# Patient Record
Sex: FEMALE | Race: WHITE | NOT HISPANIC OR LATINO | Employment: OTHER | URBAN - METROPOLITAN AREA
[De-identification: names, ages, dates, MRNs, and addresses within clinical notes are randomized per-mention and may not be internally consistent; named-entity substitution may affect disease eponyms.]

---

## 2018-01-17 ENCOUNTER — CONVERSION ENCOUNTER (OUTPATIENT)
Dept: CARDIOLOGY | Facility: CLINIC | Age: 68
End: 2018-01-17

## 2018-01-17 ENCOUNTER — CONVERSION ENCOUNTER (OUTPATIENT)
Dept: CARDIOLOGY | Facility: CLINIC | Age: 68
End: 2018-01-17
Attending: INTERNAL MEDICINE

## 2018-07-23 ENCOUNTER — OFFICE VISIT CONVERTED (OUTPATIENT)
Dept: CARDIOLOGY | Facility: CLINIC | Age: 68
End: 2018-07-23
Attending: INTERNAL MEDICINE

## 2018-07-23 ENCOUNTER — CONVERSION ENCOUNTER (OUTPATIENT)
Dept: CARDIOLOGY | Facility: CLINIC | Age: 68
End: 2018-07-23

## 2019-01-16 ENCOUNTER — HOSPITAL ENCOUNTER (OUTPATIENT)
Dept: OTHER | Facility: HOSPITAL | Age: 69
Discharge: HOME | End: 2019-01-16

## 2019-01-16 LAB
ALBUMIN SERPL-MCNC: 4.1 G/DL (ref 3.5–5)
ALBUMIN/GLOB SERPL: 1.2 {RATIO} (ref 1.4–2.6)
ALP SERPL-CCNC: 86 U/L (ref 43–160)
ALT SERPL-CCNC: 14 U/L (ref 10–40)
ANION GAP SERPL CALC-SCNC: 19 MMOL/L (ref 8–19)
AST SERPL-CCNC: 16 U/L (ref 15–50)
BASOPHILS # BLD AUTO: 0.01 10*3/UL (ref 0–0.2)
BASOPHILS NFR BLD AUTO: 0.14 % (ref 0–3)
BILIRUB SERPL-MCNC: 0.61 MG/DL (ref 0.2–1.3)
BUN SERPL-MCNC: 12 MG/DL (ref 5–25)
BUN/CREAT SERPL: 12 {RATIO} (ref 6–20)
CALCIUM SERPL-MCNC: 9.6 MG/DL (ref 8.7–10.4)
CHLORIDE SERPL-SCNC: 103 MMOL/L (ref 99–111)
CHOLEST SERPL-MCNC: 99 MG/DL (ref 107–200)
CHOLEST/HDLC SERPL: 3.2 {RATIO} (ref 3–6)
CONV CO2: 25 MMOL/L (ref 22–32)
CONV TOTAL PROTEIN: 7.5 G/DL (ref 6.3–8.2)
CREAT UR-MCNC: 1.02 MG/DL (ref 0.5–0.9)
DIGOXIN SERPL-MCNC: 0.7 NG/ML (ref 0.5–2)
EOSINOPHIL # BLD AUTO: 0.13 10*3/UL (ref 0–0.7)
EOSINOPHIL # BLD AUTO: 1.36 % (ref 0–7)
ERYTHROCYTE [DISTWIDTH] IN BLOOD BY AUTOMATED COUNT: 11.7 % (ref 11.5–14.5)
EST. AVERAGE GLUCOSE BLD GHB EST-MCNC: 166 MG/DL
GFR SERPLBLD BASED ON 1.73 SQ M-ARVRAT: 57 ML/MIN/{1.73_M2}
GLOBULIN UR ELPH-MCNC: 3.4 G/DL (ref 2–3.5)
GLUCOSE SERPL-MCNC: 189 MG/DL (ref 65–99)
HBA1C MFR BLD: 15.2 G/DL (ref 12–16)
HBA1C MFR BLD: 7.4 % (ref 3.5–5.7)
HCT VFR BLD AUTO: 47.4 % (ref 37–47)
HDLC SERPL-MCNC: 31 MG/DL (ref 40–60)
LDLC SERPL CALC-MCNC: 38 MG/DL (ref 70–100)
LYMPHOCYTES # BLD AUTO: 1.99 10*3/UL (ref 1–5)
MCH RBC QN AUTO: 28.5 PG (ref 27–31)
MCHC RBC AUTO-ENTMCNC: 32.1 G/DL (ref 33–37)
MCV RBC AUTO: 88.8 FL (ref 81–99)
MONOCYTES # BLD AUTO: 0.53 10*3/UL (ref 0.2–1.2)
MONOCYTES NFR BLD AUTO: 5.57 % (ref 3–10)
NEUTROPHILS # BLD AUTO: 6.9 10*3/UL (ref 2–8)
NEUTROPHILS NFR BLD AUTO: 72.1 % (ref 30–85)
NRBC BLD AUTO-RTO: 0 % (ref 0–0.01)
OSMOLALITY SERPL CALC.SUM OF ELEC: 299 MOSM/KG (ref 273–304)
PLATELET # BLD AUTO: 84 10*3/UL (ref 130–400)
PMV BLD AUTO: 11.4 FL (ref 7.4–10.4)
POTASSIUM SERPL-SCNC: 4.8 MMOL/L (ref 3.5–5.3)
RBC # BLD AUTO: 5.34 10*6/UL (ref 4.2–5.4)
SODIUM SERPL-SCNC: 142 MMOL/L (ref 135–147)
TRIGL SERPL-MCNC: 150 MG/DL (ref 40–150)
VARIANT LYMPHS NFR BLD MANUAL: 20.8 % (ref 20–45)
VLDLC SERPL-MCNC: 30 MG/DL (ref 5–37)
WBC # BLD AUTO: 9.56 10*3/UL (ref 4.8–10.8)

## 2019-01-23 ENCOUNTER — OFFICE VISIT CONVERTED (OUTPATIENT)
Dept: CARDIOLOGY | Facility: CLINIC | Age: 69
End: 2019-01-23
Attending: INTERNAL MEDICINE

## 2019-04-17 ENCOUNTER — HOSPITAL ENCOUNTER (OUTPATIENT)
Dept: OTHER | Facility: HOSPITAL | Age: 69
Discharge: HOME | End: 2019-04-17

## 2019-04-22 ENCOUNTER — HOSPITAL ENCOUNTER (OUTPATIENT)
Dept: OTHER | Facility: HOSPITAL | Age: 69
Discharge: HOME | End: 2019-04-22

## 2019-04-22 LAB
ANION GAP SERPL CALC-SCNC: 21 MMOL/L (ref 8–19)
BASOPHILS # BLD AUTO: 0.03 10*3/UL (ref 0–0.2)
BASOPHILS NFR BLD AUTO: 0.4 % (ref 0–3)
BUN SERPL-MCNC: 11 MG/DL (ref 5–25)
BUN/CREAT SERPL: 12 {RATIO} (ref 6–20)
CALCIUM SERPL-MCNC: 9.2 MG/DL (ref 8.7–10.4)
CHLORIDE SERPL-SCNC: 98 MMOL/L (ref 99–111)
CONV ABS IMM GRAN: 0.04 10*3/UL (ref 0–0.2)
CONV CO2: 24 MMOL/L (ref 22–32)
CONV IMMATURE GRAN: 0.5 % (ref 0–1.8)
CREAT UR-MCNC: 0.91 MG/DL (ref 0.5–0.9)
DEPRECATED RDW RBC AUTO: 43.1 FL (ref 36.4–46.3)
EOSINOPHIL # BLD AUTO: 0.1 10*3/UL (ref 0–0.7)
EOSINOPHIL # BLD AUTO: 1.3 % (ref 0–7)
ERYTHROCYTE [DISTWIDTH] IN BLOOD BY AUTOMATED COUNT: 13.3 % (ref 11.7–14.4)
EST. AVERAGE GLUCOSE BLD GHB EST-MCNC: 223 MG/DL
GFR SERPLBLD BASED ON 1.73 SQ M-ARVRAT: >60 ML/MIN/{1.73_M2}
GLUCOSE SERPL-MCNC: 260 MG/DL (ref 65–99)
HBA1C MFR BLD: 15.2 G/DL (ref 12–16)
HBA1C MFR BLD: 9.4 % (ref 3.5–5.7)
HCT VFR BLD AUTO: 47.4 % (ref 37–47)
LYMPHOCYTES # BLD AUTO: 2.1 10*3/UL (ref 1–5)
MCH RBC QN AUTO: 28.5 PG (ref 27–31)
MCHC RBC AUTO-ENTMCNC: 32.1 G/DL (ref 33–37)
MCV RBC AUTO: 88.9 FL (ref 81–99)
MONOCYTES # BLD AUTO: 0.53 10*3/UL (ref 0.2–1.2)
MONOCYTES NFR BLD AUTO: 7.1 % (ref 3–10)
NEUTROPHILS # BLD AUTO: 4.71 10*3/UL (ref 2–8)
NEUTROPHILS NFR BLD AUTO: 62.7 % (ref 30–85)
NRBC CBCN: 0 % (ref 0–0.7)
OSMOLALITY SERPL CALC.SUM OF ELEC: 294 MOSM/KG (ref 273–304)
PLATELET # BLD AUTO: 78 10*3/UL (ref 130–400)
PMV BLD AUTO: 14.9 FL (ref 9.4–12.3)
POTASSIUM SERPL-SCNC: 4.7 MMOL/L (ref 3.5–5.3)
RBC # BLD AUTO: 5.33 10*6/UL (ref 4.2–5.4)
SODIUM SERPL-SCNC: 138 MMOL/L (ref 135–147)
TSH SERPL-ACNC: 2.07 M[IU]/L (ref 0.27–4.2)
VARIANT LYMPHS NFR BLD MANUAL: 28 % (ref 20–45)
WBC # BLD AUTO: 7.51 10*3/UL (ref 4.8–10.8)

## 2019-05-02 ENCOUNTER — OFFICE VISIT CONVERTED (OUTPATIENT)
Dept: FAMILY MEDICINE CLINIC | Facility: CLINIC | Age: 69
End: 2019-05-02
Attending: NURSE PRACTITIONER

## 2019-07-17 ENCOUNTER — HOSPITAL ENCOUNTER (OUTPATIENT)
Dept: OTHER | Facility: HOSPITAL | Age: 69
Discharge: HOME | End: 2019-07-17

## 2019-07-17 LAB
ALBUMIN SERPL-MCNC: 4.1 G/DL (ref 3.5–5)
ALBUMIN/GLOB SERPL: 1.3 {RATIO} (ref 1.4–2.6)
ALP SERPL-CCNC: 72 U/L (ref 43–160)
ALT SERPL-CCNC: 17 U/L (ref 10–40)
ANION GAP SERPL CALC-SCNC: 18 MMOL/L (ref 8–19)
AST SERPL-CCNC: 18 U/L (ref 15–50)
BILIRUB SERPL-MCNC: 0.73 MG/DL (ref 0.2–1.3)
BUN SERPL-MCNC: 12 MG/DL (ref 5–25)
BUN/CREAT SERPL: 12 {RATIO} (ref 6–20)
CALCIUM SERPL-MCNC: 9.2 MG/DL (ref 8.7–10.4)
CHLORIDE SERPL-SCNC: 100 MMOL/L (ref 99–111)
CHOLEST SERPL-MCNC: 110 MG/DL (ref 107–200)
CHOLEST/HDLC SERPL: 3.5 {RATIO} (ref 3–6)
CONV CO2: 27 MMOL/L (ref 22–32)
CONV TOTAL PROTEIN: 7.2 G/DL (ref 6.3–8.2)
CREAT UR-MCNC: 1 MG/DL (ref 0.5–0.9)
DIGOXIN SERPL-MCNC: 0.9 NG/ML (ref 0.5–2)
EST. AVERAGE GLUCOSE BLD GHB EST-MCNC: 177 MG/DL
GFR SERPLBLD BASED ON 1.73 SQ M-ARVRAT: 58 ML/MIN/{1.73_M2}
GLOBULIN UR ELPH-MCNC: 3.1 G/DL (ref 2–3.5)
GLUCOSE SERPL-MCNC: 158 MG/DL (ref 65–99)
HBA1C MFR BLD: 7.8 % (ref 3.5–5.7)
HDLC SERPL-MCNC: 31 MG/DL (ref 40–60)
LDLC SERPL CALC-MCNC: 39 MG/DL (ref 70–100)
OSMOLALITY SERPL CALC.SUM OF ELEC: 293 MOSM/KG (ref 273–304)
POTASSIUM SERPL-SCNC: 5.3 MMOL/L (ref 3.5–5.3)
SODIUM SERPL-SCNC: 140 MMOL/L (ref 135–147)
TRIGL SERPL-MCNC: 201 MG/DL (ref 40–150)
VLDLC SERPL-MCNC: 40 MG/DL (ref 5–37)

## 2019-08-07 ENCOUNTER — CONVERSION ENCOUNTER (OUTPATIENT)
Dept: OTHER | Facility: HOSPITAL | Age: 69
End: 2019-08-07

## 2019-08-07 ENCOUNTER — CONVERSION ENCOUNTER (OUTPATIENT)
Dept: CARDIOLOGY | Facility: CLINIC | Age: 69
End: 2019-08-07
Attending: INTERNAL MEDICINE

## 2019-08-15 ENCOUNTER — OFFICE VISIT CONVERTED (OUTPATIENT)
Dept: FAMILY MEDICINE CLINIC | Facility: CLINIC | Age: 69
End: 2019-08-15
Attending: NURSE PRACTITIONER

## 2019-08-16 ENCOUNTER — HOSPITAL ENCOUNTER (OUTPATIENT)
Dept: OTHER | Facility: HOSPITAL | Age: 69
Discharge: HOME | End: 2019-08-16
Attending: NURSE PRACTITIONER

## 2019-10-21 ENCOUNTER — HOSPITAL ENCOUNTER (OUTPATIENT)
Dept: FAMILY MEDICINE CLINIC | Facility: CLINIC | Age: 69
Discharge: HOME | End: 2019-10-21
Attending: NURSE PRACTITIONER

## 2019-10-21 ENCOUNTER — CONVERSION ENCOUNTER (OUTPATIENT)
Dept: FAMILY MEDICINE CLINIC | Facility: CLINIC | Age: 69
End: 2019-10-21

## 2019-10-21 ENCOUNTER — OFFICE VISIT CONVERTED (OUTPATIENT)
Dept: FAMILY MEDICINE CLINIC | Facility: CLINIC | Age: 69
End: 2019-10-21
Attending: NURSE PRACTITIONER

## 2019-10-21 LAB
ALBUMIN SERPL-MCNC: 4 G/DL (ref 3.5–5)
ALBUMIN/GLOB SERPL: 1.3 {RATIO} (ref 1.4–2.6)
ALP SERPL-CCNC: 73 U/L (ref 43–160)
ALT SERPL-CCNC: 17 U/L (ref 10–40)
ANION GAP SERPL CALC-SCNC: 17 MMOL/L (ref 8–19)
AST SERPL-CCNC: 16 U/L (ref 15–50)
BASOPHILS # BLD AUTO: 0.03 10*3/UL (ref 0–0.2)
BASOPHILS NFR BLD AUTO: 0.4 % (ref 0–3)
BILIRUB SERPL-MCNC: 0.5 MG/DL (ref 0.2–1.3)
BUN SERPL-MCNC: 18 MG/DL (ref 5–25)
BUN/CREAT SERPL: 19 {RATIO} (ref 6–20)
CALCIUM SERPL-MCNC: 9.5 MG/DL (ref 8.7–10.4)
CHLORIDE SERPL-SCNC: 104 MMOL/L (ref 99–111)
CHOLEST SERPL-MCNC: 105 MG/DL (ref 107–200)
CHOLEST/HDLC SERPL: 3.5 {RATIO} (ref 3–6)
CONV ABS IMM GRAN: 0.06 10*3/UL (ref 0–0.2)
CONV CO2: 24 MMOL/L (ref 22–32)
CONV IMMATURE GRAN: 0.8 % (ref 0–1.8)
CONV TOTAL PROTEIN: 7.2 G/DL (ref 6.3–8.2)
CREAT UR-MCNC: 0.93 MG/DL (ref 0.5–0.9)
DEPRECATED RDW RBC AUTO: 48.3 FL (ref 36.4–46.3)
EOSINOPHIL # BLD AUTO: 0.12 10*3/UL (ref 0–0.7)
EOSINOPHIL # BLD AUTO: 1.5 % (ref 0–7)
ERYTHROCYTE [DISTWIDTH] IN BLOOD BY AUTOMATED COUNT: 13.6 % (ref 11.7–14.4)
EST. AVERAGE GLUCOSE BLD GHB EST-MCNC: 148 MG/DL
GFR SERPLBLD BASED ON 1.73 SQ M-ARVRAT: >60 ML/MIN/{1.73_M2}
GLOBULIN UR ELPH-MCNC: 3.2 G/DL (ref 2–3.5)
GLUCOSE SERPL-MCNC: 187 MG/DL (ref 65–99)
HBA1C MFR BLD: 6.8 % (ref 3.5–5.7)
HCT VFR BLD AUTO: 42 % (ref 37–47)
HDLC SERPL-MCNC: 30 MG/DL (ref 40–60)
HGB BLD-MCNC: 12.6 G/DL (ref 12–16)
INR PPP: 1.02 (ref 2–3)
LDLC SERPL CALC-MCNC: 25 MG/DL (ref 70–100)
LYMPHOCYTES # BLD AUTO: 1.84 10*3/UL (ref 1–5)
LYMPHOCYTES NFR BLD AUTO: 23.1 % (ref 20–45)
MCH RBC QN AUTO: 28.6 PG (ref 27–31)
MCHC RBC AUTO-ENTMCNC: 30 G/DL (ref 33–37)
MCV RBC AUTO: 95.5 FL (ref 81–99)
MONOCYTES # BLD AUTO: 0.48 10*3/UL (ref 0.2–1.2)
MONOCYTES NFR BLD AUTO: 6 % (ref 3–10)
NEUTROPHILS # BLD AUTO: 5.42 10*3/UL (ref 2–8)
NEUTROPHILS NFR BLD AUTO: 68.2 % (ref 30–85)
NRBC CBCN: 0 % (ref 0–0.7)
OSMOLALITY SERPL CALC.SUM OF ELEC: 297 MOSM/KG (ref 273–304)
PLATELET # BLD AUTO: 81 10*3/UL (ref 130–400)
PMV BLD AUTO: ABNORMAL FL (ref 9.4–12.3)
POTASSIUM SERPL-SCNC: 4.7 MMOL/L (ref 3.5–5.3)
PROTHROMBIN TIME: 11 S (ref 9.4–12)
RBC # BLD AUTO: 4.4 10*6/UL (ref 4.2–5.4)
SODIUM SERPL-SCNC: 140 MMOL/L (ref 135–147)
TRIGL SERPL-MCNC: 251 MG/DL (ref 40–150)
TSH SERPL-ACNC: 1.97 M[IU]/L (ref 0.27–4.2)
VIT B12 SERPL-MCNC: 387 PG/ML (ref 211–911)
VLDLC SERPL-MCNC: 50 MG/DL (ref 5–37)
WBC # BLD AUTO: 7.95 10*3/UL (ref 4.8–10.8)

## 2019-10-22 LAB
25(OH)D3 SERPL-MCNC: 8 NG/ML (ref 30–100)
IRON SATN MFR SERPL: 16 % (ref 20–55)
IRON SERPL-MCNC: 75 UG/DL (ref 60–170)
TIBC SERPL-MCNC: 458 UG/DL (ref 245–450)
TRANSFERRIN SERPL-MCNC: 320 MG/DL (ref 250–380)

## 2019-12-11 ENCOUNTER — CONVERSION ENCOUNTER (OUTPATIENT)
Dept: FAMILY MEDICINE CLINIC | Facility: CLINIC | Age: 69
End: 2019-12-11

## 2019-12-11 ENCOUNTER — OFFICE VISIT CONVERTED (OUTPATIENT)
Dept: FAMILY MEDICINE CLINIC | Facility: CLINIC | Age: 69
End: 2019-12-11
Attending: NURSE PRACTITIONER

## 2019-12-11 ENCOUNTER — HOSPITAL ENCOUNTER (OUTPATIENT)
Dept: FAMILY MEDICINE CLINIC | Facility: CLINIC | Age: 69
Discharge: HOME | End: 2019-12-11
Attending: NURSE PRACTITIONER

## 2019-12-13 LAB
BACTERIA SPEC AEROBE CULT: ABNORMAL
CIPROFLOXACIN SUSC ISLT: <=0.5
CLINDAMYCIN SUSC ISLT: 0.25
DAPTOMYCIN SUSC ISLT: 0.25
DOXYCYCLINE SUSC ISLT: <=0.5
ERYTHROMYCIN SUSC ISLT: >=8
GENTAMICIN SUSC ISLT: <=0.5
LEVOFLOXACIN SUSC ISLT: 0.25
OXACILLIN SUSC ISLT: 0.5
RIFAMPIN SUSC ISLT: <=0.5
TETRACYCLINE SUSC ISLT: <=1
TIGECYCLINE SUSC ISLT: <=0.12
TMP SMX SUSC ISLT: <=10
VANCOMYCIN SUSC ISLT: 1

## 2019-12-14 LAB
BACTERIA SPEC AEROBE CULT: ABNORMAL
CEFEPIME SUSC ISLT: <=1
CEFTAZIDIME SUSC ISLT: 2
CIPROFLOXACIN SUSC ISLT: <=0.25
CIPROFLOXACIN SUSC ISLT: <=0.5
CLINDAMYCIN SUSC ISLT: 0.25
DAPTOMYCIN SUSC ISLT: 0.25
DOXYCYCLINE SUSC ISLT: <=0.5
ERYTHROMYCIN SUSC ISLT: 0.5
GENTAMICIN SUSC ISLT: <=0.5
GENTAMICIN SUSC ISLT: <=1
LEVOFLOXACIN SUSC ISLT: 0.25
LEVOFLOXACIN SUSC ISLT: 0.5
OXACILLIN SUSC ISLT: 0.5
RIFAMPIN SUSC ISLT: <=0.5
TETRACYCLINE SUSC ISLT: <=1
TIGECYCLINE SUSC ISLT: <=0.12
TMP SMX SUSC ISLT: <=10
TOBRAMYCIN SUSC ISLT: <=1
VANCOMYCIN SUSC ISLT: 1

## 2020-01-10 ENCOUNTER — OFFICE VISIT CONVERTED (OUTPATIENT)
Dept: FAMILY MEDICINE CLINIC | Facility: CLINIC | Age: 70
End: 2020-01-10
Attending: NURSE PRACTITIONER

## 2020-01-10 ENCOUNTER — CONVERSION ENCOUNTER (OUTPATIENT)
Dept: FAMILY MEDICINE CLINIC | Facility: CLINIC | Age: 70
End: 2020-01-10

## 2020-02-03 ENCOUNTER — HOSPITAL ENCOUNTER (OUTPATIENT)
Dept: OTHER | Facility: HOSPITAL | Age: 70
Discharge: HOME | End: 2020-02-03
Attending: INTERNAL MEDICINE

## 2020-02-03 LAB
ALBUMIN SERPL-MCNC: 4.2 G/DL (ref 3.5–5)
ALBUMIN/GLOB SERPL: 1.2 {RATIO} (ref 1.4–2.6)
ALP SERPL-CCNC: 58 U/L (ref 43–160)
ALT SERPL-CCNC: 16 U/L (ref 10–40)
ANION GAP SERPL CALC-SCNC: 25 MMOL/L (ref 8–19)
AST SERPL-CCNC: 18 U/L (ref 15–50)
BASOPHILS # BLD AUTO: 0.04 10*3/UL (ref 0–0.2)
BASOPHILS NFR BLD AUTO: 0.5 % (ref 0–3)
BILIRUB SERPL-MCNC: 0.95 MG/DL (ref 0.2–1.3)
BUN SERPL-MCNC: 19 MG/DL (ref 5–25)
BUN/CREAT SERPL: 17 {RATIO} (ref 6–20)
CALCIUM SERPL-MCNC: 10.1 MG/DL (ref 8.7–10.4)
CHLORIDE SERPL-SCNC: 100 MMOL/L (ref 99–111)
CHOLEST SERPL-MCNC: 105 MG/DL (ref 107–200)
CHOLEST/HDLC SERPL: 3.8 {RATIO} (ref 3–6)
CONV ABS IMM GRAN: 0.03 10*3/UL (ref 0–0.2)
CONV CO2: 22 MMOL/L (ref 22–32)
CONV IMMATURE GRAN: 0.4 % (ref 0–1.8)
CONV TOTAL PROTEIN: 7.6 G/DL (ref 6.3–8.2)
CREAT UR-MCNC: 1.15 MG/DL (ref 0.5–0.9)
DEPRECATED RDW RBC AUTO: 45.3 FL (ref 36.4–46.3)
EOSINOPHIL # BLD AUTO: 0.12 10*3/UL (ref 0–0.7)
EOSINOPHIL # BLD AUTO: 1.4 % (ref 0–7)
ERYTHROCYTE [DISTWIDTH] IN BLOOD BY AUTOMATED COUNT: 13.7 % (ref 11.7–14.4)
GFR SERPLBLD BASED ON 1.73 SQ M-ARVRAT: 49 ML/MIN/{1.73_M2}
GLOBULIN UR ELPH-MCNC: 3.4 G/DL (ref 2–3.5)
GLUCOSE SERPL-MCNC: 149 MG/DL (ref 65–99)
HCT VFR BLD AUTO: 45.9 % (ref 37–47)
HDLC SERPL-MCNC: 28 MG/DL (ref 40–60)
HGB BLD-MCNC: 14 G/DL (ref 12–16)
LDLC SERPL CALC-MCNC: 38 MG/DL (ref 70–100)
LYMPHOCYTES # BLD AUTO: 2.25 10*3/UL (ref 1–5)
LYMPHOCYTES NFR BLD AUTO: 26.5 % (ref 20–45)
MCH RBC QN AUTO: 27.7 PG (ref 27–31)
MCHC RBC AUTO-ENTMCNC: 30.5 G/DL (ref 33–37)
MCV RBC AUTO: 90.9 FL (ref 81–99)
MONOCYTES # BLD AUTO: 0.6 10*3/UL (ref 0.2–1.2)
MONOCYTES NFR BLD AUTO: 7.1 % (ref 3–10)
NEUTROPHILS # BLD AUTO: 5.45 10*3/UL (ref 2–8)
NEUTROPHILS NFR BLD AUTO: 64.1 % (ref 30–85)
NRBC CBCN: 0 % (ref 0–0.7)
OSMOLALITY SERPL CALC.SUM OF ELEC: 299 MOSM/KG (ref 273–304)
PLATELET # BLD AUTO: 77 10*3/UL (ref 130–400)
PMV BLD AUTO: 14.4 FL (ref 9.4–12.3)
POTASSIUM SERPL-SCNC: 5.2 MMOL/L (ref 3.5–5.3)
RBC # BLD AUTO: 5.05 10*6/UL (ref 4.2–5.4)
SODIUM SERPL-SCNC: 142 MMOL/L (ref 135–147)
TRIGL SERPL-MCNC: 196 MG/DL (ref 40–150)
VLDLC SERPL-MCNC: 39 MG/DL (ref 5–37)
WBC # BLD AUTO: 8.49 10*3/UL (ref 4.8–10.8)

## 2020-02-10 ENCOUNTER — OFFICE VISIT CONVERTED (OUTPATIENT)
Dept: CARDIOLOGY | Facility: CLINIC | Age: 70
End: 2020-02-10
Attending: INTERNAL MEDICINE

## 2020-03-02 ENCOUNTER — OFFICE VISIT CONVERTED (OUTPATIENT)
Dept: FAMILY MEDICINE CLINIC | Facility: CLINIC | Age: 70
End: 2020-03-02
Attending: NURSE PRACTITIONER

## 2020-10-07 ENCOUNTER — HOSPITAL ENCOUNTER (OUTPATIENT)
Dept: OTHER | Facility: HOSPITAL | Age: 70
Discharge: HOME | End: 2020-10-07
Attending: INTERNAL MEDICINE

## 2020-10-07 LAB
ALBUMIN SERPL-MCNC: 4 G/DL (ref 3.5–5)
ALBUMIN/GLOB SERPL: 1.3 {RATIO} (ref 1.4–2.6)
ALP SERPL-CCNC: 70 U/L (ref 43–160)
ALT SERPL-CCNC: 18 U/L (ref 10–40)
ANION GAP SERPL CALC-SCNC: 17 MMOL/L (ref 8–19)
AST SERPL-CCNC: 19 U/L (ref 15–50)
BASOPHILS # BLD AUTO: 0.02 10*3/UL (ref 0–0.2)
BASOPHILS NFR BLD AUTO: 0.3 % (ref 0–3)
BILIRUB SERPL-MCNC: 0.85 MG/DL (ref 0.2–1.3)
BNP SERPL-MCNC: 853 PG/ML (ref 0–900)
BUN SERPL-MCNC: 15 MG/DL (ref 5–25)
BUN/CREAT SERPL: 14 {RATIO} (ref 6–20)
CALCIUM SERPL-MCNC: 9.4 MG/DL (ref 8.7–10.4)
CHLORIDE SERPL-SCNC: 100 MMOL/L (ref 99–111)
CHOLEST SERPL-MCNC: 107 MG/DL (ref 107–200)
CHOLEST/HDLC SERPL: 2.9 {RATIO} (ref 3–6)
CONV ABS IMM GRAN: 0.03 10*3/UL (ref 0–0.2)
CONV CO2: 25 MMOL/L (ref 22–32)
CONV IMMATURE GRAN: 0.5 % (ref 0–1.8)
CONV TOTAL PROTEIN: 7.2 G/DL (ref 6.3–8.2)
CREAT UR-MCNC: 1.09 MG/DL (ref 0.5–0.9)
DEPRECATED RDW RBC AUTO: 46.5 FL (ref 36.4–46.3)
DIGOXIN SERPL-MCNC: 0.7 NG/ML (ref 0.5–2)
EOSINOPHIL # BLD AUTO: 0.11 10*3/UL (ref 0–0.7)
EOSINOPHIL # BLD AUTO: 1.7 % (ref 0–7)
ERYTHROCYTE [DISTWIDTH] IN BLOOD BY AUTOMATED COUNT: 13.8 % (ref 11.7–14.4)
GFR SERPLBLD BASED ON 1.73 SQ M-ARVRAT: 52 ML/MIN/{1.73_M2}
GLOBULIN UR ELPH-MCNC: 3.2 G/DL (ref 2–3.5)
GLUCOSE SERPL-MCNC: 122 MG/DL (ref 65–99)
HCT VFR BLD AUTO: 44.8 % (ref 37–47)
HDLC SERPL-MCNC: 37 MG/DL (ref 40–60)
HGB BLD-MCNC: 13.8 G/DL (ref 12–16)
LDLC SERPL CALC-MCNC: 41 MG/DL (ref 70–100)
LYMPHOCYTES # BLD AUTO: 1.74 10*3/UL (ref 1–5)
LYMPHOCYTES NFR BLD AUTO: 27.2 % (ref 20–45)
MCH RBC QN AUTO: 28.3 PG (ref 27–31)
MCHC RBC AUTO-ENTMCNC: 30.8 G/DL (ref 33–37)
MCV RBC AUTO: 92 FL (ref 81–99)
MONOCYTES # BLD AUTO: 0.4 10*3/UL (ref 0.2–1.2)
MONOCYTES NFR BLD AUTO: 6.3 % (ref 3–10)
NEUTROPHILS # BLD AUTO: 4.09 10*3/UL (ref 2–8)
NEUTROPHILS NFR BLD AUTO: 64 % (ref 30–85)
NRBC CBCN: 0 % (ref 0–0.7)
OSMOLALITY SERPL CALC.SUM OF ELEC: 286 MOSM/KG (ref 273–304)
PLATELET # BLD AUTO: 71 10*3/UL (ref 130–400)
PMV BLD AUTO: 13.8 FL (ref 9.4–12.3)
POTASSIUM SERPL-SCNC: 4.8 MMOL/L (ref 3.5–5.3)
RBC # BLD AUTO: 4.87 10*6/UL (ref 4.2–5.4)
SODIUM SERPL-SCNC: 137 MMOL/L (ref 135–147)
TRIGL SERPL-MCNC: 145 MG/DL (ref 40–150)
VLDLC SERPL-MCNC: 29 MG/DL (ref 5–37)
WBC # BLD AUTO: 6.39 10*3/UL (ref 4.8–10.8)

## 2020-10-16 ENCOUNTER — OFFICE VISIT CONVERTED (OUTPATIENT)
Dept: CARDIOLOGY | Facility: CLINIC | Age: 70
End: 2020-10-16
Attending: INTERNAL MEDICINE

## 2020-10-16 ENCOUNTER — CONVERSION ENCOUNTER (OUTPATIENT)
Dept: CARDIOLOGY | Facility: CLINIC | Age: 70
End: 2020-10-16

## 2020-10-19 ENCOUNTER — CONVERSION ENCOUNTER (OUTPATIENT)
Dept: FAMILY MEDICINE CLINIC | Facility: CLINIC | Age: 70
End: 2020-10-19

## 2020-10-19 ENCOUNTER — HOSPITAL ENCOUNTER (OUTPATIENT)
Dept: FAMILY MEDICINE CLINIC | Facility: CLINIC | Age: 70
Discharge: HOME | End: 2020-10-19
Attending: NURSE PRACTITIONER

## 2020-10-19 ENCOUNTER — OFFICE VISIT CONVERTED (OUTPATIENT)
Dept: FAMILY MEDICINE CLINIC | Facility: CLINIC | Age: 70
End: 2020-10-19
Attending: NURSE PRACTITIONER

## 2020-10-19 LAB
AMYLASE SERPL-CCNC: 27 U/L (ref 30–110)
BASOPHILS # BLD AUTO: 0.05 10*3/UL (ref 0–0.2)
BASOPHILS NFR BLD AUTO: 0.5 % (ref 0–3)
CONV ABS IMM GRAN: 0.04 10*3/UL (ref 0–0.2)
CONV IMMATURE GRAN: 0.4 % (ref 0–1.8)
DEPRECATED RDW RBC AUTO: 48.2 FL (ref 36.4–46.3)
EOSINOPHIL # BLD AUTO: 0.13 10*3/UL (ref 0–0.7)
EOSINOPHIL # BLD AUTO: 1.4 % (ref 0–7)
ERYTHROCYTE [DISTWIDTH] IN BLOOD BY AUTOMATED COUNT: 14 % (ref 11.7–14.4)
HCT VFR BLD AUTO: 46.6 % (ref 37–47)
HGB BLD-MCNC: 14.2 G/DL (ref 12–16)
LIPASE SERPL-CCNC: 20 U/L (ref 5–51)
LYMPHOCYTES # BLD AUTO: 2.71 10*3/UL (ref 1–5)
LYMPHOCYTES NFR BLD AUTO: 29.3 % (ref 20–45)
MCH RBC QN AUTO: 28.6 PG (ref 27–31)
MCHC RBC AUTO-ENTMCNC: 30.5 G/DL (ref 33–37)
MCV RBC AUTO: 94 FL (ref 81–99)
MONOCYTES # BLD AUTO: 0.56 10*3/UL (ref 0.2–1.2)
MONOCYTES NFR BLD AUTO: 6 % (ref 3–10)
NEUTROPHILS # BLD AUTO: 5.77 10*3/UL (ref 2–8)
NEUTROPHILS NFR BLD AUTO: 62.4 % (ref 30–85)
NRBC CBCN: 0 % (ref 0–0.7)
PLATELET # BLD AUTO: 82 10*3/UL (ref 130–400)
PMV BLD AUTO: 15 FL (ref 9.4–12.3)
RBC # BLD AUTO: 4.96 10*6/UL (ref 4.2–5.4)
T4 FREE SERPL-MCNC: 1.5 NG/DL (ref 0.9–1.8)
TSH SERPL-ACNC: 2.14 M[IU]/L (ref 0.27–4.2)
WBC # BLD AUTO: 9.26 10*3/UL (ref 4.8–10.8)

## 2020-10-20 LAB
ALBUMIN SERPL-MCNC: 4.5 G/DL (ref 3.5–5)
ALBUMIN/GLOB SERPL: 1.6 {RATIO} (ref 1.4–2.6)
ALP SERPL-CCNC: 80 U/L (ref 43–160)
ALT SERPL-CCNC: 20 U/L (ref 10–40)
ANION GAP SERPL CALC-SCNC: 16 MMOL/L (ref 8–19)
AST SERPL-CCNC: 22 U/L (ref 15–50)
BILIRUB SERPL-MCNC: 0.6 MG/DL (ref 0.2–1.3)
BUN SERPL-MCNC: 15 MG/DL (ref 5–25)
BUN/CREAT SERPL: 14 {RATIO} (ref 6–20)
CALCIUM SERPL-MCNC: 9.6 MG/DL (ref 8.7–10.4)
CHLORIDE SERPL-SCNC: 101 MMOL/L (ref 99–111)
CHOLEST SERPL-MCNC: 119 MG/DL (ref 107–200)
CHOLEST/HDLC SERPL: 3 {RATIO} (ref 3–6)
CONV CO2: 26 MMOL/L (ref 22–32)
CONV CREATININE URINE, RANDOM: 109.5 MG/DL (ref 10–300)
CONV MICROALBUM.,U,RANDOM: 24.3 MG/L (ref 0–20)
CONV TOTAL PROTEIN: 7.4 G/DL (ref 6.3–8.2)
CREAT UR-MCNC: 1.08 MG/DL (ref 0.5–0.9)
EST. AVERAGE GLUCOSE BLD GHB EST-MCNC: 137 MG/DL
GFR SERPLBLD BASED ON 1.73 SQ M-ARVRAT: 53 ML/MIN/{1.73_M2}
GLOBULIN UR ELPH-MCNC: 2.9 G/DL (ref 2–3.5)
GLUCOSE SERPL-MCNC: 89 MG/DL (ref 65–99)
HBA1C MFR BLD: 6.4 % (ref 3.5–5.7)
HDLC SERPL-MCNC: 40 MG/DL (ref 40–60)
LDLC SERPL CALC-MCNC: 49 MG/DL (ref 70–100)
MICROALBUMIN/CREAT UR: 22.2 MG/G{CRE} (ref 0–35)
OSMOLALITY SERPL CALC.SUM OF ELEC: 288 MOSM/KG (ref 273–304)
POTASSIUM SERPL-SCNC: 4.3 MMOL/L (ref 3.5–5.3)
SODIUM SERPL-SCNC: 139 MMOL/L (ref 135–147)
TRIGL SERPL-MCNC: 148 MG/DL (ref 40–150)
VLDLC SERPL-MCNC: 30 MG/DL (ref 5–37)

## 2020-11-18 ENCOUNTER — HOSPITAL ENCOUNTER (OUTPATIENT)
Dept: OTHER | Facility: HOSPITAL | Age: 70
Discharge: HOME | End: 2020-11-18
Attending: NURSE PRACTITIONER

## 2020-11-18 LAB
ANION GAP SERPL CALC-SCNC: 21 MMOL/L (ref 8–19)
BNP SERPL-MCNC: 935 PG/ML (ref 0–900)
BUN SERPL-MCNC: 15 MG/DL (ref 5–25)
BUN/CREAT SERPL: 14 {RATIO} (ref 6–20)
CALCIUM SERPL-MCNC: 9.3 MG/DL (ref 8.7–10.4)
CHLORIDE SERPL-SCNC: 101 MMOL/L (ref 99–111)
CONV CO2: 24 MMOL/L (ref 22–32)
CREAT UR-MCNC: 1.06 MG/DL (ref 0.5–0.9)
GFR SERPLBLD BASED ON 1.73 SQ M-ARVRAT: 54 ML/MIN/{1.73_M2}
GLUCOSE SERPL-MCNC: 146 MG/DL (ref 65–99)
OSMOLALITY SERPL CALC.SUM OF ELEC: 293 MOSM/KG (ref 273–304)
POTASSIUM SERPL-SCNC: 5.6 MMOL/L (ref 3.5–5.3)
SODIUM SERPL-SCNC: 140 MMOL/L (ref 135–147)

## 2020-12-09 ENCOUNTER — CONVERSION ENCOUNTER (OUTPATIENT)
Dept: CARDIOLOGY | Facility: CLINIC | Age: 70
End: 2020-12-09

## 2020-12-09 ENCOUNTER — OFFICE VISIT CONVERTED (OUTPATIENT)
Dept: CARDIOLOGY | Facility: CLINIC | Age: 70
End: 2020-12-09
Attending: INTERNAL MEDICINE

## 2021-01-20 ENCOUNTER — OFFICE VISIT CONVERTED (OUTPATIENT)
Dept: FAMILY MEDICINE CLINIC | Facility: CLINIC | Age: 71
End: 2021-01-20
Attending: NURSE PRACTITIONER

## 2021-01-20 ENCOUNTER — HOSPITAL ENCOUNTER (OUTPATIENT)
Dept: FAMILY MEDICINE CLINIC | Facility: CLINIC | Age: 71
Discharge: HOME | End: 2021-01-20
Attending: NURSE PRACTITIONER

## 2021-01-20 LAB
BASOPHILS # BLD AUTO: 0.07 10*3/UL (ref 0–0.2)
BASOPHILS NFR BLD AUTO: 0.5 % (ref 0–3)
CONV ABS IMM GRAN: 0.05 10*3/UL (ref 0–0.2)
CONV IMMATURE GRAN: 0.4 % (ref 0–1.8)
DEPRECATED RDW RBC AUTO: 43.2 FL (ref 36.4–46.3)
EOSINOPHIL # BLD AUTO: 0.13 10*3/UL (ref 0–0.7)
EOSINOPHIL # BLD AUTO: 1 % (ref 0–7)
ERYTHROCYTE [DISTWIDTH] IN BLOOD BY AUTOMATED COUNT: 13.2 % (ref 11.7–14.4)
HCT VFR BLD AUTO: 48.3 % (ref 37–47)
HGB BLD-MCNC: 15 G/DL (ref 12–16)
LYMPHOCYTES # BLD AUTO: 2.37 10*3/UL (ref 1–5)
LYMPHOCYTES NFR BLD AUTO: 17.8 % (ref 20–45)
MCH RBC QN AUTO: 28.1 PG (ref 27–31)
MCHC RBC AUTO-ENTMCNC: 31.1 G/DL (ref 33–37)
MCV RBC AUTO: 90.6 FL (ref 81–99)
MONOCYTES # BLD AUTO: 0.87 10*3/UL (ref 0.2–1.2)
MONOCYTES NFR BLD AUTO: 6.5 % (ref 3–10)
NEUTROPHILS # BLD AUTO: 9.83 10*3/UL (ref 2–8)
NEUTROPHILS NFR BLD AUTO: 73.8 % (ref 30–85)
NRBC CBCN: 0 % (ref 0–0.7)
PLATELET # BLD AUTO: 87 10*3/UL (ref 130–400)
PMV BLD AUTO: 15.6 FL (ref 9.4–12.3)
RBC # BLD AUTO: 5.33 10*6/UL (ref 4.2–5.4)
T4 FREE SERPL-MCNC: 1.3 NG/DL (ref 0.9–1.8)
TSH SERPL-ACNC: 1.49 M[IU]/L (ref 0.27–4.2)
WBC # BLD AUTO: 13.32 10*3/UL (ref 4.8–10.8)

## 2021-01-21 LAB
25(OH)D3 SERPL-MCNC: 25.3 NG/ML (ref 30–100)
ALBUMIN SERPL-MCNC: 4.1 G/DL (ref 3.5–5)
ALBUMIN/GLOB SERPL: 1.3 {RATIO} (ref 1.4–2.6)
ALP SERPL-CCNC: 96 U/L (ref 43–160)
ALT SERPL-CCNC: 18 U/L (ref 10–40)
ANION GAP SERPL CALC-SCNC: 18 MMOL/L (ref 8–19)
AST SERPL-CCNC: 23 U/L (ref 15–50)
BILIRUB SERPL-MCNC: 0.58 MG/DL (ref 0.2–1.3)
BUN SERPL-MCNC: 16 MG/DL (ref 5–25)
BUN/CREAT SERPL: 13 {RATIO} (ref 6–20)
CALCIUM SERPL-MCNC: 9.2 MG/DL (ref 8.7–10.4)
CHLORIDE SERPL-SCNC: 102 MMOL/L (ref 99–111)
CHOLEST SERPL-MCNC: 89 MG/DL (ref 107–200)
CHOLEST/HDLC SERPL: 3 {RATIO} (ref 3–6)
CONV CO2: 26 MMOL/L (ref 22–32)
CONV CREATININE URINE, RANDOM: 80 MG/DL (ref 10–300)
CONV MICROALBUM.,U,RANDOM: <12 MG/L (ref 0–20)
CONV TOTAL PROTEIN: 7.3 G/DL (ref 6.3–8.2)
CREAT UR-MCNC: 1.26 MG/DL (ref 0.5–0.9)
EST. AVERAGE GLUCOSE BLD GHB EST-MCNC: 154 MG/DL
GFR SERPLBLD BASED ON 1.73 SQ M-ARVRAT: 44 ML/MIN/{1.73_M2}
GLOBULIN UR ELPH-MCNC: 3.2 G/DL (ref 2–3.5)
GLUCOSE SERPL-MCNC: 153 MG/DL (ref 65–99)
HBA1C MFR BLD: 7 % (ref 3.5–5.7)
HDLC SERPL-MCNC: 30 MG/DL (ref 40–60)
LDLC SERPL CALC-MCNC: 20 MG/DL (ref 70–100)
MICROALBUMIN/CREAT UR: 15 MG/G{CRE} (ref 0–35)
OSMOLALITY SERPL CALC.SUM OF ELEC: 296 MOSM/KG (ref 273–304)
POTASSIUM SERPL-SCNC: 4.6 MMOL/L (ref 3.5–5.3)
SODIUM SERPL-SCNC: 141 MMOL/L (ref 135–147)
TRIGL SERPL-MCNC: 195 MG/DL (ref 40–150)
VLDLC SERPL-MCNC: 39 MG/DL (ref 5–37)

## 2021-02-22 ENCOUNTER — CONVERSION ENCOUNTER (OUTPATIENT)
Dept: CARDIOLOGY | Facility: CLINIC | Age: 71
End: 2021-02-22

## 2021-02-22 ENCOUNTER — OFFICE VISIT CONVERTED (OUTPATIENT)
Dept: CARDIOLOGY | Facility: CLINIC | Age: 71
End: 2021-02-22
Attending: INTERNAL MEDICINE

## 2021-02-25 ENCOUNTER — HOSPITAL ENCOUNTER (OUTPATIENT)
Dept: PREADMISSION TESTING | Facility: HOSPITAL | Age: 71
Discharge: HOME | End: 2021-02-25
Attending: INTERNAL MEDICINE

## 2021-02-27 LAB — SARS-COV-2 RNA SPEC QL NAA+PROBE: NOT DETECTED

## 2021-03-02 ENCOUNTER — HOSPITAL ENCOUNTER (OUTPATIENT)
Dept: OTHER | Facility: HOSPITAL | Age: 71
Discharge: HOME | End: 2021-03-02
Attending: INTERNAL MEDICINE

## 2021-03-02 LAB
ALBUMIN SERPL-MCNC: 3.9 G/DL (ref 3.5–5)
ALBUMIN/GLOB SERPL: 1.3 {RATIO} (ref 1.4–2.6)
ALP SERPL-CCNC: 91 U/L (ref 43–160)
ALT SERPL-CCNC: 16 U/L (ref 10–40)
ANION GAP SERPL CALC-SCNC: 16 MMOL/L (ref 8–19)
AST SERPL-CCNC: 18 U/L (ref 15–50)
BASOPHILS # BLD AUTO: 0.04 10*3/UL (ref 0–0.2)
BASOPHILS NFR BLD AUTO: 0.5 % (ref 0–3)
BILIRUB SERPL-MCNC: 0.63 MG/DL (ref 0.2–1.3)
BNP SERPL-MCNC: 1010 PG/ML (ref 0–900)
BUN SERPL-MCNC: 13 MG/DL (ref 5–25)
BUN/CREAT SERPL: 14 {RATIO} (ref 6–20)
CALCIUM SERPL-MCNC: 8.7 MG/DL (ref 8.7–10.4)
CHLORIDE SERPL-SCNC: 103 MMOL/L (ref 99–111)
CHOLEST SERPL-MCNC: 84 MG/DL (ref 107–200)
CHOLEST/HDLC SERPL: 2.7 {RATIO} (ref 3–6)
CONV ABS IMM GRAN: 0.04 10*3/UL (ref 0–0.2)
CONV CO2: 24 MMOL/L (ref 22–32)
CONV IMMATURE GRAN: 0.5 % (ref 0–1.8)
CONV TOTAL PROTEIN: 6.9 G/DL (ref 6.3–8.2)
CREAT UR-MCNC: 0.92 MG/DL (ref 0.5–0.9)
DEPRECATED RDW RBC AUTO: 42.4 FL (ref 36.4–46.3)
DIGOXIN SERPL-MCNC: 0.6 NG/ML (ref 0.5–2)
EOSINOPHIL # BLD AUTO: 0.12 10*3/UL (ref 0–0.7)
EOSINOPHIL # BLD AUTO: 1.4 % (ref 0–7)
ERYTHROCYTE [DISTWIDTH] IN BLOOD BY AUTOMATED COUNT: 13.2 % (ref 11.7–14.4)
GFR SERPLBLD BASED ON 1.73 SQ M-ARVRAT: >60 ML/MIN/{1.73_M2}
GLOBULIN UR ELPH-MCNC: 3 G/DL (ref 2–3.5)
GLUCOSE SERPL-MCNC: 181 MG/DL (ref 65–99)
HCT VFR BLD AUTO: 48.3 % (ref 37–47)
HDLC SERPL-MCNC: 31 MG/DL (ref 40–60)
HGB BLD-MCNC: 15.4 G/DL (ref 12–16)
LDLC SERPL CALC-MCNC: 23 MG/DL (ref 70–100)
LYMPHOCYTES # BLD AUTO: 1.75 10*3/UL (ref 1–5)
LYMPHOCYTES NFR BLD AUTO: 20.9 % (ref 20–45)
MCH RBC QN AUTO: 28 PG (ref 27–31)
MCHC RBC AUTO-ENTMCNC: 31.9 G/DL (ref 33–37)
MCV RBC AUTO: 87.8 FL (ref 81–99)
MONOCYTES # BLD AUTO: 0.49 10*3/UL (ref 0.2–1.2)
MONOCYTES NFR BLD AUTO: 5.8 % (ref 3–10)
NEUTROPHILS # BLD AUTO: 5.95 10*3/UL (ref 2–8)
NEUTROPHILS NFR BLD AUTO: 70.9 % (ref 30–85)
NRBC CBCN: 0 % (ref 0–0.7)
OSMOLALITY SERPL CALC.SUM OF ELEC: 293 MOSM/KG (ref 273–304)
PLATELET # BLD AUTO: 72 10*3/UL (ref 130–400)
PMV BLD AUTO: 14.5 FL (ref 9.4–12.3)
POTASSIUM SERPL-SCNC: 4.4 MMOL/L (ref 3.5–5.3)
RBC # BLD AUTO: 5.5 10*6/UL (ref 4.2–5.4)
SODIUM SERPL-SCNC: 139 MMOL/L (ref 135–147)
TRIGL SERPL-MCNC: 151 MG/DL (ref 40–150)
VLDLC SERPL-MCNC: 30 MG/DL (ref 5–37)
WBC # BLD AUTO: 8.39 10*3/UL (ref 4.8–10.8)

## 2021-03-15 ENCOUNTER — CONVERSION ENCOUNTER (OUTPATIENT)
Dept: FAMILY MEDICINE CLINIC | Facility: CLINIC | Age: 71
End: 2021-03-15

## 2021-03-15 ENCOUNTER — OFFICE VISIT CONVERTED (OUTPATIENT)
Dept: FAMILY MEDICINE CLINIC | Facility: CLINIC | Age: 71
End: 2021-03-15
Attending: NURSE PRACTITIONER

## 2021-04-07 ENCOUNTER — HOSPITAL ENCOUNTER (OUTPATIENT)
Dept: OTHER | Facility: HOSPITAL | Age: 71
Discharge: HOME | End: 2021-04-07
Attending: NURSE PRACTITIONER

## 2021-04-07 LAB
ALBUMIN SERPL-MCNC: 4 G/DL (ref 3.5–5)
ALBUMIN/GLOB SERPL: 1.1 {RATIO} (ref 1.4–2.6)
ALP SERPL-CCNC: 103 U/L (ref 43–160)
ALT SERPL-CCNC: 17 U/L (ref 10–40)
ANION GAP SERPL CALC-SCNC: 16 MMOL/L (ref 8–19)
AST SERPL-CCNC: 17 U/L (ref 15–50)
BASOPHILS # BLD AUTO: 0.05 10*3/UL (ref 0–0.2)
BASOPHILS NFR BLD AUTO: 0.6 % (ref 0–3)
BILIRUB SERPL-MCNC: 0.54 MG/DL (ref 0.2–1.3)
BUN SERPL-MCNC: 18 MG/DL (ref 5–25)
BUN/CREAT SERPL: 15 {RATIO} (ref 6–20)
CALCIUM SERPL-MCNC: 9.4 MG/DL (ref 8.7–10.4)
CHLORIDE SERPL-SCNC: 102 MMOL/L (ref 99–111)
CHOLEST SERPL-MCNC: 93 MG/DL (ref 107–200)
CHOLEST/HDLC SERPL: 2.8 {RATIO} (ref 3–6)
CONV ABS IMM GRAN: 0.05 10*3/UL (ref 0–0.2)
CONV CO2: 25 MMOL/L (ref 22–32)
CONV IMMATURE GRAN: 0.6 % (ref 0–1.8)
CONV TOTAL PROTEIN: 7.5 G/DL (ref 6.3–8.2)
CREAT UR-MCNC: 1.18 MG/DL (ref 0.5–0.9)
DEPRECATED RDW RBC AUTO: 45.2 FL (ref 36.4–46.3)
DIGOXIN SERPL-MCNC: 0.6 NG/ML (ref 0.5–2)
EOSINOPHIL # BLD AUTO: 0.11 10*3/UL (ref 0–0.7)
EOSINOPHIL # BLD AUTO: 1.3 % (ref 0–7)
ERYTHROCYTE [DISTWIDTH] IN BLOOD BY AUTOMATED COUNT: 14 % (ref 11.7–14.4)
GFR SERPLBLD BASED ON 1.73 SQ M-ARVRAT: 47 ML/MIN/{1.73_M2}
GLOBULIN UR ELPH-MCNC: 3.5 G/DL (ref 2–3.5)
GLUCOSE SERPL-MCNC: 246 MG/DL (ref 65–99)
HCT VFR BLD AUTO: 50.1 % (ref 37–47)
HDLC SERPL-MCNC: 33 MG/DL (ref 40–60)
HGB BLD-MCNC: 15.8 G/DL (ref 12–16)
LDLC SERPL CALC-MCNC: 20 MG/DL (ref 70–100)
LYMPHOCYTES # BLD AUTO: 2.38 10*3/UL (ref 1–5)
LYMPHOCYTES NFR BLD AUTO: 27.4 % (ref 20–45)
MCH RBC QN AUTO: 28.1 PG (ref 27–31)
MCHC RBC AUTO-ENTMCNC: 31.5 G/DL (ref 33–37)
MCV RBC AUTO: 89.1 FL (ref 81–99)
MONOCYTES # BLD AUTO: 0.68 10*3/UL (ref 0.2–1.2)
MONOCYTES NFR BLD AUTO: 7.8 % (ref 3–10)
NEUTROPHILS # BLD AUTO: 5.43 10*3/UL (ref 2–8)
NEUTROPHILS NFR BLD AUTO: 62.3 % (ref 30–85)
NRBC CBCN: 0 % (ref 0–0.7)
OSMOLALITY SERPL CALC.SUM OF ELEC: 296 MOSM/KG (ref 273–304)
PLATELET # BLD AUTO: 90 10*3/UL (ref 130–400)
PMV BLD AUTO: 14.6 FL (ref 9.4–12.3)
POTASSIUM SERPL-SCNC: 4.8 MMOL/L (ref 3.5–5.3)
RBC # BLD AUTO: 5.62 10*6/UL (ref 4.2–5.4)
SODIUM SERPL-SCNC: 138 MMOL/L (ref 135–147)
TRIGL SERPL-MCNC: 201 MG/DL (ref 40–150)
VLDLC SERPL-MCNC: 40 MG/DL (ref 5–37)
WBC # BLD AUTO: 8.7 10*3/UL (ref 4.8–10.8)

## 2021-04-14 ENCOUNTER — OFFICE VISIT CONVERTED (OUTPATIENT)
Dept: CARDIOLOGY | Facility: CLINIC | Age: 71
End: 2021-04-14
Attending: INTERNAL MEDICINE

## 2021-04-15 ENCOUNTER — OFFICE VISIT CONVERTED (OUTPATIENT)
Dept: FAMILY MEDICINE CLINIC | Facility: CLINIC | Age: 71
End: 2021-04-15
Attending: NURSE PRACTITIONER

## 2021-05-05 ENCOUNTER — HOSPITAL ENCOUNTER (OUTPATIENT)
Dept: OTHER | Facility: HOSPITAL | Age: 71
Discharge: HOME | End: 2021-05-05
Attending: NURSE PRACTITIONER

## 2021-05-05 LAB
ANION GAP SERPL CALC-SCNC: 22 MMOL/L (ref 8–19)
BNP SERPL-MCNC: 696 PG/ML (ref 0–900)
BUN SERPL-MCNC: 16 MG/DL (ref 5–25)
BUN/CREAT SERPL: 15 {RATIO} (ref 6–20)
CALCIUM SERPL-MCNC: 9.5 MG/DL (ref 8.7–10.4)
CHLORIDE SERPL-SCNC: 101 MMOL/L (ref 99–111)
CONV CO2: 21 MMOL/L (ref 22–32)
CREAT UR-MCNC: 1.09 MG/DL (ref 0.5–0.9)
GFR SERPLBLD BASED ON 1.73 SQ M-ARVRAT: 52 ML/MIN/{1.73_M2}
GLUCOSE SERPL-MCNC: 247 MG/DL (ref 65–99)
OSMOLALITY SERPL CALC.SUM OF ELEC: 299 MOSM/KG (ref 273–304)
POTASSIUM SERPL-SCNC: 4.4 MMOL/L (ref 3.5–5.3)
SODIUM SERPL-SCNC: 140 MMOL/L (ref 135–147)

## 2021-05-10 NOTE — PROCEDURES
"   Procedure Note      Patient Name: Mary Albarran   Patient ID: [id number]   Sex: Female   YOB: 1950    Primary Care Provider: Brittni BARKER   Referring Provider: Brittni BARKER    Visit Date: October 16, 2020    Provider: Tiffany Minor MD   Location: [facility]   Location Address: [address] [place] [address], [address]   [place]  [id number]   Location Phone: [phone]          FINAL REPORT   TRANSTHORACIC ECHOCARDIOGRAM REPORT    Diagnosis: Aortic Stenosis   Height: 5'4\" Weight: 260 B/P: 140/78 BSA: 2.2   Tech: BNS   MEASUREMENTS:  RVID (Diastole) : RVID. (NORMAL: 0.7 to 2.4 cm max)   LVID (Systole): 3.3 cm (Diastole): 4.4 cm . (NORMAL: 3.7 - 5.4 cm)   Posterior Wall Thickness (Diastole): 1.5 cm. (NORMAL: 0.8 - 1.1 cm)   Septal Thickness (Diastole): 1.3 cm. (NORMAL: 0.7 - 1.2 cm)   LAID (Systole): 4.2 cm. (NORMAL: 1.9 - 3.8 cm)   Aortic Root Diameter (Diastole): 2.9 cm. (NORMAL: 2.0 - 3.7 cm)   COMMENTS:  The patient underwent 2-D, M-Mode, and Doppler examination, including pulse-wave, continuous-wave, and color-flow analysis; the study is technically adequate.   FINDINGS:  MITRAL VALVE: Mild fibrocalcific changes noted.   AORTIC VALVE: Fibrocalcific changes noted of a trileaflet aortic valve with limited opening of the aortic valve leaflets.   TRICUSPID VALVE: Normal.   PULMONIC VALVE: Normal.   LEFT ATRIUM: Borderline enlarged. No masses seen. LA volume is 33 mL/m2.   AORTIC ROOT: Normal in size and motion.   LEFT VENTRICLE: The left ventricular chamber size is normal. The left ventricular wall thickness is increased with mild to moderate left ventricular hypertrophy present. The overall left ventricular systolic function is normal with an estimated EF of 55%. There is flattening of the interventricular septum most likely secondary to right ventricular pressure overload.   RIGHT ATRIUM: Prominent.   RIGHT VENTRICLE: Normal size and function.   PERICARDIUM: No effusion. "   INFERIOR VENA CAVA: Diameter is 2.0 cm with greater than 50% reduction with inspiration.   DOPPLER: Pulse-wave, continuous wave, and color-flow Doppler evaluation was performed. E/A ratio is Afib. DT= 215 msec. IVRT is 55 msec. E/E' is 19. There is mild mitral valve regurgitation present. There is tricuspid regurgitation with estimated pulmonary artery systolic pressure of 55 to 60 mmHg. There is mild aortic valve regurgitation present with a pressure half-time of 250 msec. There is mild increase in velocity across the fibrotic aortic valve with a peak gradient of 26 mmHg with a mean gradient of 12 mmHg with an estimated aortic valve area of 1.1 cm2 with a dimensionless index of 0.7.   Faxed: 10/19/2020      CONCLUSION:  1.  Left ventricular chamber size is normal. The left ventricular wall thickness is increased with mild to        moderate left ventricular hypertrophy present. The overall left ventricular systolic function is normal with an        estimated EF of 55%. There is flattening of the interventricular septum most likely secondary to right        ventricular pressure overload.   2.  Fibrocalcific changes noted of the mitral valve with mild mitral valve regurgitation.   3.  Fibrocalcific changes of the aortic valve with mild aortic valve regurgitation and moderate to moderately        severe aortic valve stenosis.   4.  Tricuspid regurgitation with estimated pulmonary artery systolic pressure of 55-60 mmHg.       Tiffany Minor MD, [facility]  PM/pap    This note was transcribed by Michelle Fuentes.  pap/pm  The above service was transcribed by Michelle Fuentes, and I attest to the accuracy of the note.  PM                 Electronically Signed by: Steph Fuentes-, Other -Author on October 23, 2020 03:52:44 PM  Electronically Co-signed by: Tiffany Minor MD -Reviewer on October 25, 2020 08:16:10 PM

## 2021-05-13 NOTE — PROGRESS NOTES
Progress Note      Patient Name: Mary Albarran   Patient ID: [id number]   Sex: Female   YOB: 1950    Primary Care Provider: Brittni BARKER   Referring Provider: Brittni BARKER    Visit Date: October 19, 2020    Provider: LUCERO Kendall   Location: [facility]   Location Address: [address] [place] [address], [address]  [id number]   Location Phone: [phone]          Chief Complaint  · F/U on diabetes  · Neuropathy      History Of Present Illness  Mary Albarran is a 70 year old /White female who presents for evaluation and treatment of:      Patient is a 70-year-old female who comes in for her 6-month follow-up.  Patient has a history of diabetes, hypertension, hyperlipidemia, COPD, chronic renal disease stage III.  Last hemoglobin A1c was 10/20/2019 at 6.8.  Patient's been complaining of some weight gain she had seen her cardiology recently who recommended due to them being known to cause weight gain.  Patient is currently on Actos and glipizide.  Patient declines a flu shot.  She is a former smoker.  Positive depression screening of 5 points mainly fatigue.  She is never had a DEXA scan.  She states it has been several admissions she was had a Pap smear.  Patient is in chronic A. fib, she sees Dr. Minor at [facility].  Blood pressure today is stable at 128/76.  She denies any headache, chest pain or change in vision.  Per Dr. Minor's last note they did not increase her metoprolol to 100 mg to see if it would control her heart rate better and help with her shortness of breath.    Patient is complaining of abdominal discomfort.  She states is been ongoing for the past several days.  She denies any nausea vomiting.  She denies any fever or chills.  She denies any dysuria urgency or frequency.  She denies any change in her bowel habits.  She states that wax and wanes in intensity.       Past Medical History  Disease Name Date Onset  Notes   Allergic rhinitis --  --    Anxiety --  --    Aortic valve disease --  --    Arthritis --  --    Atrial fibrillation --  --    Chronic pain --  --    CKD (chronic kidney disease), stage 3 (moderate) --  --    COPD (chronic obstructive pulmonary disease) --  --    Depression, unspecified depression type --  --    Diabetes mellitus type 2 with complications --  --    HTN (hypertension) --  --    Hyperlipidemia --  --    Hypertension --  --    Thrombocytopenia --  --          Past Surgical History  Procedure Name Date Notes   Hysterectomy --  30 years ago         Medication List  Name Date Started Instructions   albuterol sulfate 90 mcg/actuation inhalation HFA aerosol inhaler 09/22/2020 USE 1 TO 2 INHALATIONS EVERY 6 HOURS AS NEEDED   atorvastatin 20 mg oral tablet 08/17/2020 TAKE 1 TABLET AT BEDTIME   digoxin 125 mcg (0.125 mg) oral tablet 04/01/2020 TAKE 1 TABLET ON THURSDAYS AND SUNDAYS AND 2 TABLETS THE REST OF THE WEEK   DIGOXIN TABS 0.125MG 07/24/2019 TAKE 1 TABLET ON THURSDAYS AND SUNDAYS AND 2 TABLETS THE REST OF THE WEEK   furosemide 20 mg oral tablet 06/15/2020 TAKE 1 TABLET DAILY   gabapentin 600 mg oral tablet 04/30/2020 take 2 tablets by oral route 3 times a day for 90 days   hydrocodone-acetaminophen  mg oral tablet 10/19/2020 Take 1 tablet every 4 hours as needed for severe/chronic pain X 30 days   irbesartan 150 mg oral tablet 04/27/2020 TAKE 1 TABLET AT BEDTIME   Lasix 20 mg oral tablet 01/10/2020 take 1 tablet (20 mg) by oral route once daily for 90 days   Lyrica 50 mg oral capsule 10/19/2020 take 1 capsule (50 mg) by oral route 3 times per day for 30 days   meclizine 25 mg oral tablet 05/02/2019 take 1 tablet (25 mg) by oral route 2 times per day as needed for 30 days   metoprolol succinate 100 mg oral tablet extended release 24 hr 09/08/2020 TAKE 1 TABLET TWICE A DAY   metoprolol succinate 50 mg oral tablet extended release 24 hr 10/20/2020 TAKE ONE TABLET TWICE DAILY (ALONG WITH  METOPROLOL  MG BID)   potassium chloride 10 mEq oral capsule, extended release 06/15/2020 TAKE 1 CAPSULE DAILY WITH FOOD   ProAir HFA 90 mcg/actuation inhalation HFA aerosol inhaler 01/10/2020 inhale 1 - 2 puffs (90 - 180 mcg) by inhalation route every 6 hours as needed for 90 days   Silvadene 1 % topical cream 05/11/2020 apply a 1/16 inch (1.5 mm) thick layer to entire burn area by topical route once daily for 90 days   Xarelto 20 mg oral tablet 08/20/2020 TAKE 1 TABLET DAILY         Allergy List  Allergen Name Date Reaction Notes   NO KNOWN DRUG ALLERGIES --  --  --          Family Medical History  Disease Name Relative/Age Notes   Heart Disease Father/   sibling grandparent   Diabetes  --          Social History  Finding Status Start/Stop Quantity Notes   Alcohol --  --/-- --  --    Tobacco Former --/-- 1 ppd 1 ppd for 30 years         Immunizations  NameDate Admin Mfg Trade Name Lot Number Route Inj VIS Given VIS Publication   InfluenzaRefused 10/19/2020 NE Not Entered  NE NE     Comments:          Review of Systems  · Constitutional  o Denies  o : fever, fatigue, weight loss, weight gain  · Eyes  o Denies  o : double vision, impaired vision, blurred vision  · HENT  o Denies  o : headaches, vertigo, lightheadedness  · Cardiovascular  o Denies  o : lower extremity edema, claudication, chest pressure, palpitations  · Respiratory  o Admits  o : shortness of breath  o Denies  o : wheezing, cough, hemoptysis, dyspnea on exertion  · Gastrointestinal  o Denies  o : nausea, vomiting, diarrhea, constipation, abdominal pain  · Genitourinary  o Denies  o : urgency, frequency, dysuria  · Integument  o Denies  o : rash, itching, pigmentation changes  · Neurologic  o Admits  o : tingling or numbness  o Denies  o : incoordination, difficulty concentrating, loss of balance  · Psychiatric  o Denies  o : anxiety, depression, suicidal ideation, homicidal ideation      Vitals  Date Time BP Position Site L\R Cuff Size HR RR  "TEMP (F) WT  HT  BMI kg/m2 BSA m2 O2 Sat FR L/min FiO2        10/19/2020 03:04 /76 Sitting    109 - R  97.5 263lbs 8oz 5'  4\" 45.23 2.32 95 %            Physical Examination  · Constitutional  o Appearance  o : well-nourished, well developed, in no acute distress  · Eyes  o Conjunctivae  o : conjunctivae normal, no exudates present  o Sclerae  o : sclerae white  o Pupils and Irises  o : pupils equal and round, and reactive to light and accomodation bilaterally  o Eyelids/Ocular Adnexae  o : extra ocular movements intact  · Respiratory  o Respiratory Effort  o : breathing unlabored, no accessory muscle use  o Inspection of Chest  o : normal appearance, no retractions  o Auscultation of Lungs  o : normal breath sounds bilaterally  · Cardiovascular  o Heart  o :   § Auscultation of Heart  § : regular rate and rhythm, no murmurs, gallops or rubs  o Peripheral Vascular System  o :   § Extremities  § : no edema  · Gastrointestinal  o Abdominal Examination  o : soft, non tender, non distended, with no gaurding, rebound, or rigidity, normal sounds, no masses present, no CVA tenderness  · Neurologic  o Mental Status Examination  o :   § Orientation  § : alert and oriented x3  § Speech/Language  § : normal speech pattern  o Gait and Station  o : normal gait, able to stand without difficulty  · Psychiatric  o Judgement and Insight  o : judgment and insight intact, judgement for everyday activities and social situations within normal limits, insight intact  o Thought Processes  o : rate of thoughts normal, thought content logical          Results  · In-Office Procedures  o Lab procedure  § IOP - Urine Drug Screen In-House [facility] (58334)   § Amphetamines Ur Ql: Negative   § Barbiturates Ur Ql: Negative   § Buprenorphine+Nor Ur Ql Scn: Negative   § Benzodiaz Ur Ql: Negative   § Cocaine Ur Ql: Negative   § Methadone Ur Ql: Negative   § Methamphet Ur Ql: Negative   § MDMA Ur Ql Scn: Negative   § Opiates Ur Ql: Positive "   § Oxycodone Ur Ql: Negative   § PCP Ur Ql: Negative   § THC Ur Ql: Negative   § Temp in Range?: Within/Acceptable   § Control Seen?: Yes       Assessment  · Abdominal pain     789.00/R10.9  · Diabetes mellitus, type 2     250.00/E11.9  · Essential hypertension     401.9/I10  · Hyperlipidemia     272.4/E78.5  · Chronic pain     338.29/G89.29      Plan  · Orders  o Amylase and lipase panel (83723, 12489) - 789.00/R10.9 - 10/19/2020  o Diabetes 2 Panel (Urine Microalbumin, CMP, Lipid, A1c, ) [facility] (42375, 98275, 54338, 41258) - 250.00/E11.9 - 10/19/2020  o CBC with Auto Diff [facility] (04369) - 250.00/E11.9 - 10/19/2020  o Thyroid Profile (55300, THYII, 86996) - 250.00/E11.9 - 10/19/2020  o ACO-39: Current medications updated and reviewed (, 1159F) - - 10/19/2020  o ACO-18: Positive screen for clinical depression using a standardized tool and a follow-up plan documented () - - 10/19/2020   5  o ACO-14: Influenza immunization was not administered for reasons documented [facility] () - - 10/19/2020   Declined  · Medications  o Jardiance 25 mg oral tablet   SIG: take 1 tablet (25 mg) by oral route once daily in the morning for 30 days   DISP: (30) Tablet with 4 refills  Prescribed on 10/19/2020     o Lyrica 150 mg oral capsule   SIG: Take one tablet at bedtime X 7 days, increase to 1 tablet BID X 7 days, Increase to TID   DISP: (62) Capsule with 0 refills  Adjusted on 10/20/2020     o hydrocodone-acetaminophen  mg oral tablet   SIG: Take 1 tablet every 4 hours as needed for severe/chronic pain X 30 days   DISP: (90) Tablet with 0 refills  Adjusted on 10/19/2020     o gabapentin 600 mg oral tablet   SIG: take 2 tablets by oral route 3 times a day for 90 days   DISP: (540) tablet with 1 refills  Discontinued on 10/20/2020     o glipizide 10 mg oral tablet   SIG: TAKE 2 TABLETS DAILY   DISP: (180) Tablet with -1 refills  Discontinued on 10/19/2020     o Norco  mg oral tablet   SIG: take 1 tablet by oral 3 times  a day as needed for severe pain   DISP: (90) tablets with 0 refills  Discontinued on 10/19/2020     o Medications have been Reconciled  o Transition of Care or Provider Policy  · Instructions  o Instructed to seek medical attention urgently for new or worsening symptoms.  o Advised that cheeses and other sources of dairy fats, animal fats, fast food, and the extras (candy, pastries, pies, doughnuts and cookies) all contain LDL raising nutrients. Advised to increase fruits, vegetables, whole grains, and to monitor portion sizes.   o Patient was educated/instructed on their diagnosis, treatment and medications prior to discharge from the clinic today.  · Disposition  o Call or Return if symptoms worsen or persist.  o follow up as needed  o call the office with any questions or concerns  o follow up in 1 month            Electronically Signed by: Brittni Quiroz APRN -Author on October 20, 2020 05:34:14 PM

## 2021-05-14 VITALS
OXYGEN SATURATION: 99 % | TEMPERATURE: 97.6 F | BODY MASS INDEX: 44.43 KG/M2 | HEIGHT: 64 IN | SYSTOLIC BLOOD PRESSURE: 142 MMHG | WEIGHT: 260.25 LBS | DIASTOLIC BLOOD PRESSURE: 86 MMHG | HEART RATE: 66 BPM

## 2021-05-14 VITALS
DIASTOLIC BLOOD PRESSURE: 82 MMHG | TEMPERATURE: 96.5 F | SYSTOLIC BLOOD PRESSURE: 138 MMHG | HEIGHT: 64 IN | HEART RATE: 78 BPM | OXYGEN SATURATION: 95 % | WEIGHT: 259.25 LBS | BODY MASS INDEX: 44.26 KG/M2

## 2021-05-14 VITALS
OXYGEN SATURATION: 97 % | HEIGHT: 64 IN | BODY MASS INDEX: 44.92 KG/M2 | TEMPERATURE: 97.6 F | SYSTOLIC BLOOD PRESSURE: 142 MMHG | HEART RATE: 94 BPM | DIASTOLIC BLOOD PRESSURE: 66 MMHG | WEIGHT: 263.12 LBS

## 2021-05-14 VITALS
HEART RATE: 80 BPM | WEIGHT: 261 LBS | HEIGHT: 64 IN | BODY MASS INDEX: 44.56 KG/M2 | SYSTOLIC BLOOD PRESSURE: 154 MMHG | DIASTOLIC BLOOD PRESSURE: 42 MMHG

## 2021-05-14 VITALS
HEART RATE: 98 BPM | SYSTOLIC BLOOD PRESSURE: 146 MMHG | WEIGHT: 258 LBS | HEIGHT: 64 IN | BODY MASS INDEX: 44.05 KG/M2 | DIASTOLIC BLOOD PRESSURE: 94 MMHG

## 2021-05-14 VITALS
BODY MASS INDEX: 44.98 KG/M2 | OXYGEN SATURATION: 95 % | SYSTOLIC BLOOD PRESSURE: 128 MMHG | WEIGHT: 263.5 LBS | HEIGHT: 64 IN | DIASTOLIC BLOOD PRESSURE: 76 MMHG | TEMPERATURE: 97.5 F | HEART RATE: 109 BPM

## 2021-05-14 VITALS
SYSTOLIC BLOOD PRESSURE: 148 MMHG | WEIGHT: 265 LBS | HEART RATE: 86 BPM | BODY MASS INDEX: 45.24 KG/M2 | DIASTOLIC BLOOD PRESSURE: 78 MMHG | HEIGHT: 64 IN

## 2021-05-14 VITALS — SYSTOLIC BLOOD PRESSURE: 132 MMHG | DIASTOLIC BLOOD PRESSURE: 94 MMHG | HEART RATE: 86 BPM

## 2021-05-14 NOTE — PROGRESS NOTES
Progress Note      Patient Name: Mary Albarran   Patient ID: [id number]   Sex: Female   YOB: 1950    Primary Care Provider: Brittni BARKER   Referring Provider: Brittni BARKER    Visit Date: March 15, 2021    Provider: LUCERO Kendall   Location: [facility]   Location Address: [address] [place] [address], [address] [place] [address]  [id number]   Location Phone: [phone]          Chief Complaint  · follow-up  · Dyspnea      History Of Present Illness  Mary Albarran is a 70 year old /White female who presents for evaluation and treatment of:      Patient is a 70-year-old female who comes in for follow-up appointment.  Patient recently seen Dr. Minor cardiology he states there is no acute findings that suggest any recent surgeries that need to be completed but she does have an upcoming appointment in April to discuss her ultrasounds of her carotids other vessels to make sure she does not need any stent placement.    Patient has COPD she is currently on albuterol and Combivent.  She states that she gets short of breath very easily and is unable to complete a grocery store trip without having to sit down.  She does not like the Combivent is no longer helping her.  She does have dry nonproductive cough.  She does feel more short of breath at nighttime.  She denies any fever or chills.  She states symptoms are consistent with her COPD but just feels like the medications are no longer helping.       Past Medical History  Disease Name Date Onset Notes   Allergic rhinitis --  --    Anxiety --  --    Aortic valve disease --  --    Arthritis --  --    Atrial fibrillation --  --    Chronic pain --  --    CKD (chronic kidney disease), stage 3 (moderate) --  --    COPD (chronic obstructive pulmonary disease) --  --    Depression, unspecified depression type --  --    Diabetes mellitus type 2 with complications --  --    HTN (hypertension) --  --    Hyperlipidemia --  --     Hypertension --  --    Thrombocytopenia --  --          Past Surgical History  Procedure Name Date Notes   Hysterectomy --  30 years ago         Medication List  Name Date Started Instructions   albuterol sulfate 90 mcg/actuation inhalation HFA aerosol inhaler 09/22/2020 USE 1 TO 2 INHALATIONS EVERY 6 HOURS AS NEEDED   atorvastatin 20 mg oral tablet 12/18/2020 TAKE 1 TABLET AT BEDTIME   Centrum oral  take 1 by oral route daily   Combivent Respimat  mcg/actuation inhalation mist 03/04/2021 inhale 1 puff by inhalation route 4 times per day ; may take additional puffs as needed not to exceed 6 puffs in 24hrs for 90 days   digoxin 125 mcg (0.125 mg) oral tablet 12/01/2020 TAKE 1 TABLET ON THURSDAYS AND SUNDAYS AND 2 TABLETS THE REST OF THE WEEK   DIGOXIN TABS 0.125MG 07/24/2019 TAKE 1 TABLET ON THURSDAYS AND SUNDAYS AND 2 TABLETS THE REST OF THE WEEK   diltiazem HCl 120 mg oral capsule,extended release 24 hr 12/09/2020 take 1 capsule (120 mg) by oral route once daily at noon   Farxiga 10 mg oral tablet 11/10/2020 take 1 tablet (10 mg) by oral route once daily in the morning for 30 days   furosemide 20 mg oral tablet 10/26/2020 TAKE 1 TABLET every other day alternating with 2 tabs on opposite day and as directed   gabapentin 600 mg oral tablet  take 1 tablet by oral route once a day (in the morning)   glipizide 10 mg oral tablet 12/07/2020 take 2 tablets by oral route daily for 90 days   hydrocodone-acetaminophen  mg oral tablet 02/22/2021 Take 1 tablet every 4 hours as needed for severe/chronic pain X 30 days   irbesartan 150 mg oral tablet 11/18/2020 TAKE 1 TABLET AT BEDTIME   Lyrica 150 mg oral capsule 12/07/2020 take 1 capsule (150 mg) by oral route 3 times per day for 90 days   metoprolol succinate 100 mg oral tablet extended release 24 hr 01/14/2021 TAKE 1 TABLET TWICE A DAY   metoprolol succinate 50 mg oral tablet extended release 24 hr 01/14/2021 TAKE ONE TABLET TWICE DAILY (ALONG WITH METOPROLOL ER  "100 MG BID)   potassium chloride 10 mEq oral capsule, extended release 06/15/2020 TAKE 1 CAPSULE DAILY WITH FOOD   Silvadene 1 % topical cream 05/11/2020 apply a 1/16 inch (1.5 mm) thick layer to entire burn area by topical route once daily for 90 days   Vitamin C 250 mg oral tablet  take 2 tablets by oral route daily   Vitamin D2 1,250 mcg (50,000 unit) oral capsule 01/21/2021 take 1 capsule by oral route weekly   Vitamin D3 25 mcg (1,000 unit) oral capsule  take 1 capsule by oral route daily   Xarelto 20 mg oral tablet 03/04/2021 TAKE 1 TABLET DAILY         Allergy List  Allergen Name Date Reaction Notes   NO KNOWN DRUG ALLERGIES --  --  --        Allergies Reconciled  Family Medical History  Disease Name Relative/Age Notes   Heart Disease Father/   sibling grandparent   Diabetes  --          Social History  Finding Status Start/Stop Quantity Notes   Alcohol --  --/-- --  --    Tobacco Former --/-- 1 ppd 1 ppd for 30 years         Immunizations  NameDate Admin Mfg Trade Name Lot Number Route Inj VIS Given VIS Publication   InfluenzaRefused 10/19/2020 NE Not Entered  NE NE     Comments:          Review of Systems  · Constitutional  o Denies  o : fever, fatigue, weight loss, weight gain  · Eyes  o Denies  o : impaired vision, blurred vision, changes in vision  · HENT  o Denies  o : headaches, vertigo, lightheadedness  · Cardiovascular  o Denies  o : lower extremity edema, claudication, chest pressure, palpitations  · Respiratory  o Admits  o : shortness of breath, cough, dyspnea on exertion  o Denies  o : wheezing, hemoptysis  · Gastrointestinal  o Denies  o : nausea, vomiting, diarrhea, constipation, abdominal pain      Vitals  Date Time BP Position Site L\R Cuff Size HR RR TEMP (F) WT  HT  BMI kg/m2 BSA m2 O2 Sat FR L/min FiO2 HC       03/15/2021 10:18 /86 Sitting    66 - R  97.6 260lbs 4oz 5'  4\" 44.67 2.31 99 %            Physical Examination  · Constitutional  o Appearance  o : well-nourished, well " developed, in no acute distress  · Eyes  o Conjunctivae  o : conjunctivae normal, no exudates present  o Sclerae  o : sclerae white  o Pupils and Irises  o : pupils equal and round, and reactive to light and accomodation bilaterally  o Eyelids/Ocular Adnexae  o : extra ocular movements intact  · Respiratory  o Respiratory Effort  o : breathing unlabored, no accessory muscle use  o Inspection of Chest  o : normal appearance, no retractions  o Auscultation of Lungs  o : normal breath sounds bilaterally  · Cardiovascular  o Heart  o :   § Auscultation of Heart  § : regular rate and rhythm, no murmurs, gallops or rubs  o Peripheral Vascular System  o :   § Extremities  § : no edema  · Neurologic  o Mental Status Examination  o :   § Orientation  § : alert and oriented x3  § Speech/Language  § : normal speech pattern  o Gait and Station  o : normal gait, able to stand without difficulty  · Psychiatric  o Judgement and Insight  o : judgment and insight intact, judgement for everyday activities and social situations within normal limits, insight intact  o Thought Processes  o : rate of thoughts normal, thought content logical  o Mood and Affect  o : mood normal, affect appropriate              Assessment  · COPD (chronic obstructive pulmonary disease)     496/J44.9  Will stop her Combivent inhaler and start on trilogy. Patient given first dose in office. Patient to follow-up in 1 month to see how she is doing. Patient verbalized understanding is agreeable treatment plan. Patient sent home with 1 month of samples.  · CAD (coronary artery disease)     414.00/I25.10      Plan  · Orders  o ACO-14: Influenza immunization was not administered for reasons documented [facility] () - - 03/15/2021   pt declines  o ACO-39: Current medications updated and reviewed (, 1159F) - - 03/15/2021  · Medications  o Trelegy Ellipta 100-62.5-25 mcg inhalation blister with device   SIG: inhale 1 puff by inhalation route once daily at the same  time each day for 30 days   DISP: (2) Canister with 0 refills  Prescribed on 03/15/2021     o Medications have been Reconciled  o Transition of Care or Provider Policy  · Instructions  o Take all medications as prescribed/directed.  o Patient was educated/instructed on their diagnosis, treatment and medications prior to discharge from the clinic today.  o Call the office with any concerns or questions.  o Flu vaccine declined.  · Disposition  o Call or Return if symptoms worsen or persist.  o call the office with any questions or concerns  o follow up in 1 month            Electronically Signed by: Brittni Quiroz APRN -Author on March 15, 2021 12:45:18 PM

## 2021-05-14 NOTE — PROGRESS NOTES
Progress Note      Patient Name: Mary Albarran   Patient ID: [id number]   Sex: Female   YOB: 1950    Primary Care Provider: Brittni BARKER   Referring Provider: Brittni BARKER    Visit Date: April 14, 2021    Provider: Tiffany Minor MD   Location: [facility]   Location Address: [address] [place] [address], [address]   [place], [place]  [id number]   Location Phone: [phone]          Chief Complaint     Shortness of breath and pedal edema.       History Of Present Illness  REFERRING PROVIDER: Brittni BARKER   Mary Albarran is a 70 year old /White female who comes in and continues to be short of breath, it is moderate even with mild exertion, does not feel any different without increasing her diuretic. Her swelling has not changed either. Continues to have occasional palpitations and irregular beats but denies any chest pain, no dizziness or syncope. She does have orthopnea because she sleeps sitting up and cannot lay down flat but denies any PND. She states that she has been put on an inhaler and it seems to help the breathing a little bit but not much and she has gained a couple of pounds since her last visit.   PAST MEDICAL HISTORY: Aortic valve disease; Atrial fibrillation; Diabetes mellitus; Hyperlipidemia; Hypertension; Thrombocytopenia.   PSYCHOSOCIAL HISTORY: Denies alcohol use. Previous use of tobacco, but quit.   CURRENT MEDICATIONS: Metoprolol  mg b.i.d.; metoprolol ER 50 b.i.d.; atorvastatin 20 mg daily; Xarelto 20 mg daily; irbesartan 150 mg daily; digoxin 0.125 mg 1 tab on Thurs., and Sun., and 2 tabs 5 days a week; diltiazem  mg; Lasix is 20 mg 2 tabs a day; glipizide 10 mg 2 tabs a day; pregabalin 150 mg t.i.d.; Farxiga 10 mg a day; Omega fish oil once a day; Trelegy inhaler; Ventolin inhaler; hydrocodone p.r.n.; vitamin B 20 mg; and potassium 10 mEq.      ALLERGIES: Codeine.       Review of Systems  · Cardiovascular  o Admits  o :  "palpitations (fast, fluttering, or skipping beats), swelling (feet, ankles, hands), shortness of breath while walking or lying flat  o Denies  o : chest pain or angina pectoris   · Respiratory  o Denies  o : chronic or frequent cough      Vitals  Date Time BP Position Site L\R Cuff Size HR RR TEMP (F) WT  HT  BMI kg/m2 BSA m2 O2 Sat FR L/min FiO2 HC       04/14/2021 12:42 /42 Sitting    80 - R   261lbs 0oz 5'  4\" 44.8 2.31       04/14/2021 12:42 /70 Sitting    76 - R                   Physical Examination  · Constitutional  o Appearance  o : Awake, alert, in no acute distress. Appears short of breath with any exertion. She ambulates with a cane.  · Eyes  o Conjunctivae  o : Conjunctivae normal.  · Ears, Nose, Mouth and Throat  o Oral Cavity  o :   § Oral Mucosa  § : Normal.  · Neck  o Inspection/Palpation/Auscultation  o : No lymphadenopathy. No JVD. No bruit. Good carotid upstroke.  · Respiratory  o Respiratory  o : Clear to percussion and auscultation. Good respiratory effort.  · Cardiovascular  o Heart  o : PMI is not well felt. S1, S2 are normal. No S3. No S4. 2/6 systolic murmur at the base heard throughout the precordium with a 1-2/6 systolic murmur at the apex.   o Peripheral Vascular System  o :   § Extremities  § : Pedal pulses difficult to palpate because she is wearing compression stockings. Appears to be a trace of pedal edema.   · Gastrointestinal  o Abdominal Examination  o : Soft. No masses or tenderness felt. No hepatosplenomegaly. Abdominal aorta is not palpable.  · Labs  o Labs  o : Hgb 15.8 with hct of 50.1, blood sugar is elevated at 246, BUN is 18, creatinine is 1.18, total cholesterol is 93, triglycerides are 201, HDL 33, LDL is at 20, dig is 0.6.           Assessment     1.  Shortness of breath.  2.  Aortic valve disease.   3.  Mitral valve regurgitation.   4.  Chronic atrial fibrillation.  5.  Chronic diastolic heart failure class 3.          Plan     1.  Stop irbesartan.   2.  " Start Entresto 49/51 b.i.d.   3.  Will check a BMP and BNP in 2 weeks.  4.  Indications and possible side effects were discussed.  5.  Continue her metoprolol, diltiazem, and Lasix in view of her hypertension. Continue the Xarelto and digoxin in view of her atrial fibrillation.  6.  Continue the potassium at this time in view of her Lasix and will monitor the potassium closely.     FOLLOW-UP: 6 weeks with BMP and BNP a few days before.         LUCERO Goins/Tiffany Minor MD, [facility]  JF:PM:                Electronically Signed by: LUCERO Armstrong -Author on May 26, 2021 11:51:30 AM  Electronically Co-signed by: Tiffany Minor MD -Reviewer on May 30, 2021 03:52:19 PM

## 2021-05-14 NOTE — PROGRESS NOTES
Progress Note      Patient Name: Mary Albarran   Patient ID: [id number]   Sex: Female   YOB: 1950    Primary Care Provider: Brittni BARKER   Referring Provider: Brittni BARKER    Visit Date: February 22, 2021    Provider: Tiffany Minor MD   Location: [facility]   Location Address: [address] [place] [address], [address]   [place]  [id number]   Location Phone: [phone]          Chief Complaint  · Shortness of breath       History Of Present Illness  REFERRING PROVIDER: Brittni BARKER   Mary Albarran is a 70-year-old female with aortic valve disease, mitral valve disease who states shortness of breath is still present and significant. She gets short of breath with mild to moderate exertion. Her pedal edema has improved. She denies dizziness or syncope.   PAST MEDICAL HISTORY: Aortic valve disease; Atrial fibrillation; Diabetes mellitus; Hyperlipidemia; Hypertension; Thrombocytopenia.   PSYCHOSOCIAL HISTORY: Denies alcohol use. Previously smoked but quit.   CURRENT MEDICATIONS: Medications have been reviewed and are as stated.      ALLERGIES:  No known drug allergies.       Review of Systems  · Cardiovascular  o Admits  o : swelling (feet, ankles, hands), shortness of breath while walking or lying flat  o Denies  o : palpitations (fast, fluttering, or skipping beats), chest pain or angina pectoris   · Respiratory  o Denies  o : chronic or frequent cough      Vitals  Date Time BP Position Site L\R Cuff Size HR RR TEMP (F) WT  HT  BMI kg/m2 BSA m2 O2 Sat FR L/min FiO2 HC       02/22/2021 10:19 /94 Sitting    86 - R             02/22/2021 10:19 /48 Sitting    66 - R                   Physical Examination  · Constitutional  o Appearance  o : Awake, alert, in no acute distress.  · Eyes  o Conjunctivae  o : Conjunctivae normal.  · Ears, Nose, Mouth and Throat  o Oral Cavity  o :   § Oral Mucosa  § : Normal.  · Neck  o Inspection/Palpation/Auscultation  o : No  lymphadenopathy. No JVD. No bruit. Good carotid upstroke.  · Respiratory  o Respiratory  o : Clear to percussion and auscultation. Good respiratory effort.  · Cardiovascular  o Heart  o : PMI not well felt. S1, irregular. S2 normal. No S3. No S4. 2/6 systolic murmur at the base heard throughout the precordium with a 1-2/6 systolic murmur at the apex.   o Peripheral Vascular System  o :   § Extremities  § : Pedal pulses difficult to palpate because she is wearing compression stockings. Appears to be a trace of pedal edema.   · Gastrointestinal  o Abdominal Examination  o : Soft. No masses or tenderness felt. No hepatosplenomegaly. Abdominal aorta is not palpable.  · EKG  o Results  o : Atrial fibrillation with controlled ventricular response. Average heart rate of 65 beats. Minimal ST depression.   o Comparison  o : EKG is improved compared to prior EKG in terms of heart rate.          Assessment     1.  Chronic diastolic heart failure, class III with preserved ejection fraction.   2.  Aortic valve disease with moderately severe aortic valve stenosis and mild to moderate mitral valve        regurgitation.   3.  Chronic atrial fibrillation with controlled ventricular response.       Plan     1.  She is finally agreeable to having further evaluation of her valvular heart disease. I am going to increase        her Lasix to 40 mg every day and schedule her for a transesophageal echocardiogram next Tuesday.        Indications and risks were reviewed with the patient. The patient will also need blood work to monitor her        potassium in view of diuretic therapy. We will get a chemistry panel, CBC, and digoxin level next week        before her ABHINAV.   2.  Further management decisions contingent upon the above evaluation.      Tiffany Minor MD, [facility]  PM/pap               Electronically Signed by: Steph Fuentes-, Other -Author on February 23, 2021 01:56:15 PM  Electronically Co-signed by: Tiffany Minor  MD -Reviewer on February 23, 2021 02:11:39 PM

## 2021-05-14 NOTE — PROGRESS NOTES
Progress Note      Patient Name: Mary Albarran   Patient ID: [id number]   Sex: Female   YOB: 1950    Primary Care Provider: Brittni BARKER   Referring Provider: Brittni BARKER    Visit Date: April 15, 2021    Provider: LUCERO Kendall   Location: [facility]   Location Address: [address] [place] [address], [address] [place] [address]  [id number]   Location Phone: [phone]          Chief Complaint  · 1 month follow-up      History Of Present Illness  Mary Albarran is a 70 year old /White female who presents for evaluation and treatment of:      Patient is a 70-year-old female who comes in for 1 month follow-up.  1 month ago I started patient on trilogy due to her COPD and having trouble breathing.  Patient states she is feeling better, she is able to stand more upright and does not feel like she needs to hunch over to catch her breath.  She states she has noticed an improvement with the trilogy.  She is complaining of some annular chelitis at the corners of her mouth but otherwise she is taking and tolerating the medication well.  She is scheduled to see pulmonology next month, hoping with the trilogy they can adjust her medications so that she is able to do the things she wants was able to such as antique shopping, going to the grocery store, or doing things around the house.       Past Medical History  Disease Name Date Onset Notes   Allergic rhinitis --  --    Anxiety --  --    Aortic valve disease --  --    Arthritis --  --    Atrial fibrillation --  --    Chronic pain --  --    CKD (chronic kidney disease), stage 3 (moderate) --  --    COPD (chronic obstructive pulmonary disease) --  --    Depression, unspecified depression type --  --    Diabetes mellitus type 2 with complications --  --    HTN (hypertension) --  --    Hyperlipidemia --  --    Hypertension --  --    Thrombocytopenia --  --          Past Surgical History  Procedure Name Date Notes    Hysterectomy --  30 years ago         Medication List  Name Date Started Instructions   albuterol sulfate 90 mcg/actuation inhalation HFA aerosol inhaler 09/22/2020 USE 1 TO 2 INHALATIONS EVERY 6 HOURS AS NEEDED   atorvastatin 20 mg oral tablet 12/18/2020 TAKE 1 TABLET AT BEDTIME   Centrum oral  take 1 by oral route daily   Combivent Respimat  mcg/actuation inhalation mist 03/04/2021 inhale 1 puff by inhalation route 4 times per day ; may take additional puffs as needed not to exceed 6 puffs in 24hrs for 90 days   digoxin 125 mcg (0.125 mg) oral tablet 12/01/2020 TAKE 1 TABLET ON THURSDAYS AND SUNDAYS AND 2 TABLETS THE REST OF THE WEEK   DIGOXIN TABS 0.125MG 07/24/2019 TAKE 1 TABLET ON THURSDAYS AND SUNDAYS AND 2 TABLETS THE REST OF THE WEEK   diltiazem HCl 120 mg oral capsule,extended release 24 hr 12/09/2020 take 1 capsule (120 mg) by oral route once daily at noon   Farxiga 10 mg oral tablet 11/10/2020 take 1 tablet (10 mg) by oral route once daily in the morning for 30 days   furosemide 20 mg oral tablet 10/26/2020 TAKE 1 TABLET every other day alternating with 2 tabs on opposite day and as directed   gabapentin 600 mg oral tablet  take 1 tablet by oral route once a day (in the morning)   glipizide 10 mg oral tablet 12/07/2020 take 2 tablets by oral route daily for 90 days   hydrocodone-acetaminophen  mg oral tablet 03/23/2021 Take 1 tablet every 4 hours as needed for severe/chronic pain X 30 days   irbesartan 150 mg oral tablet 11/18/2020 TAKE 1 TABLET AT BEDTIME   Lyrica 150 mg oral capsule 12/07/2020 take 1 capsule (150 mg) by oral route 3 times per day for 90 days   metoprolol succinate 100 mg oral tablet extended release 24 hr 01/14/2021 TAKE 1 TABLET TWICE A DAY   metoprolol succinate 50 mg oral tablet extended release 24 hr 01/14/2021 TAKE ONE TABLET TWICE DAILY (ALONG WITH METOPROLOL  MG BID)   potassium chloride 10 mEq oral capsule, extended release 06/15/2020 TAKE 1 CAPSULE DAILY  "WITH FOOD   Silvadene 1 % topical cream 05/11/2020 apply a 1/16 inch (1.5 mm) thick layer to entire burn area by topical route once daily for 90 days   Bhumi Donnelly 100-62.5-25 mcg inhalation blister with device 04/15/2021 inhale 1 puff by inhalation route once daily at the same time each day   Vitamin C 250 mg oral tablet  take 2 tablets by oral route daily   Vitamin D2 1,250 mcg (50,000 unit) oral capsule 01/21/2021 take 1 capsule by oral route weekly   Vitamin D3 25 mcg (1,000 unit) oral capsule  take 1 capsule by oral route daily   Xarelto 20 mg oral tablet 03/04/2021 TAKE 1 TABLET DAILY         Allergy List  Allergen Name Date Reaction Notes   NO KNOWN DRUG ALLERGIES --  --  --          Family Medical History  Disease Name Relative/Age Notes   Heart Disease Father/   sibling grandparent   Diabetes  --          Social History  Finding Status Start/Stop Quantity Notes   Alcohol --  --/-- --  --    Tobacco Former --/-- 1 ppd 1 ppd for 30 years         Immunizations  NameDate Admin Mfg Trade Name Lot Number Route Inj VIS Given VIS Publication   InfluenzaRefused 10/19/2020 NE Not Entered  NE NE     Comments:          Review of Systems  · Constitutional  o Denies  o : fever, fatigue, weight loss, weight gain  · Cardiovascular  o Denies  o : lower extremity edema, claudication, chest pressure, palpitations  · Respiratory  o Admits  o : shortness of breath(Improving)  o Denies  o : wheezing, cough, hemoptysis, dyspnea on exertion  · Gastrointestinal  o Denies  o : nausea, vomiting, diarrhea, constipation, abdominal pain      Vitals  Date Time BP Position Site L\R Cuff Size HR RR TEMP (F) WT  HT  BMI kg/m2 BSA m2 O2 Sat FR L/min FiO2        04/15/2021 10:54 /66 Sitting    94 - R  97.6 263lbs 2oz 5'  4\" 45.16 2.32 97 %            Physical Examination  · Constitutional  o Appearance  o : well-nourished, well developed, in no acute distress  · Eyes  o Conjunctivae  o : conjunctivae normal, no exudates " present  o Sclerae  o : sclerae white  o Pupils and Irises  o : pupils equal and round, and reactive to light and accomodation bilaterally  o Eyelids/Ocular Adnexae  o : extra ocular movements intact  · Respiratory  o Respiratory Effort  o : breathing unlabored, no accessory muscle use  o Inspection of Chest  o : normal appearance, no retractions  o Auscultation of Lungs  o : normal breath sounds bilaterally  · Cardiovascular  o Heart  o :   § Auscultation of Heart  § : regular rate and rhythm, no murmurs, gallops or rubs  o Peripheral Vascular System  o :   § Extremities  § : no edema  · Neurologic  o Mental Status Examination  o :   § Orientation  § : alert and oriented x3  § Speech/Language  § : normal speech pattern  o Gait and Station  o : normal gait, able to stand without difficulty              Assessment  · COPD (chronic obstructive pulmonary disease)     496/J44.9  Will continue on trilogy, patient was given samples in office. Patient is to see pulmonology next month in hopes that she can improve her breathing even more. Discussed return precautions. Patient verbalized understanding is agreeable treatment plan.  · Angular cheilitis     528.5/K13.0  Will start on miconazole cream, discussed with patient to use once to twice a day as needed. Patient verbalized understanding.      Plan  · Orders  o ACO-39: Current medications updated and reviewed (, 1159F) - - 04/15/2021  · Medications  o miconazole nitrate 2 % topical cream   SIG: apply to the affected area(s) by topical route 2 times per day in the morning and evening for 90 days   DISP: (3) Tube with 1 refills  Prescribed on 04/15/2021     o Trelegy Ellipta 100-62.5-25 mcg inhalation blister with device   SIG: inhale 1 puff by inhalation route once daily at the same time each day for 30 days   DISP: (1) Canister with 5 refills  Adjusted on 04/15/2021     o Medications have been Reconciled  o Transition of Care or Provider  Policy  · Instructions  o Take all medications as prescribed/directed.  o Patient was educated/instructed on their diagnosis, treatment and medications prior to discharge from the clinic today.  o Call the office with any concerns or questions.  · Disposition  o Call or Return if symptoms worsen or persist.  o follow up in 6 months  o follow up as needed  o call the office with any questions or concerns            Electronically Signed by: Brittni Quiroz APRN -Author on April 15, 2021 11:25:41 AM

## 2021-05-14 NOTE — PROGRESS NOTES
Progress Note      Patient Name: Mary Albarran   Patient ID: [id number]   Sex: Female   YOB: 1950    Primary Care Provider: Brittni BARKER   Referring Provider: Brittni BARKER    Visit Date: December 9, 2020    Provider: Tiffany Minor MD   Location: [facility]   Location Address: [address] [place] [address], [facility]   [place], [place]  [id number]   Location Phone: [phone]          Chief Complaint     Shortness of breath.       History Of Present Illness  REFERRING PROVIDER: Brittni BARKER   Mary Albarran is a 70 year old /White female who states that her shortness of breath is worse and that she cannot even walk down the [place] without getting short-winded. She does feel that the pedal edema is better since increasing her Lasix. She still has palpitations, described as irregular and fast beats. Denies any chest pain. No dizziness, syncope, PND, or orthopnea. She has lost about 7 pounds since her last visit. She did decrease gabapentin as suggested, but now, she is on Lyrica 150 mg three tabs at night.   PAST MEDICAL HISTORY: Aortic valve disease; Atrial fibrillation; Diabetes mellitus; Hyperlipidemia; Hypertension; Thrombocytopenia.   PSYCHOSOCIAL HISTORY: Previously smoked, but quit. Denies alcohol use.   CURRENT MEDICATIONS: Metoprolol succinate  mg b.i.d. and metoprolol succinate ER 50 mg b.i.d.; atorvastatin 20 mg q. h.s.; furosemide 20 mg alternating with 40 mg; Xarelto 20 mg daily; irbesartan 150 mg q. h.s.; digoxin 0.125 mg one tab two days a week and two tabs five days a week; glipizide 10 mg 2 tabs daily; potassium chloride 10 mEq daily; pregabalin 150 mg 3 tabs q. h.s.; Farxiga 10 mg daily; gabapentin 600 mg 2 tabs q. a.m.; Combivent; hydrocodone p.r.n.; Centrum Silver daily; vitamin D3 daily; vitamin C daily; Ventolin HFA.      ALLERGIES:  CODEINE.       Review of Systems  · Cardiovascular  o Admits  o : swelling (feet, ankles, hands), shortness of  "breath while walking or lying flat  o Denies  o : palpitations (fast, fluttering, or skipping beats), chest pain or angina pectoris   · Respiratory  o Denies  o : chronic or frequent cough      Vitals  Date Time BP Position Site L\R Cuff Size HR RR TEMP (F) WT  HT  BMI kg/m2 BSA m2 O2 Sat FR L/min FiO2 HC       12/09/2020 03:10 /94 Sitting    98 - R   258lbs 0oz 5'  4\" 44.29 2.3       12/09/2020 03:10 /56 Sitting    86 - R                    Blood pressure log was reviewed and range between 109/57 to 151/72, but most are in good range.       Physical Examination  · Constitutional  o Appearance  o : Awake, alert, morbidly obese female, in no acute distress, ambulates with a cane.  · Eyes  o Conjunctivae  o : Conjunctivae normal.  · Ears, Nose, Mouth and Throat  o Oral Cavity  o :   § Oral Mucosa  § : Normal.  · Neck  o Jugular Veins  o : No JVD. Good carotid upstroke. No bruits noted.  · Respiratory  o Respiratory  o : Good respiratory effort. Clear to percussion and auscultation.  · Cardiovascular  o Heart  o : PMI not well felt. S1, irregular. S2 normal. No S3. No S4. 2/6 systolic murmur at the base heard throughout the precordium with a 1-2/6 systolic murmur at the apex.   o Peripheral Vascular System  o :   § Extremities  § : Pedal pulses difficult to palpate because she is wearing compression stockings. Appears to be a trace of pedal edema.   · Gastrointestinal  o Abdominal Examination  o : Soft. No tenderness or masses felt. No hepatosplenomegaly. Abdominal aorta is not palpable.  · Labs  o Labs  o : . BUN 15, creatinine 1.06.          Assessment     1.  Shortness of breath, increasing.    2.  Aortic valve stenosis.  3.  Atrial fibrillation with slightly rapid ventricular response.  4.  Diabetes.  5.  Hyperlipidemia.  6.  Hypertension with good improving readings at home.       Plan     1.  Discussed doing a ABHINAV to evaluate the extent of her aortic stenosis, and she declines at this time.  "   2.  Add diltiazem 120 mg at noon to help with her atrial fib rate and hopefully help with her shortness of        breath.    3.  We will give her a referral to Pulmonology for lung evaluation due to her increasing shortness of breath.  4.  Check an EKG only in 1 month.  5.  Follow up in 4 months or earlier if needed.         LUCERO Goins/Tiffany Minor MD, [facility]  JF:PM:vm               Electronically Signed by: Carla Martinez-, Other -Author on December 17, 2020 09:19:39 AM  Electronically Co-signed by: LUCERO Armstrong -Reviewer on December 17, 2020 01:45:20 PM  Electronically Co-signed by: Tiffany Minor MD -Reviewer on December 19, 2020 12:08:21 AM

## 2021-05-14 NOTE — PROGRESS NOTES
Progress Note      Patient Name: Mary Albarran   Patient ID: [id number]   Sex: Female   YOB: 1950    Primary Care Provider: Brittni BARKER   Referring Provider: Brittni BARKER    Visit Date: January 20, 2021    Provider: LUCERO Kendall   Location: [facility] [place]   Location Address: [address] [place] [address], [address] [place] [address]  [id number]   Location Phone: [phone]          Chief Complaint  · 3 month follow-up  · SOB      History Of Present Illness  Mary Albarran is a 70 year old /White female who presents for evaluation and treatment of:      Patient is a 70-year-old female who comes in today for 6-month follow-up.  She declines a flu shot.  She was a former smoker.  She has never had colorectal screening or mammogram.  Last A1c was October 2020 was 6.4.  She has a history of severe polyneuropathy, atrial fibrillation and stage III chronic renal disease.  She has a history of COPD.  Has a history of diabetes, she keeps well control of her diabetes.  Blood pressure was stable at 138/82, she denies any headache, chest pain or change in vision.  She states overall she feels well, she sees Dr. Minor cardiology every 6 months.      Patient is complaining of shortness of breath.  Is been worse over the last 2 to 3 months.  Patient has a referral to see pulmonology in May per Dr. Minor.  She states her rescue inhaler does help with her shortness of breath.  She is currently on the Lasix to help with any fluid overload.  She continues to have swelling in legs and feet along with her hands.  Dr. Minor is managing these conditions.       Past Medical History  Disease Name Date Onset Notes   Allergic rhinitis --  --    Anxiety --  --    Aortic valve disease --  --    Arthritis --  --    Atrial fibrillation --  --    Chronic pain --  --    CKD (chronic kidney disease), stage 3 (moderate) --  --    COPD (chronic obstructive pulmonary disease) --  --     Depression, unspecified depression type --  --    Diabetes mellitus type 2 with complications --  --    HTN (hypertension) --  --    Hyperlipidemia --  --    Hypertension --  --    Thrombocytopenia --  --          Past Surgical History  Procedure Name Date Notes   Hysterectomy --  30 years ago         Medication List  Name Date Started Instructions   albuterol sulfate 90 mcg/actuation inhalation HFA aerosol inhaler 09/22/2020 USE 1 TO 2 INHALATIONS EVERY 6 HOURS AS NEEDED   atorvastatin 20 mg oral tablet 12/18/2020 TAKE 1 TABLET AT BEDTIME   Centrum oral  take 1 by oral route daily   Combivent Respimat  mcg/actuation inhalation mist  inhale 1 puff by inhalation route 4 times per day ; may take additional puffs as needed not to exceed 6 puffs in 24hrs   digoxin 125 mcg (0.125 mg) oral tablet 12/01/2020 TAKE 1 TABLET ON THURSDAYS AND SUNDAYS AND 2 TABLETS THE REST OF THE WEEK   DIGOXIN TABS 0.125MG 07/24/2019 TAKE 1 TABLET ON THURSDAYS AND SUNDAYS AND 2 TABLETS THE REST OF THE WEEK   diltiazem HCl 120 mg oral capsule,extended release 24 hr 12/09/2020 take 1 capsule (120 mg) by oral route once daily at noon   Farxiga 10 mg oral tablet 11/10/2020 take 1 tablet (10 mg) by oral route once daily in the morning for 30 days   furosemide 20 mg oral tablet 10/26/2020 TAKE 1 TABLET every other day alternating with 2 tabs on opposite day and as directed   gabapentin 600 mg oral tablet  take 1 tablet by oral route once a day (in the morning)   glipizide 10 mg oral tablet 12/07/2020 take 2 tablets by oral route daily for 90 days   hydrocodone-acetaminophen  mg oral tablet 12/23/2020 Take 1 tablet every 4 hours as needed for severe/chronic pain X 30 days   irbesartan 150 mg oral tablet 11/18/2020 TAKE 1 TABLET AT BEDTIME   Lyrica 150 mg oral capsule 12/07/2020 take 1 capsule (150 mg) by oral route 3 times per day for 90 days   metoprolol succinate 100 mg oral tablet extended release 24 hr 01/14/2021 TAKE 1 TABLET  TWICE A DAY   metoprolol succinate 50 mg oral tablet extended release 24 hr 01/14/2021 TAKE ONE TABLET TWICE DAILY (ALONG WITH METOPROLOL  MG BID)   potassium chloride 10 mEq oral capsule, extended release 06/15/2020 TAKE 1 CAPSULE DAILY WITH FOOD   Silvadene 1 % topical cream 05/11/2020 apply a 1/16 inch (1.5 mm) thick layer to entire burn area by topical route once daily for 90 days   Vitamin C 250 mg oral tablet  take 2 tablets by oral route daily   Vitamin D3 25 mcg (1,000 unit) oral capsule  take 1 capsule by oral route daily   Xarelto 20 mg oral tablet 08/20/2020 TAKE 1 TABLET DAILY         Allergy List  Allergen Name Date Reaction Notes   NO KNOWN DRUG ALLERGIES --  --  --        Allergies Reconciled  Family Medical History  Disease Name Relative/Age Notes   Heart Disease Father/   sibling grandparent   Diabetes  --          Social History  Finding Status Start/Stop Quantity Notes   Alcohol --  --/-- --  --    Tobacco Former --/-- 1 ppd 1 ppd for 30 years         Immunizations  NameDate Admin Mfg Trade Name Lot Number Route Inj VIS Given VIS Publication   InfluenzaRefused 10/19/2020 NE Not Entered  NE NE     Comments:          Review of Systems  · Constitutional  o Denies  o : fever, fatigue, weight loss, weight gain  · Eyes  o Denies  o : impaired vision, blurred vision, changes in vision  · HENT  o Denies  o : headaches, vertigo, lightheadedness  · Cardiovascular  o Admits  o : lower extremity edema  o Denies  o : claudication, chest pressure, palpitations  · Respiratory  o Admits  o : shortness of breath  o Denies  o : wheezing, cough, hemoptysis, dyspnea on exertion  · Gastrointestinal  o Denies  o : nausea, vomiting, diarrhea, constipation, abdominal pain  · Integument  o Denies  o : rash, itching, pigmentation changes  · Musculoskeletal  o Admits  o : joint pain, joint swelling, muscle pain  o Denies  o : muscular weakness  · Psychiatric  o Denies  o : anxiety, depression, suicidal ideation,  "homicidal ideation      Vitals  Date Time BP Position Site L\R Cuff Size HR RR TEMP (F) WT  HT  BMI kg/m2 BSA m2 O2 Sat FR L/min FiO2 HC       01/20/2021 10:51 /82 Sitting    78 - R  96.5 259lbs 4oz 5'  4\" 44.5 2.3 95 %            Physical Examination  · Constitutional  o Appearance  o : well-nourished, well developed, in no acute distress  · Eyes  o Conjunctivae  o : conjunctivae normal, no exudates present  o Sclerae  o : sclerae white  o Pupils and Irises  o : pupils equal and round, and reactive to light and accomodation bilaterally  o Eyelids/Ocular Adnexae  o : extra ocular movements intact  · Respiratory  o Respiratory Effort  o : breathing unlabored, no accessory muscle use  o Inspection of Chest  o : normal appearance, no retractions  o Auscultation of Lungs  o : normal breath sounds bilaterally  · Cardiovascular  o Heart  o :   § Auscultation of Heart  § : regular rate and rhythm, no murmurs, gallops or rubs  o Peripheral Vascular System  o :   § Extremities  § : 2 edema present, no cyanosis, generalized distal extremity hair loss present, normal capillary refill  · Neurologic  o Mental Status Examination  o :   § Orientation  § : alert and oriented x3  § Speech/Language  § : normal speech pattern  o Gait and Station  o : normal gait, able to stand without difficulty  · Psychiatric  o Judgment and Insight  o : judgment and insight intact, judgement for everyday activities and social situations within normal limits, insight intact  o Thought Processes  o : rate of thoughts normal, thought content logical  o Mood and Affect  o : mood normal, affect appropriate              Assessment  · Post menopausal syndrome     V49.81/Z78.0  · Visit for screening mammogram     V76.12/Z12.31  · CHF (congestive heart failure)     428.0/I50.9  · COPD (chronic obstructive pulmonary disease)     496/J44.9  · Diabetes mellitus, type 2     250.00/E11.9  · Essential " hypertension     401.9/I10  · Hyperlipidemia     272.4/E78.5  · Vitamin D deficiency     268.9/E55.9  · Follow up     V67.9/Z09      Plan  · Orders  o Diabetes 2 Panel (Urine Microalbumin, CMP, Lipid, A1c, ) [facility] (39709, 40193, 13510, 14136) - 250.00/E11.9 - 01/20/2021  o CBC with Auto Diff [facility] (73707) - 250.00/E11.9 - 01/20/2021  o Thyroid Profile (64643, 25592, THYII) - 250.00/E11.9 - 01/20/2021  o Vitamin D Level (76509) - 268.9/E55.9 - 01/20/2021  o ACO - Pt declines to or was not able to provide an Advance Care Plan or name a Surrogate Decision Maker (1124F) - - 01/20/2021  o ACO-14: Influenza immunization was not administered for reasons documented [facility] () - - 01/20/2021   pt declines  o ACO-13: Fall Risk Screening with no falls in past year or only one fall without injury in the past year (1101F) - - 01/20/2021  o ACO-39: Current medications updated and reviewed (, 1159F) - - 01/20/2021  · Medications  o Medications have been Reconciled  o Transition of Care or Provider Policy  · Instructions  o Patient declines bone density testing. Discussed risks associated with not having testing performed.   o Patient declines breast cancer screening. Discussed risks associated with not having screening performed.   o Continue blood sugar monitoring daily and record. Bring your log to office visits. Call the office for readings below 70 and above 250 or any complications.  o Daily foot care. Avoid walking barefoot. Annual Dilated Eye Exam.  o Discussed with patient blood pressure monitoring, hemoglobin A1C levels need to be below 7.0, and LDL (Lipid) goals below 70.  o Patient advised to monitor blood pressure (B/P) at home and journal readings. Patient informed that a B/P reading at home of more than 130/80 is considered hypertension. For readings greater amwc961/90 or higher patient is advised to follow up in the office with readings for management. Patient advised to limit sodium intake.  o Advised that  cheeses and other sources of dairy fats, animal fats, fast food, and the extras (candy, pastries, pies, doughnuts and cookies) all contain LDL raising nutrients. Advised to increase fruits, vegetables, whole grains, and to monitor portion sizes.   o Take all medications as prescribed/directed.  o Patient was educated/instructed on their diagnosis, treatment and medications prior to discharge from the clinic today.  o Call the office with any concerns or questions.  o Flu vaccine declined.  · Disposition  o Call or Return if symptoms worsen or persist.  o follow up in 6 months  o follow up as needed  o call the office with any questions or concerns            Electronically Signed by: LUCERO Kendall -Author on January 20, 2021 12:44:25 PM

## 2021-05-15 VITALS
DIASTOLIC BLOOD PRESSURE: 78 MMHG | HEIGHT: 64 IN | OXYGEN SATURATION: 93 % | SYSTOLIC BLOOD PRESSURE: 142 MMHG | TEMPERATURE: 98.1 F | WEIGHT: 260.25 LBS | BODY MASS INDEX: 44.43 KG/M2 | HEART RATE: 83 BPM

## 2021-05-15 VITALS
HEART RATE: 65 BPM | OXYGEN SATURATION: 95 % | DIASTOLIC BLOOD PRESSURE: 84 MMHG | BODY MASS INDEX: 43.72 KG/M2 | SYSTOLIC BLOOD PRESSURE: 138 MMHG | HEIGHT: 64 IN | TEMPERATURE: 98.5 F | WEIGHT: 256.12 LBS

## 2021-05-15 VITALS
OXYGEN SATURATION: 96 % | HEIGHT: 64 IN | HEART RATE: 88 BPM | DIASTOLIC BLOOD PRESSURE: 84 MMHG | TEMPERATURE: 98.9 F | BODY MASS INDEX: 41.7 KG/M2 | WEIGHT: 244.25 LBS | SYSTOLIC BLOOD PRESSURE: 138 MMHG

## 2021-05-15 VITALS
HEIGHT: 64 IN | WEIGHT: 248.25 LBS | OXYGEN SATURATION: 94 % | BODY MASS INDEX: 42.38 KG/M2 | HEART RATE: 95 BPM | DIASTOLIC BLOOD PRESSURE: 86 MMHG | SYSTOLIC BLOOD PRESSURE: 142 MMHG | TEMPERATURE: 98.9 F

## 2021-05-15 VITALS
DIASTOLIC BLOOD PRESSURE: 84 MMHG | TEMPERATURE: 98.4 F | HEIGHT: 64 IN | BODY MASS INDEX: 43.54 KG/M2 | SYSTOLIC BLOOD PRESSURE: 138 MMHG | OXYGEN SATURATION: 96 % | WEIGHT: 255 LBS | HEART RATE: 91 BPM

## 2021-05-15 VITALS
BODY MASS INDEX: 41.15 KG/M2 | DIASTOLIC BLOOD PRESSURE: 94 MMHG | HEART RATE: 96 BPM | HEIGHT: 64 IN | WEIGHT: 241 LBS | SYSTOLIC BLOOD PRESSURE: 122 MMHG

## 2021-05-15 VITALS
HEART RATE: 88 BPM | DIASTOLIC BLOOD PRESSURE: 82 MMHG | WEIGHT: 249 LBS | HEIGHT: 64 IN | SYSTOLIC BLOOD PRESSURE: 140 MMHG | BODY MASS INDEX: 42.51 KG/M2

## 2021-05-15 VITALS
WEIGHT: 253 LBS | BODY MASS INDEX: 43.19 KG/M2 | HEIGHT: 64 IN | DIASTOLIC BLOOD PRESSURE: 64 MMHG | SYSTOLIC BLOOD PRESSURE: 140 MMHG | HEART RATE: 90 BPM

## 2021-05-15 VITALS
TEMPERATURE: 98.2 F | OXYGEN SATURATION: 96 % | DIASTOLIC BLOOD PRESSURE: 88 MMHG | HEART RATE: 93 BPM | HEIGHT: 64 IN | BODY MASS INDEX: 44.73 KG/M2 | SYSTOLIC BLOOD PRESSURE: 134 MMHG | WEIGHT: 262 LBS

## 2021-05-16 VITALS
SYSTOLIC BLOOD PRESSURE: 146 MMHG | DIASTOLIC BLOOD PRESSURE: 94 MMHG | HEIGHT: 64 IN | HEART RATE: 92 BPM | BODY MASS INDEX: 42.51 KG/M2 | WEIGHT: 249 LBS

## 2021-05-16 VITALS
HEIGHT: 64 IN | HEART RATE: 94 BPM | DIASTOLIC BLOOD PRESSURE: 92 MMHG | SYSTOLIC BLOOD PRESSURE: 136 MMHG | WEIGHT: 242 LBS | BODY MASS INDEX: 41.32 KG/M2

## 2021-05-18 ENCOUNTER — OFFICE VISIT CONVERTED (OUTPATIENT)
Dept: PULMONOLOGY | Facility: CLINIC | Age: 71
End: 2021-05-18
Attending: INTERNAL MEDICINE

## 2021-05-21 ENCOUNTER — HOSPITAL ENCOUNTER (OUTPATIENT)
Dept: OTHER | Facility: HOSPITAL | Age: 71
Discharge: HOME | End: 2021-05-21
Attending: NURSE PRACTITIONER

## 2021-05-21 LAB
ANION GAP SERPL CALC-SCNC: 15 MMOL/L (ref 8–19)
BNP SERPL-MCNC: 713 PG/ML (ref 0–900)
BUN SERPL-MCNC: 14 MG/DL (ref 5–25)
BUN/CREAT SERPL: 12 {RATIO} (ref 6–20)
CALCIUM SERPL-MCNC: 9.6 MG/DL (ref 8.7–10.4)
CHLORIDE SERPL-SCNC: 101 MMOL/L (ref 99–111)
CONV CO2: 27 MMOL/L (ref 22–32)
CREAT UR-MCNC: 1.17 MG/DL (ref 0.5–0.9)
GFR SERPLBLD BASED ON 1.73 SQ M-ARVRAT: 48 ML/MIN/{1.73_M2}
GLUCOSE SERPL-MCNC: 233 MG/DL (ref 65–99)
OSMOLALITY SERPL CALC.SUM OF ELEC: 294 MOSM/KG (ref 273–304)
POTASSIUM SERPL-SCNC: 5 MMOL/L (ref 3.5–5.3)
SODIUM SERPL-SCNC: 138 MMOL/L (ref 135–147)

## 2021-05-23 ENCOUNTER — TRANSCRIBE ORDERS (OUTPATIENT)
Dept: PULMONOLOGY | Facility: CLINIC | Age: 71
End: 2021-05-23

## 2021-05-23 DIAGNOSIS — R06.02 SHORTNESS OF BREATH: Primary | ICD-10-CM

## 2021-05-26 ENCOUNTER — OFFICE VISIT CONVERTED (OUTPATIENT)
Dept: CARDIOLOGY | Facility: CLINIC | Age: 71
End: 2021-05-26
Attending: NURSE PRACTITIONER

## 2021-05-26 ENCOUNTER — CONVERSION ENCOUNTER (OUTPATIENT)
Dept: CARDIOLOGY | Facility: CLINIC | Age: 71
End: 2021-05-26

## 2021-05-28 VITALS
TEMPERATURE: 97.1 F | HEIGHT: 63 IN | RESPIRATION RATE: 16 BRPM | BODY MASS INDEX: 46.16 KG/M2 | HEART RATE: 79 BPM | SYSTOLIC BLOOD PRESSURE: 162 MMHG | OXYGEN SATURATION: 96 % | WEIGHT: 260.5 LBS | DIASTOLIC BLOOD PRESSURE: 72 MMHG

## 2021-05-28 NOTE — PROGRESS NOTES
Patient: BELIA FORBES     Acct: [id number]     Report: #[id number]  UNIT #: [id number]     : 1950    Encounter Date:2021  PRIMARY CARE: TANK CULLEN  ***Signed***  --------------------------------------------------------------------------------------------------------------------  Chief Complaint      Encounter Date      May 18, 2021            Primary Care Provider      TANK CULLEN            Referring Provider      LIZANDRO WILLIAM            Patient Complaint      Patient is complaining of      New pt here for SOA            VITALS      Height 5 ft 3 in / 160.02 cm      Weight 260 lbs 8 oz / 118.30371 kg      BSA 2.16 m2      BMI 46.1 kg/m2      Temperature 97.1 F / 36.17 C - Temporal      Pulse 79      Respirations 16      Blood Pressure 162/72 Sitting, Right Arm      Pulse Oximetry 96%, room air            HPI      The patient is a 70 year old female with history of aortic valve regurgitation     here today to be evaluated for shortness of breath.             The patient follows with Dr. William, she has been having some shortness of breath    worsening over the course of the past year. She states she has been relegated to    doing the dishes at home because she cannot do any other chores. She has     shortness of breath with any and all activities. She even develops shortness of     breath when changing her clothes as well as walking to the bathroom to bathe her    self. She denies having any associated chest pains, she does have orthopnea and     significant lower extremity edema. She currently takes bronchodilator therapies,    she takes trelegy ellipta inhaler with moderate improvement in her symptoms. She    has never had any pulmonary function tests. She has a significant smoking     history, she smoked for over 37 years, more than 1 pack per day and quit smoking    13 years ago. She denies any fever or chills or hemoptysis at this time.            ROS      Constitutional:   Denies: Fatigue, Fever, Weight gain, Weight loss, Chills,     Insomnia, Other      Respiratory/Breathing:  Complains of: Shortness of air, Wheezing, Cough; Denies:    Hemoptysis, Pleuritic pain, Other      Endocrine:  Complains of: Diabetes; Denies: Polydipsia, Polyuria, Heat/cold     intolerance, Abnorml menstrual pattern, Other      Eyes:  Denies: Blurred vision, Vision Changes, Other      Ears, nose, mouth, throat:  Complains of: Hoarseness, Allergies/Hay Fever, Dry     Mouth, Nasal discharge; Denies: Mouth lesions, Thrush, Throat pain, Post Nasal     Drip, Headaches, Recent Head Injury, Nose Bleeding, Neck Stiffness, Thyroid     Mass, Hearing Loss, Ear Fullness, Nasal or Sinus Pain, Dry Lips, Nasal co    ngestion, Other      Cardiovascular:  Complains of: Leg Swelling, Irregular Heart Rate, Dyspnea on     Exertion; Denies: Palpitations, Syncope, Claudication, Chest Pain, Wake up     Gasping for air, Cyanosis, Other      Gastrointestinal:  Complains of: Abdominal pain, Reflux/Heartburn; Denies:     Nausea, Constipation, Diarrhea, Vomiting, Difficulty Swallowing, Dysphagia,     Jaundice, Bloating, Melena, Bloody stools, Other      Genitourinary:  Denies: Urinary frequency, Incontinence, Hematuria, Urgency,     Nocturia, Dysuria, Testicular problems, Other      Musculoskeletal:  Complains of: Joint Pain, Joint Stiffness, Joint Swelling;     Denies: Myalgias, Other      Hematologic/lymphatic:  DENIES: Lymphadenopathy, Bruising, Bleeding tendencies,     Other      Neurological:  Complains of: Numbness (neuropathy); Denies: Headache, Weakness,     Seizures, Other      Psychiatric:  Denies: Anxiety, Appropriate Effect, Depression, Other      Sleep:  No: Excessive daytime sleep, Morning Headache?, Snoring, Insomnia?, Stop    breathing at sleep?, Other      Integumentary:  Complains of: Dry skin; Denies: Rash, Skin Warm to Touch, Other      Immunologic/Allergic:  Complains of: Latex allergy; Denies: Seasonal allergies,    "  Asthma, Urticaria, Eczema, Other      Immunization status:  Up to date            FAMILY/SOCIAL/MEDICAL HX      Heart - Family Hx:  Brother      Diabetes - Family Hx:  Brother      Cancer/Type - Family Hx:  Sister (lymphnoma)      Is Father Still Living?:  No      Is Mother Still Living?:  No      Social History:  No Tobacco Use, No Alcohol Use, No Recreational Drug use      Smoking status:  Former smoker (Pt started smoking at 20 yrs old, 1 ppd x 37     years, quit at age 57 )       Section:  No      Tubal Ligation:  No      Hysterectomy:  Yes (partial)      Medical History:  Yes: Chronic Bronchitis/COPD, Diabetes (TYPE II), Shortness Of    Breath, Miscellaneous Medical/oth (Afib, heart murmur); No: Blood Disease,     Chemotherapy/Cancer, Deafness or Ringing Ears, Hemorrhoids/Rectal Prob, High     Blood Pressure      Psychiatric History      None            PREVENTION      Hx Influenza Vaccination:  No      Influenza Vaccine Declined:  Yes      2 or More Falls in Past Year?:  No      Fall Past Year with Injury?:  No      Hx Pneumococcal Vaccination:  Yes      Encouraged to follow-up with:  PCP regarding preventative exams.      Chart initiated by      Zeynep Delcid MA            ALLERGIES/MEDICATIONS      Allergies:        Coded Allergies:             CODEINE (Verified  Allergy, Mild, \"MAKES ME SEE THINGS\", 21)      Uncoded Allergies:             Codeine (Allergy, Mild, 10/12/08)      Medications    Last Reconciled on 21 11:26 by ISRAEL GOSS MD      Furosemide (Furosemide) 20 Mg Tablet      20 MG PO Q12HR, #60 TAB 0 Refills         Reported         21       (dihiazem cap) Unknown Strength  No Conflict Check               Reported         21       Sacubitril/Valsartan (Entresto 49/51 mg) 1 Each Tablet      1 TAB PO HS, #60 TAB 0 Refills         Reported         21       Metoprolol Succ/HCTZ 100/25 Mg (Metoprolol Hctz 100/25 Mg) 1 Each Tablet      1 TAB PO BID, TAB         " Reported         5/18/21       Potassium Chloride (Potassium Chloride) 10 Meq Capsule.er      10 MEQ PO QDAY, #30 CAP.ER 0 Refills         Reported         5/18/21       (vitamin b) Unknown Strength  No Conflict Check               Reported         5/18/21       HYDROcodone-Acetaminophen  Mg (HYDROcodone-Acetaminophen  Mg) 1 Each    Tablet      1 TAB PO Q4H PRN for PAIN, TAB         Reported         5/18/21       MDI-Albuterol (Ventolin HFA) 18 Gm Hfa.aer.ad      2 PUFFS INH Q6H PRN for SHORTNESS OF BREATH, #1 MDI 0 Refills         Reported         5/18/21       Fluticasone/Umeclidin/Vilanter (Trelegy Ellipta 100-62.5-25) 1 Each Blst.w.dev      1 PUFF INH RTQDAY, #1 INH         Reported         5/18/21       Omega-3/Dha/Epa/Fish Oil (Omega-3 Fish Oil 1,000 mg Sfgl) 1,000 Mg Capsule      1 GM PO QDAY for 30 Days, #30 CAP         Reported         5/18/21       Dapagliflozin Propanediol (Farxiga) 10 Mg Tablet      10 MG PO QDAY, #30 TAB 0 Refills         Reported         5/18/21       Pregabolin (Lyrica*) 75 Mg Cap      150 MG PO TID, CAP         Reported         5/18/21       MDI-Ipratropium/Albuterol (Combivent Respimat) 4 Gm Inh      1 PUFF INH RTQ6H, #1 INH         Reported         7/19/17       Albuterol (Proair HFA) 8.5 Gm Inh      1-2 PUFFS INH RTQ6H PRN for SHORTNESS OF BREATH, #1 INH 0 Refills         Reported         7/19/17       Rivaroxaban (Xarelto) 10 Mg Tablet      20 MG PO QDAY, #60 TAB 0 Refills         Reported         7/19/17       Hctz (hydroCHLOROthiazide) 12.5 Mg Capsule      12.5 MG PO QDAY, #30 CAP 0 Refills         Reported         7/19/17       Atorvastatin (Atorvastatin) 20 Mg Tablet      20 MG PO HS, #30 TAB 0 Refills         Reported         7/19/17       Gabapentin (Gabapentin) 300 Mg Capsule      900 MG PO TID, #270 CAP 0 Refills         Reported         7/19/17       Digoxin (Digoxin*) 0.25 Mg Tablet      1 TAB PO QDAY, TAB         Reported         10/21/10       glipiZIDE  (glipiZIDE) 5 Mg Tablet      1 TAB PO QAM         Reported         10/14/10       Metoprolol Succinate (Metoprolol Succinate) 50 Mg Tabcr      1 TAB PO HS         Reported         10/20/08      Current Medications      Current Medications Reviewed 5/18/21            EXAM      Vital Signs Reviewed      Gen: WDWN, Alert, NAD.        HEENT:  PERRL, EOMI.  OP, nares clear, no sinus tenderness.      Neck:  Supple, no JVD, no thyromegaly.      Lymph: No axillary, cervical, supraclavicular lymphadenopathy noted bilaterally.      Chest:  Good aeration, clear to auscultation bilaterally, tympanic to percussion    bilaterally, no work of breathing noted.      CV:  RRR, no MGR, pulses 2+, equal.      Abd:  Soft, NT, ND, + BS, no HSM.      EXT:  No clubbing, no cyanosis, 3+ pitting edema past her thigh, no joint     tenderness.       Neuro:  A  Skin: No rashes or lesions.      Vtials      Vitals:             Height 5 ft 3 in / 160.02 cm           Weight 260 lbs 8 oz / 118.55977 kg           BSA 2.16 m2           BMI 46.1 kg/m2           Temperature 97.1 F / 36.17 C - Temporal           Pulse 79           Respirations 16           Blood Pressure 162/72 Sitting, Right Arm           Pulse Oximetry 96%, room air            REVIEW      Results Reviewed      PCCS Results Reviewed?:  Yes Prev Lab Results, Yes Prev Radiology Results, Yes     [facility]            Assessment      SOB (shortness of breath) - R06.02            Cancer screening - Z12.9            Notes      New Medications      * Pregabolin (Lyrica*) 75 MG CAP: 150 MG PO TID      * DAPAGLIFLOZIN PROPANEDIOL (Farxiga) 10 MG TABLET: 10 MG PO QDAY #30      * Omega-3/Dha/Epa/Fish Oil (Omega-3 Fish Oil 1,000 mg Sfgl) 1,000 MG CAPSULE: 1       GM PO QDAY 30 Days #30      * Fluticasone/Umeclidin/Vilanter (Trelegy Ellipta 100-62.5-25) 1 EACH       BLST.W.DEV: 1 PUFF INH RTQDAY #1      * MDI-Albuterol (Ventolin HFA) 18 GM HFA.AER.AD: 2 PUFFS INH Q6H PRN SHORTNESS        OF BREATH #1      * HYDROcodone-Acetaminophen  Mg 1 EACH TABLET: 1 TAB PO Q4H PRN PAIN      * (vitamin b) Unknown Strength:       * Potassium Chloride 10 MEQ CAPSULE.ER: 10 MEQ PO QDAY #30      * Metoprolol Succ/HCTZ 100/25 Mg (Metoprolol Hctz 100/25 Mg) 1 EACH TABLET: 1       TAB PO BID      * Sacubitril/Valsartan (Entresto 49/51 mg) 1 EACH TABLET: 1 TAB PO HS #60      * (dihiazem cap) Unknown Strength:       * Furosemide 20 MG TABLET: 20 MG PO Q12HR #60      Discontinued Medications      * AMOXICILLIN/CLAVULANATE K (Augmentin 875/125 Mg) 1 EACH TABLET: 875 MG PO BID       #20      New Diagnostics      * PFT-Comp, PrePost,DLCO,BodyBox, Week         Dx: SOB (shortness of breath) - R06.02      * Low Dose Chest CT, SCHEDULED PROCEDURE         Dx: Cancer screening - Z12.9      ASSESSMENT/PLAN:      1. Dyspnea. I feel the dyspnea is most likely multifactorial secondary to the     patient's valvular heart disease along with potential underlying obstructive     disease given her significant smoking history that she had in the past.  We will    obtain pulmonary function test for further evaluation.       2. Reported chronic obstructive pulmonary disease. Pulmonary function studies to    confirm. If she does indeed have obstruction we will do alpha 1 antitrypsin     level and genotype. We will continue the patient on trelegy ellipta inhaler at     this time until we get the results of pulmonary function test.       3. Valvular heart disease. I suspect this is causing a lot of the patient's     shortness of breath. Continue to follow up with Dr. Minor. It does appear to be     fluid overload at this time.       4.  Health maintenance. The patient meets criteria for low dose lung cancer     screening CT scan of the chest. We will set the patient up for low dose lung     cancer screening CT scan of the chest.       5. I have personally reviewed laboratory data, imaging and previous medical     records.      Nicotine  dependence, cigarettes, uncomplicated   F17.210            Patient Education      Education resources provided:  Yes      Patient Education Provided:  COPD            Plan      LDCT Orders:   to discuss lung ca screen, LDCT for lung ca screeing            Determine need to perform LDCT      Determine need to perform LDCT:  Pt between ages of 55-77 years old, Absence of     sign/symptoms of lung ca; No Current Smoker (F17.210)      Smoking history:  25-50 pack years            CT History      Pt has not had a reg CT  last 12 mo            Time Spent/Education      Greater than 50% For Edcuation:  Yes      LDCT Education      LDCT Patient Education            * Patient was presented with the benefits and harms of screening to include:         The possible need for follow-up diagnostic testing         Over Diagnosis         False Positive Rate         Total Radiation Exposure            * Counseled on the importance of:         Adherence to annual lung cancer LDCT screening         Impact of co-morbidities         The ability or willingness to undergo diagnosis of treatment               * Counseled on the importance of cigarette cessation.      * Counseled on the importance of maintaining cigarette smoking abstinence.      * Handout provided regarding tobacco cessation interventions.      * Order for lung cancer screening with LDCT was given to the patient.            Electronically signed by Jerson Berg  05/21/2021 12:45       Disclaimer: Converted document may not contain table formatting or lab diagrams. Please see [facility] for the authenticated document.

## 2021-06-05 NOTE — PROGRESS NOTES
Progress Note      Patient Name: Mary Albarran   Patient ID: [id number]   Sex: Female   YOB: 1950    Primary Care Provider: Brittni BARKER   Referring Provider: Brittni BARKER    Visit Date: May 26, 2021    Provider: LUCERO Armstrong   Location: [facility]   Location Address: [address] [place] [address], [facility]   [place], [place]  [id number]   Location Phone: [phone]          Chief Complaint     Shortness of breath.       History Of Present Illness  REFERRING PROVIDER: Brittni BARKER   Mary Albarran is a 70 year old /White female who comes in after recently starting Entresto. She did stop irbesartan as directed and feels like her shortness of breath is greatly improved. She is also seeing a pulmonologist and is having a pulmonary workup at this time. She continues to have pedal edema which is long term for her. She denies any chest pain, palpitations, dizziness, syncope, PND, or orthopnea. She states that her blood pressures run in the 130s systolically at home.   PAST MEDICAL HISTORY: Aortic valve disease; Atrial fibrillation; Diabetes mellitus; Hyperlipidemia; Hypertension; Thrombocytopenia.   PSYCHOSOCIAL HISTORY: Denies alcohol use. Previous use of tobacco, but quit.   CURRENT MEDICATIONS: Metoprolol  mg b.i.d.; metoprolol ER 50 b.i.d.; atorvastatin 20 mg daily; Xarelto 20 mg daily; digoxin 0.125 mg 1 tab on Thurs., and Sun., and 2 tabs 5 days a week; diltiazem  mg; Lasix is 20 mg 2 tabs a day; glipizide 10 mg 2 tabs a day; pregabalin 150 mg t.i.d.; Farxiga 10 mg a day; Omega fish oil once a day; Trelegy inhaler; Ventolin inhaler; hydrocodone p.r.n.; vitamin B 20 mg; and potassium 10 mEq; Entresto 49/51 b.i.d.      ALLERGIES:  Codeine.       Review of Systems  · Cardiovascular  o Admits  o : swelling (feet, ankles, hands), shortness of breath while walking or lying flat  o Denies  o : palpitations (fast, fluttering, or skipping beats), chest  "pain or angina pectoris   · Respiratory  o Denies  o : chronic or frequent cough      Vitals  Date Time BP Position Site L\R Cuff Size HR RR TEMP (F) WT  HT  BMI kg/m2 BSA m2 O2 Sat FR L/min FiO2 HC       05/26/2021 11:25 /64 Sitting    75 - R   262lbs 0oz 5'  4\" 44.97 2.32             Physical Examination  · Constitutional  o Appearance  o : Morbidly obese female who ambulates with a cane. Awake, alert, in no acute distress.  · Eyes  o Conjunctivae  o : Conjunctivae normal.  · Ears, Nose, Mouth and Throat  o Oral Cavity  o :   § Oral Mucosa  § : Normal.  · Neck  o Jugular Veins  o : No JVD. Good carotid upstroke. No bruits noted. No lymphadenopathy.   · Respiratory  o Respiratory  o : Good respiratory effort. Clear to percussion and auscultation.  · Cardiovascular  o Heart  o : PMI is not well felt. S1, S2 are normal. No S3. No S4. 2/6 systolic murmur at the base heard throughout the precordium with a 1-2/6 systolic murmur at the apex.   o Peripheral Vascular System  o :   § Extremities  § : Pedal pulses difficult to palpate because she is wearing compression stockings. Appears to be a trace of pedal edema.   · Gastrointestinal  o Abdominal Examination  o : Soft. No tenderness or masses felt. No hepatosplenomegaly. Abdominal aorta is not palpable.  · Labs  o Labs  o : BUN is 14, creatinine is 1.17, potassium was 5, BNP is 713.           Assessment     1.  Shortness of breath improving.   2.  Chronic diastolic heart failure class 2 to 3 improving.  3.  Aortic valve disease.   4.  Mitral valve regurgitation.  5.  Chronic atrial fibrillation, rate control.  6.  Morbid obesity with BMI of 44.          Plan     1.  Continue her metoprolol, Xarelto, and digoxin in view of her atrial fibrillation.  2.  Continue the irbesartan, diltiazem, Lasix, and potassium for hypertension.    3.  We are going to increase her Entresto to 49/51 1.5 tabs b.i.d. and send a prescription in to express this with a higher dose of 98/103 " b.i.d. and when that comes she will take 1 tab twice a day.  4.  She will repeat a BMP in 1 month to monitor her potassium.  5.  She will do a blood pressure log and will adjust meds if needed.        LUCERO Goins  JF:vh                Electronically Signed by: Katty Gonzalez-, OT -Author on May 29, 2021 08:45:05 AM  Electronically Co-signed by: LUCERO Armstrong -Reviewer on May 30, 2021 10:15:45 AM

## 2021-06-10 RX ORDER — POTASSIUM CHLORIDE 750 MG/1
CAPSULE, EXTENDED RELEASE ORAL
Qty: 90 CAPSULE | Refills: 3 | Status: SHIPPED | OUTPATIENT
Start: 2021-06-10 | End: 2021-07-21 | Stop reason: SDUPTHER

## 2021-06-18 ENCOUNTER — TELEPHONE (OUTPATIENT)
Dept: FAMILY MEDICINE CLINIC | Facility: CLINIC | Age: 71
End: 2021-06-18

## 2021-06-18 NOTE — TELEPHONE ENCOUNTER
Spoke with pt, states she has been having an increase in her pain and wanting to increase medication to four times a day. Informed her that provider is out of the office until Monday and that we will contact her back once provider returns.

## 2021-06-18 NOTE — TELEPHONE ENCOUNTER
Caller: Mary Albarran    Relationship: Self    Best call back number: [phone]    Medication needed:   Hydrocodone  3 TIMES DAILY    When do you need the refill by: ASAP    What additional details did the patient provide when requesting the medication: PATIENT STATES THAT SHE FEELS LIKE SHE MAY NEED TO START TAKING 4 PILLS A DAY BECAUSE SHE IS IN A LOT OF PAIN. PLEASE CALL PATIENT AND ADVISE.  Does the patient have less than a 3 day supply:  [] Yes  [x] No    What is the patient's preferred pharmacy:     [facility] [place], [place] - [address] AT [facility] [address] - [phone]  - [phone]   [phone]

## 2021-06-21 ENCOUNTER — APPOINTMENT (OUTPATIENT)
Dept: RESPIRATORY THERAPY | Facility: HOSPITAL | Age: 71
End: 2021-06-21

## 2021-06-21 DIAGNOSIS — G89.29 OTHER CHRONIC PAIN: Primary | ICD-10-CM

## 2021-06-21 RX ORDER — HYDROCODONE BITARTRATE AND ACETAMINOPHEN 10; 325 MG/1; MG/1
1 TABLET ORAL 4 TIMES DAILY PRN
Qty: 120 TABLET | Refills: 0 | Status: SHIPPED | OUTPATIENT
Start: 2021-06-21 | End: 2021-07-19 | Stop reason: SDUPTHER

## 2021-06-21 RX ORDER — HYDROCODONE BITARTRATE AND ACETAMINOPHEN 10; 325 MG/1; MG/1
1 TABLET ORAL 4 TIMES DAILY PRN
COMMUNITY
Start: 2021-05-26 | End: 2021-06-21 | Stop reason: SDUPTHER

## 2021-06-21 NOTE — TELEPHONE ENCOUNTER
Let patient know I have sent over 1 month supply of the hydrocodone 4 times a daily at this point and as we have discussed previously I feel like you would be more beneficial to go to pain management.  I have referred you over to pain management in [place], they can offer you stronger pain medicine in lower doses and not so many times a day.

## 2021-06-28 RX ORDER — GLIPIZIDE 10 MG/1
10 TABLET ORAL
COMMUNITY
End: 2021-06-28 | Stop reason: SDUPTHER

## 2021-06-28 RX ORDER — GLIPIZIDE 10 MG/1
10 TABLET ORAL
Qty: 90 TABLET | Refills: 1 | Status: SHIPPED | OUTPATIENT
Start: 2021-06-28 | End: 2021-07-21 | Stop reason: SDUPTHER

## 2021-06-28 NOTE — TELEPHONE ENCOUNTER
Caller: Mary Albarran    Relationship: Self    Best call back number: [phone]    Medication needed: GLIPIZIDE   PREGABALINE     When do you need the refill by: ASAP DUE TO MAIL ORDER     What additional details did the patient provide when requesting the medication: PATIENT HAS A 3 DAY SUPPLY     Does the patient have less than a 3 day supply:  [] Yes  [x] No    What is the patient's preferred pharmacy: [facility] - [address] [phone] [facility] [phone]

## 2021-07-06 DIAGNOSIS — G62.9 NEUROPATHY: Primary | ICD-10-CM

## 2021-07-06 NOTE — TELEPHONE ENCOUNTER
Caller: AvisMary    Relationship: Self    Best call back number: [phone]    Medication needed:  Pregabalin 150MG       When do you need the refill by: ASAP    What additional details did the patient provide when requesting the medication: PATIENT STATED SHE HAS CALLED MULTIPLE TIMES, ALONG WITH THE PHARMACY TO HAVE THIS SCRIPT REFILLED.  PATIENT STATED SHE IS NOW OUT AND IS REQUESTING A CALL BACK.     Does the patient have less than a 3 day supply:  [x] Yes  [] No    What is the patient's preferred pharmacy: [facility] - [address] [phone] [facility] [phone]

## 2021-07-07 RX ORDER — PREGABALIN 150 MG/1
150 CAPSULE ORAL 3 TIMES DAILY
Qty: 270 CAPSULE | Refills: 1 | Status: SHIPPED | OUTPATIENT
Start: 2021-07-07 | End: 2022-01-05

## 2021-07-07 RX ORDER — PREGABALIN 150 MG/1
150 CAPSULE ORAL 3 TIMES DAILY
COMMUNITY
End: 2021-07-07 | Stop reason: SDUPTHER

## 2021-07-15 VITALS
HEART RATE: 75 BPM | DIASTOLIC BLOOD PRESSURE: 64 MMHG | BODY MASS INDEX: 44.73 KG/M2 | HEIGHT: 64 IN | WEIGHT: 262 LBS | SYSTOLIC BLOOD PRESSURE: 132 MMHG

## 2021-07-19 DIAGNOSIS — G89.29 OTHER CHRONIC PAIN: ICD-10-CM

## 2021-07-19 RX ORDER — HYDROCODONE BITARTRATE AND ACETAMINOPHEN 10; 325 MG/1; MG/1
1 TABLET ORAL 4 TIMES DAILY PRN
Qty: 120 TABLET | Refills: 0 | Status: SHIPPED | OUTPATIENT
Start: 2021-07-19 | End: 2021-08-19 | Stop reason: SDUPTHER

## 2021-07-19 NOTE — TELEPHONE ENCOUNTER
Caller: AvisMary    Relationship: Self    Best call back number: [phone]    Medication needed:   Requested Prescriptions     Pending Prescriptions Disp Refills   • HYDROcodone-acetaminophen (NORCO)  MG per tablet 120 tablet 0     Sig: Take 1 tablet by mouth 4 (Four) Times a Day As Needed for Severe Pain  for up to 30 days.       When do you need the refill by: ASAP        Does the patient have less than a 3 day supply:  [x] Yes  [] No    What is the patient's preferred pharmacy: [facility] #[id number] [place], [place] - [facility] [address] AT [facility] [phone] [facility] [phone] FX

## 2021-07-20 RX ORDER — MELATONIN
1000 DAILY
COMMUNITY

## 2021-07-20 RX ORDER — ATORVASTATIN CALCIUM 20 MG/1
1 TABLET, FILM COATED ORAL DAILY
COMMUNITY
Start: 2020-12-18 | End: 2021-07-21 | Stop reason: SDUPTHER

## 2021-07-20 RX ORDER — DILTIAZEM HYDROCHLORIDE 120 MG/1
1 CAPSULE, EXTENDED RELEASE ORAL DAILY
COMMUNITY
Start: 2020-12-09 | End: 2021-07-21 | Stop reason: SDUPTHER

## 2021-07-20 RX ORDER — DIGOXIN 125 MCG
TABLET ORAL
COMMUNITY
Start: 2021-04-30 | End: 2021-07-21 | Stop reason: SDUPTHER

## 2021-07-20 RX ORDER — METOPROLOL SUCCINATE 100 MG/1
1 TABLET, EXTENDED RELEASE ORAL 2 TIMES DAILY
COMMUNITY
Start: 2021-05-13 | End: 2022-01-21 | Stop reason: SDUPTHER

## 2021-07-20 RX ORDER — SACUBITRIL AND VALSARTAN 97; 103 MG/1; MG/1
1 TABLET, FILM COATED ORAL 2 TIMES DAILY
COMMUNITY
Start: 2021-05-26 | End: 2021-07-21 | Stop reason: SDUPTHER

## 2021-07-20 RX ORDER — DAPAGLIFLOZIN 10 MG/1
1 TABLET, FILM COATED ORAL DAILY
COMMUNITY
Start: 2021-06-01 | End: 2021-07-21 | Stop reason: SDUPTHER

## 2021-07-20 RX ORDER — METOPROLOL SUCCINATE 50 MG/1
1 TABLET, EXTENDED RELEASE ORAL 2 TIMES DAILY
COMMUNITY
Start: 2021-06-24 | End: 2022-01-21 | Stop reason: SDUPTHER

## 2021-07-20 RX ORDER — FUROSEMIDE 20 MG/1
20 TABLET ORAL 2 TIMES DAILY
COMMUNITY
End: 2021-07-21 | Stop reason: SDUPTHER

## 2021-07-20 RX ORDER — RIVAROXABAN 20 MG/1
1 TABLET, FILM COATED ORAL DAILY
COMMUNITY
Start: 2021-05-17 | End: 2021-07-21 | Stop reason: SDUPTHER

## 2021-07-20 RX ORDER — IPRATROPIUM/ALBUTEROL SULFATE 20-100 MCG
1 MIST INHALER (GRAM) INHALATION 4 TIMES DAILY
COMMUNITY
Start: 2021-03-04 | End: 2021-07-21 | Stop reason: SDUPTHER

## 2021-07-20 RX ORDER — GABAPENTIN 600 MG/1
1 TABLET ORAL DAILY
COMMUNITY
End: 2021-07-21

## 2021-07-20 RX ORDER — ALBUTEROL SULFATE 90 UG/1
2 AEROSOL, METERED RESPIRATORY (INHALATION) EVERY 4 HOURS PRN
COMMUNITY
End: 2021-11-29

## 2021-07-20 RX ORDER — ASCORBIC ACID 250 MG
2 TABLET ORAL DAILY
COMMUNITY

## 2021-07-20 RX ORDER — ERGOCALCIFEROL 1.25 MG/1
1 CAPSULE ORAL WEEKLY
COMMUNITY
Start: 2021-01-21 | End: 2021-07-21 | Stop reason: SDUPTHER

## 2021-07-21 ENCOUNTER — OFFICE VISIT (OUTPATIENT)
Dept: FAMILY MEDICINE CLINIC | Facility: CLINIC | Age: 71
End: 2021-07-21

## 2021-07-21 VITALS
TEMPERATURE: 97.9 F | BODY MASS INDEX: 44.69 KG/M2 | OXYGEN SATURATION: 96 % | WEIGHT: 261.8 LBS | HEART RATE: 117 BPM | DIASTOLIC BLOOD PRESSURE: 74 MMHG | SYSTOLIC BLOOD PRESSURE: 148 MMHG | HEIGHT: 64 IN

## 2021-07-21 DIAGNOSIS — G89.4 CHRONIC PAIN SYNDROME: Chronic | ICD-10-CM

## 2021-07-21 DIAGNOSIS — I10 ESSENTIAL HYPERTENSION: ICD-10-CM

## 2021-07-21 DIAGNOSIS — I48.91 ATRIAL FIBRILLATION, UNSPECIFIED TYPE (HCC): ICD-10-CM

## 2021-07-21 DIAGNOSIS — E78.2 MIXED HYPERLIPIDEMIA: ICD-10-CM

## 2021-07-21 DIAGNOSIS — E11.42 TYPE 2 DIABETES MELLITUS WITH DIABETIC POLYNEUROPATHY, WITHOUT LONG-TERM CURRENT USE OF INSULIN (HCC): ICD-10-CM

## 2021-07-21 DIAGNOSIS — Z09 FOLLOW UP: Primary | ICD-10-CM

## 2021-07-21 DIAGNOSIS — N18.32 STAGE 3B CHRONIC KIDNEY DISEASE (HCC): Chronic | ICD-10-CM

## 2021-07-21 LAB
ALBUMIN SERPL-MCNC: 4.4 G/DL (ref 3.5–5.2)
ALBUMIN UR-MCNC: 11.2 MG/DL
ALBUMIN/GLOB SERPL: 1.5 G/DL
ALP SERPL-CCNC: 94 U/L (ref 39–117)
ALT SERPL W P-5'-P-CCNC: 20 U/L (ref 1–33)
ANION GAP SERPL CALCULATED.3IONS-SCNC: 11 MMOL/L (ref 5–15)
AST SERPL-CCNC: 22 U/L (ref 1–32)
BILIRUB SERPL-MCNC: 0.5 MG/DL (ref 0–1.2)
BUN SERPL-MCNC: 11 MG/DL (ref 8–23)
BUN/CREAT SERPL: 12.2 (ref 7–25)
CALCIUM SPEC-SCNC: 9.3 MG/DL (ref 8.6–10.5)
CHLORIDE SERPL-SCNC: 105 MMOL/L (ref 98–107)
CO2 SERPL-SCNC: 23 MMOL/L (ref 22–29)
CREAT SERPL-MCNC: 0.9 MG/DL (ref 0.57–1)
GFR SERPL CREATININE-BSD FRML MDRD: 62 ML/MIN/1.73
GLOBULIN UR ELPH-MCNC: 3 GM/DL
GLUCOSE SERPL-MCNC: 222 MG/DL (ref 65–99)
POTASSIUM SERPL-SCNC: 5.2 MMOL/L (ref 3.5–5.2)
PROT SERPL-MCNC: 7.4 G/DL (ref 6–8.5)
SODIUM SERPL-SCNC: 139 MMOL/L (ref 136–145)

## 2021-07-21 PROCEDURE — 80053 COMPREHEN METABOLIC PANEL: CPT | Performed by: NURSE PRACTITIONER

## 2021-07-21 PROCEDURE — 82043 UR ALBUMIN QUANTITATIVE: CPT | Performed by: NURSE PRACTITIONER

## 2021-07-21 PROCEDURE — 99214 OFFICE O/P EST MOD 30 MIN: CPT | Performed by: NURSE PRACTITIONER

## 2021-07-21 RX ORDER — CHLORAL HYDRATE 500 MG
2 CAPSULE ORAL 2 TIMES DAILY
Qty: 360 CAPSULE | Refills: 1 | Status: SHIPPED | OUTPATIENT
Start: 2021-07-21 | End: 2021-10-19

## 2021-07-21 RX ORDER — FUROSEMIDE 20 MG/1
20 TABLET ORAL 2 TIMES DAILY
Qty: 180 TABLET | Refills: 1 | Status: SHIPPED | OUTPATIENT
Start: 2021-07-21 | End: 2021-11-29 | Stop reason: ALTCHOICE

## 2021-07-21 RX ORDER — ATORVASTATIN CALCIUM 20 MG/1
20 TABLET, FILM COATED ORAL DAILY
Qty: 90 TABLET | Refills: 1 | Status: SHIPPED | OUTPATIENT
Start: 2021-07-21 | End: 2022-01-21 | Stop reason: SDUPTHER

## 2021-07-21 RX ORDER — POTASSIUM CHLORIDE 750 MG/1
10 CAPSULE, EXTENDED RELEASE ORAL DAILY
Qty: 90 CAPSULE | Refills: 3 | Status: SHIPPED | OUTPATIENT
Start: 2021-07-21 | End: 2022-01-21 | Stop reason: SDUPTHER

## 2021-07-21 RX ORDER — GLIPIZIDE 10 MG/1
10 TABLET ORAL
Qty: 90 TABLET | Refills: 1 | Status: SHIPPED | OUTPATIENT
Start: 2021-07-21 | End: 2021-10-22

## 2021-07-21 RX ORDER — ERGOCALCIFEROL 1.25 MG/1
1 CAPSULE ORAL WEEKLY
Qty: 13 CAPSULE | Refills: 1 | Status: SHIPPED | OUTPATIENT
Start: 2021-07-21 | End: 2021-08-12

## 2021-07-21 RX ORDER — RIVAROXABAN 20 MG/1
20 TABLET, FILM COATED ORAL DAILY
Qty: 90 TABLET | Refills: 1 | Status: SHIPPED | OUTPATIENT
Start: 2021-07-21 | End: 2022-01-21 | Stop reason: SDUPTHER

## 2021-07-21 RX ORDER — SACUBITRIL AND VALSARTAN 97; 103 MG/1; MG/1
1 TABLET, FILM COATED ORAL 2 TIMES DAILY
Qty: 60 TABLET | Refills: 2 | Status: SHIPPED | OUTPATIENT
Start: 2021-07-21 | End: 2021-11-29 | Stop reason: ALTCHOICE

## 2021-07-21 RX ORDER — DAPAGLIFLOZIN 10 MG/1
1 TABLET, FILM COATED ORAL DAILY
Qty: 90 TABLET | Refills: 1 | Status: SHIPPED | OUTPATIENT
Start: 2021-07-21 | End: 2022-01-21 | Stop reason: SDUPTHER

## 2021-07-21 RX ORDER — DIGOXIN 125 MCG
125 TABLET ORAL
Qty: 90 TABLET | Refills: 1 | Status: SHIPPED | OUTPATIENT
Start: 2021-07-21 | End: 2021-11-15

## 2021-07-21 RX ORDER — DILTIAZEM HYDROCHLORIDE 120 MG/1
120 CAPSULE, EXTENDED RELEASE ORAL DAILY
Qty: 90 CAPSULE | Refills: 1 | Status: SHIPPED | OUTPATIENT
Start: 2021-07-21 | End: 2022-01-21 | Stop reason: SDUPTHER

## 2021-07-21 RX ORDER — CHLORAL HYDRATE 500 MG
CAPSULE ORAL
COMMUNITY
End: 2021-07-21 | Stop reason: SDUPTHER

## 2021-07-21 RX ORDER — IPRATROPIUM/ALBUTEROL SULFATE 20-100 MCG
1 MIST INHALER (GRAM) INHALATION 4 TIMES DAILY
Qty: 4 G | Refills: 5 | Status: SHIPPED | OUTPATIENT
Start: 2021-07-21 | End: 2021-08-12

## 2021-07-21 NOTE — PROGRESS NOTES
Chief Complaint  Follow-up (6 mth F/U)    Subjective          Mary Albarran presents to [facility]  6 month follow up    Diabetes  She presents for her follow-up diabetic visit. She has type 2 diabetes mellitus. Her disease course has been stable. There are no hypoglycemic associated symptoms. Pertinent negatives for hypoglycemia include no headaches. Associated symptoms include fatigue. Pertinent negatives for diabetes include no chest pain. There are no hypoglycemic complications. Diabetic complications include peripheral neuropathy. Risk factors for coronary artery disease include diabetes mellitus, dyslipidemia, hypertension, obesity and post-menopausal. Current diabetic treatment includes oral agent (triple therapy). She is compliant with treatment all of the time. Her weight is stable. She is following a generally healthy diet. She has not had a previous visit with a dietitian. She never participates in exercise. Her overall blood glucose range is 130-140 mg/dl. An ACE inhibitor/angiotensin II receptor blocker is being taken. She does not see a podiatrist.Eye exam is not current.   Hypertension  This is a chronic problem. The problem has been waxing and waning since onset. The problem is controlled. Pertinent negatives include no chest pain, headaches, palpitations or shortness of breath. Risk factors for coronary artery disease include diabetes mellitus, dyslipidemia, obesity, post-menopausal state and sedentary lifestyle. Current antihypertension treatment includes calcium channel blockers and ACE inhibitors. The current treatment provides significant improvement. There are no compliance problems.  Hypertensive end-organ damage includes kidney disease. Identifiable causes of hypertension include chronic renal disease.   Hyperlipidemia  This is a chronic problem. The current episode started more than 1 year ago. Recent lipid tests were reviewed and are variable. Exacerbating  "diseases include chronic renal disease. Pertinent negatives include no chest pain or shortness of breath. Current antihyperlipidemic treatment includes herbal therapy and statins. The current treatment provides moderate improvement of lipids. There are no compliance problems.  Risk factors for coronary artery disease include diabetes mellitus, dyslipidemia, family history, obesity, hypertension, post-menopausal and a sedentary lifestyle.   Muscle Pain  This is a chronic problem. The current episode started more than 1 year ago. The problem occurs constantly. The problem has been gradually worsening since onset. The pain is present in the neck, right hip, left hip, left knee, right knee, right lower leg, left lower leg and lower back. The pain is severe. The symptoms are aggravated by any movement. Associated symptoms include fatigue and stiffness. Pertinent negatives include no chest pain, headaches or shortness of breath. Past treatments include prescription narcotic (jane). The treatment provided moderate relief. Her past medical history is significant for chronic back pain.       Objective   Vital Signs:   /74   Pulse 117   Temp 97.9 °F (36.6 °C)   Ht 162.6 cm (64\")   Wt 119 kg (261 lb 12.8 oz)   SpO2 96%   BMI 44.94 kg/m²     Physical Exam   Result Review :     CMP    CMP 4/7/21 5/5/21 5/21/21   Glucose 246 (A) 247 (A) 233 (A)   BUN 18 16 14   Creatinine 1.18 (A) 1.09 (A) 1.17 (A)   Sodium 138 140 138   Potassium 4.8 4.4 5.0   Chloride 102 101 101   Calcium 9.4 9.5 9.6   Albumin 4.0     Total Bilirubin 0.54     Alkaline Phosphatase 103     AST (SGOT) 17     ALT (SGPT) 17     (A) Abnormal value       Comments are available for some flowsheets but are not being displayed.           CBC w/diff    CBC w/Diff 1/20/21 3/2/21 4/7/21   WBC 13.32 (A) 8.39 8.70   RBC 5.33 5.50 (A) 5.62 (A)   Hemoglobin 15.0 15.4 15.8   Hematocrit 48.3 (A) 48.3 (A) 50.1 (A)   MCV 90.6 87.8 89.1   MCH 28.1 28.0 28.1   MCHC 31.1 " (A) 31.9 (A) 31.5 (A)   RDW 13.2 13.2 14.0   Platelets 87 (A) 72 (A) 90 (A)   Neutrophil Rel % 73.8 70.9 62.3   Lymphocyte Rel % 17.8 (A) 20.9 27.4   Monocyte Rel % 6.5 5.8 7.8   Eosinophil Rel % 1.0 1.4 1.3   Basophil Rel % 0.5 0.5 0.6   (A) Abnormal value            Lipid Panel    Lipid Panel 1/20/21 3/2/21 4/7/21   Total Cholesterol 89 (A) 84 (A) 93 (A)   Triglycerides 195 (A) 151 (A) 201 (A)   HDL Cholesterol 30 (A) 31 (A) 33 (A)   VLDL Cholesterol 39 (A) 30 40 (A)   LDL Cholesterol  20 (A) 23 (A) 20 (A)   (A) Abnormal value       Comments are available for some flowsheets but are not being displayed.           TSH    TSH 10/19/20 1/20/21   TSH 2.140 1.490           Most Recent A1C    HGBA1C Most Recent 1/20/21   Hemoglobin A1C 7.0 (A)   (A) Abnormal value       Comments are available for some flowsheets but are not being displayed.                      Current Outpatient Medications on File Prior to Visit   Medication Sig Dispense Refill   • albuterol sulfate  (90 Base) MCG/ACT inhaler Inhale 2 puffs Every 4 (Four) Hours As Needed.     • ascorbic acid (VITAMIN C) 250 MG tablet Take 2 tablets by mouth Daily.     • HYDROcodone-acetaminophen (NORCO)  MG per tablet Take 1 tablet by mouth 4 (Four) Times a Day As Needed for Severe Pain  for up to 30 days. 120 tablet 0   • metoprolol succinate XL (TOPROL-XL) 100 MG 24 hr tablet Take 1 tablet by mouth 2 (two) times a day.     • metoprolol succinate XL (TOPROL-XL) 50 MG 24 hr tablet Take 1 tablet by mouth 2 (two) times a day.     • miconazole (MICOTIN) 2 % cream 2 (two) times a day.     • pregabalin (LYRICA) 150 MG capsule Take 1 capsule by mouth 3 (Three) Times a Day. 270 capsule 1   • [DISCONTINUED] atorvastatin (LIPITOR) 20 MG tablet Take 1 tablet by mouth Daily.     • [DISCONTINUED] digoxin (LANOXIN) 125 MCG tablet Take 1 tablet on Thursday and Sunday and 2 tablets the rest of the week     • [DISCONTINUED] dilTIAZem (TIAZAC) 120 MG 24 hr capsule Take 1  capsule by mouth Daily.     • [DISCONTINUED] Farxiga 10 MG tablet Take 1 tablet by mouth Daily.     • [DISCONTINUED] furosemide (LASIX) 20 MG tablet Take 20 mg by mouth 2 (Two) Times a Day.     • [DISCONTINUED] glipizide (GLUCOTROL) 10 MG tablet Take 1 tablet by mouth 2 (Two) Times a Day Before Meals. 90 tablet 1   • [DISCONTINUED] Omega-3 1000 MG capsule Take  by mouth.     • [DISCONTINUED] potassium chloride (MICRO-K) 10 MEQ CR capsule TAKE 1 CAPSULE DAILY WITH FOOD 90 capsule 3   • [DISCONTINUED] Pyridoxine HCl (Vitamin B6) 200 MG tablet Take  by mouth.     • [DISCONTINUED] sacubitril-valsartan (Entresto)  MG tablet Take 1 tablet by mouth 2 (two) times a day.     • [DISCONTINUED] Trelegy Ellipta 100-62.5-25 MCG/INH inhaler Inhale 1 puff Daily.     • [DISCONTINUED] Xarelto 20 MG tablet Take 1 tablet by mouth Daily.     • cholecalciferol (VITAMIN D3) 25 MCG (1000 UT) tablet Take 1,000 Units by mouth Daily.     • Cholecalciferol 25 MCG (1000 UT) capsule Take 1 capsule by mouth Daily.     • [DISCONTINUED] ergocalciferol (ERGOCALCIFEROL) 1.25 MG (16700 UT) capsule Take 1 capsule by mouth 1 (One) Time Per Week.     • [DISCONTINUED] gabapentin (NEURONTIN) 600 MG tablet Take 1 tablet by mouth Daily.     • [DISCONTINUED] ipratropium-albuterol (Combivent Respimat)  MCG/ACT inhaler Inhale 1 puff 4 (Four) Times a Day.       No current facility-administered medications on file prior to visit.     Assessment and Plan    Diagnoses and all orders for this visit:    1. Follow up (Primary)    2. Type 2 diabetes mellitus with diabetic polyneuropathy, without long-term current use of insulin (CMS/[facility])  Comments:  Hemoglobin A1c was 7.0 we will continue to monitor progress adjust medication if needed we will check labs today.  Orders:  -     CBC and differential  -     Comprehensive metabolic panel  -     Hemoglobin A1c  -     Lipid panel  -     TSH  -     MicroAlbumin, Urine, Random - Urine, Clean Catch    3. Chronic  pain syndrome  Comments:  Patient is currently on hydrocodone and Lyrica she still refuses pain management at this time.  We will continue to monitor patient's progress.    4. Essential hypertension  Comments:  Stable on medication we will continue to monitor patient's progress.    5. Mixed hyperlipidemia  Comments:  Slightly elevated at last check we will check labs again in adjust medication if needed.    6. Stage 3b chronic kidney disease (CMS/HCC)  Comments:  Chronic and ongoing avoids NSAIDs, will continue to monitor patient's progress.    7. Atrial fibrillation, unspecified type (CMS/[facility])  Comments:  Sees cardiology and medication management keeps atrial fibrillation controlled.    Other orders  -     ipratropium-albuterol (Combivent Respimat)  MCG/ACT inhaler; Inhale 1 puff 4 (Four) Times a Day.  Dispense: 4 g; Refill: 5  -     ergocalciferol (ERGOCALCIFEROL) 1.25 MG (11333 UT) capsule; Take 1 capsule by mouth 1 (One) Time Per Week.  Dispense: 13 capsule; Refill: 1  -     atorvastatin (LIPITOR) 20 MG tablet; Take 1 tablet by mouth Daily.  Dispense: 90 tablet; Refill: 1  -     digoxin (LANOXIN) 125 MCG tablet; Take 1 tablet by mouth Daily. Take 1 tablet on Thursday and Sunday and 2 tablets the rest of the week  Dispense: 90 tablet; Refill: 1  -     dilTIAZem (TIAZAC) 120 MG 24 hr capsule; Take 1 capsule by mouth Daily.  Dispense: 90 capsule; Refill: 1  -     Farxiga 10 MG tablet; Take 10 mg by mouth Daily.  Dispense: 90 tablet; Refill: 1  -     furosemide (LASIX) 20 MG tablet; Take 1 tablet by mouth 2 (Two) Times a Day.  Dispense: 180 tablet; Refill: 1  -     glipizide (GLUCOTROL) 10 MG tablet; Take 1 tablet by mouth 2 (Two) Times a Day Before Meals.  Dispense: 90 tablet; Refill: 1  -     Xarelto 20 MG tablet; Take 1 tablet by mouth Daily.  Dispense: 90 tablet; Refill: 1  -     Trelegy Ellipta 100-62.5-25 MCG/INH inhaler; Inhale 1 puff Daily for 90 days.  Dispense: 3 each; Refill: 1  -      sacubitril-valsartan (Entresto)  MG tablet; Take 1 tablet by mouth 2 (two) times a day.  Dispense: 60 tablet; Refill: 2  -     potassium chloride (MICRO-K) 10 MEQ CR capsule; Take 1 capsule by mouth Daily. with food  Dispense: 90 capsule; Refill: 3  -     Omega-3 1000 MG capsule; Take 2 capsules by mouth 2 (two) times a day for 90 days.  Dispense: 360 capsule; Refill: 1  -     Pyridoxine HCl (Vitamin B6) 200 MG tablet; Take 1 tablet by mouth Daily.  Dispense: 90 tablet; Refill: 1        Follow Up   No follow-ups on file.  Patient was given instructions and counseling regarding her condition or for health maintenance advice. Please see specific information pulled into the AVS if appropriate.

## 2021-07-28 DIAGNOSIS — E78.2 MIXED HYPERLIPIDEMIA: Primary | ICD-10-CM

## 2021-07-28 DIAGNOSIS — E11.42 TYPE 2 DIABETES MELLITUS WITH DIABETIC POLYNEUROPATHY, WITHOUT LONG-TERM CURRENT USE OF INSULIN (HCC): ICD-10-CM

## 2021-07-30 ENCOUNTER — TELEPHONE (OUTPATIENT)
Dept: FAMILY MEDICINE CLINIC | Facility: CLINIC | Age: 71
End: 2021-07-30

## 2021-07-30 NOTE — TELEPHONE ENCOUNTER
Caller: Mary Albarran    Relationship: Self    Best call back number: [phone]    What is the best time to reach you: ANYTIME    Who are you requesting to speak with (clinical staff, provider,  specific staff member): CLINICAL      What was the call regarding: NEEDS TO GO OVER HER MEDICATION LIST. WHEN SHE WAS IN THERE RECENTLY SHE WAS ASKED BY NURSE ABOUT HER MEDS BUT SHE USES MAIL DELIVERY AND ENDED UP GETTING A FEW THINGS THAT SHE DOESN'T TAKE ANYMORE. PLEASE CALL TO GO OVER LIST.    Do you require a callback: YES

## 2021-08-12 ENCOUNTER — DOCUMENTATION (OUTPATIENT)
Dept: FAMILY MEDICINE CLINIC | Facility: CLINIC | Age: 71
End: 2021-08-12

## 2021-08-12 ENCOUNTER — LAB (OUTPATIENT)
Dept: FAMILY MEDICINE CLINIC | Facility: CLINIC | Age: 71
End: 2021-08-12

## 2021-08-12 DIAGNOSIS — E78.2 MIXED HYPERLIPIDEMIA: ICD-10-CM

## 2021-08-12 DIAGNOSIS — E11.42 TYPE 2 DIABETES MELLITUS WITH DIABETIC POLYNEUROPATHY, WITHOUT LONG-TERM CURRENT USE OF INSULIN (HCC): ICD-10-CM

## 2021-08-12 LAB
BASOPHILS # BLD AUTO: 0.05 10*3/MM3 (ref 0–0.2)
BASOPHILS NFR BLD AUTO: 0.8 % (ref 0–1.5)
CHOLEST SERPL-MCNC: 111 MG/DL (ref 0–200)
DEPRECATED RDW RBC AUTO: 45.1 FL (ref 37–54)
EOSINOPHIL # BLD AUTO: 0.07 10*3/MM3 (ref 0–0.4)
EOSINOPHIL NFR BLD AUTO: 1.1 % (ref 0.3–6.2)
ERYTHROCYTE [DISTWIDTH] IN BLOOD BY AUTOMATED COUNT: 13.9 % (ref 12.3–15.4)
HBA1C MFR BLD: 9.66 % (ref 4.8–5.6)
HCT VFR BLD AUTO: 49.6 % (ref 34–46.6)
HDLC SERPL-MCNC: 32 MG/DL (ref 40–60)
HGB BLD-MCNC: 16.3 G/DL (ref 12–15.9)
LDLC SERPL CALC-MCNC: 41 MG/DL (ref 0–100)
LDLC/HDLC SERPL: 0.94 {RATIO}
LYMPHOCYTES # BLD AUTO: 1.51 10*3/MM3 (ref 0.7–3.1)
LYMPHOCYTES NFR BLD AUTO: 22.9 % (ref 19.6–45.3)
MCH RBC QN AUTO: 29.4 PG (ref 26.6–33)
MCHC RBC AUTO-ENTMCNC: 32.9 G/DL (ref 31.5–35.7)
MCV RBC AUTO: 89.4 FL (ref 79–97)
MONOCYTES # BLD AUTO: 0.47 10*3/MM3 (ref 0.1–0.9)
MONOCYTES NFR BLD AUTO: 7.1 % (ref 5–12)
NEUTROPHILS NFR BLD AUTO: 4.44 10*3/MM3 (ref 1.7–7)
NEUTROPHILS NFR BLD AUTO: 67.5 % (ref 42.7–76)
PLATELET # BLD AUTO: 73 10*3/MM3 (ref 140–450)
PMV BLD AUTO: 14.5 FL (ref 6–12)
RBC # BLD AUTO: 5.55 10*6/MM3 (ref 3.77–5.28)
TRIGL SERPL-MCNC: 244 MG/DL (ref 0–150)
VLDLC SERPL-MCNC: 38 MG/DL (ref 5–40)
WBC # BLD AUTO: 6.58 10*3/MM3 (ref 3.4–10.8)

## 2021-08-12 PROCEDURE — 83036 HEMOGLOBIN GLYCOSYLATED A1C: CPT | Performed by: NURSE PRACTITIONER

## 2021-08-12 PROCEDURE — 84479 ASSAY OF THYROID (T3 OR T4): CPT | Performed by: NURSE PRACTITIONER

## 2021-08-12 PROCEDURE — 36415 COLL VENOUS BLD VENIPUNCTURE: CPT | Performed by: NURSE PRACTITIONER

## 2021-08-12 PROCEDURE — 84436 ASSAY OF TOTAL THYROXINE: CPT | Performed by: NURSE PRACTITIONER

## 2021-08-12 PROCEDURE — 84443 ASSAY THYROID STIM HORMONE: CPT | Performed by: NURSE PRACTITIONER

## 2021-08-12 PROCEDURE — 80061 LIPID PANEL: CPT | Performed by: NURSE PRACTITIONER

## 2021-08-12 PROCEDURE — 85025 COMPLETE CBC W/AUTO DIFF WBC: CPT | Performed by: NURSE PRACTITIONER

## 2021-08-12 NOTE — PROGRESS NOTES
Pt came into office and wanted to verify medication list. Medication list verbally reconciled with patient and updated.

## 2021-08-13 LAB
T-UPTAKE NFR SERPL: 1.04 TBI (ref 0.8–1.3)
T4 SERPL-MCNC: 8.82 MCG/DL (ref 4.5–11.7)
TSH SERPL DL<=0.05 MIU/L-ACNC: 1.61 UIU/ML (ref 0.27–4.2)

## 2021-08-19 DIAGNOSIS — G89.29 OTHER CHRONIC PAIN: ICD-10-CM

## 2021-08-19 NOTE — TELEPHONE ENCOUNTER
Caller: AvisMary    Relationship: Self    Best call back number: [phone]    Medication needed:   Requested Prescriptions     Pending Prescriptions Disp Refills   • HYDROcodone-acetaminophen (NORCO)  MG per tablet 120 tablet 0     Sig: Take 1 tablet by mouth 4 (Four) Times a Day As Needed for Severe Pain  for up to 30 days.       When do you need the refill by: AS SOON AS POSSIBLE    Does the patient have less than a 3 day supply:  [x] Yes  [] No    What is the patient's preferred pharmacy: [facility] #[id number] [place], [place] - [facility] [address] AT [facility] [phone] [facility] [phone] FX

## 2021-08-20 DIAGNOSIS — R73.01 IMPAIRED FASTING GLUCOSE: Primary | ICD-10-CM

## 2021-08-23 RX ORDER — HYDROCODONE BITARTRATE AND ACETAMINOPHEN 10; 325 MG/1; MG/1
1 TABLET ORAL 4 TIMES DAILY PRN
Qty: 120 TABLET | Refills: 0 | Status: SHIPPED | OUTPATIENT
Start: 2021-08-23 | End: 2021-09-23 | Stop reason: SDUPTHER

## 2021-09-23 DIAGNOSIS — G89.29 OTHER CHRONIC PAIN: ICD-10-CM

## 2021-09-23 RX ORDER — HYDROCODONE BITARTRATE AND ACETAMINOPHEN 10; 325 MG/1; MG/1
1 TABLET ORAL 4 TIMES DAILY PRN
Qty: 120 TABLET | Refills: 0 | Status: SHIPPED | OUTPATIENT
Start: 2021-09-23 | End: 2021-10-23

## 2021-09-23 NOTE — TELEPHONE ENCOUNTER
Caller: Mary Albarran    Relationship: Self      Medication requested (name and dosage): HYDROcodone-acetaminophen (NORCO)  MG per tablet     Pharmacy where request should be sent: [id number] - [place], [place] - [address] AT [facility] [phone] [facility] [phone] FX    Best call back number: [phone]    Does the patient have less than a 3 day supply:  [x] Yes  [] No    Steven Sargent Rep   09/23/21 09:13 EDT

## 2021-10-22 RX ORDER — GLIPIZIDE 10 MG/1
TABLET ORAL
Qty: 90 TABLET | Refills: 7 | Status: SHIPPED | OUTPATIENT
Start: 2021-10-22 | End: 2022-01-21 | Stop reason: SDUPTHER

## 2021-10-27 ENCOUNTER — TELEPHONE (OUTPATIENT)
Dept: FAMILY MEDICINE CLINIC | Facility: CLINIC | Age: 71
End: 2021-10-27

## 2021-10-27 DIAGNOSIS — G89.4 CHRONIC PAIN SYNDROME: Primary | ICD-10-CM

## 2021-10-27 RX ORDER — HYDROCODONE BITARTRATE AND ACETAMINOPHEN 10; 325 MG/1; MG/1
1 TABLET ORAL EVERY 6 HOURS PRN
Qty: 90 TABLET | Refills: 0 | Status: SHIPPED | OUTPATIENT
Start: 2021-10-27 | End: 2021-10-29

## 2021-10-27 NOTE — TELEPHONE ENCOUNTER
It depends on what labs Dr. Minor orders.  See if Jasmyn could look at the labs and see if they could be drawn here at the office if not please advise patient that she would need to go to the hospital.

## 2021-10-29 DIAGNOSIS — G89.4 CHRONIC PAIN SYNDROME: ICD-10-CM

## 2021-11-01 RX ORDER — HYDROCODONE BITARTRATE AND ACETAMINOPHEN 10; 325 MG/1; MG/1
1 TABLET ORAL 4 TIMES DAILY
Qty: 120 TABLET | Refills: 0 | Status: SHIPPED | OUTPATIENT
Start: 2021-11-01 | End: 2021-12-01

## 2021-11-15 RX ORDER — DIGOXIN 125 MCG
TABLET ORAL
Qty: 144 TABLET | Refills: 1 | Status: SHIPPED | OUTPATIENT
Start: 2021-11-15 | End: 2022-01-21 | Stop reason: SDUPTHER

## 2021-11-23 ENCOUNTER — LAB (OUTPATIENT)
Dept: FAMILY MEDICINE CLINIC | Facility: CLINIC | Age: 71
End: 2021-11-23

## 2021-11-23 DIAGNOSIS — Z79.01 LONG TERM (CURRENT) USE OF ANTICOAGULANTS: ICD-10-CM

## 2021-11-23 DIAGNOSIS — R73.01 IMPAIRED FASTING GLUCOSE: ICD-10-CM

## 2021-11-23 DIAGNOSIS — E78.5 HYPERLIPIDEMIA, UNSPECIFIED HYPERLIPIDEMIA TYPE: ICD-10-CM

## 2021-11-23 DIAGNOSIS — I48.91 ATRIAL FIBRILLATION, UNSPECIFIED TYPE (HCC): Primary | ICD-10-CM

## 2021-11-23 DIAGNOSIS — Z79.899 ENCOUNTER FOR LONG-TERM (CURRENT) USE OF OTHER MEDICATIONS: ICD-10-CM

## 2021-11-23 DIAGNOSIS — I50.9 HEART FAILURE, UNSPECIFIED HF CHRONICITY, UNSPECIFIED HEART FAILURE TYPE (HCC): ICD-10-CM

## 2021-11-23 DIAGNOSIS — I10 HYPERTENSION, ESSENTIAL: ICD-10-CM

## 2021-11-23 LAB
ALBUMIN SERPL-MCNC: 4.5 G/DL (ref 3.5–5.2)
ALBUMIN/GLOB SERPL: 1.6 G/DL
ALP SERPL-CCNC: 71 U/L (ref 39–117)
ALT SERPL W P-5'-P-CCNC: 28 U/L (ref 1–33)
ANION GAP SERPL CALCULATED.3IONS-SCNC: 8.9 MMOL/L (ref 5–15)
AST SERPL-CCNC: 35 U/L (ref 1–32)
BILIRUB SERPL-MCNC: 0.7 MG/DL (ref 0–1.2)
BUN SERPL-MCNC: 14 MG/DL (ref 8–23)
BUN/CREAT SERPL: 13.5 (ref 7–25)
CALCIUM SPEC-SCNC: 9.5 MG/DL (ref 8.6–10.5)
CHLORIDE SERPL-SCNC: 103 MMOL/L (ref 98–107)
CHOLEST SERPL-MCNC: 108 MG/DL (ref 0–200)
CO2 SERPL-SCNC: 27.1 MMOL/L (ref 22–29)
CREAT SERPL-MCNC: 1.04 MG/DL (ref 0.57–1)
GFR SERPL CREATININE-BSD FRML MDRD: 53 ML/MIN/1.73
GLOBULIN UR ELPH-MCNC: 2.9 GM/DL
GLUCOSE SERPL-MCNC: 222 MG/DL (ref 65–99)
HDLC SERPL-MCNC: 28 MG/DL (ref 40–60)
LDLC SERPL CALC-MCNC: 32 MG/DL (ref 0–100)
LDLC/HDLC SERPL: 0.51 {RATIO}
POTASSIUM SERPL-SCNC: 5 MMOL/L (ref 3.5–5.2)
PROT SERPL-MCNC: 7.4 G/DL (ref 6–8.5)
SODIUM SERPL-SCNC: 139 MMOL/L (ref 136–145)
TRIGL SERPL-MCNC: 328 MG/DL (ref 0–150)
VLDLC SERPL-MCNC: 48 MG/DL (ref 5–40)

## 2021-11-23 PROCEDURE — 80061 LIPID PANEL: CPT | Performed by: NURSE PRACTITIONER

## 2021-11-23 PROCEDURE — 83036 HEMOGLOBIN GLYCOSYLATED A1C: CPT | Performed by: NURSE PRACTITIONER

## 2021-11-23 PROCEDURE — 85025 COMPLETE CBC W/AUTO DIFF WBC: CPT | Performed by: NURSE PRACTITIONER

## 2021-11-23 PROCEDURE — 36415 COLL VENOUS BLD VENIPUNCTURE: CPT | Performed by: NURSE PRACTITIONER

## 2021-11-23 PROCEDURE — 80053 COMPREHEN METABOLIC PANEL: CPT | Performed by: NURSE PRACTITIONER

## 2021-11-24 LAB
BASOPHILS # BLD AUTO: 0.02 10*3/MM3 (ref 0–0.2)
BASOPHILS NFR BLD AUTO: 0.2 % (ref 0–1.5)
DEPRECATED RDW RBC AUTO: 42.9 FL (ref 37–54)
EOSINOPHIL # BLD AUTO: 0.07 10*3/MM3 (ref 0–0.4)
EOSINOPHIL NFR BLD AUTO: 0.8 % (ref 0.3–6.2)
ERYTHROCYTE [DISTWIDTH] IN BLOOD BY AUTOMATED COUNT: 13.2 % (ref 12.3–15.4)
HBA1C MFR BLD: 8.58 % (ref 4.8–5.6)
HCT VFR BLD AUTO: 46.3 % (ref 34–46.6)
HGB BLD-MCNC: 15.5 G/DL (ref 12–15.9)
LYMPHOCYTES # BLD AUTO: 1.42 10*3/MM3 (ref 0.7–3.1)
LYMPHOCYTES NFR BLD AUTO: 15.4 % (ref 19.6–45.3)
MCH RBC QN AUTO: 29.9 PG (ref 26.6–33)
MCHC RBC AUTO-ENTMCNC: 33.5 G/DL (ref 31.5–35.7)
MCV RBC AUTO: 89.2 FL (ref 79–97)
MONOCYTES # BLD AUTO: 0.47 10*3/MM3 (ref 0.1–0.9)
MONOCYTES NFR BLD AUTO: 5.1 % (ref 5–12)
NEUTROPHILS NFR BLD AUTO: 7.19 10*3/MM3 (ref 1.7–7)
NEUTROPHILS NFR BLD AUTO: 78.1 % (ref 42.7–76)
PLATELET # BLD AUTO: 75 10*3/MM3 (ref 140–450)
PMV BLD AUTO: 15.1 FL (ref 6–12)
RBC # BLD AUTO: 5.19 10*6/MM3 (ref 3.77–5.28)
WBC NRBC COR # BLD: 9.21 10*3/MM3 (ref 3.4–10.8)

## 2021-11-28 PROBLEM — I10 HYPERTENSION: Status: ACTIVE | Noted: 2021-11-28

## 2021-11-28 PROBLEM — N18.30 CHRONIC KIDNEY DISEASE, STAGE III (MODERATE): Status: ACTIVE | Noted: 2021-11-28

## 2021-11-28 PROBLEM — E78.5 HYPERLIPIDEMIA: Status: ACTIVE | Noted: 2021-11-28

## 2021-11-28 PROBLEM — I48.91 ATRIAL FIBRILLATION: Status: ACTIVE | Noted: 2021-11-28

## 2021-11-28 PROBLEM — I35.9 AORTIC VALVE DISEASE: Status: ACTIVE | Noted: 2021-11-28

## 2021-11-29 ENCOUNTER — OFFICE VISIT (OUTPATIENT)
Dept: CARDIOLOGY | Facility: CLINIC | Age: 71
End: 2021-11-29

## 2021-11-29 VITALS
BODY MASS INDEX: 48.95 KG/M2 | SYSTOLIC BLOOD PRESSURE: 156 MMHG | WEIGHT: 266 LBS | DIASTOLIC BLOOD PRESSURE: 66 MMHG | HEART RATE: 81 BPM | HEIGHT: 62 IN

## 2021-11-29 DIAGNOSIS — E78.2 MIXED HYPERLIPIDEMIA: ICD-10-CM

## 2021-11-29 DIAGNOSIS — I10 PRIMARY HYPERTENSION: ICD-10-CM

## 2021-11-29 DIAGNOSIS — I35.9 AORTIC VALVE DISEASE: Primary | ICD-10-CM

## 2021-11-29 DIAGNOSIS — I48.20 CHRONIC ATRIAL FIBRILLATION (HCC): ICD-10-CM

## 2021-11-29 DIAGNOSIS — I50.32 CHRONIC DIASTOLIC CONGESTIVE HEART FAILURE (HCC): ICD-10-CM

## 2021-11-29 DIAGNOSIS — N18.31 STAGE 3A CHRONIC KIDNEY DISEASE (HCC): ICD-10-CM

## 2021-11-29 PROCEDURE — 99214 OFFICE O/P EST MOD 30 MIN: CPT | Performed by: INTERNAL MEDICINE

## 2021-11-29 RX ORDER — FUROSEMIDE 20 MG/1
20 TABLET ORAL
COMMUNITY
End: 2022-01-21 | Stop reason: SDUPTHER

## 2021-11-29 RX ORDER — MULTIPLE VITAMINS W/ MINERALS TAB 9MG-400MCG
1 TAB ORAL DAILY
COMMUNITY

## 2021-11-29 NOTE — ASSESSMENT & PLAN NOTE
Congestive heart failure due to hypertensive heart disease and aortic valve regurgitation is significantly improved since being put on Entresto.  She could not tolerate the max dose and she is down to half a tablet of the 97/103 twice daily.  I have asked her to increase it to half a tablet in the morning and a whole tablet at night.  She still has some fluid retention with pedal edema but she is very reluctant to increase her dose of diuretic because it makes her feel extremely fatigued and weak.

## 2021-11-29 NOTE — ASSESSMENT & PLAN NOTE
Her aortic regurgitation was moderate to moderately severe and with mild aortic valve stenosis.  In the past she has refused any surgical intervention and wants to continue with medical management.  She understands the risks.  We will repeat her echocardiogram in the next few months.  If it progresses I may be able to convince her to change her mind

## 2021-11-29 NOTE — PROGRESS NOTES
Office Visit    Chief Complaint  Shortness of Breath, Mitral valve regurgitation, Atrial Fibrillation, and Obesity    Subjective            Mary Albarran presents to [facility] CARDIOLOGY  Ms. Hernandez is a 71 years old female with aortic valve disease hypertension super morbid obesity chronic diastolic heart failure who is improved dramatically with Entresto.  She states that her exercise capacity has improved tremendously.  She is able to do her household chores now that she was unable to do so before.  She denies chest pain palpitation dizziness syncope      Past Medical History:   Diagnosis Date   • Allergic rhinitis    • Anxiety    • Aortic valve disease    • Arthritis    • Atrial fibrillation (HCC)    • Chronic kidney disease (CKD), stage III (moderate) ([facility])    • Chronic pain    • COPD (chronic obstructive pulmonary disease) ([facility])    • Depression    • Diabetes mellitus type 2 with complications ([facility])    • Hyperlipidemia    • Hypertension    • Thrombocytopenia ([facility])        Allergies   Allergen Reactions   • Codeine Mental Status Change        Past Surgical History:   Procedure Laterality Date   • HYSTERECTOMY      30 YEARS AGO        Social History     Tobacco Use   • Smoking status: Former Smoker     Packs/day: 1.00     Years: 36.00     Pack years: 36.00     Types: Cigarettes     Start date: 4/3/1971     Quit date: 2008     Years since quittin.6   • Smokeless tobacco: Never Used   • Tobacco comment: FOR 30 YEARS   Vaping Use   • Vaping Use: Never used   Substance Use Topics   • Alcohol use: Never   • Drug use: Never       Family History   Problem Relation Age of Onset   • Heart disease Father    • Heart disease Other    • Heart disease Other    • Diabetes Other         Prior to Admission medications    Medication Sig Start Date End Date Taking? Authorizing Provider   ascorbic acid (VITAMIN C) 250 MG tablet Take 2 tablets by mouth Daily.   Yes Provider, MD Whitley   atorvastatin  (LIPITOR) 20 MG tablet Take 1 tablet by mouth Daily. 7/21/21  Yes Brittni Quiroz APRN   cholecalciferol (VITAMIN D3) 25 MCG (1000 UT) tablet Take 1,000 Units by mouth Daily.   Yes Whitley Emmanuel MD   digoxin (LANOXIN) 125 MCG tablet TAKE 1 TABLET ON THURSDAYS AND SUNDAYS AND 2 TABLETS THE REST OF THE WEEK 11/15/21  Yes Melissa Motley APRN   dilTIAZem (TIAZAC) 120 MG 24 hr capsule Take 1 capsule by mouth Daily. 7/21/21  Yes Brittni Quiroz APRN   Farxiga 10 MG tablet Take 10 mg by mouth Daily. 7/21/21  Yes Brittni Quiroz APRN   Fluticasone-Umeclidin-Vilant (TRELEGY ELLIPTA IN) Inhale.   Yes Whitley Emmanuel MD   furosemide (LASIX) 20 MG tablet Take 1 tablet by mouth 2 (Two) Times a Day.  Patient taking differently: Take 20 mg by mouth. Alternate 2 tabs with 1 tab qod 7/21/21  Yes Brittni Quiroz APRN   furosemide (LASIX) 20 MG tablet Take 20 mg by mouth. Alt 2 tabs with 1 tab qod   Yes Whitley Emmanuel MD   glipizide (GLUCOTROL) 10 MG tablet TAKE 1 TABLET TWICE A DAY BEFORE MEALS 10/22/21  Yes Brittni Quiroz APRN   HYDROcodone-acetaminophen (NORCO)  MG per tablet Take 1 tablet by mouth 4 (Four) Times a Day for 30 days. 11/1/21 12/1/21 Yes Brittni Quiroz APRN   metoprolol succinate XL (TOPROL-XL) 100 MG 24 hr tablet Take 1 tablet by mouth 2 (two) times a day. 5/13/21  Yes Whitley Emmanuel MD   metoprolol succinate XL (TOPROL-XL) 50 MG 24 hr tablet Take 1 tablet by mouth 2 (two) times a day. 6/24/21  Yes Whitley Emmanuel MD   miconazole (MICOTIN) 2 % cream 2 (two) times a day. 4/15/21  Yes Whitley Emmanuel MD   multivitamin with minerals tablet tablet Take 1 tablet by mouth Daily.   Yes ProviderWhitley MD   Omega 3-6-9 Fatty Acids (OMEGA 3-6-9 PO) Take  by mouth. 3 tabs qd   Yes Provider, MD Whitley   potassium chloride (MICRO-K) 10 MEQ CR capsule Take 1 capsule by mouth Daily. with food 7/21/21  Yes Brittni Quiroz,  "LUCERO   pregabalin (LYRICA) 150 MG capsule Take 1 capsule by mouth 3 (Three) Times a Day. 7/7/21  Yes Brittni Quiroz APRN   Pyridoxine HCl (Vitamin B6) 200 MG tablet Take 1 tablet by mouth Daily. 7/21/21  Yes Brittni Quiroz APRN   sacubitril-valsartan (ENTRESTO)  MG tablet Take 0.5 tablets by mouth. bid   Yes Provider, MD Whitley   SITagliptin (Januvia) 100 MG tablet Take 1 tablet by mouth Daily. 8/19/21  Yes Brittni Quiroz APRN   Xarelto 20 MG tablet Take 1 tablet by mouth Daily. 7/21/21  Yes Brittni Quiroz APRN   albuterol sulfate  (90 Base) MCG/ACT inhaler Inhale 2 puffs Every 4 (Four) Hours As Needed.    Provider, MD Whitley   sacubitril-valsartan (Entresto)  MG tablet Take 1 tablet by mouth 2 (two) times a day.  Patient taking differently: Take 0.5 tablets by mouth 2 (two) times a day. 7/21/21   Brittni Quiroz APRN        Review of Systems   Constitutional: Positive for fatigue.   Respiratory: Negative for cough and shortness of breath.    Cardiovascular: Positive for leg swelling. Negative for chest pain and palpitations.   Neurological: Positive for dizziness, weakness and light-headedness.        Objective     /66   Pulse 81   Ht 157.5 cm (62\")   Wt 121 kg (266 lb)   BMI 48.65 kg/m²       Physical Exam  Constitutional:       General: She is awake.      Appearance: Normal appearance.   Neck:      Thyroid: No thyromegaly.      Vascular: No carotid bruit or JVD.   Cardiovascular:      Rate and Rhythm: Normal rate. Rhythm irregular.      Chest Wall: PMI is not displaced.      Pulses: Normal pulses.      Heart sounds: S1 normal and S2 normal. Murmur heard.    Systolic murmur is present.  No friction rub. No gallop. No S3 or S4 sounds.    Pulmonary:      Effort: Pulmonary effort is normal.      Breath sounds: Normal breath sounds and air entry. No wheezing, rhonchi or rales.   Abdominal:      General: Bowel sounds are normal.      " Palpations: Abdomen is soft. There is no mass.      Tenderness: There is no abdominal tenderness.   Musculoskeletal:      Cervical back: Neck supple.      Right lower leg: No edema.      Left lower leg: No edema.   Neurological:      Mental Status: She is alert and oriented to person, place, and time.   Psychiatric:         Mood and Affect: Mood normal.         Behavior: Behavior is cooperative.           Result Review :                           Assessment and Plan        Diagnoses and all orders for this visit:    1. Aortic valve disease (Primary)  Assessment & Plan:  Her aortic regurgitation was moderate to moderately severe and with mild aortic valve stenosis.  In the past she has refused any surgical intervention and wants to continue with medical management.  She understands the risks.  We will repeat her echocardiogram in the next few months.  If it progresses I may be able to convince her to change her mind    Orders:  -     Lipid Panel; Future  -     Comprehensive Metabolic Panel; Future  -     Magnesium; Future  -     Comprehensive Metabolic Panel; Future  -     Digoxin Level; Future  -     proBNP; Future  -     Magnesium; Future  -     Adult Transthoracic Echo Complete W/ Cont if Necessary Per Protocol; Future  -     Digoxin Level; Future  -     proBNP; Future    2. Primary hypertension  -     Lipid Panel; Future  -     Comprehensive Metabolic Panel; Future  -     Magnesium; Future  -     Comprehensive Metabolic Panel; Future  -     Digoxin Level; Future  -     proBNP; Future  -     Magnesium; Future  -     Adult Transthoracic Echo Complete W/ Cont if Necessary Per Protocol; Future  -     Digoxin Level; Future  -     proBNP; Future    3. Mixed hyperlipidemia  -     Lipid Panel; Future  -     Comprehensive Metabolic Panel; Future  -     Magnesium; Future  -     Comprehensive Metabolic Panel; Future  -     Digoxin Level; Future  -     proBNP; Future  -     Magnesium; Future  -     Adult Transthoracic Echo  Complete W/ Cont if Necessary Per Protocol; Future  -     Digoxin Level; Future  -     proBNP; Future    4. Stage 3a chronic kidney disease (HCC)  -     Lipid Panel; Future  -     Comprehensive Metabolic Panel; Future  -     Magnesium; Future  -     Comprehensive Metabolic Panel; Future  -     Digoxin Level; Future  -     proBNP; Future  -     Magnesium; Future  -     Adult Transthoracic Echo Complete W/ Cont if Necessary Per Protocol; Future  -     Digoxin Level; Future  -     proBNP; Future    5. Chronic atrial fibrillation (HCC)  -     Lipid Panel; Future  -     Comprehensive Metabolic Panel; Future  -     Magnesium; Future  -     Comprehensive Metabolic Panel; Future  -     Digoxin Level; Future  -     proBNP; Future  -     Magnesium; Future  -     Adult Transthoracic Echo Complete W/ Cont if Necessary Per Protocol; Future  -     Digoxin Level; Future  -     proBNP; Future    6. Chronic diastolic congestive heart failure (HCC)  Assessment & Plan:  Congestive heart failure due to hypertensive heart disease and aortic valve regurgitation is significantly improved since being put on Entresto.  She could not tolerate the max dose and she is down to half a tablet of the 97/103 twice daily.  I have asked her to increase it to half a tablet in the morning and a whole tablet at night.  She still has some fluid retention with pedal edema but she is very reluctant to increase her dose of diuretic because it makes her feel extremely fatigued and weak.    Orders:  -     Lipid Panel; Future  -     Comprehensive Metabolic Panel; Future  -     Magnesium; Future  -     Comprehensive Metabolic Panel; Future  -     Digoxin Level; Future  -     proBNP; Future  -     Magnesium; Future  -     Adult Transthoracic Echo Complete W/ Cont if Necessary Per Protocol; Future  -     Digoxin Level; Future  -     proBNP; Future    Other orders  -     sacubitril-valsartan (ENTRESTO)  MG tablet; Take 0.5 tablets by mouth 2 (Two) Times a  Day. Take 0.5 tab in am and one tab in pm  Dispense: 135 tablet; Refill: 3          Follow Up     No follow-ups on file.    Patient was given instructions and counseling regarding her condition or for health maintenance advice. Please see specific information pulled into the AVS if appropriate.     Tiffany Minor MD  11/29/21 13:06 EST

## 2021-12-15 ENCOUNTER — TELEPHONE (OUTPATIENT)
Dept: CARDIOLOGY | Facility: CLINIC | Age: 71
End: 2021-12-15

## 2021-12-15 NOTE — TELEPHONE ENCOUNTER
Received VM from patient.    Returned call. Patient stated that she has had issues with being dizzy and sick at her stomach after increasing Entresto and stated that her BP also has been elevated.    Discuss with Dr. Minor who stated that patient can wait 5 days then try to increase Entresto back to 0.5 mg qAM and 1 tab qPM.    Patient aware.

## 2021-12-21 DIAGNOSIS — G89.29 OTHER CHRONIC PAIN: Primary | ICD-10-CM

## 2021-12-21 RX ORDER — HYDROCODONE BITARTRATE AND ACETAMINOPHEN 10; 325 MG/1; MG/1
1 TABLET ORAL EVERY 4 HOURS PRN
COMMUNITY
End: 2021-12-21 | Stop reason: SDUPTHER

## 2021-12-21 NOTE — TELEPHONE ENCOUNTER
Caller: Mary Albarran    Relationship: Self    Best call back number: [phone]    Requested Prescriptions:   HYDROCODONE      Pharmacy where request should be sent: [id number] - [facility] [place] - [address] AT [facility] [phone] [facility] [phone]      Additional details provided by patient: PATIENT WANTS 120 TABLETS    Does the patient have less than a 3 day supply:  [x] Yes  [] No    Steven Hollins Rep   12/21/21 08:56 EST

## 2021-12-22 RX ORDER — HYDROCODONE BITARTRATE AND ACETAMINOPHEN 10; 325 MG/1; MG/1
1 TABLET ORAL EVERY 4 HOURS PRN
Qty: 120 TABLET | Refills: 0 | Status: SHIPPED | OUTPATIENT
Start: 2021-12-22 | End: 2022-01-21 | Stop reason: SDUPTHER

## 2021-12-23 NOTE — TELEPHONE ENCOUNTER
I sent this over 12/22/2021 and I did send over the 120 tablets as we typically give.  Did pharmacy not get it?

## 2021-12-23 NOTE — TELEPHONE ENCOUNTER
Attempted to contact pt to inform her medication has been submitted to pharmacy, unable to leave vm.

## 2022-01-05 DIAGNOSIS — G62.9 NEUROPATHY: ICD-10-CM

## 2022-01-05 RX ORDER — PREGABALIN 150 MG/1
CAPSULE ORAL
Qty: 270 CAPSULE | Refills: 1 | Status: SHIPPED | OUTPATIENT
Start: 2022-01-05 | End: 2022-07-21 | Stop reason: SDUPTHER

## 2022-01-21 ENCOUNTER — OFFICE VISIT (OUTPATIENT)
Dept: FAMILY MEDICINE CLINIC | Facility: CLINIC | Age: 72
End: 2022-01-21

## 2022-01-21 VITALS
DIASTOLIC BLOOD PRESSURE: 68 MMHG | SYSTOLIC BLOOD PRESSURE: 142 MMHG | OXYGEN SATURATION: 97 % | TEMPERATURE: 96.6 F | HEART RATE: 74 BPM | HEIGHT: 62 IN | BODY MASS INDEX: 48.4 KG/M2 | WEIGHT: 263 LBS

## 2022-01-21 DIAGNOSIS — Z09 FOLLOW-UP EXAM, 3-6 MONTHS SINCE PREVIOUS EXAM: Primary | ICD-10-CM

## 2022-01-21 DIAGNOSIS — I50.32 CHRONIC DIASTOLIC CONGESTIVE HEART FAILURE: ICD-10-CM

## 2022-01-21 DIAGNOSIS — E11.42 TYPE 2 DIABETES MELLITUS WITH DIABETIC POLYNEUROPATHY, WITHOUT LONG-TERM CURRENT USE OF INSULIN: ICD-10-CM

## 2022-01-21 DIAGNOSIS — G89.29 CHRONIC JOINT PAIN: ICD-10-CM

## 2022-01-21 DIAGNOSIS — E78.2 MIXED HYPERLIPIDEMIA: ICD-10-CM

## 2022-01-21 DIAGNOSIS — M25.50 CHRONIC JOINT PAIN: ICD-10-CM

## 2022-01-21 DIAGNOSIS — I48.20 CHRONIC ATRIAL FIBRILLATION: ICD-10-CM

## 2022-01-21 DIAGNOSIS — I10 ESSENTIAL HYPERTENSION: ICD-10-CM

## 2022-01-21 DIAGNOSIS — N18.31 STAGE 3A CHRONIC KIDNEY DISEASE: ICD-10-CM

## 2022-01-21 DIAGNOSIS — E55.9 VITAMIN D DEFICIENCY: ICD-10-CM

## 2022-01-21 LAB
ALBUMIN UR-MCNC: 2.4 MG/DL
BASOPHILS # BLD AUTO: 0.04 10*3/MM3 (ref 0–0.2)
BASOPHILS NFR BLD AUTO: 0.3 % (ref 0–1.5)
DEPRECATED RDW RBC AUTO: 42.3 FL (ref 37–54)
EOSINOPHIL # BLD AUTO: 0.29 10*3/MM3 (ref 0–0.4)
EOSINOPHIL NFR BLD AUTO: 2.5 % (ref 0.3–6.2)
ERYTHROCYTE [DISTWIDTH] IN BLOOD BY AUTOMATED COUNT: 12.9 % (ref 12.3–15.4)
HBA1C MFR BLD: 9 % (ref 4.8–5.6)
HCT VFR BLD AUTO: 50.4 % (ref 34–46.6)
HGB BLD-MCNC: 16.7 G/DL (ref 12–15.9)
LYMPHOCYTES # BLD AUTO: 2.38 10*3/MM3 (ref 0.7–3.1)
LYMPHOCYTES NFR BLD AUTO: 20.7 % (ref 19.6–45.3)
MCH RBC QN AUTO: 29.8 PG (ref 26.6–33)
MCHC RBC AUTO-ENTMCNC: 33.1 G/DL (ref 31.5–35.7)
MCV RBC AUTO: 90 FL (ref 79–97)
MONOCYTES # BLD AUTO: 0.61 10*3/MM3 (ref 0.1–0.9)
MONOCYTES NFR BLD AUTO: 5.3 % (ref 5–12)
NEUTROPHILS NFR BLD AUTO: 70.9 % (ref 42.7–76)
NEUTROPHILS NFR BLD AUTO: 8.13 10*3/MM3 (ref 1.7–7)
PLATELET # BLD AUTO: 87 10*3/MM3 (ref 140–450)
PMV BLD AUTO: 14.8 FL (ref 6–12)
RBC # BLD AUTO: 5.6 10*6/MM3 (ref 3.77–5.28)
WBC NRBC COR # BLD: 11.49 10*3/MM3 (ref 3.4–10.8)

## 2022-01-21 PROCEDURE — 83036 HEMOGLOBIN GLYCOSYLATED A1C: CPT | Performed by: NURSE PRACTITIONER

## 2022-01-21 PROCEDURE — 82306 VITAMIN D 25 HYDROXY: CPT | Performed by: NURSE PRACTITIONER

## 2022-01-21 PROCEDURE — 99214 OFFICE O/P EST MOD 30 MIN: CPT | Performed by: NURSE PRACTITIONER

## 2022-01-21 PROCEDURE — 80162 ASSAY OF DIGOXIN TOTAL: CPT | Performed by: NURSE PRACTITIONER

## 2022-01-21 PROCEDURE — 82043 UR ALBUMIN QUANTITATIVE: CPT | Performed by: NURSE PRACTITIONER

## 2022-01-21 PROCEDURE — 84443 ASSAY THYROID STIM HORMONE: CPT | Performed by: NURSE PRACTITIONER

## 2022-01-21 PROCEDURE — 80061 LIPID PANEL: CPT | Performed by: NURSE PRACTITIONER

## 2022-01-21 PROCEDURE — 85025 COMPLETE CBC W/AUTO DIFF WBC: CPT | Performed by: NURSE PRACTITIONER

## 2022-01-21 PROCEDURE — 80053 COMPREHEN METABOLIC PANEL: CPT | Performed by: NURSE PRACTITIONER

## 2022-01-21 RX ORDER — METOPROLOL SUCCINATE 50 MG/1
50 TABLET, EXTENDED RELEASE ORAL 2 TIMES DAILY
Qty: 180 TABLET | Refills: 1 | Status: SHIPPED | OUTPATIENT
Start: 2022-01-21 | End: 2022-03-17 | Stop reason: SDUPTHER

## 2022-01-21 RX ORDER — METOPROLOL SUCCINATE 100 MG/1
100 TABLET, EXTENDED RELEASE ORAL 2 TIMES DAILY
Qty: 180 TABLET | Refills: 1 | Status: SHIPPED | OUTPATIENT
Start: 2022-01-21 | End: 2022-03-17 | Stop reason: SDUPTHER

## 2022-01-21 RX ORDER — FUROSEMIDE 20 MG/1
20 TABLET ORAL 2 TIMES DAILY
Qty: 180 TABLET | Refills: 1 | Status: SHIPPED | OUTPATIENT
Start: 2022-01-21 | End: 2022-03-10

## 2022-01-21 RX ORDER — DIGOXIN 125 MCG
TABLET ORAL
Qty: 180 TABLET | Refills: 1 | Status: SHIPPED | OUTPATIENT
Start: 2022-01-21 | End: 2022-10-18 | Stop reason: SDUPTHER

## 2022-01-21 RX ORDER — GLIPIZIDE 10 MG/1
10 TABLET ORAL
Qty: 180 TABLET | Refills: 1 | Status: SHIPPED | OUTPATIENT
Start: 2022-01-21 | End: 2022-06-24 | Stop reason: SDUPTHER

## 2022-01-21 RX ORDER — DILTIAZEM HYDROCHLORIDE 120 MG/1
120 CAPSULE, EXTENDED RELEASE ORAL DAILY
Qty: 90 CAPSULE | Refills: 1 | Status: SHIPPED | OUTPATIENT
Start: 2022-01-21 | End: 2022-08-29

## 2022-01-21 RX ORDER — HYDROCODONE BITARTRATE AND ACETAMINOPHEN 10; 325 MG/1; MG/1
1 TABLET ORAL EVERY 4 HOURS PRN
Qty: 120 TABLET | Refills: 0 | Status: SHIPPED | OUTPATIENT
Start: 2022-01-21 | End: 2022-02-18 | Stop reason: SDUPTHER

## 2022-01-21 RX ORDER — POTASSIUM CHLORIDE 750 MG/1
10 CAPSULE, EXTENDED RELEASE ORAL DAILY
Qty: 90 CAPSULE | Refills: 3 | Status: SHIPPED | OUTPATIENT
Start: 2022-01-21 | End: 2022-10-24

## 2022-01-21 RX ORDER — DAPAGLIFLOZIN 10 MG/1
1 TABLET, FILM COATED ORAL DAILY
Qty: 90 TABLET | Refills: 1 | Status: SHIPPED | OUTPATIENT
Start: 2022-01-21 | End: 2022-07-27 | Stop reason: SDUPTHER

## 2022-01-21 RX ORDER — RIVAROXABAN 20 MG/1
20 TABLET, FILM COATED ORAL DAILY
Qty: 90 TABLET | Refills: 1 | Status: SHIPPED | OUTPATIENT
Start: 2022-01-21 | End: 2022-04-18 | Stop reason: SDUPTHER

## 2022-01-21 RX ORDER — PERPHENAZINE 2 MG
2 TABLET ORAL 2 TIMES DAILY
Qty: 360 CAPSULE | Refills: 1 | Status: SHIPPED | OUTPATIENT
Start: 2022-01-21 | End: 2022-04-21

## 2022-01-21 RX ORDER — ATORVASTATIN CALCIUM 20 MG/1
20 TABLET, FILM COATED ORAL DAILY
Qty: 90 TABLET | Refills: 1 | Status: SHIPPED | OUTPATIENT
Start: 2022-01-21 | End: 2022-08-01

## 2022-01-21 NOTE — PROGRESS NOTES
Chief Complaint  Follow-up, Med Refill, and Diabetes    Subjective          Medical History: has a past medical history of Allergic rhinitis, Anxiety, Aortic valve disease, Arthritis, Atrial fibrillation (HCC), Chronic kidney disease (CKD), stage III (moderate) (HCC), Chronic pain, COPD (chronic obstructive pulmonary disease) (HCC), Depression, Diabetes mellitus type 2 with complications (HCC), Hyperlipidemia, Hypertension, and Thrombocytopenia (HCC).     Surgical History: has a past surgical history that includes Hysterectomy.     Family History: family history includes Diabetes in an other family member; Heart disease in her father and other family members.     Social History: reports that she quit smoking about 13 years ago. Her smoking use included cigarettes. She started smoking about 50 years ago. She has a 36.00 pack-year smoking history. She has never used smokeless tobacco. She reports that she does not drink alcohol and does not use drugs.    Mary Albarran presents to [facility]  Diabetes  She presents for her follow-up diabetic visit. She has type 2 diabetes mellitus. No MedicAlert identification noted. Her disease course has been fluctuating. There are no hypoglycemic associated symptoms. There are no diabetic associated symptoms. Diabetic complications include heart disease, peripheral neuropathy and PVD. Risk factors for coronary artery disease include diabetes mellitus, dyslipidemia, family history, hypertension, obesity and post-menopausal. Current diabetic treatment includes oral agent (triple therapy). She is compliant with treatment most of the time. Her weight is stable. She is following a generally unhealthy diet. When asked about meal planning, she reported none. She never participates in exercise. She does not see a podiatrist.Eye exam is not current.   Hypertension  This is a chronic problem. The current episode started more than 1 year ago. The problem has  "been waxing and waning since onset. The problem is controlled. Pertinent negatives include no anxiety or shortness of breath. Risk factors for coronary artery disease include diabetes mellitus, dyslipidemia, family history, post-menopausal state, obesity and sedentary lifestyle. Past treatments include beta blockers. Current antihypertension treatment includes beta blockers. The current treatment provides significant improvement. Hypertensive end-organ damage includes kidney disease, heart failure and PVD.   Hyperlipidemia  This is a chronic problem. The current episode started more than 1 year ago. The problem is resistant. Recent lipid tests were reviewed and are variable. Factors aggravating her hyperlipidemia include beta blockers and fatty foods. Associated symptoms include myalgias. Pertinent negatives include no shortness of breath. Current antihyperlipidemic treatment includes statins and herbal therapy. The current treatment provides moderate improvement of lipids. Compliance problems include adherence to diet.  Risk factors for coronary artery disease include diabetes mellitus, dyslipidemia, family history, hypertension, obesity, post-menopausal and a sedentary lifestyle.   Pain  This is a chronic problem. The current episode started more than 1 year ago. The problem occurs constantly. The problem has been unchanged. Associated symptoms include arthralgias and myalgias. The symptoms are aggravated by exertion. She has tried oral narcotics, rest and NSAIDs for the symptoms. The treatment provided mild relief.       Objective   Vital Signs:   /68   Pulse 74   Temp 96.6 °F (35.9 °C)   Ht 157.5 cm (62\")   Wt 119 kg (263 lb)   SpO2 97%   BMI 48.10 kg/m²     Physical Exam  Vitals reviewed.   Constitutional:       Appearance: Normal appearance. She is well-developed. She is morbidly obese.   HENT:      Head: Normocephalic and atraumatic.   Eyes:      Conjunctiva/sclera: Conjunctivae normal.      " Pupils: Pupils are equal, round, and reactive to light.   Cardiovascular:      Rate and Rhythm: Normal rate. Rhythm irregularly irregular.      Heart sounds: No murmur heard.  No friction rub. No gallop.       Comments: Chronic history of A. fib  Pulmonary:      Effort: Pulmonary effort is normal.      Breath sounds: Normal breath sounds. No wheezing or rhonchi.   Skin:     General: Skin is warm and dry.   Neurological:      Mental Status: She is alert and oriented to person, place, and time.      Gait: Gait abnormal (uses cane).   Psychiatric:         Mood and Affect: Mood and affect normal.         Behavior: Behavior normal.         Thought Content: Thought content normal.         Judgment: Judgment normal.        Result Review :   The following data was reviewed by: LUCERO Collins on 01/21/2022:  Common labs    Common Labsle 7/21/21 7/21/21 8/12/21 8/12/21 8/12/21 11/23/21 11/23/21 11/23/21 11/23/21    1206 1206 1329 1329 1329 1347 1347 1347 1348   Glucose 222 (A)     222 (A)      BUN 11     14      Creatinine 0.90     1.04 (A)      eGFR Non  Am 62     53 (A)      Sodium 139     139      Potassium 5.2     5.0      Chloride 105     103      Calcium 9.3     9.5      Albumin 4.40     4.50      Total Bilirubin 0.5     0.7      Alkaline Phosphatase 94     71      AST (SGOT) 22     35 (A)      ALT (SGPT) 20     28      WBC   6.58     9.21    Hemoglobin   16.3 (A)     15.5    Hematocrit   49.6 (A)     46.3    Platelets   73 (A)     75 (A)    Total Cholesterol     111  108     Triglycerides     244 (A)  328 (A)     HDL Cholesterol     32 (A)  28 (A)     LDL Cholesterol      41  32     Hemoglobin A1C    9.66 (A)     8.58 (A)   Microalbumin, Urine  11.2          (A) Abnormal value                        Current Outpatient Medications on File Prior to Visit   Medication Sig Dispense Refill   • ascorbic acid (VITAMIN C) 250 MG tablet Take 2 tablets by mouth Daily.     • cholecalciferol (VITAMIN D3) 25 MCG  (1000 UT) tablet Take 1,000 Units by mouth Daily.     • miconazole (MICOTIN) 2 % cream 2 (two) times a day.     • multivitamin with minerals tablet tablet Take 1 tablet by mouth Daily.     • pregabalin (LYRICA) 150 MG capsule TAKE 1 CAPSULE THREE TIMES A  capsule 1   • Pyridoxine HCl (Vitamin B6) 200 MG tablet Take 1 tablet by mouth Daily. 90 tablet 1   • sacubitril-valsartan (ENTRESTO)  MG tablet Take 0.5 tablets by mouth Every Morning for 90 days, THEN 1 tablet Every Night for 90 days. 135 tablet 3   • [DISCONTINUED] atorvastatin (LIPITOR) 20 MG tablet Take 1 tablet by mouth Daily. 90 tablet 1   • [DISCONTINUED] digoxin (LANOXIN) 125 MCG tablet TAKE 1 TABLET ON THURSDAYS AND SUNDAYS AND 2 TABLETS THE REST OF THE WEEK 144 tablet 1   • [DISCONTINUED] dilTIAZem (TIAZAC) 120 MG 24 hr capsule Take 1 capsule by mouth Daily. 90 capsule 1   • [DISCONTINUED] Farxiga 10 MG tablet Take 10 mg by mouth Daily. 90 tablet 1   • [DISCONTINUED] Fluticasone-Umeclidin-Vilant (TRELEGY ELLIPTA IN) Inhale.     • [DISCONTINUED] furosemide (LASIX) 20 MG tablet Take 20 mg by mouth. Alt 2 tabs with 1 tab qod     • [DISCONTINUED] glipizide (GLUCOTROL) 10 MG tablet TAKE 1 TABLET TWICE A DAY BEFORE MEALS 90 tablet 7   • [DISCONTINUED] HYDROcodone-acetaminophen (NORCO)  MG per tablet Take 1 tablet by mouth Every 4 (Four) Hours As Needed for Moderate Pain  or Severe Pain . 120 tablet 0   • [DISCONTINUED] metoprolol succinate XL (TOPROL-XL) 100 MG 24 hr tablet Take 1 tablet by mouth 2 (two) times a day.     • [DISCONTINUED] metoprolol succinate XL (TOPROL-XL) 50 MG 24 hr tablet Take 1 tablet by mouth 2 (two) times a day.     • [DISCONTINUED] Omega 3-6-9 Fatty Acids (OMEGA 3-6-9 PO) Take  by mouth. 3 tabs qd     • [DISCONTINUED] potassium chloride (MICRO-K) 10 MEQ CR capsule Take 1 capsule by mouth Daily. with food 90 capsule 3   • [DISCONTINUED] SITagliptin (Januvia) 100 MG tablet Take 1 tablet by mouth Daily. 90 tablet 1   •  [DISCONTINUED] Xarelto 20 MG tablet Take 1 tablet by mouth Daily. 90 tablet 1     No current facility-administered medications on file prior to visit.        Assessment and Plan    Diagnoses and all orders for this visit:    1. Follow-up exam, 3-6 months since previous exam (Primary)    2. Stage 3a chronic kidney disease (HCC)  -     CBC Auto Differential  -     Comprehensive Metabolic Panel  -     Hemoglobin A1c  -     Lipid Panel  -     MicroAlbumin, Urine, Random - Urine, Clean Catch  -     TSH  -     Digoxin level  -     Vitamin D 25 Hydroxy    3. Mixed hyperlipidemia  -     CBC Auto Differential  -     Comprehensive Metabolic Panel  -     Hemoglobin A1c  -     Lipid Panel  -     MicroAlbumin, Urine, Random - Urine, Clean Catch  -     TSH  -     Digoxin level  -     Vitamin D 25 Hydroxy    4. Type 2 diabetes mellitus with diabetic polyneuropathy, without long-term current use of insulin (HCC)  Comments:  We will recheck A1c last A1c was 8.8.  Patient does admit she still has trouble watching what she eats.  Will adjust medication if needed  Orders:  -     CBC Auto Differential  -     Comprehensive Metabolic Panel  -     Hemoglobin A1c  -     Lipid Panel  -     MicroAlbumin, Urine, Random - Urine, Clean Catch  -     TSH  -     Digoxin level  -     Vitamin D 25 Hydroxy    5. Essential hypertension  Comments:  Stable on medications we will continue to monitor  Orders:  -     CBC Auto Differential  -     Comprehensive Metabolic Panel  -     Hemoglobin A1c  -     Lipid Panel  -     MicroAlbumin, Urine, Random - Urine, Clean Catch  -     TSH  -     Digoxin level  -     Vitamin D 25 Hydroxy    6. Chronic diastolic congestive heart failure (HCC)  Comments:  Sees Dr. Minor cardiology, will refill medications  Orders:  -     CBC Auto Differential  -     Comprehensive Metabolic Panel  -     Hemoglobin A1c  -     Lipid Panel  -     MicroAlbumin, Urine, Random - Urine, Clean Catch  -     TSH  -     Digoxin level  -      Vitamin D 25 Hydroxy    7. Chronic atrial fibrillation (HCC)  -     CBC Auto Differential  -     Comprehensive Metabolic Panel  -     Hemoglobin A1c  -     Lipid Panel  -     MicroAlbumin, Urine, Random - Urine, Clean Catch  -     TSH  -     Digoxin level  -     Vitamin D 25 Hydroxy    8. Vitamin D deficiency  -     CBC Auto Differential  -     Comprehensive Metabolic Panel  -     Hemoglobin A1c  -     Lipid Panel  -     MicroAlbumin, Urine, Random - Urine, Clean Catch  -     TSH  -     Digoxin level  -     Vitamin D 25 Hydroxy    9. Chronic joint pain  Comments:  Takes Norco every 4 hours.  Patient is in significant amount of pain refuses pain management we will continue to monitor patient's progress  Orders:  -     HYDROcodone-acetaminophen (NORCO)  MG per tablet; Take 1 tablet by mouth Every 4 (Four) Hours As Needed for Moderate Pain  or Severe Pain .  Dispense: 120 tablet; Refill: 0    Other orders  -     atorvastatin (LIPITOR) 20 MG tablet; Take 1 tablet by mouth Daily.  Dispense: 90 tablet; Refill: 1  -     digoxin (LANOXIN) 125 MCG tablet; Take 1 tablet on Thursday and Sundays and 2 tablets the rest of the week  Dispense: 180 tablet; Refill: 1  -     dilTIAZem (TIAZAC) 120 MG 24 hr capsule; Take 1 capsule by mouth Daily.  Dispense: 90 capsule; Refill: 1  -     Farxiga 10 MG tablet; Take 10 mg by mouth Daily.  Dispense: 90 tablet; Refill: 1  -     furosemide (LASIX) 20 MG tablet; Take 1 tablet by mouth 2 (Two) Times a Day. Alt 2 tabs with 1 tab qod  Dispense: 180 tablet; Refill: 1  -     glipizide (GLUCOTROL) 10 MG tablet; Take 1 tablet by mouth 2 (Two) Times a Day Before Meals.  Dispense: 180 tablet; Refill: 1  -     metoprolol succinate XL (TOPROL-XL) 100 MG 24 hr tablet; Take 1 tablet by mouth 2 (Two) Times a Day.  Dispense: 180 tablet; Refill: 1  -     metoprolol succinate XL (TOPROL-XL) 50 MG 24 hr tablet; Take 1 tablet by mouth 2 (Two) Times a Day.  Dispense: 180 tablet; Refill: 1  -     potassium  chloride (MICRO-K) 10 MEQ CR capsule; Take 1 capsule by mouth Daily. with food  Dispense: 90 capsule; Refill: 3  -     SITagliptin (Januvia) 100 MG tablet; Take 1 tablet by mouth Daily.  Dispense: 90 tablet; Refill: 1  -     Xarelto 20 MG tablet; Take 1 tablet by mouth Daily.  Dispense: 90 tablet; Refill: 1  -     Fluticasone-Umeclidin-Vilant (Trelegy Ellipta) 100-62.5-25 MCG/INH inhaler; Inhale 1 puff Daily for 90 days.  Dispense: 3 each; Refill: 1  -     Omega 3-6-9 capsule; Take 2 capsules by mouth 2 (Two) Times a Day for 90 days. 3 tabs qd  Dispense: 360 capsule; Refill: 1        Follow Up   Return in about 6 months (around 7/21/2022) for Recheck.  Patient was given instructions and counseling regarding her condition or for health maintenance advice. Please see specific information pulled into the AVS if appropriate.

## 2022-01-22 LAB
25(OH)D3 SERPL-MCNC: 56.3 NG/ML
ALBUMIN SERPL-MCNC: 4.4 G/DL (ref 3.5–5.2)
ALBUMIN/GLOB SERPL: 1.6 G/DL
ALP SERPL-CCNC: 87 U/L (ref 39–117)
ALT SERPL W P-5'-P-CCNC: 22 U/L (ref 1–33)
ANION GAP SERPL CALCULATED.3IONS-SCNC: 17 MMOL/L (ref 5–15)
AST SERPL-CCNC: 24 U/L (ref 1–32)
BILIRUB SERPL-MCNC: 0.5 MG/DL (ref 0–1.2)
BUN SERPL-MCNC: 16 MG/DL (ref 8–23)
BUN/CREAT SERPL: 14.3 (ref 7–25)
CALCIUM SPEC-SCNC: 9.5 MG/DL (ref 8.6–10.5)
CHLORIDE SERPL-SCNC: 102 MMOL/L (ref 98–107)
CHOLEST SERPL-MCNC: 114 MG/DL (ref 0–200)
CO2 SERPL-SCNC: 21 MMOL/L (ref 22–29)
CREAT SERPL-MCNC: 1.12 MG/DL (ref 0.57–1)
DIGOXIN SERPL-MCNC: 0.7 NG/ML (ref 0.6–1.2)
GFR SERPL CREATININE-BSD FRML MDRD: 48 ML/MIN/1.73
GLOBULIN UR ELPH-MCNC: 2.7 GM/DL
GLUCOSE SERPL-MCNC: 228 MG/DL (ref 65–99)
HDLC SERPL-MCNC: 28 MG/DL (ref 40–60)
LDL/HDL RATIO NULL: ABNORMAL
LDLC SERPL CALC-MCNC: 12 MG/DL (ref 0–100)
POTASSIUM SERPL-SCNC: 4.6 MMOL/L (ref 3.5–5.2)
PROT SERPL-MCNC: 7.1 G/DL (ref 6–8.5)
SODIUM SERPL-SCNC: 140 MMOL/L (ref 136–145)
TRIGL SERPL-MCNC: 581 MG/DL (ref 0–150)
TSH SERPL DL<=0.05 MIU/L-ACNC: 1.51 UIU/ML (ref 0.27–4.2)
VLDLC SERPL-MCNC: 74 MG/DL (ref 5–40)

## 2022-01-24 RX ORDER — DULAGLUTIDE 0.75 MG/.5ML
0.75 INJECTION, SOLUTION SUBCUTANEOUS WEEKLY
Qty: 13 PEN | Refills: 1 | Status: SHIPPED | OUTPATIENT
Start: 2022-01-24 | End: 2022-01-26 | Stop reason: SDUPTHER

## 2022-01-24 RX ORDER — FENOFIBRATE 145 MG/1
145 TABLET, COATED ORAL DAILY
Qty: 90 TABLET | Refills: 1 | Status: SHIPPED | OUTPATIENT
Start: 2022-01-24 | End: 2022-04-24

## 2022-01-26 DIAGNOSIS — E11.65 TYPE 2 DIABETES MELLITUS WITH HYPERGLYCEMIA, WITH LONG-TERM CURRENT USE OF INSULIN: Primary | ICD-10-CM

## 2022-01-26 DIAGNOSIS — Z79.4 TYPE 2 DIABETES MELLITUS WITH HYPERGLYCEMIA, WITH LONG-TERM CURRENT USE OF INSULIN: Primary | ICD-10-CM

## 2022-01-26 RX ORDER — DULAGLUTIDE 0.75 MG/.5ML
0.75 INJECTION, SOLUTION SUBCUTANEOUS WEEKLY
Qty: 13 PEN | Refills: 1 | Status: SHIPPED | OUTPATIENT
Start: 2022-01-26 | End: 2022-04-01

## 2022-02-14 ENCOUNTER — TELEPHONE (OUTPATIENT)
Dept: CARDIOLOGY | Facility: CLINIC | Age: 72
End: 2022-02-14

## 2022-02-18 DIAGNOSIS — G89.29 CHRONIC JOINT PAIN: ICD-10-CM

## 2022-02-18 DIAGNOSIS — M25.50 CHRONIC JOINT PAIN: ICD-10-CM

## 2022-02-18 NOTE — TELEPHONE ENCOUNTER
Caller: Mary Albarran LAKESHA    Relationship: Self    Best call back number: [phone]    Requested Prescriptions:   Requested Prescriptions     Pending Prescriptions Disp Refills   • HYDROcodone-acetaminophen (NORCO)  MG per tablet 120 tablet 0     Sig: Take 1 tablet by mouth Every 4 (Four) Hours As Needed for Moderate Pain  or Severe Pain .        Pharmacy where request should be sent: [id number] - [place], [place] - [address] AT [facility] [phone] [facility] [phone] FX     Additional details provided by patient: PATIENT STATED THIS SHOULD BE A QUANTITY     Does the patient have less than a 3 day supply:  [x] Yes  [] No    Steven Nicholson Rep   02/18/22 11:19 EST

## 2022-02-21 RX ORDER — HYDROCODONE BITARTRATE AND ACETAMINOPHEN 10; 325 MG/1; MG/1
1 TABLET ORAL EVERY 4 HOURS PRN
Qty: 120 TABLET | Refills: 0 | Status: SHIPPED | OUTPATIENT
Start: 2022-02-21 | End: 2022-03-24 | Stop reason: SDUPTHER

## 2022-03-10 RX ORDER — FUROSEMIDE 20 MG/1
TABLET ORAL
Qty: 180 TABLET | Refills: 3 | Status: SHIPPED | OUTPATIENT
Start: 2022-03-10

## 2022-03-14 RX ORDER — METOPROLOL SUCCINATE 100 MG/1
TABLET, EXTENDED RELEASE ORAL
Qty: 180 TABLET | Refills: 3 | OUTPATIENT
Start: 2022-03-14

## 2022-03-17 RX ORDER — METOPROLOL SUCCINATE 100 MG/1
100 TABLET, EXTENDED RELEASE ORAL 2 TIMES DAILY
Qty: 180 TABLET | Refills: 3 | Status: SHIPPED | OUTPATIENT
Start: 2022-03-17 | End: 2023-03-22 | Stop reason: SDUPTHER

## 2022-03-17 RX ORDER — METOPROLOL SUCCINATE 50 MG/1
50 TABLET, EXTENDED RELEASE ORAL 2 TIMES DAILY
Qty: 180 TABLET | Refills: 3 | Status: SHIPPED | OUTPATIENT
Start: 2022-03-17 | End: 2022-03-21

## 2022-03-17 NOTE — TELEPHONE ENCOUNTER
LOV 11/29/2021    MARGARITA OV -  Spoke with pt she is making an appointment    Medication was noted at LOV

## 2022-03-18 ENCOUNTER — TELEPHONE (OUTPATIENT)
Dept: FAMILY MEDICINE CLINIC | Facility: CLINIC | Age: 72
End: 2022-03-18

## 2022-03-18 NOTE — TELEPHONE ENCOUNTER
Pt phoned stating Trulicity has been causing her to have diarrhea and nausea/vomitting. States she overall doesn't feel well when taking. Please advise.

## 2022-03-21 RX ORDER — METOPROLOL SUCCINATE 50 MG/1
TABLET, EXTENDED RELEASE ORAL
Qty: 180 TABLET | Refills: 3 | Status: SHIPPED | OUTPATIENT
Start: 2022-03-21 | End: 2023-03-22 | Stop reason: SDUPTHER

## 2022-03-21 NOTE — TELEPHONE ENCOUNTER
The only thing left at this point is to start you on once daily insulin injections.  We see if patient is agreeable with daily insulin injections?

## 2022-03-22 PROBLEM — I35.9 AORTIC VALVE DISEASE: Chronic | Status: ACTIVE | Noted: 2021-11-28

## 2022-03-23 ENCOUNTER — OFFICE VISIT (OUTPATIENT)
Dept: CARDIOLOGY | Facility: CLINIC | Age: 72
End: 2022-03-23

## 2022-03-23 VITALS
SYSTOLIC BLOOD PRESSURE: 122 MMHG | OXYGEN SATURATION: 97 % | DIASTOLIC BLOOD PRESSURE: 75 MMHG | HEART RATE: 68 BPM | WEIGHT: 259 LBS | HEIGHT: 63 IN | BODY MASS INDEX: 45.89 KG/M2

## 2022-03-23 DIAGNOSIS — I48.20 CHRONIC ATRIAL FIBRILLATION: ICD-10-CM

## 2022-03-23 DIAGNOSIS — I50.32 CHRONIC DIASTOLIC CONGESTIVE HEART FAILURE: ICD-10-CM

## 2022-03-23 DIAGNOSIS — I35.9 AORTIC VALVE DISEASE: Primary | Chronic | ICD-10-CM

## 2022-03-23 DIAGNOSIS — I10 PRIMARY HYPERTENSION: ICD-10-CM

## 2022-03-23 DIAGNOSIS — E78.2 MIXED HYPERLIPIDEMIA: ICD-10-CM

## 2022-03-23 PROCEDURE — 99214 OFFICE O/P EST MOD 30 MIN: CPT | Performed by: NURSE PRACTITIONER

## 2022-03-23 NOTE — ASSESSMENT & PLAN NOTE
Rate controlled.  Continue Xarelto 20 mg a day and metoprolol ER 50 mg twice daily and metoprolol 100 mg twice daily.  Continue digoxin.  She will check his level next labs in the next few weeks

## 2022-03-23 NOTE — ASSESSMENT & PLAN NOTE
Mixed with severely elevated triglycerides.  She is due to have labs in the next few weeks.  If still grossly abnormal will adjust cholesterol meds.  Continue fenofibrate 145 and atorvastatin 20 mg.

## 2022-03-23 NOTE — ASSESSMENT & PLAN NOTE
Well-controlled.  Continue diltiazem 120 mg, furosemide 20 mg twice daily, metoprolol 150 twice daily and Entresto 97/103 twice daily.

## 2022-03-23 NOTE — PROGRESS NOTES
Chief Complaint  Atrial Fibrillation and Hypertension    Subjective        Mary LAKESHA Albarran presents to [facility]  Is a 71-year-old white female comes in to evaluate her atrial arrhythmias, hypertension, and aortic valve disease.  Continues complain of pedal edema she said it is mild and unchanged she keeps them wrapped and that seems to help.  Has occasional lightheadedness with position changes but is mild and unchanged.  Denies any shortness of breath.  Has occasional palpitations which is mild and unchanged.  Denies chest pain,  syncope, PND, and orthopnea.  Admits she does not get any regular exercise and weight is the same.  Cardiac wise she says she is feeling good.  Her major issues has to deal with her diabetes.  She has had side effects with Trulicity and has stopped it.  Sees PCP and does labs in the next month.  She has not had the Covid vaccine.        Past History:    Past Medical History:   Diagnosis Date   • Allergic rhinitis    • Anxiety    • Aortic valve disease 11/28/2021    Fibrocalcific changes of the aortic valve with mild aortic valve stenosis   on echo 3/2/2021  Moderate aortic valve regurgitation is present. Aortic valve maximum pressure gradient is 26 mmHg. Moderate aortic valve stenosis is present.  On echo 2/10/2022      • Arthritis    • Atrial fibrillation (HCC)    • Chronic kidney disease (CKD), stage III (moderate) (HCC)    • Chronic pain    • COPD (chronic obstructive pulmonary disease) (HCC)    • Depression    • Diabetes mellitus type 2 with complications (HCC)    • Hyperlipidemia    • Hypertension    • Thrombocytopenia (HCC)         Family History: family history includes Diabetes in an other family member; Heart disease in her father and other family members.     Social History: reports that she quit smoking about 13 years ago. Her smoking use included cigarettes. She started smoking about 51 years ago. She has a 36.00 pack-year smoking history. She has  never used smokeless tobacco. She reports that she does not drink alcohol and does not use drugs.    Allergies: Codeine    Past Surgical History:   Procedure Laterality Date   • HYSTERECTOMY      30 YEARS AGO          Current Outpatient Medications:   •  ascorbic acid (VITAMIN C) 250 MG tablet, Take 2 tablets by mouth Daily., Disp: , Rfl:   •  atorvastatin (LIPITOR) 20 MG tablet, Take 1 tablet by mouth Daily., Disp: 90 tablet, Rfl: 1  •  cholecalciferol (VITAMIN D3) 25 MCG (1000 UT) tablet, Take 1,000 Units by mouth Daily., Disp: , Rfl:   •  digoxin (LANOXIN) 125 MCG tablet, Take 1 tablet on Thursday and Sundays and 2 tablets the rest of the week, Disp: 180 tablet, Rfl: 1  •  dilTIAZem (TIAZAC) 120 MG 24 hr capsule, Take 1 capsule by mouth Daily., Disp: 90 capsule, Rfl: 1  •  Dulaglutide (Trulicity) 0.75 MG/0.5ML solution pen-injector, Inject 0.75 mg under the skin into the appropriate area as directed 1 (One) Time Per Week., Disp: 13 pen, Rfl: 1  •  Farxiga 10 MG tablet, Take 10 mg by mouth Daily., Disp: 90 tablet, Rfl: 1  •  fenofibrate (TRICOR) 145 MG tablet, Take 1 tablet by mouth Daily for 90 days., Disp: 90 tablet, Rfl: 1  •  Fluticasone-Umeclidin-Vilant (Trelegy Ellipta) 100-62.5-25 MCG/INH inhaler, Inhale 1 puff Daily for 90 days., Disp: 3 each, Rfl: 1  •  furosemide (LASIX) 20 MG tablet, TAKE 1 TABLET TWICE A DAY, Disp: 180 tablet, Rfl: 3  •  glipizide (GLUCOTROL) 10 MG tablet, Take 1 tablet by mouth 2 (Two) Times a Day Before Meals., Disp: 180 tablet, Rfl: 1  •  HYDROcodone-acetaminophen (NORCO)  MG per tablet, Take 1 tablet by mouth Every 4 (Four) Hours As Needed for Moderate Pain  or Severe Pain ., Disp: 120 tablet, Rfl: 0  •  metoprolol succinate XL (TOPROL-XL) 100 MG 24 hr tablet, Take 1 tablet by mouth 2 (Two) Times a Day., Disp: 180 tablet, Rfl: 3  •  metoprolol succinate XL (TOPROL-XL) 50 MG 24 hr tablet, TAKE 1 TABLET TWICE A DAY (ALONG WITH METOPROLOL  MG TWICE A DAY ), Disp: 180  tablet, Rfl: 3  •  miconazole (MICOTIN) 2 % cream, 2 (two) times a day., Disp: , Rfl:   •  multivitamin with minerals tablet tablet, Take 1 tablet by mouth Daily., Disp: , Rfl:   •  Omega 3-6-9 capsule, Take 2 capsules by mouth 2 (Two) Times a Day for 90 days. 3 tabs qd, Disp: 360 capsule, Rfl: 1  •  potassium chloride (MICRO-K) 10 MEQ CR capsule, Take 1 capsule by mouth Daily. with food, Disp: 90 capsule, Rfl: 3  •  pregabalin (LYRICA) 150 MG capsule, TAKE 1 CAPSULE THREE TIMES A DAY, Disp: 270 capsule, Rfl: 1  •  Pyridoxine HCl (Vitamin B6) 200 MG tablet, Take 1 tablet by mouth Daily., Disp: 90 tablet, Rfl: 1  •  sacubitril-valsartan (ENTRESTO)  MG tablet, Take 0.5 tablets by mouth Every Morning for 90 days, THEN 1 tablet Every Night for 90 days., Disp: 135 tablet, Rfl: 3  •  SITagliptin (Januvia) 100 MG tablet, Take 1 tablet by mouth Daily., Disp: 90 tablet, Rfl: 1  •  Xarelto 20 MG tablet, Take 1 tablet by mouth Daily., Disp: 90 tablet, Rfl: 1     There are no discontinued medications.     Review of Systems   Constitutional: Positive for fatigue.   Respiratory: Negative for shortness of breath.    Cardiovascular: Positive for leg swelling (no change). Negative for chest pain and palpitations.   Neurological: Positive for light-headedness.   All other systems reviewed and are negative.       Objective     Physical Exam  Constitutional:       General: She is not in acute distress.     Appearance: She is obese.      Comments: Ambulates with a cane   Neck:      Vascular: No carotid bruit.   Cardiovascular:      Rate and Rhythm: Normal rate. Rhythm irregular.      Heart sounds: Murmur (2/6 systolic murmur at the base heard throughout the precordium and a 1/6 systolic murmur at the apex.) heard.   Pulmonary:      Effort: Pulmonary effort is normal.      Breath sounds: Normal breath sounds.   Musculoskeletal:      Right lower leg: No edema.      Left lower leg: No edema.      Comments: Lower legs are wrapped so  "difficult to evaluate edema.  Appears to be trace pedal edema   Neurological:      Mental Status: She is alert.       /75   Pulse 68   Ht 160 cm (63\")   Wt 117 kg (259 lb)   SpO2 97%   BMI 45.88 kg/m²       Vitals:    03/23/22 0953   BP: 122/75   Pulse: 68   SpO2: 97%       Result Review :         The following data was reviewed by: LUCERO Berger on 03/23/2022:      Lab Results   Component Value Date     05/21/2021     05/05/2021    BNP 1010 (H) 03/02/2021     CMP    CMP 7/21/21 11/23/21 1/21/22   Glucose 222 (A) 222 (A) 228 (A)   BUN 11 14 16   Creatinine 0.90 1.04 (A) 1.12 (A)   eGFR Non  Am 62 53 (A) 48 (A)   Sodium 139 139 140   Potassium 5.2 5.0 4.6   Chloride 105 103 102   Calcium 9.3 9.5 9.5   Albumin 4.40 4.50 4.40   Total Bilirubin 0.5 0.7 0.5   Alkaline Phosphatase 94 71 87   AST (SGOT) 22 35 (A) 24   ALT (SGPT) 20 28 22   (A) Abnormal value            CBC w/diff    CBC w/Diff 8/12/21 11/23/21 1/21/22   WBC 6.58 9.21 11.49 (A)   RBC 5.55 (A) 5.19 5.60 (A)   Hemoglobin 16.3 (A) 15.5 16.7 (A)   Hematocrit 49.6 (A) 46.3 50.4 (A)   MCV 89.4 89.2 90.0   MCH 29.4 29.9 29.8   MCHC 32.9 33.5 33.1   RDW 13.9 13.2 12.9   Platelets 73 (A) 75 (A) 87 (A)   Neutrophil Rel % 67.5 78.1 (A) 70.9   Lymphocyte Rel % 22.9 15.4 (A) 20.7   Monocyte Rel % 7.1 5.1 5.3   Eosinophil Rel % 1.1 0.8 2.5   Basophil Rel % 0.8 0.2 0.3   (A) Abnormal value             Lipid Panel    Lipid Panel 8/12/21 11/23/21 1/21/22   Total Cholesterol 111 108 114   Triglycerides 244 (A) 328 (A) 581 (A)   HDL Cholesterol 32 (A) 28 (A) 28 (A)   VLDL Cholesterol 38 48 (A) 74 (A)   LDL Cholesterol  41 32 12   LDL/HDL Ratio 0.94 0.51 Unable to Calculate   (A) Abnormal value             Lab Results   Component Value Date    TSH 1.510 01/21/2022    TSH 1.610 08/12/2021    TSH 1.490 01/20/2021      Lab Results   Component Value Date    FREET4 1.3 01/20/2021    FREET4 1.5 10/19/2020      Digoxin   Date Value Ref Range " Status   01/21/2022 0.70 0.60 - 1.20 ng/mL Final      A1C Last 3 Results    HGBA1C Last 3 Results 8/12/21 11/23/21 1/21/22   Hemoglobin A1C 9.66 (A) 8.58 (A) 9.00 (A)   (A) Abnormal value                          Assessment and Plan    Diagnoses and all orders for this visit:    1. Aortic valve disease (Primary)  Assessment & Plan:  Without shortness of breath.  We will continue to monitor      2. Chronic atrial fibrillation (HCC)  Assessment & Plan:  Rate controlled.  Continue Xarelto 20 mg a day and metoprolol ER 50 mg twice daily and metoprolol 100 mg twice daily.  Continue digoxin.  She will check his level next labs in the next few weeks      3. Mixed hyperlipidemia  Assessment & Plan:  Mixed with severely elevated triglycerides.  She is due to have labs in the next few weeks.  If still grossly abnormal will adjust cholesterol meds.  Continue fenofibrate 145 and atorvastatin 20 mg.    Orders:  -     Lipid Panel; Future  -     Comprehensive Metabolic Panel; Future    4. Primary hypertension  Assessment & Plan:  Well-controlled.  Continue diltiazem 120 mg, furosemide 20 mg twice daily, metoprolol 150 twice daily and Entresto 97/103 twice daily.    Orders:  -     Comprehensive Metabolic Panel; Future  -     Magnesium; Future    5. Chronic diastolic congestive heart failure (HCC)  Assessment & Plan:  Stable [place] class II.  Continue Entresto, potassium, Lasix, metoprolol, and Farxiga.            Follow Up     Return in about 6 months (around 9/23/2022) for with Dr. Minor.    Patient was given instructions and counseling regarding her condition or for health maintenance advice. Please see specific information pulled into the AVS if appropriate.       LUCERO Gions  03/23/22 20:07 EDT

## 2022-03-24 DIAGNOSIS — G89.29 CHRONIC JOINT PAIN: ICD-10-CM

## 2022-03-24 DIAGNOSIS — M25.50 CHRONIC JOINT PAIN: ICD-10-CM

## 2022-03-24 NOTE — TELEPHONE ENCOUNTER
Caller: Aivs Mary C    Relationship: Self    Best call back number: [phone]    Requested Prescriptions:   Requested Prescriptions     Pending Prescriptions Disp Refills   • HYDROcodone-acetaminophen (NORCO)  MG per tablet 120 tablet 0     Sig: Take 1 tablet by mouth Every 4 (Four) Hours As Needed for Moderate Pain  or Severe Pain .        Pharmacy where request should be sent: [id number] - [place], [place] - [address] AT [facility] [phone] [facility] [phone]      Additional details provided by patient:  THE PATIENT STATED SHE CALLED Monday 3/21/22 FOR THE REFILL     Does the patient have less than a 3 day supply:  [x] Yes  [] No    Steven Waller Rep   03/24/22 10:16 EDT

## 2022-03-28 DIAGNOSIS — M25.50 CHRONIC JOINT PAIN: ICD-10-CM

## 2022-03-28 DIAGNOSIS — G89.29 CHRONIC JOINT PAIN: ICD-10-CM

## 2022-03-28 RX ORDER — HYDROCODONE BITARTRATE AND ACETAMINOPHEN 10; 325 MG/1; MG/1
1 TABLET ORAL EVERY 4 HOURS PRN
Qty: 120 TABLET | Refills: 0 | Status: SHIPPED | OUTPATIENT
Start: 2022-03-28 | End: 2022-03-28 | Stop reason: SDUPTHER

## 2022-03-28 RX ORDER — HYDROCODONE BITARTRATE AND ACETAMINOPHEN 10; 325 MG/1; MG/1
1 TABLET ORAL EVERY 4 HOURS PRN
Qty: 120 TABLET | Refills: 0 | Status: SHIPPED | OUTPATIENT
Start: 2022-03-28 | End: 2022-04-27 | Stop reason: SDUPTHER

## 2022-03-28 NOTE — TELEPHONE ENCOUNTER
Caller: Mary Albarran    Relationship: Self    Best call back number: [phone]     Requested Prescriptions:   Requested Prescriptions     Pending Prescriptions Disp Refills   • HYDROcodone-acetaminophen (NORCO)  MG per tablet 120 tablet 0     Sig: Take 1 tablet by mouth Every 4 (Four) Hours As Needed for Moderate Pain  or Severe Pain .        Pharmacy where request should be sent: [id number] - [place], [place] - [address] AT [facility] [phone] [facility] [phone] FX     Additional details provided by patient:     Does the patient have less than a 3 day supply:  [x] Yes  [] No    Steven CHUA Rep   03/28/22 10:06 EDT

## 2022-04-01 ENCOUNTER — OFFICE VISIT (OUTPATIENT)
Dept: FAMILY MEDICINE CLINIC | Facility: CLINIC | Age: 72
End: 2022-04-01

## 2022-04-01 VITALS
TEMPERATURE: 97.3 F | OXYGEN SATURATION: 97 % | DIASTOLIC BLOOD PRESSURE: 62 MMHG | WEIGHT: 261.4 LBS | HEIGHT: 63 IN | HEART RATE: 86 BPM | BODY MASS INDEX: 46.32 KG/M2 | SYSTOLIC BLOOD PRESSURE: 130 MMHG

## 2022-04-01 DIAGNOSIS — E78.2 MIXED HYPERLIPIDEMIA: ICD-10-CM

## 2022-04-01 DIAGNOSIS — E11.9 TYPE 2 DIABETES MELLITUS WITHOUT COMPLICATION, WITHOUT LONG-TERM CURRENT USE OF INSULIN: Primary | ICD-10-CM

## 2022-04-01 PROCEDURE — 99214 OFFICE O/P EST MOD 30 MIN: CPT | Performed by: NURSE PRACTITIONER

## 2022-04-01 RX ORDER — PEN NEEDLE, DIABETIC 32GX 5/32"
1 NEEDLE, DISPOSABLE MISCELLANEOUS DAILY
Qty: 90 EACH | Refills: 1 | Status: SHIPPED | OUTPATIENT
Start: 2022-04-01

## 2022-04-01 NOTE — PROGRESS NOTES
Chief Complaint  Diabetes (Follow up, Rx concern with Trulicity) and Hyperlipidemia    Subjective          Medical History: has a past medical history of Allergic rhinitis, Anxiety, Aortic valve disease (11/28/2021), Arthritis, Atrial fibrillation (HCC), Chronic kidney disease (CKD), stage III (moderate) (HCC), Chronic pain, COPD (chronic obstructive pulmonary disease) (HCC), Depression, Diabetes mellitus type 2 with complications (HCC), Hyperlipidemia, Hypertension, and Thrombocytopenia (HCC).     Surgical History: has a past surgical history that includes Hysterectomy.     Family History: family history includes Diabetes in an other family member; Heart disease in her father and other family members.     Social History: reports that she quit smoking about 13 years ago. Her smoking use included cigarettes. She started smoking about 51 years ago. She has a 36.00 pack-year smoking history. She has never used smokeless tobacco. She reports that she does not drink alcohol and does not use drugs.    Mary Albarran presents to [facility]  Patient is type II diabetic last A1c was 9.  Started on Trulicity, patient started having nausea vomiting diarrhea, was able to be on the medication for couple weeks but had to come off due to the side effects.  Comes in today to discuss different alternative for her diabetes.    Diabetes  She presents for her follow-up diabetic visit. She has type 2 diabetes mellitus. Her disease course has been worsening. There are no hypoglycemic associated symptoms. Associated symptoms include foot paresthesias. Pertinent negatives for diabetes include no blurred vision, no polydipsia, no polyphagia and no polyuria. There are no hypoglycemic complications. There are no diabetic complications. Risk factors for coronary artery disease include dyslipidemia, hypertension, obesity, family history, sedentary lifestyle and post-menopausal. Current diabetic treatment includes  "oral agent (dual therapy) and diet. She is compliant with treatment most of the time. She is following a generally unhealthy diet. Her home blood glucose trend is increasing steadily. Her overall blood glucose range is >200 mg/dl.   Hyperlipidemia  This is a chronic problem. The current episode started more than 1 year ago. Recent lipid tests were reviewed and are variable. Exacerbating diseases include diabetes and obesity. Current antihyperlipidemic treatment includes statins, fibric acid derivatives and herbal therapy. The current treatment provides mild improvement of lipids. Compliance problems include adherence to diet.  Risk factors for coronary artery disease include diabetes mellitus, dyslipidemia, family history, hypertension, obesity and post-menopausal.       Objective   Vital Signs:   /62 (BP Location: Left arm, Patient Position: Sitting, Cuff Size: Large Adult)   Pulse 86   Temp 97.3 °F (36.3 °C) (Temporal)   Ht 160 cm (63\")   Wt 119 kg (261 lb 6.4 oz)   SpO2 97%   BMI 46.30 kg/m²     Physical Exam  Vitals reviewed.   Constitutional:       Appearance: Normal appearance. She is well-developed.   HENT:      Head: Normocephalic and atraumatic.   Eyes:      Conjunctiva/sclera: Conjunctivae normal.      Pupils: Pupils are equal, round, and reactive to light.   Cardiovascular:      Rate and Rhythm: Normal rate and regular rhythm.      Heart sounds: No murmur heard.  Pulmonary:      Effort: Pulmonary effort is normal.      Breath sounds: Normal breath sounds. No wheezing or rhonchi.   Musculoskeletal:      Right lower leg: Edema present.      Left lower leg: Edema present.   Skin:     General: Skin is warm and dry.   Neurological:      Mental Status: She is alert and oriented to person, place, and time.      Gait: Gait abnormal (walks with cane).   Psychiatric:         Mood and Affect: Mood and affect normal.         Behavior: Behavior normal.         Thought Content: Thought content normal.       "   Judgment: Judgment normal.        Result Review :   The following data was reviewed by: LUCERO Collins on 04/01/2022:  Common labs    Common Labsle 8/12/21 8/12/21 8/12/21 11/23/21 11/23/21 11/23/21 11/23/21 1/21/22 1/21/22 1/21/22 1/21/22 1/21/22    1329 1329 1329 1347 1347 1347 1348 1151 1151 1151 1151 1151   Glucose    222 (A)       228 (A)    BUN    14       16    Creatinine    1.04 (A)       1.12 (A)    eGFR Non  Am    53 (A)       48 (A)    Sodium    139       140    Potassium    5.0       4.6    Chloride    103       102    Calcium    9.5       9.5    Albumin    4.50       4.40    Total Bilirubin    0.7       0.5    Alkaline Phosphatase    71       87    AST (SGOT)    35 (A)       24    ALT (SGPT)    28       22    WBC 6.58     9.21  11.49 (A)       Hemoglobin 16.3 (A)     15.5  16.7 (A)       Hematocrit 49.6 (A)     46.3  50.4 (A)       Platelets 73 (A)     75 (A)  87 (A)       Total Cholesterol   111  108       114   Triglycerides   244 (A)  328 (A)       581 (A)   HDL Cholesterol   32 (A)  28 (A)       28 (A)   LDL Cholesterol    41  32       12   Hemoglobin A1C  9.66 (A)     8.58 (A)  9.00 (A)      Microalbumin, Urine          2.4     (A) Abnormal value                        Current Outpatient Medications on File Prior to Visit   Medication Sig Dispense Refill   • ascorbic acid (VITAMIN C) 250 MG tablet Take 2 tablets by mouth Daily.     • atorvastatin (LIPITOR) 20 MG tablet Take 1 tablet by mouth Daily. 90 tablet 1   • cholecalciferol (VITAMIN D3) 25 MCG (1000 UT) tablet Take 1,000 Units by mouth Daily.     • digoxin (LANOXIN) 125 MCG tablet Take 1 tablet on Thursday and Sundays and 2 tablets the rest of the week 180 tablet 1   • dilTIAZem (TIAZAC) 120 MG 24 hr capsule Take 1 capsule by mouth Daily. 90 capsule 1   • Farxiga 10 MG tablet Take 10 mg by mouth Daily. 90 tablet 1   • fenofibrate (TRICOR) 145 MG tablet Take 1 tablet by mouth Daily for 90 days. 90 tablet 1   •  Fluticasone-Umeclidin-Vilant (Trelegy Ellipta) 100-62.5-25 MCG/INH inhaler Inhale 1 puff Daily for 90 days. 3 each 1   • furosemide (LASIX) 20 MG tablet TAKE 1 TABLET TWICE A  tablet 3   • glipizide (GLUCOTROL) 10 MG tablet Take 1 tablet by mouth 2 (Two) Times a Day Before Meals. 180 tablet 1   • HYDROcodone-acetaminophen (NORCO)  MG per tablet Take 1 tablet by mouth Every 4 (Four) Hours As Needed for Moderate Pain  or Severe Pain . 120 tablet 0   • metoprolol succinate XL (TOPROL-XL) 100 MG 24 hr tablet Take 1 tablet by mouth 2 (Two) Times a Day. 180 tablet 3   • metoprolol succinate XL (TOPROL-XL) 50 MG 24 hr tablet TAKE 1 TABLET TWICE A DAY (ALONG WITH METOPROLOL  MG TWICE A DAY ) 180 tablet 3   • miconazole (MICOTIN) 2 % cream 2 (two) times a day.     • multivitamin with minerals tablet tablet Take 1 tablet by mouth Daily.     • Omega 3-6-9 capsule Take 2 capsules by mouth 2 (Two) Times a Day for 90 days. 3 tabs qd 360 capsule 1   • potassium chloride (MICRO-K) 10 MEQ CR capsule Take 1 capsule by mouth Daily. with food 90 capsule 3   • pregabalin (LYRICA) 150 MG capsule TAKE 1 CAPSULE THREE TIMES A  capsule 1   • Pyridoxine HCl (Vitamin B6) 200 MG tablet Take 1 tablet by mouth Daily. 90 tablet 1   • sacubitril-valsartan (ENTRESTO)  MG tablet Take 0.5 tablets by mouth Every Morning for 90 days, THEN 1 tablet Every Night for 90 days. (Patient taking differently: Take 0.5 tablets by mouth Every Morning for 90 days, THEN 0.5 tablet Every Night for 90 days.) 135 tablet 3   • Xarelto 20 MG tablet Take 1 tablet by mouth Daily. 90 tablet 1   • SITagliptin (Januvia) 100 MG tablet Take 1 tablet by mouth Daily. 90 tablet 1   • [DISCONTINUED] Dulaglutide (Trulicity) 0.75 MG/0.5ML solution pen-injector Inject 0.75 mg under the skin into the appropriate area as directed 1 (One) Time Per Week. 13 pen 1     No current facility-administered medications on file prior to visit.        Assessment and  Plan    Diagnoses and all orders for this visit:    1. Type 2 diabetes mellitus without complication, without long-term current use of insulin ([facility]) (Primary)  Comments:  Will start on Lantus 50 units at lunch with a follow-up at the end of the month to see how patient is doing.  Discussed checking blood sugars twice daily.    2. Mixed hyperlipidemia  Comments:  Discussed recheck levels at the end of April we will get a better control on her blood sugars and see if the triglycerides are trending downward with the BS    Other orders  -     Insulin Glargine (LANTUS SOLOSTAR) 100 UNIT/ML injection pen; Inject 50 Units under the skin into the appropriate area as directed Daily With Lunch for 90 days.  Dispense: 15 pen; Refill: 0  -     Insulin Pen Needle (BD Pen Needle Katiuska 2nd Gen) 32G X 4 MM misc; 1 each Daily.  Dispense: 90 each; Refill: 1        Follow Up   No follow-ups on file.  Patient was given instructions and counseling regarding her condition or for health maintenance advice. Please see specific information pulled into the AVS if appropriate.

## 2022-04-18 RX ORDER — RIVAROXABAN 20 MG/1
20 TABLET, FILM COATED ORAL DAILY
Qty: 90 TABLET | Refills: 3 | Status: SHIPPED | OUTPATIENT
Start: 2022-04-18 | End: 2023-03-27

## 2022-04-19 ENCOUNTER — TELEPHONE (OUTPATIENT)
Dept: FAMILY MEDICINE CLINIC | Facility: CLINIC | Age: 72
End: 2022-04-19

## 2022-04-19 NOTE — TELEPHONE ENCOUNTER
Caller: Mary Albarran    Relationship: Self    Best call back number: [phone]     What medication are you requesting: SOMETHING TO REPLACE PREGABLIN - PRICE HAS INCREASED AND PATIENT NEEDS SOMETHING MORE AFFORDABLE.  PATIENT ONLY HAS 3-4 DAYS OF MEDICATION LEFT      If a prescription is needed, what is your preferred pharmacy and phone number: [facility] - [facility], [address] [phone] [facility] [phone] FX

## 2022-04-20 NOTE — TELEPHONE ENCOUNTER
The only other thing here is to replace the Lyrica is gabapentin.  I think patient was on it previously and it did not work as well as her Lyrica.  Let me know what patient states, we still can send her over to pain management so that she can get a better control on her pain.

## 2022-04-21 NOTE — TELEPHONE ENCOUNTER
I spoke with patient and told her the only other option to replace the lyrica is gabapentin but told her would could send her to pain management to try to help get control of her pain and she said no thank you and ended the phone call.

## 2022-04-25 RX ORDER — SITAGLIPTIN 100 MG/1
TABLET, FILM COATED ORAL
Qty: 90 TABLET | Refills: 3 | Status: SHIPPED | OUTPATIENT
Start: 2022-04-25 | End: 2023-04-03

## 2022-04-26 DIAGNOSIS — G89.29 CHRONIC JOINT PAIN: ICD-10-CM

## 2022-04-26 DIAGNOSIS — M25.50 CHRONIC JOINT PAIN: ICD-10-CM

## 2022-04-26 RX ORDER — HYDROCODONE BITARTRATE AND ACETAMINOPHEN 10; 325 MG/1; MG/1
1 TABLET ORAL EVERY 4 HOURS PRN
Qty: 120 TABLET | Refills: 0 | Status: CANCELLED | OUTPATIENT
Start: 2022-04-26

## 2022-04-26 NOTE — TELEPHONE ENCOUNTER
Caller: Mary Albarran    Relationship: Self    Best call back number: [phone]    Requested Prescriptions:  HYDROcodone-acetaminophen (NORCO)  MG per tablet  Requested Prescriptions      No prescriptions requested or ordered in this encounter        Pharmacy where request should be sent:      [id number] - [facility] [place] - [address] AT [facility] [phone]  - [phone]   [phone]    Additional details provided by patient: ABOUT 3 DAYS LEFT    Does the patient have less than a 3 day supply:  [x] Yes  [] No    Steven Alvarado Rep   04/26/22 14:09 EDT       PLEASE TEXT PATIENT ONCE SENT TO PHARMACY

## 2022-04-27 DIAGNOSIS — M25.50 CHRONIC JOINT PAIN: ICD-10-CM

## 2022-04-27 DIAGNOSIS — G89.29 CHRONIC JOINT PAIN: ICD-10-CM

## 2022-04-27 RX ORDER — HYDROCODONE BITARTRATE AND ACETAMINOPHEN 10; 325 MG/1; MG/1
1 TABLET ORAL EVERY 4 HOURS PRN
Qty: 120 TABLET | Refills: 0 | Status: SHIPPED | OUTPATIENT
Start: 2022-04-27 | End: 2022-05-20 | Stop reason: SDUPTHER

## 2022-05-20 ENCOUNTER — OFFICE VISIT (OUTPATIENT)
Dept: FAMILY MEDICINE CLINIC | Facility: CLINIC | Age: 72
End: 2022-05-20

## 2022-05-20 VITALS
HEIGHT: 63 IN | DIASTOLIC BLOOD PRESSURE: 68 MMHG | BODY MASS INDEX: 45.89 KG/M2 | SYSTOLIC BLOOD PRESSURE: 126 MMHG | TEMPERATURE: 96.5 F | HEART RATE: 64 BPM | WEIGHT: 259 LBS | OXYGEN SATURATION: 96 %

## 2022-05-20 DIAGNOSIS — Z79.4 TYPE 2 DIABETES MELLITUS WITH HYPERGLYCEMIA, WITH LONG-TERM CURRENT USE OF INSULIN: ICD-10-CM

## 2022-05-20 DIAGNOSIS — E11.65 TYPE 2 DIABETES MELLITUS WITH HYPERGLYCEMIA, WITH LONG-TERM CURRENT USE OF INSULIN: ICD-10-CM

## 2022-05-20 DIAGNOSIS — Z79.899 MEDICATION MANAGEMENT: ICD-10-CM

## 2022-05-20 DIAGNOSIS — G89.29 CHRONIC JOINT PAIN: ICD-10-CM

## 2022-05-20 DIAGNOSIS — Z09 FOLLOW-UP EXAM, 3-6 MONTHS SINCE PREVIOUS EXAM: Primary | ICD-10-CM

## 2022-05-20 DIAGNOSIS — M25.50 CHRONIC JOINT PAIN: ICD-10-CM

## 2022-05-20 PROCEDURE — 99215 OFFICE O/P EST HI 40 MIN: CPT | Performed by: NURSE PRACTITIONER

## 2022-05-20 RX ORDER — DAPAGLIFLOZIN 10 MG/1
10 TABLET, FILM COATED ORAL DAILY
Qty: 30 TABLET | Refills: 2 | COMMUNITY
Start: 2022-05-20 | End: 2022-08-04

## 2022-05-20 RX ORDER — HYDROCODONE BITARTRATE AND ACETAMINOPHEN 10; 325 MG/1; MG/1
1 TABLET ORAL EVERY 4 HOURS PRN
Qty: 120 TABLET | Refills: 0 | Status: SHIPPED | OUTPATIENT
Start: 2022-05-20 | End: 2022-06-24 | Stop reason: SDUPTHER

## 2022-05-23 ENCOUNTER — TELEPHONE (OUTPATIENT)
Dept: FAMILY MEDICINE CLINIC | Facility: CLINIC | Age: 72
End: 2022-05-23

## 2022-05-23 NOTE — TELEPHONE ENCOUNTER
I CALLED PT TO LET THEM KNOW ABOUT HER MEDICATION THAT WAS SENT TO THE PHARMACY. PT SAID THAT THE PHARM DIDN'T HAVE HER HYDRO PRESCRIPTION I CALLED THE PHARM IS SEE WHAT WAS GOING ON. THE PHARM SAID THAT IT WAS TO EARLY FOR HER TO GET IT FILLED AND THAT'S WHY THEY WOULDN'T GIVE IT TO HER. I CALLED THE PT TO LET HER KNOW WHAT THE PHARM SAID. PT WAS NOT HAPPY SHE THOUGHT SHE WAS GETTING THEM EVERY 20 DAYS NOT 30. I ANSWERED ALL THE CONCERNS AND QUESTIONS THAT THE PT HAD.

## 2022-05-23 NOTE — PROGRESS NOTES
Chief Complaint  Diabetes    Subjective     {Problem List  Visit Diagnosis   Encounters  Notes  Medications  Labs  Result Review Imaging  Media :23}     Medical History: has a past medical history of Allergic rhinitis, Anxiety, Aortic valve disease (11/28/2021), Arthritis, Atrial fibrillation (HCC), Chronic kidney disease (CKD), stage III (moderate) (HCC), Chronic pain, COPD (chronic obstructive pulmonary disease) (HCC), Depression, Diabetes mellitus type 2 with complications (HCC), Hyperlipidemia, Hypertension, and Thrombocytopenia (HCC).     Surgical History: has a past surgical history that includes Hysterectomy.     Family History: family history includes Diabetes in an other family member; Heart disease in her father and other family members.     Social History: reports that she quit smoking about 14 years ago. Her smoking use included cigarettes. She started smoking about 51 years ago. She has a 36.00 pack-year smoking history. She has never used smokeless tobacco. She reports that she does not drink alcohol and does not use drugs.    Mary Albarran presents to [facility]  Patient is here for medication management.  She is having trouble affording her medications.  Patient states that many of her medications have increased in price causing her not to be able to afford a lot of her medications.  Patient does have type 2 diabetes with hyperglycemia.Last A1c was 9.0.  She typically does not check her blood sugars on a regular basis.  Patient states she has figured out that the chips that she eats does cause her blood sugars to become more elevated.  She states she is working on not eating as many chips.      Patient declines colorectal screening, DEXA scan, mammogram, pneumococcal vaccine, shingles vaccine.  She is a former smoker.        Diabetes  She presents for her follow-up diabetic visit. She has type 2 diabetes mellitus. Her disease course has been fluctuating. There  "are no hypoglycemic associated symptoms. There are no diabetic associated symptoms. There are no hypoglycemic complications. There are no diabetic complications. Risk factors for coronary artery disease include post-menopausal, sedentary lifestyle, hypertension, dyslipidemia, obesity and diabetes mellitus. Current diabetic treatment includes insulin injections and oral agent (dual therapy). She is compliant with treatment some of the time. She is following a generally unhealthy diet. When asked about meal planning, she reported none. She never participates in exercise. An ACE inhibitor/angiotensin II receptor blocker is being taken.       Objective   Vital Signs:   /68   Pulse 64   Temp 96.5 °F (35.8 °C)   Ht 160 cm (63\")   Wt 117 kg (259 lb)   SpO2 96%   BMI 45.88 kg/m²     Physical Exam  Vitals reviewed.   Constitutional:       Appearance: Normal appearance. She is well-developed.   HENT:      Head: Normocephalic and atraumatic.   Eyes:      Conjunctiva/sclera: Conjunctivae normal.      Pupils: Pupils are equal, round, and reactive to light.   Cardiovascular:      Rate and Rhythm: Normal rate and regular rhythm.      Heart sounds: No murmur heard.  Pulmonary:      Effort: Pulmonary effort is normal.      Breath sounds: Normal breath sounds. No wheezing or rhonchi.   Skin:     General: Skin is warm and dry.   Neurological:      Mental Status: She is alert and oriented to person, place, and time.   Psychiatric:         Mood and Affect: Mood and affect normal.         Behavior: Behavior normal.         Thought Content: Thought content normal.         Judgment: Judgment normal.        Result Review :   The following data was reviewed by: LUCERO Collins on 05/20/2022:  Common labs    Common Labsle 8/12/21 8/12/21 8/12/21 11/23/21 11/23/21 11/23/21 11/23/21 1/21/22 1/21/22 1/21/22 1/21/22 1/21/22    1329 1329 1329 1347 1347 1347 1348 1151 1151 1151 1151 1151   Glucose    222 (A)       228 (A)  "   BUN    14       16    Creatinine    1.04 (A)       1.12 (A)    eGFR Non  Am    53 (A)       48 (A)    Sodium    139       140    Potassium    5.0       4.6    Chloride    103       102    Calcium    9.5       9.5    Albumin    4.50       4.40    Total Bilirubin    0.7       0.5    Alkaline Phosphatase    71       87    AST (SGOT)    35 (A)       24    ALT (SGPT)    28       22    WBC 6.58     9.21  11.49 (A)       Hemoglobin 16.3 (A)     15.5  16.7 (A)       Hematocrit 49.6 (A)     46.3  50.4 (A)       Platelets 73 (A)     75 (A)  87 (A)       Total Cholesterol   111  108       114   Triglycerides   244 (A)  328 (A)       581 (A)   HDL Cholesterol   32 (A)  28 (A)       28 (A)   LDL Cholesterol    41  32       12   Hemoglobin A1C  9.66 (A)     8.58 (A)  9.00 (A)      Microalbumin, Urine          2.4     (A) Abnormal value                        Current Outpatient Medications on File Prior to Visit   Medication Sig Dispense Refill   • ascorbic acid (VITAMIN C) 250 MG tablet Take 2 tablets by mouth Daily.     • atorvastatin (LIPITOR) 20 MG tablet Take 1 tablet by mouth Daily. 90 tablet 1   • cholecalciferol (VITAMIN D3) 25 MCG (1000 UT) tablet Take 1,000 Units by mouth Daily.     • digoxin (LANOXIN) 125 MCG tablet Take 1 tablet on Thursday and Sundays and 2 tablets the rest of the week 180 tablet 1   • dilTIAZem (TIAZAC) 120 MG 24 hr capsule Take 1 capsule by mouth Daily. 90 capsule 1   • Farxiga 10 MG tablet Take 10 mg by mouth Daily. 90 tablet 1   • furosemide (LASIX) 20 MG tablet TAKE 1 TABLET TWICE A  tablet 3   • glipizide (GLUCOTROL) 10 MG tablet Take 1 tablet by mouth 2 (Two) Times a Day Before Meals. 180 tablet 1   • Insulin Pen Needle (BD Pen Needle Katiuska 2nd Gen) 32G X 4 MM misc 1 each Daily. 90 each 1   • Januvia 100 MG tablet TAKE 1 TABLET DAILY 90 tablet 3   • metoprolol succinate XL (TOPROL-XL) 100 MG 24 hr tablet Take 1 tablet by mouth 2 (Two) Times a Day. 180 tablet 3   • metoprolol  succinate XL (TOPROL-XL) 50 MG 24 hr tablet TAKE 1 TABLET TWICE A DAY (ALONG WITH METOPROLOL  MG TWICE A DAY ) 180 tablet 3   • miconazole (MICOTIN) 2 % cream 2 (two) times a day.     • multivitamin with minerals tablet tablet Take 1 tablet by mouth Daily.     • potassium chloride (MICRO-K) 10 MEQ CR capsule Take 1 capsule by mouth Daily. with food 90 capsule 3   • pregabalin (LYRICA) 150 MG capsule TAKE 1 CAPSULE THREE TIMES A  capsule 1   • Pyridoxine HCl (Vitamin B6) 200 MG tablet Take 1 tablet by mouth Daily. 90 tablet 1   • sacubitril-valsartan (ENTRESTO)  MG tablet Take 0.5 tablets by mouth Every Morning for 90 days, THEN 1 tablet Every Night for 90 days. (Patient taking differently: Take 0.5 tablets by mouth Every Morning for 90 days, THEN 0.5 tablet Every Night for 90 days.) 135 tablet 3   • Xarelto 20 MG tablet Take 1 tablet by mouth Daily. 90 tablet 3   • [DISCONTINUED] Insulin Glargine (LANTUS SOLOSTAR) 100 UNIT/ML injection pen Inject 50 Units under the skin into the appropriate area as directed Daily With Lunch for 90 days. 15 pen 0   • fenofibrate (TRICOR) 145 MG tablet Take 1 tablet by mouth Daily for 90 days. 90 tablet 1     No current facility-administered medications on file prior to visit.        Assessment and Plan    Diagnoses and all orders for this visit:    1. Follow-up exam, 3-6 months since previous exam (Primary)    2. Chronic joint pain  Comments:  Takes Norco every 4 hours.  Patient is in significant amount of pain refuses pain management we will continue to monitor patient's progress  Orders:  -     HYDROcodone-acetaminophen (NORCO)  MG per tablet; Take 1 tablet by mouth Every 4 (Four) Hours As Needed for Moderate Pain  or Severe Pain .  Dispense: 120 tablet; Refill: 0    3. Type 2 diabetes mellitus with hyperglycemia, with long-term current use of insulin (HCC)  -     Hemoglobin A1c; Future    4. Medication management  I was able to look on good Rx and find  patient deals through [employer] for her medication to be cheaper.  Patient was also given samples of Tresiba, Januvia, and Farxiga from the office to help with compliance of medication.  Other orders  -     silver sulfadiazine (Silvadene) 1 % cream; Apply 1 application topically to the appropriate area as directed 2 (Two) Times a Day.  Dispense: 50 g; Refill: 4  -     SITagliptin (Januvia) 100 MG tablet; Take 1 tablet by mouth Daily.  Dispense: 14 tablet; Refill: 2  -     Discontinue: Insulin Degludec (TRESIBA FLEXTOUCH) 200 UNIT/ML solution pen-injector pen injection; Inject 50 Units under the skin into the appropriate area as directed Daily.  Dispense: 3 pen; Refill: 2  -     Dapagliflozin Propanediol (Farxiga) 10 MG tablet; Take 10 mg by mouth Daily.  Dispense: 30 tablet; Refill: 2  -     Insulin Degludec (TRESIBA FLEXTOUCH) 200 UNIT/ML solution pen-injector pen injection; Inject 30 Units under the skin into the appropriate area as directed Daily.  Dispense: 3 pen; Refill: 2      I spent 40 minutes caring for Mary on this date of service. This time includes time spent by me in the following activities:performing a medically appropriate examination and/or evaluation , counseling and educating the patient/family/caregiver, ordering medications, tests, or procedures, documenting information in the medical record and care coordination  Follow Up   No follow-ups on file.  Patient was given instructions and counseling regarding her condition or for health maintenance advice. Please see specific information pulled into the AVS if appropriate.

## 2022-05-23 NOTE — TELEPHONE ENCOUNTER
Patient is stating that pharmacy does not have her prescription for Hydrocodone. Please call and advise [phone]

## 2022-05-27 DIAGNOSIS — M25.50 CHRONIC JOINT PAIN: ICD-10-CM

## 2022-05-27 DIAGNOSIS — G89.29 CHRONIC JOINT PAIN: ICD-10-CM

## 2022-05-27 RX ORDER — HYDROCODONE BITARTRATE AND ACETAMINOPHEN 10; 325 MG/1; MG/1
1 TABLET ORAL EVERY 4 HOURS PRN
Qty: 120 TABLET | Refills: 0 | OUTPATIENT
Start: 2022-05-27

## 2022-05-27 NOTE — TELEPHONE ENCOUNTER
Caller: Mary Albarran LAKESHA    Relationship: Self    Best call back number: [phone]    Requested Prescriptions:   Requested Prescriptions     Pending Prescriptions Disp Refills   • HYDROcodone-acetaminophen (NORCO)  MG per tablet 120 tablet 0     Sig: Take 1 tablet by mouth Every 4 (Four) Hours As Needed for Moderate Pain  or Severe Pain .        Pharmacy where request should be sent: [id number] - [place], [place] - [address] AT [facility] [phone] [facility] [phone]      Additional details provided by patient: THE PATIENT STATED SHE HAS BEEN OUT OF MEDICATION FOR FIVE DAYS AND IS IN A LOT OF PAIN AND WOULD PCP TO CALL PHARMACY TO GET THE REFILL A DAY EARLY.     Does the patient have less than a 3 day supply:  [x] Yes  [] No    Steven Sevilla Rep   05/27/22 08:10 EDT

## 2022-06-09 NOTE — TELEPHONE ENCOUNTER
No refill from Dr. Minor from a telephone call indicated she should take a half of Entresto 97/103 in the morning and 1 tab at night

## 2022-06-09 NOTE — TELEPHONE ENCOUNTER
I need clarification if she is still supposed be taking her entresto like the last time it was prescribed. I sent it that way but the pharmacy has questions.

## 2022-06-24 DIAGNOSIS — G89.29 CHRONIC JOINT PAIN: ICD-10-CM

## 2022-06-24 DIAGNOSIS — M25.50 CHRONIC JOINT PAIN: ICD-10-CM

## 2022-06-24 RX ORDER — GLIPIZIDE 10 MG/1
10 TABLET ORAL
Qty: 180 TABLET | Refills: 1 | Status: SHIPPED | OUTPATIENT
Start: 2022-06-24 | End: 2023-01-03 | Stop reason: SDUPTHER

## 2022-06-24 RX ORDER — HYDROCODONE BITARTRATE AND ACETAMINOPHEN 10; 325 MG/1; MG/1
1 TABLET ORAL EVERY 4 HOURS PRN
Qty: 120 TABLET | Refills: 0 | Status: SHIPPED | OUTPATIENT
Start: 2022-06-24 | End: 2022-07-27 | Stop reason: SDUPTHER

## 2022-06-24 NOTE — TELEPHONE ENCOUNTER
Caller: Mary Albarran LAKESHA    Relationship: Self    Best call back number: [phone]     Requested Prescriptions:   Requested Prescriptions     Pending Prescriptions Disp Refills   • glipizide (GLUCOTROL) 10 MG tablet 180 tablet 1     Sig: Take 1 tablet by mouth 2 (Two) Times a Day Before Meals.   • HYDROcodone-acetaminophen (NORCO)  MG per tablet 120 tablet 0     Sig: Take 1 tablet by mouth Every 4 (Four) Hours As Needed for Moderate Pain  or Severe Pain .        Pharmacy where request should be sent: [facility] - [facility] [address] [place] [address] [phone] [facility] [phone] FX  [place] [id number] - [place], [place] - [address] AT [facility] [address] [facility] [facility] - [phone] [facility] [phone] FX  [id number] [place] - [address] AT [facility] [phone] [facility] [phone] FX     Additional details provided by patient: PATIENT IS NEEDING A REFILL. PLEASE CALL AND ADVISE.     Does the patient have less than a 3 day supply:  [x] Yes  [] No    Steven Felix   06/24/22 09:08 EDT

## 2022-07-17 NOTE — PROGRESS NOTES
"   Progress Note      Patient Name: Mary Albarran   Patient ID: [id number]   Sex: Female   YOB: 1950    Primary Care Provider: Brittni BARKER   Referring Provider: Brittni BARKER    Visit Date: October 16, 2020    Provider: LUCERO Armstrong   Location: [facility]   Location Address: [address] [place] [address], [address]   [place], [place]  [id number]   Location Phone: [phone]          Chief Complaint  · Shortness of breath       History Of Present Illness  REFERRING PROVIDER: Brittni BARKER   Mary Albarran is a 70-year-old white female who continues to be short of breath, particularly when she walks and exerts herself. She feels like it might be slightly worse. She also feels like her heart is racing at times. Swelling is mild. Denies any chest pain. No dizziness, syncope, PND, or orthopnea.   PAST MEDICAL HISTORY: Aortic valve disease; Atrial fibrillation; Diabetes mellitus; Hyperlipidemia; Hypertension; Thrombocytopenia.   PSYCHOSOCIAL HISTORY: Denies alcohol use. Previously smoked but quit.   CURRENT MEDICATIONS: Metoprolol  mg b.i.d. and 25 mg b.i.d.; Pioglitazone 30 mg daily; Combivent; Ventolin; potassium 10 mEq daily; Lasix 20 mg daily; Gabapentin 600 mg 2 tablets t.i.d.; Glipizide 10 mg 2 tablets daily; Atorvastatin 20 mg daily; Digoxin 0.125 mg 1 tablet two days a week and 2 tablets four days a week; Irbesartan 150 mg daily; Xarelto 20 mg daily; Hydrocodone.       Review of Systems  · Cardiovascular  o Admits  o : swelling (feet, ankles, hands), shortness of breath while walking or lying flat  o Denies  o : palpitations (fast, fluttering, or skipping beats), chest pain or angina pectoris   · Respiratory  o Denies  o : chronic or frequent cough      Vitals  Date Time BP Position Site L\R Cuff Size HR RR TEMP (F) WT  HT  BMI kg/m2 BSA m2 O2 Sat FR L/min FiO2 HC       10/16/2020 11:10 /78 Sitting    86 - R   265lbs 0oz 5'  4\" 45.49 2.33       10/16/2020 " Spoke with [facility], where he has been admitted on med/surg sheikh. Spoke with his nurse.  He is on the second floor with safe windows in his room.  They are in the process of getting him a sitter.  She will not permit him out of sight until sitter arrives and they will pull someone to this duty immediately.   11:10 /64 Sitting                       Physical Examination  · Constitutional  o Appearance  o : Awake, alert, in no acute distress, morbidly obese female.   · Eyes  o Conjunctivae  o : Conjunctivae normal.  · Ears, Nose, Mouth and Throat  o Oral Cavity  o :   § Oral Mucosa  § : Normal.  · Neck  o Jugular Veins  o : No JVD. Good carotid upstroke. No bruits noted.  · Respiratory  o Respiratory  o : Good respiratory effort. Clear to percussion and auscultation.  · Cardiovascular  o Heart  o : PMI not well felt. S1, irregular. S2, normal. 1-2/6 systolic murmur at the base. 1-2/6 long systolic murmur at the apex.   o Peripheral Vascular System  o :   § Extremities  § : Pedal pulses difficult to palpate because she is wearing compression stockings. Appears to be a trace of pedal edema.   · Gastrointestinal  o Abdominal Examination  o : Soft. No tenderness or masses felt. No hepatosplenomegaly. Abdominal aorta is not palpable.  · EKG  o Indications  o : Atrial arrhythmias.  o Results  o : Atrial fibrillation, slightly fast at rate of 98.   o Comparison  o : This is a little bit faster than EKG of August.   · Labs  o Labs  o : Total cholesterol 107, triglyceride 145, HDL 37, LDL 41, , digoxin 0.7. Glucose 122, BUN 15, creatinine 1.09.     Echocardiogram ordered and results are pending.           Assessment     1.  Shortness of breath.  2.  Aortic valve disease.   3.  Atrial fibrillation, slightly rapid ventricular response.   4.  Diabetes mellitus.   5.  Hyperlipidemia with LDL at goal.   6.  Hypertension, fair control at home.       Plan     Will wait for results of the echocardiogram and decide regarding her beta-blocker.      LUCERO Goins/ct    This note was transcribed by Michelle Fuentes. ct/ABDIAZIZ  The above service was transcribed by Michelle Fuentes, and I attest to the accuracy of the note.  ABDIAZIZ/pap             Electronically Signed by: LUCERO Armstrong -Author on October 19, 2020 04:01:40 PM

## 2022-07-21 DIAGNOSIS — G62.9 NEUROPATHY: ICD-10-CM

## 2022-07-21 NOTE — TELEPHONE ENCOUNTER
Caller: Mary Albarran    Relationship: Self    Best call back number: [phone]    Requested Prescriptions:   Requested Prescriptions     Pending Prescriptions Disp Refills   • pregabalin (LYRICA) 150 MG capsule 270 capsule 1     Sig: Take 1 capsule by mouth 3 (Three) Times a Day.        Pharmacy where request should be sent: [facility] - [address] [phone] [facility] [phone] FX     Additional details provided by patient: PATIENT STATED SHE IS COMPLETELY OUT OF THIS MEDICATION. PATIENT STATED SHE WOULD LIKE A CALL BACK TO SPEAK WITH THE NURSE REGARDING THE PATIENTS INSULIN SHOT SHE TAKES AND WAS GIVEN SAMPLES FOR IT. THE PATIENT STATED SHE HAS QUESTIONS ABOUT IT AND WOULD LIKE A CALL BACK PLEASE ADVISE THANK YOU.     Does the patient have less than a 3 day supply:  [x] Yes  [] No    Steven Gallagher Rep   07/21/22 10:23 EDT

## 2022-07-22 ENCOUNTER — TELEPHONE (OUTPATIENT)
Dept: FAMILY MEDICINE CLINIC | Facility: CLINIC | Age: 72
End: 2022-07-22

## 2022-07-22 NOTE — TELEPHONE ENCOUNTER
Pt informed Tresiba does have directions to inject 30 units daily per chart. Pt verbalized understanding.

## 2022-07-22 NOTE — TELEPHONE ENCOUNTER
Hub staff attempted to follow warm transfer process and was unsuccessful     Caller: Mary Albarran    Relationship to patient: Self    Best call back number:[phone]    Patient is needing: PATIENT CALLED IN ABOUT HER NEW INSULIN. SHE GOT HER PRESCRIPTION PICKED UP FROM THE PHARMACY AND IT SAYS IT IS A WEEKLY SHOT. PATIENT WAS THINKING IT WAS A DAILY SHOT AND SHE DOES NOT KNOW HOW TO TAKE IT. PLEASE CALL PATIENT BACK AND ADVISE TODAY ASAP.

## 2022-07-27 ENCOUNTER — OFFICE VISIT (OUTPATIENT)
Dept: FAMILY MEDICINE CLINIC | Facility: CLINIC | Age: 72
End: 2022-07-27

## 2022-07-27 VITALS
WEIGHT: 261.4 LBS | TEMPERATURE: 97.1 F | HEART RATE: 84 BPM | SYSTOLIC BLOOD PRESSURE: 136 MMHG | BODY MASS INDEX: 46.32 KG/M2 | DIASTOLIC BLOOD PRESSURE: 82 MMHG | HEIGHT: 63 IN | OXYGEN SATURATION: 94 %

## 2022-07-27 DIAGNOSIS — Z53.20 MAMMOGRAM DECLINED: ICD-10-CM

## 2022-07-27 DIAGNOSIS — E78.2 MIXED HYPERLIPIDEMIA: ICD-10-CM

## 2022-07-27 DIAGNOSIS — M25.50 CHRONIC JOINT PAIN: ICD-10-CM

## 2022-07-27 DIAGNOSIS — Z09 FOLLOW-UP EXAM, 3-6 MONTHS SINCE PREVIOUS EXAM: Primary | ICD-10-CM

## 2022-07-27 DIAGNOSIS — Z79.899 MEDICATION MANAGEMENT: ICD-10-CM

## 2022-07-27 DIAGNOSIS — G89.29 CHRONIC JOINT PAIN: ICD-10-CM

## 2022-07-27 DIAGNOSIS — E55.9 VITAMIN D DEFICIENCY: ICD-10-CM

## 2022-07-27 DIAGNOSIS — Z11.59 ENCOUNTER FOR HEPATITIS C SCREENING TEST FOR LOW RISK PATIENT: ICD-10-CM

## 2022-07-27 DIAGNOSIS — E11.42 TYPE 2 DIABETES MELLITUS WITH DIABETIC POLYNEUROPATHY, WITH LONG-TERM CURRENT USE OF INSULIN: ICD-10-CM

## 2022-07-27 DIAGNOSIS — Z79.4 TYPE 2 DIABETES MELLITUS WITH DIABETIC POLYNEUROPATHY, WITH LONG-TERM CURRENT USE OF INSULIN: ICD-10-CM

## 2022-07-27 DIAGNOSIS — Z53.20 COLON CANCER SCREENING DECLINED: ICD-10-CM

## 2022-07-27 DIAGNOSIS — I10 ESSENTIAL HYPERTENSION: ICD-10-CM

## 2022-07-27 LAB
25(OH)D3 SERPL-MCNC: 68.2 NG/ML (ref 30–100)
ALBUMIN SERPL-MCNC: 4.5 G/DL (ref 3.5–5.2)
ALBUMIN/GLOB SERPL: 1.5 G/DL
ALP SERPL-CCNC: 75 U/L (ref 39–117)
ALT SERPL W P-5'-P-CCNC: 21 U/L (ref 1–33)
ANION GAP SERPL CALCULATED.3IONS-SCNC: 16 MMOL/L (ref 5–15)
AST SERPL-CCNC: 31 U/L (ref 1–32)
BASOPHILS # BLD AUTO: 0.04 10*3/MM3 (ref 0–0.2)
BASOPHILS NFR BLD AUTO: 0.3 % (ref 0–1.5)
BILIRUB SERPL-MCNC: 0.5 MG/DL (ref 0–1.2)
BUN SERPL-MCNC: 15 MG/DL (ref 8–23)
BUN/CREAT SERPL: 13.6 (ref 7–25)
CALCIUM SPEC-SCNC: 10 MG/DL (ref 8.6–10.5)
CHLORIDE SERPL-SCNC: 104 MMOL/L (ref 98–107)
CHOLEST SERPL-MCNC: 110 MG/DL (ref 0–200)
CO2 SERPL-SCNC: 22 MMOL/L (ref 22–29)
CREAT SERPL-MCNC: 1.1 MG/DL (ref 0.57–1)
DEPRECATED RDW RBC AUTO: 39.5 FL (ref 37–54)
DIGOXIN SERPL-MCNC: 0.7 NG/ML (ref 0.6–1.2)
EGFRCR SERPLBLD CKD-EPI 2021: 54.5 ML/MIN/1.73
EOSINOPHIL # BLD AUTO: 0.11 10*3/MM3 (ref 0–0.4)
EOSINOPHIL NFR BLD AUTO: 0.9 % (ref 0.3–6.2)
ERYTHROCYTE [DISTWIDTH] IN BLOOD BY AUTOMATED COUNT: 13 % (ref 12.3–15.4)
GLOBULIN UR ELPH-MCNC: 3 GM/DL
GLUCOSE SERPL-MCNC: 119 MG/DL (ref 65–99)
HBA1C MFR BLD: 7 % (ref 4.8–5.6)
HCT VFR BLD AUTO: 49.3 % (ref 34–46.6)
HCV AB SER DONR QL: NORMAL
HDLC SERPL-MCNC: 35 MG/DL (ref 40–60)
HGB BLD-MCNC: 16.5 G/DL (ref 12–15.9)
LDLC SERPL CALC-MCNC: 36 MG/DL (ref 0–100)
LDLC/HDLC SERPL: 0.7 {RATIO}
LYMPHOCYTES # BLD AUTO: 2.03 10*3/MM3 (ref 0.7–3.1)
LYMPHOCYTES NFR BLD AUTO: 17 % (ref 19.6–45.3)
MCH RBC QN AUTO: 28.4 PG (ref 26.6–33)
MCHC RBC AUTO-ENTMCNC: 33.5 G/DL (ref 31.5–35.7)
MCV RBC AUTO: 85 FL (ref 79–97)
MONOCYTES # BLD AUTO: 0.62 10*3/MM3 (ref 0.1–0.9)
MONOCYTES NFR BLD AUTO: 5.2 % (ref 5–12)
NEUTROPHILS NFR BLD AUTO: 76.2 % (ref 42.7–76)
NEUTROPHILS NFR BLD AUTO: 9.06 10*3/MM3 (ref 1.7–7)
PLATELET # BLD AUTO: 79 10*3/MM3 (ref 140–450)
POTASSIUM SERPL-SCNC: 5.2 MMOL/L (ref 3.5–5.2)
PROT SERPL-MCNC: 7.5 G/DL (ref 6–8.5)
RBC # BLD AUTO: 5.8 10*6/MM3 (ref 3.77–5.28)
SODIUM SERPL-SCNC: 142 MMOL/L (ref 136–145)
TRIGL SERPL-MCNC: 252 MG/DL (ref 0–150)
TSH SERPL DL<=0.05 MIU/L-ACNC: 0.98 UIU/ML (ref 0.27–4.2)
VLDLC SERPL-MCNC: 39 MG/DL (ref 5–40)
WBC NRBC COR # BLD: 11.91 10*3/MM3 (ref 3.4–10.8)

## 2022-07-27 PROCEDURE — 80162 ASSAY OF DIGOXIN TOTAL: CPT | Performed by: NURSE PRACTITIONER

## 2022-07-27 PROCEDURE — 99214 OFFICE O/P EST MOD 30 MIN: CPT | Performed by: NURSE PRACTITIONER

## 2022-07-27 PROCEDURE — 80053 COMPREHEN METABOLIC PANEL: CPT | Performed by: NURSE PRACTITIONER

## 2022-07-27 PROCEDURE — 83036 HEMOGLOBIN GLYCOSYLATED A1C: CPT | Performed by: NURSE PRACTITIONER

## 2022-07-27 PROCEDURE — 85025 COMPLETE CBC W/AUTO DIFF WBC: CPT | Performed by: NURSE PRACTITIONER

## 2022-07-27 PROCEDURE — 80061 LIPID PANEL: CPT | Performed by: NURSE PRACTITIONER

## 2022-07-27 PROCEDURE — 84443 ASSAY THYROID STIM HORMONE: CPT | Performed by: NURSE PRACTITIONER

## 2022-07-27 PROCEDURE — 86803 HEPATITIS C AB TEST: CPT | Performed by: NURSE PRACTITIONER

## 2022-07-27 PROCEDURE — 82306 VITAMIN D 25 HYDROXY: CPT | Performed by: NURSE PRACTITIONER

## 2022-07-27 RX ORDER — PREGABALIN 150 MG/1
150 CAPSULE ORAL 3 TIMES DAILY
Qty: 270 CAPSULE | Refills: 1 | Status: SHIPPED | OUTPATIENT
Start: 2022-07-27 | End: 2023-01-30

## 2022-07-27 RX ORDER — HYDROCODONE BITARTRATE AND ACETAMINOPHEN 10; 325 MG/1; MG/1
1 TABLET ORAL EVERY 4 HOURS PRN
Qty: 120 TABLET | Refills: 0 | Status: SHIPPED | OUTPATIENT
Start: 2022-07-27 | End: 2022-08-29 | Stop reason: SDUPTHER

## 2022-07-27 NOTE — PROGRESS NOTES
Chief Complaint  Diabetes, Hyperlipidemia, and Joint Pain    Subjective          Medical History: has a past medical history of Allergic rhinitis, Anxiety, Aortic valve disease (11/28/2021), Arthritis, Atrial fibrillation (HCC), Chronic kidney disease (CKD), stage III (moderate) (HCC), Chronic pain, COPD (chronic obstructive pulmonary disease) (HCC), Depression, Diabetes mellitus type 2 with complications (HCC), Hyperlipidemia, Hypertension, and Thrombocytopenia (HCC).     Surgical History: has a past surgical history that includes Hysterectomy.     Family History: family history includes Diabetes in an other family member; Heart disease in her father and other family members.     Social History: reports that she quit smoking about 14 years ago. Her smoking use included cigarettes. She started smoking about 51 years ago. She has a 36.00 pack-year smoking history. She has never used smokeless tobacco. She reports that she does not drink alcohol and does not use drugs.    Mary Albarran presents to [facility]    Diabetes  She presents for her follow-up diabetic visit. She has type 2 diabetes mellitus. Her disease course has been worsening. There are no hypoglycemic associated symptoms. Associated symptoms include foot paresthesias. Pertinent negatives for diabetes include no blurred vision, no polydipsia, no polyphagia and no polyuria. There are no hypoglycemic complications. There are no diabetic complications. Risk factors for coronary artery disease include dyslipidemia, hypertension, obesity, family history, sedentary lifestyle and post-menopausal. Current diabetic treatment includes oral agent (dual therapy) and diet. She is compliant with treatment most of the time. She is following a generally unhealthy diet. Her home blood glucose trend is increasing steadily. Her overall blood glucose range is >200 mg/dl.       Hyperlipidemia  This is a chronic problem. The current episode  "started more than 1 year ago. Recent lipid tests were reviewed and are variable. Exacerbating diseases include diabetes and obesity. Current antihyperlipidemic treatment includes statins, fibric acid derivatives and herbal therapy. The current treatment provides mild improvement of lipids. Compliance problems include adherence to diet.  Risk factors for coronary artery disease include diabetes mellitus, dyslipidemia, family history, hypertension, obesity and post-menopausal.       This is a chronic problem.  Current episode has been for longer than 6 months.  Problem has gradually been improving since she has been on vitamin D supplements.  She denies any excessive fatigue, hair loss, skin changes due to the vitamin D deficiency.  She tries to eat a well-balanced diet.  She has been on the vitamin D weekly, no compliance problems.  Condition is stable.       Joint Pain  This is a chronic problem. The current episode started more than 1 year ago. The problem occurs constantly. The problem has been gradually worsening. Associated symptoms include arthralgias, fatigue, joint swelling and myalgias. The symptoms are aggravated by bending, walking, twisting and standing. Treatments tried: lyrica and hydrocodone. The treatment provided moderate relief.       Objective   Vital Signs:   /82 (BP Location: Left arm, Patient Position: Sitting, Cuff Size: Large Adult)   Pulse 84   Temp 97.1 °F (36.2 °C) (Temporal)   Ht 160 cm (63\")   Wt 119 kg (261 lb 6.4 oz)   SpO2 94%   BMI 46.30 kg/m²     Physical Exam  Vitals reviewed.   Constitutional:       Appearance: Normal appearance. She is well-developed.   HENT:      Head: Normocephalic and atraumatic.      Right Ear: External ear normal.      Left Ear: External ear normal.   Eyes:      Conjunctiva/sclera: Conjunctivae normal.      Pupils: Pupils are equal, round, and reactive to light.   Cardiovascular:      Rate and Rhythm: Normal rate. Rhythm irregularly irregular.     "  Heart sounds: No murmur heard.    No friction rub.      Comments: Chronic A-fib  Pulmonary:      Effort: Pulmonary effort is normal.      Breath sounds: Normal breath sounds. No wheezing or rhonchi.   Skin:     General: Skin is warm and dry.   Neurological:      Mental Status: She is alert and oriented to person, place, and time.      Gait: Gait abnormal (uses cane for assistance).   Psychiatric:         Mood and Affect: Mood and affect normal.         Behavior: Behavior normal.         Thought Content: Thought content normal.         Judgment: Judgment normal.        Result Review :   The following data was reviewed by: LUCERO Collins on 07/27/2022:  Common labs    Common Labsle 8/12/21 8/12/21 8/12/21 11/23/21 11/23/21 11/23/21 11/23/21 1/21/22 1/21/22 1/21/22 1/21/22 1/21/22    1329 1329 1329 1347 1347 1347 1348 1151 1151 1151 1151 1151   Glucose    222 (A)       228 (A)    BUN    14       16    Creatinine    1.04 (A)       1.12 (A)    eGFR Non  Am    53 (A)       48 (A)    Sodium    139       140    Potassium    5.0       4.6    Chloride    103       102    Calcium    9.5       9.5    Albumin    4.50       4.40    Total Bilirubin    0.7       0.5    Alkaline Phosphatase    71       87    AST (SGOT)    35 (A)       24    ALT (SGPT)    28       22    WBC 6.58     9.21  11.49 (A)       Hemoglobin 16.3 (A)     15.5  16.7 (A)       Hematocrit 49.6 (A)     46.3  50.4 (A)       Platelets 73 (A)     75 (A)  87 (A)       Total Cholesterol   111  108       114   Triglycerides   244 (A)  328 (A)       581 (A)   HDL Cholesterol   32 (A)  28 (A)       28 (A)   LDL Cholesterol    41  32       12   Hemoglobin A1C  9.66 (A)     8.58 (A)  9.00 (A)      Microalbumin, Urine          2.4     (A) Abnormal value            Data reviewed: previous office notes            Current Outpatient Medications on File Prior to Visit   Medication Sig Dispense Refill   • ascorbic acid (VITAMIN C) 250 MG tablet Take 2 tablets  by mouth Daily.     • atorvastatin (LIPITOR) 20 MG tablet Take 1 tablet by mouth Daily. 90 tablet 1   • cholecalciferol (VITAMIN D3) 25 MCG (1000 UT) tablet Take 1,000 Units by mouth Daily.     • Dapagliflozin Propanediol (Farxiga) 10 MG tablet Take 10 mg by mouth Daily. 30 tablet 2   • digoxin (LANOXIN) 125 MCG tablet Take 1 tablet on Thursday and Sundays and 2 tablets the rest of the week 180 tablet 1   • dilTIAZem (TIAZAC) 120 MG 24 hr capsule Take 1 capsule by mouth Daily. 90 capsule 1   • furosemide (LASIX) 20 MG tablet TAKE 1 TABLET TWICE A  tablet 3   • glipizide (GLUCOTROL) 10 MG tablet Take 1 tablet by mouth 2 (Two) Times a Day Before Meals. 180 tablet 1   • Insulin Pen Needle (BD Pen Needle Katiuska 2nd Gen) 32G X 4 MM misc 1 each Daily. 90 each 1   • Januvia 100 MG tablet TAKE 1 TABLET DAILY 90 tablet 3   • metoprolol succinate XL (TOPROL-XL) 100 MG 24 hr tablet Take 1 tablet by mouth 2 (Two) Times a Day. 180 tablet 3   • metoprolol succinate XL (TOPROL-XL) 50 MG 24 hr tablet TAKE 1 TABLET TWICE A DAY (ALONG WITH METOPROLOL  MG TWICE A DAY ) 180 tablet 3   • miconazole (MICOTIN) 2 % cream 2 (two) times a day.     • multivitamin with minerals tablet tablet Take 1 tablet by mouth Daily.     • potassium chloride (MICRO-K) 10 MEQ CR capsule Take 1 capsule by mouth Daily. with food 90 capsule 3   • pregabalin (LYRICA) 150 MG capsule Take 1 capsule by mouth 3 (Three) Times a Day. 270 capsule 1   • Pyridoxine HCl (Vitamin B6) 200 MG tablet Take 1 tablet by mouth Daily. 90 tablet 1   • sacubitril-valsartan (ENTRESTO)  MG tablet Take 1 tablet by mouth Take As Directed. TAKE 1/2 TABLET IN THE MORNING AND 1 TABLET AT NIGHT. 135 tablet 1   • silver sulfadiazine (Silvadene) 1 % cream Apply 1 application topically to the appropriate area as directed 2 (Two) Times a Day. 50 g 4   • SITagliptin (Januvia) 100 MG tablet Take 1 tablet by mouth Daily. 14 tablet 2   • Xarelto 20 MG tablet Take 1 tablet by mouth  Daily. 90 tablet 3   • [DISCONTINUED] HYDROcodone-acetaminophen (NORCO)  MG per tablet Take 1 tablet by mouth Every 4 (Four) Hours As Needed for Moderate Pain  or Severe Pain . 120 tablet 0   • [DISCONTINUED] Insulin Degludec (TRESIBA FLEXTOUCH) 200 UNIT/ML solution pen-injector pen injection Inject 30 Units under the skin into the appropriate area as directed Daily. 3 pen 2   • fenofibrate (TRICOR) 145 MG tablet Take 1 tablet by mouth Daily for 90 days. 90 tablet 1   • [DISCONTINUED] Farxiga 10 MG tablet Take 10 mg by mouth Daily. 90 tablet 1     No current facility-administered medications on file prior to visit.        Assessment and Plan    Diagnoses and all orders for this visit:    1. Follow-up exam, 3-6 months since previous exam (Primary)    2. Type 2 diabetes mellitus with diabetic polyneuropathy, with long-term current use of insulin (HCC)  Comments:  States she is stable on current medication.  We will recheck A1c adjust medication if needed  Orders:  -     CBC Auto Differential  -     Comprehensive Metabolic Panel  -     Hemoglobin A1c  -     Lipid Panel  -     TSH    3. Chronic joint pain  Comments:  Takes Norco every 4 hours.  Patient is in significant amount of pain refuses pain management we will continue to monitor patient's progress  Orders:  -     HYDROcodone-acetaminophen (NORCO)  MG per tablet; Take 1 tablet by mouth Every 4 (Four) Hours As Needed for Moderate Pain  or Severe Pain .  Dispense: 120 tablet; Refill: 0    4. Vitamin D deficiency  -     Vitamin D 25 Hydroxy    5. Mixed hyperlipidemia  Comments:  We will check labs, adjust medication if needed  Orders:  -     CBC Auto Differential  -     Comprehensive Metabolic Panel  -     Lipid Panel    6. Essential hypertension  -     CBC Auto Differential  -     Comprehensive Metabolic Panel    7. Medication management  Comments:  Patient currently on digoxin we will check dig level  Orders:  -     Digoxin level    8. Mammogram  declined    9. Colon cancer screening declined    10. Encounter for hepatitis C screening test for low risk patient  Comments:  We will do a one-time screening for hepatitis C patient is at low risk  Orders:  -     Hepatitis C antibody    Other orders  -     Insulin Degludec (TRESIBA FLEXTOUCH) 200 UNIT/ML solution pen-injector pen injection; Inject 30 Units under the skin into the appropriate area as directed Daily for 90 days.  Dispense: 13.5 mL; Refill: 1        Follow Up   Return in about 6 months (around 1/27/2023) for Recheck.  Patient was given instructions and counseling regarding her condition or for health maintenance advice. Please see specific information pulled into the AVS if appropriate.

## 2022-08-01 RX ORDER — ATORVASTATIN CALCIUM 20 MG/1
TABLET, FILM COATED ORAL
Qty: 90 TABLET | Refills: 1 | Status: SHIPPED | OUTPATIENT
Start: 2022-08-01

## 2022-08-04 RX ORDER — DAPAGLIFLOZIN 10 MG/1
TABLET, FILM COATED ORAL
Qty: 90 TABLET | Refills: 3 | Status: SHIPPED | OUTPATIENT
Start: 2022-08-04

## 2022-08-11 ENCOUNTER — TELEPHONE (OUTPATIENT)
Dept: FAMILY MEDICINE CLINIC | Facility: CLINIC | Age: 72
End: 2022-08-11

## 2022-08-11 NOTE — TELEPHONE ENCOUNTER
Caller: Mary Albarran LAKESHA    Relationship: Self    Best call back number: [phone]      Requested Prescriptions:   Requested Prescriptions     Pending Prescriptions Disp Refills   • Insulin Degludec (TRESIBA FLEXTOUCH) 200 UNIT/ML solution pen-injector pen injection 13.5 mL 1     Sig: Inject 30 Units under the skin into the appropriate area as directed Daily for 90 days.        Pharmacy where request should be sent:  [facility] [place] [id number] [facility] [phone] [phone]    Additional details provided by patient: PATIENT SPOKE WITH [facility] AND THEY DON'T HAVE THIS PRESCRIPTION PER PATIENT.        Steven Max Rep   08/11/22 10:32 EDT

## 2022-08-26 DIAGNOSIS — G89.29 CHRONIC JOINT PAIN: ICD-10-CM

## 2022-08-26 DIAGNOSIS — M25.50 CHRONIC JOINT PAIN: ICD-10-CM

## 2022-08-26 RX ORDER — HYDROCODONE BITARTRATE AND ACETAMINOPHEN 10; 325 MG/1; MG/1
1 TABLET ORAL EVERY 4 HOURS PRN
Qty: 120 TABLET | Refills: 0 | Status: CANCELLED | OUTPATIENT
Start: 2022-08-26

## 2022-08-26 NOTE — TELEPHONE ENCOUNTER
Caller: Mary Albarran LAKESHA    Relationship: Self    Best call back number: [phone]     Requested Prescriptions:   Requested Prescriptions     Pending Prescriptions Disp Refills   • HYDROcodone-acetaminophen (NORCO)  MG per tablet 120 tablet 0     Sig: Take 1 tablet by mouth Every 4 (Four) Hours As Needed for Moderate Pain  or Severe Pain .        Pharmacy where request should be sent: [id number] - [place], [place] - [address] AT [facility] [phone] [facility] [phone] FX     Additional details provided by patient:     Does the patient have less than a 3 day supply:  [x] Yes  [] No    Steven Vail Rep   08/26/22 13:40 EDT

## 2022-08-29 RX ORDER — HYDROCODONE BITARTRATE AND ACETAMINOPHEN 10; 325 MG/1; MG/1
1 TABLET ORAL EVERY 4 HOURS PRN
Qty: 120 TABLET | Refills: 0 | Status: SHIPPED | OUTPATIENT
Start: 2022-08-29 | End: 2022-09-27 | Stop reason: SDUPTHER

## 2022-08-29 RX ORDER — DILTIAZEM HYDROCHLORIDE 120 MG/1
CAPSULE, EXTENDED RELEASE ORAL
Qty: 90 CAPSULE | Refills: 3 | Status: SHIPPED | OUTPATIENT
Start: 2022-08-29

## 2022-09-27 DIAGNOSIS — M25.50 CHRONIC JOINT PAIN: ICD-10-CM

## 2022-09-27 DIAGNOSIS — G89.29 CHRONIC JOINT PAIN: ICD-10-CM

## 2022-09-27 NOTE — TELEPHONE ENCOUNTER
Caller: Mary Albarran LAKESHA    Relationship: Self    Best call back number: [phone]    Requested Prescriptions:   Requested Prescriptions     Pending Prescriptions Disp Refills   • HYDROcodone-acetaminophen (NORCO)  MG per tablet 120 tablet 0     Sig: Take 1 tablet by mouth Every 4 (Four) Hours As Needed for Moderate Pain or Severe Pain.        Pharmacy where request should be sent: [facility] #[id number] - [place], [place] - [address] AT [facility] [phone] [facility] [phone] FX       Does the patient have less than a 3 day supply:  [x] Yes  [] No    Steven Daniel Rep   09/27/22 08:44 EDT

## 2022-09-28 RX ORDER — HYDROCODONE BITARTRATE AND ACETAMINOPHEN 10; 325 MG/1; MG/1
1 TABLET ORAL EVERY 4 HOURS PRN
Qty: 120 TABLET | Refills: 0 | Status: SHIPPED | OUTPATIENT
Start: 2022-09-28 | End: 2022-10-26 | Stop reason: SDUPTHER

## 2022-10-13 NOTE — TELEPHONE ENCOUNTER
Caller: AvisMary    Relationship: Self    Best call back number: [phone]    Requested Prescriptions:   Requested Prescriptions     Pending Prescriptions Disp Refills   • silver sulfadiazine (Silvadene) 1 % cream 50 g 4     Sig: Apply 1 application topically to the appropriate area as directed 2 (Two) Times a Day.        Pharmacy where request should be sent: [facility] #[id number] - [place], [place] - [address] AT [facility] [phone] [facility] [phone]      Additional details provided by patient: PATIENT REQUEST THE TUB OF CREAM SHE HAS TO DO BOTH LEGS    Does the patient have less than a 3 day supply:  [x] Yes  [] No    Steven Richardson Rep   10/13/22 11:41 EDT

## 2022-10-13 NOTE — TELEPHONE ENCOUNTER
Rx Refill Note  Requested Prescriptions     Pending Prescriptions Disp Refills   • silver sulfadiazine (Silvadene) 1 % cream 50 g 4     Sig: Apply 1 application topically to the appropriate area as directed 2 (Two) Times a Day.      Last office visit with prescribing clinician: 7/27/2022      Next office visit with prescribing clinician: 1/27/2023     Refill Pharmacy:[facility] #[id number] - [place], [place] - [address] AT [facility] - [phone] [facility] [phone]   [phone]    Lennie Loco MA  10/13/22, 14:48 EDT

## 2022-10-18 RX ORDER — DIGOXIN 125 MCG
TABLET ORAL
Qty: 180 TABLET | Refills: 0 | Status: SHIPPED | OUTPATIENT
Start: 2022-10-18 | End: 2023-01-23

## 2022-10-24 RX ORDER — POTASSIUM CHLORIDE 750 MG/1
CAPSULE, EXTENDED RELEASE ORAL
Qty: 90 CAPSULE | Refills: 3 | Status: SHIPPED | OUTPATIENT
Start: 2022-10-24

## 2022-10-26 DIAGNOSIS — G89.29 CHRONIC JOINT PAIN: ICD-10-CM

## 2022-10-26 DIAGNOSIS — M25.50 CHRONIC JOINT PAIN: ICD-10-CM

## 2022-10-26 NOTE — TELEPHONE ENCOUNTER
Caller: Mary Albarran LAKESHA    Relationship: Self    Best call back number: [phone]     Requested Prescriptions:   Requested Prescriptions     Pending Prescriptions Disp Refills   • HYDROcodone-acetaminophen (NORCO)  MG per tablet 120 tablet 0     Sig: Take 1 tablet by mouth Every 4 (Four) Hours As Needed for Moderate Pain or Severe Pain.        Pharmacy where request should be sent: [id number] [place] [place] - [address] AT [facility] [phone] [facility] [phone] FX       Does the patient have less than a 3 day supply:  [x] Yes  [] No    Steven Hidalgo Rep   10/26/22 10:56 EDT

## 2022-10-27 RX ORDER — HYDROCODONE BITARTRATE AND ACETAMINOPHEN 10; 325 MG/1; MG/1
1 TABLET ORAL EVERY 4 HOURS PRN
Qty: 120 TABLET | Refills: 0 | Status: SHIPPED | OUTPATIENT
Start: 2022-10-27 | End: 2022-11-28 | Stop reason: SDUPTHER

## 2022-10-28 ENCOUNTER — OFFICE VISIT (OUTPATIENT)
Dept: CARDIOLOGY | Facility: CLINIC | Age: 72
End: 2022-10-28

## 2022-10-28 VITALS
SYSTOLIC BLOOD PRESSURE: 166 MMHG | HEIGHT: 63 IN | WEIGHT: 261.2 LBS | DIASTOLIC BLOOD PRESSURE: 93 MMHG | BODY MASS INDEX: 46.28 KG/M2 | HEART RATE: 111 BPM

## 2022-10-28 DIAGNOSIS — E78.2 MIXED HYPERLIPIDEMIA: ICD-10-CM

## 2022-10-28 DIAGNOSIS — I48.91 ATRIAL FIBRILLATION, UNSPECIFIED TYPE: ICD-10-CM

## 2022-10-28 DIAGNOSIS — I35.1 NONRHEUMATIC AORTIC VALVE INSUFFICIENCY: Primary | ICD-10-CM

## 2022-10-28 DIAGNOSIS — I10 PRIMARY HYPERTENSION: ICD-10-CM

## 2022-10-28 DIAGNOSIS — I35.0 NONRHEUMATIC AORTIC VALVE STENOSIS: ICD-10-CM

## 2022-10-28 PROCEDURE — 99214 OFFICE O/P EST MOD 30 MIN: CPT | Performed by: INTERNAL MEDICINE

## 2022-10-28 NOTE — PROGRESS NOTES
Chief Complaint  Hyperlipidemia, Hypertension, and Congestive Heart Failure    Subjective        Maryadam Albarran presents to [facility] CARDIOLOGY  History of present illness:    Patient states that she occasionally notes palpitations.  She states that we will go 2 to 3 weeks where she will feel none of them but then over 2 to 3 days she will feel irregular heart rhythms.  She states has been this way for a while and does not bother her.  She states she feels her blood pressure is elevated because she is nervous.  The last 4 blood pressures in the chart were excellent.  She does note chronic shortness of breath that has been going on since  which she attributes to a viral infection.      Past Medical History:   Diagnosis Date   • Allergic rhinitis    • Anxiety    • Aortic valve disease 2021    Fibrocalcific changes of the aortic valve with mild aortic valve stenosis   on echo 3/2/2021  Moderate aortic valve regurgitation is present. Aortic valve maximum pressure gradient is 26 mmHg. Moderate aortic valve stenosis is present.  On echo 2/10/2022      • Arthritis    • Atrial fibrillation (HCC)    • Chronic kidney disease (CKD), stage III (moderate) ([facility])    • Chronic pain    • COPD (chronic obstructive pulmonary disease) (HCC)    • Depression    • Diabetes mellitus type 2 with complications (HCC)    • Hyperlipidemia    • Hypertension    • Thrombocytopenia (HCC)          Past Surgical History:   Procedure Laterality Date   • HYSTERECTOMY      30 YEARS AGO          Social History     Socioeconomic History   • Marital status:    Tobacco Use   • Smoking status: Former     Packs/day: 1.00     Years: 36.00     Pack years: 36.00     Types: Cigarettes     Start date: 4/3/1971     Quit date: 2008     Years since quittin.5   • Smokeless tobacco: Never   • Tobacco comments:     FOR 30 YEARS   Vaping Use   • Vaping Use: Never used   Substance and Sexual Activity   • Alcohol use: Never   •  Drug use: Never         Family History   Problem Relation Age of Onset   • Heart disease Father    • Heart disease Other    • Heart disease Other    • Diabetes Other           Allergies   Allergen Reactions   • Codeine Mental Status Change            Current Outpatient Medications:   •  atorvastatin (LIPITOR) 20 MG tablet, TAKE 1 TABLET DAILY, Disp: 90 tablet, Rfl: 1  •  cholecalciferol (VITAMIN D3) 25 MCG (1000 UT) tablet, Take 1,000 Units by mouth Daily., Disp: , Rfl:   •  digoxin (LANOXIN) 125 MCG tablet, Take 1 tablet on Thursday and Sundays and 2 tablets the rest of the week, Disp: 180 tablet, Rfl: 0  •  dilTIAZem (TIAZAC) 120 MG 24 hr capsule, TAKE 1 CAPSULE DAILY, Disp: 90 capsule, Rfl: 3  •  Farxiga 10 MG tablet, TAKE 1 TABLET DAILY, Disp: 90 tablet, Rfl: 3  •  furosemide (LASIX) 20 MG tablet, TAKE 1 TABLET TWICE A DAY, Disp: 180 tablet, Rfl: 3  •  glipizide (GLUCOTROL) 10 MG tablet, Take 1 tablet by mouth 2 (Two) Times a Day Before Meals., Disp: 180 tablet, Rfl: 1  •  HYDROcodone-acetaminophen (NORCO)  MG per tablet, Take 1 tablet by mouth Every 4 (Four) Hours As Needed for Moderate Pain or Severe Pain., Disp: 120 tablet, Rfl: 0  •  Insulin Degludec (TRESIBA FLEXTOUCH) 200 UNIT/ML solution pen-injector pen injection, Inject 30 Units under the skin into the appropriate area as directed Daily for 90 days., Disp: 13.5 mL, Rfl: 1  •  Insulin Pen Needle (BD Pen Needle Katiuska 2nd Gen) 32G X 4 MM misc, 1 each Daily., Disp: 90 each, Rfl: 1  •  Januvia 100 MG tablet, TAKE 1 TABLET DAILY, Disp: 90 tablet, Rfl: 3  •  metoprolol succinate XL (TOPROL-XL) 100 MG 24 hr tablet, Take 1 tablet by mouth 2 (Two) Times a Day., Disp: 180 tablet, Rfl: 3  •  metoprolol succinate XL (TOPROL-XL) 50 MG 24 hr tablet, TAKE 1 TABLET TWICE A DAY (ALONG WITH METOPROLOL  MG TWICE A DAY ), Disp: 180 tablet, Rfl: 3  •  miconazole (MICOTIN) 2 % cream, 2 (two) times a day., Disp: , Rfl:   •  multivitamin with minerals tablet tablet,  "Take 1 tablet by mouth Daily., Disp: , Rfl:   •  potassium chloride (MICRO-K) 10 MEQ CR capsule, TAKE 1 CAPSULE DAILY WITH FOOD, Disp: 90 capsule, Rfl: 3  •  pregabalin (LYRICA) 150 MG capsule, Take 1 capsule by mouth 3 (Three) Times a Day., Disp: 270 capsule, Rfl: 1  •  Pyridoxine HCl (Vitamin B6) 200 MG tablet, Take 1 tablet by mouth Daily., Disp: 90 tablet, Rfl: 1  •  sacubitril-valsartan (ENTRESTO)  MG tablet, Take 1 tablet by mouth Take As Directed. TAKE 1/2 TABLET IN THE MORNING AND 1 TABLET AT NIGHT., Disp: 135 tablet, Rfl: 1  •  silver sulfadiazine (Silvadene) 1 % cream, Apply 1 application topically to the appropriate area as directed 2 (Two) Times a Day., Disp: 50 g, Rfl: 4  •  SITagliptin (Januvia) 100 MG tablet, Take 1 tablet by mouth Daily., Disp: 14 tablet, Rfl: 2  •  Xarelto 20 MG tablet, Take 1 tablet by mouth Daily., Disp: 90 tablet, Rfl: 3  •  ascorbic acid (VITAMIN C) 250 MG tablet, Take 2 tablets by mouth Daily., Disp: , Rfl:   •  fenofibrate (TRICOR) 145 MG tablet, Take 1 tablet by mouth Daily for 90 days., Disp: 90 tablet, Rfl: 1      ROS:  Cardiac review of systems positive for chronic shortness of breath and palpitations.    Objective     /93   Pulse 111   Ht 160 cm (63\")   Wt 118 kg (261 lb 3.2 oz)   BMI 46.27 kg/m²       General Appearance:   · well developed  · well nourished  HENT:   · oropharynx moist  · lips not cyanotic  Respiratory:  · no respiratory distress  · normal breath sounds  · no rales  Cardiovascular:  · no jugular venous distention  · regular rhythm  · S1 normal, S2 normal  · no S3, no S4   · no murmur  · no rub, no thrill  · No carotid bruit  · pedal pulses normal  · lower extremity edema: none    Musculoskeletal:  · no clubbing of fingers.   · normocephalic, head atraumatic  Skin:   · warm, dry  Psychiatric:  · judgement and insight appropriate  · normal mood and affect    ECHO:  Results for orders placed in visit on 02/10/22    Adult Transthoracic Echo " Complete W/ Cont if Necessary Per Protocol    Interpretation Summary  · Estimated left ventricular EF was in agreement with the calculated left ventricular EF. Left ventricular ejection fraction appears to be 56 - 60%.  · Left ventricular diastolic function is consistent with (grade I) impaired relaxation.  · Left atrial volume is mildly increased.  · Estimated right ventricular systolic pressure from tricuspid regurgitation is moderately elevated (45-55 mmHg).  · The right atrial cavity is borderline dilated.  · The right ventricular cavity is borderline dilated.  · Moderate aortic valve regurgitation is present.  · Aortic valve maximum pressure gradient is 26 mmHg.  · Moderate aortic valve stenosis is present.  · There is mild mitral valve regurgitation noted    STRESS:    CATH:  No results found for this or any previous visit.    BMP:   Glucose   Date Value Ref Range Status   07/27/2022 119 (H) 65 - 99 mg/dL Final     BUN   Date Value Ref Range Status   07/27/2022 15 8 - 23 mg/dL Final     Creatinine   Date Value Ref Range Status   07/27/2022 1.10 (H) 0.57 - 1.00 mg/dL Final     Sodium   Date Value Ref Range Status   07/27/2022 142 136 - 145 mmol/L Final     Potassium   Date Value Ref Range Status   07/27/2022 5.2 3.5 - 5.2 mmol/L Final     Comment:     Slight hemolysis detected by analyzer. Results may be affected.     Chloride   Date Value Ref Range Status   07/27/2022 104 98 - 107 mmol/L Final     CO2   Date Value Ref Range Status   07/27/2022 22.0 22.0 - 29.0 mmol/L Final     Calcium   Date Value Ref Range Status   07/27/2022 10.0 8.6 - 10.5 mg/dL Final     BUN/Creatinine Ratio   Date Value Ref Range Status   07/27/2022 13.6 7.0 - 25.0 Final     Anion Gap   Date Value Ref Range Status   07/27/2022 16.0 (H) 5.0 - 15.0 mmol/L Final     eGFR   Date Value Ref Range Status   07/27/2022 54.5 (L) >60.0 mL/min/1.73 Final     Comment:     National Kidney Foundation and American Society of Nephrology (ASN) Task Force  recommended calculation based on the Chronic Kidney Disease Epidemiology Collaboration (CKD-EPI) equation refit without adjustment for race.     LIPIDS:  Total Cholesterol   Date Value Ref Range Status   07/27/2022 110 0 - 200 mg/dL Final     Triglycerides   Date Value Ref Range Status   07/27/2022 252 (H) 0 - 150 mg/dL Final     HDL Cholesterol   Date Value Ref Range Status   07/27/2022 35 (L) 40 - 60 mg/dL Final     LDL Cholesterol    Date Value Ref Range Status   07/27/2022 36 0 - 100 mg/dL Final     VLDL Cholesterol   Date Value Ref Range Status   07/27/2022 39 5 - 40 mg/dL Final     LDL/HDL Ratio   Date Value Ref Range Status   07/27/2022 0.70  Final         Procedures             ASSESSMENT:  Encounter Diagnoses   Name Primary?   • Nonrheumatic aortic valve insufficiency Yes   • Nonrheumatic aortic valve stenosis    • Atrial fibrillation, unspecified type (HCC)    • Primary hypertension    • Mixed hyperlipidemia          PLAN:    1.  Continue the Xarelto.  The patient notes no problems with bleeding.  2.  Heart rate is under good control.  Patient's blood pressure the last 4 times were under excellent control but she is nervous today as she was unable to get her labs.  We will just continue to monitor this closely.  3.  We will repeat an echocardiogram around February 2023 to keep an eye on her moderate aortic insufficiency and moderate aortic stenosis.  4.  Patient's had this shortness of breath since 2008.  She states she has had stress test and other work-up from a cardiac standpoint that it showed no significant abnormalities.  5.  Continue the Lipitor and fenofibrate.  Patient had cholesterol checked 7/27/2022 with LDL 36, HDL 35, and triglycerides 252.  These are near goal and would just continue diet and exercise.  6.  Patient notes occasional palpitation that is not bothering her.  We will just continue with the metoprolol, diltiazem, and digoxin that her cardiologist has had her on.          Patient  was given instructions and counseling regarding her condition or for health maintenance advice. Please see specific information pulled into the AVS if appropriate.         Yoav Metcalf MD   10/28/2022  15:51 EDT

## 2022-11-16 RX ORDER — SACUBITRIL AND VALSARTAN 97; 103 MG/1; MG/1
TABLET, FILM COATED ORAL
Qty: 135 TABLET | Refills: 3 | Status: SHIPPED | OUTPATIENT
Start: 2022-11-16

## 2022-11-17 NOTE — TELEPHONE ENCOUNTER
Rx Refill Note  Requested Prescriptions     Pending Prescriptions Disp Refills   • Tresiba FlexTouch 200 UNIT/ML solution pen-injector pen injection [Pharmacy Med Name: TRESIBA FLEXTOUCH PEN 3ML 3'S 200U/ML] 9 mL 5     Sig: INJECT 30 UNITS UNDER THE SKIN INTO THE APPROPRIATE AREA AS DIRECTED DAILY      Last office visit with prescribing clinician: 7/27/2022      Next office visit with prescribing clinician: 1/27/2023       Last Relevant Lab Date: 07/27/2022  Refill Pharmacy: [facility] - [address] - [phone] [facility] [phone] [phone]    Lennie Loco MA  11/17/22, 08:49 EST

## 2022-11-18 RX ORDER — INSULIN DEGLUDEC 200 U/ML
INJECTION, SOLUTION SUBCUTANEOUS
Qty: 9 ML | Refills: 5 | Status: SHIPPED | OUTPATIENT
Start: 2022-11-18 | End: 2022-11-28 | Stop reason: SDUPTHER

## 2022-11-28 DIAGNOSIS — M25.50 CHRONIC JOINT PAIN: ICD-10-CM

## 2022-11-28 DIAGNOSIS — G89.29 CHRONIC JOINT PAIN: ICD-10-CM

## 2022-11-28 RX ORDER — INSULIN DEGLUDEC 200 U/ML
30 INJECTION, SOLUTION SUBCUTANEOUS DAILY
Qty: 9 ML | Refills: 5 | Status: SHIPPED | OUTPATIENT
Start: 2022-11-28 | End: 2023-03-20 | Stop reason: CLARIF

## 2022-11-28 RX ORDER — HYDROCODONE BITARTRATE AND ACETAMINOPHEN 10; 325 MG/1; MG/1
1 TABLET ORAL EVERY 4 HOURS PRN
Qty: 120 TABLET | Refills: 0 | Status: SHIPPED | OUTPATIENT
Start: 2022-11-28 | End: 2022-12-27 | Stop reason: SDUPTHER

## 2022-11-28 NOTE — TELEPHONE ENCOUNTER
Caller: Mary Albarran    Relationship: Self    Best call back number: [phone]     Requested Prescriptions:   Requested Prescriptions     Pending Prescriptions Disp Refills   • Insulin Degludec (Tresiba FlexTouch) 200 UNIT/ML solution pen-injector pen injection 9 mL 5      TRELEGY INHALER      Pharmacy where request should be sent: [facility] - [address] [phone] [facility] [phone]      Additional details provided by patient: MEDICATION IS NEEDING A PA     Does the patient have less than a 3 day supply:  [x] Yes  [] No    Steven Manning Rep   11/28/22 13:10 EST

## 2022-11-28 NOTE — TELEPHONE ENCOUNTER
Caller: Mary Albarran LAKESHA    Relationship: Self    Best call back number: [phone]    Requested Prescriptions:   Requested Prescriptions     Pending Prescriptions Disp Refills   • HYDROcodone-acetaminophen (NORCO)  MG per tablet 120 tablet 0     Sig: Take 1 tablet by mouth Every 4 (Four) Hours As Needed for Moderate Pain or Severe Pain.        Pharmacy where request should be sent: [id number] - [place], [place] - [address] AT [facility] [phone] [facility] [phone] FX     Additional details provided by patient: TWO DAYS LEFT     Does the patient have less than a 3 day supply:  [x] Yes  [] No    Steven Manning Rep   11/28/22 13:12 EST

## 2022-12-27 ENCOUNTER — TELEPHONE (OUTPATIENT)
Dept: FAMILY MEDICINE CLINIC | Facility: CLINIC | Age: 72
End: 2022-12-27

## 2022-12-27 DIAGNOSIS — M25.50 CHRONIC JOINT PAIN: ICD-10-CM

## 2022-12-27 DIAGNOSIS — G89.29 CHRONIC JOINT PAIN: ICD-10-CM

## 2022-12-27 NOTE — TELEPHONE ENCOUNTER
Caller: Mary Albarran    Relationship: Self    Best call back number: [phone]    Requested Prescriptions:   Requested Prescriptions     Pending Prescriptions Disp Refills   • HYDROcodone-acetaminophen (NORCO)  MG per tablet 120 tablet 0     Sig: Take 1 tablet by mouth Every 4 (Four) Hours As Needed for Moderate Pain or Severe Pain.        Pharmacy where request should be sent: [facility][id number] - [place], [place] - [address] AT [facility] [phone] [facility] [phone] FX       Does the patient have less than a 3 day supply:  [x] Yes  [] No    Would you like a call back once the refill request has been completed: [] Yes [x] No    If the office needs to give you a call back, can they leave a voicemail: [] Yes [x] No    Steven Bone Rep   12/27/22 11:03 EST

## 2022-12-28 RX ORDER — HYDROCODONE BITARTRATE AND ACETAMINOPHEN 10; 325 MG/1; MG/1
1 TABLET ORAL EVERY 4 HOURS PRN
Qty: 120 TABLET | Refills: 0 | Status: SHIPPED | OUTPATIENT
Start: 2022-12-28 | End: 2023-01-27 | Stop reason: SDUPTHER

## 2023-01-03 RX ORDER — GLIPIZIDE 10 MG/1
10 TABLET ORAL
Qty: 180 TABLET | Refills: 1 | Status: SHIPPED | OUTPATIENT
Start: 2023-01-03

## 2023-01-03 NOTE — TELEPHONE ENCOUNTER
Caller: Mary Albarran LAKESHA    Relationship: Self    Best call back number: [phone]     Requested Prescriptions:   Requested Prescriptions     Pending Prescriptions Disp Refills   • glipizide (GLUCOTROL) 10 MG tablet 180 tablet 1     Sig: Take 1 tablet by mouth 2 (Two) Times a Day Before Meals.        Pharmacy where request should be sent: [facility] [address] [phone] [facility] [phone]      Additional details provided by patient: PATIENT IS NEEDING A REFILL.     Does the patient have less than a 3 day supply:  [x] Yes  [] No    Would you like a call back once the refill request has been completed: [x] Yes [] No    If the office needs to give you a call back, can they leave a voicemail: [x] Yes [] No    Steven Felix Rep   01/03/23 11:15 EST

## 2023-01-23 RX ORDER — DIGOXIN 125 MCG
TABLET ORAL
Qty: 180 TABLET | Refills: 3 | Status: SHIPPED | OUTPATIENT
Start: 2023-01-23

## 2023-01-27 ENCOUNTER — OFFICE VISIT (OUTPATIENT)
Dept: FAMILY MEDICINE CLINIC | Facility: CLINIC | Age: 73
End: 2023-01-27
Payer: MEDICARE

## 2023-01-27 ENCOUNTER — TELEPHONE (OUTPATIENT)
Dept: FAMILY MEDICINE CLINIC | Facility: CLINIC | Age: 73
End: 2023-01-27

## 2023-01-27 VITALS
DIASTOLIC BLOOD PRESSURE: 62 MMHG | WEIGHT: 262.3 LBS | BODY MASS INDEX: 46.48 KG/M2 | TEMPERATURE: 96.9 F | HEIGHT: 63 IN | OXYGEN SATURATION: 96 % | HEART RATE: 84 BPM | SYSTOLIC BLOOD PRESSURE: 136 MMHG

## 2023-01-27 DIAGNOSIS — J44.9 CHRONIC OBSTRUCTIVE PULMONARY DISEASE, UNSPECIFIED COPD TYPE: ICD-10-CM

## 2023-01-27 DIAGNOSIS — Z79.4 TYPE 2 DIABETES MELLITUS WITHOUT COMPLICATION, WITH LONG-TERM CURRENT USE OF INSULIN: ICD-10-CM

## 2023-01-27 DIAGNOSIS — N18.32 STAGE 3B CHRONIC KIDNEY DISEASE (CKD): ICD-10-CM

## 2023-01-27 DIAGNOSIS — G89.4 CHRONIC PAIN SYNDROME: ICD-10-CM

## 2023-01-27 DIAGNOSIS — E55.9 VITAMIN D DEFICIENCY: ICD-10-CM

## 2023-01-27 DIAGNOSIS — I10 ESSENTIAL HYPERTENSION: ICD-10-CM

## 2023-01-27 DIAGNOSIS — Z00.00 ENCOUNTER FOR SUBSEQUENT ANNUAL WELLNESS VISIT (AWV) IN MEDICARE PATIENT: Primary | ICD-10-CM

## 2023-01-27 DIAGNOSIS — E11.9 TYPE 2 DIABETES MELLITUS WITHOUT COMPLICATION, WITH LONG-TERM CURRENT USE OF INSULIN: ICD-10-CM

## 2023-01-27 DIAGNOSIS — M25.50 CHRONIC JOINT PAIN: ICD-10-CM

## 2023-01-27 DIAGNOSIS — G89.29 OTHER CHRONIC PAIN: ICD-10-CM

## 2023-01-27 DIAGNOSIS — E78.2 MIXED HYPERLIPIDEMIA: ICD-10-CM

## 2023-01-27 DIAGNOSIS — G89.29 CHRONIC JOINT PAIN: ICD-10-CM

## 2023-01-27 DIAGNOSIS — G62.9 NEUROPATHY: ICD-10-CM

## 2023-01-27 LAB
25(OH)D3 SERPL-MCNC: 55.8 NG/ML (ref 30–100)
ALBUMIN SERPL-MCNC: 4.5 G/DL (ref 3.5–5.2)
ALBUMIN/GLOB SERPL: 1.5 G/DL
ALP SERPL-CCNC: 78 U/L (ref 39–117)
ALT SERPL W P-5'-P-CCNC: 27 U/L (ref 1–33)
AMPHET+METHAMPHET UR QL: NEGATIVE
AMPHETAMINE INTERNAL CONTROL: NORMAL
AMPHETAMINES UR QL: NEGATIVE
ANION GAP SERPL CALCULATED.3IONS-SCNC: 10.4 MMOL/L (ref 5–15)
AST SERPL-CCNC: 32 U/L (ref 1–32)
BARBITURATE INTERNAL CONTROL: NORMAL
BARBITURATES UR QL SCN: NEGATIVE
BENZODIAZ UR QL SCN: NEGATIVE
BENZODIAZEPINE INTERNAL CONTROL: NORMAL
BILIRUB SERPL-MCNC: 0.6 MG/DL (ref 0–1.2)
BUN SERPL-MCNC: 17 MG/DL (ref 8–23)
BUN/CREAT SERPL: 15 (ref 7–25)
BUPRENORPHINE INTERNAL CONTROL: NORMAL
BUPRENORPHINE SERPL-MCNC: NEGATIVE NG/ML
CALCIUM SPEC-SCNC: 9.7 MG/DL (ref 8.6–10.5)
CANNABINOIDS SERPL QL: NEGATIVE
CHLORIDE SERPL-SCNC: 104 MMOL/L (ref 98–107)
CHOLEST SERPL-MCNC: 116 MG/DL (ref 0–200)
CO2 SERPL-SCNC: 26.6 MMOL/L (ref 22–29)
COCAINE INTERNAL CONTROL: NORMAL
COCAINE UR QL: NEGATIVE
CREAT SERPL-MCNC: 1.13 MG/DL (ref 0.57–1)
EGFRCR SERPLBLD CKD-EPI 2021: 52.4 ML/MIN/1.73
EXPIRATION DATE: NORMAL
GLOBULIN UR ELPH-MCNC: 3 GM/DL
GLUCOSE SERPL-MCNC: 143 MG/DL (ref 65–99)
HDLC SERPL-MCNC: 37 MG/DL (ref 40–60)
LDLC SERPL CALC-MCNC: 42 MG/DL (ref 0–100)
LDLC/HDLC SERPL: 0.85 {RATIO}
Lab: NORMAL
MDMA (ECSTASY) INTERNAL CONTROL: NORMAL
MDMA UR QL SCN: NEGATIVE
METHADONE INTERNAL CONTROL: NORMAL
METHADONE UR QL SCN: NEGATIVE
METHAMPHETAMINE INTERNAL CONTROL: NORMAL
OPIATES INTERNAL CONTROL: NORMAL
OPIATES UR QL: NEGATIVE
OXYCODONE INTERNAL CONTROL: NORMAL
OXYCODONE UR QL SCN: NEGATIVE
PCP UR QL SCN: NEGATIVE
PHENCYCLIDINE INTERNAL CONTROL: NORMAL
POTASSIUM SERPL-SCNC: 4.3 MMOL/L (ref 3.5–5.2)
PROT SERPL-MCNC: 7.5 G/DL (ref 6–8.5)
SODIUM SERPL-SCNC: 141 MMOL/L (ref 136–145)
THC INTERNAL CONTROL: NORMAL
TRIGL SERPL-MCNC: 238 MG/DL (ref 0–150)
TSH SERPL DL<=0.05 MIU/L-ACNC: 1.52 UIU/ML (ref 0.27–4.2)
VLDLC SERPL-MCNC: 37 MG/DL (ref 5–40)

## 2023-01-27 PROCEDURE — 85025 COMPLETE CBC W/AUTO DIFF WBC: CPT | Performed by: NURSE PRACTITIONER

## 2023-01-27 PROCEDURE — 36415 COLL VENOUS BLD VENIPUNCTURE: CPT | Performed by: NURSE PRACTITIONER

## 2023-01-27 PROCEDURE — 96160 PT-FOCUSED HLTH RISK ASSMT: CPT | Performed by: NURSE PRACTITIONER

## 2023-01-27 PROCEDURE — 84443 ASSAY THYROID STIM HORMONE: CPT | Performed by: NURSE PRACTITIONER

## 2023-01-27 PROCEDURE — 83036 HEMOGLOBIN GLYCOSYLATED A1C: CPT | Performed by: NURSE PRACTITIONER

## 2023-01-27 PROCEDURE — 82306 VITAMIN D 25 HYDROXY: CPT | Performed by: NURSE PRACTITIONER

## 2023-01-27 PROCEDURE — 80061 LIPID PANEL: CPT | Performed by: NURSE PRACTITIONER

## 2023-01-27 PROCEDURE — 99214 OFFICE O/P EST MOD 30 MIN: CPT | Performed by: NURSE PRACTITIONER

## 2023-01-27 PROCEDURE — G0439 PPPS, SUBSEQ VISIT: HCPCS | Performed by: NURSE PRACTITIONER

## 2023-01-27 PROCEDURE — 82043 UR ALBUMIN QUANTITATIVE: CPT | Performed by: NURSE PRACTITIONER

## 2023-01-27 PROCEDURE — 1159F MED LIST DOCD IN RCRD: CPT | Performed by: NURSE PRACTITIONER

## 2023-01-27 PROCEDURE — 1170F FXNL STATUS ASSESSED: CPT | Performed by: NURSE PRACTITIONER

## 2023-01-27 PROCEDURE — 80305 DRUG TEST PRSMV DIR OPT OBS: CPT | Performed by: NURSE PRACTITIONER

## 2023-01-27 PROCEDURE — 80053 COMPREHEN METABOLIC PANEL: CPT | Performed by: NURSE PRACTITIONER

## 2023-01-27 RX ORDER — ALBUTEROL SULFATE 90 UG/1
2 AEROSOL, METERED RESPIRATORY (INHALATION) EVERY 4 HOURS PRN
Qty: 8 G | Refills: 11 | Status: SHIPPED | OUTPATIENT
Start: 2023-01-27

## 2023-01-27 RX ORDER — ALBUTEROL SULFATE 90 UG/1
2 AEROSOL, METERED RESPIRATORY (INHALATION) EVERY 4 HOURS PRN
COMMUNITY
End: 2023-01-27 | Stop reason: SDUPTHER

## 2023-01-27 RX ORDER — HYDROCODONE BITARTRATE AND ACETAMINOPHEN 10; 325 MG/1; MG/1
1 TABLET ORAL EVERY 4 HOURS PRN
Qty: 120 TABLET | Refills: 0 | Status: SHIPPED | OUTPATIENT
Start: 2023-01-27 | End: 2023-02-27 | Stop reason: SDUPTHER

## 2023-01-27 RX ORDER — IPRATROPIUM BROMIDE AND ALBUTEROL SULFATE 2.5; .5 MG/3ML; MG/3ML
3 SOLUTION RESPIRATORY (INHALATION) EVERY 4 HOURS PRN
Qty: 360 ML | Refills: 1 | Status: SHIPPED | OUTPATIENT
Start: 2023-01-27 | End: 2023-03-20 | Stop reason: SDUPTHER

## 2023-01-27 RX ORDER — IPRATROPIUM BROMIDE AND ALBUTEROL SULFATE 2.5; .5 MG/3ML; MG/3ML
3 SOLUTION RESPIRATORY (INHALATION) EVERY 4 HOURS PRN
Qty: 360 ML | Refills: 1 | Status: SHIPPED | OUTPATIENT
Start: 2023-01-27 | End: 2023-01-27 | Stop reason: SDUPTHER

## 2023-01-27 RX ORDER — ALBUTEROL SULFATE 90 UG/1
2 AEROSOL, METERED RESPIRATORY (INHALATION) EVERY 4 HOURS PRN
Qty: 8 G | Refills: 11 | Status: SHIPPED | OUTPATIENT
Start: 2023-01-27 | End: 2023-01-27 | Stop reason: SDUPTHER

## 2023-01-27 NOTE — TELEPHONE ENCOUNTER
Caller: Mary Albarran    Relationship: Self    Best call back number: [phone]     Requested Prescriptions:   INHALER   MEDICATION FOR NEBULIZER MACHINE      Pharmacy where request should be sent: [facility] - [address] [phone] [facility] [phone]      Additional details provided by patient: PATIENT STATED THAT THESE WERE SENT TO A [facility] AND INSURANCE WANTS HER TO USE [facility]    Does the patient have less than a 3 day supply:  [x] Yes  [] No    Would you like a call back once the refill request has been completed: [x] Yes [] No    If the office needs to give you a call back, can they leave a voicemail: [x] Yes [] No    Steven Nunez Rep   01/27/23 14:08 EST

## 2023-01-27 NOTE — TELEPHONE ENCOUNTER
Spoke to patient to verify and confirm of pharmacy change request. Patient voiced confirmation.    Resubmitted Rx dispensed at office visit on 01/27/2023, sent to [facility] - [address] - [phone]  - [phone]   [phone]

## 2023-01-27 NOTE — PROGRESS NOTES
The ABCs of the Annual Wellness Visit  Subsequent Medicare Wellness Visit    Subjective    Mary Albarran is a 72 y.o. female who presents for a Subsequent Medicare Wellness Visit.    The following portions of the patient's history were reviewed and   updated as appropriate: allergies, current medications, past family history, past medical history, past social history, past surgical history and problem list.    Compared to one year ago, the patient feels her physical   health is the same.    Compared to one year ago, the patient feels her mental   health is the same.    Recent Hospitalizations:  She was not admitted to the hospital during the last year.       Current Medical Providers:  Patient Care Team:  Brittni Quiroz APRN as PCP - General (Nurse Practitioner)    Outpatient Medications Prior to Visit   Medication Sig Dispense Refill   • ascorbic acid (VITAMIN C) 250 MG tablet Take 2 tablets by mouth Daily.     • atorvastatin (LIPITOR) 20 MG tablet TAKE 1 TABLET DAILY 90 tablet 1   • cholecalciferol (VITAMIN D3) 25 MCG (1000 UT) tablet Take 1,000 Units by mouth Daily.     • digoxin (LANOXIN) 125 MCG tablet TAKE 1 TABLET ON THURSDAYS AND SUNDAYS AND 2 TABLETS THE REST OF THE WEEK 180 tablet 3   • dilTIAZem (TIAZAC) 120 MG 24 hr capsule TAKE 1 CAPSULE DAILY 90 capsule 3   • Entresto  MG tablet TAKE ONE-HALF (1/2) TABLET IN THE MORNING AND TAKE 1 TABLET AT NIGHT AS DIRECTED 135 tablet 3   • Farxiga 10 MG tablet TAKE 1 TABLET DAILY 90 tablet 3   • furosemide (LASIX) 20 MG tablet TAKE 1 TABLET TWICE A  tablet 3   • glipizide (GLUCOTROL) 10 MG tablet Take 1 tablet by mouth 2 (Two) Times a Day Before Meals. 180 tablet 1   • Insulin Degludec (Tresiba FlexTouch) 200 UNIT/ML solution pen-injector pen injection Inject 30 Units under the skin into the appropriate area as directed Daily. 9 mL 5   • Insulin Pen Needle (BD Pen Needle Katiuska 2nd Gen) 32G X 4 MM misc 1 each Daily. 90 each 1   • Januvia 100 MG  tablet TAKE 1 TABLET DAILY 90 tablet 3   • metoprolol succinate XL (TOPROL-XL) 100 MG 24 hr tablet Take 1 tablet by mouth 2 (Two) Times a Day. 180 tablet 3   • metoprolol succinate XL (TOPROL-XL) 50 MG 24 hr tablet TAKE 1 TABLET TWICE A DAY (ALONG WITH METOPROLOL  MG TWICE A DAY ) 180 tablet 3   • miconazole (MICOTIN) 2 % cream 2 (two) times a day.     • multivitamin with minerals tablet tablet Take 1 tablet by mouth Daily.     • potassium chloride (MICRO-K) 10 MEQ CR capsule TAKE 1 CAPSULE DAILY WITH FOOD 90 capsule 3   • pregabalin (LYRICA) 150 MG capsule Take 1 capsule by mouth 3 (Three) Times a Day. 270 capsule 1   • Pyridoxine HCl (Vitamin B6) 200 MG tablet Take 1 tablet by mouth Daily. 90 tablet 1   • silver sulfadiazine (Silvadene) 1 % cream Apply 1 application topically to the appropriate area as directed 2 (Two) Times a Day. 50 g 4   • SITagliptin (Januvia) 100 MG tablet Take 1 tablet by mouth Daily. 14 tablet 2   • Xarelto 20 MG tablet Take 1 tablet by mouth Daily. 90 tablet 3   • albuterol sulfate  (90 Base) MCG/ACT inhaler Inhale 2 puffs Every 4 (Four) Hours As Needed for Wheezing.     • HYDROcodone-acetaminophen (NORCO)  MG per tablet Take 1 tablet by mouth Every 4 (Four) Hours As Needed for Moderate Pain or Severe Pain. 120 tablet 0   • fenofibrate (TRICOR) 145 MG tablet Take 1 tablet by mouth Daily for 90 days. 90 tablet 1     No facility-administered medications prior to visit.       Opioid medication/s are on active medication list.  and I have evaluated her active treatment plan and pain score trends (see table).  There were no vitals filed for this visit.  I have reviewed the chart for potential of high risk medication and harmful drug interactions in the elderly.            Aspirin is not on active medication list.  Aspirin use is indicated based on review of current medical condition/s. Pros and cons of this therapy have been discussed with this patient. Benefits of this  "medication outweigh potential harm.  Patient has been instructed to start taking this medication..    Patient Active Problem List   Diagnosis   • Aortic valve disease   • Atrial fibrillation (HCC)   • Chronic kidney disease, stage III (moderate) (HCC)   • Hyperlipidemia   • Hypertension   • Chronic diastolic congestive heart failure (HCC)     Advance Care Planning  Advance Directive is not on file.  ACP discussion was held with the patient during this visit. Patient does not have an advance directive, information provided.     Objective    Vitals:    23 1052   BP: 136/62   Pulse: 84   Temp: 96.9 °F (36.1 °C)   SpO2: 96%   Weight: 119 kg (262 lb 4.8 oz)   Height: 160 cm (63\")     Estimated body mass index is 46.46 kg/m² as calculated from the following:    Height as of this encounter: 160 cm (63\").    Weight as of this encounter: 119 kg (262 lb 4.8 oz).    Class 3 Severe Obesity (BMI >=40). Obesity-related health conditions include the following: hypertension, diabetes mellitus and dyslipidemias. Obesity is improving with treatment. BMI is is above average; BMI management plan is completed. We discussed portion control and increasing exercise.      Does the patient have evidence of cognitive impairment? No          HEALTH RISK ASSESSMENT    Smoking Status:  Social History     Tobacco Use   Smoking Status Former   • Packs/day: 1.00   • Years: 36.00   • Pack years: 36.00   • Types: Cigarettes   • Start date: 4/3/1971   • Quit date: 2008   • Years since quittin.7   Smokeless Tobacco Never   Tobacco Comments    FOR 30 YEARS     Alcohol Consumption:  Social History     Substance and Sexual Activity   Alcohol Use Never     Fall Risk Screen:    STEADI Fall Risk Assessment was completed, and patient is at MODERATE risk for falls. Assessment completed on:2023    Depression Screening:  PHQ-2/PHQ-9 Depression Screening 2023   Little Interest or Pleasure in Doing Things 1-->several days   Feeling Down, " Depressed or Hopeless 0-->not at all   PHQ-9: Brief Depression Severity Measure Score 1       Health Habits and Functional and Cognitive Screening:  Functional & Cognitive Status 1/27/2023   Do you have difficulty preparing food and eating? No   Do you have difficulty bathing yourself, getting dressed or grooming yourself? No   Do you have difficulty using the toilet? No   Do you have difficulty moving around from place to place? Yes   Do you have trouble with steps or getting out of a bed or a chair? Yes   Current Diet Well Balanced Diet   Dental Exam Not up to date   Eye Exam Not up to date   Exercise (times per week) 0 times per week   Current Exercises Include No Regular Exercise   Do you need help using the phone?  No   Are you deaf or do you have serious difficulty hearing?  No   Do you need help with transportation? No   Do you need help shopping? No   Do you need help preparing meals?  No   Do you need help with housework?  No   Do you need help with laundry? No   Do you need help taking your medications? No   Do you need help managing money? No   Do you ever drive or ride in a car without wearing a seat belt? No   Have you felt unusual stress, anger or loneliness in the last month? No   Who do you live with? Spouse   If you need help, do you have trouble finding someone available to you? No   Have you been bothered in the last four weeks by sexual problems? No   Do you have difficulty concentrating, remembering or making decisions? No       Age-appropriate Screening Schedule:  Refer to the list below for future screening recommendations based on patient's age, sex and/or medical conditions. Orders for these recommended tests are listed in the plan section. The patient has been provided with a written plan.    Health Maintenance   Topic Date Due   • URINE MICROALBUMIN  01/21/2023   • HEMOGLOBIN A1C  01/27/2023   • DXA SCAN  01/27/2023 (Originally 1950)   • ZOSTER VACCINE (1 of 2) 01/27/2023 (Originally  9/23/2002)   • DIABETIC FOOT EXAM  02/11/2023 (Originally 6/10/2021)   • DIABETIC EYE EXAM  03/17/2023 (Originally 6/10/2021)   • INFLUENZA VACCINE  03/31/2023 (Originally 8/1/2022)   • MAMMOGRAM  07/28/2023 (Originally 1950)   • TDAP/TD VACCINES (1 - Tdap) 07/28/2023 (Originally 9/23/1969)   • LIPID PANEL  07/27/2023                CMS Preventative Services Quick Reference  Risk Factors Identified During Encounter  Chronic Pain: Chronic Pain Educational material Discussed and Printed for Patient.   Polypharmacy: Medication List reviewed  The above risks/problems have been discussed with the patient.  Pertinent information has been shared with the patient in the After Visit Summary.  An After Visit Summary and PPPS were made available to the patient.    Follow Up:   Next Medicare Wellness visit to be scheduled in 1 year.       Additional E&M Note during same encounter follows:  Patient has multiple medical problems which are significant and separately identifiable that require additional work above and beyond the Medicare Wellness Visit.      Chief Complaint  Medicare Wellness-subsequent    Subjective        Shortness of Breath  This is a chronic problem. The current episode started more than 1 year ago. The problem has been waxing and waning. Associated symptoms include orthopnea and sputum production. Pertinent negatives include no chest pain, ear pain or sore throat. Nothing aggravates the symptoms. She has tried steroid inhalers for the symptoms. Her past medical history is significant for COPD.     Mary Albarran is also being seen today for   Diabetes  She presents for her follow-up diabetic visit. She has type 2 diabetes mellitus. Her disease course has been worsening. There are no hypoglycemic associated symptoms. Associated symptoms include foot paresthesias. Pertinent negatives for diabetes include no blurred vision, no polydipsia, no polyphagia and no polyuria. There are no hypoglycemic complications.  There are no diabetic complications. Risk factors for coronary artery disease include dyslipidemia, hypertension, obesity, family history, sedentary lifestyle and post-menopausal. Current diabetic treatment includes oral agent (dual therapy) and diet. She is compliant with treatment most of the time. She is following a generally unhealthy diet. Her home blood glucose trend is increasing steadily. Her overall blood glucose range is >200 mg/dl.   Hyperlipidemia  This is a chronic problem. The current episode started more than 1 year ago. Recent lipid tests were reviewed and are variable. Exacerbating diseases include diabetes and obesity. Current antihyperlipidemic treatment includes statins, fibric acid derivatives and herbal therapy. The current treatment provides mild improvement of lipids. Compliance problems include adherence to diet.  Risk factors for coronary artery disease include diabetes mellitus, dyslipidemia, family history, hypertension, obesity and post-menopausal.   Vitamin D deficiency   This is a chronic problem.  Current episode has been for longer than 6 months.  Problem has gradually been improving since she has been on vitamin D supplements.  She denies any excessive fatigue, hair loss, skin changes due to the vitamin D deficiency.  She tries to eat a well-balanced diet.  She has been on the vitamin D weekly, no compliance problems.  Condition is stable.  Joint Pain  This is a chronic problem. The current episode started more than 1 year ago. The problem occurs constantly. The problem has been gradually worsening. Associated symptoms include arthralgias, fatigue, joint swelling and myalgias. The symptoms are aggravated by bending, walking, twisting and standing. Treatments tried: lyrica and hydrocodone. The treatment provided moderate relief.      Review of Systems   HENT: Negative for ear pain and sore throat.    Respiratory: Positive for sputum production and shortness of breath.   "  Cardiovascular: Positive for orthopnea. Negative for chest pain.       Objective   Vital Signs:  /62   Pulse 84   Temp 96.9 °F (36.1 °C)   Ht 160 cm (63\")   Wt 119 kg (262 lb 4.8 oz)   SpO2 96%   BMI 46.46 kg/m²     Physical Exam  Vitals reviewed.   Constitutional:       Appearance: Normal appearance. She is well-developed.   HENT:      Head: Normocephalic and atraumatic.   Eyes:      Conjunctiva/sclera: Conjunctivae normal.      Pupils: Pupils are equal, round, and reactive to light.   Cardiovascular:      Rate and Rhythm: Normal rate and regular rhythm.      Heart sounds: No murmur heard.  Pulmonary:      Effort: Pulmonary effort is normal.      Breath sounds: Normal breath sounds. No wheezing or rhonchi.   Skin:     General: Skin is warm and dry.   Neurological:      Mental Status: She is alert and oriented to person, place, and time.      Gait: Gait abnormal (uses a cane).   Psychiatric:         Mood and Affect: Mood and affect normal.         Behavior: Behavior normal.         Thought Content: Thought content normal.         Judgment: Judgment normal.          The following data was reviewed by: LUCERO Collins on 01/27/2023:  Common labs    Common Labs 7/27/22 7/27/22 7/27/22 7/27/22    1150 1150 1150 1150   Glucose    119 (A)   BUN    15   Creatinine    1.10 (A)   Sodium    142   Potassium    5.2   Chloride    104   Calcium    10.0   Albumin    4.50   Total Bilirubin    0.5   Alkaline Phosphatase    75   AST (SGOT)    31   ALT (SGPT)    21   WBC  11.91 (A)     Hemoglobin  16.5 (A)     Hematocrit  49.3 (A)     Platelets  79 (A)     Total Cholesterol   110    Triglycerides   252 (A)    HDL Cholesterol   35 (A)    LDL Cholesterol    36    Hemoglobin A1C 7.00 (A)      (A) Abnormal value       Comments are available for some flowsheets but are not being displayed.           Data reviewed: Previous office note      Last Urine Toxicity     LAST URINE TOXICITY RESULTS Latest Ref Rng & Units " 1/27/2023    AMPHETAMINES SCREEN, URINE Negative Negative    BARBITURATES SCREEN Negative Negative    BENZODIAZEPINE SCREEN, URINE Negative Negative    BUPRENORPHINEUR Negative Negative    COCAINE SCREEN, URINE Negative Negative    METHADONE SCREEN, URINE Negative Negative    METHAMPHETAMINEUR Negative Negative           Assessment and Plan   Diagnoses and all orders for this visit:    1. Encounter for subsequent annual wellness visit (AWV) in Medicare patient (Primary)    2. Type 2 diabetes mellitus without complication, with long-term current use of insulin ([facility])  Comments:  Last A1c was 7.0 we will recheck labs adjust medication if needed  Orders:  -     CBC Auto Differential  -     Comprehensive Metabolic Panel  -     Hemoglobin A1c  -     Lipid Panel  -     MicroAlbumin, Urine, Random - Urine, Clean Catch  -     TSH    3. Essential hypertension  Comments:  Blood pressure stable today at 136/62 we will continue on current medication regimen  Orders:  -     CBC Auto Differential  -     Comprehensive Metabolic Panel    4. Mixed hyperlipidemia  -     CBC Auto Differential  -     Comprehensive Metabolic Panel  -     Lipid Panel    5. Other chronic pain  Comments:  Patient needing refill of [place].  UDS Bryan and consent up-to-date and appropriate.    6. Stage 3b chronic kidney disease (CKD) ([facility])  Comments:  We will recheck labs, adjust medication if needed  Orders:  -     Comprehensive Metabolic Panel  -     MicroAlbumin, Urine, Random - Urine, Clean Catch    7. Vitamin D deficiency  -     Vitamin D,25-Hydroxy    8. Chronic joint pain  Comments:  Takes Norco every 4 hours.  Patient is in significant amount of pain refuses pain management we will continue to monitor patient's progress  Orders:  -     HYDROcodone-acetaminophen (NORCO)  MG per tablet; Take 1 tablet by mouth Every 4 (Four) Hours As Needed for Moderate Pain or Severe Pain.  Dispense: 120 tablet; Refill: 0    9. Chronic pain syndrome  -     POC  Urine Drug Screen [facility]    10. Chronic obstructive pulmonary disease, unspecified COPD type (HCC)  Comments:  We will give patient nebulizer machine, and treat with DuoNebs unable to tolerate trilogy due to mouth sores.  Patient is agreeable    Other orders  -     albuterol sulfate  (90 Base) MCG/ACT inhaler; Inhale 2 puffs Every 4 (Four) Hours As Needed for Wheezing.  Dispense: 8 g; Refill: 11  -     ipratropium-albuterol (DUO-NEB) 0.5-2.5 mg/3 ml nebulizer; Take 3 mL by nebulization Every 4 (Four) Hours As Needed for Wheezing or Shortness of Air.  Dispense: 360 mL; Refill: 1    Risk and benefits of opioid medication such as tolerance, dependence, addiction, misuse, abuse, diversion, opioid use disorder, accidental overdose resulting in death, drug interactions, and other systemic side effects were discussed with the patient.         Follow Up   Return in about 6 months (around 7/27/2023) for Recheck.  Patient was given instructions and counseling regarding her condition or for health maintenance advice. Please see specific information pulled into the AVS if appropriate.

## 2023-01-28 LAB
ALBUMIN UR-MCNC: 4.3 MG/DL
BASOPHILS # BLD AUTO: 0.05 10*3/MM3 (ref 0–0.2)
BASOPHILS NFR BLD AUTO: 0.5 % (ref 0–1.5)
DEPRECATED RDW RBC AUTO: 48.7 FL (ref 37–54)
EOSINOPHIL # BLD AUTO: 0.08 10*3/MM3 (ref 0–0.4)
EOSINOPHIL NFR BLD AUTO: 0.7 % (ref 0.3–6.2)
ERYTHROCYTE [DISTWIDTH] IN BLOOD BY AUTOMATED COUNT: 14.8 % (ref 12.3–15.4)
HBA1C MFR BLD: 6.3 % (ref 4.8–5.6)
HCT VFR BLD AUTO: 41.5 % (ref 34–46.6)
HGB BLD-MCNC: 13.5 G/DL (ref 12–15.9)
LYMPHOCYTES # BLD AUTO: 1.84 10*3/MM3 (ref 0.7–3.1)
LYMPHOCYTES NFR BLD AUTO: 17 % (ref 19.6–45.3)
MCH RBC QN AUTO: 29.9 PG (ref 26.6–33)
MCHC RBC AUTO-ENTMCNC: 32.5 G/DL (ref 31.5–35.7)
MCV RBC AUTO: 92 FL (ref 79–97)
MONOCYTES # BLD AUTO: 0.52 10*3/MM3 (ref 0.1–0.9)
MONOCYTES NFR BLD AUTO: 4.8 % (ref 5–12)
NEUTROPHILS NFR BLD AUTO: 76.5 % (ref 42.7–76)
NEUTROPHILS NFR BLD AUTO: 8.3 10*3/MM3 (ref 1.7–7)
PLATELET # BLD AUTO: 100 10*3/MM3 (ref 140–450)
PMV BLD AUTO: 14.7 FL (ref 6–12)
RBC # BLD AUTO: 4.51 10*6/MM3 (ref 3.77–5.28)
WBC NRBC COR # BLD: 10.84 10*3/MM3 (ref 3.4–10.8)

## 2023-01-30 RX ORDER — PREGABALIN 150 MG/1
CAPSULE ORAL
Qty: 270 CAPSULE | Refills: 1 | Status: SHIPPED | OUTPATIENT
Start: 2023-01-30

## 2023-02-23 RX ORDER — METOPROLOL SUCCINATE 50 MG/1
TABLET, EXTENDED RELEASE ORAL
Qty: 180 TABLET | Refills: 3 | OUTPATIENT
Start: 2023-02-23

## 2023-02-27 DIAGNOSIS — G89.29 CHRONIC JOINT PAIN: ICD-10-CM

## 2023-02-27 DIAGNOSIS — M25.50 CHRONIC JOINT PAIN: ICD-10-CM

## 2023-02-27 RX ORDER — HYDROCODONE BITARTRATE AND ACETAMINOPHEN 10; 325 MG/1; MG/1
1 TABLET ORAL EVERY 4 HOURS PRN
Qty: 120 TABLET | Refills: 0 | Status: SHIPPED | OUTPATIENT
Start: 2023-02-27 | End: 2023-03-27 | Stop reason: SDUPTHER

## 2023-02-27 NOTE — TELEPHONE ENCOUNTER
Caller: Avis Mary C    Relationship: Self    Best call back number: [phone]    Requested Prescriptions:   Requested Prescriptions     Pending Prescriptions Disp Refills   • HYDROcodone-acetaminophen (NORCO)  MG per tablet 120 tablet 0     Sig: Take 1 tablet by mouth Every 4 (Four) Hours As Needed for Moderate Pain or Severe Pain.        Pharmacy where request should be sent: [facility] #[id number] - [place] [address] - [phone] [facility] [phone] FX       Does the patient have less than a 3 day supply:  [x] Yes  [] No    Would you like a call back once the refill request has been completed: [x] Yes [] No   TEXT  If the office needs to give you a call back, can they leave a voicemail: [] Yes [x] No    Steven Daniel Rep   02/27/23 11:04 EST

## 2023-03-20 ENCOUNTER — TELEPHONE (OUTPATIENT)
Dept: FAMILY MEDICINE CLINIC | Facility: CLINIC | Age: 73
End: 2023-03-20
Payer: MEDICARE

## 2023-03-20 DIAGNOSIS — J44.9 CHRONIC OBSTRUCTIVE PULMONARY DISEASE, UNSPECIFIED COPD TYPE: Primary | ICD-10-CM

## 2023-03-20 RX ORDER — IPRATROPIUM BROMIDE AND ALBUTEROL SULFATE 2.5; .5 MG/3ML; MG/3ML
3 SOLUTION RESPIRATORY (INHALATION) EVERY 4 HOURS PRN
Qty: 360 ML | Refills: 1 | Status: SHIPPED | OUTPATIENT
Start: 2023-03-20 | End: 2023-03-22 | Stop reason: SDUPTHER

## 2023-03-20 RX ORDER — INSULIN GLARGINE 300 U/ML
INJECTION, SOLUTION SUBCUTANEOUS
Qty: 6 ML | Refills: 2 | Status: SHIPPED | OUTPATIENT
Start: 2023-03-20

## 2023-03-20 NOTE — TELEPHONE ENCOUNTER
[facility] called to see about getting an alternative for the Tresiba. It is not covered by patient's insurance.    Teojeo Max Lantus SoloStar Pen is a good alternative that is covered by insurance.    Ivonne call back # [phone]

## 2023-03-20 NOTE — TELEPHONE ENCOUNTER
Patient notified and informed of new Rx change update and PCP guidance for dose guidance. Patient verbalized understanding and requested  nebulizer Rx be resent to mail delivery service. No further questions/concerns.

## 2023-03-21 ENCOUNTER — TELEPHONE (OUTPATIENT)
Dept: FAMILY MEDICINE CLINIC | Facility: CLINIC | Age: 73
End: 2023-03-21
Payer: MEDICARE

## 2023-03-21 NOTE — TELEPHONE ENCOUNTER
Caller: ALEXANDER    Relationship to patient:  [facility]    Best call back number: [phone]  REF# [id number]    Patient is needing: ALEXANDER FROM [facility] IS CALLING IN REGARDS THE MIKA HIGUERA. THEY ARE REQUESTING CLARIFICATION ON DIRECTIONS AND DOSAGE

## 2023-03-21 NOTE — TELEPHONE ENCOUNTER
Pharmacy need clarification on dosage the pharmacist stated the pens only do even numbers so he needed the verbal okay to change the scrip to start at 24 units increasing 2 units every week until blood sugars are under 150 I did the verbal okay to change this.

## 2023-03-22 ENCOUNTER — TELEPHONE (OUTPATIENT)
Dept: CARDIOLOGY | Facility: CLINIC | Age: 73
End: 2023-03-22
Payer: MEDICARE

## 2023-03-22 DIAGNOSIS — J44.9 CHRONIC OBSTRUCTIVE PULMONARY DISEASE, UNSPECIFIED COPD TYPE: ICD-10-CM

## 2023-03-22 RX ORDER — METOPROLOL SUCCINATE 50 MG/1
50 TABLET, EXTENDED RELEASE ORAL DAILY
Qty: 180 TABLET | Refills: 3 | Status: SHIPPED | OUTPATIENT
Start: 2023-03-22

## 2023-03-22 RX ORDER — METOPROLOL SUCCINATE 100 MG/1
100 TABLET, EXTENDED RELEASE ORAL 2 TIMES DAILY
Qty: 180 TABLET | Refills: 3 | Status: SHIPPED | OUTPATIENT
Start: 2023-03-22

## 2023-03-22 RX ORDER — METOPROLOL SUCCINATE 100 MG/1
100 TABLET, EXTENDED RELEASE ORAL 2 TIMES DAILY
Qty: 180 TABLET | Refills: 3 | Status: SHIPPED | OUTPATIENT
Start: 2023-03-22 | End: 2023-03-22 | Stop reason: SDUPTHER

## 2023-03-22 RX ORDER — IPRATROPIUM BROMIDE AND ALBUTEROL SULFATE 2.5; .5 MG/3ML; MG/3ML
3 SOLUTION RESPIRATORY (INHALATION) EVERY 4 HOURS PRN
Qty: 360 ML | Refills: 1 | Status: SHIPPED | OUTPATIENT
Start: 2023-03-22 | End: 2023-03-30

## 2023-03-22 NOTE — TELEPHONE ENCOUNTER
Caller: Mary Albarran     Relationship: [unfilled]     Best call back number: [phone]    What is your medical concern? [facility] DOES NOT CARRY THE ONE THAT THE PCP RECENTLY SENT IN, NEEDS TO SEND IN ANOTHER BRAND OF NEBULIZER  ONE PLEASE

## 2023-03-22 NOTE — TELEPHONE ENCOUNTER
Caller: Mary Albarran LAKESHA    Relationship: Self    Best call back number: [phone], [phone]    Requested Prescriptions:   Requested Prescriptions      No prescriptions requested or ordered in this encounter      METOPROLOL 50MG  Pharmacy where request should be sent: [facility]     Last office visit with prescribing clinician: 10/28/2022   Last telemedicine visit with prescribing clinician: 5/5/2023   Next office visit with prescribing clinician: 5/5/2023     Additional details provided by patient: PT STATES THE REFILL MUST STATE FOR HER TO TAKE METOPROLOL 50MG WITH HER METOPROLOL 100MG OR THE PHARMACY WILL NOT REFILL THE MEDICATION    Does the patient have less than a 3 day supply:  [] Yes  [x] No    Would you like a call back once the refill request has been completed: [] Yes [x] No    If the office needs to give you a call back, can they leave a voicemail: [x] Yes [] No    Stevne Amanda Rep   03/22/23 12:06 EDT

## 2023-03-27 DIAGNOSIS — M25.50 CHRONIC JOINT PAIN: ICD-10-CM

## 2023-03-27 DIAGNOSIS — G89.29 CHRONIC JOINT PAIN: ICD-10-CM

## 2023-03-27 RX ORDER — RIVAROXABAN 20 MG/1
TABLET, FILM COATED ORAL
Qty: 90 TABLET | Refills: 3 | Status: SHIPPED | OUTPATIENT
Start: 2023-03-27

## 2023-03-27 RX ORDER — HYDROCODONE BITARTRATE AND ACETAMINOPHEN 10; 325 MG/1; MG/1
1 TABLET ORAL EVERY 4 HOURS PRN
Qty: 120 TABLET | Refills: 0 | Status: SHIPPED | OUTPATIENT
Start: 2023-03-27

## 2023-03-27 NOTE — TELEPHONE ENCOUNTER
Controlled Medication Refill Request:    1.  Last Office Visit:  01/27/2023     2.  Next Office Visit:  Visit date not found     3.  Last UDS Date:  POC Urine Drug Screen [facility] Bio-Cup (01/27/2023 11:52)    4.  Last Consent Signed:  CONTROLLED SUBSTANCE AGREEMENT - SCAN - CONTROLLED RX/[facility] PC KEYSHAWN/01/27/2023 (01/27/2023)    5.  Medication Requested: [place]     Pharmacy: [facility] #[id number] - [place], [place] - [address] AT [address] - [phone]  - [phone] FX  [phone]

## 2023-03-27 NOTE — TELEPHONE ENCOUNTER
Caller: Mary Albarran LAKESHA    Relationship: Self    Best call back number: [phone]    Requested Prescriptions:   Requested Prescriptions     Pending Prescriptions Disp Refills   • HYDROcodone-acetaminophen (NORCO)  MG per tablet 120 tablet 0     Sig: Take 1 tablet by mouth Every 4 (Four) Hours As Needed for Moderate Pain or Severe Pain.        Pharmacy where request should be sent: [id number] - [place], [place] - [address] [facility] [address] AT [address]  [address] [facility] [phone] [facility] [phone] FX     Last office visit with prescribing clinician: 1/27/2023   Last telemedicine visit with prescribing clinician: Visit date not found   Next office visit with prescribing clinician: Visit date not found     Does the patient have less than a 3 day supply:  [x] Yes  [] No    Would you like a call back once the refill request has been completed: [] Yes [] No    If the office needs to give you a call back, can they leave a voicemail: [] Yes [] No    Steven Daniel Rep   03/27/23 10:25 EDT

## 2023-03-30 ENCOUNTER — TELEPHONE (OUTPATIENT)
Dept: FAMILY MEDICINE CLINIC | Facility: CLINIC | Age: 73
End: 2023-03-30
Payer: MEDICARE

## 2023-03-30 RX ORDER — BUDESONIDE 1 MG/2ML
1 INHALANT ORAL DAILY
Qty: 180 ML | Refills: 1 | Status: SHIPPED | OUTPATIENT
Start: 2023-03-30 | End: 2023-06-28

## 2023-03-30 RX ORDER — ALBUTEROL SULFATE 1.25 MG/3ML
1 SOLUTION RESPIRATORY (INHALATION) EVERY 6 HOURS PRN
Qty: 300 ML | Refills: 12 | Status: SHIPPED | OUTPATIENT
Start: 2023-03-30

## 2023-03-30 NOTE — TELEPHONE ENCOUNTER
Mao from [employer] called to get an alternative for the following medication.    Reason: Medication is on back order    ipratropium-albuterol (DUO-NEB) 0.5-2.5 mg/3 ml nebulizer      Reference #[id number]    Call back # [phone]

## 2023-03-31 NOTE — TELEPHONE ENCOUNTER
Called and spoke to patient to relay Rx update. Pt verbalized understanding. No further questions/concerns at this time.

## 2023-04-03 RX ORDER — SITAGLIPTIN 100 MG/1
TABLET, FILM COATED ORAL
Qty: 90 TABLET | Refills: 3 | Status: SHIPPED | OUTPATIENT
Start: 2023-04-03

## 2023-04-26 DIAGNOSIS — M25.50 CHRONIC JOINT PAIN: ICD-10-CM

## 2023-04-26 DIAGNOSIS — G89.29 CHRONIC JOINT PAIN: ICD-10-CM

## 2023-04-26 RX ORDER — HYDROCODONE BITARTRATE AND ACETAMINOPHEN 10; 325 MG/1; MG/1
1 TABLET ORAL EVERY 4 HOURS PRN
Qty: 120 TABLET | Refills: 0 | Status: CANCELLED | OUTPATIENT
Start: 2023-04-26

## 2023-04-26 NOTE — TELEPHONE ENCOUNTER
Caller: Nick Albarranadam CROWDER    Relationship: Self    Best call back number: [phone]    Requested Prescriptions:   Requested Prescriptions     Pending Prescriptions Disp Refills   • HYDROcodone-acetaminophen (NORCO)  MG per tablet 120 tablet 0     Sig: Take 1 tablet by mouth Every 4 (Four) Hours As Needed for Moderate Pain or Severe Pain.        Pharmacy where request should be sent: [id number] - [place], [place] - [address] AT [address]  [address] [facility] [phone] [facility] [phone] FX     Last office visit with prescribing clinician: 1/27/2023   Last telemedicine visit with prescribing clinician: Visit date not found   Next office visit with prescribing clinician: Visit date not found     Additional details provided by patient:   Does the patient have less than a 3 day supply:  [x] Yes  [] No    Would you like a call back once the refill request has been completed: [] Yes [x] No    If the office needs to give you a call back, can they leave a voicemail: [] Yes [x] No    Steven Waller Rep   04/26/23 09:01 EDT

## 2023-04-26 NOTE — TELEPHONE ENCOUNTER
Controlled Medication Refill Request:    1.  Last Office Visit:  01/27/2023     2.  Next Office Visit:  Visit date not found     3.  Last UDS Date:  01/27/2023    4.  Last Consent Signed:  01/27/2023    5.  Medication Requested: [place]     Pharmacy: [facility] #[id number] - [place], [place] - [address] - [phone] [facility] [phone]   [phone]

## 2023-04-28 DIAGNOSIS — M25.50 CHRONIC JOINT PAIN: ICD-10-CM

## 2023-04-28 DIAGNOSIS — G89.29 CHRONIC JOINT PAIN: ICD-10-CM

## 2023-04-28 RX ORDER — HYDROCODONE BITARTRATE AND ACETAMINOPHEN 10; 325 MG/1; MG/1
1 TABLET ORAL EVERY 4 HOURS PRN
Qty: 120 TABLET | Refills: 0 | Status: SHIPPED | OUTPATIENT
Start: 2023-04-28

## 2023-05-05 ENCOUNTER — OFFICE VISIT (OUTPATIENT)
Dept: CARDIOLOGY | Facility: CLINIC | Age: 73
End: 2023-05-05
Payer: MEDICARE

## 2023-05-05 VITALS
SYSTOLIC BLOOD PRESSURE: 147 MMHG | DIASTOLIC BLOOD PRESSURE: 63 MMHG | HEIGHT: 63 IN | BODY MASS INDEX: 47.45 KG/M2 | WEIGHT: 267.8 LBS | HEART RATE: 67 BPM

## 2023-05-05 DIAGNOSIS — I35.0 NONRHEUMATIC AORTIC VALVE STENOSIS: Primary | ICD-10-CM

## 2023-05-05 DIAGNOSIS — I10 PRIMARY HYPERTENSION: ICD-10-CM

## 2023-05-05 DIAGNOSIS — I35.1 NONRHEUMATIC AORTIC VALVE INSUFFICIENCY: ICD-10-CM

## 2023-05-05 DIAGNOSIS — I48.0 PAROXYSMAL ATRIAL FIBRILLATION: ICD-10-CM

## 2023-05-05 DIAGNOSIS — E78.2 MIXED HYPERLIPIDEMIA: ICD-10-CM

## 2023-05-05 PROCEDURE — 3077F SYST BP >= 140 MM HG: CPT | Performed by: INTERNAL MEDICINE

## 2023-05-05 PROCEDURE — 3078F DIAST BP <80 MM HG: CPT | Performed by: INTERNAL MEDICINE

## 2023-05-05 PROCEDURE — 99214 OFFICE O/P EST MOD 30 MIN: CPT | Performed by: INTERNAL MEDICINE

## 2023-05-07 NOTE — PROGRESS NOTES
Chief Complaint  Atrial Fibrillation, Hyperlipidemia, and Hypertension    Subjective        Mary LAKESHA Albarran presents to [facility] CARDIOLOGY  History of present illness:    Patient states overall she is doing pretty well.  He does note some edema but keeps her legs wrapped.  This has been chronic.  She notes rare palpitation that does not bother her.  She denies any chest pain.      Past Medical History:   Diagnosis Date   • Allergic rhinitis    • Anxiety    • Aortic valve disease 11/28/2021    Fibrocalcific changes of the aortic valve with mild aortic valve stenosis   on echo 3/2/2021  Moderate aortic valve regurgitation is present. Aortic valve maximum pressure gradient is 26 mmHg. Moderate aortic valve stenosis is present.  On echo 2/10/2022      • Arthritis    • Atrial fibrillation    • Chronic kidney disease (CKD), stage III (moderate)    • Chronic pain    • COPD (chronic obstructive pulmonary disease)    • Depression    • Diabetes mellitus type 2 with complications    • Hyperlipidemia    • Hypertension    • Thrombocytopenia          Past Surgical History:   Procedure Laterality Date   • HYSTERECTOMY      30 YEARS AGO          Social History     Socioeconomic History   • Marital status:    Tobacco Use   • Smoking status: Former     Packs/day: 1.00     Years: 36.00     Pack years: 36.00     Types: Cigarettes     Start date: 4/3/1971     Quit date: 4/17/2008     Years since quitting: 15.0   • Smokeless tobacco: Never   • Tobacco comments:     FOR 30 YEARS   Vaping Use   • Vaping Use: Never used   Substance and Sexual Activity   • Alcohol use: Never   • Drug use: Never         Family History   Problem Relation Age of Onset   • Heart disease Father    • Heart disease Other    • Heart disease Other    • Diabetes Other           Allergies   Allergen Reactions   • Codeine Mental Status Change            Current Outpatient Medications:   •  albuterol (ACCUNEB) 1.25 MG/3ML nebulizer solution, Take  3 mL by nebulization Every 6 (Six) Hours As Needed for Wheezing or Shortness of Air., Disp: 300 mL, Rfl: 12  •  albuterol sulfate  (90 Base) MCG/ACT inhaler, Inhale 2 puffs Every 4 (Four) Hours As Needed for Wheezing., Disp: 8 g, Rfl: 11  •  ascorbic acid (VITAMIN C) 250 MG tablet, Take 2 tablets by mouth Daily., Disp: , Rfl:   •  atorvastatin (LIPITOR) 20 MG tablet, TAKE 1 TABLET DAILY, Disp: 90 tablet, Rfl: 1  •  cholecalciferol (VITAMIN D3) 25 MCG (1000 UT) tablet, Take 1 tablet by mouth Daily., Disp: , Rfl:   •  digoxin (LANOXIN) 125 MCG tablet, TAKE 1 TABLET ON THURSDAYS AND SUNDAYS AND 2 TABLETS THE REST OF THE WEEK, Disp: 180 tablet, Rfl: 3  •  dilTIAZem (TIAZAC) 120 MG 24 hr capsule, TAKE 1 CAPSULE DAILY, Disp: 90 capsule, Rfl: 3  •  Entresto  MG tablet, TAKE ONE-HALF (1/2) TABLET IN THE MORNING AND TAKE 1 TABLET AT NIGHT AS DIRECTED, Disp: 135 tablet, Rfl: 3  •  Farxiga 10 MG tablet, TAKE 1 TABLET DAILY, Disp: 90 tablet, Rfl: 3  •  furosemide (LASIX) 20 MG tablet, TAKE 1 TABLET TWICE A DAY, Disp: 180 tablet, Rfl: 3  •  glipizide (GLUCOTROL) 10 MG tablet, Take 1 tablet by mouth 2 (Two) Times a Day Before Meals., Disp: 180 tablet, Rfl: 1  •  HYDROcodone-acetaminophen (NORCO)  MG per tablet, Take 1 tablet by mouth Every 4 (Four) Hours As Needed for Moderate Pain or Severe Pain., Disp: 120 tablet, Rfl: 0  •  Insulin Glargine, 2 Unit Dial, (Toujeo Max SoloStar) 300 UNIT/ML solution pen-injector injection, 25 units daily, increase 2 units weekly until blood sugar is below 150., Disp: 6 mL, Rfl: 2  •  Insulin Pen Needle (BD Pen Needle Katiuska 2nd Gen) 32G X 4 MM misc, 1 each Daily., Disp: 90 each, Rfl: 1  •  Januvia 100 MG tablet, TAKE 1 TABLET DAILY, Disp: 90 tablet, Rfl: 3  •  metoprolol succinate XL (TOPROL-XL) 100 MG 24 hr tablet, Take 1 tablet by mouth 2 (Two) Times a Day., Disp: 180 tablet, Rfl: 3  •  metoprolol succinate XL (TOPROL-XL) 50 MG 24 hr tablet, Take 1 tablet by mouth Daily.  "(Patient taking differently: Take 1 tablet by mouth 2 (Two) Times a Day.), Disp: 180 tablet, Rfl: 3  •  miconazole (MICOTIN) 2 % cream, 2 (two) times a day., Disp: , Rfl:   •  multivitamin with minerals tablet tablet, Take 1 tablet by mouth Daily., Disp: , Rfl:   •  potassium chloride (MICRO-K) 10 MEQ CR capsule, TAKE 1 CAPSULE DAILY WITH FOOD, Disp: 90 capsule, Rfl: 3  •  pregabalin (LYRICA) 150 MG capsule, TAKE 1 CAPSULE THREE TIMES A DAY, Disp: 270 capsule, Rfl: 1  •  Pyridoxine HCl (Vitamin B6) 200 MG tablet, Take 1 tablet by mouth Daily., Disp: 90 tablet, Rfl: 1  •  silver sulfadiazine (Silvadene) 1 % cream, Apply 1 application topically to the appropriate area as directed 2 (Two) Times a Day., Disp: 50 g, Rfl: 4  •  SITagliptin (Januvia) 100 MG tablet, Take 1 tablet by mouth Daily., Disp: 14 tablet, Rfl: 2  •  Xarelto 20 MG tablet, TAKE 1 TABLET DAILY, Disp: 90 tablet, Rfl: 3      ROS:  Cardiac review of systems is positive for chronic edema.    Objective     /63   Pulse 67   Ht 160 cm (63\")   Wt 121 kg (267 lb 12.8 oz)   BMI 47.44 kg/m²       General Appearance:   · well developed  · well nourished  HENT:   · oropharynx moist  · lips not cyanotic  Respiratory:  · no respiratory distress  · normal breath sounds  · no rales  Cardiovascular:  · no jugular venous distention  · regular rhythm  · S1 normal, S2 normal  · no S3, no S4   · no murmur  · no rub, no thrill  · No carotid bruit  · pedal pulses normal  · lower extremity edema: none    Musculoskeletal:  · no clubbing of fingers.   · normocephalic, head atraumatic  Skin:   · warm, dry  Psychiatric:  · judgement and insight appropriate  · normal mood and affect    ECHO:  Results for orders placed in visit on 02/10/22    Adult Transthoracic Echo Complete W/ Cont if Necessary Per Protocol    Interpretation Summary  · Estimated left ventricular EF was in agreement with the calculated left ventricular EF. Left ventricular ejection fraction appears to be " 56 - 60%.  · Left ventricular diastolic function is consistent with (grade I) impaired relaxation.  · Left atrial volume is mildly increased.  · Estimated right ventricular systolic pressure from tricuspid regurgitation is moderately elevated (45-55 mmHg).  · The right atrial cavity is borderline dilated.  · The right ventricular cavity is borderline dilated.  · Moderate aortic valve regurgitation is present.  · Aortic valve maximum pressure gradient is 26 mmHg.  · Moderate aortic valve stenosis is present.  · There is mild mitral valve regurgitation noted    STRESS:    CATH:  No results found for this or any previous visit.    BMP:   Glucose   Date Value Ref Range Status   01/27/2023 143 (H) 65 - 99 mg/dL Final     BUN   Date Value Ref Range Status   01/27/2023 17 8 - 23 mg/dL Final     Creatinine   Date Value Ref Range Status   01/27/2023 1.13 (H) 0.57 - 1.00 mg/dL Final     Sodium   Date Value Ref Range Status   01/27/2023 141 136 - 145 mmol/L Final     Potassium   Date Value Ref Range Status   01/27/2023 4.3 3.5 - 5.2 mmol/L Final     Chloride   Date Value Ref Range Status   01/27/2023 104 98 - 107 mmol/L Final     CO2   Date Value Ref Range Status   01/27/2023 26.6 22.0 - 29.0 mmol/L Final     Calcium   Date Value Ref Range Status   01/27/2023 9.7 8.6 - 10.5 mg/dL Final     BUN/Creatinine Ratio   Date Value Ref Range Status   01/27/2023 15.0 7.0 - 25.0 Final     Anion Gap   Date Value Ref Range Status   01/27/2023 10.4 5.0 - 15.0 mmol/L Final     eGFR   Date Value Ref Range Status   01/27/2023 52.4 (L) >60.0 mL/min/1.73 Final     LIPIDS:  Total Cholesterol   Date Value Ref Range Status   01/27/2023 116 0 - 200 mg/dL Final     Triglycerides   Date Value Ref Range Status   01/27/2023 238 (H) 0 - 150 mg/dL Final     HDL Cholesterol   Date Value Ref Range Status   01/27/2023 37 (L) 40 - 60 mg/dL Final     LDL Cholesterol    Date Value Ref Range Status   01/27/2023 42 0 - 100 mg/dL Final     VLDL Cholesterol   Date  Value Ref Range Status   01/27/2023 37 5 - 40 mg/dL Final     LDL/HDL Ratio   Date Value Ref Range Status   01/27/2023 0.85  Final         Procedures             ASSESSMENT:  Diagnoses and all orders for this visit:    1. Nonrheumatic aortic valve stenosis (Primary)  -     Adult Transthoracic Echo Complete W/ Cont if Necessary Per Protocol; Future    2. Nonrheumatic aortic valve insufficiency  -     Adult Transthoracic Echo Complete W/ Cont if Necessary Per Protocol; Future    3. Paroxysmal atrial fibrillation    4. Primary hypertension    5. Mixed hyperlipidemia         PLAN:    1.  We will repeat an echocardiogram looking at her moderate aortic insufficiency and aortic stenosis.  2.  Blood pressure is under good control.  3.  Continue the Xarelto.  Patient notes no bleeding problems.  The patient notices chronic palpitations that do not bother her.  4.  Continue the Lipitor and fenofibrate.  5.  Encouraged the patient to slowly increase her activity and call us if any exertional cardiac complaints.    Return in about 6 months (around 11/5/2023).     Patient was given instructions and counseling regarding her condition or for health maintenance advice. Please see specific information pulled into the AVS if appropriate.         Yoav Metcalf MD   5/7/2023  18:09 EDT

## 2023-05-12 ENCOUNTER — APPOINTMENT (OUTPATIENT)
Dept: GENERAL RADIOLOGY | Facility: HOSPITAL | Age: 73
End: 2023-05-12
Payer: MEDICARE

## 2023-05-12 ENCOUNTER — HOSPITAL ENCOUNTER (INPATIENT)
Facility: HOSPITAL | Age: 73
LOS: 8 days | Discharge: HOME HEALTH | End: 2023-05-21
Attending: EMERGENCY MEDICINE | Admitting: FAMILY MEDICINE
Payer: MEDICARE

## 2023-05-12 DIAGNOSIS — R26.2 DIFFICULTY WALKING: ICD-10-CM

## 2023-05-12 DIAGNOSIS — K52.9 ACUTE GASTROENTERITIS: ICD-10-CM

## 2023-05-12 DIAGNOSIS — I48.91 ATRIAL FIBRILLATION WITH RVR: Primary | ICD-10-CM

## 2023-05-12 LAB
ALBUMIN SERPL-MCNC: 3.9 G/DL (ref 3.5–5.2)
ALBUMIN/GLOB SERPL: 1.2 G/DL
ALP SERPL-CCNC: 78 U/L (ref 39–117)
ALT SERPL W P-5'-P-CCNC: 20 U/L (ref 1–33)
ANION GAP SERPL CALCULATED.3IONS-SCNC: 12.7 MMOL/L (ref 5–15)
AST SERPL-CCNC: 21 U/L (ref 1–32)
BASOPHILS # BLD AUTO: 0.06 10*3/MM3 (ref 0–0.2)
BASOPHILS NFR BLD AUTO: 0.2 % (ref 0–1.5)
BILIRUB SERPL-MCNC: 1.1 MG/DL (ref 0–1.2)
BUN SERPL-MCNC: 15 MG/DL (ref 8–23)
BUN/CREAT SERPL: 14.3 (ref 7–25)
CALCIUM SPEC-SCNC: 9.4 MG/DL (ref 8.6–10.5)
CHLORIDE SERPL-SCNC: 104 MMOL/L (ref 98–107)
CO2 SERPL-SCNC: 23.3 MMOL/L (ref 22–29)
CREAT SERPL-MCNC: 1.05 MG/DL (ref 0.57–1)
DEPRECATED RDW RBC AUTO: 48.1 FL (ref 37–54)
DIGOXIN SERPL-MCNC: 0.68 NG/ML (ref 0.6–1.2)
EGFRCR SERPLBLD CKD-EPI 2021: 57.3 ML/MIN/1.73
EOSINOPHIL # BLD AUTO: 0 10*3/MM3 (ref 0–0.4)
EOSINOPHIL NFR BLD AUTO: 0 % (ref 0.3–6.2)
ERYTHROCYTE [DISTWIDTH] IN BLOOD BY AUTOMATED COUNT: 14.9 % (ref 12.3–15.4)
GLOBULIN UR ELPH-MCNC: 3.3 GM/DL
GLUCOSE SERPL-MCNC: 233 MG/DL (ref 65–99)
HCT VFR BLD AUTO: 49.2 % (ref 34–46.6)
HGB BLD-MCNC: 16 G/DL (ref 12–15.9)
HOLD SPECIMEN: NORMAL
HOLD SPECIMEN: NORMAL
IMM GRANULOCYTES # BLD AUTO: 0.16 10*3/MM3 (ref 0–0.05)
IMM GRANULOCYTES NFR BLD AUTO: 0.6 % (ref 0–0.5)
LYMPHOCYTES # BLD AUTO: 0.88 10*3/MM3 (ref 0.7–3.1)
LYMPHOCYTES NFR BLD AUTO: 3.4 % (ref 19.6–45.3)
MCH RBC QN AUTO: 28.9 PG (ref 26.6–33)
MCHC RBC AUTO-ENTMCNC: 32.5 G/DL (ref 31.5–35.7)
MCV RBC AUTO: 88.8 FL (ref 79–97)
MONOCYTES # BLD AUTO: 0.51 10*3/MM3 (ref 0.1–0.9)
MONOCYTES NFR BLD AUTO: 2 % (ref 5–12)
NEUTROPHILS NFR BLD AUTO: 24.49 10*3/MM3 (ref 1.7–7)
NEUTROPHILS NFR BLD AUTO: 93.8 % (ref 42.7–76)
NRBC BLD AUTO-RTO: 0 /100 WBC (ref 0–0.2)
NT-PROBNP SERPL-MCNC: 976.7 PG/ML (ref 0–900)
PLATELET # BLD AUTO: 87 10*3/MM3 (ref 140–450)
PMV BLD AUTO: 13.8 FL (ref 6–12)
POTASSIUM SERPL-SCNC: 4.7 MMOL/L (ref 3.5–5.2)
PROT SERPL-MCNC: 7.2 G/DL (ref 6–8.5)
RBC # BLD AUTO: 5.54 10*6/MM3 (ref 3.77–5.28)
RBC MORPH BLD: NORMAL
SMALL PLATELETS BLD QL SMEAR: NORMAL
SODIUM SERPL-SCNC: 140 MMOL/L (ref 136–145)
TROPONIN T SERPL HS-MCNC: 17 NG/L
WBC MORPH BLD: NORMAL
WBC NRBC COR # BLD: 26.1 10*3/MM3 (ref 3.4–10.8)
WHOLE BLOOD HOLD COAG: NORMAL
WHOLE BLOOD HOLD SPECIMEN: NORMAL

## 2023-05-12 PROCEDURE — 71045 X-RAY EXAM CHEST 1 VIEW: CPT

## 2023-05-12 PROCEDURE — 93005 ELECTROCARDIOGRAM TRACING: CPT | Performed by: EMERGENCY MEDICINE

## 2023-05-12 PROCEDURE — 84484 ASSAY OF TROPONIN QUANT: CPT | Performed by: EMERGENCY MEDICINE

## 2023-05-12 PROCEDURE — 93010 ELECTROCARDIOGRAM REPORT: CPT | Performed by: INTERNAL MEDICINE

## 2023-05-12 PROCEDURE — 25010000002 ONDANSETRON PER 1 MG: Performed by: EMERGENCY MEDICINE

## 2023-05-12 PROCEDURE — 85025 COMPLETE CBC W/AUTO DIFF WBC: CPT | Performed by: EMERGENCY MEDICINE

## 2023-05-12 PROCEDURE — 93005 ELECTROCARDIOGRAM TRACING: CPT

## 2023-05-12 PROCEDURE — 83880 ASSAY OF NATRIURETIC PEPTIDE: CPT | Performed by: EMERGENCY MEDICINE

## 2023-05-12 PROCEDURE — 85007 BL SMEAR W/DIFF WBC COUNT: CPT | Performed by: EMERGENCY MEDICINE

## 2023-05-12 PROCEDURE — 80162 ASSAY OF DIGOXIN TOTAL: CPT | Performed by: EMERGENCY MEDICINE

## 2023-05-12 PROCEDURE — 84145 PROCALCITONIN (PCT): CPT | Performed by: STUDENT IN AN ORGANIZED HEALTH CARE EDUCATION/TRAINING PROGRAM

## 2023-05-12 PROCEDURE — 80053 COMPREHEN METABOLIC PANEL: CPT | Performed by: EMERGENCY MEDICINE

## 2023-05-12 PROCEDURE — 99285 EMERGENCY DEPT VISIT HI MDM: CPT

## 2023-05-12 PROCEDURE — 36415 COLL VENOUS BLD VENIPUNCTURE: CPT

## 2023-05-12 RX ORDER — ONDANSETRON 2 MG/ML
4 INJECTION INTRAMUSCULAR; INTRAVENOUS ONCE
Status: COMPLETED | OUTPATIENT
Start: 2023-05-12 | End: 2023-05-12

## 2023-05-12 RX ORDER — HYDROCODONE BITARTRATE AND ACETAMINOPHEN 5; 325 MG/1; MG/1
2 TABLET ORAL ONCE
Status: COMPLETED | OUTPATIENT
Start: 2023-05-12 | End: 2023-05-12

## 2023-05-12 RX ORDER — DILTIAZEM HCL IN NACL,ISO-OSM 125 MG/125
5-15 PLASTIC BAG, INJECTION (ML) INTRAVENOUS
Status: DISCONTINUED | OUTPATIENT
Start: 2023-05-12 | End: 2023-05-18

## 2023-05-12 RX ORDER — SODIUM CHLORIDE 0.9 % (FLUSH) 0.9 %
10 SYRINGE (ML) INJECTION AS NEEDED
Status: DISCONTINUED | OUTPATIENT
Start: 2023-05-12 | End: 2023-05-21 | Stop reason: HOSPADM

## 2023-05-12 RX ADMIN — ONDANSETRON 4 MG: 2 INJECTION INTRAMUSCULAR; INTRAVENOUS at 23:04

## 2023-05-12 RX ADMIN — DILTIAZEM HYDROCHLORIDE 5 MG/HR: 5 INJECTION INTRAVENOUS at 23:05

## 2023-05-12 RX ADMIN — HYDROCODONE BITARTRATE AND ACETAMINOPHEN 2 TABLET: 5; 325 TABLET ORAL at 23:27

## 2023-05-12 RX ADMIN — SODIUM CHLORIDE, POTASSIUM CHLORIDE, SODIUM LACTATE AND CALCIUM CHLORIDE 1000 ML: 600; 310; 30; 20 INJECTION, SOLUTION INTRAVENOUS at 23:04

## 2023-05-13 PROBLEM — I48.91 ATRIAL FIBRILLATION WITH RVR: Status: ACTIVE | Noted: 2023-05-13

## 2023-05-13 LAB
ALBUMIN SERPL-MCNC: 3.6 G/DL (ref 3.5–5.2)
ALBUMIN/GLOB SERPL: 1.3 G/DL
ALP SERPL-CCNC: 64 U/L (ref 39–117)
ALT SERPL W P-5'-P-CCNC: 18 U/L (ref 1–33)
ANION GAP SERPL CALCULATED.3IONS-SCNC: 11.1 MMOL/L (ref 5–15)
AST SERPL-CCNC: 20 U/L (ref 1–32)
BACTERIA UR QL AUTO: ABNORMAL /HPF
BASOPHILS # BLD AUTO: 0.05 10*3/MM3 (ref 0–0.2)
BASOPHILS NFR BLD AUTO: 0.2 % (ref 0–1.5)
BILIRUB SERPL-MCNC: 1 MG/DL (ref 0–1.2)
BILIRUB UR QL STRIP: NEGATIVE
BUN SERPL-MCNC: 16 MG/DL (ref 8–23)
BUN/CREAT SERPL: 15.4 (ref 7–25)
CALCIUM SPEC-SCNC: 9 MG/DL (ref 8.6–10.5)
CHLORIDE SERPL-SCNC: 103 MMOL/L (ref 98–107)
CLARITY UR: CLEAR
CO2 SERPL-SCNC: 23.9 MMOL/L (ref 22–29)
COLOR UR: YELLOW
CREAT SERPL-MCNC: 1.04 MG/DL (ref 0.57–1)
DEPRECATED RDW RBC AUTO: 48.4 FL (ref 37–54)
EGFRCR SERPLBLD CKD-EPI 2021: 57.9 ML/MIN/1.73
EOSINOPHIL # BLD AUTO: 0 10*3/MM3 (ref 0–0.4)
EOSINOPHIL NFR BLD AUTO: 0 % (ref 0.3–6.2)
ERYTHROCYTE [DISTWIDTH] IN BLOOD BY AUTOMATED COUNT: 14.8 % (ref 12.3–15.4)
GLOBULIN UR ELPH-MCNC: 2.8 GM/DL
GLUCOSE BLDC GLUCOMTR-MCNC: 117 MG/DL (ref 70–99)
GLUCOSE BLDC GLUCOMTR-MCNC: 130 MG/DL (ref 70–99)
GLUCOSE BLDC GLUCOMTR-MCNC: 135 MG/DL (ref 70–99)
GLUCOSE BLDC GLUCOMTR-MCNC: 156 MG/DL (ref 70–99)
GLUCOSE SERPL-MCNC: 189 MG/DL (ref 65–99)
GLUCOSE UR STRIP-MCNC: ABNORMAL MG/DL
HCT VFR BLD AUTO: 46.1 % (ref 34–46.6)
HGB BLD-MCNC: 14.8 G/DL (ref 12–15.9)
HGB UR QL STRIP.AUTO: NEGATIVE
HYALINE CASTS UR QL AUTO: ABNORMAL /LPF
IMM GRANULOCYTES # BLD AUTO: 0.17 10*3/MM3 (ref 0–0.05)
IMM GRANULOCYTES NFR BLD AUTO: 0.8 % (ref 0–0.5)
KETONES UR QL STRIP: ABNORMAL
LEUKOCYTE ESTERASE UR QL STRIP.AUTO: NEGATIVE
LYMPHOCYTES # BLD AUTO: 1.35 10*3/MM3 (ref 0.7–3.1)
LYMPHOCYTES NFR BLD AUTO: 6.1 % (ref 19.6–45.3)
MAGNESIUM SERPL-MCNC: 1.8 MG/DL (ref 1.6–2.4)
MCH RBC QN AUTO: 29 PG (ref 26.6–33)
MCHC RBC AUTO-ENTMCNC: 32.1 G/DL (ref 31.5–35.7)
MCV RBC AUTO: 90.2 FL (ref 79–97)
MONOCYTES # BLD AUTO: 0.77 10*3/MM3 (ref 0.1–0.9)
MONOCYTES NFR BLD AUTO: 3.5 % (ref 5–12)
NEUTROPHILS NFR BLD AUTO: 19.92 10*3/MM3 (ref 1.7–7)
NEUTROPHILS NFR BLD AUTO: 89.4 % (ref 42.7–76)
NITRITE UR QL STRIP: NEGATIVE
NRBC BLD AUTO-RTO: 0 /100 WBC (ref 0–0.2)
PH UR STRIP.AUTO: 6 [PH] (ref 5–8)
PHOSPHATE SERPL-MCNC: 4 MG/DL (ref 2.5–4.5)
PLATELET # BLD AUTO: 83 10*3/MM3 (ref 140–450)
PMV BLD AUTO: 14 FL (ref 6–12)
POTASSIUM SERPL-SCNC: 4.7 MMOL/L (ref 3.5–5.2)
PROCALCITONIN SERPL-MCNC: 0.45 NG/ML (ref 0–0.25)
PROT SERPL-MCNC: 6.4 G/DL (ref 6–8.5)
PROT UR QL STRIP: ABNORMAL
QT INTERVAL: 292 MS
RBC # BLD AUTO: 5.11 10*6/MM3 (ref 3.77–5.28)
RBC # UR STRIP: ABNORMAL /HPF
REF LAB TEST METHOD: ABNORMAL
SODIUM SERPL-SCNC: 138 MMOL/L (ref 136–145)
SP GR UR STRIP: >1.03 (ref 1–1.03)
SQUAMOUS #/AREA URNS HPF: ABNORMAL /HPF
UROBILINOGEN UR QL STRIP: ABNORMAL
WBC # UR STRIP: ABNORMAL /HPF
WBC NRBC COR # BLD: 22.26 10*3/MM3 (ref 3.4–10.8)

## 2023-05-13 PROCEDURE — 94640 AIRWAY INHALATION TREATMENT: CPT

## 2023-05-13 PROCEDURE — 99223 1ST HOSP IP/OBS HIGH 75: CPT | Performed by: STUDENT IN AN ORGANIZED HEALTH CARE EDUCATION/TRAINING PROGRAM

## 2023-05-13 PROCEDURE — 84100 ASSAY OF PHOSPHORUS: CPT | Performed by: STUDENT IN AN ORGANIZED HEALTH CARE EDUCATION/TRAINING PROGRAM

## 2023-05-13 PROCEDURE — 94799 UNLISTED PULMONARY SVC/PX: CPT

## 2023-05-13 PROCEDURE — 25010000002 MAGNESIUM SULFATE IN D5W 1G/100ML (PREMIX) 1-5 GM/100ML-% SOLUTION: Performed by: FAMILY MEDICINE

## 2023-05-13 PROCEDURE — 63710000001 INSULIN DETEMIR PER 5 UNITS: Performed by: STUDENT IN AN ORGANIZED HEALTH CARE EDUCATION/TRAINING PROGRAM

## 2023-05-13 PROCEDURE — 81001 URINALYSIS AUTO W/SCOPE: CPT | Performed by: STUDENT IN AN ORGANIZED HEALTH CARE EDUCATION/TRAINING PROGRAM

## 2023-05-13 PROCEDURE — 82948 REAGENT STRIP/BLOOD GLUCOSE: CPT

## 2023-05-13 PROCEDURE — 99222 1ST HOSP IP/OBS MODERATE 55: CPT | Performed by: INTERNAL MEDICINE

## 2023-05-13 PROCEDURE — 87040 BLOOD CULTURE FOR BACTERIA: CPT | Performed by: STUDENT IN AN ORGANIZED HEALTH CARE EDUCATION/TRAINING PROGRAM

## 2023-05-13 PROCEDURE — 25010000002 CEFTRIAXONE PER 250 MG: Performed by: STUDENT IN AN ORGANIZED HEALTH CARE EDUCATION/TRAINING PROGRAM

## 2023-05-13 PROCEDURE — 85025 COMPLETE CBC W/AUTO DIFF WBC: CPT | Performed by: STUDENT IN AN ORGANIZED HEALTH CARE EDUCATION/TRAINING PROGRAM

## 2023-05-13 PROCEDURE — 80053 COMPREHEN METABOLIC PANEL: CPT | Performed by: STUDENT IN AN ORGANIZED HEALTH CARE EDUCATION/TRAINING PROGRAM

## 2023-05-13 PROCEDURE — 83735 ASSAY OF MAGNESIUM: CPT | Performed by: STUDENT IN AN ORGANIZED HEALTH CARE EDUCATION/TRAINING PROGRAM

## 2023-05-13 RX ORDER — METOPROLOL SUCCINATE 50 MG/1
50 TABLET, EXTENDED RELEASE ORAL 2 TIMES DAILY
Status: DISCONTINUED | OUTPATIENT
Start: 2023-05-13 | End: 2023-05-13

## 2023-05-13 RX ORDER — MELATONIN
1000 DAILY
Status: DISCONTINUED | OUTPATIENT
Start: 2023-05-13 | End: 2023-05-21 | Stop reason: HOSPADM

## 2023-05-13 RX ORDER — DIGOXIN 250 MCG
250 TABLET ORAL
Status: DISCONTINUED | OUTPATIENT
Start: 2023-05-13 | End: 2023-05-21 | Stop reason: HOSPADM

## 2023-05-13 RX ORDER — SODIUM CHLORIDE 0.9 % (FLUSH) 0.9 %
10 SYRINGE (ML) INJECTION EVERY 12 HOURS SCHEDULED
Status: DISCONTINUED | OUTPATIENT
Start: 2023-05-13 | End: 2023-05-21 | Stop reason: HOSPADM

## 2023-05-13 RX ORDER — METOPROLOL SUCCINATE 50 MG/1
50 TABLET, EXTENDED RELEASE ORAL
Status: DISCONTINUED | OUTPATIENT
Start: 2023-05-13 | End: 2023-05-15

## 2023-05-13 RX ORDER — ONDANSETRON 2 MG/ML
4 INJECTION INTRAMUSCULAR; INTRAVENOUS EVERY 6 HOURS PRN
Status: DISCONTINUED | OUTPATIENT
Start: 2023-05-13 | End: 2023-05-21 | Stop reason: HOSPADM

## 2023-05-13 RX ORDER — SODIUM CHLORIDE, SODIUM LACTATE, POTASSIUM CHLORIDE, CALCIUM CHLORIDE 600; 310; 30; 20 MG/100ML; MG/100ML; MG/100ML; MG/100ML
100 INJECTION, SOLUTION INTRAVENOUS CONTINUOUS
Status: ACTIVE | OUTPATIENT
Start: 2023-05-13 | End: 2023-05-13

## 2023-05-13 RX ORDER — MAGNESIUM SULFATE 1 G/100ML
1 INJECTION INTRAVENOUS ONCE
Status: COMPLETED | OUTPATIENT
Start: 2023-05-13 | End: 2023-05-13

## 2023-05-13 RX ORDER — ATORVASTATIN CALCIUM 20 MG/1
20 TABLET, FILM COATED ORAL NIGHTLY
Status: DISCONTINUED | OUTPATIENT
Start: 2023-05-13 | End: 2023-05-21 | Stop reason: HOSPADM

## 2023-05-13 RX ORDER — CEFTRIAXONE SODIUM 1 G/50ML
1 INJECTION, SOLUTION INTRAVENOUS EVERY 24 HOURS
Status: DISCONTINUED | OUTPATIENT
Start: 2023-05-13 | End: 2023-05-14

## 2023-05-13 RX ORDER — NITROGLYCERIN 0.4 MG/1
0.4 TABLET SUBLINGUAL
Status: DISCONTINUED | OUTPATIENT
Start: 2023-05-13 | End: 2023-05-21 | Stop reason: HOSPADM

## 2023-05-13 RX ORDER — LANOLIN ALCOHOL/MO/W.PET/CERES
200 CREAM (GRAM) TOPICAL DAILY
Status: DISCONTINUED | OUTPATIENT
Start: 2023-05-13 | End: 2023-05-21 | Stop reason: HOSPADM

## 2023-05-13 RX ORDER — HYDROCODONE BITARTRATE AND ACETAMINOPHEN 10; 325 MG/1; MG/1
1 TABLET ORAL EVERY 4 HOURS PRN
Status: DISCONTINUED | OUTPATIENT
Start: 2023-05-13 | End: 2023-05-21 | Stop reason: HOSPADM

## 2023-05-13 RX ORDER — SACUBITRIL AND VALSARTAN 97; 103 MG/1; MG/1
1 TABLET, FILM COATED ORAL NIGHTLY
COMMUNITY

## 2023-05-13 RX ORDER — ARFORMOTEROL TARTRATE 15 UG/2ML
SOLUTION RESPIRATORY (INHALATION)
Status: DISPENSED
Start: 2023-05-13 | End: 2023-05-13

## 2023-05-13 RX ORDER — LEVALBUTEROL INHALATION SOLUTION 1.25 MG/3ML
1.25 SOLUTION RESPIRATORY (INHALATION) EVERY 6 HOURS PRN
Status: DISCONTINUED | OUTPATIENT
Start: 2023-05-13 | End: 2023-05-21 | Stop reason: HOSPADM

## 2023-05-13 RX ORDER — METOPROLOL SUCCINATE 50 MG/1
100 TABLET, EXTENDED RELEASE ORAL EVERY 12 HOURS SCHEDULED
Status: DISCONTINUED | OUTPATIENT
Start: 2023-05-13 | End: 2023-05-13

## 2023-05-13 RX ORDER — SODIUM CHLORIDE 9 MG/ML
40 INJECTION, SOLUTION INTRAVENOUS AS NEEDED
Status: DISCONTINUED | OUTPATIENT
Start: 2023-05-13 | End: 2023-05-21 | Stop reason: HOSPADM

## 2023-05-13 RX ORDER — INSULIN LISPRO 100 [IU]/ML
1-200 INJECTION, SOLUTION INTRAVENOUS; SUBCUTANEOUS AS NEEDED
Status: DISCONTINUED | OUTPATIENT
Start: 2023-05-13 | End: 2023-05-16

## 2023-05-13 RX ORDER — DEXTROSE MONOHYDRATE 25 G/50ML
10-50 INJECTION, SOLUTION INTRAVENOUS
Status: DISCONTINUED | OUTPATIENT
Start: 2023-05-13 | End: 2023-05-16

## 2023-05-13 RX ORDER — ARFORMOTEROL TARTRATE 15 UG/2ML
15 SOLUTION RESPIRATORY (INHALATION)
Status: DISCONTINUED | OUTPATIENT
Start: 2023-05-13 | End: 2023-05-21 | Stop reason: HOSPADM

## 2023-05-13 RX ORDER — PREGABALIN 75 MG/1
150 CAPSULE ORAL 3 TIMES DAILY
Status: DISCONTINUED | OUTPATIENT
Start: 2023-05-13 | End: 2023-05-21 | Stop reason: HOSPADM

## 2023-05-13 RX ORDER — INSULIN LISPRO 100 [IU]/ML
1-200 INJECTION, SOLUTION INTRAVENOUS; SUBCUTANEOUS
Status: DISCONTINUED | OUTPATIENT
Start: 2023-05-13 | End: 2023-05-16

## 2023-05-13 RX ORDER — NICOTINE POLACRILEX 4 MG
15 LOZENGE BUCCAL
Status: DISCONTINUED | OUTPATIENT
Start: 2023-05-13 | End: 2023-05-16

## 2023-05-13 RX ORDER — SODIUM CHLORIDE, SODIUM LACTATE, POTASSIUM CHLORIDE, CALCIUM CHLORIDE 600; 310; 30; 20 MG/100ML; MG/100ML; MG/100ML; MG/100ML
100 INJECTION, SOLUTION INTRAVENOUS CONTINUOUS
Status: DISCONTINUED | OUTPATIENT
Start: 2023-05-13 | End: 2023-05-14

## 2023-05-13 RX ORDER — DIGOXIN 125 MCG
125 TABLET ORAL
Status: DISCONTINUED | OUTPATIENT
Start: 2023-05-14 | End: 2023-05-21 | Stop reason: HOSPADM

## 2023-05-13 RX ORDER — SODIUM CHLORIDE 0.9 % (FLUSH) 0.9 %
10 SYRINGE (ML) INJECTION AS NEEDED
Status: DISCONTINUED | OUTPATIENT
Start: 2023-05-13 | End: 2023-05-21 | Stop reason: HOSPADM

## 2023-05-13 RX ORDER — MULTIPLE VITAMINS W/ MINERALS TAB 9MG-400MCG
1 TAB ORAL DAILY
Status: DISCONTINUED | OUTPATIENT
Start: 2023-05-13 | End: 2023-05-21 | Stop reason: HOSPADM

## 2023-05-13 RX ADMIN — SODIUM CHLORIDE 500 ML: 9 INJECTION, SOLUTION INTRAVENOUS at 06:43

## 2023-05-13 RX ADMIN — PREGABALIN 150 MG: 75 CAPSULE ORAL at 20:55

## 2023-05-13 RX ADMIN — SODIUM CHLORIDE, POTASSIUM CHLORIDE, SODIUM LACTATE AND CALCIUM CHLORIDE 100 ML/HR: 600; 310; 30; 20 INJECTION, SOLUTION INTRAVENOUS at 03:30

## 2023-05-13 RX ADMIN — MAGNESIUM SULFATE 1 G: 1 INJECTION INTRAVENOUS at 12:10

## 2023-05-13 RX ADMIN — SODIUM CHLORIDE, POTASSIUM CHLORIDE, SODIUM LACTATE AND CALCIUM CHLORIDE 100 ML/HR: 600; 310; 30; 20 INJECTION, SOLUTION INTRAVENOUS at 14:50

## 2023-05-13 RX ADMIN — ATORVASTATIN CALCIUM 20 MG: 20 TABLET, FILM COATED ORAL at 20:55

## 2023-05-13 RX ADMIN — METOPROLOL SUCCINATE 50 MG: 50 TABLET, EXTENDED RELEASE ORAL at 12:36

## 2023-05-13 RX ADMIN — SODIUM CHLORIDE, POTASSIUM CHLORIDE, SODIUM LACTATE AND CALCIUM CHLORIDE 100 ML/HR: 600; 310; 30; 20 INJECTION, SOLUTION INTRAVENOUS at 20:56

## 2023-05-13 RX ADMIN — SODIUM CHLORIDE, POTASSIUM CHLORIDE, SODIUM LACTATE AND CALCIUM CHLORIDE 500 ML: 600; 310; 30; 20 INJECTION, SOLUTION INTRAVENOUS at 19:38

## 2023-05-13 RX ADMIN — HYDROCODONE BITARTRATE AND ACETAMINOPHEN 1 TABLET: 10; 325 TABLET ORAL at 20:55

## 2023-05-13 RX ADMIN — PREGABALIN 150 MG: 75 CAPSULE ORAL at 09:05

## 2023-05-13 RX ADMIN — SODIUM CHLORIDE, POTASSIUM CHLORIDE, SODIUM LACTATE AND CALCIUM CHLORIDE 500 ML: 600; 310; 30; 20 INJECTION, SOLUTION INTRAVENOUS at 08:08

## 2023-05-13 RX ADMIN — ARFORMOTEROL TARTRATE 15 MCG: 15 SOLUTION RESPIRATORY (INHALATION) at 07:11

## 2023-05-13 RX ADMIN — MULTIPLE VITAMINS W/ MINERALS TAB 1 TABLET: TAB at 09:05

## 2023-05-13 RX ADMIN — SODIUM CHLORIDE, POTASSIUM CHLORIDE, SODIUM LACTATE AND CALCIUM CHLORIDE 100 ML/HR: 600; 310; 30; 20 INJECTION, SOLUTION INTRAVENOUS at 07:46

## 2023-05-13 RX ADMIN — Medication 10 ML: at 09:05

## 2023-05-13 RX ADMIN — INSULIN DETEMIR 18 UNITS: 100 INJECTION, SOLUTION SUBCUTANEOUS at 20:55

## 2023-05-13 RX ADMIN — Medication 10 ML: at 20:55

## 2023-05-13 RX ADMIN — RIVAROXABAN 20 MG: 20 TABLET, FILM COATED ORAL at 17:02

## 2023-05-13 RX ADMIN — PYRIDOXINE HCL TAB 50 MG 200 MG: 50 TAB at 09:05

## 2023-05-13 RX ADMIN — CEFTRIAXONE SODIUM 1 G: 1 INJECTION, SOLUTION INTRAVENOUS at 02:57

## 2023-05-13 RX ADMIN — HYDROCODONE BITARTRATE AND ACETAMINOPHEN 1 TABLET: 10; 325 TABLET ORAL at 12:36

## 2023-05-13 RX ADMIN — DIGOXIN 250 MCG: 250 TABLET ORAL at 09:05

## 2023-05-13 RX ADMIN — PREGABALIN 150 MG: 75 CAPSULE ORAL at 17:02

## 2023-05-13 RX ADMIN — Medication 1000 UNITS: at 09:05

## 2023-05-13 RX ADMIN — ARFORMOTEROL TARTRATE 15 MCG: 15 SOLUTION RESPIRATORY (INHALATION) at 20:30

## 2023-05-13 NOTE — CONSULTS
[facility]   Cardiology Consult Note    Patient Name: Mary Albarran  : 1950  MRN: [id number]  Primary Care Physician:  Brittni Quiroz APRN  Referring Physician: No ref. provider found  Date of admission: 2023    Subjective   Subjective     Reason for Consult/ Chief Complaint: A-fib with RVR    HPI:  Mary Albarran is a 72 y.o. female with chronic atrial fibrillation, on Xarelto, chronic kidney disease stage III, type 2 diabetes, dyslipidemia, hypertension, COPD, presented to hospital with nausea and vomiting which started the day before presentation.  She was noted to be in A-fib with RVR and was admitted for further management.  She is being evaluated for possible gastroenteritis.  She is feeling much better this morning.  She has no cardiac complaints.  She has no chest discomfort or dyspnea.  She has no palpitations, dizziness, presyncope or syncope.    Review of Systems   All systems were reviewed and negative except as above    Personal History     Past Medical History:   Diagnosis Date   • Allergic rhinitis    • Anxiety    • Aortic valve disease 2021    Fibrocalcific changes of the aortic valve with mild aortic valve stenosis   on echo 3/2/2021  Moderate aortic valve regurgitation is present. Aortic valve maximum pressure gradient is 26 mmHg. Moderate aortic valve stenosis is present.  On echo 2/10/2022      • Arthritis    • Atrial fibrillation    • Chronic kidney disease (CKD), stage III (moderate)    • Chronic pain    • COPD (chronic obstructive pulmonary disease)    • Diabetes mellitus type 2 with complications    • Hyperlipidemia    • Hypertension    • Thrombocytopenia        Family History: family history includes Diabetes in an other family member; Heart disease in her father and other family members. Otherwise pertinent FHx was reviewed and not pertinent to current issue.    Social History:  reports that she quit smoking about 15 years ago. Her smoking use included  cigarettes. She started smoking about 52 years ago. She has a 36.00 pack-year smoking history. She has never used smokeless tobacco. She reports that she does not drink alcohol and does not use drugs.    Home Medications:  HYDROcodone-acetaminophen, Insulin Glargine (2 Unit Dial), Insulin Pen Needle, SITagliptin, Vitamin B6, albuterol, albuterol sulfate HFA, ascorbic acid, atorvastatin, cholecalciferol, dapagliflozin Propanediol, digoxin, dilTIAZem, furosemide, glipizide, metoprolol succinate XL, miconazole, multivitamin with minerals, potassium chloride, pregabalin, rivaroxaban, sacubitril-valsartan, and silver sulfadiazine    Allergies:  Allergies   Allergen Reactions   • Codeine Mental Status Change       Objective    Objective     Vitals:   Temp:  [98.2 °F (36.8 °C)-99.2 °F (37.3 °C)] 98.2 °F (36.8 °C)  Heart Rate:  [] 93  Resp:  [17-24] 20  BP: ()/(32-90) 93/78  Flow (L/min):  [2] 2      Physical Exam:   Constitutional: Awake, alert, No acute distress    Eyes: PERRLA, sclerae anicteric, no conjunctival injection   HENT: NCAT, mucous membranes moist   Neck: Supple, no thyromegaly, no lymphadenopathy, trachea midline   Respiratory: Clear to auscultation bilaterally, nonlabored respirations    Cardiovascular: Normal rate, irregular rhythm, no murmurs, rubs, or gallops, palpable pedal pulses bilaterally   Gastrointestinal: Positive bowel sounds, soft, nontender, nondistended   Musculoskeletal: No bilateral ankle edema, no clubbing or cyanosis to extremities   Psychiatric: Appropriate affect, cooperative   Neurologic: Oriented x 3, strength symmetric in all extremities, Cranial Nerves grossly intact to confrontation, speech clear   Skin: No rashes     Result Review    Result Review:  I have personally reviewed the results from the time of this admission to 5/13/2023 12:03 EDT and agree with these findings:  [x]  Laboratory  []  Microbiology  [x]  Radiology  [x]  EKG/Telemetry   [x]  Cardiology/Vascular    []  Pathology  [x]  Old records  []  Other:  Most notable findings include:     CMP        1/27/2023    11:51 5/12/2023    22:01 5/13/2023    03:13   CMP   Glucose 143   233   189     BUN 17   15   16     Creatinine 1.13   1.05   1.04     EGFR 52.4   57.3   57.9     Sodium 141   140   138     Potassium 4.3   4.7   4.7     Chloride 104   104   103     Calcium 9.7   9.4   9.0     Total Protein 7.5   7.2   6.4     Albumin 4.5   3.9   3.6     Globulin 3.0   3.3   2.8     Total Bilirubin 0.6   1.1   1.0     Alkaline Phosphatase 78   78   64     AST (SGOT) 32   21   20     ALT (SGPT) 27   20   18     Albumin/Globulin Ratio 1.5   1.2   1.3     BUN/Creatinine Ratio 15.0   14.3   15.4     Anion Gap 10.4   12.7   11.1        CBC        1/27/2023    11:51 5/12/2023    22:01 5/13/2023    03:13   CBC   WBC 10.84   26.10   22.26     RBC 4.51   5.54   5.11     Hemoglobin 13.5   16.0   14.8     Hematocrit 41.5   49.2   46.1     MCV 92.0   88.8   90.2     MCH 29.9   28.9   29.0     MCHC 32.5   32.5   32.1     RDW 14.8   14.9   14.8     Platelets 100   87   83        Lab Results   Component Value Date    TROPONINT 17 (H) 05/12/2023         Assessment & Plan   Assessment / Plan     Brief Patient Summary:  Mary Albarran is a 72 y.o. female with:    -Atrial fibrillation with rapid ventricular response, more likely precipitated by nausea/vomiting.  LVEF within normal limits.  She is normally on Xarelto 20 mg daily.  -Possible gastroenteritis  -Mild to moderate aortic valve regurgitation by echo  -Hypertension, blood pressure currently soft, more likely related to dehydration.  -Mixed dyslipidemia  -COPD    Plan:   -She is off Cardizem infusion.    -Ventricular rate is reasonably controlled, mostly in the 80s beats per minute.  -Continue digoxin at the current dose.  -Resume metoprolol XL 50 mg daily when blood pressure stabilizes.  -Continue Xarelto 20 mg daily.  -Gentle IV hydration.  -Monitor creatinine and electrolytes  -Other issues  as per primary team.    Thank you for the consultation.  Please do not hesitate to call us with any questions or concerns.  She may be discharged from cardiac perspective after blood pressure stabilization.     Electronically signed by Elaine Lerner MD, 05/13/23, 12:03 PM EDT.

## 2023-05-13 NOTE — ED PROVIDER NOTES
"Time: 10:04 PM EDT  Date of encounter:  5/12/2023  Independent Historian/Clinical History and Information was obtained by:   Patient  Chief Complaint   Patient presents with   • Shortness of Breath   • Dizziness   • Vomiting       History is limited by: N/A    History of Present Illness:  Patient is a 72 y.o. year old female who presents to the emergency department for evaluation of vomiting since yesterday and dizziness. Patient saw her cardiologist yesterday for an echo and has had nausea, vomiting, and dizziness since then. Her cardiologist did not change any  medications yesterday. She also states her heart has been \"going crazy.\" She notes some diarrhea, as well. She denies abdominal pain but states she just feels queasy. She also notes some shortness of breath. She does not use oxygen at baseline. Patient notes some lower extremity swelling and is wearing wraps. She denies chest pain.    Patient notes she has not taken her daily medications today.    HPI    Patient Care Team  Primary Care Provider: Brittni Quiroz APRN    Past Medical History:     Allergies   Allergen Reactions   • Codeine Mental Status Change     Past Medical History:   Diagnosis Date   • Allergic rhinitis    • Anxiety    • Aortic valve disease 11/28/2021    Fibrocalcific changes of the aortic valve with mild aortic valve stenosis   on echo 3/2/2021  Moderate aortic valve regurgitation is present. Aortic valve maximum pressure gradient is 26 mmHg. Moderate aortic valve stenosis is present.  On echo 2/10/2022      • Arthritis    • Atrial fibrillation    • Chronic kidney disease (CKD), stage III (moderate)    • Chronic pain    • COPD (chronic obstructive pulmonary disease)    • Diabetes mellitus type 2 with complications    • Hyperlipidemia    • Hypertension    • Thrombocytopenia      Past Surgical History:   Procedure Laterality Date   • HYSTERECTOMY      30 YEARS AGO     Family History   Problem Relation Age of Onset   • Heart disease " Father    • Heart disease Other    • Heart disease Other    • Diabetes Other        Home Medications:  Prior to Admission medications    Medication Sig Start Date End Date Taking? Authorizing Provider   albuterol (ACCUNEB) 1.25 MG/3ML nebulizer solution Take 3 mL by nebulization Every 6 (Six) Hours As Needed for Wheezing or Shortness of Air. 3/30/23   Brittni Quiroz APRN   albuterol sulfate  (90 Base) MCG/ACT inhaler Inhale 2 puffs Every 4 (Four) Hours As Needed for Wheezing. 1/27/23   Brittni Quiroz APRN   ascorbic acid (VITAMIN C) 250 MG tablet Take 2 tablets by mouth Daily.    ProviderWhitley MD   atorvastatin (LIPITOR) 20 MG tablet TAKE 1 TABLET DAILY 8/1/22   Brittni Quiroz APRN   cholecalciferol (VITAMIN D3) 25 MCG (1000 UT) tablet Take 1 tablet by mouth Daily.    Provider, MD Whitley   digoxin (LANOXIN) 125 MCG tablet TAKE 1 TABLET ON THURSDAYS AND SUNDAYS AND 2 TABLETS THE REST OF THE WEEK 1/23/23   Yoav Metcalf MD   dilTIAZem (TIAZAC) 120 MG 24 hr capsule TAKE 1 CAPSULE DAILY 8/29/22   Brittni Quiroz APRN   Entresto  MG tablet TAKE ONE-HALF (1/2) TABLET IN THE MORNING AND TAKE 1 TABLET AT NIGHT AS DIRECTED 11/16/22   Tiffany Minor MD   Farxiga 10 MG tablet TAKE 1 TABLET DAILY 8/4/22   Brittni Quiroz APRN   furosemide (LASIX) 20 MG tablet TAKE 1 TABLET TWICE A DAY 3/10/22   Brittni Quiroz APRN   glipizide (GLUCOTROL) 10 MG tablet Take 1 tablet by mouth 2 (Two) Times a Day Before Meals. 1/3/23   Brittni Quiroz APRN   HYDROcodone-acetaminophen (NORCO)  MG per tablet Take 1 tablet by mouth Every 4 (Four) Hours As Needed for Moderate Pain or Severe Pain. 4/28/23   Justine Milan MD   Insulin Glargine, 2 Unit Dial, (Toujeo Max SoloStar) 300 UNIT/ML solution pen-injector injection 25 units daily, increase 2 units weekly until blood sugar is below 150. 3/20/23   Brittni Quiroz APRN   Insulin Pen Needle (BD  Pen Needle Katiuska 2nd Gen) 32G X 4 MM misc 1 each Daily. 4/1/22   Brittni Quiroz APRN   Januvia 100 MG tablet TAKE 1 TABLET DAILY 4/3/23   Brittni Quiroz APRN   metoprolol succinate XL (TOPROL-XL) 100 MG 24 hr tablet Take 1 tablet by mouth 2 (Two) Times a Day. 3/22/23   Yoav Metcalf MD   metoprolol succinate XL (TOPROL-XL) 50 MG 24 hr tablet Take 1 tablet by mouth Daily.  Patient taking differently: Take 1 tablet by mouth 2 (Two) Times a Day. 3/22/23   Yoav Metcalf MD   miconazole (MICOTIN) 2 % cream 2 (two) times a day. 4/15/21   ProviderWhitley MD   multivitamin with minerals tablet tablet Take 1 tablet by mouth Daily.    ProviderWhitley MD   potassium chloride (MICRO-K) 10 MEQ CR capsule TAKE 1 CAPSULE DAILY WITH FOOD 10/24/22   Brittni Quiroz APRN   pregabalin (LYRICA) 150 MG capsule TAKE 1 CAPSULE THREE TIMES A DAY 1/30/23   Brittni Quiroz APRN   Pyridoxine HCl (Vitamin B6) 200 MG tablet Take 1 tablet by mouth Daily. 7/21/21   Brittni Quiroz APRN   silver sulfadiazine (Silvadene) 1 % cream Apply 1 application topically to the appropriate area as directed 2 (Two) Times a Day. 10/13/22   Brittni Quiroz APRN   SITagliptin (Januvia) 100 MG tablet Take 1 tablet by mouth Daily. 5/20/22   Brittni Quiroz APRN   Xarelto 20 MG tablet TAKE 1 TABLET DAILY 3/27/23   Yoav Metcalf MD        Social History:   Social History     Tobacco Use   • Smoking status: Former     Packs/day: 1.00     Years: 36.00     Pack years: 36.00     Types: Cigarettes     Start date: 4/3/1971     Quit date: 4/17/2008     Years since quitting: 15.0   • Smokeless tobacco: Never   • Tobacco comments:     FOR 30 YEARS   Vaping Use   • Vaping Use: Never used   Substance Use Topics   • Alcohol use: Never   • Drug use: Never         Review of Systems:  Review of Systems   Constitutional: Negative for chills and fever.   HENT: Negative for congestion, ear pain  "and sore throat.    Eyes: Negative for pain.   Respiratory: Positive for shortness of breath. Negative for cough and chest tightness.    Cardiovascular: Positive for leg swelling. Negative for chest pain.   Gastrointestinal: Positive for diarrhea, nausea and vomiting. Negative for abdominal pain.   Genitourinary: Negative for flank pain and hematuria.   Musculoskeletal: Negative for joint swelling.   Skin: Negative for pallor.   Neurological: Positive for dizziness. Negative for seizures and headaches.   All other systems reviewed and are negative.       Physical Exam:  /61 (BP Location: Left arm, Patient Position: Lying)   Pulse 108   Temp 97.3 °F (36.3 °C) (Oral)   Resp 17   Ht 160 cm (63\")   Wt 120 kg (265 lb 6.9 oz)   SpO2 91%   BMI 47.02 kg/m²     Physical Exam  Vitals and nursing note reviewed.   Constitutional:       General: She is not in acute distress.     Appearance: Normal appearance. She is ill-appearing. She is not toxic-appearing.   HENT:      Head: Normocephalic and atraumatic.      Mouth/Throat:      Mouth: Mucous membranes are moist.   Eyes:      General: No scleral icterus.  Cardiovascular:      Rate and Rhythm: Tachycardia present. Rhythm irregular.      Pulses: Normal pulses.      Heart sounds: Normal heart sounds.      Comments: Wraps in place on BL lower extremities.  Pulmonary:      Effort: Pulmonary effort is normal. No respiratory distress.      Breath sounds: Normal breath sounds.   Abdominal:      General: Abdomen is flat.      Palpations: Abdomen is soft.      Tenderness: There is no abdominal tenderness.   Musculoskeletal:         General: Normal range of motion.      Cervical back: Normal range of motion and neck supple.      Right lower leg: Edema present.      Left lower leg: Edema present.   Skin:     General: Skin is warm and dry.   Neurological:      Mental Status: She is alert and oriented to person, place, and time. Mental status is at baseline.            "       Procedures:  Procedures      Medical Decision Making:      Comorbidities that affect care:    Hyperlipidemia, Atrial Fibrillation, Chronic Kidney Disease, COPD, Diabetes, Hypertension    External Notes reviewed:    Previous Clinic Note: Cardiology visit May 5      The following orders were placed and all results were independently analyzed by me:  Orders Placed This Encounter   Procedures   • Blood Culture - Blood,   • Blood Culture - Blood,   • XR Chest 1 View   • CT Abdomen Pelvis Without Contrast   • CT Chest Without Contrast Diagnostic   • Barnard Draw   • Comprehensive Metabolic Panel   • BNP   • Single High Sensitivity Troponin T   • CBC Auto Differential   • Scan Slide   • Digoxin Level   • Procalcitonin   • Urinalysis With Microscopic If Indicated (No Culture) - Urine, Clean Catch   • Potassium   • Magnesium   • High Sensitivity Troponin T   • Blood Gas, Arterial -With Co-Ox Panel: Yes   • CBC Auto Differential   • Comprehensive Metabolic Panel   • Magnesium   • Phosphorus   • Urinalysis, Microscopic Only - Urine, Clean Catch   • CBC (No Diff)   • Vancomycin, Random   • Renal Function Panel   • Diet: Gastrointestinal Diets; Low Irritant; Texture: Regular Texture (IDDSI 7); Fluid Consistency: Thin (IDDSI 0)   • Undress & Gown   • Continuous Pulse Oximetry   • Vital Signs   • Vital Signs   • Intake & Output   • Weigh Patient   • Oral Care   • Telemetry - Maintain IV Access   • Continuous Cardiac Monitoring   • Telemetry - Pulse Oximetry   • Notify Provider (With Default Parameters)   • Advance Diet As Tolerated -   • Initiate Glucommander™ SQ   • RN to Order STAT Glucose (Lab Performed) for POC Glucose <10 or >600   • Straight Cath   • Assess Patient For Urinary Retention   • Utilize Voiding Measures if Retention is Suspected   • Bladder Scan for Suspected Urinary Retention   • Monitor Every 1-2 Hours for Spontaneous Void if Bladder Scan Volume is Less Than 500mL & Patient is Without Symptoms of Bladder  Discomfort / Distention   • Straight Cath For Acute Retention if Bladder Scan Volume is Greater Than 500mL or Patient Has Symptoms of Bladder Discomfort / Distention (Unless Contraindicated)   • Notify Provider Acute Urinary Retention   • Code Status and Medical Interventions:   • Hospitalist (on-call MD unless specified)   • Inpatient Cardiology Consult   • PT Consult: Eval & Treat Functional Mobility Below Baseline   • Oxygen Therapy- Nasal Cannula; Titrate for SPO2: 90% - 95%   • Oxygen Therapy- Nasal Cannula; Titrate for SPO2: 90% - 95%   • POC Glucose PRN   • POC Glucose 4x Daily AC & at Bedtime   • POC Glucose Once   • POC Glucose Once   • POC Glucose Once   • POC Glucose Once   • ECG 12 Lead ED Triage Standing Order; SOA   • ECG 12 Lead Chest Pain   • Wound Ostomy Eval & Treat   • Insert Peripheral IV   • Insert Peripheral IV   • Inpatient Admission   • CBC & Differential   • Green Top (Gel)   • Lavender Top   • Gold Top - SST   • Light Blue Top       Medications Given in the Emergency Department:  Medications   sodium chloride 0.9 % flush 10 mL (has no administration in time range)   dilTIAZem (CARDIZEM) 125 mg in 125 mL sodium chloride  infusion (0 mg/hr Intravenous Stopped 5/13/23 0807)   levalbuterol (XOPENEX) nebulizer solution 1.25 mg (has no administration in time range)   vitamin B-6 (PYRIDOXINE) tablet 200 mg (200 mg Oral Given 5/13/23 0905)   pregabalin (LYRICA) capsule 150 mg (150 mg Oral Given 5/13/23 2055)   multivitamin with minerals 1 tablet (1 tablet Oral Given 5/13/23 0905)   HYDROcodone-acetaminophen (NORCO)  MG per tablet 1 tablet (1 tablet Oral Given 5/14/23 0550)   atorvastatin (LIPITOR) tablet 20 mg (20 mg Oral Given 5/13/23 2055)   cholecalciferol (VITAMIN D3) tablet 1,000 Units (1,000 Units Oral Given 5/13/23 0905)   sodium chloride 0.9 % flush 10 mL (has no administration in time range)   sodium chloride 0.9 % flush 10 mL (10 mL Intravenous Given 5/13/23 2055)   sodium chloride  0.9 % infusion 40 mL (has no administration in time range)   nitroglycerin (NITROSTAT) SL tablet 0.4 mg (has no administration in time range)   lactated ringers infusion (100 mL/hr Intravenous New Bag 5/13/23 0746)   ondansetron (ZOFRAN) injection 4 mg (has no administration in time range)   digoxin (LANOXIN) tablet 125 mcg (has no administration in time range)     And   digoxin (LANOXIN) tablet 250 mcg (250 mcg Oral Given 5/13/23 0905)   arformoterol (BROVANA) nebulizer solution 15 mcg (15 mcg Nebulization Given 5/13/23 2030)   insulin detemir (LEVEMIR) injection 1-200 Units (18 Units Subcutaneous Given 5/13/23 2055)   insulin lispro (humaLOG) injection 1-200 Units (1 Units Subcutaneous Not Given 5/13/23 2055)   insulin lispro (humaLOG) injection 1-200 Units (has no administration in time range)   dextrose (GLUTOSE) oral gel 15 g (has no administration in time range)   dextrose (D50W) (25 g/50 mL) IV injection 10-50 mL (has no administration in time range)   glucagon (GLUCAGEN) injection 1 mg (has no administration in time range)   arformoterol (BROVANA) 15 MCG/2ML nebulizer solution  - ADS Override Pull (  Not Given 5/13/23 0711)   rivaroxaban (XARELTO) tablet 20 mg (20 mg Oral Given 5/13/23 1702)   metoprolol succinate XL (TOPROL-XL) 24 hr tablet 50 mg (50 mg Oral Given 5/13/23 1236)   lactated ringers infusion (100 mL/hr Intravenous New Bag 5/13/23 2056)   Pharmacy to dose vancomycin (has no administration in time range)   Pharmacy to Dose Cefepime (has no administration in time range)   cefepime (MAXIPIME) 2 g/100 mL 0.9% NS (mbp) (has no administration in time range)   vancomycin 1250 mg/250 mL 0.9% NS IVPB ([facility]) (has no administration in time range)   ondansetron (ZOFRAN) injection 4 mg (4 mg Intravenous Given 5/12/23 2302)   dilTIAZem (CARDIZEM) bolus from bag 1 mg/mL 10 mg (10 mg Intravenous Bolus from Bag 5/12/23 2763)   lactated ringers bolus 1,000 mL (0 mL Intravenous Stopped 5/12/23 8176)    HYDROcodone-acetaminophen (NORCO) 5-325 MG per tablet 2 tablet (2 tablets Oral Given 5/12/23 2327)   sodium chloride 0.9 % bolus 500 mL (500 mL Intravenous New Bag 5/13/23 0643)   lactated ringers bolus 500 mL (500 mL Intravenous New Bag 5/13/23 0808)   magnesium sulfate in D5W 1g/100mL (PREMIX) (1 g Intravenous New Bag 5/13/23 1210)   lactated ringers bolus 500 mL (500 mL Intravenous New Bag 5/13/23 1938)   vancomycin 2500 mg/500 mL 0.9% NS IVPB ([facility]) (2,500 mg Intravenous New Bag 5/14/23 0233)   cefepime (MAXIPIME) 2 g/100 mL 0.9% NS (mbp) (2 g Intravenous New Bag 5/14/23 0233)        ED Course:    The patient was initially evaluated in the triage area where orders were placed. The patient was later dispositioned by Dc Martinez MD.      The patient was advised to stay for completion of workup which includes but is not limited to communication of labs and radiological results, reassessment and plan. The patient was advised that leaving prior to disposition by a provider could result in critical findings that are not communicated to the patient.     ED Course as of 05/14/23 0634   Fri May 12, 2023   2233 My interpretation EKG: A-fib 128, normal QRS, nonspecific ST change, normal QT, rate change from previous. [JS]   9099 Patient presents with nausea vomiting and diarrhea consistent with acute gastroenteritis.  She has no abdominal tenderness and a nonfocal abdominal exam.  Given her recent illness she has been unable to take her home medications and now presents with atrial fibrillation and rapid ventricular response.  We will initiate rate control with diltiazem bolus and infusion.  We will treat with IV fluids and antiemetics.  Patient will require admission. [JS]      ED Course User Index  [JS] Dc Martinez MD       Labs:    Lab Results (last 24 hours)     Procedure Component Value Units Date/Time    POC Glucose Once [[id number]]  (Abnormal) Collected: 05/13/23 0759    Specimen: Blood Updated: 05/13/23 0803      Glucose 130 mg/dL      Comment: Serial Number: [id number]:  [id number]       POC Glucose Once [[id number]]  (Abnormal) Collected: 05/13/23 1133    Specimen: Blood Updated: 05/13/23 1135     Glucose 135 mg/dL      Comment: Serial Number: [id number]:  [id number]       POC Glucose Once [[id number]]  (Abnormal) Collected: 05/13/23 1701    Specimen: Blood Updated: 05/13/23 1704     Glucose 117 mg/dL      Comment: Serial Number: [id number]:  [id number]       POC Glucose Once [[id number]]  (Abnormal) Collected: 05/13/23 2051    Specimen: Blood Updated: 05/13/23 2052     Glucose 156 mg/dL      Comment: Serial Number: [id number]:  [id number]       CBC (No Diff) [[id number]]  (Abnormal) Collected: 05/14/23 0427    Specimen: Blood Updated: 05/14/23 0515     WBC 13.33 10*3/mm3      RBC 4.58 10*6/mm3      Hemoglobin 13.4 g/dL      Hematocrit 42.4 %      MCV 92.6 fL      MCH 29.3 pg      MCHC 31.6 g/dL      RDW 15.3 %      RDW-SD 52.1 fl      MPV 13.9 fL      Platelets 55 10*3/mm3     Renal Function Panel [[id number]]  (Abnormal) Collected: 05/14/23 0427    Specimen: Blood Updated: 05/14/23 0527     Glucose 151 mg/dL      BUN 15 mg/dL      Creatinine 0.83 mg/dL      Sodium 137 mmol/L      Potassium 4.8 mmol/L      Comment: Slight hemolysis detected by analyzer. Results may be affected.        Chloride 102 mmol/L      CO2 26.3 mmol/L      Calcium 8.6 mg/dL      Albumin 3.1 g/dL      Phosphorus 3.0 mg/dL      Anion Gap 8.7 mmol/L      BUN/Creatinine Ratio 18.1     eGFR 75.9 mL/min/1.73     Narrative:      GFR Normal >60  Chronic Kidney Disease <60  Kidney Failure <15             Imaging:    No Radiology Exams Resulted Within Past 24 Hours      Differential Diagnosis and Discussion:      Dizziness: Based on the patient's history, signs, and symptoms, the diffential diagnosis includes but is not limited to meningitis, stroke, sepsis, subarachnoid hemorrhage, intracranial bleeding, encephalitis, vertigo,  electrolyte imbalance, and metabolic disorders.  Vomiting: Differential diagnosis includes but is not limited to migraine, labyrinthine disorders, psychogenic, metabolic and endocrine causes, peptic ulcer, gastric outlet obstruction, gastritis, gastroenteritis, appendicitis, intestinal obstruction, paralytic ileus, food poisoning, cholecystitis, acute hepatitis, acute pancreatitis, acute febrile illness, and myocardial infarction.    All labs were reviewed and interpreted by me.  All X-rays impressions were independently interpreted by me.  EKG was interpreted by me.    [facility]     Critical Care Note: Total Critical Care time of 35 minutes. Total critical care time documented does not include time spent on separately billed procedures for services of nurses or physician assistants. I personally saw and examined the patient. I have reviewed all diagnostic interpretations and treatment plans as written. I was present for the key portions of any procedures performed and the inclusive time noted in any critical care statement. Critical care time includes patient management by me, time spent at the patients bedside,  time to review lab and imaging results, discussing patient care, documentation in the medical record, and time spent with family or caregiver.    Patient Care Considerations:          Consultants/Shared Management Plan:    Hospitalist: I have discussed the case with The hospitalist who agrees to accept the patient for admission.    Social Determinants of Health:    Patient is independent, reliable, and has access to care.       Disposition and Care Coordination:    Admit:   Through independent evaluation of the patient's history, physical, and imperical data, the patient meets criteria for observation/admission to the hospital.        Final diagnoses:   Atrial fibrillation with RVR   Acute gastroenteritis        ED Disposition     ED Disposition   Decision to Admit    Condition   --    Comment   Level of Care:  Telemetry [5]   Diagnosis: Atrial fibrillation with RVR [[id number]]   Certification: I Certify That Inpatient Hospital Services Are Medically Necessary For Greater Than 2 Midnights               This medical record created using voice recognition software.    Documentation assistance provided by Carlin Hua acting as scribe for Dc Martinez MD. Information recorded by the scribe was done at my direction and has been verified and validated by me.           Carlin Hua  05/12/23 3660       Dc Martinez MD  05/14/23 3083

## 2023-05-13 NOTE — H&P
[facility]  Date: 2023   Patient Name: Mary Albarran  : 1950  MRN: [id number]  Primary Care Physician:  Brittni Quiroz APRN  Date of admission: 2023    Subjective   Subjective     Chief Complaint: Nausea, Vomiting     HPI:    Mary Albarran is a 72 y.o. female with a past medical history of A-fib on Xarelto, CKD stage III, COPD, type 2 diabetes mellitus, hyperlipidemia, hypertension presented to the ED for evaluation of nausea, vomiting since yesterday.  Patient went to cardiology clinic yesterday and had an appointment with Dr. Metcalf, had cardiac echo done and after that when she came home she started experiencing nausea , associated with multiple episodes of vomiting since yesterday.  Mild intermittent diffuse cramping abdominal pain was noted.  Denies fevers, cough, shortness of breath, chest pain, diarrhea, dysuria, visual changes, focal weakness, numbness, tingling, palpitations, lightheadedness.    On arrival to the ED, vital signs temperature 99.2, pulse 146, respiratory 20, blood pressure 147/71 on 2 L of nasal cannula saturating around 91%.  EKG showed A-fib with RVR.  Labs showed troponin 17, proBNP 976, rest of the CMP within normal limits except creatinine 1.05, WBC 26, hemoglobin 16, hematocrit 49, platelets 87, digoxin level within therapeutic range.  Chest x-ray showed no acute cardiopulmonary process.  Patient had a cardiac echo yesterday which showed LVEF 60-65%, left ventricular diastolic function was indeterminate.  Mildly dilated left atrial cavity.  Mild to early moderate aortic insufficiency.  Patient appears to be in coarse atrial fibrillation/flutter during my exam.  Received IV diltiazem loading dose and started on diltiazem drip.  Patient has been admitted for further evaluation and management of A-fib with RVR      Personal History     Past Medical History:   Diagnosis Date   • Allergic rhinitis    • Anxiety    • Aortic valve  disease 11/28/2021    Fibrocalcific changes of the aortic valve with mild aortic valve stenosis   on echo 3/2/2021  Moderate aortic valve regurgitation is present. Aortic valve maximum pressure gradient is 26 mmHg. Moderate aortic valve stenosis is present.  On echo 2/10/2022      • Arthritis    • Atrial fibrillation    • Chronic kidney disease (CKD), stage III (moderate)    • Chronic pain    • COPD (chronic obstructive pulmonary disease)    • Diabetes mellitus type 2 with complications    • Hyperlipidemia    • Hypertension    • Thrombocytopenia          Past Surgical History:   Procedure Laterality Date   • HYSTERECTOMY      30 YEARS AGO         Family History   Problem Relation Age of Onset   • Heart disease Father    • Heart disease Other    • Heart disease Other    • Diabetes Other          Social History     Socioeconomic History   • Marital status:    Tobacco Use   • Smoking status: Former     Packs/day: 1.00     Years: 36.00     Pack years: 36.00     Types: Cigarettes     Start date: 4/3/1971     Quit date: 4/17/2008     Years since quitting: 15.0   • Smokeless tobacco: Never   • Tobacco comments:     FOR 30 YEARS   Vaping Use   • Vaping Use: Never used   Substance and Sexual Activity   • Alcohol use: Never   • Drug use: Never   • Sexual activity: Defer         Home Medications:  HYDROcodone-acetaminophen, Insulin Glargine (2 Unit Dial), Insulin Pen Needle, SITagliptin, Vitamin B6, albuterol, albuterol sulfate HFA, ascorbic acid, atorvastatin, cholecalciferol, dapagliflozin Propanediol, digoxin, dilTIAZem, furosemide, glipizide, metoprolol succinate XL, miconazole, multivitamin with minerals, potassium chloride, pregabalin, rivaroxaban, sacubitril-valsartan, and silver sulfadiazine    Allergies:  Allergies   Allergen Reactions   • Codeine Mental Status Change       Review of Systems   All other systems reviewed and negative except as mentioned in the HPI    Objective   Objective     Vitals:   Temp:   [98.7 °F (37.1 °C)-99.2 °F (37.3 °C)] 98.7 °F (37.1 °C)  Heart Rate:  [] 82  Resp:  [17-20] 18  BP: ()/(32-90) 105/77  Flow (L/min):  [2] 2    Physical Exam    Constitutional: Awake, alert, mild respiratory distress   Eyes: Pupils equal, sclerae anicteric, no conjunctival injection   HENT: NCAT, mucous membranes moist   Respiratory: Clear to auscultation bilaterally, nonlabored respirations    Cardiovascular: RRR, no murmurs, rubs, or gallops, palpable pedal pulses bilaterally   Gastrointestinal: Positive bowel sounds, soft, nontender, nondistended   Musculoskeletal: Trace bilateral LE edema, no clubbing or cyanosis to extremities   Psychiatric: Appropriate affect, cooperative   Neurologic: Oriented x 3, speech clear    Result Review    Result Review:  I have personally reviewed the results from the time of this admission to 5/13/2023 02:53 EDT and agree with these findings:  [x]  Laboratory  []  Microbiology  [x]  Radiology  [x]  EKG/Telemetry   []  Cardiology/Vascular   []  Pathology  []  Old records  []  Other:        Imaging Results (Last 24 Hours)     Procedure Component Value Units Date/Time    XR Chest 1 View [[id number]] Collected: 05/12/23 2326     Updated: 05/12/23 2329    Narrative:      PROCEDURE: XR CHEST 1 VW     COMPARISON: [facility], , CHEST AP/PA 1 VIEW, 10/14/2010, 18:28.     INDICATIONS: SHORTNESS OF BREATH; DIZZINESS; VOMITING     FINDINGS:   Cardiac silhouette is mildly enlarged, but is exaggerated by portable AP technique.  Lungs are   adequately expanded and appear grossly clear.  No pneumothorax or large pleural effusion is seen.       Impression:         No definite acute cardiopulmonary abnormality.                  JULIO CÉSAR POMPA MD         Electronically Signed and Approved By: JULIO CÉSAR POMPA MD on 5/12/2023 at 23:26                            arformoterol, 15 mcg, Nebulization, BID - RT  atorvastatin, 20 mg, Oral, Nightly  cefTRIAXone, 1 g, Intravenous,  Q24H  cholecalciferol, 1,000 Units, Oral, Daily  [START ON 5/14/2023] digoxin, 125 mcg, Oral, Once per day on Sun Thu   And  digoxin, 250 mcg, Oral, Once per day on Mon Tue Wed Fri Sat  insulin detemir, 1-200 Units, Subcutaneous, Nightly - Glucommander  insulin lispro, 1-200 Units, Subcutaneous, 4x Daily With Meals & Nightly  metoprolol succinate XL, 100 mg, Oral, Q12H  multivitamin with minerals, 1 tablet, Oral, Daily  pregabalin, 150 mg, Oral, TID  rivaroxaban, 20 mg, Oral, Daily  sodium chloride, 10 mL, Intravenous, Q12H  vitamin B-6, 200 mg, Oral, Daily         Assessment & Plan   Assessment / Plan     Assessment/Plan:  A-fib with RVR  Shortness of breath  Elevated white count   History of A-fib on Xarelto  CKD stage IIIa  COPD  Type 2 diabetes mellitus  Hyperlipidemia  Hypertension    Plan  Admit to inpatient, telemetry  Continue diltiazem drip, wean per protocol  Cardiology consult in the a.m with Dr. Lerner. Patient follows Dr. Metcalf as an outpatient  Resume home digoxin and metoprolol succinate as ordered  Elevated white count, procalcitonin.  Patient did not endorse any fevers.  Prophylactically started on ceftriaxone.  Blood cultures has been sent.  Follow-up on urinalysis.  Monitor white count with a.m. labs, if continues to be elevated, consider getting CT chest abdomen pelvis to further investigate for underlying infection.  Sin scheduled  Xopenex every 6 hours as needed  Clear liquid diet, advance to clear liquid diet, advance diet as tolerated  Insulin as per the weight-based Glucomander protocol  Resume other appropriate home medications once reconciled.    DVT prophylaxis:  Medical DVT prophylaxis orders are present.    CODE STATUS:    Level Of Support Discussed With: Patient  Code Status (Patient has no pulse and is not breathing): CPR (Attempt to Resuscitate)  Medical Interventions (Patient has pulse or is breathing): Full Support    Admission Status:  I believe this patient meets  inpatient status.    Part of this note may be an electronic transcription/translation of spoken language to printed text using the Dragon Dictation System    Linh Benitez MD

## 2023-05-13 NOTE — PLAN OF CARE
Goal Outcome Evaluation:   Hypotension noted at beginning of the shift. Bolus of LR 500mL administered. Cardizem drip was discontinued. BP WNL now, pain medication administered per MD order. LR continuing at 100mL/hr. Continuing with plan of care, no complaints at this time.

## 2023-05-13 NOTE — PLAN OF CARE
Problem: Adult Inpatient Plan of Care  Goal: Plan of Care Review  Outcome: Ongoing, Progressing  Flowsheets (Taken 5/13/2023 4699)  Progress: improving  Plan of Care Reviewed With: patient  Outcome Evaluation: Patient admitted throughout the night, in afib rvr. Patient arrived to floor on cardizem drip going at 12.5mg/hour, currently running at 5mg/hour. Patient blood pressure hanging in the 90's systolic, metoprolol held until blood pressure improves. Patient unable to urinate, straight cathed per md order with 150cc of urine returned. UA sent. No other issues or complaints noted at this time. Will continue to monitor.   Goal Outcome Evaluation:      Cathryn Maldonado RN

## 2023-05-13 NOTE — NURSING NOTE
Patient has bilateral ace wraps on lower extremities. Patient refused to have the dressings removed to take pictures of the wounds. Patient stated she has a certain way she does it and it is certain wound care products. I explained that we have the same wound care products here and that we need to take pictures to make sure the wounds are healing. Patient verbalized understanding but still refused removal of dressings.

## 2023-05-14 ENCOUNTER — APPOINTMENT (OUTPATIENT)
Dept: CT IMAGING | Facility: HOSPITAL | Age: 73
End: 2023-05-14
Payer: MEDICARE

## 2023-05-14 LAB
ALBUMIN SERPL-MCNC: 3.1 G/DL (ref 3.5–5.2)
ANION GAP SERPL CALCULATED.3IONS-SCNC: 8.7 MMOL/L (ref 5–15)
BUN SERPL-MCNC: 15 MG/DL (ref 8–23)
BUN/CREAT SERPL: 18.1 (ref 7–25)
CALCIUM SPEC-SCNC: 8.6 MG/DL (ref 8.6–10.5)
CHLORIDE SERPL-SCNC: 102 MMOL/L (ref 98–107)
CO2 SERPL-SCNC: 26.3 MMOL/L (ref 22–29)
CREAT SERPL-MCNC: 0.83 MG/DL (ref 0.57–1)
DEPRECATED RDW RBC AUTO: 52.1 FL (ref 37–54)
EGFRCR SERPLBLD CKD-EPI 2021: 75.9 ML/MIN/1.73
ERYTHROCYTE [DISTWIDTH] IN BLOOD BY AUTOMATED COUNT: 15.3 % (ref 12.3–15.4)
GLUCOSE BLDC GLUCOMTR-MCNC: 127 MG/DL (ref 70–99)
GLUCOSE BLDC GLUCOMTR-MCNC: 137 MG/DL (ref 70–99)
GLUCOSE BLDC GLUCOMTR-MCNC: 140 MG/DL (ref 70–99)
GLUCOSE BLDC GLUCOMTR-MCNC: 160 MG/DL (ref 70–99)
GLUCOSE SERPL-MCNC: 151 MG/DL (ref 65–99)
HCT VFR BLD AUTO: 42.4 % (ref 34–46.6)
HGB BLD-MCNC: 13.4 G/DL (ref 12–15.9)
MCH RBC QN AUTO: 29.3 PG (ref 26.6–33)
MCHC RBC AUTO-ENTMCNC: 31.6 G/DL (ref 31.5–35.7)
MCV RBC AUTO: 92.6 FL (ref 79–97)
PHOSPHATE SERPL-MCNC: 3 MG/DL (ref 2.5–4.5)
PLATELET # BLD AUTO: 55 10*3/MM3 (ref 140–450)
PMV BLD AUTO: 13.9 FL (ref 6–12)
POTASSIUM SERPL-SCNC: 4.8 MMOL/L (ref 3.5–5.2)
RBC # BLD AUTO: 4.58 10*6/MM3 (ref 3.77–5.28)
SODIUM SERPL-SCNC: 137 MMOL/L (ref 136–145)
WBC NRBC COR # BLD: 13.33 10*3/MM3 (ref 3.4–10.8)

## 2023-05-14 PROCEDURE — 94799 UNLISTED PULMONARY SVC/PX: CPT

## 2023-05-14 PROCEDURE — 82948 REAGENT STRIP/BLOOD GLUCOSE: CPT

## 2023-05-14 PROCEDURE — 25010000002 PROCHLORPERAZINE 10 MG/2ML SOLUTION: Performed by: FAMILY MEDICINE

## 2023-05-14 PROCEDURE — 0 CEFEPIME PER 500 MG

## 2023-05-14 PROCEDURE — 63710000001 INSULIN DETEMIR PER 5 UNITS: Performed by: STUDENT IN AN ORGANIZED HEALTH CARE EDUCATION/TRAINING PROGRAM

## 2023-05-14 PROCEDURE — 63710000001 INSULIN LISPRO (HUMAN) PER 5 UNITS: Performed by: STUDENT IN AN ORGANIZED HEALTH CARE EDUCATION/TRAINING PROGRAM

## 2023-05-14 PROCEDURE — 74176 CT ABD & PELVIS W/O CONTRAST: CPT

## 2023-05-14 PROCEDURE — 25010000002 ONDANSETRON PER 1 MG: Performed by: STUDENT IN AN ORGANIZED HEALTH CARE EDUCATION/TRAINING PROGRAM

## 2023-05-14 PROCEDURE — 99233 SBSQ HOSP IP/OBS HIGH 50: CPT | Performed by: FAMILY MEDICINE

## 2023-05-14 PROCEDURE — 80069 RENAL FUNCTION PANEL: CPT | Performed by: STUDENT IN AN ORGANIZED HEALTH CARE EDUCATION/TRAINING PROGRAM

## 2023-05-14 PROCEDURE — 85027 COMPLETE CBC AUTOMATED: CPT | Performed by: FAMILY MEDICINE

## 2023-05-14 PROCEDURE — 25010000002 VANCOMYCIN 5 G RECONSTITUTED SOLUTION

## 2023-05-14 PROCEDURE — 94664 DEMO&/EVAL PT USE INHALER: CPT

## 2023-05-14 PROCEDURE — 71250 CT THORAX DX C-: CPT

## 2023-05-14 RX ORDER — PROCHLORPERAZINE EDISYLATE 5 MG/ML
5 INJECTION INTRAMUSCULAR; INTRAVENOUS EVERY 6 HOURS PRN
Status: DISCONTINUED | OUTPATIENT
Start: 2023-05-14 | End: 2023-05-21 | Stop reason: HOSPADM

## 2023-05-14 RX ADMIN — PYRIDOXINE HCL TAB 50 MG 200 MG: 50 TAB at 08:16

## 2023-05-14 RX ADMIN — LEVALBUTEROL HYDROCHLORIDE 1.25 MG: 1.25 SOLUTION RESPIRATORY (INHALATION) at 15:45

## 2023-05-14 RX ADMIN — INSULIN DETEMIR 16 UNITS: 100 INJECTION, SOLUTION SUBCUTANEOUS at 21:47

## 2023-05-14 RX ADMIN — METOPROLOL TARTRATE 2.5 MG: 1 INJECTION, SOLUTION INTRAVENOUS at 13:18

## 2023-05-14 RX ADMIN — INSULIN LISPRO 4 UNITS: 100 INJECTION, SOLUTION INTRAVENOUS; SUBCUTANEOUS at 08:17

## 2023-05-14 RX ADMIN — HYDROCODONE BITARTRATE AND ACETAMINOPHEN 1 TABLET: 10; 325 TABLET ORAL at 21:50

## 2023-05-14 RX ADMIN — Medication 10 ML: at 21:50

## 2023-05-14 RX ADMIN — ONDANSETRON 4 MG: 2 INJECTION INTRAMUSCULAR; INTRAVENOUS at 11:37

## 2023-05-14 RX ADMIN — HYDROCODONE BITARTRATE AND ACETAMINOPHEN 1 TABLET: 10; 325 TABLET ORAL at 02:37

## 2023-05-14 RX ADMIN — MULTIPLE VITAMINS W/ MINERALS TAB 1 TABLET: TAB at 08:16

## 2023-05-14 RX ADMIN — DIGOXIN 125 MCG: 125 TABLET ORAL at 09:26

## 2023-05-14 RX ADMIN — HYDROCODONE BITARTRATE AND ACETAMINOPHEN 1 TABLET: 10; 325 TABLET ORAL at 12:38

## 2023-05-14 RX ADMIN — HYDROCODONE BITARTRATE AND ACETAMINOPHEN 1 TABLET: 10; 325 TABLET ORAL at 16:31

## 2023-05-14 RX ADMIN — Medication 10 ML: at 10:33

## 2023-05-14 RX ADMIN — METOPROLOL SUCCINATE 50 MG: 50 TABLET, EXTENDED RELEASE ORAL at 09:26

## 2023-05-14 RX ADMIN — CEFEPIME HYDROCHLORIDE 2 G: 2 INJECTION, POWDER, FOR SOLUTION INTRAMUSCULAR; INTRAVENOUS at 02:33

## 2023-05-14 RX ADMIN — PREGABALIN 150 MG: 75 CAPSULE ORAL at 21:40

## 2023-05-14 RX ADMIN — Medication 1000 UNITS: at 08:16

## 2023-05-14 RX ADMIN — PREGABALIN 150 MG: 75 CAPSULE ORAL at 17:50

## 2023-05-14 RX ADMIN — ARFORMOTEROL TARTRATE 15 MCG: 15 SOLUTION RESPIRATORY (INHALATION) at 08:49

## 2023-05-14 RX ADMIN — RIVAROXABAN 20 MG: 20 TABLET, FILM COATED ORAL at 17:50

## 2023-05-14 RX ADMIN — CEFEPIME HYDROCHLORIDE 2 G: 2 INJECTION, POWDER, FOR SOLUTION INTRAMUSCULAR; INTRAVENOUS at 10:33

## 2023-05-14 RX ADMIN — PROCHLORPERAZINE EDISYLATE 5 MG: 5 INJECTION, SOLUTION INTRAMUSCULAR; INTRAVENOUS at 15:19

## 2023-05-14 RX ADMIN — PREGABALIN 150 MG: 75 CAPSULE ORAL at 08:16

## 2023-05-14 RX ADMIN — VANCOMYCIN HYDROCHLORIDE 2500 MG: 5 INJECTION, POWDER, LYOPHILIZED, FOR SOLUTION INTRAVENOUS at 02:33

## 2023-05-14 RX ADMIN — HYDROCODONE BITARTRATE AND ACETAMINOPHEN 1 TABLET: 10; 325 TABLET ORAL at 05:50

## 2023-05-14 RX ADMIN — INSULIN LISPRO 3 UNITS: 100 INJECTION, SOLUTION INTRAVENOUS; SUBCUTANEOUS at 17:50

## 2023-05-14 RX ADMIN — ARFORMOTEROL TARTRATE 15 MCG: 15 SOLUTION RESPIRATORY (INHALATION) at 20:53

## 2023-05-14 RX ADMIN — ATORVASTATIN CALCIUM 20 MG: 20 TABLET, FILM COATED ORAL at 21:39

## 2023-05-14 RX ADMIN — CEFEPIME HYDROCHLORIDE 2 G: 2 INJECTION, POWDER, FOR SOLUTION INTRAMUSCULAR; INTRAVENOUS at 19:53

## 2023-05-14 NOTE — PLAN OF CARE
Goal Outcome Evaluation:              Outcome Evaluation: Throughout shift, pt c/o pain and constantly moaning and whimpering. PRN Chandler given. VSS, continues on 2L. Continue with POC.

## 2023-05-14 NOTE — PROGRESS NOTES
"[facility] Clinical Pharmacy Services: Vancomycin Pharmacokinetic Initial Consult Note    Mary Albarran is a 72 y.o. female who is on day 1 of pharmacy to dose vancomycin for Sepsis.    Consult Information  Consulting Provider: Bernardino   Planned Duration of Therapy: 7 days  Was Patient Receiving Prior to Admission/Consult?: No  Loading Dose Ordered or Given: 2500 mg on 2023 at 0215  PK/PD Target: -600 mg/L.hr   Relevant ID History: No  Other Antimicrobials: Cefepime    Imaging Reviewed?: Yes    Microbiology Data  MRSA PCR performed: No; Result: Not ordered due to excluded indication or presence of suspected abscess  Culture/Source:       Vitals/Labs  Ht: 160 cm (63\"); Wt: 120 kg (265 lb 6.9 oz)  Temp (24hrs), Av.5 °F (36.9 °C), Min:97.9 °F (36.6 °C), Max:99 °F (37.2 °C)   Estimated Creatinine Clearance: 63.1 mL/min (A) (by C-G formula based on SCr of 1.04 mg/dL (H)).        Results from last 7 days   Lab Units 23  0313 23  2201   CREATININE mg/dL 1.04* 1.05*   WBC 10*3/mm3 22.26* 26.10*     Assessment/Plan:    Vancomycin Dose:  1250 mg IV every 24 hours; which provides the following predicted parameters:  Exposure target: AUC24 (range)400-600 mg/L.hr   AUC24,ss: 517 mg/L.hr  PAUC*: 70 %  Ctrough,ss: 13.7 mg/L  Pconc*: 32 %  Tox.: 9 %  Vanc Random ordered for 05/15/23 at 1300  Patient has order for Renal Function Panel    Pharmacy will follow patient's kidney function and will adjust doses and obtain levels as necessary. Thank you for involving pharmacy in this patient's care. Please contact pharmacy with any questions or concerns.                           Austyn Verma [facility]  Clinical Pharmacist   "

## 2023-05-14 NOTE — PROGRESS NOTES
[facility]   Hospitalist Progress Note  Date: 2023  Patient Name: Mary Albarran  : 1950  MRN: [id number]  Date of admission: 2023      Subjective   Subjective     Chief Complaint: Nausea vomiting follow-up    Summary:Mary Albarran is a 72 y.o. female with a past medical history of A-fib on Xarelto, CKD stage III, COPD, type 2 diabetes mellitus, hyperlipidemia, hypertension presented to the ED for evaluation of nausea, vomiting since yesterday.  Patient went to cardiology clinic yesterday and had an appointment with Dr. Metcalf, had cardiac echo done and after that when she came home she started experiencing nausea , associated with multiple episodes of vomiting since yesterday.  Mild intermittent diffuse cramping abdominal pain was noted.  Denies fevers, cough, shortness of breath, chest pain, diarrhea, dysuria, visual changes, focal weakness, numbness, tingling, palpitations, lightheadedness.     On arrival to the ED, vital signs temperature 99.2, pulse 146, respiratory 20, blood pressure 147/71 on 2 L of nasal cannula saturating around 91%.  EKG showed A-fib with RVR.  Labs showed troponin 17, proBNP 976, rest of the CMP within normal limits except creatinine 1.05, WBC 26, hemoglobin 16, hematocrit 49, platelets 87, digoxin level within therapeutic range.  Chest x-ray showed no acute cardiopulmonary process.  Patient had a cardiac echo yesterday which showed LVEF 60-65%, left ventricular diastolic function was indeterminate.  Mildly dilated left atrial cavity.  Mild to early moderate aortic insufficiency.  Patient appears to be in coarse atrial fibrillation/flutter during my exam.  Received IV diltiazem loading dose and started on diltiazem drip.  Patient has been admitted for further evaluation and management of nausea and vomiting with associated A-fib with RVR.  Patient continued IV fluids and Cardizem drip.  Weaned off of Cardizem drip.  Cardiology consulted.  A-fib RVR thought likely  secondary to volume depletion.  Abdominal pain improved and was able to tolerate clears.  Diet advanced but patient not able to tolerate episodes of abdominal pain nausea vomiting.  CT of the abdomen demonstrated wall thickening at the proximal duodenum and adjacent fat stranding concerning for enteritis.  Started on empiric antibiotics.  Diet downgraded back to clears.  CT chest demonstrated septal thickening patchy bilateral groundglass opacities similar to finding in 2008.  Also found to have small pulmonary nodules bilaterally.  Radiology recommending 6-month follow-up.    Interval Followup: Patient lying in bed peers uncomfortable.  Patient complaining of increased abdominal pain nausea since eating breakfast.  Afebrile overnight.  Remains A-fib 80s to 140s on telemetry review.  Blood pressure improved.  Requiring increased O2 now on 6L nasal cannula.    Review of Systems  Constitutional: Negative for fatigue and fever.   HENT: Negative for sore throat and trouble swallowing.    Eyes: Negative for pain and discharge.   Respiratory: Negative for cough and positive shortness of breath.    Cardiovascular: Negative for chest pain and palpitations.   Gastrointestinal: Positive for abdominal pain, nausea and vomiting.   Endocrine: Negative for cold intolerance and heat intolerance.   Genitourinary: Negative for difficulty urinating and dysuria.   Musculoskeletal: Negative for back pain and neck stiffness.   Skin: Negative for color change and rash.   Neurological: Negative for syncope and headaches.   Hematological: Negative for adenopathy.   Psychiatric/Behavioral: Negative for confusion and hallucinations.    Objective   Objective     Vitals:   Temp:  [97.3 °F (36.3 °C)-99.3 °F (37.4 °C)] 98.6 °F (37 °C)  Heart Rate:  [] 102  Resp:  [16-20] 20  BP: ()/(48-61) 126/61  Flow (L/min):  [2-6] 6  Physical Exam   Gen. well-developed appearing stated age in no acute distress  HEENT: Normocephalic atraumatic  moist membranes pupils equal round reactive light, no scleral icterus no conjunctival injection  Cardiovascular: Irregularly irregular no lower extremity edema appreciated  Pulmonary: Clear to auscultation bilaterally no wheezes rales or rhonchi symmetric chest expansion, unlabored, no conversational dyspnea appreciated  Gastrointestinal: Soft tender to palpation in the epigastrium nondistended positive bowel sounds all 4 quadrants no rebound or guarding  Musculoskeletal: No clubbing cyanosis, warm and well-perfused, calves soft symmetric nontender bilaterally  Skin: Clean dry without rashs  Neuro: Cranial nerves II through XII intact grossly no sensorimotor deficits appreciated bilateral upper and lower extremities  Psych: Patient is calm cooperative and appropriate with exam not responding to internal stimuli  : No Maldonado catheter no bladder distention no suprapubic tenderness    Result Review    Result Review:  I have personally reviewed these results and agree with these findings:  [x]  Laboratory  LAB RESULTS:      Lab 05/14/23 0427 05/13/23 0313 05/12/23 2201   WBC 13.33* 22.26* 26.10*   HEMOGLOBIN 13.4 14.8 16.0*   HEMATOCRIT 42.4 46.1 49.2*   PLATELETS 55* 83* 87*   NEUTROS ABS  --  19.92* 24.49*   IMMATURE GRANS (ABS)  --  0.17* 0.16*   LYMPHS ABS  --  1.35 0.88   MONOS ABS  --  0.77 0.51   EOS ABS  --  0.00 0.00   MCV 92.6 90.2 88.8   PROCALCITONIN  --   --  0.45*         Lab 05/14/23 0427 05/13/23 0313 05/12/23 2201   SODIUM 137 138 140   POTASSIUM 4.8 4.7 4.7   CHLORIDE 102 103 104   CO2 26.3 23.9 23.3   ANION GAP 8.7 11.1 12.7   BUN 15 16 15   CREATININE 0.83 1.04* 1.05*   EGFR 75.9 57.9* 57.3*   GLUCOSE 151* 189* 233*   CALCIUM 8.6 9.0 9.4   MAGNESIUM  --  1.8  --    PHOSPHORUS 3.0 4.0  --          Lab 05/14/23 0427 05/13/23 0313 05/12/23 2201   TOTAL PROTEIN  --  6.4 7.2   ALBUMIN 3.1* 3.6 3.9   GLOBULIN  --  2.8 3.3   ALT (SGPT)  --  18 20   AST (SGOT)  --  20 21   BILIRUBIN  --  1.0 1.1    ALK PHOS  --  64 78         Lab 05/12/23  2201   PROBNP 976.7*   HSTROP T 17*                 Brief Urine Lab Results  (Last result in the past 365 days)      Color   Clarity   Blood   Leuk Est   Nitrite   Protein   CREAT   Urine HCG        05/13/23 0415 Yellow   Clear   Negative   Negative   Negative   30 mg/dL (1+)               Microbiology Results (last 10 days)     Procedure Component Value - Date/Time    Blood Culture - Blood, Arm, Left [[id number]]  (Normal) Collected: 05/13/23 0105    Lab Status: Preliminary result Specimen: Blood from Arm, Left Updated: 05/14/23 0131     Blood Culture No growth at 24 hours    Blood Culture - Blood, Arm, Right [[id number]]  (Normal) Collected: 05/13/23 0105    Lab Status: Preliminary result Specimen: Blood from Arm, Right Updated: 05/14/23 0131     Blood Culture No growth at 24 hours          [x]  Microbiology  [x]  Radiology  CT Abdomen Pelvis Without Contrast    Result Date: 5/14/2023    1. Wall thickening at the proximal duodenum with adjacent fat stranding.  The appearance is nonspecific.  Consider enteritis or peptic ulcer disease.  2. Cholelithiasis. 3. Hepatomegaly and architectural changes in the liver that might reflect cirrhosis.  Mild splenomegaly could indicate portal venous hypertension or be a function of body habitus. 4. Small nonobstructing right-sided kidney stones. 5. Significant multilevel lumbar spondylosis.     LORETTA DAVALOS MD       Electronically Signed and Approved By: LORETTA DAVALOS MD on 5/14/2023 at 8:01             CT Chest Without Contrast Diagnostic    Result Date: 5/14/2023    1. Interlobular septal thickening and patchy areas of ground-glass opacity in both lungs.  This could be infectious or inflammatory.  The lungs have a similar appearance in 2008.  This may represent a recurrent or residual process. 2. Cardiomegaly, coronary artery disease and aortic atherosclerosis. 3. Trace bilateral pleural effusions. 4. Small nodules in the each lung,  which could be infectious or inflammatory, but a 6 month follow-up chest CT is recommended to further evaluate.     LORETTA DAVALOS MD       Electronically Signed and Approved By: LORETTA DAVALOS MD on 5/14/2023 at 7:58             XR Chest 1 View    Result Date: 5/12/2023    No definite acute cardiopulmonary abnormality.       JULIO CÉSAR POMPA MD       Electronically Signed and Approved By: JULIO CÉSAR POMPA MD on 5/12/2023 at 23:26               [x]  EKG/Telemetry   []  Cardiology/Vascular   []  Pathology  []  Old records  [x]  Other:  Scheduled Meds:arformoterol, 15 mcg, Nebulization, BID - RT  atorvastatin, 20 mg, Oral, Nightly  cefepime, 2 g, Intravenous, Q8H  cholecalciferol, 1,000 Units, Oral, Daily  digoxin, 125 mcg, Oral, Once per day on Sun Thu   And  digoxin, 250 mcg, Oral, Once per day on Mon Tue Wed Fri Sat  insulin detemir, 1-200 Units, Subcutaneous, Nightly - Glucommander  insulin lispro, 1-200 Units, Subcutaneous, 4x Daily With Meals & Nightly  metoprolol succinate XL, 50 mg, Oral, Q24H  multivitamin with minerals, 1 tablet, Oral, Daily  pregabalin, 150 mg, Oral, TID  rivaroxaban, 20 mg, Oral, Daily  sodium chloride, 10 mL, Intravenous, Q12H  [START ON 5/15/2023] vancomycin, 1,250 mg, Intravenous, Q24H  vitamin B-6, 200 mg, Oral, Daily      Continuous Infusions:dilTIAZem, 5-15 mg/hr, Last Rate: Stopped (05/13/23 0807)  Pharmacy to Dose Cefepime,   Pharmacy to dose vancomycin,       PRN Meds:.•  dextrose  •  dextrose  •  glucagon (human recombinant)  •  HYDROcodone-acetaminophen  •  insulin lispro  •  levalbuterol  •  metoprolol tartrate  •  nitroglycerin  •  ondansetron  •  Pharmacy to Dose Cefepime  •  Pharmacy to dose vancomycin  •  prochlorperazine  •  sodium chloride  •  sodium chloride  •  sodium chloride      Assessment & Plan   Assessment / Plan     Assessment/Plan:  Sepsis secondary to duodenitis present on admission  Duodenitis  A-fib RVR  Shortness of breath  Leukocytosis  CKD 3A  COPD without  acute exacerbation  Type 2 diabetes mellitus  Hyperlipidemia  Hypertension  Thrombocytopenia  Concern for cirrhosis  Bilateral pulmonary nodules      Patient admitted for further evaluation and treatment  Cardiology consulted thank you for your assistance  Continue home digoxin and metoprolol  Continue as needed Lopressor  Continue Xarelto  Stop IV fluids  Downgrade diet to clear liquids  Continue vancomycin and cefepime empirically  Get MRSA by PCR  Continue supplemental nasal cannula oxygen titrate to keep sats greater than 90%  Continue to monitor leukocytosis  Continue monitor renal function electrolytes  Continue Brovana  Continue Levemir and sliding scale insulin and titrate per protocol  Continue atorvastatin  Suspect thrombocytopenia due to cirrhosis  Repeat CT chest in 6 months per radiology recommendations.  Consult physical therapy to evaluate and treat  Further inpatient orders recommendations pending clinical course.         Discussed plan with bedside RN as well as cardiology attending.    Disposition: PT OT eval's pending.  Repeat CT chest in 6 months per radiology recommendations.    DVT prophylaxis:  Medical DVT prophylaxis orders are present.    CODE STATUS:   Level Of Support Discussed With: Patient  Code Status (Patient has no pulse and is not breathing): CPR (Attempt to Resuscitate)  Medical Interventions (Patient has pulse or is breathing): Full Support

## 2023-05-15 ENCOUNTER — APPOINTMENT (OUTPATIENT)
Dept: GENERAL RADIOLOGY | Facility: HOSPITAL | Age: 73
End: 2023-05-15
Payer: MEDICARE

## 2023-05-15 LAB
ALBUMIN SERPL-MCNC: 3.2 G/DL (ref 3.5–5.2)
ALBUMIN SERPL-MCNC: 3.4 G/DL (ref 3.5–5.2)
ALBUMIN/GLOB SERPL: 0.9 G/DL
ALBUMIN/GLOB SERPL: 1.3 G/DL
ALP SERPL-CCNC: 56 U/L (ref 39–117)
ALP SERPL-CCNC: 59 U/L (ref 39–117)
ALT SERPL W P-5'-P-CCNC: 17 U/L (ref 1–33)
ALT SERPL W P-5'-P-CCNC: 22 U/L (ref 1–33)
AMMONIA BLD-SCNC: 153 UMOL/L (ref 11–51)
ANION GAP SERPL CALCULATED.3IONS-SCNC: 10.3 MMOL/L (ref 5–15)
ANION GAP SERPL CALCULATED.3IONS-SCNC: 8.7 MMOL/L (ref 5–15)
ARTERIAL PATENCY WRIST A: POSITIVE
ARTERIAL PATENCY WRIST A: POSITIVE
AST SERPL-CCNC: 20 U/L (ref 1–32)
AST SERPL-CCNC: 28 U/L (ref 1–32)
BASE EXCESS BLDA CALC-SCNC: 0.6 MMOL/L (ref -2–2)
BASE EXCESS BLDA CALC-SCNC: 1.2 MMOL/L (ref -2–2)
BASOPHILS # BLD AUTO: 0.04 10*3/MM3 (ref 0–0.2)
BASOPHILS NFR BLD AUTO: 0.4 % (ref 0–1.5)
BDY SITE: ABNORMAL
BDY SITE: ABNORMAL
BILIRUB SERPL-MCNC: 1.1 MG/DL (ref 0–1.2)
BILIRUB SERPL-MCNC: 1.4 MG/DL (ref 0–1.2)
BUN SERPL-MCNC: 13 MG/DL (ref 8–23)
BUN SERPL-MCNC: 14 MG/DL (ref 8–23)
BUN/CREAT SERPL: 17.9 (ref 7–25)
BUN/CREAT SERPL: 18.3 (ref 7–25)
CA-I BLDA-SCNC: 1.09 MMOL/L (ref 1.13–1.32)
CA-I BLDA-SCNC: 1.15 MMOL/L (ref 1.13–1.32)
CALCIUM SPEC-SCNC: 8.7 MG/DL (ref 8.6–10.5)
CALCIUM SPEC-SCNC: 8.9 MG/DL (ref 8.6–10.5)
CHLORIDE BLDA-SCNC: 100 MMOL/L (ref 98–106)
CHLORIDE BLDA-SCNC: 101 MMOL/L (ref 98–106)
CHLORIDE SERPL-SCNC: 101 MMOL/L (ref 98–107)
CHLORIDE SERPL-SCNC: 101 MMOL/L (ref 98–107)
CO2 SERPL-SCNC: 22.7 MMOL/L (ref 22–29)
CO2 SERPL-SCNC: 26.3 MMOL/L (ref 22–29)
COHGB MFR BLD: 0.8 % (ref 0–1.5)
COHGB MFR BLD: 1.1 % (ref 0–1.5)
CREAT SERPL-MCNC: 0.71 MG/DL (ref 0.57–1)
CREAT SERPL-MCNC: 0.78 MG/DL (ref 0.57–1)
D-LACTATE SERPL-SCNC: 1.3 MMOL/L (ref 0.5–2)
DEPRECATED RDW RBC AUTO: 50.1 FL (ref 37–54)
DEPRECATED RDW RBC AUTO: 50.8 FL (ref 37–54)
EGFRCR SERPLBLD CKD-EPI 2021: 81.8 ML/MIN/1.73
EGFRCR SERPLBLD CKD-EPI 2021: 91.6 ML/MIN/1.73
EOSINOPHIL # BLD AUTO: 0.01 10*3/MM3 (ref 0–0.4)
EOSINOPHIL NFR BLD AUTO: 0.1 % (ref 0.3–6.2)
ERYTHROCYTE [DISTWIDTH] IN BLOOD BY AUTOMATED COUNT: 14.9 % (ref 12.3–15.4)
ERYTHROCYTE [DISTWIDTH] IN BLOOD BY AUTOMATED COUNT: 15.1 % (ref 12.3–15.4)
FHHB: 11 % (ref 0–5)
FHHB: 6.7 % (ref 0–5)
GAS FLOW AIRWAY: 15 LPM
GAS FLOW AIRWAY: 15 LPM
GEN 5 2HR TROPONIN T REFLEX: 28 NG/L
GLOBULIN UR ELPH-MCNC: 2.7 GM/DL
GLOBULIN UR ELPH-MCNC: 3.4 GM/DL
GLUCOSE BLDA-MCNC: 136 MG/DL (ref 65–99)
GLUCOSE BLDA-MCNC: 144 MG/DL (ref 65–99)
GLUCOSE BLDC GLUCOMTR-MCNC: 120 MG/DL (ref 70–99)
GLUCOSE BLDC GLUCOMTR-MCNC: 124 MG/DL (ref 70–99)
GLUCOSE BLDC GLUCOMTR-MCNC: 127 MG/DL (ref 70–99)
GLUCOSE BLDC GLUCOMTR-MCNC: 145 MG/DL (ref 70–99)
GLUCOSE BLDC GLUCOMTR-MCNC: 147 MG/DL (ref 70–99)
GLUCOSE BLDC GLUCOMTR-MCNC: 155 MG/DL (ref 70–99)
GLUCOSE SERPL-MCNC: 136 MG/DL (ref 65–99)
GLUCOSE SERPL-MCNC: 153 MG/DL (ref 65–99)
HCO3 BLDA-SCNC: 26.1 MMOL/L (ref 22–26)
HCO3 BLDA-SCNC: 26.9 MMOL/L (ref 22–26)
HCT VFR BLD AUTO: 41.3 % (ref 34–46.6)
HCT VFR BLD AUTO: 41.9 % (ref 34–46.6)
HGB BLD-MCNC: 13.1 G/DL (ref 12–15.9)
HGB BLD-MCNC: 13.2 G/DL (ref 12–15.9)
HGB BLDA-MCNC: 13.9 G/DL (ref 11.7–14.6)
HGB BLDA-MCNC: 14.3 G/DL (ref 11.7–14.6)
IMM GRANULOCYTES # BLD AUTO: 0.09 10*3/MM3 (ref 0–0.05)
IMM GRANULOCYTES NFR BLD AUTO: 0.8 % (ref 0–0.5)
INHALED O2 CONCENTRATION: ABNORMAL %
INHALED O2 CONCENTRATION: ABNORMAL %
LACTATE BLDA-SCNC: 1.17 MMOL/L (ref 0.5–2)
LACTATE BLDA-SCNC: 1.37 MMOL/L (ref 0.5–2)
LYMPHOCYTES # BLD AUTO: 0.86 10*3/MM3 (ref 0.7–3.1)
LYMPHOCYTES NFR BLD AUTO: 7.7 % (ref 19.6–45.3)
MAGNESIUM SERPL-MCNC: 1.7 MG/DL (ref 1.6–2.4)
MAGNESIUM SERPL-MCNC: 1.8 MG/DL (ref 1.6–2.4)
MCH RBC QN AUTO: 28.8 PG (ref 26.6–33)
MCH RBC QN AUTO: 29.2 PG (ref 26.6–33)
MCHC RBC AUTO-ENTMCNC: 31.5 G/DL (ref 31.5–35.7)
MCHC RBC AUTO-ENTMCNC: 31.7 G/DL (ref 31.5–35.7)
MCV RBC AUTO: 91.5 FL (ref 79–97)
MCV RBC AUTO: 92.2 FL (ref 79–97)
METHGB BLD QL: 0.2 % (ref 0–1.5)
METHGB BLD QL: 0.3 % (ref 0–1.5)
MODALITY: ABNORMAL
MODALITY: ABNORMAL
MONOCYTES # BLD AUTO: 0.85 10*3/MM3 (ref 0.1–0.9)
MONOCYTES NFR BLD AUTO: 7.6 % (ref 5–12)
MRSA DNA SPEC QL NAA+PROBE: NORMAL
NEUTROPHILS NFR BLD AUTO: 83.4 % (ref 42.7–76)
NEUTROPHILS NFR BLD AUTO: 9.29 10*3/MM3 (ref 1.7–7)
NOTE: ABNORMAL
NOTE: ABNORMAL
NRBC BLD AUTO-RTO: 0.2 /100 WBC (ref 0–0.2)
NT-PROBNP SERPL-MCNC: 3335 PG/ML (ref 0–900)
OXYHGB MFR BLDV: 87.7 % (ref 94–99)
OXYHGB MFR BLDV: 92.2 % (ref 94–99)
PCO2 BLDA: 45.4 MM HG (ref 35–45)
PCO2 BLDA: 46.8 MM HG (ref 35–45)
PH BLDA: 7.38 PH UNITS (ref 7.35–7.45)
PH BLDA: 7.38 PH UNITS (ref 7.35–7.45)
PLATELET # BLD AUTO: 55 10*3/MM3 (ref 140–450)
PLATELET # BLD AUTO: 69 10*3/MM3 (ref 140–450)
PMV BLD AUTO: 13.5 FL (ref 6–12)
PMV BLD AUTO: 13.6 FL (ref 6–12)
PO2 BLD: ABNORMAL MM[HG]
PO2 BLD: ABNORMAL MM[HG]
PO2 BLDA: 55.2 MM HG (ref 80–100)
PO2 BLDA: 69.6 MM HG (ref 80–100)
POTASSIUM BLDA-SCNC: 4.32 MMOL/L (ref 3.5–5)
POTASSIUM BLDA-SCNC: 4.36 MMOL/L (ref 3.5–5)
POTASSIUM SERPL-SCNC: 4.5 MMOL/L (ref 3.5–5.2)
POTASSIUM SERPL-SCNC: 4.7 MMOL/L (ref 3.5–5.2)
PROT SERPL-MCNC: 6.1 G/DL (ref 6–8.5)
PROT SERPL-MCNC: 6.6 G/DL (ref 6–8.5)
RBC # BLD AUTO: 4.48 10*6/MM3 (ref 3.77–5.28)
RBC # BLD AUTO: 4.58 10*6/MM3 (ref 3.77–5.28)
SAO2 % BLDCOA: 88.9 % (ref 95–99)
SAO2 % BLDCOA: 93.2 % (ref 95–99)
SODIUM BLDA-SCNC: 133.8 MMOL/L (ref 136–146)
SODIUM BLDA-SCNC: 134.3 MMOL/L (ref 136–146)
SODIUM SERPL-SCNC: 134 MMOL/L (ref 136–145)
SODIUM SERPL-SCNC: 136 MMOL/L (ref 136–145)
TROPONIN T DELTA: 0 NG/L
TROPONIN T SERPL HS-MCNC: 28 NG/L
VANCOMYCIN SERPL-MCNC: 26.34 MCG/ML (ref 5–40)
WBC NRBC COR # BLD: 10.59 10*3/MM3 (ref 3.4–10.8)
WBC NRBC COR # BLD: 11.14 10*3/MM3 (ref 3.4–10.8)

## 2023-05-15 PROCEDURE — 82948 REAGENT STRIP/BLOOD GLUCOSE: CPT

## 2023-05-15 PROCEDURE — 82375 ASSAY CARBOXYHB QUANT: CPT | Performed by: STUDENT IN AN ORGANIZED HEALTH CARE EDUCATION/TRAINING PROGRAM

## 2023-05-15 PROCEDURE — 83735 ASSAY OF MAGNESIUM: CPT

## 2023-05-15 PROCEDURE — 97161 PT EVAL LOW COMPLEX 20 MIN: CPT

## 2023-05-15 PROCEDURE — 94799 UNLISTED PULMONARY SVC/PX: CPT

## 2023-05-15 PROCEDURE — 94660 CPAP INITIATION&MGMT: CPT

## 2023-05-15 PROCEDURE — 25010000002 ONDANSETRON PER 1 MG: Performed by: STUDENT IN AN ORGANIZED HEALTH CARE EDUCATION/TRAINING PROGRAM

## 2023-05-15 PROCEDURE — 82140 ASSAY OF AMMONIA: CPT | Performed by: STUDENT IN AN ORGANIZED HEALTH CARE EDUCATION/TRAINING PROGRAM

## 2023-05-15 PROCEDURE — 63710000001 INSULIN DETEMIR PER 5 UNITS: Performed by: STUDENT IN AN ORGANIZED HEALTH CARE EDUCATION/TRAINING PROGRAM

## 2023-05-15 PROCEDURE — 25010000002 METHYLPREDNISOLONE PER 125 MG

## 2023-05-15 PROCEDURE — 83735 ASSAY OF MAGNESIUM: CPT | Performed by: FAMILY MEDICINE

## 2023-05-15 PROCEDURE — 36600 WITHDRAWAL OF ARTERIAL BLOOD: CPT | Performed by: STUDENT IN AN ORGANIZED HEALTH CARE EDUCATION/TRAINING PROGRAM

## 2023-05-15 PROCEDURE — 83050 HGB METHEMOGLOBIN QUAN: CPT | Performed by: STUDENT IN AN ORGANIZED HEALTH CARE EDUCATION/TRAINING PROGRAM

## 2023-05-15 PROCEDURE — 71045 X-RAY EXAM CHEST 1 VIEW: CPT

## 2023-05-15 PROCEDURE — 80053 COMPREHEN METABOLIC PANEL: CPT | Performed by: FAMILY MEDICINE

## 2023-05-15 PROCEDURE — 83050 HGB METHEMOGLOBIN QUAN: CPT | Performed by: FAMILY MEDICINE

## 2023-05-15 PROCEDURE — 93005 ELECTROCARDIOGRAM TRACING: CPT

## 2023-05-15 PROCEDURE — 80202 ASSAY OF VANCOMYCIN: CPT

## 2023-05-15 PROCEDURE — 87641 MR-STAPH DNA AMP PROBE: CPT | Performed by: FAMILY MEDICINE

## 2023-05-15 PROCEDURE — 25010000002 PROCHLORPERAZINE 10 MG/2ML SOLUTION: Performed by: FAMILY MEDICINE

## 2023-05-15 PROCEDURE — 93010 ELECTROCARDIOGRAM REPORT: CPT | Performed by: INTERNAL MEDICINE

## 2023-05-15 PROCEDURE — 99233 SBSQ HOSP IP/OBS HIGH 50: CPT | Performed by: FAMILY MEDICINE

## 2023-05-15 PROCEDURE — 84484 ASSAY OF TROPONIN QUANT: CPT

## 2023-05-15 PROCEDURE — 36600 WITHDRAWAL OF ARTERIAL BLOOD: CPT | Performed by: FAMILY MEDICINE

## 2023-05-15 PROCEDURE — 83880 ASSAY OF NATRIURETIC PEPTIDE: CPT | Performed by: FAMILY MEDICINE

## 2023-05-15 PROCEDURE — 85027 COMPLETE CBC AUTOMATED: CPT | Performed by: FAMILY MEDICINE

## 2023-05-15 PROCEDURE — 85025 COMPLETE CBC W/AUTO DIFF WBC: CPT

## 2023-05-15 PROCEDURE — 0 CEFEPIME PER 500 MG

## 2023-05-15 PROCEDURE — 83605 ASSAY OF LACTIC ACID: CPT

## 2023-05-15 PROCEDURE — 63710000001 INSULIN LISPRO (HUMAN) PER 5 UNITS: Performed by: STUDENT IN AN ORGANIZED HEALTH CARE EDUCATION/TRAINING PROGRAM

## 2023-05-15 PROCEDURE — 82805 BLOOD GASES W/O2 SATURATION: CPT | Performed by: STUDENT IN AN ORGANIZED HEALTH CARE EDUCATION/TRAINING PROGRAM

## 2023-05-15 PROCEDURE — 94664 DEMO&/EVAL PT USE INHALER: CPT

## 2023-05-15 PROCEDURE — 82375 ASSAY CARBOXYHB QUANT: CPT | Performed by: FAMILY MEDICINE

## 2023-05-15 PROCEDURE — 25010000002 FUROSEMIDE PER 20 MG: Performed by: STUDENT IN AN ORGANIZED HEALTH CARE EDUCATION/TRAINING PROGRAM

## 2023-05-15 PROCEDURE — 25010000002 VANCOMYCIN 5 G RECONSTITUTED SOLUTION: Performed by: STUDENT IN AN ORGANIZED HEALTH CARE EDUCATION/TRAINING PROGRAM

## 2023-05-15 PROCEDURE — 82805 BLOOD GASES W/O2 SATURATION: CPT | Performed by: FAMILY MEDICINE

## 2023-05-15 PROCEDURE — 80053 COMPREHEN METABOLIC PANEL: CPT

## 2023-05-15 PROCEDURE — 25010000002 MAGNESIUM SULFATE 2 GM/50ML SOLUTION: Performed by: FAMILY MEDICINE

## 2023-05-15 RX ORDER — LACTULOSE 10 G/15ML
20 SOLUTION ORAL 3 TIMES DAILY
Status: DISCONTINUED | OUTPATIENT
Start: 2023-05-16 | End: 2023-05-16

## 2023-05-15 RX ORDER — METHYLPREDNISOLONE SODIUM SUCCINATE 40 MG/ML
INJECTION, POWDER, LYOPHILIZED, FOR SOLUTION INTRAMUSCULAR; INTRAVENOUS
Status: DISPENSED
Start: 2023-05-15 | End: 2023-05-16

## 2023-05-15 RX ORDER — MAGNESIUM SULFATE HEPTAHYDRATE 40 MG/ML
2 INJECTION, SOLUTION INTRAVENOUS ONCE
Status: COMPLETED | OUTPATIENT
Start: 2023-05-15 | End: 2023-05-15

## 2023-05-15 RX ORDER — METOPROLOL SUCCINATE 50 MG/1
100 TABLET, EXTENDED RELEASE ORAL
Status: DISCONTINUED | OUTPATIENT
Start: 2023-05-16 | End: 2023-05-18

## 2023-05-15 RX ORDER — METOPROLOL SUCCINATE 25 MG/1
50 TABLET, EXTENDED RELEASE ORAL ONCE
Status: COMPLETED | OUTPATIENT
Start: 2023-05-15 | End: 2023-05-15

## 2023-05-15 RX ORDER — FUROSEMIDE 10 MG/ML
40 INJECTION INTRAMUSCULAR; INTRAVENOUS ONCE
Status: COMPLETED | OUTPATIENT
Start: 2023-05-15 | End: 2023-05-15

## 2023-05-15 RX ORDER — METHYLPREDNISOLONE SODIUM SUCCINATE 125 MG/2ML
125 INJECTION, POWDER, LYOPHILIZED, FOR SOLUTION INTRAMUSCULAR; INTRAVENOUS ONCE
Status: COMPLETED | OUTPATIENT
Start: 2023-05-15 | End: 2023-05-15

## 2023-05-15 RX ADMIN — CEFEPIME HYDROCHLORIDE 2 G: 2 INJECTION, POWDER, FOR SOLUTION INTRAMUSCULAR; INTRAVENOUS at 02:15

## 2023-05-15 RX ADMIN — CEFEPIME HYDROCHLORIDE 2 G: 2 INJECTION, POWDER, FOR SOLUTION INTRAMUSCULAR; INTRAVENOUS at 17:43

## 2023-05-15 RX ADMIN — HYDROCODONE BITARTRATE AND ACETAMINOPHEN 1 TABLET: 10; 325 TABLET ORAL at 11:25

## 2023-05-15 RX ADMIN — ARFORMOTEROL TARTRATE 15 MCG: 15 SOLUTION RESPIRATORY (INHALATION) at 08:59

## 2023-05-15 RX ADMIN — PROCHLORPERAZINE EDISYLATE 5 MG: 5 INJECTION, SOLUTION INTRAMUSCULAR; INTRAVENOUS at 11:37

## 2023-05-15 RX ADMIN — METOPROLOL SUCCINATE 50 MG: 50 TABLET, EXTENDED RELEASE ORAL at 08:25

## 2023-05-15 RX ADMIN — METHYLPREDNISOLONE SODIUM SUCCINATE 125 MG: 125 INJECTION INTRAMUSCULAR; INTRAVENOUS at 21:41

## 2023-05-15 RX ADMIN — ATORVASTATIN CALCIUM 20 MG: 20 TABLET, FILM COATED ORAL at 23:28

## 2023-05-15 RX ADMIN — LEVALBUTEROL HYDROCHLORIDE 1.25 MG: 1.25 SOLUTION RESPIRATORY (INHALATION) at 02:30

## 2023-05-15 RX ADMIN — Medication 10 ML: at 18:41

## 2023-05-15 RX ADMIN — MAGNESIUM SULFATE HEPTAHYDRATE 2 G: 2 INJECTION, SOLUTION INTRAVENOUS at 11:13

## 2023-05-15 RX ADMIN — METOPROLOL TARTRATE 2.5 MG: 1 INJECTION, SOLUTION INTRAVENOUS at 06:28

## 2023-05-15 RX ADMIN — METOPROLOL SUCCINATE 50 MG: 25 TABLET, EXTENDED RELEASE ORAL at 21:40

## 2023-05-15 RX ADMIN — VANCOMYCIN HYDROCHLORIDE 1250 MG: 5 INJECTION, POWDER, LYOPHILIZED, FOR SOLUTION INTRAVENOUS at 02:54

## 2023-05-15 RX ADMIN — INSULIN LISPRO 5 UNITS: 100 INJECTION, SOLUTION INTRAVENOUS; SUBCUTANEOUS at 17:51

## 2023-05-15 RX ADMIN — DIGOXIN 250 MCG: 250 TABLET ORAL at 08:25

## 2023-05-15 RX ADMIN — METOPROLOL TARTRATE 2.5 MG: 1 INJECTION, SOLUTION INTRAVENOUS at 02:31

## 2023-05-15 RX ADMIN — PREGABALIN 150 MG: 75 CAPSULE ORAL at 08:25

## 2023-05-15 RX ADMIN — METOPROLOL TARTRATE 2.5 MG: 1 INJECTION, SOLUTION INTRAVENOUS at 18:39

## 2023-05-15 RX ADMIN — PREGABALIN 150 MG: 75 CAPSULE ORAL at 21:40

## 2023-05-15 RX ADMIN — INSULIN LISPRO 5 UNITS: 100 INJECTION, SOLUTION INTRAVENOUS; SUBCUTANEOUS at 08:26

## 2023-05-15 RX ADMIN — FUROSEMIDE 40 MG: 10 INJECTION, SOLUTION INTRAMUSCULAR; INTRAVENOUS at 22:59

## 2023-05-15 RX ADMIN — HYDROCODONE BITARTRATE AND ACETAMINOPHEN 1 TABLET: 10; 325 TABLET ORAL at 06:33

## 2023-05-15 RX ADMIN — Medication 10 ML: at 08:26

## 2023-05-15 RX ADMIN — PREGABALIN 150 MG: 75 CAPSULE ORAL at 16:08

## 2023-05-15 RX ADMIN — Medication 1000 UNITS: at 08:25

## 2023-05-15 RX ADMIN — RIVAROXABAN 20 MG: 20 TABLET, FILM COATED ORAL at 17:43

## 2023-05-15 RX ADMIN — MULTIPLE VITAMINS W/ MINERALS TAB 1 TABLET: TAB at 08:25

## 2023-05-15 RX ADMIN — Medication 10 ML: at 21:57

## 2023-05-15 RX ADMIN — PYRIDOXINE HCL TAB 50 MG 200 MG: 50 TAB at 08:24

## 2023-05-15 RX ADMIN — INSULIN DETEMIR 16 UNITS: 100 INJECTION, SOLUTION SUBCUTANEOUS at 21:41

## 2023-05-15 RX ADMIN — METOPROLOL TARTRATE 2.5 MG: 1 INJECTION, SOLUTION INTRAVENOUS at 14:07

## 2023-05-15 RX ADMIN — INSULIN LISPRO 6 UNITS: 100 INJECTION, SOLUTION INTRAVENOUS; SUBCUTANEOUS at 12:21

## 2023-05-15 RX ADMIN — PROCHLORPERAZINE EDISYLATE 5 MG: 5 INJECTION, SOLUTION INTRAMUSCULAR; INTRAVENOUS at 23:19

## 2023-05-15 RX ADMIN — ARFORMOTEROL TARTRATE 15 MCG: 15 SOLUTION RESPIRATORY (INHALATION) at 22:37

## 2023-05-15 RX ADMIN — HYDROCODONE BITARTRATE AND ACETAMINOPHEN 1 TABLET: 10; 325 TABLET ORAL at 02:15

## 2023-05-15 RX ADMIN — Medication 10 ML: at 18:40

## 2023-05-15 RX ADMIN — ONDANSETRON 4 MG: 2 INJECTION INTRAMUSCULAR; INTRAVENOUS at 18:51

## 2023-05-15 RX ADMIN — CEFEPIME HYDROCHLORIDE 2 G: 2 INJECTION, POWDER, FOR SOLUTION INTRAMUSCULAR; INTRAVENOUS at 11:13

## 2023-05-15 NOTE — SIGNIFICANT NOTE
Wound Eval / Progress Noted    GUERO Bautista     Patient Name: Mary Albarran  : 1950  MRN: [id number]  Today's Date: 5/15/2023                 Admit Date: 2023    Visit Dx:    ICD-10-CM ICD-9-CM   1. Atrial fibrillation with RVR  I48.91 427.31   2. Acute gastroenteritis  K52.9 558.9   3. Difficulty walking  R26.2 719.7       Patient Active Problem List   Diagnosis   • Aortic valve disease   • Atrial fibrillation   • Chronic kidney disease, stage III (moderate)   • Hyperlipidemia   • Hypertension   • Chronic diastolic congestive heart failure   • Atrial fibrillation with RVR        Past Medical History:   Diagnosis Date   • Allergic rhinitis    • Anxiety    • Aortic valve disease 2021    Fibrocalcific changes of the aortic valve with mild aortic valve stenosis   on echo 3/2/2021  Moderate aortic valve regurgitation is present. Aortic valve maximum pressure gradient is 26 mmHg. Moderate aortic valve stenosis is present.  On echo 2/10/2022      • Arthritis    • Atrial fibrillation    • Chronic kidney disease (CKD), stage III (moderate)    • Chronic pain    • COPD (chronic obstructive pulmonary disease)    • Diabetes mellitus type 2 with complications    • Ex-smoker     QUIT SMOKING    • Hyperlipidemia    • Hypertension    • Thrombocytopenia         Past Surgical History:   Procedure Laterality Date   • HYSTERECTOMY      30 YEARS AGO         Physical Assessment:  Wound 23 0210 Left lower leg Venous Ulcer (Active)   Wound Image    05/15/23 1215   Dressing Appearance intact;dry 05/15/23 1215   Closure None 05/15/23 1215   Base yellow;dry;red 05/15/23 1215   Periwound intact;dry;edematous;redness 05/15/23 1215   Periwound Temperature warm 05/15/23 1215   Periwound Skin Turgor firm 05/15/23 1215   Edges rolled/closed 05/15/23 1215   Drainage Amount none 05/15/23 1215   Care, Wound cleansed with;other (see comments) 05/15/23 1215   Dressing Care dressing applied;abdominal pad;tubular wrap;gauze  05/15/23 1215   Periwound Care absorptive dressing applied 05/15/23 1215       Wound 05/13/23 0210 Right lower leg Venous Ulcer (Active)   Wound Image      05/15/23 1215   Dressing Appearance intact;dry 05/15/23 1215   Closure None 05/15/23 1215   Base dry;yellow;red;scab;moist 05/15/23 1215   Periwound intact;edematous;redness;dry 05/15/23 1215   Periwound Temperature warm 05/15/23 1215   Periwound Skin Turgor soft 05/15/23 1215   Edges open;rolled/closed 05/15/23 1215   Drainage Amount none 05/15/23 1215   Care, Wound cleansed with;other (see comments) 05/15/23 1215   Dressing Care dressing applied;abdominal pad;tubular wrap;gauze 05/15/23 1215   Periwound Care absorptive dressing applied 05/15/23 1215        Wound Check / Follow-up:  Patient seen today for a wound consult.  When asked about wound care at home, the patient reports that she does the care at home; she also reports that she is not seen at the wound clinic or by a home health agency. She states that when she cares for her BLE she apply silvadene all over her legs and then wraps her legs with unna boot, all of which she reports she orders off of [employer]. Patient is open to other options for wound care at this time, she says that she has not seen progress with the unna boot.  Patient with dry thickened tissue to bilateral anterior lower legs, feet and ankles. Chronic discoloration noted to the BLE / feet as we.. Cleansed with CHG bath and applied ABD pads, gauze roll, and elastic bandages. Recommend cleansing daily with CHG bath and then applying a thin layer of triamcinolone cream to legs and feet. Wrap with gauze roll and elastic bandages.     Impression: edema, thickened dry skin to BLE / feet    Short term goals:  Regain skin integrity, pressure reduction, skin protection, edema management, daily dressing changes and topical treatment    Arminda Alvarez RN    5/15/2023    15:41 EDT

## 2023-05-15 NOTE — PLAN OF CARE
Goal Outcome Evaluation:           Progress: no change  Outcome Evaluation: Patient remains alert and oriented. Pain controled with PRN meds. Patient remains on 6L NC with SATs in mid 90s. Patients HR elevated with ambulation. HR into the 150s. 2.5 mgs of lopressor administered. HR back into the 110s to 120s. Rythym remains a fib. Diet remains clear liquid. No S or S on nausia. BS monitored, insulin administered per glucommander.

## 2023-05-15 NOTE — PROGRESS NOTES
[facility]   Hospitalist Progress Note  Date: 5/15/2023  Patient Name: Mary Albarran  : 1950  MRN: [id number]  Date of admission: 2023      Subjective   Subjective     Chief Complaint: Nausea vomiting follow-up    Summary:Mary Albarran is a 72 y.o. female with a past medical history of A-fib on Xarelto, CKD stage III, COPD, type 2 diabetes mellitus, hyperlipidemia, hypertension presented to the ED for evaluation of nausea, vomiting since yesterday.  Patient went to cardiology clinic yesterday and had an appointment with Dr. Metcalf, had cardiac echo done and after that when she came home she started experiencing nausea , associated with multiple episodes of vomiting since yesterday.  Mild intermittent diffuse cramping abdominal pain was noted.  Denies fevers, cough, shortness of breath, chest pain, diarrhea, dysuria, visual changes, focal weakness, numbness, tingling, palpitations, lightheadedness.     On arrival to the ED, vital signs temperature 99.2, pulse 146, respiratory 20, blood pressure 147/71 on 2 L of nasal cannula saturating around 91%.  EKG showed A-fib with RVR.  Labs showed troponin 17, proBNP 976, rest of the CMP within normal limits except creatinine 1.05, WBC 26, hemoglobin 16, hematocrit 49, platelets 87, digoxin level within therapeutic range.  Chest x-ray showed no acute cardiopulmonary process.  Patient had a cardiac echo yesterday which showed LVEF 60-65%, left ventricular diastolic function was indeterminate.  Mildly dilated left atrial cavity.  Mild to early moderate aortic insufficiency.  Patient appears to be in coarse atrial fibrillation/flutter during my exam.  Received IV diltiazem loading dose and started on diltiazem drip.  Patient has been admitted for further evaluation and management of nausea and vomiting with associated A-fib with RVR.  Patient continued IV fluids and Cardizem drip.  Weaned off of Cardizem drip.  Cardiology consulted.  A-fib RVR thought likely  secondary to volume depletion.  Abdominal pain improved and was able to tolerate clears.  Diet advanced but patient not able to tolerate episodes of abdominal pain nausea vomiting.  CT of the abdomen demonstrated wall thickening at the proximal duodenum and adjacent fat stranding concerning for enteritis.  Started on empiric antibiotics.  Diet downgraded back to clears.  CT chest demonstrated septal thickening patchy bilateral groundglass opacities similar to finding in 2008.  Also found to have small pulmonary nodules bilaterally.  Radiology recommending 6-month follow-up.  IV fluids stopped.  Abdominal pain improving.  Diet slowly advanced.  Heart rate remained difficult to control.  Continued on as needed Lopressor pushes and home metoprolol titrated up.  O2 requirement slowly trending up.  Patient short of breath and tachycardic on the evening of 5/15/2023 when she got up to the commode sats in the 70s.  Placed on 15 L high flow.  RRT called.  ABG showed mild hypercapnia without acidosis.  Chest x-ray pending.  Patient given IV Solu-Medrol.  Patient transferred to the ICU.      Interval Followup: Patient seen earlier this morning prior to RRT.  Patient sitting up in bedside chair resting comfortably with family at the bedside.  Patient indicates abdominal pain much improved asking to advance diet.  Afebrile overnight.  Remains A-fib 80s to 140s on telemetry review.  Blood pressure within normal limits.  Requiring 5 to 6 L nasal cannula.  Acidosis resolved.  No other issues per nursing.      Review of Systems  Constitutional: Negative for fatigue and fever.   HENT: Negative for sore throat and trouble swallowing.    Eyes: Negative for pain and discharge.   Respiratory: Negative for cough and positive shortness of breath.    Cardiovascular: Negative for chest pain and palpitations.   Gastrointestinal: Negative for abdominal pain, nausea and vomiting.   Endocrine: Negative for cold intolerance and heat intolerance.    Genitourinary: Negative for difficulty urinating and dysuria.   Musculoskeletal: Negative for back pain and neck stiffness.   Skin: Negative for color change and rash.   Neurological: Negative for syncope and headaches.   Hematological: Negative for adenopathy.   Psychiatric/Behavioral: Negative for confusion and hallucinations.    Objective   Objective     Vitals:   Temp:  [97.9 °F (36.6 °C)-100.4 °F (38 °C)] 100.4 °F (38 °C)  Heart Rate:  [] 138  Resp:  [16-20] 18  BP: (119-174)/(62-85) 174/64  Flow (L/min):  [3-15] 15  Physical Exam   Gen. well-developed appearing stated age in no acute distress  HEENT: Normocephalic atraumatic moist membranes pupils equal round reactive light, no scleral icterus no conjunctival injection  Cardiovascular: Irregularly irregular no lower extremity edema appreciated  Pulmonary: Clear to auscultation bilaterally crackles bilateral lower lung fields, no wheezes or rhonchi symmetric chest expansion, unlabored, no conversational dyspnea appreciated  Gastrointestinal: Soft nontender nondistended positive bowel sounds all 4 quadrants no rebound or guarding  Musculoskeletal: No clubbing cyanosis, warm and well-perfused, calves soft symmetric nontender bilaterally  Skin: Clean dry without rashs  Neuro: Cranial nerves II through XII intact grossly no sensorimotor deficits appreciated bilateral upper and lower extremities  Psych: Patient is calm cooperative and appropriate with exam not responding to internal stimuli  : No Maldonado catheter no bladder distention no suprapubic tenderness    Result Review    Result Review:  I have personally reviewed these results and agree with these findings:  [x]  Laboratory  LAB RESULTS:      Lab 05/15/23  1931 05/15/23  0549 05/14/23  0427 05/13/23  0313 05/12/23  2201   WBC  --  10.59 13.33* 22.26* 26.10*   HEMOGLOBIN  --  13.1 13.4 14.8 16.0*   HEMATOCRIT  --  41.3 42.4 46.1 49.2*   PLATELETS  --  55* 55* 83* 87*   NEUTROS ABS  --   --   --   19.92* 24.49*   IMMATURE GRANS (ABS)  --   --   --  0.17* 0.16*   LYMPHS ABS  --   --   --  1.35 0.88   MONOS ABS  --   --   --  0.77 0.51   EOS ABS  --   --   --  0.00 0.00   MCV  --  92.2 92.6 90.2 88.8   PROCALCITONIN  --   --   --   --  0.45*   LACTATE, ARTERIAL 1.37  --   --   --   --          Lab 05/15/23  1931 05/15/23  0549 05/14/23 0427 05/13/23 0313 05/12/23 2201   SODIUM  --  136 137 138 140   SODIUM, ARTERIAL 134.3*  --   --   --   --    POTASSIUM  --  4.7 4.8 4.7 4.7   CHLORIDE  --  101 102 103 104   CO2  --  26.3 26.3 23.9 23.3   ANION GAP  --  8.7 8.7 11.1 12.7   BUN  --  14 15 16 15   CREATININE  --  0.78 0.83 1.04* 1.05*   EGFR  --  81.8 75.9 57.9* 57.3*   GLUCOSE  --  153* 151* 189* 233*   GLUCOSE, ARTERIAL 144*  --   --   --   --    CALCIUM  --  8.7 8.6 9.0 9.4   IONIZED CALCIUM 1.15  --   --   --   --    MAGNESIUM  --  1.7  --  1.8  --    PHOSPHORUS  --   --  3.0 4.0  --          Lab 05/15/23  0549 05/14/23  0427 05/13/23  0313 05/12/23  2201   TOTAL PROTEIN 6.1  --  6.4 7.2   ALBUMIN 3.4* 3.1* 3.6 3.9   GLOBULIN 2.7  --  2.8 3.3   ALT (SGPT) 17  --  18 20   AST (SGOT) 20  --  20 21   BILIRUBIN 1.1  --  1.0 1.1   ALK PHOS 56  --  64 78         Lab 05/12/23 2201   PROBNP 976.7*   HSTROP T 17*                 Lab 05/15/23  1931   PH, ARTERIAL 7.378   PCO2, ARTERIAL 46.8*   PO2 ART 55.2*   O2 SATURATION ART 88.9*   HCO3 ART 26.9*   BASE EXCESS ART 1.2   CARBOXYHEMOGLOBIN 1.1     Brief Urine Lab Results  (Last result in the past 365 days)      Color   Clarity   Blood   Leuk Est   Nitrite   Protein   CREAT   Urine HCG        05/13/23 0415 Yellow   Clear   Negative   Negative   Negative   30 mg/dL (1+)               Microbiology Results (last 10 days)     Procedure Component Value - Date/Time    MRSA Screen, PCR (Inpatient) - Swab, Nares [[id number]]  (Normal) Collected: 05/15/23 0216    Lab Status: Final result Specimen: Swab from Nares Updated: 05/15/23 0408     MRSA PCR No MRSA Detected     Narrative:      The negative predictive value of this diagnostic test is high and should only be used to consider de-escalating anti-MRSA therapy. A positive result may indicate colonization with MRSA and must be correlated clinically.    Blood Culture - Blood, Arm, Left [[id number]]  (Normal) Collected: 05/13/23 0105    Lab Status: Preliminary result Specimen: Blood from Arm, Left Updated: 05/15/23 0131     Blood Culture No growth at 2 days    Blood Culture - Blood, Arm, Right [[id number]]  (Normal) Collected: 05/13/23 0105    Lab Status: Preliminary result Specimen: Blood from Arm, Right Updated: 05/15/23 0131     Blood Culture No growth at 2 days          [x]  Microbiology  [x]  Radiology  CT Abdomen Pelvis Without Contrast    Result Date: 5/14/2023    1. Wall thickening at the proximal duodenum with adjacent fat stranding.  The appearance is nonspecific.  Consider enteritis or peptic ulcer disease.  2. Cholelithiasis. 3. Hepatomegaly and architectural changes in the liver that might reflect cirrhosis.  Mild splenomegaly could indicate portal venous hypertension or be a function of body habitus. 4. Small nonobstructing right-sided kidney stones. 5. Significant multilevel lumbar spondylosis.     LORETTA DAVALOS MD       Electronically Signed and Approved By: LORETTA DAVALOS MD on 5/14/2023 at 8:01             CT Chest Without Contrast Diagnostic    Result Date: 5/14/2023    1. Interlobular septal thickening and patchy areas of ground-glass opacity in both lungs.  This could be infectious or inflammatory.  The lungs have a similar appearance in 2008.  This may represent a recurrent or residual process. 2. Cardiomegaly, coronary artery disease and aortic atherosclerosis. 3. Trace bilateral pleural effusions. 4. Small nodules in the each lung, which could be infectious or inflammatory, but a 6 month follow-up chest CT is recommended to further evaluate.     LORETTA DAVALOS MD       Electronically Signed and Approved By:  LORETTA DAVALOS MD on 5/14/2023 at 7:58             XR Chest 1 View    Result Date: 5/12/2023    No definite acute cardiopulmonary abnormality.       JULIO CÉSAR POMPA MD       Electronically Signed and Approved By: JULIO CÉSAR POMPA MD on 5/12/2023 at 23:26               [x]  EKG/Telemetry   []  Cardiology/Vascular   []  Pathology  []  Old records  [x]  Other:  Scheduled Meds:arformoterol, 15 mcg, Nebulization, BID - RT  atorvastatin, 20 mg, Oral, Nightly  cefepime, 2 g, Intravenous, Q8H  cholecalciferol, 1,000 Units, Oral, Daily  digoxin, 125 mcg, Oral, Once per day on Sun Thu   And  digoxin, 250 mcg, Oral, Once per day on Mon Tue Wed Fri Sat  insulin detemir, 1-200 Units, Subcutaneous, Nightly - Glucommander  insulin lispro, 1-200 Units, Subcutaneous, 4x Daily With Meals & Nightly  [START ON 5/16/2023] metoprolol succinate XL, 100 mg, Oral, Q24H  multivitamin with minerals, 1 tablet, Oral, Daily  pregabalin, 150 mg, Oral, TID  rivaroxaban, 20 mg, Oral, Daily  sodium chloride, 10 mL, Intravenous, Q12H  [START ON 5/16/2023] triamcinolone, 1 application, Topical, Daily  vitamin B-6, 200 mg, Oral, Daily      Continuous Infusions:dilTIAZem, 5-15 mg/hr, Last Rate: Stopped (05/13/23 0807)  Pharmacy to Dose Cefepime,       PRN Meds:.•  dextrose  •  dextrose  •  glucagon (human recombinant)  •  HYDROcodone-acetaminophen  •  insulin lispro  •  levalbuterol  •  metoprolol tartrate  •  nitroglycerin  •  ondansetron  •  Pharmacy to Dose Cefepime  •  prochlorperazine  •  sodium chloride  •  sodium chloride  •  sodium chloride      Assessment & Plan   Assessment / Plan     Assessment/Plan:  Sepsis secondary to duodenitis present on admission  Duodenitis  A-fib RVR  Acute hypoxic respiratory failure  Leukocytosis  CKD 3A  COPD without acute exacerbation  Type 2 diabetes mellitus  Hyperlipidemia  Hypertension  Thrombocytopenia due to sepsis and suspected cirrhosis  Concern for cirrhosis  Bilateral pulmonary nodules      Patient  admitted for further evaluation and treatment  Cardiology consulted thank you for your assistance  Continue home digoxin and metoprolol  Continue as needed Lopressor  Continue Xarelto  Advance diet as tolerated  Continue cefepime empirically  MRSA negative by PCR to stop vancomycin  Continue supplemental nasal cannula oxygen titrate to keep sats greater than 90%.  Patient could benefit from BiPAP if able to tolerate  Follow-up chest x-ray  Get BNP  Continue to monitor leukocytosis  Continue monitor renal function electrolytes  Continue Brovana  Continue Levemir and sliding scale insulin and titrate per protocol  Continue atorvastatin  Suspect thrombocytopenia due to cirrhosis with sepsis  Repeat CT chest in 6 months per radiology recommendations.  Consult physical therapy to evaluate and treat  Further inpatient orders recommendations pending clinical course.         Discussed plan with bedside RN as well as cardiology attending.    Disposition: PT OT eval's pending.  Repeat CT chest in 6 months per radiology recommendations.    DVT prophylaxis:  Medical DVT prophylaxis orders are present.    CODE STATUS:   Level Of Support Discussed With: Patient  Code Status (Patient has no pulse and is not breathing): CPR (Attempt to Resuscitate)  Medical Interventions (Patient has pulse or is breathing): Full Support

## 2023-05-15 NOTE — PLAN OF CARE
Goal Outcome Evaluation:    AAO X4. TOLERATING 3-6L/NC & ACTIVITY AD ENID & STAND-BY ASSIST. SEE EMAR FOR PRN PAIN, NAUSEA, & METOPROLOL MEDS.     PATIENT DECLINING TO TCDB &/OR USE IS. WILL CONTINUE TO ENCOURAGE.    CM: GERMÁN KIRKR.

## 2023-05-15 NOTE — PLAN OF CARE
Goal Outcome Evaluation:  Plan of Care Reviewed With: patient           Outcome Evaluation: Patient presents with decreased trunk, transfers, and functional mobility.  She will benefit from inpatient PT services and home health services upon return home.  Patient has a rolling walker to use upon return home.      PT Evaluation Complexity  History, PT Evaluation Complexity: no personal factors and/or comorbidities  Examination of Body Systems (PT Eval Complexity): total of 4 or more elements  Clinical Presentation (PT Evaluation Complexity): stable  Clinical Decision Making (PT Evaluation Complexity): low complexity  Overall Complexity (PT Evaluation Complexity): low complexity

## 2023-05-15 NOTE — THERAPY EVALUATION
Acute Care - Physical Therapy Initial Evaluation   [facility]     Patient Name: Mary Albarran  : 1950  MRN: [id number]  Today's Date: 5/15/2023      Visit Dx:     ICD-10-CM ICD-9-CM   1. Atrial fibrillation with RVR  I48.91 427.31   2. Acute gastroenteritis  K52.9 558.9   3. Difficulty walking  R26.2 719.7     Patient Active Problem List   Diagnosis   • Aortic valve disease   • Atrial fibrillation   • Chronic kidney disease, stage III (moderate)   • Hyperlipidemia   • Hypertension   • Chronic diastolic congestive heart failure   • Atrial fibrillation with RVR     Past Medical History:   Diagnosis Date   • Allergic rhinitis    • Anxiety    • Aortic valve disease 2021    Fibrocalcific changes of the aortic valve with mild aortic valve stenosis   on echo 3/2/2021  Moderate aortic valve regurgitation is present. Aortic valve maximum pressure gradient is 26 mmHg. Moderate aortic valve stenosis is present.  On echo 2/10/2022      • Arthritis    • Atrial fibrillation    • Chronic kidney disease (CKD), stage III (moderate)    • Chronic pain    • COPD (chronic obstructive pulmonary disease)    • Diabetes mellitus type 2 with complications    • Ex-smoker     QUIT SMOKING    • Hyperlipidemia    • Hypertension    • Thrombocytopenia      Past Surgical History:   Procedure Laterality Date   • HYSTERECTOMY      30 YEARS AGO     PT Assessment (last 12 hours)     PT Evaluation and Treatment     Row Name 05/15/23 1400          Physical Therapy Time and Intention    Subjective Information complains of;weakness;fatigue  -DP     Document Type evaluation  -DP     Patient Effort good  -DP     Row Name 05/15/23 1400          General Information    Patient Profile Reviewed yes  -DP     Patient Observations alert;cooperative;agree to therapy  -DP     Prior Level of Function independent:;gait;transfer;ADL's;bed mobility  -DP     Equipment Currently Used at Home none  rollingwalker outside  -DP     Existing  Precautions/Restrictions fall  -DP     Barriers to Rehab none identified  -DP     Row Name 05/15/23 1400          Living Environment    Current Living Arrangements home  -DP     Home Accessibility stairs to enter home  -DP     People in Home spouse  -DP     Row Name 05/15/23 1400          Home Main Entrance    Number of Stairs, Main Entrance eight  -DP     Row Name 05/15/23 1400          Home Use of Assistive/Adaptive Equipment    Equipment Currently Used at Home walker, rolling;cane, straight  -DP     Row Name 05/15/23 1400          Range of Motion (ROM)    Range of Motion bilateral lower extremities;ROM is WFL  -DP     Row Name 05/15/23 1400          Strength (Manual Muscle Testing)    Strength (Manual Muscle Testing) bilateral lower extremities;strength is WFL  -DP     Row Name 05/15/23 1400          Bed Mobility    Comment, (Bed Mobility) Not tested up in chair  -DP     Row Name 05/15/23 1400          Transfers    Transfers sit-stand transfer  -DP     Row Name 05/15/23 1400          Sit-Stand Transfer    Sit-Stand [place] (Transfers) contact guard  -DP     Row Name 05/15/23 1400          Gait/Stairs (Locomotion)    Gait/Stairs Locomotion gait/ambulation assistive device  -DP     [place] Level (Gait) contact guard  -DP     Assistive Device (Gait) walker, front-wheeled  -DP     Distance in Feet (Gait) 12  -DP     Row Name 05/15/23 1400          Balance    Balance Assessment standing dynamic balance  -DP     Dynamic Standing Balance contact guard  -DP     Position/Device Used, Standing Balance supported;walker, front-wheeled  -DP     Row Name             Wound 05/13/23 0210 Left lower leg Venous Ulcer    Wound - Properties Group Placement Date: 05/13/23 -TH Placement Time: 0210 -TH Present on Hospital Admission: Y  -TH Side: Left  -TH Orientation: lower  -TH Location: leg  -TH Primary Wound Type: Venous ulcer  -TH    Retired Wound - Properties Group Placement Date: 05/13/23 -TH Placement Time: 0210   -TH Present on Hospital Admission: Y  -TH Side: Left  -TH Orientation: lower  -TH Location: leg  -TH Primary Wound Type: Venous ulcer  -TH    Retired Wound - Properties Group Date first assessed: 05/13/23 -TH Time first assessed: 0210  -TH Present on Hospital Admission: Y  -TH Side: Left  -TH Location: leg  -TH Primary Wound Type: Venous ulcer  -TH    Row Name             Wound 05/13/23 0210 Right lower leg Venous Ulcer    Wound - Properties Group Placement Date: 05/13/23 -TH Placement Time: 0210 -TH Present on Hospital Admission: Y  -TH Side: Right  -TH Orientation: lower  -TH Location: leg  -TH Primary Wound Type: Venous ulcer  -TH    Retired Wound - Properties Group Placement Date: 05/13/23 -TH Placement Time: 0210 -TH Present on Hospital Admission: Y  -TH Side: Right  -TH Orientation: lower  -TH Location: leg  -TH Primary Wound Type: Venous ulcer  -TH    Retired Wound - Properties Group Date first assessed: 05/13/23 -TH Time first assessed: 0210 -TH Present on Hospital Admission: Y  -TH Side: Right  -TH Location: leg  -TH Primary Wound Type: Venous ulcer  -TH    Row Name 05/15/23 1400          Plan of Care Review    Plan of Care Reviewed With patient  -DP     Outcome Evaluation Patient presents with decreased trunk, transfers, and functional mobility.  She will benefit from inpatient PT services and home health services upon return home.  Patient has a rolling walker to use upon return home.  -DP     Row Name 05/15/23 1400          Therapy Assessment/Plan (PT)    Rehab Potential (PT) good, to achieve stated therapy goals  -DP     Criteria for Skilled Interventions Met (PT) yes;meets criteria  -DP     Therapy Frequency (PT) daily  -DP     Predicted Duration of Therapy Intervention (PT) 10 days  -DP     Problem List (PT) problems related to;balance;mobility  -DP     Activity Limitations Related to Problem List (PT) unable to transfer safely;unable to ambulate safely  -DP     Row Name 05/15/23 1400           PT Evaluation Complexity    History, PT Evaluation Complexity no personal factors and/or comorbidities  -DP     Examination of Body Systems (PT Eval Complexity) total of 4 or more elements  -DP     Clinical Presentation (PT Evaluation Complexity) stable  -DP     Clinical Decision Making (PT Evaluation Complexity) low complexity  -DP     Overall Complexity (PT Evaluation Complexity) low complexity  -DP     Row Name 05/15/23 1400          Physical Therapy Goals    Transfer Goal Selection (PT) transfer, PT goal 1  -DP     Gait Training Goal Selection (PT) gait training, PT goal 1  -DP     Row Name 05/15/23 1400          Transfer Goal 1 (PT)    Activity/Assistive Device (Transfer Goal 1, PT) sit-to-stand/stand-to-sit  -DP     [place] Level/Cues Needed (Transfer Goal 1, PT) supervision required  -DP     Time Frame (Transfer Goal 1, PT) 10 days  -DP     Row Name 05/15/23 1400          Gait Training Goal 1 (PT)    Activity/Assistive Device (Gait Training Goal 1, PT) assistive device use;walker, rolling  -DP     [place] Level (Gait Training Goal 1, PT) standby assist  -DP     Distance (Gait Training Goal 1, PT) 200  -DP     Time Frame (Gait Training Goal 1, PT) 10 days  -DP           User Key  (r) = Recorded By, (t) = Taken By, (c) = Cosigned By    Initials Name Provider Type    TH Cathryn Maldonado, RN Registered Nurse    Matt Pina, PT Physical Therapist                  PT Recommendation and Plan  Anticipated Discharge Disposition (PT): home with home health  Planned Therapy Interventions (PT): balance training, bed mobility training, gait training, strengthening, transfer training  Therapy Frequency (PT): daily  Plan of Care Reviewed With: patient  Outcome Evaluation: Patient presents with decreased trunk, transfers, and functional mobility.  She will benefit from inpatient PT services and home health services upon return home.  Patient has a rolling walker to use upon return home.   Outcome Measures      Row Name 05/15/23 1400             How much help from another person do you currently need...    Turning from your back to your side while in flat bed without using bedrails? 4  -DP      Moving from lying on back to sitting on the side of a flat bed without bedrails? 4  -DP      Moving to and from a bed to a chair (including a wheelchair)? 3  -DP      Standing up from a chair using your arms (e.g., wheelchair, bedside chair)? 3  -DP      Climbing 3-5 steps with a railing? 3  -DP      To walk in hospital room? 3  -DP      AM-PAC 6 Clicks Score (PT) 20  -DP         Functional Assessment    Outcome Measure Options AM-PAC 6 Clicks Basic Mobility (PT)  -DP            User Key  (r) = Recorded By, (t) = Taken By, (c) = Cosigned By    Initials Name Provider Type    Matt Pina, PT Physical Therapist                 Time Calculation:    PT Charges     Row Name 05/15/23 1425             Time Calculation    PT Received On 05/15/23  -DP      PT Goal Re-Cert Due Date 05/24/23  -DP         Untimed Charges    PT Eval/Re-eval Minutes 40  -DP         Total Minutes    Untimed Charges Total Minutes 40  -DP       Total Minutes 40  -DP            User Key  (r) = Recorded By, (t) = Taken By, (c) = Cosigned By    Initials Name Provider Type    Matt Pina, PT Physical Therapist              Therapy Charges for Today     Code Description Service Date Service Provider Modifiers Qty    66190328613 HC PT EVAL LOW COMPLEXITY 3 5/15/2023 Matt Dowd, PT GP 1          PT G-Codes  Outcome Measure Options: AM-PAC 6 Clicks Basic Mobility (PT)  AM-PAC 6 Clicks Score (PT): 20    Matt Dowd, PT  5/15/2023

## 2023-05-15 NOTE — PLAN OF CARE
Problem: Adult Inpatient Plan of Care  Goal: Plan of Care Review  Outcome: Ongoing, Progressing  Flowsheets (Taken 5/14/2023 1958)  Outcome Evaluation: Patient alert and oriented throughout shift. Patient complained of nausea this morning and zofran administered. Patient complained of pain and norco administered. Norco helped only a little bit and still complained of nausea. MD notified about nausea, compazine ordered and administered. Norco administered once more to help with pain control and seemed to relieve pain. Patient currently on 6L NC, oxygen desated throughout the day and was not able maintain oxygen saturation in the 90s without being on 6L. Throughout the day, HR has been fluctuating and began maintaining in the 120s this afternoon. MD notified and PRN lopressor 2.5mg ordered and administered once. Blood sugars monitored and insulin administered per [employer]. Diet downgraded to clear liquid diet, patient tolerated. Patient is currently resting.   Goal Outcome Evaluation:              Outcome Evaluation: Patient alert and oriented throughout shift. Patient complained of nausea this morning and zofran administered. Patient complained of pain and norco administered. Norco helped only a little bit and still complained of nausea. MD notified about nausea, compazine ordered and administered. Norco administered once more to help with pain control and seemed to relieve pain. Patient currently on 6L NC, oxygen desated throughout the day and was not able maintain oxygen saturation in the 90s without being on 6L. Throughout the day, HR has been fluctuating and began maintaining in the 120s this afternoon. MD notified and PRN lopressor 2.5mg ordered and administered once. Blood sugars monitored and insulin administered per [employer]. Diet downgraded to clear liquid diet, patient tolerated. Patient is currently resting.

## 2023-05-15 NOTE — PROGRESS NOTES
"[facility] Clinical Pharmacy Services: Vancomycin Monitoring Note    Mary Albarran is a 72 y.o. female who is on day  of pharmacy to dose vancomycin for Sepsis.    Previous Vancomycin Dose:   1250 mg IV every  24  hours  Imaging Reviewed?: Yes  Updated Cultures and Sensitivities:   5-15-23: MRSA PCR: NOT DETECTED  23: BLOOD CX, LEFT ARM; NGD2  23: BLOOD CX, RIGHT ARM: NGD2    Vitals/Labs  Ht: 160 cm (63\"); Wt: 120 kg (265 lb 6.9 oz)     Temp (24hrs), Av.5 °F (36.9 °C), Min:97.9 °F (36.6 °C), Max:99.1 °F (37.3 °C)    Estimated Creatinine Clearance: 84.1 mL/min (by C-G formula based on SCr of 0.78 mg/dL).       Results from last 7 days   Lab Units 05/15/23  0549 23  0427 23  0313   VANCOMYCIN RM mcg/mL 26.34  --   --    CREATININE mg/dL 0.78 0.83 1.04*   WBC 10*3/mm3 10.59 13.33* 22.26*     Assessment/Plan    Current Vancomycin Dose:  1250 mg IV every 24 hours; which provides the following predicted parameters:  AUC24,ss: 387 mg/L.hr  PAUC*: 46 %  Ctrough,ss: 9.1 mg/L  Pconc*: 11 %  Tox.: 5 %  Low AUC. Will change to 1500 mg IV every 24 hours for: AUC24,ss: 463 mg/L.hr, Ctrough,ss: 11 mg/L, Tox.: 7 %  Next Vanc Random ordered for tomorrow at 0600  We will continue to monitor patient changes and renal function     Thank you for involving pharmacy in this patient's care. Please contact pharmacy with any questions or concerns.    Armida Kaufman  Clinical Pharmacist    "

## 2023-05-16 LAB
ALBUMIN SERPL-MCNC: 3.3 G/DL (ref 3.5–5.2)
ALBUMIN/GLOB SERPL: 1 G/DL
ALP SERPL-CCNC: 62 U/L (ref 39–117)
ALT SERPL W P-5'-P-CCNC: 23 U/L (ref 1–33)
AMMONIA BLD-SCNC: 18 UMOL/L (ref 11–51)
ANION GAP SERPL CALCULATED.3IONS-SCNC: 11.1 MMOL/L (ref 5–15)
ARTERIAL PATENCY WRIST A: POSITIVE
AST SERPL-CCNC: 27 U/L (ref 1–32)
BASE EXCESS BLDA CALC-SCNC: 1.6 MMOL/L (ref -2–2)
BDY SITE: ABNORMAL
BILIRUB SERPL-MCNC: 1.4 MG/DL (ref 0–1.2)
BUN SERPL-MCNC: 14 MG/DL (ref 8–23)
BUN/CREAT SERPL: 17.5 (ref 7–25)
CA-I BLDA-SCNC: 1.16 MMOL/L (ref 1.13–1.32)
CALCIUM SPEC-SCNC: 9.2 MG/DL (ref 8.6–10.5)
CHLORIDE BLDA-SCNC: 102 MMOL/L (ref 98–106)
CHLORIDE SERPL-SCNC: 100 MMOL/L (ref 98–107)
CO2 SERPL-SCNC: 25.9 MMOL/L (ref 22–29)
COHGB MFR BLD: 0.6 % (ref 0–1.5)
CREAT SERPL-MCNC: 0.8 MG/DL (ref 0.57–1)
DEPRECATED RDW RBC AUTO: 49.5 FL (ref 37–54)
EGFRCR SERPLBLD CKD-EPI 2021: 79.4 ML/MIN/1.73
ERYTHROCYTE [DISTWIDTH] IN BLOOD BY AUTOMATED COUNT: 14.7 % (ref 12.3–15.4)
FHHB: 5.5 % (ref 0–5)
GAS FLOW AIRWAY: ABNORMAL L/MIN
GLOBULIN UR ELPH-MCNC: 3.4 GM/DL
GLUCOSE BLDA-MCNC: 197 MG/DL (ref 65–99)
GLUCOSE BLDC GLUCOMTR-MCNC: 160 MG/DL (ref 70–99)
GLUCOSE BLDC GLUCOMTR-MCNC: 165 MG/DL (ref 70–99)
GLUCOSE BLDC GLUCOMTR-MCNC: 188 MG/DL (ref 70–99)
GLUCOSE BLDC GLUCOMTR-MCNC: 188 MG/DL (ref 70–99)
GLUCOSE SERPL-MCNC: 189 MG/DL (ref 65–99)
HCO3 BLDA-SCNC: 25.6 MMOL/L (ref 22–26)
HCT VFR BLD AUTO: 43.8 % (ref 34–46.6)
HGB BLD-MCNC: 13.7 G/DL (ref 12–15.9)
HGB BLDA-MCNC: 14.1 G/DL (ref 11.7–14.6)
INHALED O2 CONCENTRATION: 40 %
INR PPP: 1.95 (ref 0.86–1.15)
LACTATE BLDA-SCNC: 1.44 MMOL/L (ref 0.5–2)
MAGNESIUM SERPL-MCNC: 1.9 MG/DL (ref 1.6–2.4)
MAGNESIUM SERPL-MCNC: 1.9 MG/DL (ref 1.6–2.4)
MCH RBC QN AUTO: 28.8 PG (ref 26.6–33)
MCHC RBC AUTO-ENTMCNC: 31.3 G/DL (ref 31.5–35.7)
MCV RBC AUTO: 92 FL (ref 79–97)
METHGB BLD QL: 0.2 % (ref 0–1.5)
MODALITY: ABNORMAL
NOTE: ABNORMAL
OXYHGB MFR BLDV: 93.7 % (ref 94–99)
PCO2 BLDA: 38.5 MM HG (ref 35–45)
PH BLDA: 7.44 PH UNITS (ref 7.35–7.45)
PLATELET # BLD AUTO: 65 10*3/MM3 (ref 140–450)
PMV BLD AUTO: 14.4 FL (ref 6–12)
PO2 BLD: 184 MM[HG] (ref 0–500)
PO2 BLDA: 73.5 MM HG (ref 80–100)
POTASSIUM BLDA-SCNC: 4.14 MMOL/L (ref 3.5–5)
POTASSIUM SERPL-SCNC: 4.5 MMOL/L (ref 3.5–5.2)
POTASSIUM SERPL-SCNC: 4.7 MMOL/L (ref 3.5–5.2)
PROT SERPL-MCNC: 6.7 G/DL (ref 6–8.5)
PROTHROMBIN TIME: 22.1 SECONDS (ref 11.8–14.9)
RBC # BLD AUTO: 4.76 10*6/MM3 (ref 3.77–5.28)
SAO2 % BLDCOA: 94.5 % (ref 95–99)
SODIUM BLDA-SCNC: 137.7 MMOL/L (ref 136–146)
SODIUM SERPL-SCNC: 137 MMOL/L (ref 136–145)
TROPONIN T SERPL HS-MCNC: 20 NG/L
WBC NRBC COR # BLD: 7.22 10*3/MM3 (ref 3.4–10.8)

## 2023-05-16 PROCEDURE — 94799 UNLISTED PULMONARY SVC/PX: CPT

## 2023-05-16 PROCEDURE — 25010000002 FUROSEMIDE PER 20 MG: Performed by: NURSE PRACTITIONER

## 2023-05-16 PROCEDURE — 63710000001 INSULIN LISPRO (HUMAN) PER 5 UNITS: Performed by: STUDENT IN AN ORGANIZED HEALTH CARE EDUCATION/TRAINING PROGRAM

## 2023-05-16 PROCEDURE — 85027 COMPLETE CBC AUTOMATED: CPT | Performed by: FAMILY MEDICINE

## 2023-05-16 PROCEDURE — 83735 ASSAY OF MAGNESIUM: CPT | Performed by: STUDENT IN AN ORGANIZED HEALTH CARE EDUCATION/TRAINING PROGRAM

## 2023-05-16 PROCEDURE — 25010000002 ONDANSETRON PER 1 MG: Performed by: STUDENT IN AN ORGANIZED HEALTH CARE EDUCATION/TRAINING PROGRAM

## 2023-05-16 PROCEDURE — 82805 BLOOD GASES W/O2 SATURATION: CPT | Performed by: INTERNAL MEDICINE

## 2023-05-16 PROCEDURE — 99233 SBSQ HOSP IP/OBS HIGH 50: CPT | Performed by: INTERNAL MEDICINE

## 2023-05-16 PROCEDURE — 63710000001 INSULIN DETEMIR PER 5 UNITS: Performed by: INTERNAL MEDICINE

## 2023-05-16 PROCEDURE — 85610 PROTHROMBIN TIME: CPT | Performed by: STUDENT IN AN ORGANIZED HEALTH CARE EDUCATION/TRAINING PROGRAM

## 2023-05-16 PROCEDURE — 0 CEFEPIME PER 500 MG

## 2023-05-16 PROCEDURE — 97530 THERAPEUTIC ACTIVITIES: CPT

## 2023-05-16 PROCEDURE — 97110 THERAPEUTIC EXERCISES: CPT

## 2023-05-16 PROCEDURE — 25010000002 MAGNESIUM SULFATE IN D5W 1G/100ML (PREMIX) 1-5 GM/100ML-% SOLUTION: Performed by: NURSE PRACTITIONER

## 2023-05-16 PROCEDURE — 94761 N-INVAS EAR/PLS OXIMETRY MLT: CPT

## 2023-05-16 PROCEDURE — 82140 ASSAY OF AMMONIA: CPT | Performed by: NURSE PRACTITIONER

## 2023-05-16 PROCEDURE — 84484 ASSAY OF TROPONIN QUANT: CPT | Performed by: STUDENT IN AN ORGANIZED HEALTH CARE EDUCATION/TRAINING PROGRAM

## 2023-05-16 PROCEDURE — 94660 CPAP INITIATION&MGMT: CPT

## 2023-05-16 PROCEDURE — 36600 WITHDRAWAL OF ARTERIAL BLOOD: CPT | Performed by: INTERNAL MEDICINE

## 2023-05-16 PROCEDURE — 82948 REAGENT STRIP/BLOOD GLUCOSE: CPT

## 2023-05-16 PROCEDURE — 80053 COMPREHEN METABOLIC PANEL: CPT | Performed by: FAMILY MEDICINE

## 2023-05-16 PROCEDURE — 82375 ASSAY CARBOXYHB QUANT: CPT | Performed by: INTERNAL MEDICINE

## 2023-05-16 PROCEDURE — 94664 DEMO&/EVAL PT USE INHALER: CPT

## 2023-05-16 PROCEDURE — 99223 1ST HOSP IP/OBS HIGH 75: CPT | Performed by: INTERNAL MEDICINE

## 2023-05-16 PROCEDURE — 83050 HGB METHEMOGLOBIN QUAN: CPT | Performed by: INTERNAL MEDICINE

## 2023-05-16 PROCEDURE — 84132 ASSAY OF SERUM POTASSIUM: CPT | Performed by: STUDENT IN AN ORGANIZED HEALTH CARE EDUCATION/TRAINING PROGRAM

## 2023-05-16 RX ORDER — INSULIN LISPRO 100 [IU]/ML
2-9 INJECTION, SOLUTION INTRAVENOUS; SUBCUTANEOUS
Status: DISCONTINUED | OUTPATIENT
Start: 2023-05-17 | End: 2023-05-21 | Stop reason: HOSPADM

## 2023-05-16 RX ORDER — INSULIN LISPRO 100 [IU]/ML
2-9 INJECTION, SOLUTION INTRAVENOUS; SUBCUTANEOUS
Status: DISCONTINUED | OUTPATIENT
Start: 2023-05-16 | End: 2023-05-16

## 2023-05-16 RX ORDER — NICOTINE POLACRILEX 4 MG
15 LOZENGE BUCCAL
Status: DISCONTINUED | OUTPATIENT
Start: 2023-05-16 | End: 2023-05-21 | Stop reason: HOSPADM

## 2023-05-16 RX ORDER — METOLAZONE 5 MG/1
5 TABLET ORAL ONCE
Status: COMPLETED | OUTPATIENT
Start: 2023-05-16 | End: 2023-05-16

## 2023-05-16 RX ORDER — DEXTROSE MONOHYDRATE 25 G/50ML
25 INJECTION, SOLUTION INTRAVENOUS
Status: DISCONTINUED | OUTPATIENT
Start: 2023-05-16 | End: 2023-05-21 | Stop reason: HOSPADM

## 2023-05-16 RX ORDER — MAGNESIUM SULFATE 1 G/100ML
1 INJECTION INTRAVENOUS
Status: COMPLETED | OUTPATIENT
Start: 2023-05-16 | End: 2023-05-16

## 2023-05-16 RX ORDER — FUROSEMIDE 10 MG/ML
40 INJECTION INTRAMUSCULAR; INTRAVENOUS EVERY 12 HOURS
Status: DISCONTINUED | OUTPATIENT
Start: 2023-05-16 | End: 2023-05-20

## 2023-05-16 RX ADMIN — CEFEPIME HYDROCHLORIDE 2 G: 2 INJECTION, POWDER, FOR SOLUTION INTRAMUSCULAR; INTRAVENOUS at 10:20

## 2023-05-16 RX ADMIN — TRIAMCINOLONE ACETONIDE 1 APPLICATION: 1 OINTMENT TOPICAL at 12:44

## 2023-05-16 RX ADMIN — INSULIN DETEMIR 15 UNITS: 100 INJECTION, SOLUTION SUBCUTANEOUS at 21:12

## 2023-05-16 RX ADMIN — HYDROCODONE BITARTRATE AND ACETAMINOPHEN 1 TABLET: 10; 325 TABLET ORAL at 15:29

## 2023-05-16 RX ADMIN — LACTULOSE 20 G: 20 SOLUTION ORAL at 00:51

## 2023-05-16 RX ADMIN — HYDROCODONE BITARTRATE AND ACETAMINOPHEN 1 TABLET: 10; 325 TABLET ORAL at 11:18

## 2023-05-16 RX ADMIN — Medication 1000 UNITS: at 08:51

## 2023-05-16 RX ADMIN — MULTIPLE VITAMINS W/ MINERALS TAB 1 TABLET: TAB at 08:50

## 2023-05-16 RX ADMIN — ATORVASTATIN CALCIUM 20 MG: 20 TABLET, FILM COATED ORAL at 20:51

## 2023-05-16 RX ADMIN — HYDROCODONE BITARTRATE AND ACETAMINOPHEN 1 TABLET: 10; 325 TABLET ORAL at 07:23

## 2023-05-16 RX ADMIN — PREGABALIN 150 MG: 75 CAPSULE ORAL at 08:50

## 2023-05-16 RX ADMIN — ARFORMOTEROL TARTRATE 15 MCG: 15 SOLUTION RESPIRATORY (INHALATION) at 18:22

## 2023-05-16 RX ADMIN — INSULIN LISPRO 1 UNITS: 100 INJECTION, SOLUTION INTRAVENOUS; SUBCUTANEOUS at 18:17

## 2023-05-16 RX ADMIN — RIVAROXABAN 20 MG: 20 TABLET, FILM COATED ORAL at 17:41

## 2023-05-16 RX ADMIN — MAGNESIUM SULFATE 1 G: 1 INJECTION INTRAVENOUS at 13:32

## 2023-05-16 RX ADMIN — METOLAZONE 5 MG: 5 TABLET ORAL at 13:44

## 2023-05-16 RX ADMIN — METOPROLOL TARTRATE 2.5 MG: 1 INJECTION, SOLUTION INTRAVENOUS at 00:05

## 2023-05-16 RX ADMIN — PYRIDOXINE HCL TAB 50 MG 200 MG: 50 TAB at 09:12

## 2023-05-16 RX ADMIN — MAGNESIUM SULFATE 1 G: 1 INJECTION INTRAVENOUS at 15:26

## 2023-05-16 RX ADMIN — PREGABALIN 150 MG: 75 CAPSULE ORAL at 20:51

## 2023-05-16 RX ADMIN — INSULIN LISPRO 1 UNITS: 100 INJECTION, SOLUTION INTRAVENOUS; SUBCUTANEOUS at 12:40

## 2023-05-16 RX ADMIN — METOPROLOL SUCCINATE 100 MG: 50 TABLET, EXTENDED RELEASE ORAL at 08:51

## 2023-05-16 RX ADMIN — ONDANSETRON 4 MG: 2 INJECTION INTRAMUSCULAR; INTRAVENOUS at 12:17

## 2023-05-16 RX ADMIN — CEFEPIME HYDROCHLORIDE 2 G: 2 INJECTION, POWDER, FOR SOLUTION INTRAMUSCULAR; INTRAVENOUS at 17:38

## 2023-05-16 RX ADMIN — Medication 10 ML: at 09:04

## 2023-05-16 RX ADMIN — PREGABALIN 150 MG: 75 CAPSULE ORAL at 15:52

## 2023-05-16 RX ADMIN — DIGOXIN 250 MCG: 250 TABLET ORAL at 09:11

## 2023-05-16 RX ADMIN — CEFEPIME HYDROCHLORIDE 2 G: 2 INJECTION, POWDER, FOR SOLUTION INTRAMUSCULAR; INTRAVENOUS at 01:27

## 2023-05-16 RX ADMIN — ARFORMOTEROL TARTRATE 15 MCG: 15 SOLUTION RESPIRATORY (INHALATION) at 06:49

## 2023-05-16 RX ADMIN — HYDROCODONE BITARTRATE AND ACETAMINOPHEN 1 TABLET: 10; 325 TABLET ORAL at 21:55

## 2023-05-16 RX ADMIN — INSULIN LISPRO 5 UNITS: 100 INJECTION, SOLUTION INTRAVENOUS; SUBCUTANEOUS at 08:59

## 2023-05-16 RX ADMIN — Medication 10 ML: at 20:51

## 2023-05-16 RX ADMIN — LACTULOSE 20 G: 20 SOLUTION ORAL at 08:51

## 2023-05-16 RX ADMIN — TRIAMCINOLONE ACETONIDE 1 APPLICATION: 1 OINTMENT TOPICAL at 15:36

## 2023-05-16 RX ADMIN — FUROSEMIDE 40 MG: 10 INJECTION, SOLUTION INTRAMUSCULAR; INTRAVENOUS at 13:25

## 2023-05-16 NOTE — THERAPY TREATMENT NOTE
Acute Care - Physical Therapy Treatment Note  GUERO Bautista     Patient Name: Mary Albarran  : 1950  MRN: [id number]  Today's Date: 2023      Visit Dx:     ICD-10-CM ICD-9-CM   1. Atrial fibrillation with RVR  I48.91 427.31   2. Acute gastroenteritis  K52.9 558.9   3. Difficulty walking  R26.2 719.7     Patient Active Problem List   Diagnosis   • Aortic valve disease   • Atrial fibrillation   • Chronic kidney disease, stage III (moderate)   • Hyperlipidemia   • Hypertension   • Chronic diastolic congestive heart failure   • Atrial fibrillation with RVR     Past Medical History:   Diagnosis Date   • Allergic rhinitis    • Anxiety    • Aortic valve disease 2021    Fibrocalcific changes of the aortic valve with mild aortic valve stenosis   on echo 3/2/2021  Moderate aortic valve regurgitation is present. Aortic valve maximum pressure gradient is 26 mmHg. Moderate aortic valve stenosis is present.  On echo 2/10/2022      • Arthritis    • Atrial fibrillation    • Chronic kidney disease (CKD), stage III (moderate)    • Chronic pain    • COPD (chronic obstructive pulmonary disease)    • Diabetes mellitus type 2 with complications    • Ex-smoker     QUIT SMOKING    • Hyperlipidemia    • Hypertension    • Thrombocytopenia      Past Surgical History:   Procedure Laterality Date   • HYSTERECTOMY      30 YEARS AGO     PT Assessment (last 12 hours)     PT Evaluation and Treatment     Row Name 23 1211          Physical Therapy Time and Intention    Subjective Information complains of;weakness;fatigue;pain  -DK     Document Type therapy note (daily note)  -DK     Mode of Treatment individual therapy;physical therapy  -DK     Patient Effort good  -DK     Symptoms Noted During/After Treatment fatigue;increased pain  -DK     Comment Pt had an RRT on 5/15/23 and was transferred to the unit.  She currently is c/o moderate / severe generalized pain, SOA / fatigue.  -DK     Row Name 23 1211          Pain     Pretreatment Pain Rating 8/10  -DK     Posttreatment Pain Rating 8/10  -DK     Pain Location generalized  -DK     Pain Intervention(s) Distraction;Therapeutic presence  -     Row Name 05/16/23 1211          Cognition    Affect/Mental Status (Cognition) WFL  -DK     Orientation Status (Cognition) oriented x 4  -DK     Follows Commands (Cognition) WNL  -DK     Cognitive Function WNL  -DK     Personal Safety Interventions gait belt;nonskid shoes/slippers when out of bed;supervised activity  -     Row Name 05/16/23 1211          Motor Skills    Motor Skills --  therapeutic exercises  -DK     Therapeutic Exercise hip;knee;ankle  -     Additional Documentation --  Pt reports both legs sensitive.  She deferred transfers this session.  -     Row Name 05/16/23 1211          Hip (Therapeutic Exercise)    Hip (Therapeutic Exercise) AAROM (active assistive range of motion)  -     Hip AAROM (Therapeutic Exercise) bilateral;flexion;extension;aBduction;aDduction;supine;10 repetitions;2 sets  -     Row Name 05/16/23 1211          Knee (Therapeutic Exercise)    Knee (Therapeutic Exercise) AAROM (active assistive range of motion)  -     Knee AAROM (Therapeutic Exercise) bilateral;flexion;extension;supine;10 repetitions;2 sets  -     Row Name 05/16/23 1211          Ankle (Therapeutic Exercise)    Ankle (Therapeutic Exercise) AAROM (active assistive range of motion)  -     Ankle AAROM (Therapeutic Exercise) bilateral;dorsiflexion;plantarflexion;supine;10 repetitions;2 sets  -     Row Name             Wound 05/13/23 0210 Left lower leg Venous Ulcer    Wound - Properties Group Placement Date: 05/13/23 -TH Placement Time: 0210 -TH Present on Hospital Admission: Y  -TH Side: Left  -TH Orientation: lower  -TH Location: leg  -TH Primary Wound Type: Venous ulcer  -TH    Retired Wound - Properties Group Placement Date: 05/13/23 -TH Placement Time: 0210 -TH Present on Hospital Admission: Y  -TH Side: Left  -TH  Orientation: lower  -TH Location: leg  -TH Primary Wound Type: Venous ulcer  -TH    Retired Wound - Properties Group Date first assessed: 05/13/23 -TH Time first assessed: 0210 -TH Present on Hospital Admission: Y  -TH Side: Left  -TH Location: leg  -TH Primary Wound Type: Venous ulcer  -TH    Row Name             Wound 05/13/23 0210 Right lower leg Venous Ulcer    Wound - Properties Group Placement Date: 05/13/23 -TH Placement Time: 0210 -TH Present on Hospital Admission: Y  -TH Side: Right  -TH Orientation: lower  -TH Location: leg  -TH Primary Wound Type: Venous ulcer  -TH    Retired Wound - Properties Group Placement Date: 05/13/23 -TH Placement Time: 0210 -TH Present on Hospital Admission: Y  -TH Side: Right  -TH Orientation: lower  -TH Location: leg  -TH Primary Wound Type: Venous ulcer  -TH    Retired Wound - Properties Group Date first assessed: 05/13/23 -TH Time first assessed: 0210 -TH Present on Hospital Admission: Y  -TH Side: Right  -TH Location: leg  -TH Primary Wound Type: Venous ulcer  -TH    Row Name 05/16/23 1211          Plan of Care Review    Plan of Care Reviewed With patient  -DK     Progress no change  -DK     Row Name 05/16/23 1211          Positioning and Restraints    Pre-Treatment Position in bed  -DK     Post Treatment Position bed  -DK     In Bed supine;call light within reach;encouraged to call for assist;side rails up x3;legs elevated;heels elevated  -     Row Name 05/16/23 1211          Therapy Assessment/Plan (PT)    Rehab Potential (PT) good, to achieve stated therapy goals  -     Criteria for Skilled Interventions Met (PT) skilled treatment is necessary  -     Therapy Frequency (PT) daily  -DK     Problem List (PT) problems related to;balance;mobility;strength;pain  breathing issues  -     Activity Limitations Related to Problem List (PT) unable to ambulate safely;unable to transfer safely  -     Row Name 05/16/23 1211          Progress Summary (PT)    Progress  Toward Functional Goals (PT) progress toward functional goals is good  -DK           User Key  (r) = Recorded By, (t) = Taken By, (c) = Cosigned By    Initials Name Provider Type    Cathryn Kelley RN Registered Nurse    Clara Anderson PTA Physical Therapist Assistant                  PT Recommendation and Plan  Planned Therapy Interventions (PT): balance training, bed mobility training, gait training, strengthening, transfer training  Therapy Frequency (PT): daily  Progress Summary (PT)  Progress Toward Functional Goals (PT): progress toward functional goals is good  Plan of Care Reviewed With: patient  Progress: no change   Outcome Measures     Row Name 05/16/23 1211 05/15/23 1400          How much help from another person do you currently need...    Turning from your back to your side while in flat bed without using bedrails? 2  -DK 4  -DP     Moving from lying on back to sitting on the side of a flat bed without bedrails? 2  -DK 4  -DP     Moving to and from a bed to a chair (including a wheelchair)? 2  -DK 3  -DP     Standing up from a chair using your arms (e.g., wheelchair, bedside chair)? 2  -DK 3  -DP     Climbing 3-5 steps with a railing? 1  -DK 3  -DP     To walk in hospital room? 1  -DK 3  -DP     AM-PAC 6 Clicks Score (PT) 10  -DK 20  -DP        Functional Assessment    Outcome Measure Options AM-PAC 6 Clicks Basic Mobility (PT)  -DK AM-PAC 6 Clicks Basic Mobility (PT)  -DP           User Key  (r) = Recorded By, (t) = Taken By, (c) = Cosigned By    Initials Name Provider Type    Clara Anderson PTA Physical Therapist Assistant    Matt Pina, PT Physical Therapist                 Time Calculation:    PT Charges     Row Name 05/16/23 1216             Time Calculation    PT Received On 05/16/23  -DK      PT Goal Re-Cert Due Date 05/24/23  -DK         Timed Charges    48288 - PT Therapeutic Exercise Minutes 14  -DK      81856 - PT Therapeutic Activity Minutes 10  -DK         Total  Minutes    Timed Charges Total Minutes 24  -DK       Total Minutes 24  -DK            User Key  (r) = Recorded By, (t) = Taken By, (c) = Cosigned By    Initials Name Provider Type    Clara Anderson PTA Physical Therapist Assistant              Therapy Charges for Today     Code Description Service Date Service Provider Modifiers Qty    41849192345  PT THER PROC EA 15 MIN 5/16/2023 Clara Francois PTA GP 1    47560916958 HC PT THERAPEUTIC ACT EA 15 MIN 5/16/2023 Clara Francois PTA GP 1          PT G-Codes  Outcome Measure Options: AM-PAC 6 Clicks Basic Mobility (PT)  AM-PAC 6 Clicks Score (PT): 10    Clara Francois PTA  5/16/2023

## 2023-05-16 NOTE — CONSULTS
Pulmonary / Critical Care Consult Note      Patient Name: Mary Albarran  : 1950  MRN: [id number]  Primary Care Physician:  Brittni Quiroz APRN  Referring Physician: Jose Maria Alcala MD  Date of admission: 2023    Subjective   Subjective     Reason for Consult/ Chief Complaint: A-fib RVR    HPI:  Mary Albarran is a 72 y.o. female with history of A-fib, probable cirrhosis, congestive heart failure, found to have sepsis related to duodenitis.  Was transferred to the intensive care unit for A-fib with RVR overnight.    Patient is currently on 6 L nasal cannula, chest x-ray favorable for pulmonary edema.  Heart rate is currently  /81.    Review of Systems  Constitutional symptoms:  Denied complaints   Ear, nose, throat: Denied complaints  Cardiovascular:  Denied complaints  Respiratory: Denied complaints  Gastrointestinal: Denied complaints  Musculoskeletal: Denied complaints  Genitourinary: Denied complaints  Allergy / Immunology: Denied complaints  Hematologic: Denied complaints  Neurologic: Denied complaints  Skin: Denied complaints  Endocrine: Denied complaints  Psychiatric: Denied complaints      Personal History     Past Medical History:   Diagnosis Date   • Allergic rhinitis    • Anxiety    • Aortic valve disease 2021    Fibrocalcific changes of the aortic valve with mild aortic valve stenosis   on echo 3/2/2021  Moderate aortic valve regurgitation is present. Aortic valve maximum pressure gradient is 26 mmHg. Moderate aortic valve stenosis is present.  On echo 2/10/2022      • Arthritis    • Atrial fibrillation    • Chronic kidney disease (CKD), stage III (moderate)    • Chronic pain    • COPD (chronic obstructive pulmonary disease)    • Diabetes mellitus type 2 with complications    • Ex-smoker     QUIT SMOKING    • Hyperlipidemia    • Hypertension    • Thrombocytopenia        Past Surgical History:   Procedure Laterality Date   • HYSTERECTOMY      30 YEARS AGO       Family  History: family history includes Diabetes in an other family member; Heart disease in her father and other family members. Otherwise pertinent FHx was reviewed and not pertinent to current issue.    Social History:  reports that she quit smoking about 15 years ago. Her smoking use included cigarettes. She started smoking about 52 years ago. She has a 36.00 pack-year smoking history. She has never used smokeless tobacco. She reports that she does not drink alcohol and does not use drugs.    Home Medications:  HYDROcodone-acetaminophen, Insulin Glargine (2 Unit Dial), Insulin Pen Needle, SITagliptin, Vitamin B6, albuterol, albuterol sulfate HFA, ascorbic acid, atorvastatin, cholecalciferol, dapagliflozin Propanediol, digoxin, dilTIAZem, furosemide, glipizide, metoprolol succinate XL, miconazole, multivitamin with minerals, potassium chloride, pregabalin, rivaroxaban, sacubitril-valsartan, and silver sulfadiazine    Allergies:  Allergies   Allergen Reactions   • Codeine Mental Status Change       Objective    Objective     Vitals:   Temp:  [97.4 °F (36.3 °C)-100.4 °F (38 °C)] 98.1 °F (36.7 °C)  Heart Rate:  [] 113  Resp:  [18-30] 22  BP: ()/(39-99) 144/81  Flow (L/min):  [3-15] 6    Physical Exam:  Vital Signs Reviewed   General: Obese, no acute distress on 6 L nasal cannula  HEENT:  PERRL, EOMI.  OP, nares clear  Neck:  Supple, no JVD, no thyromegaly  Chest:  good aeration, clear to auscultation bilaterally, tympanic to percussion bilaterally, no work of breathing noted  CV: A-fib, tachycardic, no MGR, pulses 2+, equal.  Abd:  Soft, NT, ND, + BS, no HSM  EXT:  no clubbing, no cyanosis, no edema  Neuro:  A&Ox3, CN grossly intact, no focal deficits.  Skin: No rashes or lesions noted  Patient has anasarca is volume overloaded on exam  Result Review    Result Review:  I have personally reviewed the results from the time of this admission to 5/16/2023 14:47 EDT and agree with these findings:  [x]   Laboratory  [x]  Microbiology  [x]  Radiology  []  EKG/Telemetry   [x]  Cardiology/Vascular   []  Pathology  []  Old records  []  Other:  Most notable findings include:       Lab 05/16/23  1031 05/16/23  0735 05/16/23  0416 05/15/23  2254 05/15/23  2103 05/15/23  1931 05/15/23  0549 05/14/23  0427 05/13/23  0313 05/12/23  2201   WBC  --   --  7.22  --  11.14*  --  10.59 13.33* 22.26* 26.10*   HEMOGLOBIN  --   --  13.7  --  13.2  --  13.1 13.4 14.8 16.0*   HEMATOCRIT  --   --  43.8  --  41.9  --  41.3 42.4 46.1 49.2*   PLATELETS  --   --  65*  --  69*  --  55* 55* 83* 87*   SODIUM  --   --  137  --  134*  --  136 137 138 140   SODIUM, ARTERIAL  --  137.7  --  133.8*  --  134.3*  --   --   --   --    POTASSIUM 4.5  --  4.7  --  4.5  --  4.7 4.8 4.7 4.7   CHLORIDE  --   --  100  --  101  --  101 102 103 104   CO2  --   --  25.9  --  22.7  --  26.3 26.3 23.9 23.3   BUN  --   --  14  --  13  --  14 15 16 15   CREATININE  --   --  0.80  --  0.71  --  0.78 0.83 1.04* 1.05*   GLUCOSE  --   --  189*  --  136*  --  153* 151* 189* 233*   GLUCOSE, ARTERIAL  --  197*  --  136*  --  144*  --   --   --   --    CALCIUM  --   --  9.2  --  8.9  --  8.7 8.6 9.0 9.4   PHOSPHORUS  --   --   --   --   --   --   --  3.0 4.0  --    TOTAL PROTEIN  --   --  6.7  --  6.6  --  6.1  --  6.4 7.2   ALBUMIN  --   --  3.3*  --  3.2*  --  3.4* 3.1* 3.6 3.9   GLOBULIN  --   --  3.4  --  3.4  --  2.7  --  2.8 3.3     XR Chest 1 View    Result Date: 5/15/2023    1. Interval worsening of the appearance of the chest with bilateral pulmonary infiltrates 2. Right pleural effusion 3. Cardiomegaly.       JESUS HICKEY MD       Electronically Signed and Approved By: JESUS HICKEY MD on 5/15/2023 at 20:33                 Assessment & Plan   Assessment / Plan     Active Hospital Problems:  Active Hospital Problems    Diagnosis    • **Atrial fibrillation with RVR      Impression:  Acute hypoxic respiratory failure requiring supplemental O2  Decompensated heart  failure with preserved ejection fraction  Right-sided pleural effusion  A-fib with RVR  Probable liver cirrhosis  Portal vein hypertension  Volume overload  Chronic thrombocytopenia    Plan:  Continue supplemental O2  Would recommend IV diuresis  We will start Lasix 40 mg IV twice daily, pulse dose metolazone  Continue nebulizers  ?  Ammonia level patient was not reported altered, question if this is hemolyzed specimen  Okay for lactulose to follow off, follow  Cardiology following, continue beta-blocker, continue digoxin  On cefepime for duodenitis per primary  Patient on Xarelto for A-fib    oK for patient to move out of ICU    DVT prophylaxis:  Medical DVT prophylaxis orders are present.     Code Status and Medical Interventions:   Ordered at: 05/13/23 0203     Level Of Support Discussed With:    Patient     Code Status (Patient has no pulse and is not breathing):    CPR (Attempt to Resuscitate)     Medical Interventions (Patient has pulse or is breathing):    Full Support      LUCERO Delacruz,  This visit was performed by BOTH a physician and an APC I have spent 51% of the time caring for this patient. I personally evaluated and examined the patient. I performed all aspects of MDM as documented. , I have reviewed and confirmed the accuracy of the patient's history as documented in this note. and I have reexamined the patient and the results are consistent with the previously documented exam. I have updated the documentation as necessary    Electronically signed by Jerson Berg DO, 05/16/23, 4:07 PM EDT.     Detail Level: Generalized Detail Level: Zone Detail Level: Simple Detail Level: Detailed

## 2023-05-16 NOTE — PROGRESS NOTES
[facility]   Hospitalist Progress Note  Date: 2023  Patient Name: Mary Albarran  : 1950  MRN: [id number]  Date of admission: 2023      Subjective   Subjective     Chief Complaint: Nausea vomiting follow-up    Summary:Mary Albarran is a 72 y.o. female with a past medical history of A-fib on Xarelto, CKD stage III, COPD, type 2 diabetes mellitus, hyperlipidemia, hypertension presented to the ED for evaluation of nausea, vomiting since yesterday.  Patient went to cardiology clinic yesterday and had an appointment with Dr. Metcalf, had cardiac echo done and after that when she came home she started experiencing nausea , associated with multiple episodes of vomiting since yesterday.  Mild intermittent diffuse cramping abdominal pain was noted.  Denies fevers, cough, shortness of breath, chest pain, diarrhea, dysuria, visual changes, focal weakness, numbness, tingling, palpitations, lightheadedness.     On arrival to the ED, vital signs temperature 99.2, pulse 146, respiratory 20, blood pressure 147/71 on 2 L of nasal cannula saturating around 91%.  EKG showed A-fib with RVR.  Labs showed troponin 17, proBNP 976, rest of the CMP within normal limits except creatinine 1.05, WBC 26, hemoglobin 16, hematocrit 49, platelets 87, digoxin level within therapeutic range.  Chest x-ray showed no acute cardiopulmonary process.  Patient had a cardiac echo yesterday which showed LVEF 60-65%, left ventricular diastolic function was indeterminate.  Mildly dilated left atrial cavity.  Mild to early moderate aortic insufficiency.  Patient appears to be in coarse atrial fibrillation/flutter during my exam.  Received IV diltiazem loading dose and started on diltiazem drip.  Patient has been admitted for further evaluation and management of nausea and vomiting with associated A-fib with RVR.  Patient continued IV fluids and Cardizem drip.  Weaned off of Cardizem drip.  Cardiology consulted.  A-fib RVR thought likely  secondary to volume depletion.  Abdominal pain improved and was able to tolerate clears.  Diet advanced but patient not able to tolerate episodes of abdominal pain nausea vomiting.  CT of the abdomen demonstrated wall thickening at the proximal duodenum and adjacent fat stranding concerning for enteritis.  Started on empiric antibiotics.  Diet downgraded back to clears.  CT chest demonstrated septal thickening patchy bilateral groundglass opacities similar to finding in 2008.  Also found to have small pulmonary nodules bilaterally.  Radiology recommending 6-month follow-up.  IV fluids stopped.  Abdominal pain improving.  Diet slowly advanced.  Heart rate remained difficult to control.  Continued on as needed Lopressor pushes and home metoprolol titrated up.  O2 requirement slowly trending up.  Patient short of breath and tachycardic on the evening of 5/15/2023 when she got up to the commode sats in the 70s.  Placed on 15 L high flow.  RRT called.  ABG showed mild hypercapnia without acidosis.  Chest x-ray pending.  Patient given IV Solu-Medrol.  Patient transferred to the ICU.      Interval Followup: Patient was transferred to the ICU for uncontrolled RVR.  IV access reestablished and heart rate better controlled.  For stability through the day, possibly transfer this evening back to the floor    ROS:  Denies chest pain or palpitations currently    Objective   Objective     Vitals:   Temp:  [97.4 °F (36.3 °C)-99 °F (37.2 °C)] 98.4 °F (36.9 °C)  Heart Rate:  [] 98  Resp:  [16-30] 22  BP: ()/() 128/64  Flow (L/min):  [3-15] 3  Physical Exam   Gen. well-developed appearing stated age in no acute distress  HEENT: Normocephalic atraumatic moist membranes pupils equal round reactive light, no scleral icterus no conjunctival injection  Cardiovascular: Irregularly irregular, rates in the low 90s on rounds.  No lower extremity edema appreciated  Pulmonary: Clear to auscultation bilaterally crackles  bilateral lower lung fields, no wheezes or rhonchi symmetric chest expansion, unlabored, no conversational dyspnea appreciated  Gastrointestinal: Soft nontender nondistended positive bowel sounds all 4 quadrants no rebound or guarding  Musculoskeletal: No clubbing cyanosis, warm and well-perfused, calves soft symmetric nontender bilaterally  Skin: Clean dry without rashs  Neuro: Cranial nerves II through XII intact grossly no sensorimotor deficits appreciated bilateral upper and lower extremities  Psych: Patient is calm cooperative and appropriate with exam not responding to internal stimuli  : No Maldonado catheter no bladder distention no suprapubic tenderness    Result Review    Result Review:  I have personally reviewed these results and agree with these findings:  [x]  Laboratory  LAB RESULTS:      Lab 05/16/23  0735 05/16/23  0416 05/15/23  2254 05/15/23  2103 05/15/23  1931 05/15/23  0549 05/14/23  0427 05/13/23  0313 05/12/23  2201   WBC  --  7.22  --  11.14*  --  10.59 13.33* 22.26* 26.10*   HEMOGLOBIN  --  13.7  --  13.2  --  13.1 13.4 14.8 16.0*   HEMATOCRIT  --  43.8  --  41.9  --  41.3 42.4 46.1 49.2*   PLATELETS  --  65*  --  69*  --  55* 55* 83* 87*   NEUTROS ABS  --   --   --  9.29*  --   --   --  19.92* 24.49*   IMMATURE GRANS (ABS)  --   --   --  0.09*  --   --   --  0.17* 0.16*   LYMPHS ABS  --   --   --  0.86  --   --   --  1.35 0.88   MONOS ABS  --   --   --  0.85  --   --   --  0.77 0.51   EOS ABS  --   --   --  0.01  --   --   --  0.00 0.00   MCV  --  92.0  --  91.5  --  92.2 92.6 90.2 88.8   PROCALCITONIN  --   --   --   --   --   --   --   --  0.45*   LACTATE  --   --   --  1.3  --   --   --   --   --    LACTATE, ARTERIAL 1.44  --  1.17  --  1.37  --   --   --   --    PROTIME  --  22.1*  --   --   --   --   --   --   --          Lab 05/16/23  1031 05/16/23  0735 05/16/23  0611 05/16/23  0416 05/15/23  2254 05/15/23  2103 05/15/23  1931 05/15/23  0549 05/14/23  0427 05/13/23  0313   SODIUM  --    --   --  137  --  134*  --  136 137 138   SODIUM, ARTERIAL  --  137.7  --   --  133.8*  --  134.3*  --   --   --    POTASSIUM 4.5  --   --  4.7  --  4.5  --  4.7 4.8 4.7   CHLORIDE  --   --   --  100  --  101  --  101 102 103   CO2  --   --   --  25.9  --  22.7  --  26.3 26.3 23.9   ANION GAP  --   --   --  11.1  --  10.3  --  8.7 8.7 11.1   BUN  --   --   --  14  --  13  --  14 15 16   CREATININE  --   --   --  0.80  --  0.71  --  0.78 0.83 1.04*   EGFR  --   --   --  79.4  --  91.6  --  81.8 75.9 57.9*   GLUCOSE  --   --   --  189*  --  136*  --  153* 151* 189*   GLUCOSE, ARTERIAL  --  197*  --   --  136*  --  144*  --   --   --    CALCIUM  --   --   --  9.2  --  8.9  --  8.7 8.6 9.0   IONIZED CALCIUM  --  1.16  --   --  1.09*  --  1.15  --   --   --    MAGNESIUM 1.9  --  1.9  --   --  1.8  --  1.7  --  1.8   PHOSPHORUS  --   --   --   --   --   --   --   --  3.0 4.0         Lab 05/16/23  0416 05/15/23  2103 05/15/23  0549 05/14/23  0427 05/13/23  0313 05/12/23  2201   TOTAL PROTEIN 6.7 6.6 6.1  --  6.4 7.2   ALBUMIN 3.3* 3.2* 3.4* 3.1* 3.6 3.9   GLOBULIN 3.4 3.4 2.7  --  2.8 3.3   ALT (SGPT) 23 22 17  --  18 20   AST (SGOT) 27 28 20  --  20 21   BILIRUBIN 1.4* 1.4* 1.1  --  1.0 1.1   ALK PHOS 62 59 56  --  64 78         Lab 05/16/23  1031 05/16/23  0416 05/15/23  2255 05/15/23  2103 05/15/23  0549 05/12/23  2201   PROBNP  --   --   --   --  3,335.0* 976.7*   HSTROP T 20*  --  28* 28*  --  17*   PROTIME  --  22.1*  --   --   --   --    INR  --  1.95*  --   --   --   --                  Lab 05/16/23  0735 05/15/23  2254 05/15/23  1931   PH, ARTERIAL 7.441 7.378 7.378   PCO2, ARTERIAL 38.5 45.4* 46.8*   PO2 ART 73.5* 69.6* 55.2*   O2 SATURATION ART 94.5* 93.2* 88.9*   FIO2 40  --   --    HCO3 ART 25.6 26.1* 26.9*   BASE EXCESS ART 1.6 0.6 1.2   CARBOXYHEMOGLOBIN 0.6 0.8 1.1     Brief Urine Lab Results  (Last result in the past 365 days)      Color   Clarity   Blood   Leuk Est   Nitrite   Protein   CREAT   Urine HCG         05/13/23 0415 Yellow   Clear   Negative   Negative   Negative   30 mg/dL (1+)               Microbiology Results (last 10 days)     Procedure Component Value - Date/Time    MRSA Screen, PCR (Inpatient) - Swab, Nares [[id number]]  (Normal) Collected: 05/15/23 0216    Lab Status: Final result Specimen: Swab from Nares Updated: 05/15/23 0408     MRSA PCR No MRSA Detected    Narrative:      The negative predictive value of this diagnostic test is high and should only be used to consider de-escalating anti-MRSA therapy. A positive result may indicate colonization with MRSA and must be correlated clinically.    Blood Culture - Blood, Arm, Left [[id number]]  (Normal) Collected: 05/13/23 0105    Lab Status: Preliminary result Specimen: Blood from Arm, Left Updated: 05/16/23 0130     Blood Culture No growth at 3 days    Blood Culture - Blood, Arm, Right [[id number]]  (Normal) Collected: 05/13/23 0105    Lab Status: Preliminary result Specimen: Blood from Arm, Right Updated: 05/16/23 0130     Blood Culture No growth at 3 days          [x]  Microbiology  [x]  Radiology  CT Abdomen Pelvis Without Contrast    Result Date: 5/14/2023    1. Wall thickening at the proximal duodenum with adjacent fat stranding.  The appearance is nonspecific.  Consider enteritis or peptic ulcer disease.  2. Cholelithiasis. 3. Hepatomegaly and architectural changes in the liver that might reflect cirrhosis.  Mild splenomegaly could indicate portal venous hypertension or be a function of body habitus. 4. Small nonobstructing right-sided kidney stones. 5. Significant multilevel lumbar spondylosis.     LORETTA DAVALOS MD       Electronically Signed and Approved By: LORETTA DAVALOS MD on 5/14/2023 at 8:01             CT Chest Without Contrast Diagnostic    Result Date: 5/14/2023    1. Interlobular septal thickening and patchy areas of ground-glass opacity in both lungs.  This could be infectious or inflammatory.  The lungs have a similar appearance in  2008.  This may represent a recurrent or residual process. 2. Cardiomegaly, coronary artery disease and aortic atherosclerosis. 3. Trace bilateral pleural effusions. 4. Small nodules in the each lung, which could be infectious or inflammatory, but a 6 month follow-up chest CT is recommended to further evaluate.     LORETTA DAVALOS MD       Electronically Signed and Approved By: LORETTA DAVALOS MD on 5/14/2023 at 7:58             XR Chest 1 View    Result Date: 5/15/2023    1. Interval worsening of the appearance of the chest with bilateral pulmonary infiltrates 2. Right pleural effusion 3. Cardiomegaly.       JESUS HICKEY MD       Electronically Signed and Approved By: JESUS HICKEY MD on 5/15/2023 at 20:33             XR Chest 1 View    Result Date: 5/12/2023    No definite acute cardiopulmonary abnormality.       JULIO CÉSAR POMPA MD       Electronically Signed and Approved By: JULIO CÉSAR POMPA MD on 5/12/2023 at 23:26               [x]  EKG/Telemetry   []  Cardiology/Vascular   []  Pathology  []  Old records  [x]  Other:  Scheduled Meds:arformoterol, 15 mcg, Nebulization, BID - RT  atorvastatin, 20 mg, Oral, Nightly  cefepime, 2 g, Intravenous, Q8H  cholecalciferol, 1,000 Units, Oral, Daily  digoxin, 125 mcg, Oral, Once per day on Sun Thu   And  digoxin, 250 mcg, Oral, Once per day on Mon Tue Wed Fri Sat  furosemide, 40 mg, Intravenous, Q12H  insulin detemir, 1-200 Units, Subcutaneous, Nightly - Glucommander  insulin lispro, 1-200 Units, Subcutaneous, 4x Daily With Meals & Nightly  metoprolol succinate XL, 100 mg, Oral, Q24H  multivitamin with minerals, 1 tablet, Oral, Daily  pregabalin, 150 mg, Oral, TID  rivaroxaban, 20 mg, Oral, Daily  sodium chloride, 10 mL, Intravenous, Q12H  triamcinolone, 1 application, Topical, Daily  vitamin B-6, 200 mg, Oral, Daily      Continuous Infusions:dilTIAZem, 5-15 mg/hr, Last Rate: Stopped (05/13/23 0807)  Pharmacy to Dose Cefepime,       PRN Meds:.•  dextrose  •  dextrose  •   glucagon (human recombinant)  •  HYDROcodone-acetaminophen  •  insulin lispro  •  levalbuterol  •  metoprolol tartrate  •  nitroglycerin  •  ondansetron  •  Pharmacy to Dose Cefepime  •  prochlorperazine  •  sodium chloride  •  sodium chloride  •  sodium chloride      Assessment & Plan   Assessment / Plan     Assessment/Plan:  Sepsis secondary to duodenitis present on admission  Duodenitis  A-fib RVR  Acute hypoxic respiratory failure  Leukocytosis  CKD 3A  COPD without acute exacerbation  Type 2 diabetes mellitus  Hyperlipidemia  Hypertension  Thrombocytopenia due to sepsis and suspected cirrhosis  Concern for cirrhosis  Bilateral pulmonary nodules    Plan:  Patient admitted for further evaluation and treatment  For continued stability will transfer to the floor this evening  Cardiology consulted thank you for your assistance  Continue home digoxin and metoprolol  Continue as needed Lopressor  Continue Xarelto  Advance diet as tolerated  Continue cefepime empirically  MRSA negative by PCR to stop vancomycin  Continue supplemental nasal cannula oxygen titrate to keep sats greater than 90%.  Patient could benefit from BiPAP if able to tolerate  Started on IV diuretics we will monitor renal function and electrolytes closely  Continue to monitor leukocytosis  Continue Brovana  Continue Levemir and sliding scale insulin and titrate per protocol  Continue atorvastatin  Suspect thrombocytopenia due to cirrhosis with sepsis  Repeat CT chest in 6 months per radiology recommendations.  Consult physical therapy to evaluate and treat  Further inpatient orders recommendations pending clinical course.     Discussed plan with bedside RN, intensivist    Disposition: PT OT elle's pending.  Repeat CT chest in 6 months per radiology recommendations.    DVT prophylaxis:  Medical DVT prophylaxis orders are present.    CODE STATUS:   Level Of Support Discussed With: Patient  Code Status (Patient has no pulse and is not breathing): CPR  (Attempt to Resuscitate)  Medical Interventions (Patient has pulse or is breathing): Full Support

## 2023-05-16 NOTE — NURSING NOTE
Pt o2 sats were in the 70's during shift change. Pt was anxious . Pt was already on 15L HF. HR was afib in 130s. Rsespiratory was notified. PA notified and replied to call RRT. RRT was called at 1940. Pt was placed on bipap.  Pt iv infiltrated, no iv access was able to be obtained. Dr. Kelly and MARIANA SandersSuffolk at bedside and ordered for pt to transferred to units for line placement. Pt was the transferred to CCU 4. Report given and family notified. Daughter came and collected belongings. Eden Villegas RN

## 2023-05-16 NOTE — NURSING NOTE
Pt had mentation change. Alert to person, place, time but not situation, Slow to follow commands. Dr. Benitez notified, ABG with lytes and ammonia ordered. MD en-route to bedside. Glucose WDL, Bipap removed and patient placed on 15L highflow due to complaint of nausea. --------one time dose Lasix given due to CXR from RRT, lactulose ordered for ammonia levels, replaced back on Bipap.

## 2023-05-16 NOTE — PLAN OF CARE
Goal Outcome Evaluation: one complaint of nausea given Zofran, pain times one in LE's given pain med. Decreased from BIPAP to HF , currently at 6 LPM. BS controled with sliding scale , bed ban times two for BM , watery and med size

## 2023-05-16 NOTE — PLAN OF CARE
Goal Outcome Evaluation:              [facility] Pt received 50mg PO metoprolol once, 40mg IV Lasix once and was started on scheduled Lactulose due to ammonia levels. Please see previous note & Physcian Notified documentation for details. Pt states she takes 200mg metoprolol at home from Dr. Minor and takes PO lasix at home as well.

## 2023-05-16 NOTE — PLAN OF CARE
Goal Outcome Evaluation:      Patient has tolerated bipap well. She did have it off twice for short periods of time. I have weeaned o2 to 50%.

## 2023-05-16 NOTE — SIGNIFICANT NOTE
Called by RN early in shift that patient got up to the bedside commode and was previously satting mid 90s on 3 L nasal cannula, at that time she was on 15 L satting 85.  Patient also has history of A-fib with RVR and heart rate was approximately 130.  I responded to the RRT with Dr. Kelly.  Once at the bedside, we were informed that the patient's IV had infiltrated and she did not have IV access.  EKG revealed A-fib with heart rate in 130s.  She was placed on BiPAP and her O2 sat improved significantly.  However, given that the patient's heart rate was not improving and she did not have IV access, the decision was made to move the patient to the ICU.  Once in the ICU, IV access was obtained.    Barb Lebron PA-C

## 2023-05-17 LAB
ANION GAP SERPL CALCULATED.3IONS-SCNC: 11.3 MMOL/L (ref 5–15)
BUN SERPL-MCNC: 24 MG/DL (ref 8–23)
BUN/CREAT SERPL: 28.6 (ref 7–25)
CALCIUM SPEC-SCNC: 9.4 MG/DL (ref 8.6–10.5)
CHLORIDE SERPL-SCNC: 99 MMOL/L (ref 98–107)
CO2 SERPL-SCNC: 27.7 MMOL/L (ref 22–29)
CREAT SERPL-MCNC: 0.84 MG/DL (ref 0.57–1)
DEPRECATED RDW RBC AUTO: 47.8 FL (ref 37–54)
EGFRCR SERPLBLD CKD-EPI 2021: 74.9 ML/MIN/1.73
ERYTHROCYTE [DISTWIDTH] IN BLOOD BY AUTOMATED COUNT: 14.5 % (ref 12.3–15.4)
GLUCOSE BLDC GLUCOMTR-MCNC: 160 MG/DL (ref 70–99)
GLUCOSE BLDC GLUCOMTR-MCNC: 174 MG/DL (ref 70–99)
GLUCOSE BLDC GLUCOMTR-MCNC: 198 MG/DL (ref 70–99)
GLUCOSE BLDC GLUCOMTR-MCNC: 210 MG/DL (ref 70–99)
GLUCOSE SERPL-MCNC: 133 MG/DL (ref 65–99)
HCT VFR BLD AUTO: 42 % (ref 34–46.6)
HGB BLD-MCNC: 13.7 G/DL (ref 12–15.9)
MAGNESIUM SERPL-MCNC: 2 MG/DL (ref 1.6–2.4)
MCH RBC QN AUTO: 29.5 PG (ref 26.6–33)
MCHC RBC AUTO-ENTMCNC: 32.6 G/DL (ref 31.5–35.7)
MCV RBC AUTO: 90.5 FL (ref 79–97)
PHOSPHATE SERPL-MCNC: 4 MG/DL (ref 2.5–4.5)
PLATELET # BLD AUTO: 84 10*3/MM3 (ref 140–450)
PMV BLD AUTO: 13.9 FL (ref 6–12)
POTASSIUM SERPL-SCNC: 3.8 MMOL/L (ref 3.5–5.2)
RBC # BLD AUTO: 4.64 10*6/MM3 (ref 3.77–5.28)
SODIUM SERPL-SCNC: 138 MMOL/L (ref 136–145)
WBC NRBC COR # BLD: 12.58 10*3/MM3 (ref 3.4–10.8)

## 2023-05-17 PROCEDURE — 63710000001 INSULIN DETEMIR PER 5 UNITS: Performed by: INTERNAL MEDICINE

## 2023-05-17 PROCEDURE — 94660 CPAP INITIATION&MGMT: CPT

## 2023-05-17 PROCEDURE — 82948 REAGENT STRIP/BLOOD GLUCOSE: CPT

## 2023-05-17 PROCEDURE — 94799 UNLISTED PULMONARY SVC/PX: CPT

## 2023-05-17 PROCEDURE — 63710000001 INSULIN LISPRO (HUMAN) PER 5 UNITS: Performed by: INTERNAL MEDICINE

## 2023-05-17 PROCEDURE — 80048 BASIC METABOLIC PNL TOTAL CA: CPT | Performed by: INTERNAL MEDICINE

## 2023-05-17 PROCEDURE — 94761 N-INVAS EAR/PLS OXIMETRY MLT: CPT

## 2023-05-17 PROCEDURE — 94664 DEMO&/EVAL PT USE INHALER: CPT

## 2023-05-17 PROCEDURE — 0 CEFEPIME PER 500 MG

## 2023-05-17 PROCEDURE — 85027 COMPLETE CBC AUTOMATED: CPT | Performed by: INTERNAL MEDICINE

## 2023-05-17 PROCEDURE — 99233 SBSQ HOSP IP/OBS HIGH 50: CPT | Performed by: INTERNAL MEDICINE

## 2023-05-17 PROCEDURE — 25010000002 ONDANSETRON PER 1 MG: Performed by: STUDENT IN AN ORGANIZED HEALTH CARE EDUCATION/TRAINING PROGRAM

## 2023-05-17 PROCEDURE — 25010000002 PROCHLORPERAZINE 10 MG/2ML SOLUTION: Performed by: FAMILY MEDICINE

## 2023-05-17 PROCEDURE — 0 CEFTRIAXONE PER 250 MG: Performed by: INTERNAL MEDICINE

## 2023-05-17 PROCEDURE — 84100 ASSAY OF PHOSPHORUS: CPT | Performed by: INTERNAL MEDICINE

## 2023-05-17 PROCEDURE — 25010000002 FUROSEMIDE PER 20 MG: Performed by: NURSE PRACTITIONER

## 2023-05-17 PROCEDURE — 83735 ASSAY OF MAGNESIUM: CPT | Performed by: INTERNAL MEDICINE

## 2023-05-17 RX ORDER — POTASSIUM CHLORIDE 750 MG/1
40 CAPSULE, EXTENDED RELEASE ORAL ONCE
Status: COMPLETED | OUTPATIENT
Start: 2023-05-17 | End: 2023-05-17

## 2023-05-17 RX ORDER — CEFTRIAXONE SODIUM 2 G/50ML
2 INJECTION, SOLUTION INTRAVENOUS EVERY 24 HOURS
Status: COMPLETED | OUTPATIENT
Start: 2023-05-17 | End: 2023-05-18

## 2023-05-17 RX ORDER — CEFEPIME 1 G/50ML
2 INJECTION, SOLUTION INTRAVENOUS EVERY 8 HOURS
Status: DISCONTINUED | OUTPATIENT
Start: 2023-05-17 | End: 2023-05-17

## 2023-05-17 RX ADMIN — PREGABALIN 150 MG: 75 CAPSULE ORAL at 20:47

## 2023-05-17 RX ADMIN — INSULIN LISPRO 2 UNITS: 100 INJECTION, SOLUTION INTRAVENOUS; SUBCUTANEOUS at 08:21

## 2023-05-17 RX ADMIN — FUROSEMIDE 40 MG: 10 INJECTION, SOLUTION INTRAMUSCULAR; INTRAVENOUS at 14:29

## 2023-05-17 RX ADMIN — INSULIN LISPRO 4 UNITS: 100 INJECTION, SOLUTION INTRAVENOUS; SUBCUTANEOUS at 18:15

## 2023-05-17 RX ADMIN — PYRIDOXINE HCL TAB 50 MG 200 MG: 50 TAB at 08:55

## 2023-05-17 RX ADMIN — Medication 10 ML: at 08:21

## 2023-05-17 RX ADMIN — ATORVASTATIN CALCIUM 20 MG: 20 TABLET, FILM COATED ORAL at 20:47

## 2023-05-17 RX ADMIN — CEFEPIME HYDROCHLORIDE 2 G: 2 INJECTION, POWDER, FOR SOLUTION INTRAMUSCULAR; INTRAVENOUS at 02:42

## 2023-05-17 RX ADMIN — MULTIPLE VITAMINS W/ MINERALS TAB 1 TABLET: TAB at 08:22

## 2023-05-17 RX ADMIN — HYDROCODONE BITARTRATE AND ACETAMINOPHEN 1 TABLET: 10; 325 TABLET ORAL at 20:54

## 2023-05-17 RX ADMIN — CEFEPIME HYDROCHLORIDE 2 G: 2 INJECTION, POWDER, FOR SOLUTION INTRAMUSCULAR; INTRAVENOUS at 09:58

## 2023-05-17 RX ADMIN — FUROSEMIDE 40 MG: 10 INJECTION, SOLUTION INTRAMUSCULAR; INTRAVENOUS at 02:42

## 2023-05-17 RX ADMIN — RIVAROXABAN 20 MG: 20 TABLET, FILM COATED ORAL at 17:41

## 2023-05-17 RX ADMIN — ARFORMOTEROL TARTRATE 15 MCG: 15 SOLUTION RESPIRATORY (INHALATION) at 08:58

## 2023-05-17 RX ADMIN — INSULIN LISPRO 2 UNITS: 100 INJECTION, SOLUTION INTRAVENOUS; SUBCUTANEOUS at 12:57

## 2023-05-17 RX ADMIN — TRIAMCINOLONE ACETONIDE 1 APPLICATION: 1 OINTMENT TOPICAL at 09:58

## 2023-05-17 RX ADMIN — POTASSIUM CHLORIDE 40 MEQ: 750 CAPSULE, EXTENDED RELEASE ORAL at 08:21

## 2023-05-17 RX ADMIN — ONDANSETRON 4 MG: 2 INJECTION INTRAMUSCULAR; INTRAVENOUS at 16:00

## 2023-05-17 RX ADMIN — DIGOXIN 250 MCG: 250 TABLET ORAL at 08:55

## 2023-05-17 RX ADMIN — PROCHLORPERAZINE EDISYLATE 5 MG: 5 INJECTION, SOLUTION INTRAMUSCULAR; INTRAVENOUS at 17:41

## 2023-05-17 RX ADMIN — METOPROLOL SUCCINATE 100 MG: 50 TABLET, EXTENDED RELEASE ORAL at 08:22

## 2023-05-17 RX ADMIN — INSULIN DETEMIR 15 UNITS: 100 INJECTION, SOLUTION SUBCUTANEOUS at 20:46

## 2023-05-17 RX ADMIN — Medication 10 ML: at 20:47

## 2023-05-17 RX ADMIN — PREGABALIN 150 MG: 75 CAPSULE ORAL at 08:22

## 2023-05-17 RX ADMIN — HYDROCODONE BITARTRATE AND ACETAMINOPHEN 1 TABLET: 10; 325 TABLET ORAL at 12:22

## 2023-05-17 RX ADMIN — Medication 1000 UNITS: at 08:22

## 2023-05-17 RX ADMIN — PREGABALIN 150 MG: 75 CAPSULE ORAL at 17:41

## 2023-05-17 RX ADMIN — ONDANSETRON 4 MG: 2 INJECTION INTRAMUSCULAR; INTRAVENOUS at 03:41

## 2023-05-17 RX ADMIN — CEFTRIAXONE SODIUM 2 G: 2 INJECTION, SOLUTION INTRAVENOUS at 17:42

## 2023-05-17 RX ADMIN — HYDROCODONE BITARTRATE AND ACETAMINOPHEN 1 TABLET: 10; 325 TABLET ORAL at 07:16

## 2023-05-17 RX ADMIN — ARFORMOTEROL TARTRATE 15 MCG: 15 SOLUTION RESPIRATORY (INHALATION) at 20:23

## 2023-05-17 NOTE — PLAN OF CARE
Goal Outcome Evaluation:         Patient refused to wear bipap, patient is oxygenating fine on nasal cannula at this time.

## 2023-05-17 NOTE — PLAN OF CARE
Problem: Adult Inpatient Plan of Care  Goal: Plan of Care Review  Outcome: Ongoing, Progressing  Flowsheets  Taken 5/17/2023 1822 by Darlin Alcantara RN  Progress: no change  Outcome Evaluation: Transfer form CCU, arrived to unit, oriented to floor,, call light in reach, bed in lowest position, instructed ot call out with any needs. IV zofran and compazine given x1 each for nausea, patient reports feeling much better at this time. heavy 1x assist to bedside. VSS, will continue to monitor.  Taken 5/16/2023 1211 by Clara Francois PTA  Plan of Care Reviewed With: patient   Goal Outcome Evaluation:           Progress: no change  Outcome Evaluation: Transfer form CCU, arrived to unit, oriented to floor,, call light in reach, bed in lowest position, instructed ot call out with any needs. IV zofran and compazine given x1 each for nausea, patient reports feeling much better at this time. heavy 1x assist to bedside. VSS, will continue to monitor.

## 2023-05-17 NOTE — PLAN OF CARE
Goal Outcome Evaluation:                   Patient oxygen saturation started to decline and explained to patient that bipap was needed for them.  Patient agreed to wear and patient was placed on at this time.

## 2023-05-17 NOTE — PROGRESS NOTES
[facility]   Hospitalist Progress Note  Date: 2023  Patient Name: Mary Albarran  : 1950  MRN: [id number]  Date of admission: 2023      Subjective   Subjective     Chief Complaint: Nausea vomiting follow-up    Summary:Mary Albarran is a 72 y.o. female with a past medical history of A-fib on Xarelto, CKD stage III, COPD, type 2 diabetes mellitus, hyperlipidemia, hypertension presented to the ED for evaluation of nausea, vomiting since yesterday.  Patient went to cardiology clinic yesterday and had an appointment with Dr. Metcalf, had cardiac echo done and after that when she came home she started experiencing nausea , associated with multiple episodes of vomiting since yesterday.  Mild intermittent diffuse cramping abdominal pain was noted.  Denies fevers, cough, shortness of breath, chest pain, diarrhea, dysuria, visual changes, focal weakness, numbness, tingling, palpitations, lightheadedness.     On arrival to the ED, vital signs temperature 99.2, pulse 146, respiratory 20, blood pressure 147/71 on 2 L of nasal cannula saturating around 91%.  EKG showed A-fib with RVR.  Labs showed troponin 17, proBNP 976, rest of the CMP within normal limits except creatinine 1.05, WBC 26, hemoglobin 16, hematocrit 49, platelets 87, digoxin level within therapeutic range.  Chest x-ray showed no acute cardiopulmonary process.  Patient had a cardiac echo yesterday which showed LVEF 60-65%, left ventricular diastolic function was indeterminate.  Mildly dilated left atrial cavity.  Mild to early moderate aortic insufficiency.  Patient appears to be in coarse atrial fibrillation/flutter during my exam.  Received IV diltiazem loading dose and started on diltiazem drip.  Patient has been admitted for further evaluation and management of nausea and vomiting with associated A-fib with RVR.  Patient continued IV fluids and Cardizem drip.  Weaned off of Cardizem drip.  Cardiology consulted.  A-fib RVR thought likely  secondary to volume depletion.  Abdominal pain improved and was able to tolerate clears.  Diet advanced but patient not able to tolerate episodes of abdominal pain nausea vomiting.  CT of the abdomen demonstrated wall thickening at the proximal duodenum and adjacent fat stranding concerning for enteritis.  Started on empiric antibiotics.  Diet downgraded back to clears.  CT chest demonstrated septal thickening patchy bilateral groundglass opacities similar to finding in 2008.  Also found to have small pulmonary nodules bilaterally.  Radiology recommending 6-month follow-up.  IV fluids stopped.  Abdominal pain improving.  Diet slowly advanced.  Heart rate remained difficult to control.  Continued on as needed Lopressor pushes and home metoprolol titrated up.  O2 requirement slowly trending up.  Patient short of breath and tachycardic on the evening of 5/15/2023 when she got up to the commode sats in the 70s.  Placed on 15 L high flow.  RRT called.  ABG showed mild hypercapnia without acidosis.  Chest x-ray pending.  Patient given IV Solu-Medrol.  Patient transferred to the ICU.      Interval Followup:   Given controlled rates patient will be transferred to the floor.  Patient between 6 L and CPAP.  Continue to push diuresis as renal function permits.  Patient denies any palpitations, minimal shortness of breath    ROS:  Denies chest pain or palpitations currently.  Endorses minimal shortness of breath    Objective   Objective     Vitals:   Temp:  [96.5 °F (35.8 °C)-98.3 °F (36.8 °C)] 98.1 °F (36.7 °C)  Heart Rate:  [] 113  Resp:  [16-23] 22  BP: ()/(43-80) 126/63  Flow (L/min):  [3-6] 6  Physical Exam   Gen. well-developed appearing stated age in no acute distress  HEENT: Normocephalic atraumatic moist membranes pupils equal round reactive light, no scleral icterus no conjunctival injection  Cardiovascular: Irregularly irregular, rates in the 80s on rounds.  No lower extremity edema  appreciated  Pulmonary: Crackles bilateral lower lung fields, no wheezes or rhonchi symmetric chest expansion, unlabored, no conversational dyspnea appreciated  Gastrointestinal: Soft nontender nondistended positive bowel sounds all 4 quadrants no rebound or guarding  Musculoskeletal: No clubbing cyanosis, warm and well-perfused, calves soft symmetric nontender bilaterally  Skin: Clean dry without rashs  Neuro: Cranial nerves II through XII intact grossly no sensorimotor deficits appreciated bilateral upper and lower extremities  Psych: Patient is calm cooperative and appropriate with exam not responding to internal stimuli  : No Maldonado catheter no bladder distention no suprapubic tenderness    Result Review    Result Review:  I have personally reviewed these results and agree with these findings:  [x]  Laboratory  LAB RESULTS:      Lab 05/17/23  0316 05/16/23  0735 05/16/23  0416 05/15/23  2254 05/15/23  2103 05/15/23  1931 05/15/23  0549 05/14/23  0427 05/13/23  0313 05/12/23  2201   WBC 12.58*  --  7.22  --  11.14*  --  10.59 13.33* 22.26* 26.10*   HEMOGLOBIN 13.7  --  13.7  --  13.2  --  13.1 13.4 14.8 16.0*   HEMATOCRIT 42.0  --  43.8  --  41.9  --  41.3 42.4 46.1 49.2*   PLATELETS 84*  --  65*  --  69*  --  55* 55* 83* 87*   NEUTROS ABS  --   --   --   --  9.29*  --   --   --  19.92* 24.49*   IMMATURE GRANS (ABS)  --   --   --   --  0.09*  --   --   --  0.17* 0.16*   LYMPHS ABS  --   --   --   --  0.86  --   --   --  1.35 0.88   MONOS ABS  --   --   --   --  0.85  --   --   --  0.77 0.51   EOS ABS  --   --   --   --  0.01  --   --   --  0.00 0.00   MCV 90.5  --  92.0  --  91.5  --  92.2 92.6 90.2 88.8   PROCALCITONIN  --   --   --   --   --   --   --   --   --  0.45*   LACTATE  --   --   --   --  1.3  --   --   --   --   --    LACTATE, ARTERIAL  --  1.44  --  1.17  --  1.37  --   --   --   --    PROTIME  --   --  22.1*  --   --   --   --   --   --   --          Lab 05/17/23  0316 05/16/23  1031 05/16/23  0735  05/16/23  0611 05/16/23  0416 05/15/23  2254 05/15/23  2103 05/15/23  1931 05/15/23  0549 05/14/23  0427 05/13/23  0313   SODIUM 138  --   --   --  137  --  134*  --  136 137 138   SODIUM, ARTERIAL  --   --  137.7  --   --  133.8*  --  134.3*  --   --   --    POTASSIUM 3.8 4.5  --   --  4.7  --  4.5  --  4.7 4.8 4.7   CHLORIDE 99  --   --   --  100  --  101  --  101 102 103   CO2 27.7  --   --   --  25.9  --  22.7  --  26.3 26.3 23.9   ANION GAP 11.3  --   --   --  11.1  --  10.3  --  8.7 8.7 11.1   BUN 24*  --   --   --  14  --  13  --  14 15 16   CREATININE 0.84  --   --   --  0.80  --  0.71  --  0.78 0.83 1.04*   EGFR 74.9  --   --   --  79.4  --  91.6  --  81.8 75.9 57.9*   GLUCOSE 133*  --   --   --  189*  --  136*  --  153* 151* 189*   GLUCOSE, ARTERIAL  --   --  197*  --   --  136*  --  144*  --   --   --    CALCIUM 9.4  --   --   --  9.2  --  8.9  --  8.7 8.6 9.0   IONIZED CALCIUM  --   --  1.16  --   --  1.09*  --  1.15  --   --   --    MAGNESIUM 2.0 1.9  --  1.9  --   --  1.8  --  1.7  --  1.8   PHOSPHORUS 4.0  --   --   --   --   --   --   --   --  3.0 4.0         Lab 05/16/23  0416 05/15/23  2103 05/15/23  0549 05/14/23  0427 05/13/23  0313 05/12/23  2201   TOTAL PROTEIN 6.7 6.6 6.1  --  6.4 7.2   ALBUMIN 3.3* 3.2* 3.4* 3.1* 3.6 3.9   GLOBULIN 3.4 3.4 2.7  --  2.8 3.3   ALT (SGPT) 23 22 17  --  18 20   AST (SGOT) 27 28 20  --  20 21   BILIRUBIN 1.4* 1.4* 1.1  --  1.0 1.1   ALK PHOS 62 59 56  --  64 78         Lab 05/16/23  1031 05/16/23  0416 05/15/23  2255 05/15/23  2103 05/15/23  0549 05/12/23  2201   PROBNP  --   --   --   --  3,335.0* 976.7*   HSTROP T 20*  --  28* 28*  --  17*   PROTIME  --  22.1*  --   --   --   --    INR  --  1.95*  --   --   --   --                  Lab 05/16/23  0735 05/15/23  2254 05/15/23  1931   PH, ARTERIAL 7.441 7.378 7.378   PCO2, ARTERIAL 38.5 45.4* 46.8*   PO2 ART 73.5* 69.6* 55.2*   O2 SATURATION ART 94.5* 93.2* 88.9*   FIO2 40  --   --    HCO3 ART 25.6 26.1* 26.9*    BASE EXCESS ART 1.6 0.6 1.2   CARBOXYHEMOGLOBIN 0.6 0.8 1.1     Brief Urine Lab Results  (Last result in the past 365 days)      Color   Clarity   Blood   Leuk Est   Nitrite   Protein   CREAT   Urine HCG        05/13/23 0415 Yellow   Clear   Negative   Negative   Negative   30 mg/dL (1+)               Microbiology Results (last 10 days)     Procedure Component Value - Date/Time    MRSA Screen, PCR (Inpatient) - Swab, Nares [[id number]]  (Normal) Collected: 05/15/23 0216    Lab Status: Final result Specimen: Swab from Nares Updated: 05/15/23 0408     MRSA PCR No MRSA Detected    Narrative:      The negative predictive value of this diagnostic test is high and should only be used to consider de-escalating anti-MRSA therapy. A positive result may indicate colonization with MRSA and must be correlated clinically.    Blood Culture - Blood, Arm, Left [[id number]]  (Normal) Collected: 05/13/23 0105    Lab Status: Preliminary result Specimen: Blood from Arm, Left Updated: 05/17/23 0131     Blood Culture No growth at 4 days    Blood Culture - Blood, Arm, Right [[id number]]  (Normal) Collected: 05/13/23 0105    Lab Status: Preliminary result Specimen: Blood from Arm, Right Updated: 05/17/23 0131     Blood Culture No growth at 4 days          [x]  Microbiology  [x]  Radiology  CT Abdomen Pelvis Without Contrast    Result Date: 5/14/2023    1. Wall thickening at the proximal duodenum with adjacent fat stranding.  The appearance is nonspecific.  Consider enteritis or peptic ulcer disease.  2. Cholelithiasis. 3. Hepatomegaly and architectural changes in the liver that might reflect cirrhosis.  Mild splenomegaly could indicate portal venous hypertension or be a function of body habitus. 4. Small nonobstructing right-sided kidney stones. 5. Significant multilevel lumbar spondylosis.     LORETTA DAVALOS MD       Electronically Signed and Approved By: LORETTA DAVALOS MD on 5/14/2023 at 8:01             CT Chest Without Contrast  Diagnostic    Result Date: 5/14/2023    1. Interlobular septal thickening and patchy areas of ground-glass opacity in both lungs.  This could be infectious or inflammatory.  The lungs have a similar appearance in 2008.  This may represent a recurrent or residual process. 2. Cardiomegaly, coronary artery disease and aortic atherosclerosis. 3. Trace bilateral pleural effusions. 4. Small nodules in the each lung, which could be infectious or inflammatory, but a 6 month follow-up chest CT is recommended to further evaluate.     LORETTA DAVALOS MD       Electronically Signed and Approved By: LORETTA DAVALOS MD on 5/14/2023 at 7:58             XR Chest 1 View    Result Date: 5/15/2023    1. Interval worsening of the appearance of the chest with bilateral pulmonary infiltrates 2. Right pleural effusion 3. Cardiomegaly.       JESUS HICKEY MD       Electronically Signed and Approved By: JESUS HICKEY MD on 5/15/2023 at 20:33             XR Chest 1 View    Result Date: 5/12/2023    No definite acute cardiopulmonary abnormality.       JULIO CÉSAR POMPA MD       Electronically Signed and Approved By: JULIO CÉSAR POMPA MD on 5/12/2023 at 23:26               [x]  EKG/Telemetry   []  Cardiology/Vascular   []  Pathology  []  Old records  [x]  Other:  Scheduled Meds:arformoterol, 15 mcg, Nebulization, BID - RT  atorvastatin, 20 mg, Oral, Nightly  cefepime, 2 g, Intravenous, Q8H  cefepime, 2 g, Intravenous, Q8H  cholecalciferol, 1,000 Units, Oral, Daily  digoxin, 125 mcg, Oral, Once per day on Sun Thu   And  digoxin, 250 mcg, Oral, Once per day on Mon Tue Wed Fri Sat  furosemide, 40 mg, Intravenous, Q12H  insulin detemir, 15 Units, Subcutaneous, Nightly  insulin lispro, 2-9 Units, Subcutaneous, TID AC  metoprolol succinate XL, 100 mg, Oral, Q24H  multivitamin with minerals, 1 tablet, Oral, Daily  pregabalin, 150 mg, Oral, TID  rivaroxaban, 20 mg, Oral, Daily  sodium chloride, 10 mL, Intravenous, Q12H  triamcinolone, 1 application,  Topical, Daily  vitamin B-6, 200 mg, Oral, Daily      Continuous Infusions:dilTIAZem, 5-15 mg/hr, Last Rate: Stopped (05/13/23 0807)  Pharmacy to Dose Cefepime,       PRN Meds:.•  dextrose  •  dextrose  •  glucagon (human recombinant)  •  HYDROcodone-acetaminophen  •  levalbuterol  •  metoprolol tartrate  •  nitroglycerin  •  ondansetron  •  Pharmacy to Dose Cefepime  •  prochlorperazine  •  sodium chloride  •  sodium chloride  •  sodium chloride      Assessment & Plan   Assessment / Plan     Assessment/Plan:  Sepsis secondary to duodenitis present on admission  Duodenitis  A-fib RVR  Acute hypoxic respiratory failure  Leukocytosis  CKD 3A  COPD without acute exacerbation  Type 2 diabetes mellitus  Hyperlipidemia  Hypertension  Thrombocytopenia due to sepsis and suspected cirrhosis  Concern for cirrhosis  Bilateral pulmonary nodules    Plan:  Patient admitted for further evaluation and treatment  Cardiology consulted thank you for your assistance  Continue home digoxin and metoprolol  Continue as needed Lopressor  Diltiazem as ordered, however has not been hung anytime recently will keep on MAR in case it is needed  Continue Xarelto  Advance diet as tolerated  Continue cefepime empirically  MRSA negative by PCR to stop vancomycin  Continue supplemental nasal cannula oxygen titrate to keep sats greater than 90%.  Patient could benefit from BiPAP if able to tolerate  Started on IV diuretics we will monitor renal function and electrolytes closely  Continue to monitor leukocytosis  Continue Brovana  Continue Levemir and sliding scale insulin and titrate per protocol  Continue atorvastatin  Suspect thrombocytopenia due to cirrhosis with sepsis  Repeat CT chest in 6 months per radiology recommendations.  Consult physical therapy to evaluate and treat  Further inpatient orders recommendations pending clinical course      Discussed plan with bedside RN, intensivist    Disposition: PT OT elle's pending.  Repeat CT chest in 6  months per radiology recommendations.    DVT prophylaxis:  Medical DVT prophylaxis orders are present.    CODE STATUS:   Level Of Support Discussed With: Patient  Code Status (Patient has no pulse and is not breathing): CPR (Attempt to Resuscitate)  Medical Interventions (Patient has pulse or is breathing): Full Support

## 2023-05-18 LAB
ALBUMIN SERPL-MCNC: 3.3 G/DL (ref 3.5–5.2)
ALBUMIN/GLOB SERPL: 0.9 G/DL
ALP SERPL-CCNC: 64 U/L (ref 39–117)
ALT SERPL W P-5'-P-CCNC: 24 U/L (ref 1–33)
ANION GAP SERPL CALCULATED.3IONS-SCNC: 10.6 MMOL/L (ref 5–15)
AST SERPL-CCNC: 30 U/L (ref 1–32)
BACTERIA SPEC AEROBE CULT: NORMAL
BACTERIA SPEC AEROBE CULT: NORMAL
BILIRUB SERPL-MCNC: 0.9 MG/DL (ref 0–1.2)
BUN SERPL-MCNC: 29 MG/DL (ref 8–23)
BUN/CREAT SERPL: 29.3 (ref 7–25)
CALCIUM SPEC-SCNC: 9.6 MG/DL (ref 8.6–10.5)
CHLORIDE SERPL-SCNC: 93 MMOL/L (ref 98–107)
CO2 SERPL-SCNC: 32.4 MMOL/L (ref 22–29)
CREAT SERPL-MCNC: 0.99 MG/DL (ref 0.57–1)
DEPRECATED RDW RBC AUTO: 47.7 FL (ref 37–54)
EGFRCR SERPLBLD CKD-EPI 2021: 61.5 ML/MIN/1.73
ERYTHROCYTE [DISTWIDTH] IN BLOOD BY AUTOMATED COUNT: 14.6 % (ref 12.3–15.4)
GLOBULIN UR ELPH-MCNC: 3.8 GM/DL
GLUCOSE BLDC GLUCOMTR-MCNC: 155 MG/DL (ref 70–99)
GLUCOSE BLDC GLUCOMTR-MCNC: 158 MG/DL (ref 70–99)
GLUCOSE BLDC GLUCOMTR-MCNC: 165 MG/DL (ref 70–99)
GLUCOSE SERPL-MCNC: 160 MG/DL (ref 65–99)
HCT VFR BLD AUTO: 44.8 % (ref 34–46.6)
HGB BLD-MCNC: 14.1 G/DL (ref 12–15.9)
MAGNESIUM SERPL-MCNC: 1.7 MG/DL (ref 1.6–2.4)
MCH RBC QN AUTO: 28.4 PG (ref 26.6–33)
MCHC RBC AUTO-ENTMCNC: 31.5 G/DL (ref 31.5–35.7)
MCV RBC AUTO: 90.1 FL (ref 79–97)
PLATELET # BLD AUTO: 103 10*3/MM3 (ref 140–450)
PMV BLD AUTO: 13.6 FL (ref 6–12)
POTASSIUM SERPL-SCNC: 4.1 MMOL/L (ref 3.5–5.2)
PROT SERPL-MCNC: 7.1 G/DL (ref 6–8.5)
RBC # BLD AUTO: 4.97 10*6/MM3 (ref 3.77–5.28)
SODIUM SERPL-SCNC: 136 MMOL/L (ref 136–145)
WBC NRBC COR # BLD: 9.7 10*3/MM3 (ref 3.4–10.8)

## 2023-05-18 PROCEDURE — 94799 UNLISTED PULMONARY SVC/PX: CPT

## 2023-05-18 PROCEDURE — 94664 DEMO&/EVAL PT USE INHALER: CPT

## 2023-05-18 PROCEDURE — 99232 SBSQ HOSP IP/OBS MODERATE 35: CPT | Performed by: INTERNAL MEDICINE

## 2023-05-18 PROCEDURE — 94761 N-INVAS EAR/PLS OXIMETRY MLT: CPT

## 2023-05-18 PROCEDURE — 63710000001 INSULIN LISPRO (HUMAN) PER 5 UNITS: Performed by: INTERNAL MEDICINE

## 2023-05-18 PROCEDURE — 83735 ASSAY OF MAGNESIUM: CPT | Performed by: INTERNAL MEDICINE

## 2023-05-18 PROCEDURE — 80053 COMPREHEN METABOLIC PANEL: CPT | Performed by: INTERNAL MEDICINE

## 2023-05-18 PROCEDURE — 63710000001 INSULIN DETEMIR PER 5 UNITS: Performed by: INTERNAL MEDICINE

## 2023-05-18 PROCEDURE — 97110 THERAPEUTIC EXERCISES: CPT

## 2023-05-18 PROCEDURE — 82948 REAGENT STRIP/BLOOD GLUCOSE: CPT

## 2023-05-18 PROCEDURE — 25010000002 FUROSEMIDE PER 20 MG: Performed by: NURSE PRACTITIONER

## 2023-05-18 PROCEDURE — 85027 COMPLETE CBC AUTOMATED: CPT | Performed by: INTERNAL MEDICINE

## 2023-05-18 PROCEDURE — 0 CEFTRIAXONE PER 250 MG: Performed by: INTERNAL MEDICINE

## 2023-05-18 RX ORDER — METOPROLOL SUCCINATE 50 MG/1
150 TABLET, EXTENDED RELEASE ORAL EVERY 12 HOURS SCHEDULED
Status: DISCONTINUED | OUTPATIENT
Start: 2023-05-18 | End: 2023-05-21 | Stop reason: HOSPADM

## 2023-05-18 RX ORDER — METOPROLOL SUCCINATE 50 MG/1
50 TABLET, EXTENDED RELEASE ORAL ONCE
Status: COMPLETED | OUTPATIENT
Start: 2023-05-18 | End: 2023-05-18

## 2023-05-18 RX ADMIN — INSULIN LISPRO 2 UNITS: 100 INJECTION, SOLUTION INTRAVENOUS; SUBCUTANEOUS at 17:17

## 2023-05-18 RX ADMIN — ATORVASTATIN CALCIUM 20 MG: 20 TABLET, FILM COATED ORAL at 22:05

## 2023-05-18 RX ADMIN — METOPROLOL SUCCINATE 100 MG: 50 TABLET, EXTENDED RELEASE ORAL at 08:10

## 2023-05-18 RX ADMIN — HYDROCODONE BITARTRATE AND ACETAMINOPHEN 1 TABLET: 10; 325 TABLET ORAL at 01:45

## 2023-05-18 RX ADMIN — Medication 1000 UNITS: at 08:11

## 2023-05-18 RX ADMIN — HYDROCODONE BITARTRATE AND ACETAMINOPHEN 1 TABLET: 10; 325 TABLET ORAL at 14:12

## 2023-05-18 RX ADMIN — TRIAMCINOLONE ACETONIDE 1 APPLICATION: 1 OINTMENT TOPICAL at 11:11

## 2023-05-18 RX ADMIN — METOPROLOL SUCCINATE 150 MG: 50 TABLET, EXTENDED RELEASE ORAL at 22:05

## 2023-05-18 RX ADMIN — HYDROCODONE BITARTRATE AND ACETAMINOPHEN 1 TABLET: 10; 325 TABLET ORAL at 18:24

## 2023-05-18 RX ADMIN — INSULIN DETEMIR 15 UNITS: 100 INJECTION, SOLUTION SUBCUTANEOUS at 22:05

## 2023-05-18 RX ADMIN — MULTIPLE VITAMINS W/ MINERALS TAB 1 TABLET: TAB at 08:10

## 2023-05-18 RX ADMIN — PYRIDOXINE HCL TAB 50 MG 200 MG: 50 TAB at 08:10

## 2023-05-18 RX ADMIN — INSULIN LISPRO 2 UNITS: 100 INJECTION, SOLUTION INTRAVENOUS; SUBCUTANEOUS at 08:11

## 2023-05-18 RX ADMIN — RIVAROXABAN 20 MG: 20 TABLET, FILM COATED ORAL at 17:17

## 2023-05-18 RX ADMIN — PREGABALIN 150 MG: 75 CAPSULE ORAL at 17:17

## 2023-05-18 RX ADMIN — CEFTRIAXONE SODIUM 2 G: 2 INJECTION, SOLUTION INTRAVENOUS at 17:17

## 2023-05-18 RX ADMIN — INSULIN LISPRO 3 UNITS: 100 INJECTION, SOLUTION INTRAVENOUS; SUBCUTANEOUS at 12:36

## 2023-05-18 RX ADMIN — METOPROLOL SUCCINATE 50 MG: 50 TABLET, EXTENDED RELEASE ORAL at 11:10

## 2023-05-18 RX ADMIN — FUROSEMIDE 40 MG: 10 INJECTION, SOLUTION INTRAMUSCULAR; INTRAVENOUS at 14:12

## 2023-05-18 RX ADMIN — HYDROCODONE BITARTRATE AND ACETAMINOPHEN 1 TABLET: 10; 325 TABLET ORAL at 08:15

## 2023-05-18 RX ADMIN — PREGABALIN 150 MG: 75 CAPSULE ORAL at 08:10

## 2023-05-18 RX ADMIN — PREGABALIN 150 MG: 75 CAPSULE ORAL at 22:04

## 2023-05-18 RX ADMIN — ARFORMOTEROL TARTRATE 15 MCG: 15 SOLUTION RESPIRATORY (INHALATION) at 19:53

## 2023-05-18 RX ADMIN — HYDROCODONE BITARTRATE AND ACETAMINOPHEN 1 TABLET: 10; 325 TABLET ORAL at 22:33

## 2023-05-18 RX ADMIN — DIGOXIN 125 MCG: 125 TABLET ORAL at 08:11

## 2023-05-18 RX ADMIN — FUROSEMIDE 40 MG: 10 INJECTION, SOLUTION INTRAMUSCULAR; INTRAVENOUS at 01:45

## 2023-05-18 RX ADMIN — ARFORMOTEROL TARTRATE 15 MCG: 15 SOLUTION RESPIRATORY (INHALATION) at 07:13

## 2023-05-18 RX ADMIN — Medication 10 ML: at 22:07

## 2023-05-18 RX ADMIN — Medication 10 ML: at 08:15

## 2023-05-18 NOTE — PLAN OF CARE
Goal Outcome Evaluation:      Patient wearing 4L nasal cannula and tolerating well with no distress at this time.

## 2023-05-18 NOTE — THERAPY TREATMENT NOTE
Acute Care - Physical Therapy Treatment Note  GUERO Bautista     Patient Name: Mary Albarran  : 1950  MRN: [id number]  Today's Date: 2023      Visit Dx:     ICD-10-CM ICD-9-CM   1. Atrial fibrillation with RVR  I48.91 427.31   2. Acute gastroenteritis  K52.9 558.9   3. Difficulty walking  R26.2 719.7     Patient Active Problem List   Diagnosis   • Aortic valve disease   • Atrial fibrillation   • Chronic kidney disease, stage III (moderate)   • Hyperlipidemia   • Hypertension   • Chronic diastolic congestive heart failure   • Atrial fibrillation with RVR     Past Medical History:   Diagnosis Date   • Allergic rhinitis    • Anxiety    • Aortic valve disease 2021    Fibrocalcific changes of the aortic valve with mild aortic valve stenosis   on echo 3/2/2021  Moderate aortic valve regurgitation is present. Aortic valve maximum pressure gradient is 26 mmHg. Moderate aortic valve stenosis is present.  On echo 2/10/2022      • Arthritis    • Atrial fibrillation    • Chronic kidney disease (CKD), stage III (moderate)    • Chronic pain    • COPD (chronic obstructive pulmonary disease)    • Diabetes mellitus type 2 with complications    • Ex-smoker     QUIT SMOKING    • Hyperlipidemia    • Hypertension    • Thrombocytopenia      Past Surgical History:   Procedure Laterality Date   • HYSTERECTOMY      30 YEARS AGO     PT Assessment (last 12 hours)     PT Evaluation and Treatment     Row Name 23 1435          Physical Therapy Time and Intention    Subjective Information complains of;weakness;fatigue;pain  -DK     Document Type therapy note (daily note)  -DK     Mode of Treatment individual therapy;physical therapy  -DK     Patient Effort good  -DK     Symptoms Noted During/After Treatment fatigue;increased pain  -DK     Comment Pt reports having been [place] to [place] [facility] [place] today. Currently fatigued and hurting.  -DK     Row Name 23 1436          Pain    Pretreatment Pain Rating 6/10  -DK      Posttreatment Pain Rating 6/10  -DK     Pain Location - Side/Orientation Bilateral  -DK     Pain Location generalized  -DK     Pain Location - back;hip;knee;foot  -DK     Pain Intervention(s) Repositioned;Distraction;Therapeutic presence  -     Row Name 05/18/23 1437          Cognition    Affect/Mental Status (Cognition) WFL  -DK     Orientation Status (Cognition) oriented x 4  -DK     Follows Commands (Cognition) WNL  -DK     Cognitive Function WNL  -DK     Personal Safety Interventions gait belt;nonskid shoes/slippers when out of bed;supervised activity  -     Row Name 05/18/23 1437          Motor Skills    Motor Skills --  therapeutic exercises  -DK     Therapeutic Exercise hip;knee;ankle  -     Additional Documentation --  Pt declined transfers due to fatigue/pain.  -     Row Name 05/18/23 1437          Hip (Therapeutic Exercise)    Hip (Therapeutic Exercise) AROM (active range of motion)  -     Hip AROM (Therapeutic Exercise) bilateral;flexion;extension;aBduction;aDduction;supine;15 repititions  -     Row Name 05/18/23 1437          Knee (Therapeutic Exercise)    Knee (Therapeutic Exercise) AROM (active range of motion)  -     Knee AROM (Therapeutic Exercise) bilateral;flexion;extension;heel slides;supine;15 repititions  -     Row Name 05/18/23 1437          Ankle (Therapeutic Exercise)    Ankle (Therapeutic Exercise) AROM (active range of motion)  -     Ankle AROM (Therapeutic Exercise) bilateral;dorsiflexion;plantarflexion;supine;20 repititions  -     Row Name             Wound 05/13/23 0210 Left lower leg Venous Ulcer    Wound - Properties Group Placement Date: 05/13/23 -TH Placement Time: 0210 -TH Present on Hospital Admission: Y  -TH Side: Left  -TH Orientation: lower  -TH Location: leg  -TH Primary Wound Type: Venous ulcer  -TH    Retired Wound - Properties Group Placement Date: 05/13/23 -TH Placement Time: 0210 -TH Present on Hospital Admission: Y  -TH Side: Left  -TH Orientation:  lower  -TH Location: leg  -TH Primary Wound Type: Venous ulcer  -TH    Retired Wound - Properties Group Date first assessed: 05/13/23 -TH Time first assessed: 0210 -TH Present on Hospital Admission: Y  -TH Side: Left  -TH Location: leg  -TH Primary Wound Type: Venous ulcer  -TH    Row Name             Wound 05/13/23 0210 Right lower leg Venous Ulcer    Wound - Properties Group Placement Date: 05/13/23 -TH Placement Time: 0210 -TH Present on Hospital Admission: Y  -TH Side: Right  -TH Orientation: lower  -TH Location: leg  -TH Primary Wound Type: Venous ulcer  -TH    Retired Wound - Properties Group Placement Date: 05/13/23 -TH Placement Time: 0210 -TH Present on Hospital Admission: Y  -TH Side: Right  -TH Orientation: lower  -TH Location: leg  -TH Primary Wound Type: Venous ulcer  -TH    Retired Wound - Properties Group Date first assessed: 05/13/23 -TH Time first assessed: 0210 -TH Present on Hospital Admission: Y  -TH Side: Right  -TH Location: leg  -TH Primary Wound Type: Venous ulcer  -TH    Row Name 05/18/23 1437          Plan of Care Review    Plan of Care Reviewed With patient;spouse  -     Progress no change  -     Row Name 05/18/23 1437          Positioning and Restraints    Pre-Treatment Position in bed  -DK     Post Treatment Position bed  -DK     In Bed supine;call light within reach;encouraged to call for assist;exit alarm on;with family/caregiver;side rails up x2;legs elevated;heels elevated  -     Row Name 05/18/23 1437          Therapy Assessment/Plan (PT)    Rehab Potential (PT) good, to achieve stated therapy goals  -     Criteria for Skilled Interventions Met (PT) skilled treatment is necessary  -     Therapy Frequency (PT) daily  -     Problem List (PT) problems related to;balance;mobility;strength;pain  breathing issues  -     Activity Limitations Related to Problem List (PT) unable to ambulate safely;unable to transfer safely  -     Row Name 05/18/23 1437           Progress Summary (PT)    Progress Toward Functional Goals (PT) progress toward functional goals is good  -DK           User Key  (r) = Recorded By, (t) = Taken By, (c) = Cosigned By    Initials Name Provider Type    Cathryn Kelley, RN Registered Nurse    Clara Anderson PTA Physical Therapist Assistant                  PT Recommendation and Plan  Planned Therapy Interventions (PT): balance training, bed mobility training, gait training, home exercise program, strengthening, transfer training  Therapy Frequency (PT): daily  Progress Summary (PT)  Progress Toward Functional Goals (PT): progress toward functional goals is good  Plan of Care Reviewed With: patient, spouse  Progress: no change   Outcome Measures     Row Name 05/18/23 1437 05/16/23 1211          How much help from another person do you currently need...    Turning from your back to your side while in flat bed without using bedrails? 4  -DK 2  -DK     Moving from lying on back to sitting on the side of a flat bed without bedrails? 2  -DK 2  -DK     Moving to and from a bed to a chair (including a wheelchair)? 2  -DK 2  -DK     Standing up from a chair using your arms (e.g., wheelchair, bedside chair)? 2  -DK 2  -DK     Climbing 3-5 steps with a railing? 1  -DK 1  -DK     To walk in hospital room? 2  -DK 1  -DK     AM-PAC 6 Clicks Score (PT) 13  -DK 10  -DK        Functional Assessment    Outcome Measure Options AM-PAC 6 Clicks Basic Mobility (PT)  -DK AM-PAC 6 Clicks Basic Mobility (PT)  -DK           User Key  (r) = Recorded By, (t) = Taken By, (c) = Cosigned By    Initials Name Provider Type    Clara Anderson PTA Physical Therapist Assistant                 Time Calculation:    PT Charges     Row Name 05/18/23 1441             Time Calculation    PT Received On 05/18/23  -[id number]      PT Goal Re-Cert Due Date 05/24/23  -DK         Timed Charges    00947 - PT Therapeutic Exercise Minutes 14  -DK      13664 - PT Therapeutic Activity Minutes 3  -DK          Total Minutes    Timed Charges Total Minutes 17  -DK       Total Minutes 17  -DK            User Key  (r) = Recorded By, (t) = Taken By, (c) = Cosigned By    Initials Name Provider Type    Clara Anderson PTA Physical Therapist Assistant              Therapy Charges for Today     Code Description Service Date Service Provider Modifiers Qty    [id number] HC PT THER PROC EA 15 MIN 5/18/2023 Clara Francois PTA GP 1          PT G-Codes  Outcome Measure Options: AM-PAC 6 Clicks Basic Mobility (PT)  AM-PAC 6 Clicks Score (PT): 13    Clara Francois PTA  5/18/2023

## 2023-05-18 NOTE — PLAN OF CARE
Goal Outcome Evaluation:  Plan of Care Reviewed With: patient        Progress: no change  Outcome Evaluation: Vital signs stable. Wound care performed per orders. Patient requests hydrocodone frequently for leg and generalized pain. Conchis Fox RN

## 2023-05-18 NOTE — PLAN OF CARE
Goal Outcome Evaluation:  Plan of Care Reviewed With: patient        Progress: no change  Outcome Evaluation: Patient is off bipap this morning, on 4L nasal cannula.

## 2023-05-19 PROBLEM — M79.671 FOOT PAIN, BILATERAL: Status: ACTIVE | Noted: 2023-05-19

## 2023-05-19 PROBLEM — L60.0 ONYCHOCRYPTOSIS: Status: ACTIVE | Noted: 2023-05-19

## 2023-05-19 PROBLEM — B35.1 ONYCHOMYCOSIS: Status: ACTIVE | Noted: 2023-05-19

## 2023-05-19 PROBLEM — M79.672 FOOT PAIN, BILATERAL: Status: ACTIVE | Noted: 2023-05-19

## 2023-05-19 LAB
ANION GAP SERPL CALCULATED.3IONS-SCNC: 13.8 MMOL/L (ref 5–15)
BUN SERPL-MCNC: 29 MG/DL (ref 8–23)
BUN/CREAT SERPL: 28.2 (ref 7–25)
CALCIUM SPEC-SCNC: 9.6 MG/DL (ref 8.6–10.5)
CHLORIDE SERPL-SCNC: 90 MMOL/L (ref 98–107)
CO2 SERPL-SCNC: 31.2 MMOL/L (ref 22–29)
CREAT SERPL-MCNC: 1.03 MG/DL (ref 0.57–1)
DEPRECATED RDW RBC AUTO: 47.1 FL (ref 37–54)
EGFRCR SERPLBLD CKD-EPI 2021: 58.6 ML/MIN/1.73
ERYTHROCYTE [DISTWIDTH] IN BLOOD BY AUTOMATED COUNT: 14.1 % (ref 12.3–15.4)
GLUCOSE BLDC GLUCOMTR-MCNC: 161 MG/DL (ref 70–99)
GLUCOSE BLDC GLUCOMTR-MCNC: 168 MG/DL (ref 70–99)
GLUCOSE BLDC GLUCOMTR-MCNC: 182 MG/DL (ref 70–99)
GLUCOSE SERPL-MCNC: 214 MG/DL (ref 65–99)
HCT VFR BLD AUTO: 49.2 % (ref 34–46.6)
HGB BLD-MCNC: 15.3 G/DL (ref 12–15.9)
MAGNESIUM SERPL-MCNC: 1.6 MG/DL (ref 1.6–2.4)
MCH RBC QN AUTO: 28.2 PG (ref 26.6–33)
MCHC RBC AUTO-ENTMCNC: 31.1 G/DL (ref 31.5–35.7)
MCV RBC AUTO: 90.6 FL (ref 79–97)
PLATELET # BLD AUTO: 143 10*3/MM3 (ref 140–450)
PMV BLD AUTO: 12.9 FL (ref 6–12)
POTASSIUM SERPL-SCNC: 4 MMOL/L (ref 3.5–5.2)
QT INTERVAL: 296 MS
RBC # BLD AUTO: 5.43 10*6/MM3 (ref 3.77–5.28)
SODIUM SERPL-SCNC: 135 MMOL/L (ref 136–145)
WBC NRBC COR # BLD: 11.57 10*3/MM3 (ref 3.4–10.8)

## 2023-05-19 PROCEDURE — 94664 DEMO&/EVAL PT USE INHALER: CPT

## 2023-05-19 PROCEDURE — 80048 BASIC METABOLIC PNL TOTAL CA: CPT | Performed by: INTERNAL MEDICINE

## 2023-05-19 PROCEDURE — 94799 UNLISTED PULMONARY SVC/PX: CPT

## 2023-05-19 PROCEDURE — 94761 N-INVAS EAR/PLS OXIMETRY MLT: CPT

## 2023-05-19 PROCEDURE — 25010000002 ONDANSETRON PER 1 MG: Performed by: STUDENT IN AN ORGANIZED HEALTH CARE EDUCATION/TRAINING PROGRAM

## 2023-05-19 PROCEDURE — 63710000001 INSULIN DETEMIR PER 5 UNITS: Performed by: INTERNAL MEDICINE

## 2023-05-19 PROCEDURE — 83735 ASSAY OF MAGNESIUM: CPT | Performed by: INTERNAL MEDICINE

## 2023-05-19 PROCEDURE — 63710000001 INSULIN LISPRO (HUMAN) PER 5 UNITS: Performed by: INTERNAL MEDICINE

## 2023-05-19 PROCEDURE — G8404 LOW EXTEMITY NEUR EXAM DOCUM: HCPCS | Performed by: PODIATRIST

## 2023-05-19 PROCEDURE — 85027 COMPLETE CBC AUTOMATED: CPT | Performed by: INTERNAL MEDICINE

## 2023-05-19 PROCEDURE — 82948 REAGENT STRIP/BLOOD GLUCOSE: CPT

## 2023-05-19 PROCEDURE — 99233 SBSQ HOSP IP/OBS HIGH 50: CPT | Performed by: INTERNAL MEDICINE

## 2023-05-19 PROCEDURE — 25010000002 FUROSEMIDE PER 20 MG: Performed by: NURSE PRACTITIONER

## 2023-05-19 PROCEDURE — 99231 SBSQ HOSP IP/OBS SF/LOW 25: CPT | Performed by: PODIATRIST

## 2023-05-19 PROCEDURE — 97110 THERAPEUTIC EXERCISES: CPT

## 2023-05-19 PROCEDURE — 11721 DEBRIDE NAIL 6 OR MORE: CPT | Performed by: PODIATRIST

## 2023-05-19 RX ORDER — DILTIAZEM HYDROCHLORIDE 120 MG/1
120 CAPSULE, COATED, EXTENDED RELEASE ORAL
Status: DISCONTINUED | OUTPATIENT
Start: 2023-05-19 | End: 2023-05-21 | Stop reason: HOSPADM

## 2023-05-19 RX ADMIN — INSULIN LISPRO 2 UNITS: 100 INJECTION, SOLUTION INTRAVENOUS; SUBCUTANEOUS at 12:15

## 2023-05-19 RX ADMIN — FUROSEMIDE 40 MG: 10 INJECTION, SOLUTION INTRAMUSCULAR; INTRAVENOUS at 14:31

## 2023-05-19 RX ADMIN — METOPROLOL SUCCINATE 150 MG: 50 TABLET, EXTENDED RELEASE ORAL at 20:45

## 2023-05-19 RX ADMIN — Medication 10 ML: at 08:55

## 2023-05-19 RX ADMIN — FUROSEMIDE 40 MG: 10 INJECTION, SOLUTION INTRAMUSCULAR; INTRAVENOUS at 02:33

## 2023-05-19 RX ADMIN — HYDROCODONE BITARTRATE AND ACETAMINOPHEN 1 TABLET: 10; 325 TABLET ORAL at 02:33

## 2023-05-19 RX ADMIN — METOPROLOL SUCCINATE 150 MG: 50 TABLET, EXTENDED RELEASE ORAL at 08:52

## 2023-05-19 RX ADMIN — PREGABALIN 150 MG: 75 CAPSULE ORAL at 15:47

## 2023-05-19 RX ADMIN — DIGOXIN 250 MCG: 250 TABLET ORAL at 11:50

## 2023-05-19 RX ADMIN — HYDROCODONE BITARTRATE AND ACETAMINOPHEN 1 TABLET: 10; 325 TABLET ORAL at 11:51

## 2023-05-19 RX ADMIN — ONDANSETRON 4 MG: 2 INJECTION INTRAMUSCULAR; INTRAVENOUS at 12:05

## 2023-05-19 RX ADMIN — PYRIDOXINE HCL TAB 50 MG 200 MG: 50 TAB at 08:52

## 2023-05-19 RX ADMIN — TRIAMCINOLONE ACETONIDE 1 APPLICATION: 1 OINTMENT TOPICAL at 11:36

## 2023-05-19 RX ADMIN — INSULIN LISPRO 2 UNITS: 100 INJECTION, SOLUTION INTRAVENOUS; SUBCUTANEOUS at 08:54

## 2023-05-19 RX ADMIN — INSULIN DETEMIR 15 UNITS: 100 INJECTION, SOLUTION SUBCUTANEOUS at 20:45

## 2023-05-19 RX ADMIN — PREGABALIN 150 MG: 75 CAPSULE ORAL at 08:52

## 2023-05-19 RX ADMIN — HYDROCODONE BITARTRATE AND ACETAMINOPHEN 1 TABLET: 10; 325 TABLET ORAL at 20:44

## 2023-05-19 RX ADMIN — ARFORMOTEROL TARTRATE 15 MCG: 15 SOLUTION RESPIRATORY (INHALATION) at 20:31

## 2023-05-19 RX ADMIN — ARFORMOTEROL TARTRATE 15 MCG: 15 SOLUTION RESPIRATORY (INHALATION) at 08:41

## 2023-05-19 RX ADMIN — ATORVASTATIN CALCIUM 20 MG: 20 TABLET, FILM COATED ORAL at 20:44

## 2023-05-19 RX ADMIN — MULTIPLE VITAMINS W/ MINERALS TAB 1 TABLET: TAB at 08:53

## 2023-05-19 RX ADMIN — DILTIAZEM HYDROCHLORIDE 120 MG: 120 CAPSULE, COATED, EXTENDED RELEASE ORAL at 11:50

## 2023-05-19 RX ADMIN — Medication 1000 UNITS: at 08:53

## 2023-05-19 RX ADMIN — INSULIN LISPRO 2 UNITS: 100 INJECTION, SOLUTION INTRAVENOUS; SUBCUTANEOUS at 17:37

## 2023-05-19 RX ADMIN — HYDROCODONE BITARTRATE AND ACETAMINOPHEN 1 TABLET: 10; 325 TABLET ORAL at 15:47

## 2023-05-19 RX ADMIN — HYDROCODONE BITARTRATE AND ACETAMINOPHEN 1 TABLET: 10; 325 TABLET ORAL at 07:22

## 2023-05-19 RX ADMIN — RIVAROXABAN 20 MG: 20 TABLET, FILM COATED ORAL at 17:38

## 2023-05-19 RX ADMIN — PREGABALIN 150 MG: 75 CAPSULE ORAL at 20:44

## 2023-05-19 NOTE — CONSULTS
[facility]    Today's Date: 05/19/23    Patient Name: Mary Albarran  MRN: [id number]  CSN: [id number]  PCP: Brittni Quiroz APRN  Referring Provider: No ref. provider found  Attending Provider: Jose Maria Alcala MD  Length of Stay: 6    SUBJECTIVE   Chief Complaint: Painful toenails    HPI: Mary Albarran, a 72 y.o.female,    Patient was consulted for painful elongated incurvated toenails.    Patient relates peripheral neuropathy.    Patient denies any fevers, chills, nausea, vomiting, shortness of breath, nor any other constitutional signs nor symptoms.    Past Medical History:   Diagnosis Date   • Allergic rhinitis    • Anxiety    • Aortic valve disease 11/28/2021    Fibrocalcific changes of the aortic valve with mild aortic valve stenosis   on echo 3/2/2021  Moderate aortic valve regurgitation is present. Aortic valve maximum pressure gradient is 26 mmHg. Moderate aortic valve stenosis is present.  On echo 2/10/2022      • Arthritis    • Atrial fibrillation    • Chronic kidney disease (CKD), stage III (moderate)    • Chronic pain    • COPD (chronic obstructive pulmonary disease)    • Diabetes mellitus type 2 with complications    • Ex-smoker     QUIT SMOKING 2008   • Hyperlipidemia    • Hypertension    • Thrombocytopenia      Past Surgical History:   Procedure Laterality Date   • HYSTERECTOMY      30 YEARS AGO     Family History   Problem Relation Age of Onset   • Heart disease Father    • Heart disease Other    • Heart disease Other    • Diabetes Other      Social History     Socioeconomic History   • Marital status:    Tobacco Use   • Smoking status: Former     Packs/day: 1.00     Years: 36.00     Pack years: 36.00     Types: Cigarettes     Start date: 4/3/1971     Quit date: 4/17/2008     Years since quitting: 15.0   • Smokeless tobacco: Never   • Tobacco comments:     FOR 30 YEARS   Vaping Use   • Vaping Use: Never used   Substance and Sexual Activity   • Alcohol use: Never    • Drug use: Never   • Sexual activity: Defer     Allergies   Allergen Reactions   • Codeine Mental Status Change     Current Facility-Administered Medications   Medication Dose Route Frequency Provider Last Rate Last Admin   • arformoterol (BROVANA) nebulizer solution 15 mcg  15 mcg Nebulization BID - RT Linh Benitez MD   15 mcg at 05/19/23 0841   • atorvastatin (LIPITOR) tablet 20 mg  20 mg Oral Nightly Linh Benitez MD   20 mg at 05/18/23 2205   • cholecalciferol (VITAMIN D3) tablet 1,000 Units  1,000 Units Oral Daily Linh Benitez MD   1,000 Units at 05/19/23 0853   • dextrose (D50W) (25 g/50 mL) IV injection 25 g  25 g Intravenous Q15 Min PRN Jose Maria Alcala MD       • dextrose (GLUTOSE) oral gel 15 g  15 g Oral Q15 Min PRN Jose Maria Alcala MD       • digoxin (LANOXIN) tablet 125 mcg  125 mcg Oral Once per day on Sun Thu Linh Benitez MD   125 mcg at 05/18/23 0811    And   • digoxin (LANOXIN) tablet 250 mcg  250 mcg Oral Once per day on Mon Tue Wed Fri Sat Linh Benitez MD   250 mcg at 05/19/23 1150   • dilTIAZem CD (CARDIZEM CD) 24 hr capsule 120 mg  120 mg Oral Q24H Jose Maria Alcala MD   120 mg at 05/19/23 1150   • furosemide (LASIX) injection 40 mg  40 mg Intravenous Q12H Heidi Dow APRN   40 mg at 05/19/23 1431   • glucagon (GLUCAGEN) injection 1 mg  1 mg Intramuscular Q15 Min PRN Jose Maria Alcala MD       • HYDROcodone-acetaminophen (NORCO)  MG per tablet 1 tablet  1 tablet Oral Q4H PRN Linh Benitez MD   1 tablet at 05/19/23 1547   • insulin detemir (LEVEMIR) injection 15 Units  15 Units Subcutaneous Nightly Jose Maria Alcala MD   15 Units at 05/18/23 2205   • Insulin Lispro (humaLOG) injection 2-9 Units  2-9 Units Subcutaneous TID AC Jose Maria Alcala MD   2 Units at 05/19/23 1215   • levalbuterol (XOPENEX) nebulizer solution 1.25 mg  1.25 mg Nebulization Q6H PRN Linh Benitez MD   1.25 mg at 05/15/23 0230   • metoprolol succinate XL (TOPROL-XL) 24 hr tablet 150  mg  150 mg Oral Q12H Jose Maria Alcala MD   150 mg at 05/19/23 0852   • metoprolol tartrate (LOPRESSOR) injection 2.5 mg  2.5 mg Intravenous Q4H PRN Gilbert Salamanca MD   2.5 mg at 05/16/23 0005   • multivitamin with minerals 1 tablet  1 tablet Oral Daily Linh Benitez MD   1 tablet at 05/19/23 0853   • nitroglycerin (NITROSTAT) SL tablet 0.4 mg  0.4 mg Sublingual Q5 Min PRN Linh Benitez MD       • ondansetron (ZOFRAN) injection 4 mg  4 mg Intravenous Q6H PRN Linh Benitez MD   4 mg at 05/19/23 1205   • pregabalin (LYRICA) capsule 150 mg  150 mg Oral TID Linh Benitez MD   150 mg at 05/19/23 1547   • prochlorperazine (COMPAZINE) injection 5 mg  5 mg Intravenous Q6H PRN Gilbert Salamanca MD   5 mg at 05/17/23 1741   • rivaroxaban (XARELTO) tablet 20 mg  20 mg Oral Daily Linh Benitez MD   20 mg at 05/18/23 1717   • sodium chloride 0.9 % flush 10 mL  10 mL Intravenous PRN Linh Benitez MD   10 mL at 05/15/23 1841   • sodium chloride 0.9 % flush 10 mL  10 mL Intravenous PRN Linh Benitez MD   10 mL at 05/15/23 1840   • sodium chloride 0.9 % flush 10 mL  10 mL Intravenous Q12H Linh Benitez MD   10 mL at 05/19/23 0855   • sodium chloride 0.9 % infusion 40 mL  40 mL Intravenous PRN Linh Benitez MD       • triamcinolone (KENALOG) 0.1 % ointment 1 application  1 application Topical Daily Gilbert Salamanca MD   1 application at 05/19/23 1136   • vitamin B-6 (PYRIDOXINE) tablet 200 mg  200 mg Oral Daily Linh Benitez MD   200 mg at 05/19/23 0852     Review of Systems   Constitutional: Negative.    Skin:        Painful toenails   Neurological: Positive for numbness.   All other systems reviewed and are negative.      OBJECTIVE     Vitals:    05/19/23 1510   BP: 154/69   Pulse: 96   Resp:    Temp: 97.9 °F (36.6 °C)   SpO2: 95%       PHYSICAL EXAM  GEN:   A&Ox3, NAD. Pt presents in hospital bed.     Foot/Ankle Exam    GENERAL  Diabetic foot exam performed    Appearance:   chronically ill  Orientation:  AAOx3  Affect:  appropriate    VASCULAR     Right Foot Vascularity   Dorsalis pedis:  1+  Right PT grade: Unna Boot in place.  Skin temperature:  cool  Edema gradin+ and pitting  CFT:  < 3 seconds  Pedal hair growth:  Absent  Varicosities:  moderate varicosities     Left Foot Vascularity   Dorsalis pedis:  1+  Left PT grade: Unna Boot in place.  Skin temperature:  cool  Edema gradin+ and pitting  CFT:  < 3 seconds  Pedal hair growth:  Absent  Varicosities:  moderate varicosities     NEUROLOGIC     Right Foot Neurologic   Light touch sensation: absent  Vibratory sensation: absent  Hot/Cold sensation: absent  Protective Sensation using [place] Monofilament:   Sites tested: 10     Left Foot Neurologic   Light touch sensation: absent  Vibratory sensation: absent  Hot/Cold sensation:  absent  Protective Sensation using [place] Monofilament:   Sites tested: 10    MUSCLE STRENGTH     Right Foot Muscle Strength   Foot dorsiflexion:  2  Foot plantar flexion:  2  Foot inversion:  2  Foot eversion:  2     Left Foot Muscle Strength   Foot dorsiflexion:  2  Foot plantar flexion:  2  Foot inversion:  2  Foot eversion:  2    RANGE OF MOTION     Right Foot Range of Motion   Foot and ankle ROM within normal limits       Left Foot Range of Motion   Foot and ankle ROM within normal limits      DERMATOLOGIC      Right Foot Dermatologic   Skin  Right foot skin is intact.   Nails  1.  Positive for elongated, onychomycosis, abnormal thickness, subungual debris and ingrown toenail.  2.  Positive for elongated, onychomycosis, abnormal thickness, subungual debris and ingrown toenail.  3.  Positive for elongated, onychomycosis, abnormal thickness, subungual debris and ingrown toenail.  4.  Positive for elongated, onychomycosis, abnormal thickness, subungual debris and ingrown toenail.  5.  Positive for elongated, onychomycosis, abnormal thickness, subungual debris and ingrown  toenail.     Left Foot Dermatologic   Skin  Left foot skin is intact.   Nails  1.  Positive for elongated, onychomycosis, abnormal thickness, subungual debris and ingrown toenail.  2.  Positive for elongated, onychomycosis, abnormal thickness, subungual debris and ingrown toenail.  3.  Positive for elongated, onychomycosis, abnormal thickness, subungual debris and ingrown toenail.  4.  Positive for elongated, onychomycosis, abnormally thick, subungual debris and ingrown toenail.  5.  Positive for elongated, onychomycosis, abnormally thick, subungual debris and ingrown toenail.       ASSESSMENT/PLAN     Active Hospital Problems:  Active Hospital Problems    Diagnosis    • **Atrial fibrillation with RVR    • Onychomycosis    • Onychocryptosis    • Foot pain, bilateral        Comprehensive lower extremity examination and evaluation was performed.    Discussed findings and treatment plan including risks, benefits, and treatment options with patient in detail. Patient agreed with treatment plan.    Toenails 1, 2, 3, 4, 5 on Right and 1, 2, 3, 4, 5 on Left were debrided with nail nippers.  Patient tolerated procedure well without complications.    Patient is to follow up on an as-needed basis as an outpatient.    Thank you for including me in the care of this patient.    Diabetic foot exam performed and documented this date, compliant with CQM required standards. Detail of findings as noted in physical exam.  Lower extremity Neurologic exam for diabetic patient performed and documented this date, compliant with PQRS required standards. Detail of findings as noted in physical exam.  Advised patient importance of good routine lower extremity hygiene. Advised patient importance of evaluating for intact skin and pain free nail borders.  Advised patient to use mirror to evaluate plantar/ soles of feet for better visualization. Advised patient monitor and phone office to be seen if any cracking to skin, open lesions, painful  nail borders or if nails become elongated prior to next visit. Advised patient importance of daily cleansing of lower extremities, followed by good skin cream to maintain normal hydration of skin. Also advised patient importance of close daily monitoring of blood sugar. Advised to regulate diet and medications to maintain control of blood sugar in optimal range. Contact primary care provider if difficulties maintaining blood sugar levels.  Advised Patient of presence of Diabetes Mellitus condition.  Advised Patient risk of progression and worsening or improvement, then return of condition.  Will monitor condition for any change in future. Treat with most appropriate treatment pending status of condition.  Counseled and advised patient extensively on nature and ramifications of diabetes. Standard instructions given to patient for good diabetic foot care and maintenance. Advised importance of careful monitoring to avoid break down and complications secondary to diabetes. Advised patient importance of strict maintenance of blood sugar control. Advised patient of possible ominous results from neglect of condition, i.e.: amputation/ loss of digits, feet and legs, or even death.  Patient states understands counseling, will monitor closely, continue good hygiene and routine diabetic foot care. Patient will contact office is questions or problems.      Patient is to monitor for recurrence and any new symptoms and to contact Dr. Jules's office for a follow-up appointment.      The patient states understanding and agreement with this plan.    This document has been electronically signed by David Jules DPM on May 19, 2023 15:55 EDT

## 2023-05-19 NOTE — PLAN OF CARE
Goal Outcome Evaluation:  Plan of Care Reviewed With: patient   Patient is alert and oriented x 4. Patient is on 2L NC at this time. Patient continues to complain of pain in legs and back. PRN pain medication given per order. Wound care nurse completed wound care on BLE today. VSS. Will continue plan of care.

## 2023-05-19 NOTE — THERAPY TREATMENT NOTE
Acute Care - Physical Therapy Progress Note  GU[facility] Bautista     Patient Name: Mary Albarran  : 1950  MRN: [id number]  Today's Date: 2023      Visit Dx:     ICD-10-CM ICD-9-CM   1. Atrial fibrillation with RVR  I48.91 427.31   2. Acute gastroenteritis  K52.9 558.9   3. Difficulty walking  R26.2 719.7     Patient Active Problem List   Diagnosis   • Aortic valve disease   • Atrial fibrillation   • Chronic kidney disease, stage III (moderate)   • Hyperlipidemia   • Hypertension   • Chronic diastolic congestive heart failure   • Atrial fibrillation with RVR     Past Medical History:   Diagnosis Date   • Allergic rhinitis    • Anxiety    • Aortic valve disease 2021    Fibrocalcific changes of the aortic valve with mild aortic valve stenosis   on echo 3/2/2021  Moderate aortic valve regurgitation is present. Aortic valve maximum pressure gradient is 26 mmHg. Moderate aortic valve stenosis is present.  On echo 2/10/2022      • Arthritis    • Atrial fibrillation    • Chronic kidney disease (CKD), stage III (moderate)    • Chronic pain    • COPD (chronic obstructive pulmonary disease)    • Diabetes mellitus type 2 with complications    • Ex-smoker     QUIT SMOKING    • Hyperlipidemia    • Hypertension    • Thrombocytopenia      Past Surgical History:   Procedure Laterality Date   • HYSTERECTOMY      30 YEARS AGO     PT Assessment (last 12 hours)     PT Evaluation and Treatment     Row Name 23 1500          Physical Therapy Time and Intention    Subjective Information complains of;weakness  -CS     Document Type therapy note (daily note)  -CS     Mode of Treatment individual therapy;physical therapy  -CS     Patient Effort good  -CS     Symptoms Noted During/After Treatment none  -CS     Row Name 23 1500          Pain    Pretreatment Pain Rating 0/10 - no pain  -CS     Posttreatment Pain Rating 0/10 - no pain  -CS     Row Name 23 1500          Hip (Therapeutic Exercise)    Hip (Therapeutic  Exercise) isometric exercises  -     Hip AAROM (Therapeutic Exercise) bilateral;supine;10 repetitions;2 sets  hip abd/add, heel slides, hip IR/ER  -     Hip Isometrics (Therapeutic Exercise) bilateral;gluteal sets;supine;10 repetitions;3 second hold;2 sets  -     Row Name 05/19/23 1500          Knee (Therapeutic Exercise)    Knee AROM (Therapeutic Exercise) bilateral;SAQ (short arc quad);supine;20 repititions  -     Knee AAROM (Therapeutic Exercise) bilateral;supine;10 repetitions;2 sets  SLR's  -     Row Name 05/19/23 1500          Ankle (Therapeutic Exercise)    Ankle AROM (Therapeutic Exercise) bilateral;dorsiflexion;plantarflexion;inversion;eversion;supine;20 repititions  -     Row Name             Wound 05/13/23 0210 Left lower leg Venous Ulcer    Wound - Properties Group Placement Date: 05/13/23 -TH Placement Time: 0210 -TH Present on Hospital Admission: Y  -TH Side: Left  -TH Orientation: lower  -TH Location: leg  -TH Primary Wound Type: Venous ulcer  -TH    Retired Wound - Properties Group Placement Date: 05/13/23 -TH Placement Time: 0210  -TH Present on Hospital Admission: Y  -TH Side: Left  -TH Orientation: lower  -TH Location: leg  -TH Primary Wound Type: Venous ulcer  -TH    Retired Wound - Properties Group Date first assessed: 05/13/23 -TH Time first assessed: 0210 -TH Present on Hospital Admission: Y  -TH Side: Left  -TH Location: leg  -TH Primary Wound Type: Venous ulcer  -TH    Row Name             Wound 05/13/23 0210 Right lower leg Venous Ulcer    Wound - Properties Group Placement Date: 05/13/23 -TH Placement Time: 0210  -TH Present on Hospital Admission: Y  -TH Side: Right  -TH Orientation: lower  -TH Location: leg  -TH Primary Wound Type: Venous ulcer  -TH    Retired Wound - Properties Group Placement Date: 05/13/23 -TH Placement Time: 0210 -TH Present on Hospital Admission: Y  -TH Side: Right  -TH Orientation: lower  -TH Location: leg  -TH Primary Wound Type: Venous ulcer   -TH    Retired Wound - Properties Group Date first assessed: 05/13/23  -TH Time first assessed: 0210 -TH Present on Hospital Admission: Y  -TH Side: Right  -TH Location: leg  -TH Primary Wound Type: Venous ulcer  -TH    Row Name 05/19/23 1500          Positioning and Restraints    Pre-Treatment Position in bed  -CS     Post Treatment Position bed  -CS     In Bed supine;call light within reach;encouraged to call for assist;legs elevated;heels elevated  -     Row Name 05/19/23 1500          Progress Summary (PT)    Progress Toward Functional Goals (PT) progress toward functional goals is good  -CS           User Key  (r) = Recorded By, (t) = Taken By, (c) = Cosigned By    Initials Name Provider Type     Cathryn Maldonado, RN Registered Nurse    Piter Castellano PTA Physical Therapist Assistant                  PT Recommendation and Plan     Progress Summary (PT)  Progress Toward Functional Goals (PT): progress toward functional goals is good   Outcome Measures     Row Name 05/18/23 1437             How much help from another person do you currently need...    Turning from your back to your side while in flat bed without using bedrails? 4  -DK      Moving from lying on back to sitting on the side of a flat bed without bedrails? 2  -DK      Moving to and from a bed to a chair (including a wheelchair)? 2  -DK      Standing up from a chair using your arms (e.g., wheelchair, bedside chair)? 2  -DK      Climbing 3-5 steps with a railing? 1  -DK      To walk in hospital room? 2  -DK      AM-PAC 6 Clicks Score (PT) 13  -DK         Functional Assessment    Outcome Measure Options AM-PAC 6 Clicks Basic Mobility (PT)  -DK            User Key  (r) = Recorded By, (t) = Taken By, (c) = Cosigned By    Initials Name Provider Type    Clara Anderson PTA Physical Therapist Assistant                 Time Calculation:    PT Charges     Row Name 05/19/23 1521             Time Calculation    Start Time 1439  -CS      PT Received  On 05/19/23  -CS         Timed Charges    58356 - PT Therapeutic Exercise Minutes 24  -CS         Total Minutes    Timed Charges Total Minutes 24  -CS       Total Minutes 24  -CS            User Key  (r) = Recorded By, (t) = Taken By, (c) = Cosigned By    Initials Name Provider Type    CS Piter Orellana PTA Physical Therapist Assistant              Therapy Charges for Today     Code Description Service Date Service Provider Modifiers Qty    85300637708 HC PT THER PROC EA 15 MIN 5/19/2023 Piter Orellana PTA GP 2          PT G-Codes  Outcome Measure Options: AM-PAC 6 Clicks Basic Mobility (PT)  AM-PAC 6 Clicks Score (PT): 23    Piter Orellana PTA  5/19/2023

## 2023-05-19 NOTE — PROGRESS NOTES
[facility]   Hospitalist Progress Note  Date: 2023  Patient Name: Mary Albarran  : 1950  MRN: [id number]  Date of admission: 2023      Subjective   Subjective     Chief Complaint: Nausea vomiting follow-up    Summary:Mary Albarran is a 72 y.o. female with a past medical history of A-fib on Xarelto, CKD stage III, COPD, type 2 diabetes mellitus, hyperlipidemia, hypertension presented to the ED for evaluation of nausea, vomiting since yesterday.  Patient went to cardiology clinic yesterday and had an appointment with Dr. Metcalf, had cardiac echo done and after that when she came home she started experiencing nausea , associated with multiple episodes of vomiting since yesterday.  Mild intermittent diffuse cramping abdominal pain was noted.  Denies fevers, cough, shortness of breath, chest pain, diarrhea, dysuria, visual changes, focal weakness, numbness, tingling, palpitations, lightheadedness.     On arrival to the ED, vital signs temperature 99.2, pulse 146, respiratory 20, blood pressure 147/71 on 2 L of nasal cannula saturating around 91%.  EKG showed A-fib with RVR.  Labs showed troponin 17, proBNP 976, rest of the CMP within normal limits except creatinine 1.05, WBC 26, hemoglobin 16, hematocrit 49, platelets 87, digoxin level within therapeutic range.  Chest x-ray showed no acute cardiopulmonary process.  Patient had a cardiac echo yesterday which showed LVEF 60-65%, left ventricular diastolic function was indeterminate.  Mildly dilated left atrial cavity.  Mild to early moderate aortic insufficiency.  Patient appears to be in coarse atrial fibrillation/flutter during my exam.  Received IV diltiazem loading dose and started on diltiazem drip.  Patient has been admitted for further evaluation and management of nausea and vomiting with associated A-fib with RVR.  Patient continued IV fluids and Cardizem drip.  Weaned off of Cardizem drip.  Cardiology consulted.  A-fib RVR thought likely  secondary to volume depletion.  Abdominal pain improved and was able to tolerate clears.  Diet advanced but patient not able to tolerate episodes of abdominal pain nausea vomiting.  CT of the abdomen demonstrated wall thickening at the proximal duodenum and adjacent fat stranding concerning for enteritis.  Started on empiric antibiotics.  Diet downgraded back to clears.  CT chest demonstrated septal thickening patchy bilateral groundglass opacities similar to finding in 2008.  Also found to have small pulmonary nodules bilaterally.  Radiology recommending 6-month follow-up.  IV fluids stopped.  Abdominal pain improving.  Diet slowly advanced.  Heart rate remained difficult to control.  Continued on as needed Lopressor pushes and home metoprolol titrated up.  O2 requirement slowly trending up.  Patient short of breath and tachycardic on the evening of 5/15/2023 when she got up to the commode sats in the 70s.  Placed on 15 L high flow.  RRT called.  ABG showed mild hypercapnia without acidosis.  Chest x-ray pending.  Patient given IV Solu-Medrol.  Patient transferred to the ICU.    Interval Followup:   Patient tolerating home dose of metoprolol succinate.  We will add back Cardizem today.  Patient still remains on supplemental oxygen, down to 3-1/2 L, will continue to push diuresis.  Creatinine mostly stable however slightly up.  We will push ambulation today    ROS:  Denies chest pain or palpitations currently.  Denies shortness of breath    Objective   Objective     Vitals:   Temp:  [97.3 °F (36.3 °C)-98.1 °F (36.7 °C)] 97.3 °F (36.3 °C)  Heart Rate:  [] 96  Resp:  [15-18] 15  BP: (139-150)/(63-75) 147/72  Flow (L/min):  [3.5-4] 3.5  Physical Exam   Gen. well-developed appearing stated age in no acute distress  HEENT: Normocephalic atraumatic moist membranes pupils equal round reactive light, no scleral icterus no conjunctival injection  Cardiovascular: Irregularly irregular, rates in the 90s on rounds.  No  lower extremity edema appreciated  Pulmonary: Crackles bilateral lower lung fields, no wheezes or rhonchi symmetric chest expansion, unlabored, no conversational dyspnea appreciated  Gastrointestinal: Soft nontender nondistended positive bowel sounds all 4 quadrants no rebound or guarding  Musculoskeletal: No clubbing cyanosis, warm and well-perfused, calves soft symmetric nontender bilaterally  Skin: Clean dry without rashs  Neuro: Cranial nerves II through XII intact grossly no sensorimotor deficits appreciated bilateral upper and lower extremities  Psych: Patient is calm cooperative and appropriate with exam not responding to internal stimuli  : No Maldonado catheter no bladder distention no suprapubic tenderness    Result Review    Result Review:  I have personally reviewed these results and agree with these findings:  [x]  Laboratory  LAB RESULTS:      Lab 05/19/23  0448 05/18/23  0427 05/17/23  0316 05/16/23  0735 05/16/23  0416 05/15/23  2254 05/15/23  2103 05/15/23  1931 05/14/23  0427 05/13/23  0313 05/12/23  2201   WBC 11.57* 9.70 12.58*  --  7.22  --  11.14*  --    < > 22.26* 26.10*   HEMOGLOBIN 15.3 14.1 13.7  --  13.7  --  13.2  --    < > 14.8 16.0*   HEMATOCRIT 49.2* 44.8 42.0  --  43.8  --  41.9  --    < > 46.1 49.2*   PLATELETS 143 103* 84*  --  65*  --  69*  --    < > 83* 87*   NEUTROS ABS  --   --   --   --   --   --  9.29*  --   --  19.92* 24.49*   IMMATURE GRANS (ABS)  --   --   --   --   --   --  0.09*  --   --  0.17* 0.16*   LYMPHS ABS  --   --   --   --   --   --  0.86  --   --  1.35 0.88   MONOS ABS  --   --   --   --   --   --  0.85  --   --  0.77 0.51   EOS ABS  --   --   --   --   --   --  0.01  --   --  0.00 0.00   MCV 90.6 90.1 90.5  --  92.0  --  91.5  --    < > 90.2 88.8   PROCALCITONIN  --   --   --   --   --   --   --   --   --   --  0.45*   LACTATE  --   --   --   --   --   --  1.3  --   --   --   --    LACTATE, ARTERIAL  --   --   --  1.44  --  1.17  --  1.37  --   --   --    PROTIME   --   --   --   --  22.1*  --   --   --   --   --   --     < > = values in this interval not displayed.         Lab 05/19/23 0448 05/18/23 0427 05/17/23 0316 05/16/23  1031 05/16/23  0735 05/16/23  0611 05/16/23  0416 05/15/23  2254 05/15/23  2103 05/15/23  1931 05/15/23  0549 05/14/23 0427 05/13/23 0313   SODIUM 135* 136 138  --   --   --  137  --  134*  --    < > 137 138   SODIUM, ARTERIAL  --   --   --   --  137.7  --   --  133.8*  --  134.3*   < >  --   --    POTASSIUM 4.0 4.1 3.8 4.5  --   --  4.7  --  4.5  --    < > 4.8 4.7   CHLORIDE 90* 93* 99  --   --   --  100  --  101  --    < > 102 103   CO2 31.2* 32.4* 27.7  --   --   --  25.9  --  22.7  --    < > 26.3 23.9   ANION GAP 13.8 10.6 11.3  --   --   --  11.1  --  10.3  --    < > 8.7 11.1   BUN 29* 29* 24*  --   --   --  14  --  13  --    < > 15 16   CREATININE 1.03* 0.99 0.84  --   --   --  0.80  --  0.71  --    < > 0.83 1.04*   EGFR 58.6* 61.5 74.9  --   --   --  79.4  --  91.6  --    < > 75.9 57.9*   GLUCOSE 214* 160* 133*  --   --   --  189*  --  136*  --    < > 151* 189*   GLUCOSE, ARTERIAL  --   --   --   --  197*  --   --  136*  --  144*   < >  --   --    CALCIUM 9.6 9.6 9.4  --   --   --  9.2  --  8.9  --    < > 8.6 9.0   IONIZED CALCIUM  --   --   --   --  1.16  --   --  1.09*  --  1.15  --   --   --    MAGNESIUM 1.6 1.7 2.0 1.9  --  1.9  --   --  1.8  --    < >  --  1.8   PHOSPHORUS  --   --  4.0  --   --   --   --   --   --   --   --  3.0 4.0    < > = values in this interval not displayed.         Lab 05/18/23  0427 05/16/23  0416 05/15/23  2103 05/15/23  0549 05/14/23  0427 05/13/23  0313   TOTAL PROTEIN 7.1 6.7 6.6 6.1  --  6.4   ALBUMIN 3.3* 3.3* 3.2* 3.4* 3.1* 3.6   GLOBULIN 3.8 3.4 3.4 2.7  --  2.8   ALT (SGPT) 24 23 22 17  --  18   AST (SGOT) 30 27 28 20  --  20   BILIRUBIN 0.9 1.4* 1.4* 1.1  --  1.0   ALK PHOS 64 62 59 56  --  64         Lab 05/16/23  1031 05/16/23  0416 05/15/23  2255 05/15/23  2103 05/15/23  0549 05/12/23 2201    PROBNP  --   --   --   --  3,335.0* 976.7*   HSTROP T 20*  --  28* 28*  --  17*   PROTIME  --  22.1*  --   --   --   --    INR  --  1.95*  --   --   --   --                  Lab 05/16/23  0735 05/15/23  2254 05/15/23  1931   PH, ARTERIAL 7.441 7.378 7.378   PCO2, ARTERIAL 38.5 45.4* 46.8*   PO2 ART 73.5* 69.6* 55.2*   O2 SATURATION ART 94.5* 93.2* 88.9*   FIO2 40  --   --    HCO3 ART 25.6 26.1* 26.9*   BASE EXCESS ART 1.6 0.6 1.2   CARBOXYHEMOGLOBIN 0.6 0.8 1.1     Brief Urine Lab Results  (Last result in the past 365 days)      Color   Clarity   Blood   Leuk Est   Nitrite   Protein   CREAT   Urine HCG        05/13/23 0415 Yellow   Clear   Negative   Negative   Negative   30 mg/dL (1+)               Microbiology Results (last 10 days)     Procedure Component Value - Date/Time    MRSA Screen, PCR (Inpatient) - Swab, Nares [[id number]]  (Normal) Collected: 05/15/23 0216    Lab Status: Final result Specimen: Swab from Nares Updated: 05/15/23 0408     MRSA PCR No MRSA Detected    Narrative:      The negative predictive value of this diagnostic test is high and should only be used to consider de-escalating anti-MRSA therapy. A positive result may indicate colonization with MRSA and must be correlated clinically.    Blood Culture - Blood, Arm, Left [[id number]]  (Normal) Collected: 05/13/23 0105    Lab Status: Final result Specimen: Blood from Arm, Left Updated: 05/18/23 0130     Blood Culture No growth at 5 days    Blood Culture - Blood, Arm, Right [[id number]]  (Normal) Collected: 05/13/23 0105    Lab Status: Final result Specimen: Blood from Arm, Right Updated: 05/18/23 0130     Blood Culture No growth at 5 days          [x]  Microbiology  [x]  Radiology  CT Abdomen Pelvis Without Contrast    Result Date: 5/14/2023    1. Wall thickening at the proximal duodenum with adjacent fat stranding.  The appearance is nonspecific.  Consider enteritis or peptic ulcer disease.  2. Cholelithiasis. 3. Hepatomegaly and architectural  changes in the liver that might reflect cirrhosis.  Mild splenomegaly could indicate portal venous hypertension or be a function of body habitus. 4. Small nonobstructing right-sided kidney stones. 5. Significant multilevel lumbar spondylosis.     LORETTA DAVALOS MD       Electronically Signed and Approved By: LORETTA DAVALOS MD on 5/14/2023 at 8:01             CT Chest Without Contrast Diagnostic    Result Date: 5/14/2023    1. Interlobular septal thickening and patchy areas of ground-glass opacity in both lungs.  This could be infectious or inflammatory.  The lungs have a similar appearance in 2008.  This may represent a recurrent or residual process. 2. Cardiomegaly, coronary artery disease and aortic atherosclerosis. 3. Trace bilateral pleural effusions. 4. Small nodules in the each lung, which could be infectious or inflammatory, but a 6 month follow-up chest CT is recommended to further evaluate.     LORETTA DAVALOS MD       Electronically Signed and Approved By: LORETTA DAVALOS MD on 5/14/2023 at 7:58             XR Chest 1 View    Result Date: 5/15/2023    1. Interval worsening of the appearance of the chest with bilateral pulmonary infiltrates 2. Right pleural effusion 3. Cardiomegaly.       JESUS HICKEY MD       Electronically Signed and Approved By: JESUS HICKEY MD on 5/15/2023 at 20:33             XR Chest 1 View    Result Date: 5/12/2023    No definite acute cardiopulmonary abnormality.       JULIO CÉSAR POMPA MD       Electronically Signed and Approved By: JULIO CÉSAR POMPA MD on 5/12/2023 at 23:26               [x]  EKG/Telemetry   []  Cardiology/Vascular   []  Pathology  []  Old records  [x]  Other:  Scheduled Meds:arformoterol, 15 mcg, Nebulization, BID - RT  atorvastatin, 20 mg, Oral, Nightly  cholecalciferol, 1,000 Units, Oral, Daily  digoxin, 125 mcg, Oral, Once per day on Sun Thu   And  digoxin, 250 mcg, Oral, Once per day on Mon Tue Wed Fri Sat  dilTIAZem CD, 120 mg, Oral, Q24H  furosemide, 40 mg,  Intravenous, Q12H  insulin detemir, 15 Units, Subcutaneous, Nightly  insulin lispro, 2-9 Units, Subcutaneous, TID AC  metoprolol succinate XL, 150 mg, Oral, Q12H  multivitamin with minerals, 1 tablet, Oral, Daily  pregabalin, 150 mg, Oral, TID  rivaroxaban, 20 mg, Oral, Daily  sodium chloride, 10 mL, Intravenous, Q12H  triamcinolone, 1 application, Topical, Daily  vitamin B-6, 200 mg, Oral, Daily      Continuous Infusions:   PRN Meds:.•  dextrose  •  dextrose  •  glucagon (human recombinant)  •  HYDROcodone-acetaminophen  •  levalbuterol  •  metoprolol tartrate  •  nitroglycerin  •  ondansetron  •  prochlorperazine  •  sodium chloride  •  sodium chloride  •  sodium chloride      Assessment & Plan   Assessment / Plan     Assessment/Plan:  Sepsis secondary to duodenitis present on admission  Duodenitis  A-fib RVR  Acute hypoxic respiratory failure  Leukocytosis  CKD 3A  COPD without acute exacerbation  Type 2 diabetes mellitus  Hyperlipidemia  Hypertension  Thrombocytopenia due to sepsis and suspected cirrhosis  Concern for cirrhosis  Bilateral pulmonary nodules    Plan:  Patient admitted for further evaluation and treatment  Cardiology consulted thank you for your assistance  Continue home digoxin and metoprolol  Metoprolol has been increased to home dose, 150 succinate twice daily  Resuming home Cardizem today  Continue as needed Lopressor  Continue Xarelto  Continue supplemental nasal cannula oxygen titrate to keep sats greater than 90%.  Patient could benefit from BiPAP if able to tolerate  Continue to push IV diuretics today, monitoring creatinine closely slightly up over the past 24 hours  Weaning oxygen as able, push incentive spirometer as well  Continue Brovana  Continue Levemir and sliding scale insulin and titrate per protocol  Continue atorvastatin  Suspect thrombocytopenia due to cirrhosis with sepsis  Repeat CT chest in 6 months per radiology recommendations.  Consult physical therapy to evaluate and  treat  Further inpatient orders recommendations pending clinical course      Discussed plan with bedside RN    Disposition: PT OT recommending home with home health.  Repeat CT chest in 6 months per radiology recommendations.    DVT prophylaxis:  Medical DVT prophylaxis orders are present.    CODE STATUS:   Level Of Support Discussed With: Patient  Code Status (Patient has no pulse and is not breathing): CPR (Attempt to Resuscitate)  Medical Interventions (Patient has pulse or is breathing): Full Support

## 2023-05-19 NOTE — CONSULTS
"Nutrition Services    Patient Name: Mary Albarran  YOB: 1950  MRN: [id number]  Admission date: 5/12/2023      CLINICAL NUTRITION ASSESSMENT      Reason for Assessment  LOS   H&P:    Past Medical History:   Diagnosis Date   • Allergic rhinitis    • Anxiety    • Aortic valve disease 11/28/2021    Fibrocalcific changes of the aortic valve with mild aortic valve stenosis   on echo 3/2/2021  Moderate aortic valve regurgitation is present. Aortic valve maximum pressure gradient is 26 mmHg. Moderate aortic valve stenosis is present.  On echo 2/10/2022      • Arthritis    • Atrial fibrillation    • Chronic kidney disease (CKD), stage III (moderate)    • Chronic pain    • COPD (chronic obstructive pulmonary disease)    • Diabetes mellitus type 2 with complications    • Ex-smoker     QUIT SMOKING 2008   • Hyperlipidemia    • Hypertension    • Thrombocytopenia         Current Problems:   Active Hospital Problems    Diagnosis    • **Atrial fibrillation with RVR         Nutrition/Diet History         Narrative     Pt followed by RD for LOS x 7 days. Intake documentation reveal % meal intake. Pt w/ wt stability. Pt is at low risk per nutrition risk screening. No acute nutrition concerns or interventions at this time. RD will continue to follow and monitor per protocol.     Anthropometrics        Current Height, Weight Height: 160 cm (63\")  Weight: 120 kg (265 lb 6.9 oz)   Current BMI Body mass index is 47.02 kg/m².       Weight Hx  Wt Readings from Last 30 Encounters:   05/13/23 0115 120 kg (265 lb 6.9 oz)   05/11/23 1315 121 kg (266 lb)   05/05/23 1230 121 kg (267 lb 12.8 oz)   01/27/23 1052 119 kg (262 lb 4.8 oz)   10/28/22 1408 118 kg (261 lb 3.2 oz)   07/27/22 1047 119 kg (261 lb 6.4 oz)   05/20/22 1412 117 kg (259 lb)   04/01/22 1344 119 kg (261 lb 6.4 oz)   03/23/22 0953 117 kg (259 lb)   02/10/22 1201 119 kg (263 lb)   01/21/22 1052 119 kg (263 lb)   11/29/21 1257 121 kg (266 lb)   07/21/21 1100 119 kg " (261 lb 12.8 oz)   05/26/21 0000 119 kg (262 lb)   05/18/21 1046 118 kg (260 lb 8 oz)   04/15/21 0000 119 kg (263 lb 2 oz)   04/14/21 0000 118 kg (261 lb)   03/15/21 0000 118 kg (260 lb 4 oz)   01/20/21 0000 118 kg (259 lb 4 oz)   12/09/20 0000 117 kg (258 lb)   10/19/20 0000 120 kg (263 lb 8 oz)   10/16/20 0000 120 kg (265 lb)   03/02/20 0000 118 kg (260 lb 4 oz)   02/10/20 0000 115 kg (253 lb)   01/10/20 0000 119 kg (262 lb)   12/11/19 0000 116 kg (255 lb)   10/21/19 0000 116 kg (256 lb 2 oz)   08/15/19 0000 113 kg (248 lb 4 oz)   08/07/19 0000 113 kg (249 lb)   05/02/19 0000 111 kg (244 lb 4 oz)            Wt Change Observation Weight stable     Estimated/Assessed Needs       Energy Requirements 25-30 kcal/kg adj BW   EST Needs (kcal/day) 4730-2970 kcal       Protein Requirements 1.0-1.2  g/kg adj BW   EST Daily Needs (g/day) 69-83 g       Fluid Requirements 1 ml/kcal    Estimated Needs (mL/day) 6016-8025 ml     Labs/Medications         Pertinent Labs Reviewed.   Results from last 7 days   Lab Units 05/19/23  0448 05/18/23  0427 05/17/23  0316 05/16/23  0735 05/16/23  0416 05/15/23  2254 05/15/23  2103   SODIUM mmol/L 135* 136 138  --  137  --  134*   SODIUM, ARTERIAL   --   --   --    < >  --    < >  --    POTASSIUM mmol/L 4.0 4.1 3.8   < > 4.7  --  4.5   CHLORIDE mmol/L 90* 93* 99  --  100  --  101   CO2 mmol/L 31.2* 32.4* 27.7  --  25.9  --  22.7   BUN mg/dL 29* 29* 24*  --  14  --  13   CREATININE mg/dL 1.03* 0.99 0.84  --  0.80  --  0.71   CALCIUM mg/dL 9.6 9.6 9.4  --  9.2  --  8.9   BILIRUBIN mg/dL  --  0.9  --   --  1.4*  --  1.4*   ALK PHOS U/L  --  64  --   --  62  --  59   ALT (SGPT) U/L  --  24  --   --  23  --  22   AST (SGOT) U/L  --  30  --   --  27  --  28   GLUCOSE mg/dL 214* 160* 133*  --  189*  --  136*   GLUCOSE, ARTERIAL   --   --   --    < >  --    < >  --     < > = values in this interval not displayed.     Results from last 7 days   Lab Units 05/19/23  0448 05/18/23  0427 05/17/23  0316    MAGNESIUM mg/dL 1.6 1.7 2.0   PHOSPHORUS mg/dL  --   --  4.0   HEMOGLOBIN g/dL 15.3 14.1 13.7   HEMATOCRIT % 49.2* 44.8 42.0     Coronavirus (COVID-19)   Date Value Ref Range Status   02/25/2021 NOT DETECTED NA Final     Comment:     The SARS-CoV-2 assay is a real-time, RT-PCR test intended  for the qualitative detection of nucleic acid from the  SARS-CoV-2 in respiratory specimens from individuals,  testing performed at [facility] reference lab.       Lab Results   Component Value Date    HGBA1C 6.30 (H) 01/27/2023         Pertinent Medications Reviewed.     Current Nutrition Orders & Evaluation of Intake       Oral Nutrition     Current PO Diet Diet: Gastrointestinal Diets; Fiber-Restricted, Low Irritant, Fat-Restricted; Texture: Regular Texture (IDDSI 7); Fluid Consistency: Thin (IDDSI 0)   Supplement Orders Placed This Encounter      DIET MESSAGE PATIENT DOES NOT ---- LIKE BEEF BROTH, ANYTHING LEMON FLAVORED, OR CRANBERRY JUICE. REQUESTING COFFEE WITH ALL MEALS.       Malnutrition Severity Assessment                Nutrition Diagnosis         Nutrition Dx Problem 1 No nutrition diagnosis at this time.     Nutrition Intervention         No intervention indicated.      Medical Nutrition Therapy/Nutrition Education          Learner     Readiness N/A  N/A     Method     Response N/A  N/A     Monitor/Evaluation        Monitor Per protocol.     Nutrition Discharge Plan         No nutrition needs identified at this time.     Electronically signed by:  Bridget Hussein RD  05/19/23 09:38 EDT

## 2023-05-19 NOTE — SIGNIFICANT NOTE
Wound Eval / Progress Noted    GUERO Bautista     Patient Name: Mary Albarran  : 1950  MRN: [id number]  Today's Date: 2023                 Admit Date: 2023    Visit Dx:    ICD-10-CM ICD-9-CM   1. Atrial fibrillation with RVR  I48.91 427.31   2. Acute gastroenteritis  K52.9 558.9   3. Difficulty walking  R26.2 719.7       Patient Active Problem List   Diagnosis   • Aortic valve disease   • Atrial fibrillation   • Chronic kidney disease, stage III (moderate)   • Hyperlipidemia   • Hypertension   • Chronic diastolic congestive heart failure   • Atrial fibrillation with RVR        Past Medical History:   Diagnosis Date   • Allergic rhinitis    • Anxiety    • Aortic valve disease 2021    Fibrocalcific changes of the aortic valve with mild aortic valve stenosis   on echo 3/2/2021  Moderate aortic valve regurgitation is present. Aortic valve maximum pressure gradient is 26 mmHg. Moderate aortic valve stenosis is present.  On echo 2/10/2022      • Arthritis    • Atrial fibrillation    • Chronic kidney disease (CKD), stage III (moderate)    • Chronic pain    • COPD (chronic obstructive pulmonary disease)    • Diabetes mellitus type 2 with complications    • Ex-smoker     QUIT SMOKING    • Hyperlipidemia    • Hypertension    • Thrombocytopenia         Past Surgical History:   Procedure Laterality Date   • HYSTERECTOMY      30 YEARS AGO         Physical Assessment:  Wound 23 0210 Left lower leg Venous Ulcer (Active)   Wound Image     23 1145   Dressing Appearance open to air 23 1145   Closure None 23 1145   Base epithelialization;pink;red;yellow 23 1145   Periwound dry;warm;redness 23 1145   Periwound Temperature warm 23 1145   Periwound Skin Turgor soft 23 1145   Edges rolled/closed 23 1145   Drainage Amount none 23 1145   Care, Wound cleansed with;sterile normal saline 23 1145   Dressing Care dressing applied;gauze;tubular wrap;elastic  bandage 05/19/23 1145   Periwound Care absorptive dressing applied;topical treatment applied 05/19/23 1145       Wound 05/13/23 0210 Right lower leg Venous Ulcer (Active)   Wound Image    05/19/23 1145   Dressing Appearance open to air 05/19/23 1145   Closure None 05/19/23 1145   Base red;epithelialization;pink;yellow 05/19/23 1145   Periwound intact;redness;dry 05/19/23 1145   Periwound Temperature warm 05/19/23 1145   Periwound Skin Turgor soft 05/19/23 1145   Edges rolled/closed 05/19/23 1145   Drainage Amount none 05/19/23 1145   Care, Wound cleansed with;sterile normal saline 05/19/23 1145   Dressing Care dressing applied;gauze;tubular wrap;elastic bandage 05/19/23 1145   Periwound Care absorptive dressing applied;topical treatment applied;other (see comments) 05/19/23 1145        Wound Check / Follow-up:  Patient seen today for a wound check and dressing changes. Patient very please with how well her legs have responded to the skin care that has been performed since she has been admitted. Thickened yellow tissue and crusting to feet have show great improvement; majority of the thickened scaly tissue has been removed with the daily cleansing and topical triamcinolone cream. Cleansed legs with NS. Applied gentle friction to legs to remove tissue; applied a thin layer of triamcinolone cream. Recommending to continue current at this time due to positive response to treatment. Patient asking to see podiatry to have toe nails clipped; will discuss with primary MD.    Impression: thickened scaly skin to BLE / feet    Short term goals:  Regain skin integrity, pressure reduction, skin protection, moisture management, topical treatment    Arminda Alvarez RN    5/19/2023    12:54 EDT

## 2023-05-20 LAB
ANION GAP SERPL CALCULATED.3IONS-SCNC: 9.5 MMOL/L (ref 5–15)
BUN SERPL-MCNC: 38 MG/DL (ref 8–23)
BUN/CREAT SERPL: 34.5 (ref 7–25)
CALCIUM SPEC-SCNC: 9.4 MG/DL (ref 8.6–10.5)
CHLORIDE SERPL-SCNC: 91 MMOL/L (ref 98–107)
CO2 SERPL-SCNC: 36.5 MMOL/L (ref 22–29)
CREAT SERPL-MCNC: 1.1 MG/DL (ref 0.57–1)
DEPRECATED RDW RBC AUTO: 44.1 FL (ref 37–54)
EGFRCR SERPLBLD CKD-EPI 2021: 54.2 ML/MIN/1.73
ERYTHROCYTE [DISTWIDTH] IN BLOOD BY AUTOMATED COUNT: 13.7 % (ref 12.3–15.4)
GLUCOSE BLDC GLUCOMTR-MCNC: 159 MG/DL (ref 70–99)
GLUCOSE BLDC GLUCOMTR-MCNC: 166 MG/DL (ref 70–99)
GLUCOSE BLDC GLUCOMTR-MCNC: 177 MG/DL (ref 70–99)
GLUCOSE SERPL-MCNC: 192 MG/DL (ref 65–99)
HCT VFR BLD AUTO: 46.7 % (ref 34–46.6)
HGB BLD-MCNC: 15.3 G/DL (ref 12–15.9)
MAGNESIUM SERPL-MCNC: 1.7 MG/DL (ref 1.6–2.4)
MCH RBC QN AUTO: 28.9 PG (ref 26.6–33)
MCHC RBC AUTO-ENTMCNC: 32.8 G/DL (ref 31.5–35.7)
MCV RBC AUTO: 88.1 FL (ref 79–97)
PLATELET # BLD AUTO: 167 10*3/MM3 (ref 140–450)
PMV BLD AUTO: 12.8 FL (ref 6–12)
POTASSIUM SERPL-SCNC: 4.2 MMOL/L (ref 3.5–5.2)
RBC # BLD AUTO: 5.3 10*6/MM3 (ref 3.77–5.28)
SODIUM SERPL-SCNC: 137 MMOL/L (ref 136–145)
WBC NRBC COR # BLD: 9.96 10*3/MM3 (ref 3.4–10.8)

## 2023-05-20 PROCEDURE — 25010000002 FUROSEMIDE PER 20 MG: Performed by: INTERNAL MEDICINE

## 2023-05-20 PROCEDURE — 94799 UNLISTED PULMONARY SVC/PX: CPT

## 2023-05-20 PROCEDURE — 80048 BASIC METABOLIC PNL TOTAL CA: CPT | Performed by: INTERNAL MEDICINE

## 2023-05-20 PROCEDURE — 85027 COMPLETE CBC AUTOMATED: CPT | Performed by: INTERNAL MEDICINE

## 2023-05-20 PROCEDURE — 63710000001 INSULIN DETEMIR PER 5 UNITS: Performed by: INTERNAL MEDICINE

## 2023-05-20 PROCEDURE — 82948 REAGENT STRIP/BLOOD GLUCOSE: CPT

## 2023-05-20 PROCEDURE — 83735 ASSAY OF MAGNESIUM: CPT | Performed by: INTERNAL MEDICINE

## 2023-05-20 PROCEDURE — 99232 SBSQ HOSP IP/OBS MODERATE 35: CPT | Performed by: INTERNAL MEDICINE

## 2023-05-20 PROCEDURE — 94664 DEMO&/EVAL PT USE INHALER: CPT

## 2023-05-20 PROCEDURE — 63710000001 INSULIN LISPRO (HUMAN) PER 5 UNITS: Performed by: INTERNAL MEDICINE

## 2023-05-20 PROCEDURE — 25010000002 FUROSEMIDE PER 20 MG: Performed by: NURSE PRACTITIONER

## 2023-05-20 RX ORDER — FUROSEMIDE 10 MG/ML
40 INJECTION INTRAMUSCULAR; INTRAVENOUS
Status: DISCONTINUED | OUTPATIENT
Start: 2023-05-20 | End: 2023-05-21 | Stop reason: HOSPADM

## 2023-05-20 RX ADMIN — METOPROLOL SUCCINATE 150 MG: 50 TABLET, EXTENDED RELEASE ORAL at 09:04

## 2023-05-20 RX ADMIN — PREGABALIN 150 MG: 75 CAPSULE ORAL at 20:55

## 2023-05-20 RX ADMIN — INSULIN LISPRO 2 UNITS: 100 INJECTION, SOLUTION INTRAVENOUS; SUBCUTANEOUS at 12:14

## 2023-05-20 RX ADMIN — Medication 1000 UNITS: at 09:04

## 2023-05-20 RX ADMIN — HYDROCODONE BITARTRATE AND ACETAMINOPHEN 1 TABLET: 10; 325 TABLET ORAL at 09:06

## 2023-05-20 RX ADMIN — TRIAMCINOLONE ACETONIDE 1 APPLICATION: 1 OINTMENT TOPICAL at 09:08

## 2023-05-20 RX ADMIN — PREGABALIN 150 MG: 75 CAPSULE ORAL at 09:06

## 2023-05-20 RX ADMIN — PYRIDOXINE HCL TAB 50 MG 200 MG: 50 TAB at 09:09

## 2023-05-20 RX ADMIN — HYDROCODONE BITARTRATE AND ACETAMINOPHEN 1 TABLET: 10; 325 TABLET ORAL at 15:57

## 2023-05-20 RX ADMIN — RIVAROXABAN 20 MG: 20 TABLET, FILM COATED ORAL at 17:48

## 2023-05-20 RX ADMIN — INSULIN LISPRO 2 UNITS: 100 INJECTION, SOLUTION INTRAVENOUS; SUBCUTANEOUS at 09:03

## 2023-05-20 RX ADMIN — FUROSEMIDE 40 MG: 10 INJECTION, SOLUTION INTRAMUSCULAR; INTRAVENOUS at 03:21

## 2023-05-20 RX ADMIN — ARFORMOTEROL TARTRATE 15 MCG: 15 SOLUTION RESPIRATORY (INHALATION) at 19:56

## 2023-05-20 RX ADMIN — Medication 10 ML: at 20:56

## 2023-05-20 RX ADMIN — Medication 10 ML: at 09:08

## 2023-05-20 RX ADMIN — DIGOXIN 250 MCG: 250 TABLET ORAL at 09:08

## 2023-05-20 RX ADMIN — PREGABALIN 150 MG: 75 CAPSULE ORAL at 17:48

## 2023-05-20 RX ADMIN — FUROSEMIDE 40 MG: 10 INJECTION, SOLUTION INTRAMUSCULAR; INTRAVENOUS at 17:48

## 2023-05-20 RX ADMIN — ATORVASTATIN CALCIUM 20 MG: 20 TABLET, FILM COATED ORAL at 20:55

## 2023-05-20 RX ADMIN — INSULIN DETEMIR 15 UNITS: 100 INJECTION, SOLUTION SUBCUTANEOUS at 20:56

## 2023-05-20 RX ADMIN — INSULIN LISPRO 2 UNITS: 100 INJECTION, SOLUTION INTRAVENOUS; SUBCUTANEOUS at 17:48

## 2023-05-20 RX ADMIN — ARFORMOTEROL TARTRATE 15 MCG: 15 SOLUTION RESPIRATORY (INHALATION) at 06:54

## 2023-05-20 RX ADMIN — HYDROCODONE BITARTRATE AND ACETAMINOPHEN 1 TABLET: 10; 325 TABLET ORAL at 20:55

## 2023-05-20 RX ADMIN — Medication 10 ML: at 00:39

## 2023-05-20 RX ADMIN — HYDROCODONE BITARTRATE AND ACETAMINOPHEN 1 TABLET: 10; 325 TABLET ORAL at 01:37

## 2023-05-20 RX ADMIN — MULTIPLE VITAMINS W/ MINERALS TAB 1 TABLET: TAB at 09:04

## 2023-05-20 RX ADMIN — DILTIAZEM HYDROCHLORIDE 120 MG: 120 CAPSULE, COATED, EXTENDED RELEASE ORAL at 12:14

## 2023-05-20 NOTE — PROGRESS NOTES
[facility]   Hospitalist Progress Note  Date: 2023  Patient Name: Mary Albarran  : 1950  MRN: [id number]  Date of admission: 2023      Subjective   Subjective     Chief Complaint: Nausea vomiting follow-up    Summary:Mary Albarran is a 72 y.o. female with a past medical history of A-fib on Xarelto, CKD stage III, COPD, type 2 diabetes mellitus, hyperlipidemia, hypertension presented to the ED for evaluation of nausea, vomiting since yesterday.  Patient went to cardiology clinic yesterday and had an appointment with Dr. Metcalf, had cardiac echo done and after that when she came home she started experiencing nausea , associated with multiple episodes of vomiting since yesterday.  Mild intermittent diffuse cramping abdominal pain was noted.  Denies fevers, cough, shortness of breath, chest pain, diarrhea, dysuria, visual changes, focal weakness, numbness, tingling, palpitations, lightheadedness.     On arrival to the ED, vital signs temperature 99.2, pulse 146, respiratory 20, blood pressure 147/71 on 2 L of nasal cannula saturating around 91%.  EKG showed A-fib with RVR.  Labs showed troponin 17, proBNP 976, rest of the CMP within normal limits except creatinine 1.05, WBC 26, hemoglobin 16, hematocrit 49, platelets 87, digoxin level within therapeutic range.  Chest x-ray showed no acute cardiopulmonary process.  Patient had a cardiac echo yesterday which showed LVEF 60-65%, left ventricular diastolic function was indeterminate.  Mildly dilated left atrial cavity.  Mild to early moderate aortic insufficiency.  Patient appears to be in coarse atrial fibrillation/flutter during my exam.  Received IV diltiazem loading dose and started on diltiazem drip.  Patient has been admitted for further evaluation and management of nausea and vomiting with associated A-fib with RVR.  Patient continued IV fluids and Cardizem drip.  Weaned off of Cardizem drip.  Cardiology consulted.  A-fib RVR thought likely  secondary to volume depletion.  Abdominal pain improved and was able to tolerate clears.  Diet advanced but patient not able to tolerate episodes of abdominal pain nausea vomiting.  CT of the abdomen demonstrated wall thickening at the proximal duodenum and adjacent fat stranding concerning for enteritis.  Started on empiric antibiotics.  Diet downgraded back to clears.  CT chest demonstrated septal thickening patchy bilateral groundglass opacities similar to finding in 2008.  Also found to have small pulmonary nodules bilaterally.  Radiology recommending 6-month follow-up.  IV fluids stopped.  Abdominal pain improving.  Diet slowly advanced.  Heart rate remained difficult to control.  Continued on as needed Lopressor pushes and home metoprolol titrated up.  O2 requirement slowly trending up.  Patient short of breath and tachycardic on the evening of 5/15/2023 when she got up to the commode sats in the 70s.  Placed on 15 L high flow.  RRT called.  ABG showed mild hypercapnia without acidosis.  Chest x-ray pending.  Patient given IV Solu-Medrol.  Patient transferred to the ICU.    Interval Followup:   Patient's heart rate better controlled over the past 24 hours on her home medications.  Patient still remains on supplemental oxygen.  Continue on diuresis, wean oxygen as able.  Patient states at home her  would be able to take care of her however she is hoping to come down on the oxygen levels.  Walking oximetry ordered    ROS:  Denies chest pain or palpitations currently.  Denies shortness of breath    Objective   Objective     Vitals:   Temp:  [97.3 °F (36.3 °C)-98.3 °F (36.8 °C)] 98.1 °F (36.7 °C)  Heart Rate:  [87-97] 90  Resp:  [16] 16  BP: (116-162)/(56-68) 116/68  Flow (L/min):  [2-4] 4  Physical Exam   Gen. well-developed appearing stated age in no acute distress  HEENT: Normocephalic atraumatic moist membranes pupils equal round reactive light, no scleral icterus no conjunctival  injection  Cardiovascular: Irregularly irregular, rates in the 80s on rounds.  No lower extremity edema appreciated  Pulmonary: Crackles bilateral lower lung fields, no wheezes or rhonchi symmetric chest expansion, unlabored, no conversational dyspnea appreciated  Gastrointestinal: Soft nontender nondistended positive bowel sounds all 4 quadrants no rebound or guarding  Musculoskeletal: No clubbing cyanosis, warm and well-perfused, calves soft symmetric nontender bilaterally  Skin: Clean dry without rashs  Neuro: Cranial nerves II through XII intact grossly no sensorimotor deficits appreciated bilateral upper and lower extremities  Psych: Patient is calm cooperative and appropriate with exam not responding to internal stimuli  : No Maldonado catheter no bladder distention no suprapubic tenderness    Result Review    Result Review:  I have personally reviewed these results and agree with these findings:  [x]  Laboratory  LAB RESULTS:      Lab 05/20/23  0513 05/19/23  0448 05/18/23  0427 05/17/23  0316 05/16/23  0735 05/16/23  0416 05/15/23  2254 05/15/23  2103 05/15/23  1931   WBC 9.96 11.57* 9.70 12.58*  --  7.22  --  11.14*  --    HEMOGLOBIN 15.3 15.3 14.1 13.7  --  13.7  --  13.2  --    HEMATOCRIT 46.7* 49.2* 44.8 42.0  --  43.8  --  41.9  --    PLATELETS 167 143 103* 84*  --  65*  --  69*  --    NEUTROS ABS  --   --   --   --   --   --   --  9.29*  --    IMMATURE GRANS (ABS)  --   --   --   --   --   --   --  0.09*  --    LYMPHS ABS  --   --   --   --   --   --   --  0.86  --    MONOS ABS  --   --   --   --   --   --   --  0.85  --    EOS ABS  --   --   --   --   --   --   --  0.01  --    MCV 88.1 90.6 90.1 90.5  --  92.0  --  91.5  --    LACTATE  --   --   --   --   --   --   --  1.3  --    LACTATE, ARTERIAL  --   --   --   --  1.44  --  1.17  --  1.37   PROTIME  --   --   --   --   --  22.1*  --   --   --          Lab 05/20/23  0513 05/19/23  0448 05/18/23  0427 05/17/23  0316 05/16/23  1031 05/16/23  0735  05/16/23  0611 05/16/23  0416 05/15/23  2254 05/15/23  2103 05/15/23  1931 05/15/23  0549 05/14/23  0427   SODIUM 137 135* 136 138  --   --   --  137  --    < >  --    < > 137   SODIUM, ARTERIAL  --   --   --   --   --  137.7  --   --  133.8*  --  134.3*   < >  --    POTASSIUM 4.2 4.0 4.1 3.8 4.5  --   --  4.7  --    < >  --    < > 4.8   CHLORIDE 91* 90* 93* 99  --   --   --  100  --    < >  --    < > 102   CO2 36.5* 31.2* 32.4* 27.7  --   --   --  25.9  --    < >  --    < > 26.3   ANION GAP 9.5 13.8 10.6 11.3  --   --   --  11.1  --    < >  --    < > 8.7   BUN 38* 29* 29* 24*  --   --   --  14  --    < >  --    < > 15   CREATININE 1.10* 1.03* 0.99 0.84  --   --   --  0.80  --    < >  --    < > 0.83   EGFR 54.2* 58.6* 61.5 74.9  --   --   --  79.4  --    < >  --    < > 75.9   GLUCOSE 192* 214* 160* 133*  --   --   --  189*  --    < >  --    < > 151*   GLUCOSE, ARTERIAL  --   --   --   --   --  197*  --   --  136*  --  144*   < >  --    CALCIUM 9.4 9.6 9.6 9.4  --   --   --  9.2  --    < >  --    < > 8.6   IONIZED CALCIUM  --   --   --   --   --  1.16  --   --  1.09*  --  1.15  --   --    MAGNESIUM 1.7 1.6 1.7 2.0 1.9  --    < >  --   --    < >  --    < >  --    PHOSPHORUS  --   --   --  4.0  --   --   --   --   --   --   --   --  3.0    < > = values in this interval not displayed.         Lab 05/18/23  0427 05/16/23  0416 05/15/23  2103 05/15/23  0549 05/14/23 0427   TOTAL PROTEIN 7.1 6.7 6.6 6.1  --    ALBUMIN 3.3* 3.3* 3.2* 3.4* 3.1*   GLOBULIN 3.8 3.4 3.4 2.7  --    ALT (SGPT) 24 23 22 17  --    AST (SGOT) 30 27 28 20  --    BILIRUBIN 0.9 1.4* 1.4* 1.1  --    ALK PHOS 64 62 59 56  --          Lab 05/16/23  1031 05/16/23 0416 05/15/23  2255 05/15/23  2103 05/15/23  0549   PROBNP  --   --   --   --  3,335.0*   HSTROP T 20*  --  28* 28*  --    PROTIME  --  22.1*  --   --   --    INR  --  1.95*  --   --   --                  Lab 05/16/23  0735 05/15/23  2254 05/15/23  1931   PH, ARTERIAL 7.441 7.378 7.378   PCO2,  ARTERIAL 38.5 45.4* 46.8*   PO2 ART 73.5* 69.6* 55.2*   O2 SATURATION ART 94.5* 93.2* 88.9*   FIO2 40  --   --    HCO3 ART 25.6 26.1* 26.9*   BASE EXCESS ART 1.6 0.6 1.2   CARBOXYHEMOGLOBIN 0.6 0.8 1.1     Brief Urine Lab Results  (Last result in the past 365 days)      Color   Clarity   Blood   Leuk Est   Nitrite   Protein   CREAT   Urine HCG        05/13/23 0415 Yellow   Clear   Negative   Negative   Negative   30 mg/dL (1+)               Microbiology Results (last 10 days)     Procedure Component Value - Date/Time    MRSA Screen, PCR (Inpatient) - Swab, Nares [[id number]]  (Normal) Collected: 05/15/23 0216    Lab Status: Final result Specimen: Swab from Nares Updated: 05/15/23 0408     MRSA PCR No MRSA Detected    Narrative:      The negative predictive value of this diagnostic test is high and should only be used to consider de-escalating anti-MRSA therapy. A positive result may indicate colonization with MRSA and must be correlated clinically.    Blood Culture - Blood, Arm, Left [[id number]]  (Normal) Collected: 05/13/23 0105    Lab Status: Final result Specimen: Blood from Arm, Left Updated: 05/18/23 0130     Blood Culture No growth at 5 days    Blood Culture - Blood, Arm, Right [[id number]]  (Normal) Collected: 05/13/23 0105    Lab Status: Final result Specimen: Blood from Arm, Right Updated: 05/18/23 0130     Blood Culture No growth at 5 days          [x]  Microbiology  [x]  Radiology  CT Abdomen Pelvis Without Contrast    Result Date: 5/14/2023    1. Wall thickening at the proximal duodenum with adjacent fat stranding.  The appearance is nonspecific.  Consider enteritis or peptic ulcer disease.  2. Cholelithiasis. 3. Hepatomegaly and architectural changes in the liver that might reflect cirrhosis.  Mild splenomegaly could indicate portal venous hypertension or be a function of body habitus. 4. Small nonobstructing right-sided kidney stones. 5. Significant multilevel lumbar spondylosis.     LORETTA DAVALOS MD        Electronically Signed and Approved By: LORETTA DAVALOS MD on 5/14/2023 at 8:01             CT Chest Without Contrast Diagnostic    Result Date: 5/14/2023    1. Interlobular septal thickening and patchy areas of ground-glass opacity in both lungs.  This could be infectious or inflammatory.  The lungs have a similar appearance in 2008.  This may represent a recurrent or residual process. 2. Cardiomegaly, coronary artery disease and aortic atherosclerosis. 3. Trace bilateral pleural effusions. 4. Small nodules in the each lung, which could be infectious or inflammatory, but a 6 month follow-up chest CT is recommended to further evaluate.     LORETTA DAVALOS MD       Electronically Signed and Approved By: LORETTA DAVALOS MD on 5/14/2023 at 7:58             XR Chest 1 View    Result Date: 5/15/2023    1. Interval worsening of the appearance of the chest with bilateral pulmonary infiltrates 2. Right pleural effusion 3. Cardiomegaly.       JESUS HICKEY MD       Electronically Signed and Approved By: JESUS HICKEY MD on 5/15/2023 at 20:33             XR Chest 1 View    Result Date: 5/12/2023    No definite acute cardiopulmonary abnormality.       JULIO CÉSAR POMPA MD       Electronically Signed and Approved By: JULIO CÉSAR POMPA MD on 5/12/2023 at 23:26               [x]  EKG/Telemetry   []  Cardiology/Vascular   []  Pathology  []  Old records  [x]  Other:  Scheduled Meds:arformoterol, 15 mcg, Nebulization, BID - RT  atorvastatin, 20 mg, Oral, Nightly  cholecalciferol, 1,000 Units, Oral, Daily  digoxin, 125 mcg, Oral, Once per day on Sun Thu   And  digoxin, 250 mcg, Oral, Once per day on Mon Tue Wed Fri Sat  dilTIAZem CD, 120 mg, Oral, Q24H  furosemide, 40 mg, Intravenous, BID  insulin detemir, 15 Units, Subcutaneous, Nightly  insulin lispro, 2-9 Units, Subcutaneous, TID AC  metoprolol succinate XL, 150 mg, Oral, Q12H  multivitamin with minerals, 1 tablet, Oral, Daily  pregabalin, 150 mg, Oral, TID  rivaroxaban, 20 mg, Oral,  Daily  sodium chloride, 10 mL, Intravenous, Q12H  triamcinolone, 1 application, Topical, Daily  vitamin B-6, 200 mg, Oral, Daily      Continuous Infusions:   PRN Meds:.•  dextrose  •  dextrose  •  glucagon (human recombinant)  •  HYDROcodone-acetaminophen  •  levalbuterol  •  metoprolol tartrate  •  nitroglycerin  •  ondansetron  •  prochlorperazine  •  sodium chloride  •  sodium chloride  •  sodium chloride      Assessment & Plan   Assessment / Plan     Assessment/Plan:  Sepsis secondary to duodenitis present on admission  Duodenitis  A-fib RVR  Acute hypoxic respiratory failure  Leukocytosis  CKD 3A  COPD without acute exacerbation  Type 2 diabetes mellitus  Hyperlipidemia  Hypertension  Thrombocytopenia due to sepsis and suspected cirrhosis  Concern for cirrhosis  Bilateral pulmonary nodules    Plan:  Patient admitted for further evaluation and treatment  Cardiology consulted thank you for your assistance  Continue home digoxin  Patient back on home doses of metoprolol and Cardizem  Continue as needed Lopressor  Continue Xarelto  Continue supplemental nasal cannula oxygen titrate to keep sats greater than 90%.  Patient could benefit from BiPAP if able to tolerate  Creatinine continues to slowly move up, however on exam still with crackles will continue on diuretics mindful of creatinine  Weaning oxygen as able, push incentive spirometer as well  Walking test to be done today  Continue Brovana  Continue Levemir and sliding scale insulin and titrate per protocol  Continue atorvastatin  Suspect thrombocytopenia due to cirrhosis with sepsis  Repeat CT chest in 6 months per radiology recommendations.  Consult physical therapy to evaluate and treat  Further inpatient orders recommendations pending clinical course      Discussed plan with bedside RN    Disposition: PT OT recommending home with home health.  Repeat CT chest in 6 months per radiology recommendations.    DVT prophylaxis:  Medical DVT prophylaxis orders are  present.    CODE STATUS:   Level Of Support Discussed With: Patient  Code Status (Patient has no pulse and is not breathing): CPR (Attempt to Resuscitate)  Medical Interventions (Patient has pulse or is breathing): Full Support

## 2023-05-20 NOTE — PLAN OF CARE
Problem: Adult Inpatient Plan of Care  Goal: Plan of Care Review  Outcome: Ongoing, Progressing  Flowsheets (Taken 5/20/2023 1928)  Progress: improving  Plan of Care Reviewed With: patient  Outcome Evaluation: Patient AAO throughout shift. Patient is on 2L NC, tolerating well at this time. Pain treated per MAR. No complaints of discomfort at this time. Patient is up to BSC with no assistance, when walking x1 assist. Continuing with plan of care.   Goal Outcome Evaluation:  Plan of Care Reviewed With: patient        Progress: improving  Outcome Evaluation: Patient AAO throughout shift. Patient is on 2L NC, tolerating well at this time. Pain treated per MAR. No complaints of discomfort at this time. Patient is up to BSC with no assistance, when walking x1 assist. Continuing with plan of care.

## 2023-05-20 NOTE — PLAN OF CARE
Goal Outcome Evaluation:  Plan of Care Reviewed With: patient           Outcome Evaluation: Pt has been resting with mild to moderate pn throughout the night...medicated per MAR. Pt has been up to bedside commode with no transfer difficulties VSS

## 2023-05-21 ENCOUNTER — READMISSION MANAGEMENT (OUTPATIENT)
Dept: CALL CENTER | Facility: HOSPITAL | Age: 73
End: 2023-05-21
Payer: MEDICARE

## 2023-05-21 VITALS
RESPIRATION RATE: 18 BRPM | BODY MASS INDEX: 47.03 KG/M2 | HEIGHT: 63 IN | WEIGHT: 265.43 LBS | SYSTOLIC BLOOD PRESSURE: 141 MMHG | DIASTOLIC BLOOD PRESSURE: 68 MMHG | HEART RATE: 93 BPM | OXYGEN SATURATION: 92 % | TEMPERATURE: 97.4 F

## 2023-05-21 LAB
ANION GAP SERPL CALCULATED.3IONS-SCNC: 10.1 MMOL/L (ref 5–15)
BUN SERPL-MCNC: 41 MG/DL (ref 8–23)
BUN/CREAT SERPL: 44.6 (ref 7–25)
CALCIUM SPEC-SCNC: 9.4 MG/DL (ref 8.6–10.5)
CHLORIDE SERPL-SCNC: 93 MMOL/L (ref 98–107)
CO2 SERPL-SCNC: 32.9 MMOL/L (ref 22–29)
CREAT SERPL-MCNC: 0.92 MG/DL (ref 0.57–1)
DEPRECATED RDW RBC AUTO: 43.7 FL (ref 37–54)
EGFRCR SERPLBLD CKD-EPI 2021: 67.1 ML/MIN/1.73
ERYTHROCYTE [DISTWIDTH] IN BLOOD BY AUTOMATED COUNT: 13.8 % (ref 12.3–15.4)
GLUCOSE BLDC GLUCOMTR-MCNC: 190 MG/DL (ref 70–99)
GLUCOSE BLDC GLUCOMTR-MCNC: 229 MG/DL (ref 70–99)
GLUCOSE SERPL-MCNC: 198 MG/DL (ref 65–99)
HCT VFR BLD AUTO: 43.4 % (ref 34–46.6)
HGB BLD-MCNC: 14.2 G/DL (ref 12–15.9)
MAGNESIUM SERPL-MCNC: 1.7 MG/DL (ref 1.6–2.4)
MCH RBC QN AUTO: 28.4 PG (ref 26.6–33)
MCHC RBC AUTO-ENTMCNC: 32.7 G/DL (ref 31.5–35.7)
MCV RBC AUTO: 86.8 FL (ref 79–97)
PLATELET # BLD AUTO: 166 10*3/MM3 (ref 140–450)
PMV BLD AUTO: 13.2 FL (ref 6–12)
POTASSIUM SERPL-SCNC: 3.9 MMOL/L (ref 3.5–5.2)
RBC # BLD AUTO: 5 10*6/MM3 (ref 3.77–5.28)
SODIUM SERPL-SCNC: 136 MMOL/L (ref 136–145)
WBC NRBC COR # BLD: 11.08 10*3/MM3 (ref 3.4–10.8)

## 2023-05-21 PROCEDURE — 25010000002 FUROSEMIDE PER 20 MG: Performed by: INTERNAL MEDICINE

## 2023-05-21 PROCEDURE — 82948 REAGENT STRIP/BLOOD GLUCOSE: CPT

## 2023-05-21 PROCEDURE — 85027 COMPLETE CBC AUTOMATED: CPT | Performed by: INTERNAL MEDICINE

## 2023-05-21 PROCEDURE — 94799 UNLISTED PULMONARY SVC/PX: CPT

## 2023-05-21 PROCEDURE — 80048 BASIC METABOLIC PNL TOTAL CA: CPT | Performed by: INTERNAL MEDICINE

## 2023-05-21 PROCEDURE — 83735 ASSAY OF MAGNESIUM: CPT | Performed by: INTERNAL MEDICINE

## 2023-05-21 PROCEDURE — 63710000001 INSULIN LISPRO (HUMAN) PER 5 UNITS: Performed by: INTERNAL MEDICINE

## 2023-05-21 PROCEDURE — 94664 DEMO&/EVAL PT USE INHALER: CPT

## 2023-05-21 PROCEDURE — 99239 HOSP IP/OBS DSCHRG MGMT >30: CPT | Performed by: INTERNAL MEDICINE

## 2023-05-21 RX ORDER — FUROSEMIDE 20 MG/1
20 TABLET ORAL 2 TIMES DAILY
Qty: 180 TABLET | Refills: 3 | Status: SHIPPED | OUTPATIENT
Start: 2023-05-21

## 2023-05-21 RX ORDER — METOPROLOL SUCCINATE 50 MG/1
150 TABLET, EXTENDED RELEASE ORAL 2 TIMES DAILY
Start: 2023-05-21

## 2023-05-21 RX ADMIN — FUROSEMIDE 40 MG: 10 INJECTION, SOLUTION INTRAMUSCULAR; INTRAVENOUS at 08:07

## 2023-05-21 RX ADMIN — METOPROLOL SUCCINATE 150 MG: 50 TABLET, EXTENDED RELEASE ORAL at 08:08

## 2023-05-21 RX ADMIN — MULTIPLE VITAMINS W/ MINERALS TAB 1 TABLET: TAB at 08:08

## 2023-05-21 RX ADMIN — Medication 10 ML: at 08:09

## 2023-05-21 RX ADMIN — DIGOXIN 125 MCG: 125 TABLET ORAL at 08:08

## 2023-05-21 RX ADMIN — ARFORMOTEROL TARTRATE 15 MCG: 15 SOLUTION RESPIRATORY (INHALATION) at 08:56

## 2023-05-21 RX ADMIN — PYRIDOXINE HCL TAB 50 MG 200 MG: 50 TAB at 08:07

## 2023-05-21 RX ADMIN — Medication 1000 UNITS: at 08:08

## 2023-05-21 RX ADMIN — HYDROCODONE BITARTRATE AND ACETAMINOPHEN 1 TABLET: 10; 325 TABLET ORAL at 14:02

## 2023-05-21 RX ADMIN — HYDROCODONE BITARTRATE AND ACETAMINOPHEN 1 TABLET: 10; 325 TABLET ORAL at 01:24

## 2023-05-21 RX ADMIN — INSULIN LISPRO 4 UNITS: 100 INJECTION, SOLUTION INTRAVENOUS; SUBCUTANEOUS at 11:52

## 2023-05-21 RX ADMIN — HYDROCODONE BITARTRATE AND ACETAMINOPHEN 1 TABLET: 10; 325 TABLET ORAL at 05:25

## 2023-05-21 RX ADMIN — HYDROCODONE BITARTRATE AND ACETAMINOPHEN 1 TABLET: 10; 325 TABLET ORAL at 10:25

## 2023-05-21 RX ADMIN — TRIAMCINOLONE ACETONIDE 1 APPLICATION: 1 OINTMENT TOPICAL at 11:52

## 2023-05-21 RX ADMIN — METOPROLOL SUCCINATE 150 MG: 50 TABLET, EXTENDED RELEASE ORAL at 00:31

## 2023-05-21 RX ADMIN — PREGABALIN 150 MG: 75 CAPSULE ORAL at 08:08

## 2023-05-21 RX ADMIN — INSULIN LISPRO 2 UNITS: 100 INJECTION, SOLUTION INTRAVENOUS; SUBCUTANEOUS at 08:07

## 2023-05-21 RX ADMIN — DILTIAZEM HYDROCHLORIDE 120 MG: 120 CAPSULE, COATED, EXTENDED RELEASE ORAL at 11:52

## 2023-05-21 NOTE — PLAN OF CARE
Problem: Adult Inpatient Plan of Care  Goal: Plan of Care Review  Outcome: Met  Flowsheets (Taken 5/21/2023 1240)  Progress: improving  Plan of Care Reviewed With: patient  Outcome Evaluation: Patient AAO throughout shift. Walk test completed on shift. Patient is now on room air. Pain treated per MAR. No complaints of discomfort at this time. Patient uses walker for assistance when ambulating. Patient is to be discharged later on this shift.   Goal Outcome Evaluation:  Plan of Care Reviewed With: patient        Progress: improving  Outcome Evaluation: Patient AAO throughout shift. Walk test completed on shift. Patient is now on room air. Pain treated per MAR. No complaints of discomfort at this time. Patient uses walker for assistance when ambulating. Patient is to be discharged later on this shift.

## 2023-05-21 NOTE — NURSING NOTE
Exercise Oximetry    Patient Name:Mary Albarran   MRN: [id number]   Date: 05/21/23             ROOM AIR BASELINE   SpO2% 94   Heart Rate 90   Blood Pressure      EXERCISE ON ROOM AIR SpO2% EXERCISE ON O2 @  LPM SpO2%   1 MINUTE 91 1 MINUTE    2 MINUTES 89 2 MINUTES    3 MINUTES 90 3 MINUTES    4 MINUTES 91 4 MINUTES    5 MINUTES  5 MINUTES    6 MINUTES  6 MINUTES               Distance Walked  150 Distance Walked   Dyspnea (Lyndsay Scale)   Dyspnea (Lyndsay Scale)   Fatigue (Lyndsay Scale)   Fatigue (Lyndsay Scale)   SpO2% Post Exercise   SpO2% Post Exercise   HR Post Exercise   HR Post Exercise   Time to Recovery   Time to Recovery     Comments:

## 2023-05-21 NOTE — OUTREACH NOTE
Prep Survey    Flowsheet Row Responses   [facility] facility patient discharged from? [facility]   Is LACE score < 7 ? No   Eligibility [facility]   Hospital  [facility]   Date of Admission 05/12/23   Date of Discharge 05/21/23   Discharge Disposition Home-Health Care Svc   Discharge diagnosis Atrial fibrillation with RVR   Does the patient have one of the following disease processes/diagnoses(primary or secondary)? Other   Does the patient have Home health ordered? Yes   What is the Home health agency?  [facility]   Is there a DME ordered? No   Prep survey completed? Yes          KERLINE KUO - Registered Nurse

## 2023-05-21 NOTE — DISCHARGE SUMMARY
[facility]         HOSPITALIST  DISCHARGE SUMMARY    Patient Name: Mary Albarran  : 1950  MRN: [id number]    Date of Admission: 2023  Date of Discharge:  2023  Primary Care Physician: Brittni Quiroz APRN    Consults     Date and Time Order Name Status Description    2023  1:16 PM Inpatient Podiatry Consult Completed     2023  2:49 AM Inpatient Cardiology Consult Completed     2023 11:50 PM Hospitalist (on-call MD unless specified)            Active and Resolved Hospital Problems:  Sepsis secondary to duodenitis present on admission  Duodenitis  A-fib RVR  Acute hypoxic respiratory failure  Leukocytosis  CKD 3A  COPD without acute exacerbation  Type 2 diabetes mellitus  Hyperlipidemia  Hypertension  Thrombocytopenia due to sepsis and suspected cirrhosis  Concern for cirrhosis  Bilateral pulmonary nodules       Hospital Course     Hospital Course:  Mary Albarran is a 72 y.o. female with a past medical history of A-fib on Xarelto, CKD stage III, COPD, type 2 diabetes mellitus, hyperlipidemia, hypertension presented to the ED for evaluation of nausea, vomiting.  Patient went to cardiology clinic day prior to admit and had an appointment with Dr. Metcalf, had cardiac echo preformed and after is when she came home she started experiencing nausea, associated with multiple episodes of vomiting.  Mild intermittent diffuse cramping abdominal pain was noted.      On arrival to the ED, vital signs temperature 99.2, pulse 146, respiratory 20, blood pressure 147/71 on 2 L of nasal cannula saturating around 91%.  EKG showed A-fib with RVR.  Labs showed troponin 17, proBNP 976, rest of the CMP within normal limits except creatinine 1.05, WBC 26, hemoglobin 16, hematocrit 49, platelets 87, digoxin level within therapeutic range.  Chest x-ray showed no acute cardiopulmonary process.  Patient's recent echo showed LVEF 60-65%, left ventricular diastolic function was indeterminate.   Mildly dilated left atrial cavity.  Mild to early moderate aortic insufficiency.  Patient appears to be in coarse atrial fibrillation/flutter during my exam.  Received IV diltiazem loading dose and started on diltiazem drip.  Patient has been admitted for further evaluation and management of nausea and vomiting with associated A-fib with RVR.  Patient continued IV fluids and Cardizem drip.  Weaned off of Cardizem drip.  Cardiology consulted.  A-fib RVR thought likely secondary to volume depletion.  Abdominal pain improved and was able to tolerate clears.  Diet advanced but patient not able to tolerate episodes of abdominal pain nausea vomiting.  CT of the abdomen demonstrated wall thickening at the proximal duodenum and adjacent fat stranding concerning for enteritis.  Started on empiric antibiotics.   CT chest demonstrated septal thickening patchy bilateral groundglass opacities similar to finding in 2008.  Also found to have small pulmonary nodules bilaterally.  Radiology recommending 6-month follow-up.  Abdominal pain improving.  Diet slowly advanced.  Heart rate remained difficult to control.  Continued on as needed Lopressor pushes and home metoprolol titrated up.  O2 requirement slowly trending up.  Patient short of breath and tachycardic on the evening of 5/15/2023 when she got up to the commode sats in the 70s.  Placed on 15 L high flow.  RRT called.  ABG showed mild hypercapnia without acidosis.  Patient was transferred to the ICU.  Based on imaging and physical exam it was determined patient was fluid overloaded following her resuscitation due to the diarrhea she presented with.  Patient was slowly diuresed and weaned off of supplemental oxygen.  On date of discharge patient was tolerating a diet able to ambulate up to 4 minutes, baseline distance, without requiring supplemental oxygen.  Patient was recommended to follow-up with her PCP and cardiologist closely.  Patient was instructed to weigh herself  daily to ensure fluid does not slowly build up on her.  Patient was instructed to resume her Entresto day after discharge.  Patient will continue increased dose of diuretics for the next 24 hours at home.  Patient seen on date of discharge, clinically and hemodynamically stable.  Patient provided concerning signs and symptoms prompting immediate medical attention, patient understanding and agreeable    DISCHARGE Follow Up Recommendations for labs and diagnostics:   Follow-up PCP in 1 week  Follow-up with cardiology    Repeat CT chest in 6 months per radiology recommendations.  Small pulmonary nodules incidentally found on chest CT    Day of Discharge     Vital Signs:  Temp:  [97.3 °F (36.3 °C)-97.9 °F (36.6 °C)] 97.4 °F (36.3 °C)  Heart Rate:  [71-93] 93  Resp:  [16-18] 18  BP: (141-155)/(54-73) 141/68  Flow (L/min):  [2-4] 2  Physical Exam:   Gen. well-developed appearing stated age in no acute distress  HEENT: Normocephalic atraumatic moist membranes pupils equal round reactive light, no scleral icterus no conjunctival injection  Cardiovascular: Irregularly irregular, rates in the 80s on rounds.  No lower extremity edema appreciated  Pulmonary: No crackles, no wheezes or rhonchi symmetric chest expansion, unlabored, no conversational dyspnea appreciated  Gastrointestinal: Soft nontender nondistended positive bowel sounds all 4 quadrants no rebound or guarding  Musculoskeletal: No clubbing cyanosis, warm and well-perfused, calves soft symmetric nontender bilaterally  Skin: Clean dry without rashs  Neuro: Cranial nerves II through XII intact grossly no sensorimotor deficits appreciated bilateral upper and lower extremities  Psych: Patient is calm cooperative and appropriate with exam not responding to internal stimuli  : No Maldonado catheter no bladder distention no suprapubic tenderness       Discharge Details        Discharge Medications      Changes to Medications      Instructions Start Date   atorvastatin 20 MG  tablet  Commonly known as: LIPITOR  What changed: when to take this   TAKE 1 TABLET DAILY      dilTIAZem 120 MG 24 hr capsule  Commonly known as: TIAZAC  What changed: when to take this   TAKE 1 CAPSULE DAILY      Entresto  MG tablet  Generic drug: sacubitril-valsartan  What changed: Another medication with the same name was changed. Make sure you understand how and when to take each.   1 tablet, Oral, Nightly      Entresto  MG tablet  Generic drug: sacubitril-valsartan  What changed: See the new instructions.   TAKE ONE-HALF (1/2) TABLET IN THE MORNING AND TAKE 1 TABLET AT NIGHT AS DIRECTED      Farxiga 10 MG tablet  Generic drug: dapagliflozin Propanediol  What changed: how much to take   TAKE 1 TABLET DAILY      furosemide 20 MG tablet  Commonly known as: LASIX  What changed: when to take this   20 mg, Oral, 2 Times Daily      Januvia 100 MG tablet  Generic drug: SITagliptin  What changed: how much to take   TAKE 1 TABLET DAILY      potassium chloride 10 MEQ CR capsule  Commonly known as: MICRO-K  What changed: additional instructions   TAKE 1 CAPSULE DAILY WITH FOOD      Toujeo Max SoloStar 300 UNIT/ML solution pen-injector injection  Generic drug: Insulin Glargine (2 Unit Dial)  What changed:   · how much to take  · how to take this  · when to take this   25 units daily, increase 2 units weekly until blood sugar is below 150.      Xarelto 20 MG tablet  Generic drug: rivaroxaban  What changed:   · how much to take  · when to take this   TAKE 1 TABLET DAILY         Continue These Medications      Instructions Start Date   albuterol sulfate  (90 Base) MCG/ACT inhaler  Commonly known as: PROVENTIL HFA;VENTOLIN HFA;PROAIR HFA   2 puffs, Inhalation, Every 4 Hours PRN      albuterol 1.25 MG/3ML nebulizer solution  Commonly known as: ACCUNEB   1.25 mg, Nebulization, Every 6 Hours PRN      ascorbic acid 250 MG tablet  Commonly known as: VITAMIN C   2 tablets, Oral, Daily      BD Pen Needle Katiuska 2nd  Gen 32G X 4 MM misc  Generic drug: Insulin Pen Needle   1 each, Does not apply, Daily      cholecalciferol 25 MCG (1000 UT) tablet  Commonly known as: VITAMIN D3   1,000 Units, Oral, Daily      digoxin 125 MCG tablet  Commonly known as: LANOXIN   TAKE 1 TABLET ON THURSDAYS AND SUNDAYS AND 2 TABLETS THE REST OF THE WEEK      glipizide 10 MG tablet  Commonly known as: GLUCOTROL   10 mg, Oral, 2 Times Daily Before Meals      HYDROcodone-acetaminophen  MG per tablet  Commonly known as: NORCO   1 tablet, Oral, Every 4 Hours PRN      metoprolol succinate XL 50 MG 24 hr tablet  Commonly known as: TOPROL-XL   150 mg, Oral, 2 Times Daily      miconazole 2 % cream  Commonly known as: MICOTIN   2 times daily      multivitamin with minerals tablet tablet   1 tablet, Oral, Daily      pregabalin 150 MG capsule  Commonly known as: LYRICA   TAKE 1 CAPSULE THREE TIMES A DAY      silver sulfadiazine 1 % cream  Commonly known as: Silvadene   1 application, Topical, 2 Times Daily      Vitamin B6 200 MG tablet   1 tablet, Oral, Daily             Allergies   Allergen Reactions   • Codeine Mental Status Change       Discharge Disposition:  Home-Health Care [place]    Diet:  Hospital:  Diet Order   Procedures   • Diet: Gastrointestinal Diets; Fiber-Restricted, Low Irritant, Fat-Restricted; Texture: Regular Texture (IDDSI 7); Fluid Consistency: Thin (IDDSI 0)       Discharge Activity:   Activity Instructions     Activity as Tolerated            CODE STATUS:  Code Status and Medical Interventions:   Ordered at: 05/13/23 0203     Level Of Support Discussed With:    Patient     Code Status (Patient has no pulse and is not breathing):    CPR (Attempt to Resuscitate)     Medical Interventions (Patient has pulse or is breathing):    Full Support         Future Appointments   Date Time Provider Department Center   11/16/2023 11:30 AM Yoav Metcalf MD [facility] [place] [facility]       Additional Instructions for the Follow-ups that You Need to  Schedule     Discharge Follow-up with PCP   As directed       Currently Documented PCP:    Brittni Quiroz APRN    PCP Phone Number:    [phone]     Follow Up Details: In less than one week         Discharge Follow-up with Specified Provider: Cardiology; 1 Month   As directed      To: Cardiology    Follow Up: 1 Month               Pertinent  and/or Most Recent Results     PROCEDURES:       LAB RESULTS:      Lab 05/21/23 0459 05/20/23 0513 05/19/23 0448 05/18/23 0427 05/17/23  0316 05/16/23  0735 05/16/23  0416 05/15/23  2254 05/15/23  2103 05/15/23  1931   WBC 11.08* 9.96 11.57* 9.70 12.58*  --  7.22  --  11.14*  --    HEMOGLOBIN 14.2 15.3 15.3 14.1 13.7  --  13.7  --  13.2  --    HEMATOCRIT 43.4 46.7* 49.2* 44.8 42.0  --  43.8  --  41.9  --    PLATELETS 166 167 143 103* 84*  --  65*  --  69*  --    NEUTROS ABS  --   --   --   --   --   --   --   --  9.29*  --    IMMATURE GRANS (ABS)  --   --   --   --   --   --   --   --  0.09*  --    LYMPHS ABS  --   --   --   --   --   --   --   --  0.86  --    MONOS ABS  --   --   --   --   --   --   --   --  0.85  --    EOS ABS  --   --   --   --   --   --   --   --  0.01  --    MCV 86.8 88.1 90.6 90.1 90.5  --  92.0  --  91.5  --    LACTATE  --   --   --   --   --   --   --   --  1.3  --    LACTATE, ARTERIAL  --   --   --   --   --  1.44  --  1.17  --  1.37   PROTIME  --   --   --   --   --   --  22.1*  --   --   --          Lab 05/21/23 0459 05/20/23 0513 05/19/23 0448 05/18/23 0427 05/17/23  0316 05/16/23  1031 05/16/23  0735 05/16/23  0416 05/15/23  2254 05/15/23  2103 05/15/23  1931   SODIUM 136 137 135* 136 138  --   --    < >  --    < >  --    SODIUM, ARTERIAL  --   --   --   --   --   --  137.7  --  133.8*  --  134.3*   POTASSIUM 3.9 4.2 4.0 4.1 3.8   < >  --    < >  --    < >  --    CHLORIDE 93* 91* 90* 93* 99  --   --    < >  --    < >  --    CO2 32.9* 36.5* 31.2* 32.4* 27.7  --   --    < >  --    < >  --    ANION GAP 10.1 9.5 13.8 10.6 11.3  --    --    < >  --    < >  --    BUN 41* 38* 29* 29* 24*  --   --    < >  --    < >  --    CREATININE 0.92 1.10* 1.03* 0.99 0.84  --   --    < >  --    < >  --    EGFR 67.1 54.2* 58.6* 61.5 74.9  --   --    < >  --    < >  --    GLUCOSE 198* 192* 214* 160* 133*  --   --    < >  --    < >  --    GLUCOSE, ARTERIAL  --   --   --   --   --   --  197*  --  136*  --  144*   CALCIUM 9.4 9.4 9.6 9.6 9.4  --   --    < >  --    < >  --    IONIZED CALCIUM  --   --   --   --   --   --  1.16  --  1.09*  --  1.15   MAGNESIUM 1.7 1.7 1.6 1.7 2.0   < >  --    < >  --    < >  --    PHOSPHORUS  --   --   --   --  4.0  --   --   --   --   --   --     < > = values in this interval not displayed.         Lab 05/18/23  0427 05/16/23  0416 05/15/23  2103 05/15/23  0549   TOTAL PROTEIN 7.1 6.7 6.6 6.1   ALBUMIN 3.3* 3.3* 3.2* 3.4*   GLOBULIN 3.8 3.4 3.4 2.7   ALT (SGPT) 24 23 22 17   AST (SGOT) 30 27 28 20   BILIRUBIN 0.9 1.4* 1.4* 1.1   ALK PHOS 64 62 59 56         Lab 05/16/23  1031 05/16/23  0416 05/15/23  2255 05/15/23  2103 05/15/23  0549   PROBNP  --   --   --   --  3,335.0*   HSTROP T 20*  --  28* 28*  --    PROTIME  --  22.1*  --   --   --    INR  --  1.95*  --   --   --                  Lab 05/16/23  0735 05/15/23  2254 05/15/23  1931   PH, ARTERIAL 7.441 7.378 7.378   PCO2, ARTERIAL 38.5 45.4* 46.8*   PO2 ART 73.5* 69.6* 55.2*   O2 SATURATION ART 94.5* 93.2* 88.9*   FIO2 40  --   --    HCO3 ART 25.6 26.1* 26.9*   BASE EXCESS ART 1.6 0.6 1.2   CARBOXYHEMOGLOBIN 0.6 0.8 1.1     Brief Urine Lab Results  (Last result in the past 365 days)      Color   Clarity   Blood   Leuk Est   Nitrite   Protein   CREAT   Urine HCG        05/13/23 0415 Yellow   Clear   Negative   Negative   Negative   30 mg/dL (1+)               Microbiology Results (last 10 days)     Procedure Component Value - Date/Time    MRSA Screen, PCR (Inpatient) - Swab, Nares [[id number]]  (Normal) Collected: 05/15/23 0216    Lab Status: Final result Specimen: Swab from Nares  Updated: 05/15/23 0408     MRSA PCR No MRSA Detected    Narrative:      The negative predictive value of this diagnostic test is high and should only be used to consider de-escalating anti-MRSA therapy. A positive result may indicate colonization with MRSA and must be correlated clinically.    Blood Culture - Blood, Arm, Left [[id number]]  (Normal) Collected: 05/13/23 0105    Lab Status: Final result Specimen: Blood from Arm, Left Updated: 05/18/23 0130     Blood Culture No growth at 5 days    Blood Culture - Blood, Arm, Right [[id number]]  (Normal) Collected: 05/13/23 0105    Lab Status: Final result Specimen: Blood from Arm, Right Updated: 05/18/23 0130     Blood Culture No growth at 5 days          CT Abdomen Pelvis Without Contrast    Result Date: 5/14/2023  Impression:   1. Wall thickening at the proximal duodenum with adjacent fat stranding.  The appearance is nonspecific.  Consider enteritis or peptic ulcer disease.  2. Cholelithiasis. 3. Hepatomegaly and architectural changes in the liver that might reflect cirrhosis.  Mild splenomegaly could indicate portal venous hypertension or be a function of body habitus. 4. Small nonobstructing right-sided kidney stones. 5. Significant multilevel lumbar spondylosis.     LORETTA DAVALOS MD       Electronically Signed and Approved By: LORETTA DAVALOS MD on 5/14/2023 at 8:01             CT Chest Without Contrast Diagnostic    Result Date: 5/14/2023  Impression:   1. Interlobular septal thickening and patchy areas of ground-glass opacity in both lungs.  This could be infectious or inflammatory.  The lungs have a similar appearance in 2008.  This may represent a recurrent or residual process. 2. Cardiomegaly, coronary artery disease and aortic atherosclerosis. 3. Trace bilateral pleural effusions. 4. Small nodules in the each lung, which could be infectious or inflammatory, but a 6 month follow-up chest CT is recommended to further evaluate.     LORETTA DAVALOS MD        Electronically Signed and Approved By: LORETTA DAVALOS MD on 5/14/2023 at 7:58             XR Chest 1 View    Result Date: 5/15/2023  Impression:   1. Interval worsening of the appearance of the chest with bilateral pulmonary infiltrates 2. Right pleural effusion 3. Cardiomegaly.       JESUS HICKEY MD       Electronically Signed and Approved By: JESUS HICKEY MD on 5/15/2023 at 20:33             XR Chest 1 View    Result Date: 5/12/2023  Impression:   No definite acute cardiopulmonary abnormality.       JULIO CÉSAR POMPA MD       Electronically Signed and Approved By: JULIO CÉSAR POMPA MD on 5/12/2023 at 23:26                       Results for orders placed in visit on 05/11/23    Adult Transthoracic Echo Complete W/ Cont if Necessary Per Protocol    Interpretation Summary  •  Technically difficult study.  •  Left ventricular ejection fraction appears to be 61 - 65%.  •  Left ventricular diastolic function was indeterminate.  •  The left atrial cavity is mildly dilated.  •  The right atrial cavity is borderline dilated.  •  Mild to early moderate aortic insufficiency.  Probably at most mild aortic stenosis with max/mean pressure gradient 23/13 mmHg.  •  Patient appears to be in coarse atrial fibrillation/flutter during exam.      Labs Pending at Discharge:        Time spent on Discharge including face to face service:  36 minutes    Electronically signed by Jsoe Maria Alcala MD, 05/21/23, 2:51 PM EDT.

## 2023-05-22 ENCOUNTER — TRANSITIONAL CARE MANAGEMENT TELEPHONE ENCOUNTER (OUTPATIENT)
Dept: CALL CENTER | Facility: HOSPITAL | Age: 73
End: 2023-05-22
Payer: MEDICARE

## 2023-05-22 NOTE — OUTREACH NOTE
Call Center TCM Note    Flowsheet Row Responses   [facility] patient discharged from? [facility]   Does the patient have one of the following disease processes/diagnoses(primary or secondary)? Other   TCM attempt successful? Yes   Call start time 1532   Call end time 1534   Discharge diagnosis Atrial fibrillation with RVR   Person spoke with today (if not patient) and relationship pt   Meds reviewed with patient/caregiver? Yes   Is the patient having any side effects they believe may be caused by any medication additions or changes? No   Does the patient have all medications ordered at discharge? N/A   Is the patient taking all medications as directed (includes completed medication regime)? Yes   Does the patient have an appointment with their PCP within 7 days of discharge? No   Nursing Interventions Patient declined scheduling/rescheduling appointment at this time   What is the Home health agency?  [facility]   Has home health visited the patient within 72 hours of discharge? Call prior to 72 hours   Psychosocial issues? No   Did the patient receive a copy of their discharge instructions? Yes   Nursing interventions Reviewed instructions with patient   What is the patient's perception of their health status since discharge? Improving   Is the patient/caregiver able to teach back signs and symptoms related to disease process for when to call PCP? Yes   Is the patient/caregiver able to teach back signs and symptoms related to disease process for when to call 911? Yes   Is the patient/caregiver able to teach back the hierarchy of who to call/visit for symptoms/problems? PCP, Specialist, Home health nurse, Urgent Care, ED, 911 Yes   If the patient is a current smoker, are they able to teach back resources for cessation? Not a smoker   TCM call completed? Yes   Wrap up additional comments Pt states she is feeling back to her normal, and denies any irregular heartbeats. No medication changes.   Call  end time 1534   Would this patient benefit from a Referral to [facility] Social Work? No   Is the patient interested in additional calls from an ambulatory ?  NOTE:  applies to high risk patients requiring additional follow-up. No          Niya Brennan RN    5/22/2023, 15:36 EDT

## 2023-05-26 DIAGNOSIS — G89.29 CHRONIC JOINT PAIN: ICD-10-CM

## 2023-05-26 DIAGNOSIS — M25.50 CHRONIC JOINT PAIN: ICD-10-CM

## 2023-05-26 RX ORDER — HYDROCODONE BITARTRATE AND ACETAMINOPHEN 10; 325 MG/1; MG/1
1 TABLET ORAL EVERY 4 HOURS PRN
Qty: 120 TABLET | Refills: 0 | Status: SHIPPED | OUTPATIENT
Start: 2023-05-26

## 2023-05-26 NOTE — TELEPHONE ENCOUNTER
Caller: Mary Albarran LAKESHA    Relationship: Self    Best call back number: [phone]    Requested Prescriptions:   Requested Prescriptions     Pending Prescriptions Disp Refills   • HYDROcodone-acetaminophen (NORCO)  MG per tablet 120 tablet 0     Sig: Take 1 tablet by mouth Every 4 (Four) Hours As Needed for Moderate Pain or Severe Pain.        Pharm[facility] where request should be sent: [id number] - [place] [address] AT [address] OF  [address] [facility] [address] [facility] [address] [facility] [phone] [facility] [phone] FX     Last office visit with prescribing clinician: 1/27/2023   Last telemedicine visit with prescribing clinician: Visit date not found   Next office visit with prescribing clinician: Visit date not found     Additional details provided by patient:     Does the patient have less than a 3 day supply:  [x] Yes  [] No    Would you like a call back once the refill request has been completed: [x] Yes [] No    If the office needs to give you a call back, can they leave a voicemail: [x] Yes [] No    Steven Wong Rep   05/26/23 13:38 EDT

## 2023-05-26 NOTE — TELEPHONE ENCOUNTER
Controlled Medication Refill Request:    1.  Last Office Visit:  Office Visit with Brittni Quiroz APRN (01/27/2023)      2.  Next Office Visit:   Appointment with Brittni Quiroz APRN (06/09/2023)      3.  Last UDS Date:  POC Urine Drug Screen [facility] (01/27/2023 11:52)      4.  Last Consent Signed:  CONTROLLED SUBSTANCE AGREEMENT - SCAN - CONTROLLED RX/[facility] PC KEYSHAWN/01/27/2023 (01/27/2023)      5.  Medication Requested: Holly     Pharmacy:  [id number] - [place], [place] - [address] AT [address] - [phone]  - [phone]   [phone]

## 2023-06-01 RX ORDER — SILVER SULFADIAZINE 1 %
CREAM (GRAM) TOPICAL
Qty: 50 G | Refills: 4 | Status: SHIPPED | OUTPATIENT
Start: 2023-06-01

## 2023-06-09 ENCOUNTER — OFFICE VISIT (OUTPATIENT)
Dept: FAMILY MEDICINE CLINIC | Facility: CLINIC | Age: 73
End: 2023-06-09
Payer: MEDICARE

## 2023-06-09 ENCOUNTER — TELEPHONE (OUTPATIENT)
Dept: FAMILY MEDICINE CLINIC | Facility: CLINIC | Age: 73
End: 2023-06-09

## 2023-06-09 VITALS
WEIGHT: 263.8 LBS | OXYGEN SATURATION: 90 % | DIASTOLIC BLOOD PRESSURE: 58 MMHG | SYSTOLIC BLOOD PRESSURE: 118 MMHG | TEMPERATURE: 96.9 F | HEART RATE: 95 BPM | BODY MASS INDEX: 46.74 KG/M2 | HEIGHT: 63 IN

## 2023-06-09 DIAGNOSIS — I25.10 CVD (CARDIOVASCULAR DISEASE): ICD-10-CM

## 2023-06-09 DIAGNOSIS — I87.2: ICD-10-CM

## 2023-06-09 DIAGNOSIS — L97.808: ICD-10-CM

## 2023-06-09 DIAGNOSIS — N18.31 STAGE 3A CHRONIC KIDNEY DISEASE: ICD-10-CM

## 2023-06-09 DIAGNOSIS — Z91.81 AT HIGH RISK FOR FALLS: ICD-10-CM

## 2023-06-09 DIAGNOSIS — I48.91 ATRIAL FIBRILLATION WITH RVR: ICD-10-CM

## 2023-06-09 DIAGNOSIS — I35.9 AORTIC VALVE DISEASE: Chronic | ICD-10-CM

## 2023-06-09 DIAGNOSIS — Z09 HOSPITAL DISCHARGE FOLLOW-UP: Primary | ICD-10-CM

## 2023-06-09 DIAGNOSIS — I50.32 CHRONIC DIASTOLIC CONGESTIVE HEART FAILURE: ICD-10-CM

## 2023-06-09 DIAGNOSIS — R53.1 GENERALIZED WEAKNESS: ICD-10-CM

## 2023-06-09 RX ORDER — FLUCONAZOLE 150 MG/1
TABLET ORAL
Qty: 2 TABLET | Refills: 0 | Status: SHIPPED | OUTPATIENT
Start: 2023-06-09

## 2023-06-09 RX ORDER — TRIAMCINOLONE ACETONIDE 1 MG/G
1 CREAM TOPICAL 2 TIMES DAILY
COMMUNITY

## 2023-06-09 RX ORDER — NITROFURANTOIN 25; 75 MG/1; MG/1
100 CAPSULE ORAL 2 TIMES DAILY
Qty: 14 CAPSULE | Refills: 0 | Status: SHIPPED | OUTPATIENT
Start: 2023-06-09

## 2023-06-09 RX ORDER — BUDESONIDE 1 MG/2ML
1 INHALANT ORAL
COMMUNITY

## 2023-06-09 NOTE — PROGRESS NOTES
Chief Complaint  Hospital Follow Up Visit    Subjective        Medical History: has a past medical history of Allergic rhinitis, Anxiety, Aortic valve disease (11/28/2021), Arthritis, Atrial fibrillation, Chronic kidney disease (CKD), stage III (moderate), Chronic pain, COPD (chronic obstructive pulmonary disease), Diabetes mellitus type 2 with complications, Ex-smoker, Hyperlipidemia, Hypertension, and Thrombocytopenia.     Surgical History: has a past surgical history that includes Hysterectomy.     Family History: family history includes Diabetes in an other family member; Heart disease in her father and other family members.     Social History: reports that she quit smoking about 15 years ago. Her smoking use included cigarettes. She started smoking about 52 years ago. She has a 36.00 pack-year smoking history. She has never used smokeless tobacco. She reports that she does not drink alcohol and does not use drugs.    Mary Albarran presents to [facility]  History of Present Illness  Patient is a 72-year-old female who comes in for hospital follow-up.  Patient does not meet inpatient hospital requirement visit she is greater than 14 days.  Patient was admitted 5/12/2023.  Patient has a past medical history of A-fib, she is currently on Xarelto, current history of stage III kidney disease COPD type 2 diabetes, hyperlipidemia, hypertension.  She initially went to the emergency room for evaluation of nausea vomiting,.  On arrival to the ED patient was in A-fib with RVR troponin was elevated at 17 .  With a white count of 26, platelet levels of 87.  Chest x-ray did not show any acute cardiopulmonary process.  Her most recent echo showed an ejection fraction of 60 to 65%.  Patient was admitted to the hospital on a Cardizem drip and management of nausea and vomiting.  CT abdomen pelvis showed some wall thickening of the proximal duodenum and ejected fat stranding concerning for  "enteritis.  Patient was started on empiric antibiotics.  CT of chest showed some septal thickening with patchy bilateral groundglass opacities.  While in hospital patient became short of breath and tachycardic, O2 sats dropped in the 70s, ABG showed mild hypercapnia without acidosis patient was transferred to the ICU at that time and patient was shown to be in fluid overload.  Patient did slowly improve, and she was eventually discharged home 5/19/2023.    Patient was discharged on a gastrointestinal diet with fiber restriction, low irritant and fat restriction.  Discharge activity was as tolerated.    Patient states nausea and vomiting has improved, overall she still feels very fatigued, generalized muscular weakness and difficulty ambulating.  She was supposed to be discharged home with home health, patient states they have never called her and nobody has been in the home.    Patient states that since discharge her venous stasis ulcers have returned and has gotten worse.  She was given Kenalog cream at the hospital to place on the ulcers, but does not feel like it is helping like it was when she was in the hospital.  Again home health was supposed to be following patient to assist with dressing changes but home health has contacted patient since discharge.    Patient states that her gait is very weak, she is at high risk for falls.  Patient states that she has trouble walking from the living room to the bathroom without feeling like she is going to fall.  Patient states that she does have a rollator walker with seat, but is not currently using it in the home.  I had a long discussion with patient that she needs to start using her rollator walker with seat and home to avoid falls.  Discussed with patient we will call home health and see why they have not contacted her for follow-up yet.  Objective   Vital Signs:   /58   Pulse 95   Temp 96.9 °F (36.1 °C)   Ht 160 cm (63\")   Wt 120 kg (263 lb 12.8 oz)   " SpO2 90%   BMI 46.73 kg/m²       Wt Readings from Last 3 Encounters:   06/09/23 120 kg (263 lb 12.8 oz)   05/13/23 120 kg (265 lb 6.9 oz)   05/11/23 121 kg (266 lb)        BP Readings from Last 3 Encounters:   06/09/23 118/58   05/21/23 141/68   05/11/23 147/63                 Physical Exam  Vitals reviewed.   Constitutional:       Appearance: Normal appearance. She is well-developed. She is morbidly obese.   HENT:      Head: Normocephalic and atraumatic.   Eyes:      Conjunctiva/sclera: Conjunctivae normal.      Pupils: Pupils are equal, round, and reactive to light.   Cardiovascular:      Rate and Rhythm: Normal rate and regular rhythm.      Heart sounds: No murmur heard.  Pulmonary:      Effort: Pulmonary effort is normal.      Breath sounds: Normal breath sounds. No wheezing or rhonchi.   Musculoskeletal:      Right lower leg: Edema present.      Left lower leg: Edema present.   Skin:     General: Skin is warm and dry.      Comments: Thickening in the skin with chronic discoloration accompanied with multiple venous stasis ulcers bilateral lower extremity right worse than left   Neurological:      Mental Status: She is alert and oriented to person, place, and time.   Psychiatric:         Mood and Affect: Mood and affect normal.         Behavior: Behavior normal.         Thought Content: Thought content normal.         Judgment: Judgment normal.      Result Review :  {The following data was reviewed by LUCERO Collins on 06/09/2023.  Common labs          5/19/2023    04:48 5/20/2023    05:13 5/21/2023    04:59   Common Labs   Glucose 214  192  198    BUN 29  38  41    Creatinine 1.03  1.10  0.92    Sodium 135  137  136    Potassium 4.0  4.2  3.9    Chloride 90  91  93    Calcium 9.6  9.4  9.4    WBC 11.57  9.96  11.08    Hemoglobin 15.3  15.3  14.2    Hematocrit 49.2  46.7  43.4    Platelets 143  167  166      Data reviewed : Radiologic studies CT chest abdomen and pelvis, Recent hospitalization notes  admission from 5/12/2023 to 5/19/2023, and cardiac echo         Adult Transthoracic Echo Complete W/ Cont if Necessary Per Protocol     Interpretation Summary    Technically difficult study.    Left ventricular ejection fraction appears to be 61 - 65%.    Left ventricular diastolic function was indeterminate.    The left atrial cavity is mildly dilated.    The right atrial cavity is borderline dilated.    Mild to early moderate aortic insufficiency.  Probably at most mild aortic stenosis with max/mean pressure gradient 23/13 mmHg.    Patient appears to be in coarse atrial fibrillation/flutter during exam.    Study Result    Narrative & Impression   PROCEDURE:  CT CHEST WO CONTRAST DIAGNOSTIC     COMPARISON: [facility], CT, CHEST W/ CONTRAST, 10/12/2008, 12:17.     INDICATIONS:  eval for underlying infection     TECHNIQUE:    CT images were created without the administration of contrast material.       PROTOCOL:     Standard imaging protocol performed                 RADIATION:      DLP: 545.6 mGy*cm               Automated exposure control was utilized to minimize radiation dose.      FINDINGS:          There is a 5 mm right apical nodule on image 15 of the axial series.  There is interlobular septal   thickening randomly in both lungs.  There are residual areas patchy airspace disease and   ground-glass opacity.  This appears to have evolved since 2008. There is a semi solid 6 mm   posterior left apical nodule on image 16. Airways are patent.  There are trace bilateral pleural   effusions.     There is a new low-density nodule in the right thyroid lobe measuring up to 18 mm.  The trachea and   the esophagus appear within normal limits.  Heart size is enlarged.  There are mild coronary artery   calcifications.  No pericardial effusion or mediastinal lymphadenopathy.  There is mild aortic and   aortic branch vessel atherosclerosis.     Superficial soft tissues are unremarkable.  No acute findings in the  upper abdomen although the   abdominal findings will be discussed in a separate report.  There are no acute osseous   abnormalities or destructive bone lesions.  There is moderate diffuse thoracic spondylosis.       IMPRESSION:                 1. Interlobular septal thickening and patchy areas of ground-glass opacity in both lungs.  This   could be infectious or inflammatory.  The lungs have a similar appearance in 2008.  This may   represent a recurrent or residual process.  2. Cardiomegaly, coronary artery disease and aortic atherosclerosis.  3. Trace bilateral pleural effusions.  4. Small nodules in the each lung, which could be infectious or inflammatory, but a 6 month   follow-up chest CT is recommended to further evaluate.            LORETTA DAVALOS MD         Electronically Signed and Approved By: LORETTA DAVALOS MD on 5/14/2023 at 7:58              Study Result    Narrative & Impression   PROCEDURE:  CT ABDOMEN PELVIS WO CONTRAST     COMPARISON: [facility], CT, CHEST W/ CONTRAST, 10/12/2008, 12:17.     INDICATIONS:  eval for underlying infection     TECHNIQUE:    CT images were created without intravenous contrast.       PROTOCOL:     Standard imaging protocol performed                 RADIATION:      DLP: 1216 mGy*cm               Automated exposure control was utilized to minimize radiation dose.      FINDINGS:          Findings in the chest discussed in a separate report.  The liver appears enlarged measuring up to   21 cm craniocaudal at the midclavicular line.  Liver margins are lobulated worrisome for cirrhosis.    The spleen is enlarged measuring 13.6 cm.  There is extensive cholelithiasis without evidence of   acute cholecystitis.  The stomach, pancreas and adrenal glands appear within normal limits.  There   is a grouping of small stones in the inferior right kidney without urinary obstruction.  No   hydronephrosis.  Patient is status post hysterectomy.  Urinary bladder is nondistended.   The   appendix is normal.  Small bowel is nondistended.  The colon is unremarkable.  No ascites,   pneumoperitoneum or lymphadenopathy.  There is wall thickening at the proximal duodenum with   adjacent fat stranding.     No acute findings in the superficial soft tissues.  There are no acute osseous abnormalities or   destructive bone lesions.  There is moderate to severe lumbar spondylosis and there is mild   osteoarthritis of both hips.       IMPRESSION:                 1. Wall thickening at the proximal duodenum with adjacent fat stranding.  The appearance is   nonspecific.  Consider enteritis or peptic ulcer disease.    2. Cholelithiasis.  3. Hepatomegaly and architectural changes in the liver that might reflect cirrhosis.  Mild   splenomegaly could indicate portal venous hypertension or be a function of body habitus.  4. Small nonobstructing right-sided kidney stones.  5. Significant multilevel lumbar spondylosis.          Current Outpatient Medications on File Prior to Visit   Medication Sig Dispense Refill    albuterol (ACCUNEB) 1.25 MG/3ML nebulizer solution Take 3 mL by nebulization Every 6 (Six) Hours As Needed for Wheezing or Shortness of Air. 300 mL 12    albuterol sulfate  (90 Base) MCG/ACT inhaler Inhale 2 puffs Every 4 (Four) Hours As Needed for Wheezing. 8 g 11    ascorbic acid (VITAMIN C) 250 MG tablet Take 2 tablets by mouth Daily.      atorvastatin (LIPITOR) 20 MG tablet TAKE 1 TABLET DAILY (Patient taking differently: Take 1 tablet by mouth Every Night.) 90 tablet 1    budesonide (PULMICORT) 1 MG/2ML nebulizer solution Take 2 mL by nebulization Daily.      cholecalciferol (VITAMIN D3) 25 MCG (1000 UT) tablet Take 1 tablet by mouth Daily.      digoxin (LANOXIN) 125 MCG tablet TAKE 1 TABLET ON THURSDAYS AND SUNDAYS AND 2 TABLETS THE REST OF THE WEEK 180 tablet 3    dilTIAZem (TIAZAC) 120 MG 24 hr capsule TAKE 1 CAPSULE DAILY (Patient taking differently: Take 1 capsule by mouth Daily With  Lunch.) 90 capsule 3    Entresto  MG tablet TAKE ONE-HALF (1/2) TABLET IN THE MORNING AND TAKE 1 TABLET AT NIGHT AS DIRECTED (Patient taking differently: Take 0.5 tablets by mouth Daily With Breakfast.) 135 tablet 3    Farxiga 10 MG tablet TAKE 1 TABLET DAILY (Patient taking differently: Take 10 mg by mouth Daily.) 90 tablet 3    furosemide (LASIX) 20 MG tablet Take 1 tablet by mouth 2 (Two) Times a Day. 180 tablet 3    glipizide (GLUCOTROL) 10 MG tablet Take 1 tablet by mouth 2 (Two) Times a Day Before Meals. 180 tablet 1    HYDROcodone-acetaminophen (NORCO)  MG per tablet Take 1 tablet by mouth Every 4 (Four) Hours As Needed for Moderate Pain or Severe Pain. 120 tablet 0    Insulin Glargine, 2 Unit Dial, (Toujeo Max SoloStar) 300 UNIT/ML solution pen-injector injection 25 units daily, increase 2 units weekly until blood sugar is below 150. (Patient taking differently: Inject 40 Units under the skin into the appropriate area as directed Daily. 25 units daily, increase 2 units weekly until blood sugar is below 150.) 6 mL 2    Insulin Pen Needle (BD Pen Needle Katiuska 2nd Gen) 32G X 4 MM misc 1 each Daily. 90 each 1    Januvia 100 MG tablet TAKE 1 TABLET DAILY (Patient taking differently: Take 1 tablet by mouth Daily.) 90 tablet 3    metoprolol succinate XL (TOPROL-XL) 50 MG 24 hr tablet Take 3 tablets by mouth 2 (Two) Times a Day. (Patient taking differently: Take 6 tablets by mouth 2 (Two) Times a Day. Patient takes 150 mg in the morning and 150mg in the evening.)      miconazole (MICOTIN) 2 % cream 2 (two) times a day.      multivitamin with minerals tablet tablet Take 1 tablet by mouth Daily.      potassium chloride (MICRO-K) 10 MEQ CR capsule TAKE 1 CAPSULE DAILY WITH FOOD (Patient taking differently: Take 1 capsule by mouth Daily.) 90 capsule 3    pregabalin (LYRICA) 150 MG capsule TAKE 1 CAPSULE THREE TIMES A  capsule 1    Pyridoxine HCl (Vitamin B6) 200 MG tablet Take 1 tablet by mouth Daily.  90 tablet 1    sacubitril-valsartan (Entresto)  MG tablet Take 1 tablet by mouth Every Night.      SSD 1 % cream APPLY TOPICALLY TO THE AFFECTED AREA TWICE DAILY AS DIRECTED 50 g 4    triamcinolone (KENALOG) 0.1 % cream Apply 1 application topically to the appropriate area as directed 2 (Two) Times a Day.      Xarelto 20 MG tablet TAKE 1 TABLET DAILY (Patient taking differently: Take 1 tablet by mouth Daily With Dinner.) 90 tablet 3     No current facility-administered medications on file prior to visit.        Assessment and Plan  Diagnoses and all orders for this visit:    1. Hospital discharge follow-up (Primary)    2. Atrial fibrillation with RVR  Comments:  Rate controlled today, doing well we will continue to monitor    3. Chronic diastolic congestive heart failure  Comments:  Currently on Entresto, doing well we will continue to monitor discussed daily weights patient verbalized understanding    4. Aortic valve disease    5. Stage 3a chronic kidney disease    6. Generalized weakness  Comments:  Patient in need of PT OT, home health order was never placed at discharge will place home health order  Orders:  -     Ambulatory Referral to Home Health    7. CVD (cardiovascular disease)  -     Ambulatory Referral to Home Health    8. Venous stasis ulcer of bilateral  lower leg with other ulcer severity without varicose veins, unspecified laterality  Comments:  Patient in need of wound dressing bilateral lower extremities, home health order was never placed at discharge will place home health order  Orders:  -     Ambulatory Referral to Home Health    9. At high risk for falls  -     Ambulatory Referral to Home Health    Other orders  -     nitrofurantoin, macrocrystal-monohydrate, (Macrobid) 100 MG capsule; Take 1 capsule by mouth 2 (Two) Times a Day.  Dispense: 14 capsule; Refill: 0  -     fluconazole (Diflucan) 150 MG tablet; Take 1 tablet PO if symptoms persist repeat dose in 3 days  Dispense: 2 tablet;  Refill: 0    WOUND CARE  I was able to converse with the wound care nurse Arminda Alvarez RN, that cared for Mary Albarran in the hospital.  I discussed with her that her venous stasis ulcers had returned and was not improving with just the Kenalog cream.  I was advised to have home health start applying Aquacel along with the Kenalog cream with a gauze roll and elastic compression bandages to help with swelling.     We will relay these recommendations to home health  Cleansed with CHG bath and applied ABD pads, gauze roll, and elastic bandages. Recommend cleansing daily with CHG bath and then applying a thin layer of triamcinolone cream to legs and feet. Wrap with gauze roll and elastic bandages.      I spent 68 minutes caring for Mary on this date of service. This time includes time spent by me in the following activities:preparing for the visit, reviewing tests, obtaining and/or reviewing a separately obtained history, performing a medically appropriate examination and/or evaluation , counseling and educating the patient/family/caregiver, ordering medications, tests, or procedures, referring and communicating with other health care professionals , documenting information in the medical record, and care coordination  Follow Up   Return in about 3 months (around 9/9/2023) for Recheck.  Patient was given instructions and counseling regarding her condition or for health maintenance advice. Please see specific information pulled into the AVS if appropriate.       Part of this note may be electronic transcription/translation of spoken language to printed text using the Dragon dictation system

## 2023-06-09 NOTE — TELEPHONE ENCOUNTER
Called and notified patient that new Home Health referral was placed. Informed patient that PCP had spoken to Wound Care Nurse and provided instructions for patient's wound care and to expect a call within the next few business days for home health services. Patient voiced understanding, no further questions/concerns.  
Follow up labs, talk to ID.  Miranda Tracy MD

## 2023-06-16 RX ORDER — GLIPIZIDE 10 MG/1
TABLET ORAL
Qty: 180 TABLET | Refills: 3 | Status: SHIPPED | OUTPATIENT
Start: 2023-06-16

## 2023-06-19 RX ORDER — TRIAMCINOLONE ACETONIDE 1 MG/G
1 CREAM TOPICAL 2 TIMES DAILY
Qty: 453 G | Refills: 3 | Status: SHIPPED | OUTPATIENT
Start: 2023-06-19

## 2023-06-19 NOTE — TELEPHONE ENCOUNTER
Historical medication - never prescribed by PCP    Requested Prescriptions     Pending Prescriptions Disp Refills    triamcinolone (KENALOG) 0.1 % cream       Sig: Apply 1 application topically to the appropriate area as directed 2 (Two) Times a Day.

## 2023-07-24 DIAGNOSIS — G62.9 NEUROPATHY: ICD-10-CM

## 2023-07-24 NOTE — TELEPHONE ENCOUNTER
[facility] CALLED STATING THAT PT MRN: [id number] CAME IN 2 WEEKS AGO FOR A U/A. I AM NOT SEEING WHERE SHE CAME IN OR ANY U/A SINCE JAN OF 2023. PLEASE CALL CONCHITA BACK AT [place] TO DISCUSS [phone]. THANK YOU

## 2023-07-26 RX ORDER — PREGABALIN 150 MG/1
CAPSULE ORAL
Qty: 270 CAPSULE | Refills: 1 | Status: SHIPPED | OUTPATIENT
Start: 2023-07-26

## 2023-07-31 RX ORDER — LEVOFLOXACIN 500 MG/1
500 TABLET, FILM COATED ORAL DAILY
Qty: 10 TABLET | Refills: 0 | Status: SHIPPED | OUTPATIENT
Start: 2023-07-31 | End: 2023-08-10

## 2023-08-10 DIAGNOSIS — M25.50 CHRONIC JOINT PAIN: ICD-10-CM

## 2023-08-10 DIAGNOSIS — G89.29 CHRONIC JOINT PAIN: ICD-10-CM

## 2023-08-10 NOTE — TELEPHONE ENCOUNTER
Caller: Mary Albarran    Relationship: Self    Best call back number: [phone]     Requested Prescriptions:   Requested Prescriptions     Pending Prescriptions Disp Refills    HYDROcodone-acetaminophen (NORCO)  MG per tablet 120 tablet 0     Sig: Take 1 tablet by mouth Every 4 (Four) Hours As Needed for Moderate Pain or Severe Pain.        [id number] - [address] AT SEC OF  & [facility] [phone] [facility] [phone] FX     Last office visit with prescribing clinician: 6/9/2023   Last telemedicine visit with prescribing clinician: Visit date not found   Next office visit with prescribing clinician: Visit date not found     Additional details provided by patient: LESS THAN THREE DAY SUPPLY ON HAND.     Does the patient have less than a 3 day supply:  [x] Yes  [] No    Would you like a call back once the refill request has been completed: [x] Yes [] No    If the office needs to give you a call back, can they leave a voicemail: [x] Yes [] No    Steven Woods Rep   08/10/23 11:56 EDT

## 2023-08-11 RX ORDER — HYDROCODONE BITARTRATE AND ACETAMINOPHEN 10; 325 MG/1; MG/1
1 TABLET ORAL EVERY 4 HOURS PRN
Qty: 120 TABLET | Refills: 0 | Status: SHIPPED | OUTPATIENT
Start: 2023-08-11

## 2023-08-22 RX ORDER — DILTIAZEM HYDROCHLORIDE 120 MG/1
CAPSULE, EXTENDED RELEASE ORAL
Qty: 90 CAPSULE | Refills: 3 | Status: SHIPPED | OUTPATIENT
Start: 2023-08-22

## 2023-09-05 RX ORDER — BUDESONIDE 1 MG/2ML
INHALANT ORAL
Qty: 180 ML | Refills: 3 | Status: SHIPPED | OUTPATIENT
Start: 2023-09-05

## 2023-09-07 DIAGNOSIS — G89.29 CHRONIC JOINT PAIN: ICD-10-CM

## 2023-09-07 DIAGNOSIS — M25.50 CHRONIC JOINT PAIN: ICD-10-CM

## 2023-09-07 NOTE — TELEPHONE ENCOUNTER
Caller: Mary Albarran LAKESHA    Relationship: Self    Best call back number: [phone]     Requested Prescriptions:   Requested Prescriptions     Pending Prescriptions Disp Refills    HYDROcodone-acetaminophen (NORCO)  MG per tablet 120 tablet 0     Sig: Take 1 tablet by mouth Every 4 (Four) Hours As Needed for Moderate Pain or Severe Pain.        [id number] - [address] AT SEC OF  & [facility] [phone] [facility] [phone] FX     Last office visit with prescribing clinician: 6/9/2023   Last telemedicine visit with prescribing clinician: Visit date not found   Next office visit with prescribing clinician: Visit date not found     Does the patient have less than a 3 day supply:  [] Yes  [x] No    Would you like a call back once the refill request has been completed: [] Yes [x] No    If the office needs to give you a call back, can they leave a voicemail: [] Yes [x] No    Steven Herrera Rep   09/07/23 14:37 EDT

## 2023-09-08 ENCOUNTER — TELEPHONE (OUTPATIENT)
Dept: FAMILY MEDICINE CLINIC | Facility: CLINIC | Age: 73
End: 2023-09-08
Payer: MEDICARE

## 2023-09-08 DIAGNOSIS — G89.29 CHRONIC JOINT PAIN: ICD-10-CM

## 2023-09-08 DIAGNOSIS — M25.50 CHRONIC JOINT PAIN: ICD-10-CM

## 2023-09-08 RX ORDER — HYDROCODONE BITARTRATE AND ACETAMINOPHEN 10; 325 MG/1; MG/1
1 TABLET ORAL EVERY 4 HOURS PRN
Qty: 120 TABLET | Refills: 0 | Status: CANCELLED | OUTPATIENT
Start: 2023-09-08

## 2023-09-08 RX ORDER — HYDROCODONE BITARTRATE AND ACETAMINOPHEN 10; 325 MG/1; MG/1
1 TABLET ORAL EVERY 4 HOURS PRN
Qty: 120 TABLET | Refills: 0 | Status: SHIPPED | OUTPATIENT
Start: 2023-09-08 | End: 2023-09-08 | Stop reason: SDUPTHER

## 2023-09-08 RX ORDER — HYDROCODONE BITARTRATE AND ACETAMINOPHEN 10; 325 MG/1; MG/1
1 TABLET ORAL EVERY 4 HOURS PRN
Qty: 120 TABLET | Refills: 0 | Status: SHIPPED | OUTPATIENT
Start: 2023-09-08

## 2023-09-08 NOTE — TELEPHONE ENCOUNTER
Caller: AvisMary    Relationship: Self    Best call back number: [phone]    Requested Prescriptions:   Requested Prescriptions     Pending Prescriptions Disp Refills    HYDROcodone-acetaminophen (NORCO)  MG per tablet 120 tablet 0     Sig: Take 1 tablet by mouth Every 4 (Four) Hours As Needed for Moderate Pain or Severe Pain.        [facility]arm[id number] - [place] [address] [facility] AT SEC OF  & [facility] [phone] [facility] [phone] FX     Last office visit with prescribing clinician: 6/9/2023   Last telemedicine visit with prescribing clinician: Visit date not found   Next office visit with prescribing clinician: 9/13/2023     Additional details provided by patient:     Does the patient have less than a 3 day supply:  [x] Yes  [] No      Steven Nicholson Rep   09/08/23 12:18 EDT

## 2023-09-13 ENCOUNTER — OFFICE VISIT (OUTPATIENT)
Dept: FAMILY MEDICINE CLINIC | Facility: CLINIC | Age: 73
End: 2023-09-13
Payer: MEDICARE

## 2023-09-13 VITALS
HEART RATE: 89 BPM | TEMPERATURE: 97.6 F | OXYGEN SATURATION: 94 % | BODY MASS INDEX: 46.6 KG/M2 | DIASTOLIC BLOOD PRESSURE: 64 MMHG | HEIGHT: 63 IN | SYSTOLIC BLOOD PRESSURE: 128 MMHG | WEIGHT: 263 LBS

## 2023-09-13 DIAGNOSIS — Z09 FOLLOW-UP EXAM, 7 MONTHS TO 1 YEAR SINCE PREVIOUS EXAM: Primary | ICD-10-CM

## 2023-09-13 DIAGNOSIS — G89.29 OTHER CHRONIC PAIN: ICD-10-CM

## 2023-09-13 DIAGNOSIS — N30.01 ACUTE CYSTITIS WITH HEMATURIA: ICD-10-CM

## 2023-09-13 DIAGNOSIS — Z79.4 TYPE 2 DIABETES MELLITUS WITHOUT COMPLICATION, WITH LONG-TERM CURRENT USE OF INSULIN: ICD-10-CM

## 2023-09-13 DIAGNOSIS — E11.9 TYPE 2 DIABETES MELLITUS WITHOUT COMPLICATION, WITH LONG-TERM CURRENT USE OF INSULIN: ICD-10-CM

## 2023-09-13 DIAGNOSIS — E78.2 MIXED HYPERLIPIDEMIA: ICD-10-CM

## 2023-09-13 DIAGNOSIS — I10 ESSENTIAL HYPERTENSION: ICD-10-CM

## 2023-09-13 DIAGNOSIS — N18.31 STAGE 3A CHRONIC KIDNEY DISEASE: ICD-10-CM

## 2023-09-13 LAB
ALBUMIN SERPL-MCNC: 4.3 G/DL (ref 3.5–5.2)
ALBUMIN UR-MCNC: 3.6 MG/DL
ALBUMIN/GLOB SERPL: 1.5 G/DL
ALP SERPL-CCNC: 77 U/L (ref 39–117)
ALT SERPL W P-5'-P-CCNC: 20 U/L (ref 1–33)
ANION GAP SERPL CALCULATED.3IONS-SCNC: 10.6 MMOL/L (ref 5–15)
AST SERPL-CCNC: 27 U/L (ref 1–32)
BILIRUB BLD-MCNC: NEGATIVE MG/DL
BILIRUB SERPL-MCNC: 0.6 MG/DL (ref 0–1.2)
BUN SERPL-MCNC: 17 MG/DL (ref 8–23)
BUN/CREAT SERPL: 16 (ref 7–25)
CALCIUM SPEC-SCNC: 9.7 MG/DL (ref 8.6–10.5)
CHLORIDE SERPL-SCNC: 104 MMOL/L (ref 98–107)
CHOLEST SERPL-MCNC: 101 MG/DL (ref 0–200)
CLARITY, POC: ABNORMAL
CO2 SERPL-SCNC: 28.4 MMOL/L (ref 22–29)
COLOR UR: ABNORMAL
CREAT SERPL-MCNC: 1.06 MG/DL (ref 0.57–1)
EGFRCR SERPLBLD CKD-EPI 2021: 56.6 ML/MIN/1.73
EXPIRATION DATE: ABNORMAL
GLOBULIN UR ELPH-MCNC: 2.9 GM/DL
GLUCOSE SERPL-MCNC: 145 MG/DL (ref 65–99)
GLUCOSE UR STRIP-MCNC: ABNORMAL MG/DL
HBA1C MFR BLD: 7.3 % (ref 4.8–5.6)
HDLC SERPL-MCNC: 31 MG/DL (ref 40–60)
KETONES UR QL: ABNORMAL
LDLC SERPL CALC-MCNC: 25 MG/DL (ref 0–100)
LDLC/HDLC SERPL: 0.27 {RATIO}
LEUKOCYTE EST, POC: ABNORMAL
Lab: ABNORMAL
NITRITE UR-MCNC: NEGATIVE MG/ML
PH UR: 5.5 [PH] (ref 5–8)
POTASSIUM SERPL-SCNC: 5.2 MMOL/L (ref 3.5–5.2)
PROT SERPL-MCNC: 7.2 G/DL (ref 6–8.5)
PROT UR STRIP-MCNC: NEGATIVE MG/DL
RBC # UR STRIP: ABNORMAL /UL
SODIUM SERPL-SCNC: 143 MMOL/L (ref 136–145)
SP GR UR: 1.01 (ref 1–1.03)
TRIGL SERPL-MCNC: 308 MG/DL (ref 0–150)
UROBILINOGEN UR QL: ABNORMAL
VLDLC SERPL-MCNC: 45 MG/DL (ref 5–40)

## 2023-09-13 PROCEDURE — 85007 BL SMEAR W/DIFF WBC COUNT: CPT | Performed by: NURSE PRACTITIONER

## 2023-09-13 PROCEDURE — 83036 HEMOGLOBIN GLYCOSYLATED A1C: CPT | Performed by: NURSE PRACTITIONER

## 2023-09-13 PROCEDURE — 80061 LIPID PANEL: CPT | Performed by: NURSE PRACTITIONER

## 2023-09-13 PROCEDURE — 82043 UR ALBUMIN QUANTITATIVE: CPT | Performed by: NURSE PRACTITIONER

## 2023-09-13 PROCEDURE — 80053 COMPREHEN METABOLIC PANEL: CPT | Performed by: NURSE PRACTITIONER

## 2023-09-13 PROCEDURE — 85025 COMPLETE CBC W/AUTO DIFF WBC: CPT | Performed by: NURSE PRACTITIONER

## 2023-09-13 RX ORDER — CEFDINIR 300 MG/1
300 CAPSULE ORAL 2 TIMES DAILY
Qty: 14 CAPSULE | Refills: 0 | Status: SHIPPED | OUTPATIENT
Start: 2023-09-13

## 2023-09-13 NOTE — PROGRESS NOTES
Chief Complaint  Urinary Tract Infection    Subjective        Medical History: has a past medical history of Allergic rhinitis, Anxiety, Aortic valve disease (11/28/2021), Arthritis, Atrial fibrillation, Chronic kidney disease (CKD), stage III (moderate), Chronic pain, COPD (chronic obstructive pulmonary disease), Diabetes mellitus type 2 with complications, Ex-smoker, Hyperlipidemia, Hypertension, and Thrombocytopenia.     Surgical History: has a past surgical history that includes Hysterectomy.     Family History: family history includes Diabetes in an other family member; Heart disease in her father and other family members.     Social History: reports that she quit smoking about 15 years ago. Her smoking use included cigarettes. She started smoking about 52 years ago. She has a 36.00 pack-year smoking history. She has never used smokeless tobacco. She reports that she does not drink alcohol and does not use drugs.    Mary Albarran presents to [facility]  Urinary Tract Infection   This is a recurrent problem. Episode onset: 4 months. The quality of the pain is described as aching. The pain is mild. There has been no fever. She is Not sexually active. There is No history of pyelonephritis. Associated symptoms include hesitancy and urgency. Pertinent negatives include no chills or flank pain. She has tried antibiotics for the symptoms. The treatment provided no relief.   Diabetes  She presents for her follow-up diabetic visit. She has type 2 diabetes mellitus. Her disease course has been worsening. There are no hypoglycemic associated symptoms. Associated symptoms include foot paresthesias. Pertinent negatives for diabetes include no blurred vision, no polydipsia, no polyphagia and no polyuria. There are no hypoglycemic complications. There are no diabetic complications. Risk factors for coronary artery disease include dyslipidemia, hypertension, obesity, family history, sedentary  "lifestyle and post-menopausal. Current diabetic treatment includes oral agent (dual therapy) and diet. She is compliant with treatment most of the time. She is following a generally unhealthy diet. Her home blood glucose trend is increasing steadily. Her overall blood glucose range is >200 mg/dl.   Hyperlipidemia  This is a chronic problem. The current episode started more than 1 year ago. Recent lipid tests were reviewed and are variable. Exacerbating diseases include diabetes and obesity. Current antihyperlipidemic treatment includes statins, fibric acid derivatives and herbal therapy. The current treatment provides mild improvement of lipids. Compliance problems include adherence to diet.  Risk factors for coronary artery disease include diabetes mellitus, dyslipidemia, family history, hypertension, obesity and post-menopausal.   Vitamin D deficiency   This is a chronic problem.  Current episode has been for longer than 6 months.  Problem has gradually been improving since she has been on vitamin D supplements.  She denies any excessive fatigue, hair loss, skin changes due to the vitamin D deficiency.  She tries to eat a well-balanced diet.  She has been on the vitamin D weekly, no compliance problems.  Condition is stable.  Joint Pain  This is a chronic problem. The current episode started more than 1 year ago. The problem occurs constantly. The problem has been gradually worsening. Associated symptoms include arthralgias, fatigue, joint swelling and myalgias. The symptoms are aggravated by bending, walking, twisting and standing. Treatments tried: lyrica and hydrocodone. The treatment provided moderate relief.      Objective   Vital Signs:   /64   Pulse 89   Temp 97.6 °F (36.4 °C)   Ht 160 cm (63\")   Wt 119 kg (263 lb)   SpO2 94%   BMI 46.59 kg/m²       Wt Readings from Last 3 Encounters:   09/13/23 119 kg (263 lb)   06/09/23 120 kg (263 lb 12.8 oz)   05/13/23 120 kg (265 lb 6.9 oz)        BP " Readings from Last 3 Encounters:   09/13/23 128/64   06/09/23 118/58   05/21/23 141/68      Physical Exam  Vitals reviewed.   Constitutional:       Appearance: Normal appearance. She is well-developed. She is morbidly obese.   HENT:      Head: Normocephalic and atraumatic.   Eyes:      Conjunctiva/sclera: Conjunctivae normal.      Pupils: Pupils are equal, round, and reactive to light.   Cardiovascular:      Rate and Rhythm: Normal rate and regular rhythm.      Heart sounds: No murmur heard.  Pulmonary:      Effort: Pulmonary effort is normal.      Breath sounds: Normal breath sounds. No wheezing or rhonchi.   Musculoskeletal:      Right lower leg: Edema present.      Left lower leg: Edema present.   Skin:     General: Skin is warm and dry.   Neurological:      Mental Status: She is alert and oriented to person, place, and time.      Gait: Gait abnormal (wheel chair dependent).   Psychiatric:         Mood and Affect: Mood and affect normal.         Behavior: Behavior normal.         Thought Content: Thought content normal.         Judgment: Judgment normal.      Result Review :  {The following data was reviewed by LUCERO Collins on 09/13/2023.  Common labs          5/19/2023    04:48 5/20/2023    05:13 5/21/2023    04:59   Common Labs   Glucose 214  192  198    BUN 29  38  41    Creatinine 1.03  1.10  0.92    Sodium 135  137  136    Potassium 4.0  4.2  3.9    Chloride 90  91  93    Calcium 9.6  9.4  9.4    WBC 11.57  9.96  11.08    Hemoglobin 15.3  15.3  14.2    Hematocrit 49.2  46.7  43.4    Platelets 143  167  166      Data reviewed : Previous office note           Brief Urine Lab Results  (Last result in the past 365 days)        Color   Clarity   Blood   Leuk Est   Nitrite   Protein   CREAT   Urine HCG        09/13/23 1314 Dark Yellow   Cloudy   Trace   Small (1+)   Negative   Negative                  Last Urine Toxicity          Latest Ref Rng & Units 1/27/2023   LAST URINE TOXICITY RESULTS    Amphetamine, Urine Qual Negative Negative    Barbiturates Screen, Urine Negative Negative    Benzodiazepine Screen, Urine Negative Negative    Buprenorphine, Screen, Urine Negative Negative    Cocaine Screen, Urine Negative Negative    Methadone Screen , Urine Negative Negative    Methamphetamine, Ur Negative Negative     Risks and benefits of opioid medication such as tolerance, dependence, addiction, misuse, abuse, diversion, opioid use disorder, accidental overdose resulting in death, drug interactions, and other systemic side effects were discussed with the patient.  Current Outpatient Medications on File Prior to Visit   Medication Sig Dispense Refill    albuterol (ACCUNEB) 1.25 MG/3ML nebulizer solution Take 3 mL by nebulization Every 6 (Six) Hours As Needed for Wheezing or Shortness of Air. 300 mL 12    albuterol sulfate  (90 Base) MCG/ACT inhaler Inhale 2 puffs Every 4 (Four) Hours As Needed for Wheezing. 8 g 11    ascorbic acid (VITAMIN C) 250 MG tablet Take 2 tablets by mouth Daily.      atorvastatin (LIPITOR) 20 MG tablet TAKE 1 TABLET DAILY (Patient taking differently: Take 1 tablet by mouth Every Night.) 90 tablet 1    budesonide (PULMICORT) 1 MG/2ML nebulizer solution USE 1 AMPULE (2 ML) BY NEBULIZATION DAILY 180 mL 3    cholecalciferol (VITAMIN D3) 25 MCG (1000 UT) tablet Take 1 tablet by mouth Daily.      digoxin (LANOXIN) 125 MCG tablet TAKE 1 TABLET ON THURSDAYS AND SUNDAYS AND 2 TABLETS THE REST OF THE WEEK 180 tablet 3    dilTIAZem (TIAZAC) 120 MG 24 hr capsule TAKE 1 CAPSULE DAILY 90 capsule 3    Entresto  MG tablet TAKE ONE-HALF (1/2) TABLET IN THE MORNING AND TAKE 1 TABLET AT NIGHT AS DIRECTED (Patient taking differently: Take 0.5 tablets by mouth Daily With Breakfast.) 135 tablet 3    Farxiga 10 MG tablet TAKE 1 TABLET DAILY (Patient taking differently: Take 10 mg by mouth Daily.) 90 tablet 3    furosemide (LASIX) 20 MG tablet Take 1 tablet by mouth 2 (Two) Times a Day. 180  tablet 3    glipizide (GLUCOTROL) 10 MG tablet TAKE 1 TABLET TWICE A DAY BEFORE MEALS 180 tablet 3    HYDROcodone-acetaminophen (NORCO)  MG per tablet Take 1 tablet by mouth Every 4 (Four) Hours As Needed for Moderate Pain or Severe Pain. 120 tablet 0    Insulin Glargine, 2 Unit Dial, (Toujeo Max SoloStar) 300 UNIT/ML solution pen-injector injection 25 units daily, increase 2 units weekly until blood sugar is below 150. (Patient taking differently: Inject 40 Units under the skin into the appropriate area as directed Daily. 25 units daily, increase 2 units weekly until blood sugar is below 150.) 6 mL 2    Insulin Pen Needle (BD Pen Needle Katiuska 2nd Gen) 32G X 4 MM misc 1 each Daily. 90 each 1    Januvia 100 MG tablet TAKE 1 TABLET DAILY (Patient taking differently: Take 1 tablet by mouth Daily.) 90 tablet 3    metoprolol succinate XL (TOPROL-XL) 50 MG 24 hr tablet Take 3 tablets by mouth 2 (Two) Times a Day. (Patient taking differently: Take 6 tablets by mouth 2 (Two) Times a Day. Patient takes 150 mg in the morning and 150mg in the evening.)      miconazole (MICOTIN) 2 % cream 2 (two) times a day.      multivitamin with minerals tablet tablet Take 1 tablet by mouth Daily.      potassium chloride (MICRO-K) 10 MEQ CR capsule TAKE 1 CAPSULE DAILY WITH FOOD (Patient taking differently: Take 1 capsule by mouth Daily.) 90 capsule 3    pregabalin (LYRICA) 150 MG capsule TAKE 1 CAPSULE THREE TIMES A  capsule 1    Pyridoxine HCl (Vitamin B6) 200 MG tablet Take 1 tablet by mouth Daily. 90 tablet 1    sacubitril-valsartan (Entresto)  MG tablet Take 1 tablet by mouth Every Night.      triamcinolone (KENALOG) 0.1 % cream Apply 1 application topically to the appropriate area as directed 2 (Two) Times a Day. 453 g 3    Xarelto 20 MG tablet TAKE 1 TABLET DAILY (Patient taking differently: Take 1 tablet by mouth Daily With Dinner.) 90 tablet 3    [DISCONTINUED] fluconazole (Diflucan) 150 MG tablet Take 1 tablet PO  if symptoms persist repeat dose in 3 days 2 tablet 0    [DISCONTINUED] nitrofurantoin, macrocrystal-monohydrate, (Macrobid) 100 MG capsule Take 1 capsule by mouth 2 (Two) Times a Day. 14 capsule 0    [DISCONTINUED] SSD 1 % cream APPLY TOPICALLY TO THE AFFECTED AREA TWICE DAILY AS DIRECTED 50 g 4     No current facility-administered medications on file prior to visit.        Assessment and Plan  Diagnoses and all orders for this visit:    1. Follow-up exam, 7 months to 1 year since previous exam (Primary)    2. Acute cystitis with hematuria  Comments:  We will treat with Omnicef 300 mg twice daily for 7 days send urine off for culture patient is agreeable  Orders:  -     POC Urinalysis Dipstick, Automated    3. Stage 3a chronic kidney disease    4. Type 2 diabetes mellitus without complication, with long-term current use of insulin  Comments:  We will check labs, adjust medication if needed  Orders:  -     CBC Auto Differential  -     Comprehensive Metabolic Panel  -     Hemoglobin A1c  -     Lipid Panel  -     MicroAlbumin, Urine, Random - Urine, Clean Catch    5. Essential hypertension  Comments:  Blood pressure stable at 128/64 we will continue on current medication regimen  Orders:  -     CBC Auto Differential  -     Comprehensive Metabolic Panel    6. Mixed hyperlipidemia  Comments:  We will check labs, adjust medication if needed  Orders:  -     CBC Auto Differential  -     Comprehensive Metabolic Panel    7. Other chronic pain    Other orders  -     cefdinir (OMNICEF) 300 MG capsule; Take 1 capsule by mouth 2 (Two) Times a Day.  Dispense: 14 capsule; Refill: 0        Follow Up   Return in about 6 months (around 3/13/2024) for Recheck.  Patient was given instructions and counseling regarding her condition or for health maintenance advice. Please see specific information pulled into the AVS if appropriate.       Part of this note may be electronic transcription/translation of spoken language to printed text  using the Dragon dictation system

## 2023-09-14 DIAGNOSIS — D69.6 THROMBOCYTOPENIA: Primary | ICD-10-CM

## 2023-09-14 LAB
BASOPHILS # BLD AUTO: 0.04 10*3/MM3 (ref 0–0.2)
BASOPHILS NFR BLD AUTO: 0.4 % (ref 0–1.5)
DEPRECATED RDW RBC AUTO: 41.4 FL (ref 37–54)
EOSINOPHIL # BLD AUTO: 0.06 10*3/MM3 (ref 0–0.4)
EOSINOPHIL NFR BLD AUTO: 0.6 % (ref 0.3–6.2)
ERYTHROCYTE [DISTWIDTH] IN BLOOD BY AUTOMATED COUNT: 13.1 % (ref 12.3–15.4)
HCT VFR BLD AUTO: 47.9 % (ref 34–46.6)
HGB BLD-MCNC: 15.6 G/DL (ref 12–15.9)
LYMPHOCYTES # BLD AUTO: 1.65 10*3/MM3 (ref 0.7–3.1)
LYMPHOCYTES NFR BLD AUTO: 15.2 % (ref 19.6–45.3)
MCH RBC QN AUTO: 28.5 PG (ref 26.6–33)
MCHC RBC AUTO-ENTMCNC: 32.6 G/DL (ref 31.5–35.7)
MCV RBC AUTO: 87.4 FL (ref 79–97)
MONOCYTES # BLD AUTO: 0.58 10*3/MM3 (ref 0.1–0.9)
MONOCYTES NFR BLD AUTO: 5.4 % (ref 5–12)
NEUTROPHILS NFR BLD AUTO: 77.8 % (ref 42.7–76)
NEUTROPHILS NFR BLD AUTO: 8.44 10*3/MM3 (ref 1.7–7)
PLATELET # BLD AUTO: 73 10*3/MM3 (ref 140–450)
PMV BLD AUTO: ABNORMAL FL
RBC # BLD AUTO: 5.48 10*6/MM3 (ref 3.77–5.28)
WBC NRBC COR # BLD: 10.84 10*3/MM3 (ref 3.4–10.8)

## 2023-09-14 NOTE — PROGRESS NOTES
Called Mary Albarran at [phone] to relay results and PCP guidance, however was unable to reach patient. Unable to LVM asking to please return call, patient has a VM box that has not been set up yet.

## 2023-09-15 NOTE — PROGRESS NOTES
2nd Attempt - Called Mary Albarran at [phone] to relay results and PCP guidance, however was unable to reach patient. Unable to LVM asking to please return call, patient has a VM box that has not been set up yet.

## 2023-09-18 RX ORDER — ATORVASTATIN CALCIUM 20 MG/1
TABLET, FILM COATED ORAL
Qty: 90 TABLET | Refills: 3 | Status: SHIPPED | OUTPATIENT
Start: 2023-09-18

## 2023-09-20 DIAGNOSIS — D69.6 THROMBOCYTOPENIA: Primary | ICD-10-CM

## 2023-09-26 ENCOUNTER — READMISSION MANAGEMENT (OUTPATIENT)
Dept: CALL CENTER | Facility: HOSPITAL | Age: 73
End: 2023-09-26
Payer: MEDICARE

## 2023-09-26 NOTE — OUTREACH NOTE
Prep Survey      Flowsheet Row Responses   [facility] facility patient discharged from? Non-[facility]   Is LACE score < 7 ? Non- Discharge   Eligibility [facility]   Date of Admission 09/21/23   Date of Discharge 09/25/23   Discharge diagnosis Sepsis   Does the patient have one of the following disease processes/diagnoses(primary or secondary)? Sepsis   Prep survey completed? Yes            Carmela TREVIÑO - Registered Nurse

## 2023-09-27 ENCOUNTER — TRANSITIONAL CARE MANAGEMENT TELEPHONE ENCOUNTER (OUTPATIENT)
Dept: CALL CENTER | Facility: HOSPITAL | Age: 73
End: 2023-09-27
Payer: MEDICARE

## 2023-09-27 NOTE — OUTREACH NOTE
Call Center TCM Note      Flowsheet Row Responses   [facility] patient discharged from? Non-  [[facility]]   Does the patient have one of the following disease processes/diagnoses(primary or secondary)? Sepsis   TCM attempt successful? Yes   Call start time 1324   Call end time 1328   Discharge diagnosis Sepsis   Is the patient taking all medications as directed (includes completed medication regime)? Yes   Medication comments States she has the antibiotics   Comments States she doesn't feel well enough to get out and declined to make appt at this time.  When she starts feeling better, she will make appt.   Does the patient have an appointment with their PCP within 7-14 days of discharge? No   Nursing Interventions Patient declined scheduling/rescheduling appointment at this time   Psychosocial issues? No   What is the patient's perception of their health status since discharge? Improving   Is the patient/caregiver able to teach back signs and symptoms related to disease process for when to call PCP? Yes   Is the patient/caregiver able to teach back signs and symptoms related to disease process for when to call 911? Yes   Is the patient/caregiver able to teach back the hierarchy of who to call/visit for symptoms/problems? PCP, Specialist, Home health nurse, Urgent Care, ED, 911 Yes   If the patient is a current smoker, are they able to teach back resources for cessation? Not a smoker   Additional teach back comments States she is 100% better.  She does have a kidney stone they plan on removing but they are waiting for her to improve before they do that.  If any fevers, confusion, elevated hr over 90 and symptoms worsen, she will go to ED.   TCM call completed? Yes   Wrap up additional comments Denies questions or needs at this time.   Call end time 1328            Dahlia Mata LPN    9/27/2023, 13:31 EDT

## 2023-09-27 NOTE — OUTREACH NOTE
Call Center TCM Note      Flowsheet Row Responses   [facility] facility patient discharged from? Non-  [[facility]]   Does the patient have one of the following disease processes/diagnoses(primary or secondary)? Sepsis   TCM attempt successful? No   Unsuccessful attempts Attempt 1            Dahlia Mata LPN    9/27/2023, 10:15 EDT

## 2023-10-02 RX ORDER — POTASSIUM CHLORIDE 750 MG/1
CAPSULE, EXTENDED RELEASE ORAL
Qty: 90 CAPSULE | Refills: 3 | Status: ON HOLD | OUTPATIENT
Start: 2023-10-02

## 2023-10-04 ENCOUNTER — OFFICE VISIT (OUTPATIENT)
Dept: FAMILY MEDICINE CLINIC | Facility: CLINIC | Age: 73
End: 2023-10-04
Payer: MEDICARE

## 2023-10-04 VITALS
TEMPERATURE: 97.6 F | BODY MASS INDEX: 46.6 KG/M2 | DIASTOLIC BLOOD PRESSURE: 62 MMHG | HEART RATE: 82 BPM | WEIGHT: 263 LBS | HEIGHT: 63 IN | SYSTOLIC BLOOD PRESSURE: 130 MMHG | OXYGEN SATURATION: 95 %

## 2023-10-04 DIAGNOSIS — Z78.9 INPATIENT HOSPITALIZATION WITHIN LAST 30 DAYS: Primary | ICD-10-CM

## 2023-10-04 DIAGNOSIS — A41.50 SEPSIS DUE TO GRAM-NEGATIVE UTI: ICD-10-CM

## 2023-10-04 DIAGNOSIS — N39.0 SEPSIS DUE TO GRAM-NEGATIVE UTI: ICD-10-CM

## 2023-10-04 DIAGNOSIS — N20.0 KIDNEY STONE ON RIGHT SIDE: ICD-10-CM

## 2023-10-04 DIAGNOSIS — R31.29 OTHER MICROSCOPIC HEMATURIA: ICD-10-CM

## 2023-10-04 LAB
BILIRUB BLD-MCNC: NEGATIVE MG/DL
CLARITY, POC: ABNORMAL
COLOR UR: YELLOW
EXPIRATION DATE: ABNORMAL
GLUCOSE UR STRIP-MCNC: ABNORMAL MG/DL
KETONES UR QL: NEGATIVE
LEUKOCYTE EST, POC: ABNORMAL
Lab: ABNORMAL
NITRITE UR-MCNC: NEGATIVE MG/ML
PH UR: 7 [PH] (ref 5–8)
PROT UR STRIP-MCNC: NEGATIVE MG/DL
RBC # UR STRIP: ABNORMAL /UL
SP GR UR: 1.01 (ref 1–1.03)
UROBILINOGEN UR QL: ABNORMAL

## 2023-10-04 PROCEDURE — 87086 URINE CULTURE/COLONY COUNT: CPT | Performed by: NURSE PRACTITIONER

## 2023-10-04 RX ORDER — NYSTATIN 100000 U/G
1 CREAM TOPICAL 2 TIMES DAILY
Qty: 60 G | Refills: 2 | Status: ON HOLD | OUTPATIENT
Start: 2023-10-04

## 2023-10-04 RX ORDER — NYSTATIN 100000 [USP'U]/G
POWDER TOPICAL 2 TIMES DAILY
Qty: 60 G | Refills: 1 | Status: ON HOLD | OUTPATIENT
Start: 2023-10-04

## 2023-10-04 RX ORDER — FLUCONAZOLE 100 MG/1
100 TABLET ORAL DAILY
Qty: 5 TABLET | Refills: 0 | Status: ON HOLD | OUTPATIENT
Start: 2023-10-04

## 2023-10-04 NOTE — PROGRESS NOTES
"Mary Albarran is a 73 y.o. female admitted to  [facility]  and  is seen for follow up.  Chief Complaint   Patient presents with    Hospital Follow Up Visit           5/21/2023     2:16 PM 9/26/2023    10:07 AM   Date of TCM Phone Call   [facility]   Date of Admission 5/12/2023 9/21/2023   Date of Discharge 5/21/2023 9/25/2023   Discharge Disposition Home-Health Care [facility]      Discharge summary is available.  Current outpatient and discharge medications have been reconciled for the patient.  Reviewed by: LUCERO Collins    She was admitted for the following reason(s):  Primary admitting diagnosis at discharge was sepsis secondary to UTI, renal stone, altered mental status secondary to sepsis.  Pertinent secondary diagnoses include diarrhea-C. difficile inconclusive, hypomagnesia on, thrombocytopenia  Procedures performed while hospitalized:Procedures Performed in Hospital: IV fluids, IV Antibiotics with broad-spectrum antibiotics, and CT of abdomen and pelvis  Pending results:  Pending tests: none  Pain Control:  well controlled  Diet: Diabetic/healthy heart diet  Activity after Discharge: activity as tolerated  Items to be addressed at first follow up visit include:  1. Medication changes: Started on vancomycin at discharge along with Omnicef.    She reports her overall condition is are improving since discharge.  No specific complaints.  Social support is said to be adequate to meet her needs. .     Objective   Vitals:    10/04/23 1055   BP: 130/62   Pulse: 82   Temp: 97.6 °F (36.4 °C)   SpO2: 95%   Weight: 119 kg (263 lb)   Height: 160 cm (63\")     Body mass index is 46.59 kg/m².  Physical Exam  Vitals reviewed.   Constitutional:       Appearance: Normal appearance. She is well-developed. She is morbidly obese.   HENT:      Head: Normocephalic and atraumatic.   Eyes:      Conjunctiva/sclera: Conjunctivae normal.      Pupils: Pupils are equal, round, and reactive " to light.   Cardiovascular:      Rate and Rhythm: Normal rate and regular rhythm.      Heart sounds: No murmur heard.  Pulmonary:      Effort: Pulmonary effort is normal.      Breath sounds: Normal breath sounds. No wheezing or rhonchi.   Skin:     General: Skin is warm and dry.   Neurological:      Mental Status: She is alert and oriented to person, place, and time.      Gait: Gait abnormal (in wheel chair).   Psychiatric:         Mood and Affect: Mood and affect normal.         Behavior: Behavior normal.         Thought Content: Thought content normal.         Judgment: Judgment normal.     Brief Urine Lab Results  (Last result in the past 365 days)        Color   Clarity   Blood   Leuk Est   Nitrite   Protein   CREAT   Urine HCG        10/04/23 1149 Yellow   Slightly Cloudy  [C]   Trace  [C]   Small (1+)  [C]   Negative   Negative                    [C] - Corrected Result              Assessment & Plan   Problem List Items Addressed This Visit    None  Visit Diagnoses       Inpatient hospitalization within last 30 days    -  Primary    Sepsis due to gram-negative UTI        Relevant Orders    Ambulatory Referral to Urology    POCT urinalysis dipstick, automated (Completed)    Kidney stone on right side        Relevant Orders    Ambulatory Referral to Urology    POCT urinalysis dipstick, automated (Completed)    Other microscopic hematuria        Relevant Orders    Urine Culture - Urine, Urine, Clean Catch            We will refer over to urology for further management of the right renal stone, and recurrent UTIs including what led to sepsis and recent hospitalization.  She did have a trace of leuks and blood in urine today we will send urine off for culture before any additional treatment will be done due to patient's multiple antibiotic use.  I did discuss with patient strict return precautions when to go to the emergency room.  Encouraged to drink plenty of fluids, make sure her diabetes maintain well  controlled, and take medication as directed.  Patient states her stools are now soft she is not currently having any watery stools continue on vancomycin, patient verbalized understanding agreeable treatment plan.

## 2023-10-05 DIAGNOSIS — M25.50 CHRONIC JOINT PAIN: ICD-10-CM

## 2023-10-05 DIAGNOSIS — G89.29 CHRONIC JOINT PAIN: ICD-10-CM

## 2023-10-05 LAB — BACTERIA SPEC AEROBE CULT: NORMAL

## 2023-10-05 RX ORDER — HYDROCODONE BITARTRATE AND ACETAMINOPHEN 10; 325 MG/1; MG/1
1 TABLET ORAL EVERY 4 HOURS PRN
Qty: 120 TABLET | Refills: 0 | Status: ON HOLD | OUTPATIENT
Start: 2023-10-05

## 2023-10-05 NOTE — TELEPHONE ENCOUNTER
Caller: Mary Albarran LAKESHA    Relationship: Self    Best call back number: [phone]     Requested Prescriptions:   Requested Prescriptions     Pending Prescriptions Disp Refills    HYDROcodone-acetaminophen (NORCO)  MG per tablet 120 tablet 0     Sig: Take 1 tablet by mouth Every 4 (Four) Hours As Needed for Moderate Pain or Severe Pain.        [id number] - [address] AT SEC OF  & [facility] [phone] [facility] [phone] FX     Last office visit with prescribing clinician: 10/4/2023   Last telemedicine visit with prescribing clinician: Visit date not found   Next office visit with prescribing clinician: Visit date not found       Does the patient have less than a 3 day supply:  [x] Yes  [] No    Would you like a call back once the refill request has been completed: [] Yes [] No    If the office needs to give you a call back, can they leave a voicemail: [] Yes [] No    Steven Hidalgo Rep   10/05/23 13:40 EDT

## 2023-10-05 NOTE — PROGRESS NOTES
Called and spoke to Mary Albarran to relay results and PCP guidance. Pt verbalized understanding. No further questions/concerns at this time.

## 2023-10-06 ENCOUNTER — APPOINTMENT (OUTPATIENT)
Dept: GENERAL RADIOLOGY | Facility: HOSPITAL | Age: 73
DRG: 871 | End: 2023-10-06
Payer: MEDICARE

## 2023-10-06 ENCOUNTER — HOSPITAL ENCOUNTER (INPATIENT)
Facility: HOSPITAL | Age: 73
LOS: 5 days | Discharge: HOME HEALTH | DRG: 871 | End: 2023-10-12
Attending: EMERGENCY MEDICINE | Admitting: FAMILY MEDICINE
Payer: MEDICARE

## 2023-10-06 DIAGNOSIS — M25.50 CHRONIC JOINT PAIN: ICD-10-CM

## 2023-10-06 DIAGNOSIS — N39.0 RECURRENT UTI: ICD-10-CM

## 2023-10-06 DIAGNOSIS — A41.9 SEPSIS, DUE TO UNSPECIFIED ORGANISM, UNSPECIFIED WHETHER ACUTE ORGAN DYSFUNCTION PRESENT: Primary | ICD-10-CM

## 2023-10-06 DIAGNOSIS — R26.2 DIFFICULTY WALKING: ICD-10-CM

## 2023-10-06 DIAGNOSIS — G89.29 CHRONIC JOINT PAIN: ICD-10-CM

## 2023-10-06 DIAGNOSIS — I50.32 CHRONIC DIASTOLIC CONGESTIVE HEART FAILURE: ICD-10-CM

## 2023-10-06 DIAGNOSIS — N39.0 ACUTE UTI: ICD-10-CM

## 2023-10-06 DIAGNOSIS — N20.0 NEPHROLITHIASIS: ICD-10-CM

## 2023-10-06 LAB
ALBUMIN SERPL-MCNC: 3.9 G/DL (ref 3.5–5.2)
ALBUMIN/GLOB SERPL: 1.3 G/DL
ALP SERPL-CCNC: 68 U/L (ref 39–117)
ALT SERPL W P-5'-P-CCNC: 17 U/L (ref 1–33)
ANION GAP SERPL CALCULATED.3IONS-SCNC: 14.2 MMOL/L (ref 5–15)
AST SERPL-CCNC: 14 U/L (ref 1–32)
BASOPHILS # BLD AUTO: 0.04 10*3/MM3 (ref 0–0.2)
BASOPHILS NFR BLD AUTO: 0.2 % (ref 0–1.5)
BILIRUB SERPL-MCNC: 1.2 MG/DL (ref 0–1.2)
BUN SERPL-MCNC: 11 MG/DL (ref 8–23)
BUN/CREAT SERPL: 12.4 (ref 7–25)
CALCIUM SPEC-SCNC: 9.1 MG/DL (ref 8.6–10.5)
CHLORIDE SERPL-SCNC: 102 MMOL/L (ref 98–107)
CO2 SERPL-SCNC: 23.8 MMOL/L (ref 22–29)
CREAT SERPL-MCNC: 0.89 MG/DL (ref 0.57–1)
D-LACTATE SERPL-SCNC: 2.7 MMOL/L (ref 0.5–2)
DEPRECATED RDW RBC AUTO: 58.2 FL (ref 37–54)
DIGOXIN SERPL-MCNC: 0.63 NG/ML (ref 0.6–1.2)
EGFRCR SERPLBLD CKD-EPI 2021: 69.4 ML/MIN/1.73
EOSINOPHIL # BLD AUTO: 0.02 10*3/MM3 (ref 0–0.4)
EOSINOPHIL NFR BLD AUTO: 0.1 % (ref 0.3–6.2)
ERYTHROCYTE [DISTWIDTH] IN BLOOD BY AUTOMATED COUNT: 17.3 % (ref 12.3–15.4)
GLOBULIN UR ELPH-MCNC: 3.1 GM/DL
GLUCOSE SERPL-MCNC: 203 MG/DL (ref 65–99)
HCT VFR BLD AUTO: 40.5 % (ref 34–46.6)
HGB BLD-MCNC: 12.1 G/DL (ref 12–15.9)
HOLD SPECIMEN: NORMAL
HOLD SPECIMEN: NORMAL
IMM GRANULOCYTES # BLD AUTO: 0.24 10*3/MM3 (ref 0–0.05)
IMM GRANULOCYTES NFR BLD AUTO: 1.4 % (ref 0–0.5)
LYMPHOCYTES # BLD AUTO: 0.9 10*3/MM3 (ref 0.7–3.1)
LYMPHOCYTES NFR BLD AUTO: 5.3 % (ref 19.6–45.3)
MAGNESIUM SERPL-MCNC: 2 MG/DL (ref 1.6–2.4)
MCH RBC QN AUTO: 28.4 PG (ref 26.6–33)
MCHC RBC AUTO-ENTMCNC: 29.9 G/DL (ref 31.5–35.7)
MCV RBC AUTO: 95.1 FL (ref 79–97)
MONOCYTES # BLD AUTO: 0.62 10*3/MM3 (ref 0.1–0.9)
MONOCYTES NFR BLD AUTO: 3.6 % (ref 5–12)
NEUTROPHILS NFR BLD AUTO: 15.31 10*3/MM3 (ref 1.7–7)
NEUTROPHILS NFR BLD AUTO: 89.4 % (ref 42.7–76)
NRBC BLD AUTO-RTO: 0 /100 WBC (ref 0–0.2)
PLATELET # BLD AUTO: 136 10*3/MM3 (ref 140–450)
PMV BLD AUTO: 13.9 FL (ref 6–12)
POTASSIUM SERPL-SCNC: 4.8 MMOL/L (ref 3.5–5.2)
PROT SERPL-MCNC: 7 G/DL (ref 6–8.5)
RBC # BLD AUTO: 4.26 10*6/MM3 (ref 3.77–5.28)
SODIUM SERPL-SCNC: 140 MMOL/L (ref 136–145)
TROPONIN T SERPL HS-MCNC: 21 NG/L
WBC NRBC COR # BLD: 17.13 10*3/MM3 (ref 3.4–10.8)
WHOLE BLOOD HOLD COAG: NORMAL
WHOLE BLOOD HOLD SPECIMEN: NORMAL

## 2023-10-06 PROCEDURE — 99285 EMERGENCY DEPT VISIT HI MDM: CPT

## 2023-10-06 PROCEDURE — 83735 ASSAY OF MAGNESIUM: CPT

## 2023-10-06 PROCEDURE — 80053 COMPREHEN METABOLIC PANEL: CPT

## 2023-10-06 PROCEDURE — 93010 ELECTROCARDIOGRAM REPORT: CPT | Performed by: INTERNAL MEDICINE

## 2023-10-06 PROCEDURE — 71045 X-RAY EXAM CHEST 1 VIEW: CPT

## 2023-10-06 PROCEDURE — 0 CEFEPIME PER 500 MG: Performed by: EMERGENCY MEDICINE

## 2023-10-06 PROCEDURE — 83605 ASSAY OF LACTIC ACID: CPT | Performed by: EMERGENCY MEDICINE

## 2023-10-06 PROCEDURE — 93005 ELECTROCARDIOGRAM TRACING: CPT | Performed by: EMERGENCY MEDICINE

## 2023-10-06 PROCEDURE — 85025 COMPLETE CBC W/AUTO DIFF WBC: CPT

## 2023-10-06 PROCEDURE — 36415 COLL VENOUS BLD VENIPUNCTURE: CPT

## 2023-10-06 PROCEDURE — 87040 BLOOD CULTURE FOR BACTERIA: CPT | Performed by: EMERGENCY MEDICINE

## 2023-10-06 PROCEDURE — 93005 ELECTROCARDIOGRAM TRACING: CPT

## 2023-10-06 PROCEDURE — 80162 ASSAY OF DIGOXIN TOTAL: CPT | Performed by: EMERGENCY MEDICINE

## 2023-10-06 PROCEDURE — 84484 ASSAY OF TROPONIN QUANT: CPT

## 2023-10-06 PROCEDURE — 25810000003 SODIUM CHLORIDE 0.9 % SOLUTION: Performed by: EMERGENCY MEDICINE

## 2023-10-06 PROCEDURE — P9612 CATHETERIZE FOR URINE SPEC: HCPCS

## 2023-10-06 RX ORDER — HYDROCODONE BITARTRATE AND ACETAMINOPHEN 10; 325 MG/1; MG/1
1 TABLET ORAL EVERY 4 HOURS PRN
Qty: 120 TABLET | Refills: 0 | OUTPATIENT
Start: 2023-10-06

## 2023-10-06 RX ORDER — DAPAGLIFLOZIN 10 MG/1
10 TABLET, FILM COATED ORAL DAILY
Qty: 90 TABLET | Refills: 1 | Status: ON HOLD | OUTPATIENT
Start: 2023-10-06

## 2023-10-06 RX ORDER — SODIUM CHLORIDE 0.9 % (FLUSH) 0.9 %
10 SYRINGE (ML) INJECTION AS NEEDED
Status: DISCONTINUED | OUTPATIENT
Start: 2023-10-06 | End: 2023-10-12 | Stop reason: HOSPADM

## 2023-10-06 RX ADMIN — CEFEPIME 2000 MG: 2 INJECTION, POWDER, FOR SOLUTION INTRAVENOUS at 23:58

## 2023-10-06 RX ADMIN — SODIUM CHLORIDE 1000 ML: 9 INJECTION, SOLUTION INTRAVENOUS at 23:57

## 2023-10-06 NOTE — TELEPHONE ENCOUNTER
Caller: Mary Albarran    Relationship: Self    Best call back number:     [phone] (Mobile)       Requested Prescriptions:   Requested Prescriptions     Pending Prescriptions Disp Refills    HYDROcodone-acetaminophen (NORCO)  MG per tablet 120 tablet 0     Sig: Take 1 tablet by mouth Every 4 (Four) Hours As Needed for Moderate Pain or Severe Pain.    Farxiga 10 MG tablet 90 tablet 3     Sig: Take 10 mg by mouth Daily.        Pharmacy where request should be sent: [id number] - [place], [place] - [address] AT [facility] - [phone] [facility] [phone] FX   FARXIGA TO GO TO   [facility] - [place], [place] - [address] [place] [address] - [phone]  - [phone] [facility] [phone]         Last office visit with prescribing clinician: 10/4/2023   Last telemedicine visit with prescribing clinician: Visit date not found   Next office visit with prescribing clinician: Visit date not found     Additional details provided by patient: PATIENT IS OUT OF [facility] BUT WANTS IT SENT TO [facility] - [place], [place] - [address] [place] [address] - [phone]  - [phone] [facility] [phone]     Does the patient have less than a 3 day supply:  [x] Yes  [] No    Would you like a call back once the refill request has been completed: [] Yes [] No    If the office needs to give you a call back, can they leave a voicemail: [x] Yes [] No    Steven Max Rep   10/06/23 11:22 EDT

## 2023-10-06 NOTE — TELEPHONE ENCOUNTER
Caller: Mary Albarran LAKESHA    Relationship: Self    Best call back number: [phone]     Requested Prescriptions:   Requested Prescriptions     Pending Prescriptions Disp Refills    HYDROcodone-acetaminophen (NORCO)  MG per tablet 120 tablet 0     Sig: Take 1 tablet by mouth Every 4 (Four) Hours As Needed for Moderate Pain or Severe Pain.        Pharmacy where request should be sent: [id number] - [place], [place] - [address] AT [facility] [phone] [facility] [phone]      Last office visit with prescribing clinician: 10/4/2023   Last telemedicine visit with prescribing clinician: Visit date not found   Next office visit with prescribing clinician: Visit date not found     Additional details provided by patient: PATIENT STATES THAT THE PHARMACY THAT PRESCRIPTION WAS FIRST SENT TO DOES NOT HAVE THIS IN STOCK. PLEASE SEND TO ABOVE PHARMACY    Does the patient have less than a 3 day supply:  [x] Yes  [] No    Would you like a call back once the refill request has been completed: [] Yes [] No    If the office needs to give you a call back, can they leave a voicemail: [] Yes [] No    Steven Daniel Rep   10/06/23 08:59 EDT

## 2023-10-06 NOTE — TELEPHONE ENCOUNTER
Requested Prescriptions     Pending Prescriptions Disp Refills    Farxiga 10 MG tablet 90 tablet 3     Sig: Take 10 mg by mouth Daily.     [facility] - [place], [place] [address] - [phone] [facility] [phone]  [phone]

## 2023-10-07 ENCOUNTER — APPOINTMENT (OUTPATIENT)
Dept: CT IMAGING | Facility: HOSPITAL | Age: 73
DRG: 871 | End: 2023-10-07
Payer: MEDICARE

## 2023-10-07 PROBLEM — A41.9 SEPSIS: Status: ACTIVE | Noted: 2023-10-07

## 2023-10-07 LAB
ALBUMIN SERPL-MCNC: 3.4 G/DL (ref 3.5–5.2)
ALBUMIN/GLOB SERPL: 1.3 G/DL
ALP SERPL-CCNC: 52 U/L (ref 39–117)
ALT SERPL W P-5'-P-CCNC: 14 U/L (ref 1–33)
ANION GAP SERPL CALCULATED.3IONS-SCNC: 7.3 MMOL/L (ref 5–15)
AST SERPL-CCNC: 12 U/L (ref 1–32)
BACTERIA UR QL AUTO: ABNORMAL /HPF
BACTERIA UR QL AUTO: ABNORMAL /HPF
BILIRUB SERPL-MCNC: 1.2 MG/DL (ref 0–1.2)
BILIRUB UR QL STRIP: NEGATIVE
BILIRUB UR QL STRIP: NEGATIVE
BUN SERPL-MCNC: 11 MG/DL (ref 8–23)
BUN/CREAT SERPL: 12.1 (ref 7–25)
CALCIUM SPEC-SCNC: 8.4 MG/DL (ref 8.6–10.5)
CHLORIDE SERPL-SCNC: 105 MMOL/L (ref 98–107)
CLARITY UR: CLEAR
CLARITY UR: CLEAR
CO2 SERPL-SCNC: 27.7 MMOL/L (ref 22–29)
COLOR UR: ABNORMAL
COLOR UR: YELLOW
CREAT SERPL-MCNC: 0.91 MG/DL (ref 0.57–1)
D-LACTATE SERPL-SCNC: 1.9 MMOL/L (ref 0.5–2)
DEPRECATED RDW RBC AUTO: 61.2 FL (ref 37–54)
EGFRCR SERPLBLD CKD-EPI 2021: 67.6 ML/MIN/1.73
ERYTHROCYTE [DISTWIDTH] IN BLOOD BY AUTOMATED COUNT: 17.6 % (ref 12.3–15.4)
GLOBULIN UR ELPH-MCNC: 2.6 GM/DL
GLUCOSE BLDC GLUCOMTR-MCNC: 117 MG/DL (ref 70–99)
GLUCOSE SERPL-MCNC: 121 MG/DL (ref 65–99)
GLUCOSE UR STRIP-MCNC: ABNORMAL MG/DL
GLUCOSE UR STRIP-MCNC: ABNORMAL MG/DL
HCT VFR BLD AUTO: 37 % (ref 34–46.6)
HGB BLD-MCNC: 10.9 G/DL (ref 12–15.9)
HGB UR QL STRIP.AUTO: ABNORMAL
HGB UR QL STRIP.AUTO: ABNORMAL
HYALINE CASTS UR QL AUTO: ABNORMAL /LPF
HYALINE CASTS UR QL AUTO: ABNORMAL /LPF
KETONES UR QL STRIP: ABNORMAL
KETONES UR QL STRIP: NEGATIVE
LEUKOCYTE ESTERASE UR QL STRIP.AUTO: ABNORMAL
LEUKOCYTE ESTERASE UR QL STRIP.AUTO: ABNORMAL
LYMPHOCYTES # BLD MANUAL: 1.7 10*3/MM3 (ref 0.7–3.1)
LYMPHOCYTES NFR BLD MANUAL: 6 % (ref 5–12)
MCH RBC QN AUTO: 28.8 PG (ref 26.6–33)
MCHC RBC AUTO-ENTMCNC: 29.5 G/DL (ref 31.5–35.7)
MCV RBC AUTO: 97.6 FL (ref 79–97)
METAMYELOCYTES NFR BLD MANUAL: 1 % (ref 0–0)
MONOCYTES # BLD: 0.68 10*3/MM3 (ref 0.1–0.9)
NEUTROPHILS # BLD AUTO: 8.85 10*3/MM3 (ref 1.7–7)
NEUTROPHILS NFR BLD MANUAL: 78 % (ref 42.7–76)
NITRITE UR QL STRIP: NEGATIVE
NITRITE UR QL STRIP: NEGATIVE
PH UR STRIP.AUTO: 6.5 [PH] (ref 5–8)
PH UR STRIP.AUTO: 7 [PH] (ref 5–8)
PLAT MORPH BLD: NORMAL
PLATELET # BLD AUTO: 109 10*3/MM3 (ref 140–450)
PMV BLD AUTO: 13.6 FL (ref 6–12)
POTASSIUM SERPL-SCNC: 4.7 MMOL/L (ref 3.5–5.2)
PROCALCITONIN SERPL-MCNC: 0.11 NG/ML (ref 0–0.25)
PROT SERPL-MCNC: 6 G/DL (ref 6–8.5)
PROT UR QL STRIP: ABNORMAL
PROT UR QL STRIP: ABNORMAL
RBC # BLD AUTO: 3.79 10*6/MM3 (ref 3.77–5.28)
RBC # UR STRIP: ABNORMAL /HPF
RBC # UR STRIP: ABNORMAL /HPF
RBC MORPH BLD: NORMAL
REF LAB TEST METHOD: ABNORMAL
REF LAB TEST METHOD: ABNORMAL
SODIUM SERPL-SCNC: 140 MMOL/L (ref 136–145)
SP GR UR STRIP: >=1.03 (ref 1–1.03)
SP GR UR STRIP: >=1.03 (ref 1–1.03)
SQUAMOUS #/AREA URNS HPF: ABNORMAL /HPF
SQUAMOUS #/AREA URNS HPF: ABNORMAL /HPF
UROBILINOGEN UR QL STRIP: ABNORMAL
UROBILINOGEN UR QL STRIP: ABNORMAL
VARIANT LYMPHS NFR BLD MANUAL: 13 % (ref 19.6–45.3)
VARIANT LYMPHS NFR BLD MANUAL: 2 % (ref 0–5)
WBC # UR STRIP: ABNORMAL /HPF
WBC # UR STRIP: ABNORMAL /HPF
WBC MORPH BLD: NORMAL
WBC NRBC COR # BLD: 11.35 10*3/MM3 (ref 3.4–10.8)

## 2023-10-07 PROCEDURE — 25010000002 HEPARIN (PORCINE) PER 1000 UNITS: Performed by: FAMILY MEDICINE

## 2023-10-07 PROCEDURE — 25010000002 PIPERACILLIN SOD-TAZOBACTAM PER 1 G: Performed by: FAMILY MEDICINE

## 2023-10-07 PROCEDURE — 85007 BL SMEAR W/DIFF WBC COUNT: CPT | Performed by: FAMILY MEDICINE

## 2023-10-07 PROCEDURE — 84145 PROCALCITONIN (PCT): CPT | Performed by: FAMILY MEDICINE

## 2023-10-07 PROCEDURE — 25810000003 SODIUM CHLORIDE 0.9 % SOLUTION: Performed by: FAMILY MEDICINE

## 2023-10-07 PROCEDURE — 99223 1ST HOSP IP/OBS HIGH 75: CPT | Performed by: FAMILY MEDICINE

## 2023-10-07 PROCEDURE — 82948 REAGENT STRIP/BLOOD GLUCOSE: CPT

## 2023-10-07 PROCEDURE — 87086 URINE CULTURE/COLONY COUNT: CPT | Performed by: EMERGENCY MEDICINE

## 2023-10-07 PROCEDURE — 25810000003 SODIUM CHLORIDE 0.9 % SOLUTION: Performed by: EMERGENCY MEDICINE

## 2023-10-07 PROCEDURE — 83605 ASSAY OF LACTIC ACID: CPT | Performed by: EMERGENCY MEDICINE

## 2023-10-07 PROCEDURE — 80053 COMPREHEN METABOLIC PANEL: CPT | Performed by: FAMILY MEDICINE

## 2023-10-07 PROCEDURE — 25510000001 IOPAMIDOL PER 1 ML: Performed by: EMERGENCY MEDICINE

## 2023-10-07 PROCEDURE — 85027 COMPLETE CBC AUTOMATED: CPT | Performed by: FAMILY MEDICINE

## 2023-10-07 PROCEDURE — 25010000002 MORPHINE PER 10 MG: Performed by: FAMILY MEDICINE

## 2023-10-07 PROCEDURE — 81001 URINALYSIS AUTO W/SCOPE: CPT | Performed by: EMERGENCY MEDICINE

## 2023-10-07 PROCEDURE — 74177 CT ABD & PELVIS W/CONTRAST: CPT

## 2023-10-07 PROCEDURE — 25010000002 VANCOMYCIN 5 G RECONSTITUTED SOLUTION: Performed by: EMERGENCY MEDICINE

## 2023-10-07 RX ORDER — INSULIN LISPRO 100 [IU]/ML
2-9 INJECTION, SOLUTION INTRAVENOUS; SUBCUTANEOUS
Status: DISCONTINUED | OUTPATIENT
Start: 2023-10-07 | End: 2023-10-12 | Stop reason: HOSPADM

## 2023-10-07 RX ORDER — SODIUM CHLORIDE 0.9 % (FLUSH) 0.9 %
10 SYRINGE (ML) INJECTION AS NEEDED
Status: DISCONTINUED | OUTPATIENT
Start: 2023-10-07 | End: 2023-10-12 | Stop reason: HOSPADM

## 2023-10-07 RX ORDER — MORPHINE SULFATE 2 MG/ML
2 INJECTION, SOLUTION INTRAMUSCULAR; INTRAVENOUS EVERY 6 HOURS PRN
Status: DISCONTINUED | OUTPATIENT
Start: 2023-10-07 | End: 2023-10-12 | Stop reason: HOSPADM

## 2023-10-07 RX ORDER — SODIUM CHLORIDE 9 MG/ML
40 INJECTION, SOLUTION INTRAVENOUS AS NEEDED
Status: DISCONTINUED | OUTPATIENT
Start: 2023-10-07 | End: 2023-10-12 | Stop reason: HOSPADM

## 2023-10-07 RX ORDER — MORPHINE SULFATE 2 MG/ML
2 INJECTION, SOLUTION INTRAMUSCULAR; INTRAVENOUS EVERY 4 HOURS PRN
Status: DISCONTINUED | OUTPATIENT
Start: 2023-10-07 | End: 2023-10-08

## 2023-10-07 RX ORDER — POLYETHYLENE GLYCOL 3350 17 G/17G
17 POWDER, FOR SOLUTION ORAL DAILY PRN
Status: DISCONTINUED | OUTPATIENT
Start: 2023-10-07 | End: 2023-10-12 | Stop reason: HOSPADM

## 2023-10-07 RX ORDER — HYDROCODONE BITARTRATE AND ACETAMINOPHEN 10; 325 MG/1; MG/1
1 TABLET ORAL EVERY 6 HOURS PRN
Status: DISCONTINUED | OUTPATIENT
Start: 2023-10-07 | End: 2023-10-12 | Stop reason: HOSPADM

## 2023-10-07 RX ORDER — IPRATROPIUM BROMIDE AND ALBUTEROL SULFATE 2.5; .5 MG/3ML; MG/3ML
3 SOLUTION RESPIRATORY (INHALATION) EVERY 4 HOURS PRN
Status: DISCONTINUED | OUTPATIENT
Start: 2023-10-07 | End: 2023-10-09

## 2023-10-07 RX ORDER — BISACODYL 5 MG/1
5 TABLET, DELAYED RELEASE ORAL DAILY PRN
Status: DISCONTINUED | OUTPATIENT
Start: 2023-10-07 | End: 2023-10-12 | Stop reason: HOSPADM

## 2023-10-07 RX ORDER — FAMOTIDINE 10 MG/ML
20 INJECTION, SOLUTION INTRAVENOUS ONCE
Status: COMPLETED | OUTPATIENT
Start: 2023-10-07 | End: 2023-10-07

## 2023-10-07 RX ORDER — HEPARIN SODIUM 5000 [USP'U]/ML
5000 INJECTION, SOLUTION INTRAVENOUS; SUBCUTANEOUS EVERY 12 HOURS SCHEDULED
Status: DISCONTINUED | OUTPATIENT
Start: 2023-10-07 | End: 2023-10-07

## 2023-10-07 RX ORDER — SODIUM CHLORIDE 9 MG/ML
100 INJECTION, SOLUTION INTRAVENOUS CONTINUOUS
Status: DISCONTINUED | OUTPATIENT
Start: 2023-10-07 | End: 2023-10-09

## 2023-10-07 RX ORDER — BISACODYL 10 MG
10 SUPPOSITORY, RECTAL RECTAL DAILY PRN
Status: DISCONTINUED | OUTPATIENT
Start: 2023-10-07 | End: 2023-10-12 | Stop reason: HOSPADM

## 2023-10-07 RX ORDER — METOPROLOL SUCCINATE 25 MG/1
25 TABLET, EXTENDED RELEASE ORAL 2 TIMES DAILY
Status: DISCONTINUED | OUTPATIENT
Start: 2023-10-07 | End: 2023-10-08

## 2023-10-07 RX ORDER — ATORVASTATIN CALCIUM 20 MG/1
20 TABLET, FILM COATED ORAL NIGHTLY
Status: DISCONTINUED | OUTPATIENT
Start: 2023-10-07 | End: 2023-10-12 | Stop reason: HOSPADM

## 2023-10-07 RX ORDER — NICOTINE POLACRILEX 4 MG
15 LOZENGE BUCCAL
Status: DISCONTINUED | OUTPATIENT
Start: 2023-10-07 | End: 2023-10-12 | Stop reason: HOSPADM

## 2023-10-07 RX ORDER — NALOXONE HCL 0.4 MG/ML
0.4 VIAL (ML) INJECTION
Status: DISCONTINUED | OUTPATIENT
Start: 2023-10-07 | End: 2023-10-12 | Stop reason: HOSPADM

## 2023-10-07 RX ORDER — PANTOPRAZOLE SODIUM 40 MG/1
40 TABLET, DELAYED RELEASE ORAL 2 TIMES DAILY
Status: DISCONTINUED | OUTPATIENT
Start: 2023-10-07 | End: 2023-10-12 | Stop reason: HOSPADM

## 2023-10-07 RX ORDER — MORPHINE SULFATE 2 MG/ML
1 INJECTION, SOLUTION INTRAMUSCULAR; INTRAVENOUS EVERY 4 HOURS PRN
Status: DISCONTINUED | OUTPATIENT
Start: 2023-10-07 | End: 2023-10-07

## 2023-10-07 RX ORDER — AMOXICILLIN 250 MG
2 CAPSULE ORAL 2 TIMES DAILY
Status: DISCONTINUED | OUTPATIENT
Start: 2023-10-07 | End: 2023-10-12 | Stop reason: HOSPADM

## 2023-10-07 RX ORDER — DEXTROSE MONOHYDRATE 25 G/50ML
25 INJECTION, SOLUTION INTRAVENOUS
Status: DISCONTINUED | OUTPATIENT
Start: 2023-10-07 | End: 2023-10-12 | Stop reason: HOSPADM

## 2023-10-07 RX ORDER — SODIUM CHLORIDE 0.9 % (FLUSH) 0.9 %
10 SYRINGE (ML) INJECTION EVERY 12 HOURS SCHEDULED
Status: DISCONTINUED | OUTPATIENT
Start: 2023-10-07 | End: 2023-10-12 | Stop reason: HOSPADM

## 2023-10-07 RX ORDER — HYDROCODONE BITARTRATE AND ACETAMINOPHEN 5; 325 MG/1; MG/1
1 TABLET ORAL EVERY 6 HOURS PRN
Status: DISCONTINUED | OUTPATIENT
Start: 2023-10-07 | End: 2023-10-12 | Stop reason: HOSPADM

## 2023-10-07 RX ADMIN — PANTOPRAZOLE SODIUM 40 MG: 40 TABLET, DELAYED RELEASE ORAL at 21:21

## 2023-10-07 RX ADMIN — ATORVASTATIN CALCIUM 20 MG: 20 TABLET, FILM COATED ORAL at 21:21

## 2023-10-07 RX ADMIN — HYDROCODONE BITARTRATE AND ACETAMINOPHEN 1 TABLET: 10; 325 TABLET ORAL at 22:37

## 2023-10-07 RX ADMIN — PIPERACILLIN AND TAZOBACTAM 4.5 G: 4; .5 INJECTION, POWDER, FOR SOLUTION INTRAVENOUS at 05:10

## 2023-10-07 RX ADMIN — RIVAROXABAN 20 MG: 20 TABLET, FILM COATED ORAL at 20:05

## 2023-10-07 RX ADMIN — Medication 2500 MG: at 02:19

## 2023-10-07 RX ADMIN — MICONAZOLE NITRATE 1 APPLICATION: 2 POWDER TOPICAL at 20:05

## 2023-10-07 RX ADMIN — SODIUM CHLORIDE 100 ML/HR: 9 INJECTION, SOLUTION INTRAVENOUS at 16:52

## 2023-10-07 RX ADMIN — MORPHINE SULFATE 2 MG: 2 INJECTION, SOLUTION INTRAMUSCULAR; INTRAVENOUS at 13:13

## 2023-10-07 RX ADMIN — PIPERACILLIN AND TAZOBACTAM 4.5 G: 4; .5 INJECTION, POWDER, FOR SOLUTION INTRAVENOUS at 20:05

## 2023-10-07 RX ADMIN — IOPAMIDOL 100 ML: 755 INJECTION, SOLUTION INTRAVENOUS at 02:32

## 2023-10-07 RX ADMIN — FAMOTIDINE 20 MG: 10 INJECTION INTRAVENOUS at 03:14

## 2023-10-07 RX ADMIN — MORPHINE SULFATE 2 MG: 2 INJECTION, SOLUTION INTRAMUSCULAR; INTRAVENOUS at 03:52

## 2023-10-07 RX ADMIN — Medication 10 ML: at 08:31

## 2023-10-07 RX ADMIN — PIPERACILLIN AND TAZOBACTAM 4.5 G: 4; .5 INJECTION, POWDER, FOR SOLUTION INTRAVENOUS at 11:56

## 2023-10-07 RX ADMIN — HYDROCODONE BITARTRATE AND ACETAMINOPHEN 1 TABLET: 10; 325 TABLET ORAL at 15:53

## 2023-10-07 RX ADMIN — HEPARIN SODIUM 5000 UNITS: 5000 INJECTION INTRAVENOUS; SUBCUTANEOUS at 08:31

## 2023-10-07 RX ADMIN — MORPHINE SULFATE 2 MG: 2 INJECTION, SOLUTION INTRAMUSCULAR; INTRAVENOUS at 18:42

## 2023-10-07 RX ADMIN — SODIUM CHLORIDE 100 ML/HR: 9 INJECTION, SOLUTION INTRAVENOUS at 06:45

## 2023-10-07 RX ADMIN — MORPHINE SULFATE 1 MG: 2 INJECTION, SOLUTION INTRAMUSCULAR; INTRAVENOUS at 09:37

## 2023-10-07 NOTE — PROGRESS NOTES
[facility]   Hospitalist Progress Note       Patient Name: Mary Albarran  : 1950  MRN: [id number]  Primary Care Physician: Brittni Quiroz APRN  Date of admission: 10/6/2023  Today's Date: 10/7/2023  Room / Bed:   526/  Subjective   Chief Complaint:  Abd pain    Summary:  Patient is a 73-year-old female with past medical history of aortic valve disease, atrial fibrillation currently kidney disease stage III, COPD, diabetes mellitus, hypertension and hyperlipidemia.  More recently the patient has had recurrent urinary tract infections.  She has been hospitalized here and at [facility] over the summer for her UTI.  Patient was told previously that the reason that she is getting recurrent UTIs is because of a kidney stone that she has.  Patient says that she has been having some fever and chills subjectively as well.  Here in the ER a CT scan of the abdomen was performed which showed gallstones but no obvious renal calculi.  Patient has been tachycardic with a low-grade fever for the entirety of her ER visit as well.  Patient was found to once again have a urinary tract infection.  She is clinically septic and therefore will be admitted for further evaluation and treatment.     Interval Followup: 10/7/2023    Resting comfortably  Admitted earlier by colleague / nocturnist  On 2 L (none at home)  Monitor with A-fib rate 70-80s  Resting comfortably in bed  Frustrated about recurrent kidney infections  Lactate 2.7 ---> 1.9  Pro-Chavez 0.11  WBC: 17 --> 11.3  Urine culture 10/7 via straight cath ... Pending      REVIEW OF SYSTEMS:   fatigue  Objective   Temp:  [98.6 øF (37 øC)-100.2 øF (37.9 øC)] 98.6 øF (37 øC)  Heart Rate:  [] 99  Resp:  [17-24] 24  BP: (116-134)/(47-64) 118/50  Flow (L/min):  [2] 2  PHYSICAL EXAM   CON: WN. WD. NAD.   NECK:  No thyromegaly. No stridor.   RESP:  CTA. No wheezes. No crackles.  No work of breathing or tachypnea.   CV:  Rhythm regular. Rate WNL. No murmur noted.  No  edema.  GI:  Soft and nontender. Nondistended.    EXT: Peripheral pulses intact.  No cyanosis.  PSYCH:  Alert. Oriented. Normal affect and mood.  NEURO:  No dysarthria or aphasia. No unilateral weakness or paresthesia.  SKIN: No chronic venous stasis changes or varicosities.  No cellulitis  Results from last 7 days   Lab Units 10/07/23  0552 10/06/23  2050   WBC 10*3/mm3 11.35* 17.13*   HEMOGLOBIN g/dL 10.9* 12.1   HEMATOCRIT % 37.0 40.5   PLATELETS 10*3/mm3 109* 136*     Results from last 7 days   Lab Units 10/07/23  0552 10/06/23  2050   SODIUM mmol/L 140 140   POTASSIUM mmol/L 4.7 4.8   CO2 mmol/L 27.7 23.8   CHLORIDE mmol/L 105 102   ANION GAP mmol/L 7.3 14.2   BUN mg/dL 11 11   CREATININE mg/dL 0.91 0.89   GLUCOSE mg/dL 121* 203*         COMPLEXITY OF DATA / DECISION MAKING     []  Moderate: One acute illness or mild exacerbation of chronic or 2 stable chronic or tx side effects   []  High:  Severe acute illness or severe exacerbation of chronic - potential for major debility / life threatening         I have personally reviewed the results from the time of this admission to 10/7/2023 13:49 EDT:  []  Laboratory:  []  Microbiology: []  Radiology:  []  Telemetry:   []  Cardiology/Vascular:  []  Pathology:  []  Prior external records:  []  Independent historian provided additional details:      []  Discussed case with specialists:    []  Independent interpretation of ECG/Imaging etc:        There is probable less inflammatory change and mural thickening involving the distal   stomach and proximal duodenum.  Again some degree of age-indeterminate infectious/inflammatory   gastro-duodenitis cannot be excluded.  Peptic ulcer disease is possible, as well.  There are   gallstones without acute cholecystitis.  No definite CT evidence of biliary ductal dilatation or   choledocholithiasis.  No acute pancreatitis is suspected.  There is diffuse hepatic steatosis with   hepatomegaly.  There may be borderline splenomegaly.   There is nonobstructing nephrolithiasis on   the right with a lower pole calyceal stone measuring about 9 mm.  It was seen previously.  No   definite left nephrolithiasis.  No ureterolithiasis.  No hydronephrosis.  No acute pyelonephritis.    No urinary bladder calculi are seen.  There is an air-fluid level in the urinary bladder, which may   be related to recent catheterization.  Please correlate clinically.  There is been hysterectomy.    No suspicious adnexal mass is suggested by CT.  Atherosclerotic changes are seen without aneurysmal   dilatation of the aortoiliac arterial system.  No acute intraperitoneal or retroperitoneal   hemorrhage.  There may be mild cardiac enlargement.  Atelectasis and/or fibrosis involve(s) the   lung bases.  Probably no acute infiltrate is identified.  No pleural effusion.  Degenerative   changes are seen throughout the imaged spine, moderate to severe in degree.  Degenerative changes   involve the bilateral sacroiliac (SI) joints and the bilateral hip joints.  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  No acute fracture is appreciated.  No significant interval change is suspected in the   2.5 cm benign intraosseous hemangioma (or venous malformation) involving the right aspect of the L5   vertebral body.  No acute appendicitis.  The appendix is seen on image 115 of series 201 and   adjacent images.  There may be colonic diverticula.  No acute diverticulitis.  No acute colitis.    No mechanical bowel obstruction.  No pneumoperitoneum or pneumatosis         []  Moderate: Rx management, low risk surgery, suboptimal social situation   []  High:  Rx with close monitoring for toxicity, mod-high risk surgery, DNR decision, Comfort initiated, IV pain meds    Assessment / Plan   Assessment:    Clinical sepsis  Complicated urinary tract infection  Leukocytosis  Elevated lactic acid  Atrial fibrillation (Dr. Metcalf)  Valvular heart disease: Moderate AI and mild AS  Hypertension  Diabetes mellitus  Obesity  with BMI 45  Neuropathy  Gallstones without cholecystitis  Nonobstructing nephrolithiasis right lower pole 0.9 cm  Degenerative spinal disease  Diffuse hepatic steatosis with hepatomegaly    Plan:     Admitted earlier today by colleague/nocturnist  Patient will be admitted to the hospital service  Add PPI  Consider urology consult  Continue home Xarelto  Continue home beta-blocker, initially at lower dose  Patient has been started on the sepsis protocol  We will start the patient on broad-spectrum antibiotic attics given this is her third infection and short amount of time awaiting IR urine cultures  We will hydrate with normal saline  We will keep her on telemetry for closer monitoring  I will review and reconcile her home medications once those are available  We will treat the patient's increasing abdominal and low back pain with IV morphine as needed         Discussed plan with RN.  DVT prophylaxis:  Medical DVT prophylaxis orders are present.  CODE STATUS:      Code Status (Patient has no pulse and is not breathing): CPR (Attempt to Resuscitate)  Medical Interventions (Patient has pulse or is breathing): Full Support       Electronically signed by MARIANA Smith, 10/07/23, 1:49 PM EDT.    Attending documentation:  I reviewed the above documentation and independently reviewed and rounded and evaluated the patient and discussed the care plan with ISH Brewster PA-C, I agree with his findings and plan as documented, what I have added to the care plan and modified is as follows in my documentation and my medical decision making; current course dictate further management, discussed with nurse at the bedside.  -AVBMD    Electronically signed by Melody Luna MD, 10/07/23, 4:09 PM EDT.    Portions of this documentation were transcribed electronically from a voice recognition software.  I confirm all data accurately represents the service(s) I performed at today's visit.

## 2023-10-07 NOTE — ED PROVIDER NOTES
Time: 10:53 PM EDT  Date of encounter:  10/6/2023  Independent Historian/Clinical History and Information was obtained by:   Patient    History is limited by: N/A    Chief Complaint: Generalized weakness      History of Present Illness:  Patient is a 73 y.o. year old female who presents to the emergency department for evaluation of generalized weakness    Patient family state the patient was recently discharged from outside hospital after treatment for urinary tract infection.  There is concern for possible kidney stone complicating her infection and she is due to follow-up with urology.  She and family are concerned that she is becoming increasingly confused and generally weaker which were previous symptoms of her infection.    HPI    Patient Care Team  Primary Care Provider: Brittni Quiroz APRN    Past Medical History:     Allergies   Allergen Reactions    Codeine Mental Status Change     Past Medical History:   Diagnosis Date    Allergic rhinitis     Anxiety     Aortic valve disease 11/28/2021    Fibrocalcific changes of the aortic valve with mild aortic valve stenosis   on echo 3/2/2021  Moderate aortic valve regurgitation is present. Aortic valve maximum pressure gradient is 26 mmHg. Moderate aortic valve stenosis is present.  On echo 2/10/2022       Arthritis     Atrial fibrillation     Chronic kidney disease (CKD), stage III (moderate)     Chronic pain     COPD (chronic obstructive pulmonary disease)     Diabetes mellitus type 2 with complications     Ex-smoker     QUIT SMOKING 2008    Hyperlipidemia     Hypertension     Thrombocytopenia      Past Surgical History:   Procedure Laterality Date    HYSTERECTOMY      30 YEARS AGO     Family History   Problem Relation Age of Onset    Heart disease Father     Heart disease Other     Heart disease Other     Diabetes Other        Home Medications:  Prior to Admission medications    Medication Sig Start Date End Date Taking? Authorizing Provider   albuterol  (ACCUNEB) 1.25 MG/3ML nebulizer solution Take 3 mL by nebulization Every 6 (Six) Hours As Needed for Wheezing or Shortness of Air. 3/30/23   Brittni Quiroz APRN   albuterol sulfate  (90 Base) MCG/ACT inhaler Inhale 2 puffs Every 4 (Four) Hours As Needed for Wheezing. 1/27/23   Brittni Quiroz APRN   ascorbic acid (VITAMIN C) 250 MG tablet Take 2 tablets by mouth Daily.    ProviderWhitley MD   atorvastatin (LIPITOR) 20 MG tablet TAKE 1 TABLET DAILY 9/18/23   Brittni Quiroz APRN   budesonide (PULMICORT) 1 MG/2ML nebulizer solution USE 1 AMPULE (2 ML) BY NEBULIZATION DAILY 9/5/23   Brittni Quiroz APRN   cholecalciferol (VITAMIN D3) 25 MCG (1000 UT) tablet Take 1 tablet by mouth Daily.    ProviderWhitley MD   digoxin (LANOXIN) 125 MCG tablet TAKE 1 TABLET ON THURSDAYS AND SUNDAYS AND 2 TABLETS THE REST OF THE WEEK 1/23/23   Yoav Metcalf MD   dilTIAZem (TIAZAC) 120 MG 24 hr capsule TAKE 1 CAPSULE DAILY 8/22/23   Brittni Quiroz APRN   Entresto  MG tablet TAKE ONE-HALF (1/2) TABLET IN THE MORNING AND TAKE 1 TABLET AT NIGHT AS DIRECTED  Patient taking differently: Take 0.5 tablets by mouth Daily With Breakfast. 11/16/22   Tiffany Minor MD   Farxiga 10 MG tablet Take 10 mg by mouth Daily. 10/6/23   Brittni Quiroz APRN   fluconazole (Diflucan) 100 MG tablet Take 1 tablet by mouth Daily. 10/4/23   Brittni Quiroz APRN   furosemide (LASIX) 20 MG tablet Take 1 tablet by mouth 2 (Two) Times a Day. 5/21/23   Jose Maria Alcala MD   glipizide (GLUCOTROL) 10 MG tablet TAKE 1 TABLET TWICE A DAY BEFORE MEALS 6/16/23   Brittni Quiroz APRN   HYDROcodone-acetaminophen (NORCO)  MG per tablet Take 1 tablet by mouth Every 4 (Four) Hours As Needed for Moderate Pain or Severe Pain. 10/5/23   Brittni Quiroz APRN   Insulin Glargine, 2 Unit Dial, (Toujeo Max SoloStar) 300 UNIT/ML solution pen-injector injection 25 units daily, increase 2  units weekly until blood sugar is below 150.  Patient taking differently: Inject 40 Units under the skin into the appropriate area as directed Daily. 25 units daily, increase 2 units weekly until blood sugar is below 150. 3/20/23   Brittni Quiroz APRN   Insulin Pen Needle (BD Pen Needle Katiuska 2nd Gen) 32G X 4 MM misc 1 each Daily. 4/1/22   Brittni Quiroz APRN   Januvia 100 MG tablet TAKE 1 TABLET DAILY  Patient taking differently: Take 1 tablet by mouth Daily. 4/3/23   Brittni Quiroz APRN   metoprolol succinate XL (TOPROL-XL) 50 MG 24 hr tablet Take 3 tablets by mouth 2 (Two) Times a Day.  Patient taking differently: Take 6 tablets by mouth 2 (Two) Times a Day. Patient takes 150 mg in the morning and 150mg in the evening. 5/21/23   Jose Maria Alcala MD   miconazole (MICOTIN) 2 % cream 2 (two) times a day. 4/15/21   ProviderWhitley MD   multivitamin with minerals tablet tablet Take 1 tablet by mouth Daily.    ProviderWhitley MD   nystatin (MYCOSTATIN) 766647 UNIT/GM cream Apply 1 application  topically to the appropriate area as directed 2 (Two) Times a Day. 10/4/23   Brittni Quiroz APRN   nystatin (MYCOSTATIN) 162383 UNIT/GM powder Apply  topically to the appropriate area as directed 2 (Two) Times a Day. 10/4/23   Brittni Quiroz APRN   potassium chloride (MICRO-K) 10 MEQ CR capsule TAKE 1 CAPSULE DAILY WITH FOOD 10/2/23   Brittni Quiroz APRN   pregabalin (LYRICA) 150 MG capsule TAKE 1 CAPSULE THREE TIMES A DAY 7/26/23   Brittni Quiroz APRN   Pyridoxine HCl (Vitamin B6) 200 MG tablet Take 1 tablet by mouth Daily. 7/21/21   Brittni Quiroz APRN   sacubitril-valsartan (Entresto)  MG tablet Take 1 tablet by mouth Every Night.    Whitley Emmanuel MD   triamcinolone (KENALOG) 0.1 % cream Apply 1 application topically to the appropriate area as directed 2 (Two) Times a Day. 6/19/23   Brittni uQiroz, LUCERO   Xarelto 20 MG tablet TAKE 1  "TABLET DAILY  Patient taking differently: Take 1 tablet by mouth Daily With Dinner. 3/27/23   Yoav Metcalf MD   Farxiga 10 MG tablet TAKE 1 TABLET DAILY  Patient taking differently: Take 10 mg by mouth Daily. 8/4/22 10/6/23  Brittni QuirozLUCERO        Social History:   Social History     Tobacco Use    Smoking status: Former     Packs/day: 1.00     Years: 36.00     Additional pack years: 0.00     Total pack years: 36.00     Types: Cigarettes     Start date: 4/3/1971     Quit date: 4/17/2008     Years since quitting: 15.4    Smokeless tobacco: Never    Tobacco comments:     FOR 30 YEARS   Vaping Use    Vaping Use: Never used   Substance Use Topics    Alcohol use: Never    Drug use: Never         Review of Systems:  Review of Systems   Constitutional:  Positive for chills and fatigue. Negative for fever.   HENT:  Negative for congestion, ear pain and sore throat.    Eyes:  Negative for pain.   Respiratory:  Negative for cough, chest tightness and shortness of breath.    Cardiovascular:  Negative for chest pain.   Gastrointestinal:  Negative for abdominal pain, diarrhea, nausea and vomiting.   Genitourinary:  Positive for dysuria. Negative for flank pain and hematuria.   Musculoskeletal:  Negative for joint swelling.   Skin:  Negative for pallor.   Neurological:  Positive for weakness. Negative for seizures and headaches.   All other systems reviewed and are negative.       Physical Exam:  /48 (BP Location: Right arm, Patient Position: Lying)   Pulse 99   Temp 98.8 øF (37.1 øC) (Oral)   Resp 20   Ht 160 cm (63\")   Wt 118 kg (259 lb 4.2 oz)   SpO2 98%   BMI 45.93 kg/mý     Physical Exam  Vitals and nursing note reviewed.   Constitutional:       General: She is not in acute distress.     Appearance: Normal appearance. She is ill-appearing. She is not toxic-appearing.   HENT:      Head: Normocephalic and atraumatic.      Jaw: There is normal jaw occlusion.   Eyes:      General: Lids are " normal.      Extraocular Movements: Extraocular movements intact.      Conjunctiva/sclera: Conjunctivae normal.      Pupils: Pupils are equal, round, and reactive to light.   Cardiovascular:      Rate and Rhythm: Normal rate and regular rhythm.      Pulses: Normal pulses.      Heart sounds: Normal heart sounds.   Pulmonary:      Effort: Pulmonary effort is normal. No respiratory distress.      Breath sounds: Normal breath sounds. No wheezing or rhonchi.   Abdominal:      General: Abdomen is flat.      Palpations: Abdomen is soft.      Tenderness: There is no abdominal tenderness. There is no guarding or rebound.   Musculoskeletal:         General: Normal range of motion.      Cervical back: Normal range of motion and neck supple.      Right lower leg: Edema present.      Left lower leg: Edema present.   Skin:     General: Skin is warm and dry.   Neurological:      Mental Status: She is alert and oriented to person, place, and time. Mental status is at baseline.   Psychiatric:         Mood and Affect: Mood normal.               Procedures:  Procedures      Medical Decision Making:      Comorbidities that affect care:    Chronic pain, COPD, hyperlipidemia, thrombocytopenia, ex-smoker, atrial fibrillation, chronic kidney disease, diabetes, hypertension, aortic valve disease.      External Notes reviewed:    Previous Clinic Note: PCP office visit after hospital discharge October 4, 2023 for UTI.      The following orders were placed and all results were independently analyzed by me:  Orders Placed This Encounter   Procedures    Blood Culture - Blood,    Urine Culture - Urine,    XR Chest 1 View    CT Abdomen Pelvis With Contrast    [facility] Draw    Comprehensive Metabolic Panel    Single High Sensitivity Troponin T    Magnesium    Urinalysis With Microscopic If Indicated (No Culture) - Urine, Clean Catch    CBC Auto Differential    Lactic Acid, Plasma    STAT Lactic Acid, Reflex    Urinalysis With Culture If Indicated -  Straight Cath    Digoxin Level    Urinalysis, Microscopic Only - Urine, Clean Catch    Procalcitonin    Comprehensive Metabolic Panel    Urinalysis, Microscopic Only - Urine, Clean Catch    Manual Differential    CBC Auto Differential    LSAC Slide Creation    Basic Metabolic Panel    Magnesium    Phosphorus    Hepatic Function Panel    Diet: Cardiac Diets; Healthy Heart (2-3 Na+); Texture: Regular Texture (IDDSI 7); Fluid Consistency: Thin (IDDSI 0)    Undress & Gown    Continuous Pulse Oximetry    Vital Signs    Orthostatic Blood Pressure    Vital Signs    Intake & Output    Weigh Patient    Oral Care    Saline Lock & Maintain IV Access    Activity - Ad Charissa    Code Status and Medical Interventions:    Hospitalist (on-call MD unless specified)    PT Consult: Eval & Treat Functional Mobility Below Baseline    Oxygen Therapy- Nasal Cannula; Titrate 1-6 LPM Per SpO2; 90 - 95%    Oxygen Therapy- Nasal Cannula; Titrate 1-6 LPM Per SpO2; 90 - 95%    POC Glucose Once    ECG 12 Lead ED Triage Standing Order; Weak / Dizzy / AMS    Insert Peripheral IV    Insert Peripheral IV    Inpatient Admission    Fall Precautions    CBC & Differential    Green Top (Gel)    Lavender Top    Gold Top - SST    Light Blue Top    CBC & Differential    CBC & Differential       Medications Given in the Emergency Department:  Medications   sodium chloride 0.9 % flush 10 mL (has no administration in time range)   sodium chloride 0.9 % flush 10 mL (has no administration in time range)   sodium chloride 0.9 % flush 10 mL (has no administration in time range)   sodium chloride 0.9 % infusion 40 mL (has no administration in time range)   sennosides-docusate (PERICOLACE) 8.6-50 MG per tablet 2 tablet (has no administration in time range)     And   polyethylene glycol (MIRALAX) packet 17 g (has no administration in time range)     And   bisacodyl (DULCOLAX) EC tablet 5 mg (has no administration in time range)     And   bisacodyl (DULCOLAX) suppository  10 mg (has no administration in time range)   heparin (porcine) 5000 UNIT/ML injection 5,000 Units (has no administration in time range)   sodium chloride 0.9 % infusion (100 mL/hr Intravenous New Bag 10/7/23 0645)   piperacillin-tazobactam (ZOSYN) 4.5 g/100 mL 0.9% NS IVPB (mbp) (has no administration in time range)   morphine injection 1 mg (has no administration in time range)     And   naloxone (NARCAN) injection 0.4 mg (has no administration in time range)   morphine injection 2 mg (2 mg Intravenous Given 10/7/23 0352)   sodium chloride 0.9 % bolus 1,000 mL (0 mL Intravenous Stopped 10/7/23 0308)   cefepime (MAXIPIME) 2000 mg/100 mL 0.9% NS (mbp) (0 mg Intravenous Stopped 10/7/23 0220)   vancomycin 2500 mg/500 mL 0.9% NS IVPB ([facility]) (0 mg Intravenous Stopped 10/7/23 0421)   iopamidol (ISOVUE-370) 76 % injection 100 mL (100 mL Intravenous Given 10/7/23 0232)   famotidine (PEPCID) injection 20 mg (20 mg Intravenous Given 10/7/23 0314)   piperacillin-tazobactam (ZOSYN) 4.5 g/100 mL 0.9% NS IVPB (mbp) (4.5 g Intravenous New Bag 10/7/23 0510)        ED Course:         Labs:    Lab Results (last 24 hours)       Procedure Component Value Units Date/Time    CBC & Differential [[id number]]  (Abnormal) Collected: 10/06/23 2050    Specimen: Blood from Arm, Right Updated: 10/06/23 2106    Narrative:      The following orders were created for panel order CBC & Differential.  Procedure                               Abnormality         Status                     ---------                               -----------         ------                     CBC Auto Differential[[id number]]        Abnormal            Final result               Scan Slide[[id number]]                                                                    Please view results for these tests on the individual orders.    Comprehensive Metabolic Panel [[id number]]  (Abnormal) Collected: 10/06/23 2050    Specimen: Blood from Arm, Right Updated: 10/06/23 2121      Glucose 203 mg/dL      BUN 11 mg/dL      Creatinine 0.89 mg/dL      Sodium 140 mmol/L      Potassium 4.8 mmol/L      Chloride 102 mmol/L      CO2 23.8 mmol/L      Calcium 9.1 mg/dL      Total Protein 7.0 g/dL      Albumin 3.9 g/dL      ALT (SGPT) 17 U/L      AST (SGOT) 14 U/L      Alkaline Phosphatase 68 U/L      Total Bilirubin 1.2 mg/dL      Globulin 3.1 gm/dL      A/G Ratio 1.3 g/dL      BUN/Creatinine Ratio 12.4     Anion Gap 14.2 mmol/L      eGFR 69.4 mL/min/1.73     Narrative:      GFR Normal >60  Chronic Kidney Disease <60  Kidney Failure <15    The GFR formula is only valid for adults with stable renal function between ages 18 and 70.    Single High Sensitivity Troponin T [[id number]]  (Abnormal) Collected: 10/06/23 2050    Specimen: Blood from Arm, Right Updated: 10/06/23 2121     HS Troponin T 21 ng/L     Narrative:      High Sensitive Troponin T Reference Range:  <10.0 ng/L- Negative Female for AMI  <15.0 ng/L- Negative Male for AMI  >=10 - Abnormal Female indicating possible myocardial injury.  >=15 - Abnormal Male indicating possible myocardial injury.   Clinicians would have to utilize clinical acumen, EKG, Troponin, and serial changes to determine if it is an Acute Myocardial Infarction or myocardial injury due to an underlying chronic condition.         Magnesium [[id number]]  (Normal) Collected: 10/06/23 2050    Specimen: Blood from Arm, Right Updated: 10/06/23 2121     Magnesium 2.0 mg/dL     CBC Auto Differential [[id number]]  (Abnormal) Collected: 10/06/23 2050    Specimen: Blood from Arm, Right Updated: 10/06/23 2106     WBC 17.13 10*3/mm3      RBC 4.26 10*6/mm3      Hemoglobin 12.1 g/dL      Hematocrit 40.5 %      MCV 95.1 fL      MCH 28.4 pg      MCHC 29.9 g/dL      RDW 17.3 %      RDW-SD 58.2 fl      MPV 13.9 fL      Platelets 136 10*3/mm3      Neutrophil % 89.4 %      Lymphocyte % 5.3 %      Monocyte % 3.6 %      Eosinophil % 0.1 %      Basophil % 0.2 %      Immature Grans % 1.4 %       Neutrophils, Absolute 15.31 10*3/mm3      Lymphocytes, Absolute 0.90 10*3/mm3      Monocytes, Absolute 0.62 10*3/mm3      Eosinophils, Absolute 0.02 10*3/mm3      Basophils, Absolute 0.04 10*3/mm3      Immature Grans, Absolute 0.24 10*3/mm3      nRBC 0.0 /100 WBC     Digoxin Level [[id number]]  (Normal) Collected: 10/06/23 2050    Specimen: Blood from Arm, Right Updated: 10/06/23 2312     Digoxin 0.63 ng/mL     Lactic Acid, Plasma [[id number]]  (Abnormal) Collected: 10/06/23 2147    Specimen: Blood Updated: 10/06/23 2243     Lactate 2.7 mmol/L     Blood Culture - Blood, Arm, Left [[id number]] Collected: 10/06/23 2147    Specimen: Blood from Arm, Left Updated: 10/06/23 2159    Urinalysis With Microscopic If Indicated (No Culture) - Urine, Clean Catch [[id number]]  (Abnormal) Collected: 10/07/23 0145    Specimen: Urine, Clean Catch Updated: 10/07/23 0257     Color, UA Yellow     Appearance, UA Clear     pH, UA 6.5     Specific Gravity, UA >=1.030     Glucose,  mg/dL (2+)     Ketones, UA Negative     Bilirubin, UA Negative     Blood, UA Trace     Protein, UA 30 mg/dL (1+)     Leuk Esterase, UA Trace     Nitrite, UA Negative     Urobilinogen, UA 1.0 E.U./dL    Urinalysis With Culture If Indicated - Straight Cath [[id number]]  (Abnormal) Collected: 10/07/23 0145    Specimen: Urine from Straight Cath Updated: 10/07/23 0348     Color, UA Dark Yellow     Appearance, UA Clear     pH, UA 7.0     Specific Gravity, UA >=1.030     Glucose, UA >=1000 mg/dL (3+)     Ketones, UA Trace     Bilirubin, UA Negative     Blood, UA Trace     Protein, UA 30 mg/dL (1+)     Leuk Esterase, UA Small (1+)     Nitrite, UA Negative     Urobilinogen, UA 0.2 E.U./dL    Narrative:      In absence of clinical symptoms, the presence of pyuria, bacteria, and/or nitrites on the urinalysis result does not correlate with infection.    Urinalysis, Microscopic Only - Urine, Clean Catch [[id number]]  (Abnormal) Collected: 10/07/23 0145    Specimen: Urine,  "Clean Catch Updated: 10/07/23 0257     RBC, UA 6-12 /HPF      WBC, UA Too Numerous to Count /HPF      Bacteria, UA None Seen /HPF      Squamous Epithelial Cells, UA 3-6 /HPF      Hyaline Casts, UA 3-6 /LPF      Methodology Automated Microscopy    Urinalysis, Microscopic Only - Straight Cath [[id number]]  (Abnormal) Collected: 10/07/23 0145    Specimen: Urine from Straight Cath Updated: 10/07/23 0349     RBC, UA 6-12 /HPF      WBC, UA Too Numerous to Count /HPF      Bacteria, UA None Seen /HPF      Squamous Epithelial Cells, UA 3-6 /HPF      Hyaline Casts, UA 3-6 /LPF      Methodology Automated Microscopy    Urine Culture - Urine, Straight Cath [[id number]] Collected: 10/07/23 0145    Specimen: Urine from Straight Cath Updated: 10/07/23 0349    STAT Lactic Acid, Reflex [[id number]]  (Normal) Collected: 10/07/23 0415    Specimen: Blood Updated: 10/07/23 0441     Lactate 1.9 mmol/L     Procalcitonin [[id number]]  (Normal) Collected: 10/07/23 0552    Specimen: Blood from Arm, Right Updated: 10/07/23 0646     Procalcitonin 0.11 ng/mL     Narrative:      As a Marker for Sepsis (Non-Neonates):    1. <0.5 ng/mL represents a low risk of severe sepsis and/or septic shock.  2. >2 ng/mL represents a high risk of severe sepsis and/or septic shock.    As a Marker for Lower Respiratory Tract Infections that require antibiotic therapy:    PCT on Admission    Antibiotic Therapy       6-12 Hrs later    >0.5                Strongly Recommended  >0.25 - <0.5        Recommended  0.1 - 0.25          Discouraged              Remeasure/reassess PCT  <0.1                Strongly Discouraged     Remeasure/reassess PCT    As 28 day mortality risk marker: \"Change in Procalcitonin Result\" (>80% or <=80%) if Day 0 (or Day 1) and Day 4 values are available. Refer to http://www.[employer]-pct-calculator.com    Change in PCT <=80%  A decrease of PCT levels below or equal to 80% defines a positive change in PCT test result representing a higher risk for " 28-day all-cause mortality of patients diagnosed with severe sepsis for septic shock.    Change in PCT >80%  A decrease of PCT levels of more than 80% defines a negative change in PCT result representing a lower risk for 28-day all-cause mortality of patients diagnosed with severe sepsis or septic shock.    This test is Prognostic not Diagnostic, if elevated correlate with clinical findings before administering antibiotic treatment.        CBC & Differential [[id number]]  (Abnormal) Collected: 10/07/23 0552    Specimen: Blood from Arm, Right Updated: 10/07/23 0719    Narrative:      The following orders were created for panel order CBC & Differential.  Procedure                               Abnormality         Status                     ---------                               -----------         ------                     Manual Differential[[id number]]          Abnormal            Final result               CBC Auto Differential[[id number]]        Abnormal            Final result                 Please view results for these tests on the individual orders.    Comprehensive Metabolic Panel [[id number]]  (Abnormal) Collected: 10/07/23 0552    Specimen: Blood from Arm, Right Updated: 10/07/23 0642     Glucose 121 mg/dL      BUN 11 mg/dL      Creatinine 0.91 mg/dL      Sodium 140 mmol/L      Potassium 4.7 mmol/L      Chloride 105 mmol/L      CO2 27.7 mmol/L      Calcium 8.4 mg/dL      Total Protein 6.0 g/dL      Albumin 3.4 g/dL      ALT (SGPT) 14 U/L      AST (SGOT) 12 U/L      Alkaline Phosphatase 52 U/L      Total Bilirubin 1.2 mg/dL      Globulin 2.6 gm/dL      A/G Ratio 1.3 g/dL      BUN/Creatinine Ratio 12.1     Anion Gap 7.3 mmol/L      eGFR 67.6 mL/min/1.73     Narrative:      GFR Normal >60  Chronic Kidney Disease <60  Kidney Failure <15    The GFR formula is only valid for adults with stable renal function between ages 18 and 70.    Manual Differential [[id number]]  (Abnormal) Collected: 10/07/23 0552     Specimen: Blood from Arm, Right Updated: 10/07/23 0719     Neutrophil % 78.0 %      Lymphocyte % 13.0 %      Monocyte % 6.0 %      Metamyelocyte % 1.0 %      Atypical Lymphocyte % 2.0 %      Neutrophils Absolute 8.85 10*3/mm3      Lymphocytes Absolute 1.70 10*3/mm3      Monocytes Absolute 0.68 10*3/mm3      RBC Morphology Normal     WBC Morphology Normal     Platelet Morphology Normal    CBC Auto Differential [[id number]]  (Abnormal) Collected: 10/07/23 0552    Specimen: Blood from Arm, Right Updated: 10/07/23 0719     WBC 11.35 10*3/mm3      RBC 3.79 10*6/mm3      Hemoglobin 10.9 g/dL      Hematocrit 37.0 %      MCV 97.6 fL      MCH 28.8 pg      MCHC 29.5 g/dL      RDW 17.6 %      RDW-SD 61.2 fl      MPV 13.6 fL      Platelets 109 10*3/mm3              Imaging:    CT Abdomen Pelvis With Contrast    Result Date: 10/7/2023  PROCEDURE: CT ABDOMEN PELVIS W CONTRAST  COMPARISON: 5/14/2023.  INDICATIONS: abdominal pain, not otherwise specified  TECHNIQUE: After obtaining the patient's consent, 728 CT images were created with non-ionic intravenous contrast material.  No oral contrast agent was administered for the study.  PROTOCOL:   Standard CT imaging protocol performed.    RADIATION:   Total DLP: 1,128.7 mGy*cm.   Automated exposure control was utilized to minimize radiation dose. CONTRAST: 100 mL Isovue 370 I.V.  FINDINGS: There is probable less inflammatory change and mural thickening involving the distal stomach and proximal duodenum.  Again some degree of age-indeterminate infectious/inflammatory gastro-duodenitis cannot be excluded.  Peptic ulcer disease is possible, as well.  There are gallstones without acute cholecystitis.  No definite CT evidence of biliary ductal dilatation or choledocholithiasis.  No acute pancreatitis is suspected.  There is diffuse hepatic steatosis with hepatomegaly.  There may be borderline splenomegaly.  There is nonobstructing nephrolithiasis on the right with a lower pole calyceal  stone measuring about 9 mm.  It was seen previously.  No definite left nephrolithiasis.  No ureterolithiasis.  No hydronephrosis.  No acute pyelonephritis.  No urinary bladder calculi are seen.  There is an air-fluid level in the urinary bladder, which may be related to recent catheterization.  Please correlate clinically.  There is been hysterectomy.  No suspicious adnexal mass is suggested by CT.  Atherosclerotic changes are seen without aneurysmal dilatation of the aortoiliac arterial system.  No acute intraperitoneal or retroperitoneal hemorrhage.  There may be mild cardiac enlargement.  Atelectasis and/or fibrosis involve(s) the lung bases.  Probably no acute infiltrate is identified.  No pleural effusion.  Degenerative changes are seen throughout the imaged spine, moderate to severe in degree.  Degenerative changes involve the bilateral sacroiliac (SI) joints and the bilateral hip joints.  There is pubic osteitis.  No acute fracture is appreciated.  No significant interval change is suspected in the 2.5 cm benign intraosseous hemangioma (or venous malformation) involving the right aspect of the L5 vertebral body.  No acute appendicitis.  The appendix is seen on image 115 of series 201 and adjacent images.  There may be colonic diverticula.  No acute diverticulitis.  No acute colitis.  No mechanical bowel obstruction.  No pneumoperitoneum or pneumatosis.         1. Gallstones are seen without definite acute cholecystitis.  No biliary ductal dilatation.  No acute pancreatitis.   2. There is probable less mural thickening of the distal stomach and the proximal duodenum with some degree of persistent gastro-duodenitis or peptic ulcer disease possible.   3. Otherwise, no definite acute findings are seen.   4. Please see above comments for further detail.     Please note that portions of this note were completed with a voice recognition program.  RASHI COLES JR, MD       Electronically Signed and Approved By:  RASHI COLES JR, MD on 10/07/2023 at 2:19              XR Chest 1 View    Result Date: 10/6/2023  PROCEDURE: XR CHEST 1 VW  COMPARISON: 5/15/2023.  INDICATIONS: UNSPECIFIED WEAKNESS.  FINDINGS:  A single AP upright portable chest radiograph is provided for review.  Interval decrease in bilateral infiltrates is noted since 5/15/2023.  Interval improvement in lung expansion is present, as well.  There may be mild pulmonary edema with vascular congestion persisting.  There is mild-to-moderate cardiomegaly, as before.  Mild linear subsegmental atelectasis is seen in the left lung base.  The thoracic aorta is atherosclerotic.  No pneumothorax is seen.  Minimal, if any, pleural effusion is identified.  There may be old healed left-sided rib fractures.        Favorable progression is suggested radiographically with decreased bilateral infiltrates since 5/15/2023.  The findings may represent improvement in infectious multifocal pneumonia or improvement in pulmonary edema.  There may be mild persistent pulmonary edema.  Mild-to-moderate cardiomegaly is suspected.  Interval decrease in, if not complete resolution of, the right pleural effusion is noted since 5/15/2023.      Please note that portions of this note were completed with a voice recognition program.  RASHI COLES JR, MD       Electronically Signed and Approved By: RASHI COLES JR, MD on 10/06/2023 at 23:12                 Differential Diagnosis and Discussion:    Altered Mental Status: Based on the patient's signs and symptoms, differential diagnosis includes but is not limited to meningitis, stroke, sepsis, subarachnoid hemorrhage, intracranial bleeding, encephalitis, and metabolic encephalopathy.  Weakness: Based on the patient's history, signs, and symptoms, the diffential diagnosis includes but is not limited to meningitis, stroke, sepsis, subarachnoid hemorrhage, intracranial bleeding, encephalitis, acute uti, dehydration, MS, myasthenia gravis, Guillan  [place], migraine variant, neuromuscular disorders vertigo, electrolyte imbalance, and metabolic disorders.    All labs were reviewed and interpreted by me.  CT scan radiology impression was interpreted by me.    MDM  Number of Diagnoses or Management Options  Acute UTI  Sepsis, due to unspecified organism, unspecified whether acute organ dysfunction present  Diagnosis management comments: Sepsis criteria was met in the emergency department and the Sepsis protocol (including antibiotic administration) was initiated.      SIRS criteria considered:   1.  Temperature > 100.4 or <98.6    2.  Heart Rate > 90    3.  Respiratory Rate > 22    4.  WBC > 12K or <4K.             Severe Sepsis:     Respiratory: Mechanical Ventilation or BiPAP  Hypotension: SBP > 90 or MAP < 65  Renal: Creatinine > 2  Metabolic: Lactic Acid > 2  Hematologic: Platelets < 100K or INR > 1.5  Hepatic: BILI  >  2  CNS: Sudden AMS     Septic Shock:     Severe Sepsis + Persistent hypotension or Lactic Acid > 4     Normal saline bolus, Antibiotics, and final disposition was based on these definitions.        Sepsis was recognized at 2253.    Antibiotics were ordered.       30 mL/kg bolus was NOT indicated.       Patient did not receive the recommended 30 mL/kg fluid bolus for sepsis because it would be harmful or detrimental to the patient.    The patient has concern for fluid overload.   The patient was ordered 1L of fluids.    Total Critical Care time of 35 minutes. Total critical care time documented does not include time spent on separately billed procedures for services of nurses or physician assistants. I personally saw and examined the patient. I have reviewed all diagnostic interpretations and treatment plans as written. I was present for the key portions of any procedures performed and the inclusive time noted in any critical care statement. Critical care time includes patient management by me, time spent at the patient's bedside,  time to review  lab and imaging results, discussing patient care, documentation in the medical record, and time spent with family or caregiver.           Critical Care Note: Total Critical Care time of 35 minutes. Total critical care time documented does not include time spent on separately billed procedures for services of nurses or physician assistants. I personally saw and examined the patient. I have reviewed all diagnostic interpretations and treatment plans as written. I was present for the key portions of any procedures performed and the inclusive time noted in any critical care statement. Critical care time includes patient management by me, time spent at the patients bedside,  time to review lab and imaging results, discussing patient care, documentation in the medical record, and time spent with family or caregiver.    Patient Care Considerations:          Consultants/Shared Management Plan:    Hospitalist: I have discussed the case with the hospitalist  who agrees to accept the patient for admission.    Social Determinants of Health:    Patient has presented with family members who are responsible, reliable and will ensure follow up care.      Disposition and Care Coordination:    Admit:   Through independent evaluation of the patient's history, physical, and imperical data, the patient meets criteria for observation/admission to the hospital.        Final diagnoses:   Sepsis, due to unspecified organism, unspecified whether acute organ dysfunction present   Acute UTI        ED Disposition       ED Disposition   Decision to Admit    Condition   --    Comment   Level of Care: Telemetry [5]   Diagnosis: Sepsis [[id number]]   Admitting Physician: GOPI PHAN [[id number]]   Certification: I Certify That Inpatient Hospital Services Are Medically Necessary For Greater Than 2 Midnights                 This medical record created using voice recognition software.             Dc Martinez MD  10/07/23 0761

## 2023-10-07 NOTE — PLAN OF CARE
Goal Outcome Evaluation:  Plan of Care Reviewed With: patient        Progress: no change  Outcome Evaluation: VSS. Pt stated morphine was not working super well, MD ordered [place] per home med list. Will continue to monitor.    Mckenzie Villalobos RN

## 2023-10-07 NOTE — H&P
[facility]   HISTORY AND PHYSICAL    Patient Name: Mary Albarran  : 1950  MRN: [id number]  Primary Care Physician:  Brittni Quiroz APRN  Date of admission: 10/6/2023    Subjective   Subjective     Chief Complaint:   Weakness lower abdominal pain  History of Present Illness  Patient is a 73-year-old female with past medical history of aortic valve disease, atrial fibrillation currently kidney disease stage III, COPD, diabetes mellitus, hypertension and hyperlipidemia.  More recently the patient has had recurrent urinary tract infections.  She has been hospitalized here and at [place] over the summer for her UTI.  Patient was told previously that the reason that she is getting recurrent UTIs is because of a kidney stone that she has.  Patient says that she has been having some fever and chills subjectively as well.  Here in the ER a CT scan of the abdomen was performed which showed gallstones but no obvious renal calculi.  Patient has been tachycardic with a low-grade fever for the entirety of her ER visit as well.  Patient was found to once again have a urinary tract infection.  She is clinically septic and therefore will be admitted for further evaluation and treatment.  Review of Systems   Constitutional:  Positive for activity change and fatigue.   Genitourinary:  Positive for frequency.        Personal History     Past Medical History:   Diagnosis Date    Allergic rhinitis     Anxiety     Aortic valve disease 2021    Fibrocalcific changes of the aortic valve with mild aortic valve stenosis   on echo 3/2/2021  Moderate aortic valve regurgitation is present. Aortic valve maximum pressure gradient is 26 mmHg. Moderate aortic valve stenosis is present.  On echo 2/10/2022       Arthritis     Atrial fibrillation     Chronic kidney disease (CKD), stage III (moderate)     Chronic pain     COPD (chronic obstructive pulmonary disease)     Diabetes mellitus type 2 with complications     Ex-smoker      QUIT SMOKING 2008    Hyperlipidemia     Hypertension     Thrombocytopenia        Past Surgical History:   Procedure Laterality Date    HYSTERECTOMY      30 YEARS AGO       Family History: family history includes Diabetes in an other family member; Heart disease in her father and other family members. Otherwise pertinent FHx was reviewed and not pertinent to current issue.    Social History:  reports that she quit smoking about 15 years ago. Her smoking use included cigarettes. She started smoking about 52 years ago. She has a 36.00 pack-year smoking history. She has never used smokeless tobacco. She reports that she does not drink alcohol and does not use drugs.    Home Medications:  HYDROcodone-acetaminophen, Insulin Glargine (2 Unit Dial), Insulin Pen Needle, SITagliptin, Vitamin B6, albuterol, albuterol sulfate HFA, ascorbic acid, atorvastatin, budesonide, cholecalciferol, dapagliflozin Propanediol, digoxin, dilTIAZem, fluconazole, furosemide, glipizide, metoprolol succinate XL, miconazole, multivitamin with minerals, nystatin, potassium chloride, pregabalin, rivaroxaban, sacubitril-valsartan, and triamcinolone    Allergies:  Allergies   Allergen Reactions    Codeine Mental Status Change       Objective    Objective     Vitals:   Temp:  [99.9 øF (37.7 øC)-100.2 øF (37.9 øC)] 100.2 øF (37.9 øC)  Heart Rate:  [] 103  Resp:  [17-20] 20  BP: (128-134)/(52-64) 128/52  Flow (L/min):  [2] 2    Physical Exam  Constitutional:  Well-developed and well-nourished.  No acute distress.      HENT:  Head:  Normocephalic and atraumatic.  Mouth:  Moist mucous membranes.    Eyes:  Conjunctivae and EOM are normal. No scleral icterus.    Neck:  Neck supple.  No JVD present.    Cardiovascular:  Normal rate, regular rhythm and normal heart sounds with no murmur.  Pulmonary/Chest:  No respiratory distress, no wheezes, no crackles, with normal breath sounds and good air movement.  Abdominal:  Soft. No distension and no  tenderness.   Musculoskeletal:  No tenderness, and no deformity.  No red or swollen joints anywhere.    Neurological:  Alert and oriented to person, place, and time.  No cranial nerve deficit.    Skin:  Skin is warm and dry. No rash noted. No pallor.   Peripheral vascular:  No clubbing, no cyanosis, no edema.  Psychiatric: Appropriate mood and affect  :    Result Review    Result Review:  I have personally reviewed the results from the time of this admission to 10/7/2023 03:41 EDT and agree with these findings:  [x]  Laboratory list / accordion  []  Microbiology  [x]  Radiology  []  EKG/Telemetry   []  Cardiology/Vascular   []  Pathology  []  Old records  []  Other:  Most notable findings include:   Narrative & Impression   PROCEDURE:  CT ABDOMEN PELVIS W CONTRAST     COMPARISON: 5/14/2023.     INDICATIONS:  abdominal pain, not otherwise specified     TECHNIQUE:    After obtaining the patient's consent, 728 CT images were created with non-ionic   intravenous contrast material.  No oral contrast agent was administered for the study.     PROTOCOL:     Standard CT imaging protocol performed.                 RADIATION:      Total DLP: 1,128.7 mGy*cm.               Automated exposure control was utilized to minimize radiation dose.   CONTRAST:      100 mL Isovue 370 I.V.     FINDINGS:        There is probable less inflammatory change and mural thickening involving the distal   stomach and proximal duodenum.  Again some degree of age-indeterminate infectious/inflammatory   gastro-duodenitis cannot be excluded.  Peptic ulcer disease is possible, as well.  There are   gallstones without acute cholecystitis.  No definite CT evidence of biliary ductal dilatation or   choledocholithiasis.  No acute pancreatitis is suspected.  There is diffuse hepatic steatosis with   hepatomegaly.  There may be borderline splenomegaly.  There is nonobstructing nephrolithiasis on   the right with a lower pole calyceal stone measuring about 9  mm.  It was seen previously.  No   definite left nephrolithiasis.  No ureterolithiasis.  No hydronephrosis.  No acute pyelonephritis.    No urinary bladder calculi are seen.  There is an air-fluid level in the urinary bladder, which may   be related to recent catheterization.  Please correlate clinically.  There is been hysterectomy.    No suspicious adnexal mass is suggested by CT.  Atherosclerotic changes are seen without aneurysmal   dilatation of the aortoiliac arterial system.  No acute intraperitoneal or retroperitoneal   hemorrhage.  There may be mild cardiac enlargement.  Atelectasis and/or fibrosis involve(s) the   lung bases.  Probably no acute infiltrate is identified.  No pleural effusion.  Degenerative   changes are seen throughout the imaged spine, moderate to severe in degree.  Degenerative changes   involve the bilateral sacroiliac (SI) joints and the bilateral hip joints.  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  No acute fracture is appreciated.  No significant interval change is suspected in the   2.5 cm benign intraosseous hemangioma (or venous malformation) involving the right aspect of the L5   vertebral body.  No acute appendicitis.  The appendix is seen on image 115 of series 201 and   adjacent images.  There may be colonic diverticula.  No acute diverticulitis.  No acute colitis.    No mechanical bowel obstruction.  No pneumoperitoneum or pneumatosis.     IMPRESSION:                    1. Gallstones are seen without definite acute cholecystitis.  No biliary ductal dilatation.  No   acute pancreatitis.       2. There is probable less mural thickening of the distal stomach and the proximal duodenum with   some degree of persistent gastro-duodenitis or peptic ulcer disease possible.       3. Otherwise, no definite acute findings are seen.       4. Please see above comments for further detail.             Please note that portions of this note were completed with a voice recognition program.     RASHI GASCA  SANKET RODRIGES MD         Electronically Signed and Approved By: RASHI COLES JR, MD on 10/07/2023 at 2:19          Assessment & Plan   Assessment / Plan     Brief Patient Summary:  Mary Albarran is a 73 y.o. female who presents to the emergency department with generalized weakness low-grade fever and chills.  She was found to have a complicated urinary tract infection will be admitted for further evaluation and treatment    Active Hospital Problems:  1.  Clinical sepsis  2.  Complicated urinary tract infection  3.  Leukocytosis  4.  Elevated lactic acid  5.  Chronic conditions including atrial fibrillation, hypertension, diabetes mellitus, and neuropathy  Plan:   Patient will be admitted to the hospital service  Patient has been started on the sepsis protocol  We will start the patient on broad-spectrum antibiotic attics given this is her third infection and short amount of time awaiting IR urine cultures  We will hydrate with normal saline  We will keep her on telemetry for closer monitoring  I will review and reconcile her home medications once those are available  We will treat the patient's increasing abdominal and low back pain with IV morphine as needed    DVT prophylaxis:  No DVT prophylaxis order currently exists.    CODE STATUS:    Code Status (Patient has no pulse and is not breathing): CPR (Attempt to Resuscitate)  Medical Interventions (Patient has pulse or is breathing): Full Support    Admission Status:  I believe this patient meets inpatient status.    Joaquin Kelly,

## 2023-10-08 LAB
ALBUMIN SERPL-MCNC: 3 G/DL (ref 3.5–5.2)
ALP SERPL-CCNC: 54 U/L (ref 39–117)
ALT SERPL W P-5'-P-CCNC: 14 U/L (ref 1–33)
ANION GAP SERPL CALCULATED.3IONS-SCNC: 8.2 MMOL/L (ref 5–15)
ANISOCYTOSIS BLD QL: NORMAL
AST SERPL-CCNC: 18 U/L (ref 1–32)
BACTERIA SPEC AEROBE CULT: NO GROWTH
BASOPHILS # BLD AUTO: 0.02 10*3/MM3 (ref 0–0.2)
BASOPHILS NFR BLD AUTO: 0.3 % (ref 0–1.5)
BILIRUB CONJ SERPL-MCNC: 0.3 MG/DL (ref 0–0.3)
BILIRUB INDIRECT SERPL-MCNC: 0.5 MG/DL
BILIRUB SERPL-MCNC: 0.8 MG/DL (ref 0–1.2)
BUN SERPL-MCNC: 10 MG/DL (ref 8–23)
BUN/CREAT SERPL: 13.2 (ref 7–25)
CALCIUM SPEC-SCNC: 8.5 MG/DL (ref 8.6–10.5)
CHLORIDE SERPL-SCNC: 106 MMOL/L (ref 98–107)
CO2 SERPL-SCNC: 23.8 MMOL/L (ref 22–29)
CREAT SERPL-MCNC: 0.76 MG/DL (ref 0.57–1)
DEPRECATED RDW RBC AUTO: 59.5 FL (ref 37–54)
EGFRCR SERPLBLD CKD-EPI 2021: 83.9 ML/MIN/1.73
EOSINOPHIL # BLD AUTO: 0.13 10*3/MM3 (ref 0–0.4)
EOSINOPHIL NFR BLD AUTO: 1.7 % (ref 0.3–6.2)
ERYTHROCYTE [DISTWIDTH] IN BLOOD BY AUTOMATED COUNT: 17.1 % (ref 12.3–15.4)
GLUCOSE BLDC GLUCOMTR-MCNC: 112 MG/DL (ref 70–99)
GLUCOSE BLDC GLUCOMTR-MCNC: 152 MG/DL (ref 70–99)
GLUCOSE BLDC GLUCOMTR-MCNC: 153 MG/DL (ref 70–99)
GLUCOSE BLDC GLUCOMTR-MCNC: 186 MG/DL (ref 70–99)
GLUCOSE BLDC GLUCOMTR-MCNC: 99 MG/DL (ref 70–99)
GLUCOSE SERPL-MCNC: 120 MG/DL (ref 65–99)
HCT VFR BLD AUTO: 36.5 % (ref 34–46.6)
HGB BLD-MCNC: 10.7 G/DL (ref 12–15.9)
IMM GRANULOCYTES # BLD AUTO: 0.04 10*3/MM3 (ref 0–0.05)
IMM GRANULOCYTES NFR BLD AUTO: 0.5 % (ref 0–0.5)
LYMPHOCYTES # BLD AUTO: 1.3 10*3/MM3 (ref 0.7–3.1)
LYMPHOCYTES NFR BLD AUTO: 16.6 % (ref 19.6–45.3)
MAGNESIUM SERPL-MCNC: 2 MG/DL (ref 1.6–2.4)
MCH RBC QN AUTO: 28.2 PG (ref 26.6–33)
MCHC RBC AUTO-ENTMCNC: 29.3 G/DL (ref 31.5–35.7)
MCV RBC AUTO: 96.1 FL (ref 79–97)
MONOCYTES # BLD AUTO: 0.65 10*3/MM3 (ref 0.1–0.9)
MONOCYTES NFR BLD AUTO: 8.3 % (ref 5–12)
NEUTROPHILS NFR BLD AUTO: 5.68 10*3/MM3 (ref 1.7–7)
NEUTROPHILS NFR BLD AUTO: 72.6 % (ref 42.7–76)
NRBC BLD AUTO-RTO: 0 /100 WBC (ref 0–0.2)
PHOSPHATE SERPL-MCNC: 3.5 MG/DL (ref 2.5–4.5)
PLATELET # BLD AUTO: 74 10*3/MM3 (ref 140–450)
PMV BLD AUTO: 13.7 FL (ref 6–12)
POLYCHROMASIA BLD QL SMEAR: NORMAL
POTASSIUM SERPL-SCNC: 4.6 MMOL/L (ref 3.5–5.2)
PROT SERPL-MCNC: 5.9 G/DL (ref 6–8.5)
RBC # BLD AUTO: 3.8 10*6/MM3 (ref 3.77–5.28)
SMALL PLATELETS BLD QL SMEAR: NORMAL
SODIUM SERPL-SCNC: 138 MMOL/L (ref 136–145)
WBC MORPH BLD: NORMAL
WBC NRBC COR # BLD: 7.82 10*3/MM3 (ref 3.4–10.8)

## 2023-10-08 PROCEDURE — 25810000003 LACTATED RINGERS SOLUTION: Performed by: PHYSICIAN ASSISTANT

## 2023-10-08 PROCEDURE — 25010000002 ONDANSETRON PER 1 MG: Performed by: PHYSICIAN ASSISTANT

## 2023-10-08 PROCEDURE — 99233 SBSQ HOSP IP/OBS HIGH 50: CPT | Performed by: FAMILY MEDICINE

## 2023-10-08 PROCEDURE — 85007 BL SMEAR W/DIFF WBC COUNT: CPT | Performed by: FAMILY MEDICINE

## 2023-10-08 PROCEDURE — 85025 COMPLETE CBC W/AUTO DIFF WBC: CPT | Performed by: FAMILY MEDICINE

## 2023-10-08 PROCEDURE — 25010000002 MORPHINE PER 10 MG: Performed by: FAMILY MEDICINE

## 2023-10-08 PROCEDURE — 94799 UNLISTED PULMONARY SVC/PX: CPT

## 2023-10-08 PROCEDURE — 94640 AIRWAY INHALATION TREATMENT: CPT

## 2023-10-08 PROCEDURE — 80048 BASIC METABOLIC PNL TOTAL CA: CPT | Performed by: FAMILY MEDICINE

## 2023-10-08 PROCEDURE — 82948 REAGENT STRIP/BLOOD GLUCOSE: CPT

## 2023-10-08 PROCEDURE — 83735 ASSAY OF MAGNESIUM: CPT | Performed by: FAMILY MEDICINE

## 2023-10-08 PROCEDURE — 80076 HEPATIC FUNCTION PANEL: CPT | Performed by: FAMILY MEDICINE

## 2023-10-08 PROCEDURE — 63710000001 INSULIN LISPRO (HUMAN) PER 5 UNITS: Performed by: PHYSICIAN ASSISTANT

## 2023-10-08 PROCEDURE — 25810000003 SODIUM CHLORIDE 0.9 % SOLUTION: Performed by: FAMILY MEDICINE

## 2023-10-08 PROCEDURE — 25010000002 PIPERACILLIN SOD-TAZOBACTAM PER 1 G: Performed by: FAMILY MEDICINE

## 2023-10-08 PROCEDURE — 84100 ASSAY OF PHOSPHORUS: CPT | Performed by: FAMILY MEDICINE

## 2023-10-08 RX ORDER — ONDANSETRON 2 MG/ML
4 INJECTION INTRAMUSCULAR; INTRAVENOUS EVERY 4 HOURS PRN
Status: DISCONTINUED | OUTPATIENT
Start: 2023-10-08 | End: 2023-10-12 | Stop reason: HOSPADM

## 2023-10-08 RX ORDER — METOPROLOL SUCCINATE 25 MG/1
25 TABLET, EXTENDED RELEASE ORAL 2 TIMES DAILY
Status: DISCONTINUED | OUTPATIENT
Start: 2023-10-08 | End: 2023-10-09

## 2023-10-08 RX ADMIN — MICONAZOLE NITRATE 1 APPLICATION: 2 POWDER TOPICAL at 20:48

## 2023-10-08 RX ADMIN — ATORVASTATIN CALCIUM 20 MG: 20 TABLET, FILM COATED ORAL at 20:43

## 2023-10-08 RX ADMIN — PIPERACILLIN AND TAZOBACTAM 4.5 G: 4; .5 INJECTION, POWDER, FOR SOLUTION INTRAVENOUS at 03:28

## 2023-10-08 RX ADMIN — METOPROLOL SUCCINATE 25 MG: 25 TABLET, EXTENDED RELEASE ORAL at 03:48

## 2023-10-08 RX ADMIN — METOPROLOL SUCCINATE 25 MG: 25 TABLET, EXTENDED RELEASE ORAL at 20:43

## 2023-10-08 RX ADMIN — MICONAZOLE NITRATE 1 APPLICATION: 2 POWDER TOPICAL at 10:03

## 2023-10-08 RX ADMIN — HYDROCODONE BITARTRATE AND ACETAMINOPHEN 1 TABLET: 10; 325 TABLET ORAL at 11:38

## 2023-10-08 RX ADMIN — INSULIN LISPRO 2 UNITS: 100 INJECTION, SOLUTION INTRAVENOUS; SUBCUTANEOUS at 10:55

## 2023-10-08 RX ADMIN — Medication 10 ML: at 10:02

## 2023-10-08 RX ADMIN — METOPROLOL TARTRATE 5 MG: 1 INJECTION, SOLUTION INTRAVENOUS at 21:29

## 2023-10-08 RX ADMIN — SODIUM CHLORIDE 100 ML/HR: 9 INJECTION, SOLUTION INTRAVENOUS at 01:31

## 2023-10-08 RX ADMIN — HYDROCODONE BITARTRATE AND ACETAMINOPHEN 1 TABLET: 10; 325 TABLET ORAL at 06:18

## 2023-10-08 RX ADMIN — Medication 10 ML: at 20:45

## 2023-10-08 RX ADMIN — PIPERACILLIN AND TAZOBACTAM 4.5 G: 4; .5 INJECTION, POWDER, FOR SOLUTION INTRAVENOUS at 10:09

## 2023-10-08 RX ADMIN — SODIUM CHLORIDE 100 ML/HR: 9 INJECTION, SOLUTION INTRAVENOUS at 21:17

## 2023-10-08 RX ADMIN — PANTOPRAZOLE SODIUM 40 MG: 40 TABLET, DELAYED RELEASE ORAL at 20:43

## 2023-10-08 RX ADMIN — METOPROLOL TARTRATE 25 MG: 25 TABLET, FILM COATED ORAL at 21:48

## 2023-10-08 RX ADMIN — SODIUM CHLORIDE, POTASSIUM CHLORIDE, SODIUM LACTATE AND CALCIUM CHLORIDE 500 ML: 600; 310; 30; 20 INJECTION, SOLUTION INTRAVENOUS at 21:55

## 2023-10-08 RX ADMIN — INSULIN LISPRO 2 UNITS: 100 INJECTION, SOLUTION INTRAVENOUS; SUBCUTANEOUS at 07:53

## 2023-10-08 RX ADMIN — PIPERACILLIN AND TAZOBACTAM 4.5 G: 4; .5 INJECTION, POWDER, FOR SOLUTION INTRAVENOUS at 18:14

## 2023-10-08 RX ADMIN — ONDANSETRON 4 MG: 2 INJECTION INTRAMUSCULAR; INTRAVENOUS at 20:27

## 2023-10-08 RX ADMIN — MORPHINE SULFATE 2 MG: 2 INJECTION, SOLUTION INTRAMUSCULAR; INTRAVENOUS at 15:20

## 2023-10-08 RX ADMIN — MORPHINE SULFATE 2 MG: 2 INJECTION, SOLUTION INTRAMUSCULAR; INTRAVENOUS at 10:02

## 2023-10-08 RX ADMIN — PANTOPRAZOLE SODIUM 40 MG: 40 TABLET, DELAYED RELEASE ORAL at 08:29

## 2023-10-08 RX ADMIN — HYDROCODONE BITARTRATE AND ACETAMINOPHEN 1 TABLET: 10; 325 TABLET ORAL at 19:56

## 2023-10-08 RX ADMIN — IPRATROPIUM BROMIDE AND ALBUTEROL SULFATE 3 ML: .5; 3 SOLUTION RESPIRATORY (INHALATION) at 07:25

## 2023-10-08 RX ADMIN — METOPROLOL TARTRATE 5 MG: 1 INJECTION, SOLUTION INTRAVENOUS at 20:39

## 2023-10-08 RX ADMIN — IPRATROPIUM BROMIDE AND ALBUTEROL SULFATE 3 ML: .5; 3 SOLUTION RESPIRATORY (INHALATION) at 20:05

## 2023-10-08 RX ADMIN — RIVAROXABAN 20 MG: 20 TABLET, FILM COATED ORAL at 18:14

## 2023-10-08 RX ADMIN — SODIUM CHLORIDE 100 ML/HR: 9 INJECTION, SOLUTION INTRAVENOUS at 11:36

## 2023-10-08 NOTE — PROGRESS NOTES
[facility]   Hospitalist Progress Note       Patient Name: Mary Albarran  : 1950  MRN: [id number]  Primary Care Physician: Brittni Quiroz APRN  Date of admission: 10/6/2023  Today's Date: 10/8/2023  Room / Bed:   [facility]/  Subjective   Chief Complaint:  Abd pain    Summary:  Patient is a 73-year-old female with past medical history of aortic valve disease, atrial fibrillation currently kidney disease stage III, COPD, diabetes mellitus, hypertension and hyperlipidemia.  More recently the patient has had recurrent urinary tract infections.  She has been hospitalized here and at [place] over the summer for her UTI.  Patient was told previously that the reason that she is getting recurrent UTIs is because of a kidney stone that she has.  Patient says that she has been having some fever and chills subjectively as well.  Here in the ER a CT scan of the abdomen was performed which showed gallstones but no obvious renal calculi.  Patient has been tachycardic with a low-grade fever for the entirety of her ER visit as well.  Patient was found to once again have a urinary tract infection.  She is clinically septic and therefore will be admitted for further evaluation and treatment.     Interval Followup: 10/8/2023    Resting comfortably  , family at bedside  On 2 L (none at home)  Monitor with A-fib rate controlled, 80s  Resting comfortably in bed  Frustrated about recurrent kidney infections.  We will ask urology to see.  WBC normalizin --> 11.3 --> 7.8  Glucose 120-180 range  Normal creatinine  No fevers today or yesterday        REVIEW OF SYSTEMS:   fatigue  Objective   Temp:  [97.7 øF (36.5 øC)-98.7 øF (37.1 øC)] 98.1 øF (36.7 øC)  Heart Rate:  [] 93  Resp:  [16-20] 18  BP: ()/(53-72) 124/64  Flow (L/min):  [2] 2  PHYSICAL EXAM   CON: WN. WD. NAD.   NECK:  No thyromegaly. No stridor.   RESP:  CTA. No wheezes. No crackles.  No work of breathing or tachypnea.   CV:  Rhythm regular.  Rate WNL. No murmur noted.  No edema.  GI:  Soft and nontender. Nondistended.    EXT: Peripheral pulses intact.  No cyanosis.  PSYCH:  Alert. Oriented. Normal affect and mood.  NEURO:  No dysarthria or aphasia. No unilateral weakness or paresthesia.  SKIN: No chronic venous stasis changes or varicosities.  No cellulitis  Results from last 7 days   Lab Units 10/08/23  0454 10/07/23  0552 10/06/23  2050   WBC 10*3/mm3 7.82 11.35* 17.13*   HEMOGLOBIN g/dL 10.7* 10.9* 12.1   HEMATOCRIT % 36.5 37.0 40.5   PLATELETS 10*3/mm3 74* 109* 136*     Results from last 7 days   Lab Units 10/08/23  0454 10/07/23  0552 10/06/23  2050   SODIUM mmol/L 138 140 140   POTASSIUM mmol/L 4.6 4.7 4.8   CO2 mmol/L 23.8 27.7 23.8   CHLORIDE mmol/L 106 105 102   ANION GAP mmol/L 8.2 7.3 14.2   BUN mg/dL 10 11 11   CREATININE mg/dL 0.76 0.91 0.89   GLUCOSE mg/dL 120* 121* 203*         COMPLEXITY OF DATA / DECISION MAKING     []  Moderate: One acute illness or mild exacerbation of chronic or 2 stable chronic or tx side effects   []  High:  Severe acute illness or severe exacerbation of chronic - potential for major debility / life threatening         I have personally reviewed the results from the time of this admission to 10/8/2023 14:49 EDT:  []  Laboratory:  []  Microbiology: []  Radiology:  []  Telemetry:   []  Cardiology/Vascular:  []  Pathology:  []  Prior external records:  []  Independent historian provided additional details:      []  Discussed case with specialists:    []  Independent interpretation of ECG/Imaging etc:        There is probable less inflammatory change and mural thickening involving the distal   stomach and proximal duodenum.  Again some degree of age-indeterminate infectious/inflammatory   gastro-duodenitis cannot be excluded.  Peptic ulcer disease is possible, as well.  There are   gallstones without acute cholecystitis.  No definite CT evidence of biliary ductal dilatation or   choledocholithiasis.  No acute  pancreatitis is suspected.  There is diffuse hepatic steatosis with   hepatomegaly.  There may be borderline splenomegaly.  There is nonobstructing nephrolithiasis on   the right with a lower pole calyceal stone measuring about 9 mm.  It was seen previously.  No   definite left nephrolithiasis.  No ureterolithiasis.  No hydronephrosis.  No acute pyelonephritis.    No urinary bladder calculi are seen.  There is an air-fluid level in the urinary bladder, which may   be related to recent catheterization.  Please correlate clinically.  There is been hysterectomy.    No suspicious adnexal mass is suggested by CT.  Atherosclerotic changes are seen without aneurysmal   dilatation of the aortoiliac arterial system.  No acute intraperitoneal or retroperitoneal   hemorrhage.  There may be mild cardiac enlargement.  Atelectasis and/or fibrosis involve(s) the   lung bases.  Probably no acute infiltrate is identified.  No pleural effusion.  Degenerative   changes are seen throughout the imaged spine, moderate to severe in degree.  Degenerative changes   involve the bilateral sacroiliac (SI) joints and the bilateral hip joints.  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  No acute fracture is appreciated.  No significant interval change is suspected in the   2.5 cm benign intraosseous hemangioma (or venous malformation) involving the right aspect of the L5   vertebral body.  No acute appendicitis.  The appendix is seen on image 115 of series 201 and   adjacent images.  There may be colonic diverticula.  No acute diverticulitis.  No acute colitis.    No mechanical bowel obstruction.  No pneumoperitoneum or pneumatosis         []  Moderate: Rx management, low risk surgery, suboptimal social situation   []  High:  Rx with close monitoring for toxicity, mod-high risk surgery, DNR decision, Comfort initiated, IV pain meds    Assessment / Plan   Assessment:    Clinical sepsis  Complicated urinary tract infection  Leukocytosis  Elevated lactic  acid  Atrial fibrillation (Dr. Metcalf)  Valvular heart disease: Moderate AI and mild AS  Hypertension  Diabetes mellitus  Obesity with BMI 45  Neuropathy  Gallstones without cholecystitis  Nonobstructing nephrolithiasis right lower pole 0.9 cm  Degenerative spinal disease  Diffuse hepatic steatosis with hepatomegaly  Gastritis/duodenitis suggested CT abdomen    Plan:     Disposition: Follow-up urine cultures.  Continue with oral antibiotics at time of discharge, pending improvement of symptoms, no fevers, normal white count.  Follow-up with urology and PCP after discharge.  Awaiting urine cultures.  Continue empiric antibiotics  Urology consulted:  Dr Varela  Continue home Xarelto  Continue home beta-blocker, initially at lower dose  Patient has been started on the sepsis protocol  We will start the patient on broad-spectrum antibiotic, given this is her third infection and short amount of time  We will hydrate with normal saline  Added PPI  We will keep her on telemetry for closer monitoring  Resumehome medications once those are available  We will treat the patient's increasing abdominal and low back pain with IV morphine as needed         Discussed plan with RN.  DVT prophylaxis:  Medical DVT prophylaxis orders are present.  CODE STATUS:      Code Status (Patient has no pulse and is not breathing): CPR (Attempt to Resuscitate)  Medical Interventions (Patient has pulse or is breathing): Full Support         Attending documentation:  I reviewed the above documentation and independently reviewed and rounded and evaluated the patient and discussed the care plan with ISH Brewster PA-C, I agree with his findings and plan as documented, what I have added to the care plan and modified is as follows in my documentation and my medical decision making; 73-year-old female hospitalized on 10/7/2023 with chief complaint of abdominal pain, found to have sepsis secondary to UTI, CT imaging shows gallstones without acute  cholecystitis, there is possible persistent gastroduodenitis, nonobstructing nephrolithiasis on the right, stone measuring 9 mm.  Urology consulted as patient is concerned that she has recurrent UTIs due to renal stone.  Interval follow-up: Seen and examined, no acute distress, no acute major night events, white blood cell count down to 7000, blood sugars adequately controlled, afebrile for the past 24 hours, telemetry reviewed, A-fib rate controlled.  No family at bedside, patient very upset when bringing nonobstructing renal stone, thinks that this is the etiology of her recurrent UTIs, explained to her doubtful given her diabetes and body habitus, more than likely unrelated, she is fixated on this renal stone being the cause of her recurrent UTIs.  I offered to consult urology to further evaluate for the possibility of the renal stone causing urinary tract infections, she started became hostile and defensive and requested I leave the room and requested she be discharged, I said indicated that she is not ready for disposition while we are still actively treating UTI.  Review of systems obtained, all systems reviewed negative except for generalized weakness, fatigue, defensive, rude behavior towards staff and physicians including urology note was consulted.  Vitals reviewed, labs reviewed on physical exam morbidly obese female, no acute distress, regular rhythm, clear auscultation bilaterally, soft nontender abdomen, lower extremity edema with legs wrapped.  Assessment as above, plan, urology consultation appreciated, fortunately patient did not like what she heard from the urologist, I would have to agree with the urologist assessment, she has risk factors for recurrent UTIs, continue Zosyn, continue Xarelto, continue metoprolol, continue insulin sliding scale coverage, continue IV fluids normal saline at 100 cc/h for the next 24 hours, her urine cultures are currently negative, she can be discharged home tomorrow  if she remains clinically stable on Augmentin and have outpatient urology referral, she wants to have the stone in her kidney removed, I do not foresee this happening unless it becomes symptomatic and obstructs her ureter.  A.m. labs, full code, DVT prophylaxis Xarelto, clinical course dictate further management, discussed with nurse at the bedside.  More than 75% of the time of this patient's encounter was performed by me, this included face-to-face time, planning and coordinating, medical decision making and critical thinking personally done by me.  -AVBMD    Electronically signed by Melody Luna MD, 10/08/23, 3:50 PM EDT.    Portions of this documentation were transcribed electronically from a voice recognition software.  I confirm all data accurately represents the service(s) I performed at today's visit.

## 2023-10-08 NOTE — CONSULTS
[facility]   Consult Note    Patient Name: Mary Albarran  : 1950  MRN: [id number]  Primary Care Physician:  Brittni Quiroz APRN  Referring Physician: No ref. provider found  Date of admission: 10/6/2023    Consults  Subjective   Subjective     Reason for Consult/ Chief Complaint: Recurrent urinary tract infections, right renal calculus.    History of Present Illness  Mary Albarran is a 73 y.o. female patient is admitted to the hospital with a urinary tract infection and a right kidney stone and possible sepsis from the UTI.  Patient states that she has had multiple UTIs over the past year and has been hospitalized multiple times for lengthy period of times.  Recently she was at another hospital and was told there that the kidney stone on the right side is the cause of her recurrent UTIs.  Patient is insulin-dependent diabetic.    Review of Systems   10 point review of systems is performed and they are negative except what is mentioned in HPI.  Personal History     Past Medical History:   Diagnosis Date    Allergic rhinitis     Anxiety     Aortic valve disease 2021    Fibrocalcific changes of the aortic valve with mild aortic valve stenosis   on echo 3/2/2021  Moderate aortic valve regurgitation is present. Aortic valve maximum pressure gradient is 26 mmHg. Moderate aortic valve stenosis is present.  On echo 2/10/2022       Arthritis     Atrial fibrillation     Chronic kidney disease (CKD), stage III (moderate)     Chronic pain     COPD (chronic obstructive pulmonary disease)     Diabetes mellitus type 2 with complications     Ex-smoker     QUIT SMOKING     Hyperlipidemia     Hypertension     Thrombocytopenia        Past Surgical History:   Procedure Laterality Date    HYSTERECTOMY      30 YEARS AGO       Family History: family history includes Diabetes in an other family member; Heart disease in her father and other family members. Otherwise pertinent FHx was reviewed and not pertinent to  current issue.    Social History:  reports that she quit smoking about 15 years ago. Her smoking use included cigarettes. She started smoking about 52 years ago. She has a 36.00 pack-year smoking history. She has never used smokeless tobacco. She reports that she does not drink alcohol and does not use drugs.    Home Medications:   HYDROcodone-acetaminophen, Insulin Glargine (2 Unit Dial), Insulin Pen Needle, SITagliptin, Vitamin B6, albuterol, albuterol sulfate HFA, ascorbic acid, atorvastatin, budesonide, cholecalciferol, dapagliflozin Propanediol, digoxin, dilTIAZem, fluconazole, furosemide, glipizide, metoprolol succinate XL, miconazole, multivitamin with minerals, nystatin, potassium chloride, pregabalin, rivaroxaban, sacubitril-valsartan, and triamcinolone    Allergies:  Allergies   Allergen Reactions    Codeine Mental Status Change       Objective    Objective     Vitals:  Temp:  [97.7 øF (36.5 øC)-98.7 øF (37.1 øC)] 98.7 øF (37.1 øC)  Heart Rate:  [] 93  Resp:  [16-20] 18  BP: ()/(53-72) 113/58  Flow (L/min):  [2] 2    Physical Exam  She is awake alert oriented x3.  She is afebrile vital signs are stable.  Patient refused to have a physical exam today.  Result Review    Result Review:  I have personally reviewed the results from the time of this admission to 10/8/2023 11:25 EDT and agree with these findings:  [x]  Laboratory list / accordion  []  Microbiology  [x]  Radiology  []  EKG/Telemetry   []  Cardiology/Vascular   []  Pathology  []  Old records  []  Other:  Most notable findings include: Recurrent UTIs and kidney stone      Assessment & Plan   Assessment / Plan     Brief Patient Summary:  Mary Albarran is a 73 y.o. female who recurrent UTIs and kidney stone.    Active Hospital Problems:  Active Hospital Problems    Diagnosis     **Sepsis      Plan:   Patient has recurrent urinary tract infections.  She is morbidly obese.  She does have insulin-dependent diabetes.  Once I began discussing  her condition patient became very angry and I was not sure why she was angry.  Her  tells me that she is angry because she has been hospitalized multiple times in no one has been able to treat recurrent UTIs.  Then she started saying that she has a kidney stone that is causing the infections and she needs to have it removed.  I recommended she follow-up with the urology clinic.  Patient then while I was talking to her stated she is done talking to me.  I left the room at this point.    Shamir Varela MD

## 2023-10-09 ENCOUNTER — APPOINTMENT (OUTPATIENT)
Dept: CARDIOLOGY | Facility: HOSPITAL | Age: 73
DRG: 871 | End: 2023-10-09
Payer: MEDICARE

## 2023-10-09 ENCOUNTER — APPOINTMENT (OUTPATIENT)
Dept: GENERAL RADIOLOGY | Facility: HOSPITAL | Age: 73
DRG: 871 | End: 2023-10-09
Payer: MEDICARE

## 2023-10-09 LAB
ALBUMIN SERPL-MCNC: 3.5 G/DL (ref 3.5–5.2)
ALP SERPL-CCNC: 54 U/L (ref 39–117)
ALT SERPL W P-5'-P-CCNC: 15 U/L (ref 1–33)
ANION GAP SERPL CALCULATED.3IONS-SCNC: 10.7 MMOL/L (ref 5–15)
ARTERIAL PATENCY WRIST A: ABNORMAL
AST SERPL-CCNC: 13 U/L (ref 1–32)
BASE EXCESS BLDA CALC-SCNC: -1.2 MMOL/L (ref -2–2)
BASOPHILS # BLD AUTO: 0.02 10*3/MM3 (ref 0–0.2)
BASOPHILS NFR BLD AUTO: 0.2 % (ref 0–1.5)
BDY SITE: ABNORMAL
BH CV ECHO MEAS - ACS: 1.3 CM
BH CV ECHO MEAS - AI P1/2T: 503.1 MSEC
BH CV ECHO MEAS - AO MAX PG: 19 MMHG
BH CV ECHO MEAS - AO MEAN PG: 10.3 MMHG
BH CV ECHO MEAS - AO ROOT DIAM: 2.7 CM
BH CV ECHO MEAS - AO V2 MAX: 218 CM/SEC
BH CV ECHO MEAS - AO V2 VTI: 37.3 CM
BH CV ECHO MEAS - AVA(I,D): 1.52 CM2
BH CV ECHO MEAS - EDV(CUBED): 73.3 ML
BH CV ECHO MEAS - EDV(MOD-SP2): 51.5 ML
BH CV ECHO MEAS - EDV(MOD-SP4): 40.8 ML
BH CV ECHO MEAS - EF(MOD-BP): 54.4 %
BH CV ECHO MEAS - EF(MOD-SP2): 49.1 %
BH CV ECHO MEAS - EF(MOD-SP4): 55.6 %
BH CV ECHO MEAS - ESV(CUBED): 26.2 ML
BH CV ECHO MEAS - ESV(MOD-SP2): 26.2 ML
BH CV ECHO MEAS - ESV(MOD-SP4): 18.1 ML
BH CV ECHO MEAS - FS: 29 %
BH CV ECHO MEAS - IVS/LVPW: 0.94 CM
BH CV ECHO MEAS - IVSD: 1.08 CM
BH CV ECHO MEAS - LA DIMENSION: 4.1 CM
BH CV ECHO MEAS - LAT PEAK E' VEL: 6.7 CM/SEC
BH CV ECHO MEAS - LV MASS(C)D: 158.5 GRAMS
BH CV ECHO MEAS - LV MAX PG: 4 MMHG
BH CV ECHO MEAS - LV MEAN PG: 2.03 MMHG
BH CV ECHO MEAS - LV V1 MAX: 100.3 CM/SEC
BH CV ECHO MEAS - LV V1 VTI: 17.6 CM
BH CV ECHO MEAS - LVIDD: 4.2 CM
BH CV ECHO MEAS - LVIDS: 3 CM
BH CV ECHO MEAS - LVOT AREA: 3.2 CM2
BH CV ECHO MEAS - LVOT DIAM: 2.02 CM
BH CV ECHO MEAS - LVPWD: 1.15 CM
BH CV ECHO MEAS - MED PEAK E' VEL: 6.9 CM/SEC
BH CV ECHO MEAS - MV A MAX VEL: 50.6 CM/SEC
BH CV ECHO MEAS - MV DEC TIME: 0.12 SEC
BH CV ECHO MEAS - MV E MAX VEL: 97.3 CM/SEC
BH CV ECHO MEAS - MV E/A: 1.92
BH CV ECHO MEAS - RVDD: 2.46 CM
BH CV ECHO MEAS - SV(LVOT): 56.5 ML
BH CV ECHO MEAS - SV(MOD-SP2): 25.3 ML
BH CV ECHO MEAS - SV(MOD-SP4): 22.7 ML
BH CV ECHO MEAS - TAPSE (>1.6): 1.47 CM
BH CV ECHO MEASUREMENTS AVERAGE E/E' RATIO: 14.31
BILIRUB CONJ SERPL-MCNC: 0.4 MG/DL (ref 0–0.3)
BILIRUB INDIRECT SERPL-MCNC: 0.8 MG/DL
BILIRUB SERPL-MCNC: 1.2 MG/DL (ref 0–1.2)
BUN SERPL-MCNC: 6 MG/DL (ref 8–23)
BUN/CREAT SERPL: 8.5 (ref 7–25)
CA-I BLDA-SCNC: 1.12 MMOL/L (ref 1.13–1.32)
CALCIUM SPEC-SCNC: 8.9 MG/DL (ref 8.6–10.5)
CHLORIDE BLDA-SCNC: 106 MMOL/L (ref 98–106)
CHLORIDE SERPL-SCNC: 102 MMOL/L (ref 98–107)
CO2 SERPL-SCNC: 25.3 MMOL/L (ref 22–29)
COHGB MFR BLD: 0.8 % (ref 0–1.5)
CREAT SERPL-MCNC: 0.71 MG/DL (ref 0.57–1)
DEPRECATED RDW RBC AUTO: 56.7 FL (ref 37–54)
EGFRCR SERPLBLD CKD-EPI 2021: 91 ML/MIN/1.73
EOSINOPHIL # BLD AUTO: 0.04 10*3/MM3 (ref 0–0.4)
EOSINOPHIL NFR BLD AUTO: 0.5 % (ref 0.3–6.2)
ERYTHROCYTE [DISTWIDTH] IN BLOOD BY AUTOMATED COUNT: 16.7 % (ref 12.3–15.4)
FHHB: 10.6 % (ref 0–5)
GAS FLOW AIRWAY: 6 LPM
GLUCOSE BLDA-MCNC: 152 MG/DL (ref 65–99)
GLUCOSE BLDC GLUCOMTR-MCNC: 121 MG/DL (ref 70–99)
GLUCOSE BLDC GLUCOMTR-MCNC: 151 MG/DL (ref 70–99)
GLUCOSE BLDC GLUCOMTR-MCNC: 154 MG/DL (ref 70–99)
GLUCOSE BLDC GLUCOMTR-MCNC: 162 MG/DL (ref 70–99)
GLUCOSE BLDC GLUCOMTR-MCNC: 175 MG/DL (ref 70–99)
GLUCOSE SERPL-MCNC: 168 MG/DL (ref 65–99)
HCO3 BLDA-SCNC: 23.5 MMOL/L (ref 22–26)
HCT VFR BLD AUTO: 35.1 % (ref 34–46.6)
HGB BLD-MCNC: 10.5 G/DL (ref 12–15.9)
HGB BLDA-MCNC: 11.8 G/DL (ref 11.7–14.6)
IMM GRANULOCYTES # BLD AUTO: 0.04 10*3/MM3 (ref 0–0.05)
IMM GRANULOCYTES NFR BLD AUTO: 0.5 % (ref 0–0.5)
INHALED O2 CONCENTRATION: 45 %
LACTATE BLDA-SCNC: 1.24 MMOL/L (ref 0.5–2)
LEFT ATRIUM VOLUME INDEX: 22.7 ML/M2
LYMPHOCYTES # BLD AUTO: 0.83 10*3/MM3 (ref 0.7–3.1)
LYMPHOCYTES NFR BLD AUTO: 10.2 % (ref 19.6–45.3)
MAGNESIUM SERPL-MCNC: 1.8 MG/DL (ref 1.6–2.4)
MCH RBC QN AUTO: 28 PG (ref 26.6–33)
MCHC RBC AUTO-ENTMCNC: 29.9 G/DL (ref 31.5–35.7)
MCV RBC AUTO: 93.6 FL (ref 79–97)
METHGB BLD QL: 0.2 % (ref 0–1.5)
MODALITY: ABNORMAL
MONOCYTES # BLD AUTO: 0.55 10*3/MM3 (ref 0.1–0.9)
MONOCYTES NFR BLD AUTO: 6.7 % (ref 5–12)
NEUTROPHILS NFR BLD AUTO: 6.68 10*3/MM3 (ref 1.7–7)
NEUTROPHILS NFR BLD AUTO: 81.9 % (ref 42.7–76)
NOTE: ABNORMAL
NRBC BLD AUTO-RTO: 0 /100 WBC (ref 0–0.2)
NT-PROBNP SERPL-MCNC: 3101 PG/ML (ref 0–900)
OXYHGB MFR BLDV: 88.4 % (ref 94–99)
PCO2 BLDA: 39.3 MM HG (ref 35–45)
PH BLDA: 7.39 PH UNITS (ref 7.35–7.45)
PHOSPHATE SERPL-MCNC: 3.1 MG/DL (ref 2.5–4.5)
PLATELET # BLD AUTO: 85 10*3/MM3 (ref 140–450)
PMV BLD AUTO: 13.3 FL (ref 6–12)
PO2 BLD: 129 MM[HG] (ref 0–500)
PO2 BLDA: 58.1 MM HG (ref 80–100)
POTASSIUM BLDA-SCNC: 4.38 MMOL/L (ref 3.5–5)
POTASSIUM SERPL-SCNC: 4.1 MMOL/L (ref 3.5–5.2)
PROT SERPL-MCNC: 6.6 G/DL (ref 6–8.5)
RBC # BLD AUTO: 3.75 10*6/MM3 (ref 3.77–5.28)
SAO2 % BLDCOA: 89.3 % (ref 95–99)
SODIUM BLDA-SCNC: 136.3 MMOL/L (ref 136–146)
SODIUM SERPL-SCNC: 138 MMOL/L (ref 136–145)
WBC NRBC COR # BLD: 8.16 10*3/MM3 (ref 3.4–10.8)

## 2023-10-09 PROCEDURE — 84100 ASSAY OF PHOSPHORUS: CPT | Performed by: FAMILY MEDICINE

## 2023-10-09 PROCEDURE — 36600 WITHDRAWAL OF ARTERIAL BLOOD: CPT | Performed by: PHYSICIAN ASSISTANT

## 2023-10-09 PROCEDURE — 99233 SBSQ HOSP IP/OBS HIGH 50: CPT | Performed by: FAMILY MEDICINE

## 2023-10-09 PROCEDURE — 25010000002 PIPERACILLIN SOD-TAZOBACTAM PER 1 G: Performed by: FAMILY MEDICINE

## 2023-10-09 PROCEDURE — 82375 ASSAY CARBOXYHB QUANT: CPT | Performed by: PHYSICIAN ASSISTANT

## 2023-10-09 PROCEDURE — 80048 BASIC METABOLIC PNL TOTAL CA: CPT | Performed by: FAMILY MEDICINE

## 2023-10-09 PROCEDURE — 25010000002 ONDANSETRON PER 1 MG: Performed by: PHYSICIAN ASSISTANT

## 2023-10-09 PROCEDURE — 71045 X-RAY EXAM CHEST 1 VIEW: CPT

## 2023-10-09 PROCEDURE — 25010000002 FUROSEMIDE PER 20 MG: Performed by: PHYSICIAN ASSISTANT

## 2023-10-09 PROCEDURE — 25010000002 FUROSEMIDE PER 20 MG: Performed by: FAMILY MEDICINE

## 2023-10-09 PROCEDURE — 83735 ASSAY OF MAGNESIUM: CPT | Performed by: FAMILY MEDICINE

## 2023-10-09 PROCEDURE — 85025 COMPLETE CBC W/AUTO DIFF WBC: CPT | Performed by: FAMILY MEDICINE

## 2023-10-09 PROCEDURE — 93306 TTE W/DOPPLER COMPLETE: CPT

## 2023-10-09 PROCEDURE — 83880 ASSAY OF NATRIURETIC PEPTIDE: CPT | Performed by: FAMILY MEDICINE

## 2023-10-09 PROCEDURE — 93306 TTE W/DOPPLER COMPLETE: CPT | Performed by: STUDENT IN AN ORGANIZED HEALTH CARE EDUCATION/TRAINING PROGRAM

## 2023-10-09 PROCEDURE — 82805 BLOOD GASES W/O2 SATURATION: CPT | Performed by: PHYSICIAN ASSISTANT

## 2023-10-09 PROCEDURE — 25010000002 MORPHINE PER 10 MG: Performed by: FAMILY MEDICINE

## 2023-10-09 PROCEDURE — 83050 HGB METHEMOGLOBIN QUAN: CPT | Performed by: PHYSICIAN ASSISTANT

## 2023-10-09 PROCEDURE — 80076 HEPATIC FUNCTION PANEL: CPT | Performed by: FAMILY MEDICINE

## 2023-10-09 PROCEDURE — 82948 REAGENT STRIP/BLOOD GLUCOSE: CPT

## 2023-10-09 PROCEDURE — 63710000001 INSULIN LISPRO (HUMAN) PER 5 UNITS: Performed by: PHYSICIAN ASSISTANT

## 2023-10-09 PROCEDURE — 94761 N-INVAS EAR/PLS OXIMETRY MLT: CPT

## 2023-10-09 PROCEDURE — 97161 PT EVAL LOW COMPLEX 20 MIN: CPT

## 2023-10-09 PROCEDURE — 94799 UNLISTED PULMONARY SVC/PX: CPT

## 2023-10-09 RX ORDER — LEVALBUTEROL INHALATION SOLUTION 0.63 MG/3ML
0.63 SOLUTION RESPIRATORY (INHALATION) EVERY 4 HOURS PRN
Status: DISCONTINUED | OUTPATIENT
Start: 2023-10-09 | End: 2023-10-12 | Stop reason: HOSPADM

## 2023-10-09 RX ORDER — METOLAZONE 5 MG/1
5 TABLET ORAL ONCE
Status: COMPLETED | OUTPATIENT
Start: 2023-10-09 | End: 2023-10-09

## 2023-10-09 RX ORDER — TRIAMCINOLONE ACETONIDE 1 MG/G
1 OINTMENT TOPICAL
Status: DISCONTINUED | OUTPATIENT
Start: 2023-10-09 | End: 2023-10-12 | Stop reason: HOSPADM

## 2023-10-09 RX ORDER — METOPROLOL SUCCINATE 50 MG/1
100 TABLET, EXTENDED RELEASE ORAL 2 TIMES DAILY
Status: DISCONTINUED | OUTPATIENT
Start: 2023-10-09 | End: 2023-10-12 | Stop reason: HOSPADM

## 2023-10-09 RX ORDER — FUROSEMIDE 10 MG/ML
40 INJECTION INTRAMUSCULAR; INTRAVENOUS EVERY 12 HOURS
Status: DISCONTINUED | OUTPATIENT
Start: 2023-10-09 | End: 2023-10-12 | Stop reason: HOSPADM

## 2023-10-09 RX ORDER — DIGOXIN 250 MCG
250 TABLET ORAL DAILY
Status: DISCONTINUED | OUTPATIENT
Start: 2023-10-10 | End: 2023-10-12 | Stop reason: HOSPADM

## 2023-10-09 RX ORDER — DILTIAZEM HYDROCHLORIDE 120 MG/1
120 CAPSULE, COATED, EXTENDED RELEASE ORAL
Status: DISCONTINUED | OUTPATIENT
Start: 2023-10-09 | End: 2023-10-09

## 2023-10-09 RX ORDER — DIGOXIN 125 MCG
125 TABLET ORAL DAILY
Status: DISCONTINUED | OUTPATIENT
Start: 2023-10-09 | End: 2023-10-09

## 2023-10-09 RX ORDER — FUROSEMIDE 10 MG/ML
40 INJECTION INTRAMUSCULAR; INTRAVENOUS ONCE
Status: COMPLETED | OUTPATIENT
Start: 2023-10-09 | End: 2023-10-09

## 2023-10-09 RX ORDER — DILTIAZEM HYDROCHLORIDE 240 MG/1
240 CAPSULE, COATED, EXTENDED RELEASE ORAL
Status: DISCONTINUED | OUTPATIENT
Start: 2023-10-10 | End: 2023-10-12 | Stop reason: HOSPADM

## 2023-10-09 RX ADMIN — DILTIAZEM HYDROCHLORIDE 120 MG: 120 CAPSULE, COATED, EXTENDED RELEASE ORAL at 08:32

## 2023-10-09 RX ADMIN — PANTOPRAZOLE SODIUM 40 MG: 40 TABLET, DELAYED RELEASE ORAL at 08:33

## 2023-10-09 RX ADMIN — ATORVASTATIN CALCIUM 20 MG: 20 TABLET, FILM COATED ORAL at 20:52

## 2023-10-09 RX ADMIN — MORPHINE SULFATE 2 MG: 2 INJECTION, SOLUTION INTRAMUSCULAR; INTRAVENOUS at 00:25

## 2023-10-09 RX ADMIN — FUROSEMIDE 40 MG: 10 INJECTION, SOLUTION INTRAMUSCULAR; INTRAVENOUS at 02:13

## 2023-10-09 RX ADMIN — TRIAMCINOLONE ACETONIDE 1 APPLICATION: 1 OINTMENT TOPICAL at 14:25

## 2023-10-09 RX ADMIN — INSULIN LISPRO 2 UNITS: 100 INJECTION, SOLUTION INTRAVENOUS; SUBCUTANEOUS at 08:31

## 2023-10-09 RX ADMIN — MICONAZOLE NITRATE 1 APPLICATION: 2 POWDER TOPICAL at 08:33

## 2023-10-09 RX ADMIN — DIGOXIN 125 MCG: 125 TABLET ORAL at 08:32

## 2023-10-09 RX ADMIN — INSULIN LISPRO 2 UNITS: 100 INJECTION, SOLUTION INTRAVENOUS; SUBCUTANEOUS at 11:54

## 2023-10-09 RX ADMIN — METOPROLOL SUCCINATE 100 MG: 50 TABLET, EXTENDED RELEASE ORAL at 20:52

## 2023-10-09 RX ADMIN — Medication 2 EACH: at 13:18

## 2023-10-09 RX ADMIN — INSULIN LISPRO 2 UNITS: 100 INJECTION, SOLUTION INTRAVENOUS; SUBCUTANEOUS at 00:25

## 2023-10-09 RX ADMIN — HYDROCODONE BITARTRATE AND ACETAMINOPHEN 1 TABLET: 10; 325 TABLET ORAL at 02:13

## 2023-10-09 RX ADMIN — FUROSEMIDE 40 MG: 10 INJECTION, SOLUTION INTRAMUSCULAR; INTRAVENOUS at 08:32

## 2023-10-09 RX ADMIN — PIPERACILLIN AND TAZOBACTAM 4.5 G: 4; .5 INJECTION, POWDER, FOR SOLUTION INTRAVENOUS at 07:21

## 2023-10-09 RX ADMIN — PANTOPRAZOLE SODIUM 40 MG: 40 TABLET, DELAYED RELEASE ORAL at 20:52

## 2023-10-09 RX ADMIN — FUROSEMIDE 40 MG: 10 INJECTION, SOLUTION INTRAMUSCULAR; INTRAVENOUS at 20:08

## 2023-10-09 RX ADMIN — HYDROCODONE BITARTRATE AND ACETAMINOPHEN 1 TABLET: 10; 325 TABLET ORAL at 18:01

## 2023-10-09 RX ADMIN — HYDROCODONE BITARTRATE AND ACETAMINOPHEN 1 TABLET: 10; 325 TABLET ORAL at 11:55

## 2023-10-09 RX ADMIN — INSULIN LISPRO 2 UNITS: 100 INJECTION, SOLUTION INTRAVENOUS; SUBCUTANEOUS at 20:52

## 2023-10-09 RX ADMIN — MORPHINE SULFATE 2 MG: 2 INJECTION, SOLUTION INTRAMUSCULAR; INTRAVENOUS at 07:04

## 2023-10-09 RX ADMIN — ONDANSETRON 4 MG: 2 INJECTION INTRAMUSCULAR; INTRAVENOUS at 01:19

## 2023-10-09 RX ADMIN — PIPERACILLIN AND TAZOBACTAM 4.5 G: 4; .5 INJECTION, POWDER, FOR SOLUTION INTRAVENOUS at 23:30

## 2023-10-09 RX ADMIN — Medication 10 ML: at 20:53

## 2023-10-09 RX ADMIN — METOLAZONE 5 MG: 5 TABLET ORAL at 17:24

## 2023-10-09 RX ADMIN — MORPHINE SULFATE 2 MG: 2 INJECTION, SOLUTION INTRAMUSCULAR; INTRAVENOUS at 21:09

## 2023-10-09 RX ADMIN — PIPERACILLIN AND TAZOBACTAM 4.5 G: 4; .5 INJECTION, POWDER, FOR SOLUTION INTRAVENOUS at 14:36

## 2023-10-09 RX ADMIN — SACUBITRIL AND VALSARTAN 0.5 TABLET: 97; 103 TABLET, FILM COATED ORAL at 08:31

## 2023-10-09 RX ADMIN — MICONAZOLE NITRATE 1 APPLICATION: 2 POWDER TOPICAL at 20:53

## 2023-10-09 RX ADMIN — RIVAROXABAN 20 MG: 20 TABLET, FILM COATED ORAL at 08:32

## 2023-10-09 RX ADMIN — METOPROLOL SUCCINATE 100 MG: 50 TABLET, EXTENDED RELEASE ORAL at 08:32

## 2023-10-09 RX ADMIN — MORPHINE SULFATE 2 MG: 2 INJECTION, SOLUTION INTRAMUSCULAR; INTRAVENOUS at 14:36

## 2023-10-09 RX ADMIN — HYDROCODONE BITARTRATE AND ACETAMINOPHEN 1 TABLET: 10; 325 TABLET ORAL at 23:42

## 2023-10-09 NOTE — THERAPY EVALUATION
Acute Care - Physical Therapy Initial Evaluation   [facility]     Patient Name: Mary Albarran  : 1950  MRN: [id number]  Today's Date: 10/9/2023      Visit Dx:     ICD-10-CM ICD-9-CM   1. Sepsis, due to unspecified organism, unspecified whether acute organ dysfunction present  A41.9 038.9     995.91   2. Acute UTI  N39.0 599.0   3. Difficulty walking  R26.2 719.7     Patient Active Problem List   Diagnosis    Aortic valve disease    Atrial fibrillation    Chronic kidney disease, stage III (moderate)    Hyperlipidemia    Hypertension    Chronic diastolic congestive heart failure    Atrial fibrillation with RVR    Onychomycosis    Onychocryptosis    Foot pain, bilateral    Sepsis     Past Medical History:   Diagnosis Date    Allergic rhinitis     Anxiety     Aortic valve disease 2021    Fibrocalcific changes of the aortic valve with mild aortic valve stenosis   on echo 3/2/2021  Moderate aortic valve regurgitation is present. Aortic valve maximum pressure gradient is 26 mmHg. Moderate aortic valve stenosis is present.  On echo 2/10/2022       Arthritis     Atrial fibrillation     Chronic kidney disease (CKD), stage III (moderate)     Chronic pain     COPD (chronic obstructive pulmonary disease)     Diabetes mellitus type 2 with complications     Ex-smoker     QUIT SMOKING     Hyperlipidemia     Hypertension     Thrombocytopenia      Past Surgical History:   Procedure Laterality Date    HYSTERECTOMY      30 YEARS AGO     PT Assessment (last 12 hours)       PT Evaluation and Treatment       Row Name 10/09/23 1500          Physical Therapy Time and Intention    Document Type evaluation  -AV     Mode of Treatment individual therapy;physical therapy  -AV       Row Name 10/09/23 1500          General Information    Patient Profile Reviewed yes  -AV     Prior Level of Function independent:;all household mobility;gait;transfer;ADL's  Ambulated with STC. No home O2.  -AV     Equipment Currently Used at Home cane,  straight  -AV     Existing Precautions/Restrictions fall;oxygen therapy device and L/min  -AV       Row Name 10/09/23 1500          Living Environment    Current Living Arrangements home  -AV     Home Accessibility stairs to enter home  -AV     People in Home spouse  -AV       Row Name 10/09/23 1500          Home Main Entrance    Number of Stairs, Main Entrance other (see comments)  Reports ramp is being built  -AV       Row Name 10/09/23 1500          Cognition    Orientation Status (Cognition) oriented x 3  -AV       Row Name 10/09/23 1500          Range of Motion (ROM)    Range of Motion bilateral lower extremities;ROM is WFL  -AV       Row Name 10/09/23 1500          Strength (Manual Muscle Testing)    Strength (Manual Muscle Testing) right lower extremity strength;left lower extremity strength  -AV     Left Lower Extremity Strength --  3-/5  -AV     Right Lower Extremity Strength --  3+/5  -AV       Row Name 10/09/23 1500          Bed Mobility    Bed Mobility bed mobility (all) activities  -AV     All Activities, [place] (Bed Mobility) minimum assist (75% patient effort)  -AV       Row Name 10/09/23 1500          Safety Issues, Functional Mobility    Impairments Affecting Function (Mobility) balance;endurance/activity tolerance;strength  -AV       Row Name             Wound 10/07/23 0624 Right lower breast MASD (Moisture associated skin damage)    Wound - Properties Group Placement Date: 10/07/23  -JK Placement Time: 0624 -JK Side: Right  -JK Orientation: lower  -JK Location: breast  -JK Primary Wound Type: MASD  -JK    Retired Wound - Properties Group Placement Date: 10/07/23  -JK Placement Time: 0624 -JK Side: Right  -JK Orientation: lower  -JK Location: breast  -JK Primary Wound Type: MASD  -JK    Retired Wound - Properties Group Date first assessed: 10/07/23  -JK Time first assessed: 0624 -JK Side: Right  -JK Location: breast  -JK Primary Wound Type: MASD  -JK      Row Name             Wound  10/07/23 0625 Left breast MASD (Moisture associated skin damage)    Wound - Properties Group Placement Date: 10/07/23  -JK Placement Time: 0625 -JK Present on Original Admission: Y  -JK Side: Left  -JK Location: breast  -JK Primary Wound Type: MASD  -JK    Retired Wound - Properties Group Placement Date: 10/07/23  -JK Placement Time: 0625  -JK Present on Original Admission: Y  -JK Side: Left  -JK Location: breast  -JK Primary Wound Type: MASD  -JK    Retired Wound - Properties Group Date first assessed: 10/07/23  -JK Time first assessed: 0625  -JK Present on Original Admission: Y  -JK Side: Left  -JK Location: breast  -JK Primary Wound Type: MASD  -JK      Row Name             Wound Right anterior hip MASD (Moisture associated skin damage)    Wound - Properties Group Side: Right  -JK Orientation: anterior  -JK Location: hip  -JK Primary Wound Type: MASD  -JK    Retired Wound - Properties Group Side: Right  -JK Orientation: anterior  -JK Location: hip  -JK Primary Wound Type: MASD  -JK    Retired Wound - Properties Group Side: Right  -JK Location: hip  -JK Primary Wound Type: MASD  -JK      Row Name             Wound 10/07/23 0627 Left anterior hip MASD (Moisture associated skin damage)    Wound - Properties Group Placement Date: 10/07/23  -JK Placement Time: 0627 -JK Present on Original Admission: Y  -JK Side: Left  -JK Orientation: anterior  -JK Location: hip  -JK Primary Wound Type: MASD  -JK    Retired Wound - Properties Group Placement Date: 10/07/23  -JK Placement Time: 0627 -JK Present on Original Admission: Y  -JK Side: Left  -JK Orientation: anterior  -JK Location: hip  -JK Primary Wound Type: MASD  -JK    Retired Wound - Properties Group Date first assessed: 10/07/23  -JK Time first assessed: 0627  -JK Present on Original Admission: Y  -JK Side: Left  -JK Location: hip  -JK Primary Wound Type: MASD  -JK      Row Name             Wound 05/13/23 0210 Right lower leg Venous Ulcer    Wound - Properties Group  Placement Date: 05/13/23 -TH Placement Time: 0210  -TH Present on Original Admission: Y  -TH Side: Right  -TH Orientation: lower  -TH Location: leg  -TH Primary Wound Type: Venous ulcer  -TH    Retired Wound - Properties Group Placement Date: 05/13/23 -TH Placement Time: 0210  -TH Present on Original Admission: Y  -TH Side: Right  -TH Orientation: lower  -TH Location: leg  -TH Primary Wound Type: Venous ulcer  -TH    Retired Wound - Properties Group Date first assessed: 05/13/23 -TH Time first assessed: 0210  -TH Present on Original Admission: Y  -TH Side: Right  -TH Location: leg  -TH Primary Wound Type: Venous ulcer  -TH      Row Name             Wound 05/13/23 0210 Left lower leg Venous Ulcer    Wound - Properties Group Placement Date: 05/13/23 -TH Placement Time: 0210  -TH Present on Original Admission: Y  -TH Side: Left  -TH Orientation: lower  -TH Location: leg  -TH Primary Wound Type: Venous ulcer  -TH    Retired Wound - Properties Group Placement Date: 05/13/23 -TH Placement Time: 0210 -TH Present on Original Admission: Y  -TH Side: Left  -TH Orientation: lower  -TH Location: leg  -TH Primary Wound Type: Venous ulcer  -TH    Retired Wound - Properties Group Date first assessed: 05/13/23 -TH Time first assessed: 0210  -TH Present on Original Admission: Y  -TH Side: Left  -TH Location: leg  -TH Primary Wound Type: Venous ulcer  -TH      Row Name 10/09/23 1500          Plan of Care Review    Plan of Care Reviewed With patient  -AV     Progress no change  -AV     Outcome Evaluation Patient presents with deficits in balance, endurance, transfers, and ambulation. Patient will benefit from skilled PT services to address these mobility deficits and decrease risk of falls.  -AV       Row Name 10/09/23 1500          Therapy Assessment/Plan (PT)    Rehab Potential (PT) good, to achieve stated therapy goals  -AV     Criteria for Skilled Interventions Met (PT) yes;meets criteria  -AV     Therapy Frequency (PT)  daily  -AV     Predicted Duration of Therapy Intervention (PT) 10 days  -AV     Problem List (PT) problems related to;balance;mobility;strength  -AV     Activity Limitations Related to Problem List (PT) unable to transfer safely;unable to ambulate safely  -AV       Row Name 10/09/23 1500          PT Evaluation Complexity    History, PT Evaluation Complexity no personal factors and/or comorbidities  -AV     Examination of Body Systems (PT Eval Complexity) total of 4 or more elements  -AV     Clinical Presentation (PT Evaluation Complexity) stable  -AV     Clinical Decision Making (PT Evaluation Complexity) low complexity  -AV     Overall Complexity (PT Evaluation Complexity) low complexity  -AV       Row Name 10/09/23 1500          Therapy Plan Review/Discharge Plan (PT)    Therapy Plan Review (PT) evaluation/treatment results reviewed;patient  -AV       Row Name 10/09/23 1500          Physical Therapy Goals    Bed Mobility Goal Selection (PT) bed mobility, PT goal 1  -AV     Transfer Goal Selection (PT) transfer, PT goal 1  -AV     Gait Training Goal Selection (PT) gait training, PT goal 1  -AV       Row Name 10/09/23 1500          Bed Mobility Goal 1 (PT)    Activity/Assistive Device (Bed Mobility Goal 1, PT) sit to supine/supine to sit  -AV     [place] Level/Cues Needed (Bed Mobility Goal 1, PT) standby assist  -AV     Time Frame (Bed Mobility Goal 1, PT) 10 days  -AV       Row Name 10/09/23 1500          Transfer Goal 1 (PT)    Activity/Assistive Device (Transfer Goal 1, PT) sit-to-stand/stand-to-sit;bed-to-chair/chair-to-bed;walker, rolling  -AV     [place] Level/Cues Needed (Transfer Goal 1, PT) standby assist  -AV     Time Frame (Transfer Goal 1, PT) 10 days  -AV       Row Name 10/09/23 1500          Gait Training Goal 1 (PT)    Activity/Assistive Device (Gait Training Goal 1, PT) gait (walking locomotion);assistive device use;walker, rolling  -AV     [place] Level (Gait Training Goal 1, PT)  standby assist  -AV     Distance (Gait Training Goal 1, PT) 120  -AV     Time Frame (Gait Training Goal 1, PT) 10 days  -AV               User Key  (r) = Recorded By, (t) = Taken By, (c) = Cosigned By      Initials Name Provider Type    TH Cahtryn Maldonado, RN Registered Nurse    Queenie Ga RN Registered Nurse    Kelvin Benavides, PT Physical Therapist                    Physical Therapy Education       Title: PT OT SLP Therapies (In Progress)       Topic: Physical Therapy (In Progress)       Point: Mobility training (Done)       Learning Progress Summary             Patient Acceptance, E,TB, VU by AV at 10/9/2023 1515                         Point: Home exercise program (Not Started)       Learner Progress:  Not documented in this visit.              Point: Body mechanics (Done)       Learning Progress Summary             Patient Acceptance, E,TB, VU by AV at 10/9/2023 1515                         Point: Precautions (Done)       Learning Progress Summary             Patient Acceptance, E,TB, VU by AV at 10/9/2023 1515                                         User Key       Initials Effective Dates Name Provider Type Discipline     06/11/21 -  Kelvin Trejo, ALIVIA Physical Therapist PT                  PT Recommendation and Plan  Anticipated Discharge Disposition (PT): inpatient rehabilitation facility  Planned Therapy Interventions (PT): balance training, bed mobility training, gait training, home exercise program, neuromuscular re-education, strengthening, transfer training  Therapy Frequency (PT): daily  Plan of Care Reviewed With: patient  Progress: no change  Outcome Evaluation: Patient presents with deficits in balance, endurance, transfers, and ambulation. Patient will benefit from skilled PT services to address these mobility deficits and decrease risk of falls.   Outcome Measures       Row Name 10/09/23 1500             How much help from another person do you currently need...    Turning from  your back to your side while in flat bed without using bedrails? 4  -AV      Moving from lying on back to sitting on the side of a flat bed without bedrails? 3  -AV      Moving to and from a bed to a chair (including a wheelchair)? 3  -AV      Standing up from a chair using your arms (e.g., wheelchair, bedside chair)? 3  -AV      Climbing 3-5 steps with a railing? 2  -AV      To walk in hospital room? 3  -AV      AM-PAC 6 Clicks Score (PT) 18  -AV      Highest level of mobility 6 --> Walked 10 steps or more  -AV         Functional Assessment    Outcome Measure Options AM-PAC 6 Clicks Basic Mobility (PT)  -AV                User Key  (r) = Recorded By, (t) = Taken By, (c) = Cosigned By      Initials Name Provider Type    Kelvin Benavides, PT Physical Therapist                     Time Calculation:    PT Charges       Row Name 10/09/23 1514             Time Calculation    PT Received On 10/09/23  -AV      PT Goal Re-Cert Due Date 10/18/23  -AV         Untimed Charges    PT Eval/Re-eval Minutes 25  -AV         Total Minutes    Untimed Charges Total Minutes 25  -AV       Total Minutes 25  -AV                User Key  (r) = Recorded By, (t) = Taken By, (c) = Cosigned By      Initials Name Provider Type    Kelvin Benavides, PT Physical Therapist                  Therapy Charges for Today       Code Description Service Date Service Provider Modifiers Qty    [id number] HC PT EVAL LOW COMPLEXITY 2 10/9/2023 Kelvin Trejo, PT GP 1            PT G-Codes  Outcome Measure Options: AM-PAC 6 Clicks Basic Mobility (PT)  AM-PAC 6 Clicks Score (PT): 18    Kelvin Trejo PT  10/9/2023

## 2023-10-09 NOTE — SIGNIFICANT NOTE
Wound Eval / Progress Noted    GUERO Bautista     Patient Name: Mary Albarran  : 1950  MRN: [id number]  Today's Date: 10/9/2023                 Admit Date: 10/6/2023    Visit Dx:    ICD-10-CM ICD-9-CM   1. Sepsis, due to unspecified organism, unspecified whether acute organ dysfunction present  A41.9 038.9     995.91   2. Acute UTI  N39.0 599.0   3. Difficulty walking  R26.2 719.7         Sepsis        Past Medical History:   Diagnosis Date    Allergic rhinitis     Anxiety     Aortic valve disease 2021    Fibrocalcific changes of the aortic valve with mild aortic valve stenosis   on echo 3/2/2021  Moderate aortic valve regurgitation is present. Aortic valve maximum pressure gradient is 26 mmHg. Moderate aortic valve stenosis is present.  On echo 2/10/2022       Arthritis     Atrial fibrillation     Chronic kidney disease (CKD), stage III (moderate)     Chronic pain     COPD (chronic obstructive pulmonary disease)     Diabetes mellitus type 2 with complications     Ex-smoker     QUIT SMOKING     Hyperlipidemia     Hypertension     Thrombocytopenia         Past Surgical History:   Procedure Laterality Date    HYSTERECTOMY      30 YEARS AGO         Physical Assessment:     10/09/23 1225   Wound 23 Right lower leg Venous Ulcer   Placement Date/Time: 23   Present on Hospital Admission: Yes  Side: Right  Orientation: lower  Location: leg  Primary Wound Type: Venous Ulcer   Wound Image     Dressing Appearance intact   Base pink;red;dry   Periwound redness;swelling;warm   Periwound Temperature warm   Periwound Skin Turgor soft   Edges rolled/closed   Drainage Amount none   Wound 23 Left lower leg Venous Ulcer   Placement Date/Time: 23   Present on Hospital Admission: Yes  Side: Left  Orientation: lower  Location: leg  Primary Wound Type: Venous Ulcer   Wound Image     Dressing Appearance intact   Base pink;red;dry   Periwound blistered;swelling;warm;edematous;redness    Periwound Temperature warm   Periwound Skin Turgor firm   Edges rolled/closed   Drainage Amount none          Wound Check / Follow-up:  Patient seen today for wound consult. Patient with intact wraps to BLE. She states she had home health but no longer does so her  has been wrapping her legs.   She has unnaboots with gauze and ace wrap to BLE.     Dressing removed. Patient with chronic skin discoloration, hemosiderin staining, to bilateral lower legs with dry flaky skin. Small area of serous filled blistering noted to left lateral leg.   Recommending application of triamcinolone to BLE after cleansing and then to rewrap in manner she was tolerating at home with unna boots, gauze roll and ace wraps.     Patient additionally has MASD with fungal presentation to skin folds / creases. Recommending topical treatment with moisture prevention measures.     External female catheter in place for bladder management.     Bilateral gluteal aspects are pink with moisture within crease but no signs of dermatitis or fungus    Impression: MASD with fungal presentation within skin folds / creases. BLE with chronic discoloration and edema    Short term goals:  Regain skin integrity. Skin protection, moisture prevention, pressure reduction. Edema management, skin care.     Yanira Jacques RN    10/9/2023    18:23 EDT

## 2023-10-09 NOTE — PLAN OF CARE
Goal Outcome Evaluation:  Plan of Care Reviewed With: patient Vitals stable , medicated for pain per mar , pt remains afib on monitor denies chest pain . Assisted to bsc tolerated activity . Bilateral dressings on lower extremities pt does not want removed wound care to see .Ronda Carr RN

## 2023-10-09 NOTE — PLAN OF CARE
Goal Outcome Evaluation:            VSS. UOP. Up with standby assist. Pain controlled per patient; see MAR. Skin care completed and dsg changed BLE; tolerated well. Rhythm improved from beginning of shift; currently afib controlled. O2 sats stable on 7L high-j[place] miguel cannula.

## 2023-10-09 NOTE — PROGRESS NOTES
[facility]   Hospitalist Progress Note       Patient Name: Mary Albarran  : 1950  MRN: [id number]  Primary Care Physician: Brittni Quiroz APRN  Date of admission: 10/6/2023  Today's Date: 10/9/2023  Room / Bed:   [facility]/  Subjective   Chief Complaint:  Abd pain    Summary:  Patient is a 73-year-old female with past medical history of aortic valve disease, atrial fibrillation currently kidney disease stage III, COPD, diabetes mellitus, hypertension and hyperlipidemia.  More recently the patient has had recurrent urinary tract infections.  She has been hospitalized here and at [facility] over the summer for her UTI.  Patient was told previously that the reason that she is getting recurrent UTIs is because of a kidney stone that she has.  Patient says that she has been having some fever and chills subjectively as well.  Here in the ER a CT scan of the abdomen was performed which showed gallstones but no obvious renal calculi.  Patient has been tachycardic with a low-grade fever for the entirety of her ER visit as well.  Patient was found to once again have a urinary tract infection.  She is clinically septic and therefore will be admitted for further evaluation and treatment.     Interval Followup: 10/9/2023    Discussed with patient.  Farxiga may be contributing to frequent UTIs.  Hypoxic overnight  Volume overload with pulmonary edema on chest x-ray  BNP 3000  Starting diuresis, working briskly          REVIEW OF SYSTEMS:   fatigue  Objective   Temp:  [97.5 øF (36.4 øC)-98.9 øF (37.2 øC)] 97.5 øF (36.4 øC)  Heart Rate:  [108-136] 116  Resp:  [18-22] 18  BP: (124-151)/(54-80) 135/80  Flow (L/min):  [3-8] 7  PHYSICAL EXAM   CON: WN. WD. NAD.   NECK:  No thyromegaly. No stridor.   RESP:  CTA. No wheezes. No crackles.  No work of breathing or tachypnea.   CV:  Rhythm regular. Rate WNL. No murmur noted.  No edema.  GI:  Soft and nontender. Nondistended.    EXT: Peripheral pulses intact.  No  cyanosis.  PSYCH:  Alert. Oriented. Normal affect and mood.  NEURO:  No dysarthria or aphasia. No unilateral weakness or paresthesia.  SKIN: No chronic venous stasis changes or varicosities.  No cellulitis  Results from last 7 days   Lab Units 10/09/23  0448 10/08/23  0454 10/07/23  0552 10/06/23  2050   WBC 10*3/mm3 8.16 7.82 11.35* 17.13*   HEMOGLOBIN g/dL 10.5* 10.7* 10.9* 12.1   HEMATOCRIT % 35.1 36.5 37.0 40.5   PLATELETS 10*3/mm3 85* 74* 109* 136*     Results from last 7 days   Lab Units 10/09/23  0448 10/09/23  0135 10/08/23  0454 10/07/23  0552 10/06/23  2050   SODIUM mmol/L 138  --  138 140 140   SODIUM, ARTERIAL mmol/L  --  136.3  --   --   --    POTASSIUM mmol/L 4.1  --  4.6 4.7 4.8   CO2 mmol/L 25.3  --  23.8 27.7 23.8   CHLORIDE mmol/L 102  --  106 105 102   ANION GAP mmol/L 10.7  --  8.2 7.3 14.2   BUN mg/dL 6*  --  10 11 11   CREATININE mg/dL 0.71  --  0.76 0.91 0.89   GLUCOSE mg/dL 168*  --  120* 121* 203*   GLUCOSE, ARTERIAL mg/dL  --  152*  --   --   --          COMPLEXITY OF DATA / DECISION MAKING     []  Moderate: One acute illness or mild exacerbation of chronic or 2 stable chronic or tx side effects   []  High:  Severe acute illness or severe exacerbation of chronic - potential for major debility / life threatening         I have personally reviewed the results from the time of this admission to 10/9/2023 09:57 EDT:  []  Laboratory:  []  Microbiology: []  Radiology:  []  Telemetry:   []  Cardiology/Vascular:  []  Pathology:  []  Prior external records:  []  Independent historian provided additional details:      []  Discussed case with specialists:    []  Independent interpretation of ECG/Imaging etc:        There is probable less inflammatory change and mural thickening involving the distal   stomach and proximal duodenum.  Again some degree of age-indeterminate infectious/inflammatory   gastro-duodenitis cannot be excluded.  Peptic ulcer disease is possible, as well.  There are   gallstones  without acute cholecystitis.  No definite CT evidence of biliary ductal dilatation or   choledocholithiasis.  No acute pancreatitis is suspected.  There is diffuse hepatic steatosis with   hepatomegaly.  There may be borderline splenomegaly.  There is nonobstructing nephrolithiasis on   the right with a lower pole calyceal stone measuring about 9 mm.  It was seen previously.  No   definite left nephrolithiasis.  No ureterolithiasis.  No hydronephrosis.  No acute pyelonephritis.    No urinary bladder calculi are seen.  There is an air-fluid level in the urinary bladder, which may   be related to recent catheterization.  Please correlate clinically.  There is been hysterectomy.    No suspicious adnexal mass is suggested by CT.  Atherosclerotic changes are seen without aneurysmal   dilatation of the aortoiliac arterial system.  No acute intraperitoneal or retroperitoneal   hemorrhage.  There may be mild cardiac enlargement.  Atelectasis and/or fibrosis involve(s) the   lung bases.  Probably no acute infiltrate is identified.  No pleural effusion.  Degenerative   changes are seen throughout the imaged spine, moderate to severe in degree.  Degenerative changes   involve the bilateral sacroiliac (SI) joints and the bilateral hip joints.  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  No acute fracture is appreciated.  No significant interval change is suspected in the   2.5 cm benign intraosseous hemangioma (or venous malformation) involving the right aspect of the L5   vertebral body.  No acute appendicitis.  The appendix is seen on image 115 of series 201 and   adjacent images.  There may be colonic diverticula.  No acute diverticulitis.  No acute colitis.    No mechanical bowel obstruction.  No pneumoperitoneum or pneumatosis         []  Moderate: Rx management, low risk surgery, suboptimal social situation   []  High:  Rx with close monitoring for toxicity, mod-high risk surgery, DNR decision, Comfort initiated, IV pain  meds    Assessment / Plan   Assessment:    Clinical sepsis  Complicated urinary tract infection  Recurrent UTIs, likely exacerbated by home Farxiga (discontinued)  Leukocytosis  Elevated lactic acid  Atrial fibrillation (Dr. Metcalf)  Valvular heart disease: Moderate AI and mild AS  Hypertension  Diabetes mellitus  Obesity with BMI 45  Neuropathy  Gallstones without cholecystitis  Nonobstructing nephrolithiasis right lower pole 0.9 cm  Degenerative spinal disease  Diffuse hepatic steatosis with hepatomegaly  Gastritis/duodenitis suggested CT abdomen  Bilateral chronic venous stasis dermatitis changes    Plan:     Pulm consulted  Continue diuresis  Discontinue Farxiga  Echo pending  Unna boots for wound care bilateral lower extremities  Disposition: Follow-up urine cultures.  Continue with oral antibiotics at time of discharge, pending improvement of symptoms, no fevers, normal white count.  Follow-up with urology and PCP after discharge.  Awaiting urine cultures.  Continue empiric antibiotics  Urology consulted:  Dr Varela  Continue home Xarelto  Continue home beta-blocker, initially at lower dose  Patient has been started on the sepsis protocol  We will start the patient on broad-spectrum antibiotic, given this is her third infection and short amount of time  We will hydrate with normal saline  Added PPI  We will keep her on telemetry for closer monitoring  Resumehome medications once those are available  We will treat the patient's increasing abdominal and low back pain with IV morphine as needed         Discussed plan with RN.  DVT prophylaxis:  Medical DVT prophylaxis orders are present.  CODE STATUS:      Code Status (Patient has no pulse and is not breathing): CPR (Attempt to Resuscitate)  Medical Interventions (Patient has pulse or is breathing): Full Support           Attending documentation:  I reviewed the above documentation and independently reviewed and rounded and evaluated the patient and discussed  the care plan with ISH Brewster PA-C, I agree with his findings and plan as documented, what I have added to the care plan and modified is as follows in my documentation and my medical decision making; 73-year-old female hospitalized on 10/7/2023 with chief complaint of abdominal pain, found to have sepsis secondary to UTI, CT imaging shows gallstones without acute cholecystitis, there is possible persistent gastroduodenitis, nonobstructing nephrolithiasis on the right, stone measuring 9 mm.  Urology consulted as patient is concerned that she has recurrent UTIs due to renal stone.  Likely Farxiga contributing to recurrent UTIs, recommend discontinuation of Farxiga permanently.  Went into A-fib with RVR, volume overload, placed back on beta-blocker, digoxin, placed on diuretics, had increased oxygen requirement, pulmonary consulted.  Interval follow-up: Seen and examined, went into A-fib with elevated heart rate and volume overload and hypotension, she was given a fluid bolus yesterday evening, in addition to Lopressor, fail to improve heart rate, Cardizem resumed in addition to digoxin as well as metoprolol which she takes a significantly elevated dose, I also placed her on Lasix to help diurese the volume she received.  I went into explaining her recurrent UTIs are probably related to Farxiga since starting, has had significant increase in urinary tract infection frequency.  On review of systems obtained, all systems reviewed negative except for generalized weakness, fatigue, shortness of breath, cough.  Vitals reviewed, labs reviewed on physical exam morbidly obese female, no acute distress, wearing nasal cannula oxygen high flow at 8 L, regular rhythm, diminished to auscultation bilaterally, soft nontender abdomen, lower extremity edema with legs wrapped with Unna boots.  Assessment as above, plan, resume Cardizem, dose increased to 250 mg daily, resume digoxin 250 mcg daily, resume metoprolol reduced to 100 mg XL  strength twice a day, Lasix 40 mg IV twice daily, strict I's and O's, daily weights, metolazone 5 mg x 1 dose, discussed with pulmonary Dr. Farmer, follow-up echo, urology consultation appreciated, continue Zosyn, continue Xarelto, continue insulin sliding scale coverage,  A.m. labs, full code, DVT prophylaxis Xarelto, clinical course dictate further management, discussed with nurse at the bedside.  More than 95 % of the time of this patient's encounter was performed by me, this included face-to-face time, planning and coordinating, medical decision making and critical thinking personally done by me.  -AVBMD    Electronically signed by Melody Luna MD, 10/09/23, 4:40 PM EDT.    Portions of this documentation were transcribed electronically from a voice recognition software.  I confirm all data accurately represents the service(s) I performed at today's visit.

## 2023-10-10 LAB
ALBUMIN SERPL-MCNC: 3.5 G/DL (ref 3.5–5.2)
ALP SERPL-CCNC: 55 U/L (ref 39–117)
ALT SERPL W P-5'-P-CCNC: 14 U/L (ref 1–33)
ANION GAP SERPL CALCULATED.3IONS-SCNC: 10.9 MMOL/L (ref 5–15)
ANISOCYTOSIS BLD QL: NORMAL
AST SERPL-CCNC: 20 U/L (ref 1–32)
BASOPHILS # BLD AUTO: 0.02 10*3/MM3 (ref 0–0.2)
BASOPHILS NFR BLD AUTO: 0.4 % (ref 0–1.5)
BILIRUB CONJ SERPL-MCNC: 0.5 MG/DL (ref 0–0.3)
BILIRUB INDIRECT SERPL-MCNC: 1 MG/DL
BILIRUB SERPL-MCNC: 1.5 MG/DL (ref 0–1.2)
BUN SERPL-MCNC: 7 MG/DL (ref 8–23)
BUN/CREAT SERPL: 8.6 (ref 7–25)
CALCIUM SPEC-SCNC: 9.3 MG/DL (ref 8.6–10.5)
CHLORIDE SERPL-SCNC: 94 MMOL/L (ref 98–107)
CO2 SERPL-SCNC: 32.1 MMOL/L (ref 22–29)
CREAT SERPL-MCNC: 0.81 MG/DL (ref 0.57–1)
DEPRECATED RDW RBC AUTO: 54.4 FL (ref 37–54)
EGFRCR SERPLBLD CKD-EPI 2021: 77.7 ML/MIN/1.73
EOSINOPHIL # BLD AUTO: 0.1 10*3/MM3 (ref 0–0.4)
EOSINOPHIL NFR BLD AUTO: 1.8 % (ref 0.3–6.2)
ERYTHROCYTE [DISTWIDTH] IN BLOOD BY AUTOMATED COUNT: 16.1 % (ref 12.3–15.4)
GLUCOSE BLDC GLUCOMTR-MCNC: 118 MG/DL (ref 70–99)
GLUCOSE BLDC GLUCOMTR-MCNC: 149 MG/DL (ref 70–99)
GLUCOSE BLDC GLUCOMTR-MCNC: 169 MG/DL (ref 70–99)
GLUCOSE BLDC GLUCOMTR-MCNC: 203 MG/DL (ref 70–99)
GLUCOSE SERPL-MCNC: 185 MG/DL (ref 65–99)
HCT VFR BLD AUTO: 37.1 % (ref 34–46.6)
HGB BLD-MCNC: 11.2 G/DL (ref 12–15.9)
IMM GRANULOCYTES # BLD AUTO: 0.01 10*3/MM3 (ref 0–0.05)
IMM GRANULOCYTES NFR BLD AUTO: 0.2 % (ref 0–0.5)
LYMPHOCYTES # BLD AUTO: 1.34 10*3/MM3 (ref 0.7–3.1)
LYMPHOCYTES NFR BLD AUTO: 23.9 % (ref 19.6–45.3)
MAGNESIUM SERPL-MCNC: 1.7 MG/DL (ref 1.6–2.4)
MCH RBC QN AUTO: 27.9 PG (ref 26.6–33)
MCHC RBC AUTO-ENTMCNC: 30.2 G/DL (ref 31.5–35.7)
MCV RBC AUTO: 92.5 FL (ref 79–97)
MONOCYTES # BLD AUTO: 0.56 10*3/MM3 (ref 0.1–0.9)
MONOCYTES NFR BLD AUTO: 10 % (ref 5–12)
NEUTROPHILS NFR BLD AUTO: 3.58 10*3/MM3 (ref 1.7–7)
NEUTROPHILS NFR BLD AUTO: 63.7 % (ref 42.7–76)
NRBC BLD AUTO-RTO: 0 /100 WBC (ref 0–0.2)
PHOSPHATE SERPL-MCNC: 4 MG/DL (ref 2.5–4.5)
PLATELET # BLD AUTO: 89 10*3/MM3 (ref 140–450)
PMV BLD AUTO: 14.1 FL (ref 6–12)
POTASSIUM SERPL-SCNC: 3.4 MMOL/L (ref 3.5–5.2)
PROT SERPL-MCNC: 6.8 G/DL (ref 6–8.5)
RBC # BLD AUTO: 4.01 10*6/MM3 (ref 3.77–5.28)
SMALL PLATELETS BLD QL SMEAR: NORMAL
SODIUM SERPL-SCNC: 137 MMOL/L (ref 136–145)
WBC MORPH BLD: NORMAL
WBC NRBC COR # BLD: 5.61 10*3/MM3 (ref 3.4–10.8)

## 2023-10-10 PROCEDURE — 25010000002 MORPHINE PER 10 MG: Performed by: FAMILY MEDICINE

## 2023-10-10 PROCEDURE — 82948 REAGENT STRIP/BLOOD GLUCOSE: CPT

## 2023-10-10 PROCEDURE — 94761 N-INVAS EAR/PLS OXIMETRY MLT: CPT

## 2023-10-10 PROCEDURE — 25010000002 FUROSEMIDE PER 20 MG: Performed by: FAMILY MEDICINE

## 2023-10-10 PROCEDURE — 63710000001 INSULIN LISPRO (HUMAN) PER 5 UNITS: Performed by: PHYSICIAN ASSISTANT

## 2023-10-10 PROCEDURE — 80076 HEPATIC FUNCTION PANEL: CPT | Performed by: FAMILY MEDICINE

## 2023-10-10 PROCEDURE — 25010000002 ONDANSETRON PER 1 MG: Performed by: PHYSICIAN ASSISTANT

## 2023-10-10 PROCEDURE — 94799 UNLISTED PULMONARY SVC/PX: CPT

## 2023-10-10 PROCEDURE — 99222 1ST HOSP IP/OBS MODERATE 55: CPT | Performed by: STUDENT IN AN ORGANIZED HEALTH CARE EDUCATION/TRAINING PROGRAM

## 2023-10-10 PROCEDURE — 83735 ASSAY OF MAGNESIUM: CPT | Performed by: FAMILY MEDICINE

## 2023-10-10 PROCEDURE — 85025 COMPLETE CBC W/AUTO DIFF WBC: CPT | Performed by: FAMILY MEDICINE

## 2023-10-10 PROCEDURE — 99233 SBSQ HOSP IP/OBS HIGH 50: CPT | Performed by: FAMILY MEDICINE

## 2023-10-10 PROCEDURE — 85007 BL SMEAR W/DIFF WBC COUNT: CPT | Performed by: FAMILY MEDICINE

## 2023-10-10 PROCEDURE — 80048 BASIC METABOLIC PNL TOTAL CA: CPT | Performed by: FAMILY MEDICINE

## 2023-10-10 PROCEDURE — 84100 ASSAY OF PHOSPHORUS: CPT | Performed by: FAMILY MEDICINE

## 2023-10-10 PROCEDURE — 25010000002 PIPERACILLIN SOD-TAZOBACTAM PER 1 G: Performed by: FAMILY MEDICINE

## 2023-10-10 RX ORDER — BISMUTH SUBSALICYLATE 262 MG/1
524 TABLET, CHEWABLE ORAL
Status: DISCONTINUED | OUTPATIENT
Start: 2023-10-10 | End: 2023-10-12 | Stop reason: HOSPADM

## 2023-10-10 RX ORDER — ALUMINA, MAGNESIA, AND SIMETHICONE 2400; 2400; 240 MG/30ML; MG/30ML; MG/30ML
15 SUSPENSION ORAL EVERY 6 HOURS PRN
Status: DISCONTINUED | OUTPATIENT
Start: 2023-10-10 | End: 2023-10-12 | Stop reason: HOSPADM

## 2023-10-10 RX ORDER — CALCIUM CARBONATE 500 MG/1
2 TABLET, CHEWABLE ORAL 3 TIMES DAILY PRN
Status: DISCONTINUED | OUTPATIENT
Start: 2023-10-10 | End: 2023-10-12 | Stop reason: HOSPADM

## 2023-10-10 RX ORDER — METOLAZONE 5 MG/1
5 TABLET ORAL ONCE
Status: COMPLETED | OUTPATIENT
Start: 2023-10-10 | End: 2023-10-10

## 2023-10-10 RX ORDER — POTASSIUM CHLORIDE 750 MG/1
20 CAPSULE, EXTENDED RELEASE ORAL 2 TIMES DAILY WITH MEALS
Status: COMPLETED | OUTPATIENT
Start: 2023-10-10 | End: 2023-10-10

## 2023-10-10 RX ADMIN — DILTIAZEM HYDROCHLORIDE 240 MG: 240 CAPSULE, EXTENDED RELEASE ORAL at 08:50

## 2023-10-10 RX ADMIN — MICONAZOLE NITRATE 1 APPLICATION: 2 POWDER TOPICAL at 21:14

## 2023-10-10 RX ADMIN — HYDROCODONE BITARTRATE AND ACETAMINOPHEN 1 TABLET: 10; 325 TABLET ORAL at 06:54

## 2023-10-10 RX ADMIN — HYDROCODONE BITARTRATE AND ACETAMINOPHEN 1 TABLET: 10; 325 TABLET ORAL at 13:00

## 2023-10-10 RX ADMIN — METOPROLOL SUCCINATE 100 MG: 50 TABLET, EXTENDED RELEASE ORAL at 21:13

## 2023-10-10 RX ADMIN — MORPHINE SULFATE 2 MG: 2 INJECTION, SOLUTION INTRAMUSCULAR; INTRAVENOUS at 10:54

## 2023-10-10 RX ADMIN — RIVAROXABAN 20 MG: 20 TABLET, FILM COATED ORAL at 08:50

## 2023-10-10 RX ADMIN — METOPROLOL SUCCINATE 100 MG: 50 TABLET, EXTENDED RELEASE ORAL at 08:23

## 2023-10-10 RX ADMIN — MORPHINE SULFATE 2 MG: 2 INJECTION, SOLUTION INTRAMUSCULAR; INTRAVENOUS at 17:02

## 2023-10-10 RX ADMIN — METOLAZONE 5 MG: 5 TABLET ORAL at 08:24

## 2023-10-10 RX ADMIN — FUROSEMIDE 40 MG: 10 INJECTION, SOLUTION INTRAMUSCULAR; INTRAVENOUS at 19:47

## 2023-10-10 RX ADMIN — PANTOPRAZOLE SODIUM 40 MG: 40 TABLET, DELAYED RELEASE ORAL at 21:14

## 2023-10-10 RX ADMIN — PIPERACILLIN AND TAZOBACTAM 4.5 G: 4; .5 INJECTION, POWDER, FOR SOLUTION INTRAVENOUS at 07:16

## 2023-10-10 RX ADMIN — Medication 10 ML: at 21:15

## 2023-10-10 RX ADMIN — PIPERACILLIN AND TAZOBACTAM 4.5 G: 4; .5 INJECTION, POWDER, FOR SOLUTION INTRAVENOUS at 14:34

## 2023-10-10 RX ADMIN — MORPHINE SULFATE 2 MG: 2 INJECTION, SOLUTION INTRAMUSCULAR; INTRAVENOUS at 04:08

## 2023-10-10 RX ADMIN — FUROSEMIDE 40 MG: 10 INJECTION, SOLUTION INTRAMUSCULAR; INTRAVENOUS at 08:50

## 2023-10-10 RX ADMIN — INSULIN LISPRO 2 UNITS: 100 INJECTION, SOLUTION INTRAVENOUS; SUBCUTANEOUS at 21:14

## 2023-10-10 RX ADMIN — PANTOPRAZOLE SODIUM 40 MG: 40 TABLET, DELAYED RELEASE ORAL at 08:50

## 2023-10-10 RX ADMIN — PIPERACILLIN AND TAZOBACTAM 4.5 G: 4; .5 INJECTION, POWDER, FOR SOLUTION INTRAVENOUS at 23:29

## 2023-10-10 RX ADMIN — POTASSIUM CHLORIDE 20 MEQ: 10 CAPSULE, COATED, EXTENDED RELEASE ORAL at 17:02

## 2023-10-10 RX ADMIN — ONDANSETRON 4 MG: 2 INJECTION INTRAMUSCULAR; INTRAVENOUS at 11:02

## 2023-10-10 RX ADMIN — MORPHINE SULFATE 2 MG: 2 INJECTION, SOLUTION INTRAMUSCULAR; INTRAVENOUS at 23:26

## 2023-10-10 RX ADMIN — ATORVASTATIN CALCIUM 20 MG: 20 TABLET, FILM COATED ORAL at 21:14

## 2023-10-10 RX ADMIN — DIGOXIN 250 MCG: 250 TABLET ORAL at 08:50

## 2023-10-10 RX ADMIN — HYDROCODONE BITARTRATE AND ACETAMINOPHEN 1 TABLET: 10; 325 TABLET ORAL at 18:58

## 2023-10-10 RX ADMIN — INSULIN LISPRO 4 UNITS: 100 INJECTION, SOLUTION INTRAVENOUS; SUBCUTANEOUS at 12:03

## 2023-10-10 RX ADMIN — MICONAZOLE NITRATE 1 APPLICATION: 2 POWDER TOPICAL at 08:48

## 2023-10-10 RX ADMIN — POTASSIUM CHLORIDE 20 MEQ: 10 CAPSULE, COATED, EXTENDED RELEASE ORAL at 08:48

## 2023-10-10 NOTE — PROGRESS NOTES
[facility]   Hospitalist Progress Note       Patient Name: Mary Albarran  : 1950  MRN: [id number]  Primary Care Physician: Brittni Quiroz APRN  Date of admission: 10/6/2023  Today's Date: 10/10/2023  Room / Bed:   [facility]/  Subjective   Chief Complaint:  Abd pain    Summary:  Patient is a 73-year-old female with past medical history of aortic valve disease, atrial fibrillation currently kidney disease stage III, COPD, diabetes mellitus, hypertension and hyperlipidemia.  More recently the patient has had recurrent urinary tract infections.  She has been hospitalized here and at [facility] over the summer for her UTI.  Patient was told previously that the reason that she is getting recurrent UTIs is because of a kidney stone that she has.  Patient says that she has been having some fever and chills subjectively as well.  Here in the ER a CT scan of the abdomen was performed which showed gallstones but no obvious renal calculi.  Patient has been tachycardic with a low-grade fever for the entirety of her ER visit as well.  Patient was found to once again have a urinary tract infection.  She is clinically septic and therefore will be admitted for further evaluation and treatment.     Interval Followup: 10/10/2023    Discussed with patient.  Farxiga may be contributing to frequent UTIs.  Pulmonology following (Dr. Farmer)  O2 titrated down to 5 L  Patient is alert and conversational.  Shortness of breath somewhat better.  Discussed pulmonary edema on chest x-ray  Tolerating diuresis            REVIEW OF SYSTEMS:   fatigue  Objective   Temp:  [97.2 øF (36.2 øC)-98.6 øF (37 øC)] 97.4 øF (36.3 øC)  Heart Rate:  [] 111  Resp:  [16-18] 18  BP: (100-150)/(40-63) 126/55  Flow (L/min):  [7] 7  PHYSICAL EXAM   CON: WN. WD. NAD.   NECK:  No thyromegaly. No stridor.   RESP:  CTA. No wheezes. No crackles.  No work of breathing or tachypnea.   CV:  Rhythm regular. Rate WNL. No murmur noted.  No edema.  GI:  Soft  and nontender. Nondistended.    EXT: Peripheral pulses intact.  No cyanosis.  PSYCH:  Alert. Oriented. Normal affect and mood.  NEURO:  No dysarthria or aphasia. No unilateral weakness or paresthesia.  SKIN: No chronic venous stasis changes or varicosities.  No cellulitis  Results from last 7 days   Lab Units 10/10/23  0530 10/09/23  0448 10/08/23  0454 10/07/23  0552 10/06/23  2050   WBC 10*3/mm3 5.61 8.16 7.82 11.35* 17.13*   HEMOGLOBIN g/dL 11.2* 10.5* 10.7* 10.9* 12.1   HEMATOCRIT % 37.1 35.1 36.5 37.0 40.5   PLATELETS 10*3/mm3 89* 85* 74* 109* 136*     Results from last 7 days   Lab Units 10/10/23  0530 10/09/23  0448 10/09/23  0135 10/08/23  0454 10/07/23  0552 10/06/23  2050   SODIUM mmol/L 137 138  --  138 140 140   SODIUM, ARTERIAL mmol/L  --   --  136.3  --   --   --    POTASSIUM mmol/L 3.4* 4.1  --  4.6 4.7 4.8   CO2 mmol/L 32.1* 25.3  --  23.8 27.7 23.8   CHLORIDE mmol/L 94* 102  --  106 105 102   ANION GAP mmol/L 10.9 10.7  --  8.2 7.3 14.2   BUN mg/dL 7* 6*  --  10 11 11   CREATININE mg/dL 0.81 0.71  --  0.76 0.91 0.89   GLUCOSE mg/dL 185* 168*  --  120* 121* 203*   GLUCOSE, ARTERIAL mg/dL  --   --  152*  --   --   --          COMPLEXITY OF DATA / DECISION MAKING     []  Moderate: One acute illness or mild exacerbation of chronic or 2 stable chronic or tx side effects   []  High:  Severe acute illness or severe exacerbation of chronic - potential for major debility / life threatening         I have personally reviewed the results from the time of this admission to 10/10/2023 09:12 EDT:  []  Laboratory:  []  Microbiology: []  Radiology:  []  Telemetry:   []  Cardiology/Vascular:  []  Pathology:  []  Prior external records:  []  Independent historian provided additional details:      []  Discussed case with specialists:    []  Independent interpretation of ECG/Imaging etc:        There is probable less inflammatory change and mural thickening involving the distal   stomach and proximal duodenum.  Again  some degree of age-indeterminate infectious/inflammatory   gastro-duodenitis cannot be excluded.  Peptic ulcer disease is possible, as well.  There are   gallstones without acute cholecystitis.  No definite CT evidence of biliary ductal dilatation or   choledocholithiasis.  No acute pancreatitis is suspected.  There is diffuse hepatic steatosis with   hepatomegaly.  There may be borderline splenomegaly.  There is nonobstructing nephrolithiasis on   the right with a lower pole calyceal stone measuring about 9 mm.  It was seen previously.  No   definite left nephrolithiasis.  No ureterolithiasis.  No hydronephrosis.  No acute pyelonephritis.    No urinary bladder calculi are seen.  There is an air-fluid level in the urinary bladder, which may   be related to recent catheterization.  Please correlate clinically.  There is been hysterectomy.    No suspicious adnexal mass is suggested by CT.  Atherosclerotic changes are seen without aneurysmal   dilatation of the aortoiliac arterial system.  No acute intraperitoneal or retroperitoneal   hemorrhage.  There may be mild cardiac enlargement.  Atelectasis and/or fibrosis involve(s) the   lung bases.  Probably no acute infiltrate is identified.  No pleural effusion.  Degenerative   changes are seen throughout the imaged spine, moderate to severe in degree.  Degenerative changes   involve the bilateral sacroiliac (SI) joints and the bilateral hip joints.  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  No acute fracture is appreciated.  No significant interval change is suspected in the   2.5 cm benign intraosseous hemangioma (or venous malformation) involving the right aspect of the L5   vertebral body.  No acute appendicitis.  The appendix is seen on image 115 of series 201 and   adjacent images.  There may be colonic diverticula.  No acute diverticulitis.  No acute colitis.    No mechanical bowel obstruction.  No pneumoperitoneum or pneumatosis         []  Moderate: Rx management, low risk  surgery, suboptimal social situation   []  High:  Rx with close monitoring for toxicity, mod-high risk surgery, DNR decision, Comfort initiated, IV pain meds    Assessment / Plan   Assessment:    Clinical sepsis  Complicated urinary tract infection  Recurrent UTIs, likely exacerbated by home Farxiga (discontinued)  Leukocytosis  Elevated lactic acid  Atrial fibrillation (Dr. Metcafl)  Valvular heart disease: Moderate AI and mild AS  Hypertension  Diabetes mellitus  Obesity with BMI 45  Neuropathy  Gallstones without cholecystitis  Nonobstructing nephrolithiasis right lower pole 0.9 cm  Degenerative spinal disease  Diffuse hepatic steatosis with hepatomegaly  Gastritis/duodenitis suggested CT abdomen  Bilateral chronic venous stasis dermatitis changes    Plan:     Antiemetics as necessary   Appreciate pulm consulted  Initially on sepsis protocol/fluids.  Now continuing with diuresis  Discontinue Farxiga in light of recurrent UTIs  Echo ...    Left ventricular systolic function is normal. Calculated left ventricular EF = 54.4%    Left ventricular diastolic function was indeterminate.    The left atrial cavity is mildly dilated.    Mild aortic valve stenosis is present.    Estimated right ventricular systolic pressure from tricuspid regurgitation is normal (<35 mmHg).    No obvious wall motion abnormalities  Unna boots for wound care bilateral lower extremities  Disposition: Follow-up urine cultures.  Continue with oral antibiotics at time of discharge, pending improvement of symptoms, no fevers, normal white count.  Follow-up with urology and PCP after discharge.  Urine cultures, nonspecific.  Continue empiric antibiotics  Urology consulted:  Dr Varela  Continue home Xarelto  Continue home beta-blocker  Initially broad-spectrum antibiotic, given this is her third infection and short amount of time  Added PPI  Telemetry monitoring  Resumehome medications once those are available  We will treat the patient's increasing  abdominal and low back pain with IV morphine as needed         Discussed plan with RN.  DVT prophylaxis:  Medical DVT prophylaxis orders are present.  CODE STATUS:      Code Status (Patient has no pulse and is not breathing): CPR (Attempt to Resuscitate)  Medical Interventions (Patient has pulse or is breathing): Full Support         Attending documentation:  I reviewed the above documentation and independently reviewed and rounded and evaluated the patient and discussed the care plan with ISH Brewster PA-C, I agree with his findings and plan as documented, what I have added to the care plan and modified is as follows in my documentation and my medical decision making; 73-year-old female hospitalized on 10/7/2023 with chief complaint of abdominal pain, found to have sepsis secondary to UTI, CT imaging shows gallstones without acute cholecystitis, there is possible persistent gastroduodenitis, nonobstructing nephrolithiasis on the right, stone measuring 9 mm.  Urology consulted as patient is concerned that she has recurrent UTIs due to renal stone.  Likely Farxiga contributing to recurrent UTIs, recommend discontinuation of Farxiga permanently.  Went into A-fib with RVR, volume overload, placed back on beta-blocker, digoxin, placed on diuretics, had increased oxygen requirement, pulmonary consulted.  Interval follow-up: Seen and examined, O2 requirement improving, baseline with no oxygen, down to 5 L nasal cannula with sats in the 90s, 6.5 L of urine output over the past 24 hours, net -6.2 L, potassium 3.4, BUN 7, creatinine 0.81, platelets 89,000, echo shows EF 54.4%.  Telemetry reviewed, rate controlled A-fib better than previous but still elevated in the 100s at times.  Denies chest pain or palpitations.  Remains weak and fatigued. Review of systems obtained, all systems reviewed negative except for generalized weakness, fatigue, shortness of breath with exertion, cough.  Vitals reviewed, labs reviewed on physical  exam morbidly obese female, no acute distress, wearing nasal cannula oxygen high flow at 5 L, regular rhythm, diminished to auscultation bilaterally, soft nontender abdomen, lower extremity edema with legs wrapped with Unna boots.  Assessment as above, plan, continue Cardizem dose increased to 240 mg daily, continue digoxin 250 mcg daily, continue metoprolol reduced to 100 mg XL strength twice a day on this admission, Lasix 40 mg IV twice daily, additional dose of metolazone x5 mg, strict I's and O's, daily weights, discussed with pulmonary Dr. Farmer, urology consultation appreciated, continue Zosyn to complete 7 days, continue Xarelto, continue insulin sliding scale coverage, PT/OT, out of bed to chair, a.m. labs, full code, DVT prophylaxis Xarelto, clinical course dictate further management, discussed with nurse at the bedside.  More than 90 % of the time of this patient's encounter was performed by me, this included face-to-face time, planning and coordinating, medical decision making and critical thinking personally done by me.  -AVBMD        Electronically signed by Melody Luna MD, 10/10/23, 1:52 PM EDT.  Portions of this documentation were transcribed electronically from a voice recognition software.  I confirm all data accurately represents the service(s) I performed at today's visit.

## 2023-10-10 NOTE — PLAN OF CARE
Goal Outcome Evaluation:  Plan of Care Reviewed With: patient        Progress: improving  Outcome Evaluation: VSS.  Controlled afib on telemetry.  c/o pain to hands/feet d/t neuropathy; medicated per MAR.  Having good urine output from diuretics.  pt rested well overnight.  O2 sats above 90 on 7liters oxygen.          Pt requesting directive change.

## 2023-10-10 NOTE — PLAN OF CARE
Goal Outcome Evaluation:         VSS. UOP. Up standby assist. Weaned o2 down to 5l n/c; tolerating well. Pain controlled per patient; see MAR. Skin care completed.

## 2023-10-10 NOTE — CONSULTS
Pulmonary / Critical Care Consult Note      Patient Name: Mary Albarran  : 1950  MRN: [id number]  Primary Care Physician:  Brittni Quiroz APRN  Referring Physician: Melody Luna MD  Date of admission: 10/6/2023    Subjective   Subjective     Reason for Consult/ Chief Complaint:   Acute hypoxic respiratory failure    HPI:  Mary Albarran is a 73 y.o. female with history of aortic valve disease, A-fib, CKD stage III, COPD, diabetes, hypertension presented to the ED with abdominal pain found to have a urinary tract infection.  Patient was also found to have acute hypoxic respiratory failure requiring up to 7 L high flow nasal cannula.  Pulmonary was consulted for this reason.  In the room patient is currently on 6 L nasal cannula with SPO2 95%.  I transition to 5 L and patient's SPO2 was able to stay above 94%.  Patient reports not using any oxygen at home.  Initial labs showed ABG 7.3 /58.  BNP 3100.  2D echo show EF 54% with diastolic dysfunction indeterminate.  Patient was initiated on broad-spectrum antibiotic as well as IV diuretics.  She has had good urine output over the last 24 hours, 6500 cc output.  She reports feeling better today compared to yesterday.  Her edema improved.  She continues to have bilateral leg dressings.  Chest imaging consistent with pulmonary edema.  At this time she denies cough, fever, chills, hemoptysis.    Review of Systems  Constitutional symptoms:  Denied complaints   Ear, nose, throat: Denied complaints  Cardiovascular:  Denied complaints  Respiratory: +shortness of breath; Denied complaints  Gastrointestinal: Denied complaints  Musculoskeletal: Denied complaints  Genitourinary: Denied complaints  Allergy / Immunology: Denied complaints  Hematologic: Denied complaints  Neurologic: Denied complaints  Skin: Denied complaints  Endocrine: Denied complaints  Psychiatric: Denied complaints      Personal History     Past Medical History:   Diagnosis Date    Allergic  rhinitis     Anxiety     Aortic valve disease 11/28/2021    Fibrocalcific changes of the aortic valve with mild aortic valve stenosis   on echo 3/2/2021  Moderate aortic valve regurgitation is present. Aortic valve maximum pressure gradient is 26 mmHg. Moderate aortic valve stenosis is present.  On echo 2/10/2022       Arthritis     Atrial fibrillation     Chronic kidney disease (CKD), stage III (moderate)     Chronic pain     COPD (chronic obstructive pulmonary disease)     Diabetes mellitus type 2 with complications     Ex-smoker     QUIT SMOKING 2008    Hyperlipidemia     Hypertension     Thrombocytopenia        Past Surgical History:   Procedure Laterality Date    HYSTERECTOMY      30 YEARS AGO       Family History: family history includes Diabetes in an other family member; Heart disease in her father and other family members. Otherwise pertinent FHx was reviewed and not pertinent to current issue.    Social History:  reports that she quit smoking about 15 years ago. Her smoking use included cigarettes. She started smoking about 52 years ago. She has a 36.00 pack-year smoking history. She has never used smokeless tobacco. She reports that she does not drink alcohol and does not use drugs.    Home Medications:  HYDROcodone-acetaminophen, Insulin Glargine (2 Unit Dial), Insulin Pen Needle, SITagliptin, Vitamin B6, albuterol, albuterol sulfate HFA, ascorbic acid, atorvastatin, budesonide, cholecalciferol, dapagliflozin Propanediol, digoxin, dilTIAZem, fluconazole, furosemide, glipizide, metoprolol succinate XL, miconazole, multivitamin with minerals, nystatin, potassium chloride, pregabalin, rivaroxaban, sacubitril-valsartan, and triamcinolone    Allergies:  Allergies   Allergen Reactions    Codeine Mental Status Change       Objective    Objective     Vitals:   Temp:  [97.2 øF (36.2 øC)-98.6 øF (37 øC)] 97.7 øF (36.5 øC)  Heart Rate:  [] 83  Resp:  [16-18] 18  BP: (111-150)/(40-63) 131/63  Flow (L/min):   [5-7] 5    Physical Exam:  Vital Signs Reviewed   General:  WDWN, Alert, NAD. Lying in bed  HEENT:  PERRL, EOMI.  OP, nares clear, no sinus tenderness  Neck:  Supple, no JVD, no thyromegaly  Lymph: no axillary, cervical, supraclavicular lymphadenopathy noted bilaterally  Chest:  Diminished to auscultation bilaterally, no work of breathing noted on 5L NC  CV: RRR, no MGR, pulses 2+, equal.  Abd:  Soft, NT, ND, + BS, obese  EXT:  no clubbing, no cyanosis, b/l LE edema, LE in dressing  Neuro:  A&Ox3, CN grossly intact, no focal deficits.  Skin: No rashes or lesions noted      Result Review    Result Review:  I have personally reviewed the results from the time of this admission to 10/10/2023 14:58 EDT and agree with these findings:  []  Laboratory  []  Microbiology  []  Radiology  []  EKG/Telemetry   []  Cardiology/Vascular   []  Pathology  []  Old records  []  Other:    Most notable findings include:   -     Assessment & Plan   Assessment / Plan     Active Hospital Problems:  Active Hospital Problems    Diagnosis     **Sepsis        Impression:  Acute hypoxic respiratory failure  Pulmonary edema  Urinary tract infection  Elevated proBNP  History of COPD  History of A-fib  History of CKD stage III  History of diabetes  History of valvular heart disease  Obesity, BMI 46.5    Plan:  -Weaned to 5 L nasal cannula; continue to wean as tolerated keep SPO2 greater than 90%  -2D echo show EF 54%.  Diastolic dysfunction was indeterminate  -Chest imaging consistent with pulmonary edema.    -Agree with IV diuretics; patient has had good urine output over last 24 hours; continue monitor I's and O's; continue to follow renal function and electrolytes  -Patient is currently on diltiazem, digoxin, beta-blocker, Xarelto for history of A-fib  -Farxiga was discontinued as this may be the cause of her frequent urinary tract infection per primary  -Start nebs and bronchopulmonary hygiene  -Encourage I-S and flutter valve use  -PT/OT as  tolerated    DVT prophylaxis:  Medical DVT prophylaxis orders are present.     Code Status and Medical Interventions:   Ordered at: 10/07/23 0340     Code Status (Patient has no pulse and is not breathing):    CPR (Attempt to Resuscitate)     Medical Interventions (Patient has pulse or is breathing):    Full Support          Labs, imaging, notes, and medications personally reviewed.    Thank you for involving me in the care of this patient.    Electronically signed by Mau Farmer MD, 10/10/23, 2:58 PM EDT.

## 2023-10-11 LAB
ALBUMIN SERPL-MCNC: 3.8 G/DL (ref 3.5–5.2)
ALP SERPL-CCNC: 62 U/L (ref 39–117)
ALT SERPL W P-5'-P-CCNC: 16 U/L (ref 1–33)
ANION GAP SERPL CALCULATED.3IONS-SCNC: 14.5 MMOL/L (ref 5–15)
AST SERPL-CCNC: 16 U/L (ref 1–32)
B PARAPERT DNA SPEC QL NAA+PROBE: NOT DETECTED
B PERT DNA SPEC QL NAA+PROBE: NOT DETECTED
BACTERIA SPEC AEROBE CULT: NORMAL
BASOPHILS # BLD AUTO: 0.02 10*3/MM3 (ref 0–0.2)
BASOPHILS NFR BLD AUTO: 0.3 % (ref 0–1.5)
BILIRUB CONJ SERPL-MCNC: 0.5 MG/DL (ref 0–0.3)
BILIRUB INDIRECT SERPL-MCNC: 0.8 MG/DL
BILIRUB SERPL-MCNC: 1.3 MG/DL (ref 0–1.2)
BUN SERPL-MCNC: 15 MG/DL (ref 8–23)
BUN/CREAT SERPL: 16 (ref 7–25)
C PNEUM DNA NPH QL NAA+NON-PROBE: NOT DETECTED
CALCIUM SPEC-SCNC: 9.8 MG/DL (ref 8.6–10.5)
CHLORIDE SERPL-SCNC: 90 MMOL/L (ref 98–107)
CO2 SERPL-SCNC: 32.5 MMOL/L (ref 22–29)
CREAT SERPL-MCNC: 0.94 MG/DL (ref 0.57–1)
DEPRECATED RDW RBC AUTO: 52.8 FL (ref 37–54)
EGFRCR SERPLBLD CKD-EPI 2021: 65 ML/MIN/1.73
EOSINOPHIL # BLD AUTO: 0.12 10*3/MM3 (ref 0–0.4)
EOSINOPHIL NFR BLD AUTO: 2 % (ref 0.3–6.2)
ERYTHROCYTE [DISTWIDTH] IN BLOOD BY AUTOMATED COUNT: 15.7 % (ref 12.3–15.4)
FLUAV SUBTYP SPEC NAA+PROBE: NOT DETECTED
FLUBV RNA ISLT QL NAA+PROBE: NOT DETECTED
GLUCOSE BLDC GLUCOMTR-MCNC: 146 MG/DL (ref 70–99)
GLUCOSE BLDC GLUCOMTR-MCNC: 163 MG/DL (ref 70–99)
GLUCOSE BLDC GLUCOMTR-MCNC: 185 MG/DL (ref 70–99)
GLUCOSE BLDC GLUCOMTR-MCNC: 191 MG/DL (ref 70–99)
GLUCOSE SERPL-MCNC: 166 MG/DL (ref 65–99)
HADV DNA SPEC NAA+PROBE: NOT DETECTED
HCOV 229E RNA SPEC QL NAA+PROBE: NOT DETECTED
HCOV HKU1 RNA SPEC QL NAA+PROBE: NOT DETECTED
HCOV NL63 RNA SPEC QL NAA+PROBE: NOT DETECTED
HCOV OC43 RNA SPEC QL NAA+PROBE: NOT DETECTED
HCT VFR BLD AUTO: 41.7 % (ref 34–46.6)
HGB BLD-MCNC: 12.7 G/DL (ref 12–15.9)
HMPV RNA NPH QL NAA+NON-PROBE: NOT DETECTED
HPIV1 RNA ISLT QL NAA+PROBE: NOT DETECTED
HPIV2 RNA SPEC QL NAA+PROBE: NOT DETECTED
HPIV3 RNA NPH QL NAA+PROBE: NOT DETECTED
HPIV4 P GENE NPH QL NAA+PROBE: NOT DETECTED
IMM GRANULOCYTES # BLD AUTO: 0.03 10*3/MM3 (ref 0–0.05)
IMM GRANULOCYTES NFR BLD AUTO: 0.5 % (ref 0–0.5)
L PNEUMO1 AG UR QL IA: NEGATIVE
LYMPHOCYTES # BLD AUTO: 1.32 10*3/MM3 (ref 0.7–3.1)
LYMPHOCYTES NFR BLD AUTO: 21.7 % (ref 19.6–45.3)
M PNEUMO IGG SER IA-ACNC: NOT DETECTED
MAGNESIUM SERPL-MCNC: 1.6 MG/DL (ref 1.6–2.4)
MCH RBC QN AUTO: 28.1 PG (ref 26.6–33)
MCHC RBC AUTO-ENTMCNC: 30.5 G/DL (ref 31.5–35.7)
MCV RBC AUTO: 92.3 FL (ref 79–97)
MONOCYTES # BLD AUTO: 0.45 10*3/MM3 (ref 0.1–0.9)
MONOCYTES NFR BLD AUTO: 7.4 % (ref 5–12)
NEUTROPHILS NFR BLD AUTO: 4.14 10*3/MM3 (ref 1.7–7)
NEUTROPHILS NFR BLD AUTO: 68.1 % (ref 42.7–76)
NRBC BLD AUTO-RTO: 0 /100 WBC (ref 0–0.2)
PHOSPHATE SERPL-MCNC: 4.5 MG/DL (ref 2.5–4.5)
PLATELET # BLD AUTO: 114 10*3/MM3 (ref 140–450)
PMV BLD AUTO: 13.9 FL (ref 6–12)
POTASSIUM SERPL-SCNC: 3.3 MMOL/L (ref 3.5–5.2)
PROT SERPL-MCNC: 7.6 G/DL (ref 6–8.5)
RBC # BLD AUTO: 4.52 10*6/MM3 (ref 3.77–5.28)
RHINOVIRUS RNA SPEC NAA+PROBE: NOT DETECTED
RSV RNA NPH QL NAA+NON-PROBE: NOT DETECTED
S PNEUM AG SPEC QL LA: NEGATIVE
SARS-COV-2 RNA RESP QL NAA+PROBE: NOT DETECTED
SODIUM SERPL-SCNC: 137 MMOL/L (ref 136–145)
WBC NRBC COR # BLD: 6.08 10*3/MM3 (ref 3.4–10.8)

## 2023-10-11 PROCEDURE — 25010000002 FUROSEMIDE PER 20 MG: Performed by: FAMILY MEDICINE

## 2023-10-11 PROCEDURE — 99233 SBSQ HOSP IP/OBS HIGH 50: CPT | Performed by: STUDENT IN AN ORGANIZED HEALTH CARE EDUCATION/TRAINING PROGRAM

## 2023-10-11 PROCEDURE — 25010000002 MORPHINE PER 10 MG: Performed by: FAMILY MEDICINE

## 2023-10-11 PROCEDURE — 83735 ASSAY OF MAGNESIUM: CPT | Performed by: FAMILY MEDICINE

## 2023-10-11 PROCEDURE — 0202U NFCT DS 22 TRGT SARS-COV-2: CPT

## 2023-10-11 PROCEDURE — 25010000002 ONDANSETRON PER 1 MG: Performed by: PHYSICIAN ASSISTANT

## 2023-10-11 PROCEDURE — 87449 NOS EACH ORGANISM AG IA: CPT

## 2023-10-11 PROCEDURE — 80048 BASIC METABOLIC PNL TOTAL CA: CPT | Performed by: FAMILY MEDICINE

## 2023-10-11 PROCEDURE — 80076 HEPATIC FUNCTION PANEL: CPT | Performed by: FAMILY MEDICINE

## 2023-10-11 PROCEDURE — 84100 ASSAY OF PHOSPHORUS: CPT | Performed by: FAMILY MEDICINE

## 2023-10-11 PROCEDURE — 63710000001 INSULIN LISPRO (HUMAN) PER 5 UNITS: Performed by: PHYSICIAN ASSISTANT

## 2023-10-11 PROCEDURE — 99232 SBSQ HOSP IP/OBS MODERATE 35: CPT | Performed by: FAMILY MEDICINE

## 2023-10-11 PROCEDURE — 97110 THERAPEUTIC EXERCISES: CPT

## 2023-10-11 PROCEDURE — 82948 REAGENT STRIP/BLOOD GLUCOSE: CPT

## 2023-10-11 PROCEDURE — 25010000002 PIPERACILLIN SOD-TAZOBACTAM PER 1 G: Performed by: FAMILY MEDICINE

## 2023-10-11 PROCEDURE — 85025 COMPLETE CBC W/AUTO DIFF WBC: CPT | Performed by: FAMILY MEDICINE

## 2023-10-11 PROCEDURE — 94761 N-INVAS EAR/PLS OXIMETRY MLT: CPT

## 2023-10-11 PROCEDURE — 94799 UNLISTED PULMONARY SVC/PX: CPT

## 2023-10-11 RX ORDER — POTASSIUM CHLORIDE 750 MG/1
30 CAPSULE, EXTENDED RELEASE ORAL
Status: COMPLETED | OUTPATIENT
Start: 2023-10-11 | End: 2023-10-11

## 2023-10-11 RX ORDER — METOLAZONE 5 MG/1
5 TABLET ORAL ONCE
Status: COMPLETED | OUTPATIENT
Start: 2023-10-11 | End: 2023-10-11

## 2023-10-11 RX ADMIN — ONDANSETRON 4 MG: 2 INJECTION INTRAMUSCULAR; INTRAVENOUS at 12:44

## 2023-10-11 RX ADMIN — HYDROCODONE BITARTRATE AND ACETAMINOPHEN 1 TABLET: 10; 325 TABLET ORAL at 00:54

## 2023-10-11 RX ADMIN — Medication 10 ML: at 09:36

## 2023-10-11 RX ADMIN — RIVAROXABAN 20 MG: 20 TABLET, FILM COATED ORAL at 09:46

## 2023-10-11 RX ADMIN — FUROSEMIDE 40 MG: 10 INJECTION, SOLUTION INTRAMUSCULAR; INTRAVENOUS at 20:52

## 2023-10-11 RX ADMIN — ATORVASTATIN CALCIUM 20 MG: 20 TABLET, FILM COATED ORAL at 20:52

## 2023-10-11 RX ADMIN — POTASSIUM CHLORIDE 30 MEQ: 10 CAPSULE, COATED, EXTENDED RELEASE ORAL at 19:21

## 2023-10-11 RX ADMIN — FUROSEMIDE 40 MG: 10 INJECTION, SOLUTION INTRAMUSCULAR; INTRAVENOUS at 09:30

## 2023-10-11 RX ADMIN — POTASSIUM CHLORIDE 30 MEQ: 10 CAPSULE, COATED, EXTENDED RELEASE ORAL at 12:10

## 2023-10-11 RX ADMIN — METOLAZONE 5 MG: 5 TABLET ORAL at 09:36

## 2023-10-11 RX ADMIN — INSULIN LISPRO 2 UNITS: 100 INJECTION, SOLUTION INTRAVENOUS; SUBCUTANEOUS at 21:35

## 2023-10-11 RX ADMIN — PIPERACILLIN AND TAZOBACTAM 4.5 G: 4; .5 INJECTION, POWDER, FOR SOLUTION INTRAVENOUS at 07:47

## 2023-10-11 RX ADMIN — PANTOPRAZOLE SODIUM 40 MG: 40 TABLET, DELAYED RELEASE ORAL at 09:36

## 2023-10-11 RX ADMIN — DILTIAZEM HYDROCHLORIDE 240 MG: 240 CAPSULE, EXTENDED RELEASE ORAL at 09:46

## 2023-10-11 RX ADMIN — MICONAZOLE NITRATE 1 APPLICATION: 2 POWDER TOPICAL at 20:54

## 2023-10-11 RX ADMIN — PANTOPRAZOLE SODIUM 40 MG: 40 TABLET, DELAYED RELEASE ORAL at 20:52

## 2023-10-11 RX ADMIN — PIPERACILLIN AND TAZOBACTAM 4.5 G: 4; .5 INJECTION, POWDER, FOR SOLUTION INTRAVENOUS at 15:56

## 2023-10-11 RX ADMIN — INSULIN LISPRO 2 UNITS: 100 INJECTION, SOLUTION INTRAVENOUS; SUBCUTANEOUS at 12:10

## 2023-10-11 RX ADMIN — HYDROCODONE BITARTRATE AND ACETAMINOPHEN 1 TABLET: 10; 325 TABLET ORAL at 18:14

## 2023-10-11 RX ADMIN — INSULIN LISPRO 2 UNITS: 100 INJECTION, SOLUTION INTRAVENOUS; SUBCUTANEOUS at 18:14

## 2023-10-11 RX ADMIN — METOPROLOL SUCCINATE 100 MG: 50 TABLET, EXTENDED RELEASE ORAL at 09:36

## 2023-10-11 RX ADMIN — HYDROCODONE BITARTRATE AND ACETAMINOPHEN 1 TABLET: 10; 325 TABLET ORAL at 10:33

## 2023-10-11 RX ADMIN — METOPROLOL SUCCINATE 100 MG: 50 TABLET, EXTENDED RELEASE ORAL at 20:52

## 2023-10-11 RX ADMIN — MORPHINE SULFATE 2 MG: 2 INJECTION, SOLUTION INTRAMUSCULAR; INTRAVENOUS at 15:56

## 2023-10-11 RX ADMIN — MORPHINE SULFATE 2 MG: 2 INJECTION, SOLUTION INTRAMUSCULAR; INTRAVENOUS at 05:31

## 2023-10-11 RX ADMIN — DIGOXIN 250 MCG: 250 TABLET ORAL at 09:46

## 2023-10-11 RX ADMIN — POTASSIUM CHLORIDE 30 MEQ: 10 CAPSULE, COATED, EXTENDED RELEASE ORAL at 09:30

## 2023-10-11 RX ADMIN — SACUBITRIL AND VALSARTAN 0.5 TABLET: 97; 103 TABLET, FILM COATED ORAL at 09:45

## 2023-10-11 RX ADMIN — Medication 10 ML: at 20:52

## 2023-10-11 RX ADMIN — MICONAZOLE NITRATE 1 APPLICATION: 2 POWDER TOPICAL at 09:47

## 2023-10-11 NOTE — PROGRESS NOTES
[facility]   Hospitalist Progress Note       Patient Name: Mary Albarran  : 1950  MRN: [id number]  Primary Care Physician: Brittni Quiroz APRN  Date of admission: 10/6/2023  Today's Date: 10/11/2023  Room / Bed:   [facility]/  Subjective   Chief Complaint:  Abd pain    Summary:  Patient is a 73-year-old female with past medical history of aortic valve disease, atrial fibrillation currently kidney disease stage III, COPD, diabetes mellitus, hypertension and hyperlipidemia.  More recently the patient has had recurrent urinary tract infections.  She has been hospitalized here and at [facility] over the summer for her UTI.  Patient was told previously that the reason that she is getting recurrent UTIs is because of a kidney stone that she has.  Patient says that she has been having some fever and chills subjectively as well.  Here in the ER a CT scan of the abdomen was performed which showed gallstones but no obvious renal calculi.  Patient has been tachycardic with a low-grade fever for the entirety of her ER visit as well.  Patient was found to once again have a urinary tract infection.  She is clinically septic and therefore will be admitted for further evaluation and treatment.     Interval Followup: Seen and examined resting comfortably remains on antibiotics          REVIEW OF SYSTEMS:   fatigue  Objective   Temp:  [97.1 øF (36.2 øC)-98 øF (36.7 øC)] (P) 97.7 øF (36.5 øC)  Heart Rate:  [] (P) 87  Resp:  [12-20] (P) 20  BP: (112-144)/(55-69) (P) 125/83  Flow (L/min):  [3.5-5] 3.5  PHYSICAL EXAM   CON: WN. WD. NAD.   NECK:  No thyromegaly. No stridor.   RESP:  CTA. No wheezes. No crackles.  No work of breathing or tachypnea.   CV:  Rhythm regular. Rate WNL. No murmur noted.  No edema.  GI:  Soft and nontender. Nondistended.    EXT: Peripheral pulses intact.  No cyanosis.  PSYCH:  Alert. Oriented. Normal affect and mood.  NEURO:  No dysarthria or aphasia. No unilateral weakness or  paresthesia.  SKIN: No chronic venous stasis changes or varicosities.  No cellulitis  Results from last 7 days   Lab Units 10/11/23  0441 10/10/23  0530 10/09/23  0448 10/08/23  0454 10/07/23  0552 10/06/23  2050   WBC 10*3/mm3 6.08 5.61 8.16 7.82 11.35* 17.13*   HEMOGLOBIN g/dL 12.7 11.2* 10.5* 10.7* 10.9* 12.1   HEMATOCRIT % 41.7 37.1 35.1 36.5 37.0 40.5   PLATELETS 10*3/mm3 114* 89* 85* 74* 109* 136*     Results from last 7 days   Lab Units 10/11/23  0441 10/10/23  0530 10/09/23  0448 10/09/23  0135 10/08/23  0454 10/07/23  0552 10/06/23  2050   SODIUM mmol/L 137 137 138  --  138 140 140   SODIUM, ARTERIAL mmol/L  --   --   --  136.3  --   --   --    POTASSIUM mmol/L 3.3* 3.4* 4.1  --  4.6 4.7 4.8   CO2 mmol/L 32.5* 32.1* 25.3  --  23.8 27.7 23.8   CHLORIDE mmol/L 90* 94* 102  --  106 105 102   ANION GAP mmol/L 14.5 10.9 10.7  --  8.2 7.3 14.2   BUN mg/dL 15 7* 6*  --  10 11 11   CREATININE mg/dL 0.94 0.81 0.71  --  0.76 0.91 0.89   GLUCOSE mg/dL 166* 185* 168*  --  120* 121* 203*   GLUCOSE, ARTERIAL mg/dL  --   --   --  152*  --   --   --              Assessment / Plan   Assessment:    Clinical sepsis  Complicated urinary tract infection  Recurrent UTIs, likely exacerbated by home Farxiga (discontinued)  Leukocytosis  Elevated lactic acid  Atrial fibrillation (Dr. Metcalf)  Valvular heart disease: Moderate AI and mild AS  Hypertension  Diabetes mellitus  Obesity with BMI 45  Neuropathy  Gallstones without cholecystitis  Nonobstructing nephrolithiasis right lower pole 0.9 cm  Degenerative spinal disease  Diffuse hepatic steatosis with hepatomegaly  Gastritis/duodenitis suggested CT abdomen  Bilateral chronic venous stasis dermatitis changes    Plan:     Continue empiric IV antibiotics  Continue with Lasix 40 mg IV twice daily  Continue with rate controlling medications  Continue anticoagulation with Xarelto  Continue with beta-blocker and Entresto  Have discontinued Farxiga in light of recurrent urinary tract  infections  Continue with  Replace potassium orally  Continue with SSI per protocol  2D echo reviewed  Unna boots for wound care bilateral lower extremities  Home on oral antibiotics once volume status respiratory status stable  Discussed with RN       DVT prophylaxis:  Medical DVT prophylaxis orders are present.  CODE STATUS:      Code Status (Patient has no pulse and is not breathing): CPR (Attempt to Resuscitate)  Medical Interventions (Patient has pulse or is breathing): Full Support  Electronically signed by MARIANA Trinidad, 10/11/23, 3:14 PM EDT.         Attending documentation:  I reviewed the above documentation and independently reviewed and rounded and evaluated the patient and discussed the care plan with JESSIE Knott PA-C, I agree with his findings and plan as documented, what I have added to the care plan and modified is as follows in my documentation and my medical decision making; 73-year-old female hospitalized on 10/7/2023 with chief complaint of abdominal pain, found to have sepsis secondary to UTI, CT imaging shows gallstones without acute cholecystitis, there is possible persistent gastroduodenitis, nonobstructing nephrolithiasis on the right, stone measuring 9 mm.  Urology consulted as patient is concerned that she has recurrent UTIs due to renal stone.  Likely Farxiga contributing to recurrent UTIs, recommend discontinuation of Farxiga permanently.  Went into A-fib with RVR, volume overload, placed back on beta-blocker, digoxin, placed on diuretics, had increased oxygen requirement, pulmonary consulted.  Interval follow-up: Seen and examined, O2 requirement improving down to 3.5L, baseline with no oxygen, continues to tolerate significant aggressive diuresis with some hypokalemia, potassium 3.3, sodium 137, BUN 15, creatinine 0.94, urine output 3.3 L over the past 24 hours, net -2.8 L.  Telemetry reviewed, rate controlled A-fib rate controlled.  Denies chest pain or palpitations.  Remains  weak and fatigued. Review of systems obtained, all systems reviewed negative except for generalized weakness, fatigue, shortness of breath with exertion, cough.  Vitals reviewed, labs reviewed on physical exam morbidly obese female, no acute distress, wearing nasal cannula oxygen high flow at 3.5 L, regular rhythm, diminished to auscultation bilaterally, soft nontender abdomen, lower extremity edema with legs wrapped with Unna boots.  Assessment as above, plan, out of bed to chair, wean oxygen, stop Zosyn, continue Cardizem dose increased to 240 mg daily, continue digoxin 250 mcg daily, continue metoprolol reduced to 100 mg XL strength twice a day on this admission, Lasix 40 mg IV twice daily, additional dose of metolazone x5 mg day 2 with increased potassium replacement orally 30 mEq 3 times today., strict I's and O's, daily weights, discussed with pulmonary Dr. Farmer, urology consultation appreciated, continue Zosyn to complete 7 days, continue Xarelto, continue insulin sliding scale coverage, PT/OT, out of bed to chair, a.m. labs, full code, DVT prophylaxis Xarelto, clinical course dictate further management, discussed with nurse at the bedside.  More than 70 % of the time of this patient's encounter was performed by me, this included face-to-face time, planning and coordinating, medical decision making and critical thinking personally done by me.  -AVBMD     Electronically signed by Melody Luna MD, 10/11/23, 5:42 PM EDT.  Portions of this documentation were transcribed electronically from a voice recognition software.  I confirm all data accurately represents the service(s) I performed at today's visit.

## 2023-10-11 NOTE — PROGRESS NOTES
Respiratory Therapist Broncho-Pulmonary Hygiene Progress Note      Patient Name:  Mary Albarran  YOB: 1950    Mary Albarran meets the qualification for Level 1 of the Bronco-Pulmonary Hygiene Protocol. This was based on my daily patient assessment and includes review of chest x-ray results, cough ability and quality, oxygenation, secretions or risk for secretion development and patient mobility.     Broncho-Pulmonary Hygiene Assessment:    Level of Movement: Actively changing positions without assistance  Alert/ oriented/ cooperative    Breath Sounds: Clear to slightly diminished    Cough: Strong, effective    Chest X-Ray: Possible signs of consolidation and/or atelectasis or clear.     Sputum Productions: None or small amount of thin or watery secretions with effective cough    History and Physical: None    SpO2 to Oxygen Need: greater than 92% on 4-6L nasal canula    Current SpO2 is: 94 on 5lpm NC    Based on this information, I have completed the following interventions: Teach/Instruct patient on cough and deep breathe. No evidence of mucus plugging      Electronically signed by Arcelia Castillo RRT, 10/11/23, 6:52 AM EDT.

## 2023-10-11 NOTE — PLAN OF CARE
Goal Outcome Evaluation:  Plan of Care Reviewed With: patient        Progress: improving  Outcome Evaluation: VSS.  controlled afib on telemetry.  medicated for c/o pain per mar.  02 sats 93 and above on 5liters per nasal cannula.

## 2023-10-11 NOTE — THERAPY TREATMENT NOTE
Acute Care - Physical Therapy Treatment Note  [facility]     Patient Name: Mary Albarran  : 1950  MRN: [id number]  Today's Date: 10/11/2023      Visit Dx:     ICD-10-CM ICD-9-CM   1. Sepsis, due to unspecified organism, unspecified whether acute organ dysfunction present  A41.9 038.9     995.91   2. Acute UTI  N39.0 599.0   3. Difficulty walking  R26.2 719.7     Patient Active Problem List   Diagnosis    Aortic valve disease    Atrial fibrillation    Chronic kidney disease, stage III (moderate)    Hyperlipidemia    Hypertension    Chronic diastolic congestive heart failure    Atrial fibrillation with RVR    Onychomycosis    Onychocryptosis    Foot pain, bilateral    Sepsis     Past Medical History:   Diagnosis Date    Allergic rhinitis     Anxiety     Aortic valve disease 2021    Fibrocalcific changes of the aortic valve with mild aortic valve stenosis   on echo 3/2/2021  Moderate aortic valve regurgitation is present. Aortic valve maximum pressure gradient is 26 mmHg. Moderate aortic valve stenosis is present.  On echo 2/10/2022       Arthritis     Atrial fibrillation     Chronic kidney disease (CKD), stage III (moderate)     Chronic pain     COPD (chronic obstructive pulmonary disease)     Diabetes mellitus type 2 with complications     Ex-smoker     QUIT SMOKING     Hyperlipidemia     Hypertension     Thrombocytopenia      Past Surgical History:   Procedure Laterality Date    HYSTERECTOMY      30 YEARS AGO     PT Assessment (last 12 hours)       PT Evaluation and Treatment       Row Name 10/11/23 1243          Physical Therapy Time and Intention    Document Type therapy note (daily note)  -WM     Mode of Treatment individual therapy;physical therapy  -WM     Patient Effort good  -WM     Symptoms Noted During/After Treatment fatigue  -WM       Row Name 10/11/23 1243          Motor Skills    Therapeutic Exercise hip;knee;ankle  -WM       Row Name 10/11/23 1243          Hip (Therapeutic Exercise)     Hip (Therapeutic Exercise) strengthening exercise  -     Hip Strengthening (Therapeutic Exercise) bilateral;marching while seated;sitting;20 repititions  -       Row Name 10/11/23 1243          Knee (Therapeutic Exercise)    Knee (Therapeutic Exercise) AROM (active range of motion);strengthening exercise;isometric exercises  -     Knee AROM (Therapeutic Exercise) bilateral;LAQ (long arc quad);sitting;20 repititions  -     Knee Isometrics (Therapeutic Exercise) bilateral;quad sets;supine;10 repetitions;3 second hold;2 sets  -     Knee Strengthening (Therapeutic Exercise) bilateral;hamstring curls;sitting;resistance band;yellow;20 repititions  -       Row Name 10/11/23 1243          Ankle (Therapeutic Exercise)    Ankle (Therapeutic Exercise) AROM (active range of motion)  -     Ankle AROM (Therapeutic Exercise) bilateral;dorsiflexion;plantarflexion;supine;20 repititions  -       Row Name             Wound 10/07/23 0624 Right lower breast MASD (Moisture associated skin damage)    Wound - Properties Group Placement Date: 10/07/23  -JK Placement Time: 0624 -JK Side: Right  -JK Orientation: lower  -JK Location: breast  -JK Primary Wound Type: MASD  -JK    Retired Wound - Properties Group Placement Date: 10/07/23  -JK Placement Time: 0624 -JK Side: Right  -JK Orientation: lower  -JK Location: breast  -JK Primary Wound Type: MASD  -JK    Retired Wound - Properties Group Date first assessed: 10/07/23  -JK Time first assessed: 0624 -JK Side: Right  -JK Location: breast  -JK Primary Wound Type: MASD  -JK      Row Name             Wound 10/07/23 0625 Left breast MASD (Moisture associated skin damage)    Wound - Properties Group Placement Date: 10/07/23  -JK Placement Time: 0625  -JK Present on Original Admission: Y  -JK Side: Left  -JK Location: breast  -JK Primary Wound Type: MASD  -JK    Retired Wound - Properties Group Placement Date: 10/07/23  -JK Placement Time: 0625  -JK Present on Original Admission:  Y  -JK Side: Left  -JK Location: breast  -JK Primary Wound Type: MASD  -JK    Retired Wound - Properties Group Date first assessed: 10/07/23  -JK Time first assessed: 0625 -JK Present on Original Admission: Y  -JK Side: Left  -JK Location: breast  -JK Primary Wound Type: MASD  -JK      Row Name             Wound Right anterior hip MASD (Moisture associated skin damage)    Wound - Properties Group Side: Right  -JK Orientation: anterior  -JK Location: hip  -JK Primary Wound Type: MASD  -JK    Retired Wound - Properties Group Side: Right  -JK Orientation: anterior  -JK Location: hip  -JK Primary Wound Type: MASD  -JK    Retired Wound - Properties Group Side: Right  -JK Location: hip  -JK Primary Wound Type: MASD  -JK      Row Name             Wound 10/07/23 0627 Left anterior hip MASD (Moisture associated skin damage)    Wound - Properties Group Placement Date: 10/07/23  -JK Placement Time: 0627 -JK Present on Original Admission: Y  -JK Side: Left  -JK Orientation: anterior  -JK Location: hip  -JK Primary Wound Type: MASD  -JK    Retired Wound - Properties Group Placement Date: 10/07/23  -JK Placement Time: 0627 -JK Present on Original Admission: Y  -JK Side: Left  -JK Orientation: anterior  -JK Location: hip  -JK Primary Wound Type: MASD  -JK    Retired Wound - Properties Group Date first assessed: 10/07/23  -JK Time first assessed: 0627 -JK Present on Original Admission: Y  -JK Side: Left  -JK Location: hip  -JK Primary Wound Type: MASD  -JK      Row Name             Wound 05/13/23 0210 Right lower leg Venous Ulcer    Wound - Properties Group Placement Date: 05/13/23 -TH Placement Time: 0210 -TH Present on Original Admission: Y  -TH Side: Right  -TH Orientation: lower  -TH Location: leg  -TH Primary Wound Type: Venous ulcer  -TH    Retired Wound - Properties Group Placement Date: 05/13/23 -TH Placement Time: 0210 -TH Present on Original Admission: Y  -TH Side: Right  -TH Orientation: lower  -TH Location: leg   -TH Primary Wound Type: Venous ulcer  -TH    Retired Wound - Properties Group Date first assessed: 05/13/23 -TH Time first assessed: 0210  -TH Present on Original Admission: Y  -TH Side: Right  -TH Location: leg  -TH Primary Wound Type: Venous ulcer  -TH      Row Name             Wound 05/13/23 0210 Left lower leg Venous Ulcer    Wound - Properties Group Placement Date: 05/13/23 -TH Placement Time: 0210  -TH Present on Original Admission: Y  -TH Side: Left  -TH Orientation: lower  -TH Location: leg  -TH Primary Wound Type: Venous ulcer  -TH    Retired Wound - Properties Group Placement Date: 05/13/23 -TH Placement Time: 0210  -TH Present on Original Admission: Y  -TH Side: Left  -TH Orientation: lower  -TH Location: leg  -TH Primary Wound Type: Venous ulcer  -TH    Retired Wound - Properties Group Date first assessed: 05/13/23 -TH Time first assessed: 0210 -TH Present on Original Admission: Y  -TH Side: Left  -TH Location: leg  -TH Primary Wound Type: Venous ulcer  -TH      Row Name 10/11/23 1243          Progress Summary (PT)    Progress Toward Functional Goals (PT) progress toward functional goals is fair  -WM               User Key  (r) = Recorded By, (t) = Taken By, (c) = Cosigned By      Initials Name Provider Type     Cathryn Maldonado, RN Registered Nurse    Queenie Ga RN Registered Nurse    Jerson Summers PTA Physical Therapist Assistant                    Physical Therapy Education       Title: PT OT SLP Therapies (In Progress)       Topic: Physical Therapy (In Progress)       Point: Mobility training (Done)       Learning Progress Summary             Patient Acceptance, E,TB, VU by AV at 10/9/2023 1515                         Point: Home exercise program (Not Started)       Learner Progress:  Not documented in this visit.              Point: Body mechanics (Done)       Learning Progress Summary             Patient Acceptance, E,TB, VU by AV at 10/9/2023 1515                         Point:  Precautions (Done)       Learning Progress Summary             Patient Acceptance, E,TB, VU by AV at 10/9/2023 1515                                         User Key       Initials Effective Dates Name Provider Type Discipline     06/11/21 -  Kelvin Trejo, ALIVIA Physical Therapist PT                  PT Recommendation and Plan     Progress Summary (PT)  Progress Toward Functional Goals (PT): progress toward functional goals is fair   Outcome Measures       Row Name 10/11/23 1246 10/09/23 1500          How much help from another person do you currently need...    Turning from your back to your side while in flat bed without using bedrails? 4  -WM 4  -AV     Moving from lying on back to sitting on the side of a flat bed without bedrails? 4  -WM 3  -AV     Moving to and from a bed to a chair (including a wheelchair)? 3  -WM 3  -AV     Standing up from a chair using your arms (e.g., wheelchair, bedside chair)? 3  -WM 3  -AV     Climbing 3-5 steps with a railing? 2  -WM 2  -AV     To walk in hospital room? 3  -WM 3  -AV     AM-PAC 6 Clicks Score (PT) 19  -WM 18  -AV     Highest level of mobility 6 --> Walked 10 steps or more  -WM 6 --> Walked 10 steps or more  -AV        Functional Assessment    Outcome Measure Options -- AM-PAC 6 Clicks Basic Mobility (PT)  -AV               User Key  (r) = Recorded By, (t) = Taken By, (c) = Cosigned By      Initials Name Provider Type    Jerson Summers PTA Physical Therapist Assistant     Kelvin Trejo, ALIVIA Physical Therapist                     Time Calculation:    PT Charges       Row Name 10/11/23 1243             Time Calculation    PT Received On 10/11/23  -WM         Timed Charges    66787 - PT Therapeutic Exercise Minutes 15  -WM         Total Minutes    Timed Charges Total Minutes 15  -WM       Total Minutes 15  -WM                User Key  (r) = Recorded By, (t) = Taken By, (c) = Cosigned By      Initials Name Provider Type    Jerson Summers PTA Physical  Therapist Assistant                      PT G-Codes  Outcome Measure Options: AM-PAC 6 Clicks Basic Mobility (PT)  AM-PAC 6 Clicks Score (PT): 19    Jerson Franco, PTA  10/11/2023

## 2023-10-11 NOTE — PROGRESS NOTES
Pulmonary / Critical Care Progress Note      Patient Name: Mary Albarran  : 1950  MRN: [id number]  Primary Care Physician:  Brittni Quiroz APRN  Date of admission: 10/6/2023    Subjective   Subjective   Follow-up for acute hypoxic respiratory failure, pulmonary edema    Over past 24 hours: Remains on Lasix 40 mg IV twice daily.  Continues Zosyn course.  Remains on Xarelto and Entresto    No acute events overnight.    This morning,  On 5 L nasal cannula  Lying in bed  Feeling well overall  Diuresing well  -2.8 L daily output  Denies cough  Denies chest tightness or chest pressure    Review of Systems  General: Fatigue, otherwise denied complaints  HEENT: Denied complaints  Respiratory: Dyspnea, otherwise denied complaints  Cardiovascular: Denied complaints  GI: Denied complaints    Objective   Objective     Vitals:   Temp:  [97.1 øF (36.2 øC)-98 øF (36.7 øC)] 97.3 øF (36.3 øC)  Heart Rate:  [] 92  Resp:  [12-20] 20  BP: (112-144)/(55-69) 112/64  Flow (L/min):  [3.5-5] 3.5    Physical Exam   Vital Signs Reviewed   General:  Alert, NAD.  Chronically ill appearing female.  HEENT:  PERRL, EOMI.    Neck:  No JVD, no thyromegaly  Lymph: no axillary, cervical, supraclavicular lymphadenopathy noted bilaterally  Chest:  Clear to auscultation bilaterally, no work of breathing noted 5 L nasal cannula  CV: RRR, no M/G/R, pulses 2+  Abd:  Soft, NT, ND, +BS, obese  EXT:  no clubbing, no cyanosis, lower extremity edema  Neuro:  A&Ox3, CN grossly intact, no focal deficits.  Skin: No rashes or lesions noted    Result Review    Result Review:  I have personally reviewed the results from the time of this admission to 10/11/2023 10:03 EDT and agree with these findings:  [x]  Laboratory  [x]  Microbiology  [x]  Radiology  []  EKG/Telemetry   []  Cardiology/Vascular   []  Pathology  []  Old records  []  Other:  Most notable findings include:         Lab 10/11/23  0441 10/10/23  0530 10/09/23  0448 10/09/23  0135  10/08/23  0454 10/07/23  0552 10/06/23  2050   WBC 6.08 5.61 8.16  --  7.82 11.35* 17.13*   HEMOGLOBIN 12.7 11.2* 10.5*  --  10.7* 10.9* 12.1   HEMATOCRIT 41.7 37.1 35.1  --  36.5 37.0 40.5   PLATELETS 114* 89* 85*  --  74* 109* 136*   SODIUM 137 137 138  --  138 140 140   SODIUM, ARTERIAL  --   --   --  136.3  --   --   --    POTASSIUM 3.3* 3.4* 4.1  --  4.6 4.7 4.8   CHLORIDE 90* 94* 102  --  106 105 102   CO2 32.5* 32.1* 25.3  --  23.8 27.7 23.8   BUN 15 7* 6*  --  10 11 11   CREATININE 0.94 0.81 0.71  --  0.76 0.91 0.89   GLUCOSE 166* 185* 168*  --  120* 121* 203*   GLUCOSE, ARTERIAL  --   --   --  152*  --   --   --    CALCIUM 9.8 9.3 8.9  --  8.5* 8.4* 9.1   PHOSPHORUS 4.5 4.0 3.1  --  3.5  --   --    TOTAL PROTEIN 7.6 6.8 6.6  --  5.9* 6.0 7.0   ALBUMIN 3.8 3.5 3.5  --  3.0* 3.4* 3.9   GLOBULIN  --   --   --   --   --  2.6 3.1       Assessment & Plan   Assessment / Plan     Active Hospital Problems:  Active Hospital Problems    Diagnosis     **Sepsis      Impression:  Acute hypoxic respiratory failure  Pulmonary edema  Urinary tract infection  Elevated proBNP  History of COPD  History of A-fib  History of CKD stage III  History of diabetes  History of valvular heart disease  Obesity, BMI 46.5  Tobacco abuse in remission    Plan:  -Weaned to 3.5 L nasal cannula; continue to wean as tolerated keep SPO2 greater than 90%  -2D echo show EF 54%.  Diastolic dysfunction was indeterminate  -Ordered respiratory panel, Legionella, strep pneumo  -Continue Zosyn for UTI  -Continue Lasix 40 mg IV twice daily.  Continue to monitor I's and O's  -Continue to monitor renal panel and electrolytes.  Replace electrolytes as necessary  -Patient is currently on Entresto, diltiazem, digoxin, beta-blocker, Xarelto for history of A-fib  -Initiated bronchopulmonary bronchodilator protocol.  Encourage I-S  -PT/OT as tolerated.  Encourage out of bed to chair  -Patient plans to return home at discharge    DVT prophylaxis:  Medical DVT  prophylaxis orders are present.    CODE STATUS:   Code Status (Patient has no pulse and is not breathing): CPR (Attempt to Resuscitate)  Medical Interventions (Patient has pulse or is breathing): Full Support    Electronically signed by LUCERO Olmedo, 10/11/23, 10:03 AM EDT.  This patient was seen by both a physician and a NP. I, Mau Farmer MD, spent >50% of time in accordance with split shared billing. This included personally reviewing all pertinent labs, imaging, microbiology and documentation. Also discussing the case with the patient and any available family, the admitting physician and any available ancillary staff.   Electronically signed by Mau Farmer MD, 10/11/23, 5:09 PM EDT.

## 2023-10-12 ENCOUNTER — READMISSION MANAGEMENT (OUTPATIENT)
Dept: CALL CENTER | Facility: HOSPITAL | Age: 73
End: 2023-10-12
Payer: MEDICARE

## 2023-10-12 ENCOUNTER — TELEPHONE (OUTPATIENT)
Dept: UROLOGY | Facility: CLINIC | Age: 73
End: 2023-10-12
Payer: MEDICARE

## 2023-10-12 VITALS
TEMPERATURE: 97.3 F | RESPIRATION RATE: 18 BRPM | OXYGEN SATURATION: 92 % | HEART RATE: 82 BPM | DIASTOLIC BLOOD PRESSURE: 55 MMHG | WEIGHT: 246.25 LBS | BODY MASS INDEX: 43.63 KG/M2 | SYSTOLIC BLOOD PRESSURE: 111 MMHG | HEIGHT: 63 IN

## 2023-10-12 PROBLEM — N20.0 NEPHROLITHIASIS: Status: ACTIVE | Noted: 2023-10-12

## 2023-10-12 PROBLEM — A41.9 SEPSIS: Status: RESOLVED | Noted: 2023-10-07 | Resolved: 2023-10-12

## 2023-10-12 LAB
ALBUMIN SERPL-MCNC: 3.6 G/DL (ref 3.5–5.2)
ALP SERPL-CCNC: 56 U/L (ref 39–117)
ALT SERPL W P-5'-P-CCNC: 15 U/L (ref 1–33)
ANION GAP SERPL CALCULATED.3IONS-SCNC: 13.2 MMOL/L (ref 5–15)
AST SERPL-CCNC: 18 U/L (ref 1–32)
BASOPHILS # BLD AUTO: 0.04 10*3/MM3 (ref 0–0.2)
BASOPHILS NFR BLD AUTO: 0.6 % (ref 0–1.5)
BILIRUB CONJ SERPL-MCNC: 0.3 MG/DL (ref 0–0.3)
BILIRUB INDIRECT SERPL-MCNC: 0.6 MG/DL
BILIRUB SERPL-MCNC: 0.9 MG/DL (ref 0–1.2)
BUN SERPL-MCNC: 23 MG/DL (ref 8–23)
BUN/CREAT SERPL: 19.8 (ref 7–25)
CALCIUM SPEC-SCNC: 9.9 MG/DL (ref 8.6–10.5)
CHLORIDE SERPL-SCNC: 92 MMOL/L (ref 98–107)
CO2 SERPL-SCNC: 30.8 MMOL/L (ref 22–29)
CREAT SERPL-MCNC: 1.16 MG/DL (ref 0.57–1)
DEPRECATED RDW RBC AUTO: 51.4 FL (ref 37–54)
EGFRCR SERPLBLD CKD-EPI 2021: 50.5 ML/MIN/1.73
EOSINOPHIL # BLD AUTO: 0.11 10*3/MM3 (ref 0–0.4)
EOSINOPHIL NFR BLD AUTO: 1.7 % (ref 0.3–6.2)
ERYTHROCYTE [DISTWIDTH] IN BLOOD BY AUTOMATED COUNT: 15.6 % (ref 12.3–15.4)
GLUCOSE BLDC GLUCOMTR-MCNC: 192 MG/DL (ref 70–99)
GLUCOSE SERPL-MCNC: 199 MG/DL (ref 65–99)
HCT VFR BLD AUTO: 43 % (ref 34–46.6)
HGB BLD-MCNC: 13 G/DL (ref 12–15.9)
IMM GRANULOCYTES # BLD AUTO: 0.01 10*3/MM3 (ref 0–0.05)
IMM GRANULOCYTES NFR BLD AUTO: 0.2 % (ref 0–0.5)
LYMPHOCYTES # BLD AUTO: 1.55 10*3/MM3 (ref 0.7–3.1)
LYMPHOCYTES NFR BLD AUTO: 24.5 % (ref 19.6–45.3)
MAGNESIUM SERPL-MCNC: 1.6 MG/DL (ref 1.6–2.4)
MCH RBC QN AUTO: 27.6 PG (ref 26.6–33)
MCHC RBC AUTO-ENTMCNC: 30.2 G/DL (ref 31.5–35.7)
MCV RBC AUTO: 91.3 FL (ref 79–97)
MONOCYTES # BLD AUTO: 0.59 10*3/MM3 (ref 0.1–0.9)
MONOCYTES NFR BLD AUTO: 9.3 % (ref 5–12)
NEUTROPHILS NFR BLD AUTO: 4.02 10*3/MM3 (ref 1.7–7)
NEUTROPHILS NFR BLD AUTO: 63.7 % (ref 42.7–76)
NRBC BLD AUTO-RTO: 0 /100 WBC (ref 0–0.2)
PHOSPHATE SERPL-MCNC: 4.4 MG/DL (ref 2.5–4.5)
PLATELET # BLD AUTO: 130 10*3/MM3 (ref 140–450)
PMV BLD AUTO: 13.5 FL (ref 6–12)
POTASSIUM SERPL-SCNC: 3.7 MMOL/L (ref 3.5–5.2)
PROT SERPL-MCNC: 7.4 G/DL (ref 6–8.5)
RBC # BLD AUTO: 4.71 10*6/MM3 (ref 3.77–5.28)
RBC MORPH BLD: NORMAL
SMALL PLATELETS BLD QL SMEAR: ADEQUATE
SODIUM SERPL-SCNC: 136 MMOL/L (ref 136–145)
WBC MORPH BLD: NORMAL
WBC NRBC COR # BLD: 6.32 10*3/MM3 (ref 3.4–10.8)

## 2023-10-12 PROCEDURE — 80076 HEPATIC FUNCTION PANEL: CPT | Performed by: FAMILY MEDICINE

## 2023-10-12 PROCEDURE — 63710000001 INSULIN LISPRO (HUMAN) PER 5 UNITS: Performed by: PHYSICIAN ASSISTANT

## 2023-10-12 PROCEDURE — 99239 HOSP IP/OBS DSCHRG MGMT >30: CPT | Performed by: FAMILY MEDICINE

## 2023-10-12 PROCEDURE — 94799 UNLISTED PULMONARY SVC/PX: CPT

## 2023-10-12 PROCEDURE — 85025 COMPLETE CBC W/AUTO DIFF WBC: CPT | Performed by: FAMILY MEDICINE

## 2023-10-12 PROCEDURE — 25010000002 FUROSEMIDE PER 20 MG: Performed by: FAMILY MEDICINE

## 2023-10-12 PROCEDURE — 83735 ASSAY OF MAGNESIUM: CPT | Performed by: FAMILY MEDICINE

## 2023-10-12 PROCEDURE — 82948 REAGENT STRIP/BLOOD GLUCOSE: CPT

## 2023-10-12 PROCEDURE — 80048 BASIC METABOLIC PNL TOTAL CA: CPT | Performed by: FAMILY MEDICINE

## 2023-10-12 PROCEDURE — 99233 SBSQ HOSP IP/OBS HIGH 50: CPT | Performed by: STUDENT IN AN ORGANIZED HEALTH CARE EDUCATION/TRAINING PROGRAM

## 2023-10-12 PROCEDURE — 84100 ASSAY OF PHOSPHORUS: CPT | Performed by: FAMILY MEDICINE

## 2023-10-12 PROCEDURE — 94761 N-INVAS EAR/PLS OXIMETRY MLT: CPT

## 2023-10-12 PROCEDURE — 85007 BL SMEAR W/DIFF WBC COUNT: CPT | Performed by: FAMILY MEDICINE

## 2023-10-12 RX ORDER — FUROSEMIDE 40 MG/1
40 TABLET ORAL 2 TIMES DAILY
Qty: 60 TABLET | Refills: 0 | Status: SHIPPED | OUTPATIENT
Start: 2023-10-12 | End: 2023-11-11

## 2023-10-12 RX ADMIN — METOPROLOL SUCCINATE 100 MG: 50 TABLET, EXTENDED RELEASE ORAL at 08:59

## 2023-10-12 RX ADMIN — HYDROCODONE BITARTRATE AND ACETAMINOPHEN 1 TABLET: 10; 325 TABLET ORAL at 00:23

## 2023-10-12 RX ADMIN — PANTOPRAZOLE SODIUM 40 MG: 40 TABLET, DELAYED RELEASE ORAL at 08:59

## 2023-10-12 RX ADMIN — MICONAZOLE NITRATE 1 APPLICATION: 2 POWDER TOPICAL at 09:03

## 2023-10-12 RX ADMIN — INSULIN LISPRO 2 UNITS: 100 INJECTION, SOLUTION INTRAVENOUS; SUBCUTANEOUS at 09:09

## 2023-10-12 RX ADMIN — SACUBITRIL AND VALSARTAN 0.5 TABLET: 97; 103 TABLET, FILM COATED ORAL at 08:59

## 2023-10-12 RX ADMIN — HYDROCODONE BITARTRATE AND ACETAMINOPHEN 1 TABLET: 10; 325 TABLET ORAL at 08:59

## 2023-10-12 RX ADMIN — DILTIAZEM HYDROCHLORIDE 240 MG: 240 CAPSULE, EXTENDED RELEASE ORAL at 08:59

## 2023-10-12 RX ADMIN — RIVAROXABAN 20 MG: 20 TABLET, FILM COATED ORAL at 09:06

## 2023-10-12 RX ADMIN — DIGOXIN 250 MCG: 250 TABLET ORAL at 08:59

## 2023-10-12 RX ADMIN — FUROSEMIDE 40 MG: 10 INJECTION, SOLUTION INTRAMUSCULAR; INTRAVENOUS at 08:59

## 2023-10-12 NOTE — OUTREACH NOTE
Prep Survey      Flowsheet Row Responses   [facility] discharged from? [facility]   Is LACE score < 7 ? No   Eligibility [facility]   [facility]   Date of Admission 10/06/23   Date of Discharge 10/12/23   Discharge Disposition Home or Self Care   Discharge diagnosis Sepsis   Does the patient have one of the following disease processes/diagnoses(primary or secondary)? Sepsis   Does the patient have Home health ordered? No   Is there a DME ordered? No   Prep survey completed? Yes            KALEIGH CARDOZA - Registered Nurse           Home

## 2023-10-12 NOTE — TELEPHONE ENCOUNTER
KIRBY CALLED FROM [facility] FIFTH FLOOR.  SHE SAID THE PATIENT NEEDS A 2-WEEK F/U APPOINTMENT WITH DR. LUND FOR ACUTE UTI WITH SEPSIS.    PLEASE CALL PATIENT WITH APPOINTMENT, AS SHE WILL BE DISCHARGING.    PATIENT HAS [employer] MEDICARE REPLACEMENT.

## 2023-10-12 NOTE — PROGRESS NOTES
Pulmonary / Critical Care Progress Note      Patient Name: Mary Albarran  : 1950  MRN: [id number]  Primary Care Physician:  Brittni Quiroz APRN  Date of admission: 10/6/2023    Subjective   Subjective   Follow-up for acute hypoxic respiratory failure, pulmonary edema    Over past 24 hours: Remains on Lasix 40 mg IV twice daily.  Remains on Xarelto and Entresto    No acute events overnight.    This morning,  On 2 L nasal cannula  Lying in bed with legs wrapped  Feeling well overall  Diuresing well  No fever or chills  Denies cough  Denies chest tightness or chest pressure    Review of Systems  General: Fatigue, otherwise denied complaints  HEENT: Denied complaints  Respiratory: Dyspnea, otherwise denied complaints  Cardiovascular: Denied complaints  GI: Denied complaints    Objective   Objective     Vitals:   Temp:  [97.4 øF (36.3 øC)-98.2 øF (36.8 øC)] 97.4 øF (36.3 øC)  Heart Rate:  [82-97] 85  Resp:  [14-20] 18  BP: (125-154)/(48-83) 154/60  Flow (L/min):  [3-3.5] 3    Physical Exam   Vital Signs Reviewed   General:  Alert, NAD.  Lying in bed  HEENT:  PERRL, EOMI.    Neck:  No JVD, no thyromegaly  Lymph: no axillary, cervical, supraclavicular lymphadenopathy noted bilaterally  Chest: Diminished to auscultation bilaterally, no work of breathing noted  CV: RRR, no M/G/R, pulses 2+  Abd:  Soft, NT, ND, +BS  EXT: On 3 L nasal cannula no clubbing, no cyanosis, no edema  Neuro:  A&Ox3, CN grossly intact, no focal deficits.  Skin: No rashes or lesions noted      Result Review    Result Review:  I have personally reviewed the results from the time of this admission to 10/12/2023 10:30 EDT and agree with these findings:  [x]  Laboratory  [x]  Microbiology  [x]  Radiology  []  EKG/Telemetry   []  Cardiology/Vascular   []  Pathology  []  Old records  []  Other:  Most notable findings include:         Lab 10/12/23  0801 10/11/23  0441 10/10/23  0530 10/09/23  0448 10/09/23  0135 10/08/23  0454 10/07/23  0552  10/06/23  2050   WBC 6.32 6.08 5.61 8.16  --  7.82 11.35* 17.13*   HEMOGLOBIN 13.0 12.7 11.2* 10.5*  --  10.7* 10.9* 12.1   HEMATOCRIT 43.0 41.7 37.1 35.1  --  36.5 37.0 40.5   PLATELETS 130* 114* 89* 85*  --  74* 109* 136*   SODIUM 136 137 137 138  --  138 140 140   SODIUM, ARTERIAL  --   --   --   --  136.3  --   --   --    POTASSIUM 3.7 3.3* 3.4* 4.1  --  4.6 4.7 4.8   CHLORIDE 92* 90* 94* 102  --  106 105 102   CO2 30.8* 32.5* 32.1* 25.3  --  23.8 27.7 23.8   BUN 23 15 7* 6*  --  10 11 11   CREATININE 1.16* 0.94 0.81 0.71  --  0.76 0.91 0.89   GLUCOSE 199* 166* 185* 168*  --  120* 121* 203*   GLUCOSE, ARTERIAL  --   --   --   --  152*  --   --   --    CALCIUM 9.9 9.8 9.3 8.9  --  8.5* 8.4* 9.1   PHOSPHORUS 4.4 4.5 4.0 3.1  --  3.5  --   --    TOTAL PROTEIN 7.4 7.6 6.8 6.6  --  5.9* 6.0 7.0   ALBUMIN 3.6 3.8 3.5 3.5  --  3.0* 3.4* 3.9   GLOBULIN  --   --   --   --   --   --  2.6 3.1       Assessment & Plan   Assessment / Plan     Active Hospital Problems:  Active Hospital Problems    Diagnosis     **Sepsis      Impression:  Acute hypoxic respiratory failure  Pulmonary edema  Urinary tract infection  Elevated proBNP  History of COPD  History of A-fib  History of CKD stage III  History of diabetes  History of valvular heart disease  Obesity, BMI 46.5  Tobacco abuse in remission    Plan:  -Weaned to 2 L nasal cannula.  Patient does not wear any home O2.  Continue to wean as tolerated to maintain SPO2 greater than 90%.   -Patient continues to respond to diuresis.   -6-minute walk test prior to discharge  -Patient completed antibiotic course for UTI  -2D echo show EF 54%.  Diastolic dysfunction was indeterminate  -Respiratory panel, strep pneumo, Legionella negative to date  -Continue Lasix 40 mg IV twice daily.  Continue to monitor I's and O's  -Continue to monitor renal panel and electrolytes.  Replace electrolytes as necessary  -Patient is currently on Entresto, diltiazem, digoxin, beta-blocker, Xarelto for history  of A-fib  -Continue bronchopulmonary bronchodilator protocol.  Encourage I-S  -PT/OT as tolerated.  Encourage out of bed to chair  -Urology on board.  Appreciate assistance  -Patient plans to return home at discharge      DVT prophylaxis:  Medical DVT prophylaxis orders are present.    CODE STATUS:   Code Status (Patient has no pulse and is not breathing): CPR (Attempt to Resuscitate)  Medical Interventions (Patient has pulse or is breathing): Full Support    Electronically signed by LUCERO Olmedo, 10/12/23, 10:30 AM EDT.  This patient was seen by both a physician and a NP. I, Mau Farmer MD, spent >50% of time in accordance with split shared billing. This included personally reviewing all pertinent labs, imaging, microbiology and documentation. Also discussing the case with the patient and any available family, the admitting physician and any available ancillary staff.   Electronically signed by Mau Farmer MD, 10/12/23, 1:02 PM EDT.

## 2023-10-12 NOTE — PLAN OF CARE
Goal Outcome Evaluation:      VSS. Urine out put adequate. Pt standby assist to bedside commode. Weaned O2 to 3L nc oxygen sats 93% to 95%. PRN pain medication administered, see MAR. Wound care completed per MD order. Patient encouraged to shift weight frequently in bed, understanding confirmed via teach back method.

## 2023-10-12 NOTE — SIGNIFICANT NOTE
10/12/23 2919   Plan   Plan Comments ALICIA, RN called [employer] and faxed information as patient request [employer] to supply home oxygen.

## 2023-10-12 NOTE — DISCHARGE SUMMARY
[facility]         HOSPITALIST  DISCHARGE SUMMARY    Patient Name: Mary Albarran  : 1950  MRN: [id number]    Date of Admission: 10/6/2023  Date of Discharge:  10/12/2023    Primary Care Physician: Brittni Quiroz APRN    Consults       Date and Time Order Name Status Description    10/9/2023  7:27 AM Inpatient Pulmonology Consult Completed     10/8/2023 11:04 AM Inpatient Urology Consult      10/7/2023  2:57 AM Hospitalist (on-call MD unless specified)              Active and Resolved Hospital Problems:  Active Hospital Problems    Diagnosis POA    Nephrolithiasis [N20.0] Yes      Resolved Hospital Problems    Diagnosis POA    **Sepsis [A41.9] Yes   ú Clinical sepsis  ú Complicated urinary tract infection  ú Leukocytosis  ú Elevated lactic acid  ú Atrial fibrillation (Dr. Metcalf)  ú Valvular heart disease: Moderate AI and mild AS  ú Hypertension  ú Diabetes mellitus  ú Obesity with BMI 45  ú Neuropathy  ú Gallstones without cholecystitis  ú Nonobstructing nephrolithiasis right lower pole 0.9 cm  ú Degenerative spinal disease  ú Diffuse hepatic steatosis with hepatomegaly    Hospital Course     Hospital Course:  73-year-old female hospitalized on 10/7/2023 with chief complaint of abdominal pain, found to have sepsis secondary to UTI, CT imaging shows gallstones without acute cholecystitis, there is possible persistent gastroduodenitis, nonobstructing nephrolithiasis on the right, stone measuring 9 mm.  Urology consulted as patient is concerned that she has recurrent UTIs due to renal stone.  Likely Farxiga contributing to recurrent UTIs, recommend discontinuation of Farxiga permanently.  Went into A-fib with RVR, volume overload, placed back on beta-blocker, digoxin, placed on diuretics, had increased oxygen requirement, pulmonary consulted.  Patient's breathing improved, was able to wean off oxygen, discharged in hemodynamic stable addition on 10/12/2023 to follow-up with PCP within  1 week.  Referrals placed to follow-up with urology as outpatient.    Day of Discharge     Vital Signs:  Temp:  [97.3 øF (36.3 øC)-98.2 øF (36.8 øC)] 97.3 øF (36.3 øC)  Heart Rate:  [82-97] 82  Resp:  [14-20] 18  BP: (111-154)/(48-83) 111/55  Flow (L/min):  [2-3] 2  Review of systems:  All systems reviewed negative except for generalized fatigue    PHYSICAL EXAM        ú CON:   WN. WD. NAD.   ú NECK:             No thyromegaly. No stridor.   ú RESP:             CTA. No wheezes. No crackles.  No work of breathing or tachypnea.   ú CV:      Rhythm regular. Rate WNL. No murmur noted.  No edema.  ú GI:                   Soft and nontender. Nondistended.    ú EXT:    Peripheral pulses intact.  No cyanosis.  ú PSYCH:           Alert. Oriented. Normal affect and mood.  ú NEURO:          No dysarthria or aphasia. No unilateral weakness or paresthesia.  ú SKIN:   No chronic venous stasis changes or varicosities.  No cellulitis        Discharge Details        Discharge Medications        Changes to Medications        Instructions Start Date   budesonide 1 MG/2ML nebulizer solution  Commonly known as: PULMICORT  What changed: See the new instructions.   USE 1 AMPULE (2 ML) BY NEBULIZATION DAILY      Entresto  MG tablet  Generic drug: sacubitril-valsartan  What changed: See the new instructions.   TAKE ONE-HALF (1/2) TABLET IN THE MORNING AND TAKE 1 TABLET AT NIGHT AS DIRECTED      furosemide 40 MG tablet  Commonly known as: LASIX  What changed:   medication strength  how much to take   40 mg, Oral, 2 Times Daily      metoprolol succinate XL 50 MG 24 hr tablet  Commonly known as: TOPROL-XL  What changed:   how much to take  additional instructions   150 mg, Oral, 2 Times Daily      Toujeo Max SoloStar 300 UNIT/ML solution pen-injector injection  Generic drug: Insulin Glargine (2 Unit Dial)  What changed:   how much to take  how to take this  when to take this   25 units daily, increase 2 units weekly until blood sugar is  below 150.      Xarelto 20 MG tablet  Generic drug: rivaroxaban  What changed:   how much to take  when to take this   TAKE 1 TABLET DAILY             Continue These Medications        Instructions Start Date   albuterol sulfate  (90 Base) MCG/ACT inhaler  Commonly known as: PROVENTIL HFA;VENTOLIN HFA;PROAIR HFA   2 puffs, Inhalation, Every 4 Hours PRN      albuterol 1.25 MG/3ML nebulizer solution  Commonly known as: ACCUNEB   1.25 mg, Nebulization, Every 6 Hours PRN      ascorbic acid 250 MG tablet  Commonly known as: VITAMIN C   2 tablets, Oral, Daily      atorvastatin 20 MG tablet  Commonly known as: LIPITOR   TAKE 1 TABLET DAILY      BD Pen Needle Katiuska 2nd Gen 32G X 4 MM misc  Generic drug: Insulin Pen Needle   1 each, Does not apply, Daily      cholecalciferol 25 MCG (1000 UT) tablet  Commonly known as: VITAMIN D3   1,000 Units, Oral, Daily      digoxin 125 MCG tablet  Commonly known as: LANOXIN   TAKE 1 TABLET ON THURSDAYS AND SUNDAYS AND 2 TABLETS THE REST OF THE WEEK      dilTIAZem 120 MG 24 hr capsule  Commonly known as: TIAZAC   TAKE 1 CAPSULE DAILY      HYDROcodone-acetaminophen  MG per tablet  Commonly known as: NORCO   1 tablet, Oral, Every 4 Hours PRN      miconazole 2 % cream  Commonly known as: MICOTIN   2 times daily      multivitamin with minerals tablet tablet   1 tablet, Oral, Daily      nystatin 164326 UNIT/GM powder  Commonly known as: MYCOSTATIN   Topical, 2 Times Daily      nystatin 856919 UNIT/GM cream  Commonly known as: MYCOSTATIN   1 application , Topical, 2 Times Daily      potassium chloride 10 MEQ CR capsule  Commonly known as: MICRO-K   TAKE 1 CAPSULE DAILY WITH FOOD      pregabalin 150 MG capsule  Commonly known as: LYRICA   TAKE 1 CAPSULE THREE TIMES A DAY      triamcinolone 0.1 % cream  Commonly known as: KENALOG   1 application , Topical, 2 Times Daily      Vitamin B6 200 MG tablet   1 tablet, Oral, Daily             Stop These Medications      Farxiga 10 MG  tablet  Generic drug: dapagliflozin Propanediol     fluconazole 100 MG tablet  Commonly known as: Diflucan     glipizide 10 MG tablet  Commonly known as: GLUCOTROL     Januvia 100 MG tablet  Generic drug: SITagliptin              Allergies   Allergen Reactions    Codeine Mental Status Change       Discharge Disposition:  [place] [facility] [place]    Diet:  Hospital:  Diet Order   Procedures    Diet: Cardiac Diets; Healthy Heart (2-3 Na+); Texture: Regular Texture (IDDSI 7); Fluid Consistency: Thin (IDDSI 0)       Discharge Activity: as tolerates      CODE STATUS:  Code Status and Medical Interventions:   Ordered at: 10/07/23 0340     Code Status (Patient has no pulse and is not breathing):    CPR (Attempt to Resuscitate)     Medical Interventions (Patient has pulse or is breathing):    Full Support         Future Appointments   Date Time Provider Department Center   10/25/2023  8:15 AM Brittni Quiroz APRN [facility]   11/16/2023 11:30 AM Yoav Metcalf MD [facility]       Additional Instructions for the Follow-ups that You Need to Schedule       Discharge Follow-up with PCP   As directed       Currently Documented PCP:    Brittni Quiroz APRN    PCP Phone Number:    [phone]     Follow Up Details: 3 to 7 days                Pertinent  and/or Most Recent Results     PROCEDURES:   Adult Transthoracic Echo Complete W/ Cont if Necessary Per Protocol    Result Date: 10/9/2023    Left ventricular systolic function is normal. Calculated left ventricular EF = 54.4%   Left ventricular diastolic function was indeterminate.   The left atrial cavity is mildly dilated.   Mild aortic valve stenosis is present.   Estimated right ventricular systolic pressure from tricuspid regurgitation is normal (<35 mmHg).   No obvious wall motion abnormalities     XR Chest 1 View    Result Date: 10/9/2023  PROCEDURE: XR CHEST 1 VW  COMPARISON: [facility], CT, CT CHEST WO CONTRAST DIAGNOSTIC,  5/14/2023, 7:03.  [facility], CR, XR CHEST 1 VW, 5/15/2023, 19:51.  [facility], CR, XR CHEST 1 VW, 10/06/2023, 21:18.  [facility], CT, CT ABDOMEN PELVIS W CONTRAST, 10/07/2023, 1:15.  INDICATIONS: hypoxia  FINDINGS:  Cardiac silhouette remains enlarged.  There are diffusely increased interstitial and airspace opacities in both lungs.  No pneumothorax is seen.        Diffusely increased bilateral interstitial airspace opacities, suggestive of pulmonary edema.       JULIO CÉSAR POMPA MD       Electronically Signed and Approved By: JULIO CÉSAR POMPA MD on 10/09/2023 at 1:43             CT Abdomen Pelvis With Contrast    Result Date: 10/7/2023  PROCEDURE: CT ABDOMEN PELVIS W CONTRAST  COMPARISON: 5/14/2023.  INDICATIONS: abdominal pain, not otherwise specified  TECHNIQUE: After obtaining the patient's consent, 728 CT images were created with non-ionic intravenous contrast material.  No oral contrast agent was administered for the study.  PROTOCOL:   Standard CT imaging protocol performed.    RADIATION:   Total DLP: 1,128.7 mGy*cm.   Automated exposure control was utilized to minimize radiation dose. CONTRAST: 100 mL Isovue 370 I.V.  FINDINGS: There is probable less inflammatory change and mural thickening involving the distal stomach and proximal duodenum.  Again some degree of age-indeterminate infectious/inflammatory gastro-duodenitis cannot be excluded.  Peptic ulcer disease is possible, as well.  There are gallstones without acute cholecystitis.  No definite CT evidence of biliary ductal dilatation or choledocholithiasis.  No acute pancreatitis is suspected.  There is diffuse hepatic steatosis with hepatomegaly.  There may be borderline splenomegaly.  There is nonobstructing nephrolithiasis on the right with a lower pole calyceal stone measuring about 9 mm.  It was seen previously.  No definite left nephrolithiasis.  No ureterolithiasis.  No hydronephrosis.  No acute  pyelonephritis.  No urinary bladder calculi are seen.  There is an air-fluid level in the urinary bladder, which may be related to recent catheterization.  Please correlate clinically.  There is been hysterectomy.  No suspicious adnexal mass is suggested by CT.  Atherosclerotic changes are seen without aneurysmal dilatation of the aortoiliac arterial system.  No acute intraperitoneal or retroperitoneal hemorrhage.  There may be mild cardiac enlargement.  Atelectasis and/or fibrosis involve(s) the lung bases.  Probably no acute infiltrate is identified.  No pleural effusion.  Degenerative changes are seen throughout the imaged spine, moderate to severe in degree.  Degenerative changes involve the bilateral sacroiliac (SI) joints and the bilateral hip joints.  There is pubic osteitis.  No acute fracture is appreciated.  No significant interval change is suspected in the 2.5 cm benign intraosseous hemangioma (or venous malformation) involving the right aspect of the L5 vertebral body.  No acute appendicitis.  The appendix is seen on image 115 of series 201 and adjacent images.  There may be colonic diverticula.  No acute diverticulitis.  No acute colitis.  No mechanical bowel obstruction.  No pneumoperitoneum or pneumatosis.         1. Gallstones are seen without definite acute cholecystitis.  No biliary ductal dilatation.  No acute pancreatitis.   2. There is probable less mural thickening of the distal stomach and the proximal duodenum with some degree of persistent gastro-duodenitis or peptic ulcer disease possible.   3. Otherwise, no definite acute findings are seen.   4. Please see above comments for further detail.     Please note that portions of this note were completed with a voice recognition program.  RASHI COLES JR, MD       Electronically Signed and Approved By: RASHI COLES JR, MD on 10/07/2023 at 2:19              XR Chest 1 View    Result Date: 10/6/2023  PROCEDURE: XR CHEST 1 VW   COMPARISON: 5/15/2023.  INDICATIONS: UNSPECIFIED WEAKNESS.  FINDINGS:  A single AP upright portable chest radiograph is provided for review.  Interval decrease in bilateral infiltrates is noted since 5/15/2023.  Interval improvement in lung expansion is present, as well.  There may be mild pulmonary edema with vascular congestion persisting.  There is mild-to-moderate cardiomegaly, as before.  Mild linear subsegmental atelectasis is seen in the left lung base.  The thoracic aorta is atherosclerotic.  No pneumothorax is seen.  Minimal, if any, pleural effusion is identified.  There may be old healed left-sided rib fractures.        Favorable progression is suggested radiographically with decreased bilateral infiltrates since 5/15/2023.  The findings may represent improvement in infectious multifocal pneumonia or improvement in pulmonary edema.  There may be mild persistent pulmonary edema.  Mild-to-moderate cardiomegaly is suspected.  Interval decrease in, if not complete resolution of, the right pleural effusion is noted since 5/15/2023.      Please note that portions of this note were completed with a voice recognition program.  RASHI COLES JR, MD       Electronically Signed and Approved By: RASHI COLES JR, MD on 10/06/2023 at 23:12              CT Abdomen Pelvis Without Contrast    Result Date: 9/21/2023  REPORT-ID:[id number] EXAM:  CT ABDOMEN AND PELVIS WITHOUT INTRAVENOUS CONTRAST CLINICAL INDICATION:  UTI, recurrent/complicated (Female); Sepsis TECHNIQUE:  Helically acquired images were obtained of the abdomen and pelvis without intravenous contrast.  This CT exam was performed using one or more of the following dose reduction techniques:  automated exposure control, adjustment of the mA and/or kV according to patient size, and/or use of iterative reconstruction technique. RADIATION DOSE:  CTDIvol = 21.7 mGy, DLP = 1316.7 mGy-cm COMPARISON:  No relevant prior studies available.  FINDINGS: LOWER THORAX:  Unremarkable.  Lung bases are clear.  No cardiomegaly.  No significant pericardial effusion. ABDOMEN: LIVER:  Fatty liver with hepatomegaly. GALLBLADDER AND BILE DUCTS:  Cholelithiasis.  No gallbladder distention or wall edema.  No intra- or extrahepatic biliary ductal dilation. PANCREAS:  Unremarkable.  No focal cystic mass. SPLEEN:  Unremarkable.  Normal size without focal cystic or solid mass. ADRENALS:  Unremarkable.  No nodules. KIDNEYS AND URETERS:  Nonobstructing stone in the 9 mm nonobstructing right lower pole kidney. Otherwise unremarkable kidneys. STOMACH AND BOWEL:  Evaluation of the GI tract is limited by absence of oral contrast. Cannot exclude stomach wall thickening. No dilated loops of bowel or evidence for obstruction. Cannot exclude segmental thickening of the walls of the small or large bowel.  Cannot exclude enteritis or colitis. Appendix within normal limits. PELVIS: APPENDIX:  No evidence of acute appendicitis. BLADDER:  There is a Maldonado catheter emptying the bladder. REPRODUCTIVE:  Unremarkable as visualized.  No mass. ABDOMEN and PELVIS: INTRAPERITONEAL SPACE:  Unremarkable.  No ascites or other fluid collection.  No free air. BONES/JOINTS:  Degenerative changes throughout the spine.  No suspicious lytic or blastic abnormality. SOFT TISSUES:  Unremarkable.  No discrete abdominal or pelvic wall hernia. VASCULATURE:  Unremarkable.  Abdominal aorta is non-dilated. LYMPH NODES:  Unremarkable.  No enlarged lymph nodes.    1.  No definite acute abnormality. 2.  Cholelithiasis. 3.  Nonobstructing right renal stone. Electronically Signed: Brandon Barrientos MD 2023/09/21 at 20:44 CDT Reading Location ID and State: [address] / [place] Tel [phone], Service support  [phone], Fax [phone]    XR Chest 1 View    Result Date: 9/21/2023  REPORT-ID:[id number] STUDY:   X-RAY CHEST REASON FOR EXAM:   Female, 72 years old.  DYSURIA; Pts  states she has  just got off her antibiotic pt now is confused and still having painful urination TECHNIQUE:   Single AP portable view of the chest. COMPARISON:   None. ___________________________________ FINDINGS: There is diffuse hazy density and prominence of the bronchovascular markings in both lungs, most consistent with congestive failure and pulmonary edema. No focal infiltrates. No gross effusions. There is severe cardiac enlargement.   Normal mediastinum and winston.  Normal visualized pulmonary arteries.  Normal visualized aortic arch and descending thoracic aorta. There are diffuse degenerative changes of the visualized thoracic spine.  Normal visualized ribs, clavicles, and shoulders. There is no demonstrated abnormality of the visualized soft tissue structures of the upper abdomen. ___________________________________    Mild congestion/edema. Cardiomegaly. Electronically Signed: Brandon Barrientos MD 2023/09/21 at 17:53 CDT Reading Location ID and State: [address] / [place] Tel [phone], Service support  [phone], Fax [phone] \    LAB RESULTS:      Lab 10/12/23  0801 10/11/23  0441 10/10/23  0530 10/09/23  0448 10/09/23  0135 10/08/23  0454 10/07/23  0552 10/07/23  0415 10/06/23  2147   WBC 6.32 6.08 5.61 8.16  --  7.82 11.35*  --   --    HEMOGLOBIN 13.0 12.7 11.2* 10.5*  --  10.7* 10.9*  --   --    HEMATOCRIT 43.0 41.7 37.1 35.1  --  36.5 37.0  --   --    PLATELETS 130* 114* 89* 85*  --  74* 109*  --   --    NEUTROS ABS 4.02 4.14 3.58 6.68  --  5.68 8.85*  --   --    IMMATURE GRANS (ABS) 0.01 0.03 0.01 0.04  --  0.04  --   --   --    LYMPHS ABS 1.55 1.32 1.34 0.83  --  1.30  --   --   --    MONOS ABS 0.59 0.45 0.56 0.55  --  0.65  --   --   --    EOS ABS 0.11 0.12 0.10 0.04  --  0.13  --   --   --    MCV 91.3 92.3 92.5 93.6  --  96.1 97.6*  --   --    PROCALCITONIN  --   --   --   --   --   --  0.11  --   --    LACTATE  --   --   --   --   --   --   --  1.9 2.7*   LACTATE, ARTERIAL  --   --   --   --  1.24  --   --    --   --          Lab 10/12/23  0801 10/11/23  0441 10/10/23  0530 10/09/23  0448 10/09/23  0135 10/08/23  0454   SODIUM 136 137 137 138  --  138   SODIUM, ARTERIAL  --   --   --   --  136.3  --    POTASSIUM 3.7 3.3* 3.4* 4.1  --  4.6   CHLORIDE 92* 90* 94* 102  --  106   CO2 30.8* 32.5* 32.1* 25.3  --  23.8   ANION GAP 13.2 14.5 10.9 10.7  --  8.2   BUN 23 15 7* 6*  --  10   CREATININE 1.16* 0.94 0.81 0.71  --  0.76   EGFR 50.5* 65.0 77.7 91.0  --  83.9   GLUCOSE 199* 166* 185* 168*  --  120*   GLUCOSE, ARTERIAL  --   --   --   --  152*  --    CALCIUM 9.9 9.8 9.3 8.9  --  8.5*   IONIZED CALCIUM  --   --   --   --  1.12*  --    MAGNESIUM 1.6 1.6 1.7 1.8  --  2.0   PHOSPHORUS 4.4 4.5 4.0 3.1  --  3.5         Lab 10/12/23  0801 10/11/23  0441 10/10/23  0530 10/09/23  0448 10/08/23  0454 10/07/23  0552 10/06/23  2050   TOTAL PROTEIN 7.4 7.6 6.8 6.6 5.9* 6.0 7.0   ALBUMIN 3.6 3.8 3.5 3.5 3.0* 3.4* 3.9   GLOBULIN  --   --   --   --   --  2.6 3.1   ALT (SGPT) 15 16 14 15 14 14 17   AST (SGOT) 18 16 20 13 18 12 14   BILIRUBIN 0.9 1.3* 1.5* 1.2 0.8 1.2 1.2   INDIRECT BILIRUBIN 0.6 0.8 1.0 0.8 0.5  --   --    BILIRUBIN DIRECT 0.3 0.5* 0.5* 0.4* 0.3  --   --    ALK PHOS 56 62 55 54 54 52 68         Lab 10/09/23  0448 10/06/23  2050   PROBNP 3,101.0*  --    HSTROP T  --  21*                 Lab 10/09/23  0135   PH, ARTERIAL 7.395   PCO2, ARTERIAL 39.3   PO2 ART 58.1*   O2 SATURATION ART 89.3*   FIO2 45   HCO3 ART 23.5   BASE EXCESS ART -1.2   CARBOXYHEMOGLOBIN 0.8     Brief Urine Lab Results  (Last result in the past 365 days)        Color   Clarity   Blood   Leuk Est   Nitrite   Protein   CREAT   Urine HCG        10/07/23 0145 Dark Yellow   Clear   Trace   Small (1+)   Negative   30 mg/dL (1+)           10/07/23 0145 Yellow   Clear   Trace   Trace   Negative   30 mg/dL (1+)                 Microbiology Results (last 10 days)       Procedure Component Value - Date/Time    Respiratory Panel PCR w/COVID-19(SARS-CoV-2)  BRYAN/LENY/NINFA/PAD/COR/MAD/HUANG In-House, NP Swab in UTM/VTM, 3-4 HR TAT - Swab, Nasopharynx [[id number]]  (Normal) Collected: 10/11/23 0930    Lab Status: Final result Specimen: Swab from Nasopharynx Updated: 10/11/23 1134     ADENOVIRUS, PCR Not Detected     Coronavirus 229E Not Detected     Coronavirus HKU1 Not Detected     Coronavirus NL63 Not Detected     Coronavirus OC43 Not Detected     COVID19 Not Detected     Human Metapneumovirus Not Detected     Human Rhinovirus/Enterovirus Not Detected     Influenza A PCR Not Detected     Influenza B PCR Not Detected     Parainfluenza Virus 1 Not Detected     Parainfluenza Virus 2 Not Detected     Parainfluenza Virus 3 Not Detected     Parainfluenza Virus 4 Not Detected     RSV, PCR Not Detected     Bordetella pertussis pcr Not Detected     Bordetella parapertussis PCR Not Detected     Chlamydophila pneumoniae PCR Not Detected     Mycoplasma pneumo by PCR Not Detected    Narrative:      In the setting of a positive respiratory panel with a viral infection PLUS a negative procalcitonin without other underlying concern for bacterial infection, consider observing off antibiotics or discontinuation of antibiotics and continue supportive care. If the respiratory panel is positive for atypical bacterial infection (Bordetella pertussis, Chlamydophila pneumoniae, or Mycoplasma pneumoniae), consider antibiotic de-escalation to target atypical bacterial infection.    S. Pneumo Ag Urine or CSF - Urine, Urine, Clean Catch [[id number]]  (Normal) Collected: 10/11/23 0929    Lab Status: Final result Specimen: Urine, Clean Catch Updated: 10/11/23 1107     Strep Pneumo Ag Negative    Legionella Antigen, Urine - Urine, Urine, Clean Catch [[id number]]  (Normal) Collected: 10/11/23 0929    Lab Status: Final result Specimen: Urine, Clean Catch Updated: 10/11/23 1107     LEGIONELLA ANTIGEN, URINE Negative    Urine Culture - Urine, Straight Cath [[id number]]  (Normal) Collected: 10/07/23 0145    Lab  Status: Final result Specimen: Urine from Straight Cath Updated: 10/08/23 1253     Urine Culture No growth    Blood Culture - Blood, Arm, Left [[id number]]  (Normal) Collected: 10/06/23 2147    Lab Status: Final result Specimen: Blood from Arm, Left Updated: 10/11/23 2200     Blood Culture No growth at 5 days    Urine Culture - Urine, Urine, Clean Catch [[id number]] Collected: 10/04/23 1630    Lab Status: Final result Specimen: Urine, Clean Catch Updated: 10/05/23 1217     Urine Culture 50,000 CFU/mL Mixed Mildred Isolated    Narrative:      Specimen contains mixed organisms of questionable pathogenicity suggestive of contamination. If symptoms persist, suggest recollection.  Colonization of the urinary tract without infection is common. Treatment is discouraged unless the patient is symptomatic, pregnant, or undergoing an invasive urologic procedure.            XR Chest 1 View    Result Date: 10/9/2023  Impression:   Diffusely increased bilateral interstitial airspace opacities, suggestive of pulmonary edema.       JULIO CÉSAR POMPA MD       Electronically Signed and Approved By: JULIO CÉSAR POMPA MD on 10/09/2023 at 1:43             CT Abdomen Pelvis With Contrast    Result Date: 10/7/2023  Impression:    1. Gallstones are seen without definite acute cholecystitis.  No biliary ductal dilatation.  No acute pancreatitis.   2. There is probable less mural thickening of the distal stomach and the proximal duodenum with some degree of persistent gastro-duodenitis or peptic ulcer disease possible.   3. Otherwise, no definite acute findings are seen.   4. Please see above comments for further detail.     Please note that portions of this note were completed with a voice recognition program.  RASHI COLES JR, MD       Electronically Signed and Approved By: RASHI COLES JR, MD on 10/07/2023 at 2:19              XR Chest 1 View    Result Date: 10/6/2023  Impression:   Favorable progression is suggested radiographically with  decreased bilateral infiltrates since 5/15/2023.  The findings may represent improvement in infectious multifocal pneumonia or improvement in pulmonary edema.  There may be mild persistent pulmonary edema.  Mild-to-moderate cardiomegaly is suspected.  Interval decrease in, if not complete resolution of, the right pleural effusion is noted since 5/15/2023.      Please note that portions of this note were completed with a voice recognition program.  RASHI COLSE JR, MD       Electronically Signed and Approved By: RASHI COLES JR, MD on 10/06/2023 at 23:12              CT Abdomen Pelvis Without Contrast    Result Date: 9/21/2023  Impression: 1.  No definite acute abnormality. 2.  Cholelithiasis. 3.  Nonobstructing right renal stone. Electronically Signed: Brandon Barrientos MD 2023/09/21 at 20:44 CDT Reading Location ID and State: [address] / [place] Tel [phone], Service support  [phone], Fax [phone]    XR Chest 1 View    Result Date: 9/21/2023  Impression: Mild congestion/edema. Cardiomegaly. Electronically Signed: Brandon Barrientos MD 2023/09/21 at 17:53 CDT Reading Location ID and State: [address] / [place] Tel [phone], Service support  [phone], Fax [phone]             Results for orders placed during the hospital encounter of 10/06/23    Adult Transthoracic Echo Complete W/ Cont if Necessary Per Protocol    Interpretation Summary    Left ventricular systolic function is normal. Calculated left ventricular EF = 54.4%    Left ventricular diastolic function was indeterminate.    The left atrial cavity is mildly dilated.    Mild aortic valve stenosis is present.    Estimated right ventricular systolic pressure from tricuspid regurgitation is normal (<35 mmHg).    No obvious wall motion abnormalities      Labs Pending at Discharge: None        Time spent on Discharge including face to face service:  33 minutes    Electronically signed by Melody Luna MD, 10/12/23, 3:03 PM EDT.    Portions of this  documentation were transcribed electronically from a voice recognition software.  I confirm all data accurately represents the service(s) I performed at today's visit.

## 2023-10-12 NOTE — NURSING NOTE
Exercise Oximetry    Patient Name:Mary Albarran   MRN: [id number]   Date: 10/12/23             ROOM AIR BASELINE   SpO2% 82   Heart Rate 82   Blood Pressure 111/55     EXERCISE ON ROOM AIR SpO2% EXERCISE ON O2 @ 2   LPM SpO2%   1 MINUTE 90   1 MINUTE 94   2 MINUTES 88 2 MINUTES 91   3 MINUTES 82 3 MINUTES    4 MINUTES - 4 MINUTES    5 MINUTES - 5 MINUTES    6 MINUTES - 6 MINUTES               Distance Walked  0 Distance Walked  5 ft   Dyspnea (Lyndsay Scale)  0 Dyspnea (Lyndsay Scale)   Fatigue (Lyndsay Scale)  0 Fatigue (Lyndsay Scale)   SpO2% Post Exercise  - SpO2% Post Exercise 94   HR Post Exercise  - HR Post Exercise    Time to Recovery 1 min   Time to Recovery     Comments: pt unable for 6 minutes. Pt stood at bedside.

## 2023-10-13 ENCOUNTER — TRANSITIONAL CARE MANAGEMENT TELEPHONE ENCOUNTER (OUTPATIENT)
Dept: CALL CENTER | Facility: HOSPITAL | Age: 73
End: 2023-10-13
Payer: MEDICARE

## 2023-10-13 NOTE — OUTREACH NOTE
Call Center TCM Note      Flowsheet Row Responses   [facility] patient discharged from? [facility]   Does the patient have one of the following disease processes/diagnoses(primary or secondary)? Sepsis   TCM attempt successful? Yes  [no updated verbal release]   Call start time 1324   Call end time 1336   Discharge diagnosis Sepsis   Meds reviewed with patient/caregiver? Yes   Is the patient having any side effects they believe may be caused by any medication additions or changes? No   Does the patient have all medications related to this admission filled (includes all antibiotics, inhalers, nebulizers,steroids,etc.) Yes   Prescription comments Pt reports that last week her PCP sent over a prescription for Hydrocodone to [facility] but unable to be filled as not available--she had contacted office and they were going to send to [facility] but have not so far--she happened to be admitted on 10/6/23 so may be reason for delay, pt would like f/u regarding medication   Is the patient taking all medications as directed (includes completed medication regime)? Yes   Comments Pt has an appt on 10/25/23 at 0815--but needs another appt later if possible, none to offer and declines another provider.  Reports she lives in another time zone a distance away and this early morning appt is not possible, will route a message.   Does the patient have an appointment with their PCP within 7-14 days of discharge? Yes   Has home health visited the patient within 72 hours of discharge? N/A   What DME was ordered? Oxygen   Has all DME been delivered? Yes   DME comments Pt is using O2 at HS   Psychosocial issues? No   Did the patient receive a copy of their discharge instructions? Yes   Nursing interventions Reviewed instructions with patient   What is the patient's perception of their health status since discharge? Same  [Pt reports she feels the same--reports she has been dealing with nausea.  Pt aware to monitor for  worsening s/s UTI as well as return of Afib.  Taking her insulin but has not checked her BG-encouraged to check readings.]   Nursing interventions Nurse provided patient education   Is the patient/caregiver able to teach back TIME? T emperature - higher or lower than normal, I nfection - may have signs and symptoms of an infection, M ental Decline - confused, sleepy, difficult to arouse, E xtremely Ill - severe pain, discomfort, shortness of breath  [reviewed]   Nursing interventions Nurse provided patient education   Is patient/caregiver able to teach back steps to recovery at home? Rest and regain strength   Is the patient/caregiver able to teach back signs and symptoms of worsening condition: Fever, Shortness of breath/rapid respiratory rate, Altered mental status(confusion/coma)   Is the patient/caregiver able to teach back the hierarchy of who to call/visit for symptoms/problems? PCP, Specialist, Home health nurse, Urgent Care, ED, 911 Yes   TCM call completed? Yes   Call end time [phone]            Teodora Engle RN    10/13/2023, 13:44 EDT

## 2023-10-13 NOTE — PROGRESS NOTES
"Enter Query Response Below      Query Response:   Acute pulmonary edema   Electronically signed by Melody Luna MD, 10/13/23, 2:34 PM EDT.               If applicable, please update the problem list.   If you have any questions about this query contact me at: [email or web address]     Dr. Luna,    Patient admitted with sepsis due to UTI was also diagnosed with pulmonary edema. Per 10/9 IM PN, the patient became hypoxic overnight. ABGs revealed pO2 58, O2 sat 89% on 6 L/NC. proBNP 3101. 10/9 CXR showed \"Diffusely increased bilateral interstitial airspace opacities suggestive of pulmonary edema\". Pulmonary consult diagnosed \"Pulmonary edema\" and \"Acute hypoxic respiratory failure\". Treatment included IV Lasix, supplemental O2 up to 8L/NC, nebulizers and IS.    Please clarify if the patient was treated/monitored for:    Acute pulmonary edema  Fluid overload only  Other- specify______  Unable to determine      By submitting this query, we are merely seeking further clarification of documentation to accurately reflect all conditions that you are monitoring, evaluating, treating or that extend the hospitalization or utilize additional resources of care. Please utilize your independent clinical judgment when addressing the question(s) above.     This query and your response, once completed, will be entered into the legal medical record.    Sincerely,  Allison Huertas RN  Clinical Documentation Integrity Program     "

## 2023-10-16 ENCOUNTER — TELEPHONE (OUTPATIENT)
Dept: FAMILY MEDICINE CLINIC | Facility: CLINIC | Age: 73
End: 2023-10-16
Payer: MEDICARE

## 2023-10-16 DIAGNOSIS — M25.50 CHRONIC JOINT PAIN: ICD-10-CM

## 2023-10-16 DIAGNOSIS — G89.29 CHRONIC JOINT PAIN: ICD-10-CM

## 2023-10-16 LAB
QT INTERVAL: 327 MS
QTC INTERVAL: 451 MS

## 2023-10-16 NOTE — TELEPHONE ENCOUNTER
Caller: Mary Albarran    Relationship: Self    Best call back number: [phone]     What is the medical concern/diagnosis: KIDNEY STONES    What specialty or service is being requested: UROLOGY     What is the provider, practice or medical service name: [facility]     What is the office location: [place]    What is the office phone number: [phone]

## 2023-10-16 NOTE — TELEPHONE ENCOUNTER
Caller: Mary Albarran    Relationship: Self    Best call back number: [phone]     Requested Prescriptions:   Requested Prescriptions     Pending Prescriptions Disp Refills    HYDROcodone-acetaminophen (NORCO)  MG per tablet 120 tablet 0     Sig: Take 1 tablet by mouth Every 4 (Four) Hours As Needed for Moderate Pain or Severe Pain.        Pharmacy where request should be sent: [id number] - [place], [place] - [address] AT [facility] [phone] [facility] [phone]      Last office visit with prescribing clinician: 10/4/2023   Last telemedicine visit with prescribing clinician: Visit date not found   Next office visit with prescribing clinician: 10/25/2023     Additional details provided by patient: PATIENT WOULD LIKE TO KNOW IF SHE NEEDS TO INCREASE HER INSULIN DOSAGE    Does the patient have less than a 3 day supply:  [] Yes  [x] No    Would you like a call back once the refill request has been completed: [] Yes [x] No    If the office needs to give you a call back, can they leave a voicemail: [] Yes [x] No    Steven Hollins Rep   10/16/23 11:33 EDT

## 2023-10-17 ENCOUNTER — TELEPHONE (OUTPATIENT)
Dept: FAMILY MEDICINE CLINIC | Facility: CLINIC | Age: 73
End: 2023-10-17
Payer: MEDICARE

## 2023-10-17 RX ORDER — HYDROCODONE BITARTRATE AND ACETAMINOPHEN 10; 325 MG/1; MG/1
1 TABLET ORAL EVERY 4 HOURS PRN
Qty: 120 TABLET | Refills: 0 | OUTPATIENT
Start: 2023-10-17

## 2023-10-17 NOTE — TELEPHONE ENCOUNTER
Caller:  [facility]  Urology     Relationship:  Urology Office     Best call back number: [phone]     Who are you requesting to speak with (clinical staff, provider,  specific staff member):  Staff     What was the call regarding:    Urology Office stated they had tried multiple times to reach patient with no success. They had tried the husbands and daughter's number also. They were trying to get her scheduled.       I called the number and was able to get her on phone. She wouldn't answer their phone calls because she thought it was a different office than the one that Brittni wanted her to go to. I explained to her that is was, in fact, the same office. She stated she would try to call them back.

## 2023-10-17 NOTE — TELEPHONE ENCOUNTER
Caller:   Mary Albarran     Relationship:  Self     Best call back number:  [phone]     Who are you requesting to speak with (clinical staff, provider,  specific staff member):   Clinical     What was the call regarding:  Has a lot of questions about her referral that Brittni Quiroz put in for Urology. Please call and advise.

## 2023-10-17 NOTE — TELEPHONE ENCOUNTER
Caller: Mary Albarran    Relationship to patient: Self    Best call back number: [phone]     Patient is needing: PATIENT IS CALLING TO INQUIRE ABOUT HER ABOVE MEDICATION.

## 2023-10-18 ENCOUNTER — TELEPHONE (OUTPATIENT)
Dept: FAMILY MEDICINE CLINIC | Facility: CLINIC | Age: 73
End: 2023-10-18
Payer: MEDICARE

## 2023-10-18 RX ORDER — DAPAGLIFLOZIN 10 MG/1
TABLET, FILM COATED ORAL
Qty: 90 TABLET | Refills: 3 | Status: SHIPPED | OUTPATIENT
Start: 2023-10-18

## 2023-10-18 NOTE — TELEPHONE ENCOUNTER
Phone call received from patient on 10/17/2023 stating that her blood sugar was running over 200. Review done of dietary choices and medication regime with compliance with medication and some dietary changes. Will followup with patient tomorrow. Phone call placed today with report of blood sugar below 172 and feeling better with no symptoms. Patient to follow up with provider on 10/25/2023.

## 2023-10-18 NOTE — TELEPHONE ENCOUNTER
SPOKE WITH PATIENT WHO ADVISED ME SHE ONLY WANTS TO SEE [facility] SHE DOES NOT WANT TO SEE [facility] SHE STATED SHE TOLD SCHEDULING THAT AND THEY STILL SCHEDULED HER WITH [facility]. I TOOK THIS TO ROSALINA LIAO WITH REFERRALS WHO STATED SHE WILL CLOSE THE REFERRAL TO [facility] AND GET HER WITH A BEVERLEY FERNANDES

## 2023-10-18 NOTE — TELEPHONE ENCOUNTER
Phone call placed today and her blood sugar has been below 172 and no symptoms voiced. Patient to follow up on her 10/25/2023.

## 2023-10-19 DIAGNOSIS — M25.50 CHRONIC JOINT PAIN: ICD-10-CM

## 2023-10-19 DIAGNOSIS — G89.29 CHRONIC JOINT PAIN: ICD-10-CM

## 2023-10-19 NOTE — TELEPHONE ENCOUNTER
PATIENT NEEDS NEW RX OF HYDROcodone SENT TO [facility] @ [facility] AND [facility]

## 2023-10-19 NOTE — TELEPHONE ENCOUNTER
Caller: Mary Albarran    Relationship: Self    Best call back number: [phone]     Requested Prescriptions:   Requested Prescriptions     Pending Prescriptions Disp Refills    HYDROcodone-acetaminophen (NORCO)  MG per tablet 120 tablet 0     Sig: Take 1 tablet by mouth Every 4 (Four) Hours As Needed for Moderate Pain or Severe Pain.        Pharmacy where request should be sent: [id number] - [place], [place] - [address] AT [facility] [phone] [facility] [phone]      Last office visit with prescribing clinician: 10/4/2023   Last telemedicine visit with prescribing clinician: Visit date not found   Next office visit with prescribing clinician: 10/25/2023     Additional details provided by patient:     Does the patient have less than a 3 day supply:  X Yes  [] No    Would you like a call back once the refill request has been completed: [] Yes [] No    If the office needs to give you a call back, can they leave a voicemail: [] Yes [] No    Angeles Irby, PCT   10/19/23 13:21 EDT

## 2023-10-20 DIAGNOSIS — G89.29 CHRONIC JOINT PAIN: ICD-10-CM

## 2023-10-20 DIAGNOSIS — M25.50 CHRONIC JOINT PAIN: ICD-10-CM

## 2023-10-20 RX ORDER — HYDROCODONE BITARTRATE AND ACETAMINOPHEN 10; 325 MG/1; MG/1
1 TABLET ORAL EVERY 4 HOURS PRN
Qty: 120 TABLET | Refills: 0 | Status: SHIPPED | OUTPATIENT
Start: 2023-10-20

## 2023-10-20 RX ORDER — HYDROCODONE BITARTRATE AND ACETAMINOPHEN 10; 325 MG/1; MG/1
1 TABLET ORAL EVERY 4 HOURS PRN
Qty: 120 TABLET | Refills: 0 | Status: CANCELLED | OUTPATIENT
Start: 2023-10-20

## 2023-10-20 RX ORDER — HYDROCODONE BITARTRATE AND ACETAMINOPHEN 10; 325 MG/1; MG/1
1 TABLET ORAL EVERY 4 HOURS PRN
Qty: 120 TABLET | Refills: 0 | Status: SHIPPED | OUTPATIENT
Start: 2023-10-20 | End: 2023-10-20 | Stop reason: SDUPTHER

## 2023-10-26 ENCOUNTER — READMISSION MANAGEMENT (OUTPATIENT)
Dept: CALL CENTER | Facility: HOSPITAL | Age: 73
End: 2023-10-26
Payer: MEDICARE

## 2023-10-26 NOTE — OUTREACH NOTE
Sepsis Week 3 Survey      Flowsheet Row Responses   [facility] patient discharged from? [facility]   Does the patient have one of the following disease processes/diagnoses(primary or secondary)? Sepsis   Week 3 attempt successful? No   Unsuccessful attempts Attempt 1            Ines CROWDER - Registered Nurse

## 2023-10-27 ENCOUNTER — TELEPHONE (OUTPATIENT)
Dept: FAMILY MEDICINE CLINIC | Facility: CLINIC | Age: 73
End: 2023-10-27
Payer: MEDICARE

## 2023-10-27 NOTE — TELEPHONE ENCOUNTER
Caller: [facility]      Best call back number: [phone]     What orders are you requesting (i.e. lab or imaging): CHEST CT    In what timeframe would the patient need to come in: DUE 11/14    Where will you receive your lab/imaging services: [facility]    Additional notes: [facility] REPORTS THAT PATIENT IS DUE FOR A REPEAT CHEST CT (6 MONTHS) DUE TO A PULMONARY NODULE IDENTIFIED ON 05/14

## 2023-10-30 DIAGNOSIS — R91.8 PULMONARY NODULES/LESIONS, MULTIPLE: Primary | ICD-10-CM

## 2023-10-31 ENCOUNTER — READMISSION MANAGEMENT (OUTPATIENT)
Dept: CALL CENTER | Facility: HOSPITAL | Age: 73
End: 2023-10-31
Payer: MEDICARE

## 2023-10-31 NOTE — OUTREACH NOTE
Sepsis Week 3 Survey      Flowsheet Row Responses   [facility] facility patient discharged from? [facility]   Does the patient have one of the following disease processes/diagnoses(primary or secondary)? Sepsis   Week 3 attempt successful? Yes   Call start time 1526   Revoke Decline to participate   Call end time 1527   Discharge diagnosis Sepsis   Call end time 1527            Allison GASCA - Registered Nurse

## 2023-11-01 ENCOUNTER — OFFICE VISIT (OUTPATIENT)
Dept: FAMILY MEDICINE CLINIC | Facility: CLINIC | Age: 73
End: 2023-11-01
Payer: MEDICARE

## 2023-11-01 VITALS
TEMPERATURE: 97.8 F | BODY MASS INDEX: 46.6 KG/M2 | DIASTOLIC BLOOD PRESSURE: 60 MMHG | OXYGEN SATURATION: 94 % | HEIGHT: 63 IN | HEART RATE: 78 BPM | SYSTOLIC BLOOD PRESSURE: 130 MMHG | WEIGHT: 263 LBS

## 2023-11-01 DIAGNOSIS — Z87.440 HISTORY OF RECURRENT UTIS: ICD-10-CM

## 2023-11-01 DIAGNOSIS — Z78.9 INPATIENT HOSPITALIZATION WITHIN LAST 30 DAYS: Primary | ICD-10-CM

## 2023-11-01 DIAGNOSIS — A41.50 SEPSIS DUE TO GRAM-NEGATIVE UTI: ICD-10-CM

## 2023-11-01 DIAGNOSIS — N39.0 SEPSIS DUE TO GRAM-NEGATIVE UTI: ICD-10-CM

## 2023-11-01 DIAGNOSIS — N20.0 KIDNEY STONE ON RIGHT SIDE: ICD-10-CM

## 2023-11-01 DIAGNOSIS — R10.9 RIGHT FLANK PAIN: ICD-10-CM

## 2023-11-01 RX ORDER — METOPROLOL SUCCINATE 100 MG/1
150 TABLET, EXTENDED RELEASE ORAL
COMMUNITY
Start: 2023-08-31

## 2023-11-01 NOTE — PROGRESS NOTES
Mary Albarran is a 73 y.o. female admitted to [facility] and  is seen for follow up.  Chief Complaint   Patient presents with    Hospital Follow Up Visit     [facility] 10/06-12/2023, Dx: Nephrolithiasis  Sepsis           5/21/2023     2:16 PM 9/26/2023    10:07 AM 10/12/2023     6:27 PM   Date of TCM Phone Call   Hospital [facility]   Date of Admission 5/12/2023 9/21/2023 10/6/2023   Date of Discharge 5/21/2023 9/25/2023 10/12/2023   Discharge Disposition Home-Health Care [facility]  Home or Self Care     Discharge summary is available.  Current outpatient and discharge medications have been reconciled for the patient.  Reviewed by: LUCERO Collins    She was admitted for the following reason(s):  Primary admitting diagnosis at discharge was sepsis due to unspecified organism, acute UTI pertinent secondary diagnoses include difficulty walking, chronic diastolic congestive heart failure, recurrent UTIs.  Procedures performed while hospitalized:Procedures Performed in Hospital: IV fluids, IV Antibiotics with broad-spectrum antibiotic therapy, and CT of abdomen and pelvis  Pending results:  Pending tests: none  Pain Control:  well controlled  Diet: Healthy heart diabetic diet  Activity after Discharge: activity as tolerated  Items to be addressed at first follow up visit include:  1. Medication changes: Farxiga discontinued, glipizide discontinued, Januvia discontinued, patient was started on Pulmicort, adjust dose to Entresto, adjust dose of Lasix, started on Toujeo 25 units increase to units weekly until blood sugars below 150 and Xarelto 20 mg.  Unable to tolerate Lasix 40 side effects diarrhea, discussed with patient take 40mg  in the morning and 20mg at 2pm, patient verbalized understanding     She reports her overall condition is are improving since discharge.  Specific complaints: patient is have bout of pain, currently have 9mm stone right side, concerned for recurrent UTI, clear urine,  "staying well hydrated, no blood or odor to urine  Social support is said to be adequate to meet her needs. .   Patient was discharged home on oxygen 2 L at bedtime.  Patient states while in hospital she was waking up gasping for breath.  Patient states she had had a history of that at home and does have a history of sleep apnea but could never tolerate the CPAP mask.  O2 declined during sleep in hospital patient was able to go home with oxygen to wear at nighttime.  Patient states that she does notice a difference with when she uses the oxygen at nighttime.  Objective   Vitals:    11/01/23 0825   BP: 130/60   BP Location: Left arm   Patient Position: Sitting   Cuff Size: Adult   Pulse: 78   Temp: 97.8 °F (36.6 °C)   TempSrc: Temporal   SpO2: 94%   Weight: 119 kg (263 lb)   Height: 160 cm (63\")     Body mass index is 46.59 kg/m².  Physical Exam  Vitals reviewed.   Constitutional:       Appearance: Normal appearance. She is well-developed. She is morbidly obese.   HENT:      Head: Normocephalic and atraumatic.   Eyes:      Conjunctiva/sclera: Conjunctivae normal.      Pupils: Pupils are equal, round, and reactive to light.   Cardiovascular:      Rate and Rhythm: Normal rate. Rhythm irregular.      Heart sounds: No murmur heard.  Pulmonary:      Effort: Pulmonary effort is normal.      Breath sounds: Normal breath sounds. No wheezing or rhonchi.   Musculoskeletal:      Right lower leg: Edema present.      Left lower leg: Edema present.      Comments: Legs wrapped with ace bandages   Feet:      Right foot:      Toenail Condition: Right toenails are normal.      Left foot:      Skin integrity: No ulcer or blister.      Toenail Condition: Left toenails are normal.   Skin:     General: Skin is warm and dry.   Neurological:      Mental Status: She is alert and oriented to person, place, and time.      Motor: Weakness present.      Gait: Gait abnormal (wheelchair).   Psychiatric:         Mood and Affect: Mood and affect " normal.         Behavior: Behavior normal.         Thought Content: Thought content normal.         Judgment: Judgment normal.         Narrative & Impression   PROCEDURE:  CT ABDOMEN PELVIS W CONTRAST     COMPARISON: 5/14/2023.     INDICATIONS:  abdominal pain, not otherwise specified     TECHNIQUE:    After obtaining the patient's consent, 728 CT images were created with non-ionic   intravenous contrast material.  No oral contrast agent was administered for the study.     PROTOCOL:     Standard CT imaging protocol performed.                 RADIATION:      Total DLP: 1,128.7 mGy*cm.               Automated exposure control was utilized to minimize radiation dose.   CONTRAST:      100 mL Isovue 370 I.V.     FINDINGS:        There is probable less inflammatory change and mural thickening involving the distal   stomach and proximal duodenum.  Again some degree of age-indeterminate infectious/inflammatory   gastro-duodenitis cannot be excluded.  Peptic ulcer disease is possible, as well.  There are   gallstones without acute cholecystitis.  No definite CT evidence of biliary ductal dilatation or   choledocholithiasis.  No acute pancreatitis is suspected.  There is diffuse hepatic steatosis with   hepatomegaly.  There may be borderline splenomegaly.  There is nonobstructing nephrolithiasis on   the right with a lower pole calyceal stone measuring about 9 mm.  It was seen previously.  No   definite left nephrolithiasis.  No ureterolithiasis.  No hydronephrosis.  No acute pyelonephritis.    No urinary bladder calculi are seen.  There is an air-fluid level in the urinary bladder, which may   be related to recent catheterization.  Please correlate clinically.  There is been hysterectomy.    No suspicious adnexal mass is suggested by CT.  Atherosclerotic changes are seen without aneurysmal   dilatation of the aortoiliac arterial system.  No acute intraperitoneal or retroperitoneal   hemorrhage.  There may be mild cardiac  enlargement.  Atelectasis and/or fibrosis involve(s) the   lung bases.  Probably no acute infiltrate is identified.  No pleural effusion.  Degenerative   changes are seen throughout the imaged spine, moderate to severe in degree.  Degenerative changes   involve the bilateral sacroiliac (SI) joints and the bilateral hip joints.  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  No acute fracture is appreciated.  No significant interval change is suspected in the   2.5 cm benign intraosseous hemangioma (or venous malformation) involving the right aspect of the L5   vertebral body.  No acute appendicitis.  The appendix is seen on image 115 of series 201 and   adjacent images.  There may be colonic diverticula.  No acute diverticulitis.  No acute colitis.    No mechanical bowel obstruction.  No pneumoperitoneum or pneumatosis.     IMPRESSION:                    1. Gallstones are seen without definite acute cholecystitis.  No biliary ductal dilatation.  No   acute pancreatitis.       2. There is probable less mural thickening of the distal stomach and the proximal duodenum with   some degree of persistent gastro-duodenitis or peptic ulcer disease possible.       3. Otherwise, no definite acute findings are seen.       4. Please see above comments for further detail.       Assessment & Plan   Problem List Items Addressed This Visit    None  Visit Diagnoses       Inpatient hospitalization within last 30 days    -  Primary    Sepsis due to gram-negative UTI        Feeling much better, stay well-hydrated urine yellow no blood we will continue to monitor    Kidney stone on right side        9 mm nonobstructing renal stone on right side has an appointment see Vignesh Santillan 11/20/2023 she states she still having intermittent pain    History of recurrent UTIs        since patient is having pain, will call urology to see if they are able to see her sooner    Right flank pain, intermittent        We will call urology to see if her appointment can be  moved up, patient is agreeable

## 2023-11-16 ENCOUNTER — OFFICE VISIT (OUTPATIENT)
Dept: CARDIOLOGY | Facility: CLINIC | Age: 73
End: 2023-11-16
Payer: MEDICARE

## 2023-11-16 VITALS
BODY MASS INDEX: 45.18 KG/M2 | HEIGHT: 63 IN | HEART RATE: 72 BPM | SYSTOLIC BLOOD PRESSURE: 134 MMHG | WEIGHT: 255 LBS | DIASTOLIC BLOOD PRESSURE: 82 MMHG

## 2023-11-16 DIAGNOSIS — I50.32 CHRONIC DIASTOLIC CONGESTIVE HEART FAILURE: ICD-10-CM

## 2023-11-16 DIAGNOSIS — I48.20 CHRONIC ATRIAL FIBRILLATION: ICD-10-CM

## 2023-11-16 DIAGNOSIS — E78.2 MIXED HYPERLIPIDEMIA: ICD-10-CM

## 2023-11-16 DIAGNOSIS — I35.0 NONRHEUMATIC AORTIC VALVE STENOSIS: Primary | ICD-10-CM

## 2023-11-16 DIAGNOSIS — I10 PRIMARY HYPERTENSION: ICD-10-CM

## 2023-11-16 PROBLEM — N39.0 RECURRENT URINARY TRACT INFECTION: Status: ACTIVE | Noted: 2023-11-16

## 2023-11-16 RX ORDER — FUROSEMIDE 40 MG/1
40 TABLET ORAL DAILY
Qty: 30 TABLET | Refills: 0 | Status: SHIPPED | OUTPATIENT
Start: 2023-11-16 | End: 2023-12-16

## 2023-11-16 RX ORDER — SACUBITRIL AND VALSARTAN 97; 103 MG/1; MG/1
1 TABLET, FILM COATED ORAL TAKE AS DIRECTED
Qty: 60 TABLET | Refills: 3 | Status: SHIPPED | OUTPATIENT
Start: 2023-11-16

## 2023-11-16 RX ORDER — METOPROLOL SUCCINATE 50 MG/1
100 TABLET, EXTENDED RELEASE ORAL 2 TIMES DAILY
Start: 2023-11-16

## 2023-11-16 RX ORDER — METOPROLOL SUCCINATE 50 MG/1
50 TABLET, EXTENDED RELEASE ORAL 2 TIMES DAILY
Qty: 60 TABLET | Refills: 3 | Status: SHIPPED | OUTPATIENT
Start: 2023-11-16

## 2023-11-16 NOTE — PROGRESS NOTES
Chief Complaint: Urologic complaint    Subjective           History of Present Illness    Mary Albarran is a 73 y.o. female      Nephrolithiasis  Possible recurrent UTI  Obese      Last hospitalization for UTI was earlier this month      All urine cultures have been contaminated from both [place] and here.    Urinary symptoms are extreme pressure and mental status changes    atrial fibrillation,No CAD,  CKD sign, COPD, type 2 diabetes, hypertension, hyperlipidemia, chronic diastolic congestive heart failure  On Xarelto      Patient's had 3 hospitalizations for UTI this past year.    Drinking plenty of water.      PVR    11/23   000      Patient is dealing with some diarrhea secondary to her medications.  She has had constipation in the past.      10/23 1.1, GFR 50    10/6/2023 CT abdomen/pelvis with - gallstones, 6 mm lower pole stone nonobstructing right kidney.  Punctate stone left kidney.  No other urologic findings    10/23 UA - 6-12 RBCs, no bacteria TNTC WBCs    Urine culture    10/8/2023   negative    No previous kidney stone.        Objective     Past Medical History:   Diagnosis Date    Allergic rhinitis     Anxiety     Aortic valve disease 11/28/2021    Fibrocalcific changes of the aortic valve with mild aortic valve stenosis   on echo 3/2/2021  Moderate aortic valve regurgitation is present. Aortic valve maximum pressure gradient is 26 mmHg. Moderate aortic valve stenosis is present.  On echo 2/10/2022       Arthritis     Atrial fibrillation     Chronic kidney disease (CKD), stage III (moderate)     Chronic pain     COPD (chronic obstructive pulmonary disease)     Diabetes mellitus type 2 with complications     Ex-smoker     QUIT SMOKING 2008    Hyperlipidemia     Hypertension     Thrombocytopenia        Past Surgical History:   Procedure Laterality Date    HYSTERECTOMY      30 YEARS AGO         Current Outpatient Medications:     albuterol (ACCUNEB) 1.25 MG/3ML nebulizer solution, Take 3 mL by  nebulization Every 6 (Six) Hours As Needed for Wheezing or Shortness of Air., Disp: 300 mL, Rfl: 12    albuterol sulfate  (90 Base) MCG/ACT inhaler, Inhale 2 puffs Every 4 (Four) Hours As Needed for Wheezing., Disp: 8 g, Rfl: 11    atorvastatin (LIPITOR) 20 MG tablet, TAKE 1 TABLET DAILY, Disp: 90 tablet, Rfl: 3    budesonide (PULMICORT) 1 MG/2ML nebulizer solution, USE 1 AMPULE (2 ML) BY NEBULIZATION DAILY (Patient taking differently: Take 2 mL by nebulization Daily.), Disp: 180 mL, Rfl: 3    cholecalciferol (VITAMIN D3) 25 MCG (1000 UT) tablet, Take 1 tablet by mouth Daily., Disp: , Rfl:     digoxin (LANOXIN) 125 MCG tablet, TAKE 1 TABLET ON THURSDAYS AND SUNDAYS AND 2 TABLETS THE REST OF THE WEEK, Disp: 180 tablet, Rfl: 3    dilTIAZem (TIAZAC) 120 MG 24 hr capsule, TAKE 1 CAPSULE DAILY, Disp: 90 capsule, Rfl: 3    furosemide (LASIX) 40 MG tablet, Take 1 tablet by mouth Daily for 30 days., Disp: 30 tablet, Rfl: 0    HYDROcodone-acetaminophen (NORCO)  MG per tablet, Take 1 tablet by mouth Every 4 (Four) Hours As Needed for Moderate Pain or Severe Pain., Disp: 120 tablet, Rfl: 0    Insulin Glargine, 2 Unit Dial, (Toujeo Max SoloStar) 300 UNIT/ML solution pen-injector injection, 25 units daily, increase 2 units weekly until blood sugar is below 150. (Patient taking differently: Inject 44 Units under the skin into the appropriate area as directed Daily. 25 units daily, increase 2 units weekly until blood sugar is below 150.), Disp: 6 mL, Rfl: 2    Insulin Pen Needle (BD Pen Needle Katiuska 2nd Gen) 32G X 4 MM misc, 1 each Daily., Disp: 90 each, Rfl: 1    metoprolol succinate XL (TOPROL-XL) 50 MG 24 hr tablet, Take 2 tablets by mouth 2 (Two) Times a Day. Take with 50mg tablet for a total of 150mg twice a day., Disp: , Rfl:     metoprolol succinate XL (TOPROL-XL) 50 MG 24 hr tablet, Take 1 tablet by mouth 2 (Two) Times a Day., Disp: 60 tablet, Rfl: 3    nystatin (MYCOSTATIN) 629838 UNIT/GM powder, Apply   topically to the appropriate area as directed 2 (Two) Times a Day., Disp: 60 g, Rfl: 1    O2 (OXYGEN), Inhale 2 L/min Every Night., Disp: , Rfl:     potassium chloride (MICRO-K) 10 MEQ CR capsule, TAKE 1 CAPSULE DAILY WITH FOOD, Disp: 90 capsule, Rfl: 3    pregabalin (LYRICA) 150 MG capsule, TAKE 1 CAPSULE THREE TIMES A DAY, Disp: 270 capsule, Rfl: 1    sacubitril-valsartan (Entresto)  MG tablet, Take 1 tablet by mouth Take As Directed. Take 1/2 tablet in the morning and 1 tablet at night., Disp: 60 tablet, Rfl: 3    Xarelto 20 MG tablet, TAKE 1 TABLET DAILY (Patient taking differently: Take 1 tablet by mouth Daily With Dinner.), Disp: 90 tablet, Rfl: 3    Allergies   Allergen Reactions    Codeine Mental Status Change        Family History   Problem Relation Age of Onset    Heart disease Father     Heart disease Other     Heart disease Other     Diabetes Other        Social History     Socioeconomic History    Marital status:    Tobacco Use    Smoking status: Former     Packs/day: 1.00     Years: 36.00     Additional pack years: 0.00     Total pack years: 36.00     Types: Cigarettes     Start date: 4/3/1971     Quit date: 4/17/2008     Years since quitting: 15.5     Passive exposure: Never    Smokeless tobacco: Never    Tobacco comments:     FOR 30 YEARS   Vaping Use    Vaping Use: Never used   Substance and Sexual Activity    Alcohol use: Never    Drug use: Never    Sexual activity: Defer       Vital Signs:   There were no vitals taken for this visit.     Physical exam    Alert and orient x3  Well appearing, well developed, in no acute distress   Unlabored respirations  Nontender/nondistended      Grossly oriented to person, place and time, judgment is intact, normal mood and affect         Bladder Scan interpretation 11/20/2023    Estimation of residual urine via BVI 3000 Verathon Bladder Scan  MA/nurse performing: Kate NEUMANN  Residual Urine: 0 ml  Indication: Nephrolithiasis    Recurrent urinary tract  infection   Position: Supine  Examination: Incremental scanning of the suprapubic area using 2.0 MHz transducer using copious amounts of acoustic gel.   Findings: An anechoic area was demonstrated which represented the bladder, with measurement of residual urine as noted. I inspected this myself. In that the residual urine was stable or insignificant, refer to plan for treatment and plan necessary at this time.            Assessment and Plan    Diagnoses and all orders for this visit:    1. Nephrolithiasis (Primary)    2. Recurrent urinary tract infection      Records reviewed and summarized in chart      Patient with a nonobstructing stone in right kidney.  Images reviewed and discussed with the patient.  We did discuss we could consider cystoscopy with right ureteroscopy with laser and right ureteral   stent placement risks and benefits were discussed including bleeding, infection and damage to the urinary system.  We also discussed the risk of anesthesia up to and including death.  Patient voiced understanding     We did discuss this would remove the stone and does not guarantee that she will have no more infection.  We discussed that nonobstructing stone does not have to be the nidus of infection.  She has likely had the stone for several years could be an incidental finding on CT    At this time after discussion of risk and benefits patient would like to hold off on surgery.    Because she has had so many hospitalizations this past year I did discuss going ahead and starting her on low-dose prophylactic antibiotics at least for a time.  Patient voiced understanding    Start Keflex 250 mg nightly.  Risk and benefits discussed.    Discussed with patient if she starts having symptoms it would be good to be coming in to see nurse practitioner or nurse to get catheterized culture if possible.  We discussed after review of records she has had no positive cultures to date.  They have all been  contaminated      Follow-up for pelvic exam and cystoscopy in the office

## 2023-11-16 NOTE — PROGRESS NOTES
Chief Complaint  Nonrheumatic aortic valve stenosis and Aortic valve disease    Subjective        History of Present Illness  Mary Albarran presents to [facility] for follow up.  Patient is a 73-year-old female with past medical history outlined below, significant for atrial fibrillation, CKD sign, COPD, type 2 diabetes, hypertension, hyperlipidemia, chronic diastolic congestive heart failure who presents for follow-up.  Continues to complain of pedal edema which she says is mild and unchanged.  She keeps them wrapped and that seems to help.  Denies chest pain, palpitations, dizziness, lightheadedness.  She has chronic shortness of breath which is also stable.  She does not get any regular exercise and her weight is the same.  Cardiac wise she is feeling good.      Past Medical History:   Diagnosis Date    Allergic rhinitis     Anxiety     Aortic valve disease 11/28/2021    Fibrocalcific changes of the aortic valve with mild aortic valve stenosis   on echo 3/2/2021  Moderate aortic valve regurgitation is present. Aortic valve maximum pressure gradient is 26 mmHg. Moderate aortic valve stenosis is present.  On echo 2/10/2022       Arthritis     Atrial fibrillation     Chronic kidney disease (CKD), stage III (moderate)     Chronic pain     COPD (chronic obstructive pulmonary disease)     Diabetes mellitus type 2 with complications     Ex-smoker     QUIT SMOKING 2008    Hyperlipidemia     Hypertension     Thrombocytopenia        ALLERGY  Allergies   Allergen Reactions    Codeine Mental Status Change        Past Surgical History:   Procedure Laterality Date    HYSTERECTOMY      30 YEARS AGO        Social History     Socioeconomic History    Marital status:    Tobacco Use    Smoking status: Former     Packs/day: 1.00     Years: 36.00     Additional pack years: 0.00     Total pack years: 36.00     Types: Cigarettes     Start date: 4/3/1971     Quit date: 4/17/2008     Years since  quitting: 15.5     Passive exposure: Never    Smokeless tobacco: Never    Tobacco comments:     FOR 30 YEARS   Vaping Use    Vaping Use: Never used   Substance and Sexual Activity    Alcohol use: Never    Drug use: Never    Sexual activity: Defer       Family History   Problem Relation Age of Onset    Heart disease Father     Heart disease Other     Heart disease Other     Diabetes Other         Current Outpatient Medications on File Prior to Visit   Medication Sig    albuterol (ACCUNEB) 1.25 MG/3ML nebulizer solution Take 3 mL by nebulization Every 6 (Six) Hours As Needed for Wheezing or Shortness of Air.    albuterol sulfate  (90 Base) MCG/ACT inhaler Inhale 2 puffs Every 4 (Four) Hours As Needed for Wheezing.    atorvastatin (LIPITOR) 20 MG tablet TAKE 1 TABLET DAILY    budesonide (PULMICORT) 1 MG/2ML nebulizer solution USE 1 AMPULE (2 ML) BY NEBULIZATION DAILY (Patient taking differently: Take 2 mL by nebulization Daily.)    cholecalciferol (VITAMIN D3) 25 MCG (1000 UT) tablet Take 1 tablet by mouth Daily.    digoxin (LANOXIN) 125 MCG tablet TAKE 1 TABLET ON THURSDAYS AND SUNDAYS AND 2 TABLETS THE REST OF THE WEEK    dilTIAZem (TIAZAC) 120 MG 24 hr capsule TAKE 1 CAPSULE DAILY    HYDROcodone-acetaminophen (NORCO)  MG per tablet Take 1 tablet by mouth Every 4 (Four) Hours As Needed for Moderate Pain or Severe Pain.    Insulin Glargine, 2 Unit Dial, (Toujeo Max SoloStar) 300 UNIT/ML solution pen-injector injection 25 units daily, increase 2 units weekly until blood sugar is below 150. (Patient taking differently: Inject 44 Units under the skin into the appropriate area as directed Daily. 25 units daily, increase 2 units weekly until blood sugar is below 150.)    Insulin Pen Needle (BD Pen Needle Katiuska 2nd Gen) 32G X 4 MM misc 1 each Daily.    nystatin (MYCOSTATIN) 273835 UNIT/GM powder Apply  topically to the appropriate area as directed 2 (Two) Times a Day.    O2 (OXYGEN) Inhale 2 L/min Every Night.     potassium chloride (MICRO-K) 10 MEQ CR capsule TAKE 1 CAPSULE DAILY WITH FOOD    pregabalin (LYRICA) 150 MG capsule TAKE 1 CAPSULE THREE TIMES A DAY    Xarelto 20 MG tablet TAKE 1 TABLET DAILY (Patient taking differently: Take 1 tablet by mouth Daily With Dinner.)    [DISCONTINUED] Entresto  MG tablet TAKE ONE-HALF (1/2) TABLET IN THE MORNING AND TAKE 1 TABLET AT NIGHT AS DIRECTED (Patient taking differently: Take 0.5 tablets by mouth Daily With Breakfast.)    [DISCONTINUED] furosemide (LASIX) 40 MG tablet Take 1 tablet by mouth 2 (Two) Times a Day for 30 days.    [DISCONTINUED] metoprolol succinate XL (TOPROL-XL) 100 MG 24 hr tablet Take 1.5 tablets by mouth.    [DISCONTINUED] metoprolol succinate XL (TOPROL-XL) 50 MG 24 hr tablet Take 3 tablets by mouth 2 (Two) Times a Day. (Patient taking differently: Take 5 tablets by mouth 2 (Two) Times a Day. Patient takes 150 mg in the morning and 100mg in the evening.)    [DISCONTINUED] ascorbic acid (VITAMIN C) 250 MG tablet Take 2 tablets by mouth Daily. (Patient not taking: Reported on 11/1/2023)    [DISCONTINUED] Farxiga 10 MG tablet TAKE 1 TABLET DAILY (Patient not taking: Reported on 11/1/2023)    [DISCONTINUED] miconazole (MICOTIN) 2 % cream 2 (two) times a day. (Patient not taking: Reported on 11/1/2023)    [DISCONTINUED] multivitamin with minerals tablet tablet Take 1 tablet by mouth Daily. (Patient not taking: Reported on 11/1/2023)    [DISCONTINUED] nystatin (MYCOSTATIN) 362143 UNIT/GM cream Apply 1 application  topically to the appropriate area as directed 2 (Two) Times a Day. (Patient not taking: Reported on 11/1/2023)    [DISCONTINUED] Pyridoxine HCl (Vitamin B6) 200 MG tablet Take 1 tablet by mouth Daily. (Patient not taking: Reported on 11/1/2023)    [DISCONTINUED] triamcinolone (KENALOG) 0.1 % cream Apply 1 application topically to the appropriate area as directed 2 (Two) Times a Day. (Patient not taking: Reported on 11/1/2023)     No current  "facility-administered medications on file prior to visit.       Objective   Vitals:    11/16/23 1137 11/16/23 1141   BP: 159/70 134/82   Pulse: 83 72   Weight: 116 kg (255 lb)    Height: 160 cm (63\")        Physical Exam  Constitutional:       General: She is awake. She is not in acute distress.     Appearance: Normal appearance.   HENT:      Head: Normocephalic.      Nose: Nose normal. No congestion.   Eyes:      Extraocular Movements: Extraocular movements intact.      Conjunctiva/sclera: Conjunctivae normal.      Pupils: Pupils are equal, round, and reactive to light.   Neck:      Thyroid: No thyromegaly.      Vascular: No JVD.   Cardiovascular:      Rate and Rhythm: Normal rate and regular rhythm.      Chest Wall: PMI is not displaced.      Pulses: Normal pulses.      Heart sounds: Normal heart sounds, S1 normal and S2 normal. No murmur heard.     No friction rub. No gallop. No S3 or S4 sounds.   Pulmonary:      Effort: Pulmonary effort is normal.      Breath sounds: Normal breath sounds. No wheezing, rhonchi or rales.   Abdominal:      General: Bowel sounds are normal.      Palpations: Abdomen is soft.      Tenderness: There is no abdominal tenderness.   Musculoskeletal:      Cervical back: No tenderness.      Right lower leg: Edema present.      Left lower leg: Edema present.   Lymphadenopathy:      Cervical: No cervical adenopathy.   Skin:     General: Skin is warm and dry.      Capillary Refill: Capillary refill takes less than 2 seconds.      Coloration: Skin is not cyanotic.      Findings: No petechiae or rash.      Nails: There is no clubbing.   Neurological:      Mental Status: She is alert.   Psychiatric:         Mood and Affect: Mood normal.         Behavior: Behavior is cooperative.           Result Review     The following data was reviewed by LUCERO Albarado on 11/16/23.    proBNP   Date Value Ref Range Status   10/09/2023 3,101.0 (H) 0.0 - 900.0 pg/mL Final     CMP          10/10/2023    " 05:30 10/11/2023    04:41 10/12/2023    08:01   CMP   Glucose 185  166  199    BUN 7  15  23    Creatinine 0.81  0.94  1.16    EGFR 77.7  65.0  50.5    Sodium 137  137  136    Potassium 3.4  3.3  3.7    Chloride 94  90  92    Calcium 9.3  9.8  9.9    Total Protein 6.8  7.6  7.4    Albumin 3.5  3.8  3.6    Total Bilirubin 1.5  1.3  0.9    Alkaline Phosphatase 55  62  56    AST (SGOT) 20  16  18    ALT (SGPT) 14  16  15    BUN/Creatinine Ratio 8.6  16.0  19.8    Anion Gap 10.9  14.5  13.2      CBC w/diff          10/10/2023    05:30 10/11/2023    04:41 10/12/2023    08:01   CBC w/Diff   WBC 5.61  6.08  6.32    RBC 4.01  4.52  4.71    Hemoglobin 11.2  12.7  13.0    Hematocrit 37.1  41.7  43.0    MCV 92.5  92.3  91.3    MCH 27.9  28.1  27.6    MCHC 30.2  30.5  30.2    RDW 16.1  15.7  15.6    Platelets 89  114  130    Neutrophil Rel % 63.7  68.1  63.7    Immature Granulocyte Rel % 0.2  0.5  0.2    Lymphocyte Rel % 23.9  21.7  24.5    Monocyte Rel % 10.0  7.4  9.3    Eosinophil Rel % 1.8  2.0  1.7    Basophil Rel % 0.4  0.3  0.6       Lipid Panel          1/27/2023    11:51 9/13/2023    14:09   Lipid Panel   Total Cholesterol 116  101    Triglycerides 238  308    HDL Cholesterol 37  31    VLDL Cholesterol 37  45    LDL Cholesterol  42  25    LDL/HDL Ratio 0.85  0.27        Results for orders placed during the hospital encounter of 10/06/23    Adult Transthoracic Echo Complete W/ Cont if Necessary Per Protocol    Interpretation Summary    Left ventricular systolic function is normal. Calculated left ventricular EF = 54.4%    Left ventricular diastolic function was indeterminate.    The left atrial cavity is mildly dilated.    Mild aortic valve stenosis is present.    Estimated right ventricular systolic pressure from tricuspid regurgitation is normal (<35 mmHg).    No obvious wall motion abnormalities             ECG 12 Lead    Date/Time: 11/16/2023 12:03 PM  Performed by: Bouchra Bentley APRN    Authorized by:  Bouchra Bentley APRN  Comparison: compared with previous ECG   Similar to previous ECG  Rhythm: atrial fibrillation  Rate: normal  BPM: 91  QRS axis: normal    Clinical impression: abnormal EKG  Comments: Borderline repolarization abnormality          Assessment & Plan  Diagnoses and all orders for this visit:    1. Nonrheumatic aortic valve stenosis (Primary)    2. Chronic atrial fibrillation    3. Primary hypertension    4. Mixed hyperlipidemia    5. Chronic diastolic congestive heart failure    Other orders  -     metoprolol succinate XL (TOPROL-XL) 50 MG 24 hr tablet; Take 2 tablets by mouth 2 (Two) Times a Day. Take with 50mg tablet for a total of 150mg twice a day.  -     metoprolol succinate XL (TOPROL-XL) 50 MG 24 hr tablet; Take 1 tablet by mouth 2 (Two) Times a Day.  Dispense: 60 tablet; Refill: 3  -     sacubitril-valsartan (Entresto)  MG tablet; Take 1 tablet by mouth Take As Directed. Take 1/2 tablet in the morning and 1 tablet at night.  Dispense: 60 tablet; Refill: 3  -     furosemide (LASIX) 40 MG tablet; Take 1 tablet by mouth Daily for 30 days.  Dispense: 30 tablet; Refill: 0      1.  Mild by most recent echo.  Continue to monitor.  2.  Chronic atrial fibrillation, rate controlled on diltiazem and digoxin, metoprolol.  Continue the same.  Continue Xarelto for stroke risk reduction.  She notes no palpitations.  3.  Blood pressure is elevated in the office today but she notes good blood pressure control at home.  Continue current regimen.  4.  Continue atorvastatin 20 mg daily.  5.  She has chronic bilateral lower extremity edema.  Continue Lasix 40 mg daily.    Follow Up   Return in about 6 months (around 5/16/2024) for With .    Patient was given instructions and counseling regarding her condition or for health maintenance advice. Please see specific information pulled into the AVS if appropriate.     LUCERO Albarado  11/16/23  11:46 EST    Dictated Utilizing  Erica Dictation

## 2023-11-20 ENCOUNTER — OFFICE VISIT (OUTPATIENT)
Dept: UROLOGY | Facility: CLINIC | Age: 73
End: 2023-11-20
Payer: MEDICARE

## 2023-11-20 ENCOUNTER — PREP FOR SURGERY (OUTPATIENT)
Dept: OTHER | Facility: HOSPITAL | Age: 73
End: 2023-11-20
Payer: MEDICARE

## 2023-11-20 VITALS — WEIGHT: 254 LBS | BODY MASS INDEX: 45 KG/M2 | HEIGHT: 63 IN | RESPIRATION RATE: 16 BRPM

## 2023-11-20 DIAGNOSIS — N20.0 NEPHROLITHIASIS: Primary | ICD-10-CM

## 2023-11-20 DIAGNOSIS — N39.0 RECURRENT URINARY TRACT INFECTION: ICD-10-CM

## 2023-11-20 LAB
BILIRUB BLD-MCNC: NEGATIVE MG/DL
CLARITY, POC: CLEAR
COLOR UR: YELLOW
EXPIRATION DATE: ABNORMAL
GLUCOSE UR STRIP-MCNC: ABNORMAL MG/DL
KETONES UR QL: ABNORMAL
LEUKOCYTE EST, POC: ABNORMAL
Lab: ABNORMAL
NITRITE UR-MCNC: NEGATIVE MG/ML
PH UR: 6.5 [PH] (ref 5–8)
PROT UR STRIP-MCNC: ABNORMAL MG/DL
RBC # UR STRIP: ABNORMAL /UL
SP GR UR: 1.02 (ref 1–1.03)
SPECIMEN VOL 24H UR: 0 L
UROBILINOGEN UR QL: ABNORMAL

## 2023-11-20 RX ORDER — SODIUM CHLORIDE 9 MG/ML
40 INJECTION, SOLUTION INTRAVENOUS AS NEEDED
OUTPATIENT
Start: 2023-11-20

## 2023-11-20 RX ORDER — SODIUM CHLORIDE 0.9 % (FLUSH) 0.9 %
3 SYRINGE (ML) INJECTION EVERY 12 HOURS SCHEDULED
OUTPATIENT
Start: 2023-11-20

## 2023-11-20 RX ORDER — CEPHALEXIN 250 MG/1
250 CAPSULE ORAL NIGHTLY
Qty: 90 CAPSULE | Refills: 3 | Status: SHIPPED | OUTPATIENT
Start: 2023-11-20

## 2023-11-20 RX ORDER — SODIUM CHLORIDE 9 MG/ML
100 INJECTION, SOLUTION INTRAVENOUS CONTINUOUS
OUTPATIENT
Start: 2023-11-20

## 2023-11-20 RX ORDER — LEVOFLOXACIN 5 MG/ML
500 INJECTION, SOLUTION INTRAVENOUS ONCE
OUTPATIENT
Start: 2023-11-20 | End: 2023-11-20

## 2023-11-20 RX ORDER — SODIUM CHLORIDE 0.9 % (FLUSH) 0.9 %
10 SYRINGE (ML) INJECTION AS NEEDED
OUTPATIENT
Start: 2023-11-20

## 2023-11-22 DIAGNOSIS — M25.50 CHRONIC JOINT PAIN: ICD-10-CM

## 2023-11-22 DIAGNOSIS — G89.29 CHRONIC JOINT PAIN: ICD-10-CM

## 2023-11-22 NOTE — TELEPHONE ENCOUNTER
Caller: Mary Albarran LAKESHA    Relationship: Self    Best call back number: [phone]    Requested Prescriptions:   Requested Prescriptions     Pending Prescriptions Disp Refills    HYDROcodone-acetaminophen (NORCO)  MG per tablet 120 tablet 0     Sig: Take 1 tablet by mouth Every 4 (Four) Hours As Needed for Moderate Pain or Severe Pain.        Pharmacy where request should be sent: [facility] [address] [phone] [facility] [phone]      Last office visit with prescribing clinician: 11/1/2023   Last telemedicine visit with prescribing clinician: Visit date not found   Next office visit with prescribing clinician: Visit date not found     Additional details provided by patient:     Does the patient have less than a 3 day supply:  [x] Yes  [] No    Would you like a call back once the refill request has been completed: [x] Yes [] No    If the office needs to give you a call back, can they leave a voicemail: [x] Yes [] No    Steven Wong Rep   11/22/23 09:50 EST

## 2023-11-22 NOTE — TELEPHONE ENCOUNTER
Controlled Substance Med Protocol Failed   Protocol Details Medication not refilled in past 28 days     Controlled Medication Refill Request:    1.  Last Office Visit:  11/1/2023     2.  Next Office Visit:  Visit date not found     3.  Last UDS Date:  POC Urine Drug Screen [facility] (01/27/2023 11:52)     4.  Last Consent Signed:  CONTROLLED SUBSTANCE AGREEMENT - SCAN - CONTROLLED RX/[facility] KEYSHAWN/01/27/2023 (01/27/2023)     5.  Medication Requested: [place]     Pharmacy: [facility] [place], [place] - [address] [place] [address] [phone] [facility] [phone] [address] [address]  Phone: [phone] Fax: [phone]

## 2023-11-27 RX ORDER — HYDROCODONE BITARTRATE AND ACETAMINOPHEN 10; 325 MG/1; MG/1
1 TABLET ORAL EVERY 4 HOURS PRN
Qty: 120 TABLET | Refills: 0 | Status: SHIPPED | OUTPATIENT
Start: 2023-11-27

## 2023-11-29 RX ORDER — INSULIN GLARGINE 300 U/ML
INJECTION, SOLUTION SUBCUTANEOUS
Qty: 18 ML | Refills: 3 | Status: SHIPPED | OUTPATIENT
Start: 2023-11-29

## 2023-12-18 ENCOUNTER — TELEPHONE (OUTPATIENT)
Dept: UROLOGY | Facility: CLINIC | Age: 73
End: 2023-12-18

## 2023-12-18 NOTE — TELEPHONE ENCOUNTER
PT IS SCHEDULED FOR A CYSTO TODAY THAT SHE CANNOT MAKE. SHE NEEDS TO R/S. PLEASE CALL AND ADVISE.

## 2023-12-21 DIAGNOSIS — M25.50 CHRONIC JOINT PAIN: ICD-10-CM

## 2023-12-21 DIAGNOSIS — G89.29 CHRONIC JOINT PAIN: ICD-10-CM

## 2023-12-21 RX ORDER — HYDROCODONE BITARTRATE AND ACETAMINOPHEN 10; 325 MG/1; MG/1
1 TABLET ORAL EVERY 4 HOURS PRN
Qty: 120 TABLET | Refills: 0 | Status: SHIPPED | OUTPATIENT
Start: 2023-12-21

## 2023-12-21 NOTE — TELEPHONE ENCOUNTER
Caller: Mary Albarran LAKESHA    Relationship: Self    Best call back number: [phone]     Requested Prescriptions:   Requested Prescriptions     Pending Prescriptions Disp Refills    HYDROcodone-acetaminophen (NORCO)  MG per tablet 120 tablet 0     Sig: Take 1 tablet by mouth Every 4 (Four) Hours As Needed for Moderate Pain or Severe Pain.        Pharmacy where request should be sent: [facility] [address] [phone] [facility] [phone]      Last office visit with prescribing clinician: 11/1/2023   Last telemedicine visit with prescribing clinician: Visit date not found   Next office visit with prescribing clinician: Visit date not found     Does the patient have less than a 3 day supply:  [] Yes  [x] No    Steven Cardoso Rep   12/21/23 11:28 EST

## 2024-01-05 ENCOUNTER — TELEPHONE (OUTPATIENT)
Dept: FAMILY MEDICINE CLINIC | Facility: CLINIC | Age: 74
End: 2024-01-05

## 2024-01-05 ENCOUNTER — READMISSION MANAGEMENT (OUTPATIENT)
Dept: CALL CENTER | Facility: HOSPITAL | Age: 74
End: 2024-01-05
Payer: MEDICARE

## 2024-01-05 NOTE — TELEPHONE ENCOUNTER
Caller: [facility]    Best call back number: [phone]     New or established patient?  [] New  [x] Established    Date of discharge:01/05/2024    Facility discharged from: [facility]    Diagnosis/Symptoms: ACUTE RESPIRATORY  FAILURE    Length of stay (If applicable): 5

## 2024-01-06 ENCOUNTER — TRANSITIONAL CARE MANAGEMENT TELEPHONE ENCOUNTER (OUTPATIENT)
Dept: CALL CENTER | Facility: HOSPITAL | Age: 74
End: 2024-01-06
Payer: MEDICARE

## 2024-01-06 NOTE — OUTREACH NOTE
Call Center TCM Note      Flowsheet Row Responses   [facility] patient discharged from? Non-   Does the patient have one of the following disease processes/diagnoses(primary or secondary)? Other   TCM attempt successful? No   Unsuccessful attempts Attempt 1            Ines Castro RN    1/6/2024, 11:24 EST

## 2024-01-08 ENCOUNTER — TRANSITIONAL CARE MANAGEMENT TELEPHONE ENCOUNTER (OUTPATIENT)
Dept: CALL CENTER | Facility: HOSPITAL | Age: 74
End: 2024-01-08
Payer: MEDICARE

## 2024-01-08 NOTE — OUTREACH NOTE
Call Center TCM Note      Flowsheet Row Responses   [facility] patient discharged from? Non-  [Luke]   Does the patient have one of the following disease processes/diagnoses(primary or secondary)? Other   TCM attempt successful? Yes   Call start time 1401   Call end time 1404   Discharge diagnosis Acute Respiratory Failure   Meds reviewed with patient/caregiver? Yes   Is the patient having any side effects they believe may be caused by any medication additions or changes? No   Does the patient have all medications ordered at discharge? Yes   Is the patient taking all medications as directed (includes completed medication regime)? Yes   Medication comments ABT   Comments HOSP DC FU appt 1/10/24 815 am   Does the patient have an appointment with their PCP within 7-14 days of discharge? Yes   Has home health visited the patient within 72 hours of discharge? N/A   Psychosocial issues? No   Did the patient receive a copy of their discharge instructions? Yes   Nursing interventions Reviewed instructions with patient   What is the patient's perception of their health status since discharge? Improving   Is the patient/caregiver able to teach back signs and symptoms related to disease process for when to call PCP? Yes   Is the patient/caregiver able to teach back signs and symptoms related to disease process for when to call 911? Yes   Is the patient/caregiver able to teach back the hierarchy of who to call/visit for symptoms/problems? PCP, Specialist, Home health nurse, Urgent Care, ED, 911 Yes   TCM call completed? Yes   Wrap up additional comments Pt reports that she is doing better from when she was in the hospital, however now she has loose stool. Encouraged fluids. Pt VU. She states she will call office to reschedule her appt.   Call end time 1404            Leanna Guan RN    1/8/2024, 14:04 EST

## 2024-01-11 RX ORDER — DIGOXIN 125 MCG
TABLET ORAL
Qty: 180 TABLET | Refills: 3 | Status: SHIPPED | OUTPATIENT
Start: 2024-01-11

## 2024-01-25 DIAGNOSIS — M25.50 CHRONIC JOINT PAIN: ICD-10-CM

## 2024-01-25 DIAGNOSIS — G89.29 CHRONIC JOINT PAIN: ICD-10-CM

## 2024-01-25 RX ORDER — HYDROCODONE BITARTRATE AND ACETAMINOPHEN 10; 325 MG/1; MG/1
1 TABLET ORAL EVERY 4 HOURS PRN
Qty: 120 TABLET | Refills: 0 | Status: SHIPPED | OUTPATIENT
Start: 2024-01-25

## 2024-01-25 NOTE — TELEPHONE ENCOUNTER
Caller: AvisMary    Relationship: Self    Best call back number: [phone]    Requested Prescriptions:   Requested Prescriptions     Pending Prescriptions Disp Refills    HYDROcodone-acetaminophen (NORCO)  MG per tablet 120 tablet 0     Sig: Take 1 tablet by mouth Every 4 (Four) Hours As Needed for Moderate Pain or Severe Pain.        Pharmacy where request should be sent: [facility] [address] [phone] [facility] [phone]      Last office visit with prescribing clinician: 11/1/2023   Last telemedicine visit with prescribing clinician: Visit date not found   Next office visit with prescribing clinician: 2/16/2024     Additional details provided by patient:        PATIENT WOULD LIKE A TEXT ONCE SENT TO THE PHARMACY     Does the patient have less than a 3 day supply:  [x] Yes  [] No    Would you like a call back once the refill request has been completed: [] Yes [x] No    If the office needs to give you a call back, can they leave a voicemail: [] Yes [x] No    Steven Waller Rep   01/25/24 11:48 EST

## 2024-01-30 ENCOUNTER — TELEPHONE (OUTPATIENT)
Dept: UROLOGY | Facility: CLINIC | Age: 74
End: 2024-01-30

## 2024-02-01 NOTE — TELEPHONE ENCOUNTER
2ND CALL - CALLED PT TO OFFER R/S OF CX'D CYSTO/PELVIC EXAM 1/30 SHIELA/ ASHELY    CALLED MOBILE: NO ANSWER, NO VM.  CALLED HOME: NO ANSWER, NO VM

## 2024-02-05 NOTE — TELEPHONE ENCOUNTER
3RD CALL - CALLED PT TO OFFER R/S OF CX'D CYSTO/PELVIC EXAM 1/30 SHIELA/ ASHELY    CALLED MOBILE: NO ANSWER, NO VM.  CALLED HOME: BUSY    CALLED SPOUSE, KILEY, LISTED ON  VERBAL - MALE ANSWERED AND STATED WRONG NUMBER AND HUNG UP.    THREE ATTEMPTS HAVE BEEN MADE TO OFFER R/S, ANYTHING ELSE TO DO?

## 2024-02-16 ENCOUNTER — OFFICE VISIT (OUTPATIENT)
Dept: FAMILY MEDICINE CLINIC | Facility: CLINIC | Age: 74
End: 2024-02-16
Payer: MEDICARE

## 2024-02-16 VITALS
BODY MASS INDEX: 45 KG/M2 | WEIGHT: 254 LBS | SYSTOLIC BLOOD PRESSURE: 136 MMHG | HEART RATE: 82 BPM | OXYGEN SATURATION: 89 % | TEMPERATURE: 97 F | HEIGHT: 63 IN | DIASTOLIC BLOOD PRESSURE: 71 MMHG

## 2024-02-16 DIAGNOSIS — Z79.4 TYPE 2 DIABETES MELLITUS WITH HYPERGLYCEMIA, WITH LONG-TERM CURRENT USE OF INSULIN: ICD-10-CM

## 2024-02-16 DIAGNOSIS — Z00.00 ENCOUNTER FOR SUBSEQUENT ANNUAL WELLNESS VISIT (AWV) IN MEDICARE PATIENT: Primary | ICD-10-CM

## 2024-02-16 DIAGNOSIS — G93.39 OTHER POST INFECTION AND RELATED FATIGUE SYNDROMES: ICD-10-CM

## 2024-02-16 DIAGNOSIS — E11.65 TYPE 2 DIABETES MELLITUS WITH HYPERGLYCEMIA, WITH LONG-TERM CURRENT USE OF INSULIN: ICD-10-CM

## 2024-02-16 DIAGNOSIS — Z99.81 SUPPLEMENTAL OXYGEN DEPENDENT: ICD-10-CM

## 2024-02-16 PROCEDURE — 3078F DIAST BP <80 MM HG: CPT | Performed by: NURSE PRACTITIONER

## 2024-02-16 PROCEDURE — G0439 PPPS, SUBSEQ VISIT: HCPCS | Performed by: NURSE PRACTITIONER

## 2024-02-16 PROCEDURE — 96160 PT-FOCUSED HLTH RISK ASSMT: CPT | Performed by: NURSE PRACTITIONER

## 2024-02-16 PROCEDURE — 1159F MED LIST DOCD IN RCRD: CPT | Performed by: NURSE PRACTITIONER

## 2024-02-16 PROCEDURE — 3075F SYST BP GE 130 - 139MM HG: CPT | Performed by: NURSE PRACTITIONER

## 2024-02-16 PROCEDURE — 99214 OFFICE O/P EST MOD 30 MIN: CPT | Performed by: NURSE PRACTITIONER

## 2024-02-16 PROCEDURE — 1160F RVW MEDS BY RX/DR IN RCRD: CPT | Performed by: NURSE PRACTITIONER

## 2024-02-16 RX ORDER — INSULIN LISPRO 100 [IU]/ML
INJECTION, SOLUTION INTRAVENOUS; SUBCUTANEOUS
Qty: 12 ML | Refills: 2 | Status: SHIPPED | OUTPATIENT
Start: 2024-02-16

## 2024-02-16 RX ORDER — CEPHALEXIN 250 MG/1
250 CAPSULE ORAL NIGHTLY
COMMUNITY

## 2024-02-16 RX ORDER — INSULIN LISPRO 100 [IU]/ML
INJECTION, SOLUTION INTRAVENOUS; SUBCUTANEOUS
Qty: 12 ML | Refills: 2 | Status: SHIPPED | OUTPATIENT
Start: 2024-02-16 | End: 2024-02-16 | Stop reason: SDUPTHER

## 2024-02-16 NOTE — PROGRESS NOTES
Chief Complaint  Medicare Wellness-subsequent, Shortness of Breath, and Fatigue    Subjective        Medical History: has a past medical history of Allergic rhinitis, Anxiety, Aortic valve disease (11/28/2021), Arthritis, Atrial fibrillation, Chronic kidney disease (CKD), stage III (moderate), Chronic pain, COPD (chronic obstructive pulmonary disease), Diabetes mellitus type 2 with complications, Ex-smoker, Hyperlipidemia, Hypertension, and Thrombocytopenia.     Surgical History: has a past surgical history that includes Hysterectomy.     Family History: family history includes Diabetes in an other family member; Heart disease in her father and other family members.     Social History: reports that she quit smoking about 15 years ago. Her smoking use included cigarettes. She started smoking about 52 years ago. She has a 36.00 pack-year smoking history. She has never been exposed to tobacco smoke. She has never used smokeless tobacco. She reports that she does not drink alcohol and does not use drugs.    Mary Albarran presents to [facility]  History of Present Illness  Patient is a 73-year-old female who comes in today for follow-up.  Patient was admitted to the hospital 12/31/2023 at [facility] for respiratory failure, pneumonia, hypotension, hypoglycemia, urinary tract infection.  Patient was discharged home on 2 L of oxygen she states that she wears continuously.   Patient was discharged on January 5, 2024, also discharge patient home on trilogy, but patient states she cannot tolerate the device and does not use it.  Since the discharge patient continues to be fatigued with shortness of breath.  Patient was discharged to resume on oral Lasix due to pulmonary edema with chronic diastolic congestive heart failure.  Patient was discharged home with beta-blocker dig and Cardizem to continue also Xarelto for her A-fib with RVR.  She was dispositioned home, activity as  "tolerated with care of her family.  Discharged home on Levaquin 750 mg to take for an additional 7 days.  Since disposition Home patient states she has had no other episodes of being unresponsive or confused but continues to have shortness of breath and fatigue.  O2 sats initially were 88% on room air, on 2 L O2 sats increased to 96%.  Patient states she does sleep with the oxygen.  She does get support from her children who live nearby and her spouse.  Objective   Vital Signs:   /71   Pulse 82   Temp 97 °F (36.1 °C)   Ht 160 cm (63\")   Wt 115 kg (254 lb)   SpO2 (!) 89%   BMI 44.99 kg/m²       Wt Readings from Last 3 Encounters:   02/16/24 115 kg (254 lb)   11/20/23 115 kg (254 lb)   11/16/23 116 kg (255 lb)        BP Readings from Last 3 Encounters:   02/16/24 136/71   11/16/23 134/82   11/01/23 130/60        Class 3 Severe Obesity (BMI >=40). Obesity-related health conditions include the following: hypertension, diabetes mellitus, and dyslipidemias. Obesity is improving with treatment. BMI is is above average; BMI management plan is completed. We discussed low calorie, low carb based diet program, portion control, and increasing exercise.       Physical Exam  Vitals reviewed.   Constitutional:       Appearance: Normal appearance. She is well-developed. She is morbidly obese. She is not toxic-appearing.   HENT:      Head: Normocephalic and atraumatic.      Mouth/Throat:      Pharynx: No oropharyngeal exudate.   Eyes:      Conjunctiva/sclera: Conjunctivae normal.      Pupils: Pupils are equal, round, and reactive to light.   Cardiovascular:      Rate and Rhythm: Normal rate and regular rhythm.      Heart sounds: No murmur heard.     No friction rub.   Pulmonary:      Effort: Pulmonary effort is normal.      Breath sounds: Decreased air movement present. Decreased breath sounds present. No wheezing or rhonchi.      Comments: 2L 02 dependent  Musculoskeletal:      Right lower leg: Edema present.      Left " lower leg: Edema present.   Skin:     General: Skin is warm and dry.   Neurological:      Mental Status: She is alert and oriented to person, place, and time.      Motor: Weakness present.      Gait: Gait abnormal (in wheelchair).   Psychiatric:         Mood and Affect: Mood and affect normal.         Behavior: Behavior normal.         Thought Content: Thought content normal.         Judgment: Judgment normal.        Result Review :  {The following data was reviewed by LUCERO Collins on 02/16/2024.  Common labs          10/10/2023    05:30 10/11/2023    04:41 10/12/2023    08:01   Common Labs   Glucose 185  166  199    BUN 7  15  23    Creatinine 0.81  0.94  1.16    Sodium 137  137  136    Potassium 3.4  3.3  3.7    Chloride 94  90  92    Calcium 9.3  9.8  9.9    Albumin 3.5  3.8  3.6    Total Bilirubin 1.5  1.3  0.9    Alkaline Phosphatase 55  62  56    AST (SGOT) 20  16  18    ALT (SGPT) 14  16  15    WBC 5.61  6.08  6.32    Hemoglobin 11.2  12.7  13.0    Hematocrit 37.1  41.7  43.0    Platelets 89  114  130      {Data reviewed (Optional):41563:::1}              Current Outpatient Medications on File Prior to Visit   Medication Sig Dispense Refill    albuterol (ACCUNEB) 1.25 MG/3ML nebulizer solution Take 3 mL by nebulization Every 6 (Six) Hours As Needed for Wheezing or Shortness of Air. 300 mL 12    albuterol sulfate  (90 Base) MCG/ACT inhaler Inhale 2 puffs Every 4 (Four) Hours As Needed for Wheezing. 8 g 11    atorvastatin (LIPITOR) 20 MG tablet TAKE 1 TABLET DAILY 90 tablet 3    budesonide (PULMICORT) 1 MG/2ML nebulizer solution USE 1 AMPULE (2 ML) BY NEBULIZATION DAILY (Patient taking differently: Take 2 mL by nebulization Daily.) 180 mL 3    cholecalciferol (VITAMIN D3) 25 MCG (1000 UT) tablet Take 1 tablet by mouth Daily.      digoxin (LANOXIN) 125 MCG tablet TAKE 1 TABLET ON THURSDAYS AND SUNDAYS AND 2 TABLETS THE REST OF THE WEEK 180 tablet 3    dilTIAZem (TIAZAC) 120 MG 24 hr capsule  TAKE 1 CAPSULE DAILY 90 capsule 3    HYDROcodone-acetaminophen (NORCO)  MG per tablet Take 1 tablet by mouth Every 4 (Four) Hours As Needed for Moderate Pain or Severe Pain. 120 tablet 0    Insulin Glargine, 2 Unit Dial, (Toujeo Max SoloStar) 300 UNIT/ML solution pen-injector injection INJECT 24 UNITS DAILY, INCREASE 2 UNITS WEEKLY UNTIL BLOOD SUGAR IS BELOW 150 (Patient taking differently: 60 Units Daily. INJECT 24 UNITS DAILY, INCREASE 2 UNITS WEEKLY UNTIL BLOOD SUGAR IS BELOW 150) 18 mL 3    Insulin Pen Needle (BD Pen Needle Katiuska 2nd Gen) 32G X 4 MM misc 1 each Daily. 90 each 1    metoprolol succinate XL (TOPROL-XL) 50 MG 24 hr tablet Take 1 tablet by mouth 2 (Two) Times a Day. 60 tablet 3    nystatin (MYCOSTATIN) 616538 UNIT/GM powder Apply  topically to the appropriate area as directed 2 (Two) Times a Day. 60 g 1    O2 (OXYGEN) Inhale 2 L/min Every Night.      potassium chloride (MICRO-K) 10 MEQ CR capsule TAKE 1 CAPSULE DAILY WITH FOOD 90 capsule 3    pregabalin (LYRICA) 150 MG capsule TAKE 1 CAPSULE THREE TIMES A  capsule 1    sacubitril-valsartan (Entresto)  MG tablet Take 1 tablet by mouth Take As Directed. Take 1/2 tablet in the morning and 1 tablet at night. 60 tablet 3    Xarelto 20 MG tablet TAKE 1 TABLET DAILY (Patient taking differently: Take 1 tablet by mouth Daily With Dinner.) 90 tablet 3    cephalexin (KEFLEX) 250 MG capsule Take 1 capsule by mouth Every Night.      furosemide (LASIX) 40 MG tablet Take 1 tablet by mouth Daily for 30 days. 30 tablet 0     No current facility-administered medications on file prior to visit.        Assessment and Plan  Diagnoses and all orders for this visit:    1. Encounter for subsequent annual wellness visit (AWV) in Medicare patient (Primary)    2. Supplemental oxygen dependent    3. Other post infection and related fatigue syndromes    Other orders  -     Discontinue: Insulin Lispro, 1 Unit Dial, (HumaLOG KwikPen) 100 UNIT/ML solution  pen-injector; Per sliding scale,  -0 units, 140-180 2 units, 181-240 3 units, 241-300 4 units, 301-350 6 units, 351-400 8 units max dose 60 units daily  Dispense: 12 mL; Refill: 2  -     Insulin Lispro, 1 Unit Dial, (HumaLOG KwikPen) 100 UNIT/ML solution pen-injector; Per sliding scale,  -0 units, 140-180 2 units, 181-240 3 units, 241-300 4 units, 301-350 6 units, 351-400 8 units max dose 60 units daily  Dispense: 12 mL; Refill: 2      {Time Spent (Optional):24793}  Follow Up   No follow-ups on file.  Patient was given instructions and counseling regarding her condition or for health maintenance advice. Please see specific information pulled into the AVS if appropriate.       Part of this note may be electronic transcription/translation of spoken language to printed text using the Dragon dictation system

## 2024-02-19 NOTE — PROGRESS NOTES
The ABCs of the Annual Wellness Visit  Subsequent Medicare Wellness Visit    Subjective    Mary Albarran is a 73 y.o. female who presents for a Subsequent Medicare Wellness Visit.    The following portions of the patient's history were reviewed and   updated as appropriate: allergies, current medications, past family history, past medical history, past social history, past surgical history, and problem list.    Compared to one year ago, the patient feels her physical   health is worse.    Compared to one year ago, the patient feels her mental   health is worse.    Recent Hospitalizations:  This patient has had a [facility] admission record on file within the last 365 days.    Current Medical Providers:  Patient Care Team:  Brittni Quiroz APRN as PCP - General (Nurse Practitioner)  Vignesh Santillan MD as Consulting Physician (Urology)    Outpatient Medications Prior to Visit   Medication Sig Dispense Refill    albuterol (ACCUNEB) 1.25 MG/3ML nebulizer solution Take 3 mL by nebulization Every 6 (Six) Hours As Needed for Wheezing or Shortness of Air. 300 mL 12    albuterol sulfate  (90 Base) MCG/ACT inhaler Inhale 2 puffs Every 4 (Four) Hours As Needed for Wheezing. 8 g 11    atorvastatin (LIPITOR) 20 MG tablet TAKE 1 TABLET DAILY 90 tablet 3    budesonide (PULMICORT) 1 MG/2ML nebulizer solution USE 1 AMPULE (2 ML) BY NEBULIZATION DAILY (Patient taking differently: Take 2 mL by nebulization Daily.) 180 mL 3    cholecalciferol (VITAMIN D3) 25 MCG (1000 UT) tablet Take 1 tablet by mouth Daily.      digoxin (LANOXIN) 125 MCG tablet TAKE 1 TABLET ON THURSDAYS AND SUNDAYS AND 2 TABLETS THE REST OF THE WEEK 180 tablet 3    dilTIAZem (TIAZAC) 120 MG 24 hr capsule TAKE 1 CAPSULE DAILY 90 capsule 3    HYDROcodone-acetaminophen (NORCO)  MG per tablet Take 1 tablet by mouth Every 4 (Four) Hours As Needed for Moderate Pain or Severe Pain. 120 tablet 0    Insulin Glargine, 2 Unit Dial, (Toujeo Max  SoloStar) 300 UNIT/ML solution pen-injector injection INJECT 24 UNITS DAILY, INCREASE 2 UNITS WEEKLY UNTIL BLOOD SUGAR IS BELOW 150 (Patient taking differently: 60 Units Daily. INJECT 24 UNITS DAILY, INCREASE 2 UNITS WEEKLY UNTIL BLOOD SUGAR IS BELOW 150) 18 mL 3    Insulin Pen Needle (BD Pen Needle Katiuska 2nd Gen) 32G X 4 MM misc 1 each Daily. 90 each 1    metoprolol succinate XL (TOPROL-XL) 50 MG 24 hr tablet Take 1 tablet by mouth 2 (Two) Times a Day. 60 tablet 3    nystatin (MYCOSTATIN) 698296 UNIT/GM powder Apply  topically to the appropriate area as directed 2 (Two) Times a Day. 60 g 1    O2 (OXYGEN) Inhale 2 L/min Every Night.      potassium chloride (MICRO-K) 10 MEQ CR capsule TAKE 1 CAPSULE DAILY WITH FOOD 90 capsule 3    pregabalin (LYRICA) 150 MG capsule TAKE 1 CAPSULE THREE TIMES A  capsule 1    sacubitril-valsartan (Entresto)  MG tablet Take 1 tablet by mouth Take As Directed. Take 1/2 tablet in the morning and 1 tablet at night. 60 tablet 3    Xarelto 20 MG tablet TAKE 1 TABLET DAILY (Patient taking differently: Take 1 tablet by mouth Daily With Dinner.) 90 tablet 3    metoprolol succinate XL (TOPROL-XL) 50 MG 24 hr tablet Take 2 tablets by mouth 2 (Two) Times a Day. Take with 50mg tablet for a total of 150mg twice a day.      cephalexin (KEFLEX) 250 MG capsule Take 1 capsule by mouth Every Night.      furosemide (LASIX) 40 MG tablet Take 1 tablet by mouth Daily for 30 days. 30 tablet 0    cephalexin (KEFLEX) 250 MG capsule Take 1 capsule by mouth Every Night. 90 capsule 3     No facility-administered medications prior to visit.       Opioid medication/s are on active medication list.  and I have evaluated her active treatment plan and pain score trends (see table).  There were no vitals filed for this visit.  I have reviewed the chart for potential of high risk medication and harmful drug interactions in the elderly.          Aspirin is not on active medication list.  Aspirin use is not  "indicated based on review of current medical condition/s. Risk of harm outweighs potential benefits.  .    Patient Active Problem List   Diagnosis    Aortic valve disease    Atrial fibrillation    Chronic kidney disease, stage III (moderate)    Hyperlipidemia    Hypertension    Chronic diastolic congestive heart failure    Atrial fibrillation with RVR    Onychomycosis    Onychocryptosis    Foot pain, bilateral    Nephrolithiasis    Recurrent urinary tract infection     Advance Care Planning   Advance Care Planning     Advance Directive is not on file.  ACP discussion was held with the patient during this visit. Patient does not have an advance directive, information provided.     Objective    Vitals:    02/16/24 1421   BP: 136/71   Pulse: 82   Temp: 97 °F (36.1 °C)   SpO2: (!) 89%   Weight: 115 kg (254 lb)   Height: 160 cm (63\")     Estimated body mass index is 44.99 kg/m² as calculated from the following:    Height as of this encounter: 160 cm (63\").    Weight as of this encounter: 115 kg (254 lb).    Class 3 Severe Obesity (BMI >=40). Obesity-related health conditions include the following: hypertension, coronary heart disease, diabetes mellitus, dyslipidemias, osteoarthritis, and peripheral vascular disease. Obesity is improving with treatment. BMI is is above average; BMI management plan is completed. We discussed low calorie, low carb based diet program, portion control, and increasing exercise.      Does the patient have evidence of cognitive impairment? No          HEALTH RISK ASSESSMENT    Smoking Status:  Social History     Tobacco Use   Smoking Status Former    Packs/day: 1.00    Years: 36.00    Additional pack years: 0.00    Total pack years: 36.00    Types: Cigarettes    Start date: 4/3/1971    Quit date: 4/17/2008    Years since quitting: 15.8    Passive exposure: Never   Smokeless Tobacco Never   Tobacco Comments    FOR 30 YEARS     Alcohol Consumption:  Social History     Substance and Sexual Activity "   Alcohol Use Never     Fall Risk Screen:    STEADI Fall Risk Assessment was completed, and patient is at MODERATE risk for falls. Assessment completed on:2024    Depression Screenin/16/2024     2:21 PM   PHQ-2/PHQ-9 Depression Screening   Little Interest or Pleasure in Doing Things 0-->not at all   Feeling Down, Depressed or Hopeless 0-->not at all   PHQ-9: Brief Depression Severity Measure Score 0       Health Habits and Functional and Cognitive Screenin/16/2024     2:00 PM   Functional & Cognitive Status   Do you have difficulty preparing food and eating? No   Do you have difficulty bathing yourself, getting dressed or grooming yourself? No   Do you have difficulty using the toilet? No   Do you have difficulty moving around from place to place? Yes   Do you have trouble with steps or getting out of a bed or a chair? Yes   Current Diet Well Balanced Diet   Dental Exam Up to date   Eye Exam Up to date   Exercise (times per week) 0 times per week   Current Exercises Include No Regular Exercise   Do you need help using the phone?  No   Are you deaf or do you have serious difficulty hearing?  No   Do you need help to go to places out of walking distance? Yes   Do you need help shopping? No   Do you need help preparing meals?  No   Do you need help with housework?  No   Do you need help with laundry? No   Do you need help taking your medications? No   Do you need help managing money? No   Do you ever drive or ride in a car without wearing a seat belt? No   Have you felt unusual stress, anger or loneliness in the last month? No   Who do you live with? Spouse   If you need help, do you have trouble finding someone available to you? No   Have you been bothered in the last four weeks by sexual problems? No   Do you have difficulty concentrating, remembering or making decisions? No       Age-appropriate Screening Schedule:  Refer to the list below for future screening recommendations based on  patient's age, sex and/or medical conditions. Orders for these recommended tests are listed in the plan section. The patient has been provided with a written plan.    Health Maintenance   Topic Date Due    DXA SCAN  Never done    INFLUENZA VACCINE  03/31/2024 (Originally 8/1/2023)    DIABETIC EYE EXAM  04/05/2024 (Originally 6/10/2021)    Pneumococcal Vaccine 65+ (1 of 2 - PCV) 09/13/2024 (Originally 9/23/1956)    MAMMOGRAM  09/13/2024 (Originally 1950)    TDAP/TD VACCINES (1 - Tdap) 09/13/2024 (Originally 9/23/1971)    COVID-19 Vaccine (1) 01/17/2025 (Originally 3/23/1951)    ZOSTER VACCINE (1 of 2) 01/17/2025 (Originally 9/23/2002)    RSV Vaccine - Adults (1 - 1-dose 60+ series) 02/16/2025 (Originally 9/23/2012)    DIABETIC FOOT EXAM  05/19/2024    HEMOGLOBIN A1C  07/05/2024    LIPID PANEL  09/13/2024    URINE MICROALBUMIN  09/13/2024    COLORECTAL CANCER SCREENING  09/23/2024    ANNUAL WELLNESS VISIT  02/16/2025    BMI FOLLOWUP  02/16/2025    HEPATITIS C SCREENING  Completed                  CMS Preventative Services Quick Reference  Risk Factors Identified During Encounter  Fall Risk-High or Moderate: Discussed Fall Prevention in the home  The above risks/problems have been discussed with the patient.  Pertinent information has been shared with the patient in the After Visit Summary.  An After Visit Summary and PPPS were made available to the patient.    Follow Up:   Next Medicare Wellness visit to be scheduled in 1 year.   {Wrapup  Review (Popup)  Advance Care Planning  Labs  CC  Problem List  Visit Diagnosis  Medications  Result Review  Imaging  Health Maintenance  Quality  BestPractice  SmartSets  SnapShot  Encounters  Notes  Media  Procedures :23}    Additional E&M Note during same encounter follows:  Patient has multiple medical problems which are significant and separately identifiable that require additional work above and beyond the Medicare Wellness Visit.      Chief  Complaint  Medicare Wellness-subsequent, Shortness of Breath, and Fatigue    Subjective    {Problem List  Visit Diagnosis   Encounters  Notes  Medications  Labs  Result Review Imaging  Media :23}    HPI  Mary Albarran is also being seen today for      Patient is a 73-year-old female who comes in today for follow-up.  Patient was admitted to the hospital 12/31/2023 at [facility] for respiratory failure, pneumonia, hypotension, hypoglycemia, urinary tract infection.  Patient was discharged home on 2 L of oxygen she states that she wears continuously.   Patient was discharged on January 5, 2024, also discharge patient home on trilogy, but patient states she cannot tolerate the device and does not use it.  Since the discharge patient continues to be fatigued with shortness of breath.  Patient was discharged to resume on oral Lasix due to pulmonary edema with chronic diastolic congestive heart failure.  Patient was discharged home with beta-blocker dig and Cardizem to continue also Xarelto for her A-fib with RVR.  She was dispositioned home, activity as tolerated with care of her family.  Discharged home on Levaquin 750 mg to take for an additional 7 days.  Since disposition Home patient states she has had no other episodes of being unresponsive or confused but continues to have shortness of breath and fatigue.  O2 sats initially were 88% on room air, on 2 L O2 sats increased to 96%.  Patient states she does sleep with the oxygen.  She does get support from her children who live nearby and her spouse.     Patient states that her blood sugar has also been elevated since discharge home.  She states it can run upwards of 300, she states that medications has not changed and she is taking them as directed.  Her diet has not changed.  She is concerned about we will do that to my, she states she overall just does not feel well when they are high.  Objective   Vital Signs:  /71   Pulse 82   Temp  "97 °F (36.1 °C)   Ht 160 cm (63\")   Wt 115 kg (254 lb)   SpO2 (!) 89%   BMI 44.99 kg/m²     Physical Exam   Vitals reviewed. yes  Constitutional:       Appearance: Normal appearance. She is well-developed. She is morbidly obese. She is not toxic-appearing.   HENT:      Head: Normocephalic and atraumatic.      Mouth/Throat:      Pharynx: No oropharyngeal exudate.   Eyes:      Conjunctiva/sclera: Conjunctivae normal.      Pupils: Pupils are equal, round, and reactive to light.   Cardiovascular:      Rate and Rhythm: Normal rate and regular rhythm.      Heart sounds: No murmur heard.     No friction rub.   Pulmonary:      Effort: Pulmonary effort is normal.      Breath sounds: Decreased air movement present. Decreased breath sounds present. No wheezing or rhonchi.      Comments: 2L 02 dependent  Musculoskeletal:      Right lower leg: Edema present.      Left lower leg: Edema present.   Skin:     General: Skin is warm and dry.   Neurological:      Mental Status: She is alert and oriented to person, place, and time.      Motor: Weakness present.      Gait: Gait abnormal (in wheelchair).   Psychiatric:         Mood and Affect: Mood and affect normal.         Behavior: Behavior normal.         Thought Content: Thought content normal.         Judgment: Judgment normal.         The following data was reviewed by: LUCERO Collins on 02/16/2024:  Common labs          10/10/2023    05:30 10/11/2023    04:41 10/12/2023    08:01   Common Labs   Glucose 185  166  199    BUN 7  15  23    Creatinine 0.81  0.94  1.16    Sodium 137  137  136    Potassium 3.4  3.3  3.7    Chloride 94  90  92    Calcium 9.3  9.8  9.9    Albumin 3.5  3.8  3.6    Total Bilirubin 1.5  1.3  0.9    Alkaline Phosphatase 55  62  56    AST (SGOT) 20  16  18    ALT (SGPT) 14  16  15    WBC 5.61  6.08  6.32    Hemoglobin 11.2  12.7  13.0    Hematocrit 37.1  41.7  43.0    Platelets 89  114  130      Data reviewed : Previous admission notes, " procedures, and imaging from [facility]      Result Date: 12/31/2023  REPORT-ID:[id number] STUDY: XR Chest 2 Views 12/31/2023 2:51 PM REASON FOR EXAM: Female, 73 years old. ALTERED MENTAL STATUS; UTI, hypotension, hyperglycemia. EMS gave 500mL bolus normal saline enroute COMPARISON: 9.21.23 TECHNIQUE: XR Chest 2 Views ___________________________________ FINDINGS: There is no demonstrated pleural abnormality. Prominent increased lung markings. Left lower lobe infiltrate. Enlarged heart size. Normal mediastinum. Normal winston. Prominent appearing increased interstitial lung markings. Normal visualized pulmonary arteries. There is atherosclerotic calcification of the aortic arch with tortuosity. There are diffuse degenerative changes of the visualized thoracic spine. There is degenerative osteoarthritis of the bilateral shoulders. There are no acute findings of the upper abdomen. ___________________________________    Left sided pneumonia. Electronically Signed: Kenton Woody MD 2023/12/31 at 14:48 CST Reading Location ID and State: [facility] / [place] Tel [phone], Service support [phone], Fax [phone]    CT chest abdomen pelvis with contrast per contrast protocol    Result Date: 12/31/2023  REPORT-ID:[id number] EXAM: CT CHEST, ABDOMEN AND PELVIS WITH INTRAVENOUS CONTRAST CLINICAL INDICATION: Sepsis TECHNIQUE: Helically acquired images were obtained of the chest, abdomen and pelvis with intravenous contrast. This CT exam was performed using one or more of the following dose reduction techniques: automated exposure control, adjustment of the mA and/or kV according to patient size, and/or use of iterative reconstruction technique. CONTRAST: IV Optiray 320 100 RADIATION DOSE: CTDIvol = 21.4 mGy, DLP = 1497.3 mGy-cm COMPARISON: No relevant prior studies available. FINDINGS: CHEST: LUNGS AND PLEURAL SPACES: Left lower lobe infiltrate suggesting pneumonia. There  is no pneumothorax. There is no demonstrated pleural abnormality. No mass. HEART: There is borderline cardiomegaly. There are calcifications of the coronary arteries. No pericardial effusion. MEDIASTINUM: Unremarkable. No mediastinal or hilar adenopathy. Esophagus is unremarkable. No hiatal hernia. THYROID: Unremarkable. No thyroid lesions. ABDOMEN: LIVER: There is decreased attenuation of the liver consistent with steatosis. GALLBLADDER AND BILE DUCTS: There are multiple gallstones. No gallbladder distention or wall edema. No intra- or extrahepatic biliary ductal dilation. PANCREAS: Unremarkable. No focal cystic or solid mass. Normal pancreas. SPLEEN: Unremarkable. Normal spleen. ADRENALS: Unremarkable. Normal bilateral adrenal glands. KIDNEYS AND URETERS: Non obstructive 2 mm right renal parenchymal stones. Normal renal size and position. No acute findings of the left kidney. STOMACH AND BOWEL: Stool throughout the colon. No stomach or bowel distention. No focal inflammatory change. Normal visualized stomach. Normal small intestine. PELVIS: APPENDIX: The appendix is visualized and appears normal. BLADDER: There is a Maldonado balloon catheter in the urinary bladder. REPRODUCTIVE: There is absence of the uterus consistent with a prior hysterectomy. CHEST, ABDOMEN and PELVIS: INTRAPERITONEAL SPACE: Unremarkable. No ascites or other fluid collection. No free air. BONES/JOINTS: There are degenerative changes of the shoulders. There are multi-level degenerative changes of the thoracic spine. There are diffuse degenerative changes of the visualized lumbar spine. There is bilateral neural foraminal stenosis at L4-5 and L5-S1. No suspicious lytic or blastic abnormality. SOFT TISSUES: There is an umbilical hernia containing fat. VASCULATURE: There is atherosclerotic calcification of the aortic arch with tortuosity and elongation of the aortic arch and descending thoracic aorta. There are calcifications of the abdominal aorta.  This is consistent for atherosclerotic disease. There is no abdominal aortic aneurysm. Normal pulmonary arteries. LYMPH NODES: Unremarkable. No enlarged lymph nodes.    1. Left lower lobe infiltrate suggesting pneumonia. 2. There is decreased attenuation of the liver consistent with steatosis. 3. There are multiple gallstones. 4. There are calcifications of the coronary arteries. Electronically Signed: Kenton Woody MD 2023/12/31 at 14:43 CST Reading Location ID and State: [address] / [place] Tel [phone], Service support [phone], Fax [phone]    CT head without contrast    Result Date: 12/31/2023  REPORT-ID:[id number] STUDY: CT BRAIN WITHOUT CONTRAST REASON FOR EXAM: Female, 73 years old. Mental status change, unknown cause Individualized dose optimization techniques were used for this CT. TECHNIQUE: Transaxial CT imaging of the brain was performed without administration of intravenous contrast material. COMPARISON: None ___________________________________ FINDINGS: There are calcifications around the carotid artery. These are noted in the cavernous carotid arteries. Normal calvarium. Normal soft tissues. There is mild cerebral atrophy with widening of the extra-axial spaces and ventricular dilatation. There are areas of decreased attenuation within the white matter tracts of the supratentorial brain, consistent with microvascular disease changes. Normal basal ganglia and thalami. Normal brainstem. There is mild cerebellar atrophy. There is no intracranial hemorrhage. There are no findings of an acute ischemic infarction. There is sinus disease. ASPECTS Score for Acute Strokes: 10/10 ___________________________________    There are no acute findings. Chronic involutional changes of the brain. Electronically Signed: Kenton Woody MD 2023/12/31 at 14:16 CST Reading Location ID and State: [address] / [place] Tel [phone], Service support [phone], Fax [phone]     Assessment and Plan  {CC Problem List  Visit Diagnosis   ROS  Review (Popup)  Health Maintenance  Quality  BestPractice  Medications  SmartSets  SnapShot Encounters  Media :23}  Diagnoses and all orders for this visit:    1. Encounter for subsequent annual wellness visit (AWV) in Medicare patient (Primary)    2. Supplemental oxygen dependent  Comments:  Continue on 2 L of O2 via nasal cannula    3. Other post infection and related fatigue syndromes  Comments:  She continues to feel fatigued, no home health at discharge, patient declines, she states that she has her  and children to help her.    4. Type 2 diabetes mellitus with hyperglycemia, with long-term current use of insulin  Comments:  Will start on a daily insulin sliding scale, sit down and went over the sliding scale thoroughly and encouraged  to call office with any questions or concerns.    Other orders  -     Discontinue: Insulin Lispro, 1 Unit Dial, (HumaLOG KwikPen) 100 UNIT/ML solution pen-injector; Per sliding scale,  -0 units, 140-180 2 units, 181-240 3 units, 241-300 4 units, 301-350 6 units, 351-400 8 units max dose 60 units daily  Dispense: 12 mL; Refill: 2  -     Insulin Lispro, 1 Unit Dial, (HumaLOG KwikPen) 100 UNIT/ML solution pen-injector; Per sliding scale,  -0 units, 140-180 2 units, 181-240 3 units, 241-300 4 units, 301-350 6 units, 351-400 8 units max dose 60 units daily  Dispense: 12 mL; Refill: 2           {Time Spent (Optional):91206}  Follow Up {Instructions Charge Capture  Follow-up Communications :23}  No follow-ups on file.  Patient was given instructions and counseling regarding her condition or for health maintenance advice. Please see specific information pulled into the AVS if appropriate.

## 2024-02-19 NOTE — PROGRESS NOTES
The ABCs of the Annual Wellness Visit  Subsequent Medicare Wellness Visit    Subjective    Mary Albarran is a 73 y.o. female who presents for a Subsequent Medicare Wellness Visit.    The following portions of the patient's history were reviewed and   updated as appropriate: allergies, current medications, past family history, past medical history, past social history, past surgical history, and problem list.    Compared to one year ago, the patient feels her physical   health is worse.    Compared to one year ago, the patient feels her mental   health is worse.    Recent Hospitalizations:  This patient has had a [facility] admission record on file within the last 365 days.    Current Medical Providers:  Patient Care Team:  Brittni Quiroz APRN as PCP - General (Nurse Practitioner)  Vignesh Santillan MD as Consulting Physician (Urology)    Outpatient Medications Prior to Visit   Medication Sig Dispense Refill    albuterol (ACCUNEB) 1.25 MG/3ML nebulizer solution Take 3 mL by nebulization Every 6 (Six) Hours As Needed for Wheezing or Shortness of Air. 300 mL 12    albuterol sulfate  (90 Base) MCG/ACT inhaler Inhale 2 puffs Every 4 (Four) Hours As Needed for Wheezing. 8 g 11    atorvastatin (LIPITOR) 20 MG tablet TAKE 1 TABLET DAILY 90 tablet 3    budesonide (PULMICORT) 1 MG/2ML nebulizer solution USE 1 AMPULE (2 ML) BY NEBULIZATION DAILY (Patient taking differently: Take 2 mL by nebulization Daily.) 180 mL 3    cholecalciferol (VITAMIN D3) 25 MCG (1000 UT) tablet Take 1 tablet by mouth Daily.      digoxin (LANOXIN) 125 MCG tablet TAKE 1 TABLET ON THURSDAYS AND SUNDAYS AND 2 TABLETS THE REST OF THE WEEK 180 tablet 3    dilTIAZem (TIAZAC) 120 MG 24 hr capsule TAKE 1 CAPSULE DAILY 90 capsule 3    HYDROcodone-acetaminophen (NORCO)  MG per tablet Take 1 tablet by mouth Every 4 (Four) Hours As Needed for Moderate Pain or Severe Pain. 120 tablet 0    Insulin Glargine, 2 Unit Dial, (Toujeo Max  SoloStar) 300 UNIT/ML solution pen-injector injection INJECT 24 UNITS DAILY, INCREASE 2 UNITS WEEKLY UNTIL BLOOD SUGAR IS BELOW 150 (Patient taking differently: 60 Units Daily. INJECT 24 UNITS DAILY, INCREASE 2 UNITS WEEKLY UNTIL BLOOD SUGAR IS BELOW 150) 18 mL 3    Insulin Pen Needle (BD Pen Needle Katiuska 2nd Gen) 32G X 4 MM misc 1 each Daily. 90 each 1    metoprolol succinate XL (TOPROL-XL) 50 MG 24 hr tablet Take 1 tablet by mouth 2 (Two) Times a Day. 60 tablet 3    nystatin (MYCOSTATIN) 611804 UNIT/GM powder Apply  topically to the appropriate area as directed 2 (Two) Times a Day. 60 g 1    O2 (OXYGEN) Inhale 2 L/min Every Night.      potassium chloride (MICRO-K) 10 MEQ CR capsule TAKE 1 CAPSULE DAILY WITH FOOD 90 capsule 3    pregabalin (LYRICA) 150 MG capsule TAKE 1 CAPSULE THREE TIMES A  capsule 1    sacubitril-valsartan (Entresto)  MG tablet Take 1 tablet by mouth Take As Directed. Take 1/2 tablet in the morning and 1 tablet at night. 60 tablet 3    Xarelto 20 MG tablet TAKE 1 TABLET DAILY (Patient taking differently: Take 1 tablet by mouth Daily With Dinner.) 90 tablet 3    metoprolol succinate XL (TOPROL-XL) 50 MG 24 hr tablet Take 2 tablets by mouth 2 (Two) Times a Day. Take with 50mg tablet for a total of 150mg twice a day.      cephalexin (KEFLEX) 250 MG capsule Take 1 capsule by mouth Every Night.      furosemide (LASIX) 40 MG tablet Take 1 tablet by mouth Daily for 30 days. 30 tablet 0    cephalexin (KEFLEX) 250 MG capsule Take 1 capsule by mouth Every Night. 90 capsule 3     No facility-administered medications prior to visit.       Opioid medication/s are on active medication list.  and I have evaluated her active treatment plan and pain score trends (see table).  There were no vitals filed for this visit.  I have reviewed the chart for potential of high risk medication and harmful drug interactions in the elderly.          Aspirin is not on active medication list.  Aspirin use is not  "indicated based on review of current medical condition/s. Risk of harm outweighs potential benefits.  .    Patient Active Problem List   Diagnosis    Aortic valve disease    Atrial fibrillation    Chronic kidney disease, stage III (moderate)    Hyperlipidemia    Hypertension    Chronic diastolic congestive heart failure    Atrial fibrillation with RVR    Onychomycosis    Onychocryptosis    Foot pain, bilateral    Nephrolithiasis    Recurrent urinary tract infection     Advance Care Planning   Advance Care Planning     Advance Directive is not on file.  ACP discussion was held with the patient during this visit. Patient does not have an advance directive, information provided.     Objective    Vitals:    02/16/24 1421   BP: 136/71   Pulse: 82   Temp: 97 °F (36.1 °C)   SpO2: (!) 89%   Weight: 115 kg (254 lb)   Height: 160 cm (63\")     Estimated body mass index is 44.99 kg/m² as calculated from the following:    Height as of this encounter: 160 cm (63\").    Weight as of this encounter: 115 kg (254 lb).    Class 3 Severe Obesity (BMI >=40). Obesity-related health conditions include the following: hypertension, coronary heart disease, diabetes mellitus, dyslipidemias, osteoarthritis, and peripheral vascular disease. Obesity is improving with treatment. BMI is is above average; BMI management plan is completed. We discussed low calorie, low carb based diet program, portion control, and increasing exercise.      Does the patient have evidence of cognitive impairment? No          HEALTH RISK ASSESSMENT    Smoking Status:  Social History     Tobacco Use   Smoking Status Former    Packs/day: 1.00    Years: 36.00    Additional pack years: 0.00    Total pack years: 36.00    Types: Cigarettes    Start date: 4/3/1971    Quit date: 4/17/2008    Years since quitting: 15.8    Passive exposure: Never   Smokeless Tobacco Never   Tobacco Comments    FOR 30 YEARS     Alcohol Consumption:  Social History     Substance and Sexual Activity "   Alcohol Use Never     Fall Risk Screen:    STEADI Fall Risk Assessment was completed, and patient is at MODERATE risk for falls. Assessment completed on:2024    Depression Screenin/16/2024     2:21 PM   PHQ-2/PHQ-9 Depression Screening   Little Interest or Pleasure in Doing Things 0-->not at all   Feeling Down, Depressed or Hopeless 0-->not at all   PHQ-9: Brief Depression Severity Measure Score 0       Health Habits and Functional and Cognitive Screenin/16/2024     2:00 PM   Functional & Cognitive Status   Do you have difficulty preparing food and eating? No   Do you have difficulty bathing yourself, getting dressed or grooming yourself? No   Do you have difficulty using the toilet? No   Do you have difficulty moving around from place to place? Yes   Do you have trouble with steps or getting out of a bed or a chair? Yes   Current Diet Well Balanced Diet   Dental Exam Up to date   Eye Exam Up to date   Exercise (times per week) 0 times per week   Current Exercises Include No Regular Exercise   Do you need help using the phone?  No   Are you deaf or do you have serious difficulty hearing?  No   Do you need help to go to places out of walking distance? Yes   Do you need help shopping? No   Do you need help preparing meals?  No   Do you need help with housework?  No   Do you need help with laundry? No   Do you need help taking your medications? No   Do you need help managing money? No   Do you ever drive or ride in a car without wearing a seat belt? No   Have you felt unusual stress, anger or loneliness in the last month? No   Who do you live with? Spouse   If you need help, do you have trouble finding someone available to you? No   Have you been bothered in the last four weeks by sexual problems? No   Do you have difficulty concentrating, remembering or making decisions? No       Age-appropriate Screening Schedule:  Refer to the list below for future screening recommendations based on  patient's age, sex and/or medical conditions. Orders for these recommended tests are listed in the plan section. The patient has been provided with a written plan.    Health Maintenance   Topic Date Due    DXA SCAN  Never done    INFLUENZA VACCINE  03/31/2024 (Originally 8/1/2023)    DIABETIC EYE EXAM  04/05/2024 (Originally 6/10/2021)    Pneumococcal Vaccine 65+ (1 of 2 - PCV) 09/13/2024 (Originally 9/23/1956)    MAMMOGRAM  09/13/2024 (Originally 1950)    TDAP/TD VACCINES (1 - Tdap) 09/13/2024 (Originally 9/23/1971)    COVID-19 Vaccine (1) 01/17/2025 (Originally 3/23/1951)    ZOSTER VACCINE (1 of 2) 01/17/2025 (Originally 9/23/2002)    RSV Vaccine - Adults (1 - 1-dose 60+ series) 02/16/2025 (Originally 9/23/2012)    DIABETIC FOOT EXAM  05/19/2024    HEMOGLOBIN A1C  07/05/2024    LIPID PANEL  09/13/2024    URINE MICROALBUMIN  09/13/2024    COLORECTAL CANCER SCREENING  09/23/2024    ANNUAL WELLNESS VISIT  02/16/2025    BMI FOLLOWUP  02/16/2025    HEPATITIS C SCREENING  Completed                  CMS Preventative Services Quick Reference  Risk Factors Identified During Encounter  Fall Risk-High or Moderate: Discussed Fall Prevention in the home  The above risks/problems have been discussed with the patient.  Pertinent information has been shared with the patient in the After Visit Summary.  An After Visit Summary and PPPS were made available to the patient.    Follow Up:   Next Medicare Wellness visit to be scheduled in 1 year.       Additional E&M Note during same encounter follows:  Patient has multiple medical problems which are significant and separately identifiable that require additional work above and beyond the Medicare Wellness Visit.      Chief Complaint  Medicare Wellness-subsequent, Shortness of Breath, and Fatigue    Subjective        HPI  Mary Albarran is also being seen today for      Patient is a 73-year-old female who comes in today for follow-up.  Patient was admitted to the hospital 12/31/2023  "at [facility] for respiratory failure, pneumonia, hypotension, hypoglycemia, urinary tract infection.  Patient was discharged home on 2 L of oxygen she states that she wears continuously.   Patient was discharged on January 5, 2024, also discharge patient home on trilogy, but patient states she cannot tolerate the device and does not use it.  Since the discharge patient continues to be fatigued with shortness of breath.  Patient was discharged to resume on oral Lasix due to pulmonary edema with chronic diastolic congestive heart failure.  Patient was discharged home with beta-blocker dig and Cardizem to continue also Xarelto for her A-fib with RVR.  She was dispositioned home, activity as tolerated with care of her family.  Discharged home on Levaquin 750 mg to take for an additional 7 days.  Since disposition Home patient states she has had no other episodes of being unresponsive or confused but continues to have shortness of breath and fatigue.  O2 sats initially were 88% on room air, on 2 L O2 sats increased to 96%.  Patient states she does sleep with the oxygen.  She does get support from her children who live nearby and her spouse.     Patient states that her blood sugar has also been elevated since discharge home.  She states it can run upwards of 300, she states that medications has not changed and she is taking them as directed.  Her diet has not changed.  She is concerned about we will do that to my, she states she overall just does not feel well when they are high.  Objective   Vital Signs:  /71   Pulse 82   Temp 97 °F (36.1 °C)   Ht 160 cm (63\")   Wt 115 kg (254 lb)   SpO2 (!) 89%   BMI 44.99 kg/m²     Physical Exam   Vitals reviewed. yes  Constitutional:       Appearance: Normal appearance. She is well-developed. She is morbidly obese. She is not toxic-appearing.   HENT:      Head: Normocephalic and atraumatic.      Mouth/Throat:      Pharynx: No oropharyngeal exudate. "   Eyes:      Conjunctiva/sclera: Conjunctivae normal.      Pupils: Pupils are equal, round, and reactive to light.   Cardiovascular:      Rate and Rhythm: Normal rate and regular rhythm.      Heart sounds: No murmur heard.     No friction rub.   Pulmonary:      Effort: Pulmonary effort is normal.      Breath sounds: Decreased air movement present. Decreased breath sounds present. No wheezing or rhonchi.      Comments: 2L 02 dependent  Musculoskeletal:      Right lower leg: Edema present.      Left lower leg: Edema present.   Skin:     General: Skin is warm and dry.   Neurological:      Mental Status: She is alert and oriented to person, place, and time.      Motor: Weakness present.      Gait: Gait abnormal (in wheelchair).   Psychiatric:         Mood and Affect: Mood and affect normal.         Behavior: Behavior normal.         Thought Content: Thought content normal.         Judgment: Judgment normal.         The following data was reviewed by: LUCERO Collins on 02/16/2024:  Common labs          10/10/2023    05:30 10/11/2023    04:41 10/12/2023    08:01   Common Labs   Glucose 185  166  199    BUN 7  15  23    Creatinine 0.81  0.94  1.16    Sodium 137  137  136    Potassium 3.4  3.3  3.7    Chloride 94  90  92    Calcium 9.3  9.8  9.9    Albumin 3.5  3.8  3.6    Total Bilirubin 1.5  1.3  0.9    Alkaline Phosphatase 55  62  56    AST (SGOT) 20  16  18    ALT (SGPT) 14  16  15    WBC 5.61  6.08  6.32    Hemoglobin 11.2  12.7  13.0    Hematocrit 37.1  41.7  43.0    Platelets 89  114  130      Data reviewed : Previous admission notes, procedures, and imaging from [facility]      Result Date: 12/31/2023  REPORT-ID:[id number] STUDY: XR Chest 2 Views 12/31/2023 2:51 PM REASON FOR EXAM: Female, 73 years old. ALTERED MENTAL STATUS; UTI, hypotension, hyperglycemia. EMS gave 500mL bolus normal saline enroute COMPARISON: 9.21.23 TECHNIQUE: XR Chest 2 Views  ___________________________________ FINDINGS: There is no demonstrated pleural abnormality. Prominent increased lung markings. Left lower lobe infiltrate. Enlarged heart size. Normal mediastinum. Normal winston. Prominent appearing increased interstitial lung markings. Normal visualized pulmonary arteries. There is atherosclerotic calcification of the aortic arch with tortuosity. There are diffuse degenerative changes of the visualized thoracic spine. There is degenerative osteoarthritis of the bilateral shoulders. There are no acute findings of the upper abdomen. ___________________________________    Left sided pneumonia. Electronically Signed: Kenton Woody MD 2023/12/31 at 14:48 CST Reading Location ID and State: [facility] / [place] Tel [phone], Service support [phone], Fax [phone]    CT chest abdomen pelvis with contrast per contrast protocol    Result Date: 12/31/2023  REPORT-ID:[id number] EXAM: CT CHEST, ABDOMEN AND PELVIS WITH INTRAVENOUS CONTRAST CLINICAL INDICATION: Sepsis TECHNIQUE: Helically acquired images were obtained of the chest, abdomen and pelvis with intravenous contrast. This CT exam was performed using one or more of the following dose reduction techniques: automated exposure control, adjustment of the mA and/or kV according to patient size, and/or use of iterative reconstruction technique. CONTRAST: IV Optiray 320 100 RADIATION DOSE: CTDIvol = 21.4 mGy, DLP = 1497.3 mGy-cm COMPARISON: No relevant prior studies available. FINDINGS: CHEST: LUNGS AND PLEURAL SPACES: Left lower lobe infiltrate suggesting pneumonia. There is no pneumothorax. There is no demonstrated pleural abnormality. No mass. HEART: There is borderline cardiomegaly. There are calcifications of the coronary arteries. No pericardial effusion. MEDIASTINUM: Unremarkable. No mediastinal or hilar adenopathy. Esophagus is unremarkable. No hiatal hernia. THYROID: Unremarkable. No thyroid lesions. ABDOMEN:  LIVER: There is decreased attenuation of the liver consistent with steatosis. GALLBLADDER AND BILE DUCTS: There are multiple gallstones. No gallbladder distention or wall edema. No intra- or extrahepatic biliary ductal dilation. PANCREAS: Unremarkable. No focal cystic or solid mass. Normal pancreas. SPLEEN: Unremarkable. Normal spleen. ADRENALS: Unremarkable. Normal bilateral adrenal glands. KIDNEYS AND URETERS: Non obstructive 2 mm right renal parenchymal stones. Normal renal size and position. No acute findings of the left kidney. STOMACH AND BOWEL: Stool throughout the colon. No stomach or bowel distention. No focal inflammatory change. Normal visualized stomach. Normal small intestine. PELVIS: APPENDIX: The appendix is visualized and appears normal. BLADDER: There is a Maldonado balloon catheter in the urinary bladder. REPRODUCTIVE: There is absence of the uterus consistent with a prior hysterectomy. CHEST, ABDOMEN and PELVIS: INTRAPERITONEAL SPACE: Unremarkable. No ascites or other fluid collection. No free air. BONES/JOINTS: There are degenerative changes of the shoulders. There are multi-level degenerative changes of the thoracic spine. There are diffuse degenerative changes of the visualized lumbar spine. There is bilateral neural foraminal stenosis at L4-5 and L5-S1. No suspicious lytic or blastic abnormality. SOFT TISSUES: There is an umbilical hernia containing fat. VASCULATURE: There is atherosclerotic calcification of the aortic arch with tortuosity and elongation of the aortic arch and descending thoracic aorta. There are calcifications of the abdominal aorta. This is consistent for atherosclerotic disease. There is no abdominal aortic aneurysm. Normal pulmonary arteries. LYMPH NODES: Unremarkable. No enlarged lymph nodes.    1. Left lower lobe infiltrate suggesting pneumonia. 2. There is decreased attenuation of the liver consistent with steatosis. 3. There are multiple gallstones. 4. There are  calcifications of the coronary arteries. Electronically Signed: Kenton Woody MD 2023/12/31 at 14:43 CST Reading Location ID and State: [address] / [place] Tel [phone], Service support [phone], Fax [phone]    CT head without contrast    Result Date: 12/31/2023  REPORT-ID:[id number] STUDY: CT BRAIN WITHOUT CONTRAST REASON FOR EXAM: Female, 73 years old. Mental status change, unknown cause Individualized dose optimization techniques were used for this CT. TECHNIQUE: Transaxial CT imaging of the brain was performed without administration of intravenous contrast material. COMPARISON: None ___________________________________ FINDINGS: There are calcifications around the carotid artery. These are noted in the cavernous carotid arteries. Normal calvarium. Normal soft tissues. There is mild cerebral atrophy with widening of the extra-axial spaces and ventricular dilatation. There are areas of decreased attenuation within the white matter tracts of the supratentorial brain, consistent with microvascular disease changes. Normal basal ganglia and thalami. Normal brainstem. There is mild cerebellar atrophy. There is no intracranial hemorrhage. There are no findings of an acute ischemic infarction. There is sinus disease. ASPECTS Score for Acute Strokes: 10/10 ___________________________________    There are no acute findings. Chronic involutional changes of the brain. Electronically Signed: Kenton Woody MD 2023/12/31 at 14:16 CST Reading Location ID and State: [address] / [place] Tel [phone], Service support [phone], Fax [phone]     Assessment and Plan   Diagnoses and all orders for this visit:    1. Encounter for subsequent annual wellness visit (AWV) in Medicare patient (Primary)    2. Supplemental oxygen dependent  Comments:  Continue on 2 L of O2 via nasal cannula    3. Other post infection and related fatigue syndromes  Comments:  She continues to feel fatigued, no home health at  discharge, patient declines, she states that she has her  and children to help her.    4. Type 2 diabetes mellitus with hyperglycemia, with long-term current use of insulin  Comments:  Will start on a daily insulin sliding scale, sit down and went over the sliding scale thoroughly and encouraged  to call office with any questions or concerns.    Other orders  -     Discontinue: Insulin Lispro, 1 Unit Dial, (HumaLOG KwikPen) 100 UNIT/ML solution pen-injector; Per sliding scale,  -0 units, 140-180 2 units, 181-240 3 units, 241-300 4 units, 301-350 6 units, 351-400 8 units max dose 60 units daily  Dispense: 12 mL; Refill: 2  -     Insulin Lispro, 1 Unit Dial, (HumaLOG KwikPen) 100 UNIT/ML solution pen-injector; Per sliding scale,  -0 units, 140-180 2 units, 181-240 3 units, 241-300 4 units, 301-350 6 units, 351-400 8 units max dose 60 units daily  Dispense: 12 mL; Refill: 2             Follow Up   Return in about 3 months (around 5/16/2024) for Recheck.  Patient was given instructions and counseling regarding her condition or for health maintenance advice. Please see specific information pulled into the AVS if appropriate.            None known

## 2024-02-23 DIAGNOSIS — G62.9 NEUROPATHY: ICD-10-CM

## 2024-02-23 NOTE — TELEPHONE ENCOUNTER
Caller: Avis Mary CROWDER    Relationship: Self    Best call back number: [phone]     Requested Prescriptions:   Requested Prescriptions     Pending Prescriptions Disp Refills    pregabalin (LYRICA) 150 MG capsule 270 capsule 1     Sig: Take 1 capsule by mouth 3 (Three) Times a Day.        Pharmacy where request should be sent: [facility] [address] [phone] [facility] [phone] FX     Last office visit with prescribing clinician: 2/16/2024   Last telemedicine visit with prescribing clinician: Visit date not found   Next office visit with prescribing clinician: Visit date not found       Does the patient have less than a 3 day supply:  [] Yes  [x] No      Steven Reyes Rep   02/23/24 13:58 EST

## 2024-02-23 NOTE — TELEPHONE ENCOUNTER
Caller: [facility]NAG[facility]    Relationship to patient: Other    Best call back number: [phone]    Patient is needing: CALLING TO ADVISE PATIENT WAS DUE FOR FOLLOW UP CHEST CT SCAN IN NOVEMBER 2023  CALLER ADVISED THEY WERE  REQUESTED BY [facility] TO CONTACT BOTH THE PATIENT AND THE PROVIDER WITH THIS TYPE OF REMINDER

## 2024-02-26 DIAGNOSIS — G89.29 CHRONIC JOINT PAIN: ICD-10-CM

## 2024-02-26 DIAGNOSIS — M25.50 CHRONIC JOINT PAIN: ICD-10-CM

## 2024-02-26 RX ORDER — HYDROCODONE BITARTRATE AND ACETAMINOPHEN 10; 325 MG/1; MG/1
1 TABLET ORAL EVERY 4 HOURS PRN
Qty: 120 TABLET | Refills: 0 | Status: SHIPPED | OUTPATIENT
Start: 2024-02-26

## 2024-02-26 NOTE — TELEPHONE ENCOUNTER
Caller: Mary Albarran LAKESHA    Relationship: Self    Best call back number: [phone]    Requested Prescriptions:   Requested Prescriptions     Pending Prescriptions Disp Refills    HYDROcodone-acetaminophen (NORCO)  MG per tablet 120 tablet 0     Sig: Take 1 tablet by mouth Every 4 (Four) Hours As Needed for Moderate Pain or Severe Pain.        Pharmacy where request should be sent: [facility] [address] [phone] [facility] [phone]      Last office visit with prescribing clinician: 2/16/2024   Last telemedicine visit with prescribing clinician: Visit date not found   Next office visit with prescribing clinician: Visit date not found     Additional details provided by patient:     Does the patient have less than a 3 day supply:  [x] Yes  [] No    Would you like a call back once the refill request has been completed: [] Yes [x] No    If the office needs to give you a call back, can they leave a voicemail: [] Yes [x] No    Steven Suero Rep   02/26/24 09:15 EST

## 2024-02-27 DIAGNOSIS — G62.9 NEUROPATHY: ICD-10-CM

## 2024-02-27 RX ORDER — METOPROLOL SUCCINATE 100 MG/1
100 TABLET, EXTENDED RELEASE ORAL 2 TIMES DAILY
Qty: 180 TABLET | Refills: 3 | OUTPATIENT
Start: 2024-02-27

## 2024-02-28 RX ORDER — PREGABALIN 150 MG/1
150 CAPSULE ORAL 3 TIMES DAILY
Qty: 270 CAPSULE | Refills: 1 | Status: SHIPPED | OUTPATIENT
Start: 2024-02-28

## 2024-02-29 RX ORDER — PREGABALIN 150 MG/1
150 CAPSULE ORAL 3 TIMES DAILY
Qty: 270 CAPSULE | Refills: 1 | Status: SHIPPED | OUTPATIENT
Start: 2024-02-29

## 2024-03-20 RX ORDER — RIVAROXABAN 20 MG/1
TABLET, FILM COATED ORAL
Qty: 90 TABLET | Refills: 3 | Status: SHIPPED | OUTPATIENT
Start: 2024-03-20

## 2024-03-25 DIAGNOSIS — G89.29 CHRONIC JOINT PAIN: ICD-10-CM

## 2024-03-25 DIAGNOSIS — M25.50 CHRONIC JOINT PAIN: ICD-10-CM

## 2024-03-25 RX ORDER — HYDROCODONE BITARTRATE AND ACETAMINOPHEN 10; 325 MG/1; MG/1
1 TABLET ORAL EVERY 4 HOURS PRN
Qty: 120 TABLET | Refills: 0 | Status: SHIPPED | OUTPATIENT
Start: 2024-03-25

## 2024-03-25 NOTE — TELEPHONE ENCOUNTER
Caller: Mary Albarran LAKESHA    Relationship: Self    Best call back number: [phone]    Requested Prescriptions:   Requested Prescriptions     Pending Prescriptions Disp Refills    HYDROcodone-acetaminophen (NORCO)  MG per tablet 120 tablet 0     Sig: Take 1 tablet by mouth Every 4 (Four) Hours As Needed for Moderate Pain or Severe Pain.        Pharmacy where request should be sent: [facility] [address] [phone] [facility] [phone]      Last office visit with prescribing clinician: 2/16/2024   Last telemedicine visit with prescribing clinician: Visit date not found   Next office visit with prescribing clinician: Visit date not found     Additional details provided by patient:     Does the patient have less than a 3 day supply:  [x] Yes  [] No    Would you like a call back once the refill request has been completed: [] Yes [x] No    If the office needs to give you a call back, can they leave a voicemail: [] Yes [x] No    Steven Suero Rep   03/25/24 09:17 EDT

## 2024-03-25 NOTE — TELEPHONE ENCOUNTER
Controlled Medication Refill Request:    1.  Last Office Visit:  Office Visit with Brittni Quiroz APRN (02/16/2024)     2.  Next Office Visit:  Visit date not found     3.  Last UDS Date:  DUE    4.  Last Consent Signed:  DUE    5.  Medication Requested: [place]     Pharmacy: [facility] [place], [place] - [address] [place] [address] [phone] [facility] [phone] [address] [address]  Phone: [phone] Fax: [phone]

## 2024-04-17 ENCOUNTER — TELEPHONE (OUTPATIENT)
Dept: CARDIOLOGY | Facility: CLINIC | Age: 74
End: 2024-04-17
Payer: MEDICARE

## 2024-04-17 RX ORDER — METOPROLOL SUCCINATE 50 MG/1
150 TABLET, EXTENDED RELEASE ORAL 2 TIMES DAILY
Qty: 90 TABLET | Refills: 3 | Status: SHIPPED | OUTPATIENT
Start: 2024-04-17

## 2024-04-17 NOTE — TELEPHONE ENCOUNTER
PT CALLED AND STATED SHE IS HAVING AN ISSUE REFILLING HER METOPROLOL 100 MG BID AND 50 MG BID WITH [facility].      STATED PHARMACY ADVISED TO RESEND SCRIPTS AND PUT ON RX THAT PT IS TO TAKE  MG AND 50 MG TOGETHER BID FOR A TOTAL  MG BID.

## 2024-04-22 ENCOUNTER — TELEPHONE (OUTPATIENT)
Dept: FAMILY MEDICINE CLINIC | Facility: CLINIC | Age: 74
End: 2024-04-22

## 2024-04-22 DIAGNOSIS — M25.50 CHRONIC JOINT PAIN: ICD-10-CM

## 2024-04-22 DIAGNOSIS — G89.29 CHRONIC JOINT PAIN: ICD-10-CM

## 2024-04-22 NOTE — TELEPHONE ENCOUNTER
Caller: Mary Albarran    Relationship: Self    Best call back number: [phone]     What medication are you requesting: ONDANSETRON 4MG    What are your current symptoms: UPSET STOMACH    Have you had these symptoms before:    [] Yes  [] No    Have you been treated for these symptoms before:   [] Yes  [] No    If a prescription is needed, what is your preferred pharmacy and phone number: [facility] [address] [phone] [facility] [phone]      Additional notes: PATIENT WAS PRESCRIBED THIS MEDICATION FROM AN URGENT CARE IN [place]

## 2024-04-22 NOTE — TELEPHONE ENCOUNTER
** New Rx request - ONDANSETRON 4MG **    No history of Rx prescribed by PCP on file.    Last OV: Office Visit with Brittni Quiroz APRN (02/16/2024)     Next Appt: No future appt on file    Last Labs: 01/05/2024    Pharmacy: [facility] [address] [phone] [facility] [phone]  [phone]

## 2024-04-24 ENCOUNTER — TELEPHONE (OUTPATIENT)
Dept: FAMILY MEDICINE CLINIC | Facility: CLINIC | Age: 74
End: 2024-04-24
Payer: MEDICARE

## 2024-04-24 RX ORDER — HYDROCODONE BITARTRATE AND ACETAMINOPHEN 10; 325 MG/1; MG/1
1 TABLET ORAL EVERY 4 HOURS PRN
Qty: 120 TABLET | Refills: 0 | Status: SHIPPED | OUTPATIENT
Start: 2024-04-24

## 2024-04-24 RX ORDER — ONDANSETRON 4 MG/1
4 TABLET, ORALLY DISINTEGRATING ORAL EVERY 8 HOURS PRN
Qty: 60 TABLET | Refills: 1 | Status: SHIPPED | OUTPATIENT
Start: 2024-04-24

## 2024-04-24 RX ORDER — ONDANSETRON 4 MG/1
4 TABLET, ORALLY DISINTEGRATING ORAL EVERY 8 HOURS PRN
Qty: 60 TABLET | Refills: 1 | Status: SHIPPED | OUTPATIENT
Start: 2024-04-24 | End: 2024-04-24 | Stop reason: SDUPTHER

## 2024-04-24 RX ORDER — ALBUTEROL SULFATE 90 UG/1
2 AEROSOL, METERED RESPIRATORY (INHALATION) EVERY 4 HOURS PRN
Qty: 8 G | Refills: 11 | Status: SHIPPED | OUTPATIENT
Start: 2024-04-24

## 2024-04-24 NOTE — TELEPHONE ENCOUNTER
Pharmacy Name:      [facility] [place], [place] - [address] [place] [address] [phone] [facility] [phone] [facility] (Pharmacy)       Reference Number (if applicable):     Pharmacy representative name: PK    Pharmacy representative phone number:[phone]    What medication are you calling in regards to: ondansetron ODT (ZOFRAN-ODT) 4 MG disintegrating tablet     What question does the pharmacy have: PATIENT IS WANTING THIS PRESCRIPTION TO BE FILLED WITH [facility]    Who is the provider that prescribed the medication: MS.WHITE    Additional notes: PHARMACY CALLED AND SAID PATIENT IS WANTING PRESCRIPTION AT [facility]

## 2024-05-01 ENCOUNTER — TELEPHONE (OUTPATIENT)
Dept: FAMILY MEDICINE CLINIC | Facility: CLINIC | Age: 74
End: 2024-05-01

## 2024-05-01 NOTE — TELEPHONE ENCOUNTER
"Spoke with patient who stated she is getting sick and having pain when she bends over in the area she does her insulin injections. I did advise her to go to the ER and she stated \"she does not feel like going but she may\" I advised her again she needs to be evaluated in person since her symptoms are so severe and she stated she is not sure if she will go even after I advised her to.   "

## 2024-05-01 NOTE — TELEPHONE ENCOUNTER
Caller: Mary Ablarran    Relationship to patient: Self    Best call back number: [phone]    Patient is needing: PATIENT CALLED IN AND SAID THAT SHE HAS BEEN HAVING STOMACH DISCOMFORT FOR ABOUT A YEAR AND FEELS LIKE SHE HAS A MASS IN STOMACH. PATIENT THINKS IT MIGHT HAVE TO DO WITH Insulin Glargine, 2 Unit Dial, (Toujeo Max SoloStar) 300 UNIT/ML solution pen-injector injection AND SAID NOW SHE IS GETTING SICK WHENEVER SHE BENDS OVER. PATIENT IS REQUESTING A CALL BACK WITH GUIDANCE AND SAID IT IS OKAY TO LEAVE MESSAGE ON PHONE.

## 2024-05-01 NOTE — TELEPHONE ENCOUNTER
Attempted to call pateint but she has a voicemail that has not been set up yet will attempt to call patient again soon

## 2024-05-15 ENCOUNTER — PATIENT OUTREACH (OUTPATIENT)
Dept: CASE MANAGEMENT | Facility: OTHER | Age: 74
End: 2024-05-15
Payer: MEDICARE

## 2024-05-15 ENCOUNTER — OFFICE VISIT (OUTPATIENT)
Dept: FAMILY MEDICINE CLINIC | Facility: CLINIC | Age: 74
End: 2024-05-15
Payer: MEDICARE

## 2024-05-15 DIAGNOSIS — Z79.4 TYPE 2 DIABETES MELLITUS WITH HYPERGLYCEMIA, WITH LONG-TERM CURRENT USE OF INSULIN: ICD-10-CM

## 2024-05-15 DIAGNOSIS — R06.02 SHORTNESS OF BREATH: ICD-10-CM

## 2024-05-15 DIAGNOSIS — Z09 HOSPITAL DISCHARGE FOLLOW-UP: Primary | ICD-10-CM

## 2024-05-15 DIAGNOSIS — I50.32 CHRONIC DIASTOLIC CONGESTIVE HEART FAILURE: ICD-10-CM

## 2024-05-15 DIAGNOSIS — N18.31 STAGE 3A CHRONIC KIDNEY DISEASE: ICD-10-CM

## 2024-05-15 DIAGNOSIS — D64.9 LOW HEMOGLOBIN: ICD-10-CM

## 2024-05-15 DIAGNOSIS — R11.2 NAUSEA AND VOMITING, UNSPECIFIED VOMITING TYPE: Primary | ICD-10-CM

## 2024-05-15 DIAGNOSIS — I50.9 ACUTE ON CHRONIC HEART FAILURE, UNSPECIFIED HEART FAILURE TYPE: ICD-10-CM

## 2024-05-15 DIAGNOSIS — E11.65 TYPE 2 DIABETES MELLITUS WITH HYPERGLYCEMIA, WITH LONG-TERM CURRENT USE OF INSULIN: ICD-10-CM

## 2024-05-15 DIAGNOSIS — G62.9 NEUROPATHY: ICD-10-CM

## 2024-05-15 LAB
ALBUMIN SERPL-MCNC: 3.7 G/DL (ref 3.5–5.2)
ALBUMIN/GLOB SERPL: 1.9 G/DL
ALP SERPL-CCNC: 66 U/L (ref 39–117)
ALT SERPL W P-5'-P-CCNC: 25 U/L (ref 1–33)
ANION GAP SERPL CALCULATED.3IONS-SCNC: 7 MMOL/L (ref 5–15)
AST SERPL-CCNC: 17 U/L (ref 1–32)
BASOPHILS # BLD AUTO: 0.04 10*3/MM3 (ref 0–0.2)
BASOPHILS NFR BLD AUTO: 0.5 % (ref 0–1.5)
BILIRUB SERPL-MCNC: 0.5 MG/DL (ref 0–1.2)
BUN SERPL-MCNC: 31 MG/DL (ref 8–23)
BUN/CREAT SERPL: 37.3 (ref 7–25)
CALCIUM SPEC-SCNC: 8.4 MG/DL (ref 8.6–10.5)
CHLORIDE SERPL-SCNC: 105 MMOL/L (ref 98–107)
CO2 SERPL-SCNC: 28 MMOL/L (ref 22–29)
CREAT SERPL-MCNC: 0.83 MG/DL (ref 0.57–1)
DEPRECATED RDW RBC AUTO: 54.3 FL (ref 37–54)
EGFRCR SERPLBLD CKD-EPI 2021: 75.5 ML/MIN/1.73
EOSINOPHIL # BLD AUTO: 0.12 10*3/MM3 (ref 0–0.4)
EOSINOPHIL NFR BLD AUTO: 1.5 % (ref 0.3–6.2)
ERYTHROCYTE [DISTWIDTH] IN BLOOD BY AUTOMATED COUNT: 15.8 % (ref 12.3–15.4)
FERRITIN SERPL-MCNC: 38.6 NG/ML (ref 13–150)
GLOBULIN UR ELPH-MCNC: 2 GM/DL
GLUCOSE SERPL-MCNC: 292 MG/DL (ref 65–99)
HBA1C MFR BLD: 6.2 % (ref 4.8–5.6)
HCT VFR BLD AUTO: 29.9 % (ref 34–46.6)
HGB BLD-MCNC: 8.5 G/DL (ref 12–15.9)
IMM GRANULOCYTES # BLD AUTO: 0.12 10*3/MM3 (ref 0–0.05)
IMM GRANULOCYTES NFR BLD AUTO: 1.5 % (ref 0–0.5)
IRON 24H UR-MRATE: 29 MCG/DL (ref 37–145)
IRON SATN MFR SERPL: 5 % (ref 20–50)
LYMPHOCYTES # BLD AUTO: 1.07 10*3/MM3 (ref 0.7–3.1)
LYMPHOCYTES NFR BLD AUTO: 13.5 % (ref 19.6–45.3)
MCH RBC QN AUTO: 26.9 PG (ref 26.6–33)
MCHC RBC AUTO-ENTMCNC: 28.4 G/DL (ref 31.5–35.7)
MCV RBC AUTO: 94.6 FL (ref 79–97)
MONOCYTES # BLD AUTO: 0.43 10*3/MM3 (ref 0.1–0.9)
MONOCYTES NFR BLD AUTO: 5.4 % (ref 5–12)
NEUTROPHILS NFR BLD AUTO: 6.13 10*3/MM3 (ref 1.7–7)
NEUTROPHILS NFR BLD AUTO: 77.6 % (ref 42.7–76)
NT-PROBNP SERPL-MCNC: 2048 PG/ML (ref 0–900)
PLATELET # BLD AUTO: 80 10*3/MM3 (ref 140–450)
PMV BLD AUTO: ABNORMAL FL
POTASSIUM SERPL-SCNC: 5.8 MMOL/L (ref 3.5–5.2)
PROT SERPL-MCNC: 5.7 G/DL (ref 6–8.5)
RBC # BLD AUTO: 3.16 10*6/MM3 (ref 3.77–5.28)
SODIUM SERPL-SCNC: 140 MMOL/L (ref 136–145)
TIBC SERPL-MCNC: 544 MCG/DL (ref 298–536)
TRANSFERRIN SERPL-MCNC: 365 MG/DL (ref 200–360)
WBC NRBC COR # BLD AUTO: 7.91 10*3/MM3 (ref 3.4–10.8)

## 2024-05-15 PROCEDURE — 3074F SYST BP LT 130 MM HG: CPT | Performed by: NURSE PRACTITIONER

## 2024-05-15 PROCEDURE — 83540 ASSAY OF IRON: CPT | Performed by: NURSE PRACTITIONER

## 2024-05-15 PROCEDURE — 1160F RVW MEDS BY RX/DR IN RCRD: CPT | Performed by: NURSE PRACTITIONER

## 2024-05-15 PROCEDURE — 1125F AMNT PAIN NOTED PAIN PRSNT: CPT | Performed by: NURSE PRACTITIONER

## 2024-05-15 PROCEDURE — 82728 ASSAY OF FERRITIN: CPT | Performed by: NURSE PRACTITIONER

## 2024-05-15 PROCEDURE — 83036 HEMOGLOBIN GLYCOSYLATED A1C: CPT | Performed by: NURSE PRACTITIONER

## 2024-05-15 PROCEDURE — 83880 ASSAY OF NATRIURETIC PEPTIDE: CPT | Performed by: NURSE PRACTITIONER

## 2024-05-15 PROCEDURE — 3078F DIAST BP <80 MM HG: CPT | Performed by: NURSE PRACTITIONER

## 2024-05-15 PROCEDURE — 3044F HG A1C LEVEL LT 7.0%: CPT | Performed by: NURSE PRACTITIONER

## 2024-05-15 PROCEDURE — 99215 OFFICE O/P EST HI 40 MIN: CPT | Performed by: NURSE PRACTITIONER

## 2024-05-15 PROCEDURE — 80053 COMPREHEN METABOLIC PANEL: CPT | Performed by: NURSE PRACTITIONER

## 2024-05-15 PROCEDURE — 1159F MED LIST DOCD IN RCRD: CPT | Performed by: NURSE PRACTITIONER

## 2024-05-15 PROCEDURE — 85025 COMPLETE CBC W/AUTO DIFF WBC: CPT | Performed by: NURSE PRACTITIONER

## 2024-05-15 PROCEDURE — 84466 ASSAY OF TRANSFERRIN: CPT | Performed by: NURSE PRACTITIONER

## 2024-05-15 RX ORDER — SPIRONOLACTONE 50 MG/1
25 TABLET, FILM COATED ORAL DAILY
Qty: 90 TABLET | Refills: 1 | Status: SHIPPED | OUTPATIENT
Start: 2024-05-15 | End: 2024-05-16

## 2024-05-15 RX ORDER — PREGABALIN 150 MG/1
150 CAPSULE ORAL 2 TIMES DAILY
Qty: 90 CAPSULE | Refills: 1 | Status: SHIPPED | OUTPATIENT
Start: 2024-05-15 | End: 2024-08-13

## 2024-05-15 NOTE — PROGRESS NOTES
Chief Complaint  Anemia, Hospital Follow Up Visit, and Congestive Heart Failure    Subjective        Medical History: has a past medical history of Allergic rhinitis, Anxiety, Aortic valve disease (11/28/2021), Arthritis, Atrial fibrillation, Chronic kidney disease (CKD), stage III (moderate), Chronic pain, COPD (chronic obstructive pulmonary disease), Diabetes mellitus type 2 with complications, Ex-smoker, Hyperlipidemia, Hypertension, and Thrombocytopenia.     Surgical History: has a past surgical history that includes Hysterectomy.     Family History: family history includes Diabetes in an other family member; Heart disease in her father and other family members.     Social History: reports that she quit smoking about 16 years ago. Her smoking use included cigarettes. She started smoking about 53 years ago. She has a 37 pack-year smoking history. She has never been exposed to tobacco smoke. She has never used smokeless tobacco. She reports that she does not drink alcohol and does not use drugs.    Mary Albarran presents to [facility]  Anemia    Congestive Heart Failure  Patient was recently discharged from [facility] for anemia.  She comes in today for follow-up.  Patient states she went to the emergency room due to having abdominal pain, she states that she was able to correlate abdominal pain with her insulin injections.  Patient was getting localized reactions secondary to her insulin causing knot, redness, and bruising at injection site.  Patient states she was also experiencing excruciating abdominal pain after she would give herself the injection.  Patient has stopped her insulin injections at this time.    Patient was given 1 unit of packed red blood cells while in the hospital, she was diuresed due to her congestive heart failure.  She comes in today with worsening and fatigue, worsening in swelling, shortness of breath, patient states that since she has  "been home from the hospital she has had to use her oxygen constantly,.  Previously patient is typically used oxygen only as needed.  Patient states that she has had weight gain, swelling in her face hands and legs.  She is currently taking her Lasix 40 mg.  She does see cardiology.  She is not currently been seen at the heart failure clinic.    After review of inpatient hospitalization patient was negative for occult blood in stool, no other testing was done to determine why patient was anemic.  No iron studies was completed.  Patient does have chronic kidney disease stage III, she has a current history of atrial fibrillation, she is on anticoagulation therapy daily, she is on multiple heart medications including diltiazem, digoxin, digoxin was checked in the hospital she was not dig toxic.    Objective   Vital Signs:   /54   Pulse 84   Temp 96.4 °F (35.8 °C)   Ht 160 cm (62.99\")   Wt 118 kg (261 lb)   SpO2 94% Comment: 2L 02  BMI 46.25 kg/m²       Wt Readings from Last 3 Encounters:   05/15/24 118 kg (261 lb)   02/16/24 115 kg (254 lb)   11/20/23 115 kg (254 lb)        BP Readings from Last 3 Encounters:   05/15/24 108/54   02/16/24 136/71   11/16/23 134/82       Physical Exam  Vitals reviewed.   Constitutional:       General: She is not in acute distress.     Appearance: Normal appearance. She is well-developed. She is morbidly obese. She is not ill-appearing.   HENT:      Head: Normocephalic and atraumatic.   Eyes:      Conjunctiva/sclera: Conjunctivae normal.      Pupils: Pupils are equal, round, and reactive to light.   Cardiovascular:      Rate and Rhythm: Normal rate. Rhythm irregularly irregular.      Heart sounds: Murmur heard.      Comments: Face and hands obviously swollen on exam  Pulmonary:      Effort: Pulmonary effort is normal.      Breath sounds: Normal breath sounds. No wheezing or rhonchi.   Musculoskeletal:      Right lower leg: 3+ Edema present.      Left lower leg: 3+ Edema present. "   Skin:     General: Skin is warm and dry.   Neurological:      Mental Status: She is alert and oriented to person, place, and time.   Psychiatric:         Mood and Affect: Mood and affect normal.         Behavior: Behavior normal.         Thought Content: Thought content normal.         Judgment: Judgment normal.      Result Review :  {The following data was reviewed by LUCERO Collins on 05/15/2024.  Common labs          10/11/2023    04:41 10/12/2023    08:01 5/15/2024    11:24   Common Labs   Glucose 166  199  292    BUN 15  23  31    Creatinine 0.94  1.16  0.83    Sodium 137  136  140    Potassium 3.3  3.7  5.8    Chloride 90  92  105    Calcium 9.8  9.9  8.4    Albumin 3.8  3.6  3.7    Total Bilirubin 1.3  0.9  0.5    Alkaline Phosphatase 62  56  66    AST (SGOT) 16  18  17    ALT (SGPT) 16  15  25    WBC 6.08  6.32  7.91    Hemoglobin 12.7  13.0  8.5    Hematocrit 41.7  43.0  29.9    Platelets 114  130  80    Hemoglobin A1C   6.20      Data reviewed : Inpatient hospitalization at [facility]      REPORT-ID:[id number]     STUDY:  X-RAY CHEST     REASON FOR EXAM:   Female, 73 years old.  FATIGUE; Pt brought in per ems for weakness and vomiting x 1 week     TECHNIQUE:   Single AP portable view of the chest.     COMPARISON:   9/21/2023   ___________________________________     FINDINGS:     The lungs are clear and expanded.  There is no demonstrated pleural abnormality.     There is moderate cardiac enlargement.   Normal mediastinum and winston.  Normal visualized pulmonary arteries.  Normal visualized aortic arch and descending thoracic aorta.     Normal visualized thoracic spine.  Normal visualized ribs, clavicles, and shoulders.     There is no demonstrated abnormality of the visualized soft tissue structures of the upper abdomen.    --------------------------------------------------------------------------------------------------------------    Impression    1.   Cholelithiasis.  2.  4 mm nonobstructing right renal stone.  3.  Small amount of ascites.  4.  Small right pleural effusion.  5.  Cardiomegaly.    Electronically Signed:  Félix Simms MD  2024/05/06 at 9:26 CDT  Reading Location ID and State: [address] / [place]  Tel [phone], Service support  [phone], Fax [phone]  Narrative      REPORT-ID:[id number]    STUDY:  CT ABDOMEN AND PELVIS WITHOUT CONTRAST    REASON FOR EXAM:   Female, 73 years old.  Abdominal pain, acute, nonlocalized    RADIATION DOSAGE (If Supplied By Facility):  CTDIvol = ( 24.4 ) mGy, DLP = ( 1236.7 ) mGycm    TECHNIQUE:   Transaxial images were obtained from the dome of the diaphragm to the symphysis pubis without oral contrast, and without intravenous contrast.  Sagittal and coronal images were reconstructed.    Individualized dose optimization techniques were used for this CT.    COMPARISON:   12/31/2023  ___________________________________    FINDINGS:  Small right pleural effusion.  Cardiomegaly. Small amount of ascites surrounding the liver.    Normal liver.  There are multiple gallstones.  Normal spleen.  Normal pancreas.    Normal bilateral adrenal glands.    4 mm nonobstructing stone lower pole right kidney. No hydronephrosis, ureteral stone, ureteral dilatation.  Normal left kidney.    Normal visualized stomach.  Normal small intestine.  Normal colon.  The appendix is visualized and appears normal.    There is diffuse atherosclerotic calcification of the abdominal aorta, without a demonstrated aneurysm.  Normal inferior vena cava.  Normal retroperitoneum.    Normal urinary bladder.    Normal abdominal wall.  Mild levoscoliosis lumbar spine with degenerative disc disease.    Current Outpatient Medications on File Prior to Visit   Medication Sig Dispense Refill   • albuterol (ACCUNEB) 1.25 MG/3ML nebulizer solution Take 3 mL by nebulization Every 6 (Six) Hours As Needed for Wheezing or Shortness of Air. 300 mL 12    • albuterol sulfate  (90 Base) MCG/ACT inhaler Inhale 2 puffs Every 4 (Four) Hours As Needed for Wheezing. 8 g 11   • atorvastatin (LIPITOR) 20 MG tablet TAKE 1 TABLET DAILY 90 tablet 3   • budesonide (PULMICORT) 1 MG/2ML nebulizer solution USE 1 AMPULE (2 ML) BY NEBULIZATION DAILY (Patient taking differently: Take 2 mL by nebulization Daily.) 180 mL 3   • cholecalciferol (VITAMIN D3) 25 MCG (1000 UT) tablet Take 1 tablet by mouth Daily.     • digoxin (LANOXIN) 125 MCG tablet TAKE 1 TABLET ON THURSDAYS AND SUNDAYS AND 2 TABLETS THE REST OF THE WEEK 180 tablet 3   • dilTIAZem (TIAZAC) 120 MG 24 hr capsule TAKE 1 CAPSULE DAILY 90 capsule 3   • HYDROcodone-acetaminophen (NORCO)  MG per tablet Take 1 tablet by mouth Every 4 (Four) Hours As Needed for Moderate Pain or Severe Pain. 120 tablet 0   • Insulin Pen Needle (BD Pen Needle [place] 2nd Gen) 32G X 4 MM misc 1 each Daily. 90 each 1   • metoprolol succinate XL (TOPROL-XL) 50 MG 24 hr tablet Take 3 tablets by mouth 2 (Two) Times a Day. 90 tablet 3   • nystatin (MYCOSTATIN) 784447 UNIT/GM powder Apply  topically to the appropriate area as directed 2 (Two) Times a Day. 60 g 1   • O2 (OXYGEN) Inhale 2 L/min Every Night.     • ondansetron ODT (ZOFRAN-ODT) 4 MG disintegrating tablet Place 1 tablet on the tongue Every 8 (Eight) Hours As Needed for Nausea or Vomiting. 60 tablet 1   • potassium chloride (MICRO-K) 10 MEQ CR capsule TAKE 1 CAPSULE DAILY WITH FOOD 90 capsule 3   • pregabalin (LYRICA) 150 MG capsule Take 1 capsule by mouth 3 (Three) Times a Day. 270 capsule 1   • sacubitril-valsartan (Entresto)  MG tablet Take 1 tablet by mouth Take As Directed. Take 1/2 tablet in the morning and 1 tablet at night. 60 tablet 3   • Xarelto 20 MG tablet TAKE 1 TABLET DAILY 90 tablet 3   • furosemide (LASIX) 40 MG tablet Take 1 tablet by mouth Daily for 30 days. 30 tablet 0   • Insulin Lispro, 1 Unit Dial, (HumaLOG KwikPen) 100 UNIT/ML solution pen-injector Per  sliding scale,  -0 units, 140-180 2 units, 181-240 3 units, 241-300 4 units, 301-350 6 units, 351-400 8 units max dose 60 units daily (Patient not taking: Reported on 5/15/2024) 12 mL 2     No current facility-administered medications on file prior to visit.        Assessment and Plan  Diagnoses and all orders for this visit:    1. Hospital discharge follow-up (Primary)    2. Chronic diastolic congestive heart failure  -     proBNP  -     Comprehensive metabolic panel    3. Shortness of breath    4. Neuropathy  -     pregabalin (LYRICA) 150 MG capsule; Take 1 capsule by mouth 2 (Two) Times a Day for 90 days.  Dispense: 90 capsule; Refill: 1    5. Low hemoglobin  -     CBC & Differential  -     Iron Profile  -     Ferritin  -     Cancel: Manual Differential    6. Type 2 diabetes mellitus with hyperglycemia, with long-term current use of insulin  -     Hemoglobin A1c    7. Stage 3a chronic kidney disease    Other orders  -     Insulin Glargine (LANTUS SOLOSTAR) 100 UNIT/ML injection pen; Inject 30 Units under the skin into the appropriate area as directed Daily.  Dispense: 27 mL; Refill: 1  -     spironolactone (ALDACTONE) 50 MG tablet; Take 0.5 tablets by mouth Daily.  Dispense: 90 tablet; Refill: 1  -     SITagliptin (Januvia) 50 MG tablet; Take 1 tablet by mouth Daily.  Dispense: 90 tablet; Refill: 1    Patient was obviously in fluid overload when seen in office.  I did reach out to Dr. Minor cardiology at congestive heart failure clinic, I discussed with him concerns, patient did not want to go back to the emergency room, he recommended to start her on Farxiga and spironolactone , He recommended that she would need further investigation for possible cardiac amyloidosis, and investigation for severity of aortic valve disease to see if it has progressed and would need treatment.  He also recommend to try to reduce her Lyrica to the lowest dose possible because it can also cause congestive heart failure.  He  did advise that he would be happy to see her in the heart failure clinic.  I discussed these findings with patient, patient states she is unable to tolerate Farxiga, she had been on it previously it had caused nasty urinary tract infections and yeast infections patient states she could not tolerate the medication.  She refuses to try Farxiga once again so I will place her on Januvia instead.    Due to patient stopping insulin due to local reaction will send over Lantus and discontinue Toujeo at this time since patient is having reaction to the medication.  Discussed with patient that I want her to start on Lantus 30 units will check A1c today, encourage patient to take all medications if she is having any type of reaction to please call office and let me know.  Patient verbalized understanding.  Will also check iron panel today along with ferritin since it was not checked while patient was in the hospital.    I did discuss with patient strict return precautions, I would like for patient to be seen back in the office in about 6 weeks to see how she is doing.  Patient does see cardiology Dr. Sarabia tomorrow on 5/16/2024.  Patient states she would like to try Dr. Minor as advised before she sees him next congestive heart failure clinic.  Will reassess in 6 weeks.  I spent 56 minutes caring for Mary on this date of service. This time includes time spent by me in the following activities:preparing for the visit, reviewing tests, obtaining and/or reviewing a separately obtained history, performing a medically appropriate examination and/or evaluation , counseling and educating the patient/family/caregiver, ordering medications, tests, or procedures, referring and communicating with other health care professionals , documenting information in the medical record, and independently interpreting results and communicating that information with the patient/family/caregiver  Follow Up   Return in about 6 weeks (around  6/26/2024).  Patient was given instructions and counseling regarding her condition or for health maintenance advice. Please see specific information pulled into the AVS if appropriate.       Part of this note may be electronic transcription/translation of spoken language to printed text using the Dragon dictation system

## 2024-05-16 ENCOUNTER — OFFICE VISIT (OUTPATIENT)
Dept: CARDIOLOGY | Facility: CLINIC | Age: 74
End: 2024-05-16
Payer: MEDICARE

## 2024-05-16 VITALS
DIASTOLIC BLOOD PRESSURE: 82 MMHG | SYSTOLIC BLOOD PRESSURE: 136 MMHG | BODY MASS INDEX: 48.03 KG/M2 | HEART RATE: 91 BPM | HEIGHT: 62 IN | WEIGHT: 261 LBS

## 2024-05-16 VITALS
TEMPERATURE: 96.4 F | HEIGHT: 63 IN | OXYGEN SATURATION: 94 % | WEIGHT: 261 LBS | DIASTOLIC BLOOD PRESSURE: 54 MMHG | HEART RATE: 84 BPM | SYSTOLIC BLOOD PRESSURE: 108 MMHG | BODY MASS INDEX: 46.25 KG/M2

## 2024-05-16 DIAGNOSIS — I10 PRIMARY HYPERTENSION: ICD-10-CM

## 2024-05-16 DIAGNOSIS — I48.20 CHRONIC ATRIAL FIBRILLATION: Primary | ICD-10-CM

## 2024-05-16 DIAGNOSIS — E78.2 MIXED HYPERLIPIDEMIA: ICD-10-CM

## 2024-05-16 DIAGNOSIS — I50.33 ACUTE ON CHRONIC DIASTOLIC CHF (CONGESTIVE HEART FAILURE): ICD-10-CM

## 2024-05-16 RX ORDER — METOPROLOL SUCCINATE 50 MG/1
150 TABLET, EXTENDED RELEASE ORAL 2 TIMES DAILY
Qty: 180 TABLET | Refills: 3 | Status: SHIPPED | OUTPATIENT
Start: 2024-05-16

## 2024-05-16 RX ORDER — SACUBITRIL AND VALSARTAN 97; 103 MG/1; MG/1
1 TABLET, FILM COATED ORAL TAKE AS DIRECTED
Qty: 180 TABLET | Refills: 3 | Status: SHIPPED | OUTPATIENT
Start: 2024-05-16

## 2024-05-16 RX ORDER — TORSEMIDE 100 MG/1
50 TABLET ORAL DAILY
Qty: 45 TABLET | Refills: 3 | Status: SHIPPED | OUTPATIENT
Start: 2024-05-16

## 2024-05-16 RX ORDER — SPIRONOLACTONE 50 MG/1
25 TABLET, FILM COATED ORAL DAILY
Qty: 90 TABLET | Refills: 1 | Status: SHIPPED | OUTPATIENT
Start: 2024-05-16 | End: 2024-05-16

## 2024-05-16 RX ORDER — SPIRONOLACTONE 50 MG/1
25 TABLET, FILM COATED ORAL DAILY
Qty: 30 TABLET | Refills: 1 | Status: SHIPPED | OUTPATIENT
Start: 2024-05-16

## 2024-05-16 NOTE — PROGRESS NOTES
Chief Complaint  Nonrheumatic aortic valve stenosis (6m Follow Up)    Subjective      Patient is here for follow-up on congestive heart failure.  She has chronic diastolic congestive heart failure.  She was recently in the hospital with anemia, status post PRBC transfusion.  She has been having bilateral lower extremity swelling and difficulty breathing since hospital discharge.  She reports to be compliant with her medications, however, she states she did not take her water pill for the last 2 days because she was outside the house and did not want to urinate so much.  She has anterior chest discomfort, mostly with deep breathing.  She has no palpitations, dizziness, presyncope or syncope.    Past Medical History:   Diagnosis Date    Allergic rhinitis     Anxiety     Aortic valve disease 11/28/2021    Fibrocalcific changes of the aortic valve with mild aortic valve stenosis   on echo 3/2/2021  Moderate aortic valve regurgitation is present. Aortic valve maximum pressure gradient is 26 mmHg. Moderate aortic valve stenosis is present.  On echo 2/10/2022       Arthritis     Atrial fibrillation     Chronic kidney disease (CKD), stage III (moderate)     Chronic pain     COPD (chronic obstructive pulmonary disease)     Diabetes mellitus type 2 with complications     Ex-smoker     QUIT SMOKING 2008    Hyperlipidemia     Hypertension     Thrombocytopenia          Current Outpatient Medications:     albuterol (ACCUNEB) 1.25 MG/3ML nebulizer solution, Take 3 mL by nebulization Every 6 (Six) Hours As Needed for Wheezing or Shortness of Air., Disp: 300 mL, Rfl: 12    albuterol sulfate  (90 Base) MCG/ACT inhaler, Inhale 2 puffs Every 4 (Four) Hours As Needed for Wheezing., Disp: 8 g, Rfl: 11    atorvastatin (LIPITOR) 20 MG tablet, TAKE 1 TABLET DAILY, Disp: 90 tablet, Rfl: 3    budesonide (PULMICORT) 1 MG/2ML nebulizer solution, USE 1 AMPULE (2 ML) BY NEBULIZATION DAILY (Patient taking differently: Take 2 mL by  nebulization Daily.), Disp: 180 mL, Rfl: 3    cholecalciferol (VITAMIN D3) 25 MCG (1000 UT) tablet, Take 1 tablet by mouth Daily., Disp: , Rfl:     digoxin (LANOXIN) 125 MCG tablet, TAKE 1 TABLET ON THURSDAYS AND SUNDAYS AND 2 TABLETS THE REST OF THE WEEK, Disp: 180 tablet, Rfl: 3    dilTIAZem (TIAZAC) 120 MG 24 hr capsule, TAKE 1 CAPSULE DAILY, Disp: 90 capsule, Rfl: 3    HYDROcodone-acetaminophen (NORCO)  MG per tablet, Take 1 tablet by mouth Every 4 (Four) Hours As Needed for Moderate Pain or Severe Pain., Disp: 120 tablet, Rfl: 0    Insulin Glargine (LANTUS SOLOSTAR) 100 UNIT/ML injection pen, Inject 30 Units under the skin into the appropriate area as directed Daily., Disp: 27 mL, Rfl: 1    Insulin Pen Needle (BD Pen Needle Katiuska 2nd Gen) 32G X 4 MM misc, 1 each Daily., Disp: 90 each, Rfl: 1    metoprolol succinate XL (TOPROL-XL) 50 MG 24 hr tablet, Take 3 tablets by mouth 2 (Two) Times a Day., Disp: 180 tablet, Rfl: 3    nystatin (MYCOSTATIN) 339099 UNIT/GM powder, Apply  topically to the appropriate area as directed 2 (Two) Times a Day., Disp: 60 g, Rfl: 1    O2 (OXYGEN), Inhale 2 L/min Every Night., Disp: , Rfl:     ondansetron ODT (ZOFRAN-ODT) 4 MG disintegrating tablet, Place 1 tablet on the tongue Every 8 (Eight) Hours As Needed for Nausea or Vomiting., Disp: 60 tablet, Rfl: 1    pregabalin (LYRICA) 150 MG capsule, Take 1 capsule by mouth 3 (Three) Times a Day., Disp: 270 capsule, Rfl: 1    pregabalin (LYRICA) 150 MG capsule, Take 1 capsule by mouth 2 (Two) Times a Day for 90 days., Disp: 90 capsule, Rfl: 1    sacubitril-valsartan (Entresto)  MG tablet, Take 1 tablet by mouth Take As Directed. Take 1/2 tablet in the morning and 1 tablet at night., Disp: 180 tablet, Rfl: 3    SITagliptin (Januvia) 50 MG tablet, Take 1 tablet by mouth Daily., Disp: 90 tablet, Rfl: 1    spironolactone (ALDACTONE) 50 MG tablet, Take 0.5 tablets by mouth Daily., Disp: 30 tablet, Rfl: 1    Xarelto 20 MG tablet, TAKE  "1 TABLET DAILY, Disp: 90 tablet, Rfl: 3    torsemide (DEMADEX) 100 MG tablet, Take 0.5 tablets by mouth Daily., Disp: 45 tablet, Rfl: 3    Medications Discontinued During This Encounter   Medication Reason    Insulin Lispro, 1 Unit Dial, (HumaLOG KwikPen) 100 UNIT/ML solution pen-injector *Therapy completed    spironolactone (ALDACTONE) 50 MG tablet     furosemide (LASIX) 40 MG tablet     potassium chloride (MICRO-K) 10 MEQ CR capsule     sacubitril-valsartan (Entresto)  MG tablet Reorder    metoprolol succinate XL (TOPROL-XL) 50 MG 24 hr tablet Reorder    spironolactone (ALDACTONE) 50 MG tablet      Allergies   Allergen Reactions    Codeine Mental Status Change    Sglt2 Inhibitors Other (See Comments)     Frequent UTI         Social History     Tobacco Use    Smoking status: Former     Current packs/day: 0.00     Average packs/day: 1 pack/day for 37.0 years (37.0 ttl pk-yrs)     Types: Cigarettes     Start date: 4/3/1971     Quit date: 2008     Years since quittin.0     Passive exposure: Never    Smokeless tobacco: Never    Tobacco comments:     FOR 30 YEARS   Vaping Use    Vaping status: Never Used   Substance Use Topics    Alcohol use: Never    Drug use: Never       Family History   Problem Relation Age of Onset    Heart disease Father     Heart disease Other     Heart disease Other     Diabetes Other         Objective     /82   Pulse 91   Ht 157.5 cm (62\")   Wt 118 kg (261 lb)   BMI 47.74 kg/m²       Physical Exam    General Appearance:   no acute distress  Alert and oriented x3  HENT:   lips not cyanotic  Atraumatic  Neck:  No jvd   supple  Respiratory:  no respiratory distress  normal breath sounds  no rales  Cardiovascular:  no S3, no S4   no murmur  no rub  Extremities  No cyanosis  lower extremity edema: 2+  Skin:   warm, dry  No rashes      Result Review :     proBNP   Date Value Ref Range Status   05/15/2024 2,048.0 (H) 0.0 - 900.0 pg/mL Final     CMP          10/11/2023    04:41 " "10/12/2023    08:01 5/15/2024    11:24   CMP   Glucose 166  199  292    BUN 15  23  31    Creatinine 0.94  1.16  0.83    EGFR 65.0  50.5  75.5    Sodium 137  136  140    Potassium 3.3  3.7  5.8    Chloride 90  92  105    Calcium 9.8  9.9  8.4    Total Protein 7.6  7.4  5.7    Albumin 3.8  3.6  3.7    Globulin   2.0    Total Bilirubin 1.3  0.9  0.5    Alkaline Phosphatase 62  56  66    AST (SGOT) 16  18  17    ALT (SGPT) 16  15  25    Albumin/Globulin Ratio   1.9    BUN/Creatinine Ratio 16.0  19.8  37.3    Anion Gap 14.5  13.2  7.0      CBC w/diff          10/11/2023    04:41 10/12/2023    08:01 5/15/2024    11:24   CBC w/Diff   WBC 6.08  6.32  7.91    RBC 4.52  4.71  3.16    Hemoglobin 12.7  13.0  8.5    Hematocrit 41.7  43.0  29.9    MCV 92.3  91.3  94.6    MCH 28.1  27.6  26.9    MCHC 30.5  30.2  28.4    RDW 15.7  15.6  15.8    Platelets 114  130  80    Neutrophil Rel % 68.1  63.7  77.6    Immature Granulocyte Rel % 0.5  0.2  1.5    Lymphocyte Rel % 21.7  24.5  13.5    Monocyte Rel % 7.4  9.3  5.4    Eosinophil Rel % 2.0  1.7  1.5    Basophil Rel % 0.3  0.6  0.5       Lab Results   Component Value Date    TSH 1.520 01/27/2023      Lab Results   Component Value Date    FREET4 1.3 01/20/2021      No results found for: \"DDIMERQUANT\"  Magnesium   Date Value Ref Range Status   10/12/2023 1.6 1.6 - 2.4 mg/dL Final      Digoxin   Date Value Ref Range Status   10/06/2023 0.63 0.60 - 1.20 ng/mL Final      Lab Results   Component Value Date    TROPONINT 21 (H) 10/06/2023          Lab 05/15/24  1124   HEMOGLOBIN A1C 6.20*      Lipid Panel          9/13/2023    14:09   Lipid Panel   Total Cholesterol 101    Triglycerides 308    HDL Cholesterol 31    VLDL Cholesterol 45    LDL Cholesterol  25    LDL/HDL Ratio 0.27      No results found for: \"POCTROP\"    Results for orders placed during the hospital encounter of 10/06/23    Adult Transthoracic Echo Complete W/ Cont if Necessary Per Protocol    Interpretation Summary    Left " ventricular systolic function is normal. Calculated left ventricular EF = 54.4%    Left ventricular diastolic function was indeterminate.    The left atrial cavity is mildly dilated.    Mild aortic valve stenosis is present.    Estimated right ventricular systolic pressure from tricuspid regurgitation is normal (<35 mmHg).    No obvious wall motion abnormalities                 Diagnoses and all orders for this visit:    1. Chronic atrial fibrillation (Primary)    2. Acute on chronic diastolic CHF (congestive heart failure)  -     torsemide (DEMADEX) 100 MG tablet; Take 0.5 tablets by mouth Daily.  Dispense: 45 tablet; Refill: 3  -     Basic Metabolic Panel; Future    3. Primary hypertension    4. Mixed hyperlipidemia    Other orders  -     Discontinue: spironolactone (ALDACTONE) 50 MG tablet; Take 0.5 tablets by mouth Daily.  Dispense: 90 tablet; Refill: 1  -     metoprolol succinate XL (TOPROL-XL) 50 MG 24 hr tablet; Take 3 tablets by mouth 2 (Two) Times a Day.  Dispense: 180 tablet; Refill: 3  -     sacubitril-valsartan (Entresto)  MG tablet; Take 1 tablet by mouth Take As Directed. Take 1/2 tablet in the morning and 1 tablet at night.  Dispense: 180 tablet; Refill: 3  -     spironolactone (ALDACTONE) 50 MG tablet; Take 0.5 tablets by mouth Daily.  Dispense: 30 tablet; Refill: 1      Assessment: Patient with chronic atrial fibrillation, diastolic congestive heart failure, here for evaluation of worsening lower extremity swelling and shortness of breath.  She appears to be in acute on chronic diastolic congestive heart failure, more likely related to IV hydration/blood transfusion during recent hospitalization.  She has 2+ lower extremity swelling on today's exam.  She has some abdominal distention.  I will change furosemide to torsemide 50 mg daily.  She is on potassium chloride which will be discontinued due to elevated potassium on current treatment with Entresto and spironolactone.  I will check BMP in 1  week to follow-up on potassium level and creatinine.  The patient will be started on Farxiga 10 mg daily.  She will be continued on Entresto.  She was advised to go to the hospital if her symptoms do not improve or worsen within the next 1 to 2 days.  She will be seen back in 2 weeks for follow-up.  Continue diltiazem, digoxin, metoprolol and Xarelto.    Follow Up     Return in about 2 weeks (around 5/30/2024) for With Bouchra BARKER.        Patient was given instructions and counseling regarding her condition or for health maintenance advice. Please see specific information pulled into the AVS if appropriate.

## 2024-05-17 ENCOUNTER — TELEPHONE (OUTPATIENT)
Dept: CASE MANAGEMENT | Facility: OTHER | Age: 74
End: 2024-05-17
Payer: MEDICARE

## 2024-05-17 NOTE — PROGRESS NOTES
Called Mary Albarran at  [phone] (M) to really result and PCP guidance, however was unable to reach patient. Unable to LVM asking to please return call. Patient has a VM box that has not been set up yet.

## 2024-05-17 NOTE — TELEPHONE ENCOUNTER
Call placed to patient on home and mobile phone. Unable to leave message on cell, stated VM for cell has not been set up. No answer at home number and unable to leave message.    Will attempt again next week for 3rd time. If no answer or response, case will be closed.     Safia CARDONA Rn  Ambulatory

## 2024-05-17 NOTE — PROGRESS NOTES
Spoke to Mary Albarran to relay results and PCP wesly. Pt verbalized understanding. No further questions/concerns at this time.

## 2024-05-20 ENCOUNTER — PATIENT OUTREACH (OUTPATIENT)
Dept: CASE MANAGEMENT | Facility: OTHER | Age: 74
End: 2024-05-20
Payer: MEDICARE

## 2024-05-20 DIAGNOSIS — I50.9 ACUTE ON CHRONIC HEART FAILURE, UNSPECIFIED HEART FAILURE TYPE: Primary | ICD-10-CM

## 2024-05-20 DIAGNOSIS — G89.29 CHRONIC JOINT PAIN: ICD-10-CM

## 2024-05-20 DIAGNOSIS — M25.50 CHRONIC JOINT PAIN: ICD-10-CM

## 2024-05-20 RX ORDER — ALBUTEROL SULFATE 90 UG/1
2 AEROSOL, METERED RESPIRATORY (INHALATION) EVERY 4 HOURS PRN
Qty: 8 G | Refills: 11 | Status: SHIPPED | OUTPATIENT
Start: 2024-05-20

## 2024-05-20 NOTE — OUTREACH NOTE
AMBULATORY CASE MANAGEMENT NOTE    Names and Relationships of Patient/Support Persons: Caller: Mary Albarran; Relationship: Self -     Patient Outreach    3rd attempt for CCM program. No answer. Phone has VM which hasn't been set up.     Case closed at this time. Will open if future needs arise.       Education Documentation  No documentation found.        Safia KYLE  Ambulatory Case Management    5/20/2024, 13:25 EDT

## 2024-05-20 NOTE — TELEPHONE ENCOUNTER
Caller: AvisMary    Relationship: Self    Best call back number: [phone]    Requested Prescriptions:   Requested Prescriptions     Pending Prescriptions Disp Refills    albuterol sulfate  (90 Base) MCG/ACT inhaler 8 g 11     Sig: Inhale 2 puffs Every 4 (Four) Hours As Needed for Wheezing.    PATIENT CALLED IN [facility] RESCUE INHALER     Pharmacy where request should be sent: [facility] [address] [phone] [facility] [phone]      Last office visit with prescribing clinician: 5/15/2024   Last telemedicine visit with prescribing clinician: Visit date not found   Next office visit with prescribing clinician: 6/26/2024     Additional details provided by patient:     Does the patient have less than a 3 day supply:  [] Yes  [x] No    Would you like a call back once the refill request has been completed: [] Yes [] No    If the office needs to give you a call back, can they leave a voicemail: [] Yes [] No    Steven Zarco Rep   05/20/24 08:42 EDT

## 2024-05-20 NOTE — TELEPHONE ENCOUNTER
Caller: Mary Albarran LAKESHA    Relationship: Self    Best call back number: [phone]     Requested Prescriptions:   Requested Prescriptions     Pending Prescriptions Disp Refills    HYDROcodone-acetaminophen (NORCO)  MG per tablet 120 tablet 0     Sig: Take 1 tablet by mouth Every 4 (Four) Hours As Needed for Moderate Pain or Severe Pain.        Pharmacy where request should be sent: [facility] [address] [phone] [facility] [phone]      Last office visit with prescribing clinician: 5/15/2024   Last telemedicine visit with prescribing clinician: Visit date not found   Next office visit with prescribing clinician: 6/26/2024     Additional details provided by patient:      THE PATIENT STARTED TO CRY AND ADVISED SHE WAS HAVING A LOT OF MUSCLE SPASMS. SHE WAS ASKED IF THE MEDICATION WAS HELPING HER AND SHE SAID IT WAS NOT. HUB ASKED HER IF SHE WANTED TO SPEAK TO CLINICAL AND  SHE SAID SHE WANTED THE MESSAGE TO BE RELAYED. SHE DID NOT WANT  FEEL LIKE SPEAKING    PLEASE ADVISE          Does the patient have less than a 3 day supply:  [x] Yes  [] No    Would you like a call back once the refill request has been completed: [] Yes [] No    If the office needs to give you a call back, can they leave a voicemail: [] Yes [] No    Steven Waller Rep   05/20/24 15:54 EDT

## 2024-05-21 RX ORDER — HYDROCODONE BITARTRATE AND ACETAMINOPHEN 10; 325 MG/1; MG/1
1 TABLET ORAL EVERY 4 HOURS PRN
Qty: 120 TABLET | Refills: 0 | Status: SHIPPED | OUTPATIENT
Start: 2024-05-21

## 2024-06-06 ENCOUNTER — LAB (OUTPATIENT)
Dept: LAB | Facility: HOSPITAL | Age: 74
End: 2024-06-06
Payer: MEDICARE

## 2024-06-06 ENCOUNTER — OFFICE VISIT (OUTPATIENT)
Dept: CARDIOLOGY | Facility: CLINIC | Age: 74
End: 2024-06-06
Payer: MEDICARE

## 2024-06-06 VITALS
HEART RATE: 70 BPM | HEIGHT: 62 IN | SYSTOLIC BLOOD PRESSURE: 96 MMHG | DIASTOLIC BLOOD PRESSURE: 52 MMHG | WEIGHT: 253 LBS | BODY MASS INDEX: 46.56 KG/M2

## 2024-06-06 DIAGNOSIS — E78.2 MIXED HYPERLIPIDEMIA: ICD-10-CM

## 2024-06-06 DIAGNOSIS — R12 HEARTBURN: ICD-10-CM

## 2024-06-06 DIAGNOSIS — I50.33 ACUTE ON CHRONIC DIASTOLIC CHF (CONGESTIVE HEART FAILURE): ICD-10-CM

## 2024-06-06 DIAGNOSIS — I50.32 CHRONIC DIASTOLIC CONGESTIVE HEART FAILURE: ICD-10-CM

## 2024-06-06 DIAGNOSIS — I48.20 CHRONIC ATRIAL FIBRILLATION: Primary | ICD-10-CM

## 2024-06-06 DIAGNOSIS — I10 PRIMARY HYPERTENSION: ICD-10-CM

## 2024-06-06 DIAGNOSIS — I35.0 NONRHEUMATIC AORTIC VALVE STENOSIS: ICD-10-CM

## 2024-06-06 LAB
ANION GAP SERPL CALCULATED.3IONS-SCNC: 11 MMOL/L (ref 5–15)
BUN SERPL-MCNC: 21 MG/DL (ref 8–23)
BUN/CREAT SERPL: 18.8 (ref 7–25)
CALCIUM SPEC-SCNC: 9.4 MG/DL (ref 8.6–10.5)
CHLORIDE SERPL-SCNC: 98 MMOL/L (ref 98–107)
CO2 SERPL-SCNC: 32 MMOL/L (ref 22–29)
CREAT SERPL-MCNC: 1.12 MG/DL (ref 0.57–1)
EGFRCR SERPLBLD CKD-EPI 2021: 52.7 ML/MIN/1.73
GLUCOSE SERPL-MCNC: 162 MG/DL (ref 65–99)
POTASSIUM SERPL-SCNC: 4.7 MMOL/L (ref 3.5–5.2)
SODIUM SERPL-SCNC: 141 MMOL/L (ref 136–145)

## 2024-06-06 PROCEDURE — 80048 BASIC METABOLIC PNL TOTAL CA: CPT

## 2024-06-06 PROCEDURE — 36415 COLL VENOUS BLD VENIPUNCTURE: CPT

## 2024-06-06 RX ORDER — CEPHALEXIN 250 MG/1
250 CAPSULE ORAL NIGHTLY
COMMUNITY
Start: 2024-05-23

## 2024-06-06 RX ORDER — PANTOPRAZOLE SODIUM 20 MG/1
20 TABLET, DELAYED RELEASE ORAL DAILY
Qty: 30 TABLET | Refills: 3 | Status: SHIPPED | OUTPATIENT
Start: 2024-06-06

## 2024-06-06 NOTE — PROGRESS NOTES
Chief Complaint  Hypertension, Hyperlipidemia, and Follow-up (R/S'd 2 week f/u. BP has been low.  She has not been taking Diltiazem for the past few days.  Edema is better and pt says she feels better, more energy.  Pt is having heart burn/acid reflux.  )    Subjective        History of Present Illness  Mary Albarran presents to [facility] for follow up.   History of Present Illness  Patient is a 73-year-old female with past medical history outlined below, significant for atrial fibrillation, CKD sign, COPD, type 2 diabetes, hypertension, hyperlipidemia, chronic diastolic congestive heart failure who presents for follow-up.  She presents for follow-up on medication changes.  She was seen by Dr. Lerner recently and had symptoms suggestive of acute on chronic diastolic congestive heart failure after recent hospitalization where she received blood transfusions for anemia.  She reports she is feeling much better.  She noticed significant improvement in her edema and has lost 21 pounds since being switched to torsemide.  She denies chest pain but endorses severe acid reflux.  She has improvement in her dyspnea.  She notes no orthopnea.  She has no dizziness, lightheadedness or syncope.      Past Medical History:   Diagnosis Date    Allergic rhinitis     Anxiety     Aortic valve disease 11/28/2021    Fibrocalcific changes of the aortic valve with mild aortic valve stenosis   on echo 3/2/2021  Moderate aortic valve regurgitation is present. Aortic valve maximum pressure gradient is 26 mmHg. Moderate aortic valve stenosis is present.  On echo 2/10/2022       Arthritis     Atrial fibrillation     Chronic kidney disease (CKD), stage III (moderate)     Chronic pain     COPD (chronic obstructive pulmonary disease)     Diabetes mellitus type 2 with complications     Ex-smoker     QUIT SMOKING 2008    Hyperlipidemia     Hypertension     Thrombocytopenia        ALLERGY  Allergies   Allergen  Reactions    Codeine Mental Status Change    Sglt2 Inhibitors Other (See Comments)     Frequent UTI         Past Surgical History:   Procedure Laterality Date    HYSTERECTOMY      30 YEARS AGO        Social History     Socioeconomic History    Marital status:    Tobacco Use    Smoking status: Former     Current packs/day: 0.00     Average packs/day: 1 pack/day for 37.0 years (37.0 ttl pk-yrs)     Types: Cigarettes     Start date: 4/3/1971     Quit date: 2008     Years since quittin.1     Passive exposure: Never    Smokeless tobacco: Never    Tobacco comments:     FOR 30 YEARS   Vaping Use    Vaping status: Never Used   Substance and Sexual Activity    Alcohol use: Never    Drug use: Never    Sexual activity: Defer       Family History   Problem Relation Age of Onset    Heart disease Father     Heart disease Other     Heart disease Other     Diabetes Other         Current Outpatient Medications on File Prior to Visit   Medication Sig    albuterol (ACCUNEB) 1.25 MG/3ML nebulizer solution Take 3 mL by nebulization Every 6 (Six) Hours As Needed for Wheezing or Shortness of Air.    albuterol sulfate  (90 Base) MCG/ACT inhaler Inhale 2 puffs Every 4 (Four) Hours As Needed for Wheezing.    atorvastatin (LIPITOR) 20 MG tablet TAKE 1 TABLET DAILY    budesonide (PULMICORT) 1 MG/2ML nebulizer solution USE 1 AMPULE (2 ML) BY NEBULIZATION DAILY (Patient taking differently: Take 2 mL by nebulization Daily.)    cephalexin (KEFLEX) 250 MG capsule Take 1 capsule by mouth Every Night.    cholecalciferol (VITAMIN D3) 25 MCG (1000 UT) tablet Take 1 tablet by mouth Daily.    digoxin (LANOXIN) 125 MCG tablet TAKE 1 TABLET ON  AND SUNDAYS AND 2 TABLETS THE REST OF THE WEEK    HYDROcodone-acetaminophen (NORCO)  MG per tablet Take 1 tablet by mouth Every 4 (Four) Hours As Needed for Moderate Pain or Severe Pain.    Insulin Glargine (LANTUS SOLOSTAR) 100 UNIT/ML injection pen Inject 30 Units under the  "skin into the appropriate area as directed Daily.    Insulin Pen Needle (BD Pen Needle Katiuska 2nd Gen) 32G X 4 MM misc 1 each Daily.    metoprolol succinate XL (TOPROL-XL) 50 MG 24 hr tablet Take 3 tablets by mouth 2 (Two) Times a Day.    nystatin (MYCOSTATIN) 882228 UNIT/GM powder Apply  topically to the appropriate area as directed 2 (Two) Times a Day.    O2 (OXYGEN) Inhale 2 L/min Every Night.    ondansetron ODT (ZOFRAN-ODT) 4 MG disintegrating tablet Place 1 tablet on the tongue Every 8 (Eight) Hours As Needed for Nausea or Vomiting.    pregabalin (LYRICA) 150 MG capsule Take 1 capsule by mouth 2 (Two) Times a Day for 90 days.    sacubitril-valsartan (Entresto)  MG tablet Take 1 tablet by mouth Take As Directed. Take 1/2 tablet in the morning and 1 tablet at night.    SITagliptin (Januvia) 50 MG tablet Take 1 tablet by mouth Daily.    torsemide (DEMADEX) 100 MG tablet Take 0.5 tablets by mouth Daily.    Xarelto 20 MG tablet TAKE 1 TABLET DAILY    [DISCONTINUED] spironolactone (ALDACTONE) 50 MG tablet Take 0.5 tablets by mouth Daily.    [DISCONTINUED] dilTIAZem (TIAZAC) 120 MG 24 hr capsule TAKE 1 CAPSULE DAILY (Patient not taking: Reported on 6/6/2024)    [DISCONTINUED] pregabalin (LYRICA) 150 MG capsule Take 1 capsule by mouth 3 (Three) Times a Day. (Patient not taking: Reported on 6/6/2024)     No current facility-administered medications on file prior to visit.       Objective   Vitals:    06/06/24 1016   BP: 96/52   Pulse: 70   Weight: 115 kg (253 lb)   Height: 157.5 cm (62\")       Physical Exam  Constitutional:       General: She is awake. She is not in acute distress.     Appearance: Normal appearance.   HENT:      Head: Normocephalic.      Nose: Nose normal. No congestion.   Eyes:      Extraocular Movements: Extraocular movements intact.      Conjunctiva/sclera: Conjunctivae normal.      Pupils: Pupils are equal, round, and reactive to light.   Neck:      Thyroid: No thyromegaly.      Vascular: No " JVD.   Cardiovascular:      Rate and Rhythm: Normal rate and regular rhythm.      Chest Wall: PMI is not displaced.      Pulses: Normal pulses.      Heart sounds: Normal heart sounds, S1 normal and S2 normal. No murmur heard.     No friction rub. No gallop. No S3 or S4 sounds.   Pulmonary:      Effort: Pulmonary effort is normal.      Breath sounds: Normal breath sounds. No wheezing, rhonchi or rales.   Abdominal:      General: Bowel sounds are normal.      Palpations: Abdomen is soft.      Tenderness: There is no abdominal tenderness.   Musculoskeletal:      Cervical back: No tenderness.      Right lower leg: No edema.      Left lower leg: No edema.   Lymphadenopathy:      Cervical: No cervical adenopathy.   Skin:     General: Skin is warm and dry.      Capillary Refill: Capillary refill takes less than 2 seconds.      Coloration: Skin is not cyanotic.      Findings: No petechiae or rash.      Nails: There is no clubbing.   Neurological:      Mental Status: She is alert.   Psychiatric:         Mood and Affect: Mood normal.         Behavior: Behavior is cooperative.           Result Review     The following data was reviewed by LUCERO Albarado on 06/06/24.      CMP          10/11/2023    04:41 10/12/2023    08:01 5/15/2024    11:24   CMP   Glucose 166  199  292    BUN 15  23  31    Creatinine 0.94  1.16  0.83    EGFR 65.0  50.5  75.5    Sodium 137  136  140    Potassium 3.3  3.7  5.8    Chloride 90  92  105    Calcium 9.8  9.9  8.4    Total Protein 7.6  7.4  5.7    Albumin 3.8  3.6  3.7    Globulin   2.0    Total Bilirubin 1.3  0.9  0.5    Alkaline Phosphatase 62  56  66    AST (SGOT) 16  18  17    ALT (SGPT) 16  15  25    Albumin/Globulin Ratio   1.9    BUN/Creatinine Ratio 16.0  19.8  37.3    Anion Gap 14.5  13.2  7.0      CBC w/diff          10/11/2023    04:41 10/12/2023    08:01 5/15/2024    11:24   CBC w/Diff   WBC 6.08  6.32  7.91    RBC 4.52  4.71  3.16    Hemoglobin 12.7  13.0  8.5    Hematocrit  41.7  43.0  29.9    MCV 92.3  91.3  94.6    MCH 28.1  27.6  26.9    MCHC 30.5  30.2  28.4    RDW 15.7  15.6  15.8    Platelets 114  130  80    Neutrophil Rel % 68.1  63.7  77.6    Immature Granulocyte Rel % 0.5  0.2  1.5    Lymphocyte Rel % 21.7  24.5  13.5    Monocyte Rel % 7.4  9.3  5.4    Eosinophil Rel % 2.0  1.7  1.5    Basophil Rel % 0.3  0.6  0.5       Lipid Panel          9/13/2023    14:09   Lipid Panel   Total Cholesterol 101    Triglycerides 308    HDL Cholesterol 31    VLDL Cholesterol 45    LDL Cholesterol  25    LDL/HDL Ratio 0.27        Results for orders placed during the hospital encounter of 10/06/23    Adult Transthoracic Echo Complete W/ Cont if Necessary Per Protocol    Interpretation Summary    Left ventricular systolic function is normal. Calculated left ventricular EF = 54.4%    Left ventricular diastolic function was indeterminate.    The left atrial cavity is mildly dilated.    Mild aortic valve stenosis is present.    Estimated right ventricular systolic pressure from tricuspid regurgitation is normal (<35 mmHg).    No obvious wall motion abnormalities      No results found for this or any previous visit.          Procedures    Assessment & Plan  Diagnoses and all orders for this visit:    1. Chronic atrial fibrillation (Primary)    2. Primary hypertension    3. Mixed hyperlipidemia    4. Nonrheumatic aortic valve stenosis    5. Chronic diastolic congestive heart failure  -     Basic Metabolic Panel; Future    Other orders  -     pantoprazole (Protonix) 20 MG EC tablet; Take 1 tablet by mouth Daily.  Dispense: 30 tablet; Refill: 3      Assessment & Plan      1.  Rate controlled with the metoprolo and digoxin which will be continued l.  She is not taking diltiazem due to low blood pressures.  Continue Xarelto for stroke risk reduction.    2.  Overall low side today.  She was previously discontinued on spironolactone but reports she is not sure if she is taking it.  She was advised not to  take this medication.  BMP is pending to follow-up on hyperkalemia.    3.  Continue statin therapy.    4.  Mild aortic valve stenosis by most recent echocardiogram.  Continue clinical monitoring.    5.  She is euvolemic and well compensated on exam.  Doing much better since previous office visit.  Continue the torsemide.  Most recent BMP was reviewed and demonstrated good kidney function.    6.  She will be started on Protonix 20 mg daily for complaints of heartburn.  She was advised to follow-up with her PCP in regards to this.    The medical services provided during this encounter are part of ongoing care related to this patient's single serious condition or complex condition.    Follow Up   Return in about 3 months (around 9/6/2024) for With LUCERO Lopez.    Patient was given instructions and counseling regarding her condition or for health maintenance advice. Please see specific information pulled into the AVS if appropriate.         LUCERO Albarado  06/06/24  10:19 EDT    Dictated Utilizing Dragon Dictation

## 2024-06-17 DIAGNOSIS — M25.50 CHRONIC JOINT PAIN: ICD-10-CM

## 2024-06-17 DIAGNOSIS — G89.29 CHRONIC JOINT PAIN: ICD-10-CM

## 2024-06-17 RX ORDER — HYDROCODONE BITARTRATE AND ACETAMINOPHEN 10; 325 MG/1; MG/1
1 TABLET ORAL EVERY 4 HOURS PRN
Qty: 120 TABLET | Refills: 0 | Status: SHIPPED | OUTPATIENT
Start: 2024-06-17

## 2024-06-17 NOTE — TELEPHONE ENCOUNTER
Caller: Mary Albarran LAKESHA    Relationship: Self    Best call back number: [phone]    Requested Prescriptions:   Requested Prescriptions     Pending Prescriptions Disp Refills    HYDROcodone-acetaminophen (NORCO)  MG per tablet 120 tablet 0     Sig: Take 1 tablet by mouth Every 4 (Four) Hours As Needed for Moderate Pain or Severe Pain.        Pharmacy where request should be sent: "[facility]" [address] [phone] [facility] [phone]      Last office visit with prescribing clinician: 5/15/2024   Last telemedicine visit with prescribing clinician: Visit date not found   Next office visit with prescribing clinician: 6/26/2024       Does the patient have less than a 3 day supply:  [] Yes  [x] No    Would you like a call back once the refill request has been completed: [] Yes [x] No    If the office needs to give you a call back, can they leave a voicemail: [] Yes [x] No    Steven Suero Rep   06/17/24 13:04 EDT

## 2024-06-26 ENCOUNTER — OFFICE VISIT (OUTPATIENT)
Dept: FAMILY MEDICINE CLINIC | Facility: CLINIC | Age: 74
End: 2024-06-26
Payer: MEDICARE

## 2024-06-26 VITALS
HEART RATE: 71 BPM | OXYGEN SATURATION: 90 % | WEIGHT: 257 LBS | HEIGHT: 63 IN | BODY MASS INDEX: 45.54 KG/M2 | SYSTOLIC BLOOD PRESSURE: 128 MMHG | TEMPERATURE: 97.7 F | DIASTOLIC BLOOD PRESSURE: 70 MMHG

## 2024-06-26 DIAGNOSIS — S81.802A OPEN WOUND OF LEFT LOWER LEG, INITIAL ENCOUNTER: ICD-10-CM

## 2024-06-26 DIAGNOSIS — R06.02 SHORTNESS OF BREATH: ICD-10-CM

## 2024-06-26 DIAGNOSIS — Z09 FOLLOW-UP EXAM: Primary | ICD-10-CM

## 2024-06-26 DIAGNOSIS — Z79.4 TYPE 2 DIABETES MELLITUS WITH OTHER SKIN COMPLICATION, WITH LONG-TERM CURRENT USE OF INSULIN: ICD-10-CM

## 2024-06-26 DIAGNOSIS — I50.32 CHRONIC DIASTOLIC CONGESTIVE HEART FAILURE: ICD-10-CM
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
## 2024-06-26 DIAGNOSIS — D64.9 LOW HEMOGLOBIN: ICD-10-CM
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
## 2024-06-26 DIAGNOSIS — E11.628 TYPE 2 DIABETES MELLITUS WITH OTHER SKIN COMPLICATION, WITH LONG-TERM CURRENT USE OF INSULIN: ICD-10-CM

## 2024-06-26 LAB — GLUCOSE BLDC GLUCOMTR-MCNC: 259 MG/DL (ref 70–130)

## 2024-06-26 PROCEDURE — 3074F SYST BP LT 130 MM HG: CPT | Performed by: NURSE PRACTITIONER

## 2024-06-26 PROCEDURE — 87205 SMEAR GRAM STAIN: CPT | Performed by: NURSE PRACTITIONER

## 2024-06-26 PROCEDURE — 1160F RVW MEDS BY RX/DR IN RCRD: CPT | Performed by: NURSE PRACTITIONER

## 2024-06-26 PROCEDURE — 82948 REAGENT STRIP/BLOOD GLUCOSE: CPT | Performed by: NURSE PRACTITIONER

## 2024-06-26 PROCEDURE — 99214 OFFICE O/P EST MOD 30 MIN: CPT | Performed by: NURSE PRACTITIONER

## 2024-06-26 PROCEDURE — 3044F HG A1C LEVEL LT 7.0%: CPT | Performed by: NURSE PRACTITIONER

## 2024-06-26 PROCEDURE — 1125F AMNT PAIN NOTED PAIN PRSNT: CPT | Performed by: NURSE PRACTITIONER

## 2024-06-26 PROCEDURE — 87070 CULTURE OTHR SPECIMN AEROBIC: CPT | Performed by: NURSE PRACTITIONER

## 2024-06-26 PROCEDURE — 3078F DIAST BP <80 MM HG: CPT | Performed by: NURSE PRACTITIONER

## 2024-06-26 PROCEDURE — 1159F MED LIST DOCD IN RCRD: CPT | Performed by: NURSE PRACTITIONER

## 2024-06-26 RX ORDER — CLINDAMYCIN HYDROCHLORIDE 300 MG/1
300 CAPSULE ORAL 2 TIMES DAILY
Qty: 20 CAPSULE | Refills: 0 | Status: SHIPPED | OUTPATIENT
Start: 2024-06-26

## 2024-06-26 NOTE — PROGRESS NOTES
Chief Complaint  Edema (Better per pt ), Fall (Was on side of bed and slipped out of bed , has hurt leg ), and Dizziness    Subjective        Medical History: has a past medical history of Allergic rhinitis, Anxiety, Aortic valve disease (11/28/2021), Arthritis, Atrial fibrillation, Chronic kidney disease (CKD), stage III (moderate), Chronic pain, COPD (chronic obstructive pulmonary disease), Diabetes mellitus type 2 with complications, Ex-smoker, Hyperlipidemia, Hypertension, and Thrombocytopenia.     Surgical History: has a past surgical history that includes Hysterectomy.     Family History: family history includes Diabetes in an other family member; Heart disease in her father and other family members.     Social History: reports that she quit smoking about 16 years ago. Her smoking use included cigarettes. She started smoking about 53 years ago. She has a 37 pack-year smoking history. She has never been exposed to tobacco smoke. She has never used smokeless tobacco. She reports that she does not drink alcohol and does not use drugs.    Mary Albarran presents to [facility]  History of Present Illness  Office visit 6 weeks ago patient came in for  Congestive Heart Failure  Patient was recently discharged from [facility] for anemia.  She comes in today for follow-up.  Patient states she went to the emergency room due to having abdominal pain, she states that she was able to correlate abdominal pain with her insulin injections.  Patient was getting localized reactions secondary to her insulin causing knot, redness, and bruising at injection site.  Patient states she was also experiencing excruciating abdominal pain after she would give herself the injection.  Patient has stopped her insulin injections at this time.  6 weeks ago assessment and plan 6 weeks ago  Patient was obviously in fluid overload when seen in office.  I did reach out to Dr. Minor cardiology at  congestive heart failure clinic, I discussed with him concerns, patient did not want to go back to the emergency room, he recommended to start her on Farxiga and spironolactone , He recommended that she would need further investigation for possible cardiac amyloidosis, and investigation for severity of aortic valve disease to see if it has progressed and would need treatment.  He also recommend to try to reduce her Lyrica to the lowest dose possible because it can also cause congestive heart failure.  He did advise that he would be happy to see her in the heart failure clinic.  I discussed these findings with patient, patient states she is unable to tolerate Farxiga, she had been on it previously it had caused nasty urinary tract infections and yeast infections patient states she could not tolerate the medication.  She refuses to try Farxiga once again so I will place her on Januvia instead. I did discuss with patient strict return precautions, I would like for patient to be seen back in the office in about 6 weeks to see how she is doing. Patient does see cardiology Dr. Sarabia tomorrow on 5/16/2024. Patient states she would like to try Dr. Minor as advised before she sees him next congestive heart failure clinic. Will reassess in 6 weeks.   Wound on Left leg  Patient states she was sitting on the side of the bed about a week ago when she fell asleep and fell off the bed.  She hit her left shin causing an open  wound.  She has been dressing it at home, states that it is improving, she states the swelling has improved, and overall the wound does seem to be healing.  She is limited activity due to the pain of the leg secondary to the wound of the left leg.  Denies any fever or chills patient did not go to the emergency room when the wound occurred.  She is currently on a blood thinner.  She denies hitting her head or any other point of impact.    Patient states today she has had some dizziness, blood sugar in office  "was 259, blood pressure stable at 128/70 with a heart rate of 71.  Patient has been diuresed, last labs were checked 6/6/2024 and potassium electrolytes were within normal limits.  Patient states overall with the fluid overload she is feeling better, and was up moving better and had more strength until her fall.        Objective   Vital Signs:   /70   Pulse 71   Temp 97.7 °F (36.5 °C)   Ht 160 cm (63\")   Wt 117 kg (257 lb)   SpO2 90%   BMI 45.53 kg/m²       Wt Readings from Last 3 Encounters:   06/26/24 117 kg (257 lb)   06/06/24 115 kg (253 lb)   05/16/24 118 kg (261 lb)        BP Readings from Last 3 Encounters:   06/26/24 128/70   06/06/24 96/52   05/16/24 136/82                 Physical Exam  Vitals reviewed.   Constitutional:       Appearance: Normal appearance. She is well-developed. She is obese.   HENT:      Head: Normocephalic and atraumatic.   Eyes:      Conjunctiva/sclera: Conjunctivae normal.      Pupils: Pupils are equal, round, and reactive to light.   Cardiovascular:      Rate and Rhythm: Normal rate and regular rhythm.      Heart sounds: No murmur heard.  Pulmonary:      Effort: Pulmonary effort is normal.      Breath sounds: Normal breath sounds. No wheezing.   Abdominal:      Palpations: Abdomen is soft.   Skin:     General: Skin is warm and dry.      Comments: Open wound left leg just distal to the knee, some localized swelling around the wound with mild erythema, painful with palpation    Neurological:      Mental Status: She is alert and oriented to person, place, and time.   Psychiatric:         Mood and Affect: Mood and affect normal.         Behavior: Behavior normal.         Thought Content: Thought content normal.         Judgment: Judgment normal.     Open wound to left leg     Result Review :  {The following data was reviewed by LUCERO Collins on 06/26/2024.  Common labs          10/12/2023    08:01 5/15/2024    11:24 6/6/2024    12:17   Common Labs   Glucose 199  " 292  162    BUN 23  31  21    Creatinine 1.16  0.83  1.12    Sodium 136  140  141    Potassium 3.7  5.8  4.7    Chloride 92  105  98    Calcium 9.9  8.4  9.4    Albumin 3.6  3.7     Total Bilirubin 0.9  0.5     Alkaline Phosphatase 56  66     AST (SGOT) 18  17     ALT (SGPT) 15  25     WBC 6.32  7.91     Hemoglobin 13.0  8.5     Hematocrit 43.0  29.9     Platelets 130  80     Hemoglobin A1C  6.20       Data reviewed : Recent cardiology note on 6/6/2024 patient did see cardiology she was switched from Lasix to Turosimide-         Contains abnormal data POCT Glucose  Order: [id number]  Status: Final result       Visible to patient: No (scheduled for 6/26/2024  2:15 PM)       Next appt: 09/06/2024 at 10:30 AM in Cardiology (LUCERO Albarado)       Dx: Low hemoglobin; Open wound of left lo...    Specimen Information: Blood   0 Result Notes            Component  Ref Range & Units 13:15  (6/26/24) 2 wk ago  (6/6/24) 1 mo ago  (5/15/24) 5 mo ago  (1/5/24) 5 mo ago  (1/5/24) 5 mo ago  (1/5/24) 5 mo ago  (1/4/24)   Glucose  70 - 130 mg/dL 259 Abnormal  162 High  R 292 High  R 155 High  R 179 High  R 170 High  R 163 High            Current Outpatient Medications on File Prior to Visit   Medication Sig Dispense Refill    albuterol (ACCUNEB) 1.25 MG/3ML nebulizer solution Take 3 mL by nebulization Every 6 (Six) Hours As Needed for Wheezing or Shortness of Air. 300 mL 12    albuterol sulfate  (90 Base) MCG/ACT inhaler Inhale 2 puffs Every 4 (Four) Hours As Needed for Wheezing. 8 g 11    atorvastatin (LIPITOR) 20 MG tablet TAKE 1 TABLET DAILY 90 tablet 3    budesonide (PULMICORT) 1 MG/2ML nebulizer solution USE 1 AMPULE (2 ML) BY NEBULIZATION DAILY (Patient taking differently: Take 2 mL by nebulization Daily.) 180 mL 3    cephalexin (KEFLEX) 250 MG capsule Take 1 capsule by mouth Every Night.      cholecalciferol (VITAMIN D3) 25 MCG (1000 UT) tablet Take 1 tablet by mouth Daily.      digoxin (LANOXIN) 125 MCG tablet  TAKE 1 TABLET ON THURSDAYS AND SUNDAYS AND 2 TABLETS THE REST OF THE WEEK 180 tablet 3    HYDROcodone-acetaminophen (NORCO)  MG per tablet Take 1 tablet by mouth Every 4 (Four) Hours As Needed for Moderate Pain or Severe Pain. 120 tablet 0    Insulin Glargine (LANTUS SOLOSTAR) 100 UNIT/ML injection pen Inject 30 Units under the skin into the appropriate area as directed Daily. 27 mL 1    Insulin Pen Needle (BD Pen Needle Katiuska 2nd Gen) 32G X 4 MM misc 1 each Daily. 90 each 1    metoprolol succinate XL (TOPROL-XL) 50 MG 24 hr tablet Take 3 tablets by mouth 2 (Two) Times a Day. 180 tablet 3    nystatin (MYCOSTATIN) 760837 UNIT/GM powder Apply  topically to the appropriate area as directed 2 (Two) Times a Day. 60 g 1    O2 (OXYGEN) Inhale 2 L/min Every Night.      ondansetron ODT (ZOFRAN-ODT) 4 MG disintegrating tablet Place 1 tablet on the tongue Every 8 (Eight) Hours As Needed for Nausea or Vomiting. 60 tablet 1    pantoprazole (Protonix) 20 MG EC tablet Take 1 tablet by mouth Daily. 30 tablet 3    pregabalin (LYRICA) 150 MG capsule Take 1 capsule by mouth 2 (Two) Times a Day for 90 days. 90 capsule 1    sacubitril-valsartan (Entresto)  MG tablet Take 1 tablet by mouth Take As Directed. Take 1/2 tablet in the morning and 1 tablet at night. 180 tablet 3    SITagliptin (Januvia) 50 MG tablet Take 1 tablet by mouth Daily. 90 tablet 1    torsemide (DEMADEX) 100 MG tablet Take 0.5 tablets by mouth Daily. 45 tablet 3    Xarelto 20 MG tablet TAKE 1 TABLET DAILY 90 tablet 3     No current facility-administered medications on file prior to visit.        Assessment and Plan  Diagnoses and all orders for this visit:    1. Follow-up exam (Primary)    2. Chronic diastolic congestive heart failure    3. Shortness of breath    4. Low hemoglobin  -     POCT Glucose    5. Open wound of left lower leg, initial encounter  -     Wound Culture - Wound, Leg, Left  -     POCT Glucose  -     XR Tibia Fibula 2 View Left;  Future    6. Fall in home, initial encounter  -     Wound Culture - Wound, Leg, Left  -     POCT Glucose  -     XR Tibia Fibula 2 View Left; Future    7. Type 2 diabetes mellitus with other skin complication, with long-term current use of insulin  -     POCT Glucose    Other orders  -     silver sulfadiazine (Silvadene) 1 % cream; Apply 1 Application topically to the appropriate area as directed 2 (Two) Times a Day.  Dispense: 400 g; Refill: 1  -     clindamycin (Cleocin) 300 MG capsule; Take 1 capsule by mouth 2 (Two) Times a Day.  Dispense: 20 capsule; Refill: 0    Patient is feeling much better, recently seen cardiology for reevaluation.  She was complaining of some heartburn so she was started on Protonix.  Patient stopped the spironolactone and was started on Turosemide per cardiology with a positive outcome.    For the wound it was cultured in office, patient started on antibiotics twice daily for the next 10 days until culture returns, wound cleaned, nonadhering Telfa pad placed on wound with Bactroban and Ace wrap.  Will send a prescription over to pharmacy for Silvadene to apply to wound daily.  Discussed with patient if wound does not seem to be healing, worsening in condition to please call office, if able to try to take a picture and attach to message on [employer], due to the distance of where patient lives she does have difficulty making it into the office.  Patient states she will watch for signs and symptoms of infection, she is agreeable with current treatment plan.    Follow Up   No follow-ups on file.  Patient was given instructions and counseling regarding her condition or for health maintenance advice. Please see specific information pulled into the AVS if appropriate.       Part of this note may be electronic transcription/translation of spoken language to printed text using the Dragon dictation system

## 2024-06-28 LAB
BACTERIA SPEC AEROBE CULT: ABNORMAL
BACTERIA SPEC AEROBE CULT: ABNORMAL
GRAM STN SPEC: ABNORMAL

## 2024-07-01 RX ORDER — FLUCONAZOLE 100 MG/1
100 TABLET ORAL DAILY
Qty: 5 TABLET | Refills: 0 | Status: SHIPPED | OUTPATIENT
Start: 2024-07-01

## 2024-07-15 DIAGNOSIS — M25.50 CHRONIC JOINT PAIN: ICD-10-CM

## 2024-07-15 DIAGNOSIS — G89.29 CHRONIC JOINT PAIN: ICD-10-CM

## 2024-07-15 RX ORDER — HYDROCODONE BITARTRATE AND ACETAMINOPHEN 10; 325 MG/1; MG/1
1 TABLET ORAL EVERY 4 HOURS PRN
Qty: 120 TABLET | Refills: 0 | Status: SHIPPED | OUTPATIENT
Start: 2024-07-15

## 2024-07-15 NOTE — TELEPHONE ENCOUNTER
Caller: Mary Albarran LAKESHA    Relationship: Self    Best call back number: [phone]    Requested Prescriptions:   Requested Prescriptions     Pending Prescriptions Disp Refills    HYDROcodone-acetaminophen (NORCO)  MG per tablet 120 tablet 0     Sig: Take 1 tablet by mouth Every 4 (Four) Hours As Needed for Moderate Pain or Severe Pain.        Pharmacy where request should be sent: [facility] [address] [phone] [facility] [phone]      Last office visit with prescribing clinician: 6/26/2024   Last telemedicine visit with prescribing clinician: Visit date not found   Next office visit with prescribing clinician: Visit date not found     Additional details provided by patient:     Does the patient have less than a 3 day supply:  [] Yes  [x] No    Would you like a call back once the refill request has been completed: [] Yes [] No    If the office needs to give you a call back, can they leave a voicemail: [] Yes [] No    Steven Zarco Rep   07/15/24 11:37 EDT

## 2024-07-23 ENCOUNTER — TELEPHONE (OUTPATIENT)
Dept: FAMILY MEDICINE CLINIC | Facility: CLINIC | Age: 74
End: 2024-07-23
Payer: MEDICARE

## 2024-07-23 NOTE — TELEPHONE ENCOUNTER
Caller: Mary Albarran    Relationship: Self    Best call back number: [phone]     What medication are you requesting: Meclizine    What are your current symptoms: DIZZINESS    How long have you been experiencing symptoms: 2 WEEKS    Have you had these symptoms before:    [x] Yes  [] No    Have you been treated for these symptoms before:   [x] Yes  [] No    If a prescription is needed, what is your preferred pharmacy and phone number: [id number] [address] - [phone] [facility] [phone] FX     Additional notes:

## 2024-07-24 RX ORDER — MECLIZINE HYDROCHLORIDE 25 MG/1
25 TABLET ORAL 3 TIMES DAILY PRN
Qty: 30 TABLET | Refills: 2 | Status: SHIPPED | OUTPATIENT
Start: 2024-07-24

## 2024-08-14 DIAGNOSIS — M25.50 CHRONIC JOINT PAIN: ICD-10-CM

## 2024-08-14 DIAGNOSIS — G89.29 CHRONIC JOINT PAIN: ICD-10-CM

## 2024-08-14 RX ORDER — HYDROCODONE BITARTRATE AND ACETAMINOPHEN 10; 325 MG/1; MG/1
1 TABLET ORAL EVERY 4 HOURS PRN
Qty: 120 TABLET | Refills: 0 | Status: SHIPPED | OUTPATIENT
Start: 2024-08-14

## 2024-08-14 NOTE — TELEPHONE ENCOUNTER
Caller: Mary Albarran LAKESHA    Relationship: Self    Best call back number: [phone]    Requested Prescriptions:   Requested Prescriptions     Pending Prescriptions Disp Refills    HYDROcodone-acetaminophen (NORCO)  MG per tablet 120 tablet 0     Sig: Take 1 tablet by mouth Every 4 (Four) Hours As Needed for Moderate Pain or Severe Pain.        Pharmacy where request should be sent: [facility] [address] [phone] [facility] [phone]      Last office visit with prescribing clinician: 6/26/2024   Last telemedicine visit with prescribing clinician: Visit date not found   Next office visit with prescribing clinician: Visit date not found     Additional details provided by patient: PATIENT IS CURRENTLY ALMOST OUT OF THIS SCRIPT.     Does the patient have less than a 3 day supply:  [x] Yes  [] No    Would you like a call back once the refill request has been completed: [] Yes [x] No    If the office needs to give you a call back, can they leave a voicemail: [] Yes [x] No    Steven Ramos   08/14/24 12:35 EDT

## 2024-08-16 RX ORDER — DILTIAZEM HYDROCHLORIDE 120 MG/1
CAPSULE, EXTENDED RELEASE ORAL
Qty: 90 CAPSULE | Refills: 3 | Status: SHIPPED | OUTPATIENT
Start: 2024-08-16

## 2024-08-30 RX ORDER — BUDESONIDE 1 MG/2ML
1 INHALANT ORAL
Qty: 30 ML | Refills: 0 | Status: SHIPPED | OUTPATIENT
Start: 2024-08-30

## 2024-09-06 ENCOUNTER — TELEPHONE (OUTPATIENT)
Dept: FAMILY MEDICINE CLINIC | Facility: CLINIC | Age: 74
End: 2024-09-06

## 2024-09-06 NOTE — TELEPHONE ENCOUNTER
Pharmacy Name: [facility] [address] [place] [address] [phone] [facility] [phone] FX     Reference Number (if applicable): [id number]    Pharmacy representative name: AXEL    Pharmacy representative phone number: [phone]    What medication are you calling in regards to:     budesonide (PULMICORT) 1 MG/2ML nebulizer solution       What question does the pharmacy have: PLEASE CLARIFY THE QUANTITIY    Who is the provider that prescribed the medication: PROVIDER WHITE    Additional notes: PLEASE SEND IN CLARIFYING SCRIPT -[phone]  OR CALL TO GIVE A VERBAL.

## 2024-09-09 RX ORDER — BUDESONIDE 1 MG/2ML
1 INHALANT ORAL DAILY
Qty: 180 ML | Refills: 1 | Status: SHIPPED | OUTPATIENT
Start: 2024-09-09

## 2024-09-11 DIAGNOSIS — M25.50 CHRONIC JOINT PAIN: ICD-10-CM

## 2024-09-11 DIAGNOSIS — G89.29 CHRONIC JOINT PAIN: ICD-10-CM

## 2024-09-11 RX ORDER — HYDROCODONE BITARTRATE AND ACETAMINOPHEN 10; 325 MG/1; MG/1
1 TABLET ORAL EVERY 4 HOURS PRN
Qty: 120 TABLET | Refills: 0 | Status: SHIPPED | OUTPATIENT
Start: 2024-09-11

## 2024-09-11 NOTE — TELEPHONE ENCOUNTER
Caller: Mary Albarran LAKESHA    Relationship: Self    Best call back number: [phone]     Requested Prescriptions:   Requested Prescriptions     Pending Prescriptions Disp Refills    HYDROcodone-acetaminophen (NORCO)  MG per tablet 120 tablet 0     Sig: Take 1 tablet by mouth Every 4 (Four) Hours As Needed for Moderate Pain or Severe Pain.        Pharmacy where request should be sent: [facility] [address] [phone] [facility] [phone]      Last office visit with prescribing clinician: 6/26/2024   Last telemedicine visit with prescribing clinician: Visit date not found   Next office visit with prescribing clinician: Visit date not found     Does the patient have less than a 3 day supply:  [x] Yes  [] No    Steven Reyes Rep   09/11/24 09:23 EDT

## 2024-09-12 RX ORDER — ATORVASTATIN CALCIUM 20 MG/1
TABLET, FILM COATED ORAL
Qty: 90 TABLET | Refills: 3 | Status: SHIPPED | OUTPATIENT
Start: 2024-09-12

## 2024-09-23 RX ORDER — INSULIN GLARGINE 100 [IU]/ML
INJECTION, SOLUTION SUBCUTANEOUS
Qty: 15 ML | Refills: 6 | Status: SHIPPED | OUTPATIENT
Start: 2024-09-23

## 2024-09-25 RX ORDER — POTASSIUM CHLORIDE 750 MG/1
CAPSULE, EXTENDED RELEASE ORAL
Qty: 90 CAPSULE | Refills: 3 | OUTPATIENT
Start: 2024-09-25

## 2024-09-30 ENCOUNTER — OFFICE VISIT (OUTPATIENT)
Dept: CARDIOLOGY | Facility: CLINIC | Age: 74
End: 2024-09-30
Payer: MEDICARE

## 2024-09-30 VITALS
WEIGHT: 255 LBS | BODY MASS INDEX: 45.18 KG/M2 | HEIGHT: 63 IN | HEART RATE: 102 BPM | SYSTOLIC BLOOD PRESSURE: 142 MMHG | DIASTOLIC BLOOD PRESSURE: 72 MMHG

## 2024-09-30 DIAGNOSIS — I50.32 CHRONIC DIASTOLIC CONGESTIVE HEART FAILURE: ICD-10-CM

## 2024-09-30 DIAGNOSIS — I10 PRIMARY HYPERTENSION: ICD-10-CM

## 2024-09-30 DIAGNOSIS — I48.20 CHRONIC ATRIAL FIBRILLATION: Primary | ICD-10-CM

## 2024-09-30 DIAGNOSIS — E78.2 MIXED HYPERLIPIDEMIA: ICD-10-CM

## 2024-09-30 DIAGNOSIS — I50.33 ACUTE ON CHRONIC DIASTOLIC CHF (CONGESTIVE HEART FAILURE): ICD-10-CM

## 2024-09-30 DIAGNOSIS — I35.0 NONRHEUMATIC AORTIC VALVE STENOSIS: ICD-10-CM

## 2024-09-30 PROCEDURE — 1160F RVW MEDS BY RX/DR IN RCRD: CPT

## 2024-09-30 PROCEDURE — 3078F DIAST BP <80 MM HG: CPT

## 2024-09-30 PROCEDURE — G2211 COMPLEX E/M VISIT ADD ON: HCPCS

## 2024-09-30 PROCEDURE — 99214 OFFICE O/P EST MOD 30 MIN: CPT

## 2024-09-30 PROCEDURE — 1159F MED LIST DOCD IN RCRD: CPT

## 2024-09-30 PROCEDURE — 3077F SYST BP >= 140 MM HG: CPT

## 2024-09-30 RX ORDER — PANTOPRAZOLE SODIUM 20 MG/1
20 TABLET, DELAYED RELEASE ORAL DAILY
Qty: 30 TABLET | Refills: 3 | Status: SHIPPED | OUTPATIENT
Start: 2024-09-30

## 2024-09-30 RX ORDER — METOPROLOL SUCCINATE 50 MG/1
150 TABLET, EXTENDED RELEASE ORAL 2 TIMES DAILY
Qty: 540 TABLET | Refills: 3 | Status: SHIPPED | OUTPATIENT
Start: 2024-09-30

## 2024-09-30 NOTE — PROGRESS NOTES
Chief Complaint  3 month follow up , Chronic atrial fibrillation, Nonrheumatic aortic valve stenosis, and Nonrheumatic aortic valve insufficiency    Subjective        History of Present Illness  Mary Albarran presents to [facility] for follow up.   History of Present Illness  The patient is a 74-year-old female with a past medical history significant for atrial fibrillation, chronic kidney disease (CKD), chronic obstructive pulmonary disease (COPD), type 2 diabetes, hypertension, hyperlipidemia, and chronic diastolic congestive heart failure, who presents for a follow-up visit.    She reports no chest pain, breathing difficulties, or palpitations.     She experiences persistent swelling in her legs and feet, which is somewhat alleviated by a diuretic.      Past Medical History:   Diagnosis Date    Allergic rhinitis     Anxiety     Aortic valve disease 11/28/2021    Fibrocalcific changes of the aortic valve with mild aortic valve stenosis   on echo 3/2/2021  Moderate aortic valve regurgitation is present. Aortic valve maximum pressure gradient is 26 mmHg. Moderate aortic valve stenosis is present.  On echo 2/10/2022       Arthritis     Atrial fibrillation     Chronic kidney disease (CKD), stage III (moderate)     Chronic pain     COPD (chronic obstructive pulmonary disease)     Diabetes mellitus type 2 with complications     Ex-smoker     QUIT SMOKING 2008    Hyperlipidemia     Hypertension     Thrombocytopenia        ALLERGY  Allergies   Allergen Reactions    Codeine Mental Status Change    Sglt2 Inhibitors Other (See Comments)     Frequent UTI         Past Surgical History:   Procedure Laterality Date    HYSTERECTOMY      30 YEARS AGO        Social History     Socioeconomic History    Marital status:    Tobacco Use    Smoking status: Former     Current packs/day: 0.00     Average packs/day: 1 pack/day for 37.0 years (37.0 ttl pk-yrs)     Types: Cigarettes     Start date: 4/3/1971      Quit date: 2008     Years since quittin.4     Passive exposure: Never    Smokeless tobacco: Never    Tobacco comments:     FOR 30 YEARS   Vaping Use    Vaping status: Never Used   Substance and Sexual Activity    Alcohol use: Never    Drug use: Never    Sexual activity: Defer       Family History   Problem Relation Age of Onset    Heart disease Father     Heart disease Other     Heart disease Other     Diabetes Other         Current Outpatient Medications on File Prior to Visit   Medication Sig    albuterol (ACCUNEB) 1.25 MG/3ML nebulizer solution Take 3 mL by nebulization Every 6 (Six) Hours As Needed for Wheezing or Shortness of Air.    albuterol sulfate  (90 Base) MCG/ACT inhaler Inhale 2 puffs Every 4 (Four) Hours As Needed for Wheezing.    atorvastatin (LIPITOR) 20 MG tablet TAKE 1 TABLET DAILY    budesonide (PULMICORT) 1 MG/2ML nebulizer solution Take 2 mL by nebulization Daily.    cephalexin (KEFLEX) 250 MG capsule Take 1 capsule by mouth Every Night.    cholecalciferol (VITAMIN D3) 25 MCG (1000 UT) tablet Take 1 tablet by mouth Daily.    clindamycin (Cleocin) 300 MG capsule Take 1 capsule by mouth 2 (Two) Times a Day.    digoxin (LANOXIN) 125 MCG tablet TAKE 1 TABLET ON  AND SUNDAYS AND 2 TABLETS THE REST OF THE WEEK    dilTIAZem (TIAZAC) 120 MG 24 hr capsule TAKE 1 CAPSULE DAILY    fluconazole (Diflucan) 100 MG tablet Take 1 tablet by mouth Daily.    HYDROcodone-acetaminophen (NORCO)  MG per tablet Take 1 tablet by mouth Every 4 (Four) Hours As Needed for Moderate Pain or Severe Pain.    Insulin Pen Needle (BD Pen Needle Katiuska 2nd Gen) 32G X 4 MM misc 1 each Daily.    Lantus SoloStar 100 UNIT/ML injection pen INJECT 30 UNITS UNDER THE SKIN INTO THE APPROPRIATE AREA DAILY AS DIRECTED    meclizine (ANTIVERT) 25 MG tablet Take 1 tablet by mouth 3 (Three) Times a Day As Needed for Dizziness.    nystatin (MYCOSTATIN) 076743 UNIT/GM powder Apply  topically to the appropriate  "area as directed 2 (Two) Times a Day.    O2 (OXYGEN) Inhale 2 L/min Every Night.    ondansetron ODT (ZOFRAN-ODT) 4 MG disintegrating tablet Place 1 tablet on the tongue Every 8 (Eight) Hours As Needed for Nausea or Vomiting.    pregabalin (LYRICA) 150 MG capsule Take 1 capsule by mouth 2 (Two) Times a Day for 90 days.    sacubitril-valsartan (Entresto)  MG tablet Take 1 tablet by mouth Take As Directed. Take 1/2 tablet in the morning and 1 tablet at night.    silver sulfadiazine (Silvadene) 1 % cream Apply 1 Application topically to the appropriate area as directed 2 (Two) Times a Day.    SITagliptin (Januvia) 50 MG tablet Take 1 tablet by mouth Daily.    torsemide (DEMADEX) 100 MG tablet Take 0.5 tablets by mouth Daily.    Xarelto 20 MG tablet TAKE 1 TABLET DAILY    [DISCONTINUED] metoprolol succinate XL (TOPROL-XL) 50 MG 24 hr tablet Take 3 tablets by mouth 2 (Two) Times a Day.    [DISCONTINUED] pantoprazole (Protonix) 20 MG EC tablet Take 1 tablet by mouth Daily.     No current facility-administered medications on file prior to visit.       Objective   Vitals:    09/30/24 1430   BP: 142/72   Pulse: 102   Weight: 116 kg (255 lb)   Height: 160 cm (63\")       Physical Exam  Constitutional:       General: She is awake. She is not in acute distress.     Appearance: Normal appearance.   HENT:      Head: Normocephalic.      Nose: Nose normal. No congestion.   Eyes:      Extraocular Movements: Extraocular movements intact.      Conjunctiva/sclera: Conjunctivae normal.      Pupils: Pupils are equal, round, and reactive to light.   Neck:      Thyroid: No thyromegaly.      Vascular: No JVD.   Cardiovascular:      Rate and Rhythm: Normal rate and regular rhythm.      Chest Wall: PMI is not displaced.      Pulses: Normal pulses.      Heart sounds: S1 normal and S2 normal. Murmur heard.      No friction rub. No gallop. No S3 or S4 sounds.   Pulmonary:      Effort: Pulmonary effort is normal.      Breath sounds: Normal " breath sounds. No wheezing, rhonchi or rales.   Abdominal:      General: Bowel sounds are normal.      Palpations: Abdomen is soft.      Tenderness: There is no abdominal tenderness.   Musculoskeletal:      Cervical back: No tenderness.      Right lower leg: No edema.      Left lower leg: No edema.   Lymphadenopathy:      Cervical: No cervical adenopathy.   Skin:     General: Skin is warm and dry.      Capillary Refill: Capillary refill takes less than 2 seconds.      Coloration: Skin is not cyanotic.      Findings: No petechiae or rash.      Nails: There is no clubbing.   Neurological:      Mental Status: She is alert.   Psychiatric:         Mood and Affect: Mood normal.         Behavior: Behavior is cooperative.           Result Review     The following data was reviewed by LUCERO Albarado on 09/30/24.      CMP          10/12/2023    08:01 5/15/2024    11:24 6/6/2024    12:17   CMP   Glucose 199  292  162    BUN 23  31  21    Creatinine 1.16  0.83  1.12    EGFR 50.5  75.5  52.7    Sodium 136  140  141    Potassium 3.7  5.8  4.7    Chloride 92  105  98    Calcium 9.9  8.4  9.4    Total Protein 7.4  5.7     Albumin 3.6  3.7     Globulin  2.0     Total Bilirubin 0.9  0.5     Alkaline Phosphatase 56  66     AST (SGOT) 18  17     ALT (SGPT) 15  25     Albumin/Globulin Ratio  1.9     BUN/Creatinine Ratio 19.8  37.3  18.8    Anion Gap 13.2  7.0  11.0      CBC w/diff          10/11/2023    04:41 10/12/2023    08:01 5/15/2024    11:24   CBC w/Diff   WBC 6.08  6.32  7.91    RBC 4.52  4.71  3.16    Hemoglobin 12.7  13.0  8.5    Hematocrit 41.7  43.0  29.9    MCV 92.3  91.3  94.6    MCH 28.1  27.6  26.9    MCHC 30.5  30.2  28.4    RDW 15.7  15.6  15.8    Platelets 114  130  80    Neutrophil Rel % 68.1  63.7  77.6    Immature Granulocyte Rel % 0.5  0.2  1.5    Lymphocyte Rel % 21.7  24.5  13.5    Monocyte Rel % 7.4  9.3  5.4    Eosinophil Rel % 2.0  1.7  1.5    Basophil Rel % 0.3  0.6  0.5           Results for orders  placed during the hospital encounter of 10/06/23    Adult Transthoracic Echo Complete W/ Cont if Necessary Per Protocol    Interpretation Summary    Left ventricular systolic function is normal. Calculated left ventricular EF = 54.4%    Left ventricular diastolic function was indeterminate.    The left atrial cavity is mildly dilated.    Mild aortic valve stenosis is present.    Estimated right ventricular systolic pressure from tricuspid regurgitation is normal (<35 mmHg).    No obvious wall motion abnormalities      No results found for this or any previous visit.          Procedures    Assessment & Plan  Diagnoses and all orders for this visit:    1. Chronic atrial fibrillation (Primary)    2. Primary hypertension    3. Mixed hyperlipidemia    4. Chronic diastolic congestive heart failure    5. Acute on chronic diastolic CHF (congestive heart failure)    6. Nonrheumatic aortic valve stenosis    Other orders  -     metoprolol succinate XL (TOPROL-XL) 50 MG 24 hr tablet; Take 3 tablets by mouth 2 (Two) Times a Day.  Dispense: 540 tablet; Refill: 3  -     pantoprazole (Protonix) 20 MG EC tablet; Take 1 tablet by mouth Daily.  Dispense: 30 tablet; Refill: 3      Assessment & Plan  1. Atrial Fibrillation.  She is currently on Xarelto 20 mg daily, which is appropriate.  Continue digoxin, diltiazem, metoprolol.  She has been on these medications for many years with no change.  Tolerating them well.    2. Hypertension.  Her blood pressure was initially high but came down to an acceptable range. She is currently on metoprolol 150 mg twice daily, which will be continued. No changes were made to her medication regimen.    3. Hyperlipidemia.  Her most recent lipid panel showed total cholesterol of 101, LDL of 25, and triglycerides of 308. She is currently on atorvastatin, which will be continued. No changes were made to her medication regimen.    4. Chronic Diastolic Congestive Heart Failure.  Her most recent echocardiogram  showed an LVEF of 55% with mild aortic valve stenosis and no wall motion abnormalities. She is currently on Entresto at the maximum dose of 97/103 mg, torsemide 50 mg daily, and digoxin.  She is well compensated on exam and asymptomatic.  She is well compensated on exam and asymptomatic.    5.  Aortic stenosis.  Mild by most recent echocardiogram.  Continue to monitor clinically.  She has a soft murmur on exam.    Follow-up  Patient is scheduled for a follow-up visit in 6 months.              The medical services provided during this encounter are part of ongoing care related to this patient's single serious condition or complex condition.      Follow Up   Return in about 6 months (around 3/30/2025) for With Dr. Metcalf.    Patient was given instructions and counseling regarding her condition or for health maintenance advice. Please see specific information pulled into the AVS if appropriate.     LUCERO Albarado  09/30/24  14:29 EDT    Dictated Utilizing Dragon Dictation    Patient or patient representative verbalized consent for the use of Ambient Listening during the visit with  LUCERO Albarado for chart documentation. 9/30/2024  14:35 EDT

## 2024-10-09 DIAGNOSIS — G89.29 CHRONIC JOINT PAIN: ICD-10-CM

## 2024-10-09 DIAGNOSIS — M25.50 CHRONIC JOINT PAIN: ICD-10-CM

## 2024-10-09 DIAGNOSIS — G62.9 NEUROPATHY: ICD-10-CM

## 2024-10-09 NOTE — TELEPHONE ENCOUNTER
Caller: Mary Albarran LAKESHA    Relationship: Self    Best call back number: [phone]     Requested Prescriptions:   Requested Prescriptions     Pending Prescriptions Disp Refills    HYDROcodone-acetaminophen (NORCO)  MG per tablet 120 tablet 0     Sig: Take 1 tablet by mouth Every 4 (Four) Hours As Needed for Moderate Pain or Severe Pain.        Pharmacy where request should be sent: [facility] [address] [phone] [facility] [phone]      Last office visit with prescribing clinician: 6/26/2024   Last telemedicine visit with prescribing clinician: Visit date not found   Next office visit with prescribing clinician: Visit date not found     Additional details provided by patient: MEDICATION IS DUE THIS WEEK BUT PATIENT DOES NOT WANT TO CALL BACK AND ASKS IT BE RENEWED    Does the patient have less than a 3 day supply:  [x] Yes  [] No        Angeles Irby, PCT   10/09/24 12:16 EDT

## 2024-10-09 NOTE — TELEPHONE ENCOUNTER
Caller: Mary Albarran    Relationship: Self    Best call back number: [phone]     What medication are you requesting: pregabalin (LYRICA) 150 MG capsule      If a prescription is needed, what is your preferred pharmacy and phone number: [facility] - [address] [phone] [facility] [phone]      Additional notes:PATIENT STATES SHE WOULD LIKE TO HAVE THE DOSE CHANGED BACK TO 3 TIMES DAILY.

## 2024-10-10 RX ORDER — PREGABALIN 150 MG/1
150 CAPSULE ORAL 2 TIMES DAILY
Qty: 90 CAPSULE | Refills: 1 | Status: SHIPPED | OUTPATIENT
Start: 2024-10-10 | End: 2025-01-08

## 2024-10-10 RX ORDER — HYDROCODONE BITARTRATE AND ACETAMINOPHEN 10; 325 MG/1; MG/1
1 TABLET ORAL EVERY 4 HOURS PRN
Qty: 120 TABLET | Refills: 0 | Status: SHIPPED | OUTPATIENT
Start: 2024-10-10

## 2024-10-21 RX ORDER — PANTOPRAZOLE SODIUM 20 MG/1
20 TABLET, DELAYED RELEASE ORAL DAILY
Qty: 30 TABLET | Refills: 3 | OUTPATIENT
Start: 2024-10-21

## 2024-11-06 DIAGNOSIS — M25.50 CHRONIC JOINT PAIN: ICD-10-CM

## 2024-11-06 DIAGNOSIS — G89.29 CHRONIC JOINT PAIN: ICD-10-CM

## 2024-11-06 RX ORDER — HYDROCODONE BITARTRATE AND ACETAMINOPHEN 10; 325 MG/1; MG/1
1 TABLET ORAL EVERY 4 HOURS PRN
Qty: 120 TABLET | Refills: 0 | Status: SHIPPED | OUTPATIENT
Start: 2024-11-06

## 2024-11-06 NOTE — TELEPHONE ENCOUNTER
Caller: Mary Albarran LAKESHA    Relationship: Self    Best call back number: [phone]     Requested Prescriptions:   Requested Prescriptions     Pending Prescriptions Disp Refills    HYDROcodone-acetaminophen (NORCO)  MG per tablet 120 tablet 0     Sig: Take 1 tablet by mouth Every 4 (Four) Hours As Needed for Moderate Pain or Severe Pain.        Pharmacy where request should be sent: [facility] [address] [phone] [facility] [phone]      Last office visit with prescribing clinician: 6/26/2024   Last telemedicine visit with prescribing clinician: Visit date not found   Next office visit with prescribing clinician: Visit date not found     Additional details provided by patient:     4 DOSES LEFT.    PATIENT REQUESTING 20 DAYS    Does the patient have less than a 3 day supply:  [x] Yes  [] No    Would you like a call back once the refill request has been completed: [x] Yes [] No    If the office needs to give you a call back, can they leave a voicemail: [x] Yes [] No    Steven Orourke Rep   11/06/24 09:22 EST

## 2024-11-18 RX ORDER — SITAGLIPTIN 50 MG/1
50 TABLET, FILM COATED ORAL DAILY
Qty: 90 TABLET | Refills: 3 | Status: SHIPPED | OUTPATIENT
Start: 2024-11-18

## 2024-11-26 ENCOUNTER — TELEPHONE (OUTPATIENT)
Dept: CARDIOLOGY | Facility: CLINIC | Age: 74
End: 2024-11-26
Payer: MEDICARE

## 2024-11-26 NOTE — TELEPHONE ENCOUNTER
Pt called and she was in tears.  She said [facility] will no longer fill her Entresto script.  I thought maybe she was receiving pt assistance through [facility] since I did not recognize the pharmacy (it's not listed in her chart).  She is not getting pt asst.     I advised pt to call local pharmacies to see if they can fill it and we would send in script.      Pt verbalized understanding and said she would call back with an update.

## 2024-12-02 ENCOUNTER — TELEPHONE (OUTPATIENT)
Dept: CARDIOLOGY | Facility: CLINIC | Age: 74
End: 2024-12-02
Payer: MEDICARE

## 2024-12-02 DIAGNOSIS — G89.29 CHRONIC JOINT PAIN: ICD-10-CM

## 2024-12-02 DIAGNOSIS — M25.50 CHRONIC JOINT PAIN: ICD-10-CM

## 2024-12-02 RX ORDER — SACUBITRIL AND VALSARTAN 97; 103 MG/1; MG/1
1 TABLET, FILM COATED ORAL TAKE AS DIRECTED
Qty: 180 TABLET | Refills: 3 | Status: SHIPPED | OUTPATIENT
Start: 2024-12-02

## 2024-12-02 RX ORDER — HYDROCODONE BITARTRATE AND ACETAMINOPHEN 10; 325 MG/1; MG/1
1 TABLET ORAL EVERY 4 HOURS PRN
Qty: 120 TABLET | Refills: 0 | Status: SHIPPED | OUTPATIENT
Start: 2024-12-02

## 2024-12-02 NOTE — TELEPHONE ENCOUNTER
Caller: AvisMary    Relationship: Self    Best call back number: [phone]     Requested Prescriptions:   Requested Prescriptions     Pending Prescriptions Disp Refills    HYDROcodone-acetaminophen (NORCO)  MG per tablet 120 tablet 0     Sig: Take 1 tablet by mouth Every 4 (Four) Hours As Needed for Moderate Pain or Severe Pain.        Pharmacy where request should be sent: [facility] [address] [phone] [facility] [phone] FX     Last office visit with prescribing clinician: 6/26/2024   Last telemedicine visit with prescribing clinician: Visit date not found   Next office visit with prescribing clinician: Visit date not found     Does the patient have less than a 3 day supply:  [x] Yes  [] No      Steven Nunez Rep   12/02/24 10:20 EST

## 2024-12-02 NOTE — TELEPHONE ENCOUNTER
Pt called and stated [facility] is no longer going to carry Entresto.  Pt is asking that a new script be sent to [facility] in [place].      Sending new script.  SW pt to let her know it had been sent.

## 2024-12-27 DIAGNOSIS — M25.50 CHRONIC JOINT PAIN: ICD-10-CM

## 2024-12-27 DIAGNOSIS — G89.29 CHRONIC JOINT PAIN: ICD-10-CM

## 2024-12-27 NOTE — TELEPHONE ENCOUNTER
Caller: Mary Albarran LAKESHA    Relationship: Self    Best call back number: [phone]     Requested Prescriptions:   Requested Prescriptions     Pending Prescriptions Disp Refills    HYDROcodone-acetaminophen (NORCO)  MG per tablet 120 tablet 0     Sig: Take 1 tablet by mouth Every 4 (Four) Hours As Needed for Moderate Pain or Severe Pain.        Pharmacy where request should be sent: [facility] [address] [phone] [facility] [phone]      Last office visit with prescribing clinician: 6/26/2024   Last telemedicine visit with prescribing clinician: Visit date not found   Next office visit with prescribing clinician: Visit date not found       Does the patient have less than a 3 day supply:  [] Yes  [x] No    Would you like a call back once the refill request has been completed: [] Yes [x] No    If the office needs to give you a call back, can they leave a voicemail: [] Yes [x] No    Steven Pope Rep   12/27/24 12:22 EST

## 2024-12-30 NOTE — PAYOR COMM NOTE
Left message with patient to call back.       Mary Albarran (72 y.o. Female)     Inpatient Auth request & Initial clinicals  Emergent admission from ED    PATIENT INFORMATION  Name:  Mary Albarran  MRN#:     [id number]  :  1950     INSURANCE MEMBER ID:     [id number]      ADMISSION INFORMATION  CLASS: Inpatient   DOS:  2023      CURRENT ATTENDING PROVIDER INFORMATION  Name/NPI Gilbert Salamanca MD [[id number]]  Phone  Phone: [phone]      RENDERING FACILITY  Name:  [facility]   NPI:  [id number]  TID:  [id number]  Address:      [facility] [address] [place] [address]  Phone  [phone]      CASE MANAGEMENT CONTACT INFORMATION  Phone:      [phone]  Fax:           [phone]    HOSPITAL PROBLEM LIST and ADMISSION DIAGNOSES    Problems Addressed this Visit        Other    * (Principal) Atrial fibrillation with RVR - Primary    Relevant Medications    dilTIAZem (CARDIZEM) bolus from bag 1 mg/mL 10 mg (Completed)    dilTIAZem (CARDIZEM) 125 mg in 125 mL sodium chloride  infusion    nitroglycerin (NITROSTAT) SL tablet 0.4 mg    digoxin (LANOXIN) tablet 125 mcg (Start on 2023  9:00 AM)    digoxin (LANOXIN) tablet 250 mcg    metoprolol succinate XL (TOPROL-XL) 24 hr tablet 50 mg   Other Visit Diagnoses     Acute gastroenteritis          Diagnoses       Codes Comments    Atrial fibrillation with RVR    -  Primary ICD-10-CM: I48.91  ICD-9-CM: 427.31     Acute gastroenteritis     ICD-10-CM: K52.9  ICD-9-CM: 558.9              Date of Birth   1950    Social Security Number       Address   [address] [place] [address]    Home Phone   [phone]    MRN   [id number]       Christian   Hinduism    Marital Status                               Admission Date   23    Admission Type   Emergency    Admitting Provider   Gilbert Salamanca MD    Attending Provider   Gilbert Salamanca MD    Department, Room/Bed   [facility] 4TH FLOOR MEDICAL TELEMETRY UNIT, 416/1       Discharge Date       Discharge  "Disposition       Discharge Destination                               Attending Provider: Gilbert Salamanca MD    Allergies: Codeine    Isolation: None   Infection: None   Code Status: CPR    Ht: 160 cm (63\")   Wt: 120 kg (265 lb 6.9 oz)    Admission Cmt: None   Principal Problem: Atrial fibrillation with RVR [I48.91]                 Active Insurance as of 5/12/2023     Primary Coverage     Payor Plan Insurance Group Employer/Plan Group    HUMANA MEDICARE REPLACEMENT HUMANA MEDICARE REPLACEMENT [id number]     Payor Plan Address Payor Plan Phone Number Payor Plan Fax Number Effective Dates    [address] [address] [phone]  1/1/2019 - None Entered    Formerly [facility] [phone]       Subscriber Name Subscriber Birth Date Member ID       BELIA FROBES 1950 [id number]                 Emergency Contacts      (Rel.) Home Phone Work Phone Mobile Phone    KILEY FORBES (Spouse) [phone] -- [phone]            Bautista: NPI [id number] Tax ID [id number]        Atrial Fibrillation RRG Inpatient Care by Michelle Pelletier RN       Indications Met: Reviewed on 5/13/2023 by Michelle Pelletier RN       Created Using Review Status Review Entered   [facility] for Case Management Primary Completed 5/13/2023 1304       Created By   Michelle Pelletier RN       Criteria Set Name - Subset   Atrial Fibrillation RRG Inpatient Care      Criteria Review   Atrial Fibrillation RRG Inpatient Care     Overall Determination: Indications Met     Criteria:  [×] Admission is indicated for  1 or more  of the following :      [×] Hemodynamic instability          5/13/2023  1:04 PM              -- 5/13/2023  1:04 PM by Michelle Pelletier RN --                                    (X) Hemodynamic instability, as indicated by  1 or more  of the following  (1) (2) (3) (4) (5):                  (X) Vital sign abnormality not readily corrected by appropriate treatment, as indicated by  1 or more  of the following  [A]:                  (X) Tachycardia that persists " despite appropriate treatment (eg, volume repletion, treatment of pain, treatment of underlying cause)          5/13/2023  1:04 PM              -- 5/13/2023  1:04 PM by Michelle Pelletier RN --                  HR 140s on arrival. Received IV NS 1000ml bolus. Titrated Cardizem IV drip from 5mg to 12.5mg. HR still not below 117 at time of admit. A-fib w/RVR.     Notes:  -- 5/13/2023  1:04 PM by Michelle Pelletire RN --      To ED c/o N/V. Abd cramping, + dizziness. HR 140s on arrival. Sats 88% on RA. Is not on home O2. Trace BLE edema. Pt reports chonic ulcerations to BLE with luis m boots.       PMHx: Afib; on xarelto; CKD, DM, HLD, HTN, COPD. Aortic valve stenosis.            ED results: glucose 233, Creat 1.05, GFR 57.3; WBC 26.10/ H/H 16/49.2. Platelets 87; Mg+ 1.8; CXR:NAD.            EKG: Afib w/RVR. Rate 128.       In ED: O2 @2L. Received IV NS 1000ml bolus. Titrated Cardizem IV drip from 5mg to 12.5mg. HR still not below 117 at time of admit. Cardizem titrated from 5mg to 12.5. Pt has already received the IV fluid bolus.             Admit: Telemetry. O2; Wound RN to consult; xarelto PO qd; xopenex nebs q6h prn; Brovana neb bid; IV Cardizem drip- titirate. IV LR @ 100/hr; IV LR 500ml bolusx 2; MgSO4 1gm IVPB; Zofran IV prn; SSI per protocol.                             Carlin Hua  Emergency Medicine  ED Provider Notes   - Partial ED PROVIDER NOTE  Shared  Date of Service:  05/12/23 2204  Creation Time:  05/12/23 2204     Shared            Time: 10:04 PM EDT  Date of encounter:  5/12/2023  Independent Historian/Clinical History and Information was obtained by:   Patient      Chief Complaint   Patient presents with   • Shortness of Breath   • Dizziness   • Vomiting         History is limited by: N/A     History of Present Illness:  Patient is a 72 y.o. year old female who presents to the emergency department for evaluation of vomiting since yesterday and dizziness. Patient saw her cardiologist yesterday for an echo  "and has had nausea, vomiting, and dizziness since then. Her cardiologist did not change any  medications yesterday. She also states her heart has been \"going crazy.\" She notes some diarrhea, as well. She denies abdominal pain but states she just feels queasy. She also notes some shortness of breath. She does not use oxygen at baseline. Patient notes some lower extremity swelling and is wearing wraps. She denies chest pain.     Patient notes she has not taken her daily medications today.     HPI     Patient Care Team  Primary Care Provider: Brittni Quiroz APRN     Past Medical History:           Allergies   Allergen Reactions   • Codeine Mental Status Change      Medical History        Past Medical History:   Diagnosis Date   • Allergic rhinitis     • Anxiety     • Aortic valve disease 11/28/2021     Fibrocalcific changes of the aortic valve with mild aortic valve stenosis   on echo 3/2/2021  Moderate aortic valve regurgitation is present. Aortic valve maximum pressure gradient is 26 mmHg. Moderate aortic valve stenosis is present.  On echo 2/10/2022      • Arthritis     • Atrial fibrillation     • Chronic kidney disease (CKD), stage III (moderate)     • Chronic pain     • COPD (chronic obstructive pulmonary disease)     • Depression     • Diabetes mellitus type 2 with complications     • Hyperlipidemia     • Hypertension     • Thrombocytopenia          Physical Exam:  /71 (BP Location: Right arm, Patient Position: Sitting)   Pulse (!) 146   Temp 99.2 °F (37.3 °C) (Oral)   Resp 20   Ht 160 cm (63\")   SpO2 90%   BMI 47.12 kg/m²      Physical Exam  Vitals and nursing note reviewed.   Constitutional:       General: She is not in acute distress.     Appearance: Normal appearance. She is ill-appearing. She is not toxic-appearing.   HENT:      Head: Normocephalic and atraumatic.      Mouth/Throat:      Mouth: Mucous membranes are moist.   Eyes:      General: No scleral icterus.  Cardiovascular:      " Rate and Rhythm: Tachycardia present. Rhythm irregular.      Pulses: Normal pulses.      Heart sounds: Normal heart sounds.      Comments: Wraps in place on BL lower extremities.  Pulmonary:      Effort: Pulmonary effort is normal. No respiratory distress.      Breath sounds: Normal breath sounds.   Abdominal:      General: Abdomen is flat.      Palpations: Abdomen is soft.      Tenderness: There is no abdominal tenderness.   Musculoskeletal:         General: Normal range of motion.      Cervical back: Normal range of motion and neck supple.      Right lower leg: Edema present.      Left lower leg: Edema present.                    Cathryn Maldonado RN   Registered Nurse  Plan of Care     Date of Service:  23  Creation Time:  23     Signed                         Problem: Adult Inpatient Plan of Care  Goal: Plan of Care Review  Outcome: Ongoing, Progressing  Flowsheets (Taken 2023)  Progress: improving  Plan of Care Reviewed With: patient  Outcome Evaluation: Patient admitted throughout the night, in afib rvr. Patient arrived to floor on cardizem drip going at 12.5mg/hour, currently running at 5mg/hour. Patient blood pressure hanging in the 90's systolic, metoprolol held until blood pressure improves. Patient unable to urinate, straight cathed per md order with 150cc of urine returned. UA sent. No other issues or complaints noted at this time. Will continue to monitor.   Goal Outcome Evaluation:   Cathryn Maldonado RN                            History & Physical      Linh Benitez MD at 23 001           [facility]  [facility]   Patient Name: Mary Albarran  : 1950  MRN: [id number]  Primary Care Physician:  Brittni Quiroz APRN  Date of admission: 2023    Subjective    Subjective     Chief Complaint: Nausea, Vomiting     HPI:    Mary Albarran is a 72 y.o. female with a past medical history of A-fib on Xarelto,  CKD stage III, COPD, type 2 diabetes mellitus, hyperlipidemia, hypertension presented to the ED for evaluation of nausea, vomiting since yesterday.  Patient went to cardiology clinic yesterday and had an appointment with Dr. Metcalf, had cardiac echo done and after that when she came home she started experiencing nausea , associated with multiple episodes of vomiting since yesterday.  Mild intermittent diffuse cramping abdominal pain was noted.  Denies fevers, cough, shortness of breath, chest pain, diarrhea, dysuria, visual changes, focal weakness, numbness, tingling, palpitations, lightheadedness.    On arrival to the ED, vital signs temperature 99.2, pulse 146, respiratory 20, blood pressure 147/71 on 2 L of nasal cannula saturating around 91%.  EKG showed A-fib with RVR.  Labs showed troponin 17, proBNP 976, rest of the CMP within normal limits except creatinine 1.05, WBC 26, hemoglobin 16, hematocrit 49, platelets 87, digoxin level within therapeutic range.  Chest x-ray showed no acute cardiopulmonary process.  Patient had a cardiac echo yesterday which showed LVEF 60-65%, left ventricular diastolic function was indeterminate.  Mildly dilated left atrial cavity.  Mild to early moderate aortic insufficiency.  Patient appears to be in coarse atrial fibrillation/flutter during my exam.  Received IV diltiazem loading dose and started on diltiazem drip.  Patient has been admitted for further evaluation and management of A-fib with RVR      Personal History     Past Medical History:   Diagnosis Date   • Allergic rhinitis    • Anxiety    • Aortic valve disease 11/28/2021    Fibrocalcific changes of the aortic valve with mild aortic valve stenosis   on echo 3/2/2021  Moderate aortic valve regurgitation is present. Aortic valve maximum pressure gradient is 26 mmHg. Moderate aortic valve stenosis is present.  On echo 2/10/2022      • Arthritis    • Atrial fibrillation    • Chronic kidney disease (CKD), stage III  (moderate)    • Chronic pain    • COPD (chronic obstructive pulmonary disease)    • Diabetes mellitus type 2 with complications    • Hyperlipidemia    • Hypertension    • Thrombocytopenia          Past Surgical History:   Procedure Laterality Date   • HYSTERECTOMY      30 YEARS AGO         Family History   Problem Relation Age of Onset   • Heart disease Father    • Heart disease Other    • Heart disease Other    • Diabetes Other          Social History     Socioeconomic History   • Marital status:    Tobacco Use   • Smoking status: Former     Packs/day: 1.00     Years: 36.00     Pack years: 36.00     Types: Cigarettes     Start date: 4/3/1971     Quit date: 4/17/2008     Years since quitting: 15.0   • Smokeless tobacco: Never   • Tobacco comments:     FOR 30 YEARS   Vaping Use   • Vaping Use: Never used   Substance and Sexual Activity   • Alcohol use: Never   • Drug use: Never   • Sexual activity: Defer         Home Medications:  HYDROcodone-acetaminophen, Insulin Glargine (2 Unit Dial), Insulin Pen Needle, SITagliptin, Vitamin B6, albuterol, albuterol sulfate HFA, ascorbic acid, atorvastatin, cholecalciferol, dapagliflozin Propanediol, digoxin, dilTIAZem, furosemide, glipizide, metoprolol succinate XL, miconazole, multivitamin with minerals, potassium chloride, pregabalin, rivaroxaban, sacubitril-valsartan, and silver sulfadiazine    Allergies:  Allergies   Allergen Reactions   • Codeine Mental Status Change       Review of Systems   All other systems reviewed and negative except as mentioned in the HPI    Objective    Objective     Vitals:   Temp:  [98.7 °F (37.1 °C)-99.2 °F (37.3 °C)] 98.7 °F (37.1 °C)  Heart Rate:  [] 82  Resp:  [17-20] 18  BP: ()/(32-90) 105/77  Flow (L/min):  [2] 2    Physical Exam    Constitutional: Awake, alert, mild respiratory distress   Eyes: Pupils equal, sclerae anicteric, no conjunctival injection   HENT: NCAT, mucous membranes moist   Respiratory: Clear to  auscultation bilaterally, nonlabored respirations    Cardiovascular: RRR, no murmurs, rubs, or gallops, palpable pedal pulses bilaterally   Gastrointestinal: Positive bowel sounds, soft, nontender, nondistended   Musculoskeletal: Trace bilateral LE edema, no clubbing or cyanosis to extremities   Psychiatric: Appropriate affect, cooperative   Neurologic: Oriented x 3, speech clear    Result Review    Result Review:  I have personally reviewed the results from the time of this admission to 5/13/2023 02:53 EDT and agree with these findings:  [x]  Laboratory  []  Microbiology  [x]  Radiology  [x]  EKG/Telemetry   []  Cardiology/Vascular   []  Pathology  []  Old records  []  Other:        Imaging Results (Last 24 Hours)     Procedure Component Value Units Date/Time    XR Chest 1 View [[id number]] Collected: 05/12/23 2326     Updated: 05/12/23 2329    Narrative:      PROCEDURE: XR CHEST 1 VW     COMPARISON: [facility], , CHEST AP/PA 1 VIEW, 10/14/2010, 18:28.     INDICATIONS: SHORTNESS OF BREATH; DIZZINESS; VOMITING     FINDINGS:   Cardiac silhouette is mildly enlarged, but is exaggerated by portable AP technique.  Lungs are   adequately expanded and appear grossly clear.  No pneumothorax or large pleural effusion is seen.       Impression:         No definite acute cardiopulmonary abnormality.                  JULIO CÉSAR POMPA MD         Electronically Signed and Approved By: JULIO CÉSAR POMPA MD on 5/12/2023 at 23:26                            arformoterol, 15 mcg, Nebulization, BID - RT  atorvastatin, 20 mg, Oral, Nightly  cefTRIAXone, 1 g, Intravenous, Q24H  cholecalciferol, 1,000 Units, Oral, Daily  [START ON 5/14/2023] digoxin, 125 mcg, Oral, Once per day on Sun Thu   And  digoxin, 250 mcg, Oral, Once per day on Mon Tue Wed Fri Sat  insulin detemir, 1-200 Units, Subcutaneous, Nightly - Glucommander  insulin lispro, 1-200 Units, Subcutaneous, 4x Daily With Meals & Nightly  metoprolol succinate XL, 100  mg, Oral, Q12H  multivitamin with minerals, 1 tablet, Oral, Daily  pregabalin, 150 mg, Oral, TID  rivaroxaban, 20 mg, Oral, Daily  sodium chloride, 10 mL, Intravenous, Q12H  vitamin B-6, 200 mg, Oral, Daily         Assessment & Plan   Assessment / Plan     Assessment/Plan:  A-fib with RVR  Shortness of breath  Elevated white count   History of A-fib on Xarelto  CKD stage IIIa  COPD  Type 2 diabetes mellitus  Hyperlipidemia  Hypertension    Plan  Admit to inpatient, telemetry  Continue diltiazem drip, wean per protocol  Cardiology consult in the a.m with Dr. Lerner. Patient follows Dr. Metcalf as an outpatient  Resume home digoxin and metoprolol succinate as ordered  Elevated white count, procalcitonin.  Patient did not endorse any fevers.  Prophylactically started on ceftriaxone.  Blood cultures has been sent.  Follow-up on urinalysis.  Monitor white count with a.m. labs, if continues to be elevated, consider getting CT chest abdomen pelvis to further investigate for underlying infection.  Sin scheduled  Xopenex every 6 hours as needed  Clear liquid diet, advance to clear liquid diet, advance diet as tolerated  Insulin as per the weight-based Glucomander protocol  Resume other appropriate home medications once reconciled.    DVT prophylaxis:  Medical DVT prophylaxis orders are present.    CODE STATUS:    Level Of Support Discussed With: Patient  Code Status (Patient has no pulse and is not breathing): CPR (Attempt to Resuscitate)  Medical Interventions (Patient has pulse or is breathing): Full Support    Admission Status:  I believe this patient meets inpatient status.    Part of this note may be an electronic transcription/translation of spoken language to printed text using the Dragon Dictation System    Linh Benitez MD          Electronically signed by Linh Benitez MD at 05/13/23 0252          Emergency Department Notes      Martha Mcbride RN at 05/12/23 2240        Pt O2 sats 88% on room  air. Pt placed on O2 2L NC.     Electronically signed by Martha Mcbride RN at 05/12/23 2317         Vital Signs (last day)     Date/Time Temp Temp src Pulse Resp BP Patient Position SpO2    05/13/23 0144 98.7 (37.1) Oral -- 17 111/74 Lying 92    05/13/23 0115 -- -- 100 -- 112/45 -- 91    05/13/23 0100 -- -- 120 -- -- -- 90    05/13/23 0052 -- -- 106 -- -- -- 90    05/13/23 0045 -- -- 117 -- -- -- 89    05/13/23 0030 -- -- 117 -- 109/55 -- 91    05/13/23 0019 -- -- 119 -- -- -- 90    05/13/23 0018 -- -- 112 -- -- -- 91    05/13/23 0015 -- -- 120 -- 134/54 -- 90    05/13/23 0000 -- -- 126 -- 128/63 -- 96    05/12/23 23:15:28 -- -- -- -- -- -- 91    05/12/23 2258 -- -- 124 -- -- -- 95    05/12/23 2252 -- -- 147 -- -- -- 91    05/12/23 2215 -- -- 136 -- 121/63 -- 92    05/12/23 2154 99.2 (37.3) Oral 146 20 147/71 Sitting 90            Facility-Administered Medications as of 5/13/2023   Medication Dose Route Frequency Provider Last Rate Last Admin   • arformoterol (BROVANA) 15 MCG/2ML nebulizer solution  - ADS Override Pull            • arformoterol (BROVANA) nebulizer solution 15 mcg  15 mcg Nebulization BID - RT Linh Benitez MD   15 mcg at 05/13/23 0711   • atorvastatin (LIPITOR) tablet 20 mg  20 mg Oral Nightly Linh Benitez MD       • cefTRIAXone (ROCEPHIN) IVPB 1 g  1 g Intravenous Q24H Linh Benitez  mL/hr at 05/13/23 0257 1 g at 05/13/23 0257   • cholecalciferol (VITAMIN D3) tablet 1,000 Units  1,000 Units Oral Daily Linh Benitez MD   1,000 Units at 05/13/23 0905   • dextrose (D50W) (25 g/50 mL) IV injection 10-50 mL  10-50 mL Intravenous Q15 Min PRN Linh Benitez MD       • dextrose (GLUTOSE) oral gel 15 g  15 g Oral Q15 Min PRN Linh Benitez MD       • [START ON 5/14/2023] digoxin (LANOXIN) tablet 125 mcg  125 mcg Oral Once per day on Sun Thu Linh Benitez MD        And   • digoxin (LANOXIN) tablet 250 mcg  250 mcg Oral Once per day on Mon Tue Wed Fri Sat  Linh Benitez MD   250 mcg at 23 0905   • dilTIAZem (CARDIZEM) 125 mg in 125 mL sodium chloride  infusion  5-15 mg/hr Intravenous Titrated Linh Benitez MD   Stopped at 23 0807   • [COMPLETED] dilTIAZem (CARDIZEM) bolus from bag 1 mg/mL 10 mg  10 mg Intravenous Once Dc Martinez MD   10 mg at 23 2305   • glucagon (GLUCAGEN) injection 1 mg  1 mg Intramuscular Q15 Min PRN Linh Benitez MD       • HYDROcodone-acetaminophen (NORCO)  MG per tablet 1 tablet  1 tablet Oral Q4H PRN Linh Benitez MD   1 tablet at 23 1236   • [COMPLETED] HYDROcodone-acetaminophen (NORCO) 5-325 MG per tablet 2 tablet  2 tablet Oral Once Dc Martinez MD   2 tablet at 23 2327   • insulin detemir (LEVEMIR) injection 1-200 Units  1-200 Units Subcutaneous Nightly - Glucommander Linh Benitez MD       • insulin lispro (humaLOG) injection 1-200 Units  1-200 Units Subcutaneous 4x Daily With Meals & Nightly Linh Benitez MD       • insulin lispro (humaLOG) injection 1-200 Units  1-200 Units Subcutaneous PRN Linh Benitez MD       • [COMPLETED] lactated ringers bolus 1,000 mL  1,000 mL Intravenous Once Dc Martinez MD   Stopped at 23 2334   • [COMPLETED] lactated ringers bolus 500 mL  500 mL Intravenous Once Gilbert Salamanca  mL/hr at 23 0808 500 mL at 23 0808   • [] lactated ringers infusion  100 mL/hr Intravenous Continuous Linh Benitez  mL/hr at 23 0746 100 mL/hr at 23 0746   • lactated ringers infusion  100 mL/hr Intravenous Continuous Gilbert Salamanca  mL/hr at 23 1222 100 mL/hr at 23 1222   • levalbuterol (XOPENEX) nebulizer solution 1.25 mg  1.25 mg Nebulization Q6H PRN Linh Benitez MD       • [COMPLETED] magnesium sulfate in D5W 1g/100mL (PREMIX)  1 g Intravenous Once Gilbert Salamanca MD   1 g at 23 1210   • metoprolol succinate XL (TOPROL-XL) 24 hr tablet 50 mg  50 mg Oral Q24H  Elaine Lerner MD   50 mg at 05/13/23 1236   • multivitamin with minerals 1 tablet  1 tablet Oral Daily Linh Benitez MD   1 tablet at 05/13/23 0905   • nitroglycerin (NITROSTAT) SL tablet 0.4 mg  0.4 mg Sublingual Q5 Min PRN Linh Benitez MD       • [COMPLETED] ondansetron (ZOFRAN) injection 4 mg  4 mg Intravenous Once Dc Martinez MD   4 mg at 05/12/23 2304   • ondansetron (ZOFRAN) injection 4 mg  4 mg Intravenous Q6H PRN Linh Benitez MD       • pregabalin (LYRICA) capsule 150 mg  150 mg Oral TID Linh Benitez MD   150 mg at 05/13/23 0905   • rivaroxaban (XARELTO) tablet 20 mg  20 mg Oral Daily Linh Benitez MD       • [COMPLETED] sodium chloride 0.9 % bolus 500 mL  500 mL Intravenous Once Barb Lebron PA-C 500 mL/hr at 05/13/23 0643 500 mL at 05/13/23 0643   • sodium chloride 0.9 % flush 10 mL  10 mL Intravenous PRN Linh Benitez MD       • sodium chloride 0.9 % flush 10 mL  10 mL Intravenous PRN Linh Benitez MD       • sodium chloride 0.9 % flush 10 mL  10 mL Intravenous Q12H Linh Benitez MD   10 mL at 05/13/23 0905   • sodium chloride 0.9 % infusion 40 mL  40 mL Intravenous PRN Linh Benitez MD       • vitamin B-6 (PYRIDOXINE) tablet 200 mg  200 mg Oral Daily Linh Benitez MD   200 mg at 05/13/23 0905       Lab Results (last 24 hours)     Procedure Component Value Units Date/Time    POC Glucose Once [[id number]]  (Abnormal) Collected: 05/13/23 1133    Specimen: Blood Updated: 05/13/23 1135     Glucose 135 mg/dL      Comment: Serial Number: [id number]:  [id number]       POC Glucose Once [[id number]]  (Abnormal) Collected: 05/13/23 0759    Specimen: Blood Updated: 05/13/23 0803     Glucose 130 mg/dL      Comment: Serial Number: [id number]:  [id number]       Urinalysis, Microscopic Only - Urine, Clean Catch [[id number]]  (Abnormal) Collected: 05/13/23 0415    Specimen: Urine, Clean Catch Updated: 05/13/23 0437     RBC, UA  0-2 /HPF      WBC, UA 0-2 /HPF      Bacteria, UA None Seen /HPF      Squamous Epithelial Cells, UA 0-2 /HPF      Hyaline Casts, UA None Seen /LPF      Methodology Automated Microscopy    Urinalysis With Microscopic If Indicated (No Culture) - Urine, Clean Catch [[id number]]  (Abnormal) Collected: 05/13/23 0415    Specimen: Urine, Clean Catch Updated: 05/13/23 0437     Color, UA Yellow     Appearance, UA Clear     pH, UA 6.0     Specific Gravity, UA >1.030     Glucose, UA >=1000 mg/dL (3+)     Ketones, UA Trace     Bilirubin, UA Negative     Blood, UA Negative     Protein, UA 30 mg/dL (1+)     Leuk Esterase, UA Negative     Nitrite, UA Negative     Urobilinogen, UA 1.0 E.U./dL    Comprehensive Metabolic Panel [[id number]]  (Abnormal) Collected: 05/13/23 0313    Specimen: Blood Updated: 05/13/23 0416     Glucose 189 mg/dL      BUN 16 mg/dL      Creatinine 1.04 mg/dL      Sodium 138 mmol/L      Potassium 4.7 mmol/L      Chloride 103 mmol/L      CO2 23.9 mmol/L      Calcium 9.0 mg/dL      Total Protein 6.4 g/dL      Albumin 3.6 g/dL      ALT (SGPT) 18 U/L      AST (SGOT) 20 U/L      Alkaline Phosphatase 64 U/L      Total Bilirubin 1.0 mg/dL      Globulin 2.8 gm/dL      A/G Ratio 1.3 g/dL      BUN/Creatinine Ratio 15.4     Anion Gap 11.1 mmol/L      eGFR 57.9 mL/min/1.73     Narrative:      GFR Normal >60  Chronic Kidney Disease <60  Kidney Failure <15      Magnesium [[id number]]  (Normal) Collected: 05/13/23 0313    Specimen: Blood Updated: 05/13/23 0416     Magnesium 1.8 mg/dL     Phosphorus [[id number]]  (Normal) Collected: 05/13/23 0313    Specimen: Blood Updated: 05/13/23 0416     Phosphorus 4.0 mg/dL     CBC Auto Differential [[id number]]  (Abnormal) Collected: 05/13/23 0313    Specimen: Blood Updated: 05/13/23 0347     WBC 22.26 10*3/mm3      RBC 5.11 10*6/mm3      Hemoglobin 14.8 g/dL      Hematocrit 46.1 %      MCV 90.2 fL      MCH 29.0 pg      MCHC 32.1 g/dL      RDW 14.8 %      RDW-SD 48.4 fl      MPV 14.0 fL       "Platelets 83 10*3/mm3      Neutrophil % 89.4 %      Lymphocyte % 6.1 %      Monocyte % 3.5 %      Eosinophil % 0.0 %      Basophil % 0.2 %      Immature Grans % 0.8 %      Neutrophils, Absolute 19.92 10*3/mm3      Lymphocytes, Absolute 1.35 10*3/mm3      Monocytes, Absolute 0.77 10*3/mm3      Eosinophils, Absolute 0.00 10*3/mm3      Basophils, Absolute 0.05 10*3/mm3      Immature Grans, Absolute 0.17 10*3/mm3      nRBC 0.0 /100 WBC     Blood Culture - Blood, Arm, Left [[id number]] Collected: 05/13/23 0105    Specimen: Blood from Arm, Left Updated: 05/13/23 0116    Blood Culture - Blood, Arm, Right [[id number]] Collected: 05/13/23 0105    Specimen: Blood from Arm, Right Updated: 05/13/23 0116    Procalcitonin [[id number]]  (Abnormal) Collected: 05/12/23 2201    Specimen: Blood Updated: 05/13/23 0100     Procalcitonin 0.45 ng/mL     Narrative:      As a Marker for Sepsis (Non-Neonates):    1. <0.5 ng/mL represents a low risk of severe sepsis and/or septic shock.  2. >2 ng/mL represents a high risk of severe sepsis and/or septic shock.    As a Marker for Lower Respiratory Tract Infections that require antibiotic therapy:    PCT on Admission    Antibiotic Therapy       6-12 Hrs later    >0.5                Strongly Recommended  >0.25 - <0.5        Recommended  0.1 - 0.25          Discouraged              Remeasure/reassess PCT  <0.1                Strongly Discouraged     Remeasure/reassess PCT    As 28 day mortality risk marker: \"Change in Procalcitonin Result\" (>80% or <=80%) if Day 0 (or Day 1) and Day 4 values are available. Refer to [email or web address]://[email or web address]-[email or web address]    Change in PCT <=80%  A decrease of PCT levels below or equal to 80% defines a positive change in PCT test result representing a higher risk for 28-day all-cause mortality of patients diagnosed with severe sepsis for septic shock.    Change in PCT >80%  A decrease of PCT levels of more than 80% defines a negative change in PCT result representing a " lower risk for 28-day all-cause mortality of patients diagnosed with severe sepsis or septic shock.    This test is Prognostic not Diagnostic, if elevated correlate with clinical findings before administering antibiotic treatment.        [place] Draw [[id number]] Collected: 05/12/23 2201    Specimen: Blood Updated: 05/12/23 2353    Narrative:      The following orders were created for panel order [place] Draw.  Procedure                               Abnormality         Status                     ---------                               -----------         ------                     Green Top (Gel)[[id number]]                                  Final result               Lavender Top[[id number]]                                     Final result               Gold Top - SST[[id number]]                                   Final result               Light Blue Top[[id number]]                                   Final result                 Please view results for these tests on the individual orders.    Lavender Top [[id number]] Collected: 05/12/23 2201    Specimen: Blood Updated: 05/12/23 2353     Extra Tube hold for add-on     Comment: Auto resulted       Digoxin Level [[id number]]  (Normal) Collected: 05/12/23 2201    Specimen: Blood Updated: 05/12/23 2304     Digoxin 0.68 ng/mL     CBC & Differential [[id number]]  (Abnormal) Collected: 05/12/23 2201    Specimen: Blood Updated: 05/12/23 2257    Narrative:      The following orders were created for panel order CBC & Differential.  Procedure                               Abnormality         Status                     ---------                               -----------         ------                     CBC Auto Differential[[id number]]        Abnormal            Final result               Scan Slide[[id number]]                                       Final result                 Please view results for these tests on the individual orders.    Scan Slide [[id number]] Collected: 05/12/23  2201    Specimen: Blood Updated: 05/12/23 2257     RBC Morphology Normal     WBC Morphology Normal     Platelet Estimate Decreased    Green Top (Gel) [[id number]] Collected: 05/12/23 2201    Specimen: Blood Updated: 05/12/23 2257     Extra Tube Hold for add-ons.     Comment: Auto resulted.       Gold Top - SST [[id number]] Collected: 05/12/23 2201    Specimen: Blood Updated: 05/12/23 2257     Extra Tube Hold for add-ons.     Comment: Auto resulted.       Light Blue Top [[id number]] Collected: 05/12/23 2201    Specimen: Blood Updated: 05/12/23 2257     Extra Tube Hold for add-ons.     Comment: Auto resulted       CBC Auto Differential [[id number]]  (Abnormal) Collected: 05/12/23 2201    Specimen: Blood Updated: 05/12/23 2247     WBC 26.10 10*3/mm3      RBC 5.54 10*6/mm3      Hemoglobin 16.0 g/dL      Hematocrit 49.2 %      MCV 88.8 fL      MCH 28.9 pg      MCHC 32.5 g/dL      RDW 14.9 %      RDW-SD 48.1 fl      MPV 13.8 fL      Platelets 87 10*3/mm3      Neutrophil % 93.8 %      Lymphocyte % 3.4 %      Monocyte % 2.0 %      Eosinophil % 0.0 %      Basophil % 0.2 %      Immature Grans % 0.6 %      Neutrophils, Absolute 24.49 10*3/mm3      Lymphocytes, Absolute 0.88 10*3/mm3      Monocytes, Absolute 0.51 10*3/mm3      Eosinophils, Absolute 0.00 10*3/mm3      Basophils, Absolute 0.06 10*3/mm3      Immature Grans, Absolute 0.16 10*3/mm3      nRBC 0.0 /100 WBC     Comprehensive Metabolic Panel [[id number]]  (Abnormal) Collected: 05/12/23 2201    Specimen: Blood Updated: 05/12/23 2246     Glucose 233 mg/dL      BUN 15 mg/dL      Creatinine 1.05 mg/dL      Sodium 140 mmol/L      Potassium 4.7 mmol/L      Chloride 104 mmol/L      CO2 23.3 mmol/L      Calcium 9.4 mg/dL      Total Protein 7.2 g/dL      Albumin 3.9 g/dL      ALT (SGPT) 20 U/L      AST (SGOT) 21 U/L      Alkaline Phosphatase 78 U/L      Total Bilirubin 1.1 mg/dL      Globulin 3.3 gm/dL      A/G Ratio 1.2 g/dL      BUN/Creatinine Ratio 14.3     Anion Gap 12.7 mmol/L       eGFR 57.3 mL/min/1.73     Narrative:      GFR Normal >60  Chronic Kidney Disease <60  Kidney Failure <15      Single High Sensitivity Troponin T [[id number]]  (Abnormal) Collected: 05/12/23 2201    Specimen: Blood Updated: 05/12/23 2246     HS Troponin T 17 ng/L     Narrative:      High Sensitive Troponin T Reference Range:  <10.0 ng/L- Negative Female for AMI  <15.0 ng/L- Negative Male for AMI  >=10 - Abnormal Female indicating possible myocardial injury.  >=15 - Abnormal Male indicating possible myocardial injury.   Clinicians would have to utilize clinical acumen, EKG, Troponin, and serial changes to determine if it is an Acute Myocardial Infarction or myocardial injury due to an underlying chronic condition.         BNP [[id number]]  (Abnormal) Collected: 05/12/23 2201    Specimen: Blood Updated: 05/12/23 2242     proBNP 976.7 pg/mL     Narrative:      Among patients with dyspnea, NT-proBNP is highly sensitive for the detection of acute congestive heart failure. In addition NT-proBNP of <300 pg/ml effectively rules out acute congestive heart failure with 99% negative predictive value.          Imaging Results (Last 24 Hours)     Procedure Component Value Units Date/Time    XR Chest 1 View [[id number]] Collected: 05/12/23 2326     Updated: 05/12/23 2329    Narrative:      PROCEDURE: XR CHEST 1 VW     COMPARISON: [facility], , CHEST AP/PA 1 VIEW, 10/14/2010, 18:28.     INDICATIONS: SHORTNESS OF BREATH; DIZZINESS; VOMITING     FINDINGS:   Cardiac silhouette is mildly enlarged, but is exaggerated by portable AP technique.  Lungs are   adequately expanded and appear grossly clear.  No pneumothorax or large pleural effusion is seen.       Impression:         No definite acute cardiopulmonary abnormality.                  JULIO CÉSAR POMPA MD         Electronically Signed and Approved By: JULIO CÉSAR POMPA MD on 5/12/2023 at 23:26                         ECG/EMG Results (last 24 hours)     Procedure  Component Value Units Date/Time    ECG 12 Lead ED Triage Standing Order; ALEJANDRA [[id number]] Collected: 05/12/23 2151     Updated: 05/12/23 2157     QT Interval 292 ms     Narrative:      HEART RATE= 128  bpm  RR Interval= 467  ms  AK Interval=   ms  P Horizontal Axis=   deg  P Front Axis=   deg  QRSD Interval= 74  ms  QT Interval= 292  ms  QRS Axis= 91  deg  T Wave Axis= 266  deg  - ABNORMAL ECG -  Atrial fibrillation  Right axis deviation  Nonspecific repol abnormality, diffuse leads  Electronically Signed By:   Date and Time of Study: 2023-05-12 21:51:41          Orders (last 24 hrs)      Start     Ordered    05/14/23 0900  digoxin (LANOXIN) tablet 125 mcg  Once per day on Sun Thu        See Hyperspace for full Linked Orders Report.    05/13/23 0223    05/13/23 2100  atorvastatin (LIPITOR) tablet 20 mg  Nightly         05/13/23 0203    05/13/23 2100  insulin detemir (LEVEMIR) injection 1-200 Units  Nightly - Glucommander         05/13/23 0252    05/13/23 1800  rivaroxaban (XARELTO) tablet 20 mg  Daily         05/13/23 0913    05/13/23 1315  lactated ringers infusion  Continuous         05/13/23 1219    05/13/23 1300  metoprolol succinate XL (TOPROL-XL) 24 hr tablet 50 mg  Every 24 Hours Scheduled         05/13/23 1207    05/13/23 1243  Diet: Gastrointestinal Diets; Low Irritant; Texture: Regular Texture (IDDSI 7); Fluid Consistency: Thin (IDDSI 0)  Diet Effective Now         05/13/23 1242    05/13/23 1136  POC Glucose Once  PROCEDURE ONCE         05/13/23 1133    05/13/23 1115  magnesium sulfate in D5W 1g/100mL (PREMIX)  Once         05/13/23 1024    05/13/23 1015  Notify Provider Acute Urinary Retention  Until Discontinued         05/13/23 1014    05/13/23 0930  arformoterol (BROVANA) nebulizer solution 15 mcg  2 Times Daily - RT         05/13/23 0244    05/13/23 0900  rivaroxaban (XARELTO) tablet 20 mg  Daily,   Status:  Discontinued         05/13/23 0203    05/13/23 0900  vitamin B-6 (PYRIDOXINE) tablet 200 mg   Daily         05/13/23 0203    05/13/23 0900  pregabalin (LYRICA) capsule 150 mg  3 Times Daily         05/13/23 0203    05/13/23 0900  multivitamin with minerals 1 tablet  Daily         05/13/23 0203    05/13/23 0900  cholecalciferol (VITAMIN D3) tablet 1,000 Units  Daily         05/13/23 0203    05/13/23 0900  sodium chloride 0.9 % flush 10 mL  Every 12 Hours Scheduled         05/13/23 0203    05/13/23 0900  digoxin (LANOXIN) tablet 250 mcg  Once per day on Mon Tue Wed Fri Sat        [facility].    05/13/23 0223    05/13/23 0845  lactated ringers bolus 500 mL  Once         05/13/23 0758    05/13/23 0804  POC Glucose Once  PROCEDURE ONCE         05/13/23 0759    05/13/23 0800  Oral Care  2 Times Daily       05/13/23 0203    05/13/23 0800  insulin lispro (humaLOG) injection 1-200 Units  4 Times Daily With Meals & Nightly         05/13/23 0252    05/13/23 0730  sodium chloride 0.9 % bolus 500 mL  Once         05/13/23 0641    05/13/23 0700  POC Glucose 4x Daily AC & at Bedtime  4 Times Daily Before Meals & at Bedtime       05/13/23 0252    05/13/23 0626  arformoterol (BROVANA) 15 MCG/2ML nebulizer solution  - ADS Override Pull        Note to Pharmacy: Created by cabinet override    05/13/23 0626    05/13/23 0600  CBC Auto Differential  Morning Draw         05/13/23 0203    05/13/23 0600  Comprehensive Metabolic Panel  Morning Draw         05/13/23 0203    05/13/23 0600  Magnesium  Morning Draw         05/13/23 0203    05/13/23 0600  Phosphorus  Morning Draw         05/13/23 0203    05/13/23 0435  Urinalysis, Microscopic Only - Urine, Clean Catch  Once         05/13/23 0434    05/13/23 0405  Straight Cath  Once         05/13/23 0404    05/13/23 0400  Vital Signs  Every 4 Hours       05/13/23 0203    05/13/23 0346  Scan Slide  Once,   Status:  Canceled         05/13/23 0345    05/13/23 0330  metoprolol succinate XL (TOPROL-XL) 24 hr tablet 100 mg  Every 12 Hours Scheduled,   Status:   Discontinued         05/13/23 0236    05/13/23 0300  cefTRIAXone (ROCEPHIN) IVPB 1 g  Every 24 Hours         05/13/23 0203    05/13/23 0300  metoprolol succinate XL (TOPROL-XL) 24 hr tablet 50 mg  2 Times Daily,   Status:  Discontinued         05/13/23 0203    05/13/23 0300  lactated ringers infusion  Continuous         05/13/23 0203    05/13/23 0253  Initiate Glucommander™ SQ  Once         05/13/23 0252    05/13/23 0253  RN to Order STAT Glucose (Lab Performed) for POC Glucose <10 or >600  Continuous        Comments: Do NOT Delay Treatment of Unstable Patient to Obtain Glucose Sample From Lab  Notify Provider of Results    05/13/23 0252    05/13/23 0252  glucagon (GLUCAGEN) injection 1 mg  Every 15 Minutes PRN         05/13/23 0252    05/13/23 0252  insulin lispro (humaLOG) injection 1-200 Units  As Needed         05/13/23 0252 05/13/23 0252  dextrose (GLUTOSE) oral gel 15 g  Every 15 Minutes PRN         05/13/23 0252    05/13/23 0252  dextrose (D50W) (25 g/50 mL) IV injection 10-50 mL  Every 15 Minutes PRN         05/13/23 0252    05/13/23 0250  Inpatient Cardiology Consult  Once        Specialty:  Cardiology  Provider:  Elaine Lerner MD    05/13/23 0249    05/13/23 0237  PT Consult: Eval & Treat Functional Mobility Below Baseline  Once         05/13/23 0236    05/13/23 0204  Reason for Discontinuing Lactic Acid: Not Septic  Once         05/13/23 0203    05/13/23 0202  ondansetron (ZOFRAN) injection 4 mg  Every 6 Hours PRN         05/13/23 0203    05/13/23 0202  Notify Provider (With Default Parameters)  Until Discontinued         05/13/23 0203 05/13/23 0202  Diet: Liquid Diets; Clear Liquid; Texture: Regular Texture (IDDSI 7); Fluid Consistency: Thin (IDDSI 0)  Diet Effective Now,   Status:  Canceled         05/13/23 0203 05/13/23 0202  Advance Diet As Tolerated -  Until Discontinued         05/13/23 0203 05/13/23 0201  Intake & Output  Every Shift       05/13/23 0203 05/13/23 0201  Weigh  Patient  Once         05/13/23 0203    05/13/23 0201  Oxygen Therapy- Nasal Cannula; Titrate for SPO2: 90% - 95%  Continuous         05/13/23 0203    05/13/23 0201  Insert Peripheral IV  Once         05/13/23 0203 05/13/23 0201  Saline Lock & Maintain IV Access  Continuous,   Status:  Canceled         05/13/23 0203    05/13/23 0201  Code Status and Medical Interventions:  Continuous         05/13/23 0203    05/13/23 0201  VTE Prophylaxis Not Indicated: Other: Patient Currently Anticoagulated / Receiving Prophylaxis  Once         05/13/23 0203    05/13/23 0201  Telemetry - Maintain IV Access  Continuous         05/13/23 0203 05/13/23 0201  Continuous Cardiac Monitoring  Continuous        Comments: Follow Standing Orders As Outlined in Process Instructions (Open Order Report to View Full Instructions)    05/13/23 0203    05/13/23 0200  nitroglycerin (NITROSTAT) SL tablet 0.4 mg  Every 5 Minutes PRN         05/13/23 0203    05/13/23 0200  sodium chloride 0.9 % flush 10 mL  As Needed         05/13/23 0203 05/13/23 0200  sodium chloride 0.9 % infusion 40 mL  As Needed         05/13/23 0203 05/13/23 0159  Wound Ostomy Eval & Treat  Once        Comments: Patient states she applies zinc cream and wraps in luis m boots daily.    05/13/23 0159    05/13/23 0157  HYDROcodone-acetaminophen (NORCO)  MG per tablet 1 tablet  Every 4 Hours PRN         05/13/23 0203    05/13/23 0153  levalbuterol (XOPENEX) nebulizer solution 1.25 mg  Every 6 Hours PRN         05/13/23 0203    05/13/23 0039  Blood Culture - Blood, Arm, Left  Once        See Hyperspace for full Linked Orders Report.    05/13/23 0038    05/13/23 0039  Blood Culture - Blood, Arm, Right  Once        Comments: From a different site than #1.     See Hyperspace for full Linked Orders Report.    05/13/23 0038    05/13/23 0039  Urinalysis With Microscopic If Indicated (No Culture) - Urine, Clean Catch  Once         05/13/23 0038    05/13/23 0037  Inpatient  Admission  Once         05/13/23 0036    05/13/23 0037  Procalcitonin  STAT         05/13/23 0036    05/12/23 2351  Hospitalist (on-call MD unless specified)  Once        Specialty:  Hospitalist  Provider:  Linh Benitez MD    05/12/23 2350 05/12/23 2330  HYDROcodone-acetaminophen (NORCO) 5-325 MG per tablet 2 tablet  Once         05/12/23 2316    05/12/23 2300  ondansetron (ZOFRAN) injection 4 mg  Once         05/12/23 2250 05/12/23 2300  dilTIAZem (CARDIZEM) bolus from bag 1 mg/mL 10 mg  Once         05/12/23 2250 05/12/23 2300  dilTIAZem (CARDIZEM) 125 mg in 125 mL sodium chloride  infusion  Titrated         05/12/23 2250 05/12/23 2300  lactated ringers bolus 1,000 mL  Once         05/12/23 2250 05/12/23 2251  Digoxin Level  Once         05/12/23 2250 05/12/23 2226  Scan Slide  Once         05/12/23 2225 05/12/23 2149  NPO Diet NPO Type: Strict NPO  Diet Effective Now,   Status:  Canceled         05/12/23 2149 05/12/23 2149  Undress & Gown  Once         05/12/23 2149 05/12/23 2149  Cardiac Monitoring  Continuous,   Status:  Canceled        Comments: Follow Standing Orders As Outlined in Process Instructions (Open Order Report to View Full Instructions)    05/12/23 2149 05/12/23 2149  Continuous Pulse Oximetry  Continuous         05/12/23 2149 05/12/23 2149  Vital Signs  Per Hospital Policy         05/12/23 2149 05/12/23 2149  ECG 12 Lead ED Triage Standing Order; SOA  Once         05/12/23 2149 05/12/23 2149  XR Chest 1 View  1 Time Imaging         05/12/23 2149 05/12/23 2149  Insert Peripheral IV  Once         05/12/23 2149 05/12/23 2149  [place] Draw  Once         05/12/23 2149 05/12/23 2149  CBC & Differential  Once         05/12/23 2149 05/12/23 2149  Comprehensive Metabolic Panel  Once         05/12/23 2149 05/12/23 2149  BNP  Once         05/12/23 2149 05/12/23 2149  Single High Sensitivity Troponin T  Once         05/12/23 2149 05/12/23 2149   Green Top (Gel)  PROCEDURE ONCE         05/12/23 2149 05/12/23 2149  Lavender Top  PROCEDURE ONCE         05/12/23 2149 05/12/23 2149  Gold Top - SST  PROCEDURE ONCE         05/12/23 2149 05/12/23 2149  Light Blue Top  PROCEDURE ONCE         05/12/23 2149 05/12/23 2149  CBC Auto Differential  PROCEDURE ONCE         05/12/23 2149 05/12/23 2148  Oxygen Therapy- Nasal Cannula; 2 LPM; Titrate for SPO2: equal to or greater than, 92%  Continuous PRN,   Status:  Canceled       05/12/23 2149 05/12/23 2148  sodium chloride 0.9 % flush 10 mL  As Needed         05/12/23 2149    Unscheduled  Telemetry - Pulse Oximetry  Continuous PRN      Comments: If Patient Develops Unresponsiveness, Acute Dyspnea, Cyanosis or Suspected Hypoxemia Start Continuous Pulse Ox Monitoring, Apply Oxygen & Notify Provider    05/13/23 0203    Unscheduled  Oxygen Therapy- Nasal Cannula; Titrate for SPO2: 90% - 95%  Continuous PRN      Comments: If Patient Develops Unresponsiveness, Acute Dyspnea, Cyanosis or Suspected Hypoxemia Start Continuous Pulse Ox Monitoring, Apply Oxygen & Notify Provider    05/13/23 0203    Unscheduled  ECG 12 Lead Chest Pain  As Needed      Comments: Nurse to Release if Patient Expericences Acute Chest Pain or Dysrhythmias    05/13/23 0203    Unscheduled  Potassium  As Needed      Comments: For Ventricular Arrhythmias      05/13/23 0203    Unscheduled  Magnesium  As Needed      Comments: For Ventricular Arrhythmias      05/13/23 0203    Unscheduled  High Sensitivity Troponin T  As Needed      Comments: For Chest Pain      05/13/23 0203    Unscheduled  Blood Gas, Arterial -With Co-Ox Panel: Yes  As Needed      Comments: Draw for Acute Dyspnea, Cyanosis, Suspected Hypoxemia or UnresponsivenessNotify Provider of Results      05/13/23 0203    Unscheduled  POC Glucose PRN  As Needed       05/13/23 0252    Unscheduled  Treat Hypoglycemia As Recommended By Glucommander™ & Notify Provider of Treatment  As Needed       Comments: Follow Hypoglycemia Orders As Outlined in Process Instructions (Open Order Report to View Full Instructions)  Notify Provider Any Time Hypoglycemia Treatment is Administered    05/13/23 0252    Unscheduled  Assess Patient For Urinary Retention  As Needed      Comments: Signs / Symptoms Urinary Retention:  - Bladder Palpable  - Suprapubic / Bladder Discomfort or Pain  - Urge to Void, But Unable  - Reports Unable to Void After 8 Hours    05/13/23 1014    Unscheduled  Utilize Voiding Measures if Retention is Suspected  As Needed      Comments: Voiding Measures:  - Privacy  - Relaxed Environment  - Suprapubic Massage  - Suprapubic Warm Compress  - Trigger Techniques  - Stand to Void   - Bedside Commode    05/13/23 1014    Unscheduled  Bladder Scan for Suspected Urinary Retention  As Needed       05/13/23 1014    Unscheduled  Monitor Every 1-2 Hours for Spontaneous Void if Bladder Scan Volume is Less Than 500mL & Patient is Without Symptoms of Bladder Discomfort / Distention  As Needed       05/13/23 1014    Unscheduled  Straight Cath For Acute Retention if Bladder Scan Volume is Greater Than 500mL or Patient Has Symptoms of Bladder Discomfort / Distention (Unless Contraindicated)  As Needed       05/13/23 1014

## 2024-12-31 RX ORDER — HYDROCODONE BITARTRATE AND ACETAMINOPHEN 10; 325 MG/1; MG/1
1 TABLET ORAL EVERY 4 HOURS PRN
Qty: 120 TABLET | Refills: 0 | Status: SHIPPED | OUTPATIENT
Start: 2024-12-31

## 2025-01-08 RX ORDER — DIGOXIN 125 MCG
TABLET ORAL
Qty: 156 TABLET | Refills: 3 | Status: SHIPPED | OUTPATIENT
Start: 2025-01-08

## 2025-01-24 DIAGNOSIS — M25.50 CHRONIC JOINT PAIN: ICD-10-CM

## 2025-01-24 DIAGNOSIS — G89.29 CHRONIC JOINT PAIN: ICD-10-CM

## 2025-01-24 RX ORDER — HYDROCODONE BITARTRATE AND ACETAMINOPHEN 10; 325 MG/1; MG/1
1 TABLET ORAL EVERY 4 HOURS PRN
Qty: 120 TABLET | Refills: 0 | Status: SHIPPED | OUTPATIENT
Start: 2025-01-24

## 2025-01-24 RX ORDER — SILVER SULFADIAZINE 10 MG/G
1 CREAM TOPICAL 2 TIMES DAILY
Qty: 400 G | Refills: 1 | Status: SHIPPED | OUTPATIENT
Start: 2025-01-24

## 2025-01-24 NOTE — TELEPHONE ENCOUNTER
Caller: AvisMary    Relationship: Self    Best call back number: [phone]     Requested Prescriptions:   Requested Prescriptions     Pending Prescriptions Disp Refills    silver sulfadiazine (Silvadene) 1 % cream 400 g 1     Sig: Apply 1 Application topically to the appropriate area as directed 2 (Two) Times a Day.    HYDROcodone-acetaminophen (NORCO)  MG per tablet 120 tablet 0     Sig: Take 1 tablet by mouth Every 4 (Four) Hours As Needed for Moderate Pain or Severe Pain.        Pharmacy where request should be sent: [facility] [address] [phone] [facility] [phone] [facility]     Last office visit with prescribing clinician: 6/26/2024   Last telemedicine visit with prescribing clinician: Visit date not found   Next office visit with prescribing clinician: Visit date not found     Does the patient have less than a 3 day supply:  [x] Yes  [] No    Steven Nunez Rep   01/24/25 08:45 EST

## 2025-02-04 DIAGNOSIS — G62.9 NEUROPATHY: ICD-10-CM

## 2025-02-04 NOTE — TELEPHONE ENCOUNTER
Caller: Mary Albarran LAKESHA    Relationship: Self    Best call back number: [phone]     Requested Prescriptions:   Requested Prescriptions     Pending Prescriptions Disp Refills    pregabalin (LYRICA) 150 MG capsule 90 capsule 1     Sig: Take 1 capsule by mouth 2 (Two) Times a Day for 90 days.        Pharmacy where request should be sent: [facility] [address] [phone] [facility] [phone]      Last office visit with prescribing clinician: 6/26/2024   Last telemedicine visit with prescribing clinician: Visit date not found   Next office visit with prescribing clinician: Visit date not found     Additional details provided by patient: QUANTITY SHOULD     Does the patient have less than a 3 day supply:  [] Yes  [x] No    Steven Reyes Rep   02/04/25 15:32 EST

## 2025-02-05 RX ORDER — PREGABALIN 150 MG/1
150 CAPSULE ORAL 2 TIMES DAILY
Qty: 180 CAPSULE | Refills: 1 | Status: SHIPPED | OUTPATIENT
Start: 2025-02-05

## 2025-02-10 ENCOUNTER — HOSPITAL ENCOUNTER (INPATIENT)
Dept: OTHER | Facility: HOSPITAL | Age: 75
Discharge: HOME | End: 2025-02-10
Payer: MEDICARE

## 2025-02-11 ENCOUNTER — HOSPITAL ENCOUNTER (INPATIENT)
Facility: HOSPITAL | Age: 75
LOS: 17 days | Discharge: HOME HEALTH | End: 2025-02-28
Attending: STUDENT IN AN ORGANIZED HEALTH CARE EDUCATION/TRAINING PROGRAM | Admitting: STUDENT IN AN ORGANIZED HEALTH CARE EDUCATION/TRAINING PROGRAM
Payer: MEDICARE

## 2025-02-11 ENCOUNTER — APPOINTMENT (OUTPATIENT)
Dept: ULTRASOUND IMAGING | Facility: HOSPITAL | Age: 75
End: 2025-02-11
Payer: MEDICARE

## 2025-02-11 ENCOUNTER — APPOINTMENT (OUTPATIENT)
Dept: CARDIOLOGY | Facility: HOSPITAL | Age: 75
End: 2025-02-11
Payer: MEDICARE

## 2025-02-11 ENCOUNTER — APPOINTMENT (OUTPATIENT)
Dept: GENERAL RADIOLOGY | Facility: HOSPITAL | Age: 75
End: 2025-02-11
Payer: MEDICARE

## 2025-02-11 ENCOUNTER — HOSPITAL ENCOUNTER (INPATIENT)
Dept: OTHER | Facility: HOSPITAL | Age: 75
Discharge: HOME | End: 2025-02-11
Payer: MEDICARE

## 2025-02-11 ENCOUNTER — APPOINTMENT (OUTPATIENT)
Dept: CT IMAGING | Facility: HOSPITAL | Age: 75
End: 2025-02-11
Payer: MEDICARE

## 2025-02-11 DIAGNOSIS — Z78.9 DECREASED ACTIVITIES OF DAILY LIVING (ADL): ICD-10-CM

## 2025-02-11 DIAGNOSIS — R26.2 DIFFICULTY WALKING: Primary | ICD-10-CM

## 2025-02-11 DIAGNOSIS — I50.33 ACUTE ON CHRONIC DIASTOLIC CHF (CONGESTIVE HEART FAILURE): ICD-10-CM

## 2025-02-11 LAB
ACETONE BLD QL: NEGATIVE
ALBUMIN SERPL-MCNC: 3.3 G/DL (ref 3.5–5.2)
ALBUMIN/GLOB SERPL: 1.1 G/DL
ALP SERPL-CCNC: 60 U/L (ref 39–117)
ALT SERPL W P-5'-P-CCNC: 21 U/L (ref 1–33)
AMMONIA BLD-SCNC: 15 UMOL/L (ref 11–51)
AMPHET+METHAMPHET UR QL: NEGATIVE
AMPHETAMINES UR QL: NEGATIVE
ANION GAP SERPL CALCULATED.3IONS-SCNC: 16.2 MMOL/L (ref 5–15)
APAP SERPL-MCNC: <5 MCG/ML (ref 0–30)
ARTERIAL PATENCY WRIST A: POSITIVE
ARTERIAL PATENCY WRIST A: POSITIVE
ASCENDING AORTA: 3.5 CM
AST SERPL-CCNC: 37 U/L (ref 1–32)
ATMOSPHERIC PRESS: 747.1 MMHG
ATMOSPHERIC PRESS: 748.1 MMHG
AV MEAN PRESS GRAD SYS DOP V1V2: 16.8 MMHG
AV VMAX SYS DOP: 270.1 CM/SEC
BARBITURATES UR QL SCN: NEGATIVE
BASE EXCESS BLDA CALC-SCNC: -6.7 MMOL/L (ref -2–2)
BASE EXCESS BLDA CALC-SCNC: -7.7 MMOL/L (ref -2–2)
BDY SITE: ABNORMAL
BDY SITE: ABNORMAL
BENZODIAZ UR QL SCN: NEGATIVE
BH CV ECHO MEAS - AI P1/2T: 295 MSEC
BH CV ECHO MEAS - AO MAX PG: 29.5 MMHG
BH CV ECHO MEAS - AO ROOT DIAM: 3.2 CM
BH CV ECHO MEAS - AO V2 VTI: 41.4 CM
BH CV ECHO MEAS - AVA(I,D): 1.9 CM2
BH CV ECHO MEAS - EDV(MOD-SP2): 92.8 ML
BH CV ECHO MEAS - EDV(MOD-SP4): 46.3 ML
BH CV ECHO MEAS - EF(MOD-SP2): 48.2 %
BH CV ECHO MEAS - EF(MOD-SP4): 30.9 %
BH CV ECHO MEAS - ESV(MOD-SP2): 48.1 ML
BH CV ECHO MEAS - ESV(MOD-SP4): 32 ML
BH CV ECHO MEAS - IVS/LVPW: 0.93 CM
BH CV ECHO MEAS - IVSD: 1.3 CM
BH CV ECHO MEAS - LA DIMENSION: 3.9 CM
BH CV ECHO MEAS - LAT PEAK E' VEL: 10.8 CM/SEC
BH CV ECHO MEAS - LV DIASTOLIC VOL/BSA (35-75): 21.9 CM2
BH CV ECHO MEAS - LV MAX PG: 4.9 MMHG
BH CV ECHO MEAS - LV MEAN PG: 3 MMHG
BH CV ECHO MEAS - LV SYSTOLIC VOL/BSA (12-30): 15.1 CM2
BH CV ECHO MEAS - LV V1 MAX: 110 CM/SEC
BH CV ECHO MEAS - LV V1 VTI: 17.3 CM
BH CV ECHO MEAS - LVIDD: 3.9 CM
BH CV ECHO MEAS - LVIDS: 2.6 CM
BH CV ECHO MEAS - LVOT DIAM: 2.1 CM
BH CV ECHO MEAS - LVPWD: 1.4 CM
BH CV ECHO MEAS - MED PEAK E' VEL: 8.3 CM/SEC
BH CV ECHO MEAS - MV A MAX VEL: 33.4 CM/SEC
BH CV ECHO MEAS - MV DEC SLOPE: 720 CM/SEC2
BH CV ECHO MEAS - MV E MAX VEL: 132.3 CM/SEC
BH CV ECHO MEAS - MV E/A: 4
BH CV ECHO MEAS - MV MAX PG: 10.3 MMHG
BH CV ECHO MEAS - MV MEAN PG: 3.2 MMHG
BH CV ECHO MEAS - MV P1/2T: 65 MSEC
BH CV ECHO MEAS - MV V2 VTI: 34.6 CM
BH CV ECHO MEAS - MVA(P1/2T): 3.4 CM2
BH CV ECHO MEAS - RAP SYSTOLE: 15 MMHG
BH CV ECHO MEAS - RVDD: 2.9 CM
BH CV ECHO MEAS - RVSP: 52.3 MMHG
BH CV ECHO MEAS - SV(MOD-SP2): 44.7 ML
BH CV ECHO MEAS - SV(MOD-SP4): 14.3 ML
BH CV ECHO MEAS - SVI(MOD-SP2): 21.1 ML/M2
BH CV ECHO MEAS - SVI(MOD-SP4): 6.8 ML/M2
BH CV ECHO MEAS - TAPSE (>1.6): 1.2 CM
BH CV ECHO MEAS - TR MAX PG: 37.3 MMHG
BH CV ECHO MEAS - TR MAX VEL: 304.4 CM/SEC
BH CV ECHO MEASUREMENTS AVERAGE E/E' RATIO: 13.85
BILIRUB SERPL-MCNC: 1.4 MG/DL (ref 0–1.2)
BUN BLDA-MCNC: 26 MG/DL (ref 8–23)
BUN SERPL-MCNC: 21 MG/DL (ref 8–23)
BUN/CREAT SERPL: 14.7 (ref 7–25)
BUPRENORPHINE SERPL-MCNC: NEGATIVE NG/ML
CA-I BLDA-SCNC: 1.18 MMOL/L (ref 1.13–1.32)
CALCIUM SPEC-SCNC: 8.7 MG/DL (ref 8.6–10.5)
CANNABINOIDS SERPL QL: NEGATIVE
CHLORIDE BLDA-SCNC: 106 MMOL/L (ref 98–107)
CHLORIDE SERPL-SCNC: 102 MMOL/L (ref 98–107)
CO2 BLDA-SCNC: 17.8 MMOL/L (ref 23–27)
CO2 SERPL-SCNC: 17.8 MMOL/L (ref 22–29)
COCAINE UR QL: NEGATIVE
CREAT BLDA-MCNC: 1.4 MG/DL (ref 0.6–1.3)
CREAT SERPL-MCNC: 1.43 MG/DL (ref 0.57–1)
D-LACTATE SERPL-SCNC: 1.7 MMOL/L (ref 0.5–2)
D-LACTATE SERPL-SCNC: 3 MMOL/L (ref 0.5–2)
D-LACTATE SERPL-SCNC: 4.6 MMOL/L
D-LACTATE SERPL-SCNC: 5.2 MMOL/L (ref 0.5–2)
D-LACTATE SERPL-SCNC: 5.5 MMOL/L (ref 0.5–2)
D-LACTATE SERPL-SCNC: 6.3 MMOL/L (ref 0.5–2)
D-LACTATE SERPL-SCNC: 6.9 MMOL/L (ref 0.5–2)
DEPRECATED RDW RBC AUTO: 48.3 FL (ref 37–54)
EGFRCR SERPLBLD CKD-EPI 2021: 39 ML/MIN/1.73
ERYTHROCYTE [DISTWIDTH] IN BLOOD BY AUTOMATED COUNT: 14.1 % (ref 12.3–15.4)
FENTANYL UR-MCNC: NEGATIVE NG/ML
GAS FLOW AIRWAY: 2 LPM
GLOBULIN UR ELPH-MCNC: 2.9 GM/DL
GLUCOSE BLDC GLUCOMTR-MCNC: 104 MG/DL (ref 70–99)
GLUCOSE BLDC GLUCOMTR-MCNC: 142 MG/DL (ref 70–99)
GLUCOSE BLDC GLUCOMTR-MCNC: 148 MG/DL (ref 70–99)
GLUCOSE BLDC GLUCOMTR-MCNC: 164 MG/DL (ref 70–99)
GLUCOSE BLDC GLUCOMTR-MCNC: 216 MG/DL (ref 70–99)
GLUCOSE BLDC GLUCOMTR-MCNC: 244 MG/DL (ref 70–99)
GLUCOSE BLDC GLUCOMTR-MCNC: 250 MG/DL (ref 70–99)
GLUCOSE BLDC GLUCOMTR-MCNC: 267 MG/DL (ref 70–99)
GLUCOSE BLDC GLUCOMTR-MCNC: 288 MG/DL (ref 70–99)
GLUCOSE BLDC GLUCOMTR-MCNC: 399 MG/DL (ref 70–99)
GLUCOSE BLDC GLUCOMTR-MCNC: 406 MG/DL (ref 70–99)
GLUCOSE BLDC GLUCOMTR-MCNC: 411 MG/DL (ref 70–99)
GLUCOSE BLDC GLUCOMTR-MCNC: 420 MG/DL (ref 70–99)
GLUCOSE BLDC GLUCOMTR-MCNC: 492 MG/DL (ref 65–99)
GLUCOSE BLDC GLUCOMTR-MCNC: 86 MG/DL (ref 70–99)
GLUCOSE SERPL-MCNC: 412 MG/DL (ref 65–99)
HCO3 BLDA-SCNC: 18 MMOL/L (ref 22–26)
HCO3 BLDA-SCNC: 18.4 MMOL/L (ref 22–26)
HCT VFR BLD AUTO: 46.3 % (ref 34–46.6)
HCT VFR BLD CALC: 42 % (ref 38–51)
HCT VFR BLD CALC: 45 % (ref 38–51)
HEMODILUTION: NO
HEMODILUTION: NO
HGB BLD-MCNC: 14.3 G/DL (ref 12–15.9)
HGB BLDA-MCNC: 14.3 G/DL (ref 12–18)
HGB BLDA-MCNC: 15.1 G/DL (ref 12–18)
INHALED O2 CONCENTRATION: 28 %
INHALED O2 CONCENTRATION: 36 %
L PNEUMO1 AG UR QL IA: NEGATIVE
LEFT ATRIUM VOLUME INDEX: 32.5 ML/M2
LV EF BIPLANE MOD: 41 %
LYMPHOCYTES # BLD MANUAL: 0.58 10*3/MM3 (ref 0.7–3.1)
LYMPHOCYTES NFR BLD MANUAL: 3 % (ref 5–12)
Lab: ABNORMAL
MAGNESIUM SERPL-MCNC: 1.5 MG/DL (ref 1.6–2.4)
MCH RBC QN AUTO: 28.8 PG (ref 26.6–33)
MCHC RBC AUTO-ENTMCNC: 30.9 G/DL (ref 31.5–35.7)
MCV RBC AUTO: 93.2 FL (ref 79–97)
METAMYELOCYTES NFR BLD MANUAL: 5 % (ref 0–0)
METHADONE UR QL SCN: NEGATIVE
MODALITY: ABNORMAL
MODALITY: ABNORMAL
MONOCYTES # BLD: 0.43 10*3/MM3 (ref 0.1–0.9)
MRSA DNA SPEC QL NAA+PROBE: NORMAL
NEUTROPHILS # BLD AUTO: 12.72 10*3/MM3 (ref 1.7–7)
NEUTROPHILS NFR BLD MANUAL: 66 % (ref 42.7–76)
NEUTS BAND NFR BLD MANUAL: 22 % (ref 0–5)
NOTIFIED WHO: ABNORMAL
NOTIFIED WHO: ABNORMAL
NT-PROBNP SERPL-MCNC: 8458 PG/ML (ref 0–900)
OPIATES UR QL: POSITIVE
OXYCODONE UR QL SCN: NEGATIVE
PAW @ PEAK INSP FLOW SETTING VENT: 14 CMH2O
PCO2 BLDA: 35.4 MM HG (ref 35–45)
PCO2 BLDA: 36.7 MM HG (ref 35–45)
PCP UR QL SCN: NEGATIVE
PEEP RESPIRATORY: 7 CM[H2O]
PH BLDA: 7.3 PH UNITS (ref 7.35–7.45)
PH BLDA: 7.33 PH UNITS (ref 7.35–7.45)
PHOSPHATE SERPL-MCNC: 2.7 MG/DL (ref 2.5–4.5)
PLATELET # BLD AUTO: 78 10*3/MM3 (ref 140–450)
PMV BLD AUTO: 13.8 FL (ref 6–12)
PO2 BLD: 188 MM[HG] (ref 0–500)
PO2 BLD: 214 MM[HG] (ref 0–500)
PO2 BLDA: 52.6 MM HG (ref 80–100)
PO2 BLDA: 77.1 MM HG (ref 80–100)
POTASSIUM BLDA-SCNC: 5 MMOL/L (ref 3.5–5)
POTASSIUM SERPL-SCNC: 5.2 MMOL/L (ref 3.5–5.2)
PROCALCITONIN SERPL-MCNC: 67.59 NG/ML (ref 0–0.25)
PROT SERPL-MCNC: 6.2 G/DL (ref 6–8.5)
QT INTERVAL: 283 MS
QTC INTERVAL: 369 MS
RBC # BLD AUTO: 4.97 10*6/MM3 (ref 3.77–5.28)
RBC MORPH BLD: NORMAL
READ BACK: YES
READ BACK: YES
RESPIRATORY RATE: 12
S PNEUM AG SPEC QL LA: NEGATIVE
SAO2 % BLDCOA: 83.5 % (ref 95–99)
SAO2 % BLDCOA: 94.3 % (ref 95–99)
SCAN SLIDE: NORMAL
SMALL PLATELETS BLD QL SMEAR: ABNORMAL
SODIUM BLD-SCNC: 138 MMOL/L (ref 131–143)
SODIUM SERPL-SCNC: 136 MMOL/L (ref 136–145)
TRICYCLICS UR QL SCN: NEGATIVE
VARIANT LYMPHS NFR BLD MANUAL: 4 % (ref 19.6–45.3)
WBC MORPH BLD: NORMAL
WBC NRBC COR # BLD AUTO: 14.45 10*3/MM3 (ref 3.4–10.8)

## 2025-02-11 PROCEDURE — 82803 BLOOD GASES ANY COMBINATION: CPT

## 2025-02-11 PROCEDURE — 99291 CRITICAL CARE FIRST HOUR: CPT | Performed by: INTERNAL MEDICINE

## 2025-02-11 PROCEDURE — 84145 PROCALCITONIN (PCT): CPT | Performed by: STUDENT IN AN ORGANIZED HEALTH CARE EDUCATION/TRAINING PROGRAM

## 2025-02-11 PROCEDURE — 94799 UNLISTED PULMONARY SVC/PX: CPT

## 2025-02-11 PROCEDURE — 25010000002 ACETAMINOPHEN 10 MG/ML SOLUTION: Performed by: NURSE PRACTITIONER

## 2025-02-11 PROCEDURE — 88312 SPECIAL STAINS GROUP 1: CPT | Performed by: STUDENT IN AN ORGANIZED HEALTH CARE EDUCATION/TRAINING PROGRAM

## 2025-02-11 PROCEDURE — 25010000002 FUROSEMIDE PER 20 MG: Performed by: STUDENT IN AN ORGANIZED HEALTH CARE EDUCATION/TRAINING PROGRAM

## 2025-02-11 PROCEDURE — 82948 REAGENT STRIP/BLOOD GLUCOSE: CPT | Performed by: STUDENT IN AN ORGANIZED HEALTH CARE EDUCATION/TRAINING PROGRAM

## 2025-02-11 PROCEDURE — 84100 ASSAY OF PHOSPHORUS: CPT | Performed by: STUDENT IN AN ORGANIZED HEALTH CARE EDUCATION/TRAINING PROGRAM

## 2025-02-11 PROCEDURE — 36600 WITHDRAWAL OF ARTERIAL BLOOD: CPT

## 2025-02-11 PROCEDURE — 25010000002 MAGNESIUM SULFATE 4 GM/100ML SOLUTION: Performed by: INTERNAL MEDICINE

## 2025-02-11 PROCEDURE — 83605 ASSAY OF LACTIC ACID: CPT

## 2025-02-11 PROCEDURE — 83735 ASSAY OF MAGNESIUM: CPT | Performed by: STUDENT IN AN ORGANIZED HEALTH CARE EDUCATION/TRAINING PROGRAM

## 2025-02-11 PROCEDURE — 25010000002 VANCOMYCIN 1 G RECONSTITUTED SOLUTION 1 EACH VIAL: Performed by: STUDENT IN AN ORGANIZED HEALTH CARE EDUCATION/TRAINING PROGRAM

## 2025-02-11 PROCEDURE — 99221 1ST HOSP IP/OBS SF/LOW 40: CPT | Performed by: SURGERY

## 2025-02-11 PROCEDURE — 25010000002 MAGNESIUM SULFATE 2 GM/50ML SOLUTION: Performed by: STUDENT IN AN ORGANIZED HEALTH CARE EDUCATION/TRAINING PROGRAM

## 2025-02-11 PROCEDURE — 87076 CULTURE ANAEROBE IDENT EACH: CPT | Performed by: STUDENT IN AN ORGANIZED HEALTH CARE EDUCATION/TRAINING PROGRAM

## 2025-02-11 PROCEDURE — 25010000002 AMIODARONE IN DEXTROSE 5% 360-4.14 MG/200ML-% SOLUTION: Performed by: STUDENT IN AN ORGANIZED HEALTH CARE EDUCATION/TRAINING PROGRAM

## 2025-02-11 PROCEDURE — 94660 CPAP INITIATION&MGMT: CPT

## 2025-02-11 PROCEDURE — 80047 BASIC METABLC PNL IONIZED CA: CPT

## 2025-02-11 PROCEDURE — 87899 AGENT NOS ASSAY W/OPTIC: CPT | Performed by: NURSE PRACTITIONER

## 2025-02-11 PROCEDURE — 80053 COMPREHEN METABOLIC PANEL: CPT | Performed by: STUDENT IN AN ORGANIZED HEALTH CARE EDUCATION/TRAINING PROGRAM

## 2025-02-11 PROCEDURE — 76705 ECHO EXAM OF ABDOMEN: CPT

## 2025-02-11 PROCEDURE — 71045 X-RAY EXAM CHEST 1 VIEW: CPT

## 2025-02-11 PROCEDURE — 85007 BL SMEAR W/DIFF WBC COUNT: CPT | Performed by: STUDENT IN AN ORGANIZED HEALTH CARE EDUCATION/TRAINING PROGRAM

## 2025-02-11 PROCEDURE — 87185 SC STD ENZYME DETCJ PER NZM: CPT | Performed by: STUDENT IN AN ORGANIZED HEALTH CARE EDUCATION/TRAINING PROGRAM

## 2025-02-11 PROCEDURE — 25810000003 SODIUM CHLORIDE 0.9 % SOLUTION 250 ML FLEX CONT: Performed by: STUDENT IN AN ORGANIZED HEALTH CARE EDUCATION/TRAINING PROGRAM

## 2025-02-11 PROCEDURE — 73700 CT LOWER EXTREMITY W/O DYE: CPT

## 2025-02-11 PROCEDURE — 87154 CUL TYP ID BLD PTHGN 6+ TRGT: CPT | Performed by: STUDENT IN AN ORGANIZED HEALTH CARE EDUCATION/TRAINING PROGRAM

## 2025-02-11 PROCEDURE — 93306 TTE W/DOPPLER COMPLETE: CPT

## 2025-02-11 PROCEDURE — 25810000003 LACTATED RINGERS PER 1000 ML: Performed by: NURSE PRACTITIONER

## 2025-02-11 PROCEDURE — 63710000001 INSULIN REGULAR HUMAN PER 5 UNITS: Performed by: STUDENT IN AN ORGANIZED HEALTH CARE EDUCATION/TRAINING PROGRAM

## 2025-02-11 PROCEDURE — 25010000002 PIPERACILLIN SOD-TAZOBACTAM PER 1 G: Performed by: STUDENT IN AN ORGANIZED HEALTH CARE EDUCATION/TRAINING PROGRAM

## 2025-02-11 PROCEDURE — 93005 ELECTROCARDIOGRAM TRACING: CPT | Performed by: STUDENT IN AN ORGANIZED HEALTH CARE EDUCATION/TRAINING PROGRAM

## 2025-02-11 PROCEDURE — 80143 DRUG ASSAY ACETAMINOPHEN: CPT | Performed by: STUDENT IN AN ORGANIZED HEALTH CARE EDUCATION/TRAINING PROGRAM

## 2025-02-11 PROCEDURE — 93010 ELECTROCARDIOGRAM REPORT: CPT | Performed by: INTERNAL MEDICINE

## 2025-02-11 PROCEDURE — 83880 ASSAY OF NATRIURETIC PEPTIDE: CPT | Performed by: INTERNAL MEDICINE

## 2025-02-11 PROCEDURE — 25010000002 VANCOMYCIN 5 G RECONSTITUTED SOLUTION: Performed by: STUDENT IN AN ORGANIZED HEALTH CARE EDUCATION/TRAINING PROGRAM

## 2025-02-11 PROCEDURE — 99223 1ST HOSP IP/OBS HIGH 75: CPT | Performed by: STUDENT IN AN ORGANIZED HEALTH CARE EDUCATION/TRAINING PROGRAM

## 2025-02-11 PROCEDURE — 93306 TTE W/DOPPLER COMPLETE: CPT | Performed by: INTERNAL MEDICINE

## 2025-02-11 PROCEDURE — 99222 1ST HOSP IP/OBS MODERATE 55: CPT | Performed by: INTERNAL MEDICINE

## 2025-02-11 PROCEDURE — 85025 COMPLETE CBC W/AUTO DIFF WBC: CPT | Performed by: STUDENT IN AN ORGANIZED HEALTH CARE EDUCATION/TRAINING PROGRAM

## 2025-02-11 PROCEDURE — 82140 ASSAY OF AMMONIA: CPT | Performed by: STUDENT IN AN ORGANIZED HEALTH CARE EDUCATION/TRAINING PROGRAM

## 2025-02-11 PROCEDURE — 82009 KETONE BODYS QUAL: CPT | Performed by: STUDENT IN AN ORGANIZED HEALTH CARE EDUCATION/TRAINING PROGRAM

## 2025-02-11 PROCEDURE — 25010000002 DIGOXIN PER 500 MCG: Performed by: INTERNAL MEDICINE

## 2025-02-11 PROCEDURE — 80307 DRUG TEST PRSMV CHEM ANLYZR: CPT | Performed by: STUDENT IN AN ORGANIZED HEALTH CARE EDUCATION/TRAINING PROGRAM

## 2025-02-11 PROCEDURE — 82948 REAGENT STRIP/BLOOD GLUCOSE: CPT

## 2025-02-11 PROCEDURE — 94761 N-INVAS EAR/PLS OXIMETRY MLT: CPT

## 2025-02-11 PROCEDURE — 71250 CT THORAX DX C-: CPT

## 2025-02-11 PROCEDURE — 25810000003 SODIUM CHLORIDE 0.9 % SOLUTION: Performed by: STUDENT IN AN ORGANIZED HEALTH CARE EDUCATION/TRAINING PROGRAM

## 2025-02-11 PROCEDURE — 25810000003 LACTATED RINGERS SOLUTION: Performed by: NURSE PRACTITIONER

## 2025-02-11 PROCEDURE — 87040 BLOOD CULTURE FOR BACTERIA: CPT | Performed by: STUDENT IN AN ORGANIZED HEALTH CARE EDUCATION/TRAINING PROGRAM

## 2025-02-11 PROCEDURE — 83605 ASSAY OF LACTIC ACID: CPT | Performed by: STUDENT IN AN ORGANIZED HEALTH CARE EDUCATION/TRAINING PROGRAM

## 2025-02-11 PROCEDURE — 87641 MR-STAPH DNA AMP PROBE: CPT | Performed by: STUDENT IN AN ORGANIZED HEALTH CARE EDUCATION/TRAINING PROGRAM

## 2025-02-11 PROCEDURE — 87449 NOS EACH ORGANISM AG IA: CPT | Performed by: NURSE PRACTITIONER

## 2025-02-11 RX ORDER — BISACODYL 5 MG/1
5 TABLET, DELAYED RELEASE ORAL DAILY PRN
Status: DISCONTINUED | OUTPATIENT
Start: 2025-02-11 | End: 2025-02-28 | Stop reason: HOSPADM

## 2025-02-11 RX ORDER — SODIUM CHLORIDE, SODIUM LACTATE, POTASSIUM CHLORIDE, CALCIUM CHLORIDE 600; 310; 30; 20 MG/100ML; MG/100ML; MG/100ML; MG/100ML
100 INJECTION, SOLUTION INTRAVENOUS CONTINUOUS
Status: DISCONTINUED | OUTPATIENT
Start: 2025-02-11 | End: 2025-02-14

## 2025-02-11 RX ORDER — AMMONIUM LACTATE 12 G/100G
LOTION TOPICAL
Status: DISCONTINUED | OUTPATIENT
Start: 2025-02-11 | End: 2025-02-28 | Stop reason: HOSPADM

## 2025-02-11 RX ORDER — DEXTROSE MONOHYDRATE 25 G/50ML
10-50 INJECTION, SOLUTION INTRAVENOUS
Status: DISCONTINUED | OUTPATIENT
Start: 2025-02-11 | End: 2025-02-14

## 2025-02-11 RX ORDER — NICOTINE POLACRILEX 4 MG
15 LOZENGE BUCCAL
Status: DISCONTINUED | OUTPATIENT
Start: 2025-02-11 | End: 2025-02-11 | Stop reason: SDUPTHER

## 2025-02-11 RX ORDER — SODIUM CHLORIDE 9 MG/ML
40 INJECTION, SOLUTION INTRAVENOUS AS NEEDED
Status: DISCONTINUED | OUTPATIENT
Start: 2025-02-11 | End: 2025-02-28 | Stop reason: HOSPADM

## 2025-02-11 RX ORDER — DIGOXIN 0.25 MG/ML
500 INJECTION INTRAMUSCULAR; INTRAVENOUS ONCE
Status: COMPLETED | OUTPATIENT
Start: 2025-02-11 | End: 2025-02-11

## 2025-02-11 RX ORDER — SODIUM CHLORIDE 0.9 % (FLUSH) 0.9 %
10 SYRINGE (ML) INJECTION EVERY 12 HOURS SCHEDULED
Status: DISCONTINUED | OUTPATIENT
Start: 2025-02-11 | End: 2025-02-14

## 2025-02-11 RX ORDER — BISACODYL 10 MG
10 SUPPOSITORY, RECTAL RECTAL DAILY PRN
Status: DISCONTINUED | OUTPATIENT
Start: 2025-02-11 | End: 2025-02-28 | Stop reason: HOSPADM

## 2025-02-11 RX ORDER — HEPARIN SODIUM 5000 [USP'U]/ML
5000 INJECTION, SOLUTION INTRAVENOUS; SUBCUTANEOUS EVERY 8 HOURS SCHEDULED
Status: DISCONTINUED | OUTPATIENT
Start: 2025-02-11 | End: 2025-02-11

## 2025-02-11 RX ORDER — MAGNESIUM SULFATE HEPTAHYDRATE 40 MG/ML
2 INJECTION, SOLUTION INTRAVENOUS ONCE
Status: COMPLETED | OUTPATIENT
Start: 2025-02-11 | End: 2025-02-11

## 2025-02-11 RX ORDER — MAGNESIUM SULFATE HEPTAHYDRATE 40 MG/ML
4 INJECTION, SOLUTION INTRAVENOUS ONCE
Status: COMPLETED | OUTPATIENT
Start: 2025-02-11 | End: 2025-02-11

## 2025-02-11 RX ORDER — SODIUM CHLORIDE 0.9 % (FLUSH) 0.9 %
10 SYRINGE (ML) INJECTION AS NEEDED
Status: DISCONTINUED | OUTPATIENT
Start: 2025-02-11 | End: 2025-02-28 | Stop reason: HOSPADM

## 2025-02-11 RX ORDER — SPIRONOLACTONE 50 MG/1
25 TABLET, FILM COATED ORAL DAILY
COMMUNITY
Start: 2024-12-31 | End: 2025-02-28 | Stop reason: HOSPADM

## 2025-02-11 RX ORDER — SODIUM CHLORIDE 0.9 % (FLUSH) 0.9 %
10 SYRINGE (ML) INJECTION EVERY 12 HOURS SCHEDULED
Status: DISCONTINUED | OUTPATIENT
Start: 2025-02-11 | End: 2025-02-28 | Stop reason: HOSPADM

## 2025-02-11 RX ORDER — PHENYLEPHRINE HCL IN 0.9% NACL 0.5 MG/5ML
.5-3 SYRINGE (ML) INTRAVENOUS
Status: DISCONTINUED | OUTPATIENT
Start: 2025-02-11 | End: 2025-02-11

## 2025-02-11 RX ORDER — DEXTROSE MONOHYDRATE 25 G/50ML
25 INJECTION, SOLUTION INTRAVENOUS
Status: DISCONTINUED | OUTPATIENT
Start: 2025-02-11 | End: 2025-02-28 | Stop reason: HOSPADM

## 2025-02-11 RX ORDER — AMOXICILLIN 250 MG
2 CAPSULE ORAL 2 TIMES DAILY PRN
Status: DISCONTINUED | OUTPATIENT
Start: 2025-02-11 | End: 2025-02-28 | Stop reason: HOSPADM

## 2025-02-11 RX ORDER — IBUPROFEN 600 MG/1
1 TABLET ORAL
Status: DISCONTINUED | OUTPATIENT
Start: 2025-02-11 | End: 2025-02-14

## 2025-02-11 RX ORDER — MAGNESIUM SULFATE HEPTAHYDRATE 40 MG/ML
2 INJECTION, SOLUTION INTRAVENOUS
Status: DISCONTINUED | OUTPATIENT
Start: 2025-02-11 | End: 2025-02-11

## 2025-02-11 RX ORDER — ACETAMINOPHEN 10 MG/ML
1000 INJECTION, SOLUTION INTRAVENOUS EVERY 8 HOURS PRN
Status: DISCONTINUED | OUTPATIENT
Start: 2025-02-11 | End: 2025-02-14

## 2025-02-11 RX ORDER — NICOTINE POLACRILEX 4 MG
15 LOZENGE BUCCAL
Status: DISCONTINUED | OUTPATIENT
Start: 2025-02-11 | End: 2025-02-14

## 2025-02-11 RX ORDER — LIDOCAINE 4 G/G
2 PATCH TOPICAL
Status: DISCONTINUED | OUTPATIENT
Start: 2025-02-11 | End: 2025-02-28 | Stop reason: HOSPADM

## 2025-02-11 RX ORDER — NITROGLYCERIN 0.4 MG/1
0.4 TABLET SUBLINGUAL
Status: DISCONTINUED | OUTPATIENT
Start: 2025-02-11 | End: 2025-02-28 | Stop reason: HOSPADM

## 2025-02-11 RX ORDER — IBUPROFEN 600 MG/1
1 TABLET ORAL
Status: DISCONTINUED | OUTPATIENT
Start: 2025-02-11 | End: 2025-02-11

## 2025-02-11 RX ORDER — DIGOXIN 125 MCG
250 TABLET ORAL
COMMUNITY
End: 2025-02-28 | Stop reason: HOSPADM

## 2025-02-11 RX ORDER — HYDROCODONE BITARTRATE AND ACETAMINOPHEN 10; 325 MG/1; MG/1
1 TABLET ORAL EVERY 4 HOURS PRN
Status: DISCONTINUED | OUTPATIENT
Start: 2025-02-11 | End: 2025-02-14

## 2025-02-11 RX ORDER — POLYETHYLENE GLYCOL 3350 17 G/17G
17 POWDER, FOR SOLUTION ORAL DAILY PRN
Status: DISCONTINUED | OUTPATIENT
Start: 2025-02-11 | End: 2025-02-28 | Stop reason: HOSPADM

## 2025-02-11 RX ORDER — FAMOTIDINE 10 MG/ML
20 INJECTION, SOLUTION INTRAVENOUS EVERY 12 HOURS SCHEDULED
Status: DISCONTINUED | OUTPATIENT
Start: 2025-02-11 | End: 2025-02-28 | Stop reason: HOSPADM

## 2025-02-11 RX ORDER — FUROSEMIDE 10 MG/ML
40 INJECTION INTRAMUSCULAR; INTRAVENOUS ONCE
Status: COMPLETED | OUTPATIENT
Start: 2025-02-11 | End: 2025-02-11

## 2025-02-11 RX ORDER — SODIUM CHLORIDE 9 MG/ML
40 INJECTION, SOLUTION INTRAVENOUS AS NEEDED
Status: DISCONTINUED | OUTPATIENT
Start: 2025-02-11 | End: 2025-02-14

## 2025-02-11 RX ORDER — SODIUM CHLORIDE 0.9 % (FLUSH) 0.9 %
10 SYRINGE (ML) INJECTION AS NEEDED
Status: DISCONTINUED | OUTPATIENT
Start: 2025-02-11 | End: 2025-02-14

## 2025-02-11 RX ORDER — SACUBITRIL AND VALSARTAN 97; 103 MG/1; MG/1
0.5 TABLET, FILM COATED ORAL EVERY MORNING
COMMUNITY
End: 2025-02-28 | Stop reason: HOSPADM

## 2025-02-11 RX ORDER — NOREPINEPHRINE BITARTRATE 0.03 MG/ML
.02-.3 INJECTION, SOLUTION INTRAVENOUS
Status: DISCONTINUED | OUTPATIENT
Start: 2025-02-11 | End: 2025-02-14

## 2025-02-11 RX ADMIN — FAMOTIDINE 20 MG: 10 INJECTION, SOLUTION INTRAVENOUS at 16:02

## 2025-02-11 RX ADMIN — ACETAMINOPHEN 1000 MG: 10 INJECTION, SOLUTION INTRAVENOUS at 18:13

## 2025-02-11 RX ADMIN — DIGOXIN 500 MCG: 0.25 INJECTION INTRAMUSCULAR; INTRAVENOUS at 17:16

## 2025-02-11 RX ADMIN — AMIODARONE HYDROCHLORIDE 0.5 MG/MIN: 1.8 INJECTION, SOLUTION INTRAVENOUS at 08:56

## 2025-02-11 RX ADMIN — Medication 10 ML: at 21:41

## 2025-02-11 RX ADMIN — PIPERACILLIN AND TAZOBACTAM 4.5 G: 4; .5 INJECTION, POWDER, FOR SOLUTION INTRAVENOUS; PARENTERAL at 12:57

## 2025-02-11 RX ADMIN — MAGNESIUM SULFATE HEPTAHYDRATE 2 G: 40 INJECTION, SOLUTION INTRAVENOUS at 08:30

## 2025-02-11 RX ADMIN — MUPIROCIN 1 APPLICATION: 20 OINTMENT TOPICAL at 21:40

## 2025-02-11 RX ADMIN — PIPERACILLIN AND TAZOBACTAM 4.5 G: 4; .5 INJECTION, POWDER, FOR SOLUTION INTRAVENOUS; PARENTERAL at 04:23

## 2025-02-11 RX ADMIN — SODIUM CHLORIDE, SODIUM LACTATE, POTASSIUM CHLORIDE, CALCIUM CHLORIDE 1000 ML: 20; 30; 600; 310 INJECTION, SOLUTION INTRAVENOUS at 12:56

## 2025-02-11 RX ADMIN — MAGNESIUM SULFATE IN WATER 4 G: 40 INJECTION, SOLUTION INTRAVENOUS at 12:56

## 2025-02-11 RX ADMIN — HYDROCODONE BITARTRATE AND ACETAMINOPHEN 1 TABLET: 10; 325 TABLET ORAL at 21:44

## 2025-02-11 RX ADMIN — VANCOMYCIN HYDROCHLORIDE 1000 MG: 1 INJECTION, POWDER, LYOPHILIZED, FOR SOLUTION INTRAVENOUS at 21:40

## 2025-02-11 RX ADMIN — INSULIN HUMAN 7 UNITS: 100 INJECTION, SOLUTION PARENTERAL at 05:08

## 2025-02-11 RX ADMIN — LIDOCAINE 2 PATCH: 4 PATCH TOPICAL at 16:02

## 2025-02-11 RX ADMIN — NOREPINEPHRINE BITARTRATE 0.08 MCG/KG/MIN: 0.03 INJECTION, SOLUTION INTRAVENOUS at 22:37

## 2025-02-11 RX ADMIN — Medication 10 ML: at 08:41

## 2025-02-11 RX ADMIN — ACETAMINOPHEN 1000 MG: 10 INJECTION, SOLUTION INTRAVENOUS at 08:56

## 2025-02-11 RX ADMIN — PIPERACILLIN AND TAZOBACTAM 4.5 G: 4; .5 INJECTION, POWDER, FOR SOLUTION INTRAVENOUS; PARENTERAL at 19:32

## 2025-02-11 RX ADMIN — INSULIN HUMAN 7 UNITS/HR: 1 INJECTION, SOLUTION INTRAVENOUS at 08:49

## 2025-02-11 RX ADMIN — Medication: at 21:40

## 2025-02-11 RX ADMIN — FUROSEMIDE 40 MG: 10 INJECTION, SOLUTION INTRAMUSCULAR; INTRAVENOUS at 04:19

## 2025-02-11 RX ADMIN — INSULIN HUMAN 5 UNITS: 100 INJECTION, SOLUTION PARENTERAL at 04:00

## 2025-02-11 RX ADMIN — SODIUM CHLORIDE, SODIUM LACTATE, POTASSIUM CHLORIDE, CALCIUM CHLORIDE 100 ML/HR: 20; 30; 600; 310 INJECTION, SOLUTION INTRAVENOUS at 18:56

## 2025-02-11 RX ADMIN — INSULIN HUMAN 5 UNITS/HR: 1 INJECTION, SOLUTION INTRAVENOUS at 20:52

## 2025-02-11 RX ADMIN — AMIODARONE HYDROCHLORIDE 1 MG/MIN: 1.8 INJECTION, SOLUTION INTRAVENOUS at 03:11

## 2025-02-11 RX ADMIN — Medication 2250 MG: at 05:05

## 2025-02-11 RX ADMIN — NOREPINEPHRINE BITARTRATE 0.02 MCG/KG/MIN: 0.03 INJECTION, SOLUTION INTRAVENOUS at 09:53

## 2025-02-11 RX ADMIN — HYDROCODONE BITARTRATE AND ACETAMINOPHEN 1 TABLET: 10; 325 TABLET ORAL at 16:02

## 2025-02-11 NOTE — CONSULTS
Cardiology Consult Note  [facility] CORONARY CARE UNIT          Patient Identification:  Mary Albarran      [id number]  74 y.o.        female  1950           Reason for Consultation:  afib      PCP: Brittni Quiroz APRN    History of Present Illness:     Patient is a 74-year-old female with a history of persistent atrial fibrillation, chronic renal insufficiency stage III, mild aortic stenosis, diabetes type 2, COPD, essential hypertension, and dyslipidemia who initially presented to an outside facility due to decreased responsiveness and hypotension.  She was found to be in atrial fibrillation with elevated ventricular rate as well as hypotensive with systolics now in the 60s treat with IV fluids.  She has been febrile with a temperature up into the 103 range with an elevated white blood cell count of 14,000 and she grew gram-positive organism from a blood culture at the outside facility.  She has been started on IV pressors she reports pain in her hands and feet which she states is chronic from neuropathy.  She is not experiencing problems with chest discomfort or increased shortness of breath issues.    Past History:  Past Medical History:   Diagnosis Date    Allergic rhinitis     Anxiety     Aortic valve disease 11/28/2021    Fibrocalcific changes of the aortic valve with mild aortic valve stenosis   on echo 3/2/2021  Moderate aortic valve regurgitation is present. Aortic valve maximum pressure gradient is 26 mmHg. Moderate aortic valve stenosis is present.  On echo 2/10/2022       Arthritis     Atrial fibrillation     Chronic kidney disease (CKD), stage III (moderate)     Chronic pain     COPD (chronic obstructive pulmonary disease)     Diabetes mellitus type 2 with complications     Ex-smoker     QUIT SMOKING 2008    Hyperlipidemia     Hypertension     Thrombocytopenia      Past Surgical History:   Procedure Laterality Date    HYSTERECTOMY      30 YEARS AGO     Allergies   Allergen  Reactions    Codeine Mental Status Change    Sglt2 Inhibitors Other (See Comments)     Frequent UTI      Social History     Socioeconomic History    Marital status:    Tobacco Use    Smoking status: Former     Current packs/day: 0.00     Average packs/day: 1 pack/day for 37.0 years (37.0 ttl pk-yrs)     Types: Cigarettes     Start date: 4/3/1971     Quit date: 2008     Years since quittin.8     Passive exposure: Never    Smokeless tobacco: Never    Tobacco comments:     FOR 30 YEARS   Vaping Use    Vaping status: Never Used   Substance and Sexual Activity    Alcohol use: Never    Drug use: Never    Sexual activity: Defer     Family History   Problem Relation Age of Onset    Heart disease Father     Heart disease Other     Heart disease Other     Diabetes Other        Medications:  Prior to Admission medications    Medication Sig Start Date End Date Taking? Authorizing Provider   digoxin (LANOXIN) 125 MCG tablet TAKE 1 TABLET ON  AND SUNDAYS AND 2 TABLETS THE REST OF THE WEEK  Patient taking differently: Take 1 tablet by mouth 2 (Two) Times a Week. TAKE 1 TABLET ON  AND SUNDAYS 1/8/25  Yes Bouchra Bentley APRN   digoxin (LANOXIN) 125 MCG tablet Take 2 tablets by mouth 5 (Five) Times a Week. Mon., Tue., Wed., Fri., & Sat.   Yes Provider, MD Whitley   dilTIAZem (TIAZAC) 120 MG 24 hr capsule TAKE 1 CAPSULE DAILY  Patient taking differently: Take 1 capsule by mouth Daily. 24  Yes Brittni Quiroz APRN   HYDROcodone-acetaminophen (NORCO)  MG per tablet Take 1 tablet by mouth Every 4 (Four) Hours As Needed for Moderate Pain or Severe Pain. 25  Yes Brittni Quiroz APRN   Januvia 50 MG tablet TAKE 1 TABLET DAILY 24  Yes Brittni Quiroz APRN   metoprolol succinate XL (TOPROL-XL) 50 MG 24 hr tablet Take 3 tablets by mouth 2 (Two) Times a Day. 24  Yes Bouchra Bentley APRN   O2 (OXYGEN) Inhale 2 L/min Every Night.   Yes  ProviderWhitley MD   pregabalin (LYRICA) 150 MG capsule Take 1 capsule by mouth 2 (Two) Times a Day. 2/5/25  Yes Brittni Quiroz APRN   sacubitril-valsartan (Entresto)  MG tablet Take 1 tablet by mouth Take As Directed. Take 1/2 tablet in the morning and 1 tablet at night.  Patient taking differently: Take 1 tablet by mouth Every Night. 12/2/24  Yes Bouchra Bentley APRN   sacubitril-valsartan (Entresto)  MG tablet Take 0.5 tablets by mouth Every Morning.   Yes Whitley Emmanuel MD   silver sulfadiazine (Silvadene) 1 % cream Apply 1 Application topically to the appropriate area as directed 2 (Two) Times a Day. 1/24/25  Yes Brittni Quiroz APRN   spironolactone (ALDACTONE) 50 MG tablet Take 0.5 tablets by mouth Daily. 12/31/24  Yes Whitley Emmanuel MD   torsemide (DEMADEX) 100 MG tablet Take 0.5 tablets by mouth Daily. 5/16/24  Yes Elaine Lerner MD   Xarelto 20 MG tablet TAKE 1 TABLET DAILY 3/20/24  Yes Yoav Metcalf MD   albuterol (ACCUNEB) 1.25 MG/3ML nebulizer solution Take 3 mL by nebulization Every 6 (Six) Hours As Needed for Wheezing or Shortness of Air. 3/30/23   Brittni Quiroz APRN   albuterol sulfate  (90 Base) MCG/ACT inhaler Inhale 2 puffs Every 4 (Four) Hours As Needed for Wheezing. 5/20/24   Brittni Quiroz APRN   atorvastatin (LIPITOR) 20 MG tablet TAKE 1 TABLET DAILY  Patient taking differently: Take 1 tablet by mouth Daily. 9/12/24   Brittni Quiroz APRN   budesonide (PULMICORT) 1 MG/2ML nebulizer solution Take 2 mL by nebulization Daily. 9/9/24   Brittni Quiroz APRN   cholecalciferol (VITAMIN D3) 25 MCG (1000 UT) tablet Take 1 tablet by mouth Daily.    Whitley Emmanuel MD   Lanjammieus SoloStar 100 UNIT/ML injection pen INJECT 30 UNITS UNDER THE SKIN INTO THE APPROPRIATE AREA DAILY AS DIRECTED 9/23/24   Brittni Quiroz APRN   cephalexin (KEFLEX) 250 MG capsule Take 1 capsule by mouth Every  Night. 5/23/24 2/11/25  Provider, MD Whitley   clindamycin (Cleocin) 300 MG capsule Take 1 capsule by mouth 2 (Two) Times a Day. 6/26/24 2/11/25  Brittni Quiroz APRN   fluconazole (Diflucan) 100 MG tablet Take 1 tablet by mouth Daily. 7/1/24 2/11/25  Brittni Quiroz APRN   Insulin Pen Needle (BD Pen Needle Katiuska 2nd Gen) 32G X 4 MM misc 1 each Daily. 4/1/22 2/11/25  Brittni Quiroz APRN   meclizine (ANTIVERT) 25 MG tablet Take 1 tablet by mouth 3 (Three) Times a Day As Needed for Dizziness. 7/24/24 2/11/25  Brittni Qurioz APRN   nystatin (MYCOSTATIN) 680822 UNIT/GM powder Apply  topically to the appropriate area as directed 2 (Two) Times a Day. 10/4/23 2/11/25  Brittni Quiroz APRN   ondansetron ODT (ZOFRAN-ODT) 4 MG disintegrating tablet Place 1 tablet on the tongue Every 8 (Eight) Hours As Needed for Nausea or Vomiting. 4/24/24 2/11/25  Brittni Quiroz APRN   pantoprazole (Protonix) 20 MG EC tablet Take 1 tablet by mouth Daily. 9/30/24 2/11/25  Bouchra Bentley APRN      Current medications:  mupirocin, 1 Application, Each Nare, BID  piperacillin-tazobactam, 4.5 g, Intravenous, Q8H  sodium chloride, 1,000 mL, Intravenous, Once  sodium chloride, 10 mL, Intravenous, Q12H  sodium chloride, 10 mL, Intravenous, Q12H  vancomycin, 1,000 mg, Intravenous, Q24H      Current IV drips:  amiodarone, 0.5 mg/min, Last Rate: Stopped (02/11/25 1006)  insulin, 0-100 Units/hr, Last Rate: 7 Units/hr (02/11/25 0909)  norepinephrine, 0.02-0.3 mcg/kg/min, Last Rate: 0.1 mcg/kg/min (02/11/25 1402)  Pharmacy to dose vancomycin,         Review of Systems   Constitutional: Negative for chills, fever and weight loss.   HENT:  Negative for congestion and nosebleeds.    Cardiovascular:  Negative for orthopnea and paroxysmal nocturnal dyspnea.   Respiratory:  Negative for cough and shortness of breath.    Endocrine: Negative for cold intolerance and heat intolerance.   Skin:  Negative for  "rash.   Musculoskeletal:  Negative for back pain and muscle weakness.   Gastrointestinal:  Negative for abdominal pain, nausea and vomiting.   Genitourinary:  Negative for dysuria and nocturia.   Neurological:  Negative for dizziness and light-headedness.   Psychiatric/Behavioral:  Negative for altered mental status and hallucinations.          Physical Exam    /98 (BP Location: Right arm, Patient Position: Lying)   Pulse 104   Temp (!) 101.7 °F (38.7 °C) (Bladder)   Resp (!) 34   Ht 160 cm (62.99\")   Wt 116 kg (256 lb 2.8 oz)   SpO2 93%   BMI 45.39 kg/m²  Body mass index is 45.39 kg/m².   Oxygen saturation   @FLOWAN(10::1)@ SpO2  Min: 85 %  Max: 100 %    General Appearance:   no acute distress  Alert and oriented x3  HENT:   lips not cyanotic  Atraumatic  Neck:  thyroid not enlarged  supple  Respiratory:  no respiratory distress  normal breath sounds  no rales  Cardiovascular:  no jugular venous distention  Irregular  apical impulse normal  S1 normal, S2 normal  no S3, no S4   no murmur  no rub, no thrill  no carotid bruit  pedal pulses normal  lower extremity edema: +1  Gastrointestinal:   bowel sounds normal  non-tender  no hepatomegaly, no splenomegaly  Musculoskeletal:  no clubbing of fingers.   normocephalic, head atraumatic  Skin:   warm, dry  No rashes  Neuro/Psychiatric:  normal mood and affect  judgement and insight appropriate      Cardiographics:     ECG  (personally reviewed)    Telemetry:  (personally reviewed) atrial fibrillation with RVR   Results for orders placed during the hospital encounter of 02/11/25    Adult Transthoracic Echo Complete W/ Cont if Necessary Per Protocol    Interpretation Summary    Left ventricular systolic function is mildly decreased. Left ventricular ejection fraction appears to be 46 - 50%. with some anterior septal hypokensis    The right ventricular cavity is mildly dilated.    The left atrial cavity is mildly dilated.    The right atrial cavity is moderately " " dilated.    Mild to moderate aortic valve stenosis is present.    Estimated right ventricular systolic pressure from tricuspid regurgitation is moderately elevated (45-55 mmHg).         No results found for this or any previous visit.     Cardiolite (Tc-99m Sestamibi) stress test                  Lab Review:       CBC          5/15/2024    11:24 2/11/2025    03:02   CBC   WBC 7.91  14.45    RBC 3.16  4.97    Hemoglobin 8.5  14.3    Hematocrit 29.9  46.3    MCV 94.6  93.2    MCH 26.9  28.8    MCHC 28.4  30.9    RDW 15.8  14.1    Platelets 80  78        CMP          5/15/2024    11:24 6/6/2024    12:17 2/11/2025    03:02 2/11/2025    07:43   CMP   Glucose 292  162  412     BUN 31  21  21     Creatinine 0.83  1.12  1.43  1.40    EGFR 75.5  52.7  39.0     Sodium 140  141  136     Potassium 5.8  4.7  5.2     Chloride 105  98  102     Calcium 8.4  9.4  8.7     Total Protein 5.7   6.2     Albumin 3.7   3.3     Globulin 2.0   2.9     Total Bilirubin 0.5   1.4     Alkaline Phosphatase 66   60     AST (SGOT) 17   37     ALT (SGPT) 25   21     Albumin/Globulin Ratio 1.9   1.1     BUN/Creatinine Ratio 37.3  18.8  14.7     Anion Gap 7.0  11.0  16.2          CARDIAC LABS:      Lab 02/11/25  0302   PROBNP 8,458.0*      Lab Results   Component Value Date    DIGOXIN 0.63 10/06/2023      Lab Results   Component Value Date    TSH 1.520 01/27/2023           Invalid input(s): \"LDLCALC\"  No results found for: \"POCTROP\"  No results found for: \"DDIMERQUAN\"  Lab Results   Component Value Date    MG 1.5 (L) 02/11/2025             Component  Ref Range & Units 1 d ago Comments   D DIMER-TL  <500 ng/mL 265      CARDIAC LABS:      Lab 02/11/25  0302   PROBNP 8,458.0*    pecimen: Serum - Serum specimen (specimen)  Component  Ref Range & Units 1 d ago   TROPONIN-TL  <0.034 ng/mL 0.03   Resulting Agency [facility]   Specimen Collected: 02/10/25 18:48    Performed by: [facility] LAB Last Resulted: " 02/10/25 19:23   Received From: [employer]  Result Received: 02/11/25 03:32        Imaging:  CXR  Narrative & Impression   XR CHEST 1 VW     Date of Exam: 2/11/2025 11:22 AM EST     Indication: line placement     Comparison: 10/9/2023     Findings:  There is a right IJ central line present with the tip projecting over the high right atrium. There is cardiomegaly. There is central pulmonary vascular congestion with haziness in the perihilar regions, left greater than right which may indicate   pulmonary edema pattern or atypical infection pattern. No pneumothorax or large effusion identified.     IMPRESSION:  Impression:  1.Right IJ central line with tip projecting over the high right atrium. No pneumothorax.  2.Cardiomegaly with central pulmonary vascular congestion and perihilar haziness suggesting pulmonary edema or less likely atypical infection pattern.            Assessment:    Atrial fibrillation with RVR      Atrial fibrillation persistent in nature now with elevated ventricular rate likely secondary to exacerbation from underlying sepsis blood pressure is improved on pressors.  Will recommend IV digoxin load today as patient is unlikely to convert from amiodarone therapy given the persistent nature of atrial fibrillation.  In addition would recommend covering with Lovenox while off of Xarelto and less felt to have a bleeding risk.    HFrEF patient with mildly decreased ejection fraction on echocardiogram with area of anterior septal wall hypokinesis this would correlate with her EKG showing old anteroseptal wall MI but no acute changes.  Outside troponin is also within normal limits do not feel there is any evidence of any kind of an acute event.  In addition the images are somewhat limited given her body habitus and heart rate irregularities would likely repeat when clinically stable with very contrast to better assess LV function and if any definitive wall motion abilities.  When patient's blood  pressure improves also would encourage beta-blockade    Plan:  1.  IV digoxin 0.5 x 1 today  2.  Recommend Lovenox subcu therapeutic for CVA prevention        Thank you for allowing us to share in [facility].            Joaquin Gonzalez MD   2/11/2025    15:23 EST

## 2025-02-11 NOTE — PLAN OF CARE
Goal Outcome Evaluation:         PT PLACED ON BIPAP 14/7 RR 12 AFTER DIRECT ADMIT FROM [place]. ABG pH 7.29, CO2 36.7 PaO2 52.6 CO3 18 ON 2L NC. BS CRACKLES. KEEP SATS ABOVE 95%.

## 2025-02-11 NOTE — PROGRESS NOTES
"[facility] Clinical Pharmacy Services: Vancomycin Pharmacokinetic Initial Consult Note    Mary Albarran is a 74 y.o. female who is on day 1 of pharmacy to dose vancomycin for Empiric.    Consult Information  Consulting Provider: TIMMY Benitez  Planned Duration of Therapy: 7 days  Was Patient Receiving Prior to Admission/Consult?: No  Loading Dose Ordered or Given: 2250 mg on  at 0506  PK/PD Target: -600 mg/L.hr   Other Antimicrobials: Piperacillin/Tazobactam    Imaging Reviewed?: Yes    Microbiology Data  MRSA PCR performed: 25; Result: Negative  Culture/Source:   Microbiology Results (last 10 days)       Procedure Component Value - Date/Time    MRSA Screen, PCR (Inpatient) - Swab, Nares [[id number]]  (Normal) Collected: 25    Lab Status: Final result Specimen: Swab from Nares Updated: 25 1002     MRSA PCR No MRSA Detected    Narrative:      The negative predictive value of this diagnostic test is high and should only be used to consider de-escalating anti-MRSA therapy. A positive result may indicate colonization with MRSA and must be correlated clinically.    RSV Screen - , [[id number]] Collected: 02/10/25 2000    Lab Status: Final result Updated: 25 0205    Influenza Antigen, Rapid - , [[id number]] Collected: 02/10/25 2000    Lab Status: Final result Updated: 25 0205    COVID-19, ABBOTT IN-HOUSE,NASAL Swab (NO TRANSPORT MEDIA) 2 HR TAT - , [[id number]] Collected: 02/10/25 2000    Lab Status: Final result Updated: 25 0205    Rapid Strep A Screen - , [[id number]] Collected: 02/10/25 182    Lab Status: Final result Updated: 25 0205              Vitals/Labs  Ht: 160 cm (62.99\"); Wt: 116 kg (256 lb 2.8 oz)  Temp (24hrs), Av.1 °F (38.4 °C), Min:98.7 °F (37.1 °C), Max:103.5 °F (39.7 °C)   Estimated Creatinine Clearance: 44.6 mL/min (A) (by C-G formula based on SCr of 1.4 mg/dL (H)).     Results from last 7 days   Lab Units 25  0743 25  0302   CREATININE " mg/dL 1.40* 1.43*   WBC 10*3/mm3  --  14.45*     Assessment/Plan:    Vancomycin Dose:  1000 mg IV every 24 hours; which provides the following predicted parameters:  Exposure target: AUC24 (range)400-600 mg/L.hr   AUC24,ss: 585 mg/L.hr  Probability of AUC24 > 400: 78 %  Ctrough,ss: 17.6 mg/L  Probability of Ctrough,ss > 20: 43 %  Probability of nephrotoxicity (Lodise ERIC 2009): 13 %  Vanc Random ordered for 2/12 at 0600  Patient has order for Basic Metabolic Panel    Pharmacy will follow patient's kidney function and will adjust doses and obtain levels as necessary. Thank you for involving pharmacy in this patient's care. Please contact pharmacy with any questions or concerns.                           Angelika Holden  Clinical Pharmacist

## 2025-02-11 NOTE — CONSULTS
[facility]   HISTORY AND PHYSICAL    Patient Name: Mary Albarran  : 1950  MRN: [id number]  Primary Care Physician:  Brittni Quiroz APRN  Date of admission: 2025    Subjective   Subjective     Chief Complaint: Confusion    HPI:    Mary Albarran is a 74 y.o. female was transferred from [place] secondary to some confusion possible pulmonary edema.  Per report at home she was feeling well and started having abdominal pain and nausea vomiting and then went to the [place] facility where she was found to have sepsis.  Per report her workup there has been not conclusive of the source of her sepsis.  There was a CT which showed a possible mild thickening of the fundus of the gallbladder associated with stones.  She had an elevated lactate and leukocytosis at [place].    Review of Systems   Unable to perform ROS: Mental status change        Personal History     Past Medical History:   Diagnosis Date    Allergic rhinitis     Anxiety     Aortic valve disease 2021    Fibrocalcific changes of the aortic valve with mild aortic valve stenosis   on echo 3/2/2021  Moderate aortic valve regurgitation is present. Aortic valve maximum pressure gradient is 26 mmHg. Moderate aortic valve stenosis is present.  On echo 2/10/2022       Arthritis     Atrial fibrillation     Chronic kidney disease (CKD), stage III (moderate)     Chronic pain     COPD (chronic obstructive pulmonary disease)     Diabetes mellitus type 2 with complications     Ex-smoker     QUIT SMOKING     Hyperlipidemia     Hypertension     Thrombocytopenia        Past Surgical History:   Procedure Laterality Date    HYSTERECTOMY      30 YEARS AGO       Family History: family history includes Diabetes in an other family member; Heart disease in her father and other family members. Otherwise pertinent FHx was reviewed and not pertinent to current issue.    Social History:  reports that she quit smoking about 16 years ago. Her smoking  use included cigarettes. She started smoking about 53 years ago. She has a 37 pack-year smoking history. She has never been exposed to tobacco smoke. She has never used smokeless tobacco. She reports that she does not drink alcohol and does not use drugs.    Home Medications:  HYDROcodone-acetaminophen, Insulin Glargine, Insulin Pen Needle, O2, SITagliptin, albuterol, albuterol sulfate HFA, atorvastatin, budesonide, cephalexin, cholecalciferol, clindamycin, digoxin, dilTIAZem, fluconazole, meclizine, metoprolol succinate XL, nystatin, ondansetron ODT, pantoprazole, pregabalin, rivaroxaban, sacubitril-valsartan, silver sulfadiazine, and torsemide      Allergies:  Allergies   Allergen Reactions    Codeine Mental Status Change    Sglt2 Inhibitors Other (See Comments)     Frequent UTI        Objective   Objective     Vitals:   Temp:  [98.7 °F (37.1 °C)-103.5 °F (39.7 °C)] 103.5 °F (39.7 °C)  Heart Rate:  [] 103  Resp:  [25-30] 28  BP: ()/(52-65) 93/53  Flow (L/min) (Oxygen Therapy):  [2-3] 3    Physical Exam  Constitutional:       Appearance: She is ill-appearing.   Cardiovascular:      Rate and Rhythm: Tachycardia present.   Pulmonary:      Breath sounds: Rhonchi present.   Abdominal:      Palpations: Abdomen is soft.          Result Review    Result Review:  I have personally reviewed the results from the time of this admission to 2/11/2025 10:37 EST and agree with these findings:  [x]  Laboratory  []  Microbiology  [x]  Radiology  []  EKG/Telemetry   []  Cardiology/Vascular   []  Pathology  []  Old records  []  Other:    Lab Results   Component Value Date    WBC 14.45 (H) 02/11/2025    HGB 14.3 02/11/2025    HCT 46.3 02/11/2025    MCV 93.2 02/11/2025    PLT 78 (L) 02/11/2025      Lab Results   Component Value Date    GLUCOSE 412 (C) 02/11/2025    CALCIUM 8.7 02/11/2025     02/11/2025    K 5.2 02/11/2025    CO2 17.8 (L) 02/11/2025     02/11/2025    BUN 21 02/11/2025    CREATININE 1.40 (H)  02/11/2025    EGFR 39.0 (L) 02/11/2025    BCR 14.7 02/11/2025    ANIONGAP 16.2 (H) 02/11/2025        Lab Results   Component Value Date    ALT 21 02/11/2025      Most notable findings include: White count 14 currently; CT from outlying facility1. Cholelithiasis. Suggestion of minimal wall thickening near the fundal region of gallbladder. Recommended ultrasound correlation to rule out early cholecystitis   2. Moderate hepatomegaly with diffuse fatty infiltration   3. Mild splenomegaly   4. 7 mm non-obstructive calculus in inferior calyx of right kidney.   5. Rest of the findings as described above   6. Sections of lower thorax show atelectatic changes in visualized lung fields with surrounding ground-glass haziness. Moderate cardiomegaly. Prominent pulmonary trunk, measuring 34 mm, Possibility of pulmonary edema.     Assessment & Plan   Assessment / Plan     Brief Patient Summary:  Mary Albarran is a 74 y.o. female who has possible mild wall thickening of the gallbladder    Active Hospital Problems:  Active Hospital Problems    Diagnosis     **Atrial fibrillation with RVR      Plan:  I do not feel that mild wall thickening of the gallbladder would cause this degree of sepsis  I think her sepsis source is likely something other than gallbladder  I think ultrasound of the gallbladder is reasonable  If she does have early cholecystitis then I think she would be a poor surgical candidate and we would have to consider cholecystostomy tube  At this point in time I do not see a clear surgical indication  Will follow peripherally      VTE Prophylaxis:  Mechanical VTE prophylaxis orders are present.        CODE STATUS:    Level Of Support Discussed With: Health Care Surrogate  Code Status (Patient has no pulse and is not breathing): CPR (Attempt to Resuscitate)  Medical Interventions (Patient has pulse or is breathing): Full Support    Admission Status:  I believe this patient meets inpatient status.    Electronically signed  by Joaquin Gramajo MD, 02/11/25, 10:37 AM EST.

## 2025-02-11 NOTE — CONSULTS
Pulmonary / Critical Care Consult Note      Patient Name: Mary Albarran  : 1950  MRN: [id number]  Primary Care Physician:  Brittni Quiroz APRN  Referring Physician: Ang Garcia MD  Date of admission: 2025    Subjective   Subjective     Reason for Consult/ Chief Complaint: Shock    HPI:  Mary Albarran is a 74 y.o. female with history of A-fib on Xarelto, CKD stage III, type 2 diabetes initially presented to outside facility with decreased responsiveness.  Patient was found to be hypotensive, febrile and with elevated lactate.  Concern for infection.  Was given 2.5 L of IV fluids also had A-fib with RVR initially started on Cardizem however developed hypotension.  Did have CT scan of the abdomen pelvis which showed some gallbladder thickening concerning for cholelithiasis.  Was transferred to our facility for critical care management.    This morning on exam patient is acutely ill, central line placed due to limited IV access, reported blood cultures were positive at outside facility with gram-positive organism, febrile with temperature up to 103 this morning, is on Levophed at 0.04 mcg/kg/min.    Personal History     Past Medical History:   Diagnosis Date    Allergic rhinitis     Anxiety     Aortic valve disease 2021    Fibrocalcific changes of the aortic valve with mild aortic valve stenosis   on echo 3/2/2021  Moderate aortic valve regurgitation is present. Aortic valve maximum pressure gradient is 26 mmHg. Moderate aortic valve stenosis is present.  On echo 2/10/2022       Arthritis     Atrial fibrillation     Chronic kidney disease (CKD), stage III (moderate)     Chronic pain     COPD (chronic obstructive pulmonary disease)     Diabetes mellitus type 2 with complications     Ex-smoker     QUIT SMOKING     Hyperlipidemia     Hypertension     Thrombocytopenia        Family History: family history includes Diabetes in an other family member; Heart disease in her father and other  family members. Otherwise pertinent FHx was reviewed and not pertinent to current issue.    Social History:  reports that she quit smoking about 16 years ago. Her smoking use included cigarettes. She started smoking about 53 years ago. She has a 37 pack-year smoking history. She has never been exposed to tobacco smoke. She has never used smokeless tobacco. She reports that she does not drink alcohol and does not use drugs.    Home Medications:  HYDROcodone-acetaminophen, Insulin Glargine, O2, SITagliptin, albuterol, albuterol sulfate HFA, atorvastatin, budesonide, cholecalciferol, digoxin, dilTIAZem, metoprolol succinate XL, pregabalin, rivaroxaban, sacubitril-valsartan, silver sulfadiazine, spironolactone, and torsemide    Allergies:  Allergies   Allergen Reactions    Codeine Mental Status Change    Sglt2 Inhibitors Other (See Comments)     Frequent UTI        Objective    Objective     Vitals:   Temp:  [96.8 °F (36 °C)-103.5 °F (39.7 °C)] 101.1 °F (38.4 °C)  Heart Rate:  [] 104  Resp:  [25-30] 30  BP: ()/(25-96) 83/64  Flow (L/min) (Oxygen Therapy):  [2-4] 4    Physical Exam:  Ill-appearing  Obese female  Confused and obtunde  Does have asymmetric swelling of the lower extremities with some erythema of the right leg    Result Review    Result Review:  I have personally reviewed the results from the time of this admission to 2/11/2025 14:58 EST and agree with these findings:  [x]  Laboratory  [x]  Microbiology  []  Radiology  [x]  EKG/Telemetry   []  Cardiology/Vascular   []  Pathology  []  Old records  []  Other:  Most notable findings include:   .    Assessment & Plan   Assessment / Plan     Active Hospital Problems:  Active Hospital Problems    Diagnosis     **Atrial fibrillation with RVR      Impression:  Septic shock  Bacteremia due to gram-positive organisms at outside facility  Concern for possible right lower extremity cellulitis  Concern for possible acute cholecystitis  Type 2 diabetes with  hyperglycemia  Altered mental status likely metabolic encephalopathy from sepsis  Obesity with body mass index of 45  Abnormal echocardiogram  Chronic atrial fibrillation on Xarelto  Acute kidney injury  Hypomagnesemia  Clinically significant lactic acidosis present on admission present on admission      Plan:  Imaging reviewed patient with no clear-cut source of her bacteremia at this time  We still do not have an organism though it is gram-positive  This was positive at the outside facility  Echocardiogram has been obtained and reviewed  Patient with mildly decreased EF  We will bolus with another liter of fluid due to pressor requirements and lactic acidosis  May need lower extremity imaging to see if the patient has a deep soft tissue infection though I would not expect cellulitis to call this degree of bacteremia and altered mental status given her exam  Ultrasound does show possible acute cholecystitis will speak with surgery patient may need cholecystostomy tube  Central line placed  Levophed to maintain mean arterial pressure 65  Amiodarone for A-fib  Cardiology consulted  Magnesium replaced with 4 g of IV mag  Ultrasound of the right upper quadrant was abnormal appearing spoke with general surgery HIDA scan has been ordered  Recommend fever control due to her A-fib  VTE Prophylaxis:  Mechanical VTE prophylaxis orders are present.         Code Status and Medical Interventions: CPR (Attempt to Resuscitate); Full Support   Ordered at: 02/11/25 0433     Level Of Support Discussed With:    Health Care Surrogate     Code Status (Patient has no pulse and is not breathing):    CPR (Attempt to Resuscitate)     Medical Interventions (Patient has pulse or is breathing):    Full Support      IHeidi APRN, spent 14 minutes critical care time.  Electronically signed by LUCERO Henning, 02/11/25, 2:58 PM EST.  Total critical care time spent by our service 54 minutes    Patient seen and examined the  above assessment plan reflect my assessment plan medical decision making  The patient is critically ill in the ICU with septic shock, acute kidney injury multidisciplinary bedside critical care rounds were performed with nursing staff, respiratory therapy, pharmacy, nutritional services, social work. I have personally reviewed the chart, labs and any pertinent imaging available.  I have spent 40 minutes of critical care time, excluding procedures, in the care of this patient.

## 2025-02-11 NOTE — H&P
[facility]  Date: 2025   Patient Name: Mary Albarran  : 1950  MRN: [id number]  Primary Care Physician:  Brittni Quiroz APRN  Date of admission: 2025    Subjective   Subjective     Chief Complaint: Decreased responsiveness, sepsis, acute metabolic encephalopathy    HPI:    Mary Albarran is a 74 y.o. female with a past medical history of A-fib on Xarelto, CKD stage III, COPD, type 2 diabetes mellitus, hyperlipidemia, hypertension presented to the ER at the outside facility with complaints of decreased responsiveness.  Upon arrival at the outside facility, patient noted to be in A-fib with RVR and was borderline hypotensive with systolic blood pressure in 80s-90s.  Received 2.5 L IV fluids.  Started on Cardizem drip and patient blood pressure has dropped to 60 systolic.  Patient has been switched to p.o. amiodarone infusion and heart rate has improved to 110s.  Chest x-ray showed bilateral pulmonary bronchovascular markings is likely due to acute pulmonary congestion.  Blood at the left costophrenic angle, probable mild pleural effusion versus thickening.  CT head without contrast showed age-related brain changes with mild subcortical arteriosclerosis leukoencephalopathy.  However acute infarction could not be excluded such as diffusion MRI if clinically indicated.  CT abdomen pelvis with IV contrast showed cholelithiasis.  Suggestion of minimal thickening yet the fundal region of the gallbladder.  Recommended ultrasound correlation to rule out early cholecystitis.  Moderate hepatomegaly with diffuse fatty infiltration.  Mild splenomegaly.  7 mm nonobstructive calculus in the inferior calyx of the right kidney.  Received IV Zosyn at outside facility.  Request was made to transfer the patient to the [facility] for further evaluation and management of A-fib with RVR, acute metabolic encephalopathy, severe sepsis, possible acute cholecystitis.   Case discussed by ED physician with outside facility with general surgeon on-call Dr. Gramajo, agreed to consult.    As per the , patient was in her usual state of health, at baseline she will be able to walk with help of the walker.  Today afternoon 2 hours after the lunch patient suddenly became sick and had multiple episodes of vomiting and patient become decreasingly responsive prompting them to take to the ED.      Personal History     Past Medical History:   Diagnosis Date    Allergic rhinitis     Anxiety     Aortic valve disease 2021    Fibrocalcific changes of the aortic valve with mild aortic valve stenosis   on echo 3/2/2021  Moderate aortic valve regurgitation is present. Aortic valve maximum pressure gradient is 26 mmHg. Moderate aortic valve stenosis is present.  On echo 2/10/2022       Arthritis     Atrial fibrillation     Chronic kidney disease (CKD), stage III (moderate)     Chronic pain     COPD (chronic obstructive pulmonary disease)     Diabetes mellitus type 2 with complications     Ex-smoker     QUIT SMOKING     Hyperlipidemia     Hypertension     Thrombocytopenia          Past Surgical History:   Procedure Laterality Date    HYSTERECTOMY      30 YEARS AGO         Family History   Problem Relation Age of Onset    Heart disease Father     Heart disease Other     Heart disease Other     Diabetes Other          Social History     Socioeconomic History    Marital status:    Tobacco Use    Smoking status: Former     Current packs/day: 0.00     Average packs/day: 1 pack/day for 37.0 years (37.0 ttl pk-yrs)     Types: Cigarettes     Start date: 4/3/1971     Quit date: 2008     Years since quittin.8     Passive exposure: Never    Smokeless tobacco: Never    Tobacco comments:     FOR 30 YEARS   Vaping Use    Vaping status: Never Used   Substance and Sexual Activity    Alcohol use: Never    Drug use: Never    Sexual activity: Defer         Home  Medications:  HYDROcodone-acetaminophen, Insulin Glargine, Insulin Pen Needle, O2, SITagliptin, albuterol, albuterol sulfate HFA, atorvastatin, budesonide, cephalexin, cholecalciferol, clindamycin, digoxin, dilTIAZem, fluconazole, meclizine, metoprolol succinate XL, nystatin, ondansetron ODT, pantoprazole, pregabalin, rivaroxaban, sacubitril-valsartan, silver sulfadiazine, and torsemide    Allergies:  Allergies   Allergen Reactions    Codeine Mental Status Change    Sglt2 Inhibitors Other (See Comments)     Frequent UTI          Objective   Objective     Vitals:   Heart Rate:  [119] 119  Flow (L/min) (Oxygen Therapy):  [2] 2    Physical Exam    Constitutional: Somnolent, minimally responsive to sternal rub   Eyes: Pupils equal, sclerae anicteric, no conjunctival injection   HENT: NCAT, mucous membranes moist   Respiratory: Bilateral air entry present, mild rhonchi bilaterally, able to protect airway, nonlabored respirations    Cardiovascular: Irregular, tachycardia   Gastrointestinal: Soft, patient grimacing with palpation of the right upper quadrant               musculoskeletal: Noted to have erythematous skin with scaly lesions of the bilateral lower extremities   Neurologic: Somnolent, able to squeeze fingers with bilateral hands    Result Review    Result Review:  I have personally reviewed the results from the time of this admission to 2/11/2025 04:33 EST and agree with these findings:  [x]  Laboratory  []  Microbiology  [x]  Radiology  [x]  EKG/Telemetry   []  Cardiology/Vascular   []  Pathology  []  Old records  []  Other:        Imaging Results (Last 24 Hours)       Procedure Component Value Units Date/Time    CT Outside Films [[id number]] Resulted: 02/11/25 0352     Updated: 02/11/25 0352    Narrative:      This procedure was auto-finalized with no dictation required.    CT Outside Films [[id number]] Resulted: 02/11/25 0352     Updated: 02/11/25 0352    Narrative:      This procedure was auto-finalized with  no dictation required.    XR Outside Films [[id number]] Resulted: 02/11/25 0352     Updated: 02/11/25 0352    Narrative:      This procedure was auto-finalized with no dictation required.             heparin (porcine), 5,000 Units, Subcutaneous, Q8H  insulin regular, 2-7 Units, Subcutaneous, Q6H  magnesium sulfate, 2 g, Intravenous, Once  piperacillin-tazobactam, 4.5 g, Intravenous, Q8H  sodium chloride, 1,000 mL, Intravenous, Once  sodium chloride, 10 mL, Intravenous, Q12H  vancomycin, 20 mg/kg, Intravenous, Once         Assessment & Plan   Assessment / Plan     Assessment/Plan:   Acute metabolic encephalopathy  Severe sepsis  A-fib with RVR  Anion gap metabolic acidosis  Acute hypoxic respiratory failure  Lactic acidosis likely due to hypoxia  Pulmonary edema  Hyperglycemia  Possible cholecystitis  History of A-fib  CKD stage III  COPD  Type 2 diabetes mellitus  Hyperlipidemia  Hypertension    Plan  Admit to inpatient, critical care  Intensivist consult in the a.m. Dr. Berg  ABG showed 7.3/36/52 on 2 L.  Placed patient on BiPAP concerning for pulmonary edema  Follow-up on CT chest without contrast  Lasix 40 mg IV once  Continue Zosyn, vancomycin  Follow-up on blood cultures  Trend lactic acid  2 g IV magnesium  Ordered 5 units IV insulin regular, POC glucose every hour for next 6 hours  Follow-up on serum ammonia levels, serum acetone.  Anion gap metabolic acidosis likely secondary to lactic acidosis.  Less likely DKA.  Continue amiodarone  Cardiac echo  Consult cardiology in the a.m.  General Surgery consulted Dr. Gramajo, appreciate recommendations  Follow-up on ultrasound abdomen to evaluate for possible cholecystitis  Low-dose sliding scale insulin  Resume other appropriate home medications once reconciled  If the patient mentation does not improve with the treatment of sepsis, consider getting MRI of the brain/EEG for further evaluation    VTE Prophylaxis:  Pharmacologic VTE prophylaxis orders are  present.        CODE STATUS:    Level Of Support Discussed With: Health Care Surrogate  Code Status (Patient has no pulse and is not breathing): CPR (Attempt to Resuscitate)  Medical Interventions (Patient has pulse or is breathing): Full Support      Admission Status:  I believe this patient meets inpatient status.    Part of this note may be an electronic transcription/translation of spoken language to printed text using the Dragon Dictation System    Linh Benitez MD

## 2025-02-11 NOTE — PROGRESS NOTES
RT EQUIPMENT DEVICE RELATED - SKIN ASSESSMENT    RT Medical Equipment/Device:     NIV Mask:  Under-the-nose   size:  A    Skin Assessment:      Cheek:  Intact  Nares:  Intact  Neck:  Intact  Nose:  Intact    Device Skin Pressure Protection:  Pressure points protected    Nurse Notification:  Elayne Sequeira, RRT

## 2025-02-11 NOTE — THERAPY EVALUATION
RT EQUIPMENT DEVICE RELATED - SKIN ASSESSMENT    RT Medical Equipment/Device:     NIV Mask:  Under-the-nose   size:  A    Skin Assessment:      Cheek:  Intact  Nose:  Intact    Device Skin Pressure Protection:  Pressure points protected    Nurse Notification:  Elayne Castillo, RRT

## 2025-02-11 NOTE — PLAN OF CARE
Goal Outcome Evaluation:  Plan of Care Reviewed With: patient        Progress: no change  Outcome Evaluation: Patient remains in CCU. Mentation improved throughout shift and is now alert and oriented, medicated with PRN pain medication. Insulin and Levo gtt per orders, see MAR. BIPAP and 4LNC alternated, tolerating NC. CT scans obtained this shift. Wound care consulted with pt, and wound care performed. turned and repositoned, oral and skin care performed. no other changes at this time.

## 2025-02-11 NOTE — PLAN OF CARE
Goal Outcome Evaluation:  Plan of Care Reviewed With: patient        Progress: no change  Outcome Evaluation: Patient remains on BIPAP this morning due to her worsening pulmonary edema. 14/7 R 12. ABG this morning shows pH 7.32; Co2 35; Po2 77.

## 2025-02-12 ENCOUNTER — APPOINTMENT (OUTPATIENT)
Dept: NUCLEAR MEDICINE | Facility: HOSPITAL | Age: 75
End: 2025-02-12
Payer: MEDICARE

## 2025-02-12 LAB
ALBUMIN SERPL-MCNC: 2.9 G/DL (ref 3.5–5.2)
ALBUMIN/GLOB SERPL: 1.1 G/DL
ALP SERPL-CCNC: 42 U/L (ref 39–117)
ALT SERPL W P-5'-P-CCNC: 26 U/L (ref 1–33)
ANION GAP SERPL CALCULATED.3IONS-SCNC: 13.9 MMOL/L (ref 5–15)
AST SERPL-CCNC: 52 U/L (ref 1–32)
BACTERIA BLD CULT: NORMAL
BASOPHILS # BLD AUTO: 0.07 10*3/MM3 (ref 0–0.2)
BASOPHILS NFR BLD AUTO: 0.5 % (ref 0–1.5)
BILIRUB SERPL-MCNC: 0.8 MG/DL (ref 0–1.2)
BOTTLE TYPE: NORMAL
BUN SERPL-MCNC: 31 MG/DL (ref 8–23)
BUN/CREAT SERPL: 19.4 (ref 7–25)
CALCIUM SPEC-SCNC: 8.1 MG/DL (ref 8.6–10.5)
CHLORIDE SERPL-SCNC: 103 MMOL/L (ref 98–107)
CO2 SERPL-SCNC: 20.1 MMOL/L (ref 22–29)
CREAT SERPL-MCNC: 1.6 MG/DL (ref 0.57–1)
DEPRECATED RDW RBC AUTO: 49.4 FL (ref 37–54)
DIGOXIN SERPL-MCNC: 1.95 NG/ML (ref 0.6–1.2)
EGFRCR SERPLBLD CKD-EPI 2021: 34.1 ML/MIN/1.73
EOSINOPHIL # BLD AUTO: 0.01 10*3/MM3 (ref 0–0.4)
EOSINOPHIL NFR BLD AUTO: 0.1 % (ref 0.3–6.2)
ERYTHROCYTE [DISTWIDTH] IN BLOOD BY AUTOMATED COUNT: 14.6 % (ref 12.3–15.4)
GLOBULIN UR ELPH-MCNC: 2.7 GM/DL
GLUCOSE BLDC GLUCOMTR-MCNC: 104 MG/DL (ref 70–99)
GLUCOSE BLDC GLUCOMTR-MCNC: 110 MG/DL (ref 70–99)
GLUCOSE BLDC GLUCOMTR-MCNC: 118 MG/DL (ref 70–99)
GLUCOSE BLDC GLUCOMTR-MCNC: 119 MG/DL (ref 70–99)
GLUCOSE BLDC GLUCOMTR-MCNC: 126 MG/DL (ref 70–99)
GLUCOSE BLDC GLUCOMTR-MCNC: 126 MG/DL (ref 70–99)
GLUCOSE BLDC GLUCOMTR-MCNC: 129 MG/DL (ref 70–99)
GLUCOSE BLDC GLUCOMTR-MCNC: 136 MG/DL (ref 70–99)
GLUCOSE BLDC GLUCOMTR-MCNC: 138 MG/DL (ref 70–99)
GLUCOSE BLDC GLUCOMTR-MCNC: 140 MG/DL (ref 70–99)
GLUCOSE BLDC GLUCOMTR-MCNC: 143 MG/DL (ref 70–99)
GLUCOSE BLDC GLUCOMTR-MCNC: 143 MG/DL (ref 70–99)
GLUCOSE BLDC GLUCOMTR-MCNC: 145 MG/DL (ref 70–99)
GLUCOSE BLDC GLUCOMTR-MCNC: 146 MG/DL (ref 70–99)
GLUCOSE BLDC GLUCOMTR-MCNC: 146 MG/DL (ref 70–99)
GLUCOSE BLDC GLUCOMTR-MCNC: 147 MG/DL (ref 70–99)
GLUCOSE BLDC GLUCOMTR-MCNC: 151 MG/DL (ref 70–99)
GLUCOSE BLDC GLUCOMTR-MCNC: 162 MG/DL (ref 70–99)
GLUCOSE SERPL-MCNC: 153 MG/DL (ref 65–99)
HBA1C MFR BLD: 9.6 % (ref 4.8–5.6)
HCT VFR BLD AUTO: 39.5 % (ref 34–46.6)
HGB BLD-MCNC: 12.4 G/DL (ref 12–15.9)
IMM GRANULOCYTES # BLD AUTO: 0.17 10*3/MM3 (ref 0–0.05)
IMM GRANULOCYTES NFR BLD AUTO: 1.1 % (ref 0–0.5)
LYMPHOCYTES # BLD AUTO: 0.57 10*3/MM3 (ref 0.7–3.1)
LYMPHOCYTES NFR BLD AUTO: 3.7 % (ref 19.6–45.3)
MAGNESIUM SERPL-MCNC: 2.6 MG/DL (ref 1.6–2.4)
MCH RBC QN AUTO: 29.1 PG (ref 26.6–33)
MCHC RBC AUTO-ENTMCNC: 31.4 G/DL (ref 31.5–35.7)
MCV RBC AUTO: 92.7 FL (ref 79–97)
MONOCYTES # BLD AUTO: 0.48 10*3/MM3 (ref 0.1–0.9)
MONOCYTES NFR BLD AUTO: 3.1 % (ref 5–12)
NEUTROPHILS NFR BLD AUTO: 13.94 10*3/MM3 (ref 1.7–7)
NEUTROPHILS NFR BLD AUTO: 91.5 % (ref 42.7–76)
NRBC BLD AUTO-RTO: 0 /100 WBC (ref 0–0.2)
PHOSPHATE SERPL-MCNC: 3.8 MG/DL (ref 2.5–4.5)
PLATELET # BLD AUTO: 70 10*3/MM3 (ref 140–450)
PMV BLD AUTO: 13.2 FL (ref 6–12)
POTASSIUM SERPL-SCNC: 4.2 MMOL/L (ref 3.5–5.2)
PROCALCITONIN SERPL-MCNC: 81.77 NG/ML (ref 0–0.25)
PROT SERPL-MCNC: 5.6 G/DL (ref 6–8.5)
RBC # BLD AUTO: 4.26 10*6/MM3 (ref 3.77–5.28)
SODIUM SERPL-SCNC: 137 MMOL/L (ref 136–145)
VANCOMYCIN SERPL-MCNC: 21.63 MCG/ML (ref 5–40)
WBC NRBC COR # BLD AUTO: 15.24 10*3/MM3 (ref 3.4–10.8)

## 2025-02-12 PROCEDURE — 94761 N-INVAS EAR/PLS OXIMETRY MLT: CPT

## 2025-02-12 PROCEDURE — 80053 COMPREHEN METABOLIC PANEL: CPT | Performed by: NURSE PRACTITIONER

## 2025-02-12 PROCEDURE — 25010000002 CEFTRIAXONE PER 250 MG: Performed by: INTERNAL MEDICINE

## 2025-02-12 PROCEDURE — 84145 PROCALCITONIN (PCT): CPT | Performed by: NURSE PRACTITIONER

## 2025-02-12 PROCEDURE — 94799 UNLISTED PULMONARY SVC/PX: CPT

## 2025-02-12 PROCEDURE — 78227 HEPATOBIL SYST IMAGE W/DRUG: CPT

## 2025-02-12 PROCEDURE — A9537 TC99M MEBROFENIN: HCPCS | Performed by: INTERNAL MEDICINE

## 2025-02-12 PROCEDURE — 25010000002 ENOXAPARIN PER 10 MG: Performed by: INTERNAL MEDICINE

## 2025-02-12 PROCEDURE — 34310000005 TECHNETIUM TC 99M MEBROFENIN KIT: Performed by: INTERNAL MEDICINE

## 2025-02-12 PROCEDURE — 25010000002 PIPERACILLIN SOD-TAZOBACTAM PER 1 G: Performed by: NURSE PRACTITIONER

## 2025-02-12 PROCEDURE — 25810000003 LACTATED RINGERS PER 1000 ML: Performed by: NURSE PRACTITIONER

## 2025-02-12 PROCEDURE — 83735 ASSAY OF MAGNESIUM: CPT | Performed by: INTERNAL MEDICINE

## 2025-02-12 PROCEDURE — 99231 SBSQ HOSP IP/OBS SF/LOW 25: CPT | Performed by: SURGERY

## 2025-02-12 PROCEDURE — 25010000002 ACETAMINOPHEN 10 MG/ML SOLUTION: Performed by: NURSE PRACTITIONER

## 2025-02-12 PROCEDURE — 25010000002 METRONIDAZOLE 500 MG/100ML SOLUTION: Performed by: INTERNAL MEDICINE

## 2025-02-12 PROCEDURE — 99232 SBSQ HOSP IP/OBS MODERATE 35: CPT | Performed by: INTERNAL MEDICINE

## 2025-02-12 PROCEDURE — 84100 ASSAY OF PHOSPHORUS: CPT | Performed by: INTERNAL MEDICINE

## 2025-02-12 PROCEDURE — 78226 HEPATOBILIARY SYSTEM IMAGING: CPT

## 2025-02-12 PROCEDURE — 85025 COMPLETE CBC W/AUTO DIFF WBC: CPT | Performed by: INTERNAL MEDICINE

## 2025-02-12 PROCEDURE — 83036 HEMOGLOBIN GLYCOSYLATED A1C: CPT | Performed by: NURSE PRACTITIONER

## 2025-02-12 PROCEDURE — 80162 ASSAY OF DIGOXIN TOTAL: CPT | Performed by: INTERNAL MEDICINE

## 2025-02-12 PROCEDURE — 99291 CRITICAL CARE FIRST HOUR: CPT | Performed by: INTERNAL MEDICINE

## 2025-02-12 PROCEDURE — 25010000002 PIPERACILLIN SOD-TAZOBACTAM PER 1 G: Performed by: STUDENT IN AN ORGANIZED HEALTH CARE EDUCATION/TRAINING PROGRAM

## 2025-02-12 PROCEDURE — 99233 SBSQ HOSP IP/OBS HIGH 50: CPT | Performed by: INTERNAL MEDICINE

## 2025-02-12 PROCEDURE — 80202 ASSAY OF VANCOMYCIN: CPT | Performed by: STUDENT IN AN ORGANIZED HEALTH CARE EDUCATION/TRAINING PROGRAM

## 2025-02-12 PROCEDURE — 82948 REAGENT STRIP/BLOOD GLUCOSE: CPT

## 2025-02-12 RX ORDER — KIT FOR THE PREPARATION OF TECHNETIUM TC 99M MEBROFENIN 45 MG/10ML
1 INJECTION, POWDER, LYOPHILIZED, FOR SOLUTION INTRAVENOUS
Status: COMPLETED | OUTPATIENT
Start: 2025-02-12 | End: 2025-02-12

## 2025-02-12 RX ORDER — AMIODARONE HYDROCHLORIDE 200 MG/1
200 TABLET ORAL 2 TIMES DAILY
Status: DISCONTINUED | OUTPATIENT
Start: 2025-02-12 | End: 2025-02-17

## 2025-02-12 RX ORDER — DIGOXIN 125 MCG
125 TABLET ORAL
Status: DISCONTINUED | OUTPATIENT
Start: 2025-02-13 | End: 2025-02-15

## 2025-02-12 RX ORDER — ENOXAPARIN SODIUM 100 MG/ML
1 INJECTION SUBCUTANEOUS EVERY 12 HOURS
Status: DISCONTINUED | OUTPATIENT
Start: 2025-02-12 | End: 2025-02-12

## 2025-02-12 RX ORDER — METRONIDAZOLE 500 MG/100ML
500 INJECTION, SOLUTION INTRAVENOUS EVERY 8 HOURS
Status: DISCONTINUED | OUTPATIENT
Start: 2025-02-12 | End: 2025-02-12

## 2025-02-12 RX ADMIN — METOPROLOL TARTRATE 5 MG: 1 INJECTION, SOLUTION INTRAVENOUS at 23:32

## 2025-02-12 RX ADMIN — ACETAMINOPHEN 1000 MG: 10 INJECTION, SOLUTION INTRAVENOUS at 23:32

## 2025-02-12 RX ADMIN — MUPIROCIN 1 APPLICATION: 20 OINTMENT TOPICAL at 08:34

## 2025-02-12 RX ADMIN — SODIUM CHLORIDE, SODIUM LACTATE, POTASSIUM CHLORIDE, CALCIUM CHLORIDE 100 ML/HR: 20; 30; 600; 310 INJECTION, SOLUTION INTRAVENOUS at 04:38

## 2025-02-12 RX ADMIN — LIDOCAINE 2 PATCH: 4 PATCH TOPICAL at 08:34

## 2025-02-12 RX ADMIN — PIPERACILLIN AND TAZOBACTAM 4.5 G: 4; .5 INJECTION, POWDER, FOR SOLUTION INTRAVENOUS; PARENTERAL at 04:38

## 2025-02-12 RX ADMIN — ENOXAPARIN SODIUM 120 MG: 100 INJECTION SUBCUTANEOUS at 10:07

## 2025-02-12 RX ADMIN — HYDROCODONE BITARTRATE AND ACETAMINOPHEN 1 TABLET: 10; 325 TABLET ORAL at 12:13

## 2025-02-12 RX ADMIN — MEBROFENIN 1 DOSE: 45 INJECTION, POWDER, LYOPHILIZED, FOR SOLUTION INTRAVENOUS at 11:14

## 2025-02-12 RX ADMIN — Medication 10 ML: at 20:09

## 2025-02-12 RX ADMIN — Medication 1 APPLICATION: at 10:08

## 2025-02-12 RX ADMIN — ACETAMINOPHEN 1000 MG: 10 INJECTION, SOLUTION INTRAVENOUS at 02:34

## 2025-02-12 RX ADMIN — PIPERACILLIN AND TAZOBACTAM 4.5 G: 4; .5 INJECTION, POWDER, FOR SOLUTION INTRAVENOUS; PARENTERAL at 13:50

## 2025-02-12 RX ADMIN — MUPIROCIN 1 APPLICATION: 20 OINTMENT TOPICAL at 20:08

## 2025-02-12 RX ADMIN — Medication 10 ML: at 08:35

## 2025-02-12 RX ADMIN — METRONIDAZOLE 500 MG: 5 INJECTION, SOLUTION INTRAVENOUS at 10:06

## 2025-02-12 RX ADMIN — Medication: at 20:10

## 2025-02-12 RX ADMIN — FAMOTIDINE 20 MG: 10 INJECTION, SOLUTION INTRAVENOUS at 20:08

## 2025-02-12 RX ADMIN — PIPERACILLIN AND TAZOBACTAM 4.5 G: 4; .5 INJECTION, POWDER, FOR SOLUTION INTRAVENOUS; PARENTERAL at 20:09

## 2025-02-12 RX ADMIN — NOREPINEPHRINE BITARTRATE 0.04 MCG/KG/MIN: 0.03 INJECTION, SOLUTION INTRAVENOUS at 12:55

## 2025-02-12 RX ADMIN — HYDROCODONE BITARTRATE AND ACETAMINOPHEN 1 TABLET: 10; 325 TABLET ORAL at 01:44

## 2025-02-12 RX ADMIN — AMIODARONE HYDROCHLORIDE 200 MG: 200 TABLET ORAL at 12:13

## 2025-02-12 RX ADMIN — AMIODARONE HYDROCHLORIDE 200 MG: 200 TABLET ORAL at 20:09

## 2025-02-12 RX ADMIN — HYDROCODONE BITARTRATE AND ACETAMINOPHEN 1 TABLET: 10; 325 TABLET ORAL at 20:08

## 2025-02-12 RX ADMIN — HYDROCODONE BITARTRATE AND ACETAMINOPHEN 1 TABLET: 10; 325 TABLET ORAL at 16:25

## 2025-02-12 RX ADMIN — HYDROCODONE BITARTRATE AND ACETAMINOPHEN 1 TABLET: 10; 325 TABLET ORAL at 23:32

## 2025-02-12 RX ADMIN — FAMOTIDINE 20 MG: 10 INJECTION, SOLUTION INTRAVENOUS at 08:34

## 2025-02-12 RX ADMIN — SODIUM CHLORIDE 2000 MG: 9 INJECTION INTRAMUSCULAR; INTRAVENOUS; SUBCUTANEOUS at 10:07

## 2025-02-12 RX ADMIN — SODIUM CHLORIDE, SODIUM LACTATE, POTASSIUM CHLORIDE, CALCIUM CHLORIDE 100 ML/HR: 20; 30; 600; 310 INJECTION, SOLUTION INTRAVENOUS at 12:55

## 2025-02-12 RX ADMIN — Medication 10 ML: at 08:34

## 2025-02-12 NOTE — PLAN OF CARE
Goal Outcome Evaluation:   Pt did not wear bipap overnight, resting comfortably on 4 liter nasal cannula

## 2025-02-12 NOTE — PLAN OF CARE
Goal Outcome Evaluation:  Plan of Care Reviewed With: patient, child        Progress: improving  Outcome Evaluation: AxOx4. PRN pain medication x2. Hita scan completed, see further orders. Pt will be NPO at midnight tonight. Tolerating clear liquids at this time. Blood thinners to be held for procedure tomorrow 2/13. Wound care completed. Insulin, levo, & IVF infusing, see MAR.

## 2025-02-12 NOTE — PROGRESS NOTES
CARDIOLOGY  INPATIENT PROGRESS NOTE                [facility] CORONARY CARE UNIT    2/12/2025      PATIENT IDENTIFICATION:   Name:  Mary Albarran      MRN:  [id number]     74 y.o.  female             No chief complaint on file.       SUBJECTIVE:   Patient still with complaints of pain in her legs and feet from previous neuropathic issues.  No new events overnight patient remains tachycardic with atrial fibrillation.  OBJECTIVE:  Vitals:    02/12/25 0745 02/12/25 0800 02/12/25 0814 02/12/25 0815   BP: 98/68 105/65 105/65 100/64   BP Location:  Left arm     Patient Position:  Lying     Pulse: (!) 125 (!) 123 (!) 128 (!) 127   Resp:  18     Temp: 100.1 °F (37.8 °C) 100.1 °F (37.8 °C)  100.1 °F (37.8 °C)   TempSrc:       SpO2: 93% 93%  94%   Weight:       Height:               Body mass index is 45.39 kg/m².    Intake/Output Summary (Last 24 hours) at 2/12/2025 0857  Last data filed at 2/12/2025 0451  Gross per 24 hour   Intake 5014 ml   Output 675 ml   Net 4339 ml       Telemetry: Atrial fibrillation with RVR    Physical Exam  General Appearance:   no acute distress  Alert and oriented x3  HENT:   lips not cyanotic  Atraumatic  Neck:  No jvd   supple  Respiratory:  no respiratory distress  normal breath sounds  no rales  Cardiovascular:    Regular irregular  no S3, no S4   no murmur  no rub  lower extremity edema: +1  Skin:   warm, dry  No rashes      Allergies   Allergen Reactions    Codeine Mental Status Change    Sglt2 Inhibitors Other (See Comments)     Frequent UTI      Scheduled meds:  ammonium lactate, , Topical, 2 times per day  famotidine, 20 mg, Intravenous, Q12H  Lidocaine, 2 patch, Transdermal, Q24H  mupirocin, 1 Application, Each Nare, BID  piperacillin-tazobactam, 4.5 g, Intravenous, Q8H  sodium chloride, 1,000 mL, Intravenous, Once  sodium chloride, 10 mL, Intravenous, Q12H  sodium chloride, 10 mL, Intravenous, Q12H  vancomycin, 1,000 mg, Intravenous, Q24H      IV meds:                       "insulin, 0-100 Units/hr, Last Rate: 5 Units/hr (02/11/25 2052)  lactated ringers, 100 mL/hr, Last Rate: 100 mL/hr (02/12/25 0438)  norepinephrine, 0.02-0.3 mcg/kg/min, Last Rate: 0.04 mcg/kg/min (02/12/25 0814)  Pharmacy to dose vancomycin,       Data Review:  CBC          5/15/2024    11:24 2/11/2025    03:02 2/12/2025    04:49   CBC   WBC 7.91  14.45  15.24    RBC 3.16  4.97  4.26    Hemoglobin 8.5  14.3  12.4    Hematocrit 29.9  46.3  39.5    MCV 94.6  93.2  92.7    MCH 26.9  28.8  29.1    MCHC 28.4  30.9  31.4    RDW 15.8  14.1  14.6    Platelets 80  78  70      CMP          6/6/2024    12:17 2/11/2025    03:02 2/11/2025    07:43 2/12/2025    04:49   CMP   Glucose 162  412   153    BUN 21  21   31    Creatinine 1.12  1.43  1.40  1.60    EGFR 52.7  39.0   34.1    Sodium 141  136   137    Potassium 4.7  5.2   4.2    Chloride 98  102   103    Calcium 9.4  8.7   8.1    Total Protein  6.2   5.6    Albumin  3.3   2.9    Globulin  2.9   2.7    Total Bilirubin  1.4   0.8    Alkaline Phosphatase  60   42    AST (SGOT)  37   52    ALT (SGPT)  21   26    Albumin/Globulin Ratio  1.1   1.1    BUN/Creatinine Ratio 18.8  14.7   19.4    Anion Gap 11.0  16.2   13.9       CARDIAC LABS:      Lab 02/11/25  0302   PROBNP 8,458.0*        Lab Results   Component Value Date    DIGOXIN 0.63 10/06/2023      Lab Results   Component Value Date    TSH 1.520 01/27/2023           Invalid input(s): \"LDLCALC\"  No results found for: \"POCTROP\"  Lab Results   Component Value Date    TROPONINT 21 (H) 10/06/2023   (  Lab Results   Component Value Date    MG 2.6 (H) 02/12/2025     Results for orders placed during the hospital encounter of 02/11/25    Adult Transthoracic Echo Complete W/ Cont if Necessary Per Protocol    Interpretation Summary    Left ventricular systolic function is mildly decreased. Left ventricular ejection fraction appears to be 46 - 50%. with some anterior septal hypokensis    The right ventricular cavity is mildly dilated.    " The left atrial cavity is mildly dilated.    The right atrial cavity is moderately  dilated.    Mild to moderate aortic valve stenosis is present.    Estimated right ventricular systolic pressure from tricuspid regurgitation is moderately elevated (45-55 mmHg).           ASSESSMENT:    Atrial fibrillation with RVR        PLAN:  1.  Patient still with elevated ventricular rate we will go ahead and change over to p.o. amiodarone at 200 twice daily to try to promote better rate control given her still low blood pressure requiring pressor support.  2.  Continue her home digoxin 0.125 and repeat digoxin level  3.  Lovenox therapeutic for CVA prevention          Joaquin Gonzalez MD  2/12/2025    08:57 EST

## 2025-02-12 NOTE — PLAN OF CARE
Goal Outcome Evaluation:      Pt has remained off BIPAP throughout the night with no increased work of breathing. Pt remains on 4L nc at this time.   Problem: Noninvasive Ventilation Acute  Goal: Effective Unassisted Ventilation and Oxygenation  Outcome: Met  Intervention: Monitor and Manage Noninvasive Ventilation  Flowsheets  Taken 2/12/2025 0256 by Charmaine Sequeira, RRT  NPPV/CPAP Maintenance: other (see comments)  Taken 2/11/2025 0929 by Arcelia Castillo, RRT  Airway/Ventilation Management:   airway patency maintained   oxygen therapy provided

## 2025-02-12 NOTE — PROGRESS NOTES
Pulmonary / Critical Care Progress Note      Patient Name: Mary Albarran  : 1950  MRN: [id number]  Attending:  Ang Garcia MD   Date of admission: 2025    Subjective   Subjective   Patient critically ill with septic shock, Streptococcus bacteremia    Patient awake on Levophed 0.02 mcg/kg/min  Reported outside blood cultures with Streptococcus species  Does have right lower extremity increased erythema concerning for soft tissue infection  Plan for HIDA scan today for general surgery to evaluate cholecystitis    Objective   Objective     Vitals:   Vital signs for last 24 hours:  Temp:  [96.8 °F (36 °C)-102 °F (38.9 °C)] 100.1 °F (37.8 °C)  Heart Rate:  [] 135  Resp:  [17-34] 19  BP: ()/(24-98) 129/54    Intake/Output last 3 shifts:  I/O last 3 completed shifts:  In: 5014 [I.V.:5014]  Out: 925 [Urine:925]    Physical Exam   Increasing lower extremity edema obese female  Acutely ill tachycardic and irregular    Result Review    Result Review:  I have personally reviewed the results from the time of this admission to 2025 10:54 EST and agree with these findings:  [x]  Laboratory  [x]  Microbiology  [x]  Radiology  [x]  EKG/Telemetry   [x]  Cardiology/Vascular   []  Pathology  []  Old records  []  Other:  Most notable findings include:     Outside blood cultures with Streptococcus    Assessment & Plan   Assessment / Plan     Active Hospital Problems:  Active Hospital Problems    Diagnosis     **Atrial fibrillation with RVR      Impression:  Septic shock requiring Levophed  Gram-negative bacteremia  Reported gram-positive bacteremia from outside facility  Right lower extremity cellulitis  Questionable acute acalculous cholecystitis HIDA scan pending  Septic shock  Atrial fibrillation      Plan:  Outside patient with Streptococcus species in the blood reported from [facility]  Our blood cultures here show gram-negative bacilli  Continue with Zosyn  Which should provide a very broad  coverage at this time  Awaiting speciation amiodarone for the patient's A-fib  Hold Lovenox for possible procedure  Mental status improving  Digoxin given per cardiology service  Continue with insulin drip  Continue Levophed to maintain mean arterial pressure 65      GI prophylaxis-famotidine  VTE Prophylaxis:  Pharmacologic & mechanical VTE prophylaxis orders are present.    CODE STATUS:   Level Of Support Discussed With: Health Care Surrogate  Code Status (Patient has no pulse and is not breathing): CPR (Attempt to Resuscitate)  Medical Interventions (Patient has pulse or is breathing): Full Support    I, LUCERO Delacruz, spent 14 minutes critical care time.  Electronically signed by LUCERO Henning, 02/12/25, 10:54 AM EST.       Total critical care time spent by our service 44 minutes      Patient seen and examined the above assessment plan reflect my assessment plan medical decision making  The patient is critically ill in the ICU with septic shock, acute kidney injury multidisciplinary bedside critical care rounds were performed with nursing staff, respiratory therapy, pharmacy, nutritional services, social work. I have personally reviewed the chart, labs and any pertinent imaging available.  I have spent 30 minutes of critical care time, excluding procedures, in the care of this patient.     Electronically signed by Jerson Berg DO, 02/12/25, 3:14 PM EST.

## 2025-02-12 NOTE — THERAPY EVALUATION
RT EQUIPMENT DEVICE RELATED - SKIN ASSESSMENT    RT Medical Equipment/Device:     NIV Mask:  Under-the-nose   size:  a    Skin Assessment:      Head/neck/face:  Intact    Device Skin Pressure Protection:  Pressure points protected    Nurse Notification:  Elayne Zhao, RRT

## 2025-02-12 NOTE — PROGRESS NOTES
[facility]     Progress Note    Patient Name: Mary Albarran  : 1950  MRN: [id number]  Primary Care Physician:  Brittni Quiroz APRN  Date of admission: 2025    Subjective   Subjective     Chief Complaint: sepsis    HPI:  Patient Reports more alert today. Min abd pain      Objective   Objective     Vitals:   Temp:  [98.1 °F (36.7 °C)-102 °F (38.9 °C)] 100 °F (37.8 °C)  Heart Rate:  [] 124  Resp:  [17-34] 25  BP: ()/(24-98) 109/53  Flow (L/min) (Oxygen Therapy):  [2-4] 2    Physical Exam  Constitutional:       Appearance: Normal appearance.   Cardiovascular:      Rate and Rhythm: Normal rate.   Pulmonary:      Effort: Pulmonary effort is normal.   Abdominal:      Palpations: Abdomen is soft.         Result Review    Result Review:  I have personally reviewed the results from the time of this admission to 2025 14:24 EST and agree with these findings:  []  Laboratory  []  Microbiology  [x]  Radiology  []  EKG/Telemetry   []  Cardiology/Vascular   []  Pathology  []  Old records  []  Other:  Most notable findings include: HIDA non-vis of GB    Assessment & Plan   Assessment / Plan     Brief Patient Summary:  Mary Albarran is a 74 y.o. female who has possible acute vs chronic cholecysitis    Active Hospital Problems:  Active Hospital Problems    Diagnosis     **Atrial fibrillation with RVR      Plan:   Still not the best candidate for general anesthesia  Discussed with IR  Perc cholecystostomy tomorrow since had lovenox today  Hold lovenox  NPO p MN       VTE Prophylaxis:  Pharmacologic & mechanical VTE prophylaxis orders are present.        CODE STATUS:   Level Of Support Discussed With: Health Care Surrogate  Code Status (Patient has no pulse and is not breathing): CPR (Attempt to Resuscitate)  Medical Interventions (Patient has pulse or is breathing): Full Support    Disposition:  I expect patient to be discharged unsure.    Electronically signed by Joaquin Gramajo MD, 25,  2:24 PM EST.

## 2025-02-12 NOTE — PROGRESS NOTES
[facility]   Hospitalist Progress Note  Date: 2025  Patient Name: Mary Albarran  : 1950  MRN: [id number]  Date of admission: 2025      Subjective   Subjective     Chief Complaint: Altered mental status    Summary: 74 y.o. female with history of A-fib on Xarelto, CKD stage III, type 2 diabetes initially presented to outside facility with decreased responsiveness.  Patient was found to be hypotensive, febrile and with elevated lactate.  Concern for infection.  Was given 2.5 L of IV fluids also had A-fib with RVR initially started on Cardizem however developed hypotension.  Did have CT scan of the abdomen pelvis which showed some gallbladder thickening concerning for cholelithiasis.  Was transferred to our facility for critical care management.     Interval Followup: Mental status is improved this morning.  She is complaining of pain in her arms and feet due to chronic neuropathy.  She denies abdominal pain but did have significant pain with deep palpation of her gallbladder this morning.  Remains in A-fib with elevated heart rates in the 140s.  Remains on Levophed due to hypotension.    Objective   Objective     Vitals:   Temp:  [98.1 °F (36.7 °C)-102 °F (38.9 °C)] 99.6 °F (37.6 °C)  Heart Rate:  [] 123  Resp:  [17-31] 25  BP: ()/(24-77) 112/54  Flow (L/min) (Oxygen Therapy):  [2-4] 2  Physical Exam    Constitutional: Ill-appearing female, awake, alert, appears fatigued but answering questions appropriately   Respiratory: Diminished in bilateral bases, nasal cannula in place, nonlabored respirations    Cardiovascular: IR/IR, tachycardic, no MRG   Gastrointestinal: Positive bowel sounds, soft, right upper quadrant tenderness to deep palpation, nondistended   Neurologic: Oriented x 3, strength symmetric in all extremities, Cranial Nerves grossly intact to confrontation, speech clear             Skin: Erythema of the right lower extremity    Result Review    I have personally reviewed  the results below:  [x]  Laboratory personally reviewed CMP, CBC, procalcitonin, blood sugars, magnesium, phosphorus  []  Microbiology  []  Radiology  []  EKG/Telemetry   []  Cardiology/Vascular   []  Pathology  []  Old records  []  Other:  CBC          5/15/2024    11:24 2/11/2025    03:02 2/12/2025    04:49   CBC   WBC 7.91  14.45  15.24    RBC 3.16  4.97  4.26    Hemoglobin 8.5  14.3  12.4    Hematocrit 29.9  46.3  39.5    MCV 94.6  93.2  92.7    MCH 26.9  28.8  29.1    MCHC 28.4  30.9  31.4    RDW 15.8  14.1  14.6    Platelets 80  78  70      CMP          6/6/2024    12:17 2/11/2025    03:02 2/11/2025    07:43 2/12/2025    04:49   CMP   Glucose 162  412   153    BUN 21  21   31    Creatinine 1.12  1.43  1.40  1.60    EGFR 52.7  39.0   34.1    Sodium 141  136   137    Potassium 4.7  5.2   4.2    Chloride 98  102   103    Calcium 9.4  8.7   8.1    Total Protein  6.2   5.6    Albumin  3.3   2.9    Globulin  2.9   2.7    Total Bilirubin  1.4   0.8    Alkaline Phosphatase  60   42    AST (SGOT)  37   52    ALT (SGPT)  21   26    Albumin/Globulin Ratio  1.1   1.1    BUN/Creatinine Ratio 18.8  14.7   19.4    Anion Gap 11.0  16.2   13.9        Assessment & Plan   Assessment / Plan   Septic shock  Streptococcal bacteremia  Concern for acute cholecystitis  Concern for right lower extremity cellulitis  Acute metabolic encephalopathy secondary to above  Chronic atrial fibrillation with RVR  Acute kidney injury  Hypomagnesemia  Lactic acidosis, clinically significant  Type 2 diabetes mellitus    Continue to monitor in the ICU for workup and management of the above  Abdominal ultrasound reviewed and discussed with general surgery, patient is not an ideal surgical candidate and plan for IR guided cholecystostomy tube placement tomorrow, n.p.o. after midnight  Discussed with pulmonology, wean Levophed as tolerated keep MAP greater than 65  Discussed with cardiology, transition to oral amiodarone  Restart home digoxin  Start  therapeutic Lovenox injections, hold after midnight  Blood cultures from outside hospital growing streptococcal species, 1 blood culture from 2/10 growing gram-negative bacilli  Continue with broad-spectrum Zosyn for now  Continue LR at 100 cc/h  Continue insulin drip for now, monitor blood sugars closely  Trend renal function and electrolytes with a.m. BMP, magnesium   Trend Hgb and WBC with a.m. CBC     Discussed plan with RN, cardiology, general surgery, pulmonology    VTE Prophylaxis:  Mechanical VTE prophylaxis orders are present.        CODE STATUS:   Level Of Support Discussed With: Health Care Surrogate  Code Status (Patient has no pulse and is not breathing): CPR (Attempt to Resuscitate)  Medical Interventions (Patient has pulse or is breathing): Full Support

## 2025-02-12 NOTE — CONSULTS
02/12/25 1022   Music Therapy   Assessment Detail MT-BC introduced self and role to Pt. Pt has been having ongoing pain per RN and Pt confirmed this pain. MT-BC provided education on MT services. Pt declined MT support at this time. MT-BC reminded Pt that MT will be available if she changes her mind.   Minutes of Assessment 5

## 2025-02-12 NOTE — PLAN OF CARE
Goal Outcome Evaluation:           Progress: no change  Outcome Evaluation: AxOx4. PRN pain medication x2. 4L NC. Skin care per orders. Levo and insulin gtt.

## 2025-02-12 NOTE — SIGNIFICANT NOTE
Wound Eval / Progress Noted    GUERO Bautista     Patient Name: Mary Albarran  : 1950  MRN: [id number]  Today's Date: 2025                 Admit Date: 2025    Visit Dx:  No diagnosis found.      Atrial fibrillation with RVR        Past Medical History:   Diagnosis Date    Allergic rhinitis     Anxiety     Aortic valve disease 2021    Fibrocalcific changes of the aortic valve with mild aortic valve stenosis   on echo 3/2/2021  Moderate aortic valve regurgitation is present. Aortic valve maximum pressure gradient is 26 mmHg. Moderate aortic valve stenosis is present.  On echo 2/10/2022       Arthritis     Atrial fibrillation     Chronic kidney disease (CKD), stage III (moderate)     Chronic pain     COPD (chronic obstructive pulmonary disease)     Diabetes mellitus type 2 with complications     Ex-smoker     QUIT SMOKING     Hyperlipidemia     Hypertension     Thrombocytopenia         Past Surgical History:   Procedure Laterality Date    HYSTERECTOMY      30 YEARS AGO         Physical Assessment:  Wound 25 1338 Right posterior foot unspecified (Active)   Wound Image   25 1338   Dressing Appearance open to air 25 1338   Closure None 25 1338   Base moist;red;dry;tan 25 1338   Periwound dry;pink;other (see comments) 25 1338   Periwound Temperature warm 25 1338   Periwound Skin Turgor soft 25 1338   Edges callused;open 25 1338   Wound Length (cm) 1.4 cm 25 1338   Wound Width (cm) 0.9 cm 25 1338   Wound Depth (cm) 0.2 cm 25 1338   Wound Surface Area (cm^2) 1.26 cm^2 25 1338   Wound Volume (cm^3) 0.252 cm^3 25 1338   Drainage Characteristics/Odor serosanguineous 25 1338   Drainage Amount scant 25 1338   Care, Wound cleansed with;sterile normal saline 25 1338   Dressing Care dressing applied;silver impregnated;hydrofiber;other (see comments);silicone border foam 25 1338   Periwound Care  absorptive dressing applied 02/11/25 1338       Wound 02/11/25 1338 Left medial great toe unspecified (Active)   Wound Image   02/11/25 1338   Dressing Appearance open to air 02/11/25 1338   Closure None 02/11/25 1338   Base closed/resurfaced;dry;maroon/purple 02/11/25 1338   Periwound dry;maroon/purple;pink 02/11/25 1338   Periwound Temperature warm 02/11/25 1338   Periwound Skin Turgor soft 02/11/25 1338   Edges rolled/closed 02/11/25 1338   Drainage Amount none 02/11/25 1338   Care, Wound cleansed with;sterile normal saline 02/11/25 1338   Dressing Care open to air 02/11/25 1338   Periwound Care moisturizer applied 02/11/25 1338      02/11/25 1338   Skin   Skin WDL X;color;moisture   Skin Color/Characteristics other (see comments)  (BLE redness with scaly tan tissue)     Skin Moisture dry;flaky      Wound Check / Follow-up:  Patient seen today for wound consult. Patient is currently in CICU. Patient is awake, alert, and oriented. Patient reports she utilizes elastic bandages for compression at home. Patient states her  applies her compression dressings approximately every other day. Patient states she showers prior to her leg being wrapped and has her  apply a moisturizer. Patient states her  noted the wound to her right plantar foot a couple of weeks ago while applying her wraps; however, she is not sure how she developed this injury. Patient states she believes she hit her left medial great toe, which caused a blood blister. Will hold off on compression dressings at this time due to patient's current fluid balance. Patient is currently NPO.    Bilateral lower legs present with edema, redness, and scaly tan tissue. Intermittent areas of scaly tan tissue dislodged while cleansing with normal saline and gauze, no areas of open tissue noted beneath dislodged scaly tissue. Recommending quality skin care and hygiene with application of Ammonium Lactate twice a day. Recommending BLE to be elevated  at all times to assist with edema management as patient does not currently qualify for compression.  Discussed findings with attending MD and orders obtained.    Ulceration noted to right plantar foot.  Wound base presents with moist red tissue and dry tan tissue.  Periwound tissue is dry with a pale yellow callus, surrounded by pink coloration.  Cleansed with normal saline and gauze, blotted dry.  Recommending daily dressing changes with silver impregnated Hydrofiber, moistened with 2 drops in normal saline, and silicone border dressing securement.    Suspected traumatic injury to left medial great toe.  Wound base is closed with dry maroon tissue noted.  Periwound tissue is dry with scattered maroon tissue and redness.  Cleansed with normal saline and gauze, blotted dry.  Recommend quality skin care and hygiene.    Blanchable redness noted to bilateral gluteal aspects and heels.  Recommending quality skin care and hygiene with application of blue top moisture barrier 4 times a day and as needed for incontinence.  Keep patient clean, dry, and free from all moisture.  Implement every 2 hour turns and offload at all times.    Redness noted to bilateral breast creases.  Recommending quality skin care and hygiene with application of a light dusting of cornstarch powder twice a day.  May apply dry sheets for added moisture absorption.  Keep patient clean, dry, and free from all moisture.    Impression: Bilateral lower extremity edema, redness, and scaly tan tissue.  Right plantar foot ulceration.  Suspected traumatic injury to left medial great toe.    Short term goals:  Regain skin integrity, skin protection, moisture prevention, pressure reduction, quality skin care and hygiene, topical treatment, daily dressing change.    Christie Gee RN    2/11/2025    20:42 EST

## 2025-02-13 ENCOUNTER — APPOINTMENT (OUTPATIENT)
Facility: HOSPITAL | Age: 75
End: 2025-02-13
Payer: MEDICARE

## 2025-02-13 ENCOUNTER — APPOINTMENT (OUTPATIENT)
Dept: CT IMAGING | Facility: HOSPITAL | Age: 75
End: 2025-02-13
Payer: MEDICARE

## 2025-02-13 LAB
ANION GAP SERPL CALCULATED.3IONS-SCNC: 11.2 MMOL/L (ref 5–15)
BASOPHILS # BLD AUTO: 0.02 10*3/MM3 (ref 0–0.2)
BASOPHILS NFR BLD AUTO: 0.3 % (ref 0–1.5)
BH CV LOWER VASCULAR LEFT COMMON FEMORAL AUGMENT: NORMAL
BH CV LOWER VASCULAR LEFT COMMON FEMORAL COMPETENT: NORMAL
BH CV LOWER VASCULAR LEFT COMMON FEMORAL COMPRESS: NORMAL
BH CV LOWER VASCULAR LEFT COMMON FEMORAL PHASIC: NORMAL
BH CV LOWER VASCULAR LEFT COMMON FEMORAL SPONT: NORMAL
BH CV LOWER VASCULAR RIGHT COMMON FEMORAL AUGMENT: NORMAL
BH CV LOWER VASCULAR RIGHT COMMON FEMORAL COMPETENT: NORMAL
BH CV LOWER VASCULAR RIGHT COMMON FEMORAL COMPRESS: NORMAL
BH CV LOWER VASCULAR RIGHT COMMON FEMORAL PHASIC: NORMAL
BH CV LOWER VASCULAR RIGHT COMMON FEMORAL SPONT: NORMAL
BH CV LOWER VASCULAR RIGHT DISTAL FEMORAL COMPRESS: NORMAL
BH CV LOWER VASCULAR RIGHT GASTRONEMIUS COMPRESS: NORMAL
BH CV LOWER VASCULAR RIGHT GREATER SAPH AK COMPRESS: NORMAL
BH CV LOWER VASCULAR RIGHT GREATER SAPH BK COMPRESS: NORMAL
BH CV LOWER VASCULAR RIGHT LESSER SAPH COMPRESS: NORMAL
BH CV LOWER VASCULAR RIGHT MID FEMORAL AUGMENT: NORMAL
BH CV LOWER VASCULAR RIGHT MID FEMORAL COMPETENT: NORMAL
BH CV LOWER VASCULAR RIGHT MID FEMORAL COMPRESS: NORMAL
BH CV LOWER VASCULAR RIGHT MID FEMORAL PHASIC: NORMAL
BH CV LOWER VASCULAR RIGHT MID FEMORAL SPONT: NORMAL
BH CV LOWER VASCULAR RIGHT PERONEAL COMPRESS: NORMAL
BH CV LOWER VASCULAR RIGHT POPLITEAL AUGMENT: NORMAL
BH CV LOWER VASCULAR RIGHT POPLITEAL COMPETENT: NORMAL
BH CV LOWER VASCULAR RIGHT POPLITEAL COMPRESS: NORMAL
BH CV LOWER VASCULAR RIGHT POPLITEAL PHASIC: NORMAL
BH CV LOWER VASCULAR RIGHT POPLITEAL SPONT: NORMAL
BH CV LOWER VASCULAR RIGHT POSTERIOR TIBIAL COMPRESS: NORMAL
BH CV LOWER VASCULAR RIGHT PROXIMAL FEMORAL COMPRESS: NORMAL
BH CV LOWER VASCULAR RIGHT SAPHENOFEMORAL JUNCTION AUGMENT: NORMAL
BH CV LOWER VASCULAR RIGHT SAPHENOFEMORAL JUNCTION PHASIC: NORMAL
BH CV LOWER VASCULAR RIGHT SAPHENOFEMORAL JUNCTION SPONT: NORMAL
BUN SERPL-MCNC: 26 MG/DL (ref 8–23)
BUN/CREAT SERPL: 22.6 (ref 7–25)
CALCIUM SPEC-SCNC: 8.2 MG/DL (ref 8.6–10.5)
CHLORIDE SERPL-SCNC: 106 MMOL/L (ref 98–107)
CO2 SERPL-SCNC: 21.8 MMOL/L (ref 22–29)
CREAT SERPL-MCNC: 1.15 MG/DL (ref 0.57–1)
DEPRECATED RDW RBC AUTO: 51.1 FL (ref 37–54)
EGFRCR SERPLBLD CKD-EPI 2021: 50.7 ML/MIN/1.73
EOSINOPHIL # BLD AUTO: 0.03 10*3/MM3 (ref 0–0.4)
EOSINOPHIL NFR BLD AUTO: 0.4 % (ref 0.3–6.2)
ERYTHROCYTE [DISTWIDTH] IN BLOOD BY AUTOMATED COUNT: 14.7 % (ref 12.3–15.4)
GLUCOSE BLDC GLUCOMTR-MCNC: 121 MG/DL (ref 70–99)
GLUCOSE BLDC GLUCOMTR-MCNC: 129 MG/DL (ref 70–99)
GLUCOSE BLDC GLUCOMTR-MCNC: 143 MG/DL (ref 70–99)
GLUCOSE BLDC GLUCOMTR-MCNC: 146 MG/DL (ref 70–99)
GLUCOSE BLDC GLUCOMTR-MCNC: 146 MG/DL (ref 70–99)
GLUCOSE BLDC GLUCOMTR-MCNC: 149 MG/DL (ref 70–99)
GLUCOSE BLDC GLUCOMTR-MCNC: 159 MG/DL (ref 70–99)
GLUCOSE BLDC GLUCOMTR-MCNC: 160 MG/DL (ref 70–99)
GLUCOSE BLDC GLUCOMTR-MCNC: 162 MG/DL (ref 70–99)
GLUCOSE BLDC GLUCOMTR-MCNC: 162 MG/DL (ref 70–99)
GLUCOSE BLDC GLUCOMTR-MCNC: 164 MG/DL (ref 70–99)
GLUCOSE BLDC GLUCOMTR-MCNC: 166 MG/DL (ref 70–99)
GLUCOSE BLDC GLUCOMTR-MCNC: 176 MG/DL (ref 70–99)
GLUCOSE BLDC GLUCOMTR-MCNC: 178 MG/DL (ref 70–99)
GLUCOSE BLDC GLUCOMTR-MCNC: 187 MG/DL (ref 70–99)
GLUCOSE BLDC GLUCOMTR-MCNC: 248 MG/DL (ref 70–99)
GLUCOSE SERPL-MCNC: 135 MG/DL (ref 65–99)
HCT VFR BLD AUTO: 37.5 % (ref 34–46.6)
HGB BLD-MCNC: 11.4 G/DL (ref 12–15.9)
IMM GRANULOCYTES # BLD AUTO: 0.11 10*3/MM3 (ref 0–0.05)
IMM GRANULOCYTES NFR BLD AUTO: 1.5 % (ref 0–0.5)
INR PPP: 1.16 (ref 0.86–1.15)
LYMPHOCYTES # BLD AUTO: 0.68 10*3/MM3 (ref 0.7–3.1)
LYMPHOCYTES NFR BLD AUTO: 9 % (ref 19.6–45.3)
MAGNESIUM SERPL-MCNC: 2.2 MG/DL (ref 1.6–2.4)
MCH RBC QN AUTO: 28.9 PG (ref 26.6–33)
MCHC RBC AUTO-ENTMCNC: 30.4 G/DL (ref 31.5–35.7)
MCV RBC AUTO: 94.9 FL (ref 79–97)
MONOCYTES # BLD AUTO: 0.31 10*3/MM3 (ref 0.1–0.9)
MONOCYTES NFR BLD AUTO: 4.1 % (ref 5–12)
NEUTROPHILS NFR BLD AUTO: 6.4 10*3/MM3 (ref 1.7–7)
NEUTROPHILS NFR BLD AUTO: 84.7 % (ref 42.7–76)
NRBC BLD AUTO-RTO: 0 /100 WBC (ref 0–0.2)
PHOSPHATE SERPL-MCNC: 3.5 MG/DL (ref 2.5–4.5)
PLATELET # BLD AUTO: 52 10*3/MM3 (ref 140–450)
PMV BLD AUTO: 13.9 FL (ref 6–12)
POTASSIUM SERPL-SCNC: 4.7 MMOL/L (ref 3.5–5.2)
PROTHROMBIN TIME: 15.3 SECONDS (ref 11.8–14.9)
RBC # BLD AUTO: 3.95 10*6/MM3 (ref 3.77–5.28)
SODIUM SERPL-SCNC: 139 MMOL/L (ref 136–145)
WBC NRBC COR # BLD AUTO: 7.55 10*3/MM3 (ref 3.4–10.8)

## 2025-02-13 PROCEDURE — 99291 CRITICAL CARE FIRST HOUR: CPT | Performed by: INTERNAL MEDICINE

## 2025-02-13 PROCEDURE — 87040 BLOOD CULTURE FOR BACTERIA: CPT

## 2025-02-13 PROCEDURE — 99232 SBSQ HOSP IP/OBS MODERATE 35: CPT | Performed by: INTERNAL MEDICINE

## 2025-02-13 PROCEDURE — 25010000002 VANCOMYCIN 5 G RECONSTITUTED SOLUTION: Performed by: INTERNAL MEDICINE

## 2025-02-13 PROCEDURE — 25810000003 SODIUM CHLORIDE 0.9 % SOLUTION: Performed by: INTERNAL MEDICINE

## 2025-02-13 PROCEDURE — 25010000002 ACETAMINOPHEN 10 MG/ML SOLUTION: Performed by: NURSE PRACTITIONER

## 2025-02-13 PROCEDURE — 99233 SBSQ HOSP IP/OBS HIGH 50: CPT | Performed by: INTERNAL MEDICINE

## 2025-02-13 PROCEDURE — 0F9430Z DRAINAGE OF GALLBLADDER WITH DRAINAGE DEVICE, PERCUTANEOUS APPROACH: ICD-10-PCS | Performed by: RADIOLOGY

## 2025-02-13 PROCEDURE — C1729 CATH, DRAINAGE: HCPCS

## 2025-02-13 PROCEDURE — 94799 UNLISTED PULMONARY SVC/PX: CPT

## 2025-02-13 PROCEDURE — 87205 SMEAR GRAM STAIN: CPT | Performed by: SURGERY

## 2025-02-13 PROCEDURE — 25010000002 ONDANSETRON PER 1 MG: Performed by: RADIOLOGY

## 2025-02-13 PROCEDURE — 85610 PROTHROMBIN TIME: CPT | Performed by: SURGERY

## 2025-02-13 PROCEDURE — 83735 ASSAY OF MAGNESIUM: CPT | Performed by: INTERNAL MEDICINE

## 2025-02-13 PROCEDURE — 87070 CULTURE OTHR SPECIMN AEROBIC: CPT | Performed by: SURGERY

## 2025-02-13 PROCEDURE — 25010000002 PIPERACILLIN SOD-TAZOBACTAM PER 1 G: Performed by: NURSE PRACTITIONER

## 2025-02-13 PROCEDURE — 93971 EXTREMITY STUDY: CPT

## 2025-02-13 PROCEDURE — 80048 BASIC METABOLIC PNL TOTAL CA: CPT | Performed by: INTERNAL MEDICINE

## 2025-02-13 PROCEDURE — 25010000002 LIDOCAINE 2% SOLUTION: Performed by: INTERNAL MEDICINE

## 2025-02-13 PROCEDURE — 99231 SBSQ HOSP IP/OBS SF/LOW 25: CPT | Performed by: SURGERY

## 2025-02-13 PROCEDURE — 93971 EXTREMITY STUDY: CPT | Performed by: SURGERY

## 2025-02-13 PROCEDURE — 85025 COMPLETE CBC W/AUTO DIFF WBC: CPT | Performed by: INTERNAL MEDICINE

## 2025-02-13 PROCEDURE — 25010000002 FENTANYL CITRATE (PF) 50 MCG/ML SOLUTION: Performed by: RADIOLOGY

## 2025-02-13 PROCEDURE — 82948 REAGENT STRIP/BLOOD GLUCOSE: CPT

## 2025-02-13 PROCEDURE — 25010000002 MIDAZOLAM PER 1MG: Performed by: RADIOLOGY

## 2025-02-13 PROCEDURE — 84100 ASSAY OF PHOSPHORUS: CPT | Performed by: INTERNAL MEDICINE

## 2025-02-13 RX ORDER — METOPROLOL TARTRATE 1 MG/ML
5 INJECTION, SOLUTION INTRAVENOUS EVERY 6 HOURS PRN
Status: DISCONTINUED | OUTPATIENT
Start: 2025-02-13 | End: 2025-02-28 | Stop reason: HOSPADM

## 2025-02-13 RX ORDER — MIDAZOLAM HYDROCHLORIDE 2 MG/2ML
INJECTION, SOLUTION INTRAMUSCULAR; INTRAVENOUS
Status: DISPENSED
Start: 2025-02-13 | End: 2025-02-13

## 2025-02-13 RX ORDER — METOPROLOL TARTRATE 1 MG/ML
5 INJECTION, SOLUTION INTRAVENOUS EVERY 12 HOURS
Status: DISCONTINUED | OUTPATIENT
Start: 2025-02-13 | End: 2025-02-14

## 2025-02-13 RX ORDER — FENTANYL CITRATE 50 UG/ML
INJECTION, SOLUTION INTRAMUSCULAR; INTRAVENOUS AS NEEDED
Status: COMPLETED | OUTPATIENT
Start: 2025-02-13 | End: 2025-02-13

## 2025-02-13 RX ORDER — MIDAZOLAM HYDROCHLORIDE 2 MG/2ML
INJECTION, SOLUTION INTRAMUSCULAR; INTRAVENOUS AS NEEDED
Status: COMPLETED | OUTPATIENT
Start: 2025-02-13 | End: 2025-02-13

## 2025-02-13 RX ORDER — ONDANSETRON 2 MG/ML
INJECTION INTRAMUSCULAR; INTRAVENOUS AS NEEDED
Status: COMPLETED | OUTPATIENT
Start: 2025-02-13 | End: 2025-02-13

## 2025-02-13 RX ORDER — LIDOCAINE HYDROCHLORIDE 20 MG/ML
20 INJECTION, SOLUTION INFILTRATION; PERINEURAL ONCE
Status: COMPLETED | OUTPATIENT
Start: 2025-02-13 | End: 2025-02-13

## 2025-02-13 RX ORDER — ONDANSETRON 2 MG/ML
INJECTION INTRAMUSCULAR; INTRAVENOUS
Status: DISPENSED
Start: 2025-02-13 | End: 2025-02-14

## 2025-02-13 RX ORDER — FENTANYL CITRATE 50 UG/ML
INJECTION, SOLUTION INTRAMUSCULAR; INTRAVENOUS
Status: DISPENSED
Start: 2025-02-13 | End: 2025-02-13

## 2025-02-13 RX ADMIN — MUPIROCIN 1 APPLICATION: 20 OINTMENT TOPICAL at 20:21

## 2025-02-13 RX ADMIN — METOPROLOL TARTRATE 5 MG: 1 INJECTION, SOLUTION INTRAVENOUS at 20:21

## 2025-02-13 RX ADMIN — LIDOCAINE HYDROCHLORIDE 10 ML: 20 INJECTION, SOLUTION INFILTRATION; PERINEURAL at 12:23

## 2025-02-13 RX ADMIN — MUPIROCIN 1 APPLICATION: 20 OINTMENT TOPICAL at 09:00

## 2025-02-13 RX ADMIN — METOPROLOL TARTRATE 5 MG: 1 INJECTION, SOLUTION INTRAVENOUS at 10:50

## 2025-02-13 RX ADMIN — Medication: at 20:23

## 2025-02-13 RX ADMIN — ONDANSETRON 4 MG: 2 INJECTION INTRAMUSCULAR; INTRAVENOUS at 12:35

## 2025-02-13 RX ADMIN — Medication: at 09:04

## 2025-02-13 RX ADMIN — MIDAZOLAM HYDROCHLORIDE 1 MG: 1 INJECTION, SOLUTION INTRAMUSCULAR; INTRAVENOUS at 12:07

## 2025-02-13 RX ADMIN — PIPERACILLIN AND TAZOBACTAM 4.5 G: 4; .5 INJECTION, POWDER, FOR SOLUTION INTRAVENOUS; PARENTERAL at 03:59

## 2025-02-13 RX ADMIN — HYDROCODONE BITARTRATE AND ACETAMINOPHEN 1 TABLET: 10; 325 TABLET ORAL at 03:59

## 2025-02-13 RX ADMIN — PIPERACILLIN AND TAZOBACTAM 4.5 G: 4; .5 INJECTION, POWDER, FOR SOLUTION INTRAVENOUS; PARENTERAL at 14:10

## 2025-02-13 RX ADMIN — HYDROCODONE BITARTRATE AND ACETAMINOPHEN 1 TABLET: 10; 325 TABLET ORAL at 15:37

## 2025-02-13 RX ADMIN — AMIODARONE HYDROCHLORIDE 200 MG: 200 TABLET ORAL at 08:59

## 2025-02-13 RX ADMIN — DIGOXIN 125 MCG: 125 TABLET ORAL at 14:10

## 2025-02-13 RX ADMIN — LIDOCAINE 2 PATCH: 4 PATCH TOPICAL at 08:58

## 2025-02-13 RX ADMIN — ACETAMINOPHEN 1000 MG: 10 INJECTION, SOLUTION INTRAVENOUS at 19:42

## 2025-02-13 RX ADMIN — PIPERACILLIN AND TAZOBACTAM 4.5 G: 4; .5 INJECTION, POWDER, FOR SOLUTION INTRAVENOUS; PARENTERAL at 20:22

## 2025-02-13 RX ADMIN — FAMOTIDINE 20 MG: 10 INJECTION, SOLUTION INTRAVENOUS at 09:10

## 2025-02-13 RX ADMIN — HYDROCODONE BITARTRATE AND ACETAMINOPHEN 1 TABLET: 10; 325 TABLET ORAL at 08:59

## 2025-02-13 RX ADMIN — AMIODARONE HYDROCHLORIDE 200 MG: 200 TABLET ORAL at 20:22

## 2025-02-13 RX ADMIN — Medication 10 ML: at 20:23

## 2025-02-13 RX ADMIN — Medication 10 ML: at 20:22

## 2025-02-13 RX ADMIN — METOPROLOL TARTRATE 5 MG: 1 INJECTION, SOLUTION INTRAVENOUS at 15:36

## 2025-02-13 RX ADMIN — FAMOTIDINE 20 MG: 10 INJECTION, SOLUTION INTRAVENOUS at 20:21

## 2025-02-13 RX ADMIN — FENTANYL CITRATE 50 MCG: 50 INJECTION, SOLUTION INTRAMUSCULAR; INTRAVENOUS at 12:09

## 2025-02-13 RX ADMIN — VANCOMYCIN HYDROCHLORIDE 1250 MG: 5 INJECTION, POWDER, LYOPHILIZED, FOR SOLUTION INTRAVENOUS at 10:50

## 2025-02-13 RX ADMIN — Medication 10 ML: at 09:01

## 2025-02-13 RX ADMIN — HYDROCODONE BITARTRATE AND ACETAMINOPHEN 1 TABLET: 10; 325 TABLET ORAL at 19:42

## 2025-02-13 RX ADMIN — Medication 10 ML: at 09:00

## 2025-02-13 NOTE — PROGRESS NOTES
Pulmonary / Critical Care Progress Note      Patient Name: Mary Albarran  : 1950  MRN: [id number]  Attending:  Ang Garcia MD   Date of admission: 2025    Subjective   Subjective   Patient critically ill with septic shock, Streptococcus bacteremia  A-fib improved  Sugars controlled  Mental status improved    Objective   Objective     Vitals:   Vital signs for last 24 hours:  Temp:  [98.7 °F (37.1 °C)-99.7 °F (37.6 °C)] 99.4 °F (37.4 °C)  Heart Rate:  [] 103  Resp:  [14-24] 20  BP: ()/(43-98) 127/57    Intake/Output last 3 shifts:  I/O last 3 completed shifts:  In: 3565.9 [P.O.:240; I.V.:3305.9; Other:20]  Out: 1625 [Urine:1625]    Physical Exam   Increasing lower extremity edema obese female  Does have some worsening erythema of the lower extremity  Does have diffuse abdominal pain  Result Review    Result Review:  I have personally reviewed the results from the time of this admission to 2025 15:25 EST and agree with these findings:  [x]  Laboratory  [x]  Microbiology  [x]  Radiology  [x]  EKG/Telemetry   [x]  Cardiology/Vascular   []  Pathology  []  Old records  []  Other:  Most notable findings include:     Outside blood cultures with Streptococcus species    Assessment & Plan   Assessment / Plan     Active Hospital Problems:  Active Hospital Problems    Diagnosis     **Atrial fibrillation with RVR      Impression:  Septic shock requiring Levophed  Gram-negative bacteremia  Reported gram-positive bacteremia from outside facility  Right lower extremity cellulitis  Questionable acute acalculous cholecystitis HIDA scan pending  Septic shock  Atrial fibrillation      Plan:  Outside facility blood cultures revealed Streptococcus species consistent with a GI source  Patient with gram-negative here  She has gotten better with antibiotics  Given the fact that she is 2 separate bacteria that is growing in her blood that originate from the GI tract I suspect that she is part of probably  go ahead and have this percutaneous drain if there is any concern for possible cholecystitis  We will repeat blood cultures today  Overall she has improved from a infectious standpoint her mental status is better and her pressor requirement has resolved  Will continue broad-spectrum antibiotics  Continue with oral amiodarone  Lovenox to be started back per cardiology service  Continue with digoxin  Continue vancomycin and pip-tazo  Continue with insulin drip        GI prophylaxis-famotidine  VTE Prophylaxis:  Mechanical VTE prophylaxis orders are present.    CODE STATUS:   Level Of Support Discussed With: Health Care Surrogate  Code Status (Patient has no pulse and is not breathing): CPR (Attempt to Resuscitate)  Medical Interventions (Patient has pulse or is breathing): Full Support      Patient seen and examined the above assessment plan reflect my assessment plan medical decision making  The patient is critically ill in the ICU with septic shock, acute kidney injury multidisciplinary bedside critical care rounds were performed with nursing staff, respiratory therapy, pharmacy, nutritional services, social work. I have personally reviewed the chart, labs and any pertinent imaging available.      I have spent 35 minutes of critical care time, excluding procedures, in the care of this patient.  Electronically signed by Jerson Berg DO, 02/13/25, 3:36 PM EST.

## 2025-02-13 NOTE — PROGRESS NOTES
"[facility] Clinical Pharmacy Services: Vancomycin Pharmacokinetic Initial Consult Note    Mary Albarran is a 74 y.o. female who is on day 3 (new consult today, had previously received 2 doses vancomycin in admission) of pharmacy to dose vancomycin for Skin and Soft Tissue.    Consult Information  Consulting Provider: Ang Garcia  Planned Duration of Therapy: 5 days  Was Patient Receiving Prior to Admission/Consult?: Yes, last dose received 1g at 2140 on .  Loading Dose Ordered or Given: N/A  PK/PD Target: -600 mg/L.hr   Relevant ID History:   Other Antimicrobials: Piperacillin/Tazobactam    Imaging Reviewed?: Yes    Microbiology Data  MRSA PCR performed:   ; Result: Negative  Culture/Source:   2/10 blood (from [place] in [place], spoke to over phone) -  blood GPCs, ID streptococcus dysgalactiae spp equisimilis   1 of 2 GNRs, neg by BCID    Vitals/Labs  Ht: 160 cm (62.99\"); Wt: 116 kg (256 lb 2.8 oz)  Temp (24hrs), Av.6 °F (37.6 °C), Min:98.7 °F (37.1 °C), Max:99.9 °F (37.7 °C)   Estimated Creatinine Clearance: 54.3 mL/min (A) (by C-G formula based on SCr of 1.15 mg/dL (H)).       Results from last 7 days   Lab Units 25  0417 25  0449 25  0743 25  0302   VANCOMYCIN RM mcg/mL  --  21.63  --   --    CREATININE mg/dL 1.15* 1.60* 1.40* 1.43*   WBC 10*3/mm3 7.55 15.24*  --  14.45*     Assessment/Plan:    Vancomycin Dose:  1250 mg IV every 24 hours; which provides the following predicted parameters:  AUC24,ss: 552 mg/L.hr  Probability of AUC24 > 400: 85 %  Ctrough,ss: 18.1 mg/L  Probability of Ctrough,ss > 20: 41 %  Probability of nephrotoxicity (Lodise ERIC ): 14 %  Vanc Random ordered for 214 AM  Patient has order for Basic Metabolic Panel    Pharmacy will follow patient's kidney function and will adjust doses and obtain levels as necessary. Thank you for involving pharmacy in this patient's care. Please contact pharmacy with any questions or concerns.  "                          Steven Guillen, [facility]  Clinical Pharmacist

## 2025-02-13 NOTE — PLAN OF CARE
Goal Outcome Evaluation:            Patient remains on 4L nasal cannula. Patient has been off BIPAP during the day. Patient refused BIPAP last night

## 2025-02-13 NOTE — PROGRESS NOTES
[facility]   Hospitalist Progress Note  Date: 2025  Patient Name: Mary Albarran  : 1950  MRN: [id number]  Date of admission: 2025      Subjective   Subjective     Chief Complaint: Altered mental status    Summary: 74 y.o. female with history of A-fib on Xarelto, CKD stage III, type 2 diabetes initially presented to outside facility with decreased responsiveness.  Patient was found to be hypotensive, febrile and with elevated lactate.  Concern for infection.  Was given 2.5 L of IV fluids also had A-fib with RVR initially started on Cardizem however developed hypotension.  Did have CT scan of the abdomen pelvis which showed some gallbladder thickening concerning for cholelithiasis.  Was transferred to our facility for critical care management.     Interval Followup: Heart rate remains elevated.  Blood pressures improved, was weaned off of Levophed.  Right lower extremity erythema appears to be spreading despite Zosyn.  Afebrile overnight.    Objective   Objective     Vitals:   Temp:  [98.7 °F (37.1 °C)-99.7 °F (37.6 °C)] 98.7 °F (37.1 °C)  Heart Rate:  [] 97  Resp:  [14-24] 24  BP: ()/(43-98) 122/62  Flow (L/min) (Oxygen Therapy):  [4-6] 6  Physical Exam    Constitutional: Ill-appearing female, awake, alert, answering questions appropriately   Respiratory: Diminished in bilateral bases, nasal cannula in place, nonlabored respirations    Cardiovascular: IR/IR, tachycardic, no MRG   Gastrointestinal: Positive bowel sounds, soft, nondistended   Neurologic: Oriented x 3, strength symmetric in all extremities, Cranial Nerves grossly intact to confrontation, speech clear             Skin: Erythema of the right lower extremity appears to be worsening    Result Review    I have personally reviewed the results below:  [x]  Laboratory personally reviewed CMP, CBC, procalcitonin, blood sugars, magnesium, phosphorus  []  Microbiology  []  Radiology  []  EKG/Telemetry   []  Cardiology/Vascular    []  Pathology  []  Old records  []  Other:  CBC          2/11/2025    03:02 2/12/2025    04:49 2/13/2025    04:17   CBC   WBC 14.45  15.24  7.55    RBC 4.97  4.26  3.95    Hemoglobin 14.3  12.4  11.4    Hematocrit 46.3  39.5  37.5    MCV 93.2  92.7  94.9    MCH 28.8  29.1  28.9    MCHC 30.9  31.4  30.4    RDW 14.1  14.6  14.7    Platelets 78  70  52      CMP          2/11/2025    03:02 2/11/2025    07:43 2/12/2025    04:49 2/13/2025    04:17   CMP   Glucose 412   153  135    BUN 21   31  26    Creatinine 1.43  1.40  1.60  1.15    EGFR 39.0   34.1  50.7    Sodium 136   137  139    Potassium 5.2   4.2  4.7    Chloride 102   103  106    Calcium 8.7   8.1  8.2    Total Protein 6.2   5.6     Albumin 3.3   2.9     Globulin 2.9   2.7     Total Bilirubin 1.4   0.8     Alkaline Phosphatase 60   42     AST (SGOT) 37   52     ALT (SGPT) 21   26     Albumin/Globulin Ratio 1.1   1.1     BUN/Creatinine Ratio 14.7   19.4  22.6    Anion Gap 16.2   13.9  11.2        Assessment & Plan   Assessment / Plan   Septic shock  Streptococcal bacteremia  Gram-negative bacteremia  Concern for acute cholecystitis  Concern for right lower extremity cellulitis  Acute metabolic encephalopathy secondary to above  Chronic atrial fibrillation with RVR  Acute kidney injury  Hypomagnesemia  Lactic acidosis, clinically significant  Type 2 diabetes mellitus    Continue to monitor in the ICU for workup and management of the above  Discussed with general surgery, plan for the patient to go to IR today for cholecystostomy tube placement  Cellulitis is worsening, will add IV vancomycin and continue IV Zosyn for now.  Monitor vancomycin levels  1/2 blood cultures positive for gram-negative bacilli, repeat blood cultures today and follow-up results  Discussed with cardiology, continue oral amiodarone, schedule IV metoprolol for improved rate control  Continue home digoxin  Restart therapeutic Lovenox after cholecystostomy tube placement  Continue with Norco  as needed for pain  Continue insulin drip, monitor blood sugars closely  Trend renal function and electrolytes with a.m. BMP, magnesium   Trend Hgb and WBC with a.m. CBC     Discussed plan with RN, cardiology, general surgery, pulmonology    VTE Prophylaxis:  Mechanical VTE prophylaxis orders are present.        CODE STATUS:   Level Of Support Discussed With: Health Care Surrogate  Code Status (Patient has no pulse and is not breathing): CPR (Attempt to Resuscitate)  Medical Interventions (Patient has pulse or is breathing): Full Support

## 2025-02-13 NOTE — PROGRESS NOTES
RT EQUIPMENT DEVICE RELATED - SKIN ASSESSMENT    RT Medical Equipment/Device:  Nasal Cannula    Skin Assessment: Cheek: Intact and Nares: Intact    Device Skin Pressure Protection: Positioning supports utilized    Nurse Notification: Elayne Ruiz, RRT

## 2025-02-13 NOTE — PROGRESS NOTES
[facility]     Progress Note    Patient Name: Mary Albarran  : 1950  MRN: [id number]  Primary Care Physician:  Brittni Quiroz APRN  Date of admission: 2025    Subjective   Subjective     Chief Complaint: Sepsis    HPI:  Patient was in interventional radiology at time of rounds      Objective   Objective     Vitals:   Temp:  [98.7 °F (37.1 °C)-100 °F (37.8 °C)] 98.7 °F (37.1 °C)  Heart Rate:  [] 105  Resp:  [14-25] 14  BP: ()/(43-98) 106/64  Flow (L/min) (Oxygen Therapy):  [4] 4    Physical Exam    Result Review    Result Review:  I have personally reviewed the results from the time of this admission to 2025 12:41 EST and agree with these findings:  [x]  Laboratory  []  Microbiology  []  Radiology  []  EKG/Telemetry   []  Cardiology/Vascular   []  Pathology  []  Old records  []  Other:  Most notable findings include: White count normalized    Assessment & Plan   Assessment / Plan     Brief Patient Summary:  Mary Albarran is a 74 y.o. female who has possible cholecystitis    Active Hospital Problems:  Active Hospital Problems    Diagnosis     **Atrial fibrillation with RVR      Plan:  I discussion with IR yesterday her ultrasound and their opinion did not look bad and her HIDA scan visualized  We did initially plan to not intervene on her gallbladder and gallbladder does not seem like a terribly likely source  However findings of some gram-negative's in her blood cultures raise concern of a GI source  She is currently getting drain placed in IR  Continue drain for 2 weeks  I will likely remove IR drain in the office unless patient remains in house  Continue care otherwise  I will be following peripherally         VTE Prophylaxis:  Mechanical VTE prophylaxis orders are present.        CODE STATUS:   Level Of Support Discussed With: Health Care Surrogate  Code Status (Patient has no pulse and is not breathing): CPR (Attempt to Resuscitate)  Medical Interventions (Patient has  pulse or is breathing): Full Support    Disposition:  I expect patient to be discharged unsure.    Electronically signed by Joaquin Gramajo MD, 02/13/25, 12:41 PM EST.

## 2025-02-13 NOTE — PLAN OF CARE
Goal Outcome Evaluation:      Insulin gtt infusing. Restarted clear liquid diet. Bile drain placed this shift. Maldonado in place. Given one PRN dose of Lopressor d/t heart rate above 130. Two loose stools this shift. Family updated on status.

## 2025-02-13 NOTE — PLAN OF CARE
Goal Outcome Evaluation:   Patient refused to wear BiPAP tonight. Currently on 4L nasal cannula, and is tolerating it well.

## 2025-02-13 NOTE — PROGRESS NOTES
CARDIOLOGY  INPATIENT PROGRESS NOTE                [facility] CORONARY CARE UNIT    2/13/2025      PATIENT IDENTIFICATION:   Name:  Mary Albarran      MRN:  [id number]     74 y.o.  female             No chief complaint on file.       SUBJECTIVE:   Patient still been having leg and arm pain issues and remaining tachycardic heart rate primary in the 100-1 30s range.  OBJECTIVE:  Vitals:    02/13/25 0200 02/13/25 0300 02/13/25 0400 02/13/25 0500   BP: 109/80 127/52 137/75    BP Location: Right arm Right arm Right arm    Patient Position: Lying Lying Lying    Pulse: 101 94 115 (!) 134   Resp: 18 18 18 19   Temp:   98.9 °F (37.2 °C) 98.7 °F (37.1 °C)   TempSrc:   Bladder Bladder   SpO2: 99% 98% 99% 97%   Weight:       Height:               Body mass index is 45.39 kg/m².    Intake/Output Summary (Last 24 hours) at 2/13/2025 0909  Last data filed at 2/13/2025 0500  Gross per 24 hour   Intake 2233.9 ml   Output 1100 ml   Net 1133.9 ml       Telemetry: Atrial fibrillation with RVR    Physical Exam  General Appearance:   no acute distress  Alert and oriented x3  HENT:   lips not cyanotic  Atraumatic  Neck:  No jvd   supple  Respiratory:  no respiratory distress  normal breath sounds  no rales  Cardiovascular:    Irregular irregular  no S3, no S4   no murmur  no rub  lower extremity edema: +1  Skin:   warm, dry  No rashes      Allergies   Allergen Reactions    Codeine Mental Status Change    Sglt2 Inhibitors Other (See Comments)     Frequent UTI      Scheduled meds:  amiodarone, 200 mg, Oral, BID  ammonium lactate, , Topical, 2 times per day  digoxin, 125 mcg, Oral, Daily  famotidine, 20 mg, Intravenous, Q12H  Lidocaine, 2 patch, Transdermal, Q24H  mupirocin, 1 Application, Each Nare, BID  piperacillin-tazobactam, 4.5 g, Intravenous, Q8H  sodium chloride, 1,000 mL, Intravenous, Once  sodium chloride, 10 mL, Intravenous, Q12H  sodium chloride, 10 mL, Intravenous, Q12H      IV meds:                      insulin, 0-100  "Units/hr, Last Rate: 5 Units/hr (02/11/25 2052)  lactated ringers, 100 mL/hr, Last Rate: 100 mL/hr (02/12/25 1255)  norepinephrine, 0.02-0.3 mcg/kg/min, Last Rate: Stopped (02/12/25 1905)      Data Review:  CBC          2/11/2025    03:02 2/12/2025    04:49 2/13/2025    04:17   CBC   WBC 14.45  15.24  7.55    RBC 4.97  4.26  3.95    Hemoglobin 14.3  12.4  11.4    Hematocrit 46.3  39.5  37.5    MCV 93.2  92.7  94.9    MCH 28.8  29.1  28.9    MCHC 30.9  31.4  30.4    RDW 14.1  14.6  14.7    Platelets 78  70  52      CMP          2/11/2025    03:02 2/11/2025    07:43 2/12/2025    04:49 2/13/2025    04:17   CMP   Glucose 412   153  135    BUN 21   31  26    Creatinine 1.43  1.40  1.60  1.15    EGFR 39.0   34.1  50.7    Sodium 136   137  139    Potassium 5.2   4.2  4.7    Chloride 102   103  106    Calcium 8.7   8.1  8.2    Total Protein 6.2   5.6     Albumin 3.3   2.9     Globulin 2.9   2.7     Total Bilirubin 1.4   0.8     Alkaline Phosphatase 60   42     AST (SGOT) 37   52     ALT (SGPT) 21   26     Albumin/Globulin Ratio 1.1   1.1     BUN/Creatinine Ratio 14.7   19.4  22.6    Anion Gap 16.2   13.9  11.2       CARDIAC LABS:      Lab 02/13/25  0417 02/11/25  0302   PROBNP  --  8,458.0*   PROTIME 15.3*  --    INR 1.16*  --         Lab Results   Component Value Date    DIGOXIN 1.95 (H) 02/12/2025      Lab Results   Component Value Date    TSH 1.520 01/27/2023           Invalid input(s): \"LDLCALC\"  No results found for: \"POCTROP\"  Lab Results   Component Value Date    TROPONINT 21 (H) 10/06/2023   (  Lab Results   Component Value Date    MG 2.2 02/13/2025     Results for orders placed during the hospital encounter of 02/11/25    Adult Transthoracic Echo Complete W/ Cont if Necessary Per Protocol    Interpretation Summary    Left ventricular systolic function is mildly decreased. Left ventricular ejection fraction appears to be 46 - 50%. with some anterior septal hypokensis    The right ventricular cavity is mildly " dilated.    The left atrial cavity is mildly dilated.    The right atrial cavity is moderately  dilated.    Mild to moderate aortic valve stenosis is present.    Estimated right ventricular systolic pressure from tricuspid regurgitation is moderately elevated (45-55 mmHg).           ASSESSMENT:    Atrial fibrillation with RVR        PLAN:  1.  Patient's blood pressure improved symptomatically today we will try adding metoprolol IV since she is n.p.o. for just rate control if not able to tolerate certainly can continue with IV amiodarone treatment for rate control.  2.  Lovenox on hold for procedure resume anticoagulation when felt to be stable from a bleeding risk afterwards          Joaquin Gonzalez MD  2/13/2025    09:09 EST

## 2025-02-13 NOTE — H&P
[facility]   Interventional Radiology H&P    Patient Name: Mary Albarran  : 1950  MRN: [id number]  Primary Care Physician:  Brittni Quiroz APRN  Referring Physician: Brandon Wilson MD  Date of admission: 2025    Subjective   Subjective     HPI:  Mary Albarran is a 74 y.o. female with possible acute cholecystitis.    Review of Systems:   Constitutional no fever,  no weight loss       Otolaryngeal no difficulty swallowing   Cardiovascular no chest pain   Pulmonary no cough, no sputum production   Gastrointestinal no constipation, no diarrhea                         Personal History       Past Medical/Surgical History:   Past Medical History:   Diagnosis Date    Allergic rhinitis     Anxiety     Aortic valve disease 2021    Fibrocalcific changes of the aortic valve with mild aortic valve stenosis   on echo 3/2/2021  Moderate aortic valve regurgitation is present. Aortic valve maximum pressure gradient is 26 mmHg. Moderate aortic valve stenosis is present.  On echo 2/10/2022       Arthritis     Atrial fibrillation     Chronic kidney disease (CKD), stage III (moderate)     Chronic pain     COPD (chronic obstructive pulmonary disease)     Diabetes mellitus type 2 with complications     Ex-smoker     QUIT SMOKING     Hyperlipidemia     Hypertension     Thrombocytopenia      Past Surgical History:   Procedure Laterality Date    HYSTERECTOMY      30 YEARS AGO       Social History:  reports that she quit smoking about 16 years ago. Her smoking use included cigarettes. She started smoking about 53 years ago. She has a 37 pack-year smoking history. She has never been exposed to tobacco smoke. She has never used smokeless tobacco. She reports that she does not drink alcohol and does not use drugs.    Medications:  Medications Prior to Admission   Medication Sig Dispense Refill Last Dose/Taking    digoxin (LANOXIN) 125 MCG tablet TAKE 1 TABLET ON  AND SUNDAYS AND 2 TABLETS THE  REST OF THE WEEK (Patient taking differently: Take 1 tablet by mouth 2 (Two) Times a Week. TAKE 1 TABLET ON THURSDAYS AND SUNDAYS) 156 tablet 3 Taking Differently    digoxin (LANOXIN) 125 MCG tablet Take 2 tablets by mouth 5 (Five) Times a Week. Mon., Tue., Wed., Fri., & Sat.   Taking    dilTIAZem (TIAZAC) 120 MG 24 hr capsule TAKE 1 CAPSULE DAILY (Patient taking differently: Take 1 capsule by mouth Daily.) 90 capsule 3 Taking Differently    HYDROcodone-acetaminophen (NORCO)  MG per tablet Take 1 tablet by mouth Every 4 (Four) Hours As Needed for Moderate Pain or Severe Pain. 120 tablet 0 Taking As Needed    Januvia 50 MG tablet TAKE 1 TABLET DAILY 90 tablet 3 Taking    metoprolol succinate XL (TOPROL-XL) 50 MG 24 hr tablet Take 3 tablets by mouth 2 (Two) Times a Day. 540 tablet 3 Taking    O2 (OXYGEN) Inhale 2 L/min Every Night.   Taking    pregabalin (LYRICA) 150 MG capsule Take 1 capsule by mouth 2 (Two) Times a Day. 180 capsule 1 Taking    sacubitril-valsartan (Entresto)  MG tablet Take 1 tablet by mouth Take As Directed. Take 1/2 tablet in the morning and 1 tablet at night. (Patient taking differently: Take 1 tablet by mouth Every Night.) 180 tablet 3 Taking Differently    sacubitril-valsartan (Entresto)  MG tablet Take 0.5 tablets by mouth Every Morning.   Taking    silver sulfadiazine (Silvadene) 1 % cream Apply 1 Application topically to the appropriate area as directed 2 (Two) Times a Day. 400 g 1 Taking    spironolactone (ALDACTONE) 50 MG tablet Take 0.5 tablets by mouth Daily.   Taking    torsemide (DEMADEX) 100 MG tablet Take 0.5 tablets by mouth Daily. 45 tablet 3 Taking    Xarelto 20 MG tablet TAKE 1 TABLET DAILY 90 tablet 3 Taking    albuterol (ACCUNEB) 1.25 MG/3ML nebulizer solution Take 3 mL by nebulization Every 6 (Six) Hours As Needed for Wheezing or Shortness of Air. 300 mL 12 Unknown    albuterol sulfate  (90 Base) MCG/ACT inhaler Inhale 2 puffs Every 4 (Four) Hours As  Needed for Wheezing. 8 g 11 Unknown    atorvastatin (LIPITOR) 20 MG tablet TAKE 1 TABLET DAILY (Patient taking differently: Take 1 tablet by mouth Daily.) 90 tablet 3     budesonide (PULMICORT) 1 MG/2ML nebulizer solution Take 2 mL by nebulization Daily. 180 mL 1 Unknown    cholecalciferol (VITAMIN D3) 25 MCG (1000 UT) tablet Take 1 tablet by mouth Daily.   Unknown    Lantus SoloStar 100 UNIT/ML injection pen INJECT 30 UNITS UNDER THE SKIN INTO THE APPROPRIATE AREA DAILY AS DIRECTED 15 mL 6 Unknown     Current medications:  amiodarone, 200 mg, Oral, BID  ammonium lactate, , Topical, 2 times per day  digoxin, 125 mcg, Oral, Daily  famotidine, 20 mg, Intravenous, Q12H  fentaNYL citrate (PF), , ,   Lidocaine, 2 patch, Transdermal, Q24H  metoprolol tartrate, 5 mg, Intravenous, Q12H  Midazolam HCl (PF), , ,   mupirocin, 1 Application, Each Nare, BID  piperacillin-tazobactam, 4.5 g, Intravenous, Q8H  sodium chloride, 1,000 mL, Intravenous, Once  sodium chloride, 10 mL, Intravenous, Q12H  sodium chloride, 10 mL, Intravenous, Q12H  vancomycin, 1,250 mg, Intravenous, Once      Current IV drips:  insulin, 0-100 Units/hr, Last Rate: 5 Units/hr (02/11/25 2052)  lactated ringers, 100 mL/hr, Last Rate: 100 mL/hr (02/12/25 1255)  norepinephrine, 0.02-0.3 mcg/kg/min, Last Rate: Stopped (02/12/25 1905)  Pharmacy to dose vancomycin,         Allergies:  Allergies   Allergen Reactions    Codeine Mental Status Change    Sglt2 Inhibitors Other (See Comments)     Frequent UTI        Objective    Objective     Vitals:   Temp:  [98.6 °F (37 °C)-100 °F (37.8 °C)] 98.7 °F (37.1 °C)  Heart Rate:  [] 118  Resp:  [17-31] 19  BP: ()/(43-98) 96/54  Flow (L/min) (Oxygen Therapy):  [2-4] 4      Physical Exam:   Constitutional: Awake, alert, No acute distress    Respiratory: Clear to auscultation bilaterally, nonlabored respirations    Cardiovascular: RRR, no murmurs, rubs, or gallops, palpable pedal pulses bilaterally   Gastrointestinal:  "Positive bowel sounds, soft, nontender, nondistended        ASA SCALE ASSESSMENT:  2-Mild to moderate systemic disease, medically well controlled, with no functional limitation    MALLAMPATI CLASSIFICATION:  2-Able to visualize the soft palate, fauces, uvula. The anterior & posterior tonsilar pillars are hidden by the tongue.       Result Review        Result Review:     Sodium   Date Value Ref Range Status   02/13/2025 139 136 - 145 mmol/L Final   02/12/2025 137 136 - 145 mmol/L Final   02/11/2025 136 136 - 145 mmol/L Final       Potassium   Date Value Ref Range Status   02/13/2025 4.7 3.5 - 5.2 mmol/L Final   02/12/2025 4.2 3.5 - 5.2 mmol/L Final   02/11/2025 5.2 3.5 - 5.2 mmol/L Final       Chloride   Date Value Ref Range Status   02/13/2025 106 98 - 107 mmol/L Final   02/12/2025 103 98 - 107 mmol/L Final   02/11/2025 102 98 - 107 mmol/L Final       No results found for: \"PLASMABICARB\"    BUN   Date Value Ref Range Status   02/13/2025 26 (H) 8 - 23 mg/dL Final   02/12/2025 31 (H) 8 - 23 mg/dL Final   02/11/2025 21 8 - 23 mg/dL Final       Creatinine   Date Value Ref Range Status   02/13/2025 1.15 (H) 0.57 - 1.00 mg/dL Final   02/12/2025 1.60 (H) 0.57 - 1.00 mg/dL Final   02/11/2025 1.40 (H) 0.60 - 1.30 mg/dL Final   02/11/2025 1.43 (H) 0.57 - 1.00 mg/dL Final       Calcium   Date Value Ref Range Status   02/13/2025 8.2 (L) 8.6 - 10.5 mg/dL Final   02/12/2025 8.1 (L) 8.6 - 10.5 mg/dL Final   02/11/2025 8.7 8.6 - 10.5 mg/dL Final           No components found for: \"GLUCOSE.*\"  Results from last 7 days   Lab Units 02/13/25  0417   WBC 10*3/mm3 7.55   HEMOGLOBIN g/dL 11.4*   HEMATOCRIT % 37.5   PLATELETS 10*3/mm3 52*      Results from last 7 days   Lab Units 02/13/25  0417   INR  1.16*           Assessment / Plan     Assesment:   Possible acute cholecystitis.      Plan:   Cholecystostomy tube.      The risks and benefits of the procedure were discussed with the patient.    Electronically signed by Yoav Middleton MD, " 02/13/25, 12:01 PM EST.

## 2025-02-14 ENCOUNTER — APPOINTMENT (OUTPATIENT)
Dept: MRI IMAGING | Facility: HOSPITAL | Age: 75
End: 2025-02-14
Payer: MEDICARE

## 2025-02-14 LAB
ANION GAP SERPL CALCULATED.3IONS-SCNC: 11.2 MMOL/L (ref 5–15)
ARTERIAL PATENCY WRIST A: POSITIVE
ATMOSPHERIC PRESS: 757.3 MMHG
BACTERIA SPEC AEROBE CULT: ABNORMAL
BASE EXCESS BLDA CALC-SCNC: -5.2 MMOL/L (ref -2–2)
BASOPHILS # BLD AUTO: 0.07 10*3/MM3 (ref 0–0.2)
BASOPHILS NFR BLD AUTO: 0.6 % (ref 0–1.5)
BDY SITE: ABNORMAL
BUN SERPL-MCNC: 19 MG/DL (ref 8–23)
BUN/CREAT SERPL: 22.6 (ref 7–25)
CA-I BLDA-SCNC: 1.29 MMOL/L (ref 1.13–1.32)
CALCIUM SPEC-SCNC: 8.4 MG/DL (ref 8.6–10.5)
CHLORIDE BLDA-SCNC: 108 MMOL/L (ref 98–107)
CHLORIDE SERPL-SCNC: 104 MMOL/L (ref 98–107)
CO2 SERPL-SCNC: 20.8 MMOL/L (ref 22–29)
CREAT SERPL-MCNC: 0.84 MG/DL (ref 0.57–1)
D-LACTATE SERPL-SCNC: 1.9 MMOL/L
DEPRECATED RDW RBC AUTO: 51.2 FL (ref 37–54)
EGFRCR SERPLBLD CKD-EPI 2021: 73.9 ML/MIN/1.73
EOSINOPHIL # BLD AUTO: 0.02 10*3/MM3 (ref 0–0.4)
EOSINOPHIL NFR BLD AUTO: 0.2 % (ref 0.3–6.2)
ERYTHROCYTE [DISTWIDTH] IN BLOOD BY AUTOMATED COUNT: 14.6 % (ref 12.3–15.4)
GAS FLOW AIRWAY: 4 LPM
GLUCOSE BLDC GLUCOMTR-MCNC: 171 MG/DL (ref 70–99)
GLUCOSE BLDC GLUCOMTR-MCNC: 171 MG/DL (ref 70–99)
GLUCOSE BLDC GLUCOMTR-MCNC: 174 MG/DL (ref 65–99)
GLUCOSE BLDC GLUCOMTR-MCNC: 178 MG/DL (ref 70–99)
GLUCOSE BLDC GLUCOMTR-MCNC: 180 MG/DL (ref 70–99)
GLUCOSE BLDC GLUCOMTR-MCNC: 188 MG/DL (ref 70–99)
GLUCOSE BLDC GLUCOMTR-MCNC: 198 MG/DL (ref 70–99)
GLUCOSE BLDC GLUCOMTR-MCNC: 205 MG/DL (ref 70–99)
GLUCOSE BLDC GLUCOMTR-MCNC: 219 MG/DL (ref 70–99)
GLUCOSE BLDC GLUCOMTR-MCNC: 233 MG/DL (ref 70–99)
GLUCOSE BLDC GLUCOMTR-MCNC: 255 MG/DL (ref 70–99)
GLUCOSE SERPL-MCNC: 166 MG/DL (ref 65–99)
GRAM STN SPEC: ABNORMAL
HCO3 BLDA-SCNC: 20.2 MMOL/L (ref 22–26)
HCT VFR BLD AUTO: 37.3 % (ref 34–46.6)
HCT VFR BLD CALC: 37 % (ref 38–51)
HEMODILUTION: NO
HGB BLD-MCNC: 11.7 G/DL (ref 12–15.9)
HGB BLDA-MCNC: 12.5 G/DL (ref 12–18)
IMM GRANULOCYTES # BLD AUTO: 0.19 10*3/MM3 (ref 0–0.05)
IMM GRANULOCYTES NFR BLD AUTO: 1.5 % (ref 0–0.5)
INHALED O2 CONCENTRATION: 36 %
ISOLATED FROM: ABNORMAL
LYMPHOCYTES # BLD AUTO: 0.64 10*3/MM3 (ref 0.7–3.1)
LYMPHOCYTES NFR BLD AUTO: 5.1 % (ref 19.6–45.3)
MAGNESIUM SERPL-MCNC: 1.8 MG/DL (ref 1.6–2.4)
MCH RBC QN AUTO: 30.1 PG (ref 26.6–33)
MCHC RBC AUTO-ENTMCNC: 31.4 G/DL (ref 31.5–35.7)
MCV RBC AUTO: 95.9 FL (ref 79–97)
MODALITY: ABNORMAL
MONOCYTES # BLD AUTO: 0.29 10*3/MM3 (ref 0.1–0.9)
MONOCYTES NFR BLD AUTO: 2.3 % (ref 5–12)
NEUTROPHILS NFR BLD AUTO: 11.42 10*3/MM3 (ref 1.7–7)
NEUTROPHILS NFR BLD AUTO: 90.3 % (ref 42.7–76)
NRBC BLD AUTO-RTO: 0 /100 WBC (ref 0–0.2)
PCO2 BLDA: 37.7 MM HG (ref 35–45)
PH BLDA: 7.34 PH UNITS (ref 7.35–7.45)
PHOSPHATE SERPL-MCNC: 2.9 MG/DL (ref 2.5–4.5)
PLATELET # BLD AUTO: 52 10*3/MM3 (ref 140–450)
PMV BLD AUTO: 14 FL (ref 6–12)
PO2 BLD: 202 MM[HG] (ref 0–500)
PO2 BLDA: 72.8 MM HG (ref 80–100)
POTASSIUM BLDA-SCNC: 4.3 MMOL/L (ref 3.5–5)
POTASSIUM SERPL-SCNC: 4 MMOL/L (ref 3.5–5.2)
RBC # BLD AUTO: 3.89 10*6/MM3 (ref 3.77–5.28)
SAO2 % BLDCOA: 93.5 % (ref 95–99)
SODIUM BLD-SCNC: 140 MMOL/L (ref 131–143)
SODIUM SERPL-SCNC: 136 MMOL/L (ref 136–145)
VANCOMYCIN SERPL-MCNC: 6.8 MCG/ML (ref 5–40)
WBC NRBC COR # BLD AUTO: 12.63 10*3/MM3 (ref 3.4–10.8)

## 2025-02-14 PROCEDURE — 36600 WITHDRAWAL OF ARTERIAL BLOOD: CPT

## 2025-02-14 PROCEDURE — 82330 ASSAY OF CALCIUM: CPT

## 2025-02-14 PROCEDURE — 25010000002 PIPERACILLIN SOD-TAZOBACTAM PER 1 G: Performed by: NURSE PRACTITIONER

## 2025-02-14 PROCEDURE — 85025 COMPLETE CBC W/AUTO DIFF WBC: CPT | Performed by: INTERNAL MEDICINE

## 2025-02-14 PROCEDURE — 25010000002 MORPHINE PER 10 MG: Performed by: INTERNAL MEDICINE

## 2025-02-14 PROCEDURE — 63710000001 INSULIN GLARGINE PER 5 UNITS: Performed by: NURSE PRACTITIONER

## 2025-02-14 PROCEDURE — 73718 MRI LOWER EXTREMITY W/O DYE: CPT

## 2025-02-14 PROCEDURE — 80202 ASSAY OF VANCOMYCIN: CPT | Performed by: INTERNAL MEDICINE

## 2025-02-14 PROCEDURE — 83605 ASSAY OF LACTIC ACID: CPT

## 2025-02-14 PROCEDURE — 84100 ASSAY OF PHOSPHORUS: CPT | Performed by: INTERNAL MEDICINE

## 2025-02-14 PROCEDURE — 99291 CRITICAL CARE FIRST HOUR: CPT | Performed by: INTERNAL MEDICINE

## 2025-02-14 PROCEDURE — 83735 ASSAY OF MAGNESIUM: CPT | Performed by: INTERNAL MEDICINE

## 2025-02-14 PROCEDURE — 82948 REAGENT STRIP/BLOOD GLUCOSE: CPT

## 2025-02-14 PROCEDURE — 94799 UNLISTED PULMONARY SVC/PX: CPT

## 2025-02-14 PROCEDURE — 82948 REAGENT STRIP/BLOOD GLUCOSE: CPT | Performed by: INTERNAL MEDICINE

## 2025-02-14 PROCEDURE — 25810000003 SODIUM CHLORIDE 0.9 % SOLUTION: Performed by: INTERNAL MEDICINE

## 2025-02-14 PROCEDURE — 25010000002 ACETAMINOPHEN 10 MG/ML SOLUTION: Performed by: NURSE PRACTITIONER

## 2025-02-14 PROCEDURE — 73721 MRI JNT OF LWR EXTRE W/O DYE: CPT

## 2025-02-14 PROCEDURE — 99232 SBSQ HOSP IP/OBS MODERATE 35: CPT | Performed by: INTERNAL MEDICINE

## 2025-02-14 PROCEDURE — 82803 BLOOD GASES ANY COMBINATION: CPT

## 2025-02-14 PROCEDURE — 80051 ELECTROLYTE PANEL: CPT

## 2025-02-14 PROCEDURE — 25010000002 MAGNESIUM SULFATE 2 GM/50ML SOLUTION: Performed by: NURSE PRACTITIONER

## 2025-02-14 PROCEDURE — 25010000002 VANCOMYCIN 5 G RECONSTITUTED SOLUTION: Performed by: INTERNAL MEDICINE

## 2025-02-14 PROCEDURE — 80048 BASIC METABOLIC PNL TOTAL CA: CPT | Performed by: INTERNAL MEDICINE

## 2025-02-14 PROCEDURE — 99233 SBSQ HOSP IP/OBS HIGH 50: CPT | Performed by: INTERNAL MEDICINE

## 2025-02-14 PROCEDURE — 63710000001 INSULIN LISPRO (HUMAN) PER 5 UNITS: Performed by: INTERNAL MEDICINE

## 2025-02-14 RX ORDER — IBUPROFEN 600 MG/1
1 TABLET ORAL
Status: DISCONTINUED | OUTPATIENT
Start: 2025-02-14 | End: 2025-02-28 | Stop reason: HOSPADM

## 2025-02-14 RX ORDER — NICOTINE POLACRILEX 4 MG
15 LOZENGE BUCCAL
Status: DISCONTINUED | OUTPATIENT
Start: 2025-02-14 | End: 2025-02-28 | Stop reason: HOSPADM

## 2025-02-14 RX ORDER — DEXTROSE MONOHYDRATE 25 G/50ML
25 INJECTION, SOLUTION INTRAVENOUS
Status: DISCONTINUED | OUTPATIENT
Start: 2025-02-14 | End: 2025-02-28 | Stop reason: HOSPADM

## 2025-02-14 RX ORDER — MORPHINE SULFATE 2 MG/ML
1 INJECTION, SOLUTION INTRAMUSCULAR; INTRAVENOUS ONCE
Status: DISCONTINUED | OUTPATIENT
Start: 2025-02-14 | End: 2025-02-14

## 2025-02-14 RX ORDER — INSULIN LISPRO 100 [IU]/ML
2-7 INJECTION, SOLUTION INTRAVENOUS; SUBCUTANEOUS
Status: DISCONTINUED | OUTPATIENT
Start: 2025-02-14 | End: 2025-02-14

## 2025-02-14 RX ORDER — METOPROLOL TARTRATE 25 MG/1
25 TABLET, FILM COATED ORAL 3 TIMES DAILY
Status: DISCONTINUED | OUTPATIENT
Start: 2025-02-14 | End: 2025-02-15

## 2025-02-14 RX ORDER — OXYCODONE HYDROCHLORIDE 5 MG/1
10 TABLET ORAL EVERY 4 HOURS PRN
Status: DISCONTINUED | OUTPATIENT
Start: 2025-02-14 | End: 2025-02-17

## 2025-02-14 RX ORDER — MORPHINE SULFATE 2 MG/ML
1 INJECTION, SOLUTION INTRAMUSCULAR; INTRAVENOUS EVERY 4 HOURS PRN
Status: DISPENSED | OUTPATIENT
Start: 2025-02-14 | End: 2025-02-19

## 2025-02-14 RX ORDER — MAGNESIUM SULFATE 1 G/100ML
2 INJECTION INTRAVENOUS ONCE
Status: DISCONTINUED | OUTPATIENT
Start: 2025-02-14 | End: 2025-02-14

## 2025-02-14 RX ORDER — VANCOMYCIN/0.9 % SOD CHLORIDE 1.5G/250ML
1500 PLASTIC BAG, INJECTION (ML) INTRAVENOUS EVERY 12 HOURS
Status: DISCONTINUED | OUTPATIENT
Start: 2025-02-14 | End: 2025-02-15

## 2025-02-14 RX ORDER — NICOTINE POLACRILEX 4 MG
15 LOZENGE BUCCAL
Status: DISCONTINUED | OUTPATIENT
Start: 2025-02-14 | End: 2025-02-14

## 2025-02-14 RX ORDER — IBUPROFEN 600 MG/1
1 TABLET ORAL
Status: DISCONTINUED | OUTPATIENT
Start: 2025-02-14 | End: 2025-02-14

## 2025-02-14 RX ORDER — MAGNESIUM SULFATE HEPTAHYDRATE 40 MG/ML
2 INJECTION, SOLUTION INTRAVENOUS ONCE
Status: COMPLETED | OUTPATIENT
Start: 2025-02-14 | End: 2025-02-14

## 2025-02-14 RX ORDER — DEXTROSE MONOHYDRATE 25 G/50ML
25 INJECTION, SOLUTION INTRAVENOUS
Status: DISCONTINUED | OUTPATIENT
Start: 2025-02-14 | End: 2025-02-14

## 2025-02-14 RX ORDER — ACETAMINOPHEN 325 MG/1
650 TABLET ORAL EVERY 6 HOURS PRN
Status: DISCONTINUED | OUTPATIENT
Start: 2025-02-14 | End: 2025-02-19

## 2025-02-14 RX ORDER — INSULIN LISPRO 100 [IU]/ML
3-14 INJECTION, SOLUTION INTRAVENOUS; SUBCUTANEOUS
Status: DISCONTINUED | OUTPATIENT
Start: 2025-02-14 | End: 2025-02-28 | Stop reason: HOSPADM

## 2025-02-14 RX ORDER — OXYCODONE HYDROCHLORIDE 5 MG/1
15 TABLET ORAL EVERY 4 HOURS PRN
Status: DISCONTINUED | OUTPATIENT
Start: 2025-02-14 | End: 2025-02-17

## 2025-02-14 RX ADMIN — INSULIN LISPRO 3 UNITS: 100 INJECTION, SOLUTION INTRAVENOUS; SUBCUTANEOUS at 22:45

## 2025-02-14 RX ADMIN — Medication 1 APPLICATION: at 09:36

## 2025-02-14 RX ADMIN — AMIODARONE HYDROCHLORIDE 200 MG: 200 TABLET ORAL at 08:18

## 2025-02-14 RX ADMIN — Medication 10 ML: at 22:27

## 2025-02-14 RX ADMIN — DIGOXIN 125 MCG: 125 TABLET ORAL at 12:08

## 2025-02-14 RX ADMIN — AMIODARONE HYDROCHLORIDE 200 MG: 200 TABLET ORAL at 22:26

## 2025-02-14 RX ADMIN — LIDOCAINE 2 PATCH: 4 PATCH TOPICAL at 08:17

## 2025-02-14 RX ADMIN — PIPERACILLIN AND TAZOBACTAM 4.5 G: 4; .5 INJECTION, POWDER, FOR SOLUTION INTRAVENOUS; PARENTERAL at 03:06

## 2025-02-14 RX ADMIN — FAMOTIDINE 20 MG: 10 INJECTION, SOLUTION INTRAVENOUS at 08:18

## 2025-02-14 RX ADMIN — OXYCODONE HYDROCHLORIDE 15 MG: 5 TABLET ORAL at 14:47

## 2025-02-14 RX ADMIN — MUPIROCIN 1 APPLICATION: 20 OINTMENT TOPICAL at 08:18

## 2025-02-14 RX ADMIN — PIPERACILLIN AND TAZOBACTAM 4.5 G: 4; .5 INJECTION, POWDER, FOR SOLUTION INTRAVENOUS; PARENTERAL at 22:25

## 2025-02-14 RX ADMIN — PIPERACILLIN AND TAZOBACTAM 4.5 G: 4; .5 INJECTION, POWDER, FOR SOLUTION INTRAVENOUS; PARENTERAL at 12:08

## 2025-02-14 RX ADMIN — Medication 10 ML: at 08:18

## 2025-02-14 RX ADMIN — METOPROLOL TARTRATE 5 MG: 1 INJECTION, SOLUTION INTRAVENOUS at 02:25

## 2025-02-14 RX ADMIN — VANCOMYCIN HYDROCHLORIDE 1500 MG: 5 INJECTION, POWDER, LYOPHILIZED, FOR SOLUTION INTRAVENOUS at 06:37

## 2025-02-14 RX ADMIN — ACETAMINOPHEN 1000 MG: 10 INJECTION, SOLUTION INTRAVENOUS at 03:06

## 2025-02-14 RX ADMIN — METOPROLOL TARTRATE 25 MG: 25 TABLET, FILM COATED ORAL at 09:24

## 2025-02-14 RX ADMIN — MAGNESIUM SULFATE HEPTAHYDRATE 2 G: 40 INJECTION, SOLUTION INTRAVENOUS at 08:17

## 2025-02-14 RX ADMIN — Medication: at 22:40

## 2025-02-14 RX ADMIN — INSULIN LISPRO 3 UNITS: 100 INJECTION, SOLUTION INTRAVENOUS; SUBCUTANEOUS at 18:51

## 2025-02-14 RX ADMIN — MUPIROCIN 1 APPLICATION: 20 OINTMENT TOPICAL at 22:26

## 2025-02-14 RX ADMIN — HYDROCODONE BITARTRATE AND ACETAMINOPHEN 1 TABLET: 10; 325 TABLET ORAL at 01:29

## 2025-02-14 RX ADMIN — VANCOMYCIN HYDROCHLORIDE 1500 MG: 5 INJECTION, POWDER, LYOPHILIZED, FOR SOLUTION INTRAVENOUS at 19:06

## 2025-02-14 RX ADMIN — INSULIN GLARGINE 10 UNITS: 100 INJECTION, SOLUTION SUBCUTANEOUS at 10:39

## 2025-02-14 RX ADMIN — Medication 10 ML: at 08:22

## 2025-02-14 RX ADMIN — METOPROLOL TARTRATE 25 MG: 25 TABLET, FILM COATED ORAL at 22:26

## 2025-02-14 RX ADMIN — HYDROCODONE BITARTRATE AND ACETAMINOPHEN 1 TABLET: 10; 325 TABLET ORAL at 10:09

## 2025-02-14 RX ADMIN — MORPHINE SULFATE 1 MG: 2 INJECTION, SOLUTION INTRAMUSCULAR; INTRAVENOUS at 13:28

## 2025-02-14 RX ADMIN — HYDROCODONE BITARTRATE AND ACETAMINOPHEN 1 TABLET: 10; 325 TABLET ORAL at 06:06

## 2025-02-14 RX ADMIN — FAMOTIDINE 20 MG: 10 INJECTION, SOLUTION INTRAVENOUS at 22:26

## 2025-02-14 RX ADMIN — METOPROLOL TARTRATE 25 MG: 25 TABLET, FILM COATED ORAL at 16:07

## 2025-02-14 NOTE — PLAN OF CARE
Goal Outcome Evaluation:  Plan of Care Reviewed With: patient, spouse       AxOx4 on 3L NC.Transitioned off insulin gtt per orders. PRN pain medication given per MAR for right leg pain. Wound & skin care completed. Q2T. MRI done. Serosanguinous drainage noted from RUQ biliary drain. Continue with plan of care.   Progress: improving

## 2025-02-14 NOTE — PROGRESS NOTES
Pulmonary / Critical Care Progress Note      Patient Name: Mary Albarran  : 1950  MRN: [id number]  Attending:  Ang Garcia MD   Date of admission: 2025    Subjective   Subjective   Patient critically ill with septic shock, Streptococcus bacteremia    Patient awake on 2 L nasal cannula  Glucose controlled, on insulin drip  Percutaneous cholecystostomy drain placed yesterday  Afebrile  Increased erythema with phlegmon right lower extremity    Objective   Objective     Vitals:   Vital signs for last 24 hours:  Temp:  [98.8 °F (37.1 °C)-100.7 °F (38.2 °C)] 99.9 °F (37.7 °C)  Heart Rate:  [] 86  Resp:  [14-25] 19  BP: ()/(44-92) 132/54    Intake/Output last 3 shifts:  I/O last 3 completed shifts:  In: 1114 [I.V.:1094; Other:20]  Out: 1740 [Urine:1400; Emesis/NG output:250]    Physical Exam   Increasing lower extremity edema obese female  Does have some worsening erythema of the lower extremity    Result Review    Result Review:  I have personally reviewed the results from the time of this admission to 2025 11:28 EST and agree with these findings:  [x]  Laboratory  [x]  Microbiology  [x]  Radiology  [x]  EKG/Telemetry   [x]  Cardiology/Vascular   []  Pathology  []  Old records  []  Other:  Most notable findings include:     Outside facility blood culture 2/10/2025 Streptococcus dysgalactiae     Assessment & Plan   Assessment / Plan     Active Hospital Problems:  Active Hospital Problems    Diagnosis     **Atrial fibrillation with RVR      Impression:  Septic shock requiring Levophed  Gram-negative bacteremia  Reported gram-positive bacteremia from outside facility  Right lower extremity cellulitis  Questionable acute acalculous cholecystitis HIDA scan pending  Septic shock  Atrial fibrillation      Plan:  Continue antibiotics with vancomycin and Zosyn  Pharmacy to dose and follow Zosyn  MRI of the lower extremity today as patient's leg looks much more red and seems to be more  painful  Evaluate for any soft tissue phlegmon muscle infection and/or osteomyelitis  Percutaneous drain per surgery  Advance diet if possible  Okay from my standpoint to resume full anticoagulation Eliquis versus Lovenox per cardiology's preference  Will transition off insulin drip to 10 units of Lantus and sliding scale  Continue with digoxin  Continue with oral amiodarone  Continue with metoprolol      GI prophylaxis-famotidine  VTE Prophylaxis:  Mechanical VTE prophylaxis orders are present.    CODE STATUS:   Level Of Support Discussed With: Health Care Surrogate  Code Status (Patient has no pulse and is not breathing): CPR (Attempt to Resuscitate)  Medical Interventions (Patient has pulse or is breathing): Full Support    IHeidi APRN, spent 12 minutes critical care time.  Electronically signed by LUCERO Henning, 02/14/25, 11:37 AM EST.    Patient seen and examined the above assessment plan reflect my assessment plan medical decision making  Total critical care time spent by our service 42 minutes  The patient is critically ill in the ICU with septic shock, acute kidney injury multidisciplinary bedside critical care rounds were performed with nursing staff, respiratory therapy, pharmacy, nutritional services, social work. I have personally reviewed the chart, labs and any pertinent imaging available.   Total critical care time spent 30 minutes    Electronically signed by Jerson Berg DO, 02/14/25, 6:13 PM EST.

## 2025-02-14 NOTE — PROGRESS NOTES
[facility]   Hospitalist Progress Note  Date: 2025  Patient Name: Mary Albarran  : 1950  MRN: [id number]  Date of admission: 2025      Subjective   Subjective     Chief Complaint: Altered mental status    Summary: 74 y.o. female with history of A-fib on Xarelto, CKD stage III, type 2 diabetes initially presented to outside facility with decreased responsiveness.  Patient was found to be hypotensive, febrile and with elevated lactate.  Concern for infection.  Was given 2.5 L of IV fluids also had A-fib with RVR initially started on Cardizem however developed hypotension.  Did have CT scan of the abdomen pelvis which showed some gallbladder thickening concerning for cholelithiasis.  Was transferred to our facility for critical care management.     Interval Followup: No acute events overnight.  Cholecystostomy tube placed yesterday.  Febrile overnight with Tmax 100.7.  Has been in quite a bit of pain this morning, she complains of pain all over but most notably in the leg and abdomen.    Objective   Objective     Vitals:   Temp:  [99.3 °F (37.4 °C)-100.7 °F (38.2 °C)] 99.9 °F (37.7 °C)  Heart Rate:  [] 107  Resp:  [14-34] 16  BP: (102-160)/(52-92) 128/55  Flow (L/min) (Oxygen Therapy):  [2-5] 3  Physical Exam    Constitutional: Ill-appearing female, awake, alert, lethargic but awakens to voice   Respiratory: Diminished in bilateral bases, nasal cannula in place, nonlabored respirations    Cardiovascular: IR/IR, no MRG   Gastrointestinal: Positive bowel sounds, soft, tender to palpation throughout, cholecystostomy tube in place with bloody drainage noted   Neurologic: Oriented x 3, lethargic but answers questions appropriately when awakened, cranial Nerves grossly intact to confrontation, speech clear             Skin: Erythema of the right lower extremity, extremely tender to palpation    Result Review    I have personally reviewed the results below:  [x]  Laboratory personally reviewed CMP,  CBC, procalcitonin, blood sugars, magnesium, phosphorus  []  Microbiology  []  Radiology  []  EKG/Telemetry   []  Cardiology/Vascular   []  Pathology  []  Old records  []  Other:  CBC          2/12/2025    04:49 2/13/2025    04:17 2/14/2025    03:19   CBC   WBC 15.24  7.55  12.63    RBC 4.26  3.95  3.89    Hemoglobin 12.4  11.4  11.7    Hematocrit 39.5  37.5  37.3    MCV 92.7  94.9  95.9    MCH 29.1  28.9  30.1    MCHC 31.4  30.4  31.4    RDW 14.6  14.7  14.6    Platelets 70  52  52      CMP          2/12/2025    04:49 2/13/2025    04:17 2/14/2025    03:19   CMP   Glucose 153  135  166    BUN 31  26  19    Creatinine 1.60  1.15  0.84    EGFR 34.1  50.7  73.9    Sodium 137  139  136    Potassium 4.2  4.7  4.0    Chloride 103  106  104    Calcium 8.1  8.2  8.4    Total Protein 5.6      Albumin 2.9      Globulin 2.7      Total Bilirubin 0.8      Alkaline Phosphatase 42      AST (SGOT) 52      ALT (SGPT) 26      Albumin/Globulin Ratio 1.1      BUN/Creatinine Ratio 19.4  22.6  22.6    Anion Gap 13.9  11.2  11.2        Assessment & Plan   Assessment / Plan   Septic shock  Streptococcal bacteremia  Gram-negative bacteremia  Concern for acute cholecystitis  Concern for right lower extremity cellulitis  Acute metabolic encephalopathy secondary to above  Chronic atrial fibrillation with RVR  Acute kidney injury  Hypomagnesemia  Lactic acidosis, clinically significant  Type 2 diabetes mellitus    Continue to monitor in the ICU for workup and management of the above  Status post cholecystostomy tube placement, body fluid culture no growth to date  Discussed with pulmonology, right lower extremity continues to worsen, will obtain right ankle and leg MRI stat  Continue with broad-spectrum vancomycin and Zosyn for now  1/2 blood cultures growing Bacteroides vulgatus, repeat blood cultures no growth to date  Discussed with cardiology, continue oral amiodarone, change IV metoprolol to p.o. metoprolol 25 mg 3 times daily  Continue  digoxin 125 mcg p.o. daily  Hold therapeutic Lovenox until after imaging is reviewed in case patient needs surgical intervention  DC insulin drip, start Lantus 10 units daily, moderate dose sliding scale insulin  Transition Norco to oxycodone 10 or 50 mg as needed for pain, careful to avoid oversedation  Can use low-dose IV morphine as needed for severe breakthrough pain  Tylenol as needed for pain  Trend renal function and electrolytes with a.m. BMP, magnesium   Trend Hgb and WBC with a.m. CBC     Discussed plan with RN, cardiology, pulmonology    VTE Prophylaxis:  Mechanical VTE prophylaxis orders are present.        CODE STATUS:   Level Of Support Discussed With: Health Care Surrogate  Code Status (Patient has no pulse and is not breathing): CPR (Attempt to Resuscitate)  Medical Interventions (Patient has pulse or is breathing): Full Support

## 2025-02-14 NOTE — PLAN OF CARE
Goal Outcome Evaluation:            Patient refused BIPAP last night. Patient currently on 4L and tolerating well.

## 2025-02-14 NOTE — PROGRESS NOTES
"[facility] Clinical Pharmacy Services: Vancomycin Monitoring Note    Mary Albarran is a 74 y.o. female who is on day 4 of pharmacy to dose vancomycin for Skin and Soft Tissue.    Previous Vancomycin Dose:   1250 mg IV every  24  hours  Imaging Reviewed?: Yes  Updated Cultures and Sensitivities:   2/10 blood (from [place] in [place], spoke to over phone) -  blood GPCs, ID streptococcus dysgalactiae spp equisimilis   1 of 2 GNRs, neg by BCID   peritoneal fluid prelim ng   blood pending    Vitals/Labs  Ht: 160 cm (62.99\"); Wt: 116 kg (256 lb 2.8 oz)   Temp (24hrs), Av.8 °F (37.7 °C), Min:98.8 °F (37.1 °C), Max:100.7 °F (38.2 °C)   Estimated Creatinine Clearance: 74.4 mL/min (by C-G formula based on SCr of 0.84 mg/dL).       Results from last 7 days   Lab Units 25  0319 25  0417 25  0449   VANCOMYCIN RM mcg/mL 6.80  --  21.63   CREATININE mg/dL 0.84 1.15* 1.60*   WBC 10*3/mm3 12.63* 7.55 15.24*     Assessment/Plan    Current Vancomycin Dose:  1500 mg IV every 12 hours; which provides the following predicted parameters:  AUC24,ss: 536 mg/L.hr  Probability of AUC24 > 400: 100 %  Ctrough,ss: 13.7 mg/L  Probability of Ctrough,ss > 20: 0 %  Probability of nephrotoxicity (Lodise ERIC ): 9 %  Next Vanc Random ordered for 2/15 AM  We will continue to monitor patient changes and renal function     Thank you for involving pharmacy in this patient's care. Please contact pharmacy with any questions or concerns.    Steven Guillen [facility]  Clinical Pharmacist    "

## 2025-02-14 NOTE — PROGRESS NOTES
CARDIOLOGY  INPATIENT PROGRESS NOTE                [address] AMBULATORY SERVICES    3/2/2025      PATIENT IDENTIFICATION:   Name:  Mary Albarran      MRN:  [id number]     74 y.o.  female           Chief complaint atrial fibrillation RVR    SUBJECTIVE:   Patient feeling better this am less arm/foot pain. She has been afebrile and blood pressures been stable  OBJECTIVE:  Vitals:    02/27/25 2000 02/27/25 2218 02/28/25 0342 02/28/25 0722   BP: 131/75 105/45 130/67 107/53   BP Location: Right arm Right arm Right arm Right arm   Patient Position: Lying Lying Lying Lying   Pulse: 100 89 88 79   Resp: 18 20 20 20   Temp: 99.1 °F (37.3 °C) 98.6 °F (37 °C)  98.4 °F (36.9 °C)   TempSrc: Oral Oral     SpO2: 96% 92% 97% 93%   Weight:       Height:               Body mass index is 45.39 kg/m².  No intake or output data in the 24 hours ending 03/02/25 1455      Telemetry: Atrial fibrillation with some mild increased ventricular rate    Physical Exam  General Appearance:   no acute distress  Alert and oriented x3  HENT:   lips not cyanotic  Atraumatic  Neck:  No jvd   supple  Respiratory:  no respiratory distress  normal breath sounds  no rales  Cardiovascular:    Regular regular  no S3, no S4   no murmur  no rub  lower extremity edema: 1  Skin:   warm, dry  No rashes      Allergies   Allergen Reactions    Codeine Mental Status Change    Sglt2 Inhibitors Other (See Comments)     Frequent UTI      Scheduled meds:      IV meds:                      No current facility-administered medications for this encounter.    Data Review:  CBC          2/26/2025    05:26 2/27/2025    04:43 2/28/2025    05:01   CBC   WBC 6.20  7.77  5.77    RBC 4.44  4.18  3.98    Hemoglobin 12.8  11.8  11.3    Hematocrit 42.7  39.7  37.1    MCV 96.2  95.0  93.2    MCH 28.8  28.2  28.4    MCHC 30.0  29.7  30.5    RDW 13.6  13.9  13.8    Platelets 120  125  109      CMP          2/26/2025    05:26 2/27/2025    04:43 2/28/2025    05:01   CMP  "  Glucose 205  163  236    BUN 27  31  27    Creatinine 1.60  2.12  1.47    EGFR 34.1  24.3  37.8    Sodium 134  135  134    Potassium 4.8  5.2  5.2    Chloride 96  96  96    Calcium 8.7  8.9  8.7    Albumin  3.1     BUN/Creatinine Ratio 16.9  14.6  18.4    Anion Gap 11.0  7.8  10.8       CARDIAC LABS:           Lab Results   Component Value Date    DIGOXIN 1.95 (H) 02/12/2025      Lab Results   Component Value Date    TSH 1.520 01/27/2023           Invalid input(s): \"LDLCALC\"  No results found for: \"POCTROP\"  Lab Results   Component Value Date    TROPONINT 21 (H) 10/06/2023   (  Lab Results   Component Value Date    MG 1.7 02/28/2025     Results for orders placed during the hospital encounter of 02/11/25    Adult Transthoracic Echo Complete W/ Cont if Necessary Per Protocol    Interpretation Summary    Left ventricular systolic function is mildly decreased. Left ventricular ejection fraction appears to be 46 - 50%. with some anterior septal hypokensis    The right ventricular cavity is mildly dilated.    The left atrial cavity is mildly dilated.    The right atrial cavity is moderately  dilated.    Mild to moderate aortic valve stenosis is present.    Estimated right ventricular systolic pressure from tricuspid regurgitation is moderately elevated (45-55 mmHg).           ASSESSMENT:    Atrial fibrillation with RVR    Aortic stenosis, moderate    Acute on chronic HFrEF (heart failure with reduced ejection fraction)        PLAN:  1.  Change IV metoprolol to p.o. 25 3 times daily  2.  Digoxin 125 mcg p.o. daily  3.  Resume Lovenox therapeutic if all right with surgery status post recent percutaneous drain today          Joaquin Gonzalez MD  3/2/2025    14:55 EST           "

## 2025-02-15 LAB
ALBUMIN SERPL-MCNC: 2.7 G/DL (ref 3.5–5.2)
ALP SERPL-CCNC: 90 U/L (ref 39–117)
ALT SERPL W P-5'-P-CCNC: 20 U/L (ref 1–33)
ANION GAP SERPL CALCULATED.3IONS-SCNC: 13.7 MMOL/L (ref 5–15)
AST SERPL-CCNC: 22 U/L (ref 1–32)
BASOPHILS # BLD AUTO: 0.12 10*3/MM3 (ref 0–0.2)
BASOPHILS NFR BLD AUTO: 0.9 % (ref 0–1.5)
BILIRUB CONJ SERPL-MCNC: 0.4 MG/DL (ref 0–0.3)
BILIRUB INDIRECT SERPL-MCNC: 0.5 MG/DL
BILIRUB SERPL-MCNC: 0.9 MG/DL (ref 0–1.2)
BUN SERPL-MCNC: 18 MG/DL (ref 8–23)
BUN/CREAT SERPL: 24 (ref 7–25)
CALCIUM SPEC-SCNC: 8.9 MG/DL (ref 8.6–10.5)
CHLORIDE SERPL-SCNC: 102 MMOL/L (ref 98–107)
CO2 SERPL-SCNC: 19.3 MMOL/L (ref 22–29)
CREAT SERPL-MCNC: 0.75 MG/DL (ref 0.57–1)
DEPRECATED RDW RBC AUTO: 53.6 FL (ref 37–54)
EGFRCR SERPLBLD CKD-EPI 2021: 84.7 ML/MIN/1.73
EOSINOPHIL # BLD AUTO: 0.01 10*3/MM3 (ref 0–0.4)
EOSINOPHIL NFR BLD AUTO: 0.1 % (ref 0.3–6.2)
ERYTHROCYTE [DISTWIDTH] IN BLOOD BY AUTOMATED COUNT: 14.9 % (ref 12.3–15.4)
GLUCOSE BLDC GLUCOMTR-MCNC: 167 MG/DL (ref 70–99)
GLUCOSE BLDC GLUCOMTR-MCNC: 174 MG/DL (ref 70–99)
GLUCOSE BLDC GLUCOMTR-MCNC: 179 MG/DL (ref 70–99)
GLUCOSE BLDC GLUCOMTR-MCNC: 187 MG/DL (ref 70–99)
GLUCOSE SERPL-MCNC: 172 MG/DL (ref 65–99)
HCT VFR BLD AUTO: 41.5 % (ref 34–46.6)
HGB BLD-MCNC: 12.3 G/DL (ref 12–15.9)
IMM GRANULOCYTES # BLD AUTO: 0.31 10*3/MM3 (ref 0–0.05)
IMM GRANULOCYTES NFR BLD AUTO: 2.3 % (ref 0–0.5)
LYMPHOCYTES # BLD AUTO: 1.1 10*3/MM3 (ref 0.7–3.1)
LYMPHOCYTES NFR BLD AUTO: 8.1 % (ref 19.6–45.3)
MAGNESIUM SERPL-MCNC: 2 MG/DL (ref 1.6–2.4)
MCH RBC QN AUTO: 28.5 PG (ref 26.6–33)
MCHC RBC AUTO-ENTMCNC: 29.6 G/DL (ref 31.5–35.7)
MCV RBC AUTO: 96.1 FL (ref 79–97)
MONOCYTES # BLD AUTO: 0.93 10*3/MM3 (ref 0.1–0.9)
MONOCYTES NFR BLD AUTO: 6.8 % (ref 5–12)
NEUTROPHILS NFR BLD AUTO: 11.12 10*3/MM3 (ref 1.7–7)
NEUTROPHILS NFR BLD AUTO: 81.8 % (ref 42.7–76)
NRBC BLD AUTO-RTO: 0.7 /100 WBC (ref 0–0.2)
PHOSPHATE SERPL-MCNC: 3 MG/DL (ref 2.5–4.5)
PLATELET # BLD AUTO: 72 10*3/MM3 (ref 140–450)
PMV BLD AUTO: 13.2 FL (ref 6–12)
POTASSIUM SERPL-SCNC: 4.3 MMOL/L (ref 3.5–5.2)
PROT SERPL-MCNC: 6.1 G/DL (ref 6–8.5)
RBC # BLD AUTO: 4.32 10*6/MM3 (ref 3.77–5.28)
SODIUM SERPL-SCNC: 135 MMOL/L (ref 136–145)
VANCOMYCIN SERPL-MCNC: 14.71 MCG/ML (ref 5–40)
WBC NRBC COR # BLD AUTO: 13.59 10*3/MM3 (ref 3.4–10.8)

## 2025-02-15 PROCEDURE — 25010000002 FUROSEMIDE PER 20 MG: Performed by: STUDENT IN AN ORGANIZED HEALTH CARE EDUCATION/TRAINING PROGRAM

## 2025-02-15 PROCEDURE — 80202 ASSAY OF VANCOMYCIN: CPT | Performed by: INTERNAL MEDICINE

## 2025-02-15 PROCEDURE — 63710000001 INSULIN LISPRO (HUMAN) PER 5 UNITS: Performed by: INTERNAL MEDICINE

## 2025-02-15 PROCEDURE — 94799 UNLISTED PULMONARY SVC/PX: CPT

## 2025-02-15 PROCEDURE — 82948 REAGENT STRIP/BLOOD GLUCOSE: CPT | Performed by: INTERNAL MEDICINE

## 2025-02-15 PROCEDURE — 25010000002 PIPERACILLIN SOD-TAZOBACTAM PER 1 G: Performed by: NURSE PRACTITIONER

## 2025-02-15 PROCEDURE — 80076 HEPATIC FUNCTION PANEL: CPT | Performed by: NURSE PRACTITIONER

## 2025-02-15 PROCEDURE — 82948 REAGENT STRIP/BLOOD GLUCOSE: CPT

## 2025-02-15 PROCEDURE — 25810000003 SODIUM CHLORIDE 0.9 % SOLUTION: Performed by: INTERNAL MEDICINE

## 2025-02-15 PROCEDURE — 25010000002 MAGNESIUM SULFATE 2 GM/50ML SOLUTION: Performed by: NURSE PRACTITIONER

## 2025-02-15 PROCEDURE — 99233 SBSQ HOSP IP/OBS HIGH 50: CPT | Performed by: INTERNAL MEDICINE

## 2025-02-15 PROCEDURE — 83735 ASSAY OF MAGNESIUM: CPT | Performed by: INTERNAL MEDICINE

## 2025-02-15 PROCEDURE — 85025 COMPLETE CBC W/AUTO DIFF WBC: CPT | Performed by: INTERNAL MEDICINE

## 2025-02-15 PROCEDURE — 25010000002 VANCOMYCIN 5 G RECONSTITUTED SOLUTION: Performed by: INTERNAL MEDICINE

## 2025-02-15 PROCEDURE — 99233 SBSQ HOSP IP/OBS HIGH 50: CPT | Performed by: STUDENT IN AN ORGANIZED HEALTH CARE EDUCATION/TRAINING PROGRAM

## 2025-02-15 PROCEDURE — 63710000001 INSULIN GLARGINE PER 5 UNITS: Performed by: NURSE PRACTITIONER

## 2025-02-15 PROCEDURE — 84100 ASSAY OF PHOSPHORUS: CPT | Performed by: INTERNAL MEDICINE

## 2025-02-15 PROCEDURE — 80048 BASIC METABOLIC PNL TOTAL CA: CPT | Performed by: INTERNAL MEDICINE

## 2025-02-15 PROCEDURE — 94761 N-INVAS EAR/PLS OXIMETRY MLT: CPT

## 2025-02-15 RX ORDER — MAGNESIUM SULFATE HEPTAHYDRATE 40 MG/ML
2 INJECTION, SOLUTION INTRAVENOUS ONCE
Status: COMPLETED | OUTPATIENT
Start: 2025-02-15 | End: 2025-02-15

## 2025-02-15 RX ORDER — METOPROLOL SUCCINATE 50 MG/1
50 TABLET, EXTENDED RELEASE ORAL EVERY 12 HOURS SCHEDULED
Status: DISCONTINUED | OUTPATIENT
Start: 2025-02-15 | End: 2025-02-28 | Stop reason: HOSPADM

## 2025-02-15 RX ORDER — FUROSEMIDE 10 MG/ML
40 INJECTION INTRAMUSCULAR; INTRAVENOUS
Status: DISCONTINUED | OUTPATIENT
Start: 2025-02-15 | End: 2025-02-15

## 2025-02-15 RX ORDER — ASPIRIN 81 MG/1
81 TABLET ORAL DAILY
Status: DISCONTINUED | OUTPATIENT
Start: 2025-02-15 | End: 2025-02-28 | Stop reason: HOSPADM

## 2025-02-15 RX ORDER — FUROSEMIDE 10 MG/ML
80 INJECTION INTRAMUSCULAR; INTRAVENOUS
Status: DISCONTINUED | OUTPATIENT
Start: 2025-02-15 | End: 2025-02-17

## 2025-02-15 RX ORDER — SACUBITRIL AND VALSARTAN 24; 26 MG/1; MG/1
1 TABLET, FILM COATED ORAL EVERY 12 HOURS SCHEDULED
Status: DISCONTINUED | OUTPATIENT
Start: 2025-02-15 | End: 2025-02-28 | Stop reason: HOSPADM

## 2025-02-15 RX ORDER — SPIRONOLACTONE 25 MG/1
25 TABLET ORAL DAILY
Status: DISCONTINUED | OUTPATIENT
Start: 2025-02-15 | End: 2025-02-23

## 2025-02-15 RX ADMIN — AMIODARONE HYDROCHLORIDE 200 MG: 200 TABLET ORAL at 21:11

## 2025-02-15 RX ADMIN — ASPIRIN 81 MG: 81 TABLET, COATED ORAL at 09:38

## 2025-02-15 RX ADMIN — VANCOMYCIN HYDROCHLORIDE 1500 MG: 5 INJECTION, POWDER, LYOPHILIZED, FOR SOLUTION INTRAVENOUS at 09:38

## 2025-02-15 RX ADMIN — RIVAROXABAN 20 MG: 20 TABLET, FILM COATED ORAL at 17:28

## 2025-02-15 RX ADMIN — LIDOCAINE 2 PATCH: 4 PATCH TOPICAL at 08:47

## 2025-02-15 RX ADMIN — MAGNESIUM SULFATE HEPTAHYDRATE 2 G: 40 INJECTION, SOLUTION INTRAVENOUS at 08:47

## 2025-02-15 RX ADMIN — METOPROLOL TARTRATE 25 MG: 25 TABLET, FILM COATED ORAL at 08:46

## 2025-02-15 RX ADMIN — FAMOTIDINE 20 MG: 10 INJECTION, SOLUTION INTRAVENOUS at 08:46

## 2025-02-15 RX ADMIN — PIPERACILLIN AND TAZOBACTAM 4.5 G: 4; .5 INJECTION, POWDER, FOR SOLUTION INTRAVENOUS; PARENTERAL at 11:57

## 2025-02-15 RX ADMIN — INSULIN LISPRO 3 UNITS: 100 INJECTION, SOLUTION INTRAVENOUS; SUBCUTANEOUS at 21:11

## 2025-02-15 RX ADMIN — MUPIROCIN 1 APPLICATION: 20 OINTMENT TOPICAL at 21:11

## 2025-02-15 RX ADMIN — Medication 10 ML: at 08:47

## 2025-02-15 RX ADMIN — INSULIN LISPRO 3 UNITS: 100 INJECTION, SOLUTION INTRAVENOUS; SUBCUTANEOUS at 08:46

## 2025-02-15 RX ADMIN — SACUBITRIL AND VALSARTAN 1 TABLET: 24; 26 TABLET, FILM COATED ORAL at 21:11

## 2025-02-15 RX ADMIN — Medication 10 ML: at 21:11

## 2025-02-15 RX ADMIN — Medication: at 09:39

## 2025-02-15 RX ADMIN — INSULIN LISPRO 3 UNITS: 100 INJECTION, SOLUTION INTRAVENOUS; SUBCUTANEOUS at 12:00

## 2025-02-15 RX ADMIN — METOPROLOL SUCCINATE 50 MG: 50 TABLET, FILM COATED, EXTENDED RELEASE ORAL at 21:11

## 2025-02-15 RX ADMIN — PIPERACILLIN AND TAZOBACTAM 4.5 G: 4; .5 INJECTION, POWDER, FOR SOLUTION INTRAVENOUS; PARENTERAL at 05:14

## 2025-02-15 RX ADMIN — OXYCODONE HYDROCHLORIDE 10 MG: 5 TABLET ORAL at 12:00

## 2025-02-15 RX ADMIN — Medication: at 21:11

## 2025-02-15 RX ADMIN — OXYCODONE HYDROCHLORIDE 15 MG: 5 TABLET ORAL at 00:28

## 2025-02-15 RX ADMIN — METOPROLOL SUCCINATE 50 MG: 50 TABLET, FILM COATED, EXTENDED RELEASE ORAL at 09:38

## 2025-02-15 RX ADMIN — AMIODARONE HYDROCHLORIDE 200 MG: 200 TABLET ORAL at 08:46

## 2025-02-15 RX ADMIN — OXYCODONE HYDROCHLORIDE 10 MG: 5 TABLET ORAL at 17:06

## 2025-02-15 RX ADMIN — SPIRONOLACTONE 25 MG: 25 TABLET ORAL at 09:38

## 2025-02-15 RX ADMIN — INSULIN LISPRO 3 UNITS: 100 INJECTION, SOLUTION INTRAVENOUS; SUBCUTANEOUS at 17:27

## 2025-02-15 RX ADMIN — MUPIROCIN 1 APPLICATION: 20 OINTMENT TOPICAL at 08:46

## 2025-02-15 RX ADMIN — FAMOTIDINE 20 MG: 10 INJECTION, SOLUTION INTRAVENOUS at 21:11

## 2025-02-15 RX ADMIN — INSULIN GLARGINE 10 UNITS: 100 INJECTION, SOLUTION SUBCUTANEOUS at 08:46

## 2025-02-15 RX ADMIN — SACUBITRIL AND VALSARTAN 1 TABLET: 24; 26 TABLET, FILM COATED ORAL at 09:38

## 2025-02-15 RX ADMIN — FUROSEMIDE 40 MG: 10 INJECTION, SOLUTION INTRAMUSCULAR; INTRAVENOUS at 09:38

## 2025-02-15 RX ADMIN — PIPERACILLIN AND TAZOBACTAM 4.5 G: 4; .5 INJECTION, POWDER, FOR SOLUTION INTRAVENOUS; PARENTERAL at 20:56

## 2025-02-15 RX ADMIN — FUROSEMIDE 80 MG: 10 INJECTION, SOLUTION INTRAMUSCULAR; INTRAVENOUS at 17:27

## 2025-02-15 NOTE — PROGRESS NOTES
[facility] Clinical Pharmacy Services: Vancomycin Monitoring Note    Mary Albarran is a 74 y.o. female who is on day 5/7 of pharmacy to dose vancomycin for Skin and Soft Tissue.    Previous Vancomycin Dose: 1500 mg IV every 12 hours  Imaging Reviewed?: Yes  Updated Cultures and Sensitivities:   Microbiology Results (last 10 days)       Procedure Component Value - Date/Time    Blood Culture - Blood, Arm, Left [[id number]]  (Normal) Collected: 02/13/25 1422    Lab Status: Preliminary result Specimen: Blood from Arm, Left Updated: 02/14/25 1430     Blood Culture No growth at 24 hours    Narrative:      Less than seven (7) mL's of blood was collected.  Insufficient quantity may yield false negative results.    Blood Culture - Blood, Hand, Left [[id number]]  (Normal) Collected: 02/13/25 1400    Lab Status: Preliminary result Specimen: Blood from Hand, Left Updated: 02/14/25 1430     Blood Culture No growth at 24 hours    Narrative:      Less than seven (7) mL's of blood was collected.  Insufficient quantity may yield false negative results.    Body Fluid Culture - Body Fluid, Peritoneum [[id number]] Collected: 02/13/25 1226    Lab Status: Preliminary result Specimen: Body Fluid from Peritoneum Updated: 02/14/25 0720     Body Fluid Culture No growth     Gram Stain No organisms seen    MRSA Screen, PCR (Inpatient) - Swab, Nares [[id number]]  (Normal) Collected: 02/11/25 0841    Lab Status: Final result Specimen: Swab from Nares Updated: 02/11/25 1002     MRSA PCR No MRSA Detected    Narrative:      The negative predictive value of this diagnostic test is high and should only be used to consider de-escalating anti-MRSA therapy. A positive result may indicate colonization with MRSA and must be correlated clinically.    Legionella Antigen, Urine - Urine, Urine, Clean Catch [[id number]]  (Normal) Collected: 02/11/25 0841    Lab Status: Final result Specimen: Urine, Clean Catch Updated: 02/11/25 1210     LEGIONELLA ANTIGEN, URINE  "Negative    S. Pneumo Ag Urine or CSF - Urine, Urine, Clean Catch [[id number]]  (Normal) Collected: 02/11/25 0841    Lab Status: Final result Specimen: Urine, Clean Catch Updated: 02/11/25 1210     Strep Pneumo Ag Negative    Blood Culture - Blood, Hand, Left [[id number]]  (Abnormal) Collected: 02/11/25 0302    Lab Status: Final result Specimen: Blood from Hand, Left Updated: 02/14/25 1522     Blood Culture Phocaeicola (Bacteroides) vulgatus     Comment: This organism is Beta-lactamase positive and is predictably susceptible to ampicillin-sulbactam or amoxicillin-clavulanate.           Isolated from Anaerobic Bottle     Gram Stain Anaerobic Bottle Gram negative bacilli    Narrative:      Less than seven (7) mL's of blood was collected.  Insufficient quantity may yield false negative results.    Blood Culture - Blood, Hand, Left [[id number]]  (Normal) Collected: 02/11/25 0302    Lab Status: Preliminary result Specimen: Blood from Hand, Left Updated: 02/15/25 0315     Blood Culture No growth at 4 days    Narrative:      Less than seven (7) mL's of blood was collected.  Insufficient quantity may yield false negative results.    Blood Culture ID, PCR - Blood, Hand, Left [[id number]] Collected: 02/11/25 0302    Lab Status: Final result Specimen: Blood from Hand, Left Updated: 02/12/25 1319     BCID, PCR Negative by BCID PCR. Culture to Follow.     BOTTLE TYPE Anaerobic Bottle    RSV Screen - , [[id number]] Collected: 02/10/25 2000    Lab Status: Final result Updated: 02/11/25 0205    Influenza Antigen, Rapid - , [[id number]] Collected: 02/10/25 2000    Lab Status: Final result Updated: 02/11/25 0205    COVID-19, ABBOTT IN-HOUSE,NASAL Swab (NO TRANSPORT MEDIA) 2 HR TAT - , [[id number]] Collected: 02/10/25 2000    Lab Status: Final result Updated: 02/11/25 0205    Rapid Strep A Screen - , [[id number]] Collected: 02/10/25 1826    Lab Status: Final result Updated: 02/11/25 0205              Vitals/Labs  Ht: 160 cm (62.99\"); Wt: 116 " kg (256 lb 2.8 oz)   Temp (24hrs), Av °F (37.8 °C), Min:99.4 °F (37.4 °C), Max:100.6 °F (38.1 °C)   Estimated Creatinine Clearance: 83.3 mL/min (by C-G formula based on SCr of 0.75 mg/dL).     Results from last 7 days   Lab Units 02/15/25  0742 02/15/25  0741 25  0319 25  0417 25  0449   VANCOMYCIN RM mcg/mL 14.71  --  6.80  --  21.63   CREATININE mg/dL  --  0.75 0.84 1.15* 1.60*   WBC 10*3/mm3 13.59*  --  12.63* 7.55 15.24*     Assessment/Plan    Current Vancomycin Dose:  1500 mg IV every 12 hours; which provides the following predicted parameters:  Exposure target: AUC24 (range)400-600 mg/L.hr   AUC24,ss: 546 mg/L.hr  Probability of AUC24 > 400: 100 %  Ctrough,ss: 14.1 mg/L  Probability of Ctrough,ss > 20: 0 %  Probability of nephrotoxicity (Lodise ERIC ): 9 %  No plan for additional levels at this time  We will continue to monitor patient changes and renal function     Thank you for involving pharmacy in this patient's care. Please contact pharmacy with any questions or concerns.    Angelika Holden  Clinical Pharmacist

## 2025-02-15 NOTE — PLAN OF CARE
Goal Outcome Evaluation:         Patient refused to wear Bipap again tonight, despite encouragement to do so.  RN notified.    Currently on 3L nasal cannula.

## 2025-02-15 NOTE — THERAPY EVALUATION
RT EQUIPMENT DEVICE RELATED - SKIN ASSESSMENT    RT Medical Equipment/Device:     NIV Mask:  Under-the-nose   size:       Skin Assessment:      Head/neck/face:  Intact    Device Skin Pressure Protection:  Positioning supports utilized    Nurse Notification:  Elayne Zhao, RRT

## 2025-02-15 NOTE — PROGRESS NOTES
CARDIOLOY  INPATIENT PROGRESS NOTE         [facility] CORONARY CARE UNIT    2/15/2025      PATIENT IDENTIFICATION:   Name:  Mary Albarran      MRN:  [id number]     74 y.o.  female             No chief complaint on file.        SUBJECTIVE:   Patient is doing well denies shortness of breath or chest discomfort.  Continues to have swelling and pain in her right lower extremity.        OBJECTIVE:  Vitals:    02/15/25 0940 02/15/25 1000 02/15/25 1100 02/15/25 1200   BP: 136/73 157/80 159/96 121/45   BP Location: Right arm Right arm Right arm Right arm   Patient Position: Lying Lying Lying Lying   Pulse: 93 93 82 106   Resp: 18 20 18 20   Temp: 99.8 °F (37.7 °C) 99.5 °F (37.5 °C) 99.7 °F (37.6 °C) 99.6 °F (37.6 °C)   TempSrc: Bladder Bladder Bladder Bladder   SpO2: 93% 94% 96% 93%   Weight:       Height:               Body mass index is 45.39 kg/m².    Intake/Output Summary (Last 24 hours) at 2/15/2025 1230  Last data filed at 2/15/2025 0600  Gross per 24 hour   Intake 1651 ml   Output 1050 ml   Net 601 ml       Telemetry: Atrial fibrillation with controlled response    Physical Exam  Constitutional:  Awake.  in acute distress. Normal appearance.   Neck: No carotid bruit, hepatojugular reflux or JVD.   Cardiovascular: Irregularly irregular rate.  Pulmonary: Pulmonary effort is normal.  Reduced breath sounds  Extremities:   Bilateral edema is noted.   Skin: Tenderness on palpation, erythema of right lower extremity.  Neurological: Alert and oriented x 3        Allergies   Allergen Reactions    Codeine Mental Status Change    Sglt2 Inhibitors Other (See Comments)     Frequent UTI      Scheduled meds:  amiodarone, 200 mg, Oral, BID  ammonium lactate, , Topical, 2 times per day  aspirin, 81 mg, Oral, Daily  famotidine, 20 mg, Intravenous, Q12H  furosemide, 40 mg, Intravenous, BID Diuretics  insulin glargine, 10 Units, Subcutaneous, Daily  insulin lispro, 3-14 Units, Subcutaneous, 4x Daily AC & at Bedtime  Lidocaine,  "2 patch, Transdermal, Q24H  metoprolol succinate XL, 50 mg, Oral, Q12H  mupirocin, 1 Application, Each Nare, BID  piperacillin-tazobactam, 4.5 g, Intravenous, Q8H  rivaroxaban, 20 mg, Oral, Daily With Dinner  sacubitril-valsartan, 1 tablet, Oral, Q12H  sodium chloride, 1,000 mL, Intravenous, Once  sodium chloride, 10 mL, Intravenous, Q12H  spironolactone, 25 mg, Oral, Daily      IV meds:                         Data Review:  CBC          2/13/2025    04:17 2/14/2025    03:19 2/15/2025    07:42   CBC   WBC 7.55  12.63  13.59    RBC 3.95  3.89  4.32    Hemoglobin 11.4  11.7  12.3    Hematocrit 37.5  37.3  41.5    MCV 94.9  95.9  96.1    MCH 28.9  30.1  28.5    MCHC 30.4  31.4  29.6    RDW 14.7  14.6  14.9    Platelets 52  52  72      CMP          2/13/2025    04:17 2/14/2025    03:19 2/15/2025    07:41   CMP   Glucose 135  166  172    BUN 26  19  18    Creatinine 1.15  0.84  0.75    EGFR 50.7  73.9  84.7    Sodium 139  136  135    Potassium 4.7  4.0  4.3    Chloride 106  104  102    Calcium 8.2  8.4  8.9    Total Protein   6.1    Albumin   2.7    Total Bilirubin   0.9    Alkaline Phosphatase   90    AST (SGOT)   22    ALT (SGPT)   20    BUN/Creatinine Ratio 22.6  22.6  24.0    Anion Gap 11.2  11.2  13.7       CARDIAC LABS:      Lab 02/13/25  0417 02/11/25  0302   PROBNP  --  8,458.0*   PROTIME 15.3*  --    INR 1.16*  --         Lab Results   Component Value Date    DIGOXIN 1.95 (H) 02/12/2025      Lab Results   Component Value Date    TSH 1.520 01/27/2023           Invalid input(s): \"LDLCALC\"  No results found for: \"POCTROP\"  Lab Results   Component Value Date    TROPONINT 21 (H) 10/06/2023   (  Lab Results   Component Value Date    MG 2.0 02/15/2025     Results for orders placed during the hospital encounter of 02/11/25    Adult Transthoracic Echo Complete W/ Cont if Necessary Per Protocol    Interpretation Summary    Left ventricular systolic function is mildly decreased. Left ventricular ejection fraction appears " to be 46 - 50%. with some anterior septal hypokensis    The right ventricular cavity is mildly dilated.    The left atrial cavity is mildly dilated.    The right atrial cavity is moderately  dilated.    Mild to moderate aortic valve stenosis is present.    Estimated right ventricular systolic pressure from tricuspid regurgitation is moderately elevated (45-55 mmHg).           ASSESSMENT:    Heart failure with reduced ejection fraction, ACC stage C  Moderate aortic regurgitation associated with moderate aortic stenosis (DVI 0.39)  Atrial fibrillation with RVR  NSTEMI type II  Septic shock with gram-negative bacteremia  Cellulitis of right lower extremity  Peripheral arterial disease    PLAN:    Patient's prior records, EKG, echocardiogram and other imaging were reviewed.  Patient is currently off pressors for more than 12 hours.  Start IV diuresis with Lasix 80 mg IV twice daily.  Continue to replace potassium and magnesium to keep them above 4 and 2 respectively.  Initiated guideline directed medical therapy, Entresto 24/26 mg p.o. twice daily and spironolactone 25 mg p.o. daily.  For now we will adopt a rate control approach for A-fib, continue Toprol-XL 50 mg p.o. twice daily along with Xarelto 20 mg p.o. daily.  Tolerating well.  Troponin elevation is in context of heart failure.    Duplex scan of the legs has been ordered for evaluation of DVT.  Examination of leg is concerning for peripheral arterial disease.  Will get VENU outpatient for screening of peripheral arterial disease.  Patient has history of claudication.  Initiated aspirin 81 mg p.o. daily.  Since patient is on Xarelto therefore dual antiplatelet therapy is not necessary.    Patient needs to be referred to comprehensive valve center for management of valvular heart disease.  Will arrange that outpatient.  EF has dropped significantly as compared to last echocardiogram from October 2023.    Thank you for letting us participate in patient's care    I  have seen and examined the patient. I spent 60 min caring for the patient on this date of service. This time includes time spent by me in the following activities as necessary: face to face encounter, preparing for the visit, reviewing tests, specialists records and previous visits, obtaining and/or reviewing a separately obtained history, performing a medically appropriate exam and/or evaluation, counseling and educating the patient, family, caregiver, referring and/or communicating with other healthcare professionals, documenting information in the medical record, independently interpreting results and communicating that information with the patient, family, caregiver, and developing a medically appropriate treatment plan with consideration of other conditions, medications, and treatments. More than 50 % time was spent in counselling and patient education.       Osman Horvath MD  2/15/2025    12:30 EST

## 2025-02-15 NOTE — PLAN OF CARE
Goal Outcome Evaluation:  Plan of Care Reviewed With: patient        Progress: no change  Outcome Evaluation: Patient remains in CCU. alert and oriented, medicated with prn pain medication. IV antibiotics per orders. blood glucose monitored, and SSI and Lantus per orders. wound care performed. no other changes at this time

## 2025-02-15 NOTE — PLAN OF CARE
Goal Outcome Evaluation:   Patient refusing bipap. Did not wear overnight. Remains on 3lpm nasal cannula.

## 2025-02-15 NOTE — PROGRESS NOTES
Pulmonary / Critical Care Progress Note      Patient Name: Mary Albarran  : 1950  MRN: [id number]  Attending:  Ang Garcia MD   Date of admission: 2025    Subjective   Subjective   Patient critically ill with septic shock, Streptococcus bacteremia    Patient awake on 3 L nasal cannula  Tmax 100.6  Percutaneous cholecystostomy drain in place  Right lower extremity with significant erythema and edema    Objective   Objective     Vitals:   Vital signs for last 24 hours:  Temp:  [99.3 °F (37.4 °C)-100.6 °F (38.1 °C)] 99.8 °F (37.7 °C)  Heart Rate:  [] 91  Resp:  [16-34] 19  BP: (102-175)/() 152/62    Intake/Output last 3 shifts:  I/O last 3 completed shifts:  In: 1651 [I.V.:1651]  Out: 1750 [Urine:1300; Emesis/NG output:450]    Physical Exam   Does have some worsening erythema of the lower extremity    Result Review    Result Review:  I have personally reviewed the results from the time of this admission to 2/15/2025 07:08 EST and agree with these findings:  [x]  Laboratory  [x]  Microbiology  [x]  Radiology  [x]  EKG/Telemetry   [x]  Cardiology/Vascular   []  Pathology  []  Old records  []  Other:  Most notable findings include:     Outside facility blood culture 2/10/2025 Streptococcus dysgalactiae     Assessment & Plan   Assessment / Plan     Active Hospital Problems:  Active Hospital Problems    Diagnosis     **Atrial fibrillation with RVR      Impression:  Septic shock requiring Levophed  Bacteroides bacteremia  Reported gram-positive bacteremia from outside facility  Right lower extremity cellulitis  Questionable acute acalculous cholecystitis HIDA scan pending  Septic shock  Atrial fibrillation      Plan:  Continue Zosyn to complete course  Both organisms we covered by this antibiotic  Okay to discontinue vancomycin  Percutaneous drain per surgery  Transition to Xarelto  Will start with diuresis today  Cardiology following and has initiated goal-directed therapy  Agree with  vascular assessment of the lower extremity given her significant swelling    GI prophylaxis-famotidine  VTE Prophylaxis: Xarelto  Mechanical VTE prophylaxis orders are present.    CODE STATUS:   Level Of Support Discussed With: Health Care Surrogate  Code Status (Patient has no pulse and is not breathing): CPR (Attempt to Resuscitate)  Medical Interventions (Patient has pulse or is breathing): Full Support    Heidi GRACIA,  Electronically signed by LUCERO Henning, 02/15/25, 2:12 PM EST.    Patient seen and examined the above assessment plan reflect my assessment plan medical decision making  Electronically signed by Jerson Berg DO, 02/15/25, 4:11 PM EST.

## 2025-02-15 NOTE — PROGRESS NOTES
[facility]   Hospitalist Progress Note  Date: 2/15/2025  Patient Name: Mary Albarran  : 1950  MRN: [id number]  Date of admission: 2025      Subjective   Subjective     Chief Complaint: Altered mental status    Summary: 74 y.o. female with history of A-fib on Xarelto, CKD stage III, type 2 diabetes initially presented to outside facility with decreased responsiveness.  Patient was found to be hypotensive, febrile and with elevated lactate.  Concern for infection.  Was given 2.5 L of IV fluids also had A-fib with RVR initially started on Cardizem however developed hypotension.  Did have CT scan of the abdomen pelvis which showed some gallbladder thickening concerning for cholelithiasis.  Was transferred to our facility for critical care management.  General surgery was consulted and did recommend cholecystostomy tube which was placed by IR.  Fluid cultures returned negative.  She was kept on broad-spectrum IV antibiotics, did have significant right lower extremity cellulitis which was slow to improve.  Had issues with A-fib with RVR for which cardiology was consulted.  Heart rates were controlled with IV and ultimately oral medications.    Interval Followup: Patient still with significant right lower extremity pain.  Erythema is somewhat improved to the right lower extremity but still present and quite painful.  Tmax 100.6 overnight.  Heart rates are better controlled this morning.  Remains on 3 L nasal cannula.    Objective   Objective     Vitals:   Temp:  [99.5 °F (37.5 °C)-100.6 °F (38.1 °C)] 99.5 °F (37.5 °C)  Heart Rate:  [] 94  Resp:  [15-28] 15  BP: (114-175)/() 126/67  Flow (L/min) (Oxygen Therapy):  [3] 3  Physical Exam    Constitutional: Ill-appearing female, awake, alert, answering questions appropriately   Respiratory: Diminished in bilateral bases, nasal cannula in place, nonlabored respirations    Cardiovascular: IR/IR, no MRG   Gastrointestinal: Positive bowel sounds, soft,  tender, cholecystostomy tube in place with bloody drainage noted   Neurologic: Oriented x 3, appears fatigued, questions appropriately when awakened, cranial Nerves grossly intact to confrontation, speech clear             Skin: Erythema of the right lower extremity, slightly improved, extremely tender to palpation    Result Review    I have personally reviewed the results below:  [x]  Laboratory personally reviewed CMP, CBC, blood sugars, magnesium, phosphorus  []  Microbiology  []  Radiology  []  EKG/Telemetry   []  Cardiology/Vascular   []  Pathology  []  Old records  []  Other:  CBC          2/13/2025    04:17 2/14/2025    03:19 2/15/2025    07:42   CBC   WBC 7.55  12.63  13.59    RBC 3.95  3.89  4.32    Hemoglobin 11.4  11.7  12.3    Hematocrit 37.5  37.3  41.5    MCV 94.9  95.9  96.1    MCH 28.9  30.1  28.5    MCHC 30.4  31.4  29.6    RDW 14.7  14.6  14.9    Platelets 52  52  72      CMP          2/13/2025    04:17 2/14/2025    03:19 2/15/2025    07:41   CMP   Glucose 135  166  172    BUN 26  19  18    Creatinine 1.15  0.84  0.75    EGFR 50.7  73.9  84.7    Sodium 139  136  135    Potassium 4.7  4.0  4.3    Chloride 106  104  102    Calcium 8.2  8.4  8.9    Total Protein   6.1    Albumin   2.7    Total Bilirubin   0.9    Alkaline Phosphatase   90    AST (SGOT)   22    ALT (SGPT)   20    BUN/Creatinine Ratio 22.6  22.6  24.0    Anion Gap 11.2  11.2  13.7        Assessment & Plan   Assessment / Plan   Septic shock  Streptococcal bacteremia  Gram-negative bacteremia  Concern for acute cholecystitis  Concern for right lower extremity cellulitis  Acute metabolic encephalopathy secondary to above  Acute on chronic systolic congestive heart failure with acute exacerbation  Moderate aortic regurgitation with moderate aortic stenosis  Chronic atrial fibrillation with RVR  NSTEMI type II  Acute kidney injury  Hypomagnesemia  Lactic acidosis, clinically significant  Type 2 diabetes mellitus    Continue to monitor in the  ICU for workup and management of the above  Status post cholecystostomy tube placement, body fluid culture no growth to date  Discussed with pulmonology, MRI of right ankle and leg negative for abscess, consistent with cellulitis  Continue with broad-spectrum vancomycin and Zosyn for now  Repeat lower extremity duplex  1/2 blood cultures growing Bacteroides vulgatus, repeat blood cultures no growth to date  Discussed with cardiology, blood pressures have improved, will start Lasix 80 mg IV twice daily  Start oral Entresto as well as Aldactone for goal-directed medical therapy  Trend renal function and electrolytes closely while on IV diuretics  Continue digoxin 125 mcg p.o. daily  Change metoprolol to Toprol XL 50 mg twice daily  Replace magnesium IV  Start Xarelto 20 mg daily  Blood sugars acceptable, continue Lantus 10 units daily, moderate dose sliding scale insulin  Continue oxycodone 10 or 50 mg as needed for pain, careful to avoid oversedation  Trend renal function and electrolytes with a.m. BMP, magnesium   Trend Hgb and WBC with a.m. CBC     Discussed plan with RN, cardiology, pulmonology    VTE Prophylaxis:  Pharmacologic & mechanical VTE prophylaxis orders are present.        CODE STATUS:   Level Of Support Discussed With: Health Care Surrogate  Code Status (Patient has no pulse and is not breathing): CPR (Attempt to Resuscitate)  Medical Interventions (Patient has pulse or is breathing): Full Support

## 2025-02-15 NOTE — PLAN OF CARE
Goal Outcome Evaluation:      Pt has been oriented but very lethargic, responds to voice. NC 3L, afib on monitor 70-90's. BM noted on shift. Pt complains of pain in legs, lotion and pain medicine provided.

## 2025-02-16 LAB
ALBUMIN SERPL-MCNC: 2.5 G/DL (ref 3.5–5.2)
ALBUMIN/GLOB SERPL: 0.8 G/DL
ALP SERPL-CCNC: 105 U/L (ref 39–117)
ALT SERPL W P-5'-P-CCNC: 17 U/L (ref 1–33)
ANION GAP SERPL CALCULATED.3IONS-SCNC: 11.4 MMOL/L (ref 5–15)
ANISOCYTOSIS BLD QL: ABNORMAL
AST SERPL-CCNC: 16 U/L (ref 1–32)
BACTERIA FLD CULT: NORMAL
BACTERIA SPEC AEROBE CULT: NORMAL
BASOPHILS # BLD MANUAL: 0.1 10*3/MM3 (ref 0–0.2)
BASOPHILS NFR BLD MANUAL: 1 % (ref 0–1.5)
BILIRUB SERPL-MCNC: 0.7 MG/DL (ref 0–1.2)
BUN SERPL-MCNC: 16 MG/DL (ref 8–23)
BUN/CREAT SERPL: 20.3 (ref 7–25)
BURR CELLS BLD QL SMEAR: ABNORMAL
CALCIUM SPEC-SCNC: 8.8 MG/DL (ref 8.6–10.5)
CHLORIDE SERPL-SCNC: 101 MMOL/L (ref 98–107)
CO2 SERPL-SCNC: 25.6 MMOL/L (ref 22–29)
CREAT SERPL-MCNC: 0.79 MG/DL (ref 0.57–1)
DEPRECATED RDW RBC AUTO: 50.5 FL (ref 37–54)
EGFRCR SERPLBLD CKD-EPI 2021: 79.6 ML/MIN/1.73
EOSINOPHIL # BLD MANUAL: 0.2 10*3/MM3 (ref 0–0.4)
EOSINOPHIL NFR BLD MANUAL: 2 % (ref 0.3–6.2)
ERYTHROCYTE [DISTWIDTH] IN BLOOD BY AUTOMATED COUNT: 14.5 % (ref 12.3–15.4)
GLOBULIN UR ELPH-MCNC: 3.1 GM/DL
GLUCOSE BLDC GLUCOMTR-MCNC: 149 MG/DL (ref 70–99)
GLUCOSE BLDC GLUCOMTR-MCNC: 182 MG/DL (ref 70–99)
GLUCOSE BLDC GLUCOMTR-MCNC: 184 MG/DL (ref 70–99)
GLUCOSE BLDC GLUCOMTR-MCNC: 193 MG/DL (ref 70–99)
GLUCOSE SERPL-MCNC: 150 MG/DL (ref 65–99)
GRAM STN SPEC: NORMAL
HCT VFR BLD AUTO: 38.4 % (ref 34–46.6)
HGB BLD-MCNC: 12 G/DL (ref 12–15.9)
LARGE PLATELETS: ABNORMAL
LYMPHOCYTES # BLD MANUAL: 1.32 10*3/MM3 (ref 0.7–3.1)
LYMPHOCYTES NFR BLD MANUAL: 5 % (ref 5–12)
MAGNESIUM SERPL-MCNC: 1.7 MG/DL (ref 1.6–2.4)
MCH RBC QN AUTO: 29.4 PG (ref 26.6–33)
MCHC RBC AUTO-ENTMCNC: 31.3 G/DL (ref 31.5–35.7)
MCV RBC AUTO: 94.1 FL (ref 79–97)
MONOCYTES # BLD: 0.51 10*3/MM3 (ref 0.1–0.9)
NEUTROPHILS # BLD AUTO: 7.95 10*3/MM3 (ref 1.7–7)
NEUTROPHILS NFR BLD MANUAL: 78 % (ref 42.7–76)
NEUTS HYPERSEG # BLD: ABNORMAL 10*3/UL
NRBC SPEC MANUAL: 3 /100 WBC (ref 0–0.2)
OVALOCYTES BLD QL SMEAR: ABNORMAL
PHOSPHATE SERPL-MCNC: 2.8 MG/DL (ref 2.5–4.5)
PLATELET # BLD AUTO: 94 10*3/MM3 (ref 140–450)
PMV BLD AUTO: 13.5 FL (ref 6–12)
POLYCHROMASIA BLD QL SMEAR: ABNORMAL
POTASSIUM SERPL-SCNC: 3.4 MMOL/L (ref 3.5–5.2)
PROCALCITONIN SERPL-MCNC: 5.16 NG/ML (ref 0–0.25)
PROMYELOCYTES NFR BLD MANUAL: 1 % (ref 0–0)
PROT ?TM UR-MCNC: <4 MG/DL
PROT SERPL-MCNC: 5.6 G/DL (ref 6–8.5)
RBC # BLD AUTO: 4.08 10*6/MM3 (ref 3.77–5.28)
SCAN SLIDE: NORMAL
SMALL PLATELETS BLD QL SMEAR: ABNORMAL
SODIUM SERPL-SCNC: 138 MMOL/L (ref 136–145)
VARIANT LYMPHS NFR BLD MANUAL: 1 % (ref 0–5)
VARIANT LYMPHS NFR BLD MANUAL: 12 % (ref 19.6–45.3)
WBC NRBC COR # BLD AUTO: 10.19 10*3/MM3 (ref 3.4–10.8)

## 2025-02-16 PROCEDURE — 25010000002 PIPERACILLIN SOD-TAZOBACTAM PER 1 G: Performed by: NURSE PRACTITIONER

## 2025-02-16 PROCEDURE — 94761 N-INVAS EAR/PLS OXIMETRY MLT: CPT

## 2025-02-16 PROCEDURE — 99232 SBSQ HOSP IP/OBS MODERATE 35: CPT | Performed by: INTERNAL MEDICINE

## 2025-02-16 PROCEDURE — 99233 SBSQ HOSP IP/OBS HIGH 50: CPT | Performed by: INTERNAL MEDICINE

## 2025-02-16 PROCEDURE — 63710000001 INSULIN GLARGINE PER 5 UNITS: Performed by: NURSE PRACTITIONER

## 2025-02-16 PROCEDURE — 25010000002 FUROSEMIDE PER 20 MG: Performed by: STUDENT IN AN ORGANIZED HEALTH CARE EDUCATION/TRAINING PROGRAM

## 2025-02-16 PROCEDURE — 85025 COMPLETE CBC W/AUTO DIFF WBC: CPT | Performed by: INTERNAL MEDICINE

## 2025-02-16 PROCEDURE — 82948 REAGENT STRIP/BLOOD GLUCOSE: CPT | Performed by: STUDENT IN AN ORGANIZED HEALTH CARE EDUCATION/TRAINING PROGRAM

## 2025-02-16 PROCEDURE — 82948 REAGENT STRIP/BLOOD GLUCOSE: CPT | Performed by: INTERNAL MEDICINE

## 2025-02-16 PROCEDURE — 63710000001 INSULIN LISPRO (HUMAN) PER 5 UNITS: Performed by: INTERNAL MEDICINE

## 2025-02-16 PROCEDURE — 82948 REAGENT STRIP/BLOOD GLUCOSE: CPT

## 2025-02-16 PROCEDURE — 94799 UNLISTED PULMONARY SVC/PX: CPT

## 2025-02-16 PROCEDURE — 84145 PROCALCITONIN (PCT): CPT | Performed by: NURSE PRACTITIONER

## 2025-02-16 PROCEDURE — 99232 SBSQ HOSP IP/OBS MODERATE 35: CPT | Performed by: STUDENT IN AN ORGANIZED HEALTH CARE EDUCATION/TRAINING PROGRAM

## 2025-02-16 PROCEDURE — 83735 ASSAY OF MAGNESIUM: CPT | Performed by: INTERNAL MEDICINE

## 2025-02-16 PROCEDURE — 25010000002 MAGNESIUM SULFATE 2 GM/50ML SOLUTION: Performed by: NURSE PRACTITIONER

## 2025-02-16 PROCEDURE — 84156 ASSAY OF PROTEIN URINE: CPT | Performed by: STUDENT IN AN ORGANIZED HEALTH CARE EDUCATION/TRAINING PROGRAM

## 2025-02-16 PROCEDURE — 84100 ASSAY OF PHOSPHORUS: CPT | Performed by: INTERNAL MEDICINE

## 2025-02-16 PROCEDURE — 85007 BL SMEAR W/DIFF WBC COUNT: CPT | Performed by: INTERNAL MEDICINE

## 2025-02-16 PROCEDURE — 80053 COMPREHEN METABOLIC PANEL: CPT | Performed by: NURSE PRACTITIONER

## 2025-02-16 RX ORDER — MAGNESIUM SULFATE HEPTAHYDRATE 40 MG/ML
2 INJECTION, SOLUTION INTRAVENOUS ONCE
Status: COMPLETED | OUTPATIENT
Start: 2025-02-16 | End: 2025-02-16

## 2025-02-16 RX ORDER — POTASSIUM CHLORIDE 750 MG/1
40 CAPSULE, EXTENDED RELEASE ORAL ONCE
Status: COMPLETED | OUTPATIENT
Start: 2025-02-16 | End: 2025-02-16

## 2025-02-16 RX ADMIN — FAMOTIDINE 20 MG: 10 INJECTION, SOLUTION INTRAVENOUS at 09:46

## 2025-02-16 RX ADMIN — INSULIN GLARGINE 10 UNITS: 100 INJECTION, SOLUTION SUBCUTANEOUS at 09:46

## 2025-02-16 RX ADMIN — FUROSEMIDE 80 MG: 10 INJECTION, SOLUTION INTRAMUSCULAR; INTRAVENOUS at 09:46

## 2025-02-16 RX ADMIN — POTASSIUM CHLORIDE 40 MEQ: 750 CAPSULE, EXTENDED RELEASE ORAL at 09:46

## 2025-02-16 RX ADMIN — METOPROLOL SUCCINATE 50 MG: 50 TABLET, FILM COATED, EXTENDED RELEASE ORAL at 09:46

## 2025-02-16 RX ADMIN — AMIODARONE HYDROCHLORIDE 200 MG: 200 TABLET ORAL at 09:46

## 2025-02-16 RX ADMIN — AMIODARONE HYDROCHLORIDE 200 MG: 200 TABLET ORAL at 21:28

## 2025-02-16 RX ADMIN — OXYCODONE HYDROCHLORIDE 10 MG: 5 TABLET ORAL at 00:23

## 2025-02-16 RX ADMIN — SPIRONOLACTONE 25 MG: 25 TABLET ORAL at 09:46

## 2025-02-16 RX ADMIN — PIPERACILLIN AND TAZOBACTAM 4.5 G: 4; .5 INJECTION, POWDER, FOR SOLUTION INTRAVENOUS; PARENTERAL at 03:57

## 2025-02-16 RX ADMIN — OXYCODONE HYDROCHLORIDE 15 MG: 5 TABLET ORAL at 21:28

## 2025-02-16 RX ADMIN — Medication 10 ML: at 21:33

## 2025-02-16 RX ADMIN — SACUBITRIL AND VALSARTAN 1 TABLET: 24; 26 TABLET, FILM COATED ORAL at 09:46

## 2025-02-16 RX ADMIN — Medication: at 09:45

## 2025-02-16 RX ADMIN — RIVAROXABAN 20 MG: 20 TABLET, FILM COATED ORAL at 17:33

## 2025-02-16 RX ADMIN — PIPERACILLIN AND TAZOBACTAM 4.5 G: 4; .5 INJECTION, POWDER, FOR SOLUTION INTRAVENOUS; PARENTERAL at 14:07

## 2025-02-16 RX ADMIN — FAMOTIDINE 20 MG: 10 INJECTION, SOLUTION INTRAVENOUS at 21:29

## 2025-02-16 RX ADMIN — OXYCODONE HYDROCHLORIDE 15 MG: 5 TABLET ORAL at 15:06

## 2025-02-16 RX ADMIN — LIDOCAINE 2 PATCH: 4 PATCH TOPICAL at 09:46

## 2025-02-16 RX ADMIN — Medication 10 ML: at 09:47

## 2025-02-16 RX ADMIN — METOPROLOL SUCCINATE 50 MG: 50 TABLET, FILM COATED, EXTENDED RELEASE ORAL at 21:29

## 2025-02-16 RX ADMIN — SACUBITRIL AND VALSARTAN 1 TABLET: 24; 26 TABLET, FILM COATED ORAL at 21:28

## 2025-02-16 RX ADMIN — FUROSEMIDE 80 MG: 10 INJECTION, SOLUTION INTRAMUSCULAR; INTRAVENOUS at 17:32

## 2025-02-16 RX ADMIN — INSULIN LISPRO 3 UNITS: 100 INJECTION, SOLUTION INTRAVENOUS; SUBCUTANEOUS at 17:32

## 2025-02-16 RX ADMIN — INSULIN LISPRO 3 UNITS: 100 INJECTION, SOLUTION INTRAVENOUS; SUBCUTANEOUS at 12:24

## 2025-02-16 RX ADMIN — INSULIN LISPRO 3 UNITS: 100 INJECTION, SOLUTION INTRAVENOUS; SUBCUTANEOUS at 21:51

## 2025-02-16 RX ADMIN — PIPERACILLIN AND TAZOBACTAM 4.5 G: 4; .5 INJECTION, POWDER, FOR SOLUTION INTRAVENOUS; PARENTERAL at 21:30

## 2025-02-16 RX ADMIN — ASPIRIN 81 MG: 81 TABLET, COATED ORAL at 09:46

## 2025-02-16 RX ADMIN — MAGNESIUM SULFATE HEPTAHYDRATE 2 G: 2 INJECTION, SOLUTION INTRAVENOUS at 09:47

## 2025-02-16 NOTE — PLAN OF CARE
Goal Outcome Evaluation:  Plan of Care Reviewed With: patient        Progress: no change  Outcome Evaluation: Pt is alert and oriented, BG monitored, performed wound care. VSS at this time. plan of care ongoing

## 2025-02-16 NOTE — PROGRESS NOTES
Pulmonary / Critical Care Progress Note      Patient Name: Mary Albarran  : 1950  MRN: [id number]  Attending:  Ang Garcia MD   Date of admission: 2025    Subjective   Subjective   Patient critically ill with septic shock, Streptococcus bacteremia    Patient awake on 3 L nasal cannula  Urine output 5700/24 hours net -4700  Percutaneous cholecystostomy drain in place  Right lower extremity with significant erythema and edema    Objective   Objective     Vitals:   Vital signs for last 24 hours:  Temp:  [99.1 °F (37.3 °C)-99.7 °F (37.6 °C)] 99.3 °F (37.4 °C)  Heart Rate:  [] 77  Resp:  [15-24] 24  BP: ()/(43-96) 102/43    Intake/Output last 3 shifts:  I/O last 3 completed shifts:  In: 1111 [I.V.:1111]  Out: 6330 [Urine:6100; Emesis/NG output:230]    Physical Exam   Still significant the erythema of the lower extremity    Result Review    Result Review:  I have personally reviewed the results from the time of this admission to 2025 09:48 EST and agree with these findings:  [x]  Laboratory  [x]  Microbiology  [x]  Radiology  [x]  EKG/Telemetry   [x]  Cardiology/Vascular   []  Pathology  []  Old records  []  Other:  Most notable findings include:     Outside facility blood culture 2/10/2025 Streptococcus dysgalactiae     Assessment & Plan   Assessment / Plan     Active Hospital Problems:  Active Hospital Problems    Diagnosis     **Atrial fibrillation with RVR      Impression:  Septic shock requiring Levophed  Bacteroides bacteremia  Reported gram-positive bacteremia from outside facility  Right lower extremity cellulitis  Questionable acute acalculous cholecystitis HIDA scan pending  Septic shock  Atrial fibrillation      Plan:  Continue oxygen and wean  White blood cell count trending down  DC TLC  Continue Xarelto  Continue Zosyn  Replace mag and potassium  Txf out of the unit      GI prophylaxis-famotidine  VTE Prophylaxis: Xarelto  Pharmacologic & mechanical VTE prophylaxis orders  are present.    CODE STATUS:   Level Of Support Discussed With: Health Care Surrogate  Code Status (Patient has no pulse and is not breathing): CPR (Attempt to Resuscitate)  Medical Interventions (Patient has pulse or is breathing): Full Support    Electronically signed by LUCERO Henning, 02/16/25, 12:42 PM EST.      Patient seen and examined the above assessment plan reflect my assessment plan medical decision making  Electronically signed by Jerson Berg DO, 02/16/25, 5:28 PM EST.

## 2025-02-16 NOTE — PROGRESS NOTES
[facility]   Hospitalist Progress Note  Date: 2025  Patient Name: Mary Albarran  : 1950  MRN: [id number]  Date of admission: 2025      Subjective   Subjective     Chief Complaint: Altered mental status    Summary: 74 y.o. female with history of A-fib on Xarelto, CKD stage III, type 2 diabetes initially presented to outside facility with decreased responsiveness.  Patient was found to be hypotensive, febrile and with elevated lactate.  Concern for infection.  Was given 2.5 L of IV fluids also had A-fib with RVR initially started on Cardizem however developed hypotension.  Did have CT scan of the abdomen pelvis which showed some gallbladder thickening concerning for cholelithiasis.  Was transferred to our facility for critical care management.  General surgery was consulted and did recommend cholecystostomy tube which was placed by IR.  Fluid cultures returned negative.  She was kept on broad-spectrum IV antibiotics, did have significant right lower extremity cellulitis which was slow to improve.  1 blood culture positive for Bacteroides species.  Will need to complete 10 days 14 days of antibiotic therapy for cellulitis and bacteremia.  Had issues with A-fib with RVR for which cardiology was consulted.  2D echo revealed drop in EF and valvular heart disease with moderate aortic stenosis.  Will need follow-up with comprehensive valve center on an outpatient basis for management of valvular heart disease.  Heart rates were controlled with IV and ultimately oral medications.  Will likely need rehab at discharge.    Interval Followup: No acute events overnight, Tmax 100.6.  She states she feels a little bit better today.  Still having significant lower extremity pain but improved from yesterday.  Right lower extremity is having some weeping and persistent erythema.  Diuretics have seem to help some.    Objective   Objective     Vitals:   Temp:  [98.1 °F (36.7 °C)-99.5 °F (37.5 °C)] 98.1 °F (36.7  °C)  Heart Rate:  [77-99] 92  Resp:  [15-24] 20  BP: ()/() 157/69  Flow (L/min) (Oxygen Therapy):  [3] 3  Physical Exam    Constitutional: Ill-appearing female, awake, alert, appears slightly uncomfortable   Respiratory: Diminished in bilateral bases, nasal cannula in place, nonlabored respirations    Cardiovascular: IR/IR, no MRG   Gastrointestinal: Positive bowel sounds, soft, tender, cholecystostomy tube in place with bloody drainage present   Neurologic: Oriented x 3, appears fatigued, answering questions appropriately, cranial Nerves grossly intact to confrontation, speech clear             Skin: Erythema of the right lower extremity, slightly improved, extremely tender to palpation, weeping noted    Result Review    I have personally reviewed the results below:  [x]  Laboratory personally reviewed CMP, CBC, blood sugars, magnesium, phosphorus, blood cultures  []  Microbiology  []  Radiology  []  EKG/Telemetry   []  Cardiology/Vascular   []  Pathology  []  Old records  []  Other:  CBC          2/14/2025    03:19 2/15/2025    07:42 2/16/2025    02:47   CBC   WBC 12.63  13.59  10.19    RBC 3.89  4.32  4.08    Hemoglobin 11.7  12.3  12.0    Hematocrit 37.3  41.5  38.4    MCV 95.9  96.1  94.1    MCH 30.1  28.5  29.4    MCHC 31.4  29.6  31.3    RDW 14.6  14.9  14.5    Platelets 52  72  94      CMP          2/14/2025    03:19 2/15/2025    07:41 2/16/2025    02:47   CMP   Glucose 166  172  150    BUN 19  18  16    Creatinine 0.84  0.75  0.79    EGFR 73.9  84.7  79.6    Sodium 136  135  138    Potassium 4.0  4.3  3.4    Chloride 104  102  101    Calcium 8.4  8.9  8.8    Total Protein  6.1  5.6    Albumin  2.7  2.5    Globulin   3.1    Total Bilirubin  0.9  0.7    Alkaline Phosphatase  90  105    AST (SGOT)  22  16    ALT (SGPT)  20  17    Albumin/Globulin Ratio   0.8    BUN/Creatinine Ratio 22.6  24.0  20.3    Anion Gap 11.2  13.7  11.4        Assessment & Plan   Assessment / Plan   Septic  shock  Streptococcal bacteremia  Gram-negative bacteremia  Concern for acute cholecystitis  Concern for right lower extremity cellulitis  Acute metabolic encephalopathy secondary to above  Acute on chronic systolic congestive heart failure with acute exacerbation  Moderate aortic regurgitation with moderate aortic stenosis  Chronic atrial fibrillation with RVR  NSTEMI type II  Acute kidney injury  Hypomagnesemia  Lactic acidosis, clinically significant  Type 2 diabetes mellitus    Continue to monitor in the ICU for workup and management of the above  Status post cholecystostomy tube placement, body fluid culture no growth to date.  Will need to follow-up with general surgery on outpatient basis for tube removal  Vancomycin discontinued, continue with Zosyn to complete a 10-14-day course of antibiotics  1/2 blood cultures growing Bacteroides vulgatus, repeat blood cultures no growth to date  Discussed with cardiology, continue Lasix 80 mg IV twice daily  Trend renal function and electrolytes closely while on IV diuretics  Continue oral Entresto as well as Aldactone for goal-directed medical therapy  Initiate aspirin 81 mg daily  Continue digoxin 125 mcg p.o. daily  Change metoprolol to Toprol XL 50 mg twice daily  Replace magnesium and potassium  Continue Xarelto 20 mg daily  Patient will need to have referral on outpatient basis to a comprehensive valve center for management of valvular heart disease  Blood sugars acceptable, continue Lantus 10 units daily, moderate dose sliding scale insulin  Continue oxycodone 10 or 50 mg as needed for pain, careful to avoid oversedation  DC central line, attempt voiding trial but can use external urinary catheter  Discussed with pulmonology, transfer out of the ICU to monitored bed  Trend renal function and electrolytes with a.m. BMP, magnesium   Trend Hgb and WBC with a.m. CBC     Discussed plan with RN, cardiology, pulmonology    VTE Prophylaxis:  Pharmacologic & mechanical VTE  prophylaxis orders are present.        CODE STATUS:   Level Of Support Discussed With: Health Care Surrogate  Code Status (Patient has no pulse and is not breathing): CPR (Attempt to Resuscitate)  Medical Interventions (Patient has pulse or is breathing): Full Support

## 2025-02-16 NOTE — PROGRESS NOTES
[facility]  INPATIENT PROGRESS NOTE         [facility] CORONARY CARE UNIT    2/16/2025      PATIENT IDENTIFICATION:   Name:  Mary Albarran      MRN:  [id number]     74 y.o.  female             No chief complaint on file.        SUBJECTIVE:   Patient is doing well denies shortness of breath or chest discomfort.  Continues to have swelling and pain in her right lower extremity.        OBJECTIVE:  Vitals:    02/16/25 0500 02/16/25 0600 02/16/25 0700 02/16/25 0800   BP: 111/48 115/58 138/69 102/43   BP Location: Right arm Right arm Right arm Right arm   Patient Position: Lying Lying Lying Lying   Pulse: 91 88 89 77   Resp: 17 16 16 24   Temp: 99.3 °F (37.4 °C) 99.3 °F (37.4 °C) 99.3 °F (37.4 °C) 99.3 °F (37.4 °C)   TempSrc: Bladder Bladder Bladder Bladder   SpO2: 93% 95% 93% 91%   Weight:       Height:               Body mass index is 45.39 kg/m².    Intake/Output Summary (Last 24 hours) at 2/16/2025 0956  Last data filed at 2/16/2025 0700  Gross per 24 hour   Intake 1111 ml   Output 5830 ml   Net -4719 ml       Telemetry: Atrial fibrillation with controlled response    Physical Exam  Constitutional:  Awake.  in acute distress. Normal appearance.   Neck: No carotid bruit, hepatojugular reflux or JVD.   Cardiovascular: Irregularly irregular rate.  Pulmonary: Pulmonary effort is normal.  Reduced breath sounds  Extremities:   Bilateral edema is noted.   Skin: Tenderness on palpation, erythema of right lower extremity.  Neurological: Alert and oriented x 3        Allergies   Allergen Reactions    Codeine Mental Status Change    Sglt2 Inhibitors Other (See Comments)     Frequent UTI      Scheduled meds:  amiodarone, 200 mg, Oral, BID  ammonium lactate, , Topical, 2 times per day  aspirin, 81 mg, Oral, Daily  famotidine, 20 mg, Intravenous, Q12H  furosemide, 80 mg, Intravenous, BID Diuretics  insulin glargine, 10 Units, Subcutaneous, Daily  insulin lispro, 3-14 Units, Subcutaneous, 4x Daily AC & at  "Bedtime  Lidocaine, 2 patch, Transdermal, Q24H  metoprolol succinate XL, 50 mg, Oral, Q12H  piperacillin-tazobactam, 4.5 g, Intravenous, Q8H  rivaroxaban, 20 mg, Oral, Daily With Dinner  sacubitril-valsartan, 1 tablet, Oral, Q12H  sodium chloride, 1,000 mL, Intravenous, Once  sodium chloride, 10 mL, Intravenous, Q12H  spironolactone, 25 mg, Oral, Daily      IV meds:                         Data Review:  CBC          2/14/2025    03:19 2/15/2025    07:42 2/16/2025    02:47   CBC   WBC 12.63  13.59  10.19    RBC 3.89  4.32  4.08    Hemoglobin 11.7  12.3  12.0    Hematocrit 37.3  41.5  38.4    MCV 95.9  96.1  94.1    MCH 30.1  28.5  29.4    MCHC 31.4  29.6  31.3    RDW 14.6  14.9  14.5    Platelets 52  72  94      CMP          2/14/2025    03:19 2/15/2025    07:41 2/16/2025    02:47   CMP   Glucose 166  172  150    BUN 19  18  16    Creatinine 0.84  0.75  0.79    EGFR 73.9  84.7  79.6    Sodium 136  135  138    Potassium 4.0  4.3  3.4    Chloride 104  102  101    Calcium 8.4  8.9  8.8    Total Protein  6.1  5.6    Albumin  2.7  2.5    Globulin   3.1    Total Bilirubin  0.9  0.7    Alkaline Phosphatase  90  105    AST (SGOT)  22  16    ALT (SGPT)  20  17    Albumin/Globulin Ratio   0.8    BUN/Creatinine Ratio 22.6  24.0  20.3    Anion Gap 11.2  13.7  11.4       CARDIAC LABS:      Lab 02/13/25  0417 02/11/25  0302   PROBNP  --  8,458.0*   PROTIME 15.3*  --    INR 1.16*  --         Lab Results   Component Value Date    DIGOXIN 1.95 (H) 02/12/2025      Lab Results   Component Value Date    TSH 1.520 01/27/2023           Invalid input(s): \"LDLCALC\"  No results found for: \"POCTROP\"  Lab Results   Component Value Date    TROPONINT 21 (H) 10/06/2023   (  Lab Results   Component Value Date    MG 1.7 02/16/2025     Results for orders placed during the hospital encounter of 02/11/25    Adult Transthoracic Echo Complete W/ Cont if Necessary Per Protocol    Interpretation Summary    Left ventricular systolic function is mildly " decreased. Left ventricular ejection fraction appears to be 46 - 50%. with some anterior septal hypokensis    The right ventricular cavity is mildly dilated.    The left atrial cavity is mildly dilated.    The right atrial cavity is moderately  dilated.    Mild to moderate aortic valve stenosis is present.    Estimated right ventricular systolic pressure from tricuspid regurgitation is moderately elevated (45-55 mmHg).           ASSESSMENT:    Heart failure with reduced ejection fraction, ACC stage C  Moderate aortic regurgitation associated with moderate aortic stenosis (DVI 0.39)  Atrial fibrillation with RVR  NSTEMI type II  Septic shock with gram-negative bacteremia  Cellulitis of right lower extremity  Peripheral arterial disease    PLAN:    Patient's prior records, EKG, echocardiogram and other imaging were reviewed.  Echocardiogram was reviewed EF appears to be 35 to 40%  Continue IV diuresis with Lasix 80 mg IV twice daily.  Continue to replace potassium and magnesium to keep them above 4 and 2 respectively.    Tolerating Entresto 24/26 mg p.o. twice daily, spironolactone 25 mg p.o. daily.  Not a candidate for SGLT2 due to prior history of UTI.  Continue Toprol-XL 50 mg p.o. twice daily for rate control along with amiodarone 200 mg p.o. daily.  Patient is on Xarelto for anticoagulation.  [id number] score is 4.    Bilateral venous duplex scan is unremarkable for DVT.    Examination of leg is concerning for peripheral arterial disease.  Will get VENU outpatient for screening of peripheral arterial disease.    Patient has history of claudication.  Initiated aspirin 81 mg p.o. daily.  Since patient is on Xarelto therefore dual antiplatelet therapy is not necessary.    Patient needs to be referred to comprehensive valve center for management of valvular heart disease.  Will arrange that outpatient.  EF has dropped significantly as compared to last echocardiogram from October 2023.  Differential include  progression from either valvular heart disease or atrial fibrillation.    Thank you for letting us participate in patient's care    I have seen and examined the patient. I spent 30 min caring for the patient on this date of service. This time includes time spent by me in the following activities as necessary: face to face encounter, preparing for the visit, reviewing tests, specialists records and previous visits, obtaining and/or reviewing a separately obtained history, performing a medically appropriate exam and/or evaluation, counseling and educating the patient, family, caregiver, referring and/or communicating with other healthcare professionals, documenting information in the medical record, independently interpreting results and communicating that information with the patient, family, caregiver, and developing a medically appropriate treatment plan with consideration of other conditions, medications, and treatments. More than 50 % time was spent in counselling and patient education.       Osman Horvath MD  2/16/2025    09:56 EST

## 2025-02-17 ENCOUNTER — APPOINTMENT (OUTPATIENT)
Dept: GENERAL RADIOLOGY | Facility: HOSPITAL | Age: 75
End: 2025-02-17
Payer: MEDICARE

## 2025-02-17 PROBLEM — I50.23 ACUTE ON CHRONIC HFREF (HEART FAILURE WITH REDUCED EJECTION FRACTION): Status: ACTIVE | Noted: 2025-02-17

## 2025-02-17 PROBLEM — I35.0 AORTIC STENOSIS, MODERATE: Status: ACTIVE | Noted: 2021-11-28

## 2025-02-17 LAB
ANION GAP SERPL CALCULATED.3IONS-SCNC: 10.1 MMOL/L (ref 5–15)
BASOPHILS # BLD AUTO: 0.03 10*3/MM3 (ref 0–0.2)
BASOPHILS NFR BLD AUTO: 0.5 % (ref 0–1.5)
BUN SERPL-MCNC: 14 MG/DL (ref 8–23)
BUN/CREAT SERPL: 17.9 (ref 7–25)
CALCIUM SPEC-SCNC: 8.5 MG/DL (ref 8.6–10.5)
CHLORIDE SERPL-SCNC: 98 MMOL/L (ref 98–107)
CO2 SERPL-SCNC: 31.9 MMOL/L (ref 22–29)
CREAT SERPL-MCNC: 0.78 MG/DL (ref 0.57–1)
DEPRECATED RDW RBC AUTO: 48.5 FL (ref 37–54)
EGFRCR SERPLBLD CKD-EPI 2021: 80.8 ML/MIN/1.73
EOSINOPHIL # BLD AUTO: 0.17 10*3/MM3 (ref 0–0.4)
EOSINOPHIL NFR BLD AUTO: 2.6 % (ref 0.3–6.2)
ERYTHROCYTE [DISTWIDTH] IN BLOOD BY AUTOMATED COUNT: 14.3 % (ref 12.3–15.4)
GLUCOSE BLDC GLUCOMTR-MCNC: 170 MG/DL (ref 70–99)
GLUCOSE BLDC GLUCOMTR-MCNC: 257 MG/DL (ref 70–99)
GLUCOSE SERPL-MCNC: 136 MG/DL (ref 65–99)
HCT VFR BLD AUTO: 39.8 % (ref 34–46.6)
HGB BLD-MCNC: 12.4 G/DL (ref 12–15.9)
IMM GRANULOCYTES # BLD AUTO: 0.54 10*3/MM3 (ref 0–0.05)
IMM GRANULOCYTES NFR BLD AUTO: 8.1 % (ref 0–0.5)
LYMPHOCYTES # BLD AUTO: 1.17 10*3/MM3 (ref 0.7–3.1)
LYMPHOCYTES NFR BLD AUTO: 17.6 % (ref 19.6–45.3)
MAGNESIUM SERPL-MCNC: 1.5 MG/DL (ref 1.6–2.4)
MCH RBC QN AUTO: 29 PG (ref 26.6–33)
MCHC RBC AUTO-ENTMCNC: 31.2 G/DL (ref 31.5–35.7)
MCV RBC AUTO: 93 FL (ref 79–97)
MONOCYTES # BLD AUTO: 0.62 10*3/MM3 (ref 0.1–0.9)
MONOCYTES NFR BLD AUTO: 9.3 % (ref 5–12)
NEUTROPHILS NFR BLD AUTO: 4.12 10*3/MM3 (ref 1.7–7)
NEUTROPHILS NFR BLD AUTO: 61.9 % (ref 42.7–76)
NRBC BLD AUTO-RTO: 0.5 /100 WBC (ref 0–0.2)
NT-PROBNP SERPL-MCNC: ABNORMAL PG/ML (ref 0–900)
PHOSPHATE SERPL-MCNC: 3.7 MG/DL (ref 2.5–4.5)
PLATELET # BLD AUTO: 129 10*3/MM3 (ref 140–450)
PMV BLD AUTO: 12.2 FL (ref 6–12)
POTASSIUM SERPL-SCNC: 2.8 MMOL/L (ref 3.5–5.2)
RBC # BLD AUTO: 4.28 10*6/MM3 (ref 3.77–5.28)
SODIUM SERPL-SCNC: 140 MMOL/L (ref 136–145)
WBC NRBC COR # BLD AUTO: 6.65 10*3/MM3 (ref 3.4–10.8)

## 2025-02-17 PROCEDURE — 25010000002 PIPERACILLIN SOD-TAZOBACTAM PER 1 G: Performed by: INTERNAL MEDICINE

## 2025-02-17 PROCEDURE — 63710000001 INSULIN GLARGINE PER 5 UNITS: Performed by: NURSE PRACTITIONER

## 2025-02-17 PROCEDURE — 99232 SBSQ HOSP IP/OBS MODERATE 35: CPT | Performed by: INTERNAL MEDICINE

## 2025-02-17 PROCEDURE — 99233 SBSQ HOSP IP/OBS HIGH 50: CPT | Performed by: STUDENT IN AN ORGANIZED HEALTH CARE EDUCATION/TRAINING PROGRAM

## 2025-02-17 PROCEDURE — 25010000002 MORPHINE PER 10 MG: Performed by: INTERNAL MEDICINE

## 2025-02-17 PROCEDURE — 25010000002 FUROSEMIDE PER 20 MG: Performed by: STUDENT IN AN ORGANIZED HEALTH CARE EDUCATION/TRAINING PROGRAM

## 2025-02-17 PROCEDURE — 25010000002 PIPERACILLIN SOD-TAZOBACTAM PER 1 G: Performed by: NURSE PRACTITIONER

## 2025-02-17 PROCEDURE — 25810000003 SODIUM CHLORIDE 0.9 % SOLUTION 500 ML FLEX CONT: Performed by: STUDENT IN AN ORGANIZED HEALTH CARE EDUCATION/TRAINING PROGRAM

## 2025-02-17 PROCEDURE — 82948 REAGENT STRIP/BLOOD GLUCOSE: CPT

## 2025-02-17 PROCEDURE — 83735 ASSAY OF MAGNESIUM: CPT | Performed by: INTERNAL MEDICINE

## 2025-02-17 PROCEDURE — 83880 ASSAY OF NATRIURETIC PEPTIDE: CPT | Performed by: INTERNAL MEDICINE

## 2025-02-17 PROCEDURE — 99233 SBSQ HOSP IP/OBS HIGH 50: CPT | Performed by: INTERNAL MEDICINE

## 2025-02-17 PROCEDURE — 71045 X-RAY EXAM CHEST 1 VIEW: CPT

## 2025-02-17 PROCEDURE — 99231 SBSQ HOSP IP/OBS SF/LOW 25: CPT | Performed by: SURGERY

## 2025-02-17 PROCEDURE — 25010000002 MAGNESIUM SULFATE 4 GM/100ML SOLUTION: Performed by: INTERNAL MEDICINE

## 2025-02-17 PROCEDURE — 80048 BASIC METABOLIC PNL TOTAL CA: CPT | Performed by: INTERNAL MEDICINE

## 2025-02-17 PROCEDURE — 82948 REAGENT STRIP/BLOOD GLUCOSE: CPT | Performed by: INTERNAL MEDICINE

## 2025-02-17 PROCEDURE — 63710000001 INSULIN LISPRO (HUMAN) PER 5 UNITS: Performed by: INTERNAL MEDICINE

## 2025-02-17 PROCEDURE — 85025 COMPLETE CBC W/AUTO DIFF WBC: CPT | Performed by: INTERNAL MEDICINE

## 2025-02-17 PROCEDURE — 84100 ASSAY OF PHOSPHORUS: CPT | Performed by: INTERNAL MEDICINE

## 2025-02-17 RX ORDER — FUROSEMIDE 10 MG/ML
40 INJECTION INTRAMUSCULAR; INTRAVENOUS
Status: DISCONTINUED | OUTPATIENT
Start: 2025-02-18 | End: 2025-02-23

## 2025-02-17 RX ORDER — MAGNESIUM SULFATE HEPTAHYDRATE 40 MG/ML
4 INJECTION, SOLUTION INTRAVENOUS ONCE
Status: COMPLETED | OUTPATIENT
Start: 2025-02-17 | End: 2025-02-17

## 2025-02-17 RX ORDER — FUROSEMIDE 10 MG/ML
40 INJECTION INTRAMUSCULAR; INTRAVENOUS
Status: DISCONTINUED | OUTPATIENT
Start: 2025-02-17 | End: 2025-02-17

## 2025-02-17 RX ORDER — POTASSIUM CHLORIDE 1.5 G/1.58G
20 POWDER, FOR SOLUTION ORAL 2 TIMES DAILY
Status: DISCONTINUED | OUTPATIENT
Start: 2025-02-18 | End: 2025-02-23

## 2025-02-17 RX ORDER — POTASSIUM CHLORIDE 750 MG/1
40 CAPSULE, EXTENDED RELEASE ORAL 2 TIMES DAILY WITH MEALS
Status: COMPLETED | OUTPATIENT
Start: 2025-02-17 | End: 2025-02-17

## 2025-02-17 RX ORDER — OXYCODONE HYDROCHLORIDE 5 MG/1
15 TABLET ORAL EVERY 4 HOURS PRN
Status: DISPENSED | OUTPATIENT
Start: 2025-02-17 | End: 2025-02-24

## 2025-02-17 RX ORDER — OXYCODONE HYDROCHLORIDE 5 MG/1
10 TABLET ORAL EVERY 4 HOURS PRN
Status: DISPENSED | OUTPATIENT
Start: 2025-02-17 | End: 2025-02-24

## 2025-02-17 RX ORDER — PREGABALIN 75 MG/1
150 CAPSULE ORAL 2 TIMES DAILY
Status: DISCONTINUED | OUTPATIENT
Start: 2025-02-17 | End: 2025-02-20

## 2025-02-17 RX ORDER — ATORVASTATIN CALCIUM 20 MG/1
20 TABLET, FILM COATED ORAL NIGHTLY
Status: DISCONTINUED | OUTPATIENT
Start: 2025-02-17 | End: 2025-02-28 | Stop reason: HOSPADM

## 2025-02-17 RX ADMIN — ATORVASTATIN CALCIUM 20 MG: 20 TABLET, FILM COATED ORAL at 08:01

## 2025-02-17 RX ADMIN — Medication 10 ML: at 12:09

## 2025-02-17 RX ADMIN — SACUBITRIL AND VALSARTAN 1 TABLET: 24; 26 TABLET, FILM COATED ORAL at 08:02

## 2025-02-17 RX ADMIN — LIDOCAINE 2 PATCH: 4 PATCH TOPICAL at 08:03

## 2025-02-17 RX ADMIN — Medication 10 ML: at 11:16

## 2025-02-17 RX ADMIN — FUROSEMIDE 80 MG: 10 INJECTION, SOLUTION INTRAMUSCULAR; INTRAVENOUS at 08:02

## 2025-02-17 RX ADMIN — FAMOTIDINE 20 MG: 10 INJECTION, SOLUTION INTRAVENOUS at 08:02

## 2025-02-17 RX ADMIN — Medication 10 ML: at 17:00

## 2025-02-17 RX ADMIN — POTASSIUM CHLORIDE 40 MEQ: 750 CAPSULE, EXTENDED RELEASE ORAL at 08:01

## 2025-02-17 RX ADMIN — Medication: at 22:10

## 2025-02-17 RX ADMIN — PREGABALIN 150 MG: 75 CAPSULE ORAL at 08:01

## 2025-02-17 RX ADMIN — ATORVASTATIN CALCIUM 20 MG: 20 TABLET, FILM COATED ORAL at 22:09

## 2025-02-17 RX ADMIN — SODIUM CHLORIDE 40 ML: 9 INJECTION, SOLUTION INTRAVENOUS at 19:54

## 2025-02-17 RX ADMIN — AMIODARONE HYDROCHLORIDE 200 MG: 200 TABLET ORAL at 08:02

## 2025-02-17 RX ADMIN — INSULIN LISPRO 3 UNITS: 100 INJECTION, SOLUTION INTRAVENOUS; SUBCUTANEOUS at 17:31

## 2025-02-17 RX ADMIN — SPIRONOLACTONE 25 MG: 25 TABLET ORAL at 08:02

## 2025-02-17 RX ADMIN — INSULIN LISPRO 8 UNITS: 100 INJECTION, SOLUTION INTRAVENOUS; SUBCUTANEOUS at 22:04

## 2025-02-17 RX ADMIN — Medication 10 ML: at 07:57

## 2025-02-17 RX ADMIN — Medication 10 ML: at 22:04

## 2025-02-17 RX ADMIN — PIPERACILLIN AND TAZOBACTAM 4.5 G: 4; .5 INJECTION, POWDER, FOR SOLUTION INTRAVENOUS; PARENTERAL at 12:08

## 2025-02-17 RX ADMIN — FAMOTIDINE 20 MG: 10 INJECTION, SOLUTION INTRAVENOUS at 22:04

## 2025-02-17 RX ADMIN — Medication 10 ML: at 11:03

## 2025-02-17 RX ADMIN — METOPROLOL SUCCINATE 50 MG: 50 TABLET, FILM COATED, EXTENDED RELEASE ORAL at 22:04

## 2025-02-17 RX ADMIN — MORPHINE SULFATE 1 MG: 2 INJECTION, SOLUTION INTRAMUSCULAR; INTRAVENOUS at 22:04

## 2025-02-17 RX ADMIN — PREGABALIN 150 MG: 75 CAPSULE ORAL at 22:04

## 2025-02-17 RX ADMIN — Medication: at 11:03

## 2025-02-17 RX ADMIN — OXYCODONE HYDROCHLORIDE 15 MG: 5 TABLET ORAL at 11:15

## 2025-02-17 RX ADMIN — POTASSIUM CHLORIDE 40 MEQ: 750 CAPSULE, EXTENDED RELEASE ORAL at 17:00

## 2025-02-17 RX ADMIN — MAGNESIUM SULFATE IN WATER FOR 4 G: 40 INJECTION INTRAVENOUS at 08:03

## 2025-02-17 RX ADMIN — INSULIN GLARGINE 10 UNITS: 100 INJECTION, SOLUTION SUBCUTANEOUS at 08:02

## 2025-02-17 RX ADMIN — PIPERACILLIN AND TAZOBACTAM 4.5 G: 4; .5 INJECTION, POWDER, FOR SOLUTION INTRAVENOUS; PARENTERAL at 04:09

## 2025-02-17 RX ADMIN — OXYCODONE HYDROCHLORIDE 15 MG: 5 TABLET ORAL at 01:57

## 2025-02-17 RX ADMIN — SACUBITRIL AND VALSARTAN 1 TABLET: 24; 26 TABLET, FILM COATED ORAL at 22:04

## 2025-02-17 RX ADMIN — RIVAROXABAN 20 MG: 20 TABLET, FILM COATED ORAL at 17:00

## 2025-02-17 RX ADMIN — METOPROLOL SUCCINATE 50 MG: 50 TABLET, FILM COATED, EXTENDED RELEASE ORAL at 08:02

## 2025-02-17 RX ADMIN — INSULIN LISPRO 5 UNITS: 100 INJECTION, SOLUTION INTRAVENOUS; SUBCUTANEOUS at 12:08

## 2025-02-17 RX ADMIN — PIPERACILLIN AND TAZOBACTAM 4.5 G: 4; .5 INJECTION, POWDER, FOR SOLUTION INTRAVENOUS; PARENTERAL at 19:53

## 2025-02-17 RX ADMIN — ASPIRIN 81 MG: 81 TABLET, COATED ORAL at 08:02

## 2025-02-17 NOTE — PROGRESS NOTES
CARDIOLOGY  INPATIENT PROGRESS NOTE                [address] AMBULATORY SERVICES    2/17/2025      PATIENT IDENTIFICATION:   Name:  Mary Albarran      MRN:  [id number]     74 y.o.  female             No chief complaint on file.     Chief complaint atrial fibrillation RVR  SUBJECTIVE:   Patient improving still with complaints in her hands and feet heart rate doing well  OBJECTIVE:  Vitals:    02/16/25 2211 02/17/25 0314 02/17/25 0900 02/17/25 1045   BP: 152/59 134/62 143/73 132/71   BP Location: Right arm Right arm Right arm Right arm   Patient Position: Sitting Lying Lying Lying   Pulse: 87 87 88 93   Resp: 20 16 16 16   Temp: 98.1 °F (36.7 °C) 98.4 °F (36.9 °C) 97.9 °F (36.6 °C) 97.5 °F (36.4 °C)   TempSrc: Oral Oral Oral Oral   SpO2: 95% 93% 94% 96%   Weight:       Height:               Body mass index is 45.39 kg/m².    Intake/Output Summary (Last 24 hours) at 2/17/2025 1252  Last data filed at 2/17/2025 1045  Gross per 24 hour   Intake --   Output 3450 ml   Net -3450 ml       Telemetry: Atrial fibrillation with controlled ventricular rate    Physical Exam  General Appearance:   no acute distress  Alert and oriented x3  HENT:   lips not cyanotic  Atraumatic  Neck:  No jvd   supple  Respiratory:  no respiratory distress  normal breath sounds  no rales  Cardiovascular:    Regular regular  no S3, no S4   no murmur  no rub  lower extremity edema: Close 1 to trace  Skin:   warm, dry  No rashes      Allergies   Allergen Reactions    Codeine Mental Status Change    Sglt2 Inhibitors Other (See Comments)     Frequent UTI      Scheduled meds:  amiodarone, 200 mg, Oral, BID  ammonium lactate, , Topical, 2 times per day  aspirin, 81 mg, Oral, Daily  atorvastatin, 20 mg, Oral, Nightly  famotidine, 20 mg, Intravenous, Q12H  furosemide, 80 mg, Intravenous, BID Diuretics  insulin glargine, 10 Units, Subcutaneous, Daily  insulin lispro, 3-14 Units, Subcutaneous, 4x Daily AC & at Bedtime  Lidocaine, 2 patch,  "Transdermal, Q24H  metoprolol succinate XL, 50 mg, Oral, Q12H  piperacillin-tazobactam, 4.5 g, Intravenous, Q8H  potassium chloride, 40 mEq, Oral, BID With Meals  pregabalin, 150 mg, Oral, BID  rivaroxaban, 20 mg, Oral, Daily With Dinner  sacubitril-valsartan, 1 tablet, Oral, Q12H  sodium chloride, 1,000 mL, Intravenous, Once  sodium chloride, 10 mL, Intravenous, Q12H  spironolactone, 25 mg, Oral, Daily      IV meds:                         Data Review:  CBC          2/15/2025    07:42 2/16/2025    02:47 2/17/2025    05:10   CBC   WBC 13.59  10.19  6.65    RBC 4.32  4.08  4.28    Hemoglobin 12.3  12.0  12.4    Hematocrit 41.5  38.4  39.8    MCV 96.1  94.1  93.0    MCH 28.5  29.4  29.0    MCHC 29.6  31.3  31.2    RDW 14.9  14.5  14.3    Platelets 72  94  129      CMP          2/15/2025    07:41 2/16/2025    02:47 2/17/2025    05:10   CMP   Glucose 172  150  136    BUN 18  16  14    Creatinine 0.75  0.79  0.78    EGFR 84.7  79.6  80.8    Sodium 135  138  140    Potassium 4.3  3.4  2.8    Chloride 102  101  98    Calcium 8.9  8.8  8.5    Total Protein 6.1  5.6     Albumin 2.7  2.5     Globulin  3.1     Total Bilirubin 0.9  0.7     Alkaline Phosphatase 90  105     AST (SGOT) 22  16     ALT (SGPT) 20  17     Albumin/Globulin Ratio  0.8     BUN/Creatinine Ratio 24.0  20.3  17.9    Anion Gap 13.7  11.4  10.1       CARDIAC LABS:      Lab 02/13/25  0417 02/11/25  0302   PROBNP  --  8,458.0*   PROTIME 15.3*  --    INR 1.16*  --         Lab Results   Component Value Date    DIGOXIN 1.95 (H) 02/12/2025      Lab Results   Component Value Date    TSH 1.520 01/27/2023           Invalid input(s): \"LDLCALC\"  No results found for: \"POCTROP\"  Lab Results   Component Value Date    TROPONINT 21 (H) 10/06/2023   (  Lab Results   Component Value Date    MG 1.5 (L) 02/17/2025     Results for orders placed during the hospital encounter of 02/11/25    Adult Transthoracic Echo Complete W/ Cont if Necessary Per Protocol    Interpretation " Summary    Left ventricular systolic function is mildly decreased. Left ventricular ejection fraction appears to be 46 - 50%. with some anterior septal hypokensis    The right ventricular cavity is mildly dilated.    The left atrial cavity is mildly dilated.    The right atrial cavity is moderately  dilated.    Mild to moderate aortic valve stenosis is present.    Estimated right ventricular systolic pressure from tricuspid regurgitation is moderately elevated (45-55 mmHg).           ASSESSMENT:    Atrial fibrillation with RVR    Aortic stenosis, moderate    Acute on chronic HFrEF (heart failure with reduced ejection fraction)    Atrial fibrillation persistent appears to be doing better with rate control will discontinue the amiodarone as patient is in persistent atrial fibrillation for several years and just going for rate control strategy go ahead and resume her home digoxin .125 daily.    HFrEF acute on chronic new onset patient ejection fraction could be ischemic in nature she does appear to have a more focal anteroseptal wall infarct pattern and her EKG does show evidence of old anteroseptal wall MI but no ongoing anginal symptoms will need ischemic workup later as well as repeat echocardiogram to see if the EF improves or not as this certainly could be a stress-induced picture with recent episode of sepsis as well.    Aortic stenosis please leave mild to moderate appears to be fairly consistent with the gradients she even though her EF is lower further outpatient workup to better clarify severity    PLAN:  1.  Discontinue amiodarone therapy  2.  Digoxin 0.125 daily  3.  Decrease Lasix to 40 daily IV and replace potassium          Joaquin Gonzalez MD  2/17/2025    12:52 EST

## 2025-02-17 NOTE — PROGRESS NOTES
[facility]   Hospitalist Progress Note  Date: 2025  Patient Name: Mray Albarran  : 1950  MRN: [id number]  Date of admission: 2025      Subjective   Subjective     Chief Complaint: Altered mental status    Summary: 74 y.o. female with history of A-fib on Xarelto, CKD stage III, type 2 diabetes initially presented to outside facility with decreased responsiveness.  Patient was found to be hypotensive, febrile and with elevated lactate.  Concern for infection.  Was given 2.5 L of IV fluids also had A-fib with RVR initially started on Cardizem however developed hypotension.  Did have CT scan of the abdomen pelvis which showed some gallbladder thickening concerning for cholelithiasis.  Was transferred to our facility for critical care management.  General surgery was consulted and did recommend cholecystostomy tube which was placed by IR.  Fluid cultures returned negative.  She was kept on broad-spectrum IV antibiotics, did have significant right lower extremity cellulitis which was slow to improve.  1 blood culture positive for Bacteroides species.  Will need to complete 10 days 14 days of antibiotic therapy for cellulitis and bacteremia.  Had issues with A-fib with RVR for which cardiology was consulted.  2D echo revealed drop in EF and valvular heart disease with moderate aortic stenosis.  Will need follow-up with comprehensive valve center on an outpatient basis for management of valvular heart disease.  Heart rates were controlled with IV and ultimately oral medications.  May need rehab at discharge.    Interval Followup: No acute events overnight, afebrile overnight.  States she feels considerably better today than she has since admission.  Lower extremity is still quite painful and red but improving.  Heart rates much improved.    Objective   Objective     Vitals:   Temp:  [97.5 °F (36.4 °C)-98.7 °F (37.1 °C)] 97.5 °F (36.4 °C)  Heart Rate:  [] 93  Resp:  [16-20] 16  BP:  (132-156)/(51-73) 132/71  Flow (L/min) (Oxygen Therapy):  [3] 3  Physical Exam    Constitutional: Ill-appearing female, awake, alert, appears more comfortable   Respiratory: Diminished in bilateral bases, nasal cannula in place, nonlabored respirations    Cardiovascular: IR/IR, no MRG   Gastrointestinal: Positive bowel sounds, soft, tender, cholecystostomy tube in place with bloody drainage present   Neurologic: Oriented x 3, appears fatigued, answering questions appropriately, cranial Nerves grossly intact to confrontation, speech clear             Skin: Erythema of the right lower extremity, improving, extremely tender to palpation, weeping noted    Result Review    I have personally reviewed the results below:  [x]  Laboratory personally reviewed CMP, CBC, blood sugars, magnesium, phosphorus, blood cultures  []  Microbiology  []  Radiology  []  EKG/Telemetry   []  Cardiology/Vascular   []  Pathology  []  Old records  []  Other:  CBC          2/15/2025    07:42 2/16/2025    02:47 2/17/2025    05:10   CBC   WBC 13.59  10.19  6.65    RBC 4.32  4.08  4.28    Hemoglobin 12.3  12.0  12.4    Hematocrit 41.5  38.4  39.8    MCV 96.1  94.1  93.0    MCH 28.5  29.4  29.0    MCHC 29.6  31.3  31.2    RDW 14.9  14.5  14.3    Platelets 72  94  129      CMP          2/15/2025    07:41 2/16/2025    02:47 2/17/2025    05:10   CMP   Glucose 172  150  136    BUN 18  16  14    Creatinine 0.75  0.79  0.78    EGFR 84.7  79.6  80.8    Sodium 135  138  140    Potassium 4.3  3.4  2.8    Chloride 102  101  98    Calcium 8.9  8.8  8.5    Total Protein 6.1  5.6     Albumin 2.7  2.5     Globulin  3.1     Total Bilirubin 0.9  0.7     Alkaline Phosphatase 90  105     AST (SGOT) 22  16     ALT (SGPT) 20  17     Albumin/Globulin Ratio  0.8     BUN/Creatinine Ratio 24.0  20.3  17.9    Anion Gap 13.7  11.4  10.1        Assessment & Plan   Assessment / Plan   Septic shock  Streptococcal bacteremia  Gram-negative bacteremia  Concern for acute  cholecystitis  Concern for right lower extremity cellulitis  Acute metabolic encephalopathy secondary to above  Acute on chronic systolic congestive heart failure with acute exacerbation  Moderate aortic regurgitation with moderate aortic stenosis  Chronic atrial fibrillation with RVR  NSTEMI type II  Acute kidney injury  Hypomagnesemia  Lactic acidosis, clinically significant  Type 2 diabetes mellitus    Continue to monitor in the hospital for workup and management of the above  Status post cholecystostomy tube placement, body fluid culture no growth to date.  Will need to follow-up with general surgery on outpatient basis for tube removal  Continue Zosyn to complete a 10-14-day course of antibiotics  1/2 blood cultures growing Bacteroides vulgatus, repeat blood cultures no growth to date  Discussed with cardiology, decrease Lasix to 40 mg IV twice daily  Trend renal function and electrolytes closely while on IV diuretics  BNP still markedly elevated 11,000  Replace potassium and magnesium  Continue oral Entresto as well as Aldactone for goal-directed medical therapy  Continue aspirin 81 mg daily, digoxin 125 mcg p.o. daily, Toprol XL 50 mg twice daily  Continue Xarelto 20 mg daily  Patient will need to have referral on outpatient basis to a comprehensive valve center for management of valvular heart disease  Blood sugars acceptable, continue Lantus 10 units daily, moderate dose sliding scale insulin  Continue oxycodone 10 or 15 mg as needed for pain, careful to avoid oversedation  Discussed with pulmonology, wean O2 as tolerated keep sats greater than 90%  Trend renal function and electrolytes with a.m. BMP, magnesium   Trend Hgb and WBC with a.m. CBC     Discussed plan with RN, cardiology, pulmonology    VTE Prophylaxis:  Pharmacologic & mechanical VTE prophylaxis orders are present.        CODE STATUS:   Level Of Support Discussed With: Health Care Surrogate  Code Status (Patient has no pulse and is not  breathing): CPR (Attempt to Resuscitate)  Medical Interventions (Patient has pulse or is breathing): Full Support

## 2025-02-17 NOTE — PLAN OF CARE
Goal Outcome Evaluation:              Outcome Evaluation: Patient aox4. C/o pain with dressing change and applying the ammonia lactate lotion, given pain medication, was effective. Wound care was provided this shift. Call light in reach.

## 2025-02-17 NOTE — PROGRESS NOTES
Pulmonary / Critical Care Progress Note      Patient Name: Mary Albarran  : 1950  MRN: [id number]  Attending:  Ang Garcia MD  Date of admission: 2025    Subjective   Subjective   Follow-up for hypoxic respiratory failure    Over past 24 hours:  Doing fair this morning  On 3 L nasal cannula  Uses 2 L at baseline  300 cc urine output over last 24 hours  Blood culture no growth for 4 days    Review of Systems  General: + Weakness, fatigue; denied complaints  Cardiovascular:  Denied complaints  Respiratory: Denied complaints  Gastrointestinal: Denied complaints        Objective   Objective     Vitals:   Temp:  [97.5 °F (36.4 °C)-98.7 °F (37.1 °C)] 97.5 °F (36.4 °C)  Heart Rate:  [] 93  Resp:  [16-20] 16  BP: (132-156)/(51-73) 132/71  Flow (L/min) (Oxygen Therapy):  [3] 3    Physical Exam   Vital Signs Reviewed   General:  WDWN, Alert, NAD. Lying in bed  HEENT:  PERRL, EOMI.  OP, nares clear  Chest:  good aeration, clear to auscultation bilaterally, no work of breathing noted on 3 L nasal cannula  CV: RRR, no MGR, pulses 2+, equal.  Abd:  Soft, NT, ND, + BS, obese  EXT:  no clubbing, no cyanosis, no edema  Neuro:  A&Ox3, CN grossly intact, no focal deficits.  Skin: No rashes or lesions noted      Result Review    Result Review:  I have personally reviewed the results from the time of this admission to 2025 15:07 EST and agree with these findings:  []  Laboratory  []  Microbiology  []  Radiology  []  EKG/Telemetry   []  Cardiology/Vascular   []  Pathology  []  Old records  []  Other:  Most notable findings include:   -     Assessment & Plan   Assessment / Plan     Active Hospital Problems:  Active Hospital Problems    Diagnosis     **Atrial fibrillation with RVR     Acute on chronic HFrEF (heart failure with reduced ejection fraction)     Aortic stenosis, moderate          Impression:  Acute hypoxic respiratory failure  Septic shock, resolved  Bacteroides bacteremia  Right lower extremity  cellulitis  Atrial fibrillation    Plan:  Continue to wean oxygen.  Patient uses 2 L at baseline  Transferred out of the ICU.  Continue antibiotics for bacteremia for total of 2 weeks  Continue Zosyn for total 14 days  Repeat cultures negative to growth x 4 days  Surgery following status post cholecystostomy tube placement  proBNP remain elevated greater than 11,000   Continue diuretics as tolerated.  Monitor renal function electrolytes.      VTE Prophylaxis:  Pharmacologic & mechanical VTE prophylaxis orders are present.        CODE STATUS:   Level Of Support Discussed With: Health Care Surrogate  Code Status (Patient has no pulse and is not breathing): CPR (Attempt to Resuscitate)  Medical Interventions (Patient has pulse or is breathing): Full Support      Labs, images, and medications personally reviewed.    Discussed with patient    Electronically signed by Mau Farmer MD, 02/17/25, 3:07 PM EST.

## 2025-02-17 NOTE — PLAN OF CARE
Problem: Adult Inpatient Plan of Care  Goal: Plan of Care Review  Outcome: Progressing  Flowsheets  Taken 2/17/2025 0349 by Bridget Moya RN  Plan of Care Reviewed With: patient  Taken 2/16/2025 1830 by Naomy Stover RNA  Outcome Evaluation: Pt is alert and oriented, BG monitored, performed wound care. VSS at this time. plan of care ongoing  Goal: Patient-Specific Goal (Individualized)  Outcome: Progressing  Goal: Absence of Hospital-Acquired Illness or Injury  Outcome: Progressing  Intervention: Identify and Manage Fall Risk  Recent Flowsheet Documentation  Taken 2/17/2025 0300 by Bridget Moya RN  Safety Promotion/Fall Prevention:   assistive device/personal items within reach   fall prevention program maintained   safety round/check completed  Taken 2/17/2025 0123 by Bridget Moya RN  Safety Promotion/Fall Prevention:   assistive device/personal items within reach   fall prevention program maintained   safety round/check completed  Taken 2/16/2025 2300 by Bridget Moya RN  Safety Promotion/Fall Prevention:   assistive device/personal items within reach   fall prevention program maintained   safety round/check completed  Taken 2/16/2025 2100 by Bridget Moya RN  Safety Promotion/Fall Prevention:   assistive device/personal items within reach   fall prevention program maintained   safety round/check completed  Taken 2/16/2025 2000 by Bridget Moya RN  Safety Promotion/Fall Prevention: safety round/check completed  Intervention: Prevent Skin Injury  Recent Flowsheet Documentation  Taken 2/17/2025 0300 by Bridget Moya RN  Body Position: position changed independently  Taken 2/17/2025 0123 by Bridget Moya RN  Body Position: turned  Taken 2/16/2025 2300 by Bridget Moya RN  Body Position: position changed independently  Taken 2/16/2025 2100 by Bridget Moya RN  Body Position: position changed independently  Taken 2/16/2025 2000 by Bridget Moya RN  Body Position: position  changed independently  Skin Protection: drying agents applied  Intervention: Prevent Infection  Recent Flowsheet Documentation  Taken 2/17/2025 0300 by Bridget Moya RN  Infection Prevention:   hand hygiene promoted   rest/sleep promoted  Taken 2/17/2025 0123 by Bridget Moya RN  Infection Prevention:   hand hygiene promoted   rest/sleep promoted  Taken 2/16/2025 2300 by Bridget Moya RN  Infection Prevention:   hand hygiene promoted   rest/sleep promoted  Taken 2/16/2025 2100 by Bridget Moya RN  Infection Prevention:   hand hygiene promoted   rest/sleep promoted  Taken 2/16/2025 2000 by Bridget Moya RN  Infection Prevention:   rest/sleep promoted   single patient room provided  Goal: Optimal Comfort and Wellbeing  Outcome: Progressing  Goal: Readiness for Transition of Care  Outcome: Progressing     Problem: Skin Injury Risk Increased  Goal: Skin Health and Integrity  Outcome: Progressing  Intervention: Optimize Skin Protection  Recent Flowsheet Documentation  Taken 2/17/2025 0300 by Bridget Moya RN  Activity Management: bedrest  Pressure Reduction Techniques: heels elevated off bed  Head of Bed (HOB) Positioning: HOB at 20-30 degrees  Pressure Reduction Devices:   positioning supports utilized   pressure-redistributing mattress utilized  Taken 2/17/2025 0123 by Bridget Moya RN  Activity Management: bedrest  Pressure Reduction Techniques: heels elevated off bed  Head of Bed (HOB) Positioning: HOB at 20-30 degrees  Pressure Reduction Devices:   positioning supports utilized   pressure-redistributing mattress utilized  Taken 2/16/2025 2300 by Bridget Moya RN  Activity Management: bedrest  Pressure Reduction Techniques: heels elevated off bed  Head of Bed (HOB) Positioning: HOB at 20-30 degrees  Pressure Reduction Devices:   positioning supports utilized   pressure-redistributing mattress utilized  Taken 2/16/2025 2100 by Bridget Moya RN  Activity Management: bedrest  Pressure  Reduction Techniques: heels elevated off bed  Head of Bed (HOB) Positioning: HOB at 20-30 degrees  Pressure Reduction Devices:   positioning supports utilized   pressure-redistributing mattress utilized  Taken 2/16/2025 2000 by Bridget Moya RN  Activity Management: bedrest  Pressure Reduction Techniques:   positioned off wounds   weight shift assistance provided  Head of Bed (HOB) Positioning: HOB at 30 degrees  Pressure Reduction Devices:   positioning supports utilized   pressure-redistributing mattress utilized   specialty bed utilized  Skin Protection: drying agents applied     Problem: Violence Risk or Actual  Goal: Anger and Impulse Control  Outcome: Progressing  Intervention: Minimize Safety Risk  Recent Flowsheet Documentation  Taken 2/17/2025 0300 by Bridget Moya RN  Enhanced Safety Measures:   bed alarm set   room near unit station  Taken 2/17/2025 0123 by Bridget Moya RN  Enhanced Safety Measures:   bed alarm set   room near unit station  Taken 2/16/2025 2300 by Bridget Moya RN  Enhanced Safety Measures:   bed alarm set   room near unit station  Taken 2/16/2025 2100 by Bridget Moya RN  Enhanced Safety Measures:   bed alarm set   room near unit station  Taken 2/16/2025 2000 by Bridget Moya RN  Enhanced Safety Measures: bed alarm set  Intervention: Promote Self-Control  Recent Flowsheet Documentation  Taken 2/16/2025 2000 by Bridget Moya RN  Environmental Support:   distractions minimized   environmental consistency promoted     Problem: Sepsis/Septic Shock  Goal: Optimal Coping  Outcome: Progressing  Goal: Absence of Bleeding  Outcome: Progressing  Goal: Blood Glucose Level Within Target Range  Outcome: Progressing  Goal: Absence of Infection Signs and Symptoms  Outcome: Progressing  Intervention: Initiate Sepsis Management  Recent Flowsheet Documentation  Taken 2/17/2025 0300 by Bridget Moya RN  Infection Prevention:   hand hygiene promoted   rest/sleep  promoted  Taken 2/17/2025 0123 by Bridget Moya RN  Infection Prevention:   hand hygiene promoted   rest/sleep promoted  Taken 2/16/2025 2300 by Bridget Moya RN  Infection Prevention:   hand hygiene promoted   rest/sleep promoted  Taken 2/16/2025 2100 by Bridget Moya RN  Infection Prevention:   hand hygiene promoted   rest/sleep promoted  Taken 2/16/2025 2000 by Bridget Moya RN  Infection Prevention:   rest/sleep promoted   single patient room provided  Intervention: Promote Stabilization  Recent Flowsheet Documentation  Taken 2/16/2025 2000 by Bridget Moya RN  Fluid/Electrolyte Management: fluids provided  Intervention: Promote Recovery  Recent Flowsheet Documentation  Taken 2/17/2025 0300 by Bridget Moya RN  Activity Management: bedrest  Taken 2/17/2025 0123 by Bridget Moya RN  Activity Management: bedrest  Taken 2/16/2025 2300 by Bridget Moya RN  Activity Management: bedrest  Taken 2/16/2025 2100 by Bridget Moya RN  Activity Management: bedrest  Taken 2/16/2025 2000 by Bridget Moya RN  Activity Management: bedrest  Goal: Optimal Nutrition Delivery  Outcome: Progressing     Problem: Fall Injury Risk  Goal: Absence of Fall and Fall-Related Injury  Outcome: Progressing  Intervention: Promote Injury-Free Environment  Recent Flowsheet Documentation  Taken 2/17/2025 0300 by Bridget Moya RN  Safety Promotion/Fall Prevention:   assistive device/personal items within reach   fall prevention program maintained   safety round/check completed  Taken 2/17/2025 0123 by Bridget Moya RN  Safety Promotion/Fall Prevention:   assistive device/personal items within reach   fall prevention program maintained   safety round/check completed  Taken 2/16/2025 2300 by Bridget Moya RN  Safety Promotion/Fall Prevention:   assistive device/personal items within reach   fall prevention program maintained   safety round/check completed  Taken 2/16/2025 2100 by Bridget Moya  RN  Safety Promotion/Fall Prevention:   assistive device/personal items within reach   fall prevention program maintained   safety round/check completed  Taken 2/16/2025 2000 by Bridget Moya RN  Safety Promotion/Fall Prevention: safety round/check completed     Problem: Noninvasive Ventilation Acute  Goal: Effective Unassisted Ventilation and Oxygenation  Outcome: Progressing   Goal Outcome Evaluation:  Plan of Care Reviewed With: patient

## 2025-02-17 NOTE — PROGRESS NOTES
[facility]     Progress Note    Patient Name: Mary Albarran  : 1950  MRN: [id number]  Primary Care Physician:  Brittni Quiroz APRN  Date of admission: 2025    Subjective   Subjective     Chief Complaint: Sepsis    HPI:  Patient sleeping at time around      Objective   Objective     Vitals:   Temp:  [97.7 °F (36.5 °C)-98.7 °F (37.1 °C)] 97.9 °F (36.6 °C)  Heart Rate:  [] 88  Resp:  [16-20] 16  BP: (127-157)/(51-73) 143/73  Flow (L/min) (Oxygen Therapy):  [3] 3    Physical Exam  Constitutional:       Appearance: Normal appearance.   Cardiovascular:      Rate and Rhythm: Normal rate.   Pulmonary:      Effort: Pulmonary effort is normal.   Abdominal:      Palpations: Abdomen is soft.         Result Review    Result Review:  I have personally reviewed the results from the time of this admission to 2025 11:42 EST and agree with these findings:  [x]  Laboratory  []  Microbiology  []  Radiology  []  EKG/Telemetry   []  Cardiology/Vascular   []  Pathology  []  Old records  []  Other:  Most notable findings include: White count 6    Assessment & Plan   Assessment / Plan     Brief Patient Summary:  Mary Albarran is a 74 y.o. female who status post cholecystostomy placement    Active Hospital Problems:  Active Hospital Problems    Diagnosis     **Atrial fibrillation with RVR      Plan:  Continue cholecystostomy tube  Typically keep tube in at least 1 to 2 weeks  If patient is discharged prior to that time I can remove the tube in the office       VTE Prophylaxis:  Pharmacologic & mechanical VTE prophylaxis orders are present.        CODE STATUS:   Level Of Support Discussed With: Health Care Surrogate  Code Status (Patient has no pulse and is not breathing): CPR (Attempt to Resuscitate)  Medical Interventions (Patient has pulse or is breathing): Full Support    Disposition:  I expect patient to be discharged per primary.    Electronically signed by Joaquin Gramajo MD, 25, 11:42 AM  EST.

## 2025-02-18 LAB
ANION GAP SERPL CALCULATED.3IONS-SCNC: 7.7 MMOL/L (ref 5–15)
BACTERIA SPEC AEROBE CULT: NORMAL
BACTERIA SPEC AEROBE CULT: NORMAL
BUN SERPL-MCNC: 12 MG/DL (ref 8–23)
BUN/CREAT SERPL: 16.4 (ref 7–25)
CALCIUM SPEC-SCNC: 8 MG/DL (ref 8.6–10.5)
CHLORIDE SERPL-SCNC: 100 MMOL/L (ref 98–107)
CO2 SERPL-SCNC: 31.3 MMOL/L (ref 22–29)
CREAT SERPL-MCNC: 0.73 MG/DL (ref 0.57–1)
DEPRECATED RDW RBC AUTO: 48.6 FL (ref 37–54)
EGFRCR SERPLBLD CKD-EPI 2021: 87.5 ML/MIN/1.73
EOSINOPHIL # BLD MANUAL: 0.08 10*3/MM3 (ref 0–0.4)
EOSINOPHIL NFR BLD MANUAL: 1 % (ref 0.3–6.2)
ERYTHROCYTE [DISTWIDTH] IN BLOOD BY AUTOMATED COUNT: 14.1 % (ref 12.3–15.4)
GLUCOSE BLDC GLUCOMTR-MCNC: 117 MG/DL (ref 70–99)
GLUCOSE BLDC GLUCOMTR-MCNC: 210 MG/DL (ref 70–99)
GLUCOSE BLDC GLUCOMTR-MCNC: 254 MG/DL (ref 70–99)
GLUCOSE SERPL-MCNC: 130 MG/DL (ref 65–99)
HCT VFR BLD AUTO: 40.8 % (ref 34–46.6)
HGB BLD-MCNC: 12.6 G/DL (ref 12–15.9)
LYMPHOCYTES # BLD MANUAL: 1.55 10*3/MM3 (ref 0.7–3.1)
LYMPHOCYTES NFR BLD MANUAL: 13 % (ref 5–12)
MAGNESIUM SERPL-MCNC: 1.8 MG/DL (ref 1.6–2.4)
MCH RBC QN AUTO: 29 PG (ref 26.6–33)
MCHC RBC AUTO-ENTMCNC: 30.9 G/DL (ref 31.5–35.7)
MCV RBC AUTO: 94 FL (ref 79–97)
MONOCYTES # BLD: 1.06 10*3/MM3 (ref 0.1–0.9)
MYELOCYTES NFR BLD MANUAL: 2 % (ref 0–0)
NEUTROPHILS # BLD AUTO: 5.3 10*3/MM3 (ref 1.7–7)
NEUTROPHILS NFR BLD MANUAL: 65 % (ref 42.7–76)
PHOSPHATE SERPL-MCNC: 3.6 MG/DL (ref 2.5–4.5)
PLATELET # BLD AUTO: 154 10*3/MM3 (ref 140–450)
PMV BLD AUTO: 12 FL (ref 6–12)
POTASSIUM SERPL-SCNC: 3.4 MMOL/L (ref 3.5–5.2)
RBC # BLD AUTO: 4.34 10*6/MM3 (ref 3.77–5.28)
RBC MORPH BLD: NORMAL
SMALL PLATELETS BLD QL SMEAR: ADEQUATE
SODIUM SERPL-SCNC: 139 MMOL/L (ref 136–145)
VARIANT LYMPHS NFR BLD MANUAL: 19 % (ref 19.6–45.3)
WBC MORPH BLD: NORMAL
WBC NRBC COR # BLD AUTO: 8.16 10*3/MM3 (ref 3.4–10.8)

## 2025-02-18 PROCEDURE — 84100 ASSAY OF PHOSPHORUS: CPT | Performed by: INTERNAL MEDICINE

## 2025-02-18 PROCEDURE — 63710000001 INSULIN GLARGINE PER 5 UNITS: Performed by: NURSE PRACTITIONER

## 2025-02-18 PROCEDURE — 85025 COMPLETE CBC W/AUTO DIFF WBC: CPT | Performed by: INTERNAL MEDICINE

## 2025-02-18 PROCEDURE — 82948 REAGENT STRIP/BLOOD GLUCOSE: CPT

## 2025-02-18 PROCEDURE — 25010000002 MORPHINE PER 10 MG: Performed by: INTERNAL MEDICINE

## 2025-02-18 PROCEDURE — 99232 SBSQ HOSP IP/OBS MODERATE 35: CPT | Performed by: INTERNAL MEDICINE

## 2025-02-18 PROCEDURE — 99232 SBSQ HOSP IP/OBS MODERATE 35: CPT | Performed by: STUDENT IN AN ORGANIZED HEALTH CARE EDUCATION/TRAINING PROGRAM

## 2025-02-18 PROCEDURE — 25010000002 PIPERACILLIN SOD-TAZOBACTAM PER 1 G: Performed by: INTERNAL MEDICINE

## 2025-02-18 PROCEDURE — 83735 ASSAY OF MAGNESIUM: CPT | Performed by: INTERNAL MEDICINE

## 2025-02-18 PROCEDURE — 82948 REAGENT STRIP/BLOOD GLUCOSE: CPT | Performed by: INTERNAL MEDICINE

## 2025-02-18 PROCEDURE — 63710000001 INSULIN LISPRO (HUMAN) PER 5 UNITS: Performed by: INTERNAL MEDICINE

## 2025-02-18 PROCEDURE — 85007 BL SMEAR W/DIFF WBC COUNT: CPT | Performed by: INTERNAL MEDICINE

## 2025-02-18 PROCEDURE — 25010000002 FUROSEMIDE PER 20 MG: Performed by: INTERNAL MEDICINE

## 2025-02-18 PROCEDURE — 80048 BASIC METABOLIC PNL TOTAL CA: CPT | Performed by: INTERNAL MEDICINE

## 2025-02-18 RX ADMIN — PREGABALIN 150 MG: 75 CAPSULE ORAL at 08:34

## 2025-02-18 RX ADMIN — SACUBITRIL AND VALSARTAN 1 TABLET: 24; 26 TABLET, FILM COATED ORAL at 20:23

## 2025-02-18 RX ADMIN — FAMOTIDINE 20 MG: 10 INJECTION, SOLUTION INTRAVENOUS at 08:33

## 2025-02-18 RX ADMIN — METOPROLOL SUCCINATE 50 MG: 50 TABLET, FILM COATED, EXTENDED RELEASE ORAL at 20:23

## 2025-02-18 RX ADMIN — INSULIN GLARGINE 10 UNITS: 100 INJECTION, SOLUTION SUBCUTANEOUS at 08:34

## 2025-02-18 RX ADMIN — RIVAROXABAN 20 MG: 20 TABLET, FILM COATED ORAL at 18:22

## 2025-02-18 RX ADMIN — SPIRONOLACTONE 25 MG: 25 TABLET ORAL at 08:34

## 2025-02-18 RX ADMIN — MORPHINE SULFATE 1 MG: 2 INJECTION, SOLUTION INTRAMUSCULAR; INTRAVENOUS at 20:12

## 2025-02-18 RX ADMIN — Medication 10 ML: at 11:33

## 2025-02-18 RX ADMIN — OXYCODONE HYDROCHLORIDE 10 MG: 5 TABLET ORAL at 23:58

## 2025-02-18 RX ADMIN — POTASSIUM CHLORIDE 20 MEQ: 1.5 POWDER, FOR SOLUTION ORAL at 20:23

## 2025-02-18 RX ADMIN — SACUBITRIL AND VALSARTAN 1 TABLET: 24; 26 TABLET, FILM COATED ORAL at 08:34

## 2025-02-18 RX ADMIN — OXYCODONE HYDROCHLORIDE 10 MG: 5 TABLET ORAL at 18:22

## 2025-02-18 RX ADMIN — Medication 10 ML: at 20:25

## 2025-02-18 RX ADMIN — METOPROLOL SUCCINATE 50 MG: 50 TABLET, FILM COATED, EXTENDED RELEASE ORAL at 08:34

## 2025-02-18 RX ADMIN — INSULIN LISPRO 8 UNITS: 100 INJECTION, SOLUTION INTRAVENOUS; SUBCUTANEOUS at 18:17

## 2025-02-18 RX ADMIN — LIDOCAINE 2 PATCH: 4 PATCH TOPICAL at 08:33

## 2025-02-18 RX ADMIN — Medication: at 23:59

## 2025-02-18 RX ADMIN — OXYCODONE HYDROCHLORIDE 15 MG: 5 TABLET ORAL at 00:57

## 2025-02-18 RX ADMIN — INSULIN LISPRO 5 UNITS: 100 INJECTION, SOLUTION INTRAVENOUS; SUBCUTANEOUS at 12:15

## 2025-02-18 RX ADMIN — PIPERACILLIN AND TAZOBACTAM 4.5 G: 4; .5 INJECTION, POWDER, FOR SOLUTION INTRAVENOUS; PARENTERAL at 20:24

## 2025-02-18 RX ADMIN — ATORVASTATIN CALCIUM 20 MG: 20 TABLET, FILM COATED ORAL at 20:23

## 2025-02-18 RX ADMIN — FAMOTIDINE 20 MG: 10 INJECTION, SOLUTION INTRAVENOUS at 20:24

## 2025-02-18 RX ADMIN — PIPERACILLIN AND TAZOBACTAM 4.5 G: 4; .5 INJECTION, POWDER, FOR SOLUTION INTRAVENOUS; PARENTERAL at 12:15

## 2025-02-18 RX ADMIN — FUROSEMIDE 40 MG: 10 INJECTION, SOLUTION INTRAMUSCULAR; INTRAVENOUS at 08:34

## 2025-02-18 RX ADMIN — Medication: at 11:34

## 2025-02-18 RX ADMIN — FUROSEMIDE 40 MG: 10 INJECTION, SOLUTION INTRAMUSCULAR; INTRAVENOUS at 18:22

## 2025-02-18 RX ADMIN — PIPERACILLIN AND TAZOBACTAM 4.5 G: 4; .5 INJECTION, POWDER, FOR SOLUTION INTRAVENOUS; PARENTERAL at 04:39

## 2025-02-18 RX ADMIN — ASPIRIN 81 MG: 81 TABLET, COATED ORAL at 08:34

## 2025-02-18 RX ADMIN — PREGABALIN 150 MG: 75 CAPSULE ORAL at 20:23

## 2025-02-18 RX ADMIN — POTASSIUM CHLORIDE 20 MEQ: 1.5 POWDER, FOR SOLUTION ORAL at 08:34

## 2025-02-18 RX ADMIN — INSULIN LISPRO 3 UNITS: 100 INJECTION, SOLUTION INTRAVENOUS; SUBCUTANEOUS at 20:24

## 2025-02-18 RX ADMIN — MORPHINE SULFATE 1 MG: 2 INJECTION, SOLUTION INTRAMUSCULAR; INTRAVENOUS at 03:32

## 2025-02-18 NOTE — CONSULTS
"Nutrition Services    Patient Name: Mary Albarran  YOB: 1950  MRN: [id number]  Admission date: 2/11/2025      CLINICAL NUTRITION ASSESSMENT      Reason for Assessment  LOS   H&P:  Past Medical History:   Diagnosis Date    Allergic rhinitis     Anxiety     Aortic valve disease 11/28/2021    Fibrocalcific changes of the aortic valve with mild aortic valve stenosis   on echo 3/2/2021  Moderate aortic valve regurgitation is present. Aortic valve maximum pressure gradient is 26 mmHg. Moderate aortic valve stenosis is present.  On echo 2/10/2022       Arthritis     Atrial fibrillation     Chronic kidney disease (CKD), stage III (moderate)     Chronic pain     COPD (chronic obstructive pulmonary disease)     Diabetes mellitus type 2 with complications     Ex-smoker     QUIT SMOKING 2008    Hyperlipidemia     Hypertension     Thrombocytopenia         Current Problems:   Active Hospital Problems    Diagnosis     **Atrial fibrillation with RVR     Acute on chronic HFrEF (heart failure with reduced ejection fraction)     Aortic stenosis, moderate         Nutrition/Diet History         Narrative     Patient assessed by RD for LOS. Chart graphics reveal % meal intake. Over the past year, patient weight stable. Patient is at low risk per nutrition risk screening (NRS-2002).     No acute nutrition concerns or interventions at this time.      Anthropometrics        Current Height, Weight Height: 160 cm (62.99\")  Weight: 116 kg (256 lb 2.8 oz)   Current BMI Body mass index is 45.39 kg/m².   BMI Classification Overweight  - healthy BMI for age 23-30   % % poor indicator for age   Adjusted Body Weight (ABW) 68 kg   Weight Hx  Wt Readings from Last 30 Encounters:   02/11/25 0432 116 kg (256 lb 2.8 oz)   02/11/25 0300 115 kg (253 lb 8.5 oz)   09/30/24 1430 116 kg (255 lb)   06/26/24 1233 117 kg (257 lb)   06/06/24 1016 115 kg (253 lb)   05/16/24 1142 118 kg (261 lb)   05/15/24 1044 118 kg (261 lb)   05/15/24 " 0955 115 kg (253 lb)   02/16/24 1421 115 kg (254 lb)   11/20/23 0949 115 kg (254 lb)   11/16/23 1137 116 kg (255 lb)   11/01/23 0825 119 kg (263 lb)   10/12/23 0600 112 kg (246 lb 4.1 oz)   10/11/23 0600 114 kg (251 lb 5.2 oz)   10/10/23 0039 119 kg (262 lb 5.6 oz)   10/07/23 0501 118 kg (259 lb 4.2 oz)   10/04/23 1055 119 kg (263 lb)   09/13/23 1309 119 kg (263 lb)   06/09/23 1015 120 kg (263 lb 12.8 oz)   05/13/23 0115 120 kg (265 lb 6.9 oz)   05/11/23 1315 121 kg (266 lb)   05/05/23 1230 121 kg (267 lb 12.8 oz)   01/27/23 1052 119 kg (262 lb 4.8 oz)   10/28/22 1408 118 kg (261 lb 3.2 oz)   07/27/22 1047 119 kg (261 lb 6.4 oz)   05/20/22 1412 117 kg (259 lb)   04/01/22 1344 119 kg (261 lb 6.4 oz)   03/23/22 0953 117 kg (259 lb)   02/10/22 1201 119 kg (263 lb)   01/21/22 1052 119 kg (263 lb)   11/29/21 1257 121 kg (266 lb)   07/21/21 1100 119 kg (261 lb 12.8 oz)   05/26/21 0000 119 kg (262 lb)   05/18/21 1046 118 kg (260 lb 8 oz)   04/15/21 0000 119 kg (263 lb 2 oz)            Wt Change Observation stable     Estimated/Assessed Needs  Estimated Needs based on: Ideal Body Weight       Energy Requirements 30 kcal/kg    EST Needs (kcal/day) 1560       Protein Requirements 1.0-1.2 g/kg   EST Daily Needs (g/day) 52-62       Fluid Requirements 1 ml/kcal    Estimated Needs (mL/day) 1560     Labs/Medications         Pertinent Labs Reviewed.   Results from last 7 days   Lab Units 02/18/25  0516 02/17/25  0510 02/16/25  0247 02/15/25  0741 02/13/25  0417 02/12/25  0449   SODIUM mmol/L 139 140 138 135*   < > 137   POTASSIUM mmol/L 3.4* 2.8* 3.4* 4.3   < > 4.2   CHLORIDE mmol/L 100 98 101 102   < > 103   CO2 mmol/L 31.3* 31.9* 25.6 19.3*   < > 20.1*   BUN mg/dL 12 14 16 18   < > 31*   CREATININE mg/dL 0.73 0.78 0.79 0.75   < > 1.60*   CALCIUM mg/dL 8.0* 8.5* 8.8 8.9   < > 8.1*   BILIRUBIN mg/dL  --   --  0.7 0.9  --  0.8   ALK PHOS U/L  --   --  105 90  --  42   ALT (SGPT) U/L  --   --  17 20  --  26   AST (SGOT) U/L  --   --   16 22  --  52*   GLUCOSE mg/dL 130* 136* 150* 172*   < > 153*    < > = values in this interval not displayed.     Results from last 7 days   Lab Units 02/18/25  0516 02/17/25  0510 02/16/25  0247   MAGNESIUM mg/dL 1.8 1.5* 1.7   PHOSPHORUS mg/dL 3.6 3.7 2.8   HEMOGLOBIN g/dL 12.6 12.4 12.0   HEMATOCRIT % 40.8 39.8 38.4     COVID19   Date Value Ref Range Status   10/11/2023 Not Detected Not Detected - Ref. Range Final     Lab Results   Component Value Date    HGBA1C 9.60 (H) 02/12/2025         Pertinent Medications Reviewed.     Malnutrition Severity Assessment              Nutrition Diagnosis         Nutrition Dx Problem 1 No nutrition diagnosis at this time.       Nutrition Intervention        Current Nutrition Orders & Evaluation of Intake     Current Nutrition Orders & Evaluation of Intake       Current PO Diet Diet: Diabetic; Consistent Carbohydrate; Fluid Consistency: Thin (IDDSI 0)   Supplement No active supplement orders       Nutrition Intervention/Prescription        No further nutrition intervention indicated.       Medical Nutrition Therapy/Nutrition Education          Learner     Readiness Patient  Education not indicated.      Method     Response N/A  N/A     Monitor/Evaluation        Monitor Per protocol     Nutrition Discharge Plan         No discharge nutrition needs identified.      Electronically signed by:  Bridget Hussein RD  02/18/25 15:14 EST

## 2025-02-18 NOTE — PLAN OF CARE
Goal Outcome Evaluation:              Outcome Evaluation: patient aox4. VSS. Patient one assist to bedside commode with walker. Wound care completed. Biliary tube had 75cc drainage noted. No c/o pain or discomfort this shift. Call light in reach.

## 2025-02-18 NOTE — PROGRESS NOTES
Pulmonary / Critical Care Progress Note      Patient Name: Mary Albarran  : 1950  MRN: [id number]  Attending:  Campos Bobo MD  Date of admission: 2025    Subjective   Subjective   Follow-up for hypoxic respiratory failure    Over past 24 hours:  Doing fair this morning  Remains on 3 L nasal cannula  Of note patient uses 2 L at baseline  Feels a little stronger today  Lower extremity with edema  No cough, fever, chills    Review of Systems  General: + Weakness, fatigue; denied complaints  Cardiovascular:  Denied complaints  Respiratory: Denied complaints  Gastrointestinal: Denied complaints        Objective   Objective     Vitals:   Temp:  [96.2 °F (35.7 °C)-99 °F (37.2 °C)] 99 °F (37.2 °C)  Heart Rate:  [78-86] 85  Resp:  [16-18] 18  BP: (132-150)/(58-90) 132/58  Flow (L/min) (Oxygen Therapy):  [3] 3    Physical Exam   Vital Signs Reviewed   General:  WDWN, Alert, NAD. Lying in bed  HEENT:  PERRL, EOMI.  OP, nares clear  Chest:  good aeration, clear to auscultation bilaterally, no work of breathing noted on 3 L nasal cannula  CV: RRR, no MGR, pulses 2+, equal.  Abd:  Soft, NT, ND, + BS, obese  EXT:  no clubbing, no cyanosis, bilateral lower extremity edema  Neuro:  A&Ox3, CN grossly intact, no focal deficits.  Skin: No rashes or lesions noted      Result Review    Result Review:  I have personally reviewed the results from the time of this admission to 2025 12:12 EST and agree with these findings:  []  Laboratory  []  Microbiology  []  Radiology  []  EKG/Telemetry   []  Cardiology/Vascular   []  Pathology  []  Old records  []  Other:  Most notable findings include:   -     Assessment & Plan   Assessment / Plan     Active Hospital Problems:  Active Hospital Problems    Diagnosis     **Atrial fibrillation with RVR     Acute on chronic HFrEF (heart failure with reduced ejection fraction)     Aortic stenosis, moderate          Impression:  Acute hypoxic respiratory failure  Septic shock,  resolved  Bacteroides bacteremia  Right lower extremity cellulitis  Atrial fibrillation on Xarelto    Plan:  Continue to wean oxygen.  Patient uses 2 L at baseline  Continue antibiotics for bacteremia for total of 2 weeks  Continue Zosyn for total 14 days  Repeat cultures no growth to date  Surgery following status post cholecystostomy tube placement  proBNP remain elevated greater than 11,000   Continue diuretics as tolerated.  Monitor renal function electrolytes.  Pain control per primary    VTE Prophylaxis:  Pharmacologic & mechanical VTE prophylaxis orders are present.        CODE STATUS:   Level Of Support Discussed With: Health Care Surrogate  Code Status (Patient has no pulse and is not breathing): CPR (Attempt to Resuscitate)  Medical Interventions (Patient has pulse or is breathing): Full Support      Labs, images, and medications personally reviewed.    Discussed with patient  Electronically signed by Mau Farmer MD, 02/18/25, 12:13 PM EST.

## 2025-02-18 NOTE — PROGRESS NOTES
CARDIOLOGY  INPATIENT PROGRESS NOTE                [address] AMBULATORY SERVICES    2/20/2025      PATIENT IDENTIFICATION:   Name:  Mary Albarran      MRN:  [id number]     74 y.o.  female             Chief complaint atrial fibrillation with RVR    SUBJECTIVE:   Patient without any significant vents overnight resting comfortably this morning  OBJECTIVE:  Vitals:    02/19/25 1934 02/20/25 0010 02/20/25 0325 02/20/25 0735   BP: 150/53 123/69 140/68 129/67   BP Location: Right arm Right arm Right arm Right arm   Patient Position: Lying Sitting Lying Lying   Pulse: 83   73   Resp: 16 14 18 18   Temp: 98.2 °F (36.8 °C) 97.9 °F (36.6 °C) 98.4 °F (36.9 °C) 98.3 °F (36.8 °C)   TempSrc: Oral Oral Oral Tympanic   SpO2: 96% 100% 100% 96%   Weight:       Height:               Body mass index is 45.39 kg/m².    Intake/Output Summary (Last 24 hours) at 2/20/2025 0929  Last data filed at 2/20/2025 0833  Gross per 24 hour   Intake 880 ml   Output --   Net 880 ml       Telemetry: Atrial fibrillation rate controlled    Physical Exam  General Appearance:   no acute distress  Alert and oriented x3  HENT:   lips not cyanotic  Atraumatic  Neck:  No jvd   supple  Respiratory:  no respiratory distress  normal breath sounds  no rales  Cardiovascular:    Regular irregular  no S3, no S4   no murmur  no rub  lower extremity edema mild  Skin:   warm, dry  No rashes      Allergies   Allergen Reactions    Codeine Mental Status Change    Sglt2 Inhibitors Other (See Comments)     Frequent UTI      Scheduled meds:  ammonium lactate, , Topical, 2 times per day  aspirin, 81 mg, Oral, Daily  atorvastatin, 20 mg, Oral, Nightly  famotidine, 20 mg, Intravenous, Q12H  furosemide, 40 mg, Intravenous, BID Diuretics  insulin glargine, 10 Units, Subcutaneous, Daily  insulin lispro, 3-14 Units, Subcutaneous, 4x Daily AC & at Bedtime  Lidocaine, 2 patch, Transdermal, Q24H  metoprolol succinate XL, 50 mg, Oral, Q12H  piperacillin-tazobactam, 4.5  "g, Intravenous, Q8H  potassium chloride, 20 mEq, Oral, BID  pregabalin, 100 mg, Oral, BID  rivaroxaban, 20 mg, Oral, Daily With Dinner  sacubitril-valsartan, 1 tablet, Oral, Q12H  sodium chloride, 1,000 mL, Intravenous, Once  sodium chloride, 10 mL, Intravenous, Q12H  spironolactone, 25 mg, Oral, Daily      IV meds:                         Data Review:  CBC          2/18/2025    05:16 2/19/2025    05:12 2/20/2025    04:48   CBC   WBC 8.16  5.74  6.67    RBC 4.34  4.39  4.72    Hemoglobin 12.6  12.4  13.6    Hematocrit 40.8  40.9  44.0    MCV 94.0  93.2  93.2    MCH 29.0  28.2  28.8    MCHC 30.9  30.3  30.9    RDW 14.1  14.1  13.6    Platelets 154  154  169      CMP          2/18/2025    05:16 2/19/2025    05:12 2/20/2025    04:48   CMP   Glucose 130  179  212    BUN 12  9  10    Creatinine 0.73  0.77  0.88    EGFR 87.5  82.1  69.9    Sodium 139  139  135    Potassium 3.4  3.3  3.4    Chloride 100  98  92    Calcium 8.0  8.3  8.6    Total Protein  6.0     Albumin  2.8     Globulin  3.2     Total Bilirubin  0.5     Alkaline Phosphatase  80     AST (SGOT)  16     ALT (SGPT)  14     Albumin/Globulin Ratio  0.9     BUN/Creatinine Ratio 16.4  11.7  11.4    Anion Gap 7.7  6.8  9.3       CARDIAC LABS:      Lab 02/17/25  0510   PROBNP 11,657.0*        Lab Results   Component Value Date    DIGOXIN 1.95 (H) 02/12/2025      Lab Results   Component Value Date    TSH 1.520 01/27/2023           Invalid input(s): \"LDLCALC\"  No results found for: \"POCTROP\"  Lab Results   Component Value Date    TROPONINT 21 (H) 10/06/2023   (  Lab Results   Component Value Date    MG 1.7 02/20/2025     Results for orders placed during the hospital encounter of 02/11/25    Adult Transthoracic Echo Complete W/ Cont if Necessary Per Protocol    Interpretation Summary    Left ventricular systolic function is mildly decreased. Left ventricular ejection fraction appears to be 46 - 50%. with some anterior septal hypokensis    The right ventricular cavity " is mildly dilated.    The left atrial cavity is mildly dilated.    The right atrial cavity is moderately  dilated.    Mild to moderate aortic valve stenosis is present.    Estimated right ventricular systolic pressure from tricuspid regurgitation is moderately elevated (45-55 mmHg).           ASSESSMENT:    Atrial fibrillation with RVR    Aortic stenosis, moderate    Acute on chronic HFrEF (heart failure with reduced ejection fraction)        PLAN:  1.  IV Lasix 40 twice daily feel the patient is close to transition back to her p.o. diuretic at this point  2.  Continue with her other GDMT feel that her outpatient digoxin can be held on discharge as her rate control seems to be doing fairly well.  3.  Patient will need outpatient ischemic workup as well as repeat echocardiogram to reassess her ejection fraction.  Feel the patient is stable from cardiac standpoint for discharge we will sign off          Joaquin Gonzalez MD  2/20/2025    09:29 EST

## 2025-02-18 NOTE — PLAN OF CARE
Goal Outcome Evaluation:  Plan of Care Reviewed With: patient        Progress: no change  Outcome Evaluation: Patient remains A&Ox4. C/o BLE pain - prn morphine and oxy given. Skin care performed per orders. Dressing on right foot remained CDI. Had BM this shift. BG checked per orders - 257 this shift. Family visited at bedside at beginning of shift. VSS. On 3L NC. Biliary tube has serosanguinous drainage noted. Dressing CDI. Pt using bedpan. Bed alert in use, call light in reach, able to make needs known. No further issues/needs at this time. Patient and family wish for patient to d/c to [facility] for rehab upon discharge.

## 2025-02-18 NOTE — PROGRESS NOTES
CARDIOLOGY  INPATIENT PROGRESS NOTE                [address] AMBULATORY SERVICES    2/18/2025      PATIENT IDENTIFICATION:   Name:  Mary Albarran      MRN:  [id number]     74 y.o.  female             No chief complaint on file.       SUBJECTIVE:   Patient is feeling better symptomatically stable overnight  OBJECTIVE:  Vitals:    02/17/25 2222 02/18/25 0212 02/18/25 0700 02/18/25 1100   BP: 150/90 137/69 139/65 132/58   BP Location: Right arm Right arm Right arm Right arm   Patient Position: Lying Lying Lying Lying   Pulse: 81 85     Resp: 16 16 18 18   Temp: 98.4 °F (36.9 °C) 98.2 °F (36.8 °C) 96.2 °F (35.7 °C) 99 °F (37.2 °C)   TempSrc: Oral Oral Axillary Oral   SpO2: 94% 94% 97% 100%   Weight:       Height:               Body mass index is 45.39 kg/m².    Intake/Output Summary (Last 24 hours) at 2/18/2025 1300  Last data filed at 2/18/2025 1028  Gross per 24 hour   Intake 749 ml   Output 1350 ml   Net -601 ml       Telemetry: Atrial fibrillation rate controlled    Physical Exam  General Appearance:   no acute distress  Alert and oriented x3  HENT:   lips not cyanotic  Atraumatic  Neck:  No jvd   supple  Respiratory:  no respiratory distress  normal breath sounds  no rales  Cardiovascular:    regular rhythm  no S3, no S4   1/6 systolic murmur  no rub  lower extremity edema: +1  Skin:   warm, dry  No rashes      Allergies   Allergen Reactions    Codeine Mental Status Change    Sglt2 Inhibitors Other (See Comments)     Frequent UTI      Scheduled meds:  ammonium lactate, , Topical, 2 times per day  aspirin, 81 mg, Oral, Daily  atorvastatin, 20 mg, Oral, Nightly  famotidine, 20 mg, Intravenous, Q12H  furosemide, 40 mg, Intravenous, BID Diuretics  insulin glargine, 10 Units, Subcutaneous, Daily  insulin lispro, 3-14 Units, Subcutaneous, 4x Daily AC & at Bedtime  Lidocaine, 2 patch, Transdermal, Q24H  metoprolol succinate XL, 50 mg, Oral, Q12H  piperacillin-tazobactam, 4.5 g, Intravenous,  "Q8H  potassium chloride, 20 mEq, Oral, BID  pregabalin, 150 mg, Oral, BID  rivaroxaban, 20 mg, Oral, Daily With Dinner  sacubitril-valsartan, 1 tablet, Oral, Q12H  sodium chloride, 1,000 mL, Intravenous, Once  sodium chloride, 10 mL, Intravenous, Q12H  spironolactone, 25 mg, Oral, Daily      IV meds:                         Data Review:  CBC          2/16/2025    02:47 2/17/2025    05:10 2/18/2025    05:16   CBC   WBC 10.19  6.65  8.16    RBC 4.08  4.28  4.34    Hemoglobin 12.0  12.4  12.6    Hematocrit 38.4  39.8  40.8    MCV 94.1  93.0  94.0    MCH 29.4  29.0  29.0    MCHC 31.3  31.2  30.9    RDW 14.5  14.3  14.1    Platelets 94  129  154      CMP          2/16/2025    02:47 2/17/2025    05:10 2/18/2025    05:16   CMP   Glucose 150  136  130    BUN 16  14  12    Creatinine 0.79  0.78  0.73    EGFR 79.6  80.8  87.5    Sodium 138  140  139    Potassium 3.4  2.8  3.4    Chloride 101  98  100    Calcium 8.8  8.5  8.0    Total Protein 5.6      Albumin 2.5      Globulin 3.1      Total Bilirubin 0.7      Alkaline Phosphatase 105      AST (SGOT) 16      ALT (SGPT) 17      Albumin/Globulin Ratio 0.8      BUN/Creatinine Ratio 20.3  17.9  16.4    Anion Gap 11.4  10.1  7.7       CARDIAC LABS:      Lab 02/17/25  0510 02/13/25  0417   PROBNP 11,657.0*  --    PROTIME  --  15.3*   INR  --  1.16*        Lab Results   Component Value Date    DIGOXIN 1.95 (H) 02/12/2025      Lab Results   Component Value Date    TSH 1.520 01/27/2023           Invalid input(s): \"LDLCALC\"  No results found for: \"POCTROP\"  Lab Results   Component Value Date    TROPONINT 21 (H) 10/06/2023   (  Lab Results   Component Value Date    MG 1.8 02/18/2025     Results for orders placed during the hospital encounter of 02/11/25    Adult Transthoracic Echo Complete W/ Cont if Necessary Per Protocol    Interpretation Summary    Left ventricular systolic function is mildly decreased. Left ventricular ejection fraction appears to be 46 - 50%. with some anterior " septal hypokensis    The right ventricular cavity is mildly dilated.    The left atrial cavity is mildly dilated.    The right atrial cavity is moderately  dilated.    Mild to moderate aortic valve stenosis is present.    Estimated right ventricular systolic pressure from tricuspid regurgitation is moderately elevated (45-55 mmHg).           ASSESSMENT:    Atrial fibrillation with RVR    Aortic stenosis, moderate    Acute on chronic HFrEF (heart failure with reduced ejection fraction)        PLAN:  1.  Continue with GDMT Entresto 24-26, 25 daily, and Toprol 50 twice daily dosing  2.  Toprol 50 twice daily and digoxin 125 mcg daily for rate control we will hold back on restarting diltiazem at this point as her rates doing better  3.  Xarelto 20 mg daily for CVA prevention  4.  Lasix 40 for IV diuresis potassium still low r replacing with p.o. supplement          Joaquin Gonzalez MD  2/18/2025    13:00 EST

## 2025-02-18 NOTE — PROGRESS NOTES
[facility]   Hospitalist Progress Note  Date: 2025  Patient Name: Mary Albarran  : 1950  MRN: [id number]  Date of admission: 2025      Subjective   Subjective     Chief Complaint: Altered mental status    Summary: 74 y.o. female with history of A-fib on Xarelto, CKD stage III, type 2 diabetes initially presented to outside facility with decreased responsiveness.  Patient was found to be hypotensive, febrile and with elevated lactate.  Concern for infection.  Was given 2.5 L of IV fluids also had A-fib with RVR initially started on Cardizem however developed hypotension.  Did have CT scan of the abdomen pelvis which showed some gallbladder thickening concerning for cholelithiasis.  Was transferred to our facility for critical care management.  General surgery was consulted and did recommend cholecystostomy tube which was placed by IR.  Fluid cultures returned negative.  She was kept on broad-spectrum IV antibiotics, did have significant right lower extremity cellulitis which was slow to improve.  1 blood culture positive for Bacteroides species.  Will need to complete 10 days 14 days of antibiotic therapy for cellulitis and bacteremia.  Had issues with A-fib with RVR for which cardiology was consulted.  2D echo revealed drop in EF and valvular heart disease with moderate aortic stenosis.  Will need follow-up with comprehensive valve center on an outpatient basis for management of valvular heart disease.  Heart rates were controlled with IV and ultimately oral medications.  May need rehab at discharge.    Interval Followup: No acute events overnight, patient resting comfortably in bed    Objective   Objective     Vitals:   Temp:  [96.2 °F (35.7 °C)-99 °F (37.2 °C)] 99 °F (37.2 °C)  Heart Rate:  [78-85] 85  Resp:  [16-18] 18  BP: (132-150)/(58-90) 132/58  Flow (L/min) (Oxygen Therapy):  [3] 3  Physical Exam   GEN: No acute distress  HEENT: Moist mucous membranes  LUNGS: Equal chest rise  bilaterally  CARDIAC: Regular rate and rhythm  NEURO: Moving all 4 extremities spontaneously  SKIN: No obvious breakdown    Result Review    I have personally reviewed the results below:  [x]  Laboratory personally reviewed CMP, CBC, blood sugars, magnesium, phosphorus, blood cultures  []  Microbiology  []  Radiology  []  EKG/Telemetry   []  Cardiology/Vascular   []  Pathology  []  Old records  []  Other:  CBC          2/16/2025    02:47 2/17/2025    05:10 2/18/2025    05:16   CBC   WBC 10.19  6.65  8.16    RBC 4.08  4.28  4.34    Hemoglobin 12.0  12.4  12.6    Hematocrit 38.4  39.8  40.8    MCV 94.1  93.0  94.0    MCH 29.4  29.0  29.0    MCHC 31.3  31.2  30.9    RDW 14.5  14.3  14.1    Platelets 94  129  154      CMP          2/16/2025    02:47 2/17/2025    05:10 2/18/2025    05:16   CMP   Glucose 150  136  130    BUN 16  14  12    Creatinine 0.79  0.78  0.73    EGFR 79.6  80.8  87.5    Sodium 138  140  139    Potassium 3.4  2.8  3.4    Chloride 101  98  100    Calcium 8.8  8.5  8.0    Total Protein 5.6      Albumin 2.5      Globulin 3.1      Total Bilirubin 0.7      Alkaline Phosphatase 105      AST (SGOT) 16      ALT (SGPT) 17      Albumin/Globulin Ratio 0.8      BUN/Creatinine Ratio 20.3  17.9  16.4    Anion Gap 11.4  10.1  7.7        Assessment & Plan   Assessment / Plan   Septic shock  Streptococcal bacteremia  Gram-negative bacteremia  Concern for acute cholecystitis  Concern for right lower extremity cellulitis  Acute metabolic encephalopathy secondary to above  Acute on chronic systolic congestive heart failure with acute exacerbation  Moderate aortic regurgitation with moderate aortic stenosis  Chronic atrial fibrillation with RVR  NSTEMI type II  Acute kidney injury  Hypomagnesemia  Lactic acidosis, clinically significant  Type 2 diabetes mellitus    Continue to monitor in the hospital for workup and management of the above  Status post cholecystostomy tube placement, body fluid culture no growth to date.   Will need to follow-up with general surgery on outpatient basis for tube removal  Continue Zosyn to complete a 10-14-day course of antibiotics  1/2 blood cultures growing Bacteroides vulgatus, repeat blood cultures no growth to date  Discussed with cardiology, decrease Lasix to 40 mg IV twice daily  Trend renal function and electrolytes closely while on IV diuretics  BNP still markedly elevated 11,000, continue diuretics  Replace potassium and magnesium  Continue oral Entresto as well as Aldactone for goal-directed medical therapy  Continue aspirin 81 mg daily, digoxin 125 mcg p.o. daily, Toprol XL 50 mg twice daily  Continue Xarelto 20 mg daily  Patient will need to have referral on outpatient basis to a comprehensive valve center for management of valvular heart disease  Blood sugars acceptable, continue Lantus 10 units daily, moderate dose sliding scale insulin  Continue oxycodone 10 or 15 mg as needed for pain, careful to avoid oversedation  Discussed with pulmonology, wean O2 as tolerated keep sats greater than 90%  CBC, CMP reviewed  Repeat CBC, CMP, mag and Phos in a.m.     Discussed plan with RN, cardiology, pulmonology    VTE Prophylaxis:  Pharmacologic & mechanical VTE prophylaxis orders are present.        CODE STATUS:   Level Of Support Discussed With: Health Care Surrogate  Code Status (Patient has no pulse and is not breathing): CPR (Attempt to Resuscitate)  Medical Interventions (Patient has pulse or is breathing): Full Support      Electronically signed by Campos Bobo MD, 2/18/2025, 16:18 EST.

## 2025-02-19 LAB
ALBUMIN SERPL-MCNC: 2.8 G/DL (ref 3.5–5.2)
ALBUMIN/GLOB SERPL: 0.9 G/DL
ALP SERPL-CCNC: 80 U/L (ref 39–117)
ALT SERPL W P-5'-P-CCNC: 14 U/L (ref 1–33)
ANION GAP SERPL CALCULATED.3IONS-SCNC: 6.8 MMOL/L (ref 5–15)
AST SERPL-CCNC: 16 U/L (ref 1–32)
BASOPHILS # BLD AUTO: 0.05 10*3/MM3 (ref 0–0.2)
BASOPHILS NFR BLD AUTO: 0.9 % (ref 0–1.5)
BILIRUB SERPL-MCNC: 0.5 MG/DL (ref 0–1.2)
BUN SERPL-MCNC: 9 MG/DL (ref 8–23)
BUN/CREAT SERPL: 11.7 (ref 7–25)
CALCIUM SPEC-SCNC: 8.3 MG/DL (ref 8.6–10.5)
CHLORIDE SERPL-SCNC: 98 MMOL/L (ref 98–107)
CO2 SERPL-SCNC: 34.2 MMOL/L (ref 22–29)
CREAT SERPL-MCNC: 0.77 MG/DL (ref 0.57–1)
DEPRECATED RDW RBC AUTO: 48.4 FL (ref 37–54)
EGFRCR SERPLBLD CKD-EPI 2021: 82.1 ML/MIN/1.73
EOSINOPHIL # BLD AUTO: 0.17 10*3/MM3 (ref 0–0.4)
EOSINOPHIL NFR BLD AUTO: 3 % (ref 0.3–6.2)
ERYTHROCYTE [DISTWIDTH] IN BLOOD BY AUTOMATED COUNT: 14.1 % (ref 12.3–15.4)
GLOBULIN UR ELPH-MCNC: 3.2 GM/DL
GLUCOSE BLDC GLUCOMTR-MCNC: 183 MG/DL (ref 70–99)
GLUCOSE BLDC GLUCOMTR-MCNC: 213 MG/DL (ref 70–99)
GLUCOSE BLDC GLUCOMTR-MCNC: 235 MG/DL (ref 70–99)
GLUCOSE BLDC GLUCOMTR-MCNC: 285 MG/DL (ref 70–99)
GLUCOSE SERPL-MCNC: 179 MG/DL (ref 65–99)
HCT VFR BLD AUTO: 40.9 % (ref 34–46.6)
HGB BLD-MCNC: 12.4 G/DL (ref 12–15.9)
IMM GRANULOCYTES # BLD AUTO: 0.29 10*3/MM3 (ref 0–0.05)
IMM GRANULOCYTES NFR BLD AUTO: 5.1 % (ref 0–0.5)
LYMPHOCYTES # BLD AUTO: 1.2 10*3/MM3 (ref 0.7–3.1)
LYMPHOCYTES NFR BLD AUTO: 20.9 % (ref 19.6–45.3)
MAGNESIUM SERPL-MCNC: 1.6 MG/DL (ref 1.6–2.4)
MCH RBC QN AUTO: 28.2 PG (ref 26.6–33)
MCHC RBC AUTO-ENTMCNC: 30.3 G/DL (ref 31.5–35.7)
MCV RBC AUTO: 93.2 FL (ref 79–97)
MONOCYTES # BLD AUTO: 0.45 10*3/MM3 (ref 0.1–0.9)
MONOCYTES NFR BLD AUTO: 7.8 % (ref 5–12)
NEUTROPHILS NFR BLD AUTO: 3.58 10*3/MM3 (ref 1.7–7)
NEUTROPHILS NFR BLD AUTO: 62.3 % (ref 42.7–76)
NRBC BLD AUTO-RTO: 0 /100 WBC (ref 0–0.2)
PHOSPHATE SERPL-MCNC: 3.3 MG/DL (ref 2.5–4.5)
PLATELET # BLD AUTO: 154 10*3/MM3 (ref 140–450)
PMV BLD AUTO: 11.4 FL (ref 6–12)
POTASSIUM SERPL-SCNC: 3.3 MMOL/L (ref 3.5–5.2)
PROT SERPL-MCNC: 6 G/DL (ref 6–8.5)
RBC # BLD AUTO: 4.39 10*6/MM3 (ref 3.77–5.28)
RBC MORPH BLD: NORMAL
SMALL PLATELETS BLD QL SMEAR: ADEQUATE
SODIUM SERPL-SCNC: 139 MMOL/L (ref 136–145)
WBC MORPH BLD: NORMAL
WBC NRBC COR # BLD AUTO: 5.74 10*3/MM3 (ref 3.4–10.8)

## 2025-02-19 PROCEDURE — 97161 PT EVAL LOW COMPLEX 20 MIN: CPT

## 2025-02-19 PROCEDURE — 63710000001 INSULIN GLARGINE PER 5 UNITS: Performed by: NURSE PRACTITIONER

## 2025-02-19 PROCEDURE — 82948 REAGENT STRIP/BLOOD GLUCOSE: CPT | Performed by: INTERNAL MEDICINE

## 2025-02-19 PROCEDURE — 80053 COMPREHEN METABOLIC PANEL: CPT | Performed by: INTERNAL MEDICINE

## 2025-02-19 PROCEDURE — 25010000002 PIPERACILLIN SOD-TAZOBACTAM PER 1 G: Performed by: INTERNAL MEDICINE

## 2025-02-19 PROCEDURE — 85007 BL SMEAR W/DIFF WBC COUNT: CPT | Performed by: INTERNAL MEDICINE

## 2025-02-19 PROCEDURE — 25010000002 FUROSEMIDE PER 20 MG: Performed by: INTERNAL MEDICINE

## 2025-02-19 PROCEDURE — 85025 COMPLETE CBC W/AUTO DIFF WBC: CPT | Performed by: INTERNAL MEDICINE

## 2025-02-19 PROCEDURE — 82948 REAGENT STRIP/BLOOD GLUCOSE: CPT

## 2025-02-19 PROCEDURE — 99232 SBSQ HOSP IP/OBS MODERATE 35: CPT | Performed by: INTERNAL MEDICINE

## 2025-02-19 PROCEDURE — 25010000002 MAGNESIUM SULFATE 2 GM/50ML SOLUTION: Performed by: INTERNAL MEDICINE

## 2025-02-19 PROCEDURE — 63710000001 INSULIN LISPRO (HUMAN) PER 5 UNITS: Performed by: INTERNAL MEDICINE

## 2025-02-19 PROCEDURE — 83735 ASSAY OF MAGNESIUM: CPT | Performed by: INTERNAL MEDICINE

## 2025-02-19 PROCEDURE — 99232 SBSQ HOSP IP/OBS MODERATE 35: CPT | Performed by: STUDENT IN AN ORGANIZED HEALTH CARE EDUCATION/TRAINING PROGRAM

## 2025-02-19 PROCEDURE — 84100 ASSAY OF PHOSPHORUS: CPT | Performed by: INTERNAL MEDICINE

## 2025-02-19 RX ORDER — POTASSIUM CHLORIDE 750 MG/1
40 CAPSULE, EXTENDED RELEASE ORAL ONCE
Status: COMPLETED | OUTPATIENT
Start: 2025-02-19 | End: 2025-02-19

## 2025-02-19 RX ORDER — MINERAL OIL/HYDROPHIL PETROLAT
1 OINTMENT (GRAM) TOPICAL
Status: DISCONTINUED | OUTPATIENT
Start: 2025-02-19 | End: 2025-02-20

## 2025-02-19 RX ORDER — METOLAZONE 5 MG/1
5 TABLET ORAL ONCE
Status: COMPLETED | OUTPATIENT
Start: 2025-02-19 | End: 2025-02-19

## 2025-02-19 RX ORDER — MAGNESIUM SULFATE HEPTAHYDRATE 40 MG/ML
2 INJECTION, SOLUTION INTRAVENOUS ONCE
Status: COMPLETED | OUTPATIENT
Start: 2025-02-19 | End: 2025-02-19

## 2025-02-19 RX ADMIN — PREGABALIN 150 MG: 75 CAPSULE ORAL at 20:38

## 2025-02-19 RX ADMIN — OXYCODONE HYDROCHLORIDE 10 MG: 5 TABLET ORAL at 12:33

## 2025-02-19 RX ADMIN — Medication 10 ML: at 12:34

## 2025-02-19 RX ADMIN — RIVAROXABAN 20 MG: 20 TABLET, FILM COATED ORAL at 17:23

## 2025-02-19 RX ADMIN — INSULIN LISPRO 3 UNITS: 100 INJECTION, SOLUTION INTRAVENOUS; SUBCUTANEOUS at 08:44

## 2025-02-19 RX ADMIN — WHITE PETROLATUM 1 APPLICATION: 1.75 OINTMENT TOPICAL at 17:22

## 2025-02-19 RX ADMIN — INSULIN GLARGINE 10 UNITS: 100 INJECTION, SOLUTION SUBCUTANEOUS at 08:44

## 2025-02-19 RX ADMIN — Medication: at 12:24

## 2025-02-19 RX ADMIN — FAMOTIDINE 20 MG: 10 INJECTION, SOLUTION INTRAVENOUS at 08:44

## 2025-02-19 RX ADMIN — ATORVASTATIN CALCIUM 20 MG: 20 TABLET, FILM COATED ORAL at 20:38

## 2025-02-19 RX ADMIN — SACUBITRIL AND VALSARTAN 1 TABLET: 24; 26 TABLET, FILM COATED ORAL at 20:38

## 2025-02-19 RX ADMIN — INSULIN LISPRO 5 UNITS: 100 INJECTION, SOLUTION INTRAVENOUS; SUBCUTANEOUS at 12:24

## 2025-02-19 RX ADMIN — POTASSIUM CHLORIDE 20 MEQ: 1.5 POWDER, FOR SOLUTION ORAL at 20:38

## 2025-02-19 RX ADMIN — OXYCODONE HYDROCHLORIDE 15 MG: 5 TABLET ORAL at 20:56

## 2025-02-19 RX ADMIN — METOPROLOL SUCCINATE 50 MG: 50 TABLET, FILM COATED, EXTENDED RELEASE ORAL at 08:44

## 2025-02-19 RX ADMIN — SACUBITRIL AND VALSARTAN 1 TABLET: 24; 26 TABLET, FILM COATED ORAL at 08:45

## 2025-02-19 RX ADMIN — PIPERACILLIN AND TAZOBACTAM 4.5 G: 4; .5 INJECTION, POWDER, FOR SOLUTION INTRAVENOUS; PARENTERAL at 12:24

## 2025-02-19 RX ADMIN — SPIRONOLACTONE 25 MG: 25 TABLET ORAL at 08:44

## 2025-02-19 RX ADMIN — INSULIN LISPRO 5 UNITS: 100 INJECTION, SOLUTION INTRAVENOUS; SUBCUTANEOUS at 20:38

## 2025-02-19 RX ADMIN — MAGNESIUM SULFATE HEPTAHYDRATE 2 G: 40 INJECTION, SOLUTION INTRAVENOUS at 12:24

## 2025-02-19 RX ADMIN — POTASSIUM CHLORIDE 20 MEQ: 1.5 POWDER, FOR SOLUTION ORAL at 08:44

## 2025-02-19 RX ADMIN — FUROSEMIDE 40 MG: 10 INJECTION, SOLUTION INTRAMUSCULAR; INTRAVENOUS at 08:44

## 2025-02-19 RX ADMIN — PIPERACILLIN AND TAZOBACTAM 4.5 G: 4; .5 INJECTION, POWDER, FOR SOLUTION INTRAVENOUS; PARENTERAL at 04:26

## 2025-02-19 RX ADMIN — Medication 10 ML: at 08:45

## 2025-02-19 RX ADMIN — PREGABALIN 150 MG: 75 CAPSULE ORAL at 08:45

## 2025-02-19 RX ADMIN — Medication 10 ML: at 12:25

## 2025-02-19 RX ADMIN — FAMOTIDINE 20 MG: 10 INJECTION, SOLUTION INTRAVENOUS at 20:39

## 2025-02-19 RX ADMIN — ASPIRIN 81 MG: 81 TABLET, COATED ORAL at 08:45

## 2025-02-19 RX ADMIN — POTASSIUM CHLORIDE 40 MEQ: 750 CAPSULE, EXTENDED RELEASE ORAL at 12:25

## 2025-02-19 RX ADMIN — METOPROLOL SUCCINATE 50 MG: 50 TABLET, FILM COATED, EXTENDED RELEASE ORAL at 20:38

## 2025-02-19 RX ADMIN — LIDOCAINE 2 PATCH: 4 PATCH TOPICAL at 08:44

## 2025-02-19 RX ADMIN — INSULIN LISPRO 8 UNITS: 100 INJECTION, SOLUTION INTRAVENOUS; SUBCUTANEOUS at 17:27

## 2025-02-19 RX ADMIN — PIPERACILLIN AND TAZOBACTAM 4.5 G: 4; .5 INJECTION, POWDER, FOR SOLUTION INTRAVENOUS; PARENTERAL at 20:38

## 2025-02-19 RX ADMIN — Medication 10 ML: at 20:39

## 2025-02-19 RX ADMIN — METOLAZONE 5 MG: 5 TABLET ORAL at 12:25

## 2025-02-19 RX ADMIN — FUROSEMIDE 40 MG: 10 INJECTION, SOLUTION INTRAMUSCULAR; INTRAVENOUS at 17:22

## 2025-02-19 NOTE — PROGRESS NOTES
[facility]   Hospitalist Progress Note  Date: 2025  Patient Name: Mary Albarran  : 1950  MRN: [id number]  Date of admission: 2025      Subjective   Subjective     Chief Complaint: Altered mental status    Summary: 74 y.o. female with history of A-fib on Xarelto, CKD stage III, type 2 diabetes initially presented to outside facility with decreased responsiveness.  Patient was found to be hypotensive, febrile and with elevated lactate.  Concern for infection.  Was given 2.5 L of IV fluids also had A-fib with RVR initially started on Cardizem however developed hypotension.  Did have CT scan of the abdomen pelvis which showed some gallbladder thickening concerning for cholelithiasis.  Was transferred to our facility for critical care management.  General surgery was consulted and did recommend cholecystostomy tube which was placed by IR.  Fluid cultures returned negative.  She was kept on broad-spectrum IV antibiotics, did have significant right lower extremity cellulitis which was slow to improve.  1 blood culture positive for Bacteroides species.  Will need to complete 10 days 14 days of antibiotic therapy for cellulitis and bacteremia.  Had issues with A-fib with RVR for which cardiology was consulted.  2D echo revealed drop in EF and valvular heart disease with moderate aortic stenosis.  Will need follow-up with comprehensive valve center on an outpatient basis for management of valvular heart disease.  Heart rates were controlled with IV and ultimately oral medications.  May need rehab at discharge     Interval Followup: Seen and examined patient this morning.  Patient still volume overloaded but volume status is improving.  Complaints of right leg pain.  Will continue to wean oxygen. 2L baseline.      Objective   Objective     Vitals:   Temp:  [97.3 °F (36.3 °C)-98.6 °F (37 °C)] 97.7 °F (36.5 °C)  Heart Rate:  [73-95] 90  Resp:  [16-20] 20  BP: (121-173)/(51-77) 132/77  Flow (L/min) (Oxygen  Therapy):  [3] 3    Physical Exam   GEN: No acute distress  HEENT: Moist mucous membranes  LUNGS: Equal chest rise bilaterally  CARDIAC: Regular rate and rhythm  NEURO: Moving all 4 extremities spontaneously  SKIN: No obvious breakdown.  Right lower extremity cellulitis  ABD: S/P cholecystostomy tube     Result Review    Result Review:  I have personally reviewed the results from the time of this admission to 2/19/2025 12:45 EST and agree with these findings:  []  Laboratory  []  Microbiology  []  Radiology  []  EKG/Telemetry   []  Cardiology/Vascular   []  Pathology  []  Old records  []  Other:    Assessment & Plan   Assessment / Plan     Assessment:  Septic shock  Streptococcal bacteremia  Gram-negative bacteremia  Concern for acute cholecystitis  Concern for right lower extremity cellulitis  Acute metabolic encephalopathy secondary to above  Acute on chronic systolic congestive heart failure with acute exacerbation  Moderate aortic regurgitation with moderate aortic stenosis  Chronic atrial fibrillation with RVR  NSTEMI type II  Acute kidney injury  Hypomagnesemia  Lactic acidosis, clinically significant  Type 2 diabetes mellitus      Plan:  Continue to monitor in the hospital for the management of the above  Appreciate recommendations from general surgery, cardiology and pulmonology  S/p cholecystostomy tube placement--Will need follow up with general surgery as outpatient for tube removal  Continue Zosyn 10 to 14-day course  Blood cultures showing no growth to date  Continue Lasix 40  Added Metolazone   Continue Entresto and Aldactone  Continue aspirin, digoxin and Toprol  Continue Xarelto  SSI  Continue pain control  Electrolyte replacement  CBC, CMP reviewed--monitor creatinine and electrolytes  Patient will need to have outpatient referral to a comprehensive valve center for management Dr. Owen heart disease     Discussed plan with RN.    VTE Prophylaxis:  Pharmacologic & mechanical VTE prophylaxis  orders are present.        CODE STATUS:   Level Of Support Discussed With: Health Care Surrogate  Code Status (Patient has no pulse and is not breathing): CPR (Attempt to Resuscitate)  Medical Interventions (Patient has pulse or is breathing): Full Support      Electronically signed by Yesica Luke PA-C, 02/19/25, 12:48 PM EST.        Patient independently seen and evaluated, agree with assessment and plan, above documentation reflects plan put forth during bedside rounds.  More than 51% of the time of this patient encounter was performed by me.    Interval history:  No acute events overnight, patient states she is feeling better    GEN: No acute distress  HEENT: Moist mucous membranes  LUNGS: Equal chest rise bilaterally  CARDIAC: Regular rate and rhythm  NEURO: Moving all 4 extremities spontaneously    Plan:  Agree with assessment plan as above  Continue cholecystostomy tube, will follow-up with general surgery as an outpatient  Continue Zosyn with plans for 14 days  Continue Lasix  Give dose of metolazone today  Continue Entresto, Aldactone  Continue Xarelto  Wound care consult for lower extremity wounds  CBC, CMP reviewed  Repeat CBC, CMP, mag and Phos in a.m.      Electronically signed by Campos Bobo MD, 2/19/2025, 13:47 EST.

## 2025-02-19 NOTE — SIGNIFICANT NOTE
Wound Eval / Progress Noted    GUERO Bautista     Patient Name: Mary Albarran  : 1950  MRN: [id number]  Today's Date: 2025                 Admit Date: 2025    Visit Dx:  No diagnosis found.      Atrial fibrillation with RVR    Aortic stenosis, moderate    Acute on chronic HFrEF (heart failure with reduced ejection fraction)        Past Medical History:   Diagnosis Date    Allergic rhinitis     Anxiety     Aortic valve disease 2021    Fibrocalcific changes of the aortic valve with mild aortic valve stenosis   on echo 3/2/2021  Moderate aortic valve regurgitation is present. Aortic valve maximum pressure gradient is 26 mmHg. Moderate aortic valve stenosis is present.  On echo 2/10/2022       Arthritis     Atrial fibrillation     Chronic kidney disease (CKD), stage III (moderate)     Chronic pain     COPD (chronic obstructive pulmonary disease)     Diabetes mellitus type 2 with complications     Ex-smoker     QUIT SMOKING     Hyperlipidemia     Hypertension     Thrombocytopenia         Past Surgical History:   Procedure Laterality Date    HYSTERECTOMY      30 YEARS AGO         Physical Assessment:     25 0830   Skin       Skin WDL X;color;temperature;moisture;elasticity;integrity;all   Skin Color/Characteristics chip;other (see comments)  (bilateral lower legs and feet. NIKIP, RN, WCC)   Skin Temperature warm   Skin Moisture flaky;dry   Skin Integrity intact;cracked   Wound 25 Right posterior foot unspecified   Placement Date/Time: 25   Side: Right  Orientation: posterior  Location: foot  Type: unspecified   Wound Image    Dressing Appearance intact;dry   Closure None   Base moist;red;pink   Periwound intact;dry   Periwound Temperature warm   Periwound Skin Turgor soft   Edges callused;open   Wound Length (cm) 0.9 cm   Wound Width (cm) 0.8 cm   Wound Depth (cm) 0.2 cm   Wound Surface Area (cm^2) 0.72 cm^2   Wound Volume (cm^3) 0.144 cm^3   Drainage Characteristics/Odor  serosanguineous   Drainage Amount scant   Care, Wound cleansed with;sterile normal saline   Dressing Care dressing applied;silver impregnated;hydrofiber;silicone border foam   Periwound Care absorptive dressing applied   Wound 02/11/25 1338 Left medial great toe unspecified   Placement Date/Time: 02/11/25 1338   Side: Left  Orientation: medial  Location: great toe  Type: unspecified   Dressing Appearance open to air   Closure None   Base dry;black   Periwound intact;dry;redness   Periwound Temperature warm   Periwound Skin Turgor soft   Edges rolled/closed   Drainage Amount none   Care, Wound cleansed with;sterile normal saline   Dressing Care open to air   Periwound Care moisturizer applied     Wound Check / Follow-up: Patient was seen today for a wound check and dressing change.  Patient is awake, alert and oriented at the time of assessment; patient was agreeable to visit at this time.    Bilateral lower legs continue to be edematous with dry scaly, flaking tissue present all over the legs and feet.  No open or active wounds present to the bilateral lower legs.  Discussed possible compression therapy with the patient for edema management; patient was agreeable.  Will discuss plan of care with attending physician.  Recommending daily application of Aquaphor to the bilateral lower legs and wrap with gauze roll and elastic bandage.    Ulceration to the right plantar foot appears to be responding appropriately to the current wound treatment as the wound base is red and moist with areas of pink tissue present as well.  Wound edges are tashia and the wound base does appear to be filling in.  There is callus tissue present around the wound edges and periwound.  Continue daily wound care with silver impregnated Hydrofiber to the wound base.    Darkened area of scabbing to the left great toe; scabbing appears to remain stable at this time.  Provide quality skin care and hygiene with application of moisturizer  daily.    Abdominal folds, groin folds and under bilateral breasts continue to be pink and moist however do appear to be improving   With the use of cornstarch powder, will recommend to continue to use daily and add the use of dry fit sheets or pillowcases for moisture prevention.    Impression: Edema and redness noted to the bilateral lower legs and feet    Short term goals: Regain skin integrity, skin protection, moisture prevention, pressure reduction, edema management, daily dressing change, quality skin care and hygiene.    Arminda Alvarez RN    2/19/2025    13:30 EST

## 2025-02-19 NOTE — THERAPY EVALUATION
Acute Care - Physical Therapy Initial Evaluation   [facility]     Patient Name: Mary Albarran  : 1950  MRN: [id number]  Today's Date: 2025      Visit Dx:     ICD-10-CM ICD-9-CM   1. Difficulty walking  R26.2 719.7     Patient Active Problem List   Diagnosis    Aortic stenosis, moderate    Atrial fibrillation    Chronic kidney disease, stage III (moderate)    Hyperlipidemia    Hypertension    Chronic diastolic congestive heart failure    Atrial fibrillation with RVR    Onychomycosis    Onychocryptosis    Foot pain, bilateral    Nephrolithiasis    Recurrent urinary tract infection    Acute on chronic HFrEF (heart failure with reduced ejection fraction)     Past Medical History:   Diagnosis Date    Allergic rhinitis     Anxiety     Aortic valve disease 2021    Fibrocalcific changes of the aortic valve with mild aortic valve stenosis   on echo 3/2/2021  Moderate aortic valve regurgitation is present. Aortic valve maximum pressure gradient is 26 mmHg. Moderate aortic valve stenosis is present.  On echo 2/10/2022       Arthritis     Atrial fibrillation     Chronic kidney disease (CKD), stage III (moderate)     Chronic pain     COPD (chronic obstructive pulmonary disease)     Diabetes mellitus type 2 with complications     Ex-smoker     QUIT SMOKING     Hyperlipidemia     Hypertension     Thrombocytopenia      Past Surgical History:   Procedure Laterality Date    HYSTERECTOMY      30 YEARS AGO     PT Assessment (Last 12 Hours)       PT Evaluation and Treatment       Row Name 25 1400          Physical Therapy Time and Intention    Document Type evaluation  -AV     Mode of Treatment individual therapy;physical therapy  -AV       Row Name 25 1400          General Information    Patient Profile Reviewed yes  -AV     Patient Observations lethargic  -AV     Prior Level of Function independent:;all household mobility;gait;transfer;ADL's  Ambulated with RW. No home O2.  -AV     Equipment Currently  Used at Home walker, rolling  -AV     Existing Precautions/Restrictions fall;other (see comments)  cholecystostomy tube  -AV       Row Name 02/19/25 1400          Living Environment    Current Living Arrangements home  -AV     People in Home spouse  -AV       Row Name 02/19/25 1400          Range of Motion (ROM)    Range of Motion bilateral lower extremities;ROM is WFL  -AV       Row Name 02/19/25 1400          Strength (Manual Muscle Testing)    Strength (Manual Muscle Testing) bilateral lower extremities;strength is WFL  -AV       Row Name 02/19/25 1400          Bed Mobility    Bed Mobility supine-sit;sit-supine  -AV     Supine-Sit [place] (Bed Mobility) minimum assist (75% patient effort)  -AV     Sit-Supine [place] (Bed Mobility) minimum assist (75% patient effort)  -AV       Row Name 02/19/25 1400          Transfers    Comment, (Transfers) Further transfers deferred for safety  -AV       Row Name 02/19/25 1400          Safety Issues/Impairments Affecting Functional Mobility    Impairments Affecting Function (Mobility) balance;endurance/activity tolerance;strength  -AV       Row Name 02/19/25 1400          Balance    Balance Assessment sitting dynamic balance  -AV     Dynamic Sitting Balance contact guard  -AV     Position, Sitting Balance supported;sitting edge of bed  -AV       Row Name             Wound 02/11/25 1338 Right posterior foot unspecified    Wound - Properties Group Placement Date: 02/11/25  -KE Placement Time: 1338  -KE Side: Right  -KE Orientation: posterior  -KE Location: foot  -KE Type: unspecified  -KE    Retired Wound - Properties Group Placement Date: 02/11/25  -KE Placement Time: 1338  -KE Side: Right  -KE Orientation: posterior  -KE Location: foot  -KE Type: unspecified  -KE    Retired Wound - Properties Group Placement Date: 02/11/25  -KE Placement Time: 1338  -KE Side: Right  -KE Orientation: posterior  -KE Location: foot  -KE Type: unspecified  -KE    Retired Wound -  Properties Group Date first assessed: 02/11/25  -KE Time first assessed: 1338  -KE Side: Right  -KE Location: foot  -KE Type: unspecified  -KE      Row Name             Wound 02/11/25 1338 Left medial great toe unspecified    Wound - Properties Group Placement Date: 02/11/25  -KE Placement Time: 1338  -KE Side: Left  -KE Orientation: medial  -KE Location: great toe  -KE Type: unspecified  -KE    Retired Wound - Properties Group Placement Date: 02/11/25  -KE Placement Time: 1338  -KE Side: Left  -KE Orientation: medial  -KE Location: great toe  -KE Type: unspecified  -KE    Retired Wound - Properties Group Placement Date: 02/11/25  -KE Placement Time: 1338  -KE Side: Left  -KE Orientation: medial  -KE Location: great toe  -KE Type: unspecified  -KE    Retired Wound - Properties Group Date first assessed: 02/11/25  -KE Time first assessed: 1338  -KE Side: Left  -KE Location: great toe  -KE Type: unspecified  -KE      Row Name 02/19/25 1400          Plan of Care Review    Plan of Care Reviewed With patient  -AV     Progress no change  -AV     Outcome Evaluation Patient presents with deficits in balance, endurance, transfers, and ambulation. Patient will benefit from skilled PT services to address these mobility deficits and decrease risk of falls.  -AV       Row Name 02/19/25 1400          Therapy Assessment/Plan (PT)    Rehab Potential (PT) good  -AV     Criteria for Skilled Interventions Met (PT) yes;meets criteria  -AV     Therapy Frequency (PT) daily  -AV     Predicted Duration of Therapy Intervention (PT) 10 days  -AV     Problem List (PT) problems related to;balance;mobility;strength  -AV     Activity Limitations Related to Problem List (PT) unable to transfer safely;unable to ambulate safely  -AV       Row Name 02/19/25 1400          PT Evaluation Complexity    History, PT Evaluation Complexity 1-2 personal factors and/or comorbidities  -AV     Examination of Body Systems (PT Eval Complexity) total of 4 or more  elements  -AV     Clinical Presentation (PT Evaluation Complexity) stable  -AV     Clinical Decision Making (PT Evaluation Complexity) low complexity  -AV     Overall Complexity (PT Evaluation Complexity) low complexity  -AV       Row Name 02/19/25 1400          Therapy Plan Review/Discharge Plan (PT)    Therapy Plan Review (PT) evaluation/treatment results reviewed;patient  -AV       Row Name 02/19/25 1400          Physical Therapy Goals    Bed Mobility Goal Selection (PT) bed mobility, PT goal 1  -AV     Transfer Goal Selection (PT) transfer, PT goal 1  -AV     Gait Training Goal Selection (PT) gait training, PT goal 1  -AV       Row Name 02/19/25 1400          Bed Mobility Goal 1 (PT)    Activity/Assistive Device (Bed Mobility Goal 1, PT) sit to supine/supine to sit  -AV     [place] Level/Cues Needed (Bed Mobility Goal 1, PT) modified independence  -AV     Time Frame (Bed Mobility Goal 1, PT) 10 days  -AV       Row Name 02/19/25 1400          Transfer Goal 1 (PT)    Activity/Assistive Device (Transfer Goal 1, PT) sit-to-stand/stand-to-sit;bed-to-chair/chair-to-bed;walker, rolling  -AV     [place] Level/Cues Needed (Transfer Goal 1, PT) modified independence  -AV     Time Frame (Transfer Goal 1, PT) 10 days  -AV       Row Name 02/19/25 1400          Gait Training Goal 1 (PT)    Activity/Assistive Device (Gait Training Goal 1, PT) gait (walking locomotion);assistive device use;walker, rolling  -AV     [place] Level (Gait Training Goal 1, PT) modified independence  -AV     Distance (Gait Training Goal 1, PT) 200  -AV     Time Frame (Gait Training Goal 1, PT) 10 days  -AV               User Key  (r) = Recorded By, (t) = Taken By, (c) = Cosigned By      Initials Name Provider Type    Chritsie Isaac, RN Registered Nurse    Kelvin Benavides, PT Physical Therapist                    Physical Therapy Education       Title: PT OT SLP Therapies (In Progress)       Topic: Physical Therapy (In Progress)        Point: Mobility training (Done)       Learning Progress Summary            Patient Acceptance, E,TB, VU by AV at 2/19/2025 1451                      Point: Home exercise program (Not Started)       Learner Progress:  Not documented in this visit.              Point: Body mechanics (Done)       Learning Progress Summary            Patient Acceptance, E,TB, VU by AV at 2/19/2025 1451                      Point: Precautions (Done)       Learning Progress Summary            Patient Acceptance, E,TB, VU by AV at 2/19/2025 1451                                      User Key       Initials Effective Dates Name Provider Type Discipline    AV 06/11/21 -  Kelvin Trejo, PT Physical Therapist PT                  PT Recommendation and Plan  Anticipated Discharge Disposition (PT): inpatient rehabilitation facility  Planned Therapy Interventions (PT): balance training, bed mobility training, gait training, home exercise program, neuromuscular re-education, strengthening, transfer training  Therapy Frequency (PT): daily  Plan of Care Reviewed With: patient  Progress: no change  Outcome Evaluation: Patient presents with deficits in balance, endurance, transfers, and ambulation. Patient will benefit from skilled PT services to address these mobility deficits and decrease risk of falls.   Outcome Measures       Row Name 02/19/25 1400             How much help from another person do you currently need...    Turning from your back to your side while in flat bed without using bedrails? 3  -AV      Moving from lying on back to sitting on the side of a flat bed without bedrails? 3  -AV      Moving to and from a bed to a chair (including a wheelchair)? 3  -AV      Standing up from a chair using your arms (e.g., wheelchair, bedside chair)? 3  -AV      Climbing 3-5 steps with a railing? 2  -AV      To walk in hospital room? 3  -AV      AM-PAC 6 Clicks Score (PT) 17  -AV         Functional Assessment    Outcome Measure Options AM-PAC  6 Clicks Basic Mobility (PT)  -AV                User Key  (r) = Recorded By, (t) = Taken By, (c) = Cosigned By      Initials Name Provider Type    Kelvin Benavides, PT Physical Therapist                     Time Calculation:    PT Charges       Row Name 02/19/25 1451             Time Calculation    PT Received On 02/19/25  -AV      PT Goal Re-Cert Due Date 02/28/25  -AV         Untimed Charges    PT Eval/Re-eval Minutes 35  -AV         Total Minutes    Untimed Charges Total Minutes 35  -AV       Total Minutes 35  -AV                User Key  (r) = Recorded By, (t) = Taken By, (c) = Cosigned By      Initials Name Provider Type    Kelvin Benavides, PT Physical Therapist                  Therapy Charges for Today       Code Description Service Date Service Provider Modifiers Qty    13284649037 HC PT EVAL LOW COMPLEXITY 3 2/19/2025 Kelvin Trejo, PT GP 1            PT G-Codes  Outcome Measure Options: AM-PAC 6 Clicks Basic Mobility (PT)  AM-PAC 6 Clicks Score (PT): 17    Kelvin Trejo, PT  2/19/2025

## 2025-02-19 NOTE — PLAN OF CARE
Goal Outcome Evaluation:  Plan of Care Reviewed With: patient        Progress: no change  Outcome Evaluation: Patient presents with deficits in balance, endurance, transfers, and ambulation. Patient will benefit from skilled PT services to address these mobility deficits and decrease risk of falls.    Anticipated Discharge Disposition (PT): inpatient rehabilitation facility

## 2025-02-19 NOTE — PROGRESS NOTES
Pulmonary / Critical Care Progress Note      Patient Name: Mary Albarran  : 1950  MRN: [id number]  Attending:  Campos Bobo MD  Date of admission: 2025    Subjective   Subjective   Follow-up for hypoxic respiratory failure    Over past 24 hours:  Doing fair this morning  On 3 L nasal cannula  Reports feeling better overall  Having some nonproductive cough  Has generalized weakness    Review of Systems  General: + Weakness, fatigue; denied complaints  Cardiovascular:  Denied complaints  Respiratory: Denied complaints  Gastrointestinal: Denied complaints        Objective   Objective     Vitals:   Temp:  [97.3 °F (36.3 °C)-99 °F (37.2 °C)] 97.9 °F (36.6 °C)  Heart Rate:  [73-95] 80  Resp:  [16-20] 20  BP: (121-173)/(51-77) 173/68  Flow (L/min) (Oxygen Therapy):  [3] 3    Physical Exam   Vital Signs Reviewed   General:  WDWN, Alert, NAD. Lying in bed  HEENT:  PERRL, EOMI.  OP, nares clear  Chest:  good aeration, clear to auscultation bilaterally, no work of breathing noted on 3 L nasal cannula  CV: RRR, no MGR, pulses 2+, equal.  Abd:  Soft, NT, ND, + BS, obese  EXT:  no clubbing, no cyanosis, bilateral lower extremity edema  Neuro:  A&Ox3, CN grossly intact, no focal deficits.  Skin: No rashes or lesions noted      Result Review    Result Review:  I have personally reviewed the results from the time of this admission to 2025 08:31 EST and agree with these findings:  []  Laboratory  []  Microbiology  []  Radiology  []  EKG/Telemetry   []  Cardiology/Vascular   []  Pathology  []  Old records  []  Other:  Most notable findings include:   -     Assessment & Plan   Assessment / Plan     Active Hospital Problems:  Active Hospital Problems    Diagnosis     **Atrial fibrillation with RVR     Acute on chronic HFrEF (heart failure with reduced ejection fraction)     Aortic stenosis, moderate          Impression:  Acute hypoxic respiratory failure  Septic shock, resolved  Bacteroides bacteremia  Right  lower extremity cellulitis  Atrial fibrillation on Xarelto    Plan:  Continue to wean oxygen.  Patient uses 2 L at baseline  Continue antibiotics for bacteremia for total of 2 weeks  Continue Zosyn for total 14 days  Repeat cultures no growth to date  Surgery following status post cholecystostomy tube placement  proBNP remain elevated greater than 11,000   Continue diuretics as tolerated.  Monitor renal function electrolytes.  Pain control per primary  Pulmonary will sign off for now.  Please call with questions    VTE Prophylaxis:  Pharmacologic & mechanical VTE prophylaxis orders are present.        CODE STATUS:   Level Of Support Discussed With: Health Care Surrogate  Code Status (Patient has no pulse and is not breathing): CPR (Attempt to Resuscitate)  Medical Interventions (Patient has pulse or is breathing): Full Support      Labs, images, and medications personally reviewed.    Discussed with patient  Electronically signed by Mau Farmer MD, 02/19/25, 3:09 PM EST.

## 2025-02-19 NOTE — PLAN OF CARE
Goal Outcome Evaluation:              Outcome Evaluation: Patient A&Ox4, VSS. Administered scheduled medications per MAR as well as PRN pain medication. Wound dressings clean, dry and intact. Biliary has dark yellow/red output this shift totaling x cc. No need at this time. Plan of care ongoing.          Biliary drainage of 25mL concentrated yellow fluid this shift.

## 2025-02-19 NOTE — PLAN OF CARE
Goal Outcome Evaluation:  Plan of Care Reviewed With: patient        Progress: no change  Outcome Evaluation: Assumed care of patient @ 1640. Patient alert and oriented x4. No complaints of pain or nausea. Sugar elevated with dinner, treated with SSI. Wound care unable to be completed while under this nurse's care, passed along to nightshift RN.

## 2025-02-20 LAB
ANION GAP SERPL CALCULATED.3IONS-SCNC: 9.3 MMOL/L (ref 5–15)
BASOPHILS # BLD AUTO: 0.09 10*3/MM3 (ref 0–0.2)
BASOPHILS NFR BLD AUTO: 1.3 % (ref 0–1.5)
BUN SERPL-MCNC: 10 MG/DL (ref 8–23)
BUN/CREAT SERPL: 11.4 (ref 7–25)
CALCIUM SPEC-SCNC: 8.6 MG/DL (ref 8.6–10.5)
CHLORIDE SERPL-SCNC: 92 MMOL/L (ref 98–107)
CO2 SERPL-SCNC: 33.7 MMOL/L (ref 22–29)
CREAT SERPL-MCNC: 0.88 MG/DL (ref 0.57–1)
DEPRECATED RDW RBC AUTO: 46.8 FL (ref 37–54)
EGFRCR SERPLBLD CKD-EPI 2021: 69.9 ML/MIN/1.73
EOSINOPHIL # BLD AUTO: 0.16 10*3/MM3 (ref 0–0.4)
EOSINOPHIL NFR BLD AUTO: 2.4 % (ref 0.3–6.2)
ERYTHROCYTE [DISTWIDTH] IN BLOOD BY AUTOMATED COUNT: 13.6 % (ref 12.3–15.4)
GLUCOSE BLDC GLUCOMTR-MCNC: 181 MG/DL (ref 70–99)
GLUCOSE BLDC GLUCOMTR-MCNC: 213 MG/DL (ref 70–99)
GLUCOSE BLDC GLUCOMTR-MCNC: 295 MG/DL (ref 70–99)
GLUCOSE BLDC GLUCOMTR-MCNC: 305 MG/DL (ref 70–99)
GLUCOSE SERPL-MCNC: 212 MG/DL (ref 65–99)
HCT VFR BLD AUTO: 44 % (ref 34–46.6)
HGB BLD-MCNC: 13.6 G/DL (ref 12–15.9)
IMM GRANULOCYTES # BLD AUTO: 0.24 10*3/MM3 (ref 0–0.05)
IMM GRANULOCYTES NFR BLD AUTO: 3.6 % (ref 0–0.5)
LYMPHOCYTES # BLD AUTO: 1.15 10*3/MM3 (ref 0.7–3.1)
LYMPHOCYTES NFR BLD AUTO: 17.2 % (ref 19.6–45.3)
MAGNESIUM SERPL-MCNC: 1.7 MG/DL (ref 1.6–2.4)
MCH RBC QN AUTO: 28.8 PG (ref 26.6–33)
MCHC RBC AUTO-ENTMCNC: 30.9 G/DL (ref 31.5–35.7)
MCV RBC AUTO: 93.2 FL (ref 79–97)
MONOCYTES # BLD AUTO: 0.65 10*3/MM3 (ref 0.1–0.9)
MONOCYTES NFR BLD AUTO: 9.7 % (ref 5–12)
NEUTROPHILS NFR BLD AUTO: 4.38 10*3/MM3 (ref 1.7–7)
NEUTROPHILS NFR BLD AUTO: 65.8 % (ref 42.7–76)
NRBC BLD AUTO-RTO: 0 /100 WBC (ref 0–0.2)
PHOSPHATE SERPL-MCNC: 3.6 MG/DL (ref 2.5–4.5)
PLATELET # BLD AUTO: 169 10*3/MM3 (ref 140–450)
PMV BLD AUTO: 12 FL (ref 6–12)
POTASSIUM SERPL-SCNC: 3.4 MMOL/L (ref 3.5–5.2)
RBC # BLD AUTO: 4.72 10*6/MM3 (ref 3.77–5.28)
SODIUM SERPL-SCNC: 135 MMOL/L (ref 136–145)
WBC NRBC COR # BLD AUTO: 6.67 10*3/MM3 (ref 3.4–10.8)

## 2025-02-20 PROCEDURE — 84100 ASSAY OF PHOSPHORUS: CPT | Performed by: INTERNAL MEDICINE

## 2025-02-20 PROCEDURE — 85025 COMPLETE CBC W/AUTO DIFF WBC: CPT | Performed by: INTERNAL MEDICINE

## 2025-02-20 PROCEDURE — 63710000001 INSULIN GLARGINE PER 5 UNITS: Performed by: NURSE PRACTITIONER

## 2025-02-20 PROCEDURE — 25010000002 FUROSEMIDE PER 20 MG: Performed by: INTERNAL MEDICINE

## 2025-02-20 PROCEDURE — 82948 REAGENT STRIP/BLOOD GLUCOSE: CPT

## 2025-02-20 PROCEDURE — 25010000002 MAGNESIUM SULFATE 2 GM/50ML SOLUTION: Performed by: INTERNAL MEDICINE

## 2025-02-20 PROCEDURE — 83735 ASSAY OF MAGNESIUM: CPT | Performed by: INTERNAL MEDICINE

## 2025-02-20 PROCEDURE — 25010000002 PIPERACILLIN SOD-TAZOBACTAM PER 1 G: Performed by: INTERNAL MEDICINE

## 2025-02-20 PROCEDURE — 82948 REAGENT STRIP/BLOOD GLUCOSE: CPT | Performed by: INTERNAL MEDICINE

## 2025-02-20 PROCEDURE — 80048 BASIC METABOLIC PNL TOTAL CA: CPT | Performed by: INTERNAL MEDICINE

## 2025-02-20 PROCEDURE — 99232 SBSQ HOSP IP/OBS MODERATE 35: CPT | Performed by: INTERNAL MEDICINE

## 2025-02-20 PROCEDURE — 63710000001 INSULIN LISPRO (HUMAN) PER 5 UNITS: Performed by: INTERNAL MEDICINE

## 2025-02-20 RX ORDER — MINERAL OIL/HYDROPHIL PETROLAT
1 OINTMENT (GRAM) TOPICAL
Status: DISCONTINUED | OUTPATIENT
Start: 2025-02-20 | End: 2025-02-28 | Stop reason: HOSPADM

## 2025-02-20 RX ORDER — PREGABALIN 100 MG/1
100 CAPSULE ORAL 2 TIMES DAILY
Status: DISCONTINUED | OUTPATIENT
Start: 2025-02-20 | End: 2025-02-28 | Stop reason: HOSPADM

## 2025-02-20 RX ORDER — POTASSIUM CHLORIDE 750 MG/1
40 CAPSULE, EXTENDED RELEASE ORAL EVERY 4 HOURS
Status: COMPLETED | OUTPATIENT
Start: 2025-02-20 | End: 2025-02-20

## 2025-02-20 RX ORDER — MAGNESIUM SULFATE HEPTAHYDRATE 40 MG/ML
2 INJECTION, SOLUTION INTRAVENOUS ONCE
Status: COMPLETED | OUTPATIENT
Start: 2025-02-20 | End: 2025-02-20

## 2025-02-20 RX ADMIN — INSULIN LISPRO 10 UNITS: 100 INJECTION, SOLUTION INTRAVENOUS; SUBCUTANEOUS at 21:16

## 2025-02-20 RX ADMIN — INSULIN LISPRO 5 UNITS: 100 INJECTION, SOLUTION INTRAVENOUS; SUBCUTANEOUS at 11:34

## 2025-02-20 RX ADMIN — FUROSEMIDE 40 MG: 10 INJECTION, SOLUTION INTRAMUSCULAR; INTRAVENOUS at 07:56

## 2025-02-20 RX ADMIN — INSULIN GLARGINE 10 UNITS: 100 INJECTION, SOLUTION SUBCUTANEOUS at 07:56

## 2025-02-20 RX ADMIN — Medication: at 11:26

## 2025-02-20 RX ADMIN — INSULIN LISPRO 8 UNITS: 100 INJECTION, SOLUTION INTRAVENOUS; SUBCUTANEOUS at 17:28

## 2025-02-20 RX ADMIN — WHITE PETROLATUM 1 APPLICATION: 1.75 OINTMENT TOPICAL at 04:40

## 2025-02-20 RX ADMIN — POTASSIUM CHLORIDE 40 MEQ: 750 CAPSULE, EXTENDED RELEASE ORAL at 15:05

## 2025-02-20 RX ADMIN — FUROSEMIDE 40 MG: 10 INJECTION, SOLUTION INTRAMUSCULAR; INTRAVENOUS at 17:27

## 2025-02-20 RX ADMIN — POTASSIUM CHLORIDE 20 MEQ: 1.5 POWDER, FOR SOLUTION ORAL at 07:57

## 2025-02-20 RX ADMIN — PIPERACILLIN AND TAZOBACTAM 4.5 G: 4; .5 INJECTION, POWDER, FOR SOLUTION INTRAVENOUS; PARENTERAL at 11:27

## 2025-02-20 RX ADMIN — Medication: at 21:17

## 2025-02-20 RX ADMIN — PREGABALIN 100 MG: 100 CAPSULE ORAL at 20:31

## 2025-02-20 RX ADMIN — OXYCODONE HYDROCHLORIDE 15 MG: 5 TABLET ORAL at 03:29

## 2025-02-20 RX ADMIN — SPIRONOLACTONE 25 MG: 25 TABLET ORAL at 07:56

## 2025-02-20 RX ADMIN — ASPIRIN 81 MG: 81 TABLET, COATED ORAL at 07:56

## 2025-02-20 RX ADMIN — MAGNESIUM SULFATE HEPTAHYDRATE 2 G: 40 INJECTION, SOLUTION INTRAVENOUS at 11:27

## 2025-02-20 RX ADMIN — OXYCODONE HYDROCHLORIDE 10 MG: 5 TABLET ORAL at 15:05

## 2025-02-20 RX ADMIN — ATORVASTATIN CALCIUM 20 MG: 20 TABLET, FILM COATED ORAL at 20:31

## 2025-02-20 RX ADMIN — PIPERACILLIN AND TAZOBACTAM 4.5 G: 4; .5 INJECTION, POWDER, FOR SOLUTION INTRAVENOUS; PARENTERAL at 19:32

## 2025-02-20 RX ADMIN — SACUBITRIL AND VALSARTAN 1 TABLET: 24; 26 TABLET, FILM COATED ORAL at 20:31

## 2025-02-20 RX ADMIN — POTASSIUM CHLORIDE 20 MEQ: 1.5 POWDER, FOR SOLUTION ORAL at 20:31

## 2025-02-20 RX ADMIN — FAMOTIDINE 20 MG: 10 INJECTION, SOLUTION INTRAVENOUS at 07:56

## 2025-02-20 RX ADMIN — POTASSIUM CHLORIDE 40 MEQ: 750 CAPSULE, EXTENDED RELEASE ORAL at 11:27

## 2025-02-20 RX ADMIN — RIVAROXABAN 20 MG: 20 TABLET, FILM COATED ORAL at 17:28

## 2025-02-20 RX ADMIN — LIDOCAINE 2 PATCH: 4 PATCH TOPICAL at 07:57

## 2025-02-20 RX ADMIN — SACUBITRIL AND VALSARTAN 1 TABLET: 24; 26 TABLET, FILM COATED ORAL at 07:56

## 2025-02-20 RX ADMIN — Medication 10 ML: at 19:34

## 2025-02-20 RX ADMIN — PIPERACILLIN AND TAZOBACTAM 4.5 G: 4; .5 INJECTION, POWDER, FOR SOLUTION INTRAVENOUS; PARENTERAL at 04:40

## 2025-02-20 RX ADMIN — Medication 10 ML: at 07:57

## 2025-02-20 RX ADMIN — INSULIN LISPRO 3 UNITS: 100 INJECTION, SOLUTION INTRAVENOUS; SUBCUTANEOUS at 07:54

## 2025-02-20 RX ADMIN — METOPROLOL SUCCINATE 50 MG: 50 TABLET, FILM COATED, EXTENDED RELEASE ORAL at 07:56

## 2025-02-20 RX ADMIN — FAMOTIDINE 20 MG: 10 INJECTION, SOLUTION INTRAVENOUS at 20:31

## 2025-02-20 NOTE — PLAN OF CARE
Goal Outcome Evaluation:  Plan of Care Reviewed With: patient        Progress: improving  Outcome Evaluation: Patient encouraged to sit in chair for lunch and dinner. Patient still debating rehab vs. home. Pain medication given x1; effective per patient. O2 weaned to baseline of 2L per NC.

## 2025-02-20 NOTE — PLAN OF CARE
Goal Outcome Evaluation:              Outcome Evaluation: Patient A&Ox4, VSS this shift. Administered scheduled medications per MAR as well as PRN pain medication x2. No significant changes this shift. Plan of care ongoing.

## 2025-02-20 NOTE — PROGRESS NOTES
[facility]   Hospitalist Progress Note  Date: 2025  Patient Name: Mary Albarran  : 1950  MRN: [id number]  Date of admission: 2025      Subjective   Subjective     Chief Complaint: Altered mental status    Summary: 74 y.o. female with history of A-fib on Xarelto, CKD stage III, type 2 diabetes initially presented to outside facility with decreased responsiveness.  Patient was found to be hypotensive, febrile and with elevated lactate.  Concern for infection.  Was given 2.5 L of IV fluids also had A-fib with RVR initially started on Cardizem however developed hypotension.  Did have CT scan of the abdomen pelvis which showed some gallbladder thickening concerning for cholelithiasis.  Was transferred to our facility for critical care management.  General surgery was consulted and did recommend cholecystostomy tube which was placed by IR.  Fluid cultures returned negative.  She was kept on broad-spectrum IV antibiotics, did have significant right lower extremity cellulitis which was slow to improve.  1 blood culture positive for Bacteroides species.  Will need to complete 10 days 14 days of antibiotic therapy for cellulitis and bacteremia.  Had issues with A-fib with RVR for which cardiology was consulted.  2D echo revealed drop in EF and valvular heart disease with moderate aortic stenosis.  Will need follow-up with comprehensive valve center on an outpatient basis for management of valvular heart disease.  Heart rates were controlled with IV and ultimately oral medications.  May need rehab at discharge     Interval Followup: Seen and examined patient this morning. No acute overnight events. Volume status improving. Back to 2L NC baseline. States she feels a lot better today. Encouraged ambulation.        Objective   Objective     Vitals:   Temp:  [97.9 °F (36.6 °C)-98.4 °F (36.9 °C)] 98.1 °F (36.7 °C)  Heart Rate:  [73-92] 89  Resp:  [14-20] 20  BP: (123-150)/(53-69) 126/53  Flow (L/min) (Oxygen  Therapy):  [3] 3    Physical Exam   GEN: No acute distress  HEENT: Moist mucous membranes  LUNGS: Equal chest rise bilaterally  CARDIAC: Regular rate and rhythm  NEURO: Moving all 4 extremities spontaneously  SKIN: No obvious breakdown.  Right lower extremity cellulitis  ABD: S/P cholecystostomy tube     Result Review    Result Review:  I have personally reviewed the results from the time of this admission to 2/20/2025 14:51 EST and agree with these findings:  []  Laboratory  []  Microbiology  []  Radiology  []  EKG/Telemetry   []  Cardiology/Vascular   []  Pathology  []  Old records  []  Other:    Assessment & Plan   Assessment / Plan     Assessment:  Septic shock  Streptococcal bacteremia  Gram-negative bacteremia  Concern for acute cholecystitis  Concern for right lower extremity cellulitis  Acute metabolic encephalopathy secondary to above  Acute on chronic systolic congestive heart failure with acute exacerbation  Moderate aortic regurgitation with moderate aortic stenosis  Chronic atrial fibrillation with RVR  NSTEMI type II  Acute kidney injury  Hypomagnesemia  Lactic acidosis, clinically significant  Type 2 diabetes mellitus      Plan:  Continue to monitor in the hospital for the management of the above  Appreciate recommendations from general surgery, cardiology and pulmonology  S/p cholecystostomy tube placement--Will need follow up with general surgery as outpatient for tube removal  Continue Zosyn day 10 of 14  Blood cultures showing no growth to date  Continue Lasix 40  Continue Entresto and Aldactone  Continue aspirin, digoxin and Toprol  Continue Xarelto  SSI  Continue pain control  PT/OT  Electrolyte replacement  CBC, CMP reviewed--monitor creatinine and electrolytes  Patient will need to have outpatient referral to a comprehensive valve center for management Dr. Owen heart disease     Discussed plan with RN.    VTE Prophylaxis:  Pharmacologic & mechanical VTE prophylaxis orders are  present.        CODE STATUS:   Level Of Support Discussed With: Health Care Surrogate  Code Status (Patient has no pulse and is not breathing): CPR (Attempt to Resuscitate)  Medical Interventions (Patient has pulse or is breathing): Full Support      Electronically signed by Yesica Luke PA-C, 02/20/25, 2:53 PM EST.     Patient independently seen and evaluated, agree with assessment and plan, above documentation reflects plan put forth during bedside rounds.  More than 51% of the time of this patient encounter was performed by me.     Interval history:  No acute events overnight, patient states she is feeling better     GEN: No acute distress  HEENT: Moist mucous membranes  LUNGS: Equal chest rise bilaterally  CARDIAC: Regular rate and rhythm  NEURO: Moving all 4 extremities spontaneously     Plan:  Agree with assessment plan as above  Continue cholecystostomy tube, will follow-up with general surgery as an outpatient  Continue Zosyn with plans for 14 days  Continue Lasix, volume status improving  Continue Entresto, Aldactone  Continue Xarelto  Wound care consult for lower extremity wounds  CBC, CMP reviewed 2/20/2025   Repeat CBC, CMP, mag and Phos in a.m. 2/20/2025       Electronically signed by Campos Bobo MD, 2/20/2025, 15:35 EST.

## 2025-02-21 LAB
ANION GAP SERPL CALCULATED.3IONS-SCNC: 11.8 MMOL/L (ref 5–15)
BASOPHILS # BLD AUTO: 0.09 10*3/MM3 (ref 0–0.2)
BASOPHILS NFR BLD AUTO: 1.2 % (ref 0–1.5)
BUN SERPL-MCNC: 16 MG/DL (ref 8–23)
BUN/CREAT SERPL: 12.2 (ref 7–25)
CALCIUM SPEC-SCNC: 9.1 MG/DL (ref 8.6–10.5)
CHLORIDE SERPL-SCNC: 91 MMOL/L (ref 98–107)
CO2 SERPL-SCNC: 28.2 MMOL/L (ref 22–29)
CREAT SERPL-MCNC: 1.31 MG/DL (ref 0.57–1)
DEPRECATED RDW RBC AUTO: 46.2 FL (ref 37–54)
EGFRCR SERPLBLD CKD-EPI 2021: 43.4 ML/MIN/1.73
EOSINOPHIL # BLD AUTO: 0.13 10*3/MM3 (ref 0–0.4)
EOSINOPHIL NFR BLD AUTO: 1.7 % (ref 0.3–6.2)
ERYTHROCYTE [DISTWIDTH] IN BLOOD BY AUTOMATED COUNT: 13.6 % (ref 12.3–15.4)
GLUCOSE BLDC GLUCOMTR-MCNC: 238 MG/DL (ref 70–99)
GLUCOSE BLDC GLUCOMTR-MCNC: 311 MG/DL (ref 70–99)
GLUCOSE SERPL-MCNC: 238 MG/DL (ref 65–99)
HCT VFR BLD AUTO: 49.5 % (ref 34–46.6)
HGB BLD-MCNC: 15.3 G/DL (ref 12–15.9)
IMM GRANULOCYTES # BLD AUTO: 0.14 10*3/MM3 (ref 0–0.05)
IMM GRANULOCYTES NFR BLD AUTO: 1.8 % (ref 0–0.5)
LYMPHOCYTES # BLD AUTO: 1.73 10*3/MM3 (ref 0.7–3.1)
LYMPHOCYTES NFR BLD AUTO: 22.7 % (ref 19.6–45.3)
MAGNESIUM SERPL-MCNC: 2 MG/DL (ref 1.6–2.4)
MCH RBC QN AUTO: 28.6 PG (ref 26.6–33)
MCHC RBC AUTO-ENTMCNC: 30.9 G/DL (ref 31.5–35.7)
MCV RBC AUTO: 92.5 FL (ref 79–97)
MONOCYTES # BLD AUTO: 0.86 10*3/MM3 (ref 0.1–0.9)
MONOCYTES NFR BLD AUTO: 11.3 % (ref 5–12)
NEUTROPHILS NFR BLD AUTO: 4.66 10*3/MM3 (ref 1.7–7)
NEUTROPHILS NFR BLD AUTO: 61.3 % (ref 42.7–76)
NRBC BLD AUTO-RTO: 0 /100 WBC (ref 0–0.2)
PHOSPHATE SERPL-MCNC: 3.8 MG/DL (ref 2.5–4.5)
PLATELET # BLD AUTO: 183 10*3/MM3 (ref 140–450)
PMV BLD AUTO: 12.5 FL (ref 6–12)
POTASSIUM SERPL-SCNC: 4.5 MMOL/L (ref 3.5–5.2)
RBC # BLD AUTO: 5.35 10*6/MM3 (ref 3.77–5.28)
SODIUM SERPL-SCNC: 131 MMOL/L (ref 136–145)
WBC NRBC COR # BLD AUTO: 7.61 10*3/MM3 (ref 3.4–10.8)

## 2025-02-21 PROCEDURE — 97165 OT EVAL LOW COMPLEX 30 MIN: CPT

## 2025-02-21 PROCEDURE — 63710000001 INSULIN GLARGINE PER 5 UNITS: Performed by: NURSE PRACTITIONER

## 2025-02-21 PROCEDURE — 99233 SBSQ HOSP IP/OBS HIGH 50: CPT | Performed by: INTERNAL MEDICINE

## 2025-02-21 PROCEDURE — 83735 ASSAY OF MAGNESIUM: CPT | Performed by: INTERNAL MEDICINE

## 2025-02-21 PROCEDURE — 63710000001 INSULIN LISPRO (HUMAN) PER 5 UNITS: Performed by: INTERNAL MEDICINE

## 2025-02-21 PROCEDURE — 25010000002 FUROSEMIDE PER 20 MG: Performed by: INTERNAL MEDICINE

## 2025-02-21 PROCEDURE — 82948 REAGENT STRIP/BLOOD GLUCOSE: CPT

## 2025-02-21 PROCEDURE — 85025 COMPLETE CBC W/AUTO DIFF WBC: CPT | Performed by: INTERNAL MEDICINE

## 2025-02-21 PROCEDURE — 80048 BASIC METABOLIC PNL TOTAL CA: CPT | Performed by: INTERNAL MEDICINE

## 2025-02-21 PROCEDURE — 84100 ASSAY OF PHOSPHORUS: CPT | Performed by: INTERNAL MEDICINE

## 2025-02-21 PROCEDURE — 82948 REAGENT STRIP/BLOOD GLUCOSE: CPT | Performed by: INTERNAL MEDICINE

## 2025-02-21 PROCEDURE — 25010000002 PIPERACILLIN SOD-TAZOBACTAM PER 1 G: Performed by: INTERNAL MEDICINE

## 2025-02-21 RX ADMIN — Medication: at 11:24

## 2025-02-21 RX ADMIN — OXYCODONE HYDROCHLORIDE 15 MG: 5 TABLET ORAL at 21:06

## 2025-02-21 RX ADMIN — METOPROLOL SUCCINATE 50 MG: 50 TABLET, FILM COATED, EXTENDED RELEASE ORAL at 21:06

## 2025-02-21 RX ADMIN — FAMOTIDINE 20 MG: 10 INJECTION, SOLUTION INTRAVENOUS at 08:00

## 2025-02-21 RX ADMIN — INSULIN LISPRO 8 UNITS: 100 INJECTION, SOLUTION INTRAVENOUS; SUBCUTANEOUS at 12:11

## 2025-02-21 RX ADMIN — PIPERACILLIN AND TAZOBACTAM 4.5 G: 4; .5 INJECTION, POWDER, FOR SOLUTION INTRAVENOUS; PARENTERAL at 12:12

## 2025-02-21 RX ADMIN — INSULIN GLARGINE 10 UNITS: 100 INJECTION, SOLUTION SUBCUTANEOUS at 08:00

## 2025-02-21 RX ADMIN — INSULIN LISPRO 5 UNITS: 100 INJECTION, SOLUTION INTRAVENOUS; SUBCUTANEOUS at 21:07

## 2025-02-21 RX ADMIN — RIVAROXABAN 20 MG: 20 TABLET, FILM COATED ORAL at 17:27

## 2025-02-21 RX ADMIN — Medication: at 21:13

## 2025-02-21 RX ADMIN — FAMOTIDINE 20 MG: 10 INJECTION, SOLUTION INTRAVENOUS at 21:06

## 2025-02-21 RX ADMIN — INSULIN LISPRO 10 UNITS: 100 INJECTION, SOLUTION INTRAVENOUS; SUBCUTANEOUS at 07:55

## 2025-02-21 RX ADMIN — PREGABALIN 100 MG: 100 CAPSULE ORAL at 21:07

## 2025-02-21 RX ADMIN — ASPIRIN 81 MG: 81 TABLET, COATED ORAL at 08:01

## 2025-02-21 RX ADMIN — LIDOCAINE 2 PATCH: 4 PATCH TOPICAL at 08:03

## 2025-02-21 RX ADMIN — METOPROLOL TARTRATE 5 MG: 1 INJECTION, SOLUTION INTRAVENOUS at 21:28

## 2025-02-21 RX ADMIN — FUROSEMIDE 40 MG: 10 INJECTION, SOLUTION INTRAMUSCULAR; INTRAVENOUS at 08:00

## 2025-02-21 RX ADMIN — INSULIN LISPRO 10 UNITS: 100 INJECTION, SOLUTION INTRAVENOUS; SUBCUTANEOUS at 17:27

## 2025-02-21 RX ADMIN — SACUBITRIL AND VALSARTAN 1 TABLET: 24; 26 TABLET, FILM COATED ORAL at 21:06

## 2025-02-21 RX ADMIN — POTASSIUM CHLORIDE 20 MEQ: 1.5 POWDER, FOR SOLUTION ORAL at 21:06

## 2025-02-21 RX ADMIN — Medication 10 ML: at 21:07

## 2025-02-21 RX ADMIN — ATORVASTATIN CALCIUM 20 MG: 20 TABLET, FILM COATED ORAL at 21:07

## 2025-02-21 RX ADMIN — PIPERACILLIN AND TAZOBACTAM 4.5 G: 4; .5 INJECTION, POWDER, FOR SOLUTION INTRAVENOUS; PARENTERAL at 03:53

## 2025-02-21 RX ADMIN — POTASSIUM CHLORIDE 20 MEQ: 1.5 POWDER, FOR SOLUTION ORAL at 08:02

## 2025-02-21 RX ADMIN — SACUBITRIL AND VALSARTAN 1 TABLET: 24; 26 TABLET, FILM COATED ORAL at 08:01

## 2025-02-21 RX ADMIN — OXYCODONE HYDROCHLORIDE 10 MG: 5 TABLET ORAL at 06:35

## 2025-02-21 RX ADMIN — OXYCODONE HYDROCHLORIDE 10 MG: 5 TABLET ORAL at 00:55

## 2025-02-21 RX ADMIN — Medication 10 ML: at 08:02

## 2025-02-21 RX ADMIN — PREGABALIN 100 MG: 100 CAPSULE ORAL at 08:01

## 2025-02-21 NOTE — THERAPY EVALUATION
Patient Name: Mary Albarran  : 1950    MRN: [id number]                              Today's Date: 2025       Admit Date: 2025    Visit Dx:     ICD-10-CM ICD-9-CM   1. Difficulty walking  R26.2 719.7   2. Decreased activities of daily living (ADL)  Z78.9 V49.89     Patient Active Problem List   Diagnosis    Aortic stenosis, moderate    Atrial fibrillation    Chronic kidney disease, stage III (moderate)    Hyperlipidemia    Hypertension    Chronic diastolic congestive heart failure    Atrial fibrillation with RVR    Onychomycosis    Onychocryptosis    Foot pain, bilateral    Nephrolithiasis    Recurrent urinary tract infection    Acute on chronic HFrEF (heart failure with reduced ejection fraction)     Past Medical History:   Diagnosis Date    Allergic rhinitis     Anxiety     Aortic valve disease 2021    Fibrocalcific changes of the aortic valve with mild aortic valve stenosis   on echo 3/2/2021  Moderate aortic valve regurgitation is present. Aortic valve maximum pressure gradient is 26 mmHg. Moderate aortic valve stenosis is present.  On echo 2/10/2022       Arthritis     Atrial fibrillation     Chronic kidney disease (CKD), stage III (moderate)     Chronic pain     COPD (chronic obstructive pulmonary disease)     Diabetes mellitus type 2 with complications     Ex-smoker     QUIT SMOKING     History of home oxygen therapy     2L/NC AAT REPORTED    Hyperlipidemia     Hypertension     Thrombocytopenia      Past Surgical History:   Procedure Laterality Date    HYSTERECTOMY      30 YEARS AGO      General Information       Row Name 25 1043          OT Time and Intention    Document Type evaluation  -AV     Mode of Treatment individual therapy;occupational therapy  -AV       Row Name 25 1043          General Information    Patient Profile Reviewed yes  -AV     Prior Level of Function independent:;ADL's;transfer;all household mobility  stands at sink to groom. uses tub chair and  standard commode. set-up for dressing. ambulates with walker. 2L continuous O2.  -AV     Existing Precautions/Restrictions fall  impaired skin integrity. choeycystosomy tube.  -AV     Barriers to Rehab none identified  -AV       Row Name 02/21/25 1043          Occupational Profile    Reason for Services/Referral (Occupational Profile) Patient is a 74 year old female admitted to [facility] on 2/11/25. She is currently on 3W/ 2L O2. OT consulted due to recent decline in ADL/ transfer independence. No previous OT services for current condition.  -AV       Row Name 02/21/25 1043          Living Environment    People in Home spouse  -AV       Row Name 02/21/25 1043          Home Main Entrance    Number of Stairs, Main Entrance none  ramped entry  -AV       Row Name 02/21/25 1043          Stairs Within Home, Primary    Number of Stairs, Within Home, Primary none  -AV       Row Name 02/21/25 1043          Cognition    Orientation Status (Cognition) --  alert, pleasant and cooperative. able to retain information and follow commands.  -AV       Row Name 02/21/25 1043          Safety Issues/Impairments Affecting Functional Mobility    Impairments Affecting Function (Mobility) balance;endurance/activity tolerance  -AV               User Key  (r) = Recorded By, (t) = Taken By, (c) = Cosigned By      Initials Name Provider Type    Juan Magana, OT Occupational Therapist                     Mobility/ADL's       Row Name 02/21/25 1048          Transfers    Comment, (Transfers) supine to sit min assist. assist of 1 BSC transfer per nursing.  -AV       Row Name 02/21/25 1048          Activities of Daily Living    BADL Assessment/Intervention --  Independent feeding with setup. Min assist grooming with setup in bed. Min-mod assist bathing/ dressing. Assist toilet hygiene using BSC.  -AV               User Key  (r) = Recorded By, (t) = Taken By, (c) = Cosigned By      Initials Name Provider Type    Juan Magana,  "OT Occupational Therapist                   Obj/Interventions       Row Name 02/21/25 1049          Sensory Assessment (Somatosensory)    Sensory Assessment sensory awareness to light touch intact. reports neuropathy \"comes and goes\".  -AV       Row Name 02/21/25 1049          Vision Assessment/Intervention    Visual Impairment/Limitations WFL  -AV     Vision Assessment Comment reports her glasses are broken  -AV       Row Name 02/21/25 1049          Range of Motion Comprehensive    General Range of Motion bilateral upper extremity ROM WFL  -AV     Comment, General Range of Motion AROM  -AV       Row Name 02/21/25 1049          Strength Comprehensive (MMT)    Comment, General Manual Muscle Testing (MMT) Assessment 4/5 bilateral biceps, triceps and   -AV       Row Name 02/21/25 1049          Motor Skills    Motor Skills coordination;functional endurance  -AV     Coordination WFL  right dominant  -AV     Functional Endurance fair minus  -AV       Row Name 02/21/25 1049          Balance    Balance Assessment standing dynamic balance  -AV     Dynamic Sitting Balance 1-person assist  -AV               User Key  (r) = Recorded By, (t) = Taken By, (c) = Cosigned By      Initials Name Provider Type    AV Juan Ireland OT Occupational Therapist                   Goals/Plan       Row Name 02/21/25 1052          Transfer Goal 1 (OT)    Activity/Assistive Device (Transfer Goal 1, OT) transfers, all;walker, rolling  -AV     [place] Level/Cues Needed (Transfer Goal 1, OT) modified independence  -AV     Time Frame (Transfer Goal 1, OT) long term goal (LTG);10 days  -AV       Row Name 02/21/25 1052          Bathing Goal 1 (OT)    Activity/Device (Bathing Goal 1, OT) bathing skills, all;tub bench  -AV     [place] Level/Cues Needed (Bathing Goal 1, OT) modified independence  -AV     Time Frame (Bathing Goal 1, OT) long term goal (LTG);10 days  -AV       Row Name 02/21/25 1052          Dressing Goal 1 (OT)    " Activity/Device (Dressing Goal 1, OT) dressing skills, all  -AV     [place]/Cues Needed (Dressing Goal 1, OT) modified independence  -AV     Time Frame (Dressing Goal 1, OT) long term goal (LTG);10 days  -AV       Row Name 02/21/25 1052          Toileting Goal 1 (OT)    Activity/Device (Toileting Goal 1, OT) toileting skills, all;raised toilet seat  -AV     [place] Level/Cues Needed (Toileting Goal 1, OT) modified independence  -AV     Time Frame (Toileting Goal 1, OT) long term goal (LTG);10 days  -AV       Row Name 02/21/25 1052          Grooming Goal 1 (OT)    Activity/Device (Grooming Goal 1, OT) grooming skills, all  -AV     [place] (Grooming Goal 1, OT) modified independence  -AV     Time Frame (Grooming Goal 1, OT) long term goal (LTG);10 days  -AV       Row Name 02/21/25 1052          Problem Specific Goal 1 (OT)    Problem Specific Goal 1 (OT) Patient will demonstrate fair endurance/activity tolerance needed to support ADLs.  -AV     Time Frame (Problem Specific Goal 1, OT) long term goal (LTG);10 days  -AV       Row Name 02/21/25 1052          Therapy Assessment/Plan (OT)    Planned Therapy Interventions (OT) activity tolerance training;BADL retraining;functional balance retraining;occupation/activity based interventions;patient/caregiver education/training;ROM/therapeutic exercise;transfer/mobility retraining  -AV               User Key  (r) = Recorded By, (t) = Taken By, (c) = Cosigned By      Initials Name Provider Type    AV Juan Ireland, OT Occupational Therapist                   Clinical Impression       Row Name 02/21/25 1050          Pain Assessment    Additional Documentation Pain Scale: FACES Pre/Post-Treatment (Group)  -AV       Row Name 02/21/25 1050          Pain Scale: FACES Pre/Post-Treatment    Pain: FACES Scale, Pretreatment 0-->no hurt  -AV     Posttreatment Pain Rating 0-->no hurt  -AV       [facility] Name 02/21/25 1050          Plan of Care Review    Plan of Care Reviewed  With patient  -AV     Progress no change  first session for evaluation  -AV     Outcome Evaluation Patient presents with limitations of balance and endurance/activity tolerance which are impacting ADL/ transfer independence. Skilled OT services are indicated  to remediate/ compensate for deficits to maximize independence and safety with functional ADL tasks.  -AV       Row Name 02/21/25 1050          Therapy Assessment/Plan (OT)    Patient/Family Therapy Goal Statement (OT) regain independence to return home  -AV     Rehab Potential (OT) good  -AV     Criteria for Skilled Therapeutic Interventions Met (OT) yes;meets criteria;skilled treatment is necessary  -AV     Therapy Frequency (OT) 5 times/wk  -AV       Row Name 02/21/25 1050          Therapy Plan Review/Discharge Plan (OT)    Equipment Needs Upon Discharge (OT) walker, rolling;commode chair;tub bench  -AV     Anticipated Discharge Disposition (OT) inpatient rehabilitation facility  -AV       Row Name 02/21/25 1050          Vital Signs    O2 Delivery Pre Treatment nasal cannula  2L  -AV     O2 Delivery Intra Treatment nasal cannula  2L  -AV     O2 Delivery Post Treatment nasal cannula  2L  -AV       Row Name 02/21/25 1050          Positioning and Restraints    Pre-Treatment Position in bed  -AV     Post Treatment Position bed  -AV               User Key  (r) = Recorded By, (t) = Taken By, (c) = Cosigned By      Initials Name Provider Type    AV Juan Ireland, ASHLEE Occupational Therapist                   Outcome Measures       Row Name 02/21/25 105          How much help from another is currently needed...    Putting on and taking off regular lower body clothing? 2  -AV     Bathing (including washing, rinsing, and drying) 2  -AV     Toileting (which includes using toilet bed pan or urinal) 2  -AV     Putting on and taking off regular upper body clothing 3  -AV     Taking care of personal grooming (such as brushing teeth) 3  -AV     Eating meals 4  -AV      AM-PAC 6 Clicks Score (OT) 16  -AV       Row Name 02/21/25 1055          Functional Assessment    Outcome Measure Options AM-PAC 6 Clicks Daily Activity (OT);Optimal Instrument  -AV       Row Name 02/21/25 1055          Optimal Instrument    Optimal Instrument Optimal - 3  -AV     Bending/Stooping 2  -AV     Standing 2  -AV     Reaching 1  -AV     From the list, choose the 3 activities you would most like to be able to do without any difficulty Bending/stooping;Standing;Reaching  -AV     Total Score Optimal - 3 5  -AV               User Key  (r) = Recorded By, (t) = Taken By, (c) = Cosigned By      Initials Name Provider Type    Juan Magana OT Occupational Therapist                    Occupational Therapy Education       Title: PT OT SLP Therapies (In Progress)       Topic: Occupational Therapy (Done)       Point: ADL training (Done)       Description:   Instruct learner(s) on proper safety adaptation and remediation techniques during self care or transfers.   Instruct in proper use of assistive devices.                  Learning Progress Summary            Patient Acceptance, E, VU by AV at 2/21/2025 1056                      Point: Home exercise program (Done)       Description:   Instruct learner(s) on appropriate technique for monitoring, assisting and/or progressing therapeutic exercises/activities.                  Learning Progress Summary            Patient Acceptance, E, VU by AV at 2/21/2025 1056                      Point: Precautions (Done)       Description:   Instruct learner(s) on prescribed precautions during self-care and functional transfers.                  Learning Progress Summary            Patient Acceptance, E, VU by AV at 2/21/2025 1056                      Point: Body mechanics (Done)       Description:   Instruct learner(s) on proper positioning and spine alignment during self-care, functional mobility activities and/or exercises.                  Learning Progress Summary             Patient Acceptance, E, VU by AV at 2/21/2025 1056                                      User Key       Initials Effective Dates Name Provider Type Discipline     06/16/21 -  Juan Ireland OT Occupational Therapist OT                  OT Recommendation and Plan  Planned Therapy Interventions (OT): activity tolerance training, BADL retraining, functional balance retraining, occupation/activity based interventions, patient/caregiver education/training, ROM/therapeutic exercise, transfer/mobility retraining  Therapy Frequency (OT): 5 times/wk  Plan of Care Review  Plan of Care Reviewed With: patient  Progress: no change (first session for evaluation)  Outcome Evaluation: Patient presents with limitations of balance and endurance/activity tolerance which are impacting ADL/ transfer independence. Skilled OT services are indicated  to remediate/ compensate for deficits to maximize independence and safety with functional ADL tasks.     Time Calculation:   Evaluation Complexity (OT)  Review Occupational Profile/Medical/Therapy History Complexity: expanded/moderate complexity  Assessment, Occupational Performance/Identification of Deficit Complexity: 1-3 performance deficits  Clinical Decision Making Complexity (OT): problem focused assessment/low complexity  Overall Complexity of Evaluation (OT): low complexity     Time Calculation- OT       Row Name 02/21/25 1057             Time Calculation- OT    OT Received On 02/21/25  -AV      OT Goal Re-Cert Due Date 03/02/25  -AV         Untimed Charges    OT Eval/Re-eval Minutes 32  -AV         Total Minutes    Untimed Charges Total Minutes 32  -AV       Total Minutes 32  -AV                User Key  (r) = Recorded By, (t) = Taken By, (c) = Cosigned By      Initials Name Provider Type    AV Juan Ireland OT Occupational Therapist                  Therapy Charges for Today       Code Description Service Date Service Provider Modifiers Qty    [id number]  OT EVAL LOW  COMPLEXITY 3 2/21/2025 Juan Ireland, OT GO 1                 Juan Ireland, OT  2/21/2025

## 2025-02-21 NOTE — PROGRESS NOTES
[facility]   Hospitalist Progress Note  Date: 2025  Patient Name: Mary Albarran  : 1950  MRN: [id number]  Date of admission: 2025      Subjective   Subjective     Chief Complaint: Altered mental status    Summary: 74 y.o. female with history of A-fib on Xarelto, CKD stage III, type 2 diabetes initially presented to outside facility with decreased responsiveness.  Patient was found to be hypotensive, febrile and with elevated lactate.  Concern for infection.  Was given 2.5 L of IV fluids also had A-fib with RVR initially started on Cardizem however developed hypotension.  Did have CT scan of the abdomen pelvis which showed some gallbladder thickening concerning for cholelithiasis.  Was transferred to our facility for critical care management.  General surgery was consulted and did recommend cholecystostomy tube which was placed by IR.  Fluid cultures returned negative.  She was kept on broad-spectrum IV antibiotics, did have significant right lower extremity cellulitis which was slow to improve.  1 blood culture positive for Bacteroides species.  Will need to complete 10 days 14 days of antibiotic therapy for cellulitis and bacteremia.  Had issues with A-fib with RVR for which cardiology was consulted.  2D echo revealed drop in EF and valvular heart disease with moderate aortic stenosis.  Will need follow-up with comprehensive valve center on an outpatient basis for management of valvular heart disease.  Heart rates were controlled with IV and ultimately oral medications.  May need rehab at discharge     Interval Followup: Seen and examined patient this morning. No acute overnight events. Volume status improving. Patient to bedside chair today. Patient to decide going home vs rehab.      Objective   Objective     Vitals:   Temp:  [97.5 °F (36.4 °C)-98.3 °F (36.8 °C)] 97.5 °F (36.4 °C)  Heart Rate:  [70-95] 70  Resp:  [14-20] 16  BP: (102-131)/(52-82) 116/82  Flow (L/min) (Oxygen Therapy):  [1-3]  1    Physical Exam   GEN: No acute distress  HEENT: Moist mucous membranes  LUNGS: Equal chest rise bilaterally  CARDIAC: Regular rate and rhythm  NEURO: Moving all 4 extremities spontaneously  SKIN: No obvious breakdown.  Right lower extremity cellulitis  ABD: S/P cholecystostomy tube     Result Review    Result Review:  I have personally reviewed the results from the time of this admission to 2/21/2025 12:49 EST and agree with these findings:  []  Laboratory  []  Microbiology  []  Radiology  []  EKG/Telemetry   []  Cardiology/Vascular   []  Pathology  []  Old records  []  Other:    Assessment & Plan   Assessment / Plan     Assessment:  Septic shock  Streptococcal bacteremia  Gram-negative bacteremia  Concern for acute cholecystitis  Concern for right lower extremity cellulitis  Acute metabolic encephalopathy secondary to above  Acute on chronic systolic congestive heart failure with acute exacerbation  Moderate aortic regurgitation with moderate aortic stenosis  Chronic atrial fibrillation with RVR  NSTEMI type II  Acute kidney injury  Hypomagnesemia  Lactic acidosis, clinically significant  Type 2 diabetes mellitus      Plan:  Continue to monitor in the hospital for the management of the above  Appreciate recommendations from general surgery, cardiology and pulmonology  S/p cholecystostomy tube placement--Will need follow up with general surgery as outpatient for tube removal  Continue Zosyn day 11 of 14  Blood cultures showing no growth to date  Lasix on hold--Volume status improved. Cr rising.  Continue Entresto and Aldactone  Continue aspirin, digoxin and Toprol  Continue Xarelto  SSI  Continue pain control  PT/OT  Electrolyte replacement  CBC, CMP reviewed--monitor creatinine and electrolytes  Patient will need to have outpatient referral to a comprehensive valve center for management Dr. Owen heart disease  Patient will also need general surgery follow up for outpatient tube removal.     Discussed plan  with RN.    VTE Prophylaxis:  Pharmacologic & mechanical VTE prophylaxis orders are present.        CODE STATUS:   Level Of Support Discussed With: Health Care Surrogate  Code Status (Patient has no pulse and is not breathing): CPR (Attempt to Resuscitate)  Medical Interventions (Patient has pulse or is breathing): Full Support      Electronically signed by Yesica Luke PA-C, 02/21/25, 12:59 PM EST.     Patient independently seen and evaluated, agree with assessment and plan, above documentation reflects plan put forth during bedside rounds.  More than 51% of the time of this patient encounter was performed by me.     Interval history:  No acute vents overnight, patient resting comfortably in chair     GEN: No acute distress  HEENT: Moist mucous membranes  LUNGS: Equal chest rise bilaterally  CARDIAC: Regular rate and rhythm  NEURO: Moving all 4 extremities spontaneously     Plan:  Agree with assessment plan as above  Continue cholecystostomy tube, will follow-up with general surgery as an outpatient  Continue Zosyn with plans for 14 days  Hold Lasix today given of rising creatinine, hold Aldactone  Continue Entresto,  Continue Xarelto  Wound care consult for lower extremity wounds  CBC, CMP reviewed 2/21/2025   Repeat CBC, CMP, mag and Phos in a.m. 2/21/2025     Time spent: Time spent involving patient care including face-to-face encounter 51 minutes    Electronically signed by Campos Bobo MD, 2/21/2025, 14:31 EST.

## 2025-02-21 NOTE — PLAN OF CARE
Goal Outcome Evaluation:            Pt has been A&Ox4, pleasant, rested well throughout the night. Pain medicine given x1. Up to bsc x1, BM charted.

## 2025-02-21 NOTE — PLAN OF CARE
Goal Outcome Evaluation:  Plan of Care Reviewed With: patient        Progress: no change (first session for evaluation)  Outcome Evaluation: Patient presents with limitations of balance and endurance/activity tolerance which are impacting ADL/ transfer independence. Skilled OT services are indicated  to remediate/ compensate for deficits to maximize independence and safety with functional ADL tasks.    Anticipated Discharge Disposition (OT): inpatient rehabilitation facility

## 2025-02-22 LAB
ANION GAP SERPL CALCULATED.3IONS-SCNC: 10.4 MMOL/L (ref 5–15)
BASOPHILS # BLD AUTO: 0.09 10*3/MM3 (ref 0–0.2)
BASOPHILS NFR BLD AUTO: 1.1 % (ref 0–1.5)
BUN SERPL-MCNC: 21 MG/DL (ref 8–23)
BUN/CREAT SERPL: 15.7 (ref 7–25)
CALCIUM SPEC-SCNC: 8.9 MG/DL (ref 8.6–10.5)
CHLORIDE SERPL-SCNC: 96 MMOL/L (ref 98–107)
CO2 SERPL-SCNC: 26.6 MMOL/L (ref 22–29)
CREAT SERPL-MCNC: 1.34 MG/DL (ref 0.57–1)
DEPRECATED RDW RBC AUTO: 46.3 FL (ref 37–54)
EGFRCR SERPLBLD CKD-EPI 2021: 42.2 ML/MIN/1.73
EOSINOPHIL # BLD AUTO: 0.2 10*3/MM3 (ref 0–0.4)
EOSINOPHIL NFR BLD AUTO: 2.4 % (ref 0.3–6.2)
ERYTHROCYTE [DISTWIDTH] IN BLOOD BY AUTOMATED COUNT: 13.5 % (ref 12.3–15.4)
GLUCOSE BLDC GLUCOMTR-MCNC: 197 MG/DL (ref 70–99)
GLUCOSE BLDC GLUCOMTR-MCNC: 247 MG/DL (ref 70–99)
GLUCOSE BLDC GLUCOMTR-MCNC: 326 MG/DL (ref 70–99)
GLUCOSE SERPL-MCNC: 238 MG/DL (ref 65–99)
HCT VFR BLD AUTO: 44.6 % (ref 34–46.6)
HGB BLD-MCNC: 13.7 G/DL (ref 12–15.9)
IMM GRANULOCYTES # BLD AUTO: 0.1 10*3/MM3 (ref 0–0.05)
IMM GRANULOCYTES NFR BLD AUTO: 1.2 % (ref 0–0.5)
LYMPHOCYTES # BLD AUTO: 2.3 10*3/MM3 (ref 0.7–3.1)
LYMPHOCYTES NFR BLD AUTO: 27.5 % (ref 19.6–45.3)
MAGNESIUM SERPL-MCNC: 1.9 MG/DL (ref 1.6–2.4)
MCH RBC QN AUTO: 28.3 PG (ref 26.6–33)
MCHC RBC AUTO-ENTMCNC: 30.7 G/DL (ref 31.5–35.7)
MCV RBC AUTO: 92.1 FL (ref 79–97)
MONOCYTES # BLD AUTO: 1.33 10*3/MM3 (ref 0.1–0.9)
MONOCYTES NFR BLD AUTO: 15.9 % (ref 5–12)
NEUTROPHILS NFR BLD AUTO: 4.34 10*3/MM3 (ref 1.7–7)
NEUTROPHILS NFR BLD AUTO: 51.9 % (ref 42.7–76)
NRBC BLD AUTO-RTO: 0 /100 WBC (ref 0–0.2)
PHOSPHATE SERPL-MCNC: 4.3 MG/DL (ref 2.5–4.5)
PLATELET # BLD AUTO: 200 10*3/MM3 (ref 140–450)
PMV BLD AUTO: 12.4 FL (ref 6–12)
POTASSIUM SERPL-SCNC: 4.7 MMOL/L (ref 3.5–5.2)
RBC # BLD AUTO: 4.84 10*6/MM3 (ref 3.77–5.28)
SODIUM SERPL-SCNC: 133 MMOL/L (ref 136–145)
WBC NRBC COR # BLD AUTO: 8.36 10*3/MM3 (ref 3.4–10.8)

## 2025-02-22 PROCEDURE — 85025 COMPLETE CBC W/AUTO DIFF WBC: CPT | Performed by: INTERNAL MEDICINE

## 2025-02-22 PROCEDURE — 82948 REAGENT STRIP/BLOOD GLUCOSE: CPT

## 2025-02-22 PROCEDURE — 63710000001 INSULIN LISPRO (HUMAN) PER 5 UNITS: Performed by: INTERNAL MEDICINE

## 2025-02-22 PROCEDURE — 84100 ASSAY OF PHOSPHORUS: CPT | Performed by: INTERNAL MEDICINE

## 2025-02-22 PROCEDURE — 83735 ASSAY OF MAGNESIUM: CPT | Performed by: INTERNAL MEDICINE

## 2025-02-22 PROCEDURE — 82948 REAGENT STRIP/BLOOD GLUCOSE: CPT | Performed by: INTERNAL MEDICINE

## 2025-02-22 PROCEDURE — 63710000001 INSULIN GLARGINE PER 5 UNITS: Performed by: NURSE PRACTITIONER

## 2025-02-22 PROCEDURE — 99233 SBSQ HOSP IP/OBS HIGH 50: CPT | Performed by: INTERNAL MEDICINE

## 2025-02-22 PROCEDURE — 80048 BASIC METABOLIC PNL TOTAL CA: CPT | Performed by: INTERNAL MEDICINE

## 2025-02-22 RX ADMIN — INSULIN LISPRO 10 UNITS: 100 INJECTION, SOLUTION INTRAVENOUS; SUBCUTANEOUS at 11:40

## 2025-02-22 RX ADMIN — ATORVASTATIN CALCIUM 20 MG: 20 TABLET, FILM COATED ORAL at 20:37

## 2025-02-22 RX ADMIN — INSULIN LISPRO 8 UNITS: 100 INJECTION, SOLUTION INTRAVENOUS; SUBCUTANEOUS at 20:38

## 2025-02-22 RX ADMIN — INSULIN LISPRO 5 UNITS: 100 INJECTION, SOLUTION INTRAVENOUS; SUBCUTANEOUS at 17:38

## 2025-02-22 RX ADMIN — POTASSIUM CHLORIDE 20 MEQ: 1.5 POWDER, FOR SOLUTION ORAL at 09:53

## 2025-02-22 RX ADMIN — FAMOTIDINE 20 MG: 10 INJECTION, SOLUTION INTRAVENOUS at 20:38

## 2025-02-22 RX ADMIN — INSULIN GLARGINE 10 UNITS: 100 INJECTION, SOLUTION SUBCUTANEOUS at 09:53

## 2025-02-22 RX ADMIN — SACUBITRIL AND VALSARTAN 1 TABLET: 24; 26 TABLET, FILM COATED ORAL at 09:59

## 2025-02-22 RX ADMIN — Medication 10 ML: at 20:38

## 2025-02-22 RX ADMIN — LIDOCAINE 2 PATCH: 4 PATCH TOPICAL at 09:53

## 2025-02-22 RX ADMIN — Medication: at 09:58

## 2025-02-22 RX ADMIN — SACUBITRIL AND VALSARTAN 1 TABLET: 24; 26 TABLET, FILM COATED ORAL at 20:37

## 2025-02-22 RX ADMIN — OXYCODONE HYDROCHLORIDE 10 MG: 5 TABLET ORAL at 02:58

## 2025-02-22 RX ADMIN — FAMOTIDINE 20 MG: 10 INJECTION, SOLUTION INTRAVENOUS at 09:58

## 2025-02-22 RX ADMIN — OXYCODONE HYDROCHLORIDE 10 MG: 5 TABLET ORAL at 14:20

## 2025-02-22 RX ADMIN — INSULIN LISPRO 3 UNITS: 100 INJECTION, SOLUTION INTRAVENOUS; SUBCUTANEOUS at 07:50

## 2025-02-22 RX ADMIN — RIVAROXABAN 20 MG: 20 TABLET, FILM COATED ORAL at 17:35

## 2025-02-22 RX ADMIN — Medication 10 ML: at 09:58

## 2025-02-22 RX ADMIN — POTASSIUM CHLORIDE 20 MEQ: 1.5 POWDER, FOR SOLUTION ORAL at 20:39

## 2025-02-22 RX ADMIN — ASPIRIN 81 MG: 81 TABLET, COATED ORAL at 09:54

## 2025-02-22 RX ADMIN — PREGABALIN 100 MG: 100 CAPSULE ORAL at 20:37

## 2025-02-22 RX ADMIN — METOPROLOL SUCCINATE 50 MG: 50 TABLET, FILM COATED, EXTENDED RELEASE ORAL at 09:59

## 2025-02-22 RX ADMIN — PREGABALIN 100 MG: 100 CAPSULE ORAL at 09:59

## 2025-02-22 NOTE — PLAN OF CARE
Goal Outcome Evaluation:  Plan of Care Reviewed With: patient        Progress: improving  Outcome Evaluation: A/O x4. Heart rhythm goes into uncontrolled afib with prolonged activity. Medicated for pain x2. No additional needs at this time. Plan of care ongoing.

## 2025-02-22 NOTE — PROGRESS NOTES
[facility]   Hospitalist Progress Note  Date: 2025  Patient Name: Mary Ablarran  : 1950  MRN: [id number]  Date of admission: 2025      Subjective   Subjective     Chief Complaint: Altered mental status    Summary: 74 y.o. female with history of A-fib on Xarelto, CKD stage III, type 2 diabetes initially presented to outside facility with decreased responsiveness.  Patient was found to be hypotensive, febrile and with elevated lactate.  Concern for infection.  Was given 2.5 L of IV fluids also had A-fib with RVR initially started on Cardizem however developed hypotension.  Did have CT scan of the abdomen pelvis which showed some gallbladder thickening concerning for cholelithiasis.  Was transferred to our facility for critical care management.  General surgery was consulted and did recommend cholecystostomy tube which was placed by IR.  Fluid cultures returned negative.  She was kept on broad-spectrum IV antibiotics, did have significant right lower extremity cellulitis which was slow to improve.  1 blood culture positive for Bacteroides species.  Will need to complete 10 days 14 days of antibiotic therapy for cellulitis and bacteremia.  Had issues with A-fib with RVR for which cardiology was consulted.  2D echo revealed drop in EF and valvular heart disease with moderate aortic stenosis.  Will need follow-up with comprehensive valve center on an outpatient basis for management of valvular heart disease.  Heart rates were controlled with IV and ultimately oral medications.  May need rehab at discharge     Interval Followup: Seen and examined patient this morning.  Patient agreeable to rehab.  Volume status improving.      Objective   Objective     Vitals:   Temp:  [97.5 °F (36.4 °C)-98.2 °F (36.8 °C)] 97.5 °F (36.4 °C)  Heart Rate:  [] 88  Resp:  [18-22] 20  BP: ()/(47-78) 119/49  Flow (L/min) (Oxygen Therapy):  [1-1.5] 1.5    Physical Exam   GEN: No acute distress  HEENT: Moist  mucous membranes  LUNGS: Equal chest rise bilaterally  CARDIAC: Regular rate and rhythm  NEURO: Moving all 4 extremities spontaneously  SKIN: No obvious breakdown.  Right lower extremity cellulitis  ABD: S/P cholecystostomy tube     Result Review    Result Review:  I have personally reviewed the results from the time of this admission to 2/22/2025 13:32 EST and agree with these findings:  []  Laboratory  []  Microbiology  []  Radiology  []  EKG/Telemetry   []  Cardiology/Vascular   []  Pathology  []  Old records  []  Other:    Assessment & Plan   Assessment / Plan     Assessment:  Septic shock  Streptococcal bacteremia  Gram-negative bacteremia  Concern for acute cholecystitis  Concern for right lower extremity cellulitis  Acute metabolic encephalopathy secondary to above  Acute on chronic systolic congestive heart failure with acute exacerbation  Moderate aortic regurgitation with moderate aortic stenosis  Chronic atrial fibrillation with RVR  NSTEMI type II  Acute kidney injury  Hypomagnesemia  Lactic acidosis, clinically significant  Type 2 diabetes mellitus      Plan:  Continue to monitor in the hospital for the management of the above  Appreciate recommendations from general surgery, cardiology and pulmonology  S/p cholecystostomy tube placement--Will need follow up with general surgery as outpatient for tube removal  Continue Zosyn day 12 of 14  Blood cultures showing no growth to date  Lasix on hold--Volume status improved. Cr rising.  Continue Entresto and Aldactone  Continue aspirin, digoxin and Toprol  Continue Xarelto  SSI  Continue pain control  PT/OT  Electrolyte replacement  CBC, CMP reviewed--monitor creatinine and electrolytes  Patient will need to have outpatient referral to a comprehensive valve center for management Dr. Owen heart disease  Patient will also need general surgery follow up for outpatient tube removal.     Discussed plan with RN.    VTE Prophylaxis:  Pharmacologic & mechanical  VTE prophylaxis orders are present.        CODE STATUS:   Level Of Support Discussed With: Health Care Surrogate  Code Status (Patient has no pulse and is not breathing): CPR (Attempt to Resuscitate)  Medical Interventions (Patient has pulse or is breathing): Full Support      Electronically signed by Yesica Luke PA-C, 02/22/25, 1:47 PM EST.     Patient independently seen and evaluated, agree with assessment and plan, above documentation reflects plan put forth during bedside rounds.  More than 51% of the time of this patient encounter was performed by me.     Interval history:  No acute events over the last 24 hours, patient sitting up in chair, states she is feeling much better     GEN: No acute distress  HEENT: Moist mucous membranes  LUNGS: Equal chest rise bilaterally  CARDIAC: Regular rate and rhythm  NEURO: Moving all 4 extremities spontaneously     Plan:  Agree with assessment plan as above  Continue cholecystostomy tube, will follow-up with general surgery as an outpatient  Continue Zosyn with plans for 14 days  Continue to hold Lasix today given of rising creatinine, hold Aldactone, suspect will need diuretics tomorrow  Continue Entresto, for now  Continue Xarelto  Wound care consult for lower extremity wounds  CBC, CMP reviewed 2/22/2025   Repeat CBC, CMP, mag and Phos in a.m. 2/22/2025     Time spent: Time spent involving patient care including face-to-face encounter 52 minutes    Disposition: Patient agreeable to rehab now, social work to coordinate    Electronically signed by Campos Bobo MD, 2/22/2025, 13:32 EST.

## 2025-02-23 LAB
ANION GAP SERPL CALCULATED.3IONS-SCNC: 9.7 MMOL/L (ref 5–15)
BASOPHILS # BLD AUTO: 0.07 10*3/MM3 (ref 0–0.2)
BASOPHILS NFR BLD AUTO: 1 % (ref 0–1.5)
BUN SERPL-MCNC: 18 MG/DL (ref 8–23)
BUN/CREAT SERPL: 16.8 (ref 7–25)
CALCIUM SPEC-SCNC: 9.2 MG/DL (ref 8.6–10.5)
CHLORIDE SERPL-SCNC: 96 MMOL/L (ref 98–107)
CO2 SERPL-SCNC: 25.3 MMOL/L (ref 22–29)
CREAT SERPL-MCNC: 1.07 MG/DL (ref 0.57–1)
DEPRECATED RDW RBC AUTO: 45.9 FL (ref 37–54)
EGFRCR SERPLBLD CKD-EPI 2021: 55.3 ML/MIN/1.73
EOSINOPHIL # BLD AUTO: 0.15 10*3/MM3 (ref 0–0.4)
EOSINOPHIL NFR BLD AUTO: 2.2 % (ref 0.3–6.2)
ERYTHROCYTE [DISTWIDTH] IN BLOOD BY AUTOMATED COUNT: 13.4 % (ref 12.3–15.4)
GLUCOSE BLDC GLUCOMTR-MCNC: 176 MG/DL (ref 70–99)
GLUCOSE BLDC GLUCOMTR-MCNC: 194 MG/DL (ref 70–99)
GLUCOSE BLDC GLUCOMTR-MCNC: 259 MG/DL (ref 70–99)
GLUCOSE BLDC GLUCOMTR-MCNC: 310 MG/DL (ref 70–99)
GLUCOSE SERPL-MCNC: 205 MG/DL (ref 65–99)
HCT VFR BLD AUTO: 43.8 % (ref 34–46.6)
HGB BLD-MCNC: 13.5 G/DL (ref 12–15.9)
IMM GRANULOCYTES # BLD AUTO: 0.05 10*3/MM3 (ref 0–0.05)
IMM GRANULOCYTES NFR BLD AUTO: 0.7 % (ref 0–0.5)
LYMPHOCYTES # BLD AUTO: 1.9 10*3/MM3 (ref 0.7–3.1)
LYMPHOCYTES NFR BLD AUTO: 27.5 % (ref 19.6–45.3)
MAGNESIUM SERPL-MCNC: 1.8 MG/DL (ref 1.6–2.4)
MCH RBC QN AUTO: 28.6 PG (ref 26.6–33)
MCHC RBC AUTO-ENTMCNC: 30.8 G/DL (ref 31.5–35.7)
MCV RBC AUTO: 92.8 FL (ref 79–97)
MONOCYTES # BLD AUTO: 1.04 10*3/MM3 (ref 0.1–0.9)
MONOCYTES NFR BLD AUTO: 15.1 % (ref 5–12)
NEUTROPHILS NFR BLD AUTO: 3.69 10*3/MM3 (ref 1.7–7)
NEUTROPHILS NFR BLD AUTO: 53.5 % (ref 42.7–76)
NRBC BLD AUTO-RTO: 0 /100 WBC (ref 0–0.2)
PHOSPHATE SERPL-MCNC: 3.6 MG/DL (ref 2.5–4.5)
PLATELET # BLD AUTO: 146 10*3/MM3 (ref 140–450)
PMV BLD AUTO: 12.8 FL (ref 6–12)
POTASSIUM SERPL-SCNC: 4.6 MMOL/L (ref 3.5–5.2)
RBC # BLD AUTO: 4.72 10*6/MM3 (ref 3.77–5.28)
SODIUM SERPL-SCNC: 131 MMOL/L (ref 136–145)
WBC NRBC COR # BLD AUTO: 6.9 10*3/MM3 (ref 3.4–10.8)

## 2025-02-23 PROCEDURE — 84100 ASSAY OF PHOSPHORUS: CPT | Performed by: INTERNAL MEDICINE

## 2025-02-23 PROCEDURE — 80048 BASIC METABOLIC PNL TOTAL CA: CPT | Performed by: INTERNAL MEDICINE

## 2025-02-23 PROCEDURE — 63710000001 INSULIN LISPRO (HUMAN) PER 5 UNITS: Performed by: INTERNAL MEDICINE

## 2025-02-23 PROCEDURE — 82948 REAGENT STRIP/BLOOD GLUCOSE: CPT

## 2025-02-23 PROCEDURE — 63710000001 INSULIN GLARGINE PER 5 UNITS: Performed by: NURSE PRACTITIONER

## 2025-02-23 PROCEDURE — 85025 COMPLETE CBC W/AUTO DIFF WBC: CPT | Performed by: INTERNAL MEDICINE

## 2025-02-23 PROCEDURE — 83735 ASSAY OF MAGNESIUM: CPT | Performed by: INTERNAL MEDICINE

## 2025-02-23 PROCEDURE — 99233 SBSQ HOSP IP/OBS HIGH 50: CPT | Performed by: INTERNAL MEDICINE

## 2025-02-23 RX ORDER — POTASSIUM CHLORIDE 750 MG/1
20 CAPSULE, EXTENDED RELEASE ORAL 2 TIMES DAILY WITH MEALS
Status: DISCONTINUED | OUTPATIENT
Start: 2025-02-23 | End: 2025-02-24

## 2025-02-23 RX ORDER — TORSEMIDE 20 MG/1
50 TABLET ORAL DAILY
Status: DISCONTINUED | OUTPATIENT
Start: 2025-02-23 | End: 2025-02-28 | Stop reason: HOSPADM

## 2025-02-23 RX ORDER — SPIRONOLACTONE 25 MG/1
25 TABLET ORAL DAILY
Status: DISCONTINUED | OUTPATIENT
Start: 2025-02-23 | End: 2025-02-28 | Stop reason: HOSPADM

## 2025-02-23 RX ADMIN — Medication: at 06:00

## 2025-02-23 RX ADMIN — SPIRONOLACTONE 25 MG: 25 TABLET ORAL at 12:25

## 2025-02-23 RX ADMIN — ATORVASTATIN CALCIUM 20 MG: 20 TABLET, FILM COATED ORAL at 20:58

## 2025-02-23 RX ADMIN — POTASSIUM CHLORIDE 20 MEQ: 750 CAPSULE, EXTENDED RELEASE ORAL at 17:25

## 2025-02-23 RX ADMIN — FAMOTIDINE 20 MG: 10 INJECTION, SOLUTION INTRAVENOUS at 08:41

## 2025-02-23 RX ADMIN — RIVAROXABAN 20 MG: 20 TABLET, FILM COATED ORAL at 17:25

## 2025-02-23 RX ADMIN — POTASSIUM CHLORIDE 20 MEQ: 1.5 POWDER, FOR SOLUTION ORAL at 08:41

## 2025-02-23 RX ADMIN — INSULIN GLARGINE 10 UNITS: 100 INJECTION, SOLUTION SUBCUTANEOUS at 08:41

## 2025-02-23 RX ADMIN — METOPROLOL SUCCINATE 50 MG: 50 TABLET, FILM COATED, EXTENDED RELEASE ORAL at 08:41

## 2025-02-23 RX ADMIN — OXYCODONE HYDROCHLORIDE 15 MG: 5 TABLET ORAL at 05:47

## 2025-02-23 RX ADMIN — OXYCODONE HYDROCHLORIDE 15 MG: 5 TABLET ORAL at 11:29

## 2025-02-23 RX ADMIN — FAMOTIDINE 20 MG: 10 INJECTION, SOLUTION INTRAVENOUS at 20:58

## 2025-02-23 RX ADMIN — PREGABALIN 100 MG: 100 CAPSULE ORAL at 20:58

## 2025-02-23 RX ADMIN — INSULIN LISPRO 8 UNITS: 100 INJECTION, SOLUTION INTRAVENOUS; SUBCUTANEOUS at 20:58

## 2025-02-23 RX ADMIN — OXYCODONE HYDROCHLORIDE 10 MG: 5 TABLET ORAL at 18:11

## 2025-02-23 RX ADMIN — METOPROLOL SUCCINATE 50 MG: 50 TABLET, FILM COATED, EXTENDED RELEASE ORAL at 20:58

## 2025-02-23 RX ADMIN — INSULIN LISPRO 3 UNITS: 100 INJECTION, SOLUTION INTRAVENOUS; SUBCUTANEOUS at 07:44

## 2025-02-23 RX ADMIN — Medication 10 ML: at 20:58

## 2025-02-23 RX ADMIN — TORSEMIDE 50 MG: 20 TABLET ORAL at 12:25

## 2025-02-23 RX ADMIN — OXYCODONE HYDROCHLORIDE 10 MG: 5 TABLET ORAL at 01:56

## 2025-02-23 RX ADMIN — SACUBITRIL AND VALSARTAN 1 TABLET: 24; 26 TABLET, FILM COATED ORAL at 08:51

## 2025-02-23 RX ADMIN — ASPIRIN 81 MG: 81 TABLET, COATED ORAL at 08:41

## 2025-02-23 RX ADMIN — INSULIN LISPRO 10 UNITS: 100 INJECTION, SOLUTION INTRAVENOUS; SUBCUTANEOUS at 11:23

## 2025-02-23 RX ADMIN — INSULIN LISPRO 3 UNITS: 100 INJECTION, SOLUTION INTRAVENOUS; SUBCUTANEOUS at 17:25

## 2025-02-23 RX ADMIN — LIDOCAINE 2 PATCH: 4 PATCH TOPICAL at 08:41

## 2025-02-23 RX ADMIN — PREGABALIN 100 MG: 100 CAPSULE ORAL at 08:50

## 2025-02-23 RX ADMIN — SACUBITRIL AND VALSARTAN 1 TABLET: 24; 26 TABLET, FILM COATED ORAL at 20:58

## 2025-02-23 RX ADMIN — Medication 10 ML: at 08:42

## 2025-02-23 NOTE — NURSING NOTE
0954  Bileteral lower extremeties topical ordered lotion applied wrapped with kurlex and elastic bandages

## 2025-02-23 NOTE — PLAN OF CARE
Goal Outcome Evaluation:  Plan of Care Reviewed With: patient        Progress: improving Pt remains alert and oriented.  Blood sugar was elevated today coverage was administered.  Pt has wound care orders and was completed this shift.  Drain continues with minimal serous drainage.  Pt is on 1.5 liters of O2 per nasal canula.  Plans for rehab at discharge

## 2025-02-23 NOTE — PLAN OF CARE
Goal Outcome Evaluation:  Plan of Care Reviewed With: patient        Progress: improving  Outcome Evaluation: A/o x4. Medicated for pain x1. Progressed to stand by assitance to [facility]. No additional needs at this time.

## 2025-02-23 NOTE — PLAN OF CARE
Goal Outcome Evaluation:  Plan of Care Reviewed With: patient        Progress: improving  Outcome Evaluation: Pt remains alert and oriented x4 on 1 liter of oxygen per nasal canula.  medicated for pain and it is effective for pain control.. Bllod sugar covered with insulin.  wound care completed

## 2025-02-23 NOTE — PROGRESS NOTES
[facility]   Hospitalist Progress Note  Date: 2025  Patient Name: Mary Albarran  : 1950  MRN: [id number]  Date of admission: 2025      Subjective   Subjective     Chief Complaint: Altered mental status    Summary: 74 y.o. female with history of A-fib on Xarelto, CKD stage III, type 2 diabetes initially presented to outside facility with decreased responsiveness.  Patient was found to be hypotensive, febrile and with elevated lactate.  Concern for infection.  Was given 2.5 L of IV fluids also had A-fib with RVR initially started on Cardizem however developed hypotension.  Did have CT scan of the abdomen pelvis which showed some gallbladder thickening concerning for cholelithiasis.  Was transferred to our facility for critical care management.  General surgery was consulted and did recommend cholecystostomy tube which was placed by IR.  Fluid cultures returned negative.  She was kept on broad-spectrum IV antibiotics, did have significant right lower extremity cellulitis which was slow to improve.  1 blood culture positive for Bacteroides species.  Will need to complete 10 days 14 days of antibiotic therapy for cellulitis and bacteremia.  Had issues with A-fib with RVR for which cardiology was consulted.  2D echo revealed drop in EF and valvular heart disease with moderate aortic stenosis.  Will need follow-up with comprehensive valve center on an outpatient basis for management of valvular heart disease.  Heart rates were controlled with IV and ultimately oral medications.  May need rehab at discharge     Interval Followup: Seen and examined patient this morning. Encouraged mobility up to bedside chair. Pending rehab placement. Volume status continues to improve.        Objective   Objective     Vitals:   Temp:  [97.3 °F (36.3 °C)-98.5 °F (36.9 °C)] 97.3 °F (36.3 °C)  Heart Rate:  [74-93] 92  Resp:  [18-22] 20  BP: (104-134)/(45-69) 104/56  Flow (L/min) (Oxygen Therapy):  [1.5] 1.5    Physical  Exam   GEN: No acute distress  HEENT: Moist mucous membranes  LUNGS: Equal chest rise bilaterally  CARDIAC: Regular rate and rhythm  NEURO: Moving all 4 extremities spontaneously  SKIN: No obvious breakdown.  Right lower extremity cellulitis. Bilateral lower extremity edema  ABD: S/P cholecystostomy tube     Result Review    Result Review:  I have personally reviewed the results from the time of this admission to 2/23/2025 11:51 EST and agree with these findings:  []  Laboratory  []  Microbiology  []  Radiology  []  EKG/Telemetry   []  Cardiology/Vascular   []  Pathology  []  Old records  []  Other:    Assessment & Plan   Assessment / Plan     Assessment:  Septic shock  Streptococcal bacteremia  Gram-negative bacteremia  Concern for acute cholecystitis  Concern for right lower extremity cellulitis  Acute metabolic encephalopathy secondary to above  Acute on chronic systolic congestive heart failure with acute exacerbation  Moderate aortic regurgitation with moderate aortic stenosis  Chronic atrial fibrillation with RVR  NSTEMI type II  Acute kidney injury  Hypomagnesemia  Lactic acidosis, clinically significant  Type 2 diabetes mellitus      Plan:  Continue to monitor in the hospital for the management of the above  Appreciate recommendations from general surgery, cardiology and pulmonology  S/p cholecystostomy tube placement--Will need follow up with general surgery as outpatient for tube removal  Continue Zosyn day 13 of 14  Blood cultures showing no growth to date  Previously help Lasix--will restart home dos torsemide  Continue Entresto and Aldactone  Continue aspirin, digoxin and Toprol  Continue Xarelto  SSI  Continue pain control  PT/OT  Electrolyte replacement  CBC, CMP reviewed--monitor creatinine and electrolytes  Patient will need to have outpatient referral to a comprehensive valve center for management Dr. Owen heart disease  Patient will also need general surgery follow up for outpatient tube  removal.     Discussed plan with RN.    VTE Prophylaxis:  Pharmacologic & mechanical VTE prophylaxis orders are present.        CODE STATUS:   Level Of Support Discussed With: Health Care Surrogate  Code Status (Patient has no pulse and is not breathing): CPR (Attempt to Resuscitate)  Medical Interventions (Patient has pulse or is breathing): Full Support      Electronically signed by Yesica Luke PA-C, 02/23/25, 1:29 PM EST.     Patient independently seen and evaluated, agree with assessment and plan, above documentation reflects plan put forth during bedside rounds.  More than 51% of the time of this patient encounter was performed by me.     Interval history:  No acute events over the last 24 hours, patient sitting up in chair, states she is feeling much better     GEN: No acute distress  HEENT: Moist mucous membranes  LUNGS: Equal chest rise bilaterally  CARDIAC: Regular rate and rhythm  NEURO: Moving all 4 extremities spontaneously     Plan:  Agree with assessment plan as above  Continue cholecystostomy tube, will follow-up with general surgery as an outpatient  Continue Zosyn with plans for 14 days  Lasix was on hold yesterday, will transition to home dose of torsemide today, reinitiate Aldactone  Continue Entresto  Continue Xarelto  Wound care consult for lower extremity wounds  CBC, CMP reviewed 2/23/2025   Repeat CBC, CMP, mag and Phos in a.m. 2/23/2025     Time spent: Time spent involving patient care including face-to-face encounter 51 minutes    Disposition: Patient agreeable to rehab now, social work to coordinate    Electronically signed by Campos Bobo MD, 2/23/2025, 11:51 EST.

## 2025-02-24 LAB
ANION GAP SERPL CALCULATED.3IONS-SCNC: 12.8 MMOL/L (ref 5–15)
BASOPHILS # BLD AUTO: 0.06 10*3/MM3 (ref 0–0.2)
BASOPHILS NFR BLD AUTO: 0.9 % (ref 0–1.5)
BUN SERPL-MCNC: 20 MG/DL (ref 8–23)
BUN/CREAT SERPL: 16.1 (ref 7–25)
CALCIUM SPEC-SCNC: 8.8 MG/DL (ref 8.6–10.5)
CHLORIDE SERPL-SCNC: 96 MMOL/L (ref 98–107)
CO2 SERPL-SCNC: 23.2 MMOL/L (ref 22–29)
CREAT SERPL-MCNC: 1.24 MG/DL (ref 0.57–1)
DEPRECATED RDW RBC AUTO: 46.5 FL (ref 37–54)
EGFRCR SERPLBLD CKD-EPI 2021: 46.3 ML/MIN/1.73
EOSINOPHIL # BLD AUTO: 0.12 10*3/MM3 (ref 0–0.4)
EOSINOPHIL NFR BLD AUTO: 1.8 % (ref 0.3–6.2)
ERYTHROCYTE [DISTWIDTH] IN BLOOD BY AUTOMATED COUNT: 13.7 % (ref 12.3–15.4)
GLUCOSE BLDC GLUCOMTR-MCNC: 207 MG/DL (ref 70–99)
GLUCOSE BLDC GLUCOMTR-MCNC: 220 MG/DL (ref 70–99)
GLUCOSE BLDC GLUCOMTR-MCNC: 224 MG/DL (ref 70–99)
GLUCOSE BLDC GLUCOMTR-MCNC: 247 MG/DL (ref 70–99)
GLUCOSE SERPL-MCNC: 254 MG/DL (ref 65–99)
HCT VFR BLD AUTO: 43.4 % (ref 34–46.6)
HGB BLD-MCNC: 13.4 G/DL (ref 12–15.9)
IMM GRANULOCYTES # BLD AUTO: 0.05 10*3/MM3 (ref 0–0.05)
IMM GRANULOCYTES NFR BLD AUTO: 0.7 % (ref 0–0.5)
LYMPHOCYTES # BLD AUTO: 1.76 10*3/MM3 (ref 0.7–3.1)
LYMPHOCYTES NFR BLD AUTO: 26 % (ref 19.6–45.3)
MAGNESIUM SERPL-MCNC: 1.8 MG/DL (ref 1.6–2.4)
MCH RBC QN AUTO: 28.5 PG (ref 26.6–33)
MCHC RBC AUTO-ENTMCNC: 30.9 G/DL (ref 31.5–35.7)
MCV RBC AUTO: 92.3 FL (ref 79–97)
MONOCYTES # BLD AUTO: 0.86 10*3/MM3 (ref 0.1–0.9)
MONOCYTES NFR BLD AUTO: 12.7 % (ref 5–12)
NEUTROPHILS NFR BLD AUTO: 3.92 10*3/MM3 (ref 1.7–7)
NEUTROPHILS NFR BLD AUTO: 57.9 % (ref 42.7–76)
NRBC BLD AUTO-RTO: 0 /100 WBC (ref 0–0.2)
PHOSPHATE SERPL-MCNC: 4 MG/DL (ref 2.5–4.5)
PLATELET # BLD AUTO: 166 10*3/MM3 (ref 140–450)
PMV BLD AUTO: 12.9 FL (ref 6–12)
POTASSIUM SERPL-SCNC: 5.1 MMOL/L (ref 3.5–5.2)
RBC # BLD AUTO: 4.7 10*6/MM3 (ref 3.77–5.28)
SODIUM SERPL-SCNC: 132 MMOL/L (ref 136–145)
WBC NRBC COR # BLD AUTO: 6.77 10*3/MM3 (ref 3.4–10.8)

## 2025-02-24 PROCEDURE — 82948 REAGENT STRIP/BLOOD GLUCOSE: CPT | Performed by: INTERNAL MEDICINE

## 2025-02-24 PROCEDURE — 85025 COMPLETE CBC W/AUTO DIFF WBC: CPT | Performed by: INTERNAL MEDICINE

## 2025-02-24 PROCEDURE — 83735 ASSAY OF MAGNESIUM: CPT | Performed by: INTERNAL MEDICINE

## 2025-02-24 PROCEDURE — 63710000001 INSULIN LISPRO (HUMAN) PER 5 UNITS: Performed by: INTERNAL MEDICINE

## 2025-02-24 PROCEDURE — 80048 BASIC METABOLIC PNL TOTAL CA: CPT | Performed by: INTERNAL MEDICINE

## 2025-02-24 PROCEDURE — 63710000001 INSULIN GLARGINE PER 5 UNITS: Performed by: NURSE PRACTITIONER

## 2025-02-24 PROCEDURE — 25010000002 ONDANSETRON PER 1 MG: Performed by: PHYSICIAN ASSISTANT

## 2025-02-24 PROCEDURE — 84100 ASSAY OF PHOSPHORUS: CPT | Performed by: INTERNAL MEDICINE

## 2025-02-24 PROCEDURE — 82948 REAGENT STRIP/BLOOD GLUCOSE: CPT

## 2025-02-24 PROCEDURE — 99233 SBSQ HOSP IP/OBS HIGH 50: CPT | Performed by: INTERNAL MEDICINE

## 2025-02-24 RX ORDER — OXYCODONE HYDROCHLORIDE 5 MG/1
15 TABLET ORAL EVERY 4 HOURS PRN
Status: DISCONTINUED | OUTPATIENT
Start: 2025-02-24 | End: 2025-02-28 | Stop reason: HOSPADM

## 2025-02-24 RX ORDER — OXYCODONE HYDROCHLORIDE 5 MG/1
10 TABLET ORAL EVERY 4 HOURS PRN
Status: DISCONTINUED | OUTPATIENT
Start: 2025-02-24 | End: 2025-02-28 | Stop reason: HOSPADM

## 2025-02-24 RX ORDER — ONDANSETRON 2 MG/ML
4 INJECTION INTRAMUSCULAR; INTRAVENOUS EVERY 4 HOURS PRN
Status: DISCONTINUED | OUTPATIENT
Start: 2025-02-24 | End: 2025-02-28 | Stop reason: HOSPADM

## 2025-02-24 RX ADMIN — LIDOCAINE 2 PATCH: 4 PATCH TOPICAL at 08:37

## 2025-02-24 RX ADMIN — PREGABALIN 100 MG: 100 CAPSULE ORAL at 22:03

## 2025-02-24 RX ADMIN — SACUBITRIL AND VALSARTAN 1 TABLET: 24; 26 TABLET, FILM COATED ORAL at 22:03

## 2025-02-24 RX ADMIN — Medication: at 16:18

## 2025-02-24 RX ADMIN — TORSEMIDE 50 MG: 20 TABLET ORAL at 08:56

## 2025-02-24 RX ADMIN — Medication 10 ML: at 22:02

## 2025-02-24 RX ADMIN — RIVAROXABAN 20 MG: 20 TABLET, FILM COATED ORAL at 17:16

## 2025-02-24 RX ADMIN — INSULIN GLARGINE 10 UNITS: 100 INJECTION, SOLUTION SUBCUTANEOUS at 08:37

## 2025-02-24 RX ADMIN — INSULIN LISPRO 5 UNITS: 100 INJECTION, SOLUTION INTRAVENOUS; SUBCUTANEOUS at 22:20

## 2025-02-24 RX ADMIN — FAMOTIDINE 20 MG: 10 INJECTION, SOLUTION INTRAVENOUS at 22:20

## 2025-02-24 RX ADMIN — SPIRONOLACTONE 25 MG: 25 TABLET ORAL at 08:56

## 2025-02-24 RX ADMIN — INSULIN LISPRO 5 UNITS: 100 INJECTION, SOLUTION INTRAVENOUS; SUBCUTANEOUS at 17:16

## 2025-02-24 RX ADMIN — OXYCODONE HYDROCHLORIDE 10 MG: 5 TABLET ORAL at 22:03

## 2025-02-24 RX ADMIN — PREGABALIN 100 MG: 100 CAPSULE ORAL at 08:38

## 2025-02-24 RX ADMIN — METOPROLOL SUCCINATE 50 MG: 50 TABLET, FILM COATED, EXTENDED RELEASE ORAL at 08:56

## 2025-02-24 RX ADMIN — ONDANSETRON 4 MG: 2 INJECTION INTRAMUSCULAR; INTRAVENOUS at 22:00

## 2025-02-24 RX ADMIN — ASPIRIN 81 MG: 81 TABLET, COATED ORAL at 08:37

## 2025-02-24 RX ADMIN — Medication 10 ML: at 08:38

## 2025-02-24 RX ADMIN — OXYCODONE HYDROCHLORIDE 15 MG: 5 TABLET ORAL at 10:15

## 2025-02-24 RX ADMIN — SACUBITRIL AND VALSARTAN 1 TABLET: 24; 26 TABLET, FILM COATED ORAL at 08:56

## 2025-02-24 RX ADMIN — INSULIN LISPRO 5 UNITS: 100 INJECTION, SOLUTION INTRAVENOUS; SUBCUTANEOUS at 11:53

## 2025-02-24 RX ADMIN — ATORVASTATIN CALCIUM 20 MG: 20 TABLET, FILM COATED ORAL at 22:03

## 2025-02-24 RX ADMIN — Medication: at 22:20

## 2025-02-24 RX ADMIN — INSULIN LISPRO 5 UNITS: 100 INJECTION, SOLUTION INTRAVENOUS; SUBCUTANEOUS at 08:38

## 2025-02-24 RX ADMIN — FAMOTIDINE 20 MG: 10 INJECTION, SOLUTION INTRAVENOUS at 08:37

## 2025-02-24 NOTE — PROGRESS NOTES
[facility]   Hospitalist Progress Note  Date: 2025  Patient Name: Mary Albararn  : 1950  MRN: [id number]  Date of admission: 2025      Subjective   Subjective     Chief Complaint: Altered mental status    Summary: 74 y.o. female with history of A-fib on Xarelto, CKD stage III, type 2 diabetes initially presented to outside facility with decreased responsiveness.  Patient was found to be hypotensive, febrile and with elevated lactate.  Concern for infection.  Was given 2.5 L of IV fluids also had A-fib with RVR initially started on Cardizem however developed hypotension.  Did have CT scan of the abdomen pelvis which showed some gallbladder thickening concerning for cholelithiasis.  Was transferred to our facility for critical care management.  General surgery was consulted and did recommend cholecystostomy tube which was placed by IR.  Fluid cultures returned negative.  She was kept on broad-spectrum IV antibiotics, did have significant right lower extremity cellulitis which was slow to improve.  1 blood culture positive for Bacteroides species.  Will need to complete 10 days 14 days of antibiotic therapy for cellulitis and bacteremia.  Had issues with A-fib with RVR for which cardiology was consulted.  2D echo revealed drop in EF and valvular heart disease with moderate aortic stenosis.  Will need follow-up with comprehensive valve center on an outpatient basis for management of valvular heart disease.  Heart rates were controlled with IV and ultimately oral medications.  May need rehab at discharge     Interval Followup: Seen and examined patient this morning. Plan for cholecystostomy tube removal tomorrow. Right foot causing some pain today. Pending rehab placement.      Objective   Objective     Vitals:   Temp:  [97.9 °F (36.6 °C)-98.8 °F (37.1 °C)] 98.8 °F (37.1 °C)  Heart Rate:  [83-93] 83  Resp:  [20-22] 22  BP: ()/(42-68) 120/51  Flow (L/min) (Oxygen Therapy):  [1.5]  1.5    Physical Exam   GEN: No acute distress  HEENT: Moist mucous membranes  LUNGS: Equal chest rise bilaterally  CARDIAC: Regular rate and rhythm  NEURO: Moving all 4 extremities spontaneously  SKIN: No obvious breakdown.  Right lower extremity cellulitis. Bilateral lower extremity edema  ABD: S/P cholecystostomy tube     Result Review    Result Review:  I have personally reviewed the results from the time of this admission to 2/24/2025 11:02 EST and agree with these findings:  []  Laboratory  []  Microbiology  []  Radiology  []  EKG/Telemetry   []  Cardiology/Vascular   []  Pathology  []  Old records  []  Other:    Assessment & Plan   Assessment / Plan     Assessment:  Septic shock  Streptococcal bacteremia  Gram-negative bacteremia  Concern for acute cholecystitis  Concern for right lower extremity cellulitis  Acute metabolic encephalopathy secondary to above  Acute on chronic systolic congestive heart failure with acute exacerbation  Moderate aortic regurgitation with moderate aortic stenosis  Chronic atrial fibrillation with RVR  NSTEMI type II  Acute kidney injury  Hypomagnesemia  Lactic acidosis, clinically significant  Type 2 diabetes mellitus      Plan:  Continue to monitor in the hospital for the management of the above  Appreciate recommendations from general surgery, cardiology and pulmonology  S/p cholecystostomy tube placement--Plan for tube removal tomorrow  Completed 14 day course of Zosyn day  Blood cultures showing no growth to date  Continue torsemide  Continue Entresto and Aldactone  Continue aspirin, digoxin and Toprol  Continue Xarelto  SSI  Continue pain control  PT/OT  Electrolyte replacement  CBC, CMP reviewed--monitor creatinine and electrolytes  Patient will need to have outpatient referral to a comprehensive valve center for management Dr. Owen heart disease.  Pending rehab placement     Discussed plan with RN.    VTE Prophylaxis:  Pharmacologic & mechanical VTE prophylaxis orders  are present.        CODE STATUS:   Level Of Support Discussed With: Health Care Surrogate  Code Status (Patient has no pulse and is not breathing): CPR (Attempt to Resuscitate)  Medical Interventions (Patient has pulse or is breathing): Full Support      Electronically signed by Yesica Luke PA-C, 02/24/25, 11:06 AM EST.     Patient independently seen and evaluated, agree with assessment and plan, above documentation reflects plan put forth during bedside rounds.  More than 51% of the time of this patient encounter was performed by me.     Interval history:  No acute events over the last 24 hours, patient complaining of lower extremity pain     GEN: No acute distress  HEENT: Moist mucous membranes  LUNGS: Equal chest rise bilaterally  CARDIAC: Regular rate and rhythm  NEURO: Moving all 4 extremities spontaneously     Plan:  Agree with assessment plan as above  Continue cholecystostomy tube, planning to remove tomorrow  Continue Zosyn with plans for 14 days, continue to completion  Continue torsemide, Aldactone, discontinue scheduled potassium replacement, monitor renal function and potassium with a.m. labs  Continue Entresto  Continue Xarelto  Wound care consult for lower extremity wounds  CBC, CMP reviewed 2/24/2025   Repeat CBC, CMP, mag and Phos in a.m. 2/24/2025      Time spent: Time spent involving patient care including face-to-face encounter 52 minutes     Disposition: Patient agreeable to rehab now, social work to coordinate, [facility] once [facility] approved      Electronically signed by Campos Bobo MD, 2/24/2025, 11:56 EST.

## 2025-02-24 NOTE — PLAN OF CARE
Goal Outcome Evaluation:  Plan of Care Reviewed With: patient        Progress: improving  Outcome Evaluation: Patient c/o upper and lower extremity pain X1; medicated per MAR. Blood glucose in the 200s all shift, SSI given per MAR. Skin care completed to RT ft and Lower legs. Patient has sat up in the chair a couple times this shift. Patient is waiting for rehab placement.

## 2025-02-24 NOTE — PLAN OF CARE
Goal Outcome Evaluation:  Plan of Care Reviewed With: patient        Progress: improving  Outcome Evaluation: Alert and oriented x4. Medicated for pain x1. Blood glucose monitored. No significant changes.

## 2025-02-25 LAB
ALBUMIN SERPL-MCNC: 3.3 G/DL (ref 3.5–5.2)
ALBUMIN/GLOB SERPL: 0.9 G/DL
ALP SERPL-CCNC: 80 U/L (ref 39–117)
ALT SERPL W P-5'-P-CCNC: 20 U/L (ref 1–33)
ANION GAP SERPL CALCULATED.3IONS-SCNC: 11.4 MMOL/L (ref 5–15)
AST SERPL-CCNC: 22 U/L (ref 1–32)
BASOPHILS # BLD AUTO: 0.07 10*3/MM3 (ref 0–0.2)
BASOPHILS NFR BLD AUTO: 1.2 % (ref 0–1.5)
BILIRUB SERPL-MCNC: 0.6 MG/DL (ref 0–1.2)
BUN SERPL-MCNC: 23 MG/DL (ref 8–23)
BUN/CREAT SERPL: 16.3 (ref 7–25)
CALCIUM SPEC-SCNC: 9.1 MG/DL (ref 8.6–10.5)
CHLORIDE SERPL-SCNC: 97 MMOL/L (ref 98–107)
CO2 SERPL-SCNC: 28.6 MMOL/L (ref 22–29)
CREAT SERPL-MCNC: 1.41 MG/DL (ref 0.57–1)
DEPRECATED RDW RBC AUTO: 45.6 FL (ref 37–54)
EGFRCR SERPLBLD CKD-EPI 2021: 39.7 ML/MIN/1.73
EOSINOPHIL # BLD AUTO: 0.11 10*3/MM3 (ref 0–0.4)
EOSINOPHIL NFR BLD AUTO: 1.8 % (ref 0.3–6.2)
ERYTHROCYTE [DISTWIDTH] IN BLOOD BY AUTOMATED COUNT: 13.4 % (ref 12.3–15.4)
GLOBULIN UR ELPH-MCNC: 3.6 GM/DL
GLUCOSE BLDC GLUCOMTR-MCNC: 123 MG/DL (ref 70–99)
GLUCOSE BLDC GLUCOMTR-MCNC: 187 MG/DL (ref 70–99)
GLUCOSE BLDC GLUCOMTR-MCNC: 216 MG/DL (ref 70–99)
GLUCOSE BLDC GLUCOMTR-MCNC: 217 MG/DL (ref 70–99)
GLUCOSE BLDC GLUCOMTR-MCNC: 219 MG/DL (ref 70–99)
GLUCOSE BLDC GLUCOMTR-MCNC: 226 MG/DL (ref 70–99)
GLUCOSE BLDC GLUCOMTR-MCNC: 275 MG/DL (ref 70–99)
GLUCOSE BLDC GLUCOMTR-MCNC: 330 MG/DL (ref 70–99)
GLUCOSE SERPL-MCNC: 183 MG/DL (ref 65–99)
HCT VFR BLD AUTO: 44.2 % (ref 34–46.6)
HGB BLD-MCNC: 13.9 G/DL (ref 12–15.9)
IMM GRANULOCYTES # BLD AUTO: 0.03 10*3/MM3 (ref 0–0.05)
IMM GRANULOCYTES NFR BLD AUTO: 0.5 % (ref 0–0.5)
LYMPHOCYTES # BLD AUTO: 2.24 10*3/MM3 (ref 0.7–3.1)
LYMPHOCYTES NFR BLD AUTO: 37.1 % (ref 19.6–45.3)
MAGNESIUM SERPL-MCNC: 1.7 MG/DL (ref 1.6–2.4)
MCH RBC QN AUTO: 29.1 PG (ref 26.6–33)
MCHC RBC AUTO-ENTMCNC: 31.4 G/DL (ref 31.5–35.7)
MCV RBC AUTO: 92.7 FL (ref 79–97)
MONOCYTES # BLD AUTO: 0.68 10*3/MM3 (ref 0.1–0.9)
MONOCYTES NFR BLD AUTO: 11.3 % (ref 5–12)
NEUTROPHILS NFR BLD AUTO: 2.91 10*3/MM3 (ref 1.7–7)
NEUTROPHILS NFR BLD AUTO: 48.1 % (ref 42.7–76)
NRBC BLD AUTO-RTO: 0 /100 WBC (ref 0–0.2)
PHOSPHATE SERPL-MCNC: 4.6 MG/DL (ref 2.5–4.5)
PLATELET # BLD AUTO: 145 10*3/MM3 (ref 140–450)
PMV BLD AUTO: 13.3 FL (ref 6–12)
POTASSIUM SERPL-SCNC: 4.5 MMOL/L (ref 3.5–5.2)
PROT SERPL-MCNC: 6.9 G/DL (ref 6–8.5)
RBC # BLD AUTO: 4.77 10*6/MM3 (ref 3.77–5.28)
SODIUM SERPL-SCNC: 137 MMOL/L (ref 136–145)
WBC NRBC COR # BLD AUTO: 6.04 10*3/MM3 (ref 3.4–10.8)

## 2025-02-25 PROCEDURE — 97530 THERAPEUTIC ACTIVITIES: CPT

## 2025-02-25 PROCEDURE — 82948 REAGENT STRIP/BLOOD GLUCOSE: CPT | Performed by: INTERNAL MEDICINE

## 2025-02-25 PROCEDURE — 84100 ASSAY OF PHOSPHORUS: CPT | Performed by: INTERNAL MEDICINE

## 2025-02-25 PROCEDURE — 82948 REAGENT STRIP/BLOOD GLUCOSE: CPT

## 2025-02-25 PROCEDURE — 80053 COMPREHEN METABOLIC PANEL: CPT | Performed by: INTERNAL MEDICINE

## 2025-02-25 PROCEDURE — 99231 SBSQ HOSP IP/OBS SF/LOW 25: CPT | Performed by: SURGERY

## 2025-02-25 PROCEDURE — 85025 COMPLETE CBC W/AUTO DIFF WBC: CPT | Performed by: INTERNAL MEDICINE

## 2025-02-25 PROCEDURE — 97110 THERAPEUTIC EXERCISES: CPT

## 2025-02-25 PROCEDURE — 83735 ASSAY OF MAGNESIUM: CPT | Performed by: INTERNAL MEDICINE

## 2025-02-25 PROCEDURE — 63710000001 INSULIN GLARGINE PER 5 UNITS: Performed by: NURSE PRACTITIONER

## 2025-02-25 PROCEDURE — 99232 SBSQ HOSP IP/OBS MODERATE 35: CPT | Performed by: INTERNAL MEDICINE

## 2025-02-25 PROCEDURE — 63710000001 INSULIN LISPRO (HUMAN) PER 5 UNITS: Performed by: INTERNAL MEDICINE

## 2025-02-25 RX ADMIN — METOPROLOL SUCCINATE 50 MG: 50 TABLET, FILM COATED, EXTENDED RELEASE ORAL at 09:06

## 2025-02-25 RX ADMIN — INSULIN LISPRO 5 UNITS: 100 INJECTION, SOLUTION INTRAVENOUS; SUBCUTANEOUS at 21:28

## 2025-02-25 RX ADMIN — Medication 10 ML: at 09:06

## 2025-02-25 RX ADMIN — SACUBITRIL AND VALSARTAN 1 TABLET: 24; 26 TABLET, FILM COATED ORAL at 21:28

## 2025-02-25 RX ADMIN — PREGABALIN 100 MG: 100 CAPSULE ORAL at 09:06

## 2025-02-25 RX ADMIN — FAMOTIDINE 20 MG: 10 INJECTION, SOLUTION INTRAVENOUS at 09:06

## 2025-02-25 RX ADMIN — INSULIN LISPRO 5 UNITS: 100 INJECTION, SOLUTION INTRAVENOUS; SUBCUTANEOUS at 11:56

## 2025-02-25 RX ADMIN — Medication 10 ML: at 21:28

## 2025-02-25 RX ADMIN — INSULIN GLARGINE 10 UNITS: 100 INJECTION, SOLUTION SUBCUTANEOUS at 09:06

## 2025-02-25 RX ADMIN — LIDOCAINE 2 PATCH: 4 PATCH TOPICAL at 09:06

## 2025-02-25 RX ADMIN — ASPIRIN 81 MG: 81 TABLET, COATED ORAL at 09:06

## 2025-02-25 RX ADMIN — PREGABALIN 100 MG: 100 CAPSULE ORAL at 21:28

## 2025-02-25 RX ADMIN — INSULIN LISPRO 5 UNITS: 100 INJECTION, SOLUTION INTRAVENOUS; SUBCUTANEOUS at 07:46

## 2025-02-25 RX ADMIN — FAMOTIDINE 20 MG: 10 INJECTION, SOLUTION INTRAVENOUS at 21:28

## 2025-02-25 RX ADMIN — Medication: at 16:00

## 2025-02-25 RX ADMIN — ATORVASTATIN CALCIUM 20 MG: 20 TABLET, FILM COATED ORAL at 21:28

## 2025-02-25 RX ADMIN — SPIRONOLACTONE 25 MG: 25 TABLET ORAL at 09:06

## 2025-02-25 RX ADMIN — TORSEMIDE 50 MG: 20 TABLET ORAL at 09:06

## 2025-02-25 RX ADMIN — OXYCODONE HYDROCHLORIDE 15 MG: 5 TABLET ORAL at 21:33

## 2025-02-25 RX ADMIN — RIVAROXABAN 15 MG: 15 TABLET, FILM COATED ORAL at 17:38

## 2025-02-25 RX ADMIN — INSULIN LISPRO 5 UNITS: 100 INJECTION, SOLUTION INTRAVENOUS; SUBCUTANEOUS at 17:37

## 2025-02-25 RX ADMIN — OXYCODONE HYDROCHLORIDE 10 MG: 5 TABLET ORAL at 05:20

## 2025-02-25 RX ADMIN — METOPROLOL SUCCINATE 50 MG: 50 TABLET, FILM COATED, EXTENDED RELEASE ORAL at 21:28

## 2025-02-25 RX ADMIN — SACUBITRIL AND VALSARTAN 1 TABLET: 24; 26 TABLET, FILM COATED ORAL at 09:06

## 2025-02-25 NOTE — PROGRESS NOTES
[facility]   Hospitalist Progress Note  Date: 2025  Patient Name: Mary Albarran  : 1950  MRN: [id number]  Date of admission: 2025      Subjective   Subjective     Chief Complaint: Altered mental status    Summary: 74 y.o. female with history of A-fib on Xarelto, CKD stage III, type 2 diabetes initially presented to outside facility with decreased responsiveness. Patient was found to be hypotensive, febrile and with elevated lactate. Concern for infection. Was given 2.5 L of IV fluids also had A-fib with RVR initially started on Cardizem however developed hypotension. Did have CT scan of the abdomen pelvis which showed some gallbladder thickening concerning for cholelithiasis. Was transferred to our facility for critical care management. General surgery was consulted and did recommend cholecystostomy tube which was placed by IR. Fluid cultures returned negative. She was kept on broad-spectrum IV antibiotics, did have significant right lower extremity cellulitis which was slow to improve. 1 blood culture positive for Bacteroides species. Completed 14 days of antibiotic therapy for cellulitis and bacteremia. Had issues with A-fib with RVR for which cardiology was consulted. 2D echo revealed drop in EF and valvular heart disease with moderate aortic stenosis. Will need follow-up with comprehensive valve center on an outpatient basis for management of valvular heart disease. Heart rates were controlled with IV and ultimately oral medications. Cholecystostomy tube to be removed on . Pending rehab placement.    Interval Followup: Seen and examined patient this morning. Patient denies pain. Cholecystostomy tube removal today. Pending rehab placement.       Objective   Objective     Vitals:   Temp:  [97.3 °F (36.3 °C)-98.4 °F (36.9 °C)] 97.9 °F (36.6 °C)  Heart Rate:  [75-89] 83  Resp:  [16-20] 16  BP: ()/(47-81) 120/68  Flow (L/min) (Oxygen Therapy):  [1.5] 1.5    Physical Exam   GEN: No  acute distress  HEENT: Moist mucous membranes  LUNGS: Equal chest rise bilaterally  CARDIAC: Regular rate and rhythm  NEURO: Moving all 4 extremities spontaneously  SKIN: No obvious breakdown        Result Review    Result Review:  I have reviewed the following:  [x]  Laboratory  CBC          2/23/2025    05:20 2/24/2025    05:09 2/25/2025    04:48   CBC   WBC 6.90  6.77  6.04    RBC 4.72  4.70  4.77    Hemoglobin 13.5  13.4  13.9    Hematocrit 43.8  43.4  44.2    MCV 92.8  92.3  92.7    MCH 28.6  28.5  29.1    MCHC 30.8  30.9  31.4    RDW 13.4  13.7  13.4    Platelets 146  166  145      CMP          2/23/2025    05:20 2/24/2025    05:09 2/25/2025    04:48   CMP   Glucose 205  254  183    BUN 18  20  23    Creatinine 1.07  1.24  1.41    EGFR 55.3  46.3  39.7    Sodium 131  132  137    Potassium 4.6  5.1  4.5    Chloride 96  96  97    Calcium 9.2  8.8  9.1    Total Protein   6.9    Albumin   3.3    Globulin   3.6    Total Bilirubin   0.6    Alkaline Phosphatase   80    AST (SGOT)   22    ALT (SGPT)   20    Albumin/Globulin Ratio   0.9    BUN/Creatinine Ratio 16.8  16.1  16.3    Anion Gap 9.7  12.8  11.4        []  Microbiology  []  Radiology  []  EKG/Telemetry   []  Cardiology/Vascular   []  Pathology  []  Old records  []  Other:    Assessment & Plan   Assessment / Plan     Assessment:  Septic shock  Streptococcal bacteremia  Gram-negative bacteremia  Concern for acute cholecystitis  Concern for right lower extremity cellulitis  Acute metabolic encephalopathy secondary to above  Acute on chronic systolic congestive heart failure with acute exacerbation  Moderate aortic regurgitation with moderate aortic stenosis  Chronic atrial fibrillation with RVR  NSTEMI type II  Acute kidney injury  Hypomagnesemia  Lactic acidosis, clinically significant  Type 2 diabetes mellitus        Plan:  Continue to monitor in the hospital for the management of the above  Appreciate recommendations from general surgery, cardiology and  pulmonology  S/p cholecystostomy tube placement--Plan for tube removal today  Completed 14 day course of Zosyn day  Blood cultures showing no growth to date  Continue torsemide--creatinine rising  Continue Entresto and Aldactone  Continue aspirin, digoxin and Toprol  Continue Xarelto  SSI  Continue pain control  PT/OT  Electrolyte replacement  CBC, CMP reviewed--monitor creatinine and electrolytes  Patient will need to have outpatient referral to a comprehensive valve center for management Dr. Owen heart disease.  Pending rehab placement     Discussed plan with RN.    VTE Prophylaxis:  Pharmacologic & mechanical VTE prophylaxis orders are present.        CODE STATUS:   Level Of Support Discussed With: Health Care Surrogate  Code Status (Patient has no pulse and is not breathing): CPR (Attempt to Resuscitate)  Medical Interventions (Patient has pulse or is breathing): Full Support        Electronically signed by Yesica Luke PA-C, 02/25/25, 12:24 PM EST.      Attending Documentation:  Patient independently seen and evaluated, above documentation reflects plan put forth during bedside rounds.  More than 51% of the time of this patient encounter was performed by me. I discussed the care plan with MACRINA Luke PA-C, I agree with her findings and plan as documented, what I have added to the care plan and modified is as follows in my documentation and my medical decision making; pleasant 74-year-old female admitted with septic shock, underwent cholecystostomy tube placement, removed today, completed a 2-week course of Zosyn.  Awaiting rehab.  Electronically signed by Joaquin Rutledge MD, 02/25/25, 4:39 PM EST.

## 2025-02-25 NOTE — THERAPY TREATMENT NOTE
Acute Care - Physical Therapy Treatment Note  GUERO Bautista     Patient Name: Mary Albarran  : 1950  MRN: [id number]  Today's Date: 2025      Visit Dx:     ICD-10-CM ICD-9-CM   1. Difficulty walking  R26.2 719.7   2. Decreased activities of daily living (ADL)  Z78.9 V49.89     Patient Active Problem List   Diagnosis    Aortic stenosis, moderate    Atrial fibrillation    Chronic kidney disease, stage III (moderate)    Hyperlipidemia    Hypertension    Chronic diastolic congestive heart failure    Atrial fibrillation with RVR    Onychomycosis    Onychocryptosis    Foot pain, bilateral    Nephrolithiasis    Recurrent urinary tract infection    Acute on chronic HFrEF (heart failure with reduced ejection fraction)     Past Medical History:   Diagnosis Date    Allergic rhinitis     Anxiety     Aortic valve disease 2021    Fibrocalcific changes of the aortic valve with mild aortic valve stenosis   on echo 3/2/2021  Moderate aortic valve regurgitation is present. Aortic valve maximum pressure gradient is 26 mmHg. Moderate aortic valve stenosis is present.  On echo 2/10/2022       Arthritis     Atrial fibrillation     Chronic kidney disease (CKD), stage III (moderate)     Chronic pain     COPD (chronic obstructive pulmonary disease)     Diabetes mellitus type 2 with complications     Ex-smoker     QUIT SMOKING     History of home oxygen therapy     2L/NC AAT REPORTED    Hyperlipidemia     Hypertension     Thrombocytopenia      Past Surgical History:   Procedure Laterality Date    HYSTERECTOMY      30 YEARS AGO     PT Assessment (Last 12 Hours)       PT Evaluation and Treatment       Row Name 25 1441          Physical Therapy Time and Intention    Subjective Information complains of;weakness;fatigue;pain  -DK     Document Type therapy note (daily note)  -DK     Mode of Treatment individual therapy;physical therapy  -DK     Patient Effort good  -DK     Symptoms Noted During/After Treatment  fatigue;increased pain;shortness of breath  -DK     Comment Pt reports feeling better and was able to participate in exercises, transfers and a short ambulation distance.  -       Row Name 02/25/25 1441          Pain    Pretreatment Pain Rating 5/10  -DK     Posttreatment Pain Rating 5/10  -DK     Pain Side/Orientation generalized  -DK     Pain Management Interventions exercise or physical activity utilized  -DK     Response to Pain Interventions activity and movement patterns unchanged  -       Row Name 02/25/25 1441          Cognition    Affect/Mental Status (Cognition) WFL  -DK     Orientation Status (Cognition) oriented x 4  -DK     Follows Commands (Cognition) WFL  -DK     Cognitive Function (Cognition) WFL  -DK     Personal Safety Interventions gait belt;nonskid shoes/slippers when out of bed;supervised activity  -       Row Name 02/25/25 1441          Bed Mobility    Bed Mobility supine-sit-supine  -DK     Supine-Sit [place] (Bed Mobility) standby assist  -DK     Sit-Supine [place] (Bed Mobility) standby assist  -     Supine-Sit-Supine [place] (Bed Mobility) standby assist  -     Assistive Device (Bed Mobility) bed rails  -       Row Name 02/25/25 1441          Transfers    Transfers sit-stand transfer;stand-sit transfer  -       Row Name 02/25/25 1441          Sit-Stand Transfer    Sit-Stand [place] (Transfers) contact guard;1 person assist  -     Assistive Device (Sit-Stand Transfers) walker, front-wheeled  -       Row Name 02/25/25 1441          Stand-Sit Transfer    Stand-Sit [place] (Transfers) contact guard;1 person assist  -     Assistive Device (Stand-Sit Transfers) walker, front-wheeled  -       Row Name 02/25/25 1441          Gait/Stairs (Locomotion)    Gait/Stairs Locomotion gait/ambulation independence;gait/ambulation assistive device;distance ambulated;gait pattern  -     [place] Level (Gait) standby assist;contact guard;1 person assist  -      Assistive Device (Gait) walker, front-wheeled  -DK     Distance in Feet (Gait) 16  4' forward / reverse x 2 reps  -DK     Pattern (Gait) step-to  -DK     Deviations/Abnormal Patterns (Gait) vinod decreased;festinating/shuffling;gait speed decreased;stride length decreased  -     Bilateral Gait Deviations forward flexed posture  -DK     Comment, (Gait/Stairs) Pt ambulated with O2 and a rolling walker. She returned to bed post treatment.  -       Row Name 02/25/25 1441          Safety Issues/Impairments Affecting Functional Mobility    Safety Issues Affecting Function (Mobility) safety precaution awareness  -DK     Impairments Affecting Function (Mobility) balance;endurance/activity tolerance;pain;shortness of breath;strength  -       Row Name 02/25/25 1441          Balance    Balance Assessment sitting static balance;sitting dynamic balance;standing dynamic balance;standing static balance  -DK     Static Sitting Balance standby assist  -DK     Dynamic Sitting Balance standby assist  -DK     Position, Sitting Balance unsupported;sitting edge of bed  -DK     Static Standing Balance standby assist;contact guard;1-person assist  -DK     Dynamic Standing Balance standby assist;contact guard;1-person assist  -DK     Position/Device Used, Standing Balance walker, front-wheeled  -DK     Balance Interventions standing;dynamic;tandem gait  -       Row Name 02/25/25 1441          Motor Skills    Motor Skills --  therapeutic exercises  -     Coordination WFL  -     Therapeutic Exercise hip;knee;ankle  -       Row Name 02/25/25 1441          Hip (Therapeutic Exercise)    Hip (Therapeutic Exercise) AAROM (active assistive range of motion)  -     Hip AAROM (Therapeutic Exercise) bilateral;flexion;extension;aBduction;aDduction;supine;10 repetitions;2 sets  -       Row Name 02/25/25 1441          Knee (Therapeutic Exercise)    Knee (Therapeutic Exercise) AAROM (active assistive range of motion)  -     Knee  AAROM (Therapeutic Exercise) bilateral;flexion;extension;supine;10 repetitions;2 sets  -DK       Row Name 02/25/25 1441          Ankle (Therapeutic Exercise)    Ankle (Therapeutic Exercise) AAROM (active assistive range of motion)  -DK     Ankle AAROM (Therapeutic Exercise) bilateral;dorsiflexion;plantarflexion;supine;10 repetitions;2 sets  -DK       Row Name             Wound 02/11/25 1338 Right posterior foot unspecified    Wound - Properties Group Placement Date: 02/11/25  -KE Placement Time: 1338  -KE Side: Right  -KE Orientation: posterior  -KE Location: foot  -KE Type: unspecified  -KE    Retired Wound - Properties Group Placement Date: 02/11/25  -KE Placement Time: 1338  -KE Side: Right  -KE Orientation: posterior  -KE Location: foot  -KE Type: unspecified  -KE    Retired Wound - Properties Group Placement Date: 02/11/25  -KE Placement Time: 1338  -KE Side: Right  -KE Orientation: posterior  -KE Location: foot  -KE Type: unspecified  -KE    Retired Wound - Properties Group Date first assessed: 02/11/25  -KE Time first assessed: 1338  -KE Side: Right  -KE Location: foot  -KE Type: unspecified  -KE      Row Name             Wound 02/11/25 1338 Left medial great toe unspecified    Wound - Properties Group Placement Date: 02/11/25  -KE Placement Time: 1338  -KE Side: Left  -KE Orientation: medial  -KE Location: great toe  -KE Type: unspecified  -KE    Retired Wound - Properties Group Placement Date: 02/11/25  -KE Placement Time: 1338  -KE Side: Left  -KE Orientation: medial  -KE Location: great toe  -KE Type: unspecified  -KE    Retired Wound - Properties Group Placement Date: 02/11/25  -KE Placement Time: 1338  -KE Side: Left  -KE Orientation: medial  -KE Location: great toe  -KE Type: unspecified  -KE    Retired Wound - Properties Group Date first assessed: 02/11/25  -KE Time first assessed: 1338  -KE Side: Left  -KE Location: great toe  -KE Type: unspecified  -KE      Row Name 02/25/25 1441          Plan of  Care Review    Plan of Care Reviewed With patient  -DK     Progress improving  -DK       Row Name 02/25/25 1441          Positioning and Restraints    Pre-Treatment Position in bed  -DK     Post Treatment Position bed  -DK     In Bed supine;call light within reach;encouraged to call for assist;side rails up x3;legs elevated;heels elevated  -DK               User Key  (r) = Recorded By, (t) = Taken By, (c) = Cosigned By      Initials Name Provider Type    Christie Isaac, RN Registered Nurse    Clara Anderson PTA Physical Therapist Assistant                    Physical Therapy Education       Title: PT OT SLP Therapies (In Progress)       Topic: Physical Therapy (In Progress)       Point: Mobility training (Done)       Learning Progress Summary            Patient Acceptance, E,TB, VU by  at 2/19/2025 1451                      Point: Home exercise program (Not Started)       Learner Progress:  Not documented in this visit.              Point: Body mechanics (Done)       Learning Progress Summary            Patient Acceptance, E,TB, VU by AV at 2/19/2025 1451                      Point: Precautions (Done)       Learning Progress Summary            Patient Acceptance, E,TB, VU by AV at 2/19/2025 1451                                      User Key       Initials Effective Dates Name Provider Type Discipline     06/11/21 -  Kelvin Trejo, ALIVIA Physical Therapist PT                  PT Recommendation and Plan     Plan of Care Reviewed With: patient  Progress: improving   Outcome Measures       Row Name 02/25/25 1441             How much help from another person do you currently need...    Turning from your back to your side while in flat bed without using bedrails? 4  -DK      Moving from lying on back to sitting on the side of a flat bed without bedrails? 4  -DK      Moving to and from a bed to a chair (including a wheelchair)? 3  -DK      Standing up from a chair using your arms (e.g., wheelchair, bedside  chair)? 3  -DK      Climbing 3-5 steps with a railing? 2  -DK      To walk in hospital room? 3  -DK      AM-PAC 6 Clicks Score (PT) 19  -DK         Functional Assessment    Outcome Measure Options AM-PAC 6 Clicks Basic Mobility (PT)  -DK                User Key  (r) = Recorded By, (t) = Taken By, (c) = Cosigned By      Initials Name Provider Type    Clara Anderson PTA Physical Therapist Assistant                     Time Calculation:    PT Charges       Row Name 02/25/25 1446             Time Calculation    PT Received On 02/25/25  -DK      PT Goal Re-Cert Due Date 02/28/25  -DK         Timed Charges    64927 - PT Therapeutic Exercise Minutes 14  -DK      83836 - Gait Training Minutes  5  -DK      11193 - PT Therapeutic Activity Minutes 7  -DK         Total Minutes    Timed Charges Total Minutes 26  -DK       Total Minutes 26  -DK                User Key  (r) = Recorded By, (t) = Taken By, (c) = Cosigned By      Initials Name Provider Type    Clara Anderson PTA Physical Therapist Assistant                  Therapy Charges for Today       Code Description Service Date Service Provider Modifiers Qty    41650614251 HC PT THER PROC EA 15 MIN 2/25/2025 Clara Francois PTA GP 1    62805680294 HC PT THERAPEUTIC ACT EA 15 MIN 2/25/2025 Clara Francois PTA GP 1            PT G-Codes  Outcome Measure Options: AM-PAC 6 Clicks Basic Mobility (PT)  AM-PAC 6 Clicks Score (PT): 19  AM-PAC 6 Clicks Score (OT): 16    Clara Francois PTA  2/25/2025

## 2025-02-25 NOTE — PROGRESS NOTES
[facility]     Progress Note    Patient Name: Mary Albarran  : 1950  MRN: [id number]  Primary Care Physician:  Brittni Quiroz APRN  Date of admission: 2025    Subjective   Subjective     Chief Complaint: Sepsis    HPI:  Patient Reports feeling well.  No abdominal pain.  Tolerating regular diet.  Cholecystostomy drain has been in for 2 weeks      Objective   Objective     Vitals:   Temp:  [97.3 °F (36.3 °C)-98.2 °F (36.8 °C)] 97.9 °F (36.6 °C)  Heart Rate:  [75-89] 83  Resp:  [16-20] 16  BP: (105-127)/(47-81) 120/68  Flow (L/min) (Oxygen Therapy):  [1.5] 1.5    Physical Exam  Constitutional:       Appearance: Normal appearance.   Cardiovascular:      Rate and Rhythm: Normal rate.   Pulmonary:      Effort: Pulmonary effort is normal.   Abdominal:      Palpations: Abdomen is soft.     Cholecystostomy with serosanguineous drainage    Result Review    Result Review:  I have personally reviewed the results from the time of this admission to 2025 12:35 EST and agree with these findings:  []  Laboratory  []  Microbiology  []  Radiology  []  EKG/Telemetry   []  Cardiology/Vascular   []  Pathology  []  Old records  []  Other:      Assessment & Plan   Assessment / Plan     Brief Patient Summary:  Mary Albarran is a 74 y.o. female who had cholecystostomy tube placed for presumptive cholecystitis    Active Hospital Problems:  Active Hospital Problems    Diagnosis     **Atrial fibrillation with RVR     Acute on chronic HFrEF (heart failure with reduced ejection fraction)     Aortic stenosis, moderate      Plan:  Cholecystostomy was removed today at bedside  Patient tolerated well  When she is done with rehab she can follow-up with me as an outpatient and we can discuss elective cholecystectomy       VTE Prophylaxis:  Pharmacologic & mechanical VTE prophylaxis orders are present.        CODE STATUS:   Level Of Support Discussed With: Health Care Surrogate  Code Status (Patient has no pulse and is not  breathing): CPR (Attempt to Resuscitate)  Medical Interventions (Patient has pulse or is breathing): Full Support    Disposition:  I expect patient to be discharged per primary.    Electronically signed by Joaquin Gramajo MD, 02/25/25, 12:35 PM EST.

## 2025-02-25 NOTE — PLAN OF CARE
Goal Outcome Evaluation:   Patient AAOx4, able to make wants and need know, Cholecystostomy drain in place - 10ml of serosanguinous drainage, Metoprolol held this shift d/t order perimeters not met, VSS

## 2025-02-25 NOTE — CONSULTS
"Nutrition Services    Patient Name: Mary Albarran  YOB: 1950  MRN: [id number]  Admission date: 2/11/2025      CLINICAL NUTRITION ASSESSMENT      Reason for Assessment  Follow Up   H&P:  Past Medical History:   Diagnosis Date    Allergic rhinitis     Anxiety     Aortic valve disease 11/28/2021    Fibrocalcific changes of the aortic valve with mild aortic valve stenosis   on echo 3/2/2021  Moderate aortic valve regurgitation is present. Aortic valve maximum pressure gradient is 26 mmHg. Moderate aortic valve stenosis is present.  On echo 2/10/2022       Arthritis     Atrial fibrillation     Chronic kidney disease (CKD), stage III (moderate)     Chronic pain     COPD (chronic obstructive pulmonary disease)     Diabetes mellitus type 2 with complications     Ex-smoker     QUIT SMOKING 2008    History of home oxygen therapy     2L/NC AAT REPORTED    Hyperlipidemia     Hypertension     Thrombocytopenia         Current Problems:   Active Hospital Problems    Diagnosis     **Atrial fibrillation with RVR     Acute on chronic HFrEF (heart failure with reduced ejection fraction)     Aortic stenosis, moderate         Nutrition/Diet History         Narrative     Patient receiving diabetic diet. Adequate po intake, evidenced by % meal consumption. Ulceration to right plantar foot noted. Wound care following. BM+ 2/24.      Anthropometrics        Current Height, Weight Height: 160 cm (62.99\")  Weight: 116 kg (256 lb 2.8 oz)   Current BMI Body mass index is 45.39 kg/m².   BMI Classification Overweight  - healthy BMI for age 23-30   % % poor indicator for age   Adjusted Body Weight (ABW) 68 kg   Weight Hx  Wt Readings from Last 30 Encounters:   02/11/25 0432 116 kg (256 lb 2.8 oz)   02/11/25 0300 115 kg (253 lb 8.5 oz)   09/30/24 1430 116 kg (255 lb)   06/26/24 1233 117 kg (257 lb)   06/06/24 1016 115 kg (253 lb)   05/16/24 1142 118 kg (261 lb)   05/15/24 1044 118 kg (261 lb)   05/15/24 0955 115 kg (253 " lb)   02/16/24 1421 115 kg (254 lb)   11/20/23 0949 115 kg (254 lb)   11/16/23 1137 116 kg (255 lb)   11/01/23 0825 119 kg (263 lb)   10/12/23 0600 112 kg (246 lb 4.1 oz)   10/11/23 0600 114 kg (251 lb 5.2 oz)   10/10/23 0039 119 kg (262 lb 5.6 oz)   10/07/23 0501 118 kg (259 lb 4.2 oz)   10/04/23 1055 119 kg (263 lb)   09/13/23 1309 119 kg (263 lb)   06/09/23 1015 120 kg (263 lb 12.8 oz)   05/13/23 0115 120 kg (265 lb 6.9 oz)   05/11/23 1315 121 kg (266 lb)   05/05/23 1230 121 kg (267 lb 12.8 oz)   01/27/23 1052 119 kg (262 lb 4.8 oz)   10/28/22 1408 118 kg (261 lb 3.2 oz)   07/27/22 1047 119 kg (261 lb 6.4 oz)   05/20/22 1412 117 kg (259 lb)   04/01/22 1344 119 kg (261 lb 6.4 oz)   03/23/22 0953 117 kg (259 lb)   02/10/22 1201 119 kg (263 lb)   01/21/22 1052 119 kg (263 lb)   11/29/21 1257 121 kg (266 lb)   07/21/21 1100 119 kg (261 lb 12.8 oz)   05/26/21 0000 119 kg (262 lb)   05/18/21 1046 118 kg (260 lb 8 oz)   04/15/21 0000 119 kg (263 lb 2 oz)            Wt Change Observation stable     Estimated/Assessed Needs  Estimated Needs based on: Ideal Body Weight       Energy Requirements 30 kcal/kg    EST Needs (kcal/day) 1560       Protein Requirements 1.0-1.2 g/kg   EST Daily Needs (g/day) 52-62       Fluid Requirements 1 ml/kcal    Estimated Needs (mL/day) 1560     Labs/Medications         Pertinent Labs Reviewed.   Results from last 7 days   Lab Units 02/25/25  0448 02/24/25  0509 02/23/25  0520 02/20/25  0448 02/19/25  0512   SODIUM mmol/L 137 132* 131*   < > 139   POTASSIUM mmol/L 4.5 5.1 4.6   < > 3.3*   CHLORIDE mmol/L 97* 96* 96*   < > 98   CO2 mmol/L 28.6 23.2 25.3   < > 34.2*   BUN mg/dL 23 20 18   < > 9   CREATININE mg/dL 1.41* 1.24* 1.07*   < > 0.77   CALCIUM mg/dL 9.1 8.8 9.2   < > 8.3*   BILIRUBIN mg/dL 0.6  --   --   --  0.5   ALK PHOS U/L 80  --   --   --  80   ALT (SGPT) U/L 20  --   --   --  14   AST (SGOT) U/L 22  --   --   --  16   GLUCOSE mg/dL 183* 254* 205*   < > 179*    < > = values in this  interval not displayed.     Results from last 7 days   Lab Units 02/25/25  0448 02/24/25  0509 02/23/25  0520   MAGNESIUM mg/dL 1.7 1.8 1.8   PHOSPHORUS mg/dL 4.6* 4.0 3.6   HEMOGLOBIN g/dL 13.9 13.4 13.5   HEMATOCRIT % 44.2 43.4 43.8     COVID19   Date Value Ref Range Status   10/11/2023 Not Detected Not Detected - Ref. Range Final     Lab Results   Component Value Date    HGBA1C 9.60 (H) 02/12/2025         Pertinent Medications Reviewed.     Malnutrition Severity Assessment              Nutrition Diagnosis         Nutrition Dx Problem 1 No nutrition diagnosis at this time.       Nutrition Intervention        Current Nutrition Orders & Evaluation of Intake     Current Nutrition Orders & Evaluation of Intake       Current PO Diet Diet: Diabetic; Consistent Carbohydrate; Fluid Consistency: Thin (IDDSI 0)   Supplement No active supplement orders       Nutrition Intervention/Prescription        No further nutrition intervention indicated.       Medical Nutrition Therapy/Nutrition Education          Learner     Readiness Patient  Education not indicated.      Method     Response N/A  N/A     Monitor/Evaluation        Monitor Per protocol     Nutrition Discharge Plan         No discharge nutrition needs identified.      Electronically signed by:  Bridget Hussein RD  02/25/25 15:00 EST

## 2025-02-25 NOTE — SIGNIFICANT NOTE
Wound Eval / Progress Noted    GUERO Bautista     Patient Name: Mary Albarran  : 1950  MRN: [id number]  Today's Date: 2025                 Admit Date: 2025    Visit Dx:    ICD-10-CM ICD-9-CM   1. Difficulty walking  R26.2 719.7   2. Decreased activities of daily living (ADL)  Z78.9 V49.89         Atrial fibrillation with RVR    Aortic stenosis, moderate    Acute on chronic HFrEF (heart failure with reduced ejection fraction)        Past Medical History:   Diagnosis Date    Allergic rhinitis     Anxiety     Aortic valve disease 2021    Fibrocalcific changes of the aortic valve with mild aortic valve stenosis   on echo 3/2/2021  Moderate aortic valve regurgitation is present. Aortic valve maximum pressure gradient is 26 mmHg. Moderate aortic valve stenosis is present.  On echo 2/10/2022       Arthritis     Atrial fibrillation     Chronic kidney disease (CKD), stage III (moderate)     Chronic pain     COPD (chronic obstructive pulmonary disease)     Diabetes mellitus type 2 with complications     Ex-smoker     QUIT SMOKING     History of home oxygen therapy     2L/NC AAT REPORTED    Hyperlipidemia     Hypertension     Thrombocytopenia         Past Surgical History:   Procedure Laterality Date    HYSTERECTOMY      30 YEARS AGO         Physical Assessment:  Wound 25 1338 Right posterior foot unspecified (Active)   Wound Image   25 1659   Dressing Appearance open to air 259   Base moist;pink;red 25   Red (%), Wound Tissue Color 100 25   Periwound macerated 25   Periwound Temperature warm 25   Periwound Skin Turgor soft 25 165   Edges open 25 1659   Wound Length (cm) 0.8 cm 25 1659   Wound Width (cm) 0.8 cm 25 1659   Wound Depth (cm) 0.3 cm 25 1659   Wound Surface Area (cm^2) 0.64 cm^2 25 1659   Wound Volume (cm^3) 0.192 cm^3 25 165   Drainage Characteristics/Odor serous 259    Drainage Amount scant 02/25/25 1659   Care, Wound cleansed with;sterile normal saline 02/25/25 1659   Dressing Care hydrofiber;silver impregnated;silicone border foam 02/25/25 1659       Wound 02/11/25 1338 Left medial great toe unspecified (Active)   Wound Image   02/25/25 1659   Dressing Appearance open to air 02/25/25 1659   Base black;dry 02/25/25 1659   Black (%), Wound Tissue Color 100 02/25/25 1659   Periwound pink 02/25/25 1659   Periwound Temperature warm 02/25/25 1659   Periwound Skin Turgor soft 02/25/25 1659   Edges rolled/closed 02/25/25 1659   Wound Length (cm) 0.3 cm 02/25/25 1659   Wound Width (cm) 1.2 cm 02/25/25 1659   Wound Depth (cm) 0 cm 02/25/25 1659   Wound Surface Area (cm^2) 0.36 cm^2 02/25/25 1659   Wound Volume (cm^3) 0 cm^3 02/25/25 1659   Drainage Amount none 02/25/25 1659   Care, Wound cleansed with;sterile normal saline 02/25/25 1659   Dressing Care open to air 02/25/25 1659          Red blanchable buttocks - patient educated to turn more often - staff encouraged to prompt patient to reposition                                      Right foot plantar prior to wound care    right foot plantar after wound care      Left great toe  drying blood blister       LLE       RLE       Wound Check / Follow-up:  wpund nurse follow up . Patient alert and agreeable to assessment with family at bedside on [facility].     Requested staff to remove BLE dressing perform hygiene and lotion.   Upon arrival this had been done.   Legs with decreasing edema, continues to have scaly skin and dryness present  Denies pain  States feels better when aces are on.     (See above for right foot wound)   Left great toe with blackened area appears as drying blood blister    Right foot plantar wound cleansed with NS moist gauze, periwound dryness removed with wound care, pink base noted, edges of wound slightly macerated. Silver hydrofiber and silicone border applied.     Kerlix layer from base of toes to just below knee  bend and ace wraps over kerlix. Tolerated well.     Buttocks now very red but remains blanchable suggesting barrier cream and repositioning protocol.     Impression: buttocks red, right foot wound continues but is slightly improved, decreased BLE edema and healing noted remains scaly and dry. Left great toe black area as drying blister.    Short term goals:  continued wound care toward healing, skin care toward skin health, and repositioning for buttocks protection     Lauren Miller RN    2/25/2025    18:34 EST

## 2025-02-26 LAB
ANION GAP SERPL CALCULATED.3IONS-SCNC: 11 MMOL/L (ref 5–15)
BASOPHILS # BLD AUTO: 0.08 10*3/MM3 (ref 0–0.2)
BASOPHILS NFR BLD AUTO: 1.3 % (ref 0–1.5)
BUN SERPL-MCNC: 27 MG/DL (ref 8–23)
BUN/CREAT SERPL: 16.9 (ref 7–25)
CALCIUM SPEC-SCNC: 8.7 MG/DL (ref 8.6–10.5)
CHLORIDE SERPL-SCNC: 96 MMOL/L (ref 98–107)
CO2 SERPL-SCNC: 27 MMOL/L (ref 22–29)
CREAT SERPL-MCNC: 1.6 MG/DL (ref 0.57–1)
DEPRECATED RDW RBC AUTO: 47.8 FL (ref 37–54)
EGFRCR SERPLBLD CKD-EPI 2021: 34.1 ML/MIN/1.73
EOSINOPHIL # BLD AUTO: 0.12 10*3/MM3 (ref 0–0.4)
EOSINOPHIL NFR BLD AUTO: 1.9 % (ref 0.3–6.2)
ERYTHROCYTE [DISTWIDTH] IN BLOOD BY AUTOMATED COUNT: 13.6 % (ref 12.3–15.4)
GLUCOSE BLDC GLUCOMTR-MCNC: 206 MG/DL (ref 70–99)
GLUCOSE BLDC GLUCOMTR-MCNC: 230 MG/DL (ref 70–99)
GLUCOSE BLDC GLUCOMTR-MCNC: 243 MG/DL (ref 70–99)
GLUCOSE BLDC GLUCOMTR-MCNC: 257 MG/DL (ref 70–99)
GLUCOSE BLDC GLUCOMTR-MCNC: 296 MG/DL (ref 70–99)
GLUCOSE SERPL-MCNC: 205 MG/DL (ref 65–99)
HCT VFR BLD AUTO: 42.7 % (ref 34–46.6)
HGB BLD-MCNC: 12.8 G/DL (ref 12–15.9)
IMM GRANULOCYTES # BLD AUTO: 0.04 10*3/MM3 (ref 0–0.05)
IMM GRANULOCYTES NFR BLD AUTO: 0.6 % (ref 0–0.5)
LYMPHOCYTES # BLD AUTO: 2 10*3/MM3 (ref 0.7–3.1)
LYMPHOCYTES NFR BLD AUTO: 32.3 % (ref 19.6–45.3)
MAGNESIUM SERPL-MCNC: 1.8 MG/DL (ref 1.6–2.4)
MCH RBC QN AUTO: 28.8 PG (ref 26.6–33)
MCHC RBC AUTO-ENTMCNC: 30 G/DL (ref 31.5–35.7)
MCV RBC AUTO: 96.2 FL (ref 79–97)
MONOCYTES # BLD AUTO: 0.87 10*3/MM3 (ref 0.1–0.9)
MONOCYTES NFR BLD AUTO: 14 % (ref 5–12)
NEUTROPHILS NFR BLD AUTO: 3.09 10*3/MM3 (ref 1.7–7)
NEUTROPHILS NFR BLD AUTO: 49.9 % (ref 42.7–76)
NRBC BLD AUTO-RTO: 0 /100 WBC (ref 0–0.2)
PHOSPHATE SERPL-MCNC: 4.8 MG/DL (ref 2.5–4.5)
PLATELET # BLD AUTO: 120 10*3/MM3 (ref 140–450)
PMV BLD AUTO: 13.4 FL (ref 6–12)
POTASSIUM SERPL-SCNC: 4.8 MMOL/L (ref 3.5–5.2)
RBC # BLD AUTO: 4.44 10*6/MM3 (ref 3.77–5.28)
SODIUM SERPL-SCNC: 134 MMOL/L (ref 136–145)
WBC NRBC COR # BLD AUTO: 6.2 10*3/MM3 (ref 3.4–10.8)

## 2025-02-26 PROCEDURE — 83735 ASSAY OF MAGNESIUM: CPT | Performed by: INTERNAL MEDICINE

## 2025-02-26 PROCEDURE — 82948 REAGENT STRIP/BLOOD GLUCOSE: CPT

## 2025-02-26 PROCEDURE — 99232 SBSQ HOSP IP/OBS MODERATE 35: CPT | Performed by: INTERNAL MEDICINE

## 2025-02-26 PROCEDURE — 63710000001 INSULIN LISPRO (HUMAN) PER 5 UNITS: Performed by: INTERNAL MEDICINE

## 2025-02-26 PROCEDURE — 80048 BASIC METABOLIC PNL TOTAL CA: CPT | Performed by: INTERNAL MEDICINE

## 2025-02-26 PROCEDURE — 84100 ASSAY OF PHOSPHORUS: CPT | Performed by: INTERNAL MEDICINE

## 2025-02-26 PROCEDURE — 63710000001 INSULIN GLARGINE PER 5 UNITS: Performed by: NURSE PRACTITIONER

## 2025-02-26 PROCEDURE — 85025 COMPLETE CBC W/AUTO DIFF WBC: CPT | Performed by: INTERNAL MEDICINE

## 2025-02-26 PROCEDURE — 82948 REAGENT STRIP/BLOOD GLUCOSE: CPT | Performed by: INTERNAL MEDICINE

## 2025-02-26 RX ADMIN — PREGABALIN 100 MG: 100 CAPSULE ORAL at 21:09

## 2025-02-26 RX ADMIN — INSULIN GLARGINE 10 UNITS: 100 INJECTION, SOLUTION SUBCUTANEOUS at 07:56

## 2025-02-26 RX ADMIN — Medication: at 14:47

## 2025-02-26 RX ADMIN — FAMOTIDINE 20 MG: 10 INJECTION, SOLUTION INTRAVENOUS at 21:09

## 2025-02-26 RX ADMIN — FAMOTIDINE 20 MG: 10 INJECTION, SOLUTION INTRAVENOUS at 07:57

## 2025-02-26 RX ADMIN — OXYCODONE HYDROCHLORIDE 15 MG: 5 TABLET ORAL at 17:23

## 2025-02-26 RX ADMIN — INSULIN LISPRO 8 UNITS: 100 INJECTION, SOLUTION INTRAVENOUS; SUBCUTANEOUS at 21:09

## 2025-02-26 RX ADMIN — OXYCODONE HYDROCHLORIDE 15 MG: 5 TABLET ORAL at 10:46

## 2025-02-26 RX ADMIN — Medication 10 ML: at 21:09

## 2025-02-26 RX ADMIN — INSULIN LISPRO 5 UNITS: 100 INJECTION, SOLUTION INTRAVENOUS; SUBCUTANEOUS at 12:11

## 2025-02-26 RX ADMIN — RIVAROXABAN 15 MG: 15 TABLET, FILM COATED ORAL at 17:20

## 2025-02-26 RX ADMIN — PREGABALIN 100 MG: 100 CAPSULE ORAL at 08:01

## 2025-02-26 RX ADMIN — LIDOCAINE 2 PATCH: 4 PATCH TOPICAL at 07:55

## 2025-02-26 RX ADMIN — INSULIN LISPRO 8 UNITS: 100 INJECTION, SOLUTION INTRAVENOUS; SUBCUTANEOUS at 07:51

## 2025-02-26 RX ADMIN — OXYCODONE HYDROCHLORIDE 15 MG: 5 TABLET ORAL at 21:09

## 2025-02-26 RX ADMIN — ASPIRIN 81 MG: 81 TABLET, COATED ORAL at 08:01

## 2025-02-26 RX ADMIN — INSULIN LISPRO 5 UNITS: 100 INJECTION, SOLUTION INTRAVENOUS; SUBCUTANEOUS at 17:19

## 2025-02-26 RX ADMIN — Medication 10 ML: at 07:54

## 2025-02-26 RX ADMIN — ATORVASTATIN CALCIUM 20 MG: 20 TABLET, FILM COATED ORAL at 21:09

## 2025-02-26 RX ADMIN — SACUBITRIL AND VALSARTAN 1 TABLET: 24; 26 TABLET, FILM COATED ORAL at 21:09

## 2025-02-26 RX ADMIN — SACUBITRIL AND VALSARTAN 1 TABLET: 24; 26 TABLET, FILM COATED ORAL at 08:01

## 2025-02-26 NOTE — PLAN OF CARE
Goal Outcome Evaluation:  Plan of Care Reviewed With: patient        Progress: improving  Outcome Evaluation: Patient's bile drain was removed by MD today. Mepilex applied to abd. B/L LE wrapped by wound RN. SSI given per MAR. Patient is waiting for rehab placement at [facility].

## 2025-02-26 NOTE — PLAN OF CARE
Goal Outcome Evaluation:           Progress: improving  Outcome Evaluation: Patient A&O x4. VS stable. Voiced c/o pain to bilateral hands&feet x1 on shift. Medications administered per MAR. Blood glucose monitored and supplemental insulin given per orders. No significant changes or events to report on shift. All needs met at this time. Plan of care ongoing.

## 2025-02-26 NOTE — PLAN OF CARE
Goal Outcome Evaluation:   Alert & oriented x4; vital signs remain stable for patient. BLE wound & skin care performed per orders; BLE wrapped per orders. Medicated with PRN medication for pain per MAR. Reports no further needs at this time, plan of care ongoing.

## 2025-02-27 LAB
ALBUMIN SERPL-MCNC: 3.1 G/DL (ref 3.5–5.2)
ANION GAP SERPL CALCULATED.3IONS-SCNC: 7.8 MMOL/L (ref 5–15)
BASOPHILS # BLD AUTO: 0.08 10*3/MM3 (ref 0–0.2)
BASOPHILS NFR BLD AUTO: 1 % (ref 0–1.5)
BUN SERPL-MCNC: 31 MG/DL (ref 8–23)
BUN/CREAT SERPL: 14.6 (ref 7–25)
CALCIUM SPEC-SCNC: 8.9 MG/DL (ref 8.6–10.5)
CHLORIDE SERPL-SCNC: 96 MMOL/L (ref 98–107)
CO2 SERPL-SCNC: 31.2 MMOL/L (ref 22–29)
CREAT SERPL-MCNC: 2.12 MG/DL (ref 0.57–1)
DEPRECATED RDW RBC AUTO: 48.6 FL (ref 37–54)
EGFRCR SERPLBLD CKD-EPI 2021: 24.3 ML/MIN/1.73
EOSINOPHIL # BLD AUTO: 0.13 10*3/MM3 (ref 0–0.4)
EOSINOPHIL NFR BLD AUTO: 1.7 % (ref 0.3–6.2)
ERYTHROCYTE [DISTWIDTH] IN BLOOD BY AUTOMATED COUNT: 13.9 % (ref 12.3–15.4)
GLUCOSE BLDC GLUCOMTR-MCNC: 146 MG/DL (ref 70–99)
GLUCOSE BLDC GLUCOMTR-MCNC: 204 MG/DL (ref 70–99)
GLUCOSE BLDC GLUCOMTR-MCNC: 236 MG/DL (ref 70–99)
GLUCOSE BLDC GLUCOMTR-MCNC: 253 MG/DL (ref 70–99)
GLUCOSE SERPL-MCNC: 163 MG/DL (ref 65–99)
HCT VFR BLD AUTO: 39.7 % (ref 34–46.6)
HGB BLD-MCNC: 11.8 G/DL (ref 12–15.9)
IMM GRANULOCYTES # BLD AUTO: 0.03 10*3/MM3 (ref 0–0.05)
IMM GRANULOCYTES NFR BLD AUTO: 0.4 % (ref 0–0.5)
LYMPHOCYTES # BLD AUTO: 2.27 10*3/MM3 (ref 0.7–3.1)
LYMPHOCYTES NFR BLD AUTO: 29.2 % (ref 19.6–45.3)
MAGNESIUM SERPL-MCNC: 1.6 MG/DL (ref 1.6–2.4)
MCH RBC QN AUTO: 28.2 PG (ref 26.6–33)
MCHC RBC AUTO-ENTMCNC: 29.7 G/DL (ref 31.5–35.7)
MCV RBC AUTO: 95 FL (ref 79–97)
MONOCYTES # BLD AUTO: 0.94 10*3/MM3 (ref 0.1–0.9)
MONOCYTES NFR BLD AUTO: 12.1 % (ref 5–12)
NEUTROPHILS NFR BLD AUTO: 4.32 10*3/MM3 (ref 1.7–7)
NEUTROPHILS NFR BLD AUTO: 55.6 % (ref 42.7–76)
NRBC BLD AUTO-RTO: 0 /100 WBC (ref 0–0.2)
PHOSPHATE SERPL-MCNC: 4.8 MG/DL (ref 2.5–4.5)
PLATELET # BLD AUTO: 125 10*3/MM3 (ref 140–450)
PMV BLD AUTO: 13.9 FL (ref 6–12)
POTASSIUM SERPL-SCNC: 5.2 MMOL/L (ref 3.5–5.2)
RBC # BLD AUTO: 4.18 10*6/MM3 (ref 3.77–5.28)
SODIUM SERPL-SCNC: 135 MMOL/L (ref 136–145)
WBC NRBC COR # BLD AUTO: 7.77 10*3/MM3 (ref 3.4–10.8)

## 2025-02-27 PROCEDURE — 99233 SBSQ HOSP IP/OBS HIGH 50: CPT | Performed by: INTERNAL MEDICINE

## 2025-02-27 PROCEDURE — 63710000001 INSULIN GLARGINE PER 5 UNITS: Performed by: NURSE PRACTITIONER

## 2025-02-27 PROCEDURE — 82948 REAGENT STRIP/BLOOD GLUCOSE: CPT | Performed by: INTERNAL MEDICINE

## 2025-02-27 PROCEDURE — 82948 REAGENT STRIP/BLOOD GLUCOSE: CPT

## 2025-02-27 PROCEDURE — 25810000003 SODIUM CHLORIDE 0.9 % SOLUTION: Performed by: INTERNAL MEDICINE

## 2025-02-27 PROCEDURE — 80069 RENAL FUNCTION PANEL: CPT | Performed by: INTERNAL MEDICINE

## 2025-02-27 PROCEDURE — 85025 COMPLETE CBC W/AUTO DIFF WBC: CPT | Performed by: INTERNAL MEDICINE

## 2025-02-27 PROCEDURE — 97116 GAIT TRAINING THERAPY: CPT

## 2025-02-27 PROCEDURE — 97530 THERAPEUTIC ACTIVITIES: CPT

## 2025-02-27 PROCEDURE — 63710000001 INSULIN LISPRO (HUMAN) PER 5 UNITS: Performed by: INTERNAL MEDICINE

## 2025-02-27 PROCEDURE — 83735 ASSAY OF MAGNESIUM: CPT | Performed by: INTERNAL MEDICINE

## 2025-02-27 RX ORDER — SODIUM CHLORIDE 9 MG/ML
75 INJECTION, SOLUTION INTRAVENOUS CONTINUOUS
Status: ACTIVE | OUTPATIENT
Start: 2025-02-27 | End: 2025-02-27

## 2025-02-27 RX ADMIN — OXYCODONE HYDROCHLORIDE 10 MG: 5 TABLET ORAL at 14:30

## 2025-02-27 RX ADMIN — ATORVASTATIN CALCIUM 20 MG: 20 TABLET, FILM COATED ORAL at 21:45

## 2025-02-27 RX ADMIN — FAMOTIDINE 20 MG: 10 INJECTION, SOLUTION INTRAVENOUS at 09:01

## 2025-02-27 RX ADMIN — METOPROLOL SUCCINATE 50 MG: 50 TABLET, FILM COATED, EXTENDED RELEASE ORAL at 21:45

## 2025-02-27 RX ADMIN — INSULIN LISPRO 5 UNITS: 100 INJECTION, SOLUTION INTRAVENOUS; SUBCUTANEOUS at 11:34

## 2025-02-27 RX ADMIN — SODIUM CHLORIDE 75 ML/HR: 9 INJECTION, SOLUTION INTRAVENOUS at 10:24

## 2025-02-27 RX ADMIN — METOPROLOL SUCCINATE 50 MG: 50 TABLET, FILM COATED, EXTENDED RELEASE ORAL at 09:02

## 2025-02-27 RX ADMIN — INSULIN LISPRO 8 UNITS: 100 INJECTION, SOLUTION INTRAVENOUS; SUBCUTANEOUS at 18:30

## 2025-02-27 RX ADMIN — RIVAROXABAN 15 MG: 15 TABLET, FILM COATED ORAL at 18:30

## 2025-02-27 RX ADMIN — INSULIN GLARGINE 10 UNITS: 100 INJECTION, SOLUTION SUBCUTANEOUS at 09:01

## 2025-02-27 RX ADMIN — Medication: at 11:01

## 2025-02-27 RX ADMIN — PREGABALIN 100 MG: 100 CAPSULE ORAL at 21:45

## 2025-02-27 RX ADMIN — ASPIRIN 81 MG: 81 TABLET, COATED ORAL at 09:02

## 2025-02-27 RX ADMIN — INSULIN LISPRO 5 UNITS: 100 INJECTION, SOLUTION INTRAVENOUS; SUBCUTANEOUS at 21:45

## 2025-02-27 RX ADMIN — Medication 10 ML: at 09:03

## 2025-02-27 RX ADMIN — OXYCODONE HYDROCHLORIDE 10 MG: 5 TABLET ORAL at 21:45

## 2025-02-27 RX ADMIN — LIDOCAINE 2 PATCH: 4 PATCH TOPICAL at 09:01

## 2025-02-27 RX ADMIN — PREGABALIN 100 MG: 100 CAPSULE ORAL at 09:03

## 2025-02-27 NOTE — SIGNIFICANT NOTE
02/27/25 0902   OTHER   Discipline occupational therapist   Rehab Time/Intention   Session Not Performed patient unavailable for treatment  (with nurse)

## 2025-02-27 NOTE — CONSULTS
Consult placed per Blood Glucose > 200. Met with patient at bedside. Discussed current A1c of 9.6% with an estimated average glucose of 229 mg/dl which is much higher than her prior A1c of 6.2% (131 mg/dl) from May 2024. Patient states she takes her Lantus and Januvia as prescribed, checks her blood sugar daily, and follows with her PCP. Patient may benefit from adjusting the dose of her home medications. No needs or questions at this time.

## 2025-02-27 NOTE — PLAN OF CARE
Goal Outcome Evaluation:           Progress: improving  Outcome Evaluation: Patient A&O x4. VS stable. Voiced c/o pain x1 on shift. Medications administered per MAR. Blood glucose monitored and supplemental insulin given per orders. No acute events to report on shift. All needs met at this time. Plan of care ongoing.

## 2025-02-27 NOTE — PLAN OF CARE
Goal Outcome Evaluation:              Outcome Evaluation: Patient is aox4. C/o bilateral leg pain, given pain medication as ordered. Medication was effective for pain management. Stand by assist to the chair and bedside commode. Patient reports she is ready to go home. Ensured patient would go soon just have to make sure all the labwork in wnl first. Patient satisfied with this answer. Daily medications administered as ordered, tolerated well and took pills whole. All needs have been met and call light is in reach.

## 2025-02-27 NOTE — THERAPY TREATMENT NOTE
Acute Care - Physical Therapy Treatment Note  [facility]     Patient Name: Mary Albarran  : 1950  MRN: [id number]  Today's Date: 2025      Visit Dx:     ICD-10-CM ICD-9-CM   1. Difficulty walking  R26.2 719.7   2. Decreased activities of daily living (ADL)  Z78.9 V49.89     Patient Active Problem List   Diagnosis    Aortic stenosis, moderate    Atrial fibrillation    Chronic kidney disease, stage III (moderate)    Hyperlipidemia    Hypertension    Chronic diastolic congestive heart failure    Atrial fibrillation with RVR    Onychomycosis    Onychocryptosis    Foot pain, bilateral    Nephrolithiasis    Recurrent urinary tract infection    Acute on chronic HFrEF (heart failure with reduced ejection fraction)     Past Medical History:   Diagnosis Date    Allergic rhinitis     Anxiety     Aortic valve disease 2021    Fibrocalcific changes of the aortic valve with mild aortic valve stenosis   on echo 3/2/2021  Moderate aortic valve regurgitation is present. Aortic valve maximum pressure gradient is 26 mmHg. Moderate aortic valve stenosis is present.  On echo 2/10/2022       Arthritis     Atrial fibrillation     Chronic kidney disease (CKD), stage III (moderate)     Chronic pain     COPD (chronic obstructive pulmonary disease)     Diabetes mellitus type 2 with complications     Ex-smoker     QUIT SMOKING     History of home oxygen therapy     2L/NC AAT REPORTED    Hyperlipidemia     Hypertension     Thrombocytopenia      Past Surgical History:   Procedure Laterality Date    HYSTERECTOMY      30 YEARS AGO     PT Assessment (Last 12 Hours)       PT Evaluation and Treatment       Row Name 25 1149          Physical Therapy Time and Intention    Subjective Information no complaints  -VK     Document Type therapy note (daily note)  -VK     Mode of Treatment individual therapy;physical therapy  -VK     Patient Effort good  -VK       Row Name 25 1421          General Information    Patient Profile  Reviewed yes  -VK     Existing Precautions/Restrictions fall  impaired skin integrity, choeycystosomy tube.  -VK       Row Name 02/27/25 1149          Cognition    Affect/Mental Status (Cognition) WNL  -VK     Orientation Status (Cognition) oriented to;person;place;situation  -VK     Personal Safety Interventions nonskid shoes/slippers when out of bed;gait belt;fall prevention program maintained  -VK       Row Name 02/27/25 1149          Bed Mobility    Sit-Supine Randolph (Bed Mobility) standby assist  -VK     Bed Mobility, Safety Issues decreased use of arms for pushing/pulling;decreased use of legs for bridging/pushing  -VK     Assistive Device (Bed Mobility) bed rails  -VK       Row Name 02/27/25 1149          Transfers    Transfers sit-stand transfer;stand-sit transfer  -VK       Row Name 02/27/25 1149          Sit-Stand Transfer    Sit-Stand Randolph (Transfers) standby assist;contact guard  -VK     Assistive Device (Sit-Stand Transfers) walker, front-wheeled  -VK       Row Name 02/27/25 1149          Stand-Sit Transfer    Stand-Sit Randolph (Transfers) standby assist;contact guard  -VK     Assistive Device (Stand-Sit Transfers) walker, front-wheeled  -VK       Row Name 02/27/25 1149          Gait/Stairs (Locomotion)    Randolph Level (Gait) standby assist;contact guard  -VK     Assistive Device (Gait) walker, front-wheeled  -VK     Patient was able to Ambulate yes  -VK     Distance in Feet (Gait) 45  -VK     Pattern (Gait) 4-point;step-to  -VK     Deviations/Abnormal Patterns (Gait) antalgic;base of support, wide;gait speed decreased;stride length decreased  -VK     Bilateral Gait Deviations heel strike decreased  -VK     Right Sided Gait Deviations weight shift ability decreased  -VK       Row Name 02/27/25 1149          Safety Issues/Impairments Affecting Functional Mobility    Impairments Affecting Function (Mobility) balance;endurance/activity tolerance;pain;shortness of breath;strength   -VK       Row Name 02/27/25 1149          Balance    Static Standing Balance standby assist  -VK     Dynamic Standing Balance standby assist;contact guard  -VK     Position/Device Used, Standing Balance walker, front-wheeled  -VK     Balance Interventions sit to stand  -VK       Row Name             Wound 02/11/25 1338 Right posterior foot unspecified    Wound - Properties Group Placement Date: 02/11/25  -KE Placement Time: 1338  -KE Side: Right  -KE Orientation: posterior  -KE Location: foot  -KE Type: unspecified  -KE    Retired Wound - Properties Group Placement Date: 02/11/25  -KE Placement Time: 1338  -KE Side: Right  -KE Orientation: posterior  -KE Location: foot  -KE Type: unspecified  -KE    Retired Wound - Properties Group Placement Date: 02/11/25  -KE Placement Time: 1338  -KE Side: Right  -KE Orientation: posterior  -KE Location: foot  -KE Type: unspecified  -KE    Retired Wound - Properties Group Date first assessed: 02/11/25  -KE Time first assessed: 1338  -KE Side: Right  -KE Location: foot  -KE Type: unspecified  -KE      Row Name             Wound 02/11/25 1338 Left medial great toe unspecified    Wound - Properties Group Placement Date: 02/11/25  -KE Placement Time: 1338  -KE Side: Left  -KE Orientation: medial  -KE Location: great toe  -KE Type: unspecified  -KE    Retired Wound - Properties Group Placement Date: 02/11/25  -KE Placement Time: 1338  -KE Side: Left  -KE Orientation: medial  -KE Location: great toe  -KE Type: unspecified  -KE    Retired Wound - Properties Group Placement Date: 02/11/25  -KE Placement Time: 1338  -KE Side: Left  -KE Orientation: medial  -KE Location: great toe  -KE Type: unspecified  -KE    Retired Wound - Properties Group Date first assessed: 02/11/25  -KE Time first assessed: 1338  -KE Side: Left  -KE Location: great toe  -KE Type: unspecified  -KE      Row Name 02/27/25 1149          Positioning and Restraints    Pre-Treatment Position in bed  -VK     Post Treatment  Position chair  -VK     In Chair reclined;call light within reach;encouraged to call for assist;notified nsg;with family/caregiver  Alarms not active per nsg.  -VK       Row Name 02/27/25 1149          Progress Summary (PT)    Progress Toward Functional Goals (PT) progress toward functional goals is fair;progress toward functional goals is good  -VK               User Key  (r) = Recorded By, (t) = Taken By, (c) = Cosigned By      Initials Name Provider Type    Christie Isaac, RN Registered Nurse    Marysol Preston PTA Physical Therapist Assistant                    Physical Therapy Education       Title: PT OT SLP Therapies (In Progress)       Topic: Physical Therapy (In Progress)       Point: Mobility training (Done)       Learning Progress Summary            Patient Acceptance, E,TB, VU by AV at 2/19/2025 1451                      Point: Home exercise program (Not Started)       Learner Progress:  Not documented in this visit.              Point: Body mechanics (Done)       Learning Progress Summary            Patient Acceptance, E,TB, VU by AV at 2/19/2025 1451                      Point: Precautions (Done)       Learning Progress Summary            Patient Acceptance, E,TB, VU by AV at 2/19/2025 1451                                      User Key       Initials Effective Dates Name Provider Type Discipline     06/11/21 -  Kelvin Trejo, PT Physical Therapist PT                  PT Recommendation and Plan     Progress Summary (PT)  Progress Toward Functional Goals (PT): progress toward functional goals is fair, progress toward functional goals is good   Outcome Measures       Row Name 02/27/25 1200 02/25/25 1441          How much help from another person do you currently need...    Turning from your back to your side while in flat bed without using bedrails? 4  -VK 4  -DK     Moving from lying on back to sitting on the side of a flat bed without bedrails? 4  -VK 4  -DK     Moving to and from a bed to a  chair (including a wheelchair)? 3  -VK 3  -DK     Standing up from a chair using your arms (e.g., wheelchair, bedside chair)? 3  -VK 3  -DK     Climbing 3-5 steps with a railing? 3  -VK 2  -DK     To walk in hospital room? 3  -VK 3  -DK     AM-PAC 6 Clicks Score (PT) 20  -VK 19  -DK        Functional Assessment    Outcome Measure Options -- AM-PAC 6 Clicks Basic Mobility (PT)  -DK               User Key  (r) = Recorded By, (t) = Taken By, (c) = Cosigned By      Initials Name Provider Type    Clara Anderson PTA Physical Therapist Assistant    Marysol Preston PTA Physical Therapist Assistant                     Time Calculation:    PT Charges       Row Name 02/27/25 1226             Time Calculation    PT Received On 02/27/25  -VK         Timed Charges    45579 - Gait Training Minutes  11  -VK      78314 - PT Therapeutic Activity Minutes 12  -VK         Total Minutes    Timed Charges Total Minutes 23  -VK       Total Minutes 23  -VK                User Key  (r) = Recorded By, (t) = Taken By, (c) = Cosigned By      Initials Name Provider Type    Marysol Preston PTA Physical Therapist Assistant                  Therapy Charges for Today       Code Description Service Date Service Provider Modifiers Qty    67682827334 HC GAIT TRAINING EA 15 MIN 2/27/2025 Marysol Denise PTA GP 1    81504463414 HC PT THERAPEUTIC ACT EA 15 MIN 2/27/2025 Marysol Denise PTA GP 1            PT G-Codes  Outcome Measure Options: AM-PAC 6 Clicks Basic Mobility (PT)  AM-PAC 6 Clicks Score (PT): 20  AM-PAC 6 Clicks Score (OT): 16    Marysol Denise PTA  2/27/2025

## 2025-02-27 NOTE — PROGRESS NOTES
[facility]   Hospitalist Progress Note  Date: 2025  Patient Name: Mary Albarran  : 1950  MRN: [id number]  Date of admission: 2025      Subjective   Subjective     Chief Complaint: Altered mental status    Summary: 74 y.o. female with history of A-fib on Xarelto, CKD stage III, type 2 diabetes initially presented to outside facility with decreased responsiveness. Patient was found to be hypotensive, febrile and with elevated lactate. Concern for infection. Was given 2.5 L of IV fluids also had A-fib with RVR initially started on Cardizem however developed hypotension. Did have CT scan of the abdomen pelvis which showed some gallbladder thickening concerning for cholelithiasis. Was transferred to our facility for critical care management. General surgery was consulted and did recommend cholecystostomy tube which was placed by IR. Fluid cultures returned negative. She was kept on broad-spectrum IV antibiotics, did have significant right lower extremity cellulitis which was slow to improve. 1 blood culture positive for Bacteroides species. Completed 14 days of antibiotic therapy for cellulitis and bacteremia. Had issues with A-fib with RVR for which cardiology was consulted. 2D echo revealed drop in EF and valvular heart disease with moderate aortic stenosis. Will need follow-up with comprehensive valve [facility] on an outpatient basis for management of valvular heart disease. Heart rates were controlled with IV and ultimately oral medications. Cholecystostomy tube to be removed on .     Interval Followup: No new complaints, acute rehab denied, patient now does want to go home instead      Objective   Objective     Vitals:   Temp:  [97.2 °F (36.2 °C)-97.9 °F (36.6 °C)] 97.7 °F (36.5 °C)  Heart Rate:  [66-96] 95  Resp:  [18] 18  BP: (103-154)/(52-96) 154/96  Flow (L/min) (Oxygen Therapy):  [1] 1    Physical Exam   GEN: No acute distress  HEENT: Moist mucous membranes  LUNGS: Equal chest rise  bilaterally  CARDIAC: Regular rate and rhythm  NEURO: Moving all 4 extremities spontaneously  SKIN: No obvious breakdown        Result Review    Result Review:  I have reviewed the following:  [x]  Laboratory  CBC          2/24/2025    05:09 2/25/2025    04:48 2/26/2025    05:26   CBC   WBC 6.77  6.04  6.20    RBC 4.70  4.77  4.44    Hemoglobin 13.4  13.9  12.8    Hematocrit 43.4  44.2  42.7    MCV 92.3  92.7  96.2    MCH 28.5  29.1  28.8    MCHC 30.9  31.4  30.0    RDW 13.7  13.4  13.6    Platelets 166  145  120      CMP          2/24/2025    05:09 2/25/2025    04:48 2/26/2025    05:26   CMP   Glucose 254  183  205    BUN 20  23  27    Creatinine 1.24  1.41  1.60    EGFR 46.3  39.7  34.1    Sodium 132  137  134    Potassium 5.1  4.5  4.8    Chloride 96  97  96    Calcium 8.8  9.1  8.7    Total Protein  6.9     Albumin  3.3     Globulin  3.6     Total Bilirubin  0.6     Alkaline Phosphatase  80     AST (SGOT)  22     ALT (SGPT)  20     Albumin/Globulin Ratio  0.9     BUN/Creatinine Ratio 16.1  16.3  16.9    Anion Gap 12.8  11.4  11.0        []  Microbiology  []  Radiology  []  EKG/Telemetry   []  Cardiology/Vascular   []  Pathology  []  Old records  []  Other:    Assessment & Plan   Assessment / Plan     Assessment:  Septic shock  Streptococcal bacteremia  Gram-negative bacteremia  Concern for acute cholecystitis  Concern for right lower extremity cellulitis  Acute metabolic encephalopathy secondary to above  Acute on chronic systolic congestive heart failure with acute exacerbation  Moderate aortic regurgitation with moderate aortic stenosis  Chronic atrial fibrillation with RVR  NSTEMI type II  Acute kidney injury  Hypomagnesemia  Lactic acidosis, clinically significant  Type 2 diabetes mellitus        Plan:  Continue to monitor in the hospital for the management of the above  Appreciate recommendations from general surgery, cardiology and pulmonology  S/p cholecystostomy tube placement removed  2/25/2025  Completed 14 day course of Zosyn day  Blood cultures showing no growth to date  Hold torsemide--creatinine rising  Continue Entresto and Aldactone  Continue aspirin, digoxin and Toprol  Continue Xarelto  SSI  Continue pain control  PT/OT  Electrolyte replacement  CBC, CMP reviewed--monitor creatinine and electrolytes  Patient has decided that she does not want to go to rehab and prefers to go home.  Insurance denied [employer].  I offered to do a peer to peer for her or to look into subacute rehab but she is pretty insistent that she wants to go home.  I would like to keep a watch on her creatinine and defer discharge at this time until renal function stabilizes.     Discussed plan with RN.    VTE Prophylaxis:  Pharmacologic & mechanical VTE prophylaxis orders are present.        CODE STATUS:   Level Of Support Discussed With: Health Care Surrogate  Code Status (Patient has no pulse and is not breathing): CPR (Attempt to Resuscitate)  Medical Interventions (Patient has pulse or is breathing): Full Support    Electronically signed by Joaquin Rutledge MD, 02/26/25, 7:16 PM EST.

## 2025-02-28 ENCOUNTER — READMISSION MANAGEMENT (OUTPATIENT)
Dept: CALL CENTER | Facility: HOSPITAL | Age: 75
End: 2025-02-28
Payer: MEDICARE

## 2025-02-28 VITALS
SYSTOLIC BLOOD PRESSURE: 107 MMHG | TEMPERATURE: 98.4 F | OXYGEN SATURATION: 93 % | DIASTOLIC BLOOD PRESSURE: 53 MMHG | HEART RATE: 79 BPM | RESPIRATION RATE: 20 BRPM | HEIGHT: 63 IN | BODY MASS INDEX: 45.39 KG/M2 | WEIGHT: 256.17 LBS

## 2025-02-28 LAB
ANION GAP SERPL CALCULATED.3IONS-SCNC: 10.8 MMOL/L (ref 5–15)
BASOPHILS # BLD AUTO: 0.07 10*3/MM3 (ref 0–0.2)
BASOPHILS NFR BLD AUTO: 1.2 % (ref 0–1.5)
BUN SERPL-MCNC: 27 MG/DL (ref 8–23)
BUN/CREAT SERPL: 18.4 (ref 7–25)
CALCIUM SPEC-SCNC: 8.7 MG/DL (ref 8.6–10.5)
CHLORIDE SERPL-SCNC: 96 MMOL/L (ref 98–107)
CO2 SERPL-SCNC: 27.2 MMOL/L (ref 22–29)
CREAT SERPL-MCNC: 1.47 MG/DL (ref 0.57–1)
DEPRECATED RDW RBC AUTO: 47.4 FL (ref 37–54)
EGFRCR SERPLBLD CKD-EPI 2021: 37.8 ML/MIN/1.73
EOSINOPHIL # BLD AUTO: 0.1 10*3/MM3 (ref 0–0.4)
EOSINOPHIL NFR BLD AUTO: 1.7 % (ref 0.3–6.2)
ERYTHROCYTE [DISTWIDTH] IN BLOOD BY AUTOMATED COUNT: 13.8 % (ref 12.3–15.4)
GLUCOSE BLDC GLUCOMTR-MCNC: 183 MG/DL (ref 70–99)
GLUCOSE BLDC GLUCOMTR-MCNC: 210 MG/DL (ref 70–99)
GLUCOSE SERPL-MCNC: 236 MG/DL (ref 65–99)
HCT VFR BLD AUTO: 37.1 % (ref 34–46.6)
HGB BLD-MCNC: 11.3 G/DL (ref 12–15.9)
IMM GRANULOCYTES # BLD AUTO: 0.02 10*3/MM3 (ref 0–0.05)
IMM GRANULOCYTES NFR BLD AUTO: 0.3 % (ref 0–0.5)
LYMPHOCYTES # BLD AUTO: 1.67 10*3/MM3 (ref 0.7–3.1)
LYMPHOCYTES NFR BLD AUTO: 28.9 % (ref 19.6–45.3)
MAGNESIUM SERPL-MCNC: 1.7 MG/DL (ref 1.6–2.4)
MCH RBC QN AUTO: 28.4 PG (ref 26.6–33)
MCHC RBC AUTO-ENTMCNC: 30.5 G/DL (ref 31.5–35.7)
MCV RBC AUTO: 93.2 FL (ref 79–97)
MONOCYTES # BLD AUTO: 0.81 10*3/MM3 (ref 0.1–0.9)
MONOCYTES NFR BLD AUTO: 14 % (ref 5–12)
NEUTROPHILS NFR BLD AUTO: 3.1 10*3/MM3 (ref 1.7–7)
NEUTROPHILS NFR BLD AUTO: 53.9 % (ref 42.7–76)
NRBC BLD AUTO-RTO: 0 /100 WBC (ref 0–0.2)
PHOSPHATE SERPL-MCNC: 3.3 MG/DL (ref 2.5–4.5)
PLATELET # BLD AUTO: 109 10*3/MM3 (ref 140–450)
PMV BLD AUTO: 13.9 FL (ref 6–12)
POTASSIUM SERPL-SCNC: 5.2 MMOL/L (ref 3.5–5.2)
RBC # BLD AUTO: 3.98 10*6/MM3 (ref 3.77–5.28)
SODIUM SERPL-SCNC: 134 MMOL/L (ref 136–145)
WBC NRBC COR # BLD AUTO: 5.77 10*3/MM3 (ref 3.4–10.8)

## 2025-02-28 PROCEDURE — 80048 BASIC METABOLIC PNL TOTAL CA: CPT | Performed by: INTERNAL MEDICINE

## 2025-02-28 PROCEDURE — 99239 HOSP IP/OBS DSCHRG MGMT >30: CPT | Performed by: INTERNAL MEDICINE

## 2025-02-28 PROCEDURE — 83735 ASSAY OF MAGNESIUM: CPT | Performed by: INTERNAL MEDICINE

## 2025-02-28 PROCEDURE — 63710000001 INSULIN LISPRO (HUMAN) PER 5 UNITS: Performed by: INTERNAL MEDICINE

## 2025-02-28 PROCEDURE — 63710000001 INSULIN GLARGINE PER 5 UNITS: Performed by: NURSE PRACTITIONER

## 2025-02-28 PROCEDURE — 85025 COMPLETE CBC W/AUTO DIFF WBC: CPT | Performed by: INTERNAL MEDICINE

## 2025-02-28 PROCEDURE — 84100 ASSAY OF PHOSPHORUS: CPT | Performed by: INTERNAL MEDICINE

## 2025-02-28 PROCEDURE — 82948 REAGENT STRIP/BLOOD GLUCOSE: CPT | Performed by: INTERNAL MEDICINE

## 2025-02-28 RX ORDER — INSULIN GLARGINE 100 [IU]/ML
15 INJECTION, SOLUTION SUBCUTANEOUS DAILY
Qty: 15 ML | Refills: 6 | Status: SHIPPED | OUTPATIENT
Start: 2025-02-28

## 2025-02-28 RX ORDER — MINERAL OIL/HYDROPHIL PETROLAT
1 OINTMENT (GRAM) TOPICAL
Qty: 100 G | Refills: 0 | Status: SHIPPED | OUTPATIENT
Start: 2025-02-28

## 2025-02-28 RX ORDER — AMMONIUM LACTATE 12 G/100G
LOTION TOPICAL 2 TIMES DAILY
Qty: 450 G | Refills: 0 | Status: SHIPPED | OUTPATIENT
Start: 2025-02-28

## 2025-02-28 RX ORDER — SACUBITRIL AND VALSARTAN 24; 26 MG/1; MG/1
1 TABLET, FILM COATED ORAL EVERY 12 HOURS SCHEDULED
Qty: 60 TABLET | Refills: 0 | Status: SHIPPED | OUTPATIENT
Start: 2025-02-28 | End: 2025-03-30

## 2025-02-28 RX ORDER — ASPIRIN 81 MG/1
81 TABLET ORAL DAILY
Qty: 30 TABLET | Refills: 0 | Status: SHIPPED | OUTPATIENT
Start: 2025-03-01 | End: 2025-03-31

## 2025-02-28 RX ORDER — METOPROLOL SUCCINATE 50 MG/1
50 TABLET, EXTENDED RELEASE ORAL EVERY 12 HOURS SCHEDULED
Qty: 60 TABLET | Refills: 0 | Status: SHIPPED | OUTPATIENT
Start: 2025-02-28 | End: 2025-03-30

## 2025-02-28 RX ORDER — TORSEMIDE 20 MG/1
20 TABLET ORAL DAILY
Qty: 30 TABLET | Refills: 0 | Status: SHIPPED | OUTPATIENT
Start: 2025-02-28 | End: 2025-03-30

## 2025-02-28 RX ADMIN — FAMOTIDINE 20 MG: 10 INJECTION, SOLUTION INTRAVENOUS at 08:14

## 2025-02-28 RX ADMIN — ASPIRIN 81 MG: 81 TABLET, COATED ORAL at 08:14

## 2025-02-28 RX ADMIN — INSULIN GLARGINE 10 UNITS: 100 INJECTION, SOLUTION SUBCUTANEOUS at 08:14

## 2025-02-28 RX ADMIN — METOPROLOL SUCCINATE 50 MG: 50 TABLET, FILM COATED, EXTENDED RELEASE ORAL at 08:14

## 2025-02-28 RX ADMIN — PREGABALIN 100 MG: 100 CAPSULE ORAL at 08:14

## 2025-02-28 RX ADMIN — OXYCODONE HYDROCHLORIDE 10 MG: 5 TABLET ORAL at 05:35

## 2025-02-28 RX ADMIN — Medication 10 ML: at 08:14

## 2025-02-28 RX ADMIN — INSULIN LISPRO 3 UNITS: 100 INJECTION, SOLUTION INTRAVENOUS; SUBCUTANEOUS at 08:14

## 2025-02-28 RX ADMIN — LIDOCAINE 2 PATCH: 4 PATCH TOPICAL at 08:15

## 2025-02-28 NOTE — PLAN OF CARE
Goal Outcome Evaluation:              Outcome Evaluation: No significant changes in condition noted. Aox4, able to make needs known. Standby assist to BSC. All needs have been met, call light in reach.

## 2025-02-28 NOTE — PROGRESS NOTES
[facility]   Hospitalist Progress Note  Date: 2025  Patient Name: Mary Albarran  : 1950  MRN: [id number]  Date of admission: 2025      Subjective   Subjective     Chief Complaint: Altered mental status    Summary: 74 y.o. female with history of A-fib on Xarelto, CKD stage III, type 2 diabetes initially presented to outside facility with decreased responsiveness. Patient was found to be hypotensive, febrile and with elevated lactate. Concern for infection. Was given 2.5 L of IV fluids also had A-fib with RVR initially started on Cardizem however developed hypotension. Did have CT scan of the abdomen pelvis which showed some gallbladder thickening concerning for cholelithiasis. Was transferred to our facility for critical care management. General surgery was consulted and did recommend cholecystostomy tube which was placed by IR. Fluid cultures returned negative. She was kept on broad-spectrum IV antibiotics, did have significant right lower extremity cellulitis which was slow to improve. 1 blood culture positive for Bacteroides species. Completed 14 days of antibiotic therapy for cellulitis and bacteremia. Had issues with A-fib with RVR for which cardiology was consulted. 2D echo revealed drop in EF and valvular heart disease with moderate aortic stenosis. Will need follow-up with [facility] valve center on an outpatient basis for management of valvular heart disease. Heart rates were controlled with IV and ultimately oral medications. Cholecystostomy tube to be removed on .     Interval Followup: Feels better, ambulating more, creatinine  increasing      Objective   Objective     Vitals:   Temp:  [97.3 °F (36.3 °C)-98.4 °F (36.9 °C)] 97.7 °F (36.5 °C)  Heart Rate:  [] 103  Resp:  [18-20] 18  BP: ()/(50-77) 115/77  Flow (L/min) (Oxygen Therapy):  [1] 1    Physical Exam   GEN: No acute distress, up in chair  HEENT: Moist mucous membranes  LUNGS: Equal chest rise  bilaterally  CARDIAC: Regular rate and rhythm  NEURO: Moving all 4 extremities spontaneously  SKIN: No obvious breakdown        Result Review    Result Review:  I have reviewed the following:  [x]  Laboratory  CBC          2/25/2025    04:48 2/26/2025    05:26 2/27/2025    04:43   CBC   WBC 6.04  6.20  7.77    RBC 4.77  4.44  4.18    Hemoglobin 13.9  12.8  11.8    Hematocrit 44.2  42.7  39.7    MCV 92.7  96.2  95.0    MCH 29.1  28.8  28.2    MCHC 31.4  30.0  29.7    RDW 13.4  13.6  13.9    Platelets 145  120  125      CMP          2/25/2025    04:48 2/26/2025    05:26 2/27/2025    04:43   CMP   Glucose 183  205  163    BUN 23  27  31    Creatinine 1.41  1.60  2.12    EGFR 39.7  34.1  24.3    Sodium 137  134  135    Potassium 4.5  4.8  5.2    Chloride 97  96  96    Calcium 9.1  8.7  8.9    Total Protein 6.9      Albumin 3.3   3.1    Globulin 3.6      Total Bilirubin 0.6      Alkaline Phosphatase 80      AST (SGOT) 22      ALT (SGPT) 20      Albumin/Globulin Ratio 0.9      BUN/Creatinine Ratio 16.3  16.9  14.6    Anion Gap 11.4  11.0  7.8        []  Microbiology  []  Radiology  []  EKG/Telemetry   []  Cardiology/Vascular   []  Pathology  []  Old records  []  Other:    Assessment & Plan   Assessment / Plan     Assessment:  Septic shock  Streptococcal bacteremia  Gram-negative bacteremia  Concern for acute cholecystitis  Concern for right lower extremity cellulitis  Acute metabolic encephalopathy secondary to above  Acute on chronic systolic congestive heart failure with acute exacerbation  Moderate aortic regurgitation with moderate aortic stenosis  Chronic atrial fibrillation with RVR  NSTEMI type II  Acute kidney injury  Hypomagnesemia  Lactic acidosis, clinically significant  Type 2 diabetes mellitus        Plan:  Continue to monitor in the hospital for the management of the above  Appreciate recommendations from general surgery, cardiology and pulmonology  S/p cholecystostomy tube placement removed  2/25/2025  Completed 14 day course of Zosyn day  Blood cultures showing no growth to date  Hold torsemide--creatinine rising  Continue Entresto and Aldactone  Continue aspirin, digoxin and Toprol  Continue Xarelto  Hold nephrotoxic can give some gentle IV fluids today  Recheck labs tomorrow and hopefully will see creatinine improved    Discussed plan with RN.    VTE Prophylaxis:  Pharmacologic & mechanical VTE prophylaxis orders are present.        CODE STATUS:   Level Of Support Discussed With: Health Care Surrogate  Code Status (Patient has no pulse and is not breathing): CPR (Attempt to Resuscitate)  Medical Interventions (Patient has pulse or is breathing): Full Support    Electronically signed by Joaquin Rutledge MD, 02/27/25 19:19 EST

## 2025-02-28 NOTE — NURSING NOTE
Discharge education given, verbalized understanding. IV removed, tape and gauze applied. Patient getting dressed now and all belongings are bagged up. Waiting on transport.

## 2025-02-28 NOTE — PLAN OF CARE
Goal Outcome Evaluation:           Progress: improving  Outcome Evaluation: Patient A&O x4. VS stable. Voiced c/o pain x2 on shift. Medications administered per MAR. New IV placed on shift, patient tolerated well. No significant changes or events to report. All needs met at this time. Plan of care ongoing.

## 2025-02-28 NOTE — OUTREACH NOTE
Prep Survey      Flowsheet Row Responses   [facility] patient discharged from? [facility]   Is LACE score < 7 ? No   Eligibility [facility]   Date of Admission 02/11/25   Date of Discharge 02/28/25   Discharge Disposition Home-Health Care Sv   Discharge diagnosis Atrial fibrillation with RVR   Does the patient have one of the following disease processes/diagnoses(primary or secondary)? Other   Does the patient have Home health ordered? Yes   [place]  [facility] [place] a D[place] ordered? No   Prep survey completed? Yes            Meeta TREVIÑO - Registered Nurse

## 2025-02-28 NOTE — DISCHARGE SUMMARY
[facility]         HOSPITALIST  DISCHARGE SUMMARY    Patient Name: Mary Albarran  : 1950  MRN: [id number]    Date of Admission: 2025  Date of Discharge:  2025  Primary Care Physician: Brittni Quiroz APRN  Admitting: Medicine  Consultants: Cardiology, surgery, pulm    Final Diagnoses:  Septic shock  Streptococcal bacteremia  Gram-negative bacteremia  Concern for acute cholecystitis  Concern for right lower extremity cellulitis  Acute metabolic encephalopathy secondary to above  Acute on chronic systolic congestive heart failure with acute exacerbation  Moderate aortic regurgitation with moderate aortic stenosis  Chronic atrial fibrillation with RVR  NSTEMI type II  Acute kidney injury  Hypomagnesemia  Lactic acidosis, clinically significant  Type 2 diabetes mellitus      Hospital Course     Hospital Course:  74-year-old female with A-fib, CKD, diabetes presented to an outside facility with decreased responsiveness.  She was found to be hypotensive, febrile and had a lactic acidosis.  She was given IV fluids and transferred here.  CT abdomen pelvis showed some gallbladder thickening concerning for cholecystitis.  She is transferred here for general surgery, critical care.  She was managed with IV antibiotics.  She had some right lower extremity cellulitis that slowly improved.  1 blood culture was positive for Bacteroides species.  She completed 14 days of cellulitis and bacteremia treatment.  Cardiology was consulted for A-fib.  She will need follow-up with cardiology upon discharge.  Plan was to go to [facility] but insurance denied this and she decided she would rather just go home.  She had a close ostomy tube placed and it was removed on 2025.  She had some renal insufficiency after torsemide was restarted, was ultimately given some fluids and kidney function has improved.  She will discharge home today, holding Aldactone for now due to KATHRIN.  Decreased  torsemide dose and also decreased Entresto dose.  She will need to follow-up with her PCP, cardiology.    DISCHARGE Follow Up Recommendations for labs and diagnostics: cardiology, pcp      Day of Discharge     Vital Signs:  Temp:  [97.7 °F (36.5 °C)-99.1 °F (37.3 °C)] 98.4 °F (36.9 °C)  Heart Rate:  [] 79  Resp:  [18-20] 20  BP: (105-131)/(45-77) 107/53  Flow (L/min) (Oxygen Therapy):  [1] 1  Physical Exam: no distress      Discharge Details        Discharge Medications        New Medications        Instructions Start Date   ammonium lactate 12 % lotion  Commonly known as: LAC-HYDRIN   Topical, 2 Times Daily      Aspirin Adult Low Dose 81 MG EC tablet  Generic drug: aspirin   81 mg, Oral, Daily   Start Date: March 1, 2025     Entresto 24-26 MG tablet  Generic drug: sacubitril-valsartan  Replaces: Entresto  MG tablet   1 tablet, Oral, Every 12 Hours Scheduled      mineral oil-hydrophilic petrolatum ointment   1 Application, Topical, Every 1 Hour PRN             Changes to Medications        Instructions Start Date   Lantus SoloStar 100 UNIT/ML injection pen  Generic drug: Insulin Glargine  What changed: See the new instructions.   15 Units, Subcutaneous, Daily      metoprolol succinate XL 50 MG 24 hr tablet  Commonly known as: TOPROL-XL  What changed:   how much to take  when to take this   50 mg, Oral, Every 12 Hours Scheduled      torsemide 20 MG tablet  Commonly known as: DEMADEX  What changed:   medication strength  how much to take   20 mg, Oral, Daily      Xarelto 15 MG tablet  Generic drug: rivaroxaban  What changed:   medication strength  how much to take  when to take this   15 mg, Oral, Daily With Dinner             Continue These Medications        Instructions Start Date   albuterol 1.25 MG/3ML nebulizer solution  Commonly known as: ACCUNEB   1.25 mg, Nebulization, Every 6 Hours PRN      albuterol sulfate  (90 Base) MCG/ACT inhaler  Commonly known as: PROVENTIL HFA;VENTOLIN HFA;PROAIR  HFA   2 puffs, Inhalation, Every 4 Hours PRN      atorvastatin 20 MG tablet  Commonly known as: LIPITOR   TAKE 1 TABLET DAILY      budesonide 1 MG/2ML nebulizer solution  Commonly known as: PULMICORT   1 mg, Nebulization, Daily      cholecalciferol 25 MCG (1000 UT) tablet  Commonly known as: VITAMIN D3   1,000 Units, Oral, Daily      O2  Commonly known as: OXYGEN   2 L/min, Inhalation, Nightly      pregabalin 150 MG capsule  Commonly known as: LYRICA   150 mg, Oral, 2 Times Daily             Stop These Medications      digoxin 125 MCG tablet  Commonly known as: LANOXIN     dilTIAZem 120 MG 24 hr capsule  Commonly known as: TIAZAC     Entresto  MG tablet  Generic drug: sacubitril-valsartan  Replaced by: Entresto 24-26 MG tablet     HYDROcodone-acetaminophen  MG per tablet  Commonly known as: NORCO     Januvia 50 MG tablet  Generic drug: SITagliptin     silver sulfadiazine 1 % cream  Commonly known as: Silvadene     spironolactone 50 MG tablet  Commonly known as: ALDACTONE              Allergies   Allergen Reactions    Codeine Mental Status Change    Sglt2 Inhibitors Other (See Comments)     Frequent UTI        Discharge Disposition:  Home-Health Care [facility]    Diet:  Hospital:  Diet Order   Procedures    Diet: Diabetic; Consistent Carbohydrate; Fluid Consistency: Thin (IDDSI 0)       Discharge Activity:       CODE STATUS:  Code Status and Medical Interventions: CPR (Attempt to Resuscitate); Full Support   Ordered at: 02/11/25 0433     Level Of Support Discussed With:    Health Care Surrogate     Code Status (Patient has no pulse and is not breathing):    CPR (Attempt to Resuscitate)     Medical Interventions (Patient has pulse or is breathing):    Full Support         Future Appointments   Date Time Provider Department Center   3/7/2025 11:00 AM Brittni Quiroz APRN [facility]   3/14/2025  1:15 PM Bouchra Bentley APRN [facility]   4/7/2025  2:00 PM Bouchra Bentley APRN  [facility]       Additional Instructions for the Follow-ups that You Need to Schedule       Discharge Follow-up with PCP   As directed       Currently Documented PCP:    Brittni Quiroz APRN    PCP Phone Number:    [phone]     Follow Up Details: within 1 week                Pertinent  and/or Most Recent Results     PROCEDURES:   Cholecystomy tube and removal    LAB RESULTS:      Lab 02/28/25  0501 02/27/25  0443 02/26/25  0526 02/25/25  0448 02/24/25  0509   WBC 5.77 7.77 6.20 6.04 6.77   HEMOGLOBIN 11.3* 11.8* 12.8 13.9 13.4   HEMATOCRIT 37.1 39.7 42.7 44.2 43.4   PLATELETS 109* 125* 120* 145 166   NEUTROS ABS 3.10 4.32 3.09 2.91 3.92   IMMATURE GRANS (ABS) 0.02 0.03 0.04 0.03 0.05   LYMPHS ABS 1.67 2.27 2.00 2.24 1.76   MONOS ABS 0.81 0.94* 0.87 0.68 0.86   EOS ABS 0.10 0.13 0.12 0.11 0.12   MCV 93.2 95.0 96.2 92.7 92.3         Lab 02/28/25  0501 02/27/25  0443 02/26/25  0526 02/25/25  0448 02/24/25  0509   SODIUM 134* 135* 134* 137 132*   POTASSIUM 5.2 5.2 4.8 4.5 5.1   CHLORIDE 96* 96* 96* 97* 96*   CO2 27.2 31.2* 27.0 28.6 23.2   ANION GAP 10.8 7.8 11.0 11.4 12.8   BUN 27* 31* 27* 23 20   CREATININE 1.47* 2.12* 1.60* 1.41* 1.24*   EGFR 37.8* 24.3* 34.1* 39.7* 46.3*   GLUCOSE 236* 163* 205* 183* 254*   CALCIUM 8.7 8.9 8.7 9.1 8.8   MAGNESIUM 1.7 1.6 1.8 1.7 1.8   PHOSPHORUS 3.3 4.8* 4.8* 4.6* 4.0         Lab 02/27/25  0443 02/25/25  0448   TOTAL PROTEIN  --  6.9   ALBUMIN 3.1* 3.3*   GLOBULIN  --  3.6   ALT (SGPT)  --  20   AST (SGOT)  --  22   BILIRUBIN  --  0.6   ALK PHOS  --  80                     Brief Urine Lab Results       None          Microbiology Results (last 10 days)       ** No results found for the last 240 hours. **            No radiology results for the last 7 days     Results for orders placed during the hospital encounter of 02/11/25    Duplex Venous Lower Extremity - Right CV-READ    Interpretation Summary    Normal right lower extremity venous duplex scan.      Results for  orders placed during the hospital encounter of 02/11/25    Duplex Venous Lower Extremity - Right CV-READ    Interpretation Summary    Normal right lower extremity venous duplex scan.      Results for orders placed during the hospital encounter of 02/11/25    Adult Transthoracic Echo Complete W/ Cont if Necessary Per Protocol    Interpretation Summary    Left ventricular systolic function is mildly decreased. Left ventricular ejection fraction appears to be 46 - 50%. with some anterior septal hypokensis    The right ventricular cavity is mildly dilated.    The left atrial cavity is mildly dilated.    The right atrial cavity is moderately  dilated.    Mild to moderate aortic valve stenosis is present.    Estimated right ventricular systolic pressure from tricuspid regurgitation is moderately elevated (45-55 mmHg).      Labs Pending at Discharge:        Time spent on Discharge including face to face service:  ~45 minutes    Electronically signed by Joaquin Rutledge MD, 02/28/25, 1:12 PM EST.

## 2025-03-03 ENCOUNTER — TRANSITIONAL CARE MANAGEMENT TELEPHONE ENCOUNTER (OUTPATIENT)
Dept: CALL CENTER | Facility: HOSPITAL | Age: 75
End: 2025-03-03
Payer: MEDICARE

## 2025-03-03 NOTE — OUTREACH NOTE
Call Center TCM Note      Flowsheet Row Responses   [facility] patient discharged from? [facility]   Does the patient have one of the following disease processes/diagnoses(primary or secondary)? Other   TCM attempt successful? Yes  [VR_contacts]   Call start time 0924   Call end time 0929   Discharge diagnosis Atrial fibrillation with RVR   Meds reviewed with patient/caregiver? Yes   Is the patient having any side effects they believe may be caused by any medication additions or changes? No   Does the patient have all medications ordered at discharge? Yes   Is the patient taking all medications as directed (includes completed medication regime)? Yes   Comments HOSP DC FU appt 3/7/25 11 am   Has home health visited the patient within 72 hours of discharge? N/A   Home health comments daughter states it was not approved with insurance and Pt is doing well at this time.   Psychosocial issues? No   Did the patient receive a copy of their discharge instructions? Yes   Nursing interventions Reviewed instructions with patient   What is the patient's perception of their health status since discharge? Improving   Is the patient/caregiver able to teach back signs and symptoms related to disease process for when to call PCP? Yes   Is the patient/caregiver able to teach back signs and symptoms related to disease process for when to call 911? Yes   Is the patient/caregiver able to teach back the hierarchy of who to call/visit for symptoms/problems? PCP, Specialist, Home health nurse, Urgent Care, ED, 911 Yes   TCM call completed? Yes   Wrap up additional comments Daughter reports Pt is improving. No needs.   Call end time 0929            Leanna Guan RN    3/3/2025, 09:29 EST

## 2025-03-07 ENCOUNTER — TELEPHONE (OUTPATIENT)
Dept: FAMILY MEDICINE CLINIC | Facility: CLINIC | Age: 75
End: 2025-03-07

## 2025-03-07 ENCOUNTER — OFFICE VISIT (OUTPATIENT)
Dept: FAMILY MEDICINE CLINIC | Facility: CLINIC | Age: 75
End: 2025-03-07
Payer: MEDICARE

## 2025-03-07 VITALS
BODY MASS INDEX: 46.07 KG/M2 | DIASTOLIC BLOOD PRESSURE: 66 MMHG | OXYGEN SATURATION: 94 % | TEMPERATURE: 97.4 F | SYSTOLIC BLOOD PRESSURE: 110 MMHG | HEIGHT: 63 IN | WEIGHT: 260 LBS | HEART RATE: 90 BPM

## 2025-03-07 DIAGNOSIS — G62.9 NEUROPATHY: ICD-10-CM

## 2025-03-07 DIAGNOSIS — E11.9 TYPE 2 DIABETES MELLITUS WITHOUT COMPLICATION, WITH LONG-TERM CURRENT USE OF INSULIN: ICD-10-CM

## 2025-03-07 DIAGNOSIS — M25.50 CHRONIC JOINT PAIN: Primary | ICD-10-CM

## 2025-03-07 DIAGNOSIS — E66.01 CLASS 3 SEVERE OBESITY DUE TO EXCESS CALORIES WITH SERIOUS COMORBIDITY AND BODY MASS INDEX (BMI) OF 40.0 TO 44.9 IN ADULT: ICD-10-CM

## 2025-03-07 DIAGNOSIS — Z78.9 INPATIENT HOSPITALIZATION WITHIN LAST 30 DAYS: Primary | ICD-10-CM

## 2025-03-07 DIAGNOSIS — G89.29 CHRONIC JOINT PAIN: Primary | ICD-10-CM

## 2025-03-07 DIAGNOSIS — E66.813 CLASS 3 SEVERE OBESITY DUE TO EXCESS CALORIES WITH SERIOUS COMORBIDITY AND BODY MASS INDEX (BMI) OF 40.0 TO 44.9 IN ADULT: ICD-10-CM

## 2025-03-07 DIAGNOSIS — I50.32 CHRONIC DIASTOLIC CONGESTIVE HEART FAILURE: ICD-10-CM

## 2025-03-07 DIAGNOSIS — T46.0X5A ADVERSE EFFECT OF DIGOXIN, INITIAL ENCOUNTER: ICD-10-CM

## 2025-03-07 DIAGNOSIS — I50.23 ACUTE ON CHRONIC HFREF (HEART FAILURE WITH REDUCED EJECTION FRACTION): ICD-10-CM

## 2025-03-07 DIAGNOSIS — I35.0 AORTIC STENOSIS, MODERATE: ICD-10-CM

## 2025-03-07 DIAGNOSIS — Z79.4 TYPE 2 DIABETES MELLITUS WITHOUT COMPLICATION, WITH LONG-TERM CURRENT USE OF INSULIN: ICD-10-CM

## 2025-03-07 LAB
BASOPHILS # BLD AUTO: 0.03 10*3/MM3 (ref 0–0.2)
BASOPHILS NFR BLD AUTO: 0.4 % (ref 0–1.5)
DEPRECATED RDW RBC AUTO: 50.9 FL (ref 37–54)
EOSINOPHIL # BLD AUTO: 0.11 10*3/MM3 (ref 0–0.4)
EOSINOPHIL NFR BLD AUTO: 1.6 % (ref 0.3–6.2)
ERYTHROCYTE [DISTWIDTH] IN BLOOD BY AUTOMATED COUNT: 14.4 % (ref 12.3–15.4)
HBA1C MFR BLD: 9 % (ref 4.8–5.6)
HCT VFR BLD AUTO: 37.7 % (ref 34–46.6)
HGB BLD-MCNC: 11.5 G/DL (ref 12–15.9)
IMM GRANULOCYTES # BLD AUTO: 0.04 10*3/MM3 (ref 0–0.05)
IMM GRANULOCYTES NFR BLD AUTO: 0.6 % (ref 0–0.5)
LYMPHOCYTES # BLD AUTO: 0.95 10*3/MM3 (ref 0.7–3.1)
LYMPHOCYTES NFR BLD AUTO: 14.2 % (ref 19.6–45.3)
MCH RBC QN AUTO: 29.6 PG (ref 26.6–33)
MCHC RBC AUTO-ENTMCNC: 30.5 G/DL (ref 31.5–35.7)
MCV RBC AUTO: 97.2 FL (ref 79–97)
MONOCYTES # BLD AUTO: 0.38 10*3/MM3 (ref 0.1–0.9)
MONOCYTES NFR BLD AUTO: 5.7 % (ref 5–12)
NEUTROPHILS NFR BLD AUTO: 5.19 10*3/MM3 (ref 1.7–7)
NEUTROPHILS NFR BLD AUTO: 77.5 % (ref 42.7–76)
NRBC BLD AUTO-RTO: 0 /100 WBC (ref 0–0.2)
PLATELET # BLD AUTO: 86 10*3/MM3 (ref 140–450)
PMV BLD AUTO: 13.9 FL (ref 6–12)
RBC # BLD AUTO: 3.88 10*6/MM3 (ref 3.77–5.28)
WBC NRBC COR # BLD AUTO: 6.7 10*3/MM3 (ref 3.4–10.8)

## 2025-03-07 PROCEDURE — 83880 ASSAY OF NATRIURETIC PEPTIDE: CPT | Performed by: NURSE PRACTITIONER

## 2025-03-07 PROCEDURE — 85025 COMPLETE CBC W/AUTO DIFF WBC: CPT | Performed by: NURSE PRACTITIONER

## 2025-03-07 PROCEDURE — 80053 COMPREHEN METABOLIC PANEL: CPT | Performed by: NURSE PRACTITIONER

## 2025-03-07 PROCEDURE — 83735 ASSAY OF MAGNESIUM: CPT | Performed by: NURSE PRACTITIONER

## 2025-03-07 PROCEDURE — 84100 ASSAY OF PHOSPHORUS: CPT | Performed by: NURSE PRACTITIONER

## 2025-03-07 PROCEDURE — 80162 ASSAY OF DIGOXIN TOTAL: CPT | Performed by: NURSE PRACTITIONER

## 2025-03-07 PROCEDURE — 83036 HEMOGLOBIN GLYCOSYLATED A1C: CPT | Performed by: NURSE PRACTITIONER

## 2025-03-07 RX ORDER — HYDROCODONE BITARTRATE AND ACETAMINOPHEN 10; 325 MG/1; MG/1
1 TABLET ORAL EVERY 6 HOURS PRN
Qty: 120 TABLET | Refills: 0 | Status: SHIPPED | OUTPATIENT
Start: 2025-03-07

## 2025-03-07 NOTE — TELEPHONE ENCOUNTER
Caller: Mary Albarran    Relationship: Self    Best call back number: [phone]     What medication are you requesting: HYDROCODONE    What are your current symptoms: PER PATIENT PROVIDER WHITE AWARE      If a prescription is needed, what is your preferred pharmacy and phone number: [facility] [place], [place] - [address] [phone] [facility] [phone]      Additional notes: PATIENT CALLED PHARMACY NO SCRIPTS SENT IN FOR HYDROCODONE.    CONTACT PATIENT WHEN COMPLETE.

## 2025-03-07 NOTE — PROGRESS NOTES
Mary Albarran is a 74 y.o. female admitted to [facility] and  is seen for follow up.  Chief Complaint   Patient presents with    Hospital Follow Up Visit     AFIb,CKD    Shortness of Breath     OXYGEN given , difficulty Monday 3/3    Fatigue           5/21/2023     2:16 PM 9/26/2023    10:07 AM 10/12/2023     6:27 PM 1/5/2024     2:36 PM 2/28/2025     5:27 PM   Date of TCM Phone Call   [facility]   Date of Admission 5/12/2023 9/21/2023 10/6/2023  2/11/2025   Date of Discharge 5/21/2023 9/25/2023 10/12/2023 1/5/2024 2/28/2025   Discharge Disposition Home-Health Care Sv  Home or Self Care Home or Self Care Home-Health Care [facility] [place]   Hospital Course:  74-year-old female with A-fib, CKD, diabetes presented to an outside facility with decreased responsiveness.  She was found to be hypotensive, febrile and had a lactic acidosis.  She was given IV fluids and transferred here.  CT abdomen pelvis showed some gallbladder thickening concerning for cholecystitis.  She is transferred here for general surgery, critical care.  She was managed with IV antibiotics.  She had some right lower extremity cellulitis that slowly improved.  1 blood culture was positive for Bacteroides species.  She completed 14 days of cellulitis and bacteremia treatment.  Cardiology was consulted for A-fib.  She will need follow-up with cardiology upon discharge.  Plan was to go to [facility] but insurance denied this and she decided she would rather just go home.  She had a close ostomy tube placed and it was removed on 2/25/2025.  She had some renal insufficiency after torsemide was restarted, was ultimately given some fluids and kidney function has improved.  She will discharge home today, holding Aldactone for now due to KAHTRIN.  Decreased torsemide dose and also decreased Entresto dose.  She will need to follow-up with her PCP, cardiology.     Discharge summary  is available.  Current outpatient and discharge medications have been reconciled for the patient.  Reviewed by: LUCERO Collins  New Medications         Instructions Start Date   ammonium lactate 12 % lotion  Commonly known as: LAC-HYDRIN    Topical, 2 Times Daily        Aspirin Adult Low Dose 81 MG EC tablet  Generic drug: aspirin    81 mg, Oral, Daily    Start Date: March 1, 2025      Entresto 24-26 MG tablet  Generic drug: sacubitril-valsartan  Replaces: Entresto  MG tablet    1 tablet, Oral, Every 12 Hours Scheduled        mineral oil-hydrophilic petrolatum ointment    1 Application, Topical, Every 1 Hour PRN                  Changes to Medications         Instructions Start Date   Lantus SoloStar 100 UNIT/ML injection pen  Generic drug: Insulin Glargine  What changed: See the new instructions.    15 Units, Subcutaneous, Daily        metoprolol succinate XL 50 MG 24 hr tablet  Commonly known as: TOPROL-XL  What changed:   how much to take  when to take this    50 mg, Oral, Every 12 Hours Scheduled        torsemide 20 MG tablet  Commonly known as: DEMADEX  What changed:   medication strength  how much to take    20 mg, Oral, Daily        Xarelto 15 MG tablet  Generic drug: rivaroxaban  What changed:   medication strength  how much to take  when to take this    15 mg, Oral, Daily With Dinner                  Continue These Medications         Instructions Start Date   albuterol 1.25 MG/3ML nebulizer solution  Commonly known as: ACCUNEB    1.25 mg, Nebulization, Every 6 Hours PRN        albuterol sulfate  (90 Base) MCG/ACT inhaler  Commonly known as: PROVENTIL HFA;VENTOLIN HFA;PROAIR HFA    2 puffs, Inhalation, Every 4 Hours PRN        atorvastatin 20 MG tablet  Commonly known as: LIPITOR    TAKE 1 TABLET DAILY        budesonide 1 MG/2ML nebulizer solution  Commonly known as: PULMICORT    1 mg, Nebulization, Daily        cholecalciferol 25 MCG (1000 UT) tablet  Commonly known as: VITAMIN  D3    1,000 Units, Oral, Daily        O2  Commonly known as: OXYGEN    2 L/min, Inhalation, Nightly        pregabalin 150 MG capsule  Commonly known as: LYRICA    150 mg, Oral, 2 Times Daily                  Stop These Medications       digoxin 125 MCG tablet  Commonly known as: LANOXIN      dilTIAZem 120 MG 24 hr capsule  Commonly known as: TIAZAC      Entresto  MG tablet  Generic drug: sacubitril-valsartan  Replaced by: Entresto 24-26 MG tablet      HYDROcodone-acetaminophen  MG per tablet  Commonly known as: NORCO      Januvia 50 MG tablet  Generic drug: SITagliptin      silver sulfadiazine 1 % cream  Commonly known as: Silvadene      spironolactone 50 MG tablet  Commonly known as: ALDACTONE     She was admitted for the following reason(s):  Primary admitting diagnosis at discharge was    Final Diagnoses:  Septic shock  Streptococcal bacteremia  Gram-negative bacteremia  Concern for acute cholecystitis  Concern for right lower extremity cellulitis  Acute metabolic encephalopathy secondary to above  Acute on chronic systolic congestive heart failure with acute exacerbation  Moderate aortic regurgitation with moderate aortic stenosis  Chronic atrial fibrillation with RVR  NSTEMI type II  Acute kidney injury  Hypomagnesemia  Lactic acidosis, clinically significant  Type 2 diabetes mellitus      Pertinent secondary diagnoses include       Procedures performed while hospitalized:      Procedures Performed in Hospital: IV fluids, IV Antibiotics with broad-spectrum antibiotics, Echo , CT of see note below, and MRI of see note below  XR Chest 1 View    Result Date: 2/17/2025  Impression: Cardiomegaly without evidence of heart failure or pneumonia. Electronically Signed: Joaquin Mcintosh MD  2/17/2025 9:28 AM EST  Workstation ID: [id number]    MRI Tibia Fibula Right Without Contrast    Result Date: 2/14/2025  Impression: 1.No evidence of osteomyelitis. 2.Generalized subcutaneous edema throughout the right lower leg  with skin thickening. Correlate for cellulitis. No abscess identified. 3.While limited by motion artifact there appears to be more focal edema within the medial head of the gastrocnemius muscle suggesting the possibility of muscle strain. Electronically Signed: Stevie Jules MD  2/14/2025 6:48 PM EST  Workstation ID: [id number]    MRI Ankle Right Without Contrast    Result Date: 2/14/2025  Impression: 1.No evidence of osteomyelitis. 2.Generalized ankle subcutaneous edema with anterior skin thickening. Correlate for cellulitis. Electronically Signed: Stevie Jules MD  2/14/2025 6:24 PM EST  Workstation ID: [id number]    CT Percutaneous Cholecystostomy    Result Date: 2/13/2025  1. Successful placement of CT-guided percutaneous cholecystostomy tube. A transhepatic approach was utilized. 2. Interval development of a small amount of perihepatic ascites. Electronically Signed: Yoav Middleton MD  2/13/2025 2:20 PM EST  Workstation ID: [id number]    NM HIDA SCAN WITHOUT PHARMACOLOGICAL INTERVENTION    Result Date: 2/12/2025  Tracer activity in the gallbladder arguing against acute calculus cholecystitis. No evidence of biliary obstruction. Electronically Signed: Dc Miguel MD  2/12/2025 12:08 PM EST  Workstation ID: [id number]    CT Lower Extremity Right Without Contrast    Result Date: 2/11/2025  Impression: No acute osseous abnormality. Nonspecific circumferential soft tissue edema about the imaged right lower extremity. No visible organized fluid collection or soft tissue gas. Electronically Signed: Talat Watkins  2/11/2025 8:37 PM EST  Workstation ID: [id number]    CT Chest Without Contrast Diagnostic    Result Date: 2/11/2025  Impression: Cardiomegaly with findings most suggestive of mild pulmonary edema. A viral or atypical infectious process can look similar. Electronically Signed: Talat Watkins  2/11/2025 7:00 PM EST  Workstation ID: [id number]    US Abdomen Limited    Result Date: 2/11/2025  Impression:  1.Cholelithiasis with gallbladder wall thickening and trace pericholecystic fluid. Findings are suggestive of cholecystitis. Correlation with nuclear medicine biliary scan may be of value to determine cystic duct patency. 2.No evidence of biliary ductal dilatation. 3.Increased echogenicity of the liver parenchyma suggesting steatosis. Electronically Signed: Pete Quiroz MD  2/11/2025 1:11 PM EST  Workstation ID: [id number]    XR Chest 1 View    Result Date: 2/11/2025  Impression: 1.Right IJ central line with tip projecting over the high right atrium. No pneumothorax. 2.Cardiomegaly with central pulmonary vascular congestion and perihilar haziness suggesting pulmonary edema or less likely atypical infection pattern. Electronically Signed: Pete Quiroz MD  2/11/2025 11:52 AM EST  Workstation ID: [id number]    CT Head Without Contrast    Result Date: 2/10/2025  Age-related brain changes with mild subcortical arteriosclerosis leukoencephalopathy. (however acute infarction couldn`t be excluded suggest diffusion MRI if clinically indicated).    CT Abdomen Pelvis With Contrast    Result Date: 2/10/2025  1. Cholelithiasis. Suggestion of minimal wall thickening near the fundal region of gallbladder. Recommended ultrasound correlation to rule out early cholecystitis 2. Moderate hepatomegaly with diffuse fatty infiltration 3. Mild splenomegaly 4. 7 mm non-obstructive calculus in inferior calyx of right kidney. 5. Rest of the findings as described above 6. Sections of lower thorax show atelectatic changes in visualized lung fields with surrounding ground-glass haziness. Moderate cardiomegaly. Prominent pulmonary trunk, measuring 34 mm, Possibility of pulmonary edema.    XR Chest 1 View    Result Date: 2/10/2025  1. Gross cardiomegaly with bilateral prominent bronchovascular markings is likely due to acute pulmonary congestion. 2. Blurred left costophrenic angle, probable mild pleural effusion vs. thickening. 3. Please correlate  "with clinical examination to exclude CHF.     Pending results:  Pending tests: none    Pain Control:  well controlled    Diet: low sodium diet       Activity after Discharge: activity as tolerated      Items to be addressed at first follow up visit include:  1. Medication changes: See list    She reports her overall condition is are worsening since discharge.  Increasing weakness:, Fatigue, and overall not feeling well since being discharged home  Social support is said to be adequate to meet her needs. .     Objective   Vitals:    03/07/25 1107 03/07/25 1117   BP:  110/66   Pulse: 90 90   Temp: 97.4 °F (36.3 °C) 97.4 °F (36.3 °C)   SpO2: (!) 87% 94%   Weight: 118 kg (260 lb) 118 kg (260 lb)   Height: 160 cm (63\") 160 cm (63\")     Body mass index is 46.06 kg/m².  History of Present Illness  The patient presents for evaluation of atrial fibrillation, sepsis, congestive heart failure, diabetes mellitus, gallbladder issues, and respiratory issues.    She reports a decline in her condition, with difficulty walking from her bedroom to the living room, a task that now takes her 30 minutes and requires frequent rest. She was discharged from the hospital a week ago but began to deteriorate on Monday. She has an upcoming appointment with her cardiologist next Friday. She has discontinued aspirin due to concerns about potential interactions with Xarelto. She is currently taking digoxin once daily, down from her previous regimen of 2 pills on Monday, Tuesday, Wednesday, Friday, and Saturday, and 1 pill on Sunday and Thursday. She is also taking furosemide every other day, as daily administration results in severe fatigue. She maintains adequate hydration and is not currently taking spironolactone. She reports occasional wheezing, which is managed with a rescue inhaler or breathing treatments. She is not currently under the care of a pulmonologist. She has been advised to discontinue Januvia due to renal concerns. She reports " no cough or congestion.    She reports that her gallbladder was not  functioning well. She had a drain inserted during her hospital stay, which was subsequently flushed with water due to minimal output. She experiences intermittent pain at the site of the drain insertion, particularly after consuming large meals or excessive amounts of potato chips.    Her legs, which were previously swollen and wrapped, have shown improvement. She was informed that her legs were severely swollen, with red streaks extending down her leg. She was also informed that she was septic and in septic shock, necessitating a 2-day stay in the ICU. She reports no energy and has difficulty performing daily activities such as showering. She is currently on oxygen therapy.    She reports that her blood glucose levels remained elevated around 300 despite a strict diet and frequent monitoring. She did not receive any blood transfusions during her hospital stay.    She is currently on metoprolol 50 mg twice daily, reduced from 150 mg, and has been taken off digoxin. She reports that her blood pressure readings have been inconsistent, with some readings being low and others high.    MEDICATIONS  Current: Metoprolol, digoxin, furosemide, Xarelto, insulin, Januvia, Pulmicort (budesonide)  Discontinued: Aspirin, spironolactone  Physical Exam  Vitals reviewed.   Constitutional:       Appearance: Normal appearance. She is well-developed. She is ill-appearing (chronically).   HENT:      Head: Normocephalic and atraumatic.      Right Ear: External ear normal.      Left Ear: External ear normal.   Eyes:      Conjunctiva/sclera: Conjunctivae normal.      Pupils: Pupils are equal, round, and reactive to light.   Cardiovascular:      Rate and Rhythm: Normal rate and regular rhythm. Rhythm irregularly irregular.      Heart sounds: No murmur heard.     No friction rub. No gallop.      Comments: Chronic A-fib  Pulmonary:      Effort: Pulmonary effort is normal.       Breath sounds: No wheezing.      Comments: 2 liter 02 continous  Musculoskeletal:      Right lower leg: Edema present.      Left lower leg: Edema present.   Skin:     General: Skin is warm and dry.   Neurological:      Mental Status: She is alert and oriented to person, place, and time.      Cranial Nerves: No cranial nerve deficit.      Gait: Gait abnormal (wheelchair).   Psychiatric:         Mood and Affect: Mood and affect normal.         Behavior: Behavior normal.         Thought Content: Thought content normal.         Judgment: Judgment normal.             Assessment & Plan     Assessment & Plan  1. Atrial fibrillation.  She has a history of chronic atrial fibrillation. Her EKG results are satisfactory, and her ejection fraction is within normal limits, although there is mild dilation and calcium buildup on her valves. She is advised to continue her current medication regimen, including metoprolol 50 mg twice a day and Xarelto once a day. She is instructed to stop taking digoxin as it may not be beneficial. Labs will be rechecked today to monitor her condition.    2. Sepsis.  She experienced septic shock during her recent hospitalization, requiring ICU admission and antibiotics. Blood cultures were positive. She is advised to monitor for any signs of infection and seek immediate medical attention if symptoms worsen.    3. Congestive heart failure.  She has a history of congestive heart failure. Her BNP level was 381, indicating fluid overload. She is currently on furosemide 20 mg daily, reduced from 40 mg due to kidney concerns. She is advised to monitor her fluid intake and weight. Labs will be rechecked today to assess her kidney function and fluid status.    4. Diabetes mellitus.  Her blood sugars were not well controlled during her hospitalization with a strict diet and insulin regimen BS remain 300+ She is currently on insulin and Januvia. Due to kidney concerns, she may need to stop taking Januvia.  This will be confirmed after reviewing her lab results.    5. Gallbladder issues.  She had a drain placed for suspected gallbladder issues during her recent hospitalization. The drain did not produce significant output. She is advised to follow up with general surgery to discuss further management of her gallbladder condition.    6. Respiratory issues.  She is currently on 2 L of oxygen and uses a concentrator at home. She reports wheezing occasionally and uses a rescue inhaler and Pulmicort (budesonide) daily. She is advised to continue her current respiratory treatments and follow up with a lung doctor if symptoms persist.    Follow-up  The patient will follow up in 2 months.     Problem List Items Addressed This Visit       Aortic stenosis, moderate    Overview     Fibrocalcific changes of the aortic valve with mild aortic valve stenosis   on echo 3/2/2021    Moderate aortic valve regurgitation is present.  Aortic valve maximum pressure gradient is 26 mmHg.  Moderate aortic valve stenosis is present.  On echo 2/10/2022               Relevant Orders    CBC & Differential    Digoxin level    proBNP    Chronic diastolic congestive heart failure    Acute on chronic HFrEF (heart failure with reduced ejection fraction)    Relevant Orders    CBC & Differential    Digoxin level    proBNP    Comprehensive Metabolic Panel    Phosphorus    Magnesium     Other Visit Diagnoses       Inpatient hospitalization within last 30 days    -  Primary    Class 3 severe obesity due to excess calories with serious comorbidity and body mass index (BMI) of 40.0 to 44.9 in adult        Type 2 diabetes mellitus without complication, with long-term current use of insulin        Relevant Orders    Hemoglobin A1c    Adverse effect of digoxin, initial encounter        Relevant Orders    Digoxin level                    Patient or patient representative verbalized consent for the use of Ambient Listening during the visit with  Brittni Treadwell  LUCERO Quiroz for chart documentation. 3/7/2025  15:40 EST

## 2025-03-08 LAB
ALBUMIN SERPL-MCNC: 3.5 G/DL (ref 3.5–5.2)
ALBUMIN/GLOB SERPL: 1.1 G/DL
ALP SERPL-CCNC: 84 U/L (ref 39–117)
ALT SERPL W P-5'-P-CCNC: 18 U/L (ref 1–33)
ANION GAP SERPL CALCULATED.3IONS-SCNC: 13.2 MMOL/L (ref 5–15)
AST SERPL-CCNC: 21 U/L (ref 1–32)
BILIRUB SERPL-MCNC: 0.8 MG/DL (ref 0–1.2)
BUN SERPL-MCNC: 19 MG/DL (ref 8–23)
BUN/CREAT SERPL: 16.2 (ref 7–25)
CALCIUM SPEC-SCNC: 8.9 MG/DL (ref 8.6–10.5)
CHLORIDE SERPL-SCNC: 103 MMOL/L (ref 98–107)
CO2 SERPL-SCNC: 22.8 MMOL/L (ref 22–29)
CREAT SERPL-MCNC: 1.17 MG/DL (ref 0.57–1)
DIGOXIN SERPL-MCNC: 0.5 NG/ML (ref 0.6–1.2)
EGFRCR SERPLBLD CKD-EPI 2021: 49.7 ML/MIN/1.73
GLOBULIN UR ELPH-MCNC: 3.3 GM/DL
GLUCOSE SERPL-MCNC: 253 MG/DL (ref 65–99)
MAGNESIUM SERPL-MCNC: 1.7 MG/DL (ref 1.6–2.4)
NT-PROBNP SERPL-MCNC: 5457 PG/ML (ref 0–900)
PHOSPHATE SERPL-MCNC: 4.4 MG/DL (ref 2.5–4.5)
POTASSIUM SERPL-SCNC: 5.2 MMOL/L (ref 3.5–5.2)
PROT SERPL-MCNC: 6.8 G/DL (ref 6–8.5)
SODIUM SERPL-SCNC: 139 MMOL/L (ref 136–145)

## 2025-03-11 ENCOUNTER — READMISSION MANAGEMENT (OUTPATIENT)
Dept: CALL CENTER | Facility: HOSPITAL | Age: 75
End: 2025-03-11
Payer: MEDICARE

## 2025-03-11 NOTE — OUTREACH NOTE
Medical Week 2 Survey      Flowsheet Row Responses   [facility] facility patient discharged from? [facility]   Does the patient have one of the following disease processes/diagnoses(primary or secondary)? Other   Week 2 attempt successful? No   Unsuccessful attempts Attempt 1            Allison GASCA - Registered Nurse

## 2025-03-13 ENCOUNTER — TELEPHONE (OUTPATIENT)
Dept: CARDIOLOGY | Facility: CLINIC | Age: 75
End: 2025-03-13

## 2025-03-13 NOTE — TELEPHONE ENCOUNTER
Name: Mary Albarran    Relationship: Self    Best Callback Number:   Telephone Information:   Mobile [phone]       Incoming call to the Hub, requesting to  Reschedule their HFU appointment on 3-14-25.     Per Hub workflow, further review is needed before the task can be completed.    Result of Call: Attempted warm transfer

## 2025-03-13 NOTE — TELEPHONE ENCOUNTER
Name: Mary Albarran    Relationship: Self    Best Callback Number: [phone]     Incoming call to the Hub, requesting to  Reschedule their HFU appointment on 3.14.25.     Per Hub workflow, further review is needed before the task can be completed.    Result of Call: Transferred to NANCY at the practice

## 2025-03-18 ENCOUNTER — APPOINTMENT (OUTPATIENT)
Dept: GENERAL RADIOLOGY | Facility: HOSPITAL | Age: 75
End: 2025-03-18
Payer: MEDICARE

## 2025-03-18 ENCOUNTER — READMISSION MANAGEMENT (OUTPATIENT)
Dept: CALL CENTER | Facility: HOSPITAL | Age: 75
End: 2025-03-18
Payer: MEDICARE

## 2025-03-18 ENCOUNTER — HOSPITAL ENCOUNTER (INPATIENT)
Facility: HOSPITAL | Age: 75
LOS: 6 days | Discharge: HOME HEALTH | End: 2025-03-24
Attending: EMERGENCY MEDICINE | Admitting: INTERNAL MEDICINE
Payer: MEDICARE

## 2025-03-18 DIAGNOSIS — Z78.9 DECREASED ACTIVITIES OF DAILY LIVING (ADL): ICD-10-CM

## 2025-03-18 DIAGNOSIS — L03.119 CELLULITIS OF LOWER EXTREMITY, UNSPECIFIED LATERALITY: ICD-10-CM

## 2025-03-18 DIAGNOSIS — J96.01 ACUTE RESPIRATORY FAILURE WITH HYPOXIA: ICD-10-CM

## 2025-03-18 DIAGNOSIS — I48.91 ATRIAL FIBRILLATION WITH RAPID VENTRICULAR RESPONSE: Primary | ICD-10-CM

## 2025-03-18 DIAGNOSIS — R26.2 DIFFICULTY WALKING: ICD-10-CM

## 2025-03-18 DIAGNOSIS — N18.9 CHRONIC KIDNEY DISEASE, UNSPECIFIED CKD STAGE: ICD-10-CM

## 2025-03-18 DIAGNOSIS — E87.5 HYPERKALEMIA: ICD-10-CM

## 2025-03-18 PROBLEM — A41.9 SEPSIS: Status: ACTIVE | Noted: 2025-03-18

## 2025-03-18 LAB
ALBUMIN SERPL-MCNC: 3.5 G/DL (ref 3.5–5.2)
ALBUMIN/GLOB SERPL: 1 G/DL
ALP SERPL-CCNC: 85 U/L (ref 39–117)
ALT SERPL W P-5'-P-CCNC: 20 U/L (ref 1–33)
ANION GAP SERPL CALCULATED.3IONS-SCNC: 10.4 MMOL/L (ref 5–15)
ANION GAP SERPL CALCULATED.3IONS-SCNC: 9.4 MMOL/L (ref 5–15)
AST SERPL-CCNC: 24 U/L (ref 1–32)
BASOPHILS # BLD AUTO: 0.04 10*3/MM3 (ref 0–0.2)
BASOPHILS NFR BLD AUTO: 0.3 % (ref 0–1.5)
BILIRUB SERPL-MCNC: 1.2 MG/DL (ref 0–1.2)
BUN SERPL-MCNC: 26 MG/DL (ref 8–23)
BUN SERPL-MCNC: 27 MG/DL (ref 8–23)
BUN/CREAT SERPL: 21.8 (ref 7–25)
BUN/CREAT SERPL: 26 (ref 7–25)
CALCIUM SPEC-SCNC: 8.9 MG/DL (ref 8.6–10.5)
CALCIUM SPEC-SCNC: 9.2 MG/DL (ref 8.6–10.5)
CHLORIDE SERPL-SCNC: 101 MMOL/L (ref 98–107)
CHLORIDE SERPL-SCNC: 101 MMOL/L (ref 98–107)
CO2 SERPL-SCNC: 25.6 MMOL/L (ref 22–29)
CO2 SERPL-SCNC: 26.6 MMOL/L (ref 22–29)
CREAT SERPL-MCNC: 1 MG/DL (ref 0.57–1)
CREAT SERPL-MCNC: 1.24 MG/DL (ref 0.57–1)
CRP SERPL-MCNC: 5.18 MG/DL (ref 0–0.5)
D-LACTATE SERPL-SCNC: 2 MMOL/L (ref 0.5–2)
D-LACTATE SERPL-SCNC: 2.5 MMOL/L (ref 0.5–2)
DEPRECATED RDW RBC AUTO: 66.1 FL (ref 37–54)
EGFRCR SERPLBLD CKD-EPI 2021: 46.3 ML/MIN/1.73
EGFRCR SERPLBLD CKD-EPI 2021: 60 ML/MIN/1.73
EOSINOPHIL # BLD AUTO: 0.03 10*3/MM3 (ref 0–0.4)
EOSINOPHIL NFR BLD AUTO: 0.2 % (ref 0.3–6.2)
ERYTHROCYTE [DISTWIDTH] IN BLOOD BY AUTOMATED COUNT: 17.7 % (ref 12.3–15.4)
ERYTHROCYTE [SEDIMENTATION RATE] IN BLOOD: 38 MM/HR (ref 0–30)
FLUAV RNA RESP QL NAA+PROBE: NOT DETECTED
FLUBV RNA RESP QL NAA+PROBE: NOT DETECTED
GEN 5 1HR TROPONIN T REFLEX: 20 NG/L
GLOBULIN UR ELPH-MCNC: 3.6 GM/DL
GLUCOSE BLDC GLUCOMTR-MCNC: 259 MG/DL (ref 70–99)
GLUCOSE BLDC GLUCOMTR-MCNC: 302 MG/DL (ref 70–99)
GLUCOSE SERPL-MCNC: 217 MG/DL (ref 65–99)
GLUCOSE SERPL-MCNC: 240 MG/DL (ref 65–99)
HCT VFR BLD AUTO: 39.2 % (ref 34–46.6)
HGB BLD-MCNC: 11.6 G/DL (ref 12–15.9)
HOLD SPECIMEN: NORMAL
IMM GRANULOCYTES # BLD AUTO: 0.05 10*3/MM3 (ref 0–0.05)
IMM GRANULOCYTES NFR BLD AUTO: 0.4 % (ref 0–0.5)
LYMPHOCYTES # BLD AUTO: 0.7 10*3/MM3 (ref 0.7–3.1)
LYMPHOCYTES NFR BLD AUTO: 5.4 % (ref 19.6–45.3)
MAGNESIUM SERPL-MCNC: 1.7 MG/DL (ref 1.6–2.4)
MCH RBC QN AUTO: 30.3 PG (ref 26.6–33)
MCHC RBC AUTO-ENTMCNC: 29.6 G/DL (ref 31.5–35.7)
MCV RBC AUTO: 102.3 FL (ref 79–97)
MONOCYTES # BLD AUTO: 0.64 10*3/MM3 (ref 0.1–0.9)
MONOCYTES NFR BLD AUTO: 4.9 % (ref 5–12)
NEUTROPHILS NFR BLD AUTO: 11.62 10*3/MM3 (ref 1.7–7)
NEUTROPHILS NFR BLD AUTO: 88.8 % (ref 42.7–76)
NRBC BLD AUTO-RTO: 0 /100 WBC (ref 0–0.2)
NT-PROBNP SERPL-MCNC: 4083 PG/ML (ref 0–900)
PLATELET # BLD AUTO: 103 10*3/MM3 (ref 140–450)
PMV BLD AUTO: 13.6 FL (ref 6–12)
POTASSIUM SERPL-SCNC: 5.1 MMOL/L (ref 3.5–5.2)
POTASSIUM SERPL-SCNC: 6.4 MMOL/L (ref 3.5–5.2)
PROT SERPL-MCNC: 7.1 G/DL (ref 6–8.5)
QT INTERVAL: 372 MS
QTC INTERVAL: 545 MS
RBC # BLD AUTO: 3.83 10*6/MM3 (ref 3.77–5.28)
RSV RNA RESP QL NAA+PROBE: NOT DETECTED
SARS-COV-2 RNA RESP QL NAA+PROBE: NOT DETECTED
SODIUM SERPL-SCNC: 137 MMOL/L (ref 136–145)
SODIUM SERPL-SCNC: 137 MMOL/L (ref 136–145)
TROPONIN T % DELTA: -17
TROPONIN T NUMERIC DELTA: -4 NG/L
TROPONIN T SERPL HS-MCNC: 24 NG/L
WBC NRBC COR # BLD AUTO: 13.08 10*3/MM3 (ref 3.4–10.8)
WHOLE BLOOD HOLD COAG: NORMAL
WHOLE BLOOD HOLD SPECIMEN: NORMAL

## 2025-03-18 PROCEDURE — 87040 BLOOD CULTURE FOR BACTERIA: CPT | Performed by: EMERGENCY MEDICINE

## 2025-03-18 PROCEDURE — 99223 1ST HOSP IP/OBS HIGH 75: CPT | Performed by: INTERNAL MEDICINE

## 2025-03-18 PROCEDURE — 36415 COLL VENOUS BLD VENIPUNCTURE: CPT | Performed by: EMERGENCY MEDICINE

## 2025-03-18 PROCEDURE — 93010 ELECTROCARDIOGRAM REPORT: CPT | Performed by: INTERNAL MEDICINE

## 2025-03-18 PROCEDURE — 25010000002 CALCIUM GLUCONATE-NACL 1-0.675 GM/50ML-% SOLUTION: Performed by: EMERGENCY MEDICINE

## 2025-03-18 PROCEDURE — 63710000001 INSULIN LISPRO (HUMAN) PER 5 UNITS: Performed by: INTERNAL MEDICINE

## 2025-03-18 PROCEDURE — 25010000002 PIPERACILLIN SOD-TAZOBACTAM PER 1 G: Performed by: INTERNAL MEDICINE

## 2025-03-18 PROCEDURE — 83735 ASSAY OF MAGNESIUM: CPT | Performed by: INTERNAL MEDICINE

## 2025-03-18 PROCEDURE — 80053 COMPREHEN METABOLIC PANEL: CPT | Performed by: EMERGENCY MEDICINE

## 2025-03-18 PROCEDURE — 94761 N-INVAS EAR/PLS OXIMETRY MLT: CPT

## 2025-03-18 PROCEDURE — 25010000002 PIPERACILLIN SOD-TAZOBACTAM PER 1 G: Performed by: EMERGENCY MEDICINE

## 2025-03-18 PROCEDURE — 93005 ELECTROCARDIOGRAM TRACING: CPT | Performed by: EMERGENCY MEDICINE

## 2025-03-18 PROCEDURE — 25010000002 ENOXAPARIN PER 10 MG: Performed by: INTERNAL MEDICINE

## 2025-03-18 PROCEDURE — 85652 RBC SED RATE AUTOMATED: CPT | Performed by: INTERNAL MEDICINE

## 2025-03-18 PROCEDURE — 93005 ELECTROCARDIOGRAM TRACING: CPT

## 2025-03-18 PROCEDURE — 73620 X-RAY EXAM OF FOOT: CPT

## 2025-03-18 PROCEDURE — 82948 REAGENT STRIP/BLOOD GLUCOSE: CPT

## 2025-03-18 PROCEDURE — 82948 REAGENT STRIP/BLOOD GLUCOSE: CPT | Performed by: INTERNAL MEDICINE

## 2025-03-18 PROCEDURE — 25010000002 HYDROMORPHONE 1 MG/ML SOLUTION: Performed by: EMERGENCY MEDICINE

## 2025-03-18 PROCEDURE — 83605 ASSAY OF LACTIC ACID: CPT | Performed by: EMERGENCY MEDICINE

## 2025-03-18 PROCEDURE — 84484 ASSAY OF TROPONIN QUANT: CPT | Performed by: EMERGENCY MEDICINE

## 2025-03-18 PROCEDURE — 25010000002 FENTANYL CITRATE (PF) 50 MCG/ML SOLUTION: Performed by: EMERGENCY MEDICINE

## 2025-03-18 PROCEDURE — 86140 C-REACTIVE PROTEIN: CPT | Performed by: INTERNAL MEDICINE

## 2025-03-18 PROCEDURE — 71045 X-RAY EXAM CHEST 1 VIEW: CPT

## 2025-03-18 PROCEDURE — 87637 SARSCOV2&INF A&B&RSV AMP PRB: CPT | Performed by: EMERGENCY MEDICINE

## 2025-03-18 PROCEDURE — 25010000002 FUROSEMIDE PER 20 MG: Performed by: EMERGENCY MEDICINE

## 2025-03-18 PROCEDURE — 25010000002 VANCOMYCIN 5 G RECONSTITUTED SOLUTION: Performed by: EMERGENCY MEDICINE

## 2025-03-18 PROCEDURE — 83880 ASSAY OF NATRIURETIC PEPTIDE: CPT | Performed by: EMERGENCY MEDICINE

## 2025-03-18 PROCEDURE — 25810000003 SODIUM CHLORIDE 0.9 % SOLUTION: Performed by: EMERGENCY MEDICINE

## 2025-03-18 PROCEDURE — 99291 CRITICAL CARE FIRST HOUR: CPT

## 2025-03-18 PROCEDURE — 63710000001 INSULIN REGULAR HUMAN PER 5 UNITS: Performed by: EMERGENCY MEDICINE

## 2025-03-18 PROCEDURE — 85025 COMPLETE CBC W/AUTO DIFF WBC: CPT

## 2025-03-18 RX ORDER — FUROSEMIDE 10 MG/ML
40 INJECTION INTRAMUSCULAR; INTRAVENOUS
Status: DISCONTINUED | OUTPATIENT
Start: 2025-03-19 | End: 2025-03-24

## 2025-03-18 RX ORDER — DILTIAZEM HCL IN NACL,ISO-OSM 125 MG/125
5-15 PLASTIC BAG, INJECTION (ML) INTRAVENOUS CONTINUOUS
Status: DISCONTINUED | OUTPATIENT
Start: 2025-03-18 | End: 2025-03-19

## 2025-03-18 RX ORDER — FUROSEMIDE 10 MG/ML
40 INJECTION INTRAMUSCULAR; INTRAVENOUS ONCE
Status: DISCONTINUED | OUTPATIENT
Start: 2025-03-18 | End: 2025-03-18

## 2025-03-18 RX ORDER — NICOTINE POLACRILEX 4 MG
15 LOZENGE BUCCAL
Status: DISCONTINUED | OUTPATIENT
Start: 2025-03-18 | End: 2025-03-24 | Stop reason: HOSPADM

## 2025-03-18 RX ORDER — SODIUM CHLORIDE 0.9 % (FLUSH) 0.9 %
10 SYRINGE (ML) INJECTION AS NEEDED
Status: DISCONTINUED | OUTPATIENT
Start: 2025-03-18 | End: 2025-03-24 | Stop reason: HOSPADM

## 2025-03-18 RX ORDER — DEXTROSE MONOHYDRATE 25 G/50ML
25 INJECTION, SOLUTION INTRAVENOUS
Status: DISCONTINUED | OUTPATIENT
Start: 2025-03-18 | End: 2025-03-24 | Stop reason: HOSPADM

## 2025-03-18 RX ORDER — CALCIUM GLUCONATE 20 MG/ML
1000 INJECTION, SOLUTION INTRAVENOUS ONCE
Status: COMPLETED | OUTPATIENT
Start: 2025-03-18 | End: 2025-03-18

## 2025-03-18 RX ORDER — DEXTROSE MONOHYDRATE 25 G/50ML
25 INJECTION, SOLUTION INTRAVENOUS ONCE
Status: COMPLETED | OUTPATIENT
Start: 2025-03-18 | End: 2025-03-18

## 2025-03-18 RX ORDER — INSULIN LISPRO 100 [IU]/ML
2-7 INJECTION, SOLUTION INTRAVENOUS; SUBCUTANEOUS
Status: DISCONTINUED | OUTPATIENT
Start: 2025-03-18 | End: 2025-03-24 | Stop reason: HOSPADM

## 2025-03-18 RX ORDER — METOPROLOL SUCCINATE 50 MG/1
50 TABLET, EXTENDED RELEASE ORAL EVERY 12 HOURS SCHEDULED
Status: DISCONTINUED | OUTPATIENT
Start: 2025-03-18 | End: 2025-03-20

## 2025-03-18 RX ORDER — SODIUM CHLORIDE 9 MG/ML
40 INJECTION, SOLUTION INTRAVENOUS AS NEEDED
Status: DISCONTINUED | OUTPATIENT
Start: 2025-03-18 | End: 2025-03-24 | Stop reason: HOSPADM

## 2025-03-18 RX ORDER — INDOMETHACIN 25 MG/1
50 CAPSULE ORAL ONCE
Status: COMPLETED | OUTPATIENT
Start: 2025-03-18 | End: 2025-03-18

## 2025-03-18 RX ORDER — FUROSEMIDE 10 MG/ML
80 INJECTION INTRAMUSCULAR; INTRAVENOUS ONCE
Status: COMPLETED | OUTPATIENT
Start: 2025-03-18 | End: 2025-03-18

## 2025-03-18 RX ORDER — HYDROCODONE BITARTRATE AND ACETAMINOPHEN 10; 325 MG/1; MG/1
1 TABLET ORAL EVERY 6 HOURS PRN
Refills: 0 | Status: DISCONTINUED | OUTPATIENT
Start: 2025-03-18 | End: 2025-03-20

## 2025-03-18 RX ORDER — SODIUM CHLORIDE 0.9 % (FLUSH) 0.9 %
10 SYRINGE (ML) INJECTION EVERY 12 HOURS SCHEDULED
Status: DISCONTINUED | OUTPATIENT
Start: 2025-03-18 | End: 2025-03-24 | Stop reason: HOSPADM

## 2025-03-18 RX ORDER — IBUPROFEN 600 MG/1
1 TABLET ORAL
Status: DISCONTINUED | OUTPATIENT
Start: 2025-03-18 | End: 2025-03-24 | Stop reason: HOSPADM

## 2025-03-18 RX ORDER — FENTANYL CITRATE 50 UG/ML
50 INJECTION, SOLUTION INTRAMUSCULAR; INTRAVENOUS ONCE
Refills: 0 | Status: COMPLETED | OUTPATIENT
Start: 2025-03-18 | End: 2025-03-18

## 2025-03-18 RX ORDER — RIVAROXABAN 20 MG/1
20 TABLET, FILM COATED ORAL DAILY
Qty: 90 TABLET | Refills: 3 | Status: SHIPPED | OUTPATIENT
Start: 2025-03-18 | End: 2025-03-18

## 2025-03-18 RX ORDER — NYSTATIN 100000 [USP'U]/G
POWDER TOPICAL EVERY 12 HOURS SCHEDULED
Status: DISCONTINUED | OUTPATIENT
Start: 2025-03-18 | End: 2025-03-19

## 2025-03-18 RX ORDER — ENOXAPARIN SODIUM 100 MG/ML
40 INJECTION SUBCUTANEOUS 2 TIMES DAILY
Status: DISCONTINUED | OUTPATIENT
Start: 2025-03-18 | End: 2025-03-19

## 2025-03-18 RX ADMIN — PIPERACILLIN AND TAZOBACTAM 4.5 G: 4; .5 INJECTION, POWDER, FOR SOLUTION INTRAVENOUS; PARENTERAL at 17:14

## 2025-03-18 RX ADMIN — DILTIAZEM HYDROCHLORIDE 5 MG/HR: 5 INJECTION, SOLUTION INTRAVENOUS at 11:39

## 2025-03-18 RX ADMIN — INSULIN LISPRO 5 UNITS: 100 INJECTION, SOLUTION INTRAVENOUS; SUBCUTANEOUS at 21:52

## 2025-03-18 RX ADMIN — FENTANYL CITRATE 50 MCG: 50 INJECTION, SOLUTION INTRAMUSCULAR; INTRAVENOUS at 15:55

## 2025-03-18 RX ADMIN — ENOXAPARIN SODIUM 40 MG: 100 INJECTION SUBCUTANEOUS at 21:53

## 2025-03-18 RX ADMIN — INSULIN LISPRO 4 UNITS: 100 INJECTION, SOLUTION INTRAVENOUS; SUBCUTANEOUS at 17:41

## 2025-03-18 RX ADMIN — METOPROLOL SUCCINATE 50 MG: 50 TABLET, FILM COATED, EXTENDED RELEASE ORAL at 21:53

## 2025-03-18 RX ADMIN — VANCOMYCIN HYDROCHLORIDE 2250 MG: 5 INJECTION, POWDER, LYOPHILIZED, FOR SOLUTION INTRAVENOUS at 12:39

## 2025-03-18 RX ADMIN — HYDROMORPHONE HYDROCHLORIDE 1 MG: 1 INJECTION, SOLUTION INTRAMUSCULAR; INTRAVENOUS; SUBCUTANEOUS at 11:51

## 2025-03-18 RX ADMIN — NYSTATIN: 100000 POWDER TOPICAL at 21:53

## 2025-03-18 RX ADMIN — INSULIN HUMAN 10 UNITS: 100 INJECTION, SOLUTION PARENTERAL at 11:08

## 2025-03-18 RX ADMIN — SODIUM BICARBONATE 50 MEQ: 84 INJECTION INTRAVENOUS at 11:05

## 2025-03-18 RX ADMIN — HYDROCODONE BITARTRATE AND ACETAMINOPHEN 1 TABLET: 10; 325 TABLET ORAL at 18:43

## 2025-03-18 RX ADMIN — FUROSEMIDE 80 MG: 10 INJECTION, SOLUTION INTRAMUSCULAR; INTRAVENOUS at 11:25

## 2025-03-18 RX ADMIN — Medication 10 ML: at 21:53

## 2025-03-18 RX ADMIN — CALCIUM GLUCONATE 1000 MG: 20 INJECTION, SOLUTION INTRAVENOUS at 10:57

## 2025-03-18 RX ADMIN — SODIUM ZIRCONIUM CYCLOSILICATE 10 G: 10 POWDER, FOR SUSPENSION ORAL at 12:03

## 2025-03-18 RX ADMIN — DEXTROSE MONOHYDRATE 25 G: 25 INJECTION, SOLUTION INTRAVENOUS at 11:12

## 2025-03-18 RX ADMIN — PIPERACILLIN AND TAZOBACTAM 3.38 G: 3; .375 INJECTION, POWDER, FOR SOLUTION INTRAVENOUS at 11:43

## 2025-03-18 NOTE — PROGRESS NOTES
"[facility]: Vancomycin Pharmacokinetic Initial Consult Note    Mary Albarran is a 74 y.o. female who is on day 1 of pharmacy to dose vancomycin for Bacteremia and Skin and Soft Tissue.    Consulting Provider: Dr. Acharya  Planned Duration of Therapy: 7 days  Was Patient Receiving Prior to Admission/Consult?: No  Loading Dose Ordered or Given: 2250 mg on 3/18 at 1239  PK/PD Target: -600 mg/L.hr   Imaging Reviewed?: Yes  Other Antimicrobials: Piperacillin/Tazobactam    Microbiology Data  MRSA PCR performed: No; Result: Not ordered due to excluded indication or presence of suspected abscess  Culture/Source:   3/18 blood cultures x 2: in process    Vitals/Labs  Ht: 160 cm (63\"); Wt:    Temp (24hrs), Av.7 °F (37.1 °C), Min:98.7 °F (37.1 °C), Max:98.7 °F (37.1 °C)   Estimated Creatinine Clearance: 50.9 mL/min (A) (by C-G formula based on SCr of 1.24 mg/dL (H)).     Results from last 7 days   Lab Units 25  0751   CREATININE mg/dL 1.24*   WBC 10*3/mm3 13.08*     Assessment/Plan:    Vancomycin Dose:  1000 mg IV every 24 hours; which provides the following predicted parameters:  AUC24,ss: 520 mg/L.hr  Probability of AUC24 > 400: 70 %  Ctrough,ss: 14.9 mg/L  Probability of Ctrough,ss > 20: 34 %  Probability of nephrotoxicity (Lodise ERIC ): 10 %  Vanc Trough planned prior to the 3rd or 4th dose or as clinically indicated  Patient has order for Complete Metabolic Panel    Pharmacy will follow patient's kidney function and will adjust doses and obtain levels as necessary. Thank you for involving pharmacy in this patient's care. Please contact pharmacy with any questions or concerns.                           Rosemarie Pablo  Clinical Pharmacist    "

## 2025-03-18 NOTE — ED PROVIDER NOTES
Time: 10:35 AM EDT  Date of encounter:  3/18/2025  Independent Historian/Clinical History and Information was obtained by:   Patient, Family, and EMS    History is limited by: N/A    Chief Complaint: Shortness of breath, rapid heart rate, weakness, swollen red legs.      History of Present Illness:  Patient is a 74 y.o. year old female who presents to the emergency department for evaluation of shortness of breath, rapid heart rate, weakness, swollen red legs.  Patient has a history of chronic kidney disease, atrial fibrillation, CHF, COPD, diabetes, and aortic valve disease.  The patient was admitted to the hospital in February 2025 for septic shock, streptococcal bacteremia, gram-negative bacteremia, lower extremity cellulitis and metabolic encephalopathy.  She also had congestive heart failure.  Patient states that over last several days she has had increasing shortness of breath.  She currently is on 2 L of oxygen at home however had increased her oxygen to 4 L via nasal cannula.  She has had no fever or chills and does not not have a cough however she does complain of severe bilateral leg swelling with increasing redness to the legs bilaterally.      Patient Care Team  Primary Care Provider: Brittni Quiroz APRN    Past Medical History:     Allergies   Allergen Reactions    Codeine Mental Status Change    Sglt2 Inhibitors Other (See Comments)     Frequent UTI      Past Medical History:   Diagnosis Date    Allergic rhinitis     Anxiety     Aortic valve disease 11/28/2021    Fibrocalcific changes of the aortic valve with mild aortic valve stenosis   on echo 3/2/2021  Moderate aortic valve regurgitation is present. Aortic valve maximum pressure gradient is 26 mmHg. Moderate aortic valve stenosis is present.  On echo 2/10/2022       Arthritis     Atrial fibrillation     Chronic kidney disease (CKD), stage III (moderate)     Chronic pain     COPD (chronic obstructive pulmonary disease)     Diabetes mellitus  type 2 with complications     Ex-smoker     QUIT SMOKING 2008    History of home oxygen therapy     2L/NC AAT REPORTED    Hyperlipidemia     Hypertension     Thrombocytopenia      Past Surgical History:   Procedure Laterality Date    HYSTERECTOMY      30 YEARS AGO     Family History   Problem Relation Age of Onset    Heart disease Father     Heart disease Other     Heart disease Other     Diabetes Other        Home Medications:  Prior to Admission medications    Medication Sig Start Date End Date Taking? Authorizing Provider   albuterol (ACCUNEB) 1.25 MG/3ML nebulizer solution Take 3 mL by nebulization Every 6 (Six) Hours As Needed for Wheezing or Shortness of Air. 3/30/23   Britnti Quiroz APRN   albuterol sulfate  (90 Base) MCG/ACT inhaler Inhale 2 puffs Every 4 (Four) Hours As Needed for Wheezing. 5/20/24   Brittni Quiroz APRN   ammonium lactate (LAC-HYDRIN) 12 % lotion Apply  topically to the appropriate area as directed 2 (Two) Times a Day. 2/28/25   Joaquin Rutledge MD   aspirin 81 MG EC tablet Take 1 tablet by mouth Daily for 30 days.  Patient not taking: Reported on 3/7/2025 3/1/25 3/31/25  Joaquin Rutledge MD   atorvastatin (LIPITOR) 20 MG tablet TAKE 1 TABLET DAILY  Patient taking differently: Take 1 tablet by mouth Daily. 9/12/24   Brittni Quiroz APRN   budesonide (PULMICORT) 1 MG/2ML nebulizer solution Take 2 mL by nebulization Daily. 9/9/24   Brittni Quiroz APRN   cholecalciferol (VITAMIN D3) 25 MCG (1000 UT) tablet Take 1 tablet by mouth Daily.    Provider, MD Whitley   HYDROcodone-acetaminophen (NORCO)  MG per tablet Take 1 tablet by mouth Every 6 (Six) Hours As Needed for Moderate Pain. 3/7/25   Brittni Quiroz APRN   Insulin Glargine (Lantus SoloStar) 100 UNIT/ML injection pen Inject 15 Units under the skin into the appropriate area as directed Daily. 2/28/25   Joaquin Rutledge MD   metoprolol succinate XL (TOPROL-XL) 50 MG 24 hr tablet Take 1 tablet  by mouth Every 12 (Twelve) Hours for 30 days. 2/28/25 3/30/25  Joaquin Rutledge MD   mineral oil-hydrophilic petrolatum (AQUAPHOR) ointment Apply 1 Application topically to the appropriate area as directed Every 1 (One) Hour As Needed for Dry Skin. 25   Joaquin Rutledge MD   O2 (OXYGEN) Inhale 2 L/min Every Night.    Provider, MD Whitley   pregabalin (LYRICA) 150 MG capsule Take 1 capsule by mouth 2 (Two) Times a Day. 25   Brittni Quiroz APRN   sacubitril-valsartan (ENTRESTO) 24-26 MG tablet Take 1 tablet by mouth Every 12 (Twelve) Hours for 30 days. 2/28/25 3/30/25  Joaquin Rutledge MD   torsemide (DEMADEX) 20 MG tablet Take 1 tablet by mouth Daily for 30 days.  Patient not taking: Reported on 3/7/2025 2/28/25 3/30/25  Joaquin Rutledge MD   Xarelto 20 MG tablet TAKE 1 TABLET DAILY 3/18/25   Bouchra Bentley APRN   rivaroxaban (XARELTO) 15 MG tablet Take 1 tablet by mouth Daily With Dinner for 30 days. Indications: Atrial Fibrillation 2/28/25 3/18/25  Joaquin Rutledge MD        Social History:   Social History     Tobacco Use    Smoking status: Former     Current packs/day: 0.00     Average packs/day: 1 pack/day for 37.0 years (37.0 ttl pk-yrs)     Types: Cigarettes     Start date: 4/3/1971     Quit date: 2008     Years since quittin.9     Passive exposure: Never    Smokeless tobacco: Never    Tobacco comments:     FOR 30 YEARS   Vaping Use    Vaping status: Never Used   Substance Use Topics    Alcohol use: Never    Drug use: Never         Review of Systems:  Review of Systems   Constitutional:  Positive for activity change, appetite change, chills and fatigue. Negative for fever.   HENT:  Negative for congestion, ear pain and sore throat.    Eyes:  Negative for pain.   Respiratory:  Positive for shortness of breath. Negative for cough and chest tightness.    Cardiovascular:  Positive for palpitations. Negative for chest pain.   Gastrointestinal:  Negative for abdominal pain, diarrhea,  "nausea and vomiting.   Genitourinary:  Negative for flank pain and hematuria.   Musculoskeletal:  Negative for joint swelling.   Skin:  Positive for rash. Negative for pallor.   Neurological:  Positive for weakness. Negative for seizures and headaches.   All other systems reviewed and are negative.       Physical Exam:  BP (!) 125/113   Pulse 93   Temp 98.7 °F (37.1 °C) (Oral)   Resp 17   Ht 160 cm (63\")   SpO2 90%   BMI 46.06 kg/m²     Physical Exam  Vitals and nursing note reviewed.   Constitutional:       General: She is in acute distress.      Appearance: Normal appearance. She is obese. She is ill-appearing. She is not toxic-appearing.   HENT:      Head: Normocephalic and atraumatic.      Mouth/Throat:      Mouth: Mucous membranes are moist.   Eyes:      General: No scleral icterus.  Cardiovascular:      Rate and Rhythm: Tachycardia present. Rhythm irregular.      Pulses: Normal pulses.      Heart sounds: Normal heart sounds.   Pulmonary:      Effort: Tachypnea present. No respiratory distress.      Breath sounds: Examination of the right-middle field reveals rhonchi and rales. Examination of the left-middle field reveals rhonchi and rales. Rhonchi and rales present.   Abdominal:      General: Abdomen is flat.      Palpations: Abdomen is soft.      Tenderness: There is no abdominal tenderness.   Musculoskeletal:         General: Normal range of motion.      Cervical back: Normal range of motion and neck supple.      Right lower leg: Tenderness present. Edema present.      Left lower leg: Tenderness present. Edema present.   Skin:     General: Skin is warm and dry.      Capillary Refill: Capillary refill takes less than 2 seconds.      Findings: Erythema present.      Comments: There is erythema to the lower extremities bilaterally.  Right is worse than left.   Neurological:      General: No focal deficit present.      Mental Status: She is alert and oriented to person, place, and time. Mental status is at " baseline.                    Medical Decision Making:      Comorbidities that affect care:    Atrial Fibrillation    External Notes reviewed:    Previous Clinic Note: Recent admission for sepsis.      The following orders were placed and all results were independently analyzed by me:  Orders Placed This Encounter   Procedures    COVID-19, FLU A/B, RSV PCR 1 HR TAT - Swab, Nasopharynx    Blood Culture - Blood,    Blood Culture - Blood,    XR Chest 1 View    XR Foot 2 View Right    XR Foot 2 View Left    [place] Draw    Comprehensive Metabolic Panel    BNP    High Sensitivity Troponin T    CBC Auto Differential    High Sensitivity Troponin T 1Hr    Urinalysis With Culture If Indicated - Straight Cath    Lactic Acid, Plasma    STAT Lactic Acid, Reflex    C-reactive Protein    Sedimentation Rate    Basic Metabolic Panel    Magnesium    CBC Auto Differential    Comprehensive Metabolic Panel    Magnesium    Procalcitonin    NPO Diet NPO Type: Strict NPO    Undress & Gown    Continuous Pulse Oximetry    Vital Signs    Vital Signs    Intake & Output    Weigh Patient    Oral Care    Maintain IV Access    Telemetry - Place Orders & Notify Provider of Results When Patient Experiences Acute Chest Pain, Dysrhythmia or Respiratory Distress    Code Status and Medical Interventions: CPR (Attempt to Resuscitate); Full Support    Hospitalist (on-call MD unless specified)    Oxygen Therapy- Nasal Cannula; Titrate 1-6 LPM Per SpO2; 90 - 95%    POC Glucose Q1H    ECG 12 Lead ED Triage Standing Order; SOA    Insert Peripheral IV    Insert Peripheral IV    Inpatient Admission    CBC & Differential    Green Top (Gel)    Lavender Top    Gold Top - SST    Light Blue Top    Extra Tubes    Green Top (Gel)       Medications Given in the Emergency Department:  Medications   sodium chloride 0.9 % flush 10 mL (has no administration in time range)   dilTIAZem (CARDIZEM) 125 mg in 125 mL sodium chloride  infusion (5 mg/hr Intravenous Currently  Infusing 3/18/25 1202)   vancomycin 2250 mg/500 mL 0.9% NS IVPB ([facility]) (2,250 mg Intravenous New Bag 3/18/25 1239)   Pharmacy to dose vancomycin (has no administration in time range)   piperacillin-tazobactam (ZOSYN) IVPB 4.5 g IVPB in 100 mL NS (VTB) (has no administration in time range)   sodium chloride 0.9 % flush 10 mL (has no administration in time range)   sodium chloride 0.9 % flush 10 mL (has no administration in time range)   sodium chloride 0.9 % infusion 40 mL (has no administration in time range)   enoxaparin sodium (LOVENOX) syringe 40 mg (has no administration in time range)   Pharmacy to dose vancomycin (has no administration in time range)   Pharmacy To Dose: Piperacillin-tazobactam (Zosyn) (has no administration in time range)   dextrose (D50W) (25 g/50 mL) IV injection 25 g (25 g Intravenous Given 3/18/25 1112)   insulin regular (humuLIN R,novoLIN R) injection 10 Units (10 Units Intravenous Given 3/18/25 1108)   sodium bicarbonate injection 8.4% 50 mEq (50 mEq Intravenous Given 3/18/25 1105)   calcium gluconate 1000 Mg/50ml 0.675% NaCl IV SOLN (0 mg Intravenous Stopped 3/18/25 1105)   sodium zirconium cyclosilicate (LOKELMA) packet 10 g (10 g Oral Given 3/18/25 1203)   dilTIAZem (CARDIZEM) bolus from bag 1 mg/mL solution 10 mg (10 mg Intravenous Bolus from Bag 3/18/25 1139)   piperacillin-tazobactam (ZOSYN) IVPB 3.375 g IVPB in 100 mL NS (VTB) (0 g Intravenous Stopped 3/18/25 1236)   furosemide (LASIX) injection 80 mg (80 mg Intravenous Given 3/18/25 1125)   HYDROmorphone (DILAUDID) injection 1 mg (1 mg Intravenous Given 3/18/25 1151)        ED Course:           EKG: Atrial fibrillation with rate of 129 bpm  Abnormal P wave  Nonspecific ST changes.        Labs:    Lab Results (last 24 hours)       Procedure Component Value Units Date/Time    CBC & Differential [[id number]]  (Abnormal) Collected: 03/18/25 0751    Specimen: Blood from Arm, Right Updated: 03/18/25 0804    Narrative:      The following  orders were created for panel order CBC & Differential.  Procedure                               Abnormality         Status                     ---------                               -----------         ------                     CBC Auto Differential[[id number]]        Abnormal            Final result               Scan Slide[[id number]]                                                                    Please view results for these tests on the individual orders.    Comprehensive Metabolic Panel [[id number]]  (Abnormal) Collected: 03/18/25 0751    Specimen: Blood from Arm, Right Updated: 03/18/25 0851     Glucose 217 mg/dL      BUN 27 mg/dL      Creatinine 1.24 mg/dL      Sodium 137 mmol/L      Potassium 6.4 mmol/L      Chloride 101 mmol/L      CO2 26.6 mmol/L      Calcium 8.9 mg/dL      Total Protein 7.1 g/dL      Albumin 3.5 g/dL      ALT (SGPT) 20 U/L      AST (SGOT) 24 U/L      Alkaline Phosphatase 85 U/L      Total Bilirubin 1.2 mg/dL      Globulin 3.6 gm/dL      A/G Ratio 1.0 g/dL      BUN/Creatinine Ratio 21.8     Anion Gap 9.4 mmol/L      eGFR 46.3 mL/min/1.73     Narrative:      GFR Categories in Chronic Kidney Disease (CKD)      GFR Category          GFR (mL/min/1.73)    Interpretation  G1                     90 or greater         Normal or high (1)  G2                      60-89                Mild decrease (1)  G3a                   45-59                Mild to moderate decrease  G3b                   30-44                Moderate to severe decrease  G4                    15-29                Severe decrease  G5                    14 or less           Kidney failure          (1)In the absence of evidence of kidney disease, neither GFR category G1 or G2 fulfill the criteria for CKD.    eGFR calculation 2021 CKD-EPI creatinine equation, which does not include race as a factor    BNP [[id number]]  (Abnormal) Collected: 03/18/25 0751    Specimen: Blood from Arm, Right Updated: 03/18/25 0824     proBNP 4,083.0  pg/mL     Narrative:      This assay is used as an aid in the diagnosis of individuals suspected of having heart failure. It can be used as an aid in the diagnosis of acute decompensated heart failure (ADHF) in patients presenting with signs and symptoms of ADHF to the emergency department (ED). In addition, NT-proBNP of <300 pg/mL indicates ADHF is not likely.    Age Range Result Interpretation  NT-proBNP Concentration (pg/mL:      <50             Positive            >450                   Gray                 300-450                    Negative             <300    50-75           Positive            >900                  Gray                300-900                  Negative            <300      >75             Positive            >1800                  Gray                300-1800                  Negative            <300    High Sensitivity Troponin T [[id number]]  (Abnormal) Collected: 03/18/25 0751    Specimen: Blood from Arm, Right Updated: 03/18/25 0827     HS Troponin T 24 ng/L     Narrative:      High Sensitive Troponin T Reference Range:  <14.0 ng/L- Negative Female for AMI  <22.0 ng/L- Negative Male for AMI  >=14 - Abnormal Female indicating possible myocardial injury.  >=22 - Abnormal Male indicating possible myocardial injury.   Clinicians would have to utilize clinical acumen, EKG, Troponin, and serial changes to determine if it is an Acute Myocardial Infarction or myocardial injury due to an underlying chronic condition.         CBC Auto Differential [[id number]]  (Abnormal) Collected: 03/18/25 0751    Specimen: Blood from Arm, Right Updated: 03/18/25 0804     WBC 13.08 10*3/mm3      RBC 3.83 10*6/mm3      Hemoglobin 11.6 g/dL      Hematocrit 39.2 %      .3 fL      MCH 30.3 pg      MCHC 29.6 g/dL      RDW 17.7 %      RDW-SD 66.1 fl      MPV 13.6 fL      Platelets 103 10*3/mm3      Neutrophil % 88.8 %      Lymphocyte % 5.4 %      Monocyte % 4.9 %      Eosinophil % 0.2 %      Basophil % 0.3 %       Immature Grans % 0.4 %      Neutrophils, Absolute 11.62 10*3/mm3      Lymphocytes, Absolute 0.70 10*3/mm3      Monocytes, Absolute 0.64 10*3/mm3      Eosinophils, Absolute 0.03 10*3/mm3      Basophils, Absolute 0.04 10*3/mm3      Immature Grans, Absolute 0.05 10*3/mm3      nRBC 0.0 /100 WBC     Sedimentation Rate [[id number]]  (Abnormal) Collected: 03/18/25 0751    Specimen: Blood from Arm, Right Updated: 03/18/25 1334     Sed Rate 38 mm/hr     High Sensitivity Troponin T 1Hr [[id number]]  (Abnormal) Collected: 03/18/25 1029    Specimen: Blood from Hand, Left Updated: 03/18/25 1052     HS Troponin T 20 ng/L      Troponin T Numeric Delta -4 ng/L      Troponin T % Delta -17    Narrative:      High Sensitive Troponin T Reference Range:  <14.0 ng/L- Negative Female for AMI  <22.0 ng/L- Negative Male for AMI  >=14 - Abnormal Female indicating possible myocardial injury.  >=22 - Abnormal Male indicating possible myocardial injury.   Clinicians would have to utilize clinical acumen, EKG, Troponin, and serial changes to determine if it is an Acute Myocardial Infarction or myocardial injury due to an underlying chronic condition.         COVID-19, FLU A/B, RSV PCR 1 HR TAT - Swab, Nasopharynx [[id number]]  (Normal) Collected: 03/18/25 1130    Specimen: Swab from Nasopharynx Updated: 03/18/25 1214     COVID19 Not Detected     Influenza A PCR Not Detected     Influenza B PCR Not Detected     RSV, PCR Not Detected    Narrative:      Fact sheet for providers: https://www.fda.gov/media/469181/download    Fact sheet for patients: https://www.fda.gov/media/273560/download    Test performed by PCR.    Blood Culture - Blood, Arm, Left [[id number]] Collected: 03/18/25 1130    Specimen: Blood from Arm, Left Updated: 03/18/25 1136    Blood Culture - Blood, Arm, Right [[id number]] Collected: 03/18/25 1130    Specimen: Blood from Arm, Right Updated: 03/18/25 1136    Lactic Acid, Plasma [[id number]]  (Abnormal) Collected: 03/18/25 1137     Specimen: Blood from Arm, Right Updated: 03/18/25 1219     Lactate 2.5 mmol/L     C-reactive Protein [[id number]]  (Abnormal) Collected: 03/18/25 1130    Specimen: Blood from Arm, Left Updated: 03/18/25 1345     C-Reactive Protein 5.18 mg/dL     STAT Lactic Acid, Reflex [[id number]] Collected: 03/18/25 1437    Specimen: Blood from Arm, Left Updated: 03/18/25 1440    Basic Metabolic Panel [[id number]] Collected: 03/18/25 1437    Specimen: Blood from Arm, Left Updated: 03/18/25 1440    Magnesium [[id number]] Collected: 03/18/25 1437    Specimen: Blood from Arm, Left Updated: 03/18/25 1440             Imaging:    XR Foot 2 View Left  Result Date: 3/18/2025  XR FOOT 2 VW LEFT Date of Exam: 3/18/2025 1:28 PM EDT Indication: left foot wounds Comparison: None available. Findings: Diffuse osteopenia is noted. No fracture or malalignment is seen. No focal osseous destruction is identified to confirm osteomyelitis. Soft tissue swelling is noted in the lower calf and foot. A plantar calcaneal spur is noted. No tibiotalar or subtalar joint effusion is noted.     Impression: No specific finding of osteomyelitis. Soft tissue swelling in the visualized extremity could represent cellulitis, dependent edema and/or posttraumatic change. Electronically Signed: Thom Vega MD  3/18/2025 2:07 PM EDT  Workstation ID: [id number]    XR Foot 2 View Right  Result Date: 3/18/2025  XR FOOT 2 VW RIGHT Date of Exam: 3/18/2025 1:28 PM EDT Indication: Wounds-rule out infection Comparison: 3 view left foot dated 3/18/2025 Findings: Severe osteopenia is noted. There is soft tissue swelling throughout the lower calf, ankle and foot. No focal osseous destruction is identified to suggest osteomyelitis. No fracture or malalignment is seen. A small plantar calcaneal spur is noted.     Impression: Soft tissue swelling throughout the visualized extremity could represent dependent edema and/or cellulitis. No specific radiographic finding of osteomyelitis.  Electronically Signed: Thom Vega MD  3/18/2025 2:05 PM EDT  Workstation ID: [id number]    XR Chest 1 View  Result Date: 3/18/2025  XR CHEST 1 VW Date of Exam: 3/18/2025 7:45 AM EDT Indication: SOA Triage Protocol Comparison: Chest radiograph 2/17/2025. Findings: Suboptimal exam due to body habitus. Enlarged cardiac silhouette, unchanged. Low lung volumes. Possible bibasilar airspace opacities. No large pleural effusion. No pneumothorax. Osseous structures are unchanged.     Impression: Suboptimal portable radiograph. There is possibly bibasilar airspace disease that could be related to atelectasis and/or pneumonia. Cardiomegaly. Electronically Signed: Ishaan Martinez MD  3/18/2025 8:13 AM EDT  Workstation ID: [id number]        Differential Diagnosis and Discussion:    Dyspnea: Differential diagnosis includes but is not limited to metabolic acidosis, neurological disorders, psychogenic, asthma, pneumothorax, upper airway obstruction, COPD, pneumonia, noncardiogenic pulmonary edema, interstitial lung disease, anemia, congestive heart failure, and pulmonary embolism  Edema: Based on the patient's history, signs, and symptoms, the differential diagnosis includes but is not limited to heart failure, kidney disease, liver disease, venous insufficiency, lymphedema, pregnancy, medications, allergic reactions.  Palpitations: Differential diagnosis includes but is not limited to anxiety, atrioventricular blocks, mitral valve disease, hypoxia, coronary artery disease, hypokalemia, anemia, fever, COPD, congestive heart failure, pericarditis, Marialuisa-Parkinson-White syndrome, pulmonary embolism, SVT, atrial fibrillation, atrial flutter, sinus tachycardia, thyrotoxicosis, and pheochromocytoma.    PROCEDURES:    Labs were collected in the emergency department and all labs were reviewed and interpreted by me.  X-ray were performed in the emergency department and all X-ray impressions were independently interpreted by me.  An EKG was  performed and the EKG was interpreted by me.    ECG 12 Lead ED Triage Standing Order; SOA   Final Result   HEART JTYT=022  bpm   RR Dvdumkcw=154  ms   AK Interval=  ms   P Horizontal Axis=  deg   P Front Axis=  deg   QRSD Interval=76  ms   QT Yqreqdvo=894  ms   OPhP=905  ms   QRS Axis=116  deg   T Wave Axis=88  deg   - ABNORMAL ECG -   Atrial fibrillation   Right axis deviation   Low voltage, precordial leads   Prolonged QT interval   When compared with ECG of 11-Feb-2025 03:39:33,   Significant rate increase   Significant repolarization change   Electronically Signed By: Joaquin Gonzalez ([facility]) 2025-03-18 14:47:50   Date and Time of Study:2025-03-18 07:43:35          Procedures    MDM     Amount and/or Complexity of Data Reviewed  Clinical lab tests: reviewed  Tests in the radiology section of CPT®: reviewed  Tests in the medicine section of CPT®: reviewed  Decide to obtain previous medical records or to obtain history from someone other than the patient: yes             Total Critical Care time of 50 minutes. Total critical care time documented does not include time spent on separately billed procedures for services of nurses or physician assistants. I personally saw and examined the patient. I have reviewed all diagnostic interpretations and treatment plans as written. I was present for the key portions of any procedures performed and the inclusive time noted in any critical care statement. Critical care time includes patient management by me, time spent at the patients bedside,  time to review lab and imaging results, discussing patient care, documentation in the medical record, and time spent with family or caregiver.          Patient Care Considerations:    SEPSIS was considered but is NOT present in the emergency department as SIRS criteria is not present.      Consultants/Shared Management Plan:    Hospitalist: I have discussed the case with hospitalist who agrees to accept the patient for admission.    Social  Determinants of Health:    Patient is unable to carry out activities of daily life. Escalation of care is necessary.       Disposition and Care Coordination:    Admit:   Through independent evaluation of the patient's history, physical, and imperical data, the patient meets criteria for inpatient admission to the hospital.        Final diagnoses:   Atrial fibrillation with rapid ventricular response   Acute respiratory failure with hypoxia   Cellulitis of lower extremity, unspecified laterality   Hyperkalemia   Chronic kidney disease, unspecified CKD stage        ED Disposition       ED Disposition   Decision to Admit    Condition   --    Comment   Level of Care: Telemetry [5]   Diagnosis: Sepsis [[id number]]   Certification: I Certify That Inpatient Hospital Services Are Medically Necessary For Greater Than 2 Midnights                 This medical record created using voice recognition software.             Terrence Jameson, DO  03/18/25 0047

## 2025-03-18 NOTE — NURSING NOTE
Patient Mary Albarran admitted to 4MTU from the ED. Patient is alert and oriented to person, place, time, and situation. Patient oriented to unit, call light system and bed alarm use, teaching effective. Patient agreed to bed alarm use. Candida Auris screening revealed patient will NOT need tested, contact isolation and bleach cleaning due to no risk factors. Patient currently is not a concern for an active CDIFF infection.    4 eyes skin assessment completed with Tahmina Lepe RN. Per policy, the following skin interventions were put in place as a result of the skin assessment below: Wound care/nutrition consulted for present wound, Wound care/nutrition consulted for skin breakdown, LDA created and pictures and measurements taken of present wounds, LDA created and pictures and measurements taken of current skin breakdown, Avoiding use of disposable briefs, Limit number of layers underneath patient, Barrier cream for incontinence, Barrier cream for skin breakdown, Q2 turns, and Pillow supprt for offloading pressure, floating heels or padding for bony prominences    Skin Assessments (most recent)      Flowsheet Row Most Recent Value   Skin WDL .WDL except, all   Skin Color/Characteristics pale, redness blanchable, maroon/purple   Skin Temperature warm   Skin Moisture moist   Skin Elasticity slow return to original state   Skin Integrity excoriation, scab, scar(s), bruised (ecchymotic)   Sensory Perception 4-->no impairment   Moisture 3-->occasionally moist   Activity 3-->walks occasionally   Mobility 2-->very limited   Nutrition 2-->probably inadequate   Friction and Shear 2-->potential problem   Brandon Score 16            Fall assessment completed. Per policy, the patient was determined be a High fall risk due to Joyner Hernan score 15 or greater (only used within the first 24 hours of admission). Patient assessed to need the following mobility assistance: 2 Assist with the following assistive devices: Walker and  Gait Belt, and will be using a bedpan and a purewick for toileting. Per policy, the following fall interventions were put in place: Standard Fall Precautions - oriented to room and call light, bed low and locked, clutter free environment, adequate lighting, directed to call for assistance, non-skid footwear, hourly rounding and personal belongings within reach., Fall Risk Care Plan, Assistive devices at beside (hearing aids, glasses, walker, lifts), Gait Belt, and Scheduled toileting with staff remaining with patient.     Patient's belongings that were sent with family: None  Patient's belongings that were sent to security: None  Patient's belongings locked in safe box in room: None  Patient's belongings that were sent to pharmacy: None  Patient's belongings kept at bedside per patient: Dentures and Other:cell phone, bilateral ear rings and hair clip

## 2025-03-18 NOTE — TELEPHONE ENCOUNTER
Rx Refill Note  Requested Prescriptions     Pending Prescriptions Disp Refills    Xarelto 20 MG tablet [Pharmacy Med Name: XARELTO TABS 20MG] 90 tablet 3     Sig: TAKE 1 TABLET DAILY        LAST OFFICE VISIT:  09/30/2024     NEXT OFFICE VISIT:  03/28/2025     Does the medication requests match the last office note:    [] Yes   [x] No  MGS DO NOT MATCH    Does this refill request meet protocol details for MA to approve:     [] Yes   [x] No    The original prescription was discontinued on 2/28/2025 by Joaquin Rutledge MD for the following reason: Stop Taking at Discharge. Renewing this prescription may not be appropriate.     Spoke to pt's daughter, pt was currently in the ER.  She stated pt is currently taking the Xarelto, unsure of mgs.  Also, Pt has not had an appt since the original prescription was d/c'd.    Please advise, thank you.

## 2025-03-18 NOTE — H&P
[facility]  Date: 3[facility]2025   Patient Name: Mary Albarran  : 1950  MRN: [id number]  Primary Care Physician:  Brittni Quiroz APRN  Date of admission: 3/18/2025    Subjective   Subjective     Chief Complaint: Right leg redness    HPI:    Mary Albarran is a 74 y.o. female past medical history BMI 46, moderate aortic stenosis, CKD stage IIIa, type 2 diabetes, home oxygen 2 L, hypertension, dyslipidemia, and COPD who presents with right leg cellulitis.    Patient was recent in the hospital for significant infection.  He is found to have strep bacteremia and gram-negative bacteremia.  Thought to have possible cholecystitis as well as leg cellulitis.  Patient developed septic shock.  Patient completed 14 days of antibiotics.  Due to worsening right leg cellulitis over the last 24 to 36 hours the patient came to ER for further evaluation.    In the emergency department the patient vital signs are as follows: Temperature is 98.7, pulse 93 and A-fib, respiratory 17, blood pressure 125/113 , on 2 L.  CBC shows a white blood cell count of 13,000.  CMP shows a potassium of 6.4.  Creatinine is 1.24.  BNP is 4000.  Troponin went from 24 down to 20.  Lactate was malleable to 2.5.  Patient was given vancomycin and Zosyn due to lower extremity cellulitis.  When I saw the patient I was concern for possible worsening cellulitis that was spreading rapidly so I ask for imaging to rule out possible gas in the leg.  Patient will be admitted to hospital for sepsis due to right leg cellulitis.  She also was found to have life-threatening hyperkalemia.    All systems reviewed and are as noted above    Personal History     Past Medical History:  Obesity BMI 46  Moderate aortic stenosis  CKD stage IIIa  Type 2 diabetes  Home oxygen of 2 L  Hypertension  Dyslipidemia  COPD    Past Surgical History:  Hysterectomy    Family History:   Diabetes and heart disease    Social History:   Former  smoker.  No alcohol.    Home Medications:  HYDROcodone-acetaminophen, Insulin Glargine, O2, albuterol, albuterol sulfate HFA, ammonium lactate, aspirin, atorvastatin, budesonide, cholecalciferol, metoprolol succinate XL, mineral oil-hydrophilic petrolatum, pregabalin, rivaroxaban, sacubitril-valsartan, and torsemide    Allergies:  Allergies   Allergen Reactions    Codeine Mental Status Change    Sglt2 Inhibitors Other (See Comments)     Frequent UTI          Objective   Objective     Vitals:   Temp:  [98.7 °F (37.1 °C)] 98.7 °F (37.1 °C)  Heart Rate:  [] 93  Resp:  [17-20] 17  BP: (102-125)/() 125/113  Flow (L/min) (Oxygen Therapy):  [2] 2    Physical Exam    Constitutional: Ill-appearing nontoxic.   Eyes: Pupils equal, sclerae anicteric, no conjunctival injection   HENT: NCAT, mucous membranes moist   Neck: Supple, no thyromegaly, no lymphadenopathy, trachea midline   Respiratory: Clear to auscultation bilaterally, nonlabored respirations    Cardiovascular: RRR, 2/6 systolic ejection murmur rubs, or gallops, palpable pedal pulses bilaterally   Gastrointestinal: Positive bowel sounds, soft, nontender, nondistended   Musculoskeletal: No bilateral ankle edema, no clubbing or cyanosis to extremities   Psychiatric: Appropriate affect, cooperative   Neurologic: Oriented x 3, strength symmetric in all extremities, Cranial Nerves grossly intact to confrontation, speech clear   Skin: Right leg shows significant edema with blistering.  There is some lesion on the right fifth metatarsal head it is draining.  Area is erythematous and warm to touch.    Result Review    Result Review:  I have personally reviewed the results from the time of this admission to 3/18/2025 12:51 EDT and agree with these findings:  Labs reviewed      Assessment & Plan   Assessment / Plan     Assessment/Plan:   Sepsis due to right lower leg and foot cellulitis (leukocytosis and tachycardia) without endorgan damage  A-fib with RVR  Acute  heart failure exacerbation with reduced EF of 46 to 50% RVSP of 45 to 55 mm per mercury  Life-threatening hyperkalemia  Recent streptococcal bacteremia  Recent gram-negative bacteremia  Obesity BMI 46  On home oxygen    Plan:  --Admit to hospital service  -- Continue diltiazem for rate control  -- Patient was not given fluids even though concern for sepsis due to the fact that patient was overtly fluid overloaded  -- Patient was given diuretic and had brisk output  --  x-ray in the ER did not show any gas  -- Vancomycin and Zosyn  -- Blood culture  -- CRP and ESR  -- Wounds wound care consult  -- Admit to telemetry due to life-threatening hyperkalemia  --Calcium, glucose, insulin, and Lokelma  --Repeat BMP to make sure her potassium returns to normal  -- Continue diuresis        VTE Prophylaxis:  Lovenox        CODE STATUS:     Full code    Admission Status:  I believe this patient meets admission status.    Electronically signed by Trenton Acharya MD, 03/18/25, 12:51 PM EDT.

## 2025-03-18 NOTE — PLAN OF CARE
Goal Outcome Evaluation:              Outcome Evaluation: Patient alert and oriented, on 4L NC, atrial fibrillation on monitor, cardizem drip continued, wound photos taken, wound care consulted.

## 2025-03-19 LAB
ALBUMIN SERPL-MCNC: 3.2 G/DL (ref 3.5–5.2)
ALBUMIN/GLOB SERPL: 1 G/DL
ALP SERPL-CCNC: 62 U/L (ref 39–117)
ALT SERPL W P-5'-P-CCNC: 16 U/L (ref 1–33)
ANION GAP SERPL CALCULATED.3IONS-SCNC: 5.7 MMOL/L (ref 5–15)
AST SERPL-CCNC: 17 U/L (ref 1–32)
BASOPHILS # BLD AUTO: 0.04 10*3/MM3 (ref 0–0.2)
BASOPHILS NFR BLD AUTO: 0.4 % (ref 0–1.5)
BILIRUB SERPL-MCNC: 0.9 MG/DL (ref 0–1.2)
BUN SERPL-MCNC: 32 MG/DL (ref 8–23)
BUN/CREAT SERPL: 27.1 (ref 7–25)
CALCIUM SPEC-SCNC: 8.5 MG/DL (ref 8.6–10.5)
CHLORIDE SERPL-SCNC: 98 MMOL/L (ref 98–107)
CO2 SERPL-SCNC: 27.3 MMOL/L (ref 22–29)
CREAT SERPL-MCNC: 1.18 MG/DL (ref 0.57–1)
DEPRECATED RDW RBC AUTO: 66.5 FL (ref 37–54)
EGFRCR SERPLBLD CKD-EPI 2021: 49.2 ML/MIN/1.73
EOSINOPHIL # BLD AUTO: 0.01 10*3/MM3 (ref 0–0.4)
EOSINOPHIL NFR BLD AUTO: 0.1 % (ref 0.3–6.2)
ERYTHROCYTE [DISTWIDTH] IN BLOOD BY AUTOMATED COUNT: 17.8 % (ref 12.3–15.4)
GLOBULIN UR ELPH-MCNC: 3.2 GM/DL
GLUCOSE BLDC GLUCOMTR-MCNC: 124 MG/DL (ref 70–99)
GLUCOSE BLDC GLUCOMTR-MCNC: 125 MG/DL (ref 70–99)
GLUCOSE BLDC GLUCOMTR-MCNC: 149 MG/DL (ref 70–99)
GLUCOSE BLDC GLUCOMTR-MCNC: 174 MG/DL (ref 70–99)
GLUCOSE SERPL-MCNC: 213 MG/DL (ref 65–99)
HCT VFR BLD AUTO: 34.9 % (ref 34–46.6)
HGB BLD-MCNC: 10 G/DL (ref 12–15.9)
IMM GRANULOCYTES # BLD AUTO: 0.05 10*3/MM3 (ref 0–0.05)
IMM GRANULOCYTES NFR BLD AUTO: 0.5 % (ref 0–0.5)
LYMPHOCYTES # BLD AUTO: 0.81 10*3/MM3 (ref 0.7–3.1)
LYMPHOCYTES NFR BLD AUTO: 7.5 % (ref 19.6–45.3)
MAGNESIUM SERPL-MCNC: 1.8 MG/DL (ref 1.6–2.4)
MCH RBC QN AUTO: 29.4 PG (ref 26.6–33)
MCHC RBC AUTO-ENTMCNC: 28.7 G/DL (ref 31.5–35.7)
MCV RBC AUTO: 102.6 FL (ref 79–97)
MONOCYTES # BLD AUTO: 0.96 10*3/MM3 (ref 0.1–0.9)
MONOCYTES NFR BLD AUTO: 8.9 % (ref 5–12)
NEUTROPHILS NFR BLD AUTO: 8.94 10*3/MM3 (ref 1.7–7)
NEUTROPHILS NFR BLD AUTO: 82.6 % (ref 42.7–76)
NRBC BLD AUTO-RTO: 0 /100 WBC (ref 0–0.2)
PLATELET # BLD AUTO: 98 10*3/MM3 (ref 140–450)
PMV BLD AUTO: 14.1 FL (ref 6–12)
POTASSIUM SERPL-SCNC: 5.2 MMOL/L (ref 3.5–5.2)
PROCALCITONIN SERPL-MCNC: 0.33 NG/ML (ref 0–0.25)
PROT SERPL-MCNC: 6.4 G/DL (ref 6–8.5)
RBC # BLD AUTO: 3.4 10*6/MM3 (ref 3.77–5.28)
SODIUM SERPL-SCNC: 131 MMOL/L (ref 136–145)
WBC NRBC COR # BLD AUTO: 10.81 10*3/MM3 (ref 3.4–10.8)

## 2025-03-19 PROCEDURE — 94640 AIRWAY INHALATION TREATMENT: CPT

## 2025-03-19 PROCEDURE — 25010000002 FUROSEMIDE PER 20 MG: Performed by: INTERNAL MEDICINE

## 2025-03-19 PROCEDURE — 25010000002 HYDROMORPHONE 1 MG/ML SOLUTION: Performed by: FAMILY MEDICINE

## 2025-03-19 PROCEDURE — 94799 UNLISTED PULMONARY SVC/PX: CPT

## 2025-03-19 PROCEDURE — 84145 PROCALCITONIN (PCT): CPT | Performed by: INTERNAL MEDICINE

## 2025-03-19 PROCEDURE — 97161 PT EVAL LOW COMPLEX 20 MIN: CPT

## 2025-03-19 PROCEDURE — 82948 REAGENT STRIP/BLOOD GLUCOSE: CPT | Performed by: INTERNAL MEDICINE

## 2025-03-19 PROCEDURE — 25010000002 ONDANSETRON PER 1 MG: Performed by: FAMILY MEDICINE

## 2025-03-19 PROCEDURE — 25010000002 PIPERACILLIN SOD-TAZOBACTAM PER 1 G: Performed by: INTERNAL MEDICINE

## 2025-03-19 PROCEDURE — 97165 OT EVAL LOW COMPLEX 30 MIN: CPT

## 2025-03-19 PROCEDURE — 99232 SBSQ HOSP IP/OBS MODERATE 35: CPT | Performed by: FAMILY MEDICINE

## 2025-03-19 PROCEDURE — 82948 REAGENT STRIP/BLOOD GLUCOSE: CPT

## 2025-03-19 PROCEDURE — 63710000001 INSULIN GLARGINE PER 5 UNITS: Performed by: FAMILY MEDICINE

## 2025-03-19 PROCEDURE — 25010000002 VANCOMYCIN 5 G RECONSTITUTED SOLUTION: Performed by: INTERNAL MEDICINE

## 2025-03-19 PROCEDURE — 85025 COMPLETE CBC W/AUTO DIFF WBC: CPT | Performed by: INTERNAL MEDICINE

## 2025-03-19 PROCEDURE — 25810000003 SODIUM CHLORIDE 0.9 % SOLUTION: Performed by: INTERNAL MEDICINE

## 2025-03-19 PROCEDURE — 80053 COMPREHEN METABOLIC PANEL: CPT | Performed by: INTERNAL MEDICINE

## 2025-03-19 PROCEDURE — 94761 N-INVAS EAR/PLS OXIMETRY MLT: CPT

## 2025-03-19 PROCEDURE — 63710000001 INSULIN LISPRO (HUMAN) PER 5 UNITS: Performed by: INTERNAL MEDICINE

## 2025-03-19 PROCEDURE — 83735 ASSAY OF MAGNESIUM: CPT | Performed by: INTERNAL MEDICINE

## 2025-03-19 RX ORDER — PREGABALIN 75 MG/1
150 CAPSULE ORAL 2 TIMES DAILY
Status: DISCONTINUED | OUTPATIENT
Start: 2025-03-19 | End: 2025-03-24 | Stop reason: HOSPADM

## 2025-03-19 RX ORDER — ARFORMOTEROL TARTRATE 15 UG/2ML
15 SOLUTION RESPIRATORY (INHALATION)
Status: DISCONTINUED | OUTPATIENT
Start: 2025-03-20 | End: 2025-03-24 | Stop reason: HOSPADM

## 2025-03-19 RX ORDER — ATORVASTATIN CALCIUM 20 MG/1
20 TABLET, FILM COATED ORAL DAILY
Status: DISCONTINUED | OUTPATIENT
Start: 2025-03-19 | End: 2025-03-24 | Stop reason: HOSPADM

## 2025-03-19 RX ORDER — IPRATROPIUM BROMIDE AND ALBUTEROL SULFATE 2.5; .5 MG/3ML; MG/3ML
3 SOLUTION RESPIRATORY (INHALATION)
Status: DISCONTINUED | OUTPATIENT
Start: 2025-03-20 | End: 2025-03-20

## 2025-03-19 RX ORDER — NYSTATIN 100000 [USP'U]/G
POWDER TOPICAL
Status: DISCONTINUED | OUTPATIENT
Start: 2025-03-19 | End: 2025-03-24 | Stop reason: HOSPADM

## 2025-03-19 RX ORDER — BUDESONIDE 0.5 MG/2ML
0.5 INHALANT ORAL
Status: DISCONTINUED | OUTPATIENT
Start: 2025-03-20 | End: 2025-03-24 | Stop reason: HOSPADM

## 2025-03-19 RX ORDER — ONDANSETRON 2 MG/ML
4 INJECTION INTRAMUSCULAR; INTRAVENOUS ONCE
Status: COMPLETED | OUTPATIENT
Start: 2025-03-19 | End: 2025-03-19

## 2025-03-19 RX ORDER — ASPIRIN 81 MG/1
81 TABLET ORAL DAILY
Status: DISCONTINUED | OUTPATIENT
Start: 2025-03-19 | End: 2025-03-24 | Stop reason: HOSPADM

## 2025-03-19 RX ORDER — AMMONIUM LACTATE 12 G/100G
LOTION TOPICAL
Status: DISCONTINUED | OUTPATIENT
Start: 2025-03-20 | End: 2025-03-24 | Stop reason: HOSPADM

## 2025-03-19 RX ADMIN — NYSTATIN: 100000 POWDER TOPICAL at 09:33

## 2025-03-19 RX ADMIN — HYDROCODONE BITARTRATE AND ACETAMINOPHEN 1 TABLET: 10; 325 TABLET ORAL at 09:32

## 2025-03-19 RX ADMIN — Medication 10 ML: at 09:34

## 2025-03-19 RX ADMIN — INSULIN GLARGINE 15 UNITS: 100 INJECTION, SOLUTION SUBCUTANEOUS at 09:33

## 2025-03-19 RX ADMIN — PREGABALIN 150 MG: 75 CAPSULE ORAL at 20:35

## 2025-03-19 RX ADMIN — RIVAROXABAN 15 MG: 15 TABLET, FILM COATED ORAL at 09:33

## 2025-03-19 RX ADMIN — HYDROCODONE BITARTRATE AND ACETAMINOPHEN 1 TABLET: 10; 325 TABLET ORAL at 01:02

## 2025-03-19 RX ADMIN — HYDROCODONE BITARTRATE AND ACETAMINOPHEN 1 TABLET: 10; 325 TABLET ORAL at 14:17

## 2025-03-19 RX ADMIN — NYSTATIN: 100000 POWDER TOPICAL at 21:48

## 2025-03-19 RX ADMIN — IPRATROPIUM BROMIDE AND ALBUTEROL SULFATE 3 ML: .5; 3 SOLUTION RESPIRATORY (INHALATION) at 19:57

## 2025-03-19 RX ADMIN — INSULIN LISPRO 2 UNITS: 100 INJECTION, SOLUTION INTRAVENOUS; SUBCUTANEOUS at 09:33

## 2025-03-19 RX ADMIN — ASPIRIN 81 MG: 81 TABLET, COATED ORAL at 09:33

## 2025-03-19 RX ADMIN — ATORVASTATIN CALCIUM 20 MG: 20 TABLET, FILM COATED ORAL at 09:33

## 2025-03-19 RX ADMIN — FUROSEMIDE 40 MG: 10 INJECTION, SOLUTION INTRAMUSCULAR; INTRAVENOUS at 18:12

## 2025-03-19 RX ADMIN — FUROSEMIDE 40 MG: 10 INJECTION, SOLUTION INTRAMUSCULAR; INTRAVENOUS at 09:33

## 2025-03-19 RX ADMIN — PIPERACILLIN AND TAZOBACTAM 4.5 G: 4; .5 INJECTION, POWDER, FOR SOLUTION INTRAVENOUS; PARENTERAL at 18:32

## 2025-03-19 RX ADMIN — METOPROLOL SUCCINATE 50 MG: 50 TABLET, FILM COATED, EXTENDED RELEASE ORAL at 20:35

## 2025-03-19 RX ADMIN — PIPERACILLIN AND TAZOBACTAM 4.5 G: 4; .5 INJECTION, POWDER, FOR SOLUTION INTRAVENOUS; PARENTERAL at 01:05

## 2025-03-19 RX ADMIN — ARFORMOTEROL TARTRATE 15 MCG: 15 SOLUTION RESPIRATORY (INHALATION) at 19:56

## 2025-03-19 RX ADMIN — BUDESONIDE 0.5 MG: 0.5 INHALANT RESPIRATORY (INHALATION) at 19:57

## 2025-03-19 RX ADMIN — Medication 10 ML: at 20:35

## 2025-03-19 RX ADMIN — HYDROMORPHONE HYDROCHLORIDE 0.5 MG: 1 INJECTION, SOLUTION INTRAMUSCULAR; INTRAVENOUS; SUBCUTANEOUS at 00:57

## 2025-03-19 RX ADMIN — METOPROLOL SUCCINATE 50 MG: 50 TABLET, FILM COATED, EXTENDED RELEASE ORAL at 09:33

## 2025-03-19 RX ADMIN — PREGABALIN 150 MG: 75 CAPSULE ORAL at 09:33

## 2025-03-19 RX ADMIN — PIPERACILLIN AND TAZOBACTAM 4.5 G: 4; .5 INJECTION, POWDER, FOR SOLUTION INTRAVENOUS; PARENTERAL at 09:34

## 2025-03-19 RX ADMIN — ONDANSETRON 4 MG: 2 INJECTION INTRAMUSCULAR; INTRAVENOUS at 04:18

## 2025-03-19 RX ADMIN — HYDROCODONE BITARTRATE AND ACETAMINOPHEN 1 TABLET: 10; 325 TABLET ORAL at 20:35

## 2025-03-19 RX ADMIN — VANCOMYCIN HYDROCHLORIDE 1250 MG: 5 INJECTION, POWDER, LYOPHILIZED, FOR SOLUTION INTRAVENOUS at 12:17

## 2025-03-19 NOTE — PROGRESS NOTES
[facility]   Hospitalist Progress Note  Date: 3/19/2025  Patient Name: Mary Albarran  : 1950  MRN: [id number]  Date of admission: 3/18/2025      Subjective   Subjective     Chief complaint: Right leg redness    Summary:  74-year-old female with history of morbid obesity, moderate aortic stenosis, CKD stage IIIa, diabetes mellitus, COPD with home oxygen, hypertension, dyslipidemia, hospitalized on 3/18/2025 with chief complaint of right leg swelling and redness and cellulitic changes, with recent hospitalization for strep bacteremia and gram-negative bacteremia, hospitalized with worsening right lower extremity cellulitis over the past 36 hours, placed on broad-spectrum antibiotic, concern for A-fib with RVR, Cardizem ordered, rate control achieved, Cardizem discontinued.    Interval follow-up: Seen and examined, no distress, no events overnight, breathing on 4 L nasal cannula with signs of volume overload, on Lasix, diuresing, indicates she has had no interruptions in her Xarelto that she recalls, no chest pain or palpitations.  Right leg still edematous and erythematous.  Weeping.  Sodium 131, creatinine 1.18, potassium 5.2, white blood cell count 10,000, hemoglobin 10.0, procalcitonin 0.30.  Imaging shows no gas under her lower extremity.    Review of systems:  All systems reviewed and negative except weakness, fatigue, shortness of breath, right lower extremity pain    Objective   Objective     Vitals:   Temp:  [97.3 °F (36.3 °C)-97.9 °F (36.6 °C)] 97.3 °F (36.3 °C)  Heart Rate:  [] 91  Resp:  [17-22] 18  BP: ()/(51-74) 116/68  Flow (L/min) (Oxygen Therapy):  [2-4] 4  Physical Exam               Constitutional: Ill-appearing nontoxic              Eyes: Pupils equal, sclerae anicteric, no conjunctival injection              HENT: NCAT, mucous membranes moist              Neck: Supple, no thyromegaly, no lymphadenopathy, trachea midline              Respiratory: Clear to auscultation  bilaterally, nonlabored respirations               Cardiovascular: RRR, 2/6 systolic ejection murmur rubs, or gallops, palpable pedal pulses bilaterally              Gastrointestinal: Positive bowel sounds, soft, nontender, nondistended              Musculoskeletal: No bilateral ankle edema, no clubbing or cyanosis to extremities              Psychiatric: Appropriate affect, cooperative              Neurologic: Oriented x 3, strength symmetric in all extremities, Cranial Nerves grossly intact to confrontation, speech clear              Skin: Right leg shows significant edema with blistering covered with dressing.  There is some lesion on the right fifth metatarsal head it is draining.  Area is erythematous and warm to touch.        Result Review    Result Review:  I have personally reviewed the pertinent results from the past 24 hours to 3/19/2025 15:09 EDT and agree with these findings:  [x]  Laboratory   CBC          3/7/2025    12:45 3/18/2025    07:51 3/19/2025    05:24   CBC   WBC 6.70  13.08  10.81    RBC 3.88  3.83  3.40    Hemoglobin 11.5  11.6  10.0    Hematocrit 37.7  39.2  34.9    MCV 97.2  102.3  102.6    MCH 29.6  30.3  29.4    MCHC 30.5  29.6  28.7    RDW 14.4  17.7  17.8    Platelets 86  103  98      BMP          3/7/2025    12:45 3/18/2025    07:51 3/18/2025    14:37 3/19/2025    05:24   BMP   BUN 19  27  26  32    Creatinine 1.17  1.24  1.00  1.18    Sodium 139  137  137  131    Potassium 5.2  6.4  5.1  5.2    Chloride 103  101  101  98    CO2 22.8  26.6  25.6  27.3    Calcium 8.9  8.9  9.2  8.5      LIVER FUNCTION TESTS:      Lab 03/19/25  0524 03/18/25  0751   TOTAL PROTEIN 6.4 7.1   ALBUMIN 3.2* 3.5   GLOBULIN 3.2 3.6   ALT (SGPT) 16 20   AST (SGOT) 17 24   BILIRUBIN 0.9 1.2   ALK PHOS 62 85       [x]  Microbiology   Microbiology Results (last 10 days)       Procedure Component Value - Date/Time    COVID-19, FLU A/B, RSV PCR 1 HR TAT - Swab, Nasopharynx [[id number]]  (Normal) Collected: 03/18/25  1130    Lab Status: Final result Specimen: Swab from Nasopharynx Updated: 03/18/25 1214     COVID19 Not Detected     Influenza A PCR Not Detected     Influenza B PCR Not Detected     RSV, PCR Not Detected    Narrative:      Fact sheet for providers: https://www.fda.gov/media/913613/download    Fact sheet for patients: https://www.fda.gov/media/713575/download    Test performed by PCR.    Blood Culture - Blood, Arm, Left [[id number]]  (Normal) Collected: 03/18/25 1130    Lab Status: Preliminary result Specimen: Blood from Arm, Left Updated: 03/19/25 1145     Blood Culture No growth at 24 hours    Narrative:      Less than seven (7) mL's of blood was collected.  Insufficient quantity may yield false negative results.    Blood Culture - Blood, Arm, Right [[id number]]  (Normal) Collected: 03/18/25 1130    Lab Status: Preliminary result Specimen: Blood from Arm, Right Updated: 03/19/25 1145     Blood Culture No growth at 24 hours    Narrative:      Less than seven (7) mL's of blood was collected.  Insufficient quantity may yield false negative results.    Influenza Antigen, Rapid - , [[id number]] Collected: 03/10/25 1602    Lab Status: Final result Updated: 03/10/25 1850    COVID-19, ABBOTT IN-HOUSE,NASAL Swab (NO TRANSPORT MEDIA) 2 HR TAT - , [[id number]] Collected: 03/10/25 1602    Lab Status: Final result Updated: 03/10/25 1850              [x]  Radiology XR Foot 2 View Left  Result Date: 3/18/2025  Impression: No specific finding of osteomyelitis. Soft tissue swelling in the visualized extremity could represent cellulitis, dependent edema and/or posttraumatic change. Electronically Signed: Thom Vega MD  3/18/2025 2:07 PM EDT  Workstation ID: [id number]    XR Foot 2 View Right  Result Date: 3/18/2025  Impression: Soft tissue swelling throughout the visualized extremity could represent dependent edema and/or cellulitis. No specific radiographic finding of osteomyelitis. Electronically Signed: Thom Vega MD  3/18/2025  2:05 PM EDT  Workstation ID: [id number]    XR Chest 1 View  Result Date: 3/18/2025  Impression: Suboptimal portable radiograph. There is possibly bibasilar airspace disease that could be related to atelectasis and/or pneumonia. Cardiomegaly. Electronically Signed: Ishaan Martinez MD  3/18/2025 8:13 AM EDT  Workstation ID: [id number]        []  EKG/Telemetry   ECG 12 Lead ED Triage Standing Order; SOA   Final Result   HEART JKBQ=097  bpm   RR Apbiubme=614  ms   CT Interval=  ms   P Horizontal Axis=  deg   P Front Axis=  deg   QRSD Interval=76  ms   QT Fuwkrwum=059  ms   BIlO=026  ms   QRS Axis=116  deg   T Wave Axis=88  deg   - ABNORMAL ECG -   Atrial fibrillation   Right axis deviation   Low voltage, precordial leads   Prolonged QT interval   When compared with ECG of 11-Feb-2025 03:39:33,   Significant rate increase   Significant repolarization change   Electronically Signed By: Joaquin Gonzalez ([facility]) 2025-03-18 14:47:50   Date and Time of Study:2025-03-18 07:43:35          []  Cardiology/Vascular   []  Pathology  [x]  Old records  []  Other:    Assessment & Plan   Assessment / Plan     Assessment/Plan:  Assessment:  Sepsis due to right lower leg and foot cellulitis (leukocytosis and tachycardia) without endorgan damage  A-fib with RVR  Acute heart failure exacerbation with reduced EF of 46 to 50% RVSP of 45 to 55 mm per mercury  Life-threatening hyperkalemia  Recent streptococcal bacteremia  Recent gram-negative bacteremia  Obesity BMI 46  On home oxygen     Plan:  Labs and imaging reviewed  Continue Lasix 40 mg IV twice daily  Strict I's and O's  Daily weights  Continue Xarelto  Continue Zosyn and vancomycin  Continue Toprol  Continue Lantus 15 units daily with insulin sliding scale coverage  Continue telemetry monitor  Continue Lipitor, aspirin  Wound care consult  Notify MD if heart rate becomes uncontrolled  Continue pain control  A.m. labs  Full code  DVT prophylaxis Xarelto  Clinical course dictate further  management  Discussed with nurse at the bedside      VTE Prophylaxis:  Pharmacologic VTE prophylaxis orders are present.        CODE STATUS:   Code Status (Patient has no pulse and is not breathing): CPR (Attempt to Resuscitate)  Medical Interventions (Patient has pulse or is breathing): Full Support  Level Of Support Discussed With: Patient        Electronically signed by Melody Luna MD, 3/19/2025, 15:09 EDT.    Portions of this documentation were transcribed electronically from a voice recognition software.  I confirm all data accurately represents the service(s) I performed at today's visit.

## 2025-03-19 NOTE — CONSULTS
Consult placed per Teach Blood Glucose Monitoring. Met with patient and granddaughter at bedside. Discussed current A1c of 9% with an estimated average glucose of 212 mg/dl. Educated on goal A1c of less than 7%.     Educated patient on American Diabetes Association guidelines for how to build a plate, focusing on protein and non-starchy vegetables, how to count carbohydrates, and limiting meals to 60 grams of carbohydrates. I've added Diabetes and Nutrition handout to her AVS. Explained that patient can continue to eat fruit, but to keep it to a serving size per meal. Patient is receptive to education.     Patient sees her PCP for her diabetes needs and is not interested in seeing a specialist at this time. No further needs or questions at this time. Will continue to support and assist as needed.

## 2025-03-19 NOTE — THERAPY EVALUATION
Patient Name: Mary Albarran  : 1950    MRN: [id number]                              Today's Date: 3/19/2025       Admit Date: 3/18/2025    Visit Dx:     ICD-10-CM ICD-9-CM   1. Atrial fibrillation with rapid ventricular response  I48.91 427.31   2. Acute respiratory failure with hypoxia  J96.01 518.81   3. Cellulitis of lower extremity, unspecified laterality  L03.119 682.6   4. Hyperkalemia  E87.5 276.7   5. Chronic kidney disease, unspecified CKD stage  N18.9 585.9   6. Decreased activities of daily living (ADL)  Z78.9 V49.89   7. Difficulty walking  R26.2 719.7     Patient Active Problem List   Diagnosis    Aortic stenosis, moderate    Atrial fibrillation    Chronic kidney disease, stage III (moderate)    Hyperlipidemia    Hypertension    Chronic diastolic congestive heart failure    Atrial fibrillation with RVR    Onychomycosis    Onychocryptosis    Foot pain, bilateral    Nephrolithiasis    Recurrent urinary tract infection    Acute on chronic HFrEF (heart failure with reduced ejection fraction)    Sepsis     Past Medical History:   Diagnosis Date    Allergic rhinitis     Anxiety     Aortic valve disease 2021    Fibrocalcific changes of the aortic valve with mild aortic valve stenosis   on echo 3/2/2021  Moderate aortic valve regurgitation is present. Aortic valve maximum pressure gradient is 26 mmHg. Moderate aortic valve stenosis is present.  On echo 2/10/2022       Arthritis     Atrial fibrillation     CHF (congestive heart failure)     Chronic kidney disease (CKD), stage III (moderate)     Chronic pain     COPD (chronic obstructive pulmonary disease)     Coronary artery disease     Diabetes mellitus type 2 with complications     Elevated cholesterol     Ex-smoker     QUIT SMOKING     GERD (gastroesophageal reflux disease)     History of home oxygen therapy     2L/NC AAT REPORTED    History of transfusion     Hyperlipidemia     Hypertension     Thrombocytopenia      Past Surgical History:    Procedure Laterality Date    HYSTERECTOMY      30 YEARS AGO      General Information       Row Name 03/19/25 1400          Occupational Profile    Reason for Services/Referral (Occupational Profile) Patient is a 74 year old female admitted to [facility] for R leg redness on March 18th, 2025. Occupational therapy consulted due to recent decline in ADLs/functional transfers. No previous occupational therapy services for current condition.  -       Row Name 03/19/25 1400          Home Main Entrance    Number of Stairs, Main Entrance none  Ramp  -       Row Name 03/19/25 1400          Cognition    Orientation Status (Cognition) oriented x 3  -       Row Name 03/19/25 1400          Safety Issues/Impairments Affecting Functional Mobility    Impairments Affecting Function (Mobility) balance;endurance/activity tolerance  -               User Key  (r) = Recorded By, (t) = Taken By, (c) = Cosigned By      Initials Name Provider Type    Nevaeh Hyde OT Occupational Therapist                     Mobility/ADL's       Row Name 03/19/25 1411          Bed Mobility    Comment, (Bed Mobility) Pt getting back into bed with her daughter's assist on arrival and politely declined any further functional transfers d/t fatigue. Per PT evaluation she required min assist for supine to sit and sit to stand.  -       Row Name 03/19/25 1411          Activities of Daily Living    BADL Assessment/Intervention bathing;upper body dressing;lower body dressing;grooming;feeding;toileting  -       Row Name 03/19/25 1411          Bathing Assessment/Intervention    [place] Level (Bathing) bathing skills;upper body;set up;lower body;moderate assist (50% patient effort)  -       Row Name 03/19/25 1411          Upper Body Dressing Assessment/Training    [place] Level (Upper Body Dressing) upper body dressing skills;set up  -       Row Name 03/19/25 1411          Lower Body Dressing Assessment/Training     [place] Level (Lower Body Dressing) lower body dressing skills;moderate assist (50% patient effort)  -       Row Name 03/19/25 1411          Grooming Assessment/Training    [place] Level (Grooming) grooming skills;set up  -       Row Name 03/19/25 1411          Self-Feeding Assessment/Training    [place] Level (Feeding) feeding skills;set up  -       Row Name 03/19/25 1411          Toileting Assessment/Training    [place] Level (Toileting) toileting skills;minimum assist (75% patient effort)  -               User Key  (r) = Recorded By, (t) = Taken By, (c) = Cosigned By      Initials Name Provider Type     Nevaeh Schmidt OT Occupational Therapist                   Obj/Interventions       Row Name 03/19/25 1412          Sensory Assessment (Somatosensory)    Sensory Assessment (Somatosensory) UE sensation intact  -[facility] Name 03/19/25 1412          Vision Assessment/Intervention    Visual Impairment/Limitations WFL  -[facility] Name 03/19/25 1412          Range of Motion Comprehensive    General Range of Motion bilateral upper extremity ROM WFL  -LF       Row Name 03/19/25 1412          Strength Comprehensive (MMT)    Comment, General Manual Muscle Testing (MMT) Assessment 4/5 BUEs  -[facility] Name 03/19/25 1412          Motor Skills    Motor Skills coordination;functional endurance  -LF     Coordination bilateral;upper extremity;WFL  -LF     Functional Endurance Fair  -[facility] Name 03/19/25 1412          Balance    Comment, Balance Not tested, likely impaired.  -               User Key  (r) = Recorded By, (t) = Taken By, (c) = Cosigned By      Initials Name Provider Type     Nevaeh Schmidt OT Occupational Therapist                   Goals/Plan       Row Name 03/19/25 1414          Bed Mobility Goal 1 (OT)    Activity/Assistive Device (Bed Mobility Goal 1, OT) bed mobility activities, all  -LF     [place] Level/Cues Needed (Bed Mobility Goal 1, OT) modified  independence  -LF     Time Frame (Bed Mobility Goal 1, OT) long term goal (LTG);10 days  -LF       Row Name 03/19/25 1414          Transfer Goal 1 (OT)    Activity/Assistive Device (Transfer Goal 1, OT) transfers, all  -LF     [place] Level/Cues Needed (Transfer Goal 1, OT) modified independence  -LF     Time Frame (Transfer Goal 1, OT) long term goal (LTG);10 days  -LF       Row Name 03/19/25 1414          Bathing Goal 1 (OT)    Activity/Device (Bathing Goal 1, OT) bathing skills, all  -LF     [place] Level/Cues Needed (Bathing Goal 1, OT) modified independence  -LF     Time Frame (Bathing Goal 1, OT) long term goal (LTG);10 days  -LF       Row Name 03/19/25 1414          Dressing Goal 1 (OT)    Activity/Device (Dressing Goal 1, OT) dressing skills, all  -LF     [place]/Cues Needed (Dressing Goal 1, OT) modified independence  -LF     Time Frame (Dressing Goal 1, OT) long term goal (LTG);10 days  -LF       Row Name 03/19/25 1414          Toileting Goal 1 (OT)    Activity/Device (Toileting Goal 1, OT) toileting skills, all  -LF     [place] Level/Cues Needed (Toileting Goal 1, OT) modified independence  -LF     Time Frame (Toileting Goal 1, OT) long term goal (LTG);10 days  -LF       Row Name 03/19/25 1414          Problem Specific Goal 1 (OT)    Problem Specific Goal 1 (OT) Patient will demonstrate good- endurance to support ADLs/functional transfers.  -LF     Time Frame (Problem Specific Goal 1, OT) long term goal (LTG);10 days  -LF       Row Name 03/19/25 1414          Therapy Assessment/Plan (OT)    Planned Therapy Interventions (OT) activity tolerance training;BADL retraining;functional balance retraining;occupation/activity based interventions;patient/caregiver education/training;strengthening exercise;transfer/mobility retraining  -LF               User Key  (r) = Recorded By, (t) = Taken By, (c) = Cosigned By      Initials Name Provider Type    LF Nevaeh Schmidt, OT Occupational Therapist                    Clinical Impression       Row Name 03/19/25 1413          Pain Assessment    Additional Documentation Pain Scale: FACES Pre/Post-Treatment (Group)  -LF       Row Name 03/19/25 1413          Pain Scale: FACES Pre/Post-Treatment    Pain: FACES Scale, Pretreatment 0-->no hurt  -LF     Posttreatment Pain Rating 0-->no hurt  -LF       Row Name 03/19/25 1413          Plan of Care Review    Plan of Care Reviewed With patient;daughter  -     Progress no change  -     Outcome Evaluation Patient presents with limitations in self-care, functional transfers, balance, and endurance. She would benefit from continued skilled occupational therapy services to maximize independence with ADLs/functional transfers.  -       Row Name 03/19/25 1413          Therapy Assessment/Plan (OT)    Patient/Family Therapy Goal Statement (OT) To maximize independence.  -     Rehab Potential (OT) good  -     Criteria for Skilled Therapeutic Interventions Met (OT) yes;meets criteria;skilled treatment is necessary  -     Therapy Frequency (OT) 5 times/wk  -       Row Name 03/19/25 1413          Therapy Plan Review/Discharge Plan (OT)    Anticipated Discharge Disposition (OT) home with home health;home with assist  -       Row Name 03/19/25 1413          Vital Signs    O2 Delivery Pre Treatment nasal cannula  -LF     O2 Delivery Intra Treatment nasal cannula  -LF     O2 Delivery Post Treatment nasal cannula  -LF       Row Name 03/19/25 1413          Positioning and Restraints    Pre-Treatment Position in bed  -LF     Post Treatment Position bed  -LF     In Bed fowlers;call light within reach;encouraged to call for assist;with family/caregiver  no alarm active on arrival  -               User Key  (r) = Recorded By, (t) = Taken By, (c) = Cosigned By      Initials Name Provider Type    Nevaeh Hyde, OT Occupational Therapist                   Outcome Measures       Row Name 03/19/25 1414          How much help  from another is currently needed...    Putting on and taking off regular lower body clothing? 2  -LF     Bathing (including washing, rinsing, and drying) 2  -LF     Toileting (which includes using toilet bed pan or urinal) 3  -LF     Putting on and taking off regular upper body clothing 4  -LF     Taking care of personal grooming (such as brushing teeth) 4  -LF     Eating meals 4  -LF     AM-PAC 6 Clicks Score (OT) 19  -LF       Row Name 03/19/25 1400 03/19/25 0932       How much help from another person do you currently need...    Turning from your back to your side while in flat bed without using bedrails? 4  -DP 4  -PH    Moving from lying on back to sitting on the side of a flat bed without bedrails? 3  -DP 4  -PH    Moving to and from a bed to a chair (including a wheelchair)? 3  -DP 3  -PH    Standing up from a chair using your arms (e.g., wheelchair, bedside chair)? 3  -DP 3  -PH    Climbing 3-5 steps with a railing? 3  -DP 2  -PH    To walk in hospital room? 3  -DP 2  -PH    AM-PAC 6 Clicks Score (PT) 19  -DP 18  -PH    Highest Level of Mobility Goal 6 --> Walk 10 steps or more  -DP 6 --> Walk 10 steps or more  -PH      Row Name 03/19/25 1414 03/19/25 1400       Functional Assessment    Outcome Measure Options AM-PAC 6 Clicks Daily Activity (OT);Optimal Instrument  -LF AM-PAC 6 Clicks Basic Mobility (PT)  -DP      Row Name 03/19/25 1414          Optimal Instrument    Optimal Instrument Optimal - 3  -LF     Bending/Stooping 3  -LF     Standing 2  -LF     Reaching 1  -LF     From the list, choose the 3 activities you would most like to be able to do without any difficulty Bending/stooping;Standing;Reaching  -LF     Total Score Optimal - 3 6  -LF               User Key  (r) = Recorded By, (t) = Taken By, (c) = Cosigned By      Initials Name Provider Type    LF Nevaeh Schmidt, OT Occupational Therapist    DP Matt Dowd, PT Physical Therapist    PH Patel Dumont, RN Registered Nurse                     Occupational Therapy Education       Title: PT OT SLP Therapies (Done)       Topic: Occupational Therapy (Done)       Point: ADL training (Done)       Learning Progress Summary            Patient Acceptance, E,TB, VU by  at 3/19/2025 1415                      Point: Precautions (Done)       Learning Progress Summary            Patient Acceptance, E,TB, VU by  at 3/19/2025 1415                      Point: Body mechanics (Done)       Learning Progress Summary            Patient Acceptance, E,TB, VU by  at 3/19/2025 1415                                      User Key       Initials Effective Dates Name Provider Type Discipline     06/16/21 -  Nevaeh Schmidt OT Occupational Therapist OT                  OT Recommendation and Plan  Planned Therapy Interventions (OT): activity tolerance training, BADL retraining, functional balance retraining, occupation/activity based interventions, patient/caregiver education/training, strengthening exercise, transfer/mobility retraining  Therapy Frequency (OT): 5 times/wk  Plan of Care Review  Plan of Care Reviewed With: patient, daughter  Progress: no change  Outcome Evaluation: Patient presents with limitations in self-care, functional transfers, balance, and endurance. She would benefit from continued skilled occupational therapy services to maximize independence with ADLs/functional transfers.     Time Calculation:   Evaluation Complexity (OT)  Review Occupational Profile/Medical/Therapy History Complexity: brief/low complexity  Assessment, Occupational Performance/Identification of Deficit Complexity: 3-5 performance deficits  Clinical Decision Making Complexity (OT): problem focused assessment/low complexity  Overall Complexity of Evaluation (OT): low complexity     Time Calculation- OT       Row Name 03/19/25 1415             Time Calculation- OT    OT Received On 03/19/25  -      OT Goal Re-Cert Due Date 03/28/25  -         Untimed Charges    OT Eval/Re-eval  Minutes 31  -LF         Total Minutes    Untimed Charges Total Minutes 31  -LF       Total Minutes 31  -LF                User Key  (r) = Recorded By, (t) = Taken By, (c) = Cosigned By      Initials Name Provider Type     Nevaeh Schmidt OT Occupational Therapist                  Therapy Charges for Today       Code Description Service Date Service Provider Modifiers Qty    30852760830  OT EVAL LOW COMPLEXITY 3 3/19/2025 Nevaeh Schmidt OT GO 1                 Nevaeh Schmidt OT  3/19/2025

## 2025-03-19 NOTE — CONSULTS
03/19/25 1550   Coping/Psychosocial   Additional Documentation Spiritual Care (Group)   Spiritual Care   Spiritual Care Visit Type initial   Receptivity to Spiritual Care busy, visit another time

## 2025-03-19 NOTE — PLAN OF CARE
Goal Outcome Evaluation:  Plan of Care Reviewed With: patient, daughter        Progress: no change  Outcome Evaluation: Patient presents with limitations in self-care, functional transfers, balance, and endurance. She would benefit from continued skilled occupational therapy services to maximize independence with ADLs/functional transfers.    Anticipated Discharge Disposition (OT): home with home health, home with assist

## 2025-03-19 NOTE — PROGRESS NOTES
"[facility] Clinical Pharmacy Services: Vancomycin Monitoring Note    Mary Albarran is a 74 y.o. female who is on day 2 of pharmacy to dose vancomycin for Bacteremia and Skin and Soft Tissue.    Previous Vancomycin Dose:   2250 mg IV x1 on 3/18 @1239  Imaging Reviewed?: Yes - no specific findings of osteomyelitis, could represent cellulitis per foot XRs  Updated Cultures and Sensitivities:   3/18 blood pending    Vitals/Labs  Ht: 160 cm (63\"); Wt: 126 kg (278 lb)   Temp (24hrs), Av.6 °F (36.4 °C), Min:97.5 °F (36.4 °C), Max:97.9 °F (36.6 °C)   Estimated Creatinine Clearance: 55.7 mL/min (A) (by C-G formula based on SCr of 1.18 mg/dL (H)).       Results from last 7 days   Lab Units 25  0524 25  1437 25  0751   CREATININE mg/dL 1.18* 1.00 1.24*   WBC 10*3/mm3 10.81*  --  13.08*     Assessment/Plan    Current Vancomycin Dose:  1250 mg IV every 24 hours; which provides the following predicted parameters:  AUC24,ss: 579 mg/L.hr  Probability of AUC24 > 400: 78 %  Ctrough,ss: 16.1 mg/L  Probability of Ctrough,ss > 20: 39 %  Probability of nephrotoxicity (Lodise ERIC ): 11 %  Next Vanc Random ordered for 3/20 AM  We will continue to monitor patient changes and renal function     Thank you for involving pharmacy in this patient's care. Please contact pharmacy with any questions or concerns.    Steven Guillen [facility]  Clinical Pharmacist    "

## 2025-03-19 NOTE — THERAPY EVALUATION
Acute Care - Physical Therapy Initial Evaluation   [facility]     Patient Name: Mary Albarran  : 1950  MRN: [id number]  Today's Date: 3/19/2025      Visit Dx:     ICD-10-CM ICD-9-CM   1. Atrial fibrillation with rapid ventricular response  I48.91 427.31   2. Acute respiratory failure with hypoxia  J96.01 518.81   3. Cellulitis of lower extremity, unspecified laterality  L03.119 682.6   4. Hyperkalemia  E87.5 276.7   5. Chronic kidney disease, unspecified CKD stage  N18.9 585.9   6. Decreased activities of daily living (ADL)  Z78.9 V49.89   7. Difficulty walking  R26.2 719.7     Patient Active Problem List   Diagnosis    Aortic stenosis, moderate    Atrial fibrillation    Chronic kidney disease, stage III (moderate)    Hyperlipidemia    Hypertension    Chronic diastolic congestive heart failure    Atrial fibrillation with RVR    Onychomycosis    Onychocryptosis    Foot pain, bilateral    Nephrolithiasis    Recurrent urinary tract infection    Acute on chronic HFrEF (heart failure with reduced ejection fraction)    Sepsis     Past Medical History:   Diagnosis Date    Allergic rhinitis     Anxiety     Aortic valve disease 2021    Fibrocalcific changes of the aortic valve with mild aortic valve stenosis   on echo 3/2/2021  Moderate aortic valve regurgitation is present. Aortic valve maximum pressure gradient is 26 mmHg. Moderate aortic valve stenosis is present.  On echo 2/10/2022       Arthritis     Atrial fibrillation     CHF (congestive heart failure)     Chronic kidney disease (CKD), stage III (moderate)     Chronic pain     COPD (chronic obstructive pulmonary disease)     Coronary artery disease     Diabetes mellitus type 2 with complications     Elevated cholesterol     Ex-smoker     QUIT SMOKING     GERD (gastroesophageal reflux disease)     History of home oxygen therapy     2L/NC AAT REPORTED    History of transfusion     Hyperlipidemia     Hypertension     Thrombocytopenia      Past Surgical  History:   Procedure Laterality Date    HYSTERECTOMY      30 YEARS AGO     PT Assessment (Last 12 Hours)       PT Evaluation and Treatment       Row Name 03/19/25 1400          Physical Therapy Time and Intention    Subjective Information no complaints  -DP     Document Type evaluation  -DP     Mode of Treatment individual therapy;physical therapy  -DP     Patient Effort good  -DP       Row Name 03/19/25 1400          General Information    Patient Profile Reviewed yes  -DP     Patient Observations alert;cooperative  -DP     Prior Level of Function independent:;gait;transfer;bed mobility;ADL's  -DP     Equipment Currently Used at Home walker, rolling  -DP     Existing Precautions/Restrictions fall  -DP     Barriers to Rehab none identified  -DP       Row Name 03/19/25 1400          Living Environment    Current Living Arrangements home  -DP     Home Accessibility stairs to enter home  -DP     People in Home spouse  -DP       Row Name 03/19/25 1400          Range of Motion (ROM)    Range of Motion bilateral lower extremities;ROM is WFL  -DP       Row Name 03/19/25 1400          Strength Comprehensive (MMT)    General Manual Muscle Testing (MMT) Assessment lower extremity strength deficits identified  -DP     Comment, General Manual Muscle Testing (MMT) Assessment BLE: 4-/5  -DP       Row Name 03/19/25 1400          Bed Mobility    Bed Mobility supine-sit-supine  -DP     Supine-Sit-Supine [place] (Bed Mobility) minimum assist (75% patient effort)  -DP       Row Name 03/19/25 1400          Transfers    Transfers sit-stand transfer  -DP       Row Name 03/19/25 1400          Sit-Stand Transfer    Sit-Stand [place] (Transfers) minimum assist (75% patient effort)  -DP       Row Name 03/19/25 1400          Gait/Stairs (Locomotion)    Gait/Stairs Locomotion gait/ambulation assistive device  -DP     [place] Level (Gait) contact guard  -DP     Assistive Device (Gait) walker, front-wheeled  -DP     Patient was  able to Ambulate yes  -DP     Distance in Feet (Gait) 12  -DP       Row Name 03/19/25 1400          Balance    Balance Assessment standing dynamic balance  -DP     Dynamic Standing Balance minimal assist  -DP       Row Name             Wound 03/18/25 1812 Bilateral anterior groin    Wound - Properties Group Placement Date: 03/18/25  - Placement Time: 1812 -AH Present on Original Admission: Y  -AH Side: Bilateral  -AH Orientation: anterior  -AH Location: groin  -AH    Retired Wound - Properties Group Placement Date: 03/18/25  - Placement Time: 1812 -AH Present on Original Admission: Y  -AH Side: Bilateral  -AH Orientation: anterior  -AH Location: groin  -AH    Retired Wound - Properties Group Placement Date: 03/18/25  - Placement Time: 1812 -AH Present on Original Admission: Y  -AH Side: Bilateral  -AH Orientation: anterior  -AH Location: groin  -AH    Retired Wound - Properties Group Date first assessed: 03/18/25  - Time first assessed: 1812 -AH Present on Original Admission: Y  -AH Side: Bilateral  -AH Location: groin  -AH      Row Name             Wound 03/18/25 1813 Bilateral lower leg    Wound - Properties Group Placement Date: 03/18/25  - Placement Time: 1813 -AH Present on Original Admission: Y  -AH Side: Bilateral  -AH Orientation: lower  -AH Location: leg  -AH    Retired Wound - Properties Group Placement Date: 03/18/25  - Placement Time: 1813 -AH Present on Original Admission: Y  -AH Side: Bilateral  -AH Orientation: lower  -AH Location: leg  -AH    Retired Wound - Properties Group Placement Date: 03/18/25  - Placement Time: 1813 -AH Present on Original Admission: Y  -AH Side: Bilateral  -AH Orientation: lower  -AH Location: leg  -AH    Retired Wound - Properties Group Date first assessed: 03/18/25  - Time first assessed: 1813 -AH Present on Original Admission: Y  -AH Side: Bilateral  -AH Location: leg  -AH      Row Name             Wound 02/11/25 1338 Right posterior foot  unspecified    Wound - Properties Group Placement Date: 02/11/25  -KE Placement Time: 1338  -KE Side: Right  -KE Orientation: posterior  -KE Location: foot  -KE Type: unspecified  -KE    Retired Wound - Properties Group Placement Date: 02/11/25  -KE Placement Time: 1338  -KE Side: Right  -KE Orientation: posterior  -KE Location: foot  -KE Type: unspecified  -KE    Retired Wound - Properties Group Placement Date: 02/11/25  -KE Placement Time: 1338  -KE Side: Right  -KE Orientation: posterior  -KE Location: foot  -KE Type: unspecified  -KE    Retired Wound - Properties Group Date first assessed: 02/11/25  -KE Time first assessed: 1338  -KE Side: Right  -KE Location: foot  -KE Type: unspecified  -KE      Row Name 03/19/25 1400          Plan of Care Review    Plan of Care Reviewed With patient  -DP     Outcome Evaluation Pt presents with decreased strength, transfers and functional mobility. Will benefit from inpatient PT services and continued PT services upon discharge.  -DP       Row Name 03/19/25 1400          Therapy Assessment/Plan (PT)    Criteria for Skilled Interventions Met (PT) yes;meets criteria  -DP     Therapy Frequency (PT) daily  -DP     Predicted Duration of Therapy Intervention (PT) 10 days  -DP     Problem List (PT) problems related to;mobility  -DP     Activity Limitations Related to Problem List (PT) unable to transfer safely;unable to ambulate safely  -DP       Row Name 03/19/25 1400          PT Evaluation Complexity    History, PT Evaluation Complexity no personal factors and/or comorbidities  -DP     Examination of Body Systems (PT Eval Complexity) total of 4 or more elements  -DP     Clinical Presentation (PT Evaluation Complexity) stable  -DP     Clinical Decision Making (PT Evaluation Complexity) low complexity  -DP     Overall Complexity (PT Evaluation Complexity) low complexity  -DP       Row Name 03/19/25 1400          Physical Therapy Goals    Transfer Goal Selection (PT) transfer, PT goal  1  -DP     Gait Training Goal Selection (PT) gait training, PT goal 1  -DP       Row Name 03/19/25 1400          Transfer Goal 1 (PT)    Activity/Assistive Device (Transfer Goal 1, PT) sit-to-stand/stand-to-sit  -DP     [place] Level/Cues Needed (Transfer Goal 1, PT) supervision required  -DP     Time Frame (Transfer Goal 1, PT) 10 days  -DP       Row Name 03/19/25 1400          Gait Training Goal 1 (PT)    Activity/Assistive Device (Gait Training Goal 1, PT) assistive device use;walker, rolling  -DP     [place] Level (Gait Training Goal 1, PT) supervision required  -DP     Distance (Gait Training Goal 1, PT) 200  -DP     Time Frame (Gait Training Goal 1, PT) 10 days  -DP               User Key  (r) = Recorded By, (t) = Taken By, (c) = Cosigned By      Initials Name Provider Type    Corinna Lazaro, RN Registered Nurse    Christie Isaac RN Registered Nurse    Matt Pina, PT Physical Therapist                      PT Recommendation and Plan  Anticipated Discharge Disposition (PT): skilled nursing facility  Planned Therapy Interventions (PT): balance training, bed mobility training, gait training, strengthening, transfer training  Therapy Frequency (PT): daily  Plan of Care Reviewed With: patient  Outcome Evaluation: Pt presents with decreased strength, transfers and functional mobility. Will benefit from inpatient PT services and continued PT services upon discharge.   Outcome Measures       Row Name 03/19/25 1400             How much help from another person do you currently need...    Turning from your back to your side while in flat bed without using bedrails? 4  -DP      Moving from lying on back to sitting on the side of a flat bed without bedrails? 3  -DP      Moving to and from a bed to a chair (including a wheelchair)? 3  -DP      Standing up from a chair using your arms (e.g., wheelchair, bedside chair)? 3  -DP      Climbing 3-5 steps with a railing? 3  -DP      To walk in hospital  room? 3  -DP      AM-PAC 6 Clicks Score (PT) 19  -DP         Functional Assessment    Outcome Measure Options AM-PAC 6 Clicks Basic Mobility (PT)  -DP                User Key  (r) = Recorded By, (t) = Taken By, (c) = Cosigned By      Initials Name Provider Type    Matt Pina, PT Physical Therapist                     Time Calculation:    PT Charges       Row Name 03/19/25 1404             Time Calculation    PT Received On 03/19/25  -DP      PT Goal Re-Cert Due Date 03/28/25  -DP         Untimed Charges    PT Eval/Re-eval Minutes 23  -DP         Total Minutes    Untimed Charges Total Minutes 23  -DP       Total Minutes 23  -DP                User Key  (r) = Recorded By, (t) = Taken By, (c) = Cosigned By      Initials Name Provider Type    Matt Pina, PT Physical Therapist                      PT G-Codes  Outcome Measure Options: AM-PAC 6 Clicks Basic Mobility (PT)  AM-PAC 6 Clicks Score (PT): 19    Matt Dowd, PT  3/19/2025

## 2025-03-19 NOTE — OUTREACH NOTE
Medical Week 3 Survey      Flowsheet Row Responses   [facility] patient discharged from? [facility]   Does the patient have one of the following disease processes/diagnoses(primary or secondary)? Other   Week 3 attempt successful? No   Unsuccessful attempts Attempt 1   Revoke Readmitted   Revoke Readmitted            KALEIGH CARDOZA - Registered Nurse

## 2025-03-20 ENCOUNTER — APPOINTMENT (OUTPATIENT)
Facility: HOSPITAL | Age: 75
End: 2025-03-20
Payer: MEDICARE

## 2025-03-20 LAB
ALBUMIN SERPL-MCNC: 3.3 G/DL (ref 3.5–5.2)
ALP SERPL-CCNC: 60 U/L (ref 39–117)
ALT SERPL W P-5'-P-CCNC: 18 U/L (ref 1–33)
ANION GAP SERPL CALCULATED.3IONS-SCNC: 11.4 MMOL/L (ref 5–15)
AST SERPL-CCNC: 21 U/L (ref 1–32)
BASOPHILS # BLD AUTO: 0.04 10*3/MM3 (ref 0–0.2)
BASOPHILS NFR BLD AUTO: 0.6 % (ref 0–1.5)
BILIRUB CONJ SERPL-MCNC: 0.4 MG/DL (ref 0–0.3)
BILIRUB INDIRECT SERPL-MCNC: 0.4 MG/DL
BILIRUB SERPL-MCNC: 0.8 MG/DL (ref 0–1.2)
BUN SERPL-MCNC: 33 MG/DL (ref 8–23)
BUN/CREAT SERPL: 27.3 (ref 7–25)
CALCIUM SPEC-SCNC: 8.4 MG/DL (ref 8.6–10.5)
CHLORIDE SERPL-SCNC: 99 MMOL/L (ref 98–107)
CO2 SERPL-SCNC: 28.6 MMOL/L (ref 22–29)
CREAT SERPL-MCNC: 1.21 MG/DL (ref 0.57–1)
DEPRECATED RDW RBC AUTO: 64.3 FL (ref 37–54)
EGFRCR SERPLBLD CKD-EPI 2021: 47.7 ML/MIN/1.73
EOSINOPHIL # BLD AUTO: 0.24 10*3/MM3 (ref 0–0.4)
EOSINOPHIL NFR BLD AUTO: 3.5 % (ref 0.3–6.2)
ERYTHROCYTE [DISTWIDTH] IN BLOOD BY AUTOMATED COUNT: 17.6 % (ref 12.3–15.4)
GLUCOSE BLDC GLUCOMTR-MCNC: 147 MG/DL (ref 70–99)
GLUCOSE BLDC GLUCOMTR-MCNC: 152 MG/DL (ref 70–99)
GLUCOSE BLDC GLUCOMTR-MCNC: 174 MG/DL (ref 70–99)
GLUCOSE BLDC GLUCOMTR-MCNC: 92 MG/DL (ref 70–99)
GLUCOSE SERPL-MCNC: 116 MG/DL (ref 65–99)
HCT VFR BLD AUTO: 35.5 % (ref 34–46.6)
HGB BLD-MCNC: 10.9 G/DL (ref 12–15.9)
IMM GRANULOCYTES # BLD AUTO: 0.02 10*3/MM3 (ref 0–0.05)
IMM GRANULOCYTES NFR BLD AUTO: 0.3 % (ref 0–0.5)
LYMPHOCYTES # BLD AUTO: 1.09 10*3/MM3 (ref 0.7–3.1)
LYMPHOCYTES NFR BLD AUTO: 15.8 % (ref 19.6–45.3)
MAGNESIUM SERPL-MCNC: 1.7 MG/DL (ref 1.6–2.4)
MCH RBC QN AUTO: 30.8 PG (ref 26.6–33)
MCHC RBC AUTO-ENTMCNC: 30.7 G/DL (ref 31.5–35.7)
MCV RBC AUTO: 100.3 FL (ref 79–97)
MONOCYTES # BLD AUTO: 0.55 10*3/MM3 (ref 0.1–0.9)
MONOCYTES NFR BLD AUTO: 8 % (ref 5–12)
NEUTROPHILS NFR BLD AUTO: 4.97 10*3/MM3 (ref 1.7–7)
NEUTROPHILS NFR BLD AUTO: 71.8 % (ref 42.7–76)
NRBC BLD AUTO-RTO: 0 /100 WBC (ref 0–0.2)
PHOSPHATE SERPL-MCNC: 4.3 MG/DL (ref 2.5–4.5)
PLATELET # BLD AUTO: 110 10*3/MM3 (ref 140–450)
PMV BLD AUTO: 13.1 FL (ref 6–12)
POTASSIUM SERPL-SCNC: 4 MMOL/L (ref 3.5–5.2)
PROT SERPL-MCNC: 6.8 G/DL (ref 6–8.5)
RBC # BLD AUTO: 3.54 10*6/MM3 (ref 3.77–5.28)
SODIUM SERPL-SCNC: 139 MMOL/L (ref 136–145)
VANCOMYCIN SERPL-MCNC: 14.91 MCG/ML (ref 5–40)
WBC NRBC COR # BLD AUTO: 6.91 10*3/MM3 (ref 3.4–10.8)

## 2025-03-20 PROCEDURE — 82948 REAGENT STRIP/BLOOD GLUCOSE: CPT

## 2025-03-20 PROCEDURE — 94664 DEMO&/EVAL PT USE INHALER: CPT

## 2025-03-20 PROCEDURE — 80048 BASIC METABOLIC PNL TOTAL CA: CPT | Performed by: FAMILY MEDICINE

## 2025-03-20 PROCEDURE — 93923 UPR/LXTR ART STDY 3+ LVLS: CPT | Performed by: SURGERY

## 2025-03-20 PROCEDURE — 82948 REAGENT STRIP/BLOOD GLUCOSE: CPT | Performed by: INTERNAL MEDICINE

## 2025-03-20 PROCEDURE — 25010000002 VANCOMYCIN 5 G RECONSTITUTED SOLUTION: Performed by: FAMILY MEDICINE

## 2025-03-20 PROCEDURE — 93923 UPR/LXTR ART STDY 3+ LVLS: CPT

## 2025-03-20 PROCEDURE — 63710000001 INSULIN LISPRO (HUMAN) PER 5 UNITS: Performed by: INTERNAL MEDICINE

## 2025-03-20 PROCEDURE — 63710000001 INSULIN GLARGINE PER 5 UNITS: Performed by: FAMILY MEDICINE

## 2025-03-20 PROCEDURE — 99232 SBSQ HOSP IP/OBS MODERATE 35: CPT | Performed by: FAMILY MEDICINE

## 2025-03-20 PROCEDURE — 25810000003 SODIUM CHLORIDE 0.9 % SOLUTION: Performed by: FAMILY MEDICINE

## 2025-03-20 PROCEDURE — 80202 ASSAY OF VANCOMYCIN: CPT | Performed by: INTERNAL MEDICINE

## 2025-03-20 PROCEDURE — 25010000002 FUROSEMIDE PER 20 MG: Performed by: INTERNAL MEDICINE

## 2025-03-20 PROCEDURE — 85025 COMPLETE CBC W/AUTO DIFF WBC: CPT | Performed by: FAMILY MEDICINE

## 2025-03-20 PROCEDURE — 84100 ASSAY OF PHOSPHORUS: CPT | Performed by: FAMILY MEDICINE

## 2025-03-20 PROCEDURE — 25010000002 PIPERACILLIN SOD-TAZOBACTAM PER 1 G: Performed by: INTERNAL MEDICINE

## 2025-03-20 PROCEDURE — 80076 HEPATIC FUNCTION PANEL: CPT | Performed by: FAMILY MEDICINE

## 2025-03-20 PROCEDURE — 94761 N-INVAS EAR/PLS OXIMETRY MLT: CPT

## 2025-03-20 PROCEDURE — 94799 UNLISTED PULMONARY SVC/PX: CPT

## 2025-03-20 PROCEDURE — 83735 ASSAY OF MAGNESIUM: CPT | Performed by: FAMILY MEDICINE

## 2025-03-20 RX ORDER — HYDROCODONE BITARTRATE AND ACETAMINOPHEN 10; 325 MG/1; MG/1
1 TABLET ORAL EVERY 4 HOURS PRN
Status: DISCONTINUED | OUTPATIENT
Start: 2025-03-20 | End: 2025-03-24 | Stop reason: HOSPADM

## 2025-03-20 RX ORDER — METOPROLOL SUCCINATE 50 MG/1
100 TABLET, EXTENDED RELEASE ORAL EVERY 12 HOURS SCHEDULED
Status: DISCONTINUED | OUTPATIENT
Start: 2025-03-20 | End: 2025-03-21 | Stop reason: ALTCHOICE

## 2025-03-20 RX ORDER — VANCOMYCIN/0.9 % SOD CHLORIDE 1.5G/250ML
1500 PLASTIC BAG, INJECTION (ML) INTRAVENOUS EVERY 24 HOURS
Status: DISCONTINUED | OUTPATIENT
Start: 2025-03-20 | End: 2025-03-22

## 2025-03-20 RX ORDER — IPRATROPIUM BROMIDE AND ALBUTEROL SULFATE 2.5; .5 MG/3ML; MG/3ML
3 SOLUTION RESPIRATORY (INHALATION) EVERY 6 HOURS PRN
Status: DISCONTINUED | OUTPATIENT
Start: 2025-03-20 | End: 2025-03-24 | Stop reason: HOSPADM

## 2025-03-20 RX ADMIN — PIPERACILLIN AND TAZOBACTAM 4.5 G: 4; .5 INJECTION, POWDER, FOR SOLUTION INTRAVENOUS; PARENTERAL at 02:35

## 2025-03-20 RX ADMIN — ASPIRIN 81 MG: 81 TABLET, COATED ORAL at 09:26

## 2025-03-20 RX ADMIN — PREGABALIN 150 MG: 75 CAPSULE ORAL at 20:57

## 2025-03-20 RX ADMIN — HYDROCODONE BITARTRATE AND ACETAMINOPHEN 1 TABLET: 10; 325 TABLET ORAL at 13:15

## 2025-03-20 RX ADMIN — SODIUM CHLORIDE 40 ML: 9 INJECTION, SOLUTION INTRAVENOUS at 11:12

## 2025-03-20 RX ADMIN — BUDESONIDE 0.5 MG: 0.5 INHALANT RESPIRATORY (INHALATION) at 06:29

## 2025-03-20 RX ADMIN — PREGABALIN 150 MG: 75 CAPSULE ORAL at 09:26

## 2025-03-20 RX ADMIN — IPRATROPIUM BROMIDE AND ALBUTEROL SULFATE 3 ML: .5; 3 SOLUTION RESPIRATORY (INHALATION) at 00:21

## 2025-03-20 RX ADMIN — METOPROLOL SUCCINATE 100 MG: 50 TABLET, FILM COATED, EXTENDED RELEASE ORAL at 20:58

## 2025-03-20 RX ADMIN — PIPERACILLIN AND TAZOBACTAM 4.5 G: 4; .5 INJECTION, POWDER, FOR SOLUTION INTRAVENOUS; PARENTERAL at 17:59

## 2025-03-20 RX ADMIN — ARFORMOTEROL TARTRATE 15 MCG: 15 SOLUTION RESPIRATORY (INHALATION) at 18:35

## 2025-03-20 RX ADMIN — NYSTATIN: 100000 POWDER TOPICAL at 11:22

## 2025-03-20 RX ADMIN — BUDESONIDE 0.5 MG: 0.5 INHALANT RESPIRATORY (INHALATION) at 18:35

## 2025-03-20 RX ADMIN — HYDROCODONE BITARTRATE AND ACETAMINOPHEN 1 TABLET: 10; 325 TABLET ORAL at 20:57

## 2025-03-20 RX ADMIN — PIPERACILLIN AND TAZOBACTAM 4.5 G: 4; .5 INJECTION, POWDER, FOR SOLUTION INTRAVENOUS; PARENTERAL at 11:23

## 2025-03-20 RX ADMIN — INSULIN LISPRO 2 UNITS: 100 INJECTION, SOLUTION INTRAVENOUS; SUBCUTANEOUS at 17:59

## 2025-03-20 RX ADMIN — IPRATROPIUM BROMIDE AND ALBUTEROL SULFATE 3 ML: .5; 3 SOLUTION RESPIRATORY (INHALATION) at 06:29

## 2025-03-20 RX ADMIN — METOPROLOL TARTRATE 5 MG: 1 INJECTION, SOLUTION INTRAVENOUS at 16:30

## 2025-03-20 RX ADMIN — FUROSEMIDE 40 MG: 10 INJECTION, SOLUTION INTRAMUSCULAR; INTRAVENOUS at 09:26

## 2025-03-20 RX ADMIN — HYDROCODONE BITARTRATE AND ACETAMINOPHEN 1 TABLET: 10; 325 TABLET ORAL at 08:46

## 2025-03-20 RX ADMIN — ATORVASTATIN CALCIUM 20 MG: 20 TABLET, FILM COATED ORAL at 09:26

## 2025-03-20 RX ADMIN — ARFORMOTEROL TARTRATE 15 MCG: 15 SOLUTION RESPIRATORY (INHALATION) at 06:29

## 2025-03-20 RX ADMIN — Medication 10 ML: at 20:58

## 2025-03-20 RX ADMIN — Medication: at 16:31

## 2025-03-20 RX ADMIN — INSULIN GLARGINE 15 UNITS: 100 INJECTION, SOLUTION SUBCUTANEOUS at 09:27

## 2025-03-20 RX ADMIN — FUROSEMIDE 40 MG: 10 INJECTION, SOLUTION INTRAMUSCULAR; INTRAVENOUS at 18:00

## 2025-03-20 RX ADMIN — RIVAROXABAN 15 MG: 15 TABLET, FILM COATED ORAL at 09:26

## 2025-03-20 RX ADMIN — HYDROCODONE BITARTRATE AND ACETAMINOPHEN 1 TABLET: 10; 325 TABLET ORAL at 17:14

## 2025-03-20 RX ADMIN — HYDROCODONE BITARTRATE AND ACETAMINOPHEN 1 TABLET: 10; 325 TABLET ORAL at 01:08

## 2025-03-20 RX ADMIN — VANCOMYCIN HYDROCHLORIDE 1500 MG: 5 INJECTION, POWDER, LYOPHILIZED, FOR SOLUTION INTRAVENOUS at 09:27

## 2025-03-20 RX ADMIN — Medication 10 ML: at 16:32

## 2025-03-20 RX ADMIN — IPRATROPIUM BROMIDE AND ALBUTEROL SULFATE 3 ML: .5; 3 SOLUTION RESPIRATORY (INHALATION) at 13:20

## 2025-03-20 RX ADMIN — Medication 10 ML: at 11:12

## 2025-03-20 RX ADMIN — NYSTATIN: 100000 POWDER TOPICAL at 21:48

## 2025-03-20 NOTE — CONSULTS
03/20/25 1044   Spiritual Care   Spiritual Care Visit Type initial   Spiritual Care Source patient request (describe)   Receptivity to Spiritual Care visit welcomed   Spiritual Care Request coping/stress of illness support;spiritual/moral support   Spiritual Care Interventions supportive conversation provided   Response to Spiritual Care receptive of support;emotion expressed;engaged in conversation   Use of Spiritual Resources spirituality for coping, indicated strong use of   Spiritual Care Follow-Up follow-up planned regularly for general support

## 2025-03-20 NOTE — THERAPY EVALUATION
Respiratory Therapist Broncho-Pulmonary Hygiene Progress Note      Patient Name:  Mary Albarran  YOB: 1950    Mary Albarran meets the qualification for Level 1 of the Bronco-Pulmonary Hygiene Protocol. This was based on my daily patient assessment and includes review of chest x-ray results, cough ability and quality, oxygenation, secretions or risk for secretion development and patient mobility.     Broncho-Pulmonary Hygiene Assessment:    Level of Movement: Actively changing positions without assistance  Alert/ oriented/ cooperative    Breath Sounds: Clear to slightly diminished    Cough: Strong, effective    Chest X-Ray: Possible signs of consolidation and/or atelectasis or clear.     Sputum Productions: None or small amount of thin or watery secretions with effective cough    History and Physical: None    SpO2 to Oxygen Need: greater than 92% on room air or  less than 3L nasal canula    Current SpO2 is: 92% on 3L    Based on this information, I have completed the following interventions: Teach/Instruct patient on cough and deep breathe      Electronically signed by Rinku Nguyen RRT, 03/20/25, 6:31 AM EDT.

## 2025-03-20 NOTE — PLAN OF CARE
Goal Outcome Evaluation:  Plan of Care Reviewed With: patient        Progress: improving  Outcome Evaluation: Patient alert and oriented x 4. Patient on 2L NC. Wound and skin care completed. Complaints of pain treated per MAR. Patient up to [place] commode with walker and x1 assistance. Continue with plan of care.

## 2025-03-20 NOTE — SIGNIFICANT NOTE
Wound Eval / Progress Noted    [facility]     Patient Name: Mary Albarran  : 1950  MRN: [id number]  Today's Date: 3/19/2025                 Admit Date: 3/18/2025    Visit Dx:    ICD-10-CM ICD-9-CM   1. Atrial fibrillation with rapid ventricular response  I48.91 427.31   2. Acute respiratory failure with hypoxia  J96.01 518.81   3. Cellulitis of lower extremity, unspecified laterality  L03.119 682.6   4. Hyperkalemia  E87.5 276.7   5. Chronic kidney disease, unspecified CKD stage  N18.9 585.9   6. Decreased activities of daily living (ADL)  Z78.9 V49.89   7. Difficulty walking  R26.2 719.7         Sepsis        Past Medical History:   Diagnosis Date    Allergic rhinitis     Anxiety     Aortic valve disease 2021    Fibrocalcific changes of the aortic valve with mild aortic valve stenosis   on echo 3/2/2021  Moderate aortic valve regurgitation is present. Aortic valve maximum pressure gradient is 26 mmHg. Moderate aortic valve stenosis is present.  On echo 2/10/2022       Arthritis     Atrial fibrillation     CHF (congestive heart failure)     Chronic kidney disease (CKD), stage III (moderate)     Chronic pain     COPD (chronic obstructive pulmonary disease)     Coronary artery disease     Diabetes mellitus type 2 with complications     Elevated cholesterol     Ex-smoker     QUIT SMOKING     GERD (gastroesophageal reflux disease)     History of home oxygen therapy     2L/NC AAT REPORTED    History of transfusion     Hyperlipidemia     Hypertension     Thrombocytopenia         Past Surgical History:   Procedure Laterality Date    HYSTERECTOMY      30 YEARS AGO         Physical Assessment:  Wound 25 1338 Right posterior foot unspecified (Active)   Wound Image    25 1545   Dressing Appearance dry;intact 25 1545   Closure None 25 1545   Base moist;red;pink 25 1545   Periwound dry;pale white;maroon/purple;redness 25 1545   Periwound Temperature warm 25 1545    Periwound Skin Turgor soft 03/19/25 1545   Edges open 03/19/25 1545   Wound Length (cm) 2.3 cm 03/19/25 1545   Wound Width (cm) 4.2 cm 03/19/25 1545   Wound Depth (cm) 0.2 cm 03/19/25 1545   Wound Surface Area (cm^2) 7.59 cm^2 03/19/25 1545   Wound Volume (cm^3) 1.012 cm^3 03/19/25 1545   Drainage Characteristics/Odor serosanguineous 03/19/25 1545   Drainage Amount scant 03/19/25 1545   Care, Wound cleansed with;sterile normal saline 03/19/25 1545   Dressing Care dressing removed;dressing applied;silver impregnated;hydrofiber;abdominal pad;gauze, dry;tubular wrap 03/19/25 1545   Periwound Care absorptive dressing applied 03/19/25 1545       Wound 03/18/25 1812 Bilateral anterior groin (Active)   Wound Image     03/19/25 1545   Dressing Appearance open to air 03/19/25 1545   Closure None 03/19/25 1545   Base moist;red 03/19/25 1545   Periwound dry;pink;redness 03/19/25 1545   Periwound Temperature warm 03/19/25 1545   Periwound Skin Turgor soft 03/19/25 1545   Edges open 03/19/25 1545   Drainage Characteristics/Odor serosanguineous 03/19/25 1545   Drainage Amount scant 03/19/25 1545   Care, Wound cleansed with;sterile normal saline 03/19/25 1545   Dressing Care open to air;other (see comments) 03/19/25 1545   Periwound Care dry periwound area maintained 03/19/25 1545       Wound 03/19/25 1545 Right lower leg (Active)   Wound Image     03/19/25 1545   Dressing Appearance dry;intact 03/19/25 1545   Closure None 03/19/25 1545   Base moist;red;pink;other (see comments) 03/19/25 1545   Periwound dry;edematous;redness;maroon/purple 03/19/25 1545   Periwound Temperature warm 03/19/25 1545   Periwound Skin Turgor soft 03/19/25 1545   Edges open 03/19/25 1545   Drainage Characteristics/Odor serosanguineous 03/19/25 1545   Drainage Amount scant 03/19/25 1545   Care, Wound cleansed with;sterile normal saline 03/19/25 1545   Dressing Care dressing removed;dressing applied;silver impregnated;hydrofiber;abdominal pad;gauze,  dry;tubular wrap 03/19/25 1546   Periwound Care absorptive dressing applied 03/19/25 2074     Wound Check / Follow-up:  Patient seen today for wound consult. Patient is awake, alert, and oriented. Patient seen by wound care RN during previous inpatient admission from 2/11/25 to 2/28/25. Patient states she previously received home health following discharge; however, it had been completed. Patient states she has been purchasing her own wound care supplies from [employer] since that time. Patient reports her  has been wrapping her legs with gauze and elastic bandages every other day. Patient reports she has been putting an ointment for pressure injuries to her right plantar foot wound and that it appeared to have nearly closed before it worsened. Wound base noted to have evolved since previous admission assessment. Patient reports she has been dealing with edema to her BLE for approximately six years and that the wound to her right plantar foot has been present about 3 months. Nutritional supplements have been ordered for this patient.    Bilateral lower extremity edema.  Erythema to maroon coloration noted to bilateral lower extremities with scattered flaking tissue.  Left foot is noted to be cool to palpation, but pulses are present.  Patient reports pigmentation darkened skin coolness evolves when leg is elevated.  Scattered ulcerations noted to right lower leg.  Ulcerations present with moist red and pink tissue.  Blister noted to right lateral lower leg with serous fluid within.  Dark red, dried tissue noted to right anterior second toe and left plantar great toe.  Patient reports these sites were previous blood blisters. Cleansed with normal saline and gauze, blotted dry.  Recommending daily dressing changes with silver impregnated Hydrofiber being applied over ulcerations, covered with ABD pad, and secured with gauze roll rolls and elastic bandages.  Recommending quality skin care and hygiene to bilateral  legs with a thin layer of Lac-Hydrin lotion being applied.  Recommending edema management to bilateral lower extremities with application of gauze rolls and elastic bandages from base of toes to bend of knees.  Recommending bilateral lower extremities remain elevated at all times.  Discussed findings with attending MD and orders obtained.    Right plantar foot ulceration.  Wound base presents with moist pink and red tissue.  Pink epithelialization is noted to wound base edge from 12-4 o'clock.  Pale white coloration is noted to periwound edge from 3 to 5 o'clock.  Purple coloration which evolves to redness is noted from periwound from 2-6 o'clock, extending to midfoot.  Cleansed and normal saline and gauze, blotted dry.  Recommend daily dressing changes with silver impregnated Hydrofiber being applied over ulceration, covered with ABD pad, and secured with gauze rolls and elastic bandages.  Discussed findings with attending MD along with potential need for podiatry consult if wound continues to evolve.    Moisture associated skin damage with a fungal presentation and areas of erosion noted to abdominal crease, bilateral groin creases, and gluteal crease.  Areas of erosion present with moist red tissue.  Periwound tissue is dry with pink/redness and a fungal presentation.  Recommending quality skin care and hygiene with application of Magic barrier ointment 3 times a day.  Recommending alternating application of blue top moisture barrier to gluteal aspects 3 times a day and as needed for incontinence.  Implement every 2 hour turns and offload at all times.  Keep patient clean, dry, and free from all moisture.  Discussed findings with attending MD and orders obtained.    Moisture associated skin damage with a fungal presentation noted to bilateral breast creases.  Recommending quality skin care and hygiene with a light dusting of nystatin powder being applied twice a day.  May apply dry sheets for added moisture  absorption.  Discussed findings with attending MD and orders obtained.    Impression: Bilateral lower extremity edema.  Right lower extremity ulcerations.  Right plantar foot ulceration.  Moisture associated skin damage with a fungal presentation and areas of erosion to abdominal crease, groin creases, and gluteal crease. MASD with a fungal presentation to bilateral breast creases.    Short term goals:  Regain skin integrity, skin protection, moisture prevention, pressure reduction, quality skin care and hygiene, topical treatment, daily dressing changes, edema management.    Christie Gee RN    3/19/2025    21:09 EDT

## 2025-03-20 NOTE — PROGRESS NOTES
"[facility] Clinical Pharmacy Services: Vancomycin Monitoring Note    Mary Albarran is a 74 y.o. female who is on day 3 of pharmacy to dose vancomycin for Bacteremia and Skin and Soft Tissue.    Previous Vancomycin Dose:   2250 mg IV x1 on 3/18 @1239  Imaging Reviewed?: Yes - no specific findings of osteomyelitis, could represent cellulitis per foot XRs  Updated Cultures and Sensitivities:   3/18 Blood culture- NGTD    Vitals/Labs  Ht: 160 cm (63\"); Wt: 126 kg (278 lb)   Temp (24hrs), Av.6 °F (36.4 °C), Min:97.3 °F (36.3 °C), Max:97.7 °F (36.5 °C)   Estimated Creatinine Clearance: 54.3 mL/min (A) (by C-G formula based on SCr of 1.21 mg/dL (H)).     Results from last 7 days   Lab Units 25  0513 25  0524 25  1437 25  0751   VANCOMYCIN RM mcg/mL 14.91  --   --   --    CREATININE mg/dL 1.21* 1.18* 1.00 1.24*   WBC 10*3/mm3 6.91 10.81*  --  13.08*     Assessment/Plan  Vancomycin level this AM reported as 14.91.  Will adjust vancomycin to 1500 mg every 24 hours as recommended by [facility].    Current Vancomycin Dose:  1500 mg IV every 24 hours; which provides the following predicted parameters:    Regimen: 1500 mg IV every 24 hours.  Exposure target: AUC24 (range)400-600 mg/L.hr   AUC24,ss: 540 mg/L.hr  Probability of AUC24 > 400: 97 %  Ctrough,ss: 12 mg/L  Probability of Ctrough,ss > 20: 3 %  Probability of nephrotoxicity (Lodise ERIC ): 7 %    We will continue to monitor patient changes and renal function     Thank you for involving pharmacy in this patient's care. Please contact pharmacy with any questions or concerns.    Sergio Miranda  Clinical Pharmacist  "

## 2025-03-20 NOTE — PROGRESS NOTES
[facility]   Hospitalist Progress Note  Date: 3/20/2025  Patient Name: Mary Albararn  : 1950  MRN: [id number]  Date of admission: 3/18/2025      Subjective   Subjective     Chief complaint: Right leg redness    Summary:  74-year-old female with history of morbid obesity, moderate aortic stenosis, CKD stage IIIa, diabetes mellitus, COPD with home oxygen, hypertension, dyslipidemia, hospitalized on 3/18/2025 with chief complaint of right leg swelling and redness and cellulitic changes, with recent hospitalization for strep bacteremia and gram-negative bacteremia, hospitalized with worsening right lower extremity cellulitis over the past 36 hours, placed on broad-spectrum antibiotic, concern for A-fib with RVR, Cardizem ordered, rate control achieved, Cardizem discontinued.    Interval follow-up: Seen and examined, no distress, no events overnight, lower extremity pain and erythema slightly improved.  Right leg remains dressed.  Still has volume overload.  There is concern for uncontrolled heart rate, reaching up to the 120s with A-fib, increase Toprol dose.  Will spot dose of IV metoprolol ordered between her oral medication doses.  Changing DuoNebs to as needed, this may help control her heart rate better.  On 3 L with sats in the 90s.  No chest pain.    Review of systems:  All systems reviewed and negative except weakness, fatigue, shortness of breath, right lower extremity pain    Objective   Objective     Vitals:   Temp:  [97.3 °F (36.3 °C)-97.7 °F (36.5 °C)] 97.3 °F (36.3 °C)  Heart Rate:  [] 133  Resp:  [16-20] 20  BP: ()/(52-71) 111/55  Flow (L/min) (Oxygen Therapy):  [3-4] 3  Physical Exam                 Constitutional: Ill-appearing nontoxic              Eyes: Pupils equal, sclerae anicteric, no conjunctival injection              HENT: NCAT, mucous membranes moist              Neck: Supple, no thyromegaly, no lymphadenopathy, trachea midline              Respiratory: Diminished to  auscultation bilaterally, nonlabored respirations               Cardiovascular: Irregular rate, regular rhythm, 2/6 systolic ejection murmur rubs, or gallops, palpable pedal pulses bilaterally              Gastrointestinal: Positive bowel sounds, soft, nontender, nondistended              Musculoskeletal: No bilateral ankle edema, no clubbing or cyanosis to extremities              Psychiatric: Appropriate affect, cooperative              Neurologic: Oriented x 3, strength symmetric in all extremities, Cranial Nerves grossly intact to confrontation, speech clear              Skin: Right leg shows significant edema with blistering covered with dressing.  There is some lesion on the right fifth metatarsal head it is draining.  Area is erythematous and warm to touch.  See photo                                              Result Review    Result Review:  I have personally reviewed the pertinent results from the past 24 hours to 3/20/2025 14:41 EDT and agree with these findings:  [x]  Laboratory   CBC          3/18/2025    07:51 3/19/2025    05:24 3/20/2025    05:13   CBC   WBC 13.08  10.81  6.91    RBC 3.83  3.40  3.54    Hemoglobin 11.6  10.0  10.9    Hematocrit 39.2  34.9  35.5    .3  102.6  100.3    MCH 30.3  29.4  30.8    MCHC 29.6  28.7  30.7    RDW 17.7  17.8  17.6    Platelets 103  98  110      BMP          3/18/2025    07:51 3/18/2025    14:37 3/19/2025    05:24 3/20/2025    05:13   BMP   BUN 27  26  32  33    Creatinine 1.24  1.00  1.18  1.21    Sodium 137  137  131  139    Potassium 6.4  5.1  5.2  4.0    Chloride 101  101  98  99    CO2 26.6  25.6  27.3  28.6    Calcium 8.9  9.2  8.5  8.4      LIVER FUNCTION TESTS:      Lab 03/20/25  0513 03/19/25  0524 03/18/25  0751   TOTAL PROTEIN 6.8 6.4 7.1   ALBUMIN 3.3* 3.2* 3.5   GLOBULIN  --  3.2 3.6   ALT (SGPT) 18 16 20   AST (SGOT) 21 17 24   BILIRUBIN 0.8 0.9 1.2   INDIRECT BILIRUBIN 0.4  --   --    BILIRUBIN DIRECT 0.4*  --   --    ALK PHOS 60 62 85        [x]  Microbiology   Microbiology Results (last 10 days)       Procedure Component Value - Date/Time    COVID-19, FLU A/B, RSV PCR 1 HR TAT - Swab, Nasopharynx [[id number]]  (Normal) Collected: 03/18/25 1130    Lab Status: Final result Specimen: Swab from Nasopharynx Updated: 03/18/25 1214     COVID19 Not Detected     Influenza A PCR Not Detected     Influenza B PCR Not Detected     RSV, PCR Not Detected    Narrative:      Fact sheet for providers: https://www.fda.gov/media/359277/download    Fact sheet for patients: https://www.fda.gov/media/316011/download    Test performed by PCR.    Blood Culture - Blood, Arm, Left [[id number]]  (Normal) Collected: 03/18/25 1130    Lab Status: Preliminary result Specimen: Blood from Arm, Left Updated: 03/20/25 1145     Blood Culture No growth at 2 days    Narrative:      Less than seven (7) mL's of blood was collected.  Insufficient quantity may yield false negative results.    Blood Culture - Blood, Arm, Right [[id number]]  (Normal) Collected: 03/18/25 1130    Lab Status: Preliminary result Specimen: Blood from Arm, Right Updated: 03/20/25 1145     Blood Culture No growth at 2 days    Narrative:      Less than seven (7) mL's of blood was collected.  Insufficient quantity may yield false negative results.    Influenza Antigen, Rapid - , [[id number]] Collected: 03/10/25 1602    Lab Status: Final result Updated: 03/10/25 1850    COVID-19, ABBOTT IN-HOUSE,NASAL Swab (NO TRANSPORT MEDIA) 2 HR TAT - , [[id number]] Collected: 03/10/25 1602    Lab Status: Final result Updated: 03/10/25 1850              [x]  Radiology XR Foot 2 View Left  Result Date: 3/18/2025  Impression: No specific finding of osteomyelitis. Soft tissue swelling in the visualized extremity could represent cellulitis, dependent edema and/or posttraumatic change. Electronically Signed: Thom Vega MD  3/18/2025 2:07 PM EDT  Workstation ID: [id number]    XR Foot 2 View Right  Result Date: 3/18/2025  Impression: Soft  tissue swelling throughout the visualized extremity could represent dependent edema and/or cellulitis. No specific radiographic finding of osteomyelitis. Electronically Signed: Thom Vega MD  3/18/2025 2:05 PM EDT  Workstation ID: [id number]    XR Chest 1 View  Result Date: 3/18/2025  Impression: Suboptimal portable radiograph. There is possibly bibasilar airspace disease that could be related to atelectasis and/or pneumonia. Cardiomegaly. Electronically Signed: Ishaan Martinez MD  3/18/2025 8:13 AM EDT  Workstation ID: [id number]        []  EKG/Telemetry   ECG 12 Lead ED Triage Standing Order; SOA   Final Result   HEART FHIR=365  bpm   RR Uyucfkmt=612  ms   NM Interval=  ms   P Horizontal Axis=  deg   P Front Axis=  deg   QRSD Interval=76  ms   QT Lmjkdlfw=581  ms   DVyH=303  ms   QRS Axis=116  deg   T Wave Axis=88  deg   - ABNORMAL ECG -   Atrial fibrillation   Right axis deviation   Low voltage, precordial leads   Prolonged QT interval   When compared with ECG of 11-Feb-2025 03:39:33,   Significant rate increase   Significant repolarization change   Electronically Signed By: Joaquin Gonzalez ([facility]) 2025-03-18 14:47:50   Date and Time of Study:2025-03-18 07:43:35          []  Cardiology/Vascular   []  Pathology  [x]  Old records  []  Other:    Assessment & Plan   Assessment / Plan     Assessment/Plan:  Assessment:  Sepsis due to right lower leg and foot cellulitis (leukocytosis and tachycardia) without endorgan damage  A-fib with RVR  Acute heart failure exacerbation with reduced EF of 46 to 50% RVSP of 45 to 55 mm per mercury  Life-threatening hyperkalemia  Recent streptococcal bacteremia  Recent gram-negative bacteremia  Obesity BMI 46  On home oxygen     Plan:  Labs and imaging reviewed  Increase metoprolol XL to 100 mg twice a day  One-time dose of metoprolol 5 mg IV  Check lower extremity ABIs  Continue Lasix 40 mg IV twice daily  Strict I's and O's  Daily weights  Continue Xarelto  Continue Zosyn and  vancomycin  Continue Lantus 15 units daily with insulin sliding scale coverage  Continue telemetry monitor  Continue Lipitor, aspirin  Wound care consult  Notify MD if heart rate becomes uncontrolled  Continue pain control  A.m. labs  Full code  DVT prophylaxis Xarelto  Clinical course dictate further management  Discussed with nurse at the bedside    VTE Prophylaxis:  Pharmacologic VTE prophylaxis orders are present.        CODE STATUS:   Code Status (Patient has no pulse and is not breathing): CPR (Attempt to Resuscitate)  Medical Interventions (Patient has pulse or is breathing): Full Support  Level Of Support Discussed With: Patient        Electronically signed by Melody Luna MD, 3/20/2025, 14:41 EDT.    Portions of this documentation were transcribed electronically from a voice recognition software.  I confirm all data accurately represents the service(s) I performed at today's visit.

## 2025-03-21 LAB
ALBUMIN SERPL-MCNC: 3.3 G/DL (ref 3.5–5.2)
ALP SERPL-CCNC: 78 U/L (ref 39–117)
ALT SERPL W P-5'-P-CCNC: 19 U/L (ref 1–33)
ANION GAP SERPL CALCULATED.3IONS-SCNC: 10.4 MMOL/L (ref 5–15)
AST SERPL-CCNC: 24 U/L (ref 1–32)
BASOPHILS # BLD AUTO: 0.04 10*3/MM3 (ref 0–0.2)
BASOPHILS NFR BLD AUTO: 0.8 % (ref 0–1.5)
BH CV LOWER ARTERIAL LEFT ABI RATIO: 1.05
BH CV LOWER ARTERIAL LEFT DORSALIS PEDIS SYS MAX: 115
BH CV LOWER ARTERIAL LEFT GREAT TOE SYS MAX: 81
BH CV LOWER ARTERIAL LEFT POST TIBIAL SYS MAX: 118
BH CV LOWER ARTERIAL LEFT TBI RATIO: 0.72
BH CV LOWER ARTERIAL RIGHT ABI RATIO: 1.13
BH CV LOWER ARTERIAL RIGHT DORSALIS PEDIS SYS MAX: 121
BH CV LOWER ARTERIAL RIGHT GREAT TOE SYS MAX: 92
BH CV LOWER ARTERIAL RIGHT POST TIBIAL SYS MAX: 127
BH CV LOWER ARTERIAL RIGHT TBI RATIO: 0.82
BILIRUB CONJ SERPL-MCNC: 0.3 MG/DL (ref 0–0.3)
BILIRUB INDIRECT SERPL-MCNC: 0.3 MG/DL
BILIRUB SERPL-MCNC: 0.6 MG/DL (ref 0–1.2)
BUN SERPL-MCNC: 31 MG/DL (ref 8–23)
BUN/CREAT SERPL: 28.4 (ref 7–25)
CALCIUM SPEC-SCNC: 8.3 MG/DL (ref 8.6–10.5)
CHLORIDE SERPL-SCNC: 97 MMOL/L (ref 98–107)
CO2 SERPL-SCNC: 29.6 MMOL/L (ref 22–29)
CREAT SERPL-MCNC: 1.09 MG/DL (ref 0.57–1)
DEPRECATED RDW RBC AUTO: 64.7 FL (ref 37–54)
EGFRCR SERPLBLD CKD-EPI 2021: 54.1 ML/MIN/1.73
EOSINOPHIL # BLD AUTO: 0.18 10*3/MM3 (ref 0–0.4)
EOSINOPHIL NFR BLD AUTO: 3.6 % (ref 0.3–6.2)
ERYTHROCYTE [DISTWIDTH] IN BLOOD BY AUTOMATED COUNT: 17.3 % (ref 12.3–15.4)
GLUCOSE BLDC GLUCOMTR-MCNC: 143 MG/DL (ref 70–99)
GLUCOSE BLDC GLUCOMTR-MCNC: 146 MG/DL (ref 70–99)
GLUCOSE BLDC GLUCOMTR-MCNC: 163 MG/DL (ref 70–99)
GLUCOSE BLDC GLUCOMTR-MCNC: 169 MG/DL (ref 70–99)
GLUCOSE SERPL-MCNC: 134 MG/DL (ref 65–99)
HCT VFR BLD AUTO: 35.4 % (ref 34–46.6)
HGB BLD-MCNC: 10 G/DL (ref 12–15.9)
IMM GRANULOCYTES # BLD AUTO: 0.01 10*3/MM3 (ref 0–0.05)
IMM GRANULOCYTES NFR BLD AUTO: 0.2 % (ref 0–0.5)
LYMPHOCYTES # BLD AUTO: 1 10*3/MM3 (ref 0.7–3.1)
LYMPHOCYTES NFR BLD AUTO: 19.8 % (ref 19.6–45.3)
MAGNESIUM SERPL-MCNC: 1.6 MG/DL (ref 1.6–2.4)
MCH RBC QN AUTO: 28.7 PG (ref 26.6–33)
MCHC RBC AUTO-ENTMCNC: 28.2 G/DL (ref 31.5–35.7)
MCV RBC AUTO: 101.7 FL (ref 79–97)
MONOCYTES # BLD AUTO: 0.51 10*3/MM3 (ref 0.1–0.9)
MONOCYTES NFR BLD AUTO: 10.1 % (ref 5–12)
NEUTROPHILS NFR BLD AUTO: 3.31 10*3/MM3 (ref 1.7–7)
NEUTROPHILS NFR BLD AUTO: 65.5 % (ref 42.7–76)
NRBC BLD AUTO-RTO: 0 /100 WBC (ref 0–0.2)
PHOSPHATE SERPL-MCNC: 3.8 MG/DL (ref 2.5–4.5)
PLATELET # BLD AUTO: 103 10*3/MM3 (ref 140–450)
PMV BLD AUTO: 13.6 FL (ref 6–12)
POTASSIUM SERPL-SCNC: 4.1 MMOL/L (ref 3.5–5.2)
PROT SERPL-MCNC: 6.7 G/DL (ref 6–8.5)
RBC # BLD AUTO: 3.48 10*6/MM3 (ref 3.77–5.28)
SODIUM SERPL-SCNC: 137 MMOL/L (ref 136–145)
UPPER ARTERIAL LEFT ARM BRACHIAL SYS MAX: 109
UPPER ARTERIAL RIGHT ARM BRACHIAL SYS MAX: 112
WBC NRBC COR # BLD AUTO: 5.05 10*3/MM3 (ref 3.4–10.8)

## 2025-03-21 PROCEDURE — 25010000002 PIPERACILLIN SOD-TAZOBACTAM PER 1 G: Performed by: INTERNAL MEDICINE

## 2025-03-21 PROCEDURE — 63710000001 INSULIN GLARGINE PER 5 UNITS: Performed by: FAMILY MEDICINE

## 2025-03-21 PROCEDURE — 97110 THERAPEUTIC EXERCISES: CPT | Performed by: PHYSICAL THERAPIST

## 2025-03-21 PROCEDURE — 94761 N-INVAS EAR/PLS OXIMETRY MLT: CPT

## 2025-03-21 PROCEDURE — 97116 GAIT TRAINING THERAPY: CPT | Performed by: PHYSICAL THERAPIST

## 2025-03-21 PROCEDURE — 94799 UNLISTED PULMONARY SVC/PX: CPT

## 2025-03-21 PROCEDURE — 82948 REAGENT STRIP/BLOOD GLUCOSE: CPT | Performed by: INTERNAL MEDICINE

## 2025-03-21 PROCEDURE — 83735 ASSAY OF MAGNESIUM: CPT | Performed by: FAMILY MEDICINE

## 2025-03-21 PROCEDURE — 94664 DEMO&/EVAL PT USE INHALER: CPT

## 2025-03-21 PROCEDURE — 25010000002 VANCOMYCIN 5 G RECONSTITUTED SOLUTION: Performed by: FAMILY MEDICINE

## 2025-03-21 PROCEDURE — 84100 ASSAY OF PHOSPHORUS: CPT | Performed by: FAMILY MEDICINE

## 2025-03-21 PROCEDURE — 25810000003 SODIUM CHLORIDE 0.9 % SOLUTION: Performed by: FAMILY MEDICINE

## 2025-03-21 PROCEDURE — 85025 COMPLETE CBC W/AUTO DIFF WBC: CPT | Performed by: FAMILY MEDICINE

## 2025-03-21 PROCEDURE — 99232 SBSQ HOSP IP/OBS MODERATE 35: CPT | Performed by: INTERNAL MEDICINE

## 2025-03-21 PROCEDURE — 80048 BASIC METABOLIC PNL TOTAL CA: CPT | Performed by: FAMILY MEDICINE

## 2025-03-21 PROCEDURE — 25010000002 FUROSEMIDE PER 20 MG: Performed by: INTERNAL MEDICINE

## 2025-03-21 PROCEDURE — 63710000001 INSULIN LISPRO (HUMAN) PER 5 UNITS: Performed by: INTERNAL MEDICINE

## 2025-03-21 PROCEDURE — 80076 HEPATIC FUNCTION PANEL: CPT | Performed by: FAMILY MEDICINE

## 2025-03-21 PROCEDURE — 97530 THERAPEUTIC ACTIVITIES: CPT | Performed by: PHYSICAL THERAPIST

## 2025-03-21 PROCEDURE — 82948 REAGENT STRIP/BLOOD GLUCOSE: CPT

## 2025-03-21 RX ORDER — METOPROLOL SUCCINATE 50 MG/1
50 TABLET, EXTENDED RELEASE ORAL NIGHTLY
Status: DISCONTINUED | OUTPATIENT
Start: 2025-03-21 | End: 2025-03-21 | Stop reason: DRUGHIGH

## 2025-03-21 RX ORDER — GINSENG 100 MG
1 CAPSULE ORAL 2 TIMES DAILY PRN
Status: DISCONTINUED | OUTPATIENT
Start: 2025-03-21 | End: 2025-03-24 | Stop reason: HOSPADM

## 2025-03-21 RX ADMIN — METOPROLOL SUCCINATE 100 MG: 50 TABLET, FILM COATED, EXTENDED RELEASE ORAL at 10:14

## 2025-03-21 RX ADMIN — PIPERACILLIN AND TAZOBACTAM 4.5 G: 4; .5 INJECTION, POWDER, FOR SOLUTION INTRAVENOUS; PARENTERAL at 02:45

## 2025-03-21 RX ADMIN — INSULIN LISPRO 2 UNITS: 100 INJECTION, SOLUTION INTRAVENOUS; SUBCUTANEOUS at 18:50

## 2025-03-21 RX ADMIN — RIVAROXABAN 15 MG: 15 TABLET, FILM COATED ORAL at 10:14

## 2025-03-21 RX ADMIN — PREGABALIN 150 MG: 75 CAPSULE ORAL at 10:14

## 2025-03-21 RX ADMIN — ARFORMOTEROL TARTRATE 15 MCG: 15 SOLUTION RESPIRATORY (INHALATION) at 06:26

## 2025-03-21 RX ADMIN — PIPERACILLIN AND TAZOBACTAM 4.5 G: 4; .5 INJECTION, POWDER, FOR SOLUTION INTRAVENOUS; PARENTERAL at 18:50

## 2025-03-21 RX ADMIN — HYDROCODONE BITARTRATE AND ACETAMINOPHEN 1 TABLET: 10; 325 TABLET ORAL at 15:34

## 2025-03-21 RX ADMIN — Medication 10 ML: at 10:16

## 2025-03-21 RX ADMIN — BUDESONIDE 0.5 MG: 0.5 INHALANT RESPIRATORY (INHALATION) at 06:26

## 2025-03-21 RX ADMIN — HYDROCODONE BITARTRATE AND ACETAMINOPHEN 1 TABLET: 10; 325 TABLET ORAL at 07:10

## 2025-03-21 RX ADMIN — ARFORMOTEROL TARTRATE 15 MCG: 15 SOLUTION RESPIRATORY (INHALATION) at 18:20

## 2025-03-21 RX ADMIN — Medication: at 15:34

## 2025-03-21 RX ADMIN — INSULIN GLARGINE 15 UNITS: 100 INJECTION, SOLUTION SUBCUTANEOUS at 10:14

## 2025-03-21 RX ADMIN — FUROSEMIDE 40 MG: 10 INJECTION, SOLUTION INTRAMUSCULAR; INTRAVENOUS at 10:15

## 2025-03-21 RX ADMIN — INSULIN LISPRO 2 UNITS: 100 INJECTION, SOLUTION INTRAVENOUS; SUBCUTANEOUS at 07:39

## 2025-03-21 RX ADMIN — NYSTATIN: 100000 POWDER TOPICAL at 21:10

## 2025-03-21 RX ADMIN — ATORVASTATIN CALCIUM 20 MG: 20 TABLET, FILM COATED ORAL at 10:15

## 2025-03-21 RX ADMIN — FUROSEMIDE 40 MG: 10 INJECTION, SOLUTION INTRAMUSCULAR; INTRAVENOUS at 18:51

## 2025-03-21 RX ADMIN — HYDROCODONE BITARTRATE AND ACETAMINOPHEN 1 TABLET: 10; 325 TABLET ORAL at 02:45

## 2025-03-21 RX ADMIN — PREGABALIN 150 MG: 75 CAPSULE ORAL at 21:09

## 2025-03-21 RX ADMIN — HYDROCODONE BITARTRATE AND ACETAMINOPHEN 1 TABLET: 10; 325 TABLET ORAL at 11:36

## 2025-03-21 RX ADMIN — VANCOMYCIN HYDROCHLORIDE 1500 MG: 5 INJECTION, POWDER, LYOPHILIZED, FOR SOLUTION INTRAVENOUS at 10:15

## 2025-03-21 RX ADMIN — BACITRACIN 0.9 G: 500 OINTMENT TOPICAL at 21:27

## 2025-03-21 RX ADMIN — HYDROCODONE BITARTRATE AND ACETAMINOPHEN 1 TABLET: 10; 325 TABLET ORAL at 21:09

## 2025-03-21 RX ADMIN — BUDESONIDE 0.5 MG: 0.5 INHALANT RESPIRATORY (INHALATION) at 18:20

## 2025-03-21 RX ADMIN — PIPERACILLIN AND TAZOBACTAM 4.5 G: 4; .5 INJECTION, POWDER, FOR SOLUTION INTRAVENOUS; PARENTERAL at 10:15

## 2025-03-21 RX ADMIN — ASPIRIN 81 MG: 81 TABLET, COATED ORAL at 10:14

## 2025-03-21 RX ADMIN — METOPROLOL SUCCINATE 125 MG: 50 TABLET, FILM COATED, EXTENDED RELEASE ORAL at 21:09

## 2025-03-21 RX ADMIN — METOPROLOL SUCCINATE 125 MG: 50 TABLET, FILM COATED, EXTENDED RELEASE ORAL at 11:29

## 2025-03-21 RX ADMIN — NYSTATIN: 100000 POWDER TOPICAL at 15:34

## 2025-03-21 NOTE — THERAPY TREATMENT NOTE
Acute Care - Physical Therapy Treatment Note  GUERO Bautista     Patient Name: Mary Albarran  : 1950  MRN: [id number]  Today's Date: 3/21/2025      Visit Dx:     ICD-10-CM ICD-9-CM   1. Atrial fibrillation with rapid ventricular response  I48.91 427.31   2. Acute respiratory failure with hypoxia  J96.01 518.81   3. Cellulitis of lower extremity, unspecified laterality  L03.119 682.6   4. Hyperkalemia  E87.5 276.7   5. Chronic kidney disease, unspecified CKD stage  N18.9 585.9   6. Decreased activities of daily living (ADL)  Z78.9 V49.89   7. Difficulty walking  R26.2 719.7     Patient Active Problem List   Diagnosis    Aortic stenosis, moderate    Atrial fibrillation    Chronic kidney disease, stage III (moderate)    Hyperlipidemia    Hypertension    Chronic diastolic congestive heart failure    Atrial fibrillation with RVR    Onychomycosis    Onychocryptosis    Foot pain, bilateral    Nephrolithiasis    Recurrent urinary tract infection    Acute on chronic HFrEF (heart failure with reduced ejection fraction)    Sepsis     Past Medical History:   Diagnosis Date    Allergic rhinitis     Anxiety     Aortic valve disease 2021    Fibrocalcific changes of the aortic valve with mild aortic valve stenosis   on echo 3/2/2021  Moderate aortic valve regurgitation is present. Aortic valve maximum pressure gradient is 26 mmHg. Moderate aortic valve stenosis is present.  On echo 2/10/2022       Arthritis     Atrial fibrillation     CHF (congestive heart failure)     Chronic kidney disease (CKD), stage III (moderate)     Chronic pain     COPD (chronic obstructive pulmonary disease)     Coronary artery disease     Diabetes mellitus type 2 with complications     Elevated cholesterol     Ex-smoker     QUIT SMOKING     GERD (gastroesophageal reflux disease)     History of home oxygen therapy     2L/NC AAT REPORTED    History of transfusion     Hyperlipidemia     Hypertension     Thrombocytopenia      Past Surgical  History:   Procedure Laterality Date    HYSTERECTOMY      30 YEARS AGO     PT Assessment (Last 12 Hours)       PT Evaluation and Treatment       Row Name 03/21/25 1200          Physical Therapy Time and Intention    Subjective Information fatigue  -TC     Document Type therapy note (daily note)  -TC     Mode of Treatment individual therapy;physical therapy  -TC     Patient Effort good  -TC     Symptoms Noted During/After Treatment fatigue  -TC       Row Name 03/21/25 1200          General Information    Patient Profile Reviewed yes  -TC       Row Name 03/21/25 1200          Pain    Pretreatment Pain Rating 0/10 - no pain  -TC     Posttreatment Pain Rating 0/10 - no pain  -TC       Row Name 03/21/25 1200          Bed Mobility    Bed Mobility supine-sit-supine  -TC     Supine-Sit-Supine Falcon (Bed Mobility) contact guard  -TC       Row Name 03/21/25 1200          Transfers    Transfers sit-stand transfer  -TC     Comment, (Transfers) STS x 3 reps throughout session. CGA for t/f to BCS from bed with use of RW.  -TC       Row Name 03/21/25 1200          Sit-Stand Transfer    Sit-Stand Falcon (Transfers) contact guard  -TC     Assistive Device (Sit-Stand Transfers) walker, front-wheeled  -TC       Row Name 03/21/25 1200          Gait/Stairs (Locomotion)    Gait/Stairs Locomotion gait/ambulation assistive device  -TC     Falcon Level (Gait) contact guard  -TC     Assistive Device (Gait) walker, front-wheeled  -TC     Patient was able to Ambulate yes  -TC     Distance in Feet (Gait) 15  -TC     Deviations/Abnormal Patterns (Gait) vinod decreased;gait speed decreased  -TC     Bilateral Gait Deviations forward flexed posture  -TC     Gait Assessment/Intervention VC for keeping hips within walker for optimal postural alignment and safety.  -TC       Row Name 03/21/25 1200          Balance    Balance Assessment standing dynamic balance  -TC     Dynamic Standing Balance contact guard  -TC      Position/Device Used, Standing Balance walker, front-wheeled  -TC       Row Name 03/21/25 1200          Motor Skills    Therapeutic Exercise --  B LE TE x 10 reps sitting knee extension, ankle pumps, B LE TE x 10 reps standing marches  -TC       Row Name             Wound 03/18/25 1812 Bilateral anterior groin    Wound - Properties Group Placement Date: 03/18/25  - Placement Time: 1812 -AH Present on Original Admission: Y  -AH Side: Bilateral  -AH Orientation: anterior  -AH Location: groin  -AH    Retired Wound - Properties Group Placement Date: 03/18/25  - Placement Time: 1812 -AH Present on Original Admission: Y  -AH Side: Bilateral  -AH Orientation: anterior  -AH Location: groin  -AH    Retired Wound - Properties Group Placement Date: 03/18/25  - Placement Time: 1812 -AH Present on Original Admission: Y  -AH Side: Bilateral  -AH Orientation: anterior  -AH Location: groin  -AH    Retired Wound - Properties Group Date first assessed: 03/18/25  -AH Time first assessed: 1812 -AH Present on Original Admission: Y  -AH Side: Bilateral  -AH Location: groin  -AH      Row Name             Wound 02/11/25 1338 Right posterior foot unspecified    Wound - Properties Group Placement Date: 02/11/25  -KE Placement Time: 1338  -KE Side: Right  -KE Orientation: posterior  -KE Location: foot  -KE Type: unspecified  -KE    Retired Wound - Properties Group Placement Date: 02/11/25  -KE Placement Time: 1338  -KE Side: Right  -KE Orientation: posterior  -KE Location: foot  -KE Type: unspecified  -KE    Retired Wound - Properties Group Placement Date: 02/11/25  -KE Placement Time: 1338  -KE Side: Right  -KE Orientation: posterior  -KE Location: foot  -KE Type: unspecified  -KE    Retired Wound - Properties Group Date first assessed: 02/11/25  -KE Time first assessed: 1338  -KE Side: Right  -KE Location: foot  -KE Type: unspecified  -KE      Row Name             Wound 03/19/25 1545 Right lower leg    Wound - Properties Group  Placement Date: 03/19/25  -KE Placement Time: 1545  -KE Side: Right  -KE Orientation: lower  -KE Location: leg  -KE    Retired Wound - Properties Group Placement Date: 03/19/25  -KE Placement Time: 1545  -KE Side: Right  -KE Orientation: lower  -KE Location: leg  -KE    Retired Wound - Properties Group Placement Date: 03/19/25  -KE Placement Time: 1545  -KE Side: Right  -KE Orientation: lower  -KE Location: leg  -KE    Retired Wound - Properties Group Date first assessed: 03/19/25  -KE Time first assessed: 1545  -KE Side: Right  -KE Location: leg  -KE      Row Name 03/21/25 1200          Plan of Care Review    Plan of Care Reviewed With patient  -TC     Progress improving  -TC       Row Name 03/21/25 1200          Positioning and Restraints    Pre-Treatment Position in bed  -TC     Post Treatment Position bed  -TC     In Bed supine;call light within reach;with nsg  -TC       Row Name 03/21/25 1200          Progress Summary (PT)    Progress Toward Functional Goals (PT) progress toward functional goals is good  -TC               User Key  (r) = Recorded By, (t) = Taken By, (c) = Cosigned By      Initials Name Provider Type    Corinna Lazaro, RN Registered Nurse    Christie Isaac, RN Registered Nurse    Hien Yu, PT Physical Therapist                      PT Recommendation and Plan     Progress Summary (PT)  Progress Toward Functional Goals (PT): progress toward functional goals is good  Plan of Care Reviewed With: patient  Progress: improving   Outcome Measures       Row Name 03/21/25 1200 03/19/25 1400          How much help from another person do you currently need...    Turning from your back to your side while in flat bed without using bedrails? 4  -TC 4  -DP     Moving from lying on back to sitting on the side of a flat bed without bedrails? 3  -TC 3  -DP     Moving to and from a bed to a chair (including a wheelchair)? 3  -TC 3  -DP     Standing up from a chair using your arms (e.g., wheelchair,  bedside chair)? 3  -TC 3  -DP     Climbing 3-5 steps with a railing? 2  -TC 3  -DP     To walk in hospital room? 3  -TC 3  -DP     AM-PAC 6 Clicks Score (PT) 18  -TC 19  -DP        Functional Assessment    Outcome Measure Options AM-PAC 6 Clicks Basic Mobility (PT)  -TC AM-PAC 6 Clicks Basic Mobility (PT)  -DP               User Key  (r) = Recorded By, (t) = Taken By, (c) = Cosigned By      Initials Name Provider Type    DP Matt Dowd, PT Physical Therapist    TC Hien Whittington PT Physical Therapist                     Time Calculation:    PT Charges       Row Name 03/21/25 1235             Time Calculation    PT Received On 03/21/25  -TC         Timed Charges    47174 - PT Therapeutic Exercise Minutes 15  -TC      21512 - Gait Training Minutes  15  -TC      76547 - PT Therapeutic Activity Minutes 15  -TC         Total Minutes    Timed Charges Total Minutes 45  -TC       Total Minutes 45  -TC                User Key  (r) = Recorded By, (t) = Taken By, (c) = Cosigned By      Initials Name Provider Type    TC Hien Whittington, PT Physical Therapist                  Therapy Charges for Today       Code Description Service Date Service Provider Modifiers Qty    97360422523 HC PT THER PROC EA 15 MIN 3/21/2025 Hien Whittington, PT GP 1    86935477057 HC GAIT TRAINING EA 15 MIN 3/21/2025 Hien Whittington, PT GP 1    46561645965 HC PT THERAPEUTIC ACT EA 15 MIN 3/21/2025 Hien Whittington, PT GP 1            PT G-Codes  Outcome Measure Options: AM-PAC 6 Clicks Basic Mobility (PT)  AM-PAC 6 Clicks Score (PT): 18  AM-PAC 6 Clicks Score (OT): 19    Hien Whittington PT  3/21/2025

## 2025-03-21 NOTE — PROGRESS NOTES
[facility]   Hospitalist Progress Note  Date: 3/21/2025  Patient Name: Mary Albarran  : 1950  MRN: [id number]  Date of admission: 3/18/2025      Subjective   Subjective     Chief Complaint: Right leg redness    Summary: Mary Albarran is a 74-year-old female with a past medical history significant for morbid obesity, moderate aortic stenosis, CKD stage IIIa, diabetes mellitus, COPD on home oxygen, hypertension, dyslipidemia, who came to the emergency department on 3/1825 with a chief complaint of right leg swelling and redness with cellulitic changes.  Patient had a recent hospitalization for strep bacteremia and gram-negative bacteremia.  Patient admitted to hospitalist service with worsening right lower extremity cellulitis.  Patient placed on broad-spectrum antibiotics.  Also concern for A-fib with RVR.  Cardizem drip ordered.  Rate control achieved.  Cardizem discontinued.    Interval Followup: Seen and examined patient this morning.  Patient still with extreme right lower extremity pain, erythema and edema.  Patient still with fluctuating heart rate.  On 3 L nasal cannula satting 94%.      Objective   Objective     Vitals:   Temp:  [97.3 °F (36.3 °C)-97.9 °F (36.6 °C)] 97.9 °F (36.6 °C)  Heart Rate:  [] 114  Resp:  [20-22] 20  BP: (111-140)/(47-76) 135/66  Flow (L/min) (Oxygen Therapy):  [2-3] 2    Physical Exam   GEN: No acute distress  HEENT: Moist mucous membranes  LUNGS: Equal chest rise bilaterally  CARDIAC: Regular rate and rhythm  NEURO: Moving all 4 extremities spontaneously  SKIN: Bilateral lower extremity edema.  Right lower extremity erythema with cellulitis.        Result Review    Result Review:  I have personally reviewed the results from the time of this admission to 3/21/2025 13:47 EDT and agree with these findings:  []  Laboratory  []  Microbiology  []  Radiology  []  EKG/Telemetry   []  Cardiology/Vascular   []  Pathology  []  Old records  []  Other:    Assessment & Plan    Assessment / Plan     Assessment:  Sepsis due to right lower leg and foot cellulitis  A-fib with RVR  Acute heart failure exacerbation with reduced EF of 46 to 50%  Life-threatening hyperkalemia--resolved  Recent streptococcal bacteremia  Recent gram-negative bacteremia  Obesity BMI 49    Plan:  Continue to monitor in the hospital further workup and management of the above  MRI of right tib-fib and ankle 2/2025 negative for abscess, negative for osteomyelitis  Duplex venous ultrasound ordered and pending  Continue IV Lasix  Metoprolol increased 125 mg twice daily  Continue Zosyn and vancomycin  Continue Xarelto  Continue Brovana/Pulmicort/DuoNebs  Wound care  Heart healthy diet  SSI  PT/OT  CBC, CMP reviewed  A.m. labs--monitor creatinine while diuresing  Patient would benefit greatly with home health--SW following     Discussed plan with RN.    VTE Prophylaxis: Xarelto  Pharmacologic VTE prophylaxis orders are present.        CODE STATUS:   Code Status (Patient has no pulse and is not breathing): CPR (Attempt to Resuscitate)  Medical Interventions (Patient has pulse or is breathing): Full Support  Level Of Support Discussed With: Patient        Electronically signed by Yesica Luke PA-C, 03/21/25, 1:47 PM EDT.    Patient independently seen and evaluated, agree with assessment and plan, above documentation reflects plan put forth during bedside rounds.  More than 51% of the time of this patient encounter was performed by me.    Interval history:  No acute events overnight, patient improving    GEN: No acute distress  HEENT: Moist mucous membranes  LUNGS: Equal chest rise bilaterally  CARDIAC: Regular rate and rhythm  NEURO: Moving all 4 extremities spontaneously  SKIN: Breakdown of bilateral lower extremities noted    Plan:  Agree with assessment plan as above  MRI of the right tib-fib and ankle February 2025 negative for abscess, negative for osteomyelitis, if no improvement will consider repeat  imaging  Continue IV Lasix  Increase metoprolol to 125 mg twice daily  Continue Zosyn and vancomycin for now  Continue Xarelto  Continue improving  Healthy heart diet  PT/OT  CBC, CMP reviewed  Repeat CBC, CMP, mag and Phos in a.m.      Electronically signed by Campos Bobo MD, 3/21/2025, 16:09 EDT.

## 2025-03-21 NOTE — PROGRESS NOTES
"[facility] Clinical Pharmacy Services: Vancomycin Monitoring Note    Mary Albarran is a 74 y.o. female who is on day 4 of pharmacy to dose vancomycin for Bacteremia and Skin and Soft Tissue.    Previous Vancomycin Dose:   2250 mg IV x1 on 3/18 @1239  Imaging Reviewed?: Yes - no specific findings of osteomyelitis, could represent cellulitis per foot XRs  Updated Cultures and Sensitivities:   3/18 Blood culture- NGTD    Vitals/Labs  Ht: 160 cm (63\"); Wt: 126 kg (278 lb)   Temp (24hrs), Av.6 °F (36.4 °C), Min:97.3 °F (36.3 °C), Max:97.9 °F (36.6 °C)   Estimated Creatinine Clearance: 60.2 mL/min (A) (by C-G formula based on SCr of 1.09 mg/dL (H)).     Results from last 7 days   Lab Units 25  0437 25  0513 25  0524   VANCOMYCIN RM mcg/mL  --  14.91  --    CREATININE mg/dL 1.09* 1.21* 1.18*   WBC 10*3/mm3 5.05 6.91 10.81*     Assessment/Plan    Current Vancomycin Dose:  1500 mg IV every 24 hours; which provides the following predicted parameters:    AUC24,ss: 490 mg/L.hr  Probability of AUC24 > 400: 91 %  Ctrough,ss: 10.1 mg/L  Probability of Ctrough,ss > 20: 2 %  Probability of nephrotoxicity (Lodise ERIC ): 6 %    We will continue to monitor patient changes and renal function     Thank you for involving pharmacy in this patient's care. Please contact pharmacy with any questions or concerns.    Lori Benitez [facility]  Clinical Pharmacist    "

## 2025-03-21 NOTE — PROGRESS NOTES
Respiratory Therapist Broncho-Pulmonary Hygiene Progress Note      Patient Name:  Mary Albarran  YOB: 1950    Mary Albarran meets the qualification for Level 1 of the Bronco-Pulmonary Hygiene Protocol. This was based on my daily patient assessment and includes review of chest x-ray results, cough ability and quality, oxygenation, secretions or risk for secretion development and patient mobility.     Broncho-Pulmonary Hygiene Assessment:    Level of Movement: Actively changing positions without assistance  Alert/ oriented/ cooperative    Breath Sounds: Clear to slightly diminished    Cough: Strong, effective    Chest X-Ray: Possible signs of consolidation and/or atelectasis or clear.     Sputum Productions: None or small amount of thin or watery secretions with effective cough    History and Physical: Chronic condition    SpO2 to Oxygen Need: greater than 92% on room air or  less than 3L nasal canula    Current SpO2 is: 97% on 3L    Based on this information, I have completed the following interventions: Teach/Instruct patient on cough and deep breathe      Electronically signed by Mercedes Ruiz RRT, 03/21/25, 6:28 AM EDT.

## 2025-03-21 NOTE — PLAN OF CARE
Goal Outcome Evaluation:              Outcome Evaluation: Pt is on 2ltr NC,  pulse O2 changed finger pleth improved, legs have been washed and lotion applied, pt seems sleepy (more than normal) plan of care continues.

## 2025-03-22 LAB
ALBUMIN SERPL-MCNC: 3.4 G/DL (ref 3.5–5.2)
ALBUMIN/GLOB SERPL: 0.9 G/DL
ALP SERPL-CCNC: 78 U/L (ref 39–117)
ALT SERPL W P-5'-P-CCNC: 20 U/L (ref 1–33)
ANION GAP SERPL CALCULATED.3IONS-SCNC: 9.8 MMOL/L (ref 5–15)
AST SERPL-CCNC: 24 U/L (ref 1–32)
BASOPHILS # BLD AUTO: 0.03 10*3/MM3 (ref 0–0.2)
BASOPHILS NFR BLD AUTO: 0.5 % (ref 0–1.5)
BILIRUB SERPL-MCNC: 0.7 MG/DL (ref 0–1.2)
BUN SERPL-MCNC: 25 MG/DL (ref 8–23)
BUN/CREAT SERPL: 27.5 (ref 7–25)
CALCIUM SPEC-SCNC: 8.7 MG/DL (ref 8.6–10.5)
CHLORIDE SERPL-SCNC: 99 MMOL/L (ref 98–107)
CO2 SERPL-SCNC: 28.2 MMOL/L (ref 22–29)
CREAT SERPL-MCNC: 0.91 MG/DL (ref 0.57–1)
DEPRECATED RDW RBC AUTO: 62.6 FL (ref 37–54)
EGFRCR SERPLBLD CKD-EPI 2021: 67.2 ML/MIN/1.73
EOSINOPHIL # BLD AUTO: 0.18 10*3/MM3 (ref 0–0.4)
EOSINOPHIL NFR BLD AUTO: 3.2 % (ref 0.3–6.2)
ERYTHROCYTE [DISTWIDTH] IN BLOOD BY AUTOMATED COUNT: 17.2 % (ref 12.3–15.4)
GLOBULIN UR ELPH-MCNC: 3.6 GM/DL
GLUCOSE BLDC GLUCOMTR-MCNC: 136 MG/DL (ref 70–99)
GLUCOSE BLDC GLUCOMTR-MCNC: 142 MG/DL (ref 70–99)
GLUCOSE BLDC GLUCOMTR-MCNC: 151 MG/DL (ref 70–99)
GLUCOSE BLDC GLUCOMTR-MCNC: 221 MG/DL (ref 70–99)
GLUCOSE SERPL-MCNC: 162 MG/DL (ref 65–99)
HCT VFR BLD AUTO: 37.5 % (ref 34–46.6)
HGB BLD-MCNC: 11 G/DL (ref 12–15.9)
IMM GRANULOCYTES # BLD AUTO: 0.01 10*3/MM3 (ref 0–0.05)
IMM GRANULOCYTES NFR BLD AUTO: 0.2 % (ref 0–0.5)
LYMPHOCYTES # BLD AUTO: 1.09 10*3/MM3 (ref 0.7–3.1)
LYMPHOCYTES NFR BLD AUTO: 19.3 % (ref 19.6–45.3)
MAGNESIUM SERPL-MCNC: 1.6 MG/DL (ref 1.6–2.4)
MCH RBC QN AUTO: 29.5 PG (ref 26.6–33)
MCHC RBC AUTO-ENTMCNC: 29.3 G/DL (ref 31.5–35.7)
MCV RBC AUTO: 100.5 FL (ref 79–97)
MONOCYTES # BLD AUTO: 0.47 10*3/MM3 (ref 0.1–0.9)
MONOCYTES NFR BLD AUTO: 8.3 % (ref 5–12)
NEUTROPHILS NFR BLD AUTO: 3.88 10*3/MM3 (ref 1.7–7)
NEUTROPHILS NFR BLD AUTO: 68.5 % (ref 42.7–76)
NRBC BLD AUTO-RTO: 0 /100 WBC (ref 0–0.2)
PHOSPHATE SERPL-MCNC: 3 MG/DL (ref 2.5–4.5)
PLATELET # BLD AUTO: 115 10*3/MM3 (ref 140–450)
PMV BLD AUTO: 12.9 FL (ref 6–12)
POTASSIUM SERPL-SCNC: 4.4 MMOL/L (ref 3.5–5.2)
PROT SERPL-MCNC: 7 G/DL (ref 6–8.5)
RBC # BLD AUTO: 3.73 10*6/MM3 (ref 3.77–5.28)
SODIUM SERPL-SCNC: 137 MMOL/L (ref 136–145)
WBC NRBC COR # BLD AUTO: 5.66 10*3/MM3 (ref 3.4–10.8)

## 2025-03-22 PROCEDURE — 25010000002 PIPERACILLIN SOD-TAZOBACTAM PER 1 G: Performed by: INTERNAL MEDICINE

## 2025-03-22 PROCEDURE — 82948 REAGENT STRIP/BLOOD GLUCOSE: CPT | Performed by: INTERNAL MEDICINE

## 2025-03-22 PROCEDURE — 63710000001 INSULIN LISPRO (HUMAN) PER 5 UNITS: Performed by: INTERNAL MEDICINE

## 2025-03-22 PROCEDURE — 25010000002 VANCOMYCIN 5 G RECONSTITUTED SOLUTION: Performed by: FAMILY MEDICINE

## 2025-03-22 PROCEDURE — 25010000002 FUROSEMIDE PER 20 MG: Performed by: INTERNAL MEDICINE

## 2025-03-22 PROCEDURE — 94761 N-INVAS EAR/PLS OXIMETRY MLT: CPT

## 2025-03-22 PROCEDURE — 80053 COMPREHEN METABOLIC PANEL: CPT

## 2025-03-22 PROCEDURE — 83735 ASSAY OF MAGNESIUM: CPT

## 2025-03-22 PROCEDURE — 94664 DEMO&/EVAL PT USE INHALER: CPT

## 2025-03-22 PROCEDURE — 94799 UNLISTED PULMONARY SVC/PX: CPT

## 2025-03-22 PROCEDURE — 25810000003 SODIUM CHLORIDE 0.9 % SOLUTION: Performed by: FAMILY MEDICINE

## 2025-03-22 PROCEDURE — 63710000001 INSULIN GLARGINE PER 5 UNITS: Performed by: FAMILY MEDICINE

## 2025-03-22 PROCEDURE — 99232 SBSQ HOSP IP/OBS MODERATE 35: CPT | Performed by: FAMILY MEDICINE

## 2025-03-22 PROCEDURE — 25810000003 SODIUM CHLORIDE 0.9 % SOLUTION 250 ML FLEX CONT: Performed by: INTERNAL MEDICINE

## 2025-03-22 PROCEDURE — 25010000002 VANCOMYCIN 1 G RECONSTITUTED SOLUTION 1 EACH VIAL: Performed by: INTERNAL MEDICINE

## 2025-03-22 PROCEDURE — 84100 ASSAY OF PHOSPHORUS: CPT

## 2025-03-22 PROCEDURE — 82948 REAGENT STRIP/BLOOD GLUCOSE: CPT

## 2025-03-22 PROCEDURE — 85025 COMPLETE CBC W/AUTO DIFF WBC: CPT

## 2025-03-22 RX ADMIN — NYSTATIN: 100000 POWDER TOPICAL at 21:22

## 2025-03-22 RX ADMIN — ASPIRIN 81 MG: 81 TABLET, COATED ORAL at 08:35

## 2025-03-22 RX ADMIN — PREGABALIN 150 MG: 75 CAPSULE ORAL at 08:35

## 2025-03-22 RX ADMIN — HYDROCODONE BITARTRATE AND ACETAMINOPHEN 1 TABLET: 10; 325 TABLET ORAL at 16:40

## 2025-03-22 RX ADMIN — HYDROCODONE BITARTRATE AND ACETAMINOPHEN 1 TABLET: 10; 325 TABLET ORAL at 21:09

## 2025-03-22 RX ADMIN — PIPERACILLIN AND TAZOBACTAM 4.5 G: 4; .5 INJECTION, POWDER, FOR SOLUTION INTRAVENOUS; PARENTERAL at 12:22

## 2025-03-22 RX ADMIN — Medication: at 12:21

## 2025-03-22 RX ADMIN — RIVAROXABAN 15 MG: 15 TABLET, FILM COATED ORAL at 08:35

## 2025-03-22 RX ADMIN — METOPROLOL SUCCINATE 125 MG: 50 TABLET, FILM COATED, EXTENDED RELEASE ORAL at 08:35

## 2025-03-22 RX ADMIN — PREGABALIN 150 MG: 75 CAPSULE ORAL at 21:08

## 2025-03-22 RX ADMIN — BUDESONIDE 0.5 MG: 0.5 INHALANT RESPIRATORY (INHALATION) at 20:59

## 2025-03-22 RX ADMIN — FUROSEMIDE 40 MG: 10 INJECTION, SOLUTION INTRAMUSCULAR; INTRAVENOUS at 17:35

## 2025-03-22 RX ADMIN — ARFORMOTEROL TARTRATE 15 MCG: 15 SOLUTION RESPIRATORY (INHALATION) at 06:48

## 2025-03-22 RX ADMIN — SODIUM CHLORIDE 1000 MG: 9 INJECTION, SOLUTION INTRAVENOUS at 21:16

## 2025-03-22 RX ADMIN — METOPROLOL SUCCINATE 125 MG: 50 TABLET, FILM COATED, EXTENDED RELEASE ORAL at 21:08

## 2025-03-22 RX ADMIN — INSULIN LISPRO 2 UNITS: 100 INJECTION, SOLUTION INTRAVENOUS; SUBCUTANEOUS at 12:21

## 2025-03-22 RX ADMIN — ARFORMOTEROL TARTRATE 15 MCG: 15 SOLUTION RESPIRATORY (INHALATION) at 20:59

## 2025-03-22 RX ADMIN — Medication 10 ML: at 08:36

## 2025-03-22 RX ADMIN — INSULIN LISPRO 3 UNITS: 100 INJECTION, SOLUTION INTRAVENOUS; SUBCUTANEOUS at 21:09

## 2025-03-22 RX ADMIN — BUDESONIDE 0.5 MG: 0.5 INHALANT RESPIRATORY (INHALATION) at 06:48

## 2025-03-22 RX ADMIN — VANCOMYCIN HYDROCHLORIDE 1500 MG: 5 INJECTION, POWDER, LYOPHILIZED, FOR SOLUTION INTRAVENOUS at 08:36

## 2025-03-22 RX ADMIN — ATORVASTATIN CALCIUM 20 MG: 20 TABLET, FILM COATED ORAL at 08:35

## 2025-03-22 RX ADMIN — HYDROCODONE BITARTRATE AND ACETAMINOPHEN 1 TABLET: 10; 325 TABLET ORAL at 08:36

## 2025-03-22 RX ADMIN — NYSTATIN: 100000 POWDER TOPICAL at 12:22

## 2025-03-22 RX ADMIN — HYDROCODONE BITARTRATE AND ACETAMINOPHEN 1 TABLET: 10; 325 TABLET ORAL at 12:21

## 2025-03-22 RX ADMIN — Medication 10 ML: at 21:09

## 2025-03-22 RX ADMIN — PIPERACILLIN AND TAZOBACTAM 4.5 G: 4; .5 INJECTION, POWDER, FOR SOLUTION INTRAVENOUS; PARENTERAL at 17:35

## 2025-03-22 RX ADMIN — INSULIN GLARGINE 15 UNITS: 100 INJECTION, SOLUTION SUBCUTANEOUS at 08:35

## 2025-03-22 RX ADMIN — FUROSEMIDE 40 MG: 10 INJECTION, SOLUTION INTRAMUSCULAR; INTRAVENOUS at 08:36

## 2025-03-22 NOTE — PROGRESS NOTES
Respiratory Therapist Broncho-Pulmonary Hygiene Progress Note      Patient Name:  Mary Albarran  YOB: 1950    Mary Albarran meets the qualification for Level 1 of the Bronco-Pulmonary Hygiene Protocol. This was based on my daily patient assessment and includes review of chest x-ray results, cough ability and quality, oxygenation, secretions or risk for secretion development and patient mobility.     Broncho-Pulmonary Hygiene Assessment:    Level of Movement: Actively changing positions without assistance  Alert/ oriented/ cooperative    Breath Sounds: Clear to slightly diminished    Cough: Strong, effective    Chest X-Ray: Possible signs of consolidation and/or atelectasis or clear.     Sputum Productions: None or small amount of thin or watery secretions with effective cough    History and Physical: Chronic condition    SpO2 to Oxygen Need: greater than 92% on room air or  less than 3L nasal canula    Current SpO2 is: 92% on 2l    Based on this information, I have completed the following interventions: Teach/Instruct patient on cough and deep breathe      Electronically signed by Mercedes Ruiz RRT, 03/22/25, 6:51 AM EDT.

## 2025-03-22 NOTE — PLAN OF CARE
Goal Outcome Evaluation:   Patient rested through the night, c/o pain treated per MAR, continues pn IV abt, no signs of adverse reaction. Will continue with plan of care

## 2025-03-22 NOTE — PROGRESS NOTES
[facility]   Hospitalist Progress Note  Date: 3/22/2025  Patient Name: Mary Albarran  : 1950  MRN: [id number]  Date of admission: 3/18/2025      Subjective   Subjective     Chief complaint: Right leg redness    Summary:  74-year-old female with history of morbid obesity, moderate aortic stenosis, CKD stage IIIa, diabetes mellitus, COPD with home oxygen, hypertension, dyslipidemia, hospitalized on 3/18/2025 with chief complaint of right leg swelling and redness and cellulitic changes, with recent hospitalization for strep bacteremia and gram-negative bacteremia, hospitalized with worsening right lower extremity cellulitis over the past 36 hours, placed on broad-spectrum antibiotic, concern for A-fib with RVR, Cardizem ordered, rate control achieved, Cardizem discontinued.    Interval follow-up: Seen and examined, no distress, no events overnight, lower extremity pain and swelling and cellulitis improving, no chest pain or shortness of breath outside of her baseline.  On 2 L, sats in the 90s.  No nausea or vomiting.  No diarrhea.  Creatinine 0.91, BUN 25, potassium 4.4, sodium 137.  White blood cell count 5000.  Hemoglobin 11.0.    Review of systems:  All systems reviewed and negative except weakness, fatigue, right lower extremity pain    Objective   Objective     Vitals:   Temp:  [97.3 °F (36.3 °C)-98.1 °F (36.7 °C)] 98.1 °F (36.7 °C)  Heart Rate:  [] 109  Resp:  [18-22] 20  BP: (103-137)/(51-75) 121/55  Flow (L/min) (Oxygen Therapy):  [2] 2  Physical Exam               Constitutional: Ill-appearing nontoxic              Eyes: Pupils equal, sclerae anicteric, no conjunctival injection              HENT: NCAT, mucous membranes moist              Neck: Supple, full range of motion              Respiratory: Diminished to auscultation bilaterally, nonlabored respirations               Cardiovascular: Irregular rate, regular rhythm, 2/6 systolic ejection murmur rubs, or gallops, palpable pedal pulses  bilaterally              Gastrointestinal: Positive bowel sounds, soft, nontender, nondistended              Musculoskeletal: No bilateral ankle edema, no clubbing or cyanosis to extremities              Psychiatric: Appropriate affect, cooperative              Neurologic: Oriented x 3, strength symmetric in all extremities, Cranial Nerves grossly intact to confrontation, speech clear              Skin: Right leg shows significant edema with blistering covered with dressing. Lesion right fifth metatarsal head it is draining.  Area is erythematous and warm to touch.  See photo    Result Review    Result Review:  I have personally reviewed the pertinent results from the past 24 hours to 3/22/2025 12:18 EDT and agree with these findings:  [x]  Laboratory   CBC          3/20/2025    05:13 3/21/2025    04:37 3/22/2025    05:01   CBC   WBC 6.91  5.05  5.66    RBC 3.54  3.48  3.73    Hemoglobin 10.9  10.0  11.0    Hematocrit 35.5  35.4  37.5    .3  101.7  100.5    MCH 30.8  28.7  29.5    MCHC 30.7  28.2  29.3    RDW 17.6  17.3  17.2    Platelets 110  103  115      BMP          3/20/2025    05:13 3/21/2025    04:37 3/22/2025    05:01   BMP   BUN 33  31  25    Creatinine 1.21  1.09  0.91    Sodium 139  137  137    Potassium 4.0  4.1  4.4    Chloride 99  97  99    CO2 28.6  29.6  28.2    Calcium 8.4  8.3  8.7      LIVER FUNCTION TESTS:      Lab 03/22/25  0501 03/21/25  0437 03/20/25  0513 03/19/25  0524 03/18/25  0751   TOTAL PROTEIN 7.0 6.7 6.8 6.4 7.1   ALBUMIN 3.4* 3.3* 3.3* 3.2* 3.5   GLOBULIN 3.6  --   --  3.2 3.6   ALT (SGPT) 20 19 18 16 20   AST (SGOT) 24 24 21 17 24   BILIRUBIN 0.7 0.6 0.8 0.9 1.2   INDIRECT BILIRUBIN  --  0.3 0.4  --   --    BILIRUBIN DIRECT  --  0.3 0.4*  --   --    ALK PHOS 78 78 60 62 85       [x]  Microbiology   Microbiology Results (last 10 days)       Procedure Component Value - Date/Time    COVID-19, FLU A/B, RSV PCR 1 HR TAT - Swab, Nasopharynx [[id number]]  (Normal) Collected: 03/18/25  1130    Lab Status: Final result Specimen: Swab from Nasopharynx Updated: 03/18/25 1214     COVID19 Not Detected     Influenza A PCR Not Detected     Influenza B PCR Not Detected     RSV, PCR Not Detected    Narrative:      Fact sheet for providers: https://www.fda.gov/media/765029/download    Fact sheet for patients: https://www.fda.gov/media/843658/download    Test performed by PCR.    Blood Culture - Blood, Arm, Left [[id number]]  (Normal) Collected: 03/18/25 1130    Lab Status: Preliminary result Specimen: Blood from Arm, Left Updated: 03/22/25 1145     Blood Culture No growth at 4 days    Narrative:      Less than seven (7) mL's of blood was collected.  Insufficient quantity may yield false negative results.    Blood Culture - Blood, Arm, Right [[id number]]  (Normal) Collected: 03/18/25 1130    Lab Status: Preliminary result Specimen: Blood from Arm, Right Updated: 03/22/25 1145     Blood Culture No growth at 4 days    Narrative:      Less than seven (7) mL's of blood was collected.  Insufficient quantity may yield false negative results.              [x]  Radiology XR Foot 2 View Left  Result Date: 3/18/2025  Impression: No specific finding of osteomyelitis. Soft tissue swelling in the visualized extremity could represent cellulitis, dependent edema and/or posttraumatic change. Electronically Signed: Thom Vega MD  3/18/2025 2:07 PM EDT  Workstation ID: [id number]    XR Foot 2 View Right  Result Date: 3/18/2025  Impression: Soft tissue swelling throughout the visualized extremity could represent dependent edema and/or cellulitis. No specific radiographic finding of osteomyelitis. Electronically Signed: Thom Vega MD  3/18/2025 2:05 PM EDT  Workstation ID: [id number]    XR Chest 1 View  Result Date: 3/18/2025  Impression: Suboptimal portable radiograph. There is possibly bibasilar airspace disease that could be related to atelectasis and/or pneumonia. Cardiomegaly. Electronically Signed: Ishaan Martinez MD   3/18/2025 8:13 AM EDT  Workstation ID: [id number]        [x]  EKG/Telemetry   ECG 12 Lead ED Triage Standing Order; SOA   Final Result   HEART ORBX=121  bpm   RR Jvcsmlhd=405  ms   ND Interval=  ms   P Horizontal Axis=  deg   P Front Axis=  deg   QRSD Interval=76  ms   QT Wtpfstbj=417  ms   PChL=576  ms   QRS Axis=116  deg   T Wave Axis=88  deg   - ABNORMAL ECG -   Atrial fibrillation   Right axis deviation   Low voltage, precordial leads   Prolonged QT interval   When compared with ECG of 11-Feb-2025 03:39:33,   Significant rate increase   Significant repolarization change   Electronically Signed By: Joaquin Gonzalez ([facility]) 2025-03-18 14:47:50   Date and Time of Study:2025-03-18 07:43:35          []  Cardiology/Vascular   []  Pathology  [x]  Old records  []  Other:    Assessment & Plan   Assessment / Plan     Assessment/Plan:  Assessment:  Sepsis due to right lower leg and foot cellulitis (leukocytosis and tachycardia) without endorgan damage  A-fib with RVR  Acute heart failure exacerbation with reduced EF of 46 to 50% RVSP of 45 to 55 mm per mercury  Life-threatening hyperkalemia  Recent streptococcal bacteremia  Recent gram-negative bacteremia  Obesity BMI 46  On home oxygen     Plan:  Labs and imaging reviewed  Still with significant edema and erythema  Continue metoprolol XL to 100 mg twice a day  Continue aggressive wound care  Continue Lasix 40 mg IV twice daily  Strict I's and O's  Daily weights  Continue Xarelto  Continue Zosyn and vancomycin  Continue Lantus 15 units daily with insulin sliding scale coverage  Continue telemetry monitor  Continue Lipitor, aspirin  Wound care consult  Notify MD if heart rate becomes uncontrolled  Continue pain control  A.m. labs  Full code  DVT prophylaxis Xarelto  Clinical course dictate further management  Discussed with nurse at the bedside    VTE Prophylaxis:  Pharmacologic VTE prophylaxis orders are present.        CODE STATUS:   Code Status (Patient has no pulse and is not  breathing): CPR (Attempt to Resuscitate)  Medical Interventions (Patient has pulse or is breathing): Full Support  Level Of Support Discussed With: Patient        Electronically signed by Melody Luna MD, 3/22/2025, 12:18 EDT.    Portions of this documentation were transcribed electronically from a voice recognition software.  I confirm all data accurately represents the service(s) I performed at today's visit.

## 2025-03-22 NOTE — PLAN OF CARE
Goal Outcome Evaluation:           Progress: no change  Outcome Evaluation: No acute changes throughout shift, vss, wound care performed, pain treated per MAR, pt standby to bsc.

## 2025-03-22 NOTE — PROGRESS NOTES
"[facility] Clinical Pharmacy Services: Vancomycin Monitoring Note    Mary Albarran is a 74 y.o. female who is on day 5 of pharmacy to dose vancomycin for Bacteremia and Skin and Soft Tissue.      Imaging Reviewed?: Yes  Updated Cultures and Sensitivities:   3/18 Blood culture- NGTD    Vitals/Labs  Ht: 160 cm (63\"); Wt: 126 kg (278 lb)   Temp (24hrs), Av.7 °F (36.5 °C), Min:97.3 °F (36.3 °C), Max:97.9 °F (36.6 °C)   Estimated Creatinine Clearance: 72.2 mL/min (by C-G formula based on SCr of 0.91 mg/dL).     Results from last 7 days   Lab Units 25  0501 25  0437 25  0513   VANCOMYCIN RM mcg/mL  --   --  14.91   CREATININE mg/dL 0.91 1.09* 1.21*   WBC 10*3/mm3 5.66 5.05 6.91     Assessment/Plan    Current Vancomycin Dose:  1000 mg IV every 12 hours; which provides the following predicted parameters:  AUC24,ss: 551 mg/L.hr  Probability of AUC24 > 400: 97 %  Ctrough,ss: 15.4 mg/L  Probability of Ctrough,ss > 20: 2 %  Probability of nephrotoxicity (Lodise ERIC ): 11 %      We will continue to monitor patient changes and renal function     Thank you for involving pharmacy in this patient's care. Please contact pharmacy with any questions or concerns.    Felicia Guido  Clinical Pharmacist      "

## 2025-03-23 LAB
ALBUMIN SERPL-MCNC: 3.6 G/DL (ref 3.5–5.2)
ALP SERPL-CCNC: 68 U/L (ref 39–117)
ALT SERPL W P-5'-P-CCNC: 18 U/L (ref 1–33)
ANION GAP SERPL CALCULATED.3IONS-SCNC: 11.2 MMOL/L (ref 5–15)
AST SERPL-CCNC: 23 U/L (ref 1–32)
BACTERIA SPEC AEROBE CULT: NORMAL
BACTERIA SPEC AEROBE CULT: NORMAL
BASOPHILS # BLD AUTO: 0.05 10*3/MM3 (ref 0–0.2)
BASOPHILS NFR BLD AUTO: 0.9 % (ref 0–1.5)
BILIRUB CONJ SERPL-MCNC: 0.4 MG/DL (ref 0–0.3)
BILIRUB INDIRECT SERPL-MCNC: 0.5 MG/DL
BILIRUB SERPL-MCNC: 0.9 MG/DL (ref 0–1.2)
BUN SERPL-MCNC: 24 MG/DL (ref 8–23)
BUN/CREAT SERPL: 24.2 (ref 7–25)
CALCIUM SPEC-SCNC: 8.7 MG/DL (ref 8.6–10.5)
CHLORIDE SERPL-SCNC: 98 MMOL/L (ref 98–107)
CO2 SERPL-SCNC: 29.8 MMOL/L (ref 22–29)
CREAT SERPL-MCNC: 0.99 MG/DL (ref 0.57–1)
DEPRECATED RDW RBC AUTO: 60.3 FL (ref 37–54)
EGFRCR SERPLBLD CKD-EPI 2021: 60.7 ML/MIN/1.73
EOSINOPHIL # BLD AUTO: 0.2 10*3/MM3 (ref 0–0.4)
EOSINOPHIL NFR BLD AUTO: 3.7 % (ref 0.3–6.2)
ERYTHROCYTE [DISTWIDTH] IN BLOOD BY AUTOMATED COUNT: 17 % (ref 12.3–15.4)
GLUCOSE BLDC GLUCOMTR-MCNC: 118 MG/DL (ref 70–99)
GLUCOSE BLDC GLUCOMTR-MCNC: 137 MG/DL (ref 70–99)
GLUCOSE BLDC GLUCOMTR-MCNC: 186 MG/DL (ref 70–99)
GLUCOSE BLDC GLUCOMTR-MCNC: 203 MG/DL (ref 70–99)
GLUCOSE SERPL-MCNC: 101 MG/DL (ref 65–99)
HCT VFR BLD AUTO: 37.9 % (ref 34–46.6)
HGB BLD-MCNC: 11 G/DL (ref 12–15.9)
IMM GRANULOCYTES # BLD AUTO: 0.02 10*3/MM3 (ref 0–0.05)
IMM GRANULOCYTES NFR BLD AUTO: 0.4 % (ref 0–0.5)
LYMPHOCYTES # BLD AUTO: 1.21 10*3/MM3 (ref 0.7–3.1)
LYMPHOCYTES NFR BLD AUTO: 22.5 % (ref 19.6–45.3)
MAGNESIUM SERPL-MCNC: 1.6 MG/DL (ref 1.6–2.4)
MCH RBC QN AUTO: 28.7 PG (ref 26.6–33)
MCHC RBC AUTO-ENTMCNC: 29 G/DL (ref 31.5–35.7)
MCV RBC AUTO: 99 FL (ref 79–97)
MONOCYTES # BLD AUTO: 0.54 10*3/MM3 (ref 0.1–0.9)
MONOCYTES NFR BLD AUTO: 10 % (ref 5–12)
NEUTROPHILS NFR BLD AUTO: 3.36 10*3/MM3 (ref 1.7–7)
NEUTROPHILS NFR BLD AUTO: 62.5 % (ref 42.7–76)
NRBC BLD AUTO-RTO: 0 /100 WBC (ref 0–0.2)
PHOSPHATE SERPL-MCNC: 3.3 MG/DL (ref 2.5–4.5)
PLATELET # BLD AUTO: 111 10*3/MM3 (ref 140–450)
PMV BLD AUTO: 12.9 FL (ref 6–12)
POTASSIUM SERPL-SCNC: 3.8 MMOL/L (ref 3.5–5.2)
PROT SERPL-MCNC: 7.1 G/DL (ref 6–8.5)
RBC # BLD AUTO: 3.83 10*6/MM3 (ref 3.77–5.28)
SODIUM SERPL-SCNC: 139 MMOL/L (ref 136–145)
WBC NRBC COR # BLD AUTO: 5.38 10*3/MM3 (ref 3.4–10.8)

## 2025-03-23 PROCEDURE — 94799 UNLISTED PULMONARY SVC/PX: CPT

## 2025-03-23 PROCEDURE — 94761 N-INVAS EAR/PLS OXIMETRY MLT: CPT

## 2025-03-23 PROCEDURE — 80048 BASIC METABOLIC PNL TOTAL CA: CPT | Performed by: FAMILY MEDICINE

## 2025-03-23 PROCEDURE — 80076 HEPATIC FUNCTION PANEL: CPT | Performed by: FAMILY MEDICINE

## 2025-03-23 PROCEDURE — 84100 ASSAY OF PHOSPHORUS: CPT | Performed by: FAMILY MEDICINE

## 2025-03-23 PROCEDURE — 94664 DEMO&/EVAL PT USE INHALER: CPT

## 2025-03-23 PROCEDURE — 82948 REAGENT STRIP/BLOOD GLUCOSE: CPT

## 2025-03-23 PROCEDURE — 25810000003 SODIUM CHLORIDE 0.9 % SOLUTION: Performed by: INTERNAL MEDICINE

## 2025-03-23 PROCEDURE — 25010000002 FUROSEMIDE PER 20 MG: Performed by: INTERNAL MEDICINE

## 2025-03-23 PROCEDURE — 83735 ASSAY OF MAGNESIUM: CPT | Performed by: FAMILY MEDICINE

## 2025-03-23 PROCEDURE — 63710000001 INSULIN GLARGINE PER 5 UNITS: Performed by: FAMILY MEDICINE

## 2025-03-23 PROCEDURE — 25010000002 PIPERACILLIN SOD-TAZOBACTAM PER 1 G: Performed by: INTERNAL MEDICINE

## 2025-03-23 PROCEDURE — 63710000001 INSULIN LISPRO (HUMAN) PER 5 UNITS: Performed by: INTERNAL MEDICINE

## 2025-03-23 PROCEDURE — 85025 COMPLETE CBC W/AUTO DIFF WBC: CPT | Performed by: FAMILY MEDICINE

## 2025-03-23 PROCEDURE — 25010000002 VANCOMYCIN 5 G RECONSTITUTED SOLUTION: Performed by: INTERNAL MEDICINE

## 2025-03-23 PROCEDURE — 99232 SBSQ HOSP IP/OBS MODERATE 35: CPT | Performed by: FAMILY MEDICINE

## 2025-03-23 PROCEDURE — 82948 REAGENT STRIP/BLOOD GLUCOSE: CPT | Performed by: INTERNAL MEDICINE

## 2025-03-23 RX ADMIN — HYDROCODONE BITARTRATE AND ACETAMINOPHEN 1 TABLET: 10; 325 TABLET ORAL at 02:01

## 2025-03-23 RX ADMIN — HYDROCODONE BITARTRATE AND ACETAMINOPHEN 1 TABLET: 10; 325 TABLET ORAL at 14:55

## 2025-03-23 RX ADMIN — ASPIRIN 81 MG: 81 TABLET, COATED ORAL at 09:39

## 2025-03-23 RX ADMIN — ATORVASTATIN CALCIUM 20 MG: 20 TABLET, FILM COATED ORAL at 09:39

## 2025-03-23 RX ADMIN — Medication 10 ML: at 09:40

## 2025-03-23 RX ADMIN — Medication: at 09:39

## 2025-03-23 RX ADMIN — PREGABALIN 150 MG: 75 CAPSULE ORAL at 09:39

## 2025-03-23 RX ADMIN — INSULIN GLARGINE 15 UNITS: 100 INJECTION, SOLUTION SUBCUTANEOUS at 09:39

## 2025-03-23 RX ADMIN — VANCOMYCIN HYDROCHLORIDE 750 MG: 5 INJECTION, POWDER, LYOPHILIZED, FOR SOLUTION INTRAVENOUS at 12:46

## 2025-03-23 RX ADMIN — FUROSEMIDE 40 MG: 10 INJECTION, SOLUTION INTRAMUSCULAR; INTRAVENOUS at 09:39

## 2025-03-23 RX ADMIN — ARFORMOTEROL TARTRATE 15 MCG: 15 SOLUTION RESPIRATORY (INHALATION) at 20:17

## 2025-03-23 RX ADMIN — FUROSEMIDE 40 MG: 10 INJECTION, SOLUTION INTRAMUSCULAR; INTRAVENOUS at 17:32

## 2025-03-23 RX ADMIN — METOPROLOL SUCCINATE 125 MG: 50 TABLET, FILM COATED, EXTENDED RELEASE ORAL at 20:44

## 2025-03-23 RX ADMIN — INSULIN LISPRO 3 UNITS: 100 INJECTION, SOLUTION INTRAVENOUS; SUBCUTANEOUS at 20:45

## 2025-03-23 RX ADMIN — PIPERACILLIN AND TAZOBACTAM 4.5 G: 4; .5 INJECTION, POWDER, FOR SOLUTION INTRAVENOUS; PARENTERAL at 17:32

## 2025-03-23 RX ADMIN — INSULIN LISPRO 2 UNITS: 100 INJECTION, SOLUTION INTRAVENOUS; SUBCUTANEOUS at 12:47

## 2025-03-23 RX ADMIN — NYSTATIN: 100000 POWDER TOPICAL at 09:39

## 2025-03-23 RX ADMIN — HYDROCODONE BITARTRATE AND ACETAMINOPHEN 1 TABLET: 10; 325 TABLET ORAL at 18:58

## 2025-03-23 RX ADMIN — NYSTATIN 1 APPLICATION: 100000 POWDER TOPICAL at 22:36

## 2025-03-23 RX ADMIN — PIPERACILLIN AND TAZOBACTAM 4.5 G: 4; .5 INJECTION, POWDER, FOR SOLUTION INTRAVENOUS; PARENTERAL at 02:04

## 2025-03-23 RX ADMIN — RIVAROXABAN 15 MG: 15 TABLET, FILM COATED ORAL at 09:39

## 2025-03-23 RX ADMIN — ARFORMOTEROL TARTRATE 15 MCG: 15 SOLUTION RESPIRATORY (INHALATION) at 06:54

## 2025-03-23 RX ADMIN — PREGABALIN 150 MG: 75 CAPSULE ORAL at 20:45

## 2025-03-23 RX ADMIN — HYDROCODONE BITARTRATE AND ACETAMINOPHEN 1 TABLET: 10; 325 TABLET ORAL at 10:52

## 2025-03-23 RX ADMIN — HYDROCODONE BITARTRATE AND ACETAMINOPHEN 1 TABLET: 10; 325 TABLET ORAL at 06:16

## 2025-03-23 RX ADMIN — BUDESONIDE 0.5 MG: 0.5 INHALANT RESPIRATORY (INHALATION) at 20:17

## 2025-03-23 RX ADMIN — BUDESONIDE 0.5 MG: 0.5 INHALANT RESPIRATORY (INHALATION) at 06:54

## 2025-03-23 RX ADMIN — PIPERACILLIN AND TAZOBACTAM 4.5 G: 4; .5 INJECTION, POWDER, FOR SOLUTION INTRAVENOUS; PARENTERAL at 09:39

## 2025-03-23 RX ADMIN — METOPROLOL SUCCINATE 125 MG: 50 TABLET, FILM COATED, EXTENDED RELEASE ORAL at 09:39

## 2025-03-23 RX ADMIN — Medication 10 ML: at 20:45

## 2025-03-23 NOTE — PROGRESS NOTES
"[facility] Clinical Pharmacy Services: Vancomycin Monitoring Note    Mary Albarran is a 74 y.o. female who is on day 6 of pharmacy to dose vancomycin for Bacteremia and Skin and Soft Tissue.      Imaging Reviewed?: Yes  Updated Cultures and Sensitivities:   3/18 Blood culture- NGTD    Vitals/Labs  Ht: 160 cm (63\"); Wt: 126 kg (278 lb)   Temp (24hrs), Av.9 °F (36.6 °C), Min:97.3 °F (36.3 °C), Max:98.1 °F (36.7 °C)   Estimated Creatinine Clearance: 66.3 mL/min (by C-G formula based on SCr of 0.99 mg/dL).     Results from last 7 days   Lab Units 25  0430 25  0501 25  0437 25  0513   VANCOMYCIN RM mcg/mL  --   --   --  14.91   CREATININE mg/dL 0.99 0.91 1.09* 1.21*   WBC 10*3/mm3 5.38 5.66 5.05 6.91     Assessment/Plan    Current Vancomycin Dose:  750 mg IV every 12 hours; which provides the following predicted parameters:  AUC24,ss: 448 mg/L.hr  Probability of AUC24 > 400: 77 %  Ctrough,ss: 12.8 mg/L  Probability of Ctrough,ss > 20: 0 %  Probability of nephrotoxicity (Lodise ERIC ): 8 %        We will continue to monitor patient changes and renal function     Thank you for involving pharmacy in this patient's care. Please contact pharmacy with any questions or concerns.    Felicia Guido  Clinical Pharmacist        "

## 2025-03-23 NOTE — PROGRESS NOTES
[facility]   Hospitalist Progress Note  Date: 3/23/2025  Patient Name: Mary Albarran  : 1950  MRN: [id number]  Date of admission: 3/18/2025      Subjective   Subjective     Chief complaint: Right leg redness    Summary:  74-year-old female with history of morbid obesity, moderate aortic stenosis, CKD stage IIIa, diabetes mellitus, COPD with home oxygen, hypertension, dyslipidemia, hospitalized on 3/18/2025 with chief complaint of right leg swelling and redness and cellulitic changes, with recent hospitalization for strep bacteremia and gram-negative bacteremia, hospitalized with worsening right lower extremity cellulitis over the past 36 hours, placed on broad-spectrum antibiotic, concern for A-fib with RVR, Cardizem ordered, rate control achieved, Cardizem discontinued.    Interval follow-up: Seen and examined, no distress, no events overnight, continues to make strides with improvements in her right lower extremity cellulitis, still erythematous and draining, but described this much less.  Breathing also has improved quite significantly.  White blood cell count 5000, hemoglobin 9.0, creatinine 0.99, bicarb 29, potassium 3.8, sodium 139.  Discussed disposition planning, patient does not want to go to rehab.  She wants to go home with home health when ready    Review of systems:  All systems reviewed and negative except weakness, fatigue, right lower extremity pain    Objective   Objective     Vitals:   Temp:  [97.3 °F (36.3 °C)-98.1 °F (36.7 °C)] 97.5 °F (36.4 °C)  Heart Rate:  [] 100  Resp:  [18-20] 20  BP: (104-141)/(61-79) 126/67  Flow (L/min) (Oxygen Therapy):  [2] 2  Physical Exam               Constitutional: Ill-appearing nontoxic laying in bed              Eyes: Pupils equal, sclerae anicteric, no conjunctival injection              HENT: NCAT, mucous membranes moist              Neck: Supple, full range of motion              Respiratory: Diminished to auscultation bilaterally, nonlabored  EXAMINATION:  CHEST SINGLE (PORTABLE)    



INDICATION: Intubated  

COMPARISON:  Chest radiograph 9/11/2019 Chest CT 9/5/2019

     

FINDINGS:  AP view   



TUBES and LINES:  ET tube tip terminates in the mid intrathoracic trachea.

Right internal jugular central venous catheter, tip in the superior cavoatrial

junction.



LUNGS: Faint hazy opacities in the mid to lower lungs. Peribronchial cuffing.

Coronal and central pulmonary vasculature.



PLEURA:  Possible small bilateral pleural effusions.



HEART AND MEDIASTINUM:  Mild cardiomegaly. Calcified right hilar nodes.



BONES AND SOFT TISSUES:  No acute osseous lesion.  Soft tissues are

unremarkable.



UPPER ABDOMEN: No free air under the diaphragm.    



IMPRESSION: 



Mild bilateral mid to lower lung opacities, similar compared to day prior.

Atelectasis, edema, small pleural effusions, and/or pneumonia are

considerations.





Signed by: Josué Reyes DO on 9/12/2019 6:11 AM respirations               Cardiovascular: Irregular rate, regular rhythm, 2/6 systolic ejection murmur rubs, or gallops, palpable pedal pulses bilaterally              Gastrointestinal: Positive bowel sounds, soft, nontender, nondistended              Musculoskeletal: No bilateral ankle edema, no clubbing or cyanosis to extremities              Psychiatric: Appropriate affect, cooperative              Neurologic: Oriented x 3, strength symmetric in all extremities, Cranial Nerves grossly intact to confrontation, speech clear              Skin: Right leg shows significant edema with blistering covered with dressing. Lesion right fifth metatarsal head it is draining.  Area is erythematous and warm to touch.  See photos in epic        Result Review    Result Review:  I have personally reviewed the pertinent results from the past 24 hours to 3/23/2025 13:35 EDT and agree with these findings:  [x]  Laboratory   CBC          3/21/2025    04:37 3/22/2025    05:01 3/23/2025    04:30   CBC   WBC 5.05  5.66  5.38    RBC 3.48  3.73  3.83    Hemoglobin 10.0  11.0  11.0    Hematocrit 35.4  37.5  37.9    .7  100.5  99.0    MCH 28.7  29.5  28.7    MCHC 28.2  29.3  29.0    RDW 17.3  17.2  17.0    Platelets 103  115  111      BMP          3/21/2025    04:37 3/22/2025    05:01 3/23/2025    04:30   BMP   BUN 31  25  24    Creatinine 1.09  0.91  0.99    Sodium 137  137  139    Potassium 4.1  4.4  3.8    Chloride 97  99  98    CO2 29.6  28.2  29.8    Calcium 8.3  8.7  8.7      LIVER FUNCTION TESTS:      Lab 03/23/25  0430 03/22/25  0501 03/21/25  0437 03/20/25  0513 03/19/25  0524 03/18/25  0751   TOTAL PROTEIN 7.1 7.0 6.7 6.8 6.4 7.1   ALBUMIN 3.6 3.4* 3.3* 3.3* 3.2* 3.5   GLOBULIN  --  3.6  --   --  3.2 3.6   ALT (SGPT) 18 20 19 18 16 20   AST (SGOT) 23 24 24 21 17 24   BILIRUBIN 0.9 0.7 0.6 0.8 0.9 1.2   INDIRECT BILIRUBIN 0.5  --  0.3 0.4  --   --    BILIRUBIN DIRECT 0.4*  --  0.3 0.4*  --   --    ALK PHOS 68 78 78 60 62 85        [x]  Microbiology   Microbiology Results (last 10 days)       Procedure Component Value - Date/Time    COVID-19, FLU A/B, RSV PCR 1 HR TAT - Swab, Nasopharynx [[id number]]  (Normal) Collected: 03/18/25 1130    Lab Status: Final result Specimen: Swab from Nasopharynx Updated: 03/18/25 1214     COVID19 Not Detected     Influenza A PCR Not Detected     Influenza B PCR Not Detected     RSV, PCR Not Detected    Narrative:      Fact sheet for providers: https://www.fda.gov/media/373739/download    Fact sheet for patients: https://www.fda.gov/media/827462/download    Test performed by PCR.    Blood Culture - Blood, Arm, Left [[id number]]  (Normal) Collected: 03/18/25 1130    Lab Status: Final result Specimen: Blood from Arm, Left Updated: 03/23/25 1145     Blood Culture No growth at 5 days    Narrative:      Less than seven (7) mL's of blood was collected.  Insufficient quantity may yield false negative results.    Blood Culture - Blood, Arm, Right [[id number]]  (Normal) Collected: 03/18/25 1130    Lab Status: Final result Specimen: Blood from Arm, Right Updated: 03/23/25 1145     Blood Culture No growth at 5 days    Narrative:      Less than seven (7) mL's of blood was collected.  Insufficient quantity may yield false negative results.              [x]  Radiology XR Foot 2 View Left  Result Date: 3/18/2025  Impression: No specific finding of osteomyelitis. Soft tissue swelling in the visualized extremity could represent cellulitis, dependent edema and/or posttraumatic change. Electronically Signed: Thom Vega MD  3/18/2025 2:07 PM EDT  Workstation ID: [id number]    XR Foot 2 View Right  Result Date: 3/18/2025  Impression: Soft tissue swelling throughout the visualized extremity could represent dependent edema and/or cellulitis. No specific radiographic finding of osteomyelitis. Electronically Signed: Thom Vega MD  3/18/2025 2:05 PM EDT  Workstation ID: [id number]    XR Chest 1 View  Result Date:  3/18/2025  Impression: Suboptimal portable radiograph. There is possibly bibasilar airspace disease that could be related to atelectasis and/or pneumonia. Cardiomegaly. Electronically Signed: Ishaan Martinez MD  3/18/2025 8:13 AM EDT  Workstation ID: [id number]        [x]  EKG/Telemetry   ECG 12 Lead ED Triage Standing Order; SOA   Final Result   HEART SXTM=017  bpm   RR Cgamfqop=743  ms   WI Interval=  ms   P Horizontal Axis=  deg   P Front Axis=  deg   QRSD Interval=76  ms   QT Ftssxrim=956  ms   CNfI=522  ms   QRS Axis=116  deg   T Wave Axis=88  deg   - ABNORMAL ECG -   Atrial fibrillation   Right axis deviation   Low voltage, precordial leads   Prolonged QT interval   When compared with ECG of 11-Feb-2025 03:39:33,   Significant rate increase   Significant repolarization change   Electronically Signed By: Joaquin Gonzalez ([facility]) 2025-03-18 14:47:50   Date and Time of Study:2025-03-18 07:43:35          []  Cardiology/Vascular   []  Pathology  [x]  Old records  []  Other:    Assessment & Plan   Assessment / Plan     Assessment/Plan:  Assessment:  Sepsis due to right lower leg and foot cellulitis (leukocytosis and tachycardia) without endorgan damage  A-fib with RVR  Acute heart failure exacerbation with reduced EF of 46 to 50% RVSP of 45 to 55 mm per mercury  Life-threatening hyperkalemia  Recent streptococcal bacteremia  Recent gram-negative bacteremia  Obesity BMI 46  On home oxygen     Plan:  Labs and imaging reviewed  Out of bed to chair  PT/OT  Continue metoprolol XL to 100 mg twice a day  Continue aggressive wound care  Continue Lasix 40 mg IV twice daily will switch to p.o. tomorrow  Strict I's and O's  Daily weights  Continue Xarelto  Continue Zosyn and vancomycin  Continue Lantus 15 units daily with insulin sliding scale coverage  Continue telemetry monitor  Continue Lipitor, aspirin  Wound care consult  Notify MD if heart rate becomes uncontrolled  Continue pain control  A.m. labs  Full code  DVT prophylaxis  Xarelto  Clinical course dictate further management  Discussed with nurse at the bedside    VTE Prophylaxis:  Pharmacologic VTE prophylaxis orders are present.        CODE STATUS:   Code Status (Patient has no pulse and is not breathing): CPR (Attempt to Resuscitate)  Medical Interventions (Patient has pulse or is breathing): Full Support  Level Of Support Discussed With: Patient        Electronically signed by Melody Luna MD, 3/23/2025, 13:35 EDT.    Portions of this documentation were transcribed electronically from a voice recognition software.  I confirm all data accurately represents the service(s) I performed at today's visit.

## 2025-03-24 ENCOUNTER — READMISSION MANAGEMENT (OUTPATIENT)
Dept: CALL CENTER | Facility: HOSPITAL | Age: 75
End: 2025-03-24
Payer: MEDICARE

## 2025-03-24 VITALS
BODY MASS INDEX: 49.26 KG/M2 | TEMPERATURE: 97.9 F | WEIGHT: 278 LBS | HEART RATE: 85 BPM | OXYGEN SATURATION: 95 % | RESPIRATION RATE: 18 BRPM | HEIGHT: 63 IN | DIASTOLIC BLOOD PRESSURE: 81 MMHG | SYSTOLIC BLOOD PRESSURE: 141 MMHG

## 2025-03-24 PROBLEM — F32.A DEPRESSION: Status: ACTIVE | Noted: 2025-03-24

## 2025-03-24 PROBLEM — A41.9 SEPSIS: Status: RESOLVED | Noted: 2025-03-18 | Resolved: 2025-03-24

## 2025-03-24 PROBLEM — E11.8 DIABETES MELLITUS TYPE 2 WITH COMPLICATIONS: Status: ACTIVE | Noted: 2025-03-24

## 2025-03-24 PROBLEM — L03.115 CELLULITIS OF RIGHT LEG WITHOUT FOOT: Status: ACTIVE | Noted: 2025-03-24

## 2025-03-24 PROBLEM — J44.9 COPD (CHRONIC OBSTRUCTIVE PULMONARY DISEASE): Status: ACTIVE | Noted: 2025-03-24

## 2025-03-24 PROBLEM — F41.9 ANXIETY: Status: ACTIVE | Noted: 2025-03-24

## 2025-03-24 PROBLEM — G89.29 CHRONIC PAIN: Status: ACTIVE | Noted: 2025-03-24

## 2025-03-24 LAB
ALBUMIN SERPL-MCNC: 3.5 G/DL (ref 3.5–5.2)
ALP SERPL-CCNC: 76 U/L (ref 39–117)
ALT SERPL W P-5'-P-CCNC: 19 U/L (ref 1–33)
ANION GAP SERPL CALCULATED.3IONS-SCNC: 10.3 MMOL/L (ref 5–15)
AST SERPL-CCNC: 22 U/L (ref 1–32)
BASOPHILS # BLD AUTO: 0.04 10*3/MM3 (ref 0–0.2)
BASOPHILS NFR BLD AUTO: 0.7 % (ref 0–1.5)
BILIRUB CONJ SERPL-MCNC: 0.4 MG/DL (ref 0–0.3)
BILIRUB INDIRECT SERPL-MCNC: 0.4 MG/DL
BILIRUB SERPL-MCNC: 0.8 MG/DL (ref 0–1.2)
BUN SERPL-MCNC: 24 MG/DL (ref 8–23)
BUN/CREAT SERPL: 22.6 (ref 7–25)
CALCIUM SPEC-SCNC: 8.5 MG/DL (ref 8.6–10.5)
CHLORIDE SERPL-SCNC: 98 MMOL/L (ref 98–107)
CO2 SERPL-SCNC: 29.7 MMOL/L (ref 22–29)
CREAT SERPL-MCNC: 1.06 MG/DL (ref 0.57–1)
DEPRECATED RDW RBC AUTO: 60.8 FL (ref 37–54)
EGFRCR SERPLBLD CKD-EPI 2021: 55.9 ML/MIN/1.73
EOSINOPHIL # BLD AUTO: 0.21 10*3/MM3 (ref 0–0.4)
EOSINOPHIL NFR BLD AUTO: 3.9 % (ref 0.3–6.2)
ERYTHROCYTE [DISTWIDTH] IN BLOOD BY AUTOMATED COUNT: 16.7 % (ref 12.3–15.4)
GLUCOSE BLDC GLUCOMTR-MCNC: 115 MG/DL (ref 70–99)
GLUCOSE BLDC GLUCOMTR-MCNC: 157 MG/DL (ref 70–99)
GLUCOSE SERPL-MCNC: 139 MG/DL (ref 65–99)
HCT VFR BLD AUTO: 37 % (ref 34–46.6)
HGB BLD-MCNC: 11 G/DL (ref 12–15.9)
IMM GRANULOCYTES # BLD AUTO: 0.02 10*3/MM3 (ref 0–0.05)
IMM GRANULOCYTES NFR BLD AUTO: 0.4 % (ref 0–0.5)
LYMPHOCYTES # BLD AUTO: 1.18 10*3/MM3 (ref 0.7–3.1)
LYMPHOCYTES NFR BLD AUTO: 21.8 % (ref 19.6–45.3)
MAGNESIUM SERPL-MCNC: 1.7 MG/DL (ref 1.6–2.4)
MCH RBC QN AUTO: 29.7 PG (ref 26.6–33)
MCHC RBC AUTO-ENTMCNC: 29.7 G/DL (ref 31.5–35.7)
MCV RBC AUTO: 100 FL (ref 79–97)
MONOCYTES # BLD AUTO: 0.47 10*3/MM3 (ref 0.1–0.9)
MONOCYTES NFR BLD AUTO: 8.7 % (ref 5–12)
NEUTROPHILS NFR BLD AUTO: 3.49 10*3/MM3 (ref 1.7–7)
NEUTROPHILS NFR BLD AUTO: 64.5 % (ref 42.7–76)
NRBC BLD AUTO-RTO: 0 /100 WBC (ref 0–0.2)
PHOSPHATE SERPL-MCNC: 3.3 MG/DL (ref 2.5–4.5)
PLATELET # BLD AUTO: 109 10*3/MM3 (ref 140–450)
PMV BLD AUTO: 13 FL (ref 6–12)
POTASSIUM SERPL-SCNC: 3.8 MMOL/L (ref 3.5–5.2)
PROT SERPL-MCNC: 7.1 G/DL (ref 6–8.5)
RBC # BLD AUTO: 3.7 10*6/MM3 (ref 3.77–5.28)
SODIUM SERPL-SCNC: 138 MMOL/L (ref 136–145)
WBC NRBC COR # BLD AUTO: 5.41 10*3/MM3 (ref 3.4–10.8)

## 2025-03-24 PROCEDURE — 82948 REAGENT STRIP/BLOOD GLUCOSE: CPT | Performed by: INTERNAL MEDICINE

## 2025-03-24 PROCEDURE — 94799 UNLISTED PULMONARY SVC/PX: CPT

## 2025-03-24 PROCEDURE — 25010000002 VANCOMYCIN 5 G RECONSTITUTED SOLUTION: Performed by: INTERNAL MEDICINE

## 2025-03-24 PROCEDURE — 25010000002 PIPERACILLIN SOD-TAZOBACTAM PER 1 G: Performed by: INTERNAL MEDICINE

## 2025-03-24 PROCEDURE — 80048 BASIC METABOLIC PNL TOTAL CA: CPT | Performed by: FAMILY MEDICINE

## 2025-03-24 PROCEDURE — 83735 ASSAY OF MAGNESIUM: CPT | Performed by: FAMILY MEDICINE

## 2025-03-24 PROCEDURE — 85025 COMPLETE CBC W/AUTO DIFF WBC: CPT | Performed by: FAMILY MEDICINE

## 2025-03-24 PROCEDURE — 84100 ASSAY OF PHOSPHORUS: CPT | Performed by: FAMILY MEDICINE

## 2025-03-24 PROCEDURE — 63710000001 INSULIN LISPRO (HUMAN) PER 5 UNITS: Performed by: INTERNAL MEDICINE

## 2025-03-24 PROCEDURE — 25810000003 SODIUM CHLORIDE 0.9 % SOLUTION: Performed by: INTERNAL MEDICINE

## 2025-03-24 PROCEDURE — 94760 N-INVAS EAR/PLS OXIMETRY 1: CPT

## 2025-03-24 PROCEDURE — 63710000001 INSULIN GLARGINE PER 5 UNITS: Performed by: FAMILY MEDICINE

## 2025-03-24 PROCEDURE — 99239 HOSP IP/OBS DSCHRG MGMT >30: CPT | Performed by: FAMILY MEDICINE

## 2025-03-24 PROCEDURE — 80076 HEPATIC FUNCTION PANEL: CPT | Performed by: FAMILY MEDICINE

## 2025-03-24 PROCEDURE — 94664 DEMO&/EVAL PT USE INHALER: CPT

## 2025-03-24 RX ORDER — FUROSEMIDE 40 MG/1
40 TABLET ORAL 2 TIMES DAILY
Qty: 60 TABLET | Refills: 0 | Status: ON HOLD | OUTPATIENT
Start: 2025-03-24 | End: 2025-04-23

## 2025-03-24 RX ORDER — FUROSEMIDE 40 MG/1
40 TABLET ORAL
Status: DISCONTINUED | OUTPATIENT
Start: 2025-03-24 | End: 2025-03-24 | Stop reason: HOSPADM

## 2025-03-24 RX ADMIN — PREGABALIN 150 MG: 75 CAPSULE ORAL at 08:10

## 2025-03-24 RX ADMIN — METOPROLOL SUCCINATE 125 MG: 50 TABLET, FILM COATED, EXTENDED RELEASE ORAL at 08:10

## 2025-03-24 RX ADMIN — PIPERACILLIN AND TAZOBACTAM 4.5 G: 4; .5 INJECTION, POWDER, FOR SOLUTION INTRAVENOUS; PARENTERAL at 03:08

## 2025-03-24 RX ADMIN — INSULIN LISPRO 2 UNITS: 100 INJECTION, SOLUTION INTRAVENOUS; SUBCUTANEOUS at 11:52

## 2025-03-24 RX ADMIN — VANCOMYCIN HYDROCHLORIDE 750 MG: 5 INJECTION, POWDER, LYOPHILIZED, FOR SOLUTION INTRAVENOUS at 00:10

## 2025-03-24 RX ADMIN — Medication 10 ML: at 08:11

## 2025-03-24 RX ADMIN — BUDESONIDE 0.5 MG: 0.5 INHALANT RESPIRATORY (INHALATION) at 07:07

## 2025-03-24 RX ADMIN — ATORVASTATIN CALCIUM 20 MG: 20 TABLET, FILM COATED ORAL at 08:10

## 2025-03-24 RX ADMIN — PIPERACILLIN AND TAZOBACTAM 4.5 G: 4; .5 INJECTION, POWDER, FOR SOLUTION INTRAVENOUS; PARENTERAL at 09:38

## 2025-03-24 RX ADMIN — HYDROCODONE BITARTRATE AND ACETAMINOPHEN 1 TABLET: 10; 325 TABLET ORAL at 10:52

## 2025-03-24 RX ADMIN — FUROSEMIDE 40 MG: 40 TABLET ORAL at 08:10

## 2025-03-24 RX ADMIN — HYDROCODONE BITARTRATE AND ACETAMINOPHEN 1 TABLET: 10; 325 TABLET ORAL at 00:10

## 2025-03-24 RX ADMIN — HYDROCODONE BITARTRATE AND ACETAMINOPHEN 1 TABLET: 10; 325 TABLET ORAL at 05:28

## 2025-03-24 RX ADMIN — RIVAROXABAN 15 MG: 15 TABLET, FILM COATED ORAL at 08:10

## 2025-03-24 RX ADMIN — ARFORMOTEROL TARTRATE 15 MCG: 15 SOLUTION RESPIRATORY (INHALATION) at 07:07

## 2025-03-24 RX ADMIN — ASPIRIN 81 MG: 81 TABLET, COATED ORAL at 08:10

## 2025-03-24 RX ADMIN — INSULIN GLARGINE 15 UNITS: 100 INJECTION, SOLUTION SUBCUTANEOUS at 08:10

## 2025-03-24 NOTE — THERAPY EVALUATION
Respiratory Therapist Broncho-Pulmonary Hygiene Progress Note      Patient Name:  Mary Albarran  YOB: 1950    Mary Albarran meets the qualification for Level 1 of the Bronco-Pulmonary Hygiene Protocol. This was based on my daily patient assessment and includes review of chest x-ray results, cough ability and quality, oxygenation, secretions or risk for secretion development and patient mobility.     Broncho-Pulmonary Hygiene Assessment:    Level of Movement: Actively changing positions without assistance  Alert/ oriented/ cooperative    Breath Sounds: Diminished and/or coarse rhonchi    Cough: Strong, effective and/or frequent    Chest X-Ray: Possible signs of consolidation and/or atelectasis or clear.     Sputum Productions: None or small amount of thin or watery secretions with effective cough    History and Physical: None    SpO2 to Oxygen Need: greater than 92% on room air or  less than 3L nasal canula    Current SpO2 is: 92% on 2L    Based on this information, I have completed the following interventions: Teach/Instruct patient on cough and deep breathe      Electronically signed by Mahnaz Pierre CRT, 03/24/25, 7:10 AM EDT.

## 2025-03-24 NOTE — PLAN OF CARE
Goal Outcome Evaluation:  Plan of Care Reviewed With: patient        Progress: improving  Outcome Evaluation: Patient is AO x4, able to make needs known. Patient is currently on 2L NC, no signs of distress noted. Blood glucose monitored, treated per MAR. Patient refused wound care/skin care. Patient complained of pain, treated per MAR. No acute changes throughout shift. VSS. Continue with current plan of care. Plan to discharge patient home today.

## 2025-03-24 NOTE — PROGRESS NOTES
"[facility] Clinical Pharmacy Services: Vancomycin Monitoring Note    Mary Albarran is a 74 y.o. female who is on day 7 of pharmacy to dose vancomycin for Bacteremia and Skin and Soft Tissue.    Imaging Reviewed?: Yes  Updated Cultures and Sensitivities:   3/18 Blood culture- NGTD    Vitals/Labs  Ht: 160 cm (63\"); Wt: 126 kg (278 lb)   Temp (24hrs), Av.6 °F (36.4 °C), Min:97.3 °F (36.3 °C), Max:98 °F (36.7 °C)   Estimated Creatinine Clearance: 62 mL/min (A) (by C-G formula based on SCr of 1.06 mg/dL (H)).     Results from last 7 days   Lab Units 25  0510 25  0430 25  0501 25  0437 25  0513   VANCOMYCIN RM mcg/mL  --   --   --   --  14.91   CREATININE mg/dL 1.06* 0.99 0.91   < > 1.21*   WBC 10*3/mm3 5.41 5.38 5.66   < > 6.91    < > = values in this interval not displayed.     Assessment/Plan  Current Vancomycin Dose:  750 mg IV every 12 hours; which provides the following predicted parameters:    Regimen: 750 mg IV every 12 hours.  Start time: 12:10 on 2025  Exposure target: AUC24 (range)400-600 mg/L.hr   AUC24,ss: 478 mg/L.hr  Probability of AUC24 > 400: 87 %  Ctrough,ss: 14.1 mg/L  Probability of Ctrough,ss > 20: 0 %  Probability of nephrotoxicity (Lodise ERIC ): 9 %    BMP ordered for tomorrow.   We will continue to monitor patient changes and renal function     Thank you for involving pharmacy in this patient's care. Please contact pharmacy with any questions or concerns.    Sergio Miranda  Clinical Pharmacist  "

## 2025-03-24 NOTE — DISCHARGE SUMMARY
[facility]         HOSPITALIST  DISCHARGE SUMMARY    Patient Name: Mary Albarran  : 1950  MRN: [id number]    Date of Admission: 3/18/2025  Date of Discharge:  3/24/2025    Primary Care Physician: Brittni Quiroz APRN    Consults       Date and Time Order Name Status Description    2025  9:21 AM Inpatient General Surgery Consult Completed             Active and Resolved Hospital Problems:    Sepsis due to right lower leg and foot cellulitis (leukocytosis and tachycardia) without endorgan damage  A-fib with RVR  Acute heart failure exacerbation with reduced EF of 46 to 50% RVSP of 45 to 55 mm per mercury  Life-threatening hyperkalemia  Recent streptococcal bacteremia  Recent gram-negative bacteremia  Obesity BMI 46  On home oxygen    Active Hospital Problems    Diagnosis POA    Cellulitis of right leg without foot [L03.115] Unknown      Resolved Hospital Problems    Diagnosis POA    Sepsis [A41.9] Yes       Hospital Course     Hospital Course:  74-year-old female with history of morbid obesity, moderate aortic stenosis, CKD stage IIIa, diabetes mellitus, COPD with home oxygen, hypertension, dyslipidemia, hospitalized on 3/18/2025 with chief complaint of right leg swelling and redness and cellulitic changes, with recent hospitalization for strep bacteremia and gram-negative bacteremia, hospitalized with worsening right lower extremity cellulitis over the past 36 hours, placed on broad-spectrum antibiotic, concern for A-fib with RVR, Cardizem ordered, rate control achieved, Cardizem discontinued.  Heart rate controlled with beta-blocker increased.  Cellulitis improved with broad-spectrum antibiotics, narrowed at the time of discharge.  Patient requested discharge home on 3/24/2025, home health set up.  Discharged in hemodynamically stable addition, to follow-up with PCP within 1 week.    Day of Discharge     Vital Signs:  Temp:  [97.3 °F (36.3 °C)-98 °F (36.7 °C)] 97.9 °F (36.6  °C)  Heart Rate:  [] 85  Resp:  [18-24] 18  BP: (124-141)/(62-85) 141/81  Flow (L/min) (Oxygen Therapy):  [2] 2  Review of systems:  All systems reviewed and negative except weakness, fatigue, right lower extremity pain    Physical Exam                         Constitutional: Ill-appearing nontoxic laying in bed              Eyes: Pupils equal, sclerae anicteric, no conjunctival injection              HENT: NCAT, mucous membranes moist              Neck: Supple, full range of motion              Respiratory: Diminished to auscultation bilaterally, nonlabored respirations               Cardiovascular: Irregular rate, regular rhythm, 2/6 systolic ejection murmur rubs, or gallops, palpable pedal pulses bilaterally              Gastrointestinal: Positive bowel sounds, soft, nontender, nondistended              Musculoskeletal: No bilateral ankle edema, no clubbing or cyanosis to extremities              Psychiatric: Appropriate affect, cooperative              Neurologic: Oriented x 3, strength symmetric in all extremities, Cranial Nerves grossly intact to confrontation, speech clear              Skin: Right leg shows improved edema with blistering covered with dressing less erythema from previous days      Discharge Details        Discharge Medications        New Medications        Instructions Start Date   amoxicillin-clavulanate 875-125 MG per tablet  Commonly known as: AUGMENTIN   1 tablet, Oral, 2 Times Daily      furosemide 40 MG tablet  Commonly known as: LASIX   40 mg, Oral, 2 Times Daily             Changes to Medications        Instructions Start Date   rivaroxaban 20 MG tablet  Commonly known as: XARELTO  What changed:   medication strength  how much to take   20 mg, Oral, Daily   Start Date: March 25, 2025            Continue These Medications        Instructions Start Date   albuterol 1.25 MG/3ML nebulizer solution  Commonly known as: ACCUNEB   1.25 mg, Nebulization, Every 6 Hours PRN      albuterol  sulfate  (90 Base) MCG/ACT inhaler  Commonly known as: PROVENTIL HFA;VENTOLIN HFA;PROAIR HFA   2 puffs, Inhalation, Every 4 Hours PRN      ammonium lactate 12 % lotion  Commonly known as: LAC-HYDRIN   Topical, 2 Times Daily      Aspirin Adult Low Dose 81 MG EC tablet  Generic drug: aspirin   81 mg, Oral, Daily      atorvastatin 20 MG tablet  Commonly known as: LIPITOR   TAKE 1 TABLET DAILY      budesonide 1 MG/2ML nebulizer solution  Commonly known as: PULMICORT   1 mg, Nebulization, Daily      cholecalciferol 25 MCG (1000 UT) tablet  Commonly known as: VITAMIN D3   1,000 Units, Daily      Entresto 24-26 MG tablet  Generic drug: sacubitril-valsartan   1 tablet, Oral, Every 12 Hours Scheduled      HYDROcodone-acetaminophen  MG per tablet  Commonly known as: NORCO   1 tablet, Oral, Every 6 Hours PRN      Lantus SoloStar 100 UNIT/ML injection pen  Generic drug: Insulin Glargine   15 Units, Subcutaneous, Daily      metoprolol succinate XL 50 MG 24 hr tablet  Commonly known as: TOPROL-XL   50 mg, Oral, Every 12 Hours Scheduled      mineral oil-hydrophilic petrolatum ointment   1 Application, Topical, Every 1 Hour PRN      O2  Commonly known as: OXYGEN   2 L/min, Nightly      pregabalin 150 MG capsule  Commonly known as: LYRICA   150 mg, Oral, 2 Times Daily             Stop These Medications      torsemide 20 MG tablet  Commonly known as: DEMADEX              Allergies   Allergen Reactions    Codeine Mental Status Change    Sglt2 Inhibitors Other (See Comments)     Frequent UTI        Discharge Disposition:  Home-Health Care [facility]    Diet:  Hospital:No active diet order      Discharge Activity:   Activity Instructions    As tolerated           CODE STATUS:  Code Status and Medical Interventions: CPR (Attempt to Resuscitate); Full Support   Ordered at: 03/18/25 4199     Code Status (Patient has no pulse and is not breathing):    CPR (Attempt to Resuscitate)     Medical Interventions (Patient has pulse or is  breathing):    Full Support     Level Of Support Discussed With:    Patient         Future Appointments   Date Time Provider Department Center   3/28/2025  8:15 AM Brittni Quiroz APRN [facility]   4/7/2025  2:00 PM Bouchra Bentley APRHELENE [facility]   5/9/2025 12:30 PM Brittni Quiroz [facility]LEN[facility]       Additional Instructions for the Follow-ups that You Need to Schedule       Discharge Follow-up with PCP   As directed       Currently Documented PCP:    Brittni Qurioz APRN    PCP Phone Number:    [phone]     Follow Up Details: 3 to 7 days                Pertinent  and/or Most Recent Results     PROCEDURES:   Doppler Arterial Multi Level Lower Extremity - Bilateral CAR  Result Date: 3/21/2025    Right Conclusion: The right VENU is normal. Normal digital pressures.   Left Conclusion: The left VENU is normal. Normal digital pressures.   Normal arterial evaluation of both lower extremities.     XR Foot 2 View Left  Result Date: 3/18/2025  XR FOOT 2 VW LEFT Date of Exam: 3/18/2025 1:28 PM EDT Indication: left foot wounds Comparison: None available. Findings: Diffuse osteopenia is noted. No fracture or malalignment is seen. No focal osseous destruction is identified to confirm osteomyelitis. Soft tissue swelling is noted in the lower calf and foot. A plantar calcaneal spur is noted. No tibiotalar or subtalar joint effusion is noted.     Impression: No specific finding of osteomyelitis. Soft tissue swelling in the visualized extremity could represent cellulitis, dependent edema and/or posttraumatic change. Electronically Signed: Thom Vega MD  3/18/2025 2:07 PM EDT  Workstation ID: [id number]    XR Foot 2 View Right  Result Date: 3/18/2025  XR FOOT 2 VW RIGHT Date of Exam: 3/18/2025 1:28 PM EDT Indication: Wounds-rule out infection Comparison: 3 view left foot dated 3/18/2025 Findings: Severe osteopenia is noted. There is soft tissue swelling throughout the lower calf,  ankle and foot. No focal osseous destruction is identified to suggest osteomyelitis. No fracture or malalignment is seen. A small plantar calcaneal spur is noted.     Impression: Soft tissue swelling throughout the visualized extremity could represent dependent edema and/or cellulitis. No specific radiographic finding of osteomyelitis. Electronically Signed: Thom Vega MD  3/18/2025 2:05 PM EDT  Workstation ID: [id number]    XR Chest 1 View  Result Date: 3/18/2025  XR CHEST 1 VW Date of Exam: 3/18/2025 7:45 AM EDT Indication: SOA Triage Protocol Comparison: Chest radiograph 2/17/2025. Findings: Suboptimal exam due to body habitus. Enlarged cardiac silhouette, unchanged. Low lung volumes. Possible bibasilar airspace opacities. No large pleural effusion. No pneumothorax. Osseous structures are unchanged.     Impression: Suboptimal portable radiograph. There is possibly bibasilar airspace disease that could be related to atelectasis and/or pneumonia. Cardiomegaly. Electronically Signed: Ishaan Martinez MD  3/18/2025 8:13 AM EDT  Workstation ID: [id number]    XR Chest 1 View  Result Date: 3/13/2025  XR CHEST AP PORTABLE INDICATION:  74 years Female CONSTIPATION ABDOMINAL PAIN Pt states being frequently constipated over the past couple days. Last BM was last night. Pts O2 was 86% in triage TECHNIQUE:  Portable AP view of the chest COMPARISON:  2/10/2025 FINDINGS: The lungs are clear and expanded.  There is pulmonary vascular congestion consistent with volume overload.  There is no pleural abnormality.   There is cardiomegaly.  Normal aortic arch and descending thoracic aorta.  Normal mediastinum and winston.   Normal thoracic spine.  Normal visualized ribs, clavicles, and shoulders. There is no abnormality of the visualized upper abdomen.    Mild congestive heart failure. [id number]    CT Abdomen Pelvis Without Contrast  Result Date: 3/10/2025  Study: CT ABDOMEN PELVIS WO CONTRAST REASON FOR EXAM: Abdominal pain, acute,  nonlocalized TECHNIQUE: Non-contrast CT of the abdomen and pelvis was performed, with the following protocol: axial images, and reconstructed coronal and sagittal images. No intravenous contrast was administered. One of the following dose reduction techniques was utilized for this exam: Automated exposure control, adjustment of the mA and/or kV according to patient size, and use of iterative reconstruction. COMPARISON: Comparison is made with CT dated 02/10/2025. ___________________________________ FINDINGS: Abdomen:  Liver: Enlarged in size. No focal lesions, cysts, or masses were identified.  Gallbladder and Biliary System: The gallbladder shows multiple small calculi. No significant wall thickening of pericholecystic fluid.  Pancreas: Pancreatic head, body, and tail are visualized and appear normal in size and density. No pancreatic masses or calcifications were noted.  Spleen: Mildly enlarged in size. No focal lesions were seen.  Splenule noted.  Kidneys and Adrenal Glands: Both kidneys are normal in size, shape, and position.  Minimal perinepheric fat standing seen.  7 mm non-obstructive calculus in inferior calyx of right kidney (unchanged). No other renal calculi or hydronephrosis on left. Adrenal glands are unremarkable.  Pelvis: Urinary Bladder: is of average size and shape with no gross pathology noted. Post hysterectomy status. No adnexal lesions.  Pelvic phleboli noted.  Peritoneal and Retroperitoneal Structures: No free fluid or abnormal fluid collections were identified within the abdomen or pelvis. The abdominal aorta shows atherosclerotic changes with calcified plaques. Bowel: The appendix is seen subhepatic in locations with no signs of acute appendicitis.  The visualized bowel loops are normal in caliber and appearance. No evidence of bowel obstruction or wall thickening.  Few Non-complicated colonic diverticula were seen. Bones and Soft Tissues: Degenerative changes in lumbar spine and pelvic  bones. Multilevel spinal canal severe stenosis.  Sections of the lower thorax show right to moderate pleural effusion and bilateral atelectatic bands with surrounding ground-glass haziness. Moderate cardiomegaly. Resolved previously noted interlobular septal thickening that was suggested ad edematous changes. ___________________________________    1. Interval new right mild to moderate pleural effusion. 2. Cholelithiasis (unchanged) with no signs of acute cholecystitis. 3. Moderate hepatomegaly. (unchanged) 4. Mild splenomegaly. (unchanged) 5. 7 mm non-obstructive calculus in the inferior calyx of the right kidney. (unchanged) 6. Degenerative changes in lumbar spine and pelvic bones. Multilevel spinal canal severe stenosis. (unchanged) 7. No CT evidence of acute abdominal pathology, Clinical correlation, and further assessment as needed would be recommended.        LAB RESULTS:      Lab 03/24/25  0510 03/23/25  0430 03/22/25  0501 03/21/25  0437 03/20/25  0513 03/19/25  0524 03/18/25  1437 03/18/25  1130 03/18/25  0751   WBC 5.41 5.38 5.66 5.05 6.91 10.81*  --   --  13.08*   HEMOGLOBIN 11.0* 11.0* 11.0* 10.0* 10.9* 10.0*  --   --  11.6*   HEMATOCRIT 37.0 37.9 37.5 35.4 35.5 34.9  --   --  39.2   PLATELETS 109* 111* 115* 103* 110* 98*  --   --  103*   NEUTROS ABS 3.49 3.36 3.88 3.31 4.97 8.94*  --   --  11.62*   IMMATURE GRANS (ABS) 0.02 0.02 0.01 0.01 0.02 0.05  --   --  0.05   LYMPHS ABS 1.18 1.21 1.09 1.00 1.09 0.81  --   --  0.70   MONOS ABS 0.47 0.54 0.47 0.51 0.55 0.96*  --   --  0.64   EOS ABS 0.21 0.20 0.18 0.18 0.24 0.01  --   --  0.03   .0* 99.0* 100.5* 101.7* 100.3* 102.6*  --   --  102.3*   SED RATE  --   --   --   --   --   --   --   --  38*   CRP  --   --   --   --   --   --   --  5.18*  --    PROCALCITONIN  --   --   --   --   --  0.33*  --   --   --    LACTATE  --   --   --   --   --   --  2.0 2.5*  --          Lab 03/24/25  0510 03/23/25  0430 03/22/25  0501 03/21/25  0437 03/20/25  0513    SODIUM 138 139 137 137 139   POTASSIUM 3.8 3.8 4.4 4.1 4.0   CHLORIDE 98 98 99 97* 99   CO2 29.7* 29.8* 28.2 29.6* 28.6   ANION GAP 10.3 11.2 9.8 10.4 11.4   BUN 24* 24* 25* 31* 33*   CREATININE 1.06* 0.99 0.91 1.09* 1.21*   EGFR 55.9* 60.7 67.2 54.1* 47.7*   GLUCOSE 139* 101* 162* 134* 116*   CALCIUM 8.5* 8.7 8.7 8.3* 8.4*   MAGNESIUM 1.7 1.6 1.6 1.6 1.7   PHOSPHORUS 3.3 3.3 3.0 3.8 4.3         Lab 03/24/25  0510 03/23/25  0430 03/22/25  0501 03/21/25  0437 03/20/25  0513 03/19/25  0524 03/18/25  0751   TOTAL PROTEIN 7.1 7.1 7.0 6.7 6.8 6.4 7.1   ALBUMIN 3.5 3.6 3.4* 3.3* 3.3* 3.2* 3.5   GLOBULIN  --   --  3.6  --   --  3.2 3.6   ALT (SGPT) 19 18 20 19 18 16 20   AST (SGOT) 22 23 24 24 21 17 24   BILIRUBIN 0.8 0.9 0.7 0.6 0.8 0.9 1.2   INDIRECT BILIRUBIN 0.4 0.5  --  0.3 0.4  --   --    BILIRUBIN DIRECT 0.4* 0.4*  --  0.3 0.4*  --   --    ALK PHOS 76 68 78 78 60 62 85         Lab 03/18/25  1029 03/18/25  0751   PROBNP  --  4,083.0*   HSTROP T 20* 24*                 Brief Urine Lab Results       None          Microbiology Results (last 10 days)       Procedure Component Value - Date/Time    COVID-19, FLU A/B, RSV PCR 1 HR TAT - Swab, Nasopharynx [[id number]]  (Normal) Collected: 03/18/25 1130    Lab Status: Final result Specimen: Swab from Nasopharynx Updated: 03/18/25 1214     COVID19 Not Detected     Influenza A PCR Not Detected     Influenza B PCR Not Detected     RSV, PCR Not Detected    Narrative:      Fact sheet for providers: https://www.fda.gov/media/543747/download    Fact sheet for patients: https://www.fda.gov/media/080406/download    Test performed by PCR.    Blood Culture - Blood, Arm, Left [[id number]]  (Normal) Collected: 03/18/25 1130    Lab Status: Final result Specimen: Blood from Arm, Left Updated: 03/23/25 1145     Blood Culture No growth at 5 days    Narrative:      Less than seven (7) mL's of blood was collected.  Insufficient quantity may yield false negative results.    Blood Culture -  Blood, Arm, Right [[id number]]  (Normal) Collected: 03/18/25 1130    Lab Status: Final result Specimen: Blood from Arm, Right Updated: 03/23/25 1145     Blood Culture No growth at 5 days    Narrative:      Less than seven (7) mL's of blood was collected.  Insufficient quantity may yield false negative results.            XR Foot 2 View Left  Result Date: 3/18/2025  Impression: Impression: No specific finding of osteomyelitis. Soft tissue swelling in the visualized extremity could represent cellulitis, dependent edema and/or posttraumatic change. Electronically Signed: Thom Vega MD  3/18/2025 2:07 PM EDT  Workstation ID: [id number]    XR Foot 2 View Right  Result Date: 3/18/2025  Impression: Impression: Soft tissue swelling throughout the visualized extremity could represent dependent edema and/or cellulitis. No specific radiographic finding of osteomyelitis. Electronically Signed: Thom Vega MD  3/18/2025 2:05 PM EDT  Workstation ID: [id number]    XR Chest 1 View  Result Date: 3/18/2025  Impression: Impression: Suboptimal portable radiograph. There is possibly bibasilar airspace disease that could be related to atelectasis and/or pneumonia. Cardiomegaly. Electronically Signed: Ishaan Martinez MD  3/18/2025 8:13 AM EDT  Workstation ID: [id number]    XR Chest 1 View  Result Date: 3/13/2025  Impression: Mild congestive heart failure. [id number]    CT Abdomen Pelvis Without Contrast  Result Date: 3/10/2025  Impression: 1. Interval new right mild to moderate pleural effusion. 2. Cholelithiasis (unchanged) with no signs of acute cholecystitis. 3. Moderate hepatomegaly. (unchanged) 4. Mild splenomegaly. (unchanged) 5. 7 mm non-obstructive calculus in the inferior calyx of the right kidney. (unchanged) 6. Degenerative changes in lumbar spine and pelvic bones. Multilevel spinal canal severe stenosis. (unchanged) 7. No CT evidence of acute abdominal pathology, Clinical correlation, and further assessment as needed would be  recommended.      Results for orders placed during the hospital encounter of 03/18/25    Doppler Arterial Multi Level Lower Extremity - Bilateral CAR    Interpretation Summary    Right Conclusion: The right VENU is normal. Normal digital pressures.    Left Conclusion: The left VENU is normal. Normal digital pressures.    Normal arterial evaluation of both lower extremities.      Results for orders placed during the hospital encounter of 03/18/25    Doppler Arterial Multi Level Lower Extremity - Bilateral CAR    Interpretation Summary    Right Conclusion: The right VENU is normal. Normal digital pressures.    Left Conclusion: The left VEUN is normal. Normal digital pressures.    Normal arterial evaluation of both lower extremities.      Results for orders placed during the hospital encounter of 02/11/25    Adult Transthoracic Echo Complete W/ Cont if Necessary Per Protocol    Interpretation Summary    Left ventricular systolic function is mildly decreased. Left ventricular ejection fraction appears to be 46 - 50%. with some anterior septal hypokensis    The right ventricular cavity is mildly dilated.    The left atrial cavity is mildly dilated.    The right atrial cavity is moderately  dilated.    Mild to moderate aortic valve stenosis is present.    Estimated right ventricular systolic pressure from tricuspid regurgitation is moderately elevated (45-55 mmHg).      Labs Pending at Discharge:        Time spent on Discharge including face to face service:  35 minutes    Electronically signed by Melody Luna MD, 03/24/25, 3:59 PM EDT.    Portions of this documentation were transcribed electronically from a voice recognition software.  I confirm all data accurately represents the service(s) I performed at today's visit.

## 2025-03-24 NOTE — OUTREACH NOTE
Prep Survey      Flowsheet Row Responses   [facility] facility patient discharged from? [facility]   Is LACE score < 7 ? No   Eligibility [facility]   Date of Admission 03/18/25   Date of Discharge 03/24/25   Discharge Disposition Home-Health Care [facility] [place]   Discharge diagnosis Sepsis due to right lower leg and foot cellulitis   Does the patient have one of the following disease processes/diagnoses(primary or secondary)? Sepsis   Does the patient have Home health ordered? Yes   [facility]   Prep survey completed? Yes            Ines APPLE - Registered Nurse

## 2025-03-25 ENCOUNTER — TRANSITIONAL CARE MANAGEMENT TELEPHONE ENCOUNTER (OUTPATIENT)
Dept: CALL CENTER | Facility: HOSPITAL | Age: 75
End: 2025-03-25
Payer: MEDICARE

## 2025-03-25 NOTE — OUTREACH NOTE
Call Center TCM Note      Flowsheet Row Responses   [facility] patient discharged from? [facility]   Does the patient have one of the following disease processes/diagnoses(primary or secondary)? Sepsis   TCM attempt successful? Yes  [VR- contacts]   Call start time 1243   Call end time 1246   Discharge diagnosis Sepsis due to right lower leg and foot cellulitis   Person spoke with today (if not patient) and relationship    Meds reviewed with patient/caregiver? Yes   Is the patient having any side effects they believe may be caused by any medication additions or changes? No   Does the patient have all medications related to this admission filled (includes all antibiotics, inhalers, nebulizers,steroids,etc.) Yes   Is the patient taking all medications as directed (includes completed medication regime)? Yes   Comments HOSP DC FU appt 3/28/25 815 am   Does the patient have an appointment with their PCP within 7-14 days of discharge? Yes   What is the Home health agency?  [facility]   Has home health visited the patient within 72 hours of discharge? Call prior to 72 hours   Psychosocial issues? No   Did the patient receive a copy of their discharge instructions? Yes   Nursing interventions Reviewed instructions with patient   What is the patient's perception of their health status since discharge? Improving   Nursing interventions Nurse provided patient education   Is the patient/caregiver able to teach back TIME? T emperature - higher or lower than normal, I nfection - may have signs and symptoms of an infection, M ental Decline - confused, sleepy, difficult to arouse, E xtremely Ill - severe pain, discomfort, shortness of breath   Nursing interventions Nurse provided patient education   Is patient/caregiver able to teach back steps to recovery at home? Set small, achievable goals for return to baseline health, Rest and regain strength, Eat a balanced diet   Is the patient/caregiver able to  98.4 °F (36.9 °C)Temp  Av.6 °F (37 °C)  Min: 97.4 °F (36.3 °C)  Max: 100.5 °F (03.7 °C) BP Systolic (59MXG), XTZ:588 , Min:111 , VXT:261   Diastolic (65SYL), JCP:26, Min:56, Max:89   Pulse Pulse  Av.3  Min: 89  Max: 108 Resp Resp  Av.3  Min: 12  Max: 27 Pulse ox SpO2  Av.3 %  Min: 90 %  Max: 98 %  GENERAL: redirectable, no distress  NEURO: CN-2-12 grossly intact, no focal neurological abnormalities   HEENT: atraumatic, dry mucous membranes, nares midline, trachea midline   : deferred  LUNGS: clear to ausculation, without wheezes, rales or rhonci  HEART: normal rate and regular rhythm  ABDOMEN: soft, non-tender, non-distended, bowel sounds present in all 4 quadrants and no guarding or peritoneal signs present  EXTREMITY: no cyanosis, clubbing or edema    I/O last 3 completed shifts: In: 1744.4 [I.V.:236.4; NG/GT:1508]  Out: 600 [Urine:600]    Drain/tube output: In: 1569.2 [I.V.:305.2; NG/GT:1264]  Out: 600 [Urine:600]    LAB:  CBC:   Recent Labs     19  0425   WBC 16.4*   HGB 8.6*   HCT 28.3*   MCV 96.3   *     BMP:   Recent Labs     19  0425      K 4.2      CO2 22   BUN 30*   CREATININE 0.95   GLUCOSE 123*     COAGS: No results for input(s): APTT, PROT, INR in the last 72 hours.     RADIOLOGY:  CXR: No new imaging to report on       Feliberto Farrell MD  19, 6:42 AM teach back signs and symptoms of worsening condition: Fever, Hyperthermia, Rapid heart rate (>90), Shortness of breath/rapid respiratory rate, Altered mental status(confusion/coma)   Is the patient/caregiver able to teach back the hierarchy of who to call/visit for symptoms/problems? PCP, Specialist, Home health nurse, Urgent Care, ED, 911 Yes   TCM call completed? Yes   Wrap up additional comments  reports she is still not feeling well but is better than before. Aware of med to stop and has new meds. Aware of PCP appt.   Call end time 1246            AMAYA GUERRERO - Registered Nurse    3/25/2025, 12:46 EDT

## 2025-04-04 ENCOUNTER — APPOINTMENT (OUTPATIENT)
Dept: GENERAL RADIOLOGY | Facility: HOSPITAL | Age: 75
End: 2025-04-04
Payer: MEDICARE

## 2025-04-04 ENCOUNTER — HOSPITAL ENCOUNTER (INPATIENT)
Facility: HOSPITAL | Age: 75
LOS: 2 days | Discharge: HOME HEALTH | End: 2025-04-08
Attending: EMERGENCY MEDICINE | Admitting: INTERNAL MEDICINE
Payer: MEDICARE

## 2025-04-04 ENCOUNTER — READMISSION MANAGEMENT (OUTPATIENT)
Dept: CALL CENTER | Facility: HOSPITAL | Age: 75
End: 2025-04-04
Payer: MEDICARE

## 2025-04-04 DIAGNOSIS — R26.2 DIFFICULTY IN WALKING: ICD-10-CM

## 2025-04-04 DIAGNOSIS — I50.9 ACUTE ON CHRONIC CONGESTIVE HEART FAILURE, UNSPECIFIED HEART FAILURE TYPE: Primary | ICD-10-CM

## 2025-04-04 DIAGNOSIS — G89.29 CHRONIC JOINT PAIN: ICD-10-CM

## 2025-04-04 DIAGNOSIS — M25.50 CHRONIC JOINT PAIN: ICD-10-CM

## 2025-04-04 DIAGNOSIS — I50.23 ACUTE ON CHRONIC HFREF (HEART FAILURE WITH REDUCED EJECTION FRACTION): ICD-10-CM

## 2025-04-04 DIAGNOSIS — G62.9 NEUROPATHY: ICD-10-CM

## 2025-04-04 PROBLEM — I50.20 HEART FAILURE WITH REDUCED EJECTION FRACTION: Status: ACTIVE | Noted: 2025-04-04

## 2025-04-04 LAB
ALBUMIN SERPL-MCNC: 3.8 G/DL (ref 3.5–5.2)
ALBUMIN/GLOB SERPL: 1.2 G/DL
ALP SERPL-CCNC: 90 U/L (ref 39–117)
ALT SERPL W P-5'-P-CCNC: 19 U/L (ref 1–33)
ANION GAP SERPL CALCULATED.3IONS-SCNC: 11.2 MMOL/L (ref 5–15)
AST SERPL-CCNC: 24 U/L (ref 1–32)
BASOPHILS # BLD AUTO: 0.04 10*3/MM3 (ref 0–0.2)
BASOPHILS NFR BLD AUTO: 0.6 % (ref 0–1.5)
BILIRUB SERPL-MCNC: 0.6 MG/DL (ref 0–1.2)
BUN SERPL-MCNC: 34 MG/DL (ref 8–23)
BUN/CREAT SERPL: 37 (ref 7–25)
CALCIUM SPEC-SCNC: 9.1 MG/DL (ref 8.6–10.5)
CHLORIDE SERPL-SCNC: 100 MMOL/L (ref 98–107)
CO2 SERPL-SCNC: 28.8 MMOL/L (ref 22–29)
CREAT SERPL-MCNC: 0.92 MG/DL (ref 0.57–1)
DEPRECATED RDW RBC AUTO: 62.4 FL (ref 37–54)
EGFRCR SERPLBLD CKD-EPI 2021: 66.3 ML/MIN/1.73
EOSINOPHIL # BLD AUTO: 0.13 10*3/MM3 (ref 0–0.4)
EOSINOPHIL NFR BLD AUTO: 1.8 % (ref 0.3–6.2)
ERYTHROCYTE [DISTWIDTH] IN BLOOD BY AUTOMATED COUNT: 16.9 % (ref 12.3–15.4)
GEN 5 1HR TROPONIN T REFLEX: 22 NG/L
GLOBULIN UR ELPH-MCNC: 3.1 GM/DL
GLUCOSE BLDC GLUCOMTR-MCNC: 136 MG/DL (ref 70–99)
GLUCOSE SERPL-MCNC: 158 MG/DL (ref 65–99)
HCT VFR BLD AUTO: 41 % (ref 34–46.6)
HGB BLD-MCNC: 11.9 G/DL (ref 12–15.9)
HOLD SPECIMEN: NORMAL
IMM GRANULOCYTES # BLD AUTO: 0.04 10*3/MM3 (ref 0–0.05)
IMM GRANULOCYTES NFR BLD AUTO: 0.6 % (ref 0–0.5)
LYMPHOCYTES # BLD AUTO: 1 10*3/MM3 (ref 0.7–3.1)
LYMPHOCYTES NFR BLD AUTO: 14.1 % (ref 19.6–45.3)
MCH RBC QN AUTO: 29.5 PG (ref 26.6–33)
MCHC RBC AUTO-ENTMCNC: 29 G/DL (ref 31.5–35.7)
MCV RBC AUTO: 101.7 FL (ref 79–97)
MONOCYTES # BLD AUTO: 0.52 10*3/MM3 (ref 0.1–0.9)
MONOCYTES NFR BLD AUTO: 7.3 % (ref 5–12)
NEUTROPHILS NFR BLD AUTO: 5.38 10*3/MM3 (ref 1.7–7)
NEUTROPHILS NFR BLD AUTO: 75.6 % (ref 42.7–76)
NRBC BLD AUTO-RTO: 0 /100 WBC (ref 0–0.2)
NT-PROBNP SERPL-MCNC: 3440 PG/ML (ref 0–900)
PLATELET # BLD AUTO: 115 10*3/MM3 (ref 140–450)
PMV BLD AUTO: 13.9 FL (ref 6–12)
POTASSIUM SERPL-SCNC: 5.1 MMOL/L (ref 3.5–5.2)
PROT SERPL-MCNC: 6.9 G/DL (ref 6–8.5)
QT INTERVAL: 354 MS
QTC INTERVAL: 417 MS
RBC # BLD AUTO: 4.03 10*6/MM3 (ref 3.77–5.28)
SODIUM SERPL-SCNC: 140 MMOL/L (ref 136–145)
TROPONIN T % DELTA: 0
TROPONIN T NUMERIC DELTA: 0 NG/L
TROPONIN T SERPL HS-MCNC: 22 NG/L
WBC NRBC COR # BLD AUTO: 7.11 10*3/MM3 (ref 3.4–10.8)
WHOLE BLOOD HOLD COAG: NORMAL
WHOLE BLOOD HOLD SPECIMEN: NORMAL

## 2025-04-04 PROCEDURE — 99223 1ST HOSP IP/OBS HIGH 75: CPT | Performed by: INTERNAL MEDICINE

## 2025-04-04 PROCEDURE — 71045 X-RAY EXAM CHEST 1 VIEW: CPT

## 2025-04-04 PROCEDURE — G0378 HOSPITAL OBSERVATION PER HR: HCPCS

## 2025-04-04 PROCEDURE — 94799 UNLISTED PULMONARY SVC/PX: CPT

## 2025-04-04 PROCEDURE — 99291 CRITICAL CARE FIRST HOUR: CPT

## 2025-04-04 PROCEDURE — 36415 COLL VENOUS BLD VENIPUNCTURE: CPT

## 2025-04-04 PROCEDURE — 84484 ASSAY OF TROPONIN QUANT: CPT | Performed by: EMERGENCY MEDICINE

## 2025-04-04 PROCEDURE — 80053 COMPREHEN METABOLIC PANEL: CPT | Performed by: EMERGENCY MEDICINE

## 2025-04-04 PROCEDURE — 25010000002 BUMETANIDE PER 0.5 MG: Performed by: EMERGENCY MEDICINE

## 2025-04-04 PROCEDURE — 25010000002 ONDANSETRON PER 1 MG: Performed by: PHYSICIAN ASSISTANT

## 2025-04-04 PROCEDURE — 93005 ELECTROCARDIOGRAM TRACING: CPT

## 2025-04-04 PROCEDURE — 94640 AIRWAY INHALATION TREATMENT: CPT

## 2025-04-04 PROCEDURE — 83880 ASSAY OF NATRIURETIC PEPTIDE: CPT | Performed by: EMERGENCY MEDICINE

## 2025-04-04 PROCEDURE — 93005 ELECTROCARDIOGRAM TRACING: CPT | Performed by: EMERGENCY MEDICINE

## 2025-04-04 PROCEDURE — 82948 REAGENT STRIP/BLOOD GLUCOSE: CPT

## 2025-04-04 PROCEDURE — 94760 N-INVAS EAR/PLS OXIMETRY 1: CPT

## 2025-04-04 PROCEDURE — 85025 COMPLETE CBC W/AUTO DIFF WBC: CPT

## 2025-04-04 RX ORDER — IBUPROFEN 600 MG/1
1 TABLET ORAL
Status: DISCONTINUED | OUTPATIENT
Start: 2025-04-04 | End: 2025-04-08 | Stop reason: HOSPADM

## 2025-04-04 RX ORDER — DEXTROSE MONOHYDRATE 25 G/50ML
25 INJECTION, SOLUTION INTRAVENOUS
Status: DISCONTINUED | OUTPATIENT
Start: 2025-04-04 | End: 2025-04-08 | Stop reason: HOSPADM

## 2025-04-04 RX ORDER — IPRATROPIUM BROMIDE AND ALBUTEROL SULFATE 2.5; .5 MG/3ML; MG/3ML
3 SOLUTION RESPIRATORY (INHALATION) EVERY 6 HOURS PRN
Status: DISCONTINUED | OUTPATIENT
Start: 2025-04-04 | End: 2025-04-08 | Stop reason: HOSPADM

## 2025-04-04 RX ORDER — METOPROLOL TARTRATE 50 MG
50 TABLET ORAL 3 TIMES DAILY
COMMUNITY
Start: 2025-03-30 | End: 2025-07-29

## 2025-04-04 RX ORDER — NICOTINE POLACRILEX 4 MG
15 LOZENGE BUCCAL
Status: DISCONTINUED | OUTPATIENT
Start: 2025-04-04 | End: 2025-04-08 | Stop reason: HOSPADM

## 2025-04-04 RX ORDER — ACETAMINOPHEN 160 MG/5ML
650 SOLUTION ORAL EVERY 4 HOURS PRN
Status: DISCONTINUED | OUTPATIENT
Start: 2025-04-04 | End: 2025-04-08 | Stop reason: HOSPADM

## 2025-04-04 RX ORDER — SODIUM CHLORIDE 0.9 % (FLUSH) 0.9 %
10 SYRINGE (ML) INJECTION AS NEEDED
Status: DISCONTINUED | OUTPATIENT
Start: 2025-04-04 | End: 2025-04-08 | Stop reason: HOSPADM

## 2025-04-04 RX ORDER — HYDROCODONE BITARTRATE AND ACETAMINOPHEN 10; 325 MG/1; MG/1
1 TABLET ORAL EVERY 6 HOURS PRN
Refills: 0 | Status: DISCONTINUED | OUTPATIENT
Start: 2025-04-04 | End: 2025-04-08 | Stop reason: HOSPADM

## 2025-04-04 RX ORDER — ONDANSETRON 2 MG/ML
4 INJECTION INTRAMUSCULAR; INTRAVENOUS EVERY 4 HOURS PRN
Status: DISCONTINUED | OUTPATIENT
Start: 2025-04-04 | End: 2025-04-08 | Stop reason: HOSPADM

## 2025-04-04 RX ORDER — INSULIN LISPRO 100 [IU]/ML
2-7 INJECTION, SOLUTION INTRAVENOUS; SUBCUTANEOUS
Status: DISCONTINUED | OUTPATIENT
Start: 2025-04-04 | End: 2025-04-08 | Stop reason: HOSPADM

## 2025-04-04 RX ORDER — SODIUM CHLORIDE 9 MG/ML
40 INJECTION, SOLUTION INTRAVENOUS AS NEEDED
Status: DISCONTINUED | OUTPATIENT
Start: 2025-04-04 | End: 2025-04-08 | Stop reason: HOSPADM

## 2025-04-04 RX ORDER — BUDESONIDE 0.5 MG/2ML
0.5 INHALANT ORAL
Status: DISCONTINUED | OUTPATIENT
Start: 2025-04-04 | End: 2025-04-08 | Stop reason: HOSPADM

## 2025-04-04 RX ORDER — DILTIAZEM HYDROCHLORIDE 180 MG/1
180 CAPSULE, COATED, EXTENDED RELEASE ORAL DAILY
COMMUNITY
Start: 2025-03-30 | End: 2025-07-29

## 2025-04-04 RX ORDER — SODIUM CHLORIDE 0.9 % (FLUSH) 0.9 %
10 SYRINGE (ML) INJECTION EVERY 12 HOURS SCHEDULED
Status: DISCONTINUED | OUTPATIENT
Start: 2025-04-04 | End: 2025-04-08 | Stop reason: HOSPADM

## 2025-04-04 RX ORDER — ALUMINA, MAGNESIA, AND SIMETHICONE 2400; 2400; 240 MG/30ML; MG/30ML; MG/30ML
15 SUSPENSION ORAL EVERY 6 HOURS PRN
Status: DISCONTINUED | OUTPATIENT
Start: 2025-04-04 | End: 2025-04-08 | Stop reason: HOSPADM

## 2025-04-04 RX ORDER — ACETAMINOPHEN 650 MG/1
650 SUPPOSITORY RECTAL EVERY 4 HOURS PRN
Status: DISCONTINUED | OUTPATIENT
Start: 2025-04-04 | End: 2025-04-08 | Stop reason: HOSPADM

## 2025-04-04 RX ORDER — ARFORMOTEROL TARTRATE 15 UG/2ML
15 SOLUTION RESPIRATORY (INHALATION)
Status: DISCONTINUED | OUTPATIENT
Start: 2025-04-04 | End: 2025-04-08 | Stop reason: HOSPADM

## 2025-04-04 RX ORDER — BUMETANIDE 0.25 MG/ML
2 INJECTION, SOLUTION INTRAMUSCULAR; INTRAVENOUS
Status: DISCONTINUED | OUTPATIENT
Start: 2025-04-05 | End: 2025-04-07

## 2025-04-04 RX ORDER — BUMETANIDE 0.25 MG/ML
2 INJECTION, SOLUTION INTRAMUSCULAR; INTRAVENOUS ONCE
Status: COMPLETED | OUTPATIENT
Start: 2025-04-04 | End: 2025-04-04

## 2025-04-04 RX ORDER — ACETAMINOPHEN 325 MG/1
650 TABLET ORAL EVERY 4 HOURS PRN
Status: DISCONTINUED | OUTPATIENT
Start: 2025-04-04 | End: 2025-04-08 | Stop reason: HOSPADM

## 2025-04-04 RX ADMIN — BUDESONIDE 0.5 MG: 0.5 SUSPENSION RESPIRATORY (INHALATION) at 20:28

## 2025-04-04 RX ADMIN — ARFORMOTEROL TARTRATE 15 MCG: 15 SOLUTION RESPIRATORY (INHALATION) at 20:28

## 2025-04-04 RX ADMIN — ALUMINUM HYDROXIDE, MAGNESIUM HYDROXIDE, AND DIMETHICONE 15 ML: 400; 400; 40 SUSPENSION ORAL at 20:59

## 2025-04-04 RX ADMIN — HYDROCODONE BITARTRATE AND ACETAMINOPHEN 1 TABLET: 10; 325 TABLET ORAL at 21:00

## 2025-04-04 RX ADMIN — ONDANSETRON 4 MG: 2 INJECTION INTRAMUSCULAR; INTRAVENOUS at 21:29

## 2025-04-04 RX ADMIN — Medication 10 ML: at 21:00

## 2025-04-04 RX ADMIN — BUMETANIDE 2 MG: 0.25 INJECTION INTRAMUSCULAR; INTRAVENOUS at 18:27

## 2025-04-04 NOTE — ED PROVIDER NOTES
Time: 3:36 PM EDT  Date of encounter:  4/4/2025  Independent Historian/Clinical History and Information was obtained by:   Patient and Family    History is limited by: N/A    Chief Complaint: Weakness, shortness of air      History of Present Illness:  Patient is a 74 y.o. year old female who presents to the emergency department for evaluation of weakness, shortness of air.  Patient states she just got released from [facility] for a stomach bug.  Since then she has been more short of breath since yesterday and worsened today.  States that she is so weak she cannot walk from the bathroom back to her bed.  Gets very short of breath with any ambulation attempts.      Patient Care Team  Primary Care Provider: Brittni Quiroz APRN    Past Medical History:     Allergies   Allergen Reactions    Codeine Mental Status Change    Sglt2 Inhibitors Other (See Comments)     Frequent UTI      Past Medical History:   Diagnosis Date    Allergic rhinitis     Anxiety     Aortic valve disease 11/28/2021    Fibrocalcific changes of the aortic valve with mild aortic valve stenosis   on echo 3/2/2021  Moderate aortic valve regurgitation is present. Aortic valve maximum pressure gradient is 26 mmHg. Moderate aortic valve stenosis is present.  On echo 2/10/2022       Arthritis     Atrial fibrillation     CHF (congestive heart failure)     Chronic kidney disease (CKD), stage III (moderate)     Chronic pain     COPD (chronic obstructive pulmonary disease)     Coronary artery disease     Diabetes mellitus type 2 with complications     Elevated cholesterol     Ex-smoker     QUIT SMOKING 2008    GERD (gastroesophageal reflux disease)     History of home oxygen therapy     2L/NC AAT REPORTED    History of transfusion     Hyperlipidemia     Hypertension     Thrombocytopenia      Past Surgical History:   Procedure Laterality Date    HYSTERECTOMY      30 YEARS AGO     Family History   Problem Relation Age of Onset    Heart disease Father      Heart disease Other     Heart disease Other     Diabetes Other        Home Medications:  Prior to Admission medications    Medication Sig Start Date End Date Taking? Authorizing Provider   albuterol (ACCUNEB) 1.25 MG/3ML nebulizer solution Take 3 mL by nebulization Every 6 (Six) Hours As Needed for Wheezing or Shortness of Air. 3/30/23   Brittni Quiroz APRN   albuterol sulfate  (90 Base) MCG/ACT inhaler Inhale 2 puffs Every 4 (Four) Hours As Needed for Wheezing. 5/20/24   Brittni Quiroz APRN   ammonium lactate (LAC-HYDRIN) 12 % lotion Apply  topically to the appropriate area as directed 2 (Two) Times a Day. 2/28/25   Joaquin Rutledge MD   atorvastatin (LIPITOR) 20 MG tablet TAKE 1 TABLET DAILY  Patient taking differently: Take 1 tablet by mouth Daily. 9/12/24   Brittni Quiroz APRN   budesonide (PULMICORT) 1 MG/2ML nebulizer solution Take 2 mL by nebulization Daily. 9/9/24   Brittni Quiroz APRN   cholecalciferol (VITAMIN D3) 25 MCG (1000 UT) tablet Take 1 tablet by mouth Daily.    Provider, MD Whitley   furosemide (LASIX) 40 MG tablet Take 1 tablet by mouth 2 (Two) Times a Day for 30 days. 3/24/25 4/23/25  Melody Luna MD   HYDROcodone-acetaminophen (NORCO)  MG per tablet Take 1 tablet by mouth Every 6 (Six) Hours As Needed for Moderate Pain. 3/7/25   Brittni Quiroz APRN   Insulin Glargine (Lantus SoloStar) 100 UNIT/ML injection pen Inject 15 Units under the skin into the appropriate area as directed Daily. 2/28/25   Joaquin Rutledge MD   metoprolol succinate XL (TOPROL-XL) 50 MG 24 hr tablet Take 1 tablet by mouth Every 12 (Twelve) Hours for 30 days. 2/28/25 3/30/25  Joaquin Rutledge MD   mineral oil-hydrophilic petrolatum (AQUAPHOR) ointment Apply 1 Application topically to the appropriate area as directed Every 1 (One) Hour As Needed for Dry Skin. 2/28/25   Joaquin Rutledge MD   O2 (OXYGEN) Inhale 2 L/min Every Night.    Provider, MD Whitley   pregabalin  "(LYRICA) 150 MG capsule Take 1 capsule by mouth 2 (Two) Times a Day. 25   Brittni Quiroz APRN   rivaroxaban (XARELTO) 20 MG tablet Take 1 tablet by mouth Daily for 30 days. Indications: DVT/PE (active thrombosis) 3/25/25 4/24/25  Melody Luna MD        Social History:   Social History     Tobacco Use    Smoking status: Former     Current packs/day: 0.00     Average packs/day: 1 pack/day for 37.0 years (37.0 ttl pk-yrs)     Types: Cigarettes     Start date: 4/3/1971     Quit date: 2008     Years since quittin.9     Passive exposure: Never    Smokeless tobacco: Never    Tobacco comments:     FOR 30 YEARS   Vaping Use    Vaping status: Never Used   Substance Use Topics    Alcohol use: Never    Drug use: Never         Review of Systems:  Review of Systems   Constitutional:  Negative for chills and fever.   HENT:  Negative for congestion, rhinorrhea and sore throat.    Eyes:  Negative for photophobia.   Respiratory:  Positive for shortness of breath. Negative for apnea, cough and chest tightness.    Cardiovascular:  Negative for chest pain and palpitations.   Gastrointestinal:  Negative for abdominal pain, diarrhea, nausea and vomiting.   Endocrine: Negative.    Genitourinary:  Negative for difficulty urinating and dysuria.   Musculoskeletal:  Negative for back pain, joint swelling and myalgias.   Skin:  Negative for color change and wound.   Allergic/Immunologic: Negative.    Neurological:  Positive for weakness. Negative for seizures and headaches.   Psychiatric/Behavioral: Negative.     All other systems reviewed and are negative.       Physical Exam:  /72 (BP Location: Left arm, Patient Position: Sitting)   Pulse 89   Temp 98.1 °F (36.7 °C) (Oral)   Resp 16   Ht 160 cm (63\")   Wt 131 kg (288 lb 12.8 oz)   SpO2 95%   BMI 51.16 kg/m²     Physical Exam  Vitals and nursing note reviewed.   Constitutional:       General: She is awake.      Appearance: Normal appearance. She is " well-developed.   HENT:      Head: Normocephalic and atraumatic.      Nose: Nose normal.      Mouth/Throat:      Mouth: Mucous membranes are moist.   Eyes:      Extraocular Movements: Extraocular movements intact.      Pupils: Pupils are equal, round, and reactive to light.   Cardiovascular:      Rate and Rhythm: Normal rate and regular rhythm.      Heart sounds: Normal heart sounds.   Pulmonary:      Effort: Tachypnea present. No accessory muscle usage or respiratory distress.      Breath sounds: Examination of the right-lower field reveals decreased breath sounds. Examination of the left-lower field reveals decreased breath sounds. Decreased breath sounds present. No wheezing, rhonchi or rales.   Abdominal:      General: Bowel sounds are normal. There is no distension.      Palpations: Abdomen is soft.      Tenderness: There is no abdominal tenderness. There is no guarding or rebound.      Comments: No rigidity   Musculoskeletal:         General: No tenderness. Normal range of motion.      Cervical back: Normal range of motion and neck supple.      Right lower leg: Edema present.      Left lower leg: Edema present.   Skin:     General: Skin is warm and dry.      Coloration: Skin is not jaundiced.   Neurological:      General: No focal deficit present.      Mental Status: She is alert and oriented to person, place, and time. Mental status is at baseline.   Psychiatric:         Mood and Affect: Mood normal.         Behavior: Behavior normal.                    Medical Decision Making:      Comorbidities that affect care:    CHF, COPD, type 2 diabetes    External Notes reviewed:    Hospital Discharge Summary:    Discharge Summaries  - documented in this encounter   Alexander Dobbs DO - 03/30/2025 9:35 AM CDT  Formatting of this note is different from the original.  Physician Discharge Summary    Patient ID:  Mary Albarran  [id number]  74 y.o.  1950    Admit date: 3/27/2025    Discharge date and time:  3/30/2025    Admitting Physician: Samina Fernandez DO    Discharge Physician: Alexander Dobbs DO    Admission Diagnoses: Gastroenteritis [K52.9]  Bilateral leg pain [M79.604, M79.605]  Biventricular congestive heart failure (CMS/HCC) [I50.82]  Atrial fibrillation with rapid ventricular response (CMS/HCC) [I48.91]  Elevated lactic acid level [R79.89]  Stasis dermatitis of both legs [I87.2]    Discharge Diagnoses:    A-fib RVR  Anemia  Diarrhea secondary to norovirus/EPEC  Recent bacteremia/cellulitis  Lactic acidosis  Pancytopenia  Hypokalemia    Admission Condition: poor    Discharged Condition: good    Indication for Admission: A-fib RVR    Hospital Course:    74-year-old female with past medical history significant for heart failure moderately reduced EF and atrial fibrillation, chronic hypoxic respiratory failure, COPD, CKD stage III, hypertension and diabetes presented to hospital on 3/27 with vomiting and diarrhea. Notably Rohini Burton discharged on 3/24 and admitted for A-fib RVR and CHF. Tested positive for norovirus. Initially placed on cefepime as she had recent bacteremia blood cultures remaining negative to date. Additionally placed on Cardizem drip on arrival due to A-fib RVR and cardiology consulted. They recommended increasing metoprolol. Additionally added short acting Cardizem which was transition to long-acting as her rate improved. Ultimately continue to progress well and seem stable at time of discharge. All questions and concerns were addressed and all medications were reviewed prior to discharge. She is nearly back to her baseline and will continue her amoxicillin as prescribed for her cellulitis from prior hospitalization. New medication discharge include Cardizem and up titration of metoprolol as recommended by cardiology. Recommend following up with PCP in 1 week and cardiology in 1 week. Additionally electrolytes were monitored and replaced as needed. She was noted be pancytopenic and  would also recommend following up with CBC and CMP within the week.    Consults: cardiology    Significant Diagnostic Studies: Stool culture, EKG    Treatments: Cardizem, antibiotics    The following orders were placed and all results were independently analyzed by me:  Orders Placed This Encounter   Procedures    XR Chest 1 View    [place] Draw    Comprehensive Metabolic Panel    BNP    High Sensitivity Troponin T    CBC Auto Differential    High Sensitivity Troponin T 1Hr    CBC Auto Differential    Comprehensive Metabolic Panel    Magnesium    Diet: Cardiac, Diabetic, Fluid Restriction (240 mL/tray); Low Sodium (2g); Consistent Carbohydrate; 2000 mL/day; Fluid Consistency: Thin (IDDSI 0)    Undress & Gown    Vital Signs    Vital Signs    Intake & Output    Weigh Patient    Oral Care    Place Sequential Compression Device    Maintain Sequential Compression Device    Maintain IV Access    Telemetry - Place Orders & Notify Provider of Results When Patient Experiences Acute Chest Pain, Dysrhythmia or Respiratory Distress    Daily Weights    Strict Intake & Output    Discontinue Insulin Infusion at Specified Time After Basal Dose Administered    Discontinue Glucommander IV Insulin Order Set After Transition Complete    Discontinue Glucommander After Transition Complete    Continuous Pulse Oximetry    Code Status and Medical Interventions: CPR (Attempt to Resuscitate); Full Support    Hospitalist (on-call MD unless specified)    Inpatient Case Management  Consult    Oxygen Therapy- Nasal Cannula; Titrate 1-6 LPM Per SpO2; 90 - 95%    POC Glucose 4x Daily Before Meals & at Bedtime    POC Glucose Once    ECG 12 Lead ED Triage Standing Order; SOA    Wound Ostomy Eval & Treat    Insert Peripheral IV    Insert Peripheral IV    Initiate Observation Status    CBC & Differential    Green Top (Gel)    Lavender Top    Gold Top - SST    Light Blue Top    Extra Tubes    Gray Top       Medications Given in the  Emergency Department:  Medications   sodium chloride 0.9 % flush 10 mL (has no administration in time range)   sodium chloride 0.9 % flush 10 mL (10 mL Intravenous Given 4/4/25 2100)   sodium chloride 0.9 % flush 10 mL (has no administration in time range)   sodium chloride 0.9 % infusion 40 mL (has no administration in time range)   acetaminophen (TYLENOL) tablet 650 mg (has no administration in time range)     Or   acetaminophen (TYLENOL) 160 MG/5ML oral solution 650 mg (has no administration in time range)     Or   acetaminophen (TYLENOL) suppository 650 mg (has no administration in time range)   bumetanide (BUMEX) injection 2 mg (has no administration in time range)   arformoterol (BROVANA) nebulizer solution 15 mcg (15 mcg Nebulization Given 4/4/25 2028)   ipratropium-albuterol (DUO-NEB) nebulizer solution 3 mL (has no administration in time range)   budesonide (PULMICORT) nebulizer solution 0.5 mg (0.5 mg Nebulization Given 4/4/25 2028)   dextrose (GLUTOSE) oral gel 15 g (has no administration in time range)   dextrose (D50W) (25 g/50 mL) IV injection 25 g (has no administration in time range)   glucagon (GLUCAGEN) injection 1 mg (has no administration in time range)   Insulin Lispro (humaLOG) injection 2-7 Units ( Subcutaneous Not Given 4/4/25 2049)   HYDROcodone-acetaminophen (NORCO)  MG per tablet 1 tablet (1 tablet Oral Given 4/4/25 2100)   aluminum-magnesium hydroxide-simethicone (MAALOX MAX) 400-400-40 MG/5ML suspension 15 mL (15 mL Oral Given 4/4/25 2059)   ondansetron (ZOFRAN) injection 4 mg (4 mg Intravenous Given 4/4/25 2129)   bumetanide (BUMEX) injection 2 mg (2 mg Intravenous Given 4/4/25 1827)        ED Course:    ED Course as of 04/05/25 0252   Fri Apr 04, 2025   4177 --- PROVIDER IN TRIAGE NOTE ---    The patient was evaluated by Alhaji barry in triage. Orders were placed and the patient is currently awaiting disposition.    [AJ]   1658 EKG interpretation: No acute ischemia.   Atrial fibrillation, heart rate 83 [RP]   1814 Difficult treatment plan as the patient is strongly against taking furosemide.  Has only been taking it once daily as she has severe diarrhea anytime she takes higher dosages of furosemide. [RP]   1816 Patient feels to weak/unable to stand.  Very dyspneic even attempting to get to the restroom.  Feels that if we discharged her she would certainly be back tomorrow unfortunately pain [RP]   1832 SpO2: 90 % [RP]      ED Course User Index  [AJ] Alhaji Clark PA-C  [RP] Ovidio Boogie MD       Labs:    Lab Results (last 24 hours)       Procedure Component Value Units Date/Time    CBC & Differential [[id number]]  (Abnormal) Collected: 04/04/25 1639    Specimen: Blood Updated: 04/04/25 1652    Narrative:      The following orders were created for panel order CBC & Differential.  Procedure                               Abnormality         Status                     ---------                               -----------         ------                     CBC Auto Differential[[id number]]        Abnormal            Final result               Scan Slide[[id number]]                                                                    Please view results for these tests on the individual orders.    Comprehensive Metabolic Panel [[id number]]  (Abnormal) Collected: 04/04/25 1639    Specimen: Blood Updated: 04/04/25 1709     Glucose 158 mg/dL      BUN 34 mg/dL      Creatinine 0.92 mg/dL      Sodium 140 mmol/L      Potassium 5.1 mmol/L      Comment: Slight hemolysis detected by analyzer. Result may be falsely elevated.        Chloride 100 mmol/L      CO2 28.8 mmol/L      Calcium 9.1 mg/dL      Total Protein 6.9 g/dL      Albumin 3.8 g/dL      ALT (SGPT) 19 U/L      AST (SGOT) 24 U/L      Alkaline Phosphatase 90 U/L      Total Bilirubin 0.6 mg/dL      Globulin 3.1 gm/dL      A/G Ratio 1.2 g/dL      BUN/Creatinine Ratio 37.0     Anion Gap 11.2 mmol/L      eGFR 66.3 mL/min/1.73      Narrative:      GFR Categories in Chronic Kidney Disease (CKD)      GFR Category          GFR (mL/min/1.73)    Interpretation  G1                     90 or greater         Normal or high (1)  G2                      60-89                Mild decrease (1)  G3a                   45-59                Mild to moderate decrease  G3b                   30-44                Moderate to severe decrease  G4                    15-29                Severe decrease  G5                    14 or less           Kidney failure          (1)In the absence of evidence of kidney disease, neither GFR category G1 or G2 fulfill the criteria for CKD.    eGFR calculation 2021 CKD-EPI creatinine equation, which does not include race as a factor    BNP [[id number]]  (Abnormal) Collected: 04/04/25 1639    Specimen: Blood Updated: 04/04/25 1707     proBNP 3,440.0 pg/mL     Narrative:      This assay is used as an aid in the diagnosis of individuals suspected of having heart failure. It can be used as an aid in the diagnosis of acute decompensated heart failure (ADHF) in patients presenting with signs and symptoms of ADHF to the emergency department (ED). In addition, NT-proBNP of <300 pg/mL indicates ADHF is not likely.    Age Range Result Interpretation  NT-proBNP Concentration (pg/mL:      <50             Positive            >450                   Gray                 300-450                    Negative             <300    50-75           Positive            >900                  Gray                300-900                  Negative            <300      >75             Positive            >1800                  Gray                300-1800                  Negative            <300    High Sensitivity Troponin T [[id number]]  (Abnormal) Collected: 04/04/25 1639    Specimen: Blood Updated: 04/04/25 1709     HS Troponin T 22 ng/L     Narrative:      High Sensitive Troponin T Reference Range:  <14.0 ng/L- Negative Female for AMI  <22.0 ng/L-  Negative Male for AMI  >=14 - Abnormal Female indicating possible myocardial injury.  >=22 - Abnormal Male indicating possible myocardial injury.   Clinicians would have to utilize clinical acumen, EKG, Troponin, and serial changes to determine if it is an Acute Myocardial Infarction or myocardial injury due to an underlying chronic condition.         CBC Auto Differential [[id number]]  (Abnormal) Collected: 04/04/25 1639    Specimen: Blood Updated: 04/04/25 1652     WBC 7.11 10*3/mm3      RBC 4.03 10*6/mm3      Hemoglobin 11.9 g/dL      Hematocrit 41.0 %      .7 fL      MCH 29.5 pg      MCHC 29.0 g/dL      RDW 16.9 %      RDW-SD 62.4 fl      MPV 13.9 fL      Platelets 115 10*3/mm3      Neutrophil % 75.6 %      Lymphocyte % 14.1 %      Monocyte % 7.3 %      Eosinophil % 1.8 %      Basophil % 0.6 %      Immature Grans % 0.6 %      Neutrophils, Absolute 5.38 10*3/mm3      Lymphocytes, Absolute 1.00 10*3/mm3      Monocytes, Absolute 0.52 10*3/mm3      Eosinophils, Absolute 0.13 10*3/mm3      Basophils, Absolute 0.04 10*3/mm3      Immature Grans, Absolute 0.04 10*3/mm3      nRBC 0.0 /100 WBC     High Sensitivity Troponin T 1Hr [[id number]]  (Abnormal) Collected: 04/04/25 1829    Specimen: Blood Updated: 04/04/25 1858     HS Troponin T 22 ng/L      Troponin T Numeric Delta 0 ng/L      Troponin T % Delta 0    Narrative:      High Sensitive Troponin T Reference Range:  <14.0 ng/L- Negative Female for AMI  <22.0 ng/L- Negative Male for AMI  >=14 - Abnormal Female indicating possible myocardial injury.  >=22 - Abnormal Male indicating possible myocardial injury.   Clinicians would have to utilize clinical acumen, EKG, Troponin, and serial changes to determine if it is an Acute Myocardial Infarction or myocardial injury due to an underlying chronic condition.         POC Glucose Once [[id number]]  (Abnormal) Collected: 04/04/25 2047    Specimen: Blood Updated: 04/04/25 2048     Glucose 136 mg/dL      Comment: Serial  Number: [id number]:  [id number]                Imaging:    XR Chest 1 View  Result Date: 4/4/2025  XR CHEST 1 VW Date of Exam: 4/4/2025 4:21 PM EDT Indication: SOA Triage Protocol Comparison: 3/18/2025 Findings: Heart size is enlarged. There is mild pulmonary vascular congestion and interstitial edema. There is atelectasis in the midportion of both the right and left lung. The lung bases are clear. No definite pleural fluid is present.     Impression: Cardiomegaly with mild pulmonary vascular congestion and interstitial edema. Electronically Signed: Joaquin Mcintosh MD  4/4/2025 4:35 PM EDT  Workstation ID: [id number]        Differential Diagnosis and Discussion:    Dyspnea: Differential diagnosis includes but is not limited to metabolic acidosis, neurological disorders, psychogenic, asthma, pneumothorax, upper airway obstruction, COPD, pneumonia, noncardiogenic pulmonary edema, interstitial lung disease, anemia, congestive heart failure, and pulmonary embolism  Weakness: Based on the patient's history, signs, and symptoms, the diffential diagnosis includes but is not limited to meningitis, stroke, sepsis, subarachnoid hemorrhage, intracranial bleeding, encephalitis, acute uti, dehydration, MS, myasthenia gravis, [place], migraine variant, neuromuscular disorders vertigo, electrolyte imbalance, and metabolic disorders.    PROCEDURES:    Labs were collected in the emergency department and all labs were reviewed and interpreted by me.  X-ray were performed in the emergency department and all X-ray impressions were independently interpreted by me.  An EKG was performed and the EKG was interpreted by me.    ECG 12 Lead ED Triage Standing Order; SOA   Preliminary Result   HEART RATE=83  bpm   RR Xtcxlxpf=076  ms   [place] Interval=  ms   P Horizontal Axis=  deg   P Front Axis=  deg   QRSD Interval=79  ms   QT Mtwknxty=096  ms   IJkW=250  ms   QRS Axis=102  deg   T Wave Axis=134  deg   - ABNORMAL ECG -   Atrial  fibrillation   Right axis deviation   Low voltage, precordial leads   Nonspecific T abnormalities, lateral leads   Date and Time of Study:2025-04-04 15:50:11          Procedures    MDM           Total Critical Care time of 35 minutes. Total critical care time documented does not include time spent on separately billed procedures for services of nurses or physician assistants. I personally saw and examined the patient. I have reviewed all diagnostic interpretations and treatment plans as written. I was present for the key portions of any procedures performed and the inclusive time noted in any critical care statement. Critical care time includes patient management by me, time spent at the patients bedside,  time to review lab and imaging results, discussing patient care, documentation in the medical record, and time spent with family or caregiver.          Patient Care Considerations:    CT CHEST: I considered ordering a CT scan of the chest, however patient denies any chest pain.      Consultants/Shared Management Plan:    Hospitalist: I have discussed the case with Dr. Acharya who agrees to accept the patient for admission.    Social Determinants of Health:    Patient is unable to carry out activities of daily life. Escalation of care is necessary.       Disposition and Care Coordination:    Admit:   Through independent evaluation of the patient's history, physical, and imperical data, the patient meets criteria for inpatient admission to the hospital.        Final diagnoses:   Acute on chronic congestive heart failure, unspecified heart failure type        ED Disposition       ED Disposition   Decision to Admit    Condition   --    Comment   Level of Care: Telemetry [5]   Diagnosis: Heart failure with reduced ejection fraction [[id number]]                 This medical record created using voice recognition software.             Ovidio Boogie MD  04/05/25 0252

## 2025-04-04 NOTE — H&P
[facility]  Date: 2025   Patient Name: Mary Albarran  : 1950  MRN: [id number]  Primary Care Physician:  Brittni Quiroz APRN  Date of admission: 2025    Subjective   Subjective     Chief Complaint: Shortness of breath    HPI:    Mary Albarran is a 74 y.o. female past medical history of obesity BMI 50, moderate aortic stenosis, heart failure reduced ejection fraction EF of 46 to 50%, pulmonary hypertension with RVSP of 45 to 55 mm per mercury and type 2 diabetes who presents with shortness of breath    The patient was recently admitted to our hospital system from 3/18 3/24 for sepsis and heart failure exacerbation.  She recovered well and was discharged home.  Patient had GE symptoms with nausea vomiting diarrhea and was admitted to [facility]. She was given some fluids there.  She been in the hospital for 5 days.  Patient states the last 24 hours she has had significant shortness of breath.  She has not noticed any oxygen desaturation at home but she has had a significant amount of dyspnea on exertion.  As result she came to ER for further evaluation    In the emergency department the patient's vital signs are as follows: Temperature is 98.2, pulse 76, respiratory is 19, blood pressure 126/79, satting 94% on 4 L.  CBC shows no acute abnormalities.  CMP shows no acute abnormalities.  BNP is 3400 which is mildly elevated.  But she has definitely been it worse before in the past.  Patient was not walked but they are concerned she would have worsening desaturation with movement.  He was given 2 Bumex.  The hospital for observation due to acute systolic heart failure exacerbation.  It is unclear whether she is hypoxemic or not.  Will have them walker to see if she can pass a walk test.    All systems reviewed abnormal as noted above    Personal History     Past Medical History:  Obesity BMI 46  Moderate aortic stenosis  CKD stage IIIa  Type 2 diabetes  Home  oxygen of 2 L  Hypertension  Dyslipidemia  COPD     Past Surgical History:  Hysterectomy     Family History:   Diabetes and heart disease     Social History:   Former smoker.  No alcohol.    Home Medications:  HYDROcodone-acetaminophen, Insulin Glargine, O2, albuterol, albuterol sulfate HFA, ammonium lactate, atorvastatin, budesonide, cholecalciferol, dilTIAZem CD, furosemide, metoprolol tartrate, mineral oil-hydrophilic petrolatum, pregabalin, and rivaroxaban    Allergies:  Allergies   Allergen Reactions    Codeine Mental Status Change    Sglt2 Inhibitors Other (See Comments)     Frequent UTI            Objective   Objective     Vitals:   Temp:  [97.8 °F (36.6 °C)-98.7 °F (37.1 °C)] 98.7 °F (37.1 °C)  Heart Rate:  [] 91  Resp:  [17-20] 17  BP: (102-131)/(61-72) 131/72  Flow (L/min) (Oxygen Therapy):  [3-4] 4    Physical Exam    Constitutional: Awake, alert, no acute distress   Eyes: Pupils equal, sclerae anicteric, no conjunctival injection   HENT: NCAT, mucous membranes moist   Neck: Supple, no thyromegaly, no lymphadenopathy, trachea midline   Respiratory:  crackles in bases   Cardiovascular: RRR, no murmurs, rubs, or gallops, palpable pedal pulses bilaterally   Gastrointestinal: Positive bowel sounds, soft, nontender, nondistended   Musculoskeletal: No bilateral ankle edema, no clubbing or cyanosis to extremities   Psychiatric: Appropriate affect, cooperative   Neurologic: Oriented x 3, strength symmetric in all extremities, Cranial Nerves grossly intact to confrontation, speech clear   Skin: No rashes     Result Review    Result Review:  I have personally reviewed the results from the time of this admission to 4/4/2025 18:26 EDT and agree with these findings:  Troponin was 22  proBNP was 3400  Glucose was 158  Hemoglobin was 11.9    Chest x-ray cardiomegaly with mild pulmonary vascular congestion interstitial edema      Assessment & Plan   Assessment / Plan     Assessment/Plan:   Acute heart failure  exacerbation with reduced ejection fraction with EF of 46 to 50%  Acute pulmonary hypertension exacerbation with RVSP of 45 to 50 mm per mercury  Obesity with BMI of 51  Chronic hypoxic respiratory failure it is difficult to tell if she has acute respiratory failure  A-fib  Elevated troponin due to demand  Type 2 diabetes with hyperglycemia    Plan  --Admit to the hospital service  --Ox for saturations less than 90%  -- I will walk to see if she desaturates it is unclear if she is hypoxemic  -- Patient received 2 mg of Bumex in ER  -- Will give 2 mg of Bumex  -- Heart healthy diet with carb consistency  -- 2 g sodium restriction 2000 mL fluid restriction  -- Strict I's and O's  -- Brovana/Pulmicort/DuoNebs  -- Sliding scale for type 2 diabetes    Discussed patient's presentation with the ER nurse and with the ER physician.      VTE Prophylaxis:  Start rivaroxaban Home med        CODE STATUS:     Full code    Admission Status:  I believe this patient meets observation status.    Electronically signed by Trenton Acharya MD, 04/04/25, 6:26 PM EDT.

## 2025-04-04 NOTE — TELEPHONE ENCOUNTER
Caller: Mary Albarran    Relationship: Self    Best call back number:   Telephone Information:   Mobile [phone]         Requested Prescriptions:   Requested Prescriptions     Pending Prescriptions Disp Refills    HYDROcodone-acetaminophen (NORCO)  MG per tablet 120 tablet 0     Sig: Take 1 tablet by mouth Every 6 (Six) Hours As Needed for Moderate Pain.        Pharmacy where request should be sent: [facility] [address] [phone] [facility] [phone]      Last office visit with prescribing clinician: 3/7/2025   Last telemedicine visit with prescribing clinician: Visit date not found   Next office visit with prescribing clinician: 4/11/2025     Additional details provided by patient:      Does the patient have less than a 3 day supply:  [x] Yes  [] No    Would you like a call back once the refill request has been completed: [] Yes [x] No    If the office needs to give you a call back, can they leave a voicemail: [] Yes [x] No    Steven Waller Rep   04/04/25 13:17 EDT

## 2025-04-05 LAB
ALBUMIN SERPL-MCNC: 3.5 G/DL (ref 3.5–5.2)
ALBUMIN/GLOB SERPL: 1.1 G/DL
ALP SERPL-CCNC: 72 U/L (ref 39–117)
ALT SERPL W P-5'-P-CCNC: 18 U/L (ref 1–33)
ANION GAP SERPL CALCULATED.3IONS-SCNC: 10 MMOL/L (ref 5–15)
AST SERPL-CCNC: 23 U/L (ref 1–32)
ATMOSPHERIC PRESS: 740.4 MMHG
BASE EXCESS BLDV CALC-SCNC: 5.9 MMOL/L (ref -2–2)
BASOPHILS # BLD AUTO: 0.05 10*3/MM3 (ref 0–0.2)
BASOPHILS NFR BLD AUTO: 0.7 % (ref 0–1.5)
BDY SITE: ABNORMAL
BILIRUB SERPL-MCNC: 0.7 MG/DL (ref 0–1.2)
BUN SERPL-MCNC: 30 MG/DL (ref 8–23)
BUN/CREAT SERPL: 34.1 (ref 7–25)
CALCIUM SPEC-SCNC: 9 MG/DL (ref 8.6–10.5)
CHLORIDE SERPL-SCNC: 101 MMOL/L (ref 98–107)
CO2 SERPL-SCNC: 27 MMOL/L (ref 22–29)
CREAT SERPL-MCNC: 0.88 MG/DL (ref 0.57–1)
DEPRECATED RDW RBC AUTO: 61.6 FL (ref 37–54)
EGFRCR SERPLBLD CKD-EPI 2021: 69.9 ML/MIN/1.73
EOSINOPHIL # BLD AUTO: 0.2 10*3/MM3 (ref 0–0.4)
EOSINOPHIL NFR BLD AUTO: 2.8 % (ref 0.3–6.2)
ERYTHROCYTE [DISTWIDTH] IN BLOOD BY AUTOMATED COUNT: 16.7 % (ref 12.3–15.4)
GLOBULIN UR ELPH-MCNC: 3.1 GM/DL
GLUCOSE BLDC GLUCOMTR-MCNC: 107 MG/DL (ref 70–99)
GLUCOSE BLDC GLUCOMTR-MCNC: 127 MG/DL (ref 70–99)
GLUCOSE BLDC GLUCOMTR-MCNC: 151 MG/DL (ref 70–99)
GLUCOSE BLDC GLUCOMTR-MCNC: 71 MG/DL (ref 70–99)
GLUCOSE SERPL-MCNC: 91 MG/DL (ref 65–99)
HCO3 BLDV-SCNC: 32.5 MMOL/L (ref 22–26)
HCT VFR BLD AUTO: 39 % (ref 34–46.6)
HGB BLD-MCNC: 11.4 G/DL (ref 12–15.9)
HGB BLDA-MCNC: 13.7 G/DL (ref 12–18)
IMM GRANULOCYTES # BLD AUTO: 0.02 10*3/MM3 (ref 0–0.05)
IMM GRANULOCYTES NFR BLD AUTO: 0.3 % (ref 0–0.5)
LARGE PLATELETS: NORMAL
LYMPHOCYTES # BLD AUTO: 1.32 10*3/MM3 (ref 0.7–3.1)
LYMPHOCYTES NFR BLD AUTO: 18.4 % (ref 19.6–45.3)
MAGNESIUM SERPL-MCNC: 1.8 MG/DL (ref 1.6–2.4)
MCH RBC QN AUTO: 29.5 PG (ref 26.6–33)
MCHC RBC AUTO-ENTMCNC: 29.2 G/DL (ref 31.5–35.7)
MCV RBC AUTO: 101 FL (ref 79–97)
MODALITY: ABNORMAL
MONOCYTES # BLD AUTO: 0.8 10*3/MM3 (ref 0.1–0.9)
MONOCYTES NFR BLD AUTO: 11.2 % (ref 5–12)
NEUTROPHILS NFR BLD AUTO: 4.78 10*3/MM3 (ref 1.7–7)
NEUTROPHILS NFR BLD AUTO: 66.6 % (ref 42.7–76)
NRBC BLD AUTO-RTO: 0 /100 WBC (ref 0–0.2)
PCO2 BLDV: 54.1 MM HG (ref 41–51)
PH BLDV: 7.39 PH UNITS (ref 7.31–7.41)
PLATELET # BLD AUTO: 107 10*3/MM3 (ref 140–450)
PMV BLD AUTO: 13.5 FL (ref 6–12)
PO2 BLDV: 56.9 MM HG (ref 35–42)
POTASSIUM SERPL-SCNC: 4.6 MMOL/L (ref 3.5–5.2)
PROT SERPL-MCNC: 6.6 G/DL (ref 6–8.5)
RBC # BLD AUTO: 3.86 10*6/MM3 (ref 3.77–5.28)
RBC MORPH BLD: NORMAL
SAO2 % BLDCOV: 88.1 % (ref 45–75)
SMALL PLATELETS BLD QL SMEAR: NORMAL
SODIUM SERPL-SCNC: 138 MMOL/L (ref 136–145)
WBC MORPH BLD: NORMAL
WBC NRBC COR # BLD AUTO: 7.17 10*3/MM3 (ref 3.4–10.8)

## 2025-04-05 PROCEDURE — 82803 BLOOD GASES ANY COMBINATION: CPT

## 2025-04-05 PROCEDURE — 94799 UNLISTED PULMONARY SVC/PX: CPT

## 2025-04-05 PROCEDURE — 25010000002 BUMETANIDE PER 0.5 MG: Performed by: INTERNAL MEDICINE

## 2025-04-05 PROCEDURE — 99232 SBSQ HOSP IP/OBS MODERATE 35: CPT | Performed by: STUDENT IN AN ORGANIZED HEALTH CARE EDUCATION/TRAINING PROGRAM

## 2025-04-05 PROCEDURE — 80053 COMPREHEN METABOLIC PANEL: CPT | Performed by: INTERNAL MEDICINE

## 2025-04-05 PROCEDURE — 82948 REAGENT STRIP/BLOOD GLUCOSE: CPT | Performed by: INTERNAL MEDICINE

## 2025-04-05 PROCEDURE — 85007 BL SMEAR W/DIFF WBC COUNT: CPT | Performed by: INTERNAL MEDICINE

## 2025-04-05 PROCEDURE — G0378 HOSPITAL OBSERVATION PER HR: HCPCS

## 2025-04-05 PROCEDURE — 94761 N-INVAS EAR/PLS OXIMETRY MLT: CPT

## 2025-04-05 PROCEDURE — 83735 ASSAY OF MAGNESIUM: CPT | Performed by: INTERNAL MEDICINE

## 2025-04-05 PROCEDURE — 94664 DEMO&/EVAL PT USE INHALER: CPT

## 2025-04-05 PROCEDURE — 63710000001 INSULIN LISPRO (HUMAN) PER 5 UNITS: Performed by: INTERNAL MEDICINE

## 2025-04-05 PROCEDURE — 82948 REAGENT STRIP/BLOOD GLUCOSE: CPT

## 2025-04-05 PROCEDURE — 36415 COLL VENOUS BLD VENIPUNCTURE: CPT | Performed by: INTERNAL MEDICINE

## 2025-04-05 PROCEDURE — 85025 COMPLETE CBC W/AUTO DIFF WBC: CPT | Performed by: INTERNAL MEDICINE

## 2025-04-05 RX ORDER — PREGABALIN 75 MG/1
150 CAPSULE ORAL 2 TIMES DAILY
Status: DISCONTINUED | OUTPATIENT
Start: 2025-04-05 | End: 2025-04-08 | Stop reason: HOSPADM

## 2025-04-05 RX ORDER — ATORVASTATIN CALCIUM 20 MG/1
20 TABLET, FILM COATED ORAL NIGHTLY
Status: DISCONTINUED | OUTPATIENT
Start: 2025-04-06 | End: 2025-04-08 | Stop reason: HOSPADM

## 2025-04-05 RX ORDER — ATORVASTATIN CALCIUM 20 MG/1
20 TABLET, FILM COATED ORAL DAILY
Status: DISCONTINUED | OUTPATIENT
Start: 2025-04-05 | End: 2025-04-05

## 2025-04-05 RX ADMIN — ARFORMOTEROL TARTRATE 15 MCG: 15 SOLUTION RESPIRATORY (INHALATION) at 07:24

## 2025-04-05 RX ADMIN — ARFORMOTEROL TARTRATE 15 MCG: 15 SOLUTION RESPIRATORY (INHALATION) at 18:08

## 2025-04-05 RX ADMIN — Medication 10 ML: at 08:48

## 2025-04-05 RX ADMIN — BUMETANIDE 2 MG: 0.25 INJECTION INTRAMUSCULAR; INTRAVENOUS at 08:47

## 2025-04-05 RX ADMIN — Medication 10 ML: at 20:41

## 2025-04-05 RX ADMIN — BUMETANIDE 2 MG: 0.25 INJECTION INTRAMUSCULAR; INTRAVENOUS at 17:23

## 2025-04-05 RX ADMIN — HYDROCODONE BITARTRATE AND ACETAMINOPHEN 1 TABLET: 10; 325 TABLET ORAL at 23:50

## 2025-04-05 RX ADMIN — HYDROCODONE BITARTRATE AND ACETAMINOPHEN 1 TABLET: 10; 325 TABLET ORAL at 10:09

## 2025-04-05 RX ADMIN — BUDESONIDE 0.5 MG: 0.5 SUSPENSION RESPIRATORY (INHALATION) at 18:08

## 2025-04-05 RX ADMIN — RIVAROXABAN 20 MG: 20 TABLET, FILM COATED ORAL at 16:00

## 2025-04-05 RX ADMIN — HYDROCODONE BITARTRATE AND ACETAMINOPHEN 1 TABLET: 10; 325 TABLET ORAL at 04:16

## 2025-04-05 RX ADMIN — INSULIN LISPRO 2 UNITS: 100 INJECTION, SOLUTION INTRAVENOUS; SUBCUTANEOUS at 20:40

## 2025-04-05 RX ADMIN — HYDROCODONE BITARTRATE AND ACETAMINOPHEN 1 TABLET: 10; 325 TABLET ORAL at 17:23

## 2025-04-05 RX ADMIN — PREGABALIN 150 MG: 75 CAPSULE ORAL at 21:16

## 2025-04-05 RX ADMIN — BUDESONIDE 0.5 MG: 0.5 SUSPENSION RESPIRATORY (INHALATION) at 07:24

## 2025-04-05 NOTE — PLAN OF CARE
Goal Outcome Evaluation:  Plan of Care Reviewed With: patient           Outcome Evaluation: Pt rested intermittently throughout shift. No acute events overnight. Requested pain medication for legs, Nu Mine given per MAR. VSS.

## 2025-04-05 NOTE — PROGRESS NOTES
[facility]   Hospitalist Progress Note  Date: 2025  Patient Name: Mary Albarran  : 1950  MRN: [id number]  Date of admission: 2025  Room/Bed: 201/1      Subjective   Subjective     Chief Complaint: Shortness of breath    Summary:Mary Albarran is a 74 y.o. female with obesity, heart failure with reduced ejection fraction, pulmonary hypertension, moderate aortic stenosis, and diabetes who presents to the emergency department with shortness of breath.  Patient admitted for acute decompensated heart failure.    Interval Followup:   Patient says that she is breathing little bit better and that her swelling is improved.     All systems reviewed and negative except for what is outlined above.      Objective   Objective     Vitals:   Temp:  [97.3 °F (36.3 °C)-98.7 °F (37.1 °C)] 97.3 °F (36.3 °C)  Heart Rate:  [] 71  Resp:  [16-20] 18  BP: (102-140)/(61-79) 109/69  Flow (L/min) (Oxygen Therapy):  [3-4] 3    Physical Exam   General: Sleepy  Cardiovascular: RRR  Pulmonary: no conversational dyspnea  MSK: Bilateral lower extremities wrapped, edema present    Result Review    Result Review:  I have personally reviewed these results:  [x]  Laboratory      Lab 25  03325  1639   WBC 7.17 7.11   HEMOGLOBIN 11.4* 11.9*   HEMATOCRIT 39.0 41.0   PLATELETS 107* 115*   NEUTROS ABS 4.78 5.38   IMMATURE GRANS (ABS) 0.02 0.04   LYMPHS ABS 1.32 1.00   MONOS ABS 0.80 0.52   EOS ABS 0.20 0.13   .0* 101.7*         Lab 25  0339 25  1639   SODIUM 138 140   POTASSIUM 4.6 5.1   CHLORIDE 101 100   CO2 27.0 28.8   ANION GAP 10.0 11.2   BUN 30* 34*   CREATININE 0.88 0.92   EGFR 69.9 66.3   GLUCOSE 91 158*   CALCIUM 9.0 9.1   MAGNESIUM 1.8  --          Lab 25  0339 25  1639   TOTAL PROTEIN 6.6 6.9   ALBUMIN 3.5 3.8   GLOBULIN 3.1 3.1   ALT (SGPT) 18 19   AST (SGOT) 23 24   BILIRUBIN 0.7 0.6   ALK PHOS 72 90         Lab 25  1829 25  1639   PROBNP  --  3,440.0*   HSTROP T  22* 22*                 Brief Urine Lab Results       None          [x]  Microbiology   Microbiology Results (last 10 days)       Procedure Component Value - Date/Time    Clostridioides difficile EIA - , [[id number]] Collected: 03/27/25 1133    Lab Status: Final result Specimen: Stool Updated: 04/04/25 1506     CDIFF Interpretation NEG    Influenza Antigen, Rapid - , [[id number]] Collected: 03/27/25 1032    Lab Status: Final result Updated: 04/04/25 1506    COVID-19, ABBOTT IN-HOUSE,NASAL Swab (NO TRANSPORT MEDIA) 2 HR TAT - , [[id number]] Collected: 03/27/25 1032    Lab Status: Final result Updated: 04/04/25 1506    Rapid Strep A Screen - , [[id number]] Collected: 03/27/25 1032    Lab Status: Final result Updated: 04/04/25 1506          [x]  Radiology  XR Chest 1 View  Result Date: 4/4/2025  Impression: Cardiomegaly with mild pulmonary vascular congestion and interstitial edema. Electronically Signed: Joaquin Mcintosh MD  4/4/2025 4:35 PM EDT  Workstation ID: [id number]    []  EKG/Telemetry   []  Cardiology/Vascular   []  Pathology  []  Old records  []  Other:    Assessment & Plan        Assessment and Plan:    #Acute decompensated heart failure  #Acute pulmonary hypertension  Telemetry  Bumex  K greater than 4, mag greater than 2  I's and O's  Daily weights  Fluid restriction    #COPD  Brovana and Pulmicort    #Diabetes  Sliding scale insulin    #A-fib  Xarelto    #Hyperlipidemia  Statin     Discussed with RN.    VTE Prophylaxis:  Mechanical VTE prophylaxis orders are present.        CODE STATUS:   Code Status (Patient has no pulse and is not breathing): CPR (Attempt to Resuscitate)  Medical Interventions (Patient has pulse or is breathing): Full Support  Level Of Support Discussed With: Patient      Electronically signed by Oswald Javier MD, 4/5/2025, 08:05 EDT.

## 2025-04-05 NOTE — OUTREACH NOTE
Sepsis Week 2 Survey      Flowsheet Row Responses   Religion facility patient discharged from? [facility]   Does the patient have one of the following disease processes/diagnoses(primary or secondary)? Sepsis   Week 2 attempt successful? No   Unsuccessful attempts Attempt 1   Revoke Readmitted            KALEIGH CARDOZA - Registered Nurse

## 2025-04-06 PROBLEM — I50.9 CHF EXACERBATION: Status: ACTIVE | Noted: 2025-04-06

## 2025-04-06 LAB
ANION GAP SERPL CALCULATED.3IONS-SCNC: 12.7 MMOL/L (ref 5–15)
BUN SERPL-MCNC: 20 MG/DL (ref 8–23)
BUN/CREAT SERPL: 25.6 (ref 7–25)
CALCIUM SPEC-SCNC: 8.8 MG/DL (ref 8.6–10.5)
CHLORIDE SERPL-SCNC: 99 MMOL/L (ref 98–107)
CO2 SERPL-SCNC: 27.3 MMOL/L (ref 22–29)
CREAT SERPL-MCNC: 0.78 MG/DL (ref 0.57–1)
DEPRECATED RDW RBC AUTO: 62.8 FL (ref 37–54)
EGFRCR SERPLBLD CKD-EPI 2021: 80.8 ML/MIN/1.73
ERYTHROCYTE [DISTWIDTH] IN BLOOD BY AUTOMATED COUNT: 16.8 % (ref 12.3–15.4)
GLUCOSE BLDC GLUCOMTR-MCNC: 148 MG/DL (ref 70–99)
GLUCOSE BLDC GLUCOMTR-MCNC: 152 MG/DL (ref 70–99)
GLUCOSE BLDC GLUCOMTR-MCNC: 157 MG/DL (ref 70–99)
GLUCOSE BLDC GLUCOMTR-MCNC: 186 MG/DL (ref 70–99)
GLUCOSE SERPL-MCNC: 109 MG/DL (ref 65–99)
HCT VFR BLD AUTO: 39.6 % (ref 34–46.6)
HGB BLD-MCNC: 11.5 G/DL (ref 12–15.9)
MAGNESIUM SERPL-MCNC: 1.6 MG/DL (ref 1.6–2.4)
MCH RBC QN AUTO: 29.4 PG (ref 26.6–33)
MCHC RBC AUTO-ENTMCNC: 29 G/DL (ref 31.5–35.7)
MCV RBC AUTO: 101.3 FL (ref 79–97)
PLATELET # BLD AUTO: 92 10*3/MM3 (ref 140–450)
PMV BLD AUTO: 13.5 FL (ref 6–12)
POTASSIUM SERPL-SCNC: 4.5 MMOL/L (ref 3.5–5.2)
RBC # BLD AUTO: 3.91 10*6/MM3 (ref 3.77–5.28)
SODIUM SERPL-SCNC: 139 MMOL/L (ref 136–145)
WBC NRBC COR # BLD AUTO: 4.97 10*3/MM3 (ref 3.4–10.8)

## 2025-04-06 PROCEDURE — 94799 UNLISTED PULMONARY SVC/PX: CPT

## 2025-04-06 PROCEDURE — 94664 DEMO&/EVAL PT USE INHALER: CPT

## 2025-04-06 PROCEDURE — 83735 ASSAY OF MAGNESIUM: CPT | Performed by: STUDENT IN AN ORGANIZED HEALTH CARE EDUCATION/TRAINING PROGRAM

## 2025-04-06 PROCEDURE — 63710000001 INSULIN LISPRO (HUMAN) PER 5 UNITS: Performed by: INTERNAL MEDICINE

## 2025-04-06 PROCEDURE — 80048 BASIC METABOLIC PNL TOTAL CA: CPT | Performed by: STUDENT IN AN ORGANIZED HEALTH CARE EDUCATION/TRAINING PROGRAM

## 2025-04-06 PROCEDURE — 25010000002 BUMETANIDE PER 0.5 MG: Performed by: INTERNAL MEDICINE

## 2025-04-06 PROCEDURE — 99232 SBSQ HOSP IP/OBS MODERATE 35: CPT | Performed by: FAMILY MEDICINE

## 2025-04-06 PROCEDURE — 82948 REAGENT STRIP/BLOOD GLUCOSE: CPT

## 2025-04-06 PROCEDURE — 82948 REAGENT STRIP/BLOOD GLUCOSE: CPT | Performed by: INTERNAL MEDICINE

## 2025-04-06 PROCEDURE — 85027 COMPLETE CBC AUTOMATED: CPT | Performed by: STUDENT IN AN ORGANIZED HEALTH CARE EDUCATION/TRAINING PROGRAM

## 2025-04-06 PROCEDURE — 94761 N-INVAS EAR/PLS OXIMETRY MLT: CPT

## 2025-04-06 RX ORDER — METOPROLOL TARTRATE 50 MG
50 TABLET ORAL 3 TIMES DAILY
Status: DISCONTINUED | OUTPATIENT
Start: 2025-04-06 | End: 2025-04-08 | Stop reason: HOSPADM

## 2025-04-06 RX ORDER — DILTIAZEM HYDROCHLORIDE 180 MG/1
180 CAPSULE, COATED, EXTENDED RELEASE ORAL DAILY
Status: DISCONTINUED | OUTPATIENT
Start: 2025-04-06 | End: 2025-04-08 | Stop reason: HOSPADM

## 2025-04-06 RX ADMIN — METOPROLOL TARTRATE 50 MG: 50 TABLET, FILM COATED ORAL at 08:17

## 2025-04-06 RX ADMIN — INSULIN LISPRO 2 UNITS: 100 INJECTION, SOLUTION INTRAVENOUS; SUBCUTANEOUS at 08:16

## 2025-04-06 RX ADMIN — ARFORMOTEROL TARTRATE 15 MCG: 15 SOLUTION RESPIRATORY (INHALATION) at 07:49

## 2025-04-06 RX ADMIN — Medication 10 ML: at 20:05

## 2025-04-06 RX ADMIN — BUMETANIDE 2 MG: 0.25 INJECTION INTRAMUSCULAR; INTRAVENOUS at 18:20

## 2025-04-06 RX ADMIN — METOPROLOL TARTRATE 50 MG: 50 TABLET, FILM COATED ORAL at 20:07

## 2025-04-06 RX ADMIN — RIVAROXABAN 20 MG: 20 TABLET, FILM COATED ORAL at 08:17

## 2025-04-06 RX ADMIN — ATORVASTATIN CALCIUM 20 MG: 20 TABLET, FILM COATED ORAL at 20:04

## 2025-04-06 RX ADMIN — HYDROCODONE BITARTRATE AND ACETAMINOPHEN 1 TABLET: 10; 325 TABLET ORAL at 06:20

## 2025-04-06 RX ADMIN — HYDROCODONE BITARTRATE AND ACETAMINOPHEN 1 TABLET: 10; 325 TABLET ORAL at 20:04

## 2025-04-06 RX ADMIN — METOPROLOL TARTRATE 50 MG: 50 TABLET, FILM COATED ORAL at 16:43

## 2025-04-06 RX ADMIN — BUDESONIDE 0.5 MG: 0.5 SUSPENSION RESPIRATORY (INHALATION) at 07:49

## 2025-04-06 RX ADMIN — HYDROCODONE BITARTRATE AND ACETAMINOPHEN 1 TABLET: 10; 325 TABLET ORAL at 13:47

## 2025-04-06 RX ADMIN — INSULIN LISPRO 2 UNITS: 100 INJECTION, SOLUTION INTRAVENOUS; SUBCUTANEOUS at 12:08

## 2025-04-06 RX ADMIN — PREGABALIN 150 MG: 75 CAPSULE ORAL at 08:17

## 2025-04-06 RX ADMIN — PREGABALIN 150 MG: 75 CAPSULE ORAL at 20:04

## 2025-04-06 RX ADMIN — BUMETANIDE 2 MG: 0.25 INJECTION INTRAMUSCULAR; INTRAVENOUS at 08:16

## 2025-04-06 RX ADMIN — Medication 10 ML: at 08:17

## 2025-04-06 RX ADMIN — DILTIAZEM HYDROCHLORIDE 180 MG: 180 CAPSULE, COATED, EXTENDED RELEASE ORAL at 08:17

## 2025-04-06 RX ADMIN — BUDESONIDE 0.5 MG: 0.5 SUSPENSION RESPIRATORY (INHALATION) at 20:16

## 2025-04-06 RX ADMIN — INSULIN LISPRO 2 UNITS: 100 INJECTION, SOLUTION INTRAVENOUS; SUBCUTANEOUS at 20:04

## 2025-04-06 RX ADMIN — ARFORMOTEROL TARTRATE 15 MCG: 15 SOLUTION RESPIRATORY (INHALATION) at 20:16

## 2025-04-06 NOTE — PROGRESS NOTES
[facility]   Hospitalist Progress Note  Date: 2025  Patient Name: Mary Albarran  : 1950  MRN: [id number]  Date of admission: 2025      Subjective   Subjective       Chief complaint: Shortness of breath    Summary:  74-year-old female with history of morbid obesity, heart failure with reduced ejection fraction, atrial fibrillation, chronic hypoxemic respiratory failure, COPD, CKD stage IIIa, hypertension, diabetes, recent streptococcal and gram-negative bacteremia, hospitalized recently at an outside facility for A-fib with RVR and diarrhea secondary to norovirus, was hospitalized at our facility on 2025 with increased work of breathing and shortness of breath and volume overload status,, found to be in decompensated heart failure with acute pulmonary hypertension exacerbation, placed on IV Bumex, working to correct volume status    Interval follow-up: Seen and examined, no distress, no events overnight, breathing a bit better but still short of air and volume overloaded, 4.1 L of urine output, net -3.1 L.  Creatinine 0.78, BUN 20, potassium 4.5, sodium 139, white blood count 4000, hemoglobin 11.5.  Telemetry reviewed, A-fib rate in the 90s, brief periods of uncontrolled rate in the 100s.    Review of systems:  All systems reviewed and negative except for weakness, fatigue, shortness of air with exertional activity, lower extremity edema       Objective   Objective     Vitals:   Temp:  [97.5 °F (36.4 °C)-98.7 °F (37.1 °C)] 97.7 °F (36.5 °C)  Heart Rate:  [] 85  Resp:  [16-20] 16  BP: (110-146)/(51-92) 116/51  Flow (L/min) (Oxygen Therapy):  [3-4] 3  Physical Exam               Constitutional: Awake, alert, no acute distress sitting upright in the bed              Eyes: Pupils equal, sclerae anicteric, no conjunctival injection              HENT: NCAT, mucous membranes moist              Neck: Supple, full range of motion  Respiratory: Coarse breath sounds throughout               Cardiovascular: RRR, no murmurs, rubs, or gallops, palpable pedal pulses bilaterally              Gastrointestinal: Positive bowel sounds, soft, nontender, nondistended              Musculoskeletal: No bilateral ankle edema, no clubbing or cyanosis to extremities              Psychiatric: Appropriate affect, cooperative              Neurologic: Oriented x 3, strength symmetric in all extremities, Cranial Nerves grossly intact to confrontation, speech clear              Skin: No rashes     Result Review    Result Review:  I have personally reviewed the pertinent results from the past 24 hours to 4/6/2025 14:48 EDT and agree with these findings:  [x]  Laboratory   CBC          4/4/2025    16:39 4/5/2025    03:39 4/6/2025    04:31   CBC   WBC 7.11  7.17  4.97    RBC 4.03  3.86  3.91    Hemoglobin 11.9  11.4  11.5    Hematocrit 41.0  39.0  39.6    .7  101.0  101.3    MCH 29.5  29.5  29.4    MCHC 29.0  29.2  29.0    RDW 16.9  16.7  16.8    Platelets 115  107  92      BMP          4/4/2025    16:39 4/5/2025    03:39 4/6/2025    04:31   BMP   BUN 34  30  20    Creatinine 0.92  0.88  0.78    Sodium 140  138  139    Potassium 5.1  4.6  4.5    Chloride 100  101  99    CO2 28.8  27.0  27.3    Calcium 9.1  9.0  8.8      LIVER FUNCTION TESTS:      Lab 04/05/25  0339 04/04/25  1639   TOTAL PROTEIN 6.6 6.9   ALBUMIN 3.5 3.8   GLOBULIN 3.1 3.1   ALT (SGPT) 18 19   AST (SGOT) 23 24   BILIRUBIN 0.7 0.6   ALK PHOS 72 90       [x]  Microbiology   Microbiology Results (last 10 days)       ** No results found for the last 240 hours. **              [x]  Radiology XR Chest 1 View  Result Date: 4/4/2025  Impression: Cardiomegaly with mild pulmonary vascular congestion and interstitial edema. Electronically Signed: Joaquin Mcintosh MD  4/4/2025 4:35 PM EDT  Workstation ID: [id number]        [x]  EKG/Telemetry   ECG 12 Lead ED Triage Standing Order; SOA   Preliminary Result   HEART RATE=83  bpm   RR Xqobfepb=737  ms   WY Interval=  ms   P  Horizontal Axis=  deg   P Front Axis=  deg   QRSD Interval=79  ms   QT Gkmbuqca=406  ms   HKtM=333  ms   QRS Axis=102  deg   T Wave Axis=134  deg   - ABNORMAL ECG -   Atrial fibrillation   Right axis deviation   Low voltage, precordial leads   Nonspecific T abnormalities, lateral leads   Date and Time of Study:2025-04-04 15:50:11          []  Cardiology/Vascular   []  Pathology  [x]  Old records  []  Other:    Assessment & Plan   Assessment / Plan     Assessment/Plan:  Assessment:  Acute heart failure exacerbation with reduced ejection fraction with EF of 46 to 50%  Acute pulmonary hypertension exacerbation with RVSP of 45 to 50 mm per mercury  Obesity with BMI of 51  Chronic hypoxic respiratory failure it is difficult to tell if she has acute respiratory failure  A-fib  Elevated troponin due to demand  Type 2 diabetes with hyperglycemia    Plan:  Labs and imaging reviewed  Continue aggressive diuresis with Bumex 2 mg IV twice daily  Continue telemetry monitoring  Continue metoprolol 50 mg 3 times a day at this time, heart rate is overall adequately controlled, will make adjustments if needed and switch her to Toprol-XL.  Continue Xarelto 20 mg daily  Continue Lyrica 150 mg twice a day  Continue Humalog sliding scale coverage  Continue Cardizem 180 mg daily  Continue Pulmicort and Brovana nebs twice daily  Hold Levemir at this time, blood sugars are trending low  PT/OT  Will need to make arrangements for rehab  A.m. labs  Full code  DVT prophylaxis Xarelto  Clinical course dictate further management  Discussed with nurse at the bedside      VTE Prophylaxis:  Pharmacologic & mechanical VTE prophylaxis orders are present.        CODE STATUS:   Code Status (Patient has no pulse and is not breathing): CPR (Attempt to Resuscitate)  Medical Interventions (Patient has pulse or is breathing): Full Support  Level Of Support Discussed With: Patient        Electronically signed by Melody Luna MD, 4/6/2025, 14:48  EDT.    Portions of this documentation were transcribed electronically from a voice recognition software.  I confirm all data accurately represents the service(s) I performed at today's visit.

## 2025-04-06 NOTE — PLAN OF CARE
Goal Outcome Evaluation:  Plan of Care Reviewed With: patient           Outcome Evaluation: Discussed redness in LE with MD. Waiting for duplex scan.

## 2025-04-06 NOTE — PLAN OF CARE
Goal Outcome Evaluation:  Plan of Care Reviewed With: patient           Outcome Evaluation: Pt rested intermittently throughout shift. No acute events overnight. Requested pain medication for legs, PA restarted Lyrica. VSS. WCTM.

## 2025-04-07 ENCOUNTER — APPOINTMENT (OUTPATIENT)
Facility: HOSPITAL | Age: 75
End: 2025-04-07
Payer: MEDICARE

## 2025-04-07 LAB
ALBUMIN SERPL-MCNC: 3.3 G/DL (ref 3.5–5.2)
ALP SERPL-CCNC: 67 U/L (ref 39–117)
ALT SERPL W P-5'-P-CCNC: 16 U/L (ref 1–33)
ANION GAP SERPL CALCULATED.3IONS-SCNC: 12 MMOL/L (ref 5–15)
AST SERPL-CCNC: 26 U/L (ref 1–32)
BASOPHILS # BLD AUTO: 0.04 10*3/MM3 (ref 0–0.2)
BASOPHILS NFR BLD AUTO: 0.6 % (ref 0–1.5)
BH CV LOWER VASCULAR LEFT COMMON FEMORAL AUGMENT: NORMAL
BH CV LOWER VASCULAR LEFT COMMON FEMORAL COMPETENT: NORMAL
BH CV LOWER VASCULAR LEFT COMMON FEMORAL COMPRESS: NORMAL
BH CV LOWER VASCULAR LEFT COMMON FEMORAL PHASIC: NORMAL
BH CV LOWER VASCULAR LEFT COMMON FEMORAL SPONT: NORMAL
BH CV LOWER VASCULAR LEFT DISTAL FEMORAL COMPRESS: NORMAL
BH CV LOWER VASCULAR LEFT GASTRONEMIUS COMPRESS: NORMAL
BH CV LOWER VASCULAR LEFT GREATER SAPH AK AUGMENT: NORMAL
BH CV LOWER VASCULAR LEFT GREATER SAPH AK COMPETENT: NORMAL
BH CV LOWER VASCULAR LEFT GREATER SAPH AK COMPRESS: NORMAL
BH CV LOWER VASCULAR LEFT GREATER SAPH AK PHASIC: NORMAL
BH CV LOWER VASCULAR LEFT GREATER SAPH AK SPONT: NORMAL
BH CV LOWER VASCULAR LEFT GREATER SAPH BK COMPRESS: NORMAL
BH CV LOWER VASCULAR LEFT LESSER SAPH COMPRESS: NORMAL
BH CV LOWER VASCULAR LEFT MID FEMORAL AUGMENT: NORMAL
BH CV LOWER VASCULAR LEFT MID FEMORAL COMPETENT: NORMAL
BH CV LOWER VASCULAR LEFT MID FEMORAL COMPRESS: NORMAL
BH CV LOWER VASCULAR LEFT MID FEMORAL PHASIC: NORMAL
BH CV LOWER VASCULAR LEFT MID FEMORAL SPONT: NORMAL
BH CV LOWER VASCULAR LEFT PERONEAL AUGMENT: NORMAL
BH CV LOWER VASCULAR LEFT PERONEAL COMPETENT: NORMAL
BH CV LOWER VASCULAR LEFT PERONEAL COMPRESS: NORMAL
BH CV LOWER VASCULAR LEFT PERONEAL PHASIC: NORMAL
BH CV LOWER VASCULAR LEFT PERONEAL SPONT: NORMAL
BH CV LOWER VASCULAR LEFT POPLITEAL AUGMENT: NORMAL
BH CV LOWER VASCULAR LEFT POPLITEAL COMPETENT: NORMAL
BH CV LOWER VASCULAR LEFT POPLITEAL COMPRESS: NORMAL
BH CV LOWER VASCULAR LEFT POPLITEAL PHASIC: NORMAL
BH CV LOWER VASCULAR LEFT POPLITEAL SPONT: NORMAL
BH CV LOWER VASCULAR LEFT POSTERIOR TIBIAL AUGMENT: NORMAL
BH CV LOWER VASCULAR LEFT POSTERIOR TIBIAL COMPETENT: NORMAL
BH CV LOWER VASCULAR LEFT POSTERIOR TIBIAL COMPRESS: NORMAL
BH CV LOWER VASCULAR LEFT POSTERIOR TIBIAL PHASIC: NORMAL
BH CV LOWER VASCULAR LEFT POSTERIOR TIBIAL SPONT: NORMAL
BH CV LOWER VASCULAR LEFT PROXIMAL FEMORAL COMPRESS: NORMAL
BH CV LOWER VASCULAR RIGHT COMMON FEMORAL AUGMENT: NORMAL
BH CV LOWER VASCULAR RIGHT COMMON FEMORAL COMPETENT: NORMAL
BH CV LOWER VASCULAR RIGHT COMMON FEMORAL COMPRESS: NORMAL
BH CV LOWER VASCULAR RIGHT COMMON FEMORAL PHASIC: NORMAL
BH CV LOWER VASCULAR RIGHT COMMON FEMORAL SPONT: NORMAL
BH CV LOWER VASCULAR RIGHT DISTAL FEMORAL COMPRESS: NORMAL
BH CV LOWER VASCULAR RIGHT GASTRONEMIUS COMPRESS: NORMAL
BH CV LOWER VASCULAR RIGHT GREATER SAPH AK AUGMENT: NORMAL
BH CV LOWER VASCULAR RIGHT GREATER SAPH AK COMPETENT: NORMAL
BH CV LOWER VASCULAR RIGHT GREATER SAPH AK COMPRESS: NORMAL
BH CV LOWER VASCULAR RIGHT GREATER SAPH AK PHASIC: NORMAL
BH CV LOWER VASCULAR RIGHT GREATER SAPH AK SPONT: NORMAL
BH CV LOWER VASCULAR RIGHT GREATER SAPH BK COMPRESS: NORMAL
BH CV LOWER VASCULAR RIGHT LESSER SAPH COMPRESS: NORMAL
BH CV LOWER VASCULAR RIGHT MID FEMORAL AUGMENT: NORMAL
BH CV LOWER VASCULAR RIGHT MID FEMORAL COMPETENT: NORMAL
BH CV LOWER VASCULAR RIGHT MID FEMORAL COMPRESS: NORMAL
BH CV LOWER VASCULAR RIGHT MID FEMORAL PHASIC: NORMAL
BH CV LOWER VASCULAR RIGHT MID FEMORAL SPONT: NORMAL
BH CV LOWER VASCULAR RIGHT PERONEAL AUGMENT: NORMAL
BH CV LOWER VASCULAR RIGHT PERONEAL COMPRESS: NORMAL
BH CV LOWER VASCULAR RIGHT PERONEAL PHASIC: NORMAL
BH CV LOWER VASCULAR RIGHT PERONEAL SPONT: NORMAL
BH CV LOWER VASCULAR RIGHT POPLITEAL AUGMENT: NORMAL
BH CV LOWER VASCULAR RIGHT POPLITEAL COMPETENT: NORMAL
BH CV LOWER VASCULAR RIGHT POPLITEAL COMPRESS: NORMAL
BH CV LOWER VASCULAR RIGHT POPLITEAL PHASIC: NORMAL
BH CV LOWER VASCULAR RIGHT POPLITEAL SPONT: NORMAL
BH CV LOWER VASCULAR RIGHT POSTERIOR TIBIAL AUGMENT: NORMAL
BH CV LOWER VASCULAR RIGHT POSTERIOR TIBIAL COMPETENT: NORMAL
BH CV LOWER VASCULAR RIGHT POSTERIOR TIBIAL COMPRESS: NORMAL
BH CV LOWER VASCULAR RIGHT POSTERIOR TIBIAL PHASIC: NORMAL
BH CV LOWER VASCULAR RIGHT POSTERIOR TIBIAL SPONT: NORMAL
BH CV LOWER VASCULAR RIGHT PROXIMAL FEMORAL COMPRESS: NORMAL
BILIRUB CONJ SERPL-MCNC: 0.4 MG/DL (ref 0–0.3)
BILIRUB INDIRECT SERPL-MCNC: 0.7 MG/DL
BILIRUB SERPL-MCNC: 1.1 MG/DL (ref 0–1.2)
BUN SERPL-MCNC: 17 MG/DL (ref 8–23)
BUN/CREAT SERPL: 18.7 (ref 7–25)
CALCIUM SPEC-SCNC: 8.9 MG/DL (ref 8.6–10.5)
CHLORIDE SERPL-SCNC: 96 MMOL/L (ref 98–107)
CO2 SERPL-SCNC: 29 MMOL/L (ref 22–29)
CREAT SERPL-MCNC: 0.91 MG/DL (ref 0.57–1)
DEPRECATED RDW RBC AUTO: 59.1 FL (ref 37–54)
EGFRCR SERPLBLD CKD-EPI 2021: 67.2 ML/MIN/1.73
EOSINOPHIL # BLD AUTO: 0.09 10*3/MM3 (ref 0–0.4)
EOSINOPHIL NFR BLD AUTO: 1.4 % (ref 0.3–6.2)
ERYTHROCYTE [DISTWIDTH] IN BLOOD BY AUTOMATED COUNT: 16.3 % (ref 12.3–15.4)
GLUCOSE BLDC GLUCOMTR-MCNC: 144 MG/DL (ref 70–99)
GLUCOSE BLDC GLUCOMTR-MCNC: 149 MG/DL (ref 70–99)
GLUCOSE BLDC GLUCOMTR-MCNC: 151 MG/DL (ref 70–99)
GLUCOSE BLDC GLUCOMTR-MCNC: 185 MG/DL (ref 70–99)
GLUCOSE SERPL-MCNC: 180 MG/DL (ref 65–99)
HCT VFR BLD AUTO: 37.8 % (ref 34–46.6)
HGB BLD-MCNC: 11.3 G/DL (ref 12–15.9)
IMM GRANULOCYTES # BLD AUTO: 0.02 10*3/MM3 (ref 0–0.05)
IMM GRANULOCYTES NFR BLD AUTO: 0.3 % (ref 0–0.5)
LYMPHOCYTES # BLD AUTO: 1.03 10*3/MM3 (ref 0.7–3.1)
LYMPHOCYTES NFR BLD AUTO: 16.4 % (ref 19.6–45.3)
MAGNESIUM SERPL-MCNC: 1.4 MG/DL (ref 1.6–2.4)
MCH RBC QN AUTO: 29.4 PG (ref 26.6–33)
MCHC RBC AUTO-ENTMCNC: 29.9 G/DL (ref 31.5–35.7)
MCV RBC AUTO: 98.4 FL (ref 79–97)
MONOCYTES # BLD AUTO: 0.62 10*3/MM3 (ref 0.1–0.9)
MONOCYTES NFR BLD AUTO: 9.9 % (ref 5–12)
NEUTROPHILS NFR BLD AUTO: 4.48 10*3/MM3 (ref 1.7–7)
NEUTROPHILS NFR BLD AUTO: 71.4 % (ref 42.7–76)
NRBC BLD AUTO-RTO: 0 /100 WBC (ref 0–0.2)
PHOSPHATE SERPL-MCNC: 3.7 MG/DL (ref 2.5–4.5)
PLATELET # BLD AUTO: 89 10*3/MM3 (ref 140–450)
PMV BLD AUTO: 13.4 FL (ref 6–12)
POTASSIUM SERPL-SCNC: 4.5 MMOL/L (ref 3.5–5.2)
PROT SERPL-MCNC: 6.7 G/DL (ref 6–8.5)
RBC # BLD AUTO: 3.84 10*6/MM3 (ref 3.77–5.28)
SODIUM SERPL-SCNC: 137 MMOL/L (ref 136–145)
WBC NRBC COR # BLD AUTO: 6.28 10*3/MM3 (ref 3.4–10.8)

## 2025-04-07 PROCEDURE — 83735 ASSAY OF MAGNESIUM: CPT | Performed by: FAMILY MEDICINE

## 2025-04-07 PROCEDURE — 80076 HEPATIC FUNCTION PANEL: CPT | Performed by: FAMILY MEDICINE

## 2025-04-07 PROCEDURE — 94799 UNLISTED PULMONARY SVC/PX: CPT

## 2025-04-07 PROCEDURE — 94664 DEMO&/EVAL PT USE INHALER: CPT

## 2025-04-07 PROCEDURE — 25010000002 BUMETANIDE PER 0.5 MG: Performed by: INTERNAL MEDICINE

## 2025-04-07 PROCEDURE — 80048 BASIC METABOLIC PNL TOTAL CA: CPT | Performed by: FAMILY MEDICINE

## 2025-04-07 PROCEDURE — 85025 COMPLETE CBC W/AUTO DIFF WBC: CPT | Performed by: FAMILY MEDICINE

## 2025-04-07 PROCEDURE — 97166 OT EVAL MOD COMPLEX 45 MIN: CPT

## 2025-04-07 PROCEDURE — 97161 PT EVAL LOW COMPLEX 20 MIN: CPT

## 2025-04-07 PROCEDURE — 93970 EXTREMITY STUDY: CPT

## 2025-04-07 PROCEDURE — 82948 REAGENT STRIP/BLOOD GLUCOSE: CPT

## 2025-04-07 PROCEDURE — 94761 N-INVAS EAR/PLS OXIMETRY MLT: CPT

## 2025-04-07 PROCEDURE — 63710000001 INSULIN LISPRO (HUMAN) PER 5 UNITS: Performed by: INTERNAL MEDICINE

## 2025-04-07 PROCEDURE — 25010000002 MAGNESIUM SULFATE 4 GM/100ML SOLUTION: Performed by: FAMILY MEDICINE

## 2025-04-07 PROCEDURE — 25010000002 ONDANSETRON PER 1 MG: Performed by: PHYSICIAN ASSISTANT

## 2025-04-07 PROCEDURE — 84100 ASSAY OF PHOSPHORUS: CPT | Performed by: FAMILY MEDICINE

## 2025-04-07 PROCEDURE — 93970 EXTREMITY STUDY: CPT | Performed by: SURGERY

## 2025-04-07 PROCEDURE — 82948 REAGENT STRIP/BLOOD GLUCOSE: CPT | Performed by: INTERNAL MEDICINE

## 2025-04-07 PROCEDURE — 99232 SBSQ HOSP IP/OBS MODERATE 35: CPT | Performed by: FAMILY MEDICINE

## 2025-04-07 RX ORDER — CALCIUM ACETATE MONOHYDRATE AND ALUMINUM SULFATE TETRADECAHYDRATE 952; 1347 MG/2299MG; MG/2299MG
1 POWDER, FOR SOLUTION TOPICAL DAILY
Status: DISCONTINUED | OUTPATIENT
Start: 2025-04-07 | End: 2025-04-08 | Stop reason: HOSPADM

## 2025-04-07 RX ORDER — AMMONIUM LACTATE 12 G/100G
LOTION TOPICAL
Status: DISCONTINUED | OUTPATIENT
Start: 2025-04-07 | End: 2025-04-08 | Stop reason: HOSPADM

## 2025-04-07 RX ORDER — BUMETANIDE 1 MG/1
1 TABLET ORAL
Status: DISCONTINUED | OUTPATIENT
Start: 2025-04-07 | End: 2025-04-08 | Stop reason: HOSPADM

## 2025-04-07 RX ORDER — MAGNESIUM SULFATE HEPTAHYDRATE 40 MG/ML
4 INJECTION, SOLUTION INTRAVENOUS ONCE
Status: COMPLETED | OUTPATIENT
Start: 2025-04-07 | End: 2025-04-07

## 2025-04-07 RX ADMIN — METOPROLOL TARTRATE 50 MG: 50 TABLET, FILM COATED ORAL at 21:18

## 2025-04-07 RX ADMIN — ONDANSETRON 4 MG: 2 INJECTION INTRAMUSCULAR; INTRAVENOUS at 00:34

## 2025-04-07 RX ADMIN — Medication: at 21:47

## 2025-04-07 RX ADMIN — PREGABALIN 150 MG: 75 CAPSULE ORAL at 09:41

## 2025-04-07 RX ADMIN — ARFORMOTEROL TARTRATE 15 MCG: 15 SOLUTION RESPIRATORY (INHALATION) at 08:03

## 2025-04-07 RX ADMIN — DILTIAZEM HYDROCHLORIDE 180 MG: 180 CAPSULE, COATED, EXTENDED RELEASE ORAL at 09:41

## 2025-04-07 RX ADMIN — INSULIN LISPRO 2 UNITS: 100 INJECTION, SOLUTION INTRAVENOUS; SUBCUTANEOUS at 17:21

## 2025-04-07 RX ADMIN — METOPROLOL TARTRATE 50 MG: 50 TABLET, FILM COATED ORAL at 15:30

## 2025-04-07 RX ADMIN — ZINC OXIDE 1 APPLICATION: 200 OINTMENT TOPICAL at 18:20

## 2025-04-07 RX ADMIN — Medication 1 PACKET: at 18:19

## 2025-04-07 RX ADMIN — METOPROLOL TARTRATE 50 MG: 50 TABLET, FILM COATED ORAL at 09:41

## 2025-04-07 RX ADMIN — ATORVASTATIN CALCIUM 20 MG: 20 TABLET, FILM COATED ORAL at 21:18

## 2025-04-07 RX ADMIN — PREGABALIN 150 MG: 75 CAPSULE ORAL at 21:19

## 2025-04-07 RX ADMIN — Medication 10 ML: at 09:41

## 2025-04-07 RX ADMIN — BUMETANIDE 2 MG: 0.25 INJECTION INTRAMUSCULAR; INTRAVENOUS at 09:41

## 2025-04-07 RX ADMIN — BUDESONIDE 0.5 MG: 0.5 SUSPENSION RESPIRATORY (INHALATION) at 08:03

## 2025-04-07 RX ADMIN — BUMETANIDE 1 MG: 1 TABLET ORAL at 18:41

## 2025-04-07 RX ADMIN — HYDROCODONE BITARTRATE AND ACETAMINOPHEN 1 TABLET: 10; 325 TABLET ORAL at 15:30

## 2025-04-07 RX ADMIN — ARFORMOTEROL TARTRATE 15 MCG: 15 SOLUTION RESPIRATORY (INHALATION) at 20:08

## 2025-04-07 RX ADMIN — INSULIN LISPRO 2 UNITS: 100 INJECTION, SOLUTION INTRAVENOUS; SUBCUTANEOUS at 21:19

## 2025-04-07 RX ADMIN — MAGNESIUM SULFATE IN WATER FOR 4 G: 40 INJECTION INTRAVENOUS at 09:40

## 2025-04-07 RX ADMIN — ZINC OXIDE 1 APPLICATION: 200 OINTMENT TOPICAL at 21:21

## 2025-04-07 RX ADMIN — HYDROCODONE BITARTRATE AND ACETAMINOPHEN 1 TABLET: 10; 325 TABLET ORAL at 21:19

## 2025-04-07 RX ADMIN — HYDROCODONE BITARTRATE AND ACETAMINOPHEN 1 TABLET: 10; 325 TABLET ORAL at 05:02

## 2025-04-07 RX ADMIN — RIVAROXABAN 20 MG: 20 TABLET, FILM COATED ORAL at 09:41

## 2025-04-07 RX ADMIN — BUDESONIDE 0.5 MG: 0.5 SUSPENSION RESPIRATORY (INHALATION) at 20:08

## 2025-04-07 RX ADMIN — Medication 10 ML: at 21:19

## 2025-04-07 NOTE — PLAN OF CARE
Goal Outcome Evaluation:  Plan of Care Reviewed With: patient        Progress: no change (Evaluation complete)  Outcome Evaluation: Patient presents with limitations of strength, activity tolerance, and balance which impede her ability to perform ADLs/transfers as prior.  The skills of a therapist will be required to safely and effectively implement treatment plan to restore maximum level function.    Anticipated Discharge Disposition (OT): home with home health, home with assist

## 2025-04-07 NOTE — THERAPY EVALUATION
Patient Name: Mary Albarran  : 1950    MRN: [id number]                              Today's Date: 2025       Admit Date: 2025    Visit Dx:     ICD-10-CM ICD-9-CM   1. Acute on chronic congestive heart failure, unspecified heart failure type  I50.9 428.0   2. Difficulty in walking  R26.2 719.7     Patient Active Problem List   Diagnosis    Aortic stenosis, moderate    Atrial fibrillation    Chronic kidney disease, stage III (moderate)    Hyperlipidemia    Hypertension    Chronic diastolic congestive heart failure    Atrial fibrillation with RVR    Onychomycosis    Onychocryptosis    Foot pain, bilateral    Nephrolithiasis    Recurrent urinary tract infection    Acute on chronic HFrEF (heart failure with reduced ejection fraction)    Cellulitis of right leg without foot    Diabetes mellitus type 2 with complications    Depression    COPD (chronic obstructive pulmonary disease)    Chronic pain    Anxiety    Heart failure with reduced ejection fraction    CHF exacerbation     Past Medical History:   Diagnosis Date    Allergic rhinitis     Anxiety     Aortic valve disease 2021    Fibrocalcific changes of the aortic valve with mild aortic valve stenosis   on echo 3/2/2021  Moderate aortic valve regurgitation is present. Aortic valve maximum pressure gradient is 26 mmHg. Moderate aortic valve stenosis is present.  On echo 2/10/2022       Arthritis     Atrial fibrillation     CHF (congestive heart failure)     Chronic kidney disease (CKD), stage III (moderate)     Chronic pain     COPD (chronic obstructive pulmonary disease)     Coronary artery disease     Diabetes mellitus type 2 with complications     Elevated cholesterol     Ex-smoker     QUIT SMOKING     GERD (gastroesophageal reflux disease)     History of home oxygen therapy     2L/NC AAT REPORTED    History of transfusion     Hyperlipidemia     Hypertension     Thrombocytopenia      Past Surgical History:   Procedure Laterality Date     HYSTERECTOMY      30 YEARS AGO      General Information       [facility] Name 04/07/25 1425          OT Time and Intention    Document Type evaluation  -SC     Mode of Treatment individual therapy;occupational therapy  -SC     Patient Effort good  -SC     Symptoms Noted During/After Treatment none  -SC       Row Name 04/07/25 1427          General Information    Patient Profile Reviewed yes  -SC     Prior Level of Function --  PLOF is s/u-SBA for BADLs. She uses a walker for mobility, has a tub/shower w/chair and sits to groom and shower. Spouse completes all IADLs and assists with  ADLs pt might struggle with  such as she occasionally struggles to tricia socks. 2L home O2  -SC     Existing Precautions/Restrictions fall  -SC     Barriers to Rehab medically complex  -SC       Row Name 04/07/25 1428          Occupational Profile    Reason for Services/Referral (Occupational Profile) Patient is a patient is a 74 year-old female admitted to [facility] on 4/4/2025 with weakness and shortness of air.  OT consulted due to decline in function and mobility.  No previous OT services for current condition.  -SC     Patient Goals (Occupational Profile) Patient wishes to return home at prior level of function  -SC       Row Name 04/07/25 1425          Living Environment    Current Living Arrangements home  -SC     People in Home spouse  -[facility] Name 04/07/25 1425          Home Main Entrance    Number of Stairs, Main Entrance none  Patient has a ramp to access her home.  -[facility] Name 04/07/25 1429          Stairs Within Home, Primary    Number of Stairs, Within Home, Primary none  -SC     Stair Railings, Within Home, Primary none  -SC       Row Name 04/07/25 1427          Cognition    Orientation Status (Cognition) oriented x 3  -[facility] Name 04/07/25 1422          Safety Issues/Impairments Affecting Functional Mobility    Impairments Affecting Function (Mobility) balance;endurance/activity tolerance;shortness of breath;strength   -SC               User Key  (r) = Recorded By, (t) = Taken By, (c) = Cosigned By      Initials Name Provider Type    Mile Robin OT Occupational Therapist                     Mobility/ADL's       Row Name 04/07/25 1432          Bed Mobility    All Activities, [place] (Bed Mobility) standby assist  -SC       Row Name 04/07/25 1432          Transfers    Transfers bed-chair transfer;sit-stand transfer;stand-sit transfer;toilet transfer  -SC       Row Name 04/07/25 1432          Bed-Chair Transfer    Bed-Chair [place] (Transfers) contact guard  -SC     Assistive Device (Bed-Chair Transfers) walker, front-wheeled  -SC       Row Name 04/07/25 1432          Sit-Stand Transfer    Sit-Stand [place] (Transfers) standby assist  -SC     Assistive Device (Sit-Stand Transfers) walker, front-wheeled  -SC       Row Name 04/07/25 1432          Stand-Sit Transfer    Stand-Sit [place] (Transfers) standby assist  -SC     Assistive Device (Stand-Sit Transfers) walker, front-wheeled  -SC       Row Name 04/07/25 1432          Toilet Transfer    [place] Level (Toilet Transfer) contact guard  -SC     Assistive Device (Toilet Transfer) walker, front-wheeled  -SC       Row Name 04/07/25 1432          Functional Mobility    Functional Mobility- Ind. Level contact guard assist  -SC       Row Name 04/07/25 1432          Activities of Daily Living    BADL Assessment/Intervention bathing;upper body dressing;lower body dressing;grooming;toileting  -SC       Row Name 04/07/25 1432          Bathing Assessment/Intervention    [place] Level (Bathing) minimum assist (75% patient effort)  -SC       Row Name 04/07/25 1432          Upper Body Dressing Assessment/Training    [place] Level (Upper Body Dressing) standby assist  -SC       Row Name 04/07/25 1432          Lower Body Dressing Assessment/Training    [place] Level (Lower Body Dressing) minimum assist (75% patient effort)  -SC       Row Name 04/07/25  1432          Grooming Assessment/Training    [place] Level (Grooming) standby assist  -[facility] Name 04/07/25 1432          Toileting Assessment/Training    [place] Level (Toileting) standby assist  -SC               User Key  (r) = Recorded By, (t) = Taken By, (c) = Cosigned By      Initials Name Provider Type    SC Mile Corcoran OT Occupational Therapist                   Obj/Interventions       Row Name 04/07/25 1434          Sensory Assessment (Somatosensory)    Sensory Assessment (Somatosensory) sensation intact  -SC       Row Name 04/07/25 1434          Vision Assessment/Intervention    Visual Impairment/Limitations WFL  -[facility] 04/07/25 1434          Range of Motion Comprehensive    General Range of Motion bilateral upper extremity ROM WNL  -SC       Row Name 04/07/25 1434          Strength Comprehensive (MMT)    Comment, General Manual Muscle Testing (MMT) Assessment UB strength 4-/5  -SC       Row Name 04/07/25 1434          Motor Skills    Motor Skills functional endurance;coordination  -SC     Coordination WF  -SC     Functional Endurance fair-  -SC       Row Name 04/07/25 1434          Balance    Balance Assessment standing static balance;standing dynamic balance  -SC     Static Standing Balance standby assist  -SC     Dynamic Standing Balance contact guard  -SC               User Key  (r) = Recorded By, (t) = Taken By, (c) = Cosigned By      Initials Name Provider Type    SC Mile Corcoran OT Occupational Therapist                   Goals/Plan       Row Name 04/07/25 1437          Bed Mobility Goal 1 (OT)    Activity/Assistive Device (Bed Mobility Goal 1, OT) bed mobility activities, all  -SC     [place] Level/Cues Needed (Bed Mobility Goal 1, OT) modified independence  -SC     Time Frame (Bed Mobility Goal 1, OT) long term goal (LTG);10 days  -SC       Row Name 04/07/25 1437          Transfer Goal 1 (OT)    Activity/Assistive Device (Transfer Goal 1, OT) transfers, all   -SC     [place] Level/Cues Needed (Transfer Goal 1, OT) modified independence  -SC     Time Frame (Transfer Goal 1, OT) long term goal (LTG);10 days  -SC       Row Name 04/07/25 1437          Bathing Goal 1 (OT)    Activity/Device (Bathing Goal 1, OT) bathing skills, all  -SC     [place] Level/Cues Needed (Bathing Goal 1, OT) modified independence  -SC     Time Frame (Bathing Goal 1, OT) long term goal (LTG);10 days  -SC       Row Name 04/07/25 1437          Dressing Goal 1 (OT)    Activity/Device (Dressing Goal 1, OT) dressing skills, all  -SC     [place]/Cues Needed (Dressing Goal 1, OT) modified independence  -SC     Time Frame (Dressing Goal 1, OT) long term goal (LTG);10 days  -SC       Row Name 04/07/25 1437          Toileting Goal 1 (OT)    Activity/Device (Toileting Goal 1, OT) toileting skills, all  -SC     [place] Level/Cues Needed (Toileting Goal 1, OT) modified independence  -SC     Time Frame (Toileting Goal 1, OT) long term goal (LTG);10 days  -SC       Row Name 04/07/25 1437          Grooming Goal 1 (OT)    Activity/Device (Grooming Goal 1, OT) grooming skills, all  -SC     [place] (Grooming Goal 1, OT) modified independence  -SC     Time Frame (Grooming Goal 1, OT) long term goal (LTG);10 days  -SC       Row Name 04/07/25 1437          Strength Goal 1 (OT)    Strength Goal 1 (OT) Patient will improve upper body strength 4/5 to support improved ADL function and mobility.  -SC     Time Frame (Strength Goal 1, OT) long term goal (LTG);10 days  -SC       Row Name 04/07/25 1437          Problem Specific Goal 1 (OT)    Problem Specific Goal 1 (OT) Patient will improve endurance to fair to support improved independence, function and safety in ADLs and during functional mobility.  -SC     Time Frame (Problem Specific Goal 1, OT) long term goal (LTG);10 days  -SC       Row Name 04/07/25 1437          Therapy Assessment/Plan (OT)    Planned Therapy Interventions (OT) activity tolerance  training;occupation/activity based interventions;strengthening exercise;BADL retraining;patient/caregiver education/training;functional balance retraining  -SC               User Key  (r) = Recorded By, (t) = Taken By, (c) = Cosigned By      Initials Name Provider Type    Mile Robin OT Occupational Therapist                   Clinical Impression       [facility] Name 04/07/25 1434          Plan of Care Review    Plan of Care Reviewed With patient  -SC     Progress no change  Evaluation complete  -SC     Outcome Evaluation Patient presents with limitations of strength, activity tolerance, and balance which impede her ability to perform ADLs/transfers as prior.  The skills of a therapist will be required to safely and effectively implement treatment plan to restore maximum level function.  -[facility] Name 04/07/25 1434          Therapy Assessment/Plan (OT)    Rehab Potential (OT) fair  -SC     Criteria for Skilled Therapeutic Interventions Met (OT) yes;meets criteria;skilled treatment is necessary  -SC     Therapy Frequency (OT) 5 times/wk  -SC       Row Name 04/07/25 1434          Therapy Plan Review/Discharge Plan (OT)    Anticipated Discharge Disposition (OT) home with home health;home with assist  -SC       Row Name 04/07/25 1434          Positioning and Restraints    Pre-Treatment Position sitting in chair/recliner  -SC     Post Treatment Position chair  -SC     In Chair call light within reach;encouraged to call for assist;exit alarm on  -SC               User Key  (r) = Recorded By, (t) = Taken By, (c) = Cosigned By      Initials Name Provider Type    Mile Robin OT Occupational Therapist                   Outcome Measures       [facility] Name 04/07/25 1438          How much help from another is currently needed...    Putting on and taking off regular lower body clothing? 3  -SC     Bathing (including washing, rinsing, and drying) 3  -SC     Toileting (which includes using toilet bed pan or urinal) 4  -SC      Putting on and taking off regular upper body clothing 4  -SC     Taking care of personal grooming (such as brushing teeth) 4  -SC     Eating meals 4  -SC     AM-PAC 6 Clicks Score (OT) 22  -SC       Row Name 04/07/25 0702          How much help from another person do you currently need...    Turning from your back to your side while in flat bed without using bedrails? 3  -RN     Moving from lying on back to sitting on the side of a flat bed without bedrails? 3  -RN     Moving to and from a bed to a chair (including a wheelchair)? 3  -RN     Standing up from a chair using your arms (e.g., wheelchair, bedside chair)? 3  -RN     Climbing 3-5 steps with a railing? 2  -RN     To walk in hospital room? 3  -RN     AM-PAC 6 Clicks Score (PT) 17  -RN       Row Name 04/07/25 1438          Functional Assessment    Outcome Measure Options AM-PAC 6 Clicks Daily Activity (OT);Optimal Instrument  -SC       Row Name 04/07/25 1438          Optimal Instrument    Optimal Instrument Optimal - 3  -SC     Bending/Stooping 3  -SC     Standing 1  -SC     Reaching 1  -SC     From the list, choose the 3 activities you would most like to be able to do without any difficulty Standing;Reaching;Bending/stooping  -SC     Total Score Optimal - 3 5  -SC               User Key  (r) = Recorded By, (t) = Taken By, (c) = Cosigned By      Initials Name Provider Type    SC Mile Corcoran OT Occupational Therapist    Nathaly Lynn, RN Registered Nurse                    Occupational Therapy Education       Title: PT OT SLP Therapies (Done)       Topic: Occupational Therapy (Done)       Point: ADL training (Done)       Learning Progress Summary            Patient Acceptance, E, VU by SC at 4/7/2025 1439                      Point: Home exercise program (Done)       Learning Progress Summary            Patient Acceptance, E, VU by SC at 4/7/2025 1439                      Point: Precautions (Done)       Learning Progress Summary            Patient  Acceptance, E, VU by SC at 4/7/2025 1439                      Point: Body mechanics (Done)       Learning Progress Summary            Patient Acceptance, E, VU by SC at 4/7/2025 1439                                      User Key       Initials Effective Dates Name Provider Type [facility] 02/05/24 -  Mile Corcoran OT Occupational Therapist OT                  OT Recommendation and Plan  Planned Therapy Interventions (OT): activity tolerance training, occupation/activity based interventions, strengthening exercise, BADL retraining, patient/caregiver education/training, functional balance retraining  Therapy Frequency (OT): 5 times/wk  Plan of Care Review  Plan of Care Reviewed With: patient  Progress: no change (Evaluation complete)  Outcome Evaluation: Patient presents with limitations of strength, activity tolerance, and balance which impede her ability to perform ADLs/transfers as prior.  The skills of a therapist will be required to safely and effectively implement treatment plan to restore maximum level function.     Time Calculation:   Evaluation Complexity (OT)  Review Occupational Profile/Medical/Therapy History Complexity: expanded/moderate complexity  Assessment, Occupational Performance/Identification of Deficit Complexity: 1-3 performance deficits  Clinical Decision Making Complexity (OT): problem focused assessment/low complexity  Overall Complexity of Evaluation (OT): low complexity     Time Calculation- OT       Row Name 04/07/25 1440             Time Calculation- OT    OT Received On 04/07/25  -SC      OT Goal Re-Cert Due Date 04/16/25  -SC         Untimed Charges    OT Eval/Re-eval Minutes 35  -SC         Total Minutes    Untimed Charges Total Minutes 35  -SC       Total Minutes 35  -SC                User Key  (r) = Recorded By, (t) = Taken By, (c) = Cosigned By      Initials Name Provider Type    SC Mile Corcoran OT Occupational Therapist                  Therapy Charges for Today        Code Description Service Date Service Provider Modifiers Qty    [id number] HC OT EVAL MOD COMPLEXITY 3 4/7/2025 Mile Corcoran OT GO 1                 Mile Corcoran OT  4/7/2025

## 2025-04-07 NOTE — THERAPY EVALUATION
Acute Care - Physical Therapy Initial Evaluation  GUERO Bautista     Patient Name: Mary Albarran  : 1950  MRN: [id number]  Today's Date: 2025      Admit date: 2025     Referring Physician: Melody Luna MD     Surgery Date:* No surgery found *            Visit Dx:     ICD-10-CM ICD-9-CM   1. Acute on chronic congestive heart failure, unspecified heart failure type  I50.9 428.0   2. Difficulty in walking  R26.2 719.7     Patient Active Problem List   Diagnosis    Aortic stenosis, moderate    Atrial fibrillation    Chronic kidney disease, stage III (moderate)    Hyperlipidemia    Hypertension    Chronic diastolic congestive heart failure    Atrial fibrillation with RVR    Onychomycosis    Onychocryptosis    Foot pain, bilateral    Nephrolithiasis    Recurrent urinary tract infection    Acute on chronic HFrEF (heart failure with reduced ejection fraction)    Cellulitis of right leg without foot    Diabetes mellitus type 2 with complications    Depression    COPD (chronic obstructive pulmonary disease)    Chronic pain    Anxiety    Heart failure with reduced ejection fraction    CHF exacerbation     Past Medical History:   Diagnosis Date    Allergic rhinitis     Anxiety     Aortic valve disease 2021    Fibrocalcific changes of the aortic valve with mild aortic valve stenosis   on echo 3/2/2021  Moderate aortic valve regurgitation is present. Aortic valve maximum pressure gradient is 26 mmHg. Moderate aortic valve stenosis is present.  On echo 2/10/2022       Arthritis     Atrial fibrillation     CHF (congestive heart failure)     Chronic kidney disease (CKD), stage III (moderate)     Chronic pain     COPD (chronic obstructive pulmonary disease)     Coronary artery disease     Diabetes mellitus type 2 with complications     Elevated cholesterol     Ex-smoker     QUIT SMOKING     GERD (gastroesophageal reflux disease)     History of home oxygen therapy     2L/NC AAT REPORTED    History of  transfusion     Hyperlipidemia     Hypertension     Thrombocytopenia      Past Surgical History:   Procedure Laterality Date    HYSTERECTOMY      30 YEARS AGO     PT Assessment (Last 12 Hours)       PT Evaluation and Treatment       Row Name 04/07/25 0900          Physical Therapy Time and Intention    Subjective Information no complaints  -BILLY     Document Type evaluation  -BILLY     Mode of Treatment individual therapy;physical therapy  -BILLY     Patient Effort good  -BILLY       Row Name 04/07/25 0900          General Information    Patient Observations alert;cooperative;agree to therapy  -BILLY     Prior Level of Function independent:;all household mobility;community mobility  -BILLY     Equipment Currently Used at Home walker, rolling  -BILLY     Existing Precautions/Restrictions fall  -BILLY     Barriers to Rehab none identified  -BILLY       Row Name 04/07/25 0900          Living Environment    Current Living Arrangements home  -BILLY     People in Home spouse  -BILLY       Row Name 04/07/25 0900          Range of Motion (ROM)    Range of Motion ROM is L  -BILLY       Row Name 04/07/25 0900          Strength (Manual Muscle Testing)    Strength (Manual Muscle Testing) strength is L  -       Row Name 04/07/25 0900          Bed Mobility    Bed Mobility bed mobility (all) activities;supine-sit  -BILLY     All Activities, [place] (Bed Mobility) standby assist  -BILLY     Supine-Sit [place] (Bed Mobility) standby assist  -BILLY       Row Name 04/07/25 0900          Transfers    Transfers bed-chair transfer;sit-stand transfer  -BILLY       Row Name 04/07/25 0900          Bed-Chair Transfer    Bed-Chair [place] (Transfers) contact guard  -BILLY     Assistive Device (Bed-Chair Transfers) walker, front-wheeled  -BILLY       Row Name 04/07/25 0900          Sit-Stand Transfer    Sit-Stand [place] (Transfers) contact guard  -BILLY     Assistive Device (Sit-Stand Transfers) walker, front-wheeled  -BILLY       Row Name 04/07/25 0900          Gait/Stairs  (Locomotion)    Gait/Stairs Locomotion gait/ambulation assistive device  -BILLY     [place] Level (Gait) contact guard  -BILLY     Assistive Device (Gait) walker, front-wheeled  -BILLY     Distance in Feet (Gait) 40  -BILLY     Pattern (Gait) step-to  -BILLY       Row Name 04/07/25 0900          Safety Issues/Impairments Affecting Functional Mobility    Impairments Affecting Function (Mobility) balance;endurance/activity tolerance  -BILLY       Row Name 04/07/25 0900          Balance    Balance Assessment standing dynamic balance  -BILLY     Dynamic Standing Balance contact guard  -BILLY     Position/Device Used, Standing Balance walker, front-wheeled  -BILLY       Row Name             Wound 04/04/25 2112 Left lower breast    Wound - Properties Group Placement Date: 04/04/25  -RW Placement Time: 2112 -RW Present on Original Admission: Y  -RW Side: Left  -RW Orientation: lower  -RW Location: breast  -RW    Retired Wound - Properties Group Placement Date: 04/04/25  -RW Placement Time: 2112 -RW Present on Original Admission: Y  -RW Side: Left  -RW Orientation: lower  -RW Location: breast  -RW    Retired Wound - Properties Group Placement Date: 04/04/25  -RW Placement Time: 2112 -RW Present on Original Admission: Y  -RW Side: Left  -RW Orientation: lower  -RW Location: breast  -RW    Retired Wound - Properties Group Date first assessed: 04/04/25  -RW Time first assessed: 2112 -RW Present on Original Admission: Y  -RW Side: Left  -RW Location: breast  -RW      Row Name             Wound 04/04/25 2112 Right lower breast    Wound - Properties Group Placement Date: 04/04/25  -RW Placement Time: 2112 -RW Present on Original Admission: Y  -RW Side: Right  -RW Orientation: lower  -RW Location: breast  -RW    Retired Wound - Properties Group Placement Date: 04/04/25  -RW Placement Time: 2112 -RW Present on Original Admission: Y  -RW Side: Right  -RW Orientation: lower  -RW Location: breast  -RW    Retired Wound - Properties Group Placement  Date: 04/04/25 -RW Placement Time: 2112 -RW Present on Original Admission: Y  -RW Side: Right  -RW Orientation: lower  -RW Location: breast  -RW    Retired Wound - Properties Group Date first assessed: 04/04/25 -RW Time first assessed: 2112 -RW Present on Original Admission: Y  -RW Side: Right  -RW Location: breast  -RW      Row Name             Wound 04/04/25 2113 Left lower leg    Wound - Properties Group Placement Date: 04/04/25 -RW Placement Time: 2113 -RW Side: Left  -RW Orientation: lower  -RW Location: leg  -RW    Retired Wound - Properties Group Placement Date: 04/04/25 -RW Placement Time: 2113 -RW Side: Left  -RW Orientation: lower  -RW Location: leg  -RW    Retired Wound - Properties Group Placement Date: 04/04/25 -RW Placement Time: 2113 -RW Side: Left  -RW Orientation: lower  -RW Location: leg  -RW    Retired Wound - Properties Group Date first assessed: 04/04/25 -RW Time first assessed: 2113 -RW Side: Left  -RW Location: leg  -RW      Row Name             Wound 04/04/25 2114 Left anterior foot    Wound - Properties Group Placement Date: 04/04/25 -RW Placement Time: 2114 -RW Side: Left  -RW Orientation: anterior  -RW Location: foot  -RW    Retired Wound - Properties Group Placement Date: 04/04/25 -RW Placement Time: 2114 -RW Side: Left  -RW Orientation: anterior  -RW Location: foot  -RW    Retired Wound - Properties Group Placement Date: 04/04/25 -RW Placement Time: 2114 -RW Side: Left  -RW Orientation: anterior  -RW Location: foot  -RW    Retired Wound - Properties Group Date first assessed: 04/04/25 -RW Time first assessed: 2114 -RW Side: Left  -RW Location: foot  -RW      Row Name             Wound 04/04/25 2114 Right lateral foot    Wound - Properties Group Placement Date: 04/04/25 -RW Placement Time: 2114 -RW Present on Original Admission: Y  -RW Side: Right  -RW Orientation: lateral  -RW Location: foot  -RW    Retired Wound - Properties Group Placement Date: 04/04/25  -RW  Placement Time: 2114 -RW Present on Original Admission: Y  -RW Side: Right  -RW Orientation: lateral  -RW Location: foot  -RW    Retired Wound - Properties Group Placement Date: 04/04/25  -RW Placement Time: 2114 -RW Present on Original Admission: Y  -RW Side: Right  -RW Orientation: lateral  -RW Location: foot  -RW    Retired Wound - Properties Group Date first assessed: 04/04/25  -RW Time first assessed: 2114 -RW Present on Original Admission: Y  -RW Side: Right  -RW Location: foot  -RW      Row Name             Wound 03/19/25 1545 Right lower leg    Wound - Properties Group Placement Date: 03/19/25  -KE Placement Time: 1545  -KE Side: Right  -KE Orientation: lower  -KE Location: leg  -KE    Retired Wound - Properties Group Placement Date: 03/19/25  -KE Placement Time: 1545  -KE Side: Right  -KE Orientation: lower  -KE Location: leg  -KE    Retired Wound - Properties Group Placement Date: 03/19/25  -KE Placement Time: 1545  -KE Side: Right  -KE Orientation: lower  -KE Location: leg  -KE    Retired Wound - Properties Group Date first assessed: 03/19/25  -KE Time first assessed: 1545  -KE Side: Right  -KE Location: leg  -KE      Row Name             Wound 03/18/25 1812 Bilateral anterior groin    Wound - Properties Group Placement Date: 03/18/25  -AH Placement Time: 1812  -AH Present on Original Admission: Y  -AH Side: Bilateral  -AH Orientation: anterior  -AH Location: groin  -AH    Retired Wound - Properties Group Placement Date: 03/18/25  -AH Placement Time: 1812 -AH Present on Original Admission: Y  -AH Side: Bilateral  -AH Orientation: anterior  -AH Location: groin  -AH    Retired Wound - Properties Group Placement Date: 03/18/25  -AH Placement Time: 1812  -AH Present on Original Admission: Y  -AH Side: Bilateral  -AH Orientation: anterior  -AH Location: groin  -AH    Retired Wound - Properties Group Date first assessed: 03/18/25  -AH Time first assessed: 1812  -AH Present on Original Admission: Y  -AH  Side: Bilateral  -AH Location: groin  -AH      Row Name             Wound 04/04/25 2151 Right posterior index finger    Wound - Properties Group Placement Date: 04/04/25  -RW Placement Time: 2151 -RW Present on Original Admission: Y  -RW Side: Right  -RW Orientation: posterior  -RW Location: index finger  -RW    Retired Wound - Properties Group Placement Date: 04/04/25  -RW Placement Time: 2151 -RW Present on Original Admission: Y  -RW Side: Right  -RW Orientation: posterior  -RW Location: index finger  -RW    Retired Wound - Properties Group Placement Date: 04/04/25  -RW Placement Time: 2151 -RW Present on Original Admission: Y  -RW Side: Right  -RW Orientation: posterior  -RW Location: index finger  -RW    Retired Wound - Properties Group Date first assessed: 04/04/25  -RW Time first assessed: 2151 -RW Present on Original Admission: Y  -RW Side: Right  -RW Location: index finger  -RW      Row Name             Wound 02/11/25 1338 Left medial great toe unspecified    Wound - Properties Group Placement Date: 02/11/25  -KE Placement Time: 1338  -KE Side: Left  -KE Orientation: medial  -KE Location: great toe  -KE Type: unspecified  -KE    Retired Wound - Properties Group Placement Date: 02/11/25  -KE Placement Time: 1338  -KE Side: Left  -KE Orientation: medial  -KE Location: great toe  -KE Type: unspecified  -KE    Retired Wound - Properties Group Placement Date: 02/11/25  -KE Placement Time: 1338  -KE Side: Left  -KE Orientation: medial  -KE Location: great toe  -KE Type: unspecified  -KE    Retired Wound - Properties Group Date first assessed: 02/11/25  -KE Time first assessed: 1338  -KE Side: Left  -KE Location: great toe  -KE Type: unspecified  -KE      Row Name 04/07/25 0900          Plan of Care Review    Plan of Care Reviewed With patient  -BILLY     Outcome Evaluation Patient presents with decreased strength, transfers and ambulation.  Skilled physical therapy services will be required to address these  mobility deficits.  -BILLY       Row Name 04/07/25 0900          Therapy Assessment/Plan (PT)    Rehab Potential (PT) good  -BILLY     Criteria for Skilled Interventions Met (PT) skilled treatment is necessary  -BILLY     Therapy Frequency (PT) daily  -BILLY     Predicted Duration of Therapy Intervention (PT) 10 days  -BILLY     Problem List (PT) problems related to;balance;mobility  -BILLY     Activity Limitations Related to Problem List (PT) unable to ambulate safely;unable to transfer safely  -BILLY       Row Name 04/07/25 0900          PT Evaluation Complexity    History, PT Evaluation Complexity no personal factors and/or comorbidities  -BILLY     Examination of Body Systems (PT Eval Complexity) total of 4 or more elements  -BILLY     Clinical Presentation (PT Evaluation Complexity) stable  -BILLY     Clinical Decision Making (PT Evaluation Complexity) low complexity  -BILLY     Overall Complexity (PT Evaluation Complexity) low complexity  -BILLY       Row Name 04/07/25 0900          Therapy Plan Review/Discharge Plan (PT)    Therapy Plan Review (PT) evaluation/treatment results reviewed;participants voiced agreement with care plan;participants included;patient  -BILLY       Row Name 04/07/25 0900          Physical Therapy Goals    Transfer Goal Selection (PT) transfer, PT goal 1  -BILLY     Gait Training Goal Selection (PT) gait training, PT goal 1  -BILLY       Row Name 04/07/25 0900          Transfer Goal 1 (PT)    Activity/Assistive Device (Transfer Goal 1, PT) transfers, all  -BILLY     [place] Level/Cues Needed (Transfer Goal 1, PT) independent  -BILLY     Time Frame (Transfer Goal 1, PT) long term goal (LTG);10 days  -BILLY       Row Name 04/07/25 0900          Gait Training Goal 1 (PT)    Activity/Assistive Device (Gait Training Goal 1, PT) gait (walking locomotion);walker, rolling  -BILLY     [place] Level (Gait Training Goal 1, PT) independent  -BILLY     Distance (Gait Training Goal 1, PT) 300  -BILLY     Time Frame (Gait Training Goal 1, PT) long term  goal (LTG);10 days  -BILLY               User Key  (r) = Recorded By, (t) = Taken By, (c) = Cosigned By      Initials Name Provider Type    Corinna Lazaro, RN Registered Nurse    Christie Isaac RN Registered Nurse    Carlitos Bahena PT Physical Therapist    Rocio Umanzor RN Registered Nurse                      PT Recommendation and Plan  Anticipated Discharge Disposition (PT): home with home health  Planned Therapy Interventions (PT): balance training, bed mobility training, gait training, home exercise program, transfer training, strengthening, stair training  Therapy Frequency (PT): daily  Plan of Care Reviewed With: patient  Outcome Evaluation: Patient presents with decreased strength, transfers and ambulation.  Skilled physical therapy services will be required to address these mobility deficits.       Time Calculation:    PT Charges       Row Name 04/07/25 0920             Time Calculation    PT Received On 04/07/25  -BILLY      PT Goal Re-Cert Due Date 04/16/25  -BILLY         Untimed Charges    PT Eval/Re-eval Minutes 20  -BILLY         Total Minutes    Untimed Charges Total Minutes 20  -BILLY       Total Minutes 20  -BILLY                User Key  (r) = Recorded By, (t) = Taken By, (c) = Cosigned By      Initials Name Provider Type    Carlitos Bahena, PT Physical Therapist                      PT G-Codes  AM-PAC 6 Clicks Score (PT): 17    Carlitos Marquez, PT  4/7/2025

## 2025-04-07 NOTE — SIGNIFICANT NOTE
Wound Eval / Progress Noted    GUERO Bautista     Patient Name: Mary Albarran  : 1950  MRN: [id number]  Today's Date: 2025                 Admit Date: 2025    Visit Dx:    ICD-10-CM ICD-9-CM   1. Acute on chronic congestive heart failure, unspecified heart failure type  I50.9 428.0   2. Difficulty in walking  R26.2 719.7         Heart failure with reduced ejection fraction    CHF exacerbation        Past Medical History:   Diagnosis Date    Allergic rhinitis     Anxiety     Aortic valve disease 2021    Fibrocalcific changes of the aortic valve with mild aortic valve stenosis   on echo 3/2/2021  Moderate aortic valve regurgitation is present. Aortic valve maximum pressure gradient is 26 mmHg. Moderate aortic valve stenosis is present.  On echo 2/10/2022       Arthritis     Atrial fibrillation     CHF (congestive heart failure)     Chronic kidney disease (CKD), stage III (moderate)     Chronic pain     COPD (chronic obstructive pulmonary disease)     Coronary artery disease     Diabetes mellitus type 2 with complications     Elevated cholesterol     Ex-smoker     QUIT SMOKING     GERD (gastroesophageal reflux disease)     History of home oxygen therapy     2L/NC AAT REPORTED    History of transfusion     Hyperlipidemia     Hypertension     Thrombocytopenia         Past Surgical History:   Procedure Laterality Date    HYSTERECTOMY      30 YEARS AGO         Physical Assessment:  Wound 25 1812 Bilateral anterior groin (Active)        Dressing Appearance open to air 25 1150   Closure None 25 1150   Base moist;red 25 1150   Periwound moist;redness;yeast 25 1150   Periwound Temperature warm 25 1150   Periwound Skin Turgor soft 25 1150   Edges rolled/closed 25 1150   Drainage Characteristics/Odor malodorous 25 1150   Drainage Amount scant 25 1150   Care, Wound cleansed with;sterile normal saline 25 1150   Dressing Care open to air 25  1150       Wound 04/04/25 2112 Left lower breast (Active)        Dressing Appearance open to air 04/07/25 1150   Closure None 04/07/25 1150   Base moist;red 04/07/25 1150   Periwound moist;redness;yeast 04/07/25 1150   Periwound Temperature warm 04/07/25 1150   Periwound Skin Turgor soft 04/07/25 1150   Edges rolled/closed 04/07/25 1150   Drainage Characteristics/Odor malodorous 04/07/25 1150   Drainage Amount scant 04/07/25 1150   Care, Wound cleansed with;sterile normal saline 04/07/25 1150   Dressing Care open to air 04/07/25 1150       Wound 04/04/25 2112 Right lower breast (Active)        Dressing Appearance open to air 04/07/25 1150   Closure None 04/07/25 1150   Base moist;red 04/07/25 1150   Periwound yeast;redness;moist 04/07/25 1150   Periwound Temperature warm 04/07/25 1150   Periwound Skin Turgor soft 04/07/25 1150   Edges rolled/closed 04/07/25 1150   Drainage Characteristics/Odor malodorous 04/07/25 1150   Drainage Amount scant 04/07/25 1150   Care, Wound cleansed with;sterile normal saline 04/07/25 1150   Dressing Care open to air 04/07/25 1150       Wound 04/04/25 2114 Left anterior foot (Active)        Dressing Appearance open to air 04/07/25 1150   Closure None 04/07/25 1150   Base dry;black;eschar 04/07/25 1150   Periwound dry;intact 04/07/25 1150   Periwound Temperature warm 04/07/25 1150   Periwound Skin Turgor soft 04/07/25 1150   Edges rolled/closed 04/07/25 1150   Drainage Amount none 04/07/25 1150   Care, Wound cleansed with;sterile normal saline 04/07/25 1150   Dressing Care dressing applied;petroleum-based;non-adherent;gauze;silicone border foam 04/07/25 1150   Periwound Care absorptive dressing applied 04/07/25 1150       Wound 04/04/25 2114 Right lateral foot (Active)        Dressing Appearance open to air 04/07/25 1150   Closure None 04/07/25 1150   Base dry;red 04/07/25 1150   Periwound dry;intact 04/07/25 1150   Periwound Temperature warm 04/07/25 1150   Periwound Skin Turgor soft  04/07/25 1150   Edges open 04/07/25 1150   Drainage Amount none 04/07/25 1150   Care, Wound cleansed with;sterile normal saline 04/07/25 1150   Dressing Care dressing applied;petroleum-based;non-adherent;gauze;silicone border foam 04/07/25 1150   Periwound Care absorptive dressing applied 04/07/25 1150          Wound Check / Follow-up: Patient seen today for wound consult.  Patient is awake, alert and oriented at time of visit.  She is agreeable to assessment.    Patient with significant moisture, redness and fungal presentation noted within skin folds to lower abdomen, groin, and bilateral breasts.  Malodorous odor detected.  Patient reports these areas are tender.  Recommending skin care and topical treatment with [place] soaks as well as Magic barrier cream.  Discussed treatment plan with attending physician.  Physician agrees with treatment plan.    Dry skin is noted to bilateral lower extremities and feet.  Patient reports that she uses ammonium lactate lotion at home and wishes to continue that while she is hospitalized.  Will recommend daily skin care and hygiene and topical treatment with application of ammonium lactate lotion.    Small area of dry, black eschar is present to left anterior foot.  No drainage noted at time of assessment.  Cleansed with normal saline and gauze.  Periwound tissue towards distal foot and toes with purple discoloration and cool to the touch.  Pulses are able to be palpated.  Recommending daily dressing changes with non-adherent petroleum-based gauze and silicone border dressing.    Patient with ulceration to right plantar foot.  Dry, red tissue is present to wound base.  Surrounding tissue is dry and callused.  Cleansed with normal saline and gauze.  Recommending daily dressing changes with non-adherent petroleum-based gauze and silicone border dressing.    Bilateral gluteal aspects pink and intact.    Recommending to implement pressure reduction measures including every 2 hour  turns and offloading heels at all times.      Impression: Significant moisture, redness and fungal presentation within skin folds to lower abdomen, groin, and bilateral breast.  Dry skin to bilateral lower extremities and feet.  Ulcerations to left anterior foot and right plantar foot.        Short term goals:  Regain skin integrity, skin protection, pressure reduction, moisture prevention, skin care, topical treatment, daily dressing changes.      Marianela Pompa RN    4/7/2025    16:11 EDT

## 2025-04-07 NOTE — PROGRESS NOTES
[facility]   Hospitalist Progress Note  Date: 2025  Patient Name: Mary Albarran  : 1950  MRN: [id number]  Date of admission: 2025      Subjective   Subjective     Chief complaint: Shortness of breath    Summary:  74-year-old female with history of morbid obesity, heart failure with reduced ejection fraction, atrial fibrillation, chronic hypoxemic respiratory failure, COPD, CKD stage IIIa, hypertension, diabetes, recent streptococcal and gram-negative bacteremia, hospitalized recently at an outside facility for A-fib with RVR and diarrhea secondary to norovirus, was hospitalized at our facility on 2025 with increased work of breathing and shortness of breath and volume overload status,, found to be in decompensated heart failure with acute pulmonary hypertension exacerbation, placed on IV Bumex, working to correct volume status, diuresed appropriately, switched to p.o. Bumex, anticipate discharge home 2025.    Interval follow-up: Seen and examined, no distress, no events overnight, breathing is much improved with ongoing diuresis, 1.9 L of urine output over the past 24 hours, net -1.3 L, creatinine 0.91, BUN 17, potassium 4.5, sodium 137, hemoglobin 11.3, white blood cell count 6000.  Doppler study of the lower extremities pending.  Patient does not qualify for rehab as her insurance will not cover this, she is current with home health.  Switching Bumex to p.o. today to ensure tolerance as she was previously taken furosemide prior to hospitalization which was not seen to be adequately keeping her volume status corrected.  Anticipate home tomorrow.  Patient needs to ambulate more with therapy.    Review of systems:  All systems reviewed and negative except for weakness, fatigue, shortness of air with exertional activity, improving lower extremity edema         Objective   Objective     Vitals:   Temp:  [97.5 °F (36.4 °C)-98.2 °F (36.8 °C)] 97.9 °F (36.6 °C)  Heart Rate:  [83-95] 89  Resp:   [18-20] 18  BP: (106-156)/(54-75) 106/66  Flow (L/min) (Oxygen Therapy):  [2-3] 2  Physical Exam               Constitutional: Awake, alert, no acute distress sitting in the bedside chair              Eyes: Pupils equal, sclerae anicteric, no conjunctival injection              HENT: NCAT, mucous membranes moist              Neck: Supple, full range of motion  Respiratory: Diminished breath sounds throughout              Cardiovascular: RRR, no murmurs, rubs, or gallops, palpable pedal pulses bilaterally              Gastrointestinal: Positive bowel sounds, soft, nontender, nondistended              Musculoskeletal: Positive bilateral ankle edema, no clubbing or cyanosis to extremities              Psychiatric: Appropriate affect, cooperative              Neurologic: Oriented x 3, strength symmetric in all extremities, Cranial Nerves grossly intact to confrontation, speech clear              Skin: No rashes     Result Review    Result Review:  I have personally reviewed the pertinent results from the past 24 hours to 4/7/2025 11:55 EDT and agree with these findings:  [x]  Laboratory   CBC          4/5/2025    03:39 4/6/2025    04:31 4/7/2025    05:24   CBC   WBC 7.17  4.97  6.28    RBC 3.86  3.91  3.84    Hemoglobin 11.4  11.5  11.3    Hematocrit 39.0  39.6  37.8    .0  101.3  98.4    MCH 29.5  29.4  29.4    MCHC 29.2  29.0  29.9    RDW 16.7  16.8  16.3    Platelets 107  92  89      BMP          4/5/2025    03:39 4/6/2025    04:31 4/7/2025    05:24   BMP   BUN 30  20  17    Creatinine 0.88  0.78  0.91    Sodium 138  139  137    Potassium 4.6  4.5  4.5    Chloride 101  99  96    CO2 27.0  27.3  29.0    Calcium 9.0  8.8  8.9      LIVER FUNCTION TESTS:      Lab 04/07/25  0524 04/05/25  0339 04/04/25  1639   TOTAL PROTEIN 6.7 6.6 6.9   ALBUMIN 3.3* 3.5 3.8   GLOBULIN  --  3.1 3.1   ALT (SGPT) 16 18 19   AST (SGOT) 26 23 24   BILIRUBIN 1.1 0.7 0.6   INDIRECT BILIRUBIN 0.7  --   --    BILIRUBIN DIRECT 0.4*  --   --     ALK PHOS 67 72 90       [x]  Microbiology   Microbiology Results (last 10 days)       ** No results found for the last 240 hours. **              [x]  Radiology XR Chest 1 View  Result Date: 4/4/2025  Impression: Cardiomegaly with mild pulmonary vascular congestion and interstitial edema. Electronically Signed: Joaquin Mcintosh MD  4/4/2025 4:35 PM EDT  Workstation ID: [id number]        [x]  EKG/Telemetry   ECG 12 Lead ED Triage Standing Order; SOA   Preliminary Result   HEART RATE=83  bpm   RR Qywivgpm=195  ms   OK Interval=  ms   P Horizontal Axis=  deg   P Front Axis=  deg   QRSD Interval=79  ms   QT Gbprehmd=776  ms   PReT=622  ms   QRS Axis=102  deg   T Wave Axis=134  deg   - ABNORMAL ECG -   Atrial fibrillation   Right axis deviation   Low voltage, precordial leads   Nonspecific T abnormalities, lateral leads   Date and Time of Study:2025-04-04 15:50:11          []  Cardiology/Vascular   []  Pathology  [x]  Old records  []  Other:    Assessment & Plan   Assessment / Plan     Assessment/Plan:    Assessment:  Acute heart failure exacerbation with reduced ejection fraction with EF of 46 to 50%  Acute pulmonary hypertension exacerbation with RVSP of 45 to 50 mm per mercury  Obesity with BMI of 51  Chronic hypoxic respiratory failure it is difficult to tell if she has acute respiratory failure  A-fib  Elevated troponin due to demand  Type 2 diabetes with hyperglycemia    Plan:  Labs and imaging reviewed  Change Bumex to p.o., 1 mg twice daily  Continue telemetry monitoring  Continue metoprolol 50 mg 3 times a day at this time, heart rate is overall adequately controlled, will make adjustments if needed and switch her to Toprol-XL.  Continue Xarelto 20 mg daily  Continue Lyrica 150 mg twice a day  Continue Humalog sliding scale coverage  Continue Cardizem 180 mg daily  Continue Pulmicort and Brovana nebs twice daily  Hold Levemir at this time, blood sugars are trending low  PT/OT  Does not qualify for placement,  current with home health  A.m. labs  Full code  DVT prophylaxis Xarelto  Clinical course dictate further management  Discussed with nurse at the bedside  Anticipate discharge in 24 hours if stable    VTE Prophylaxis:  Pharmacologic & mechanical VTE prophylaxis orders are present.        CODE STATUS:   Code Status (Patient has no pulse and is not breathing): CPR (Attempt to Resuscitate)  Medical Interventions (Patient has pulse or is breathing): Full Support  Level Of Support Discussed With: Patient        Electronically signed by Melody Luna MD, 4/7/2025, 11:55 EDT.    Portions of this documentation were transcribed electronically from a voice recognition software.  I confirm all data accurately represents the service(s) I performed at today's visit.

## 2025-04-07 NOTE — PLAN OF CARE
Goal Outcome Evaluation:           Progress: no change  Outcome Evaluation: Patient has wound care and skin care orders in. VSS. No significant changes this shift. Possible D/C tomorrow.

## 2025-04-08 ENCOUNTER — READMISSION MANAGEMENT (OUTPATIENT)
Dept: CALL CENTER | Facility: HOSPITAL | Age: 75
End: 2025-04-08
Payer: MEDICARE

## 2025-04-08 VITALS
WEIGHT: 271.61 LBS | HEIGHT: 63 IN | OXYGEN SATURATION: 90 % | BODY MASS INDEX: 48.12 KG/M2 | DIASTOLIC BLOOD PRESSURE: 70 MMHG | TEMPERATURE: 97.7 F | SYSTOLIC BLOOD PRESSURE: 121 MMHG | HEART RATE: 89 BPM | RESPIRATION RATE: 18 BRPM

## 2025-04-08 LAB
ALBUMIN SERPL-MCNC: 3.6 G/DL (ref 3.5–5.2)
ALP SERPL-CCNC: 83 U/L (ref 39–117)
ALT SERPL W P-5'-P-CCNC: 17 U/L (ref 1–33)
ANION GAP SERPL CALCULATED.3IONS-SCNC: 8.6 MMOL/L (ref 5–15)
AST SERPL-CCNC: 23 U/L (ref 1–32)
BASOPHILS # BLD AUTO: 0.03 10*3/MM3 (ref 0–0.2)
BASOPHILS NFR BLD AUTO: 0.5 % (ref 0–1.5)
BILIRUB CONJ SERPL-MCNC: 0.3 MG/DL (ref 0–0.3)
BILIRUB INDIRECT SERPL-MCNC: 0.7 MG/DL
BILIRUB SERPL-MCNC: 1 MG/DL (ref 0–1.2)
BUN SERPL-MCNC: 20 MG/DL (ref 8–23)
BUN/CREAT SERPL: 18.9 (ref 7–25)
CALCIUM SPEC-SCNC: 9.1 MG/DL (ref 8.6–10.5)
CHLORIDE SERPL-SCNC: 94 MMOL/L (ref 98–107)
CO2 SERPL-SCNC: 32.4 MMOL/L (ref 22–29)
CREAT SERPL-MCNC: 1.06 MG/DL (ref 0.57–1)
DEPRECATED RDW RBC AUTO: 57.7 FL (ref 37–54)
EGFRCR SERPLBLD CKD-EPI 2021: 55.9 ML/MIN/1.73
EOSINOPHIL # BLD AUTO: 0.1 10*3/MM3 (ref 0–0.4)
EOSINOPHIL NFR BLD AUTO: 1.6 % (ref 0.3–6.2)
ERYTHROCYTE [DISTWIDTH] IN BLOOD BY AUTOMATED COUNT: 16 % (ref 12.3–15.4)
GLUCOSE BLDC GLUCOMTR-MCNC: 148 MG/DL (ref 70–99)
GLUCOSE BLDC GLUCOMTR-MCNC: 184 MG/DL (ref 70–99)
GLUCOSE SERPL-MCNC: 270 MG/DL (ref 65–99)
HCT VFR BLD AUTO: 37.2 % (ref 34–46.6)
HGB BLD-MCNC: 11.2 G/DL (ref 12–15.9)
IMM GRANULOCYTES # BLD AUTO: 0.02 10*3/MM3 (ref 0–0.05)
IMM GRANULOCYTES NFR BLD AUTO: 0.3 % (ref 0–0.5)
LYMPHOCYTES # BLD AUTO: 1 10*3/MM3 (ref 0.7–3.1)
LYMPHOCYTES NFR BLD AUTO: 15.9 % (ref 19.6–45.3)
MAGNESIUM SERPL-MCNC: 2.1 MG/DL (ref 1.6–2.4)
MCH RBC QN AUTO: 29.6 PG (ref 26.6–33)
MCHC RBC AUTO-ENTMCNC: 30.1 G/DL (ref 31.5–35.7)
MCV RBC AUTO: 98.4 FL (ref 79–97)
MONOCYTES # BLD AUTO: 0.68 10*3/MM3 (ref 0.1–0.9)
MONOCYTES NFR BLD AUTO: 10.8 % (ref 5–12)
NEUTROPHILS NFR BLD AUTO: 4.45 10*3/MM3 (ref 1.7–7)
NEUTROPHILS NFR BLD AUTO: 70.9 % (ref 42.7–76)
NRBC BLD AUTO-RTO: 0 /100 WBC (ref 0–0.2)
PHOSPHATE SERPL-MCNC: 3.8 MG/DL (ref 2.5–4.5)
PLATELET # BLD AUTO: 99 10*3/MM3 (ref 140–450)
PMV BLD AUTO: 13.6 FL (ref 6–12)
POTASSIUM SERPL-SCNC: 4.8 MMOL/L (ref 3.5–5.2)
PROT SERPL-MCNC: 7.1 G/DL (ref 6–8.5)
RBC # BLD AUTO: 3.78 10*6/MM3 (ref 3.77–5.28)
SODIUM SERPL-SCNC: 135 MMOL/L (ref 136–145)
WBC NRBC COR # BLD AUTO: 6.28 10*3/MM3 (ref 3.4–10.8)

## 2025-04-08 PROCEDURE — 63710000001 INSULIN LISPRO (HUMAN) PER 5 UNITS: Performed by: INTERNAL MEDICINE

## 2025-04-08 PROCEDURE — 94799 UNLISTED PULMONARY SVC/PX: CPT

## 2025-04-08 PROCEDURE — 94664 DEMO&/EVAL PT USE INHALER: CPT

## 2025-04-08 PROCEDURE — 94761 N-INVAS EAR/PLS OXIMETRY MLT: CPT

## 2025-04-08 PROCEDURE — 84100 ASSAY OF PHOSPHORUS: CPT | Performed by: FAMILY MEDICINE

## 2025-04-08 PROCEDURE — 80048 BASIC METABOLIC PNL TOTAL CA: CPT | Performed by: FAMILY MEDICINE

## 2025-04-08 PROCEDURE — 80076 HEPATIC FUNCTION PANEL: CPT | Performed by: FAMILY MEDICINE

## 2025-04-08 PROCEDURE — 82948 REAGENT STRIP/BLOOD GLUCOSE: CPT | Performed by: INTERNAL MEDICINE

## 2025-04-08 PROCEDURE — 99238 HOSP IP/OBS DSCHRG MGMT 30/<: CPT | Performed by: FAMILY MEDICINE

## 2025-04-08 PROCEDURE — 83735 ASSAY OF MAGNESIUM: CPT | Performed by: FAMILY MEDICINE

## 2025-04-08 PROCEDURE — 85025 COMPLETE CBC W/AUTO DIFF WBC: CPT | Performed by: FAMILY MEDICINE

## 2025-04-08 RX ORDER — BUMETANIDE 1 MG/1
1 TABLET ORAL 2 TIMES DAILY
Qty: 60 TABLET | Refills: 0 | Status: SHIPPED | OUTPATIENT
Start: 2025-04-08 | End: 2025-05-08

## 2025-04-08 RX ADMIN — DILTIAZEM HYDROCHLORIDE 180 MG: 180 CAPSULE, COATED, EXTENDED RELEASE ORAL at 09:23

## 2025-04-08 RX ADMIN — RIVAROXABAN 20 MG: 20 TABLET, FILM COATED ORAL at 09:23

## 2025-04-08 RX ADMIN — BUMETANIDE 1 MG: 1 TABLET ORAL at 09:23

## 2025-04-08 RX ADMIN — HYDROCODONE BITARTRATE AND ACETAMINOPHEN 1 TABLET: 10; 325 TABLET ORAL at 03:43

## 2025-04-08 RX ADMIN — Medication: at 09:25

## 2025-04-08 RX ADMIN — HYDROCODONE BITARTRATE AND ACETAMINOPHEN 1 TABLET: 10; 325 TABLET ORAL at 11:52

## 2025-04-08 RX ADMIN — Medication 1 PACKET: at 10:54

## 2025-04-08 RX ADMIN — Medication 10 ML: at 09:23

## 2025-04-08 RX ADMIN — BUDESONIDE 0.5 MG: 0.5 SUSPENSION RESPIRATORY (INHALATION) at 10:07

## 2025-04-08 RX ADMIN — ARFORMOTEROL TARTRATE 15 MCG: 15 SOLUTION RESPIRATORY (INHALATION) at 10:07

## 2025-04-08 RX ADMIN — ZINC OXIDE 1 APPLICATION: 200 OINTMENT TOPICAL at 09:24

## 2025-04-08 RX ADMIN — PREGABALIN 150 MG: 75 CAPSULE ORAL at 09:23

## 2025-04-08 RX ADMIN — INSULIN LISPRO 2 UNITS: 100 INJECTION, SOLUTION INTRAVENOUS; SUBCUTANEOUS at 12:39

## 2025-04-08 RX ADMIN — METOPROLOL TARTRATE 50 MG: 50 TABLET, FILM COATED ORAL at 09:23

## 2025-04-08 NOTE — DISCHARGE SUMMARY
[facility]         HOSPITALIST  DISCHARGE SUMMARY    Patient Name: Mary Albarran  : 1950  MRN: [id number]    Date of Admission: 2025  Date of Discharge: 2025  Primary Care Physician: Brittni Quiroz APRN    Consults       Date and Time Order Name Status Description    2025  6:15 PM Hospitalist (on-call MD unless specified)              Active and Resolved Hospital Problems:  Acute heart failure exacerbation with reduced ejection fraction with EF of 46 to 50%  Acute pulmonary hypertension exacerbation with RVSP of 45 to 50 mm per mercury  Obesity with BMI of 48  Chronic hypoxic respiratory failure it is difficult to tell if she has acute respiratory failure  A-fib  Elevated troponin due to demand  Type 2 diabetes with hyperglycemia  Active Hospital Problems    Diagnosis POA    **Heart failure with reduced ejection fraction [I50.20] Yes    CHF exacerbation [I50.9] Yes      Resolved Hospital Problems   No resolved problems to display.       Hospital Course     Hospital Course:  Mary Albarran is a 74 y.o. female with history of morbid obesity, heart failure with reduced ejection fraction, atrial fibrillation, chronic hypoxemic respiratory failure, COPD, CKD stage IIIa, hypertension, diabetes, recent streptococcal and gram-negative bacteremia, hospitalized recently at an outside facility for A-fib with RVR and diarrhea secondary to norovirus, was hospitalized at our facility on 2025 with increased work of breathing and shortness of breath and volume overload status,, found to be in decompensated heart failure with acute pulmonary hypertension exacerbation, placed on IV Bumex, diuresed appropriately.  Increased work of breathing and shortness of breath improved.  Volume status improved.  Switched to p.o. Bumex.  Patient seen and evaluated on day of discharge and thought stable for discharge home with home health to follow-up with cardiology and heart failure clinic as well  as her primary care provider as an outpatient.        DISCHARGE Follow Up Recommendations for labs and diagnostics: As above      Day of Discharge     Vital Signs:  Temp:  [97.5 °F (36.4 °C)-98.4 °F (36.9 °C)] 97.5 °F (36.4 °C)  Heart Rate:  [70-91] 70  Resp:  [18-23] 18  BP: (106-129)/(49-87) 115/72  Flow (L/min) (Oxygen Therapy):  [2] 2  Physical Exam:   Gen. well-developed appearing stated age obese BMI 48 in no acute distress  HEENT: Normocephalic atraumatic moist membranes pupils equal round reactive light, no scleral icterus no conjunctival injection  Cardiovascular: regular rate and rhythm no murmurs rubs or gallops S1-S2, no lower extremity edema appreciated  Pulmonary: Clear to auscultation bilaterally no wheezes rales or rhonchi symmetric chest expansion, unlabored, no conversational dyspnea appreciated  Gastrointestinal: Soft nontender nondistended positive bowel sounds all 4 quadrants no rebound or guarding  Musculoskeletal: No clubbing cyanosis, warm and well-perfused, calves soft symmetric nontender bilaterally  Skin: Clean dry without rashs  Neuro: Cranial nerves II through XII intact grossly no sensorimotor deficits appreciated bilateral upper and lower extremities  Psych: Patient is calm cooperative and appropriate with exam not responding to internal stimuli  : No Maldonado catheter no bladder distention no suprapubic tenderness      Discharge Details        Discharge Medications        New Medications        Instructions Start Date   bumetanide 1 MG tablet  Commonly known as: BUMEX   1 mg, Oral, 2 Times Daily             Continue These Medications        Instructions Start Date   albuterol 1.25 MG/3ML nebulizer solution  Commonly known as: ACCUNEB   1.25 mg, Nebulization, Every 6 Hours PRN      albuterol sulfate  (90 Base) MCG/ACT inhaler  Commonly known as: PROVENTIL HFA;VENTOLIN HFA;PROAIR HFA   2 puffs, Inhalation, Every 4 Hours PRN      ammonium lactate 12 % lotion  Commonly known as:  LAC-HYDRIN   Topical, 2 Times Daily      atorvastatin 20 MG tablet  Commonly known as: LIPITOR   TAKE 1 TABLET DAILY      budesonide 1 MG/2ML nebulizer solution  Commonly known as: PULMICORT   1 mg, Nebulization, Daily      cholecalciferol 25 MCG (1000 UT) tablet  Commonly known as: VITAMIN D3   1,000 Units, Daily      dilTIAZem  MG 24 hr capsule  Commonly known as: CARDIZEM CD   180 mg, Daily      HYDROcodone-acetaminophen  MG per tablet  Commonly known as: NORCO   1 tablet, Oral, Every 6 Hours PRN      Lantus SoloStar 100 UNIT/ML injection pen  Generic drug: Insulin Glargine   15 Units, Subcutaneous, Daily      metoprolol tartrate 50 MG tablet  Commonly known as: LOPRESSOR   50 mg, 3 Times Daily      mineral oil-hydrophilic petrolatum ointment   1 Application, Topical, Every 1 Hour PRN      O2  Commonly known as: OXYGEN   2 L/min, Nightly      pregabalin 150 MG capsule  Commonly known as: LYRICA   150 mg, Oral, 2 Times Daily      rivaroxaban 20 MG tablet  Commonly known as: XARELTO   20 mg, Oral, Daily             Stop These Medications      furosemide 40 MG tablet  Commonly known as: LASIX              Allergies   Allergen Reactions    Codeine Mental Status Change    Sglt2 Inhibitors Other (See Comments)     Frequent UTI        Discharge Disposition:  Home-Health Care Svc    Diet:  Hospital:  Diet Order   Procedures    Diet: Cardiac, Diabetic, Fluid Restriction (240 mL/tray); Low Sodium (2g); Consistent Carbohydrate; 2000 mL/day; Fluid Consistency: Thin (IDDSI 0)       Discharge Activity:   Activity Instructions       Gradually Increase Activity Until at Pre-Hospitalization Level              CODE STATUS:  Code Status and Medical Interventions: CPR (Attempt to Resuscitate); Full Support   Ordered at: 04/04/25 4108     Code Status (Patient has no pulse and is not breathing):    CPR (Attempt to Resuscitate)     Medical Interventions (Patient has pulse or is breathing):    Full Support     Level Of  Support Discussed With:    Patient         Future Appointments   Date Time Provider Department Center   4/11/2025  1:45 PM Brittni Quiroz APRN [facility]   4/25/2025  3:30 PM Bouchra Bentley APRHELENE [facility]   5/9/2025 12:30 PM Brittni Quiroz APRN [facility]       Additional Instructions for the Follow-ups that You Need to Schedule       Discharge Follow-up with PCP   As directed       Currently Documented PCP:    Brittni Quiroz APRN    PCP Phone Number:    [phone]     Follow Up Details: Hospital discharge follow up 1-2 weeks        Discharge Follow-up with Specialty: Cardiology as scheduled 4/25/25   As directed      Specialty: Cardiology as scheduled 4/25/25   Follow Up Details: Hospital discharge follow up                Pertinent  and/or Most Recent Results     PROCEDURES:   None    LAB RESULTS:      Lab 04/08/25  0518 04/07/25  0524 04/06/25  0431 04/05/25  0339 04/04/25  1639   WBC 6.28 6.28 4.97 7.17 7.11   HEMOGLOBIN 11.2* 11.3* 11.5* 11.4* 11.9*   HEMATOCRIT 37.2 37.8 39.6 39.0 41.0   PLATELETS 99* 89* 92* 107* 115*   NEUTROS ABS 4.45 4.48  --  4.78 5.38   IMMATURE GRANS (ABS) 0.02 0.02  --  0.02 0.04   LYMPHS ABS 1.00 1.03  --  1.32 1.00   MONOS ABS 0.68 0.62  --  0.80 0.52   EOS ABS 0.10 0.09  --  0.20 0.13   MCV 98.4* 98.4* 101.3* 101.0* 101.7*         Lab 04/08/25  0539 04/07/25  0524 04/06/25  0431 04/05/25  0339 04/04/25  1639   SODIUM 135* 137 139 138 140   POTASSIUM 4.8 4.5 4.5 4.6 5.1   CHLORIDE 94* 96* 99 101 100   CO2 32.4* 29.0 27.3 27.0 28.8   ANION GAP 8.6 12.0 12.7 10.0 11.2   BUN 20 17 20 30* 34*   CREATININE 1.06* 0.91 0.78 0.88 0.92   EGFR 55.9* 67.2 80.8 69.9 66.3   GLUCOSE 270* 180* 109* 91 158*   CALCIUM 9.1 8.9 8.8 9.0 9.1   MAGNESIUM 2.1 1.4* 1.6 1.8  --    PHOSPHORUS 3.8 3.7  --   --   --          Lab 04/08/25  0539 04/07/25  0524 04/05/25  0339 04/04/25  1639   TOTAL PROTEIN 7.1 6.7 6.6 6.9   ALBUMIN 3.6 3.3* 3.5 3.8   GLOBULIN   --   --  3.1 3.1   ALT (SGPT) 17 16 18 19   AST (SGOT) 23 26 23 24   BILIRUBIN 1.0 1.1 0.7 0.6   INDIRECT BILIRUBIN 0.7 0.7  --   --    BILIRUBIN DIRECT 0.3 0.4*  --   --    ALK PHOS 83 67 72 90         Lab 04/04/25  1829 04/04/25  1639   PROBNP  --  3,440.0*   HSTROP T 22* 22*                 Brief Urine Lab Results       None          Microbiology Results (last 10 days)       ** No results found for the last 240 hours. **            XR Chest 1 View  Result Date: 4/4/2025  Impression: Impression: Cardiomegaly with mild pulmonary vascular congestion and interstitial edema. Electronically Signed: Joaquin Mcintosh MD  4/4/2025 4:35 PM EDT  Workstation ID: [id number]    XR Chest 1 View  Result Date: 3/27/2025  Impression: Interval worsening of the prominent bilateral interstitial lung markings and bilateral middle and lower zones infiltrates with stable bilateral effusion along with cardiomegaly. This may be due to pulmonary edema or atypical pneumonitis. Please correlate clinically.    XR Foot 2 View Left  Result Date: 3/18/2025  Impression: Impression: No specific finding of osteomyelitis. Soft tissue swelling in the visualized extremity could represent cellulitis, dependent edema and/or posttraumatic change. Electronically Signed: Thom Vega MD  3/18/2025 2:07 PM EDT  Workstation ID: [id number]    XR Foot 2 View Right  Result Date: 3/18/2025  Impression: Impression: Soft tissue swelling throughout the visualized extremity could represent dependent edema and/or cellulitis. No specific radiographic finding of osteomyelitis. Electronically Signed: Thom Vega MD  3/18/2025 2:05 PM EDT  Workstation ID: [id number]    XR Chest 1 View  Result Date: 3/18/2025  Impression: Impression: Suboptimal portable radiograph. There is possibly bibasilar airspace disease that could be related to atelectasis and/or pneumonia. Cardiomegaly. Electronically Signed: Ishaan Martinez MD  3/18/2025 8:13 AM EDT  Workstation ID: [id number]    XR Chest  1 View  Result Date: 3/13/2025  Impression: Mild congestive heart failure. [id number]    CT Abdomen Pelvis Without Contrast  Result Date: 3/10/2025  Impression: 1. Interval new right mild to moderate pleural effusion. 2. Cholelithiasis (unchanged) with no signs of acute cholecystitis. 3. Moderate hepatomegaly. (unchanged) 4. Mild splenomegaly. (unchanged) 5. 7 mm non-obstructive calculus in the inferior calyx of the right kidney. (unchanged) 6. Degenerative changes in lumbar spine and pelvic bones. Multilevel spinal canal severe stenosis. (unchanged) 7. No CT evidence of acute abdominal pathology, Clinical correlation, and further assessment as needed would be recommended.      Results for orders placed during the hospital encounter of 04/04/25    Duplex Venous Lower Extremity - Bilateral CV-READ    Interpretation Summary    Normal bilateral lower extremity venous duplex scan.      Results for orders placed during the hospital encounter of 04/04/25    Duplex Venous Lower Extremity - Bilateral CV-READ    Interpretation Summary    Normal bilateral lower extremity venous duplex scan.      Results for orders placed during the hospital encounter of 02/11/25    Adult Transthoracic Echo Complete W/ Cont if Necessary Per Protocol    Interpretation Summary    Left ventricular systolic function is mildly decreased. Left ventricular ejection fraction appears to be 46 - 50%. with some anterior septal hypokensis    The right ventricular cavity is mildly dilated.    The left atrial cavity is mildly dilated.    The right atrial cavity is moderately  dilated.    Mild to moderate aortic valve stenosis is present.    Estimated right ventricular systolic pressure from tricuspid regurgitation is moderately elevated (45-55 mmHg).      Labs Pending at Discharge:        Time spent on Discharge including face to face service: 25 minutes    Electronically signed by Gilbert Salamanca MD, 04/08/25, 9:33 AM EDT.

## 2025-04-08 NOTE — PLAN OF CARE
Goal Outcome Evaluation:  Plan of Care Reviewed With: patient        Progress: improving  Outcome Evaluation: Patient is being discharged home.

## 2025-04-08 NOTE — OUTREACH NOTE
Prep Survey      Flowsheet Row Responses   [facility] patient discharged from? [facility]   Is LACE score < 7 ? No   [facility]   Date of Admission 04/04/25   Date of Discharge 04/08/25   Discharge Disposition Home-Health Care Sv   Discharge diagnosis Acute heart failure exacerbation with reduced ejection fraction with EF of 46 to 50   Does the patient have one of the following disease processes/diagnoses(primary or secondary)? CHF   Does the patient have Home health ordered? Yes   What is the Home health agency?  [facility]   Is there a DME ordered? No   Prep survey completed? Yes            DIPIKA HUGHES - Registered Nurse

## 2025-04-09 ENCOUNTER — TRANSITIONAL CARE MANAGEMENT TELEPHONE ENCOUNTER (OUTPATIENT)
Dept: CALL CENTER | Facility: HOSPITAL | Age: 75
End: 2025-04-09
Payer: MEDICARE

## 2025-04-09 RX ORDER — HYDROCODONE BITARTRATE AND ACETAMINOPHEN 10; 325 MG/1; MG/1
1 TABLET ORAL EVERY 6 HOURS PRN
Qty: 120 TABLET | Refills: 0 | Status: SHIPPED | OUTPATIENT
Start: 2025-04-09

## 2025-04-09 NOTE — OUTREACH NOTE
Call Center TCM Note      Flowsheet Row Responses   [facility] patient discharged from? [facility]   Does the patient have one of the following disease processes/diagnoses(primary or secondary)? CHF   TCM attempt successful? Yes  [VR lists Farnaz Garcia or Kwesi]   Call start time 1248   Call end time 1252   Discharge diagnosis Acute heart failure exacerbation with reduced ejection fraction with EF of 46 to 50   Is patient permission given to speak with other caregiver? Yes   List who call center can speak with Farnaz   Person spoke with today (if not patient) and relationship Farnaz-dtr   Meds reviewed with patient/caregiver? Yes   Is the patient having any side effects they believe may be caused by any medication additions or changes? No   Does the patient have all medications ordered at discharge? Yes   Is the patient taking all medications as directed (includes completed medication regime)? Yes   Comments 4/11/2025  1:45 PM  HOSPITAL FOLLOW UP   Does the patient have an appointment with their PCP within 7-14 days of discharge? Yes   What is the Home health agency?  [facility]   Has home health visited the patient within 72 hours of discharge? Call prior to 72 hours   Home health comments .   Comments Per pt's dtr, the pt is doing well, her SOA has improved since admission. Pt has been advised to start monitoring her wts at home again, per Farnaz. Pt is now monitoring her NA intake, Farnaz reports. This nurse educated dtr on importance of lower NA diet r/t edema, dtr v/u.   Did the patient receive a copy of their discharge instructions? Yes   Nursing interventions Reviewed instructions with patient   What is the patient's perception of their health status since discharge? Improving   Nursing interventions Nurse provided patient education   Is the patient able to teach back signs and symptoms of worsening condition? (i.e. weight gain, shortness of air, etc.) Yes   Is the  patient/caregiver able to teach back the hierarchy of who to call/visit for symptoms/problems? PCP, Specialist, Home health nurse, Urgent Care, ED, 911 Yes   TCM call completed? Yes   Call end time 1252             Allison Dela Cruz Registered Nurse    4/9/2025, 12:53 EDT

## 2025-04-10 ENCOUNTER — TELEPHONE (OUTPATIENT)
Dept: FAMILY MEDICINE CLINIC | Facility: CLINIC | Age: 75
End: 2025-04-10

## 2025-04-10 NOTE — TELEPHONE ENCOUNTER
Caller: NUNO    Relationship: [facility]    Best call back number: [phone]    What orders are you requesting (i.e. lab or imaging): VERBAL ORDERS TO CONTINUE HOME HEALTH VISITS, NEW NEBULIZER ORDER TO DME COMPANY, PATIENT HAS FOUR WOUNDS AND SHE WANTS TO MAKE SURE THE WOUND CARE ORDERS SHE DID WERE OK WITH THE DOCTOR OR HER PCP.      In what timeframe would the patient need to come in: N/A    Where will you receive your lab/imaging services: IN HOME    Additional notes: SHE HAS TWO STASIS ULCERS TO LEFT LOWER LEG SHE PUTTING ZEROFORM ON THEM AND WRAPPING HER LEG WITH COMPRESSION WRAP. SHE HAS A DIABETIC ULCER ON HER LEFT FOOT. SHE IS USING BETADYNE ON THIS. SHE HAS A DIABETIC ULCER ON HER RIGHT FOOT AND SHE IS USING BETADYNE ON THIS AS WELL. SHE WANTS TO MAKE SURE THIS IS CORRECT TREATMENT. IF TANK WOULD LIKE HER TO DO SOMETHING ELSE, SHE WILL DO WHAT SHE WISHES.

## 2025-04-12 NOTE — PROGRESS NOTES
Enter Query Response Below      Query Response: Heart failure due to pulmonary hypertension              If applicable, please update the problem list.

## 2025-04-16 LAB
QT INTERVAL: 354 MS
QTC INTERVAL: 417 MS

## 2025-04-18 ENCOUNTER — TELEPHONE (OUTPATIENT)
Dept: CARDIOLOGY | Facility: CLINIC | Age: 75
End: 2025-04-18

## 2025-04-18 ENCOUNTER — READMISSION MANAGEMENT (OUTPATIENT)
Dept: CALL CENTER | Facility: HOSPITAL | Age: 75
End: 2025-04-18
Payer: MEDICARE

## 2025-04-18 NOTE — OUTREACH NOTE
CHF Week 2 Survey      Flowsheet Row Responses   [facility]   Does the patient have one of the following disease processes/diagnoses(primary or secondary)? CHF   Week 2 attempt successful? No   Unsuccessful attempts Attempt 1            Allison GASCA - Registered Nurse

## 2025-04-23 ENCOUNTER — TELEPHONE (OUTPATIENT)
Dept: FAMILY MEDICINE CLINIC | Facility: CLINIC | Age: 75
End: 2025-04-23

## 2025-04-23 ENCOUNTER — READMISSION MANAGEMENT (OUTPATIENT)
Dept: CALL CENTER | Facility: HOSPITAL | Age: 75
End: 2025-04-23
Payer: MEDICARE

## 2025-04-23 ENCOUNTER — TELEPHONE (OUTPATIENT)
Dept: CARDIOLOGY | Facility: CLINIC | Age: 75
End: 2025-04-23
Payer: MEDICARE

## 2025-04-23 NOTE — TELEPHONE ENCOUNTER
Caller: Mary Albarran    Relationship: Self    Best call back number: [phone]     What is the best time to reach you: ANYTIME     Who are you requesting to speak with (clinical staff, provider,  specific staff member): CLINICAL     What was the call regarding: PATIENT IS CALLING REQUESTING FOR A PRESCRIPTION FOR PORTABLE OXYGEN, PATIENT DID NOT DISCLOSE WHICH MEDICAL SUPPLY PATIENT WOULD BE USING.

## 2025-04-23 NOTE — OUTREACH NOTE
CHF Week 2 Survey      Flowsheet Row Responses   [facility] patient discharged from? [facility]   Does the patient have one of the following disease processes/diagnoses(primary or secondary)? CHF   Week 2 attempt successful? Yes   Call start time 0914   Call end time 0917   Discharge diagnosis Acute heart failure exacerbation with reduced ejection fraction with EF of 46 to 50   Person spoke with today (if not patient) and relationship    Meds reviewed with patient/caregiver? Yes   Is the patient taking all medications as directed (includes completed medication regime)? Yes   Is the patient able to teach back signs and symptoms of worsening condition? (i.e. weight gain, shortness of air, etc.) Yes   CHF Week 2 call completed? Yes   Revoked No further contact(revokes)-requires comment   Graduated/Revoked comments  reports she was admitted to [facility] in [place] after her discharge. Pt has a different cardio there . Reports Pt is doing very well now.   Call end time 0917            AMAYA GUERRERO - Registered Nurse

## 2025-04-25 DIAGNOSIS — Z99.81 OXYGEN DEPENDENT: ICD-10-CM

## 2025-04-25 DIAGNOSIS — R06.02 SHORTNESS OF BREATH: ICD-10-CM

## 2025-04-25 DIAGNOSIS — I50.23 ACUTE ON CHRONIC HFREF (HEART FAILURE WITH REDUCED EJECTION FRACTION): Primary | ICD-10-CM

## 2025-04-26 ENCOUNTER — HOSPITAL ENCOUNTER (INPATIENT)
Facility: HOSPITAL | Age: 75
LOS: 6 days | Discharge: HOME | End: 2025-05-02
Attending: EMERGENCY MEDICINE | Admitting: HOSPITALIST
Payer: MEDICARE

## 2025-04-26 ENCOUNTER — APPOINTMENT (OUTPATIENT)
Dept: GENERAL RADIOLOGY | Facility: HOSPITAL | Age: 75
End: 2025-04-26
Payer: MEDICARE

## 2025-04-26 DIAGNOSIS — N39.0 ACUTE UTI: ICD-10-CM

## 2025-04-26 DIAGNOSIS — Z78.9 DECREASED ACTIVITIES OF DAILY LIVING (ADL): ICD-10-CM

## 2025-04-26 DIAGNOSIS — I48.91 ATRIAL FIBRILLATION, UNSPECIFIED TYPE: ICD-10-CM

## 2025-04-26 DIAGNOSIS — R26.2 DIFFICULTY WALKING: ICD-10-CM

## 2025-04-26 DIAGNOSIS — A41.9 SEPSIS, DUE TO UNSPECIFIED ORGANISM, UNSPECIFIED WHETHER ACUTE ORGAN DYSFUNCTION PRESENT: Primary | ICD-10-CM

## 2025-04-26 LAB
ALBUMIN SERPL-MCNC: 4.1 G/DL (ref 3.5–5.2)
ALBUMIN/GLOB SERPL: 1.1 G/DL
ALP SERPL-CCNC: 115 U/L (ref 39–117)
ALT SERPL W P-5'-P-CCNC: 20 U/L (ref 1–33)
ANION GAP SERPL CALCULATED.3IONS-SCNC: 12.4 MMOL/L (ref 5–15)
ARTERIAL PATENCY WRIST A: ABNORMAL
AST SERPL-CCNC: 25 U/L (ref 1–32)
ATMOSPHERIC PRESS: 746.9 MMHG
B PARAPERT DNA SPEC QL NAA+PROBE: NOT DETECTED
B PERT DNA SPEC QL NAA+PROBE: NOT DETECTED
BACTERIA UR QL AUTO: ABNORMAL /HPF
BASE EXCESS BLDA CALC-SCNC: 0.6 MMOL/L (ref -2–2)
BASOPHILS # BLD AUTO: 0.09 10*3/MM3 (ref 0–0.2)
BASOPHILS NFR BLD AUTO: 0.4 % (ref 0–1.5)
BDY SITE: ABNORMAL
BILIRUB SERPL-MCNC: 0.7 MG/DL (ref 0–1.2)
BILIRUB UR QL STRIP: NEGATIVE
BUN SERPL-MCNC: 36 MG/DL (ref 8–23)
BUN/CREAT SERPL: 33.3 (ref 7–25)
C PNEUM DNA NPH QL NAA+NON-PROBE: NOT DETECTED
CA-I BLDA-SCNC: 1.2 MMOL/L (ref 1.13–1.32)
CALCIUM SPEC-SCNC: 9.6 MG/DL (ref 8.6–10.5)
CHLORIDE BLDA-SCNC: 102 MMOL/L (ref 98–107)
CHLORIDE SERPL-SCNC: 97 MMOL/L (ref 98–107)
CLARITY UR: CLEAR
CO2 SERPL-SCNC: 25.6 MMOL/L (ref 22–29)
COLOR UR: YELLOW
CREAT SERPL-MCNC: 1.08 MG/DL (ref 0.57–1)
D-LACTATE SERPL-SCNC: 1.5 MMOL/L (ref 0.5–2)
D-LACTATE SERPL-SCNC: 2.3 MMOL/L
D-LACTATE SERPL-SCNC: 3 MMOL/L (ref 0.5–2)
D-LACTATE SERPL-SCNC: 3 MMOL/L (ref 0.5–2)
D-LACTATE SERPL-SCNC: 3.3 MMOL/L (ref 0.5–2)
DEPRECATED RDW RBC AUTO: 50.5 FL (ref 37–54)
EGFRCR SERPLBLD CKD-EPI 2021: 54.7 ML/MIN/1.73
EOSINOPHIL # BLD AUTO: 0.04 10*3/MM3 (ref 0–0.4)
EOSINOPHIL NFR BLD AUTO: 0.2 % (ref 0.3–6.2)
ERYTHROCYTE [DISTWIDTH] IN BLOOD BY AUTOMATED COUNT: 14.4 % (ref 12.3–15.4)
FLUAV RNA RESP QL NAA+PROBE: NOT DETECTED
FLUAV SUBTYP SPEC NAA+PROBE: NOT DETECTED
FLUBV RNA ISLT QL NAA+PROBE: NOT DETECTED
FLUBV RNA RESP QL NAA+PROBE: NOT DETECTED
GAS FLOW AIRWAY: 5 LPM
GEN 5 1HR TROPONIN T REFLEX: 15 NG/L
GLOBULIN UR ELPH-MCNC: 3.9 GM/DL
GLUCOSE BLDC GLUCOMTR-MCNC: 132 MG/DL (ref 70–99)
GLUCOSE BLDC GLUCOMTR-MCNC: 144 MG/DL (ref 70–99)
GLUCOSE BLDC GLUCOMTR-MCNC: 185 MG/DL (ref 70–99)
GLUCOSE BLDC GLUCOMTR-MCNC: 216 MG/DL (ref 70–99)
GLUCOSE BLDC GLUCOMTR-MCNC: 244 MG/DL (ref 70–99)
GLUCOSE BLDC GLUCOMTR-MCNC: 249 MG/DL (ref 70–99)
GLUCOSE BLDC GLUCOMTR-MCNC: 307 MG/DL (ref 65–99)
GLUCOSE SERPL-MCNC: 292 MG/DL (ref 65–99)
GLUCOSE UR STRIP-MCNC: NEGATIVE MG/DL
HADV DNA SPEC NAA+PROBE: NOT DETECTED
HCO3 BLDA-SCNC: 25.9 MMOL/L (ref 22–26)
HCOV 229E RNA SPEC QL NAA+PROBE: NOT DETECTED
HCOV HKU1 RNA SPEC QL NAA+PROBE: NOT DETECTED
HCOV NL63 RNA SPEC QL NAA+PROBE: NOT DETECTED
HCOV OC43 RNA SPEC QL NAA+PROBE: NOT DETECTED
HCT VFR BLD AUTO: 46.1 % (ref 34–46.6)
HCT VFR BLD CALC: 47 % (ref 38–51)
HEMODILUTION: NO
HGB BLD-MCNC: 13.8 G/DL (ref 12–15.9)
HGB BLDA-MCNC: 15.8 G/DL (ref 12–18)
HGB UR QL STRIP.AUTO: ABNORMAL
HMPV RNA NPH QL NAA+NON-PROBE: NOT DETECTED
HOLD SPECIMEN: NORMAL
HOLD SPECIMEN: NORMAL
HPIV1 RNA ISLT QL NAA+PROBE: NOT DETECTED
HPIV2 RNA SPEC QL NAA+PROBE: NOT DETECTED
HPIV3 RNA NPH QL NAA+PROBE: NOT DETECTED
HPIV4 P GENE NPH QL NAA+PROBE: NOT DETECTED
HYALINE CASTS UR QL AUTO: ABNORMAL /LPF
IMM GRANULOCYTES # BLD AUTO: 0.13 10*3/MM3 (ref 0–0.05)
IMM GRANULOCYTES NFR BLD AUTO: 0.6 % (ref 0–0.5)
INHALED O2 CONCENTRATION: 40 %
KETONES UR QL STRIP: NEGATIVE
L PNEUMO1 AG UR QL IA: NEGATIVE
LEUKOCYTE ESTERASE UR QL STRIP.AUTO: ABNORMAL
LYMPHOCYTES # BLD AUTO: 0.66 10*3/MM3 (ref 0.7–3.1)
LYMPHOCYTES NFR BLD AUTO: 2.9 % (ref 19.6–45.3)
M PNEUMO IGG SER IA-ACNC: NOT DETECTED
MAGNESIUM SERPL-MCNC: 1.5 MG/DL (ref 1.6–2.4)
MCH RBC QN AUTO: 28.2 PG (ref 26.6–33)
MCHC RBC AUTO-ENTMCNC: 29.9 G/DL (ref 31.5–35.7)
MCV RBC AUTO: 94.3 FL (ref 79–97)
MODALITY: ABNORMAL
MONOCYTES # BLD AUTO: 0.58 10*3/MM3 (ref 0.1–0.9)
MONOCYTES NFR BLD AUTO: 2.5 % (ref 5–12)
MRSA DNA SPEC QL NAA+PROBE: ABNORMAL
NEUTROPHILS NFR BLD AUTO: 21.34 10*3/MM3 (ref 1.7–7)
NEUTROPHILS NFR BLD AUTO: 93.4 % (ref 42.7–76)
NITRITE UR QL STRIP: POSITIVE
NRBC BLD AUTO-RTO: 0 /100 WBC (ref 0–0.2)
NT-PROBNP SERPL-MCNC: 619.7 PG/ML (ref 0–900)
PCO2 BLDA: 42.6 MM HG (ref 35–45)
PH BLDA: 7.39 PH UNITS (ref 7.35–7.45)
PH UR STRIP.AUTO: <=5 [PH] (ref 5–8)
PHOSPHATE SERPL-MCNC: 3.6 MG/DL (ref 2.5–4.5)
PLATELET # BLD AUTO: 114 10*3/MM3 (ref 140–450)
PMV BLD AUTO: 13.7 FL (ref 6–12)
PO2 BLD: 145 MM[HG] (ref 0–500)
PO2 BLDA: 58 MM HG (ref 80–100)
POTASSIUM BLDA-SCNC: 5 MMOL/L (ref 3.5–5)
POTASSIUM SERPL-SCNC: 5.4 MMOL/L (ref 3.5–5.2)
PROCALCITONIN SERPL-MCNC: 0.96 NG/ML (ref 0–0.25)
PROT SERPL-MCNC: 8 G/DL (ref 6–8.5)
PROT UR QL STRIP: NEGATIVE
RBC # BLD AUTO: 4.89 10*6/MM3 (ref 3.77–5.28)
RBC # UR STRIP: ABNORMAL /HPF
REF LAB TEST METHOD: ABNORMAL
RHINOVIRUS RNA SPEC NAA+PROBE: NOT DETECTED
RSV RNA NPH QL NAA+NON-PROBE: NOT DETECTED
RSV RNA RESP QL NAA+PROBE: NOT DETECTED
S PNEUM AG SPEC QL LA: NEGATIVE
SAO2 % BLDCOA: 89.3 % (ref 95–99)
SARS-COV-2 RNA RESP QL NAA+PROBE: NOT DETECTED
SARS-COV-2 RNA RESP QL NAA+PROBE: NOT DETECTED
SODIUM BLD-SCNC: 139 MMOL/L (ref 131–143)
SODIUM SERPL-SCNC: 135 MMOL/L (ref 136–145)
SP GR UR STRIP: 1.01 (ref 1–1.03)
SQUAMOUS #/AREA URNS HPF: ABNORMAL /HPF
TROPONIN T NUMERIC DELTA: 2 NG/L
TROPONIN T SERPL HS-MCNC: 13 NG/L
UROBILINOGEN UR QL STRIP: ABNORMAL
WBC # UR STRIP: ABNORMAL /HPF
WBC NRBC COR # BLD AUTO: 22.84 10*3/MM3 (ref 3.4–10.8)
WHOLE BLOOD HOLD COAG: NORMAL
WHOLE BLOOD HOLD SPECIMEN: NORMAL

## 2025-04-26 PROCEDURE — 82330 ASSAY OF CALCIUM: CPT

## 2025-04-26 PROCEDURE — 25810000003 DEXTROSE 5% IN LACTATED RINGERS PER 1000 ML: Performed by: PHYSICIAN ASSISTANT

## 2025-04-26 PROCEDURE — 84484 ASSAY OF TROPONIN QUANT: CPT | Performed by: EMERGENCY MEDICINE

## 2025-04-26 PROCEDURE — 84100 ASSAY OF PHOSPHORUS: CPT | Performed by: HOSPITALIST

## 2025-04-26 PROCEDURE — 25010000002 AMIODARONE IN DEXTROSE 5% 150-4.21 MG/100ML-% SOLUTION: Performed by: PHYSICIAN ASSISTANT

## 2025-04-26 PROCEDURE — 87641 MR-STAPH DNA AMP PROBE: CPT | Performed by: HOSPITALIST

## 2025-04-26 PROCEDURE — 94799 UNLISTED PULMONARY SVC/PX: CPT

## 2025-04-26 PROCEDURE — 87637 SARSCOV2&INF A&B&RSV AMP PRB: CPT | Performed by: EMERGENCY MEDICINE

## 2025-04-26 PROCEDURE — 25010000002 AMIODARONE IN DEXTROSE 5% 360-4.14 MG/200ML-% SOLUTION: Performed by: PHYSICIAN ASSISTANT

## 2025-04-26 PROCEDURE — 82803 BLOOD GASES ANY COMBINATION: CPT

## 2025-04-26 PROCEDURE — 83880 ASSAY OF NATRIURETIC PEPTIDE: CPT | Performed by: EMERGENCY MEDICINE

## 2025-04-26 PROCEDURE — 83605 ASSAY OF LACTIC ACID: CPT

## 2025-04-26 PROCEDURE — 80053 COMPREHEN METABOLIC PANEL: CPT | Performed by: EMERGENCY MEDICINE

## 2025-04-26 PROCEDURE — 94640 AIRWAY INHALATION TREATMENT: CPT

## 2025-04-26 PROCEDURE — 0202U NFCT DS 22 TRGT SARS-COV-2: CPT | Performed by: HOSPITALIST

## 2025-04-26 PROCEDURE — 25010000002 VANCOMYCIN 5 G RECONSTITUTED SOLUTION: Performed by: EMERGENCY MEDICINE

## 2025-04-26 PROCEDURE — 36600 WITHDRAWAL OF ARTERIAL BLOOD: CPT

## 2025-04-26 PROCEDURE — 87040 BLOOD CULTURE FOR BACTERIA: CPT | Performed by: EMERGENCY MEDICINE

## 2025-04-26 PROCEDURE — 25810000003 SODIUM CHLORIDE 0.9 % SOLUTION 250 ML FLEX CONT: Performed by: HOSPITALIST

## 2025-04-26 PROCEDURE — 93005 ELECTROCARDIOGRAM TRACING: CPT | Performed by: EMERGENCY MEDICINE

## 2025-04-26 PROCEDURE — 80051 ELECTROLYTE PANEL: CPT

## 2025-04-26 PROCEDURE — 25810000003 SODIUM CHLORIDE 0.9 % SOLUTION: Performed by: EMERGENCY MEDICINE

## 2025-04-26 PROCEDURE — 82948 REAGENT STRIP/BLOOD GLUCOSE: CPT

## 2025-04-26 PROCEDURE — 99223 1ST HOSP IP/OBS HIGH 75: CPT | Performed by: HOSPITALIST

## 2025-04-26 PROCEDURE — 36415 COLL VENOUS BLD VENIPUNCTURE: CPT | Performed by: EMERGENCY MEDICINE

## 2025-04-26 PROCEDURE — 25010000002 DIGOXIN PER 500 MCG: Performed by: HOSPITALIST

## 2025-04-26 PROCEDURE — 25010000002 MAGNESIUM SULFATE 4 GM/100ML SOLUTION: Performed by: HOSPITALIST

## 2025-04-26 PROCEDURE — 25010000002 ENOXAPARIN PER 10 MG: Performed by: HOSPITALIST

## 2025-04-26 PROCEDURE — 71045 X-RAY EXAM CHEST 1 VIEW: CPT

## 2025-04-26 PROCEDURE — 87086 URINE CULTURE/COLONY COUNT: CPT | Performed by: EMERGENCY MEDICINE

## 2025-04-26 PROCEDURE — 99291 CRITICAL CARE FIRST HOUR: CPT | Performed by: INTERNAL MEDICINE

## 2025-04-26 PROCEDURE — 25810000003 SEPSIS FLUID NS 0.9 % SOLUTION: Performed by: PHYSICIAN ASSISTANT

## 2025-04-26 PROCEDURE — 85025 COMPLETE CBC W/AUTO DIFF WBC: CPT | Performed by: EMERGENCY MEDICINE

## 2025-04-26 PROCEDURE — 82948 REAGENT STRIP/BLOOD GLUCOSE: CPT | Performed by: EMERGENCY MEDICINE

## 2025-04-26 PROCEDURE — 83735 ASSAY OF MAGNESIUM: CPT | Performed by: EMERGENCY MEDICINE

## 2025-04-26 PROCEDURE — 87449 NOS EACH ORGANISM AG IA: CPT | Performed by: HOSPITALIST

## 2025-04-26 PROCEDURE — 84145 PROCALCITONIN (PCT): CPT | Performed by: HOSPITALIST

## 2025-04-26 PROCEDURE — 87186 SC STD MICRODIL/AGAR DIL: CPT | Performed by: EMERGENCY MEDICINE

## 2025-04-26 PROCEDURE — 94660 CPAP INITIATION&MGMT: CPT

## 2025-04-26 PROCEDURE — 99291 CRITICAL CARE FIRST HOUR: CPT

## 2025-04-26 PROCEDURE — 51702 INSERT TEMP BLADDER CATH: CPT

## 2025-04-26 PROCEDURE — 25010000002 VANCOMYCIN 1 G RECONSTITUTED SOLUTION 1 EACH VIAL: Performed by: HOSPITALIST

## 2025-04-26 PROCEDURE — 94761 N-INVAS EAR/PLS OXIMETRY MLT: CPT

## 2025-04-26 PROCEDURE — 25010000002 CEFEPIME PER 500 MG: Performed by: EMERGENCY MEDICINE

## 2025-04-26 PROCEDURE — 81001 URINALYSIS AUTO W/SCOPE: CPT | Performed by: EMERGENCY MEDICINE

## 2025-04-26 PROCEDURE — 25010000002 ACETAMINOPHEN 10 MG/ML SOLUTION: Performed by: HOSPITALIST

## 2025-04-26 PROCEDURE — 83605 ASSAY OF LACTIC ACID: CPT | Performed by: EMERGENCY MEDICINE

## 2025-04-26 PROCEDURE — 87088 URINE BACTERIA CULTURE: CPT | Performed by: EMERGENCY MEDICINE

## 2025-04-26 PROCEDURE — 25010000002 CEFEPIME PER 500 MG: Performed by: HOSPITALIST

## 2025-04-26 RX ORDER — DILTIAZEM HYDROCHLORIDE 180 MG/1
180 CAPSULE, COATED, EXTENDED RELEASE ORAL DAILY
COMMUNITY

## 2025-04-26 RX ORDER — PANTOPRAZOLE SODIUM 40 MG/10ML
40 INJECTION, POWDER, LYOPHILIZED, FOR SOLUTION INTRAVENOUS DAILY
Status: DISCONTINUED | OUTPATIENT
Start: 2025-04-26 | End: 2025-05-01

## 2025-04-26 RX ORDER — ENOXAPARIN SODIUM 100 MG/ML
1 INJECTION SUBCUTANEOUS EVERY 12 HOURS
Status: DISCONTINUED | OUTPATIENT
Start: 2025-04-26 | End: 2025-04-29

## 2025-04-26 RX ORDER — SODIUM CHLORIDE 0.9 % (FLUSH) 0.9 %
10 SYRINGE (ML) INJECTION EVERY 12 HOURS SCHEDULED
Status: DISCONTINUED | OUTPATIENT
Start: 2025-04-26 | End: 2025-05-02 | Stop reason: HOSPADM

## 2025-04-26 RX ORDER — ONDANSETRON 2 MG/ML
4 INJECTION INTRAMUSCULAR; INTRAVENOUS EVERY 6 HOURS PRN
Status: DISCONTINUED | OUTPATIENT
Start: 2025-04-26 | End: 2025-05-02 | Stop reason: HOSPADM

## 2025-04-26 RX ORDER — SODIUM CHLORIDE 9 MG/ML
40 INJECTION, SOLUTION INTRAVENOUS AS NEEDED
Status: DISCONTINUED | OUTPATIENT
Start: 2025-04-26 | End: 2025-05-02 | Stop reason: HOSPADM

## 2025-04-26 RX ORDER — DEXTROSE MONOHYDRATE 25 G/50ML
10-50 INJECTION, SOLUTION INTRAVENOUS
Status: DISCONTINUED | OUTPATIENT
Start: 2025-04-26 | End: 2025-05-02 | Stop reason: HOSPADM

## 2025-04-26 RX ORDER — AMOXICILLIN 250 MG
2 CAPSULE ORAL 2 TIMES DAILY
Status: DISCONTINUED | OUTPATIENT
Start: 2025-04-26 | End: 2025-05-01

## 2025-04-26 RX ORDER — SODIUM CHLORIDE 0.9 % (FLUSH) 0.9 %
10 SYRINGE (ML) INJECTION AS NEEDED
Status: DISCONTINUED | OUTPATIENT
Start: 2025-04-26 | End: 2025-05-02 | Stop reason: HOSPADM

## 2025-04-26 RX ORDER — POTASSIUM CHLORIDE 1500 MG/1
40 TABLET, EXTENDED RELEASE ORAL 2 TIMES DAILY
COMMUNITY
Start: 2025-04-18

## 2025-04-26 RX ORDER — LANOLIN ALCOHOL/MO/W.PET/CERES
1 CREAM (GRAM) TOPICAL DAILY
COMMUNITY
Start: 2025-04-19

## 2025-04-26 RX ORDER — SODIUM CHLORIDE, SODIUM LACTATE, POTASSIUM CHLORIDE, CALCIUM CHLORIDE 600; 310; 30; 20 MG/100ML; MG/100ML; MG/100ML; MG/100ML
100 INJECTION, SOLUTION INTRAVENOUS CONTINUOUS
Status: DISCONTINUED | OUTPATIENT
Start: 2025-04-26 | End: 2025-04-26

## 2025-04-26 RX ORDER — ACETAMINOPHEN 325 MG/1
650 TABLET ORAL EVERY 4 HOURS PRN
Status: DISCONTINUED | OUTPATIENT
Start: 2025-04-26 | End: 2025-05-02 | Stop reason: HOSPADM

## 2025-04-26 RX ORDER — ONDANSETRON 4 MG/1
4 TABLET, ORALLY DISINTEGRATING ORAL EVERY 6 HOURS PRN
Status: DISCONTINUED | OUTPATIENT
Start: 2025-04-26 | End: 2025-05-02 | Stop reason: HOSPADM

## 2025-04-26 RX ORDER — IBUPROFEN 600 MG/1
1 TABLET ORAL
Status: DISCONTINUED | OUTPATIENT
Start: 2025-04-26 | End: 2025-05-02 | Stop reason: HOSPADM

## 2025-04-26 RX ORDER — BUMETANIDE 2 MG/1
1 TABLET ORAL DAILY
COMMUNITY
Start: 2025-04-18

## 2025-04-26 RX ORDER — MAGNESIUM SULFATE HEPTAHYDRATE 40 MG/ML
4 INJECTION, SOLUTION INTRAVENOUS ONCE
Status: COMPLETED | OUTPATIENT
Start: 2025-04-26 | End: 2025-04-26

## 2025-04-26 RX ORDER — POLYETHYLENE GLYCOL 3350 17 G/17G
17 POWDER, FOR SOLUTION ORAL DAILY PRN
Status: DISCONTINUED | OUTPATIENT
Start: 2025-04-26 | End: 2025-05-01

## 2025-04-26 RX ORDER — SITAGLIPTIN 50 MG/1
50 TABLET, FILM COATED ORAL DAILY
COMMUNITY
Start: 2025-04-19

## 2025-04-26 RX ORDER — BISACODYL 10 MG
10 SUPPOSITORY, RECTAL RECTAL DAILY PRN
Status: DISCONTINUED | OUTPATIENT
Start: 2025-04-26 | End: 2025-05-01

## 2025-04-26 RX ORDER — DEXTROSE, SODIUM CHLORIDE, SODIUM LACTATE, POTASSIUM CHLORIDE, AND CALCIUM CHLORIDE 5; .6; .31; .03; .02 G/100ML; G/100ML; G/100ML; G/100ML; G/100ML
50 INJECTION, SOLUTION INTRAVENOUS CONTINUOUS
Status: DISCONTINUED | OUTPATIENT
Start: 2025-04-26 | End: 2025-04-27

## 2025-04-26 RX ORDER — ACETAMINOPHEN 650 MG/1
650 SUPPOSITORY RECTAL ONCE
Status: COMPLETED | OUTPATIENT
Start: 2025-04-26 | End: 2025-04-26

## 2025-04-26 RX ORDER — ACETAMINOPHEN 10 MG/ML
1000 INJECTION, SOLUTION INTRAVENOUS ONCE
Status: COMPLETED | OUTPATIENT
Start: 2025-04-26 | End: 2025-04-26

## 2025-04-26 RX ORDER — DIGOXIN 0.25 MG/ML
250 INJECTION INTRAMUSCULAR; INTRAVENOUS ONCE
Status: COMPLETED | OUTPATIENT
Start: 2025-04-26 | End: 2025-04-26

## 2025-04-26 RX ORDER — BISACODYL 5 MG/1
5 TABLET, DELAYED RELEASE ORAL DAILY PRN
Status: DISCONTINUED | OUTPATIENT
Start: 2025-04-26 | End: 2025-05-01

## 2025-04-26 RX ORDER — DILTIAZEM HYDROCHLORIDE 120 MG/1
120 CAPSULE, COATED, EXTENDED RELEASE ORAL DAILY
COMMUNITY
Start: 2025-04-18 | End: 2025-04-26

## 2025-04-26 RX ORDER — IPRATROPIUM BROMIDE AND ALBUTEROL SULFATE 2.5; .5 MG/3ML; MG/3ML
3 SOLUTION RESPIRATORY (INHALATION)
Status: DISCONTINUED | OUTPATIENT
Start: 2025-04-26 | End: 2025-05-02 | Stop reason: HOSPADM

## 2025-04-26 RX ORDER — NITROGLYCERIN 0.4 MG/1
0.4 TABLET SUBLINGUAL
Status: DISCONTINUED | OUTPATIENT
Start: 2025-04-26 | End: 2025-05-02 | Stop reason: HOSPADM

## 2025-04-26 RX ORDER — BUDESONIDE 0.5 MG/2ML
0.5 INHALANT ORAL
Status: DISCONTINUED | OUTPATIENT
Start: 2025-04-26 | End: 2025-05-02 | Stop reason: HOSPADM

## 2025-04-26 RX ORDER — NOREPINEPHRINE BITARTRATE 0.03 MG/ML
.02-.3 INJECTION, SOLUTION INTRAVENOUS
Status: DISCONTINUED | OUTPATIENT
Start: 2025-04-26 | End: 2025-04-29

## 2025-04-26 RX ORDER — NICOTINE POLACRILEX 4 MG
15 LOZENGE BUCCAL
Status: DISCONTINUED | OUTPATIENT
Start: 2025-04-26 | End: 2025-05-02 | Stop reason: HOSPADM

## 2025-04-26 RX ORDER — ACETAMINOPHEN 650 MG/1
650 SUPPOSITORY RECTAL EVERY 4 HOURS PRN
Status: DISCONTINUED | OUTPATIENT
Start: 2025-04-26 | End: 2025-05-02 | Stop reason: HOSPADM

## 2025-04-26 RX ADMIN — BUDESONIDE 0.5 MG: 0.5 SUSPENSION RESPIRATORY (INHALATION) at 20:48

## 2025-04-26 RX ADMIN — CEFEPIME 2000 MG: 2 INJECTION, POWDER, FOR SOLUTION INTRAVENOUS at 23:10

## 2025-04-26 RX ADMIN — VANCOMYCIN HYDROCHLORIDE 2250 MG: 5 INJECTION, POWDER, LYOPHILIZED, FOR SOLUTION INTRAVENOUS at 15:28

## 2025-04-26 RX ADMIN — DIGOXIN 250 MCG: 0.25 INJECTION INTRAMUSCULAR; INTRAVENOUS at 16:26

## 2025-04-26 RX ADMIN — IPRATROPIUM BROMIDE AND ALBUTEROL SULFATE 3 ML: .5; 3 SOLUTION RESPIRATORY (INHALATION) at 20:48

## 2025-04-26 RX ADMIN — Medication 10 ML: at 20:09

## 2025-04-26 RX ADMIN — MAGNESIUM SULFATE HEPTAHYDRATE 4 G: 40 INJECTION, SOLUTION INTRAVENOUS at 17:21

## 2025-04-26 RX ADMIN — PANTOPRAZOLE SODIUM 40 MG: 40 INJECTION, POWDER, FOR SOLUTION INTRAVENOUS at 21:10

## 2025-04-26 RX ADMIN — CEFEPIME 2000 MG: 2 INJECTION, POWDER, FOR SOLUTION INTRAVENOUS at 14:55

## 2025-04-26 RX ADMIN — MUPIROCIN 1 APPLICATION: 20 OINTMENT TOPICAL at 20:09

## 2025-04-26 RX ADMIN — SODIUM CHLORIDE 1000 ML: 9 INJECTION, SOLUTION INTRAVENOUS at 13:41

## 2025-04-26 RX ADMIN — INSULIN HUMAN 3.8 UNITS/HR: 1 INJECTION, SOLUTION INTRAVENOUS at 17:25

## 2025-04-26 RX ADMIN — ACETAMINOPHEN 650 MG: 650 SUPPOSITORY RECTAL at 13:49

## 2025-04-26 RX ADMIN — VANCOMYCIN HYDROCHLORIDE 1000 MG: 1 INJECTION, POWDER, LYOPHILIZED, FOR SOLUTION INTRAVENOUS at 21:08

## 2025-04-26 RX ADMIN — ACETAMINOPHEN 650 MG: 650 SUPPOSITORY RECTAL at 21:30

## 2025-04-26 RX ADMIN — ENOXAPARIN SODIUM 120 MG: 100 INJECTION SUBCUTANEOUS at 21:10

## 2025-04-26 RX ADMIN — SODIUM CHLORIDE, SODIUM LACTATE, POTASSIUM CHLORIDE, CALCIUM CHLORIDE AND DEXTROSE MONOHYDRATE 50 ML/HR: 5; 600; 310; 30; 20 INJECTION, SOLUTION INTRAVENOUS at 18:04

## 2025-04-26 RX ADMIN — SODIUM CHLORIDE 2346 ML: 9 INJECTION, SOLUTION INTRAVENOUS at 18:04

## 2025-04-26 RX ADMIN — NOREPINEPHRINE BITARTRATE 0.02 MCG/KG/MIN: 32 SOLUTION INTRAVENOUS at 18:53

## 2025-04-26 RX ADMIN — ACETAMINOPHEN 1000 MG: 10 INJECTION, SOLUTION INTRAVENOUS at 17:21

## 2025-04-26 RX ADMIN — AMIODARONE HYDROCHLORIDE 1 MG/MIN: 1.8 INJECTION, SOLUTION INTRAVENOUS at 23:37

## 2025-04-26 RX ADMIN — AMIODARONE HYDROCHLORIDE 150 MG: 1.5 INJECTION, SOLUTION INTRAVENOUS at 23:26

## 2025-04-26 NOTE — CONSULTS
Pulmonary / Critical Care Consult Note      Patient Name: Mary Albarran  : 1950  MRN: [id number]  Primary Care Physician:  Brittni Quiroz APRN  Referring Physician: No ref. provider found  Date of admission: 2025    Subjective   Subjective     Reason for Consult/ Chief Complaint: Lethargy and sepsis    HPI:  Mary Albarran is a 74 y.o. female with past medical history of morbid obesity with BMI of 45, congestive heart failure with reduced ejection fraction, atrial fibrillation on anticoagulation,, chronic hypoxic respiratory failure, COPD, CKD stage IIIa, hypertension, diabetes with recent streptococcal and gram-negative bacteremia who presents to the emergency room for evaluation of lethargy and elevated temp.  Per family patient was recently in the hospital for congestive heart failure exacerbation.  Had only been home for about a week.  She was ambulatory around the house yesterday but today was with lethargy and not acting right.  Upon arrival to the emergency room patient was with elevated temp of 101.1, tachycardic and irregular with heart rate of 141, tachypneic with respiratory rate of 27.  Patient was placed on 2 L nasal cannula with SpO2 of 86%.  O2 requirements increased to 4 L.  Laboratory workup revealed elevated glucose of 307, elevated white blood cell count of 22 abnormal urine that was nitrite positive and small amount of leukocytes.  Patient was found to be hypotensive during her course of care while in the emergency room was placed on Levophed.  Patient will be placed on antibiotics Levophed and admitted to CCU for further evaluation and treatment.    Review of Systems  Unable to obtain as patient is lethargic      Personal History     Past Medical History:   Diagnosis Date    Allergic rhinitis     Anxiety     Aortic valve disease 2021    Fibrocalcific changes of the aortic valve with mild aortic valve stenosis   on echo 3/2/2021  Moderate aortic valve regurgitation is  present. Aortic valve maximum pressure gradient is 26 mmHg. Moderate aortic valve stenosis is present.  On echo 2/10/2022       Arthritis     Atrial fibrillation     CHF (congestive heart failure)     Chronic kidney disease (CKD), stage III (moderate)     Chronic pain     COPD (chronic obstructive pulmonary disease)     Coronary artery disease     Diabetes mellitus type 2 with complications     Elevated cholesterol     Ex-smoker     QUIT SMOKING 2008    GERD (gastroesophageal reflux disease)     History of home oxygen therapy     2L/NC AAT REPORTED    History of transfusion     Hyperlipidemia     Hypertension     Thrombocytopenia        Past Surgical History:   Procedure Laterality Date    HYSTERECTOMY      30 YEARS AGO       Family History: family history includes Diabetes in an other family member; Heart disease in her father and other family members. Otherwise pertinent FHx was reviewed.     Social History:  reports that she quit smoking about 17 years ago. Her smoking use included cigarettes. She started smoking about 54 years ago. She has a 37 pack-year smoking history. She has never been exposed to tobacco smoke. She has never used smokeless tobacco. She reports that she does not drink alcohol and does not use drugs.    Home Medications:  HYDROcodone-acetaminophen, Insulin Glargine, O2, albuterol, albuterol sulfate HFA, ammonium lactate, atorvastatin, budesonide, bumetanide, cholecalciferol, dilTIAZem CD, metoprolol tartrate, mineral oil-hydrophilic petrolatum, pregabalin, and rivaroxaban    Allergies:  Allergies   Allergen Reactions    Codeine Mental Status Change    Sglt2 Inhibitors Other (See Comments)     Frequent UTI        Objective    Objective     Vitals:   Temp:  [101.1 °F (38.4 °C)-102.7 °F (39.3 °C)] 102.4 °F (39.1 °C)  Heart Rate:  [100-153] 105  Resp:  [25-28] 28  BP: (106-147)/() 106/66  Flow (L/min) (Oxygen Therapy):  [2-6] 4    Physical Exam:  Vital Signs Reviewed   Chronically ill  female, obese with BMI of 45, lethargic  HEENT: ATNC, MMM  Chest:  good aeration, clear to auscultation bilaterally, tachypneic, no work of breathing noted  CV: Irregularly irregular, with RVR  Abd: Obese, soft  EXT: Chronic venous stasis of lower extremities bilaterally, right lower extremity is with erythema  Neuro: Lethargic, not responding to verbal or tactile stimuli, does regard to pain  Skin: See media pics      Result Review    Result Review:  I have personally reviewed the results from the time of this admission to 4/26/2025 15:24 EDT and agree with these findings:  [x]  Laboratory  [x]  Microbiology  [x]  Radiology  [x]  EKG/Telemetry   [x]  Cardiology/Vascular   []  Pathology  []  Old records  []  Other:  Most notable findings include:       Lab 04/26/25  1333   WBC 22.84*   HEMOGLOBIN 13.8   HEMATOCRIT 46.1   PLATELETS 114*   SODIUM 135*   POTASSIUM 5.4*   CHLORIDE 97*   CO2 25.6   BUN 36*   CREATININE 1.08*   GLUCOSE 292*   CALCIUM 9.6   TOTAL PROTEIN 8.0   ALBUMIN 4.1   GLOBULIN 3.9     Strep and Legionella urinary antigens negative    UA with positive nitrites, small amount of leukocytes, 4+ bacteria  Assessment & Plan   Assessment / Plan     Active Hospital Problems:  There are no active hospital problems to display for this patient.        Impression:  Septic shock likely secondary to UTI  A-fib with RVR on anticoagulation with Xarelto  Hyperglycemia with known diabetes mellitus  Hyperkalemia on admission at 5.4  KATHRIN on CKD stage III with creatinine of 1.08 on admission.  Most recent baseline 0.7-0.9  Lactic acidosis  Obesity with BMI of 45  Congestive heart failure with reduced ejection fraction without decompensation  COPD with chronic hypoxemia on 2L NC nightly    Plan:  Continue supplemental O2.  Titrate to maintain SpO2 90% or greater  Given acute hypoxia, CT of chest without contrast ordered  Levophed ordered as needed to maintain MAP 65 or greater  Continue broad-spectrum antibiotic coverage  with cefepime and vancomycin  Patient received 1 L fluid bolus while in the ER.  Will initiate fluid resuscitation for sepsis for an additional 2.3 L  On broad-spectrum antibiotic coverage with cefepime and vancomycin  Obtain MRSA PCR, blood cultures x 2 in process  Urinalysis with urine culture obtained while in the ER  Strep and Legionella urinary antigens  Check Pro-Chavez  Obtain sputum culture if possible  Currently in A-fib RVR.  Patient received 1 dose of digoxin.  On diltiazem CD and metoprolol tartrate as outpatient.  Unable to take p.o. currently given mental status  After fluid resuscitation and Tylenol for elevated temp will reevaluate rhythm.  If still with A-fib RVR can start amiodarone drip  On Xarelto as outpatient.  Currently unable to take given altered mental status  Trend renal function and electrolytes.  Replace as needed  With elevated glucose.  On EID as outpatient.  Currently on insulin drip  Place indwelling Maldonado catheter  Trend lactic till cleared      GI prophylaxis with Protonix  DVT prophylax on therapeutic Lovenox      Daisy GRACIA PA spent 15 minutes in accordance with split shared billing.  Electronically signed by MARIANA Maravilla, 04/26/25, 5:29 PM EDT.      Patient is critically ill in ICU with septic shock, A-fib with RVR, acute congestive heart failure  We spent 37 minutes of critical care time, excluding any procedure notes, in review, analysis, obtaining history and physical, formulating care plan, and I led multi-disciplinary critical care rounds with bedside nurse, respiratory therapist, clinical pharmacist and other allied services. I have discussed the case with primary service and other consultants as well. I, Dr Goodman, spent 22 mins of critical care time according to split shared critical care billing guidelines.     Electronically signed by Isauro Goodman MD, 4/26/2025, 15:24 EDT.

## 2025-04-26 NOTE — ED NOTES
RN informed provider of patient tempeture of 102.4 consistently using temp sensing gaitan. Patient given rectal tylenol 650mg at 1349 with minimal changes. RN asked provider if patient can be given anything else at this time do decrease tempeture. Provider informed RN that at this moment to continue with IV antibiotics and let admitting provider order more antipyretics later on.

## 2025-04-26 NOTE — ED PROVIDER NOTES
"Time: 1:16 PM EDT  Date of encounter:  4/26/2025  Independent Historian/Clinical History and Information was obtained by:   EMS    History is limited by: Altered Mental Status    Chief Complaint: \"Lethargic\" per EMS      History of Present Illness:  Patient is a 74 y.o. year old female who presents to the emergency department for evaluation of lethargy.  Documented temp of 101.1 per EMS.  Patient was picked up on EMS call out for lethargy with a history of recent hospitalization in [place] for congestive heart failure exacerbation.  I am told her heart rate was in the 170s and route per EMS.  Does also have a history of atrial fibrillation.  The patient is slow to respond and somewhat confused, however she does deny chest pain.            Patient Care Team  Primary Care Provider: Brittni Quiroz APRN    Past Medical History:     Allergies   Allergen Reactions    Codeine Mental Status Change    Sglt2 Inhibitors Other (See Comments)     Frequent UTI      Past Medical History:   Diagnosis Date    Allergic rhinitis     Anxiety     Aortic valve disease 11/28/2021    Fibrocalcific changes of the aortic valve with mild aortic valve stenosis   on echo 3/2/2021  Moderate aortic valve regurgitation is present. Aortic valve maximum pressure gradient is 26 mmHg. Moderate aortic valve stenosis is present.  On echo 2/10/2022       Arthritis     Atrial fibrillation     CHF (congestive heart failure)     Chronic kidney disease (CKD), stage III (moderate)     Chronic pain     COPD (chronic obstructive pulmonary disease)     Coronary artery disease     Diabetes mellitus type 2 with complications     Elevated cholesterol     Ex-smoker     QUIT SMOKING 2008    GERD (gastroesophageal reflux disease)     History of home oxygen therapy     2L/NC AAT REPORTED    History of transfusion     Hyperlipidemia     Hypertension     Thrombocytopenia      Past Surgical History:   Procedure Laterality Date    HYSTERECTOMY      30 YEARS AGO "     Family History   Problem Relation Age of Onset    Heart disease Father     Heart disease Other     Heart disease Other     Diabetes Other        Home Medications:  Prior to Admission medications    Medication Sig Start Date End Date Taking? Authorizing Provider   albuterol (ACCUNEB) 1.25 MG/3ML nebulizer solution Take 3 mL by nebulization Every 6 (Six) Hours As Needed for Wheezing or Shortness of Air. 3/30/23   Brittni Quiroz APRN   albuterol sulfate  (90 Base) MCG/ACT inhaler Inhale 2 puffs Every 4 (Four) Hours As Needed for Wheezing. 5/20/24   Brittni Quiroz APRN   ammonium lactate (LAC-HYDRIN) 12 % lotion Apply  topically to the appropriate area as directed 2 (Two) Times a Day. 2/28/25   Joaquin Rutledge MD   atorvastatin (LIPITOR) 20 MG tablet TAKE 1 TABLET DAILY  Patient taking differently: Take 1 tablet by mouth Daily. 9/12/24   Brittni Quiroz APRN   budesonide (PULMICORT) 1 MG/2ML nebulizer solution Take 2 mL by nebulization Daily. 9/9/24   Brittni Quiroz APRN   bumetanide (BUMEX) 1 MG tablet Take 1 tablet by mouth 2 (Two) Times a Day for 30 days. 4/8/25 5/8/25  Gilbert Salamanca MD   cholecalciferol (VITAMIN D3) 25 MCG (1000 UT) tablet Take 1 tablet by mouth Daily.    Whitley Emmanuel MD   dilTIAZem CD (CARDIZEM CD) 180 MG 24 hr capsule Take 1 capsule by mouth Daily. 3/30/25 7/29/25  Whitley Emmanuel MD   HYDROcodone-acetaminophen (NORCO)  MG per tablet Take 1 tablet by mouth Every 6 (Six) Hours As Needed for Moderate Pain. 4/9/25   Brittni Quiroz APRN   Insulin Glargine (Lantus SoloStar) 100 UNIT/ML injection pen Inject 15 Units under the skin into the appropriate area as directed Daily. 2/28/25   Joaquin Rutledge MD   metoprolol tartrate (LOPRESSOR) 50 MG tablet Take 1 tablet by mouth 3 (Three) Times a Day. 3/30/25 7/29/25  Whitley Emmanuel MD   mineral oil-hydrophilic petrolatum (AQUAPHOR) ointment Apply 1 Application topically to the  "appropriate area as directed Every 1 (One) Hour As Needed for Dry Skin. 25   Joaquin Rutledge MD   O2 (OXYGEN) Inhale 2 L/min Every Night.    Provider, MD Whitley   pregabalin (LYRICA) 150 MG capsule Take 1 capsule by mouth 2 (Two) Times a Day. 25   Brittni Quiroz APRN   rivaroxaban (XARELTO) 20 MG tablet Take 1 tablet by mouth Daily for 30 days. Indications: DVT/PE (active thrombosis) 3/25/25 4/24/25  Melody Luna MD        Social History:   Social History     Tobacco Use    Smoking status: Former     Current packs/day: 0.00     Average packs/day: 1 pack/day for 37.0 years (37.0 ttl pk-yrs)     Types: Cigarettes     Start date: 4/3/1971     Quit date: 2008     Years since quittin.0     Passive exposure: Never    Smokeless tobacco: Never    Tobacco comments:     FOR 30 YEARS   Vaping Use    Vaping status: Never Used   Substance Use Topics    Alcohol use: Never    Drug use: Never         Review of Systems:  Review of Systems   Unable to perform ROS: Mental status change   Constitutional:  Positive for fever.   Cardiovascular:  Negative for chest pain.   Psychiatric/Behavioral:  Positive for confusion.         Physical Exam:  /82   Pulse 103   Temp (!) 102.7 °F (39.3 °C) (Bladder)   Resp 25   Ht 160 cm (62.99\")   Wt 117 kg (258 lb 6.1 oz)   SpO2 97%   BMI 45.78 kg/m²     Physical Exam  Vitals and nursing note reviewed.   Constitutional:       General: She is awake.      Comments: Somnolent but responds to verbal stimuli   HENT:      Head: Normocephalic and atraumatic.      Nose: Nose normal.      Mouth/Throat:      Mouth: Mucous membranes are moist.   Eyes:      Extraocular Movements: Extraocular movements intact.      Pupils: Pupils are equal, round, and reactive to light.   Cardiovascular:      Rate and Rhythm: Tachycardia present. Rhythm irregular.      Heart sounds: Normal heart sounds.   Pulmonary:      Effort: Pulmonary effort is normal. No respiratory distress.    "   Breath sounds: Normal breath sounds. No wheezing, rhonchi or rales.   Abdominal:      General: Bowel sounds are normal.      Palpations: Abdomen is soft.      Tenderness: There is no abdominal tenderness. There is no guarding or rebound.      Comments: No rigidity   Musculoskeletal:         General: No tenderness. Normal range of motion.      Cervical back: Normal range of motion and neck supple.   Skin:     General: Skin is warm and dry.      Coloration: Skin is not jaundiced.   Neurological:      General: No focal deficit present.      Mental Status: Mental status is at baseline.                    Medical Decision Making:      Comorbidities that affect care:    COPD, CHF, type 2 diabetes, CKD, atrial fibrillation    External Notes reviewed:    Hospital Discharge Summary: Admission at our facility 4/4/2025 through 4/8/2025.  Diagnosis: Acute heart failure exacerbation with reduced ejection fraction.      The following orders were placed and all results were independently analyzed by me:  Orders Placed This Encounter   Procedures    Blood Culture - Blood,    Blood Culture - Blood,    COVID-19, FLU A/B, RSV PCR 1 HR TAT - Swab, Nasopharynx    Urine Culture - Urine,    XR Chest 1 View    [place] Draw    Comprehensive Metabolic Panel    High Sensitivity Troponin T    Magnesium    CBC Auto Differential    Lactic Acid, Plasma    Arterial Blood Gas + Electrolytes    BNP    Urinalysis With Culture If Indicated - Urine, Catheter    Blood Gas, Arterial -    STAT Lactic Acid, Reflex    Urinalysis, Microscopic Only - Urine, Clean Catch    High Sensitivity Troponin T 1Hr    NPO Diet NPO Type: Strict NPO    Undress & Gown    Continuous Pulse Oximetry    Vital Signs    Orthostatic Blood Pressure    Insert Indwelling Urinary Catheter    Assess Need for Indwelling Urinary Catheter - Follow Removal Protocol    Urinary Catheter Care    Hospitalist (on-call MD unless specified)    Oxygen Therapy- Nasal Cannula; Titrate 1-6 LPM Per  SpO2; 90 - 95%    POC Glucose Once    POC Lactate    POC Electrolyte Panel    ECG 12 Lead Other; Weakness    Insert Peripheral IV    Fall Precautions    CBC & Differential    Green Top (Gel)    Lavender Top    Gold Top - SST    Light Blue Top       Medications Given in the Emergency Department:  Medications   sodium chloride 0.9 % flush 10 mL (has no administration in time range)   cefepime 2000 mg IVPB in 100 mL NS (VTB) (has no administration in time range)   vancomycin 2250 mg/500 mL 0.9% NS IVPB ([facility]) (has no administration in time range)   acetaminophen (TYLENOL) suppository 650 mg (650 mg Rectal Given 4/26/25 1349)   sodium chloride 0.9 % bolus 1,000 mL (1,000 mL Intravenous New Bag 4/26/25 1341)        ED Course:    ED Course as of 04/26/25 1442   Sat Apr 26, 2025   1426 EKG interpretation: Atrial fibrillation, heart rate 107, no acute ischemia [RP]      ED Course User Index  [RP] Ovidio Boogie MD       Labs:    Lab Results (last 24 hours)       Procedure Component Value Units Date/Time    Urinalysis With Culture If Indicated - Indwelling Urethral Catheter [[id number]]  (Abnormal) Collected: 04/26/25 1325    Specimen: Urine from Indwelling Urethral Catheter Updated: 04/26/25 1347     Color, UA Yellow     Appearance, UA Clear     pH, UA <=5.0     Specific Gravity, UA 1.009     Glucose, UA Negative     Ketones, UA Negative     Bilirubin, UA Negative     Blood, UA Small (1+)     Protein, UA Negative     Leuk Esterase, UA Small (1+)     Nitrite, UA Positive     Urobilinogen, UA 0.2 E.U./dL    Narrative:      In absence of clinical symptoms, the presence of pyuria, bacteria, and/or nitrites on the urinalysis result does not correlate with infection.    Urinalysis, Microscopic Only - Indwelling Urethral Catheter [[id number]]  (Abnormal) Collected: 04/26/25 1325    Specimen: Urine from Indwelling Urethral Catheter Updated: 04/26/25 1347     RBC, UA 6-10 /HPF      WBC, UA 11-20 /HPF      Bacteria, UA 4+ /HPF       Squamous Epithelial Cells, UA 0-2 /HPF      Hyaline Casts, UA 0-2 /LPF      Methodology Automated Microscopy    Urine Culture - Urine, Indwelling Urethral Catheter [[id number]] Collected: 04/26/25 1325    Specimen: Urine from Indwelling Urethral Catheter Updated: 04/26/25 1346    CBC & Differential [[id number]]  (Abnormal) Collected: 04/26/25 1333    Specimen: Blood Updated: 04/26/25 1354    Narrative:      The following orders were created for panel order CBC & Differential.  Procedure                               Abnormality         Status                     ---------                               -----------         ------                     CBC Auto Differential[[id number]]        Abnormal            Final result               Scan Slide[[id number]]                                                                    Please view results for these tests on the individual orders.    Comprehensive Metabolic Panel [[id number]]  (Abnormal) Collected: 04/26/25 1333    Specimen: Blood Updated: 04/26/25 1404     Glucose 292 mg/dL      BUN 36 mg/dL      Creatinine 1.08 mg/dL      Sodium 135 mmol/L      Potassium 5.4 mmol/L      Chloride 97 mmol/L      CO2 25.6 mmol/L      Calcium 9.6 mg/dL      Total Protein 8.0 g/dL      Albumin 4.1 g/dL      ALT (SGPT) 20 U/L      AST (SGOT) 25 U/L      Alkaline Phosphatase 115 U/L      Total Bilirubin 0.7 mg/dL      Globulin 3.9 gm/dL      A/G Ratio 1.1 g/dL      BUN/Creatinine Ratio 33.3     Anion Gap 12.4 mmol/L      eGFR 54.7 mL/min/1.73     Narrative:      GFR Categories in Chronic Kidney Disease (CKD)      GFR Category          GFR (mL/min/1.73)    Interpretation  G1                     90 or greater         Normal or high (1)  G2                      60-89                Mild decrease (1)  G3a                   45-59                Mild to moderate decrease  G3b                   30-44                Moderate to severe decrease  G4                    15-29                Severe  decrease  G5                    14 or less           Kidney failure          (1)In the absence of evidence of kidney disease, neither GFR category G1 or G2 fulfill the criteria for CKD.    eGFR calculation 2021 CKD-EPI creatinine equation, which does not include race as a factor    High Sensitivity Troponin T [[id number]]  (Normal) Collected: 04/26/25 1333    Specimen: Blood Updated: 04/26/25 1404     HS Troponin T 13 ng/L     Narrative:      High Sensitive Troponin T Reference Range:  <14.0 ng/L- Negative Female for AMI  <22.0 ng/L- Negative Male for AMI  >=14 - Abnormal Female indicating possible myocardial injury.  >=22 - Abnormal Male indicating possible myocardial injury.   Clinicians would have to utilize clinical acumen, EKG, Troponin, and serial changes to determine if it is an Acute Myocardial Infarction or myocardial injury due to an underlying chronic condition.         Magnesium [[id number]]  (Abnormal) Collected: 04/26/25 1333    Specimen: Blood Updated: 04/26/25 1404     Magnesium 1.5 mg/dL     CBC Auto Differential [[id number]]  (Abnormal) Collected: 04/26/25 1333    Specimen: Blood Updated: 04/26/25 1354     WBC 22.84 10*3/mm3      RBC 4.89 10*6/mm3      Hemoglobin 13.8 g/dL      Hematocrit 46.1 %      MCV 94.3 fL      MCH 28.2 pg      MCHC 29.9 g/dL      RDW 14.4 %      RDW-SD 50.5 fl      MPV 13.7 fL      Platelets 114 10*3/mm3      Neutrophil % 93.4 %      Lymphocyte % 2.9 %      Monocyte % 2.5 %      Eosinophil % 0.2 %      Basophil % 0.4 %      Immature Grans % 0.6 %      Neutrophils, Absolute 21.34 10*3/mm3      Lymphocytes, Absolute 0.66 10*3/mm3      Monocytes, Absolute 0.58 10*3/mm3      Eosinophils, Absolute 0.04 10*3/mm3      Basophils, Absolute 0.09 10*3/mm3      Immature Grans, Absolute 0.13 10*3/mm3      nRBC 0.0 /100 WBC     Lactic Acid, Plasma [[id number]]  (Abnormal) Collected: 04/26/25 1333    Specimen: Blood Updated: 04/26/25 1409     Lactate 3.3 mmol/L     BNP [[id number]]  (Normal)  Collected: 04/26/25 1333    Specimen: Blood Updated: 04/26/25 1401     proBNP 619.7 pg/mL     Narrative:      This assay is used as an aid in the diagnosis of individuals suspected of having heart failure. It can be used as an aid in the diagnosis of acute decompensated heart failure (ADHF) in patients presenting with signs and symptoms of ADHF to the emergency department (ED). In addition, NT-proBNP of <300 pg/mL indicates ADHF is not likely.    Age Range Result Interpretation  NT-proBNP Concentration (pg/mL:      <50             Positive            >450                   Gray                 300-450                    Negative             <300    50-75           Positive            >900                  Gray                300-900                  Negative            <300      >75             Positive            >1800                  Gray                300-1800                  Negative            <300    POC Glucose Once [[id number]]  (Abnormal) Collected: 04/26/25 1334    Specimen: Blood Updated: 04/26/25 1338     Glucose 307 mg/dL      Comment: Serial Number: [id number]:  [id number]       COVID-19, FLU A/B, RSV PCR 1 HR TAT - Swab, Nasopharynx [[id number]]  (Normal) Collected: 04/26/25 1334    Specimen: Swab from Nasopharynx Updated: 04/26/25 1425     COVID19 Not Detected     Influenza A PCR Not Detected     Influenza B PCR Not Detected     RSV, PCR Not Detected    Narrative:      Fact sheet for providers: https://www.fda.gov/media/267857/download    Fact sheet for patients: https://www.fda.gov/media/137855/download    Test performed by PCR.    Blood Gas, Arterial - [[id number]]  (Abnormal) Collected: 04/26/25 1334    Specimen: Arterial Blood Updated: 04/26/25 1338     Site Left Brachial     Sid's Test N/A     pH, Arterial 7.391 pH units      pCO2, Arterial 42.6 mm Hg      pO2, Arterial 58.0 mm Hg      HCO3, Arterial 25.9 mmol/L      Base Excess, Arterial 0.6 mmol/L      Comment: Serial Number: [id number]:   [id number]        O2 Saturation, Arterial 89.3 %      Hemoglobin, Blood Gas 15.8 g/dL      Hematocrit, Blood Gas 47.0 %      Barometric Pressure for Blood Gas 746.9000 mmHg      Modality Cannula     FIO2 40 %      Flow Rate 5.0000 lpm      Hemodilution No     PO2/FIO2 145    POC Lactate [[id number]]  (Abnormal) Collected: 04/26/25 1334    Specimen: Arterial Blood Updated: 04/26/25 1338     Lactate 2.3 mmol/L      Comment: Serial Number: [id number]:  [id number]       POC Electrolyte Panel [[id number]]  (Normal) Collected: 04/26/25 1334    Specimen: Arterial Blood Updated: 04/26/25 1338     Sodium 139 mmol/L      POC Potassium 5.0 mmol/L      Chloride 102 mmol/L      Ionized Calcium 1.20 mmol/L      Comment: Serial Number: [id number]:  [id number]                Imaging:    XR Chest 1 View  Result Date: 4/26/2025  XR CHEST 1 VW Date of Exam: 4/26/2025 1:50 PM EDT Indication: Weak/Dizzy/AMS triage protocol Comparison: [facility] chest x-ray 4/4/2025, CT chest 2/11/2025 Findings: Cardiac silhouette remains enlarged. Findings of pulmonary edema appear decreased, but not resolved compared to previous chest x-ray. No pneumothorax or large pleural effusion is seen.     Impression: Enlarged cardiac silhouette with findings suggestive of improving pulmonary edema compared to 4/4/2025 chest x-ray. Electronically Signed: Michelle Reyez MD  4/26/2025 2:06 PM EDT  Workstation ID: [id number]        Differential Diagnosis and Discussion:    Fever: Based on the complaint of fever, differential diagnosis includes but is not limited to meningitis, pneumonia, pyelonephritis, acute uti,  systemic immune response syndrome, sepsis, viral syndrome, fungal infection, tick born illness and other bacterial infections.    PROCEDURES:    Labs were collected in the emergency department and all labs were reviewed and interpreted by me.  X-ray were performed in the emergency department and all X-ray impressions were independently interpreted by me.  An EKG was  performed and the EKG was interpreted by me.    ECG 12 Lead Other; Weakness   Preliminary Result   HEART NMFW=294  bpm   RR Wpjvwtux=805  ms   IL Interval=  ms   P Horizontal Axis=  deg   P Front Axis=  deg   QRSD Interval=71  ms   QT Wycaxjid=361  ms   GQkA=677  ms   QRS Axis=113  deg   T Wave Axis=10  deg   - ABNORMAL ECG -   Atrial fibrillation   Right axis deviation   Borderline T abnormalities, inferior leads   Date and Time of Study:2025-04-26 13:33:34          Procedures    MDM                 Sepsis criteria was met in the emergency department and the Sepsis protocol (including antibiotic administration) was initiated.      SIRS criteria considered:   1.  Temperature > 100.4 or <96.8    2.  Heart Rate > 90    3.  Respiratory Rate > 22    4.  WBC > 12K or <4K.             Severe Sepsis:     Respiratory: Mechanical Ventilation or Bipap  Hypotension: SBP > 90 or MAP < 65  Renal: Creatinine > 2  Metabolic: Lactic Acid > 2  Hematologic: Platelets < 100K or INR > 1.5  Hepatic: BILI  >  2  CNS: Sudden AMS     Septic Shock:     Severe Sepsis + Persistent hypotension or Lactic Acid > 4     Normal saline bolus, Antibiotics, and final disposition was based on these definitions.        Sepsis was recognized at 1423    Antibiotics were ordered.     30 mL/kg bolus was not indicated.       Patient did not receive the recommended 30 mL/kg fluid bolus for sepsis because it would be harmful or detrimental to the patient.    The patient has Concern for Fluid Overload, Heart Failure, and Blood Pressure Responded to Fluid Given.   The patient was ordered 1 L of fluids.  Patient was reassessed after fluid bolus.    The patient presents with 2 out of the 4 SIRS criteria and a suspected source for sepsis.  Patient was evaluated and placed on a cardiac monitor for fear of worsening tachycardia and life-threatening hypotension.  Patient was monitored for shock and signs of end-organ damage.  Mental status was repeatedly checked  throughout the ED stay.  Medications were ordered by me which includes IV cefepime, vancomycin.  The case was discussed at length with admitting physician.     Total Critical Care time of 75 minutes. Total critical care time documented does not include time spent on separately billed procedures for services of nurses or physician assistants. I personally saw and examined the patient. I have reviewed all diagnostic interpretations and treatment plans as written. I was present for the key portions of any procedures performed and the inclusive time noted in any critical care statement. Critical care time includes patient management by me, time spent at the patients bedside,  time to review lab and imaging results, discussing patient care, documentation in the medical record, and time spent with family or caregiver.    Patient Care Considerations:    CT ABDOMEN AND PELVIS: I considered ordering a CT scan of the abdomen and pelvis however patient has a benign abdominal exam      Consultants/Shared Management Plan:    Hospitalist: I have discussed the case with Dr. Dowd who agrees to accept the patient for admission.    Social Determinants of Health:    Patient is independent, reliable, and has access to care.       Disposition and Care Coordination:    Admit:   Through independent evaluation of the patient's history, physical, and imperical data, the patient meets criteria for inpatient admission to the hospital.        Final diagnoses:   Sepsis, due to unspecified organism, unspecified whether acute organ dysfunction present   Acute UTI   Atrial fibrillation, unspecified type        ED Disposition       ED Disposition   Decision to Admit    Condition   --    Comment   --               This medical record created using voice recognition software.             Ovidio Boogie MD  04/26/25 [id number]

## 2025-04-26 NOTE — H&P
[facility]  [facility]   Patient Name: Mary Albarran  : 1950  MRN: [id number]  Primary Care Physician:  Brittni Quiroz APRN  Date of admission: 2025    Subjective lethargic  Subjective   Chief Complaint: Lethargic    HPI: Patient is a 74-year-old female with history of morbid obesity, heart failure, with reduced ejection fraction, A-fib, chronic hypoxic respiratory failure, COPD, CKD stage IIIa, hypertension, diabetes, recent streptococcal and gram-negative bacteremia who presents to the ER for evaluation of lethargy.  Patient was recently hospitalized in [place] for CHF exacerbation.  Patient is very somnolent unable to give much history.     On arrival to the ED, patient's temperature 101.1, pulse of 141, respiratory rate of 27, blood pressure was 131/60, she was on 2 L of oxygen and saturating 86%.  Eventually, patient required 4 L of oxygen.    Patient's troponin is flat.  proBNP is 619.  Sodium is 135.  Potassium is 5.4.  Chloride is 97.  Creatinine is 1.08.  Glucose is 307.  Magnesium is 1.5.  Lactate is 2.3.  White blood cell count is 22.84.  Platelets are 114.    Patient has blood in her urine, is nitrite positive, has a small amount of leukocytes.    Chest x-ray shows: Enlarged cardiac silhouette with findings suggestive of improving pulmonary edema compared to 2025.    Patient was recently in the hospital from 2025 to 2025.    Personal History     Past Medical History:  Past Medical History:   Diagnosis Date    Allergic rhinitis     Anxiety     Aortic valve disease 2021    Fibrocalcific changes of the aortic valve with mild aortic valve stenosis   on echo 3/2/2021  Moderate aortic valve regurgitation is present. Aortic valve maximum pressure gradient is 26 mmHg. Moderate aortic valve stenosis is present.  On echo 2/10/2022       Arthritis     Atrial fibrillation     CHF (congestive heart failure)     Chronic kidney  disease (CKD), stage III (moderate)     Chronic pain     COPD (chronic obstructive pulmonary disease)     Coronary artery disease     Diabetes mellitus type 2 with complications     Elevated cholesterol     Ex-smoker     QUIT SMOKING     GERD (gastroesophageal reflux disease)     History of home oxygen therapy     2L/NC AAT REPORTED    History of transfusion     Hyperlipidemia     Hypertension     Thrombocytopenia        Past Surgical History:  Past Surgical History:   Procedure Laterality Date    HYSTERECTOMY      30 YEARS AGO       Family History:   Family History   Problem Relation Age of Onset    Heart disease Father     Heart disease Other     Heart disease Other     Diabetes Other        Social History:   Social History     Socioeconomic History    Marital status:    Tobacco Use    Smoking status: Former     Current packs/day: 0.00     Average packs/day: 1 pack/day for 37.0 years (37.0 ttl pk-yrs)     Types: Cigarettes     Start date: 4/3/1971     Quit date: 2008     Years since quittin.0     Passive exposure: Never    Smokeless tobacco: Never    Tobacco comments:     FOR 30 YEARS   Vaping Use    Vaping status: Never Used   Substance and Sexual Activity    Alcohol use: Never    Drug use: Never    Sexual activity: Defer       Home Medications:  HYDROcodone-acetaminophen, Insulin Glargine, Magnesium Oxide -Mg Supplement, SITagliptin, albuterol, albuterol sulfate HFA, ammonium lactate, atorvastatin, budesonide, bumetanide, cholecalciferol, dilTIAZem CD, metoprolol tartrate, potassium chloride ER, pregabalin, and rivaroxaban    Allergies:  Allergies   Allergen Reactions    Codeine Mental Status Change    Sglt2 Inhibitors Other (See Comments)     Frequent UTI        Review of Systems   All systems were reviewed and negative except for: Unable to get patient very somnolent    Objective   Objective     Vitals:   Temp:  [101.1 °F (38.4 °C)-102.7 °F (39.3 °C)] 102.4 °F (39.1 °C)  Heart Rate:   [100-153] 118  Resp:  [23-28] 23  BP: ()/() 106/60  Flow (L/min) (Oxygen Therapy):  [2-6] 4    Physical Exam    Constitutional: Somnolent   Eyes: Pupils equal, sclerae anicteric, no conjunctival injection   HENT: NCAT, mucous membranes moist   Neck: Supple, no thyromegaly, no lymphadenopathy, trachea midline   Respiratory: Tachypnea   Cardiovascular: Irregularly irregular   Gastrointestinal: Positive bowel sounds, soft, nontender, nondistended   Musculoskeletal: No bilateral ankle edema, no clubbing or cyanosis to extremities   Psychiatric: Appropriate affect, cooperative   Neurologic: Oriented x 3, strength symmetric in all extremities, Cranial Nerves grossly intact to confrontation, speech clear   Skin: No rashes     Result Review    Result Review:  I have personally reviewed the results from the time of this admission to 4/26/2025 16:24 EDT and agree with these findings:  [x]  Laboratory  [x]  Microbiology  [x]  Radiology  [x]  EKG/Telemetry   [x]  Cardiology/Vascular   [x]  Pathology  [x]  Old records  []  Other:      Assessment & Plan   Assessment / Plan   #1 sepsis as evidenced by hypotension, tachycardia, fevers, leukocytosis  -Source is urinary and possibly pulmonary.  Will get CT lung to rule out pulmonary and complete respiratory panel.  -Will empirically cover with cefepime and vancomycin.  Patient is also had multiple recent hospitalizations.  Patient is also had streptococcal and gram-negative bacteremia.  -Levophed ordered.  Patient has received 2 L of fluid in the ICU.  She has congestive heart failure (EF is 46%).  -Critical care consulted.    #2 A-fib with RVR  -Patient on DOAC at home.  Will use Lovenox in the interim since patient is too lethargic to safely swallow.  Digoxin ordered to improve rate control.  Once blood pressure stabilizes, consider metoprolol or amiodarone.  - Correct magnesium    #3 type 2 diabetes with hyperglycemia  -Insulin drip ordered    #4 COPD patient uses 2 L  of oxygen every night.  Radha Jimenez Brovana.  RT for bronchopulmonary hygiene.    #5 pulmonary hypertension (RVSP of 45-50)    #6 CHF exacerbation however is improved on chest x-ray.  -May need to spot dose Bumex once patient's blood pressure recovers.    #7 KATHRIN on CKD stage 3-will bladder scan and straight cath if needed.        VTE Prophylaxis:  Mechanical & pharmacologic VTE prophylaxis orders are signed & held.         CODE STATUS:    Code Status (Patient has no pulse and is not breathing): CPR (Attempt to Resuscitate)  Medical Interventions (Patient has pulse or is breathing): Full Support  Level Of Support Discussed With: Patient      Admission Status:  I believe this patient meets inpatient status.    Electronically signed by Juan Dowd DO, 04/26/25, 4:08 PM EDT.

## 2025-04-26 NOTE — PROGRESS NOTES
"[facility] Clinical Pharmacy Services: Vancomycin Pharmacokinetic Initial Consult Note    Mary Albarran is a 74 y.o. female who is on day 1 of pharmacy to dose vancomycin for Empiric.    Consult Information  Consulting Provider: Dr. Juan Dowd  Planned Duration of Therapy: 5 days  Was Patient Receiving Prior to Admission/Consult?: No  Loading Dose Ordered or Given: 2250 mg on  at 1528  PK/PD Target: -600 mg/L.hr   Relevant ID History: IV antibiotics in 2025  Other Antimicrobials: Cefepime    Imaging Reviewed?: Yes  CXR: \"no pneumothorax or large pleural effusion is seen\"    Microbiology Data  MRSA PCR performed:  ordered ; Result: Pending  Culture/Source:    Blood cx: pending   Urine cx: pending    Vitals/Labs  Ht: 160 cm (62.99\"); Wt: 117 kg (258 lb 6.1 oz)  Temp (24hrs), Av.2 °F (39 °C), Min:101.1 °F (38.4 °C), Max:102.7 °F (39.3 °C)   Estimated Creatinine Clearance: 58.1 mL/min (A) (by C-G formula based on SCr of 1.08 mg/dL (H)).     Results from last 7 days   Lab Units 25  1333   CREATININE mg/dL 1.08*   WBC 10*3/mm3 22.84*     Assessment/Plan:    Vancomycin Dose:  1000 mg IV every 24 hours; which provides the following predicted parameters:  AUC24,ss: 460 mg/L.hr  Probability of AUC24 > 400: 61 %  Ctrough,ss: 12.5 mg/L  Probability of Ctrough,ss > 20: 28 %  Probability of nephrotoxicity (Lodise ERIC ): 8 %  Vanc Random ordered for  with AM labs  Patient has order for Basic Metabolic Panel    Pharmacy will follow patient's kidney function and will adjust doses and obtain levels as necessary. Thank you for involving pharmacy in this patient's care. Please contact pharmacy with any questions or concerns.                           Teodora De La Paz [facility]  Clinical Pharmacist   "

## 2025-04-27 ENCOUNTER — APPOINTMENT (OUTPATIENT)
Dept: CT IMAGING | Facility: HOSPITAL | Age: 75
End: 2025-04-27
Payer: MEDICARE

## 2025-04-27 ENCOUNTER — APPOINTMENT (OUTPATIENT)
Facility: HOSPITAL | Age: 75
End: 2025-04-27
Payer: MEDICARE

## 2025-04-27 LAB
ALBUMIN SERPL-MCNC: 3.2 G/DL (ref 3.5–5.2)
ALBUMIN/GLOB SERPL: 0.8 G/DL
ALP SERPL-CCNC: 73 U/L (ref 39–117)
ALT SERPL W P-5'-P-CCNC: 16 U/L (ref 1–33)
ANION GAP SERPL CALCULATED.3IONS-SCNC: 14.4 MMOL/L (ref 5–15)
AST SERPL-CCNC: 20 U/L (ref 1–32)
BASOPHILS # BLD AUTO: 0.05 10*3/MM3 (ref 0–0.2)
BASOPHILS NFR BLD AUTO: 0.2 % (ref 0–1.5)
BILIRUB SERPL-MCNC: 0.6 MG/DL (ref 0–1.2)
BUN SERPL-MCNC: 26 MG/DL (ref 8–23)
BUN/CREAT SERPL: 32.9 (ref 7–25)
CALCIUM SPEC-SCNC: 8.8 MG/DL (ref 8.6–10.5)
CHLORIDE SERPL-SCNC: 105 MMOL/L (ref 98–107)
CO2 SERPL-SCNC: 21.6 MMOL/L (ref 22–29)
CREAT SERPL-MCNC: 0.79 MG/DL (ref 0.57–1)
D-LACTATE SERPL-SCNC: 3.8 MMOL/L (ref 0.5–2)
DEPRECATED RDW RBC AUTO: 51.5 FL (ref 37–54)
EGFRCR SERPLBLD CKD-EPI 2021: 79.6 ML/MIN/1.73
EOSINOPHIL # BLD AUTO: 0 10*3/MM3 (ref 0–0.4)
EOSINOPHIL NFR BLD AUTO: 0 % (ref 0.3–6.2)
ERYTHROCYTE [DISTWIDTH] IN BLOOD BY AUTOMATED COUNT: 14.6 % (ref 12.3–15.4)
GLOBULIN UR ELPH-MCNC: 4 GM/DL
GLUCOSE BLDC GLUCOMTR-MCNC: 114 MG/DL (ref 70–99)
GLUCOSE BLDC GLUCOMTR-MCNC: 115 MG/DL (ref 70–99)
GLUCOSE BLDC GLUCOMTR-MCNC: 121 MG/DL (ref 70–99)
GLUCOSE BLDC GLUCOMTR-MCNC: 135 MG/DL (ref 70–99)
GLUCOSE BLDC GLUCOMTR-MCNC: 140 MG/DL (ref 70–99)
GLUCOSE BLDC GLUCOMTR-MCNC: 151 MG/DL (ref 70–99)
GLUCOSE BLDC GLUCOMTR-MCNC: 165 MG/DL (ref 70–99)
GLUCOSE BLDC GLUCOMTR-MCNC: 195 MG/DL (ref 70–99)
GLUCOSE BLDC GLUCOMTR-MCNC: 87 MG/DL (ref 70–99)
GLUCOSE BLDC GLUCOMTR-MCNC: 91 MG/DL (ref 70–99)
GLUCOSE BLDC GLUCOMTR-MCNC: 92 MG/DL (ref 70–99)
GLUCOSE SERPL-MCNC: 121 MG/DL (ref 65–99)
HCT VFR BLD AUTO: 42.8 % (ref 34–46.6)
HGB BLD-MCNC: 12.9 G/DL (ref 12–15.9)
IMM GRANULOCYTES # BLD AUTO: 0.12 10*3/MM3 (ref 0–0.05)
IMM GRANULOCYTES NFR BLD AUTO: 0.5 % (ref 0–0.5)
LYMPHOCYTES # BLD AUTO: 1 10*3/MM3 (ref 0.7–3.1)
LYMPHOCYTES NFR BLD AUTO: 4.3 % (ref 19.6–45.3)
MAGNESIUM SERPL-MCNC: 2.4 MG/DL (ref 1.6–2.4)
MCH RBC QN AUTO: 28.5 PG (ref 26.6–33)
MCHC RBC AUTO-ENTMCNC: 30.1 G/DL (ref 31.5–35.7)
MCV RBC AUTO: 94.7 FL (ref 79–97)
MONOCYTES # BLD AUTO: 0.61 10*3/MM3 (ref 0.1–0.9)
MONOCYTES NFR BLD AUTO: 2.6 % (ref 5–12)
NEUTROPHILS NFR BLD AUTO: 21.55 10*3/MM3 (ref 1.7–7)
NEUTROPHILS NFR BLD AUTO: 92.4 % (ref 42.7–76)
NRBC BLD AUTO-RTO: 0 /100 WBC (ref 0–0.2)
PHOSPHATE SERPL-MCNC: 3.5 MG/DL (ref 2.5–4.5)
PLATELET # BLD AUTO: 115 10*3/MM3 (ref 140–450)
PMV BLD AUTO: 13.4 FL (ref 6–12)
POTASSIUM SERPL-SCNC: 4.6 MMOL/L (ref 3.5–5.2)
PROT SERPL-MCNC: 7.2 G/DL (ref 6–8.5)
RBC # BLD AUTO: 4.52 10*6/MM3 (ref 3.77–5.28)
SODIUM SERPL-SCNC: 141 MMOL/L (ref 136–145)
VANCOMYCIN SERPL-MCNC: 17.1 MCG/ML (ref 5–40)
WBC NRBC COR # BLD AUTO: 23.33 10*3/MM3 (ref 3.4–10.8)

## 2025-04-27 PROCEDURE — 83735 ASSAY OF MAGNESIUM: CPT | Performed by: PHYSICIAN ASSISTANT

## 2025-04-27 PROCEDURE — 94761 N-INVAS EAR/PLS OXIMETRY MLT: CPT

## 2025-04-27 PROCEDURE — 83605 ASSAY OF LACTIC ACID: CPT | Performed by: PHYSICIAN ASSISTANT

## 2025-04-27 PROCEDURE — 94799 UNLISTED PULMONARY SVC/PX: CPT

## 2025-04-27 PROCEDURE — 25010000002 CEFEPIME PER 500 MG: Performed by: HOSPITALIST

## 2025-04-27 PROCEDURE — 85025 COMPLETE CBC W/AUTO DIFF WBC: CPT | Performed by: HOSPITALIST

## 2025-04-27 PROCEDURE — 99291 CRITICAL CARE FIRST HOUR: CPT | Performed by: INTERNAL MEDICINE

## 2025-04-27 PROCEDURE — 63710000001 ONDANSETRON ODT 4 MG TABLET DISPERSIBLE: Performed by: HOSPITALIST

## 2025-04-27 PROCEDURE — 25010000002 AMIODARONE IN DEXTROSE 5% 360-4.14 MG/200ML-% SOLUTION: Performed by: STUDENT IN AN ORGANIZED HEALTH CARE EDUCATION/TRAINING PROGRAM

## 2025-04-27 PROCEDURE — 25010000002 AMIODARONE IN DEXTROSE 5% 150-4.21 MG/100ML-% SOLUTION: Performed by: STUDENT IN AN ORGANIZED HEALTH CARE EDUCATION/TRAINING PROGRAM

## 2025-04-27 PROCEDURE — 25810000003 SODIUM CHLORIDE 0.9 % SOLUTION: Performed by: HOSPITALIST

## 2025-04-27 PROCEDURE — 25010000002 ENOXAPARIN PER 10 MG: Performed by: HOSPITALIST

## 2025-04-27 PROCEDURE — 25010000002 VANCOMYCIN 5 G RECONSTITUTED SOLUTION: Performed by: HOSPITALIST

## 2025-04-27 PROCEDURE — 80053 COMPREHEN METABOLIC PANEL: CPT | Performed by: PHYSICIAN ASSISTANT

## 2025-04-27 PROCEDURE — 80202 ASSAY OF VANCOMYCIN: CPT | Performed by: HOSPITALIST

## 2025-04-27 PROCEDURE — 94660 CPAP INITIATION&MGMT: CPT

## 2025-04-27 PROCEDURE — 70450 CT HEAD/BRAIN W/O DYE: CPT

## 2025-04-27 PROCEDURE — 82948 REAGENT STRIP/BLOOD GLUCOSE: CPT

## 2025-04-27 PROCEDURE — 25010000002 AMIODARONE IN DEXTROSE 5% 360-4.14 MG/200ML-% SOLUTION: Performed by: PHYSICIAN ASSISTANT

## 2025-04-27 PROCEDURE — 71250 CT THORAX DX C-: CPT

## 2025-04-27 PROCEDURE — 84100 ASSAY OF PHOSPHORUS: CPT | Performed by: PHYSICIAN ASSISTANT

## 2025-04-27 PROCEDURE — 99233 SBSQ HOSP IP/OBS HIGH 50: CPT | Performed by: INTERNAL MEDICINE

## 2025-04-27 RX ORDER — INSULIN LISPRO 100 [IU]/ML
2-7 INJECTION, SOLUTION INTRAVENOUS; SUBCUTANEOUS
Status: DISCONTINUED | OUTPATIENT
Start: 2025-04-27 | End: 2025-05-02 | Stop reason: HOSPADM

## 2025-04-27 RX ORDER — HYDROCODONE BITARTRATE AND ACETAMINOPHEN 5; 325 MG/1; MG/1
1 TABLET ORAL EVERY 6 HOURS PRN
Refills: 0 | Status: DISCONTINUED | OUTPATIENT
Start: 2025-04-27 | End: 2025-05-02 | Stop reason: HOSPADM

## 2025-04-27 RX ORDER — DEXTROSE MONOHYDRATE 25 G/50ML
25 INJECTION, SOLUTION INTRAVENOUS
Status: DISCONTINUED | OUTPATIENT
Start: 2025-04-27 | End: 2025-05-02 | Stop reason: HOSPADM

## 2025-04-27 RX ORDER — VANCOMYCIN/0.9 % SOD CHLORIDE 1.5G/250ML
1500 PLASTIC BAG, INJECTION (ML) INTRAVENOUS EVERY 24 HOURS
Status: DISCONTINUED | OUTPATIENT
Start: 2025-04-27 | End: 2025-04-29

## 2025-04-27 RX ORDER — NICOTINE POLACRILEX 4 MG
15 LOZENGE BUCCAL
Status: DISCONTINUED | OUTPATIENT
Start: 2025-04-27 | End: 2025-05-02 | Stop reason: HOSPADM

## 2025-04-27 RX ADMIN — MUPIROCIN 1 APPLICATION: 20 OINTMENT TOPICAL at 20:44

## 2025-04-27 RX ADMIN — Medication 10 ML: at 08:09

## 2025-04-27 RX ADMIN — AMIODARONE HYDROCHLORIDE 1 MG/MIN: 1.8 INJECTION, SOLUTION INTRAVENOUS at 22:25

## 2025-04-27 RX ADMIN — INSULIN HUMAN 3.4 UNITS/HR: 1 INJECTION, SOLUTION INTRAVENOUS at 05:43

## 2025-04-27 RX ADMIN — ACETAMINOPHEN 650 MG: 325 TABLET ORAL at 13:34

## 2025-04-27 RX ADMIN — AMIODARONE HYDROCHLORIDE 0.5 MG/MIN: 1.8 INJECTION, SOLUTION INTRAVENOUS at 18:17

## 2025-04-27 RX ADMIN — HYDROCODONE BITARTRATE AND ACETAMINOPHEN 1 TABLET: 5; 325 TABLET ORAL at 16:01

## 2025-04-27 RX ADMIN — PANTOPRAZOLE SODIUM 40 MG: 40 INJECTION, POWDER, FOR SOLUTION INTRAVENOUS at 08:08

## 2025-04-27 RX ADMIN — IPRATROPIUM BROMIDE AND ALBUTEROL SULFATE 3 ML: .5; 3 SOLUTION RESPIRATORY (INHALATION) at 01:00

## 2025-04-27 RX ADMIN — SODIUM CHLORIDE 1500 MG: 9 INJECTION, SOLUTION INTRAVENOUS at 17:32

## 2025-04-27 RX ADMIN — ENOXAPARIN SODIUM 120 MG: 100 INJECTION SUBCUTANEOUS at 20:44

## 2025-04-27 RX ADMIN — CEFEPIME 2000 MG: 2 INJECTION, POWDER, FOR SOLUTION INTRAVENOUS at 15:37

## 2025-04-27 RX ADMIN — Medication 10 ML: at 20:45

## 2025-04-27 RX ADMIN — ENOXAPARIN SODIUM 120 MG: 100 INJECTION SUBCUTANEOUS at 08:08

## 2025-04-27 RX ADMIN — IPRATROPIUM BROMIDE AND ALBUTEROL SULFATE 3 ML: .5; 3 SOLUTION RESPIRATORY (INHALATION) at 13:06

## 2025-04-27 RX ADMIN — AMIODARONE HYDROCHLORIDE 150 MG: 1.5 INJECTION, SOLUTION INTRAVENOUS at 22:11

## 2025-04-27 RX ADMIN — ACETAMINOPHEN 650 MG: 325 TABLET ORAL at 09:20

## 2025-04-27 RX ADMIN — BUDESONIDE 0.5 MG: 0.5 SUSPENSION RESPIRATORY (INHALATION) at 20:29

## 2025-04-27 RX ADMIN — MUPIROCIN 1 APPLICATION: 20 OINTMENT TOPICAL at 08:08

## 2025-04-27 RX ADMIN — CEFEPIME 2000 MG: 2 INJECTION, POWDER, FOR SOLUTION INTRAVENOUS at 22:58

## 2025-04-27 RX ADMIN — IPRATROPIUM BROMIDE AND ALBUTEROL SULFATE 3 ML: .5; 3 SOLUTION RESPIRATORY (INHALATION) at 20:29

## 2025-04-27 RX ADMIN — ACETAMINOPHEN 650 MG: 325 TABLET ORAL at 20:43

## 2025-04-27 RX ADMIN — HYDROCODONE BITARTRATE AND ACETAMINOPHEN 1 TABLET: 5; 325 TABLET ORAL at 22:07

## 2025-04-27 RX ADMIN — CEFEPIME 2000 MG: 2 INJECTION, POWDER, FOR SOLUTION INTRAVENOUS at 06:31

## 2025-04-27 RX ADMIN — AMIODARONE HYDROCHLORIDE 0.5 MG/MIN: 1.8 INJECTION, SOLUTION INTRAVENOUS at 05:42

## 2025-04-27 RX ADMIN — ACETAMINOPHEN 650 MG: 650 SUPPOSITORY RECTAL at 03:12

## 2025-04-27 RX ADMIN — ONDANSETRON 4 MG: 4 TABLET, ORALLY DISINTEGRATING ORAL at 20:43

## 2025-04-27 NOTE — PROGRESS NOTES
"[facility] Clinical Pharmacy Services: Vancomycin Pharmacokinetic Consult Note    Mary Albarran is a 74 y.o. female who is on day 2 of pharmacy to dose vancomycin for Empiric.    Consult Information  Consulting Provider: Dr. Juan Dowd  Planned Duration of Therapy: 5 days  Was Patient Receiving Prior to Admission/Consult?: No  Loading Dose Ordered or Given: 2250 mg on  at 1528  PK/PD Target: -600 mg/L.hr   Relevant ID History: IV antibiotics in 2025  Other Antimicrobials: Cefepime    Imaging Reviewed?: Yes  CXR: \"no pneumothorax or large pleural effusion is seen\"    Microbiology Data  MRSA PCR performed:   ; Result: Positive   Culture/Source:    Blood cx: pending   Urine cx: pending    Vitals/Labs  Ht: 160 cm (62.99\"); Wt: 117 kg (258 lb 6.1 oz)  Temp (24hrs), Av.5 °F (38.6 °C), Min:99.7 °F (37.6 °C), Max:102.7 °F (39.3 °C)   Estimated Creatinine Clearance: 79.5 mL/min (by C-G formula based on SCr of 0.79 mg/dL).     Results from last 7 days   Lab Units 25  0253 25  1333   VANCOMYCIN RM mcg/mL 17.10  --    CREATININE mg/dL 0.79 1.08*   WBC 10*3/mm3 23.33* 22.84*     Assessment/Plan:    Level this morning of 17.10 mcg/mL, previous dose given yesterday at 2108. Renal function improved. Will increase dosing to 1500 mg every 24 hours    Vancomycin Dose:  1500 mg IV every 24 hours; which provides the following predicted parameters:  Exposure target: AUC24 (range)400-600 mg/L.hr   AUC24,ss: 483 mg/L.hr  Probability of AUC24 > 400: 76 %  Ctrough,ss: 15.7 mg/L  Probability of Ctrough,ss > 20: 25 %  Probability of nephrotoxicity (Lodise ERIC ): 11 %  No level currently ordered. Plan for level in two days   Patient has order for Basic Metabolic Panel    Pharmacy will follow patient's kidney function and will adjust doses and obtain levels as necessary. Thank you for involving pharmacy in this patient's care. Please contact pharmacy with any questions or concerns.            "                Gino García, [facility]  Clinical Pharmacist

## 2025-04-27 NOTE — PROGRESS NOTES
[facility]     Progress Note    Patient Name: Mary Albarran  : 1950  MRN: [id number]  Primary Care Physician:  Brittni Quiroz APRN  Date of admission: 2025    Subjective   Subjective       Chief Complaint:   Patient is critically ill in ICU with sepsis    Critical drips: Amiodarone drip, insulin drip, D5 LR, Levophed  Lines and tubes: Peripheral IVs, indwelling Maldonado    Over last 24 hours,   Received fluid resuscitation per sepsis bundle  Received IV Tylenol  Strep and broad-spectrum antibiotic  Blood cultures and urine culture obtained  Encephalopathic, BiPAP added  With A-fib RVR, unable to take oral meds, started on amiodarone drip    Overnight, no acute events.   Temperature improved with cooling blanket and ice packs  Started complaining of right ear pain and left lower extremity pain  Mentation improved as fever curve improved  Continued with A-fib RVR, on amiodarone drip    This morning,  Earlier this a.m. awake and following commands, during rounds lethargic  A-fib RVR with heart rate up into the 150s  Tmax 100.9  Currently on 3 L nasal cannula, transitioning to BiPAP after rounds  UOP 2.8 L  Family at bedside  Lower extremities are with wound care and wraps in place  Complaining of right ear pain      Objective   Objective     Vitals:   Temp:  [99.6 °F (37.6 °C)-102.7 °F (39.3 °C)] 99.6 °F (37.6 °C)  Heart Rate:  [100-173] 115  Resp:  [19-28] 19  BP: ()/() 136/59  Flow (L/min) (Oxygen Therapy):  [2-6] 4  Physical Exam   Vital Signs Reviewed  Obese female with BMI of 48, encephalopathic  HEENT: ATNC, MMM; ear canal and outer ear on right were without abnormal findings  Chest:  poor aeration, diminished breath sounds appreciated, no work of breathing noted at rest, equal rise and fall of chest  CV: Irregularly irregular with RVR  Abd: Obese soft nontender  EXT: Chronic venous stasis, erythema in right lower extremity, bilateral dressings in place          Result Review     Result Review:  I have personally reviewed the results from the time of this admission to 4/27/2025 07:26 EDT and agree with these findings:  [x]  Laboratory  [x]  Microbiology  [x]  Radiology  [x]  EKG/Telemetry   [x]  Cardiology/Vascular   []  Pathology  []  Old records  []  Other:  Most notable findings include:         Lab 04/27/25  0253 04/26/25  1441 04/26/25  1333   WBC 23.33*  --  22.84*   HEMOGLOBIN 12.9  --  13.8   HEMATOCRIT 42.8  --  46.1   PLATELETS 115*  --  114*   SODIUM 141  --  135*   POTASSIUM 4.6  --  5.4*   CHLORIDE 105  --  97*   CO2 21.6*  --  25.6   BUN 26*  --  36*   CREATININE 0.79  --  1.08*   GLUCOSE 121*  --  292*   CALCIUM 8.8  --  9.6   PHOSPHORUS 3.5 3.6  --    TOTAL PROTEIN 7.2  --  8.0   ALBUMIN 3.2*  --  4.1   GLOBULIN 4.0  --  3.9       XR Chest 1 View  Result Date: 4/26/2025  XR CHEST 1 VW Date of Exam: 4/26/2025 1:50 PM EDT Indication: Weak/Dizzy/AMS triage protocol Comparison: AP chest x-ray 4/4/2025, CT chest 2/11/2025 Findings: Cardiac silhouette remains enlarged. Findings of pulmonary edema appear decreased, but not resolved compared to previous chest x-ray. No pneumothorax or large pleural effusion is seen.     Impression: Impression: Enlarged cardiac silhouette with findings suggestive of improving pulmonary edema compared to 4/4/2025 chest x-ray. Electronically Signed: Michelle Reeyz MD  4/26/2025 2:06 PM EDT  Workstation ID: [id number]    Blood cultures are NGTD  Respiratory panel negative  Urine culture in process  MRSA PCR positive    Assessment & Plan   Assessment / Plan     Brief Patient Summary:  Mary Albarran is a 74 y.o. female   Septic shock likely secondary to UTI  A-fib with RVR on anticoagulation with Xarelto  Hyperglycemia with known diabetes mellitus  Hyperkalemia on admission at 5.4  KATHRIN on CKD stage III with creatinine of 1.08 on admission.  Most recent baseline 0.7-0.9  Lactic acidosis  Obesity with BMI of 45  Congestive heart failure with reduced ejection  fraction without decompensation  COPD with chronic hypoxemia on 2L NC nightly  Right ear pain    Active Hospital Problems:  Active Hospital Problems    Diagnosis     Sepsis      Plan:   Obtain CT of head with encephalopathy and right ear pain  Continue supplemental O2.  Titrate to maintain SpO2 90% or greater  Transition to BiPAP given lethargy.  Continue current BiPAP settings 12/6.  Titrate FiO2 to maintain SpO2 90% or greater  CT of chest personally reviewed  Levophed ordered if needed.  Currently not requiring pressor therapy  On D5 LR at 50.  Will continue for now  Insulin drip this a.m. currently on hold.  If FSBS greater than 200 will resume  Given encephalopathy, will hold p.o. medications and diet until mentation improves  Continue broad-spectrum antibiotic coverage with cefepime and vancomycin  Received sepsis fluid bundle on admission  MRSA PCR positive.  Blood cultures NGTD and urine culture in process  Obtain sputum culture if possible  Continues with A-fib RVR.  Currently on amiodarone drip.  Received 1 dose of digoxin on admission  Patient takes diltiazem CD and metoprolol tartrate as outpatient.  Currently unable to take p.o. currently given mental status  On Xarelto as outpatient.  Currently unable to take given altered mental status.  On therapeutic Lovenox  Trend renal function and electrolytes.  Replace as needed  Currently with indwelling Maldonado catheter  Lactic cleared    GI prophylaxis with Protonix  DVT prophylax on therapeutic Lovenox    VTE Prophylaxis:  Pharmacologic & mechanical VTE prophylaxis orders are present.        CODE STATUS:   Code Status (Patient has no pulse and is not breathing): CPR (Attempt to Resuscitate)  Medical Interventions (Patient has pulse or is breathing): Full Support  Level Of Support Discussed With: Patient    I, MARIANA Sun spent 15 minutes in accordance with split shared billing.  Electronically signed by MARIANA Maravilla, 04/27/25, 11:43 AM EDT.           Patient is critically ill in ICU with septic shock, A-fib with RVR, acute congestive heart failure, possible cellulitis.  We spent 34 mins of critical care time excluding any procedures. I, Dr Goodman, spent 21 mins of critical care time according to split shared critical care billing guidelines.      Electronically signed by Isauro Goodman MD, 4/27/2025, 07:26 EDT.

## 2025-04-27 NOTE — PLAN OF CARE
Goal Outcome Evaluation:  Plan of Care Reviewed With: patient        Progress: improving  Outcome Evaluation: Amiodarone drip still infusing. Transitioned to subq insulin. Pt has been A&OX4. Prn norco and tylenol given for pain. CT of head completed today, see result. Continuing POC.

## 2025-04-27 NOTE — PROGRESS NOTES
[facility]   Hospitalist Progress Note  Date: 2025  Patient Name: Mary Albarran  : 1950  MRN: [id number]  Date of admission: 2025  Room[place]Bed: [facility] [place]      Subjective   Subjective     Chief Complaint: Lethargic    Summary:Mary Albarran is a 74 y.o. female with a host of medical problems presented to the ED with lethargy, she had recently been admitted to the hospital removal for a CHF exacerbation.  Upon presentation in the ED she was too somnolent to give much of a history.  She was found to have a fever, tachypnea, significant leukocytosis with lactic acidosis.  She was admitted to the medicine service for sepsis.    Interval Followup: Complains of some ear pain.  She has been weaned off of Levophed.  Reports not feeling well in general.          Objective   Objective     Vitals:   Temp:  [99.6 °F (37.6 °C)-102.7 °F (39.3 °C)] 100.1 °F (37.8 °C)  Heart Rate:  [100-173] 153  Resp:  [19-28] 22  BP: ()/() 137/89  Flow (L/min) (Oxygen Therapy):  [2-6] 3    Physical Exam   General: Awake, alert, NAD  HENT: NCAT, MMM; TM without erythema, no obvious abnormality that I can see  Eyes: pupils equal, no scleral icterus  Cardiovascular: RRR, no murmurs   Pulmonary: CTA bilaterally; no wheezes; no conversational dyspnea  Gastrointestinal: S/ND/NT, +BS  Musculoskeletal: No gross deformities  Skin: No jaundice, no rash on exposed skin appreciated      Result Review    Result Review:  I have personally reviewed these results:  [x]  Laboratory      Lab 25  0253 25  2336 25  1949 25  1637 25  1441 25  1334 25  1333   WBC 23.33*  --   --   --   --   --  22.84*   HEMOGLOBIN 12.9  --   --   --   --   --  13.8   HEMATOCRIT 42.8  --   --   --   --   --  46.1   PLATELETS 115*  --   --   --   --   --  114*   NEUTROS ABS 21.55*  --   --   --   --   --  21.34*   IMMATURE GRANS (ABS) 0.12*  --   --   --   --   --  0.13*   LYMPHS ABS 1.00  --   --   --   --   --  0.66*    MONOS ABS 0.61  --   --   --   --   --  0.58   EOS ABS 0.00  --   --   --   --   --  0.04   MCV 94.7  --   --   --   --   --  94.3   PROCALCITONIN  --   --   --   --  0.96*  --   --    LACTATE 3.8* 1.5 3.0*   < >  --    < > 3.3*    < > = values in this interval not displayed.         Lab 04/27/25  0253 04/26/25  1441 04/26/25  1333   SODIUM 141  --  135*   POTASSIUM 4.6  --  5.4*   CHLORIDE 105  --  97*   CO2 21.6*  --  25.6   ANION GAP 14.4  --  12.4   BUN 26*  --  36*   CREATININE 0.79  --  1.08*   EGFR 79.6  --  54.7*   GLUCOSE 121*  --  292*   CALCIUM 8.8  --  9.6   MAGNESIUM 2.4  --  1.5*   PHOSPHORUS 3.5 3.6  --          Lab 04/27/25  0253 04/26/25  1333   TOTAL PROTEIN 7.2 8.0   ALBUMIN 3.2* 4.1   GLOBULIN 4.0 3.9   ALT (SGPT) 16 20   AST (SGOT) 20 25   BILIRUBIN 0.6 0.7   ALK PHOS 73 115         Lab 04/26/25  1441 04/26/25  1333   PROBNP  --  619.7   HSTROP T 15* 13                 Lab 04/26/25  1334   PH, ARTERIAL 7.391   PCO2, ARTERIAL 42.6   PO2 ART 58.0*   O2 SATURATION ART 89.3*   FIO2 40   HCO3 ART 25.9   BASE EXCESS ART 0.6     Brief Urine Lab Results  (Last result in the past 365 days)        Color   Clarity   Blood   Leuk Est   Nitrite   Protein   CREAT   Urine HCG        04/26/25 1325 Yellow   Clear   Small (1+)   Small (1+)   Positive   Negative                 [x]  Microbiology   Microbiology Results (last 10 days)       Procedure Component Value - Date/Time    MRSA Screen, PCR (Inpatient) - Swab, Nares [[id number]]  (Abnormal) Collected: 04/26/25 1813    Lab Status: Final result Specimen: Swab from Nares Updated: 04/26/25 2001     MRSA PCR MRSA Detected    Narrative:      The negative predictive value of this diagnostic test is high and should only be used to consider de-escalating anti-MRSA therapy. A positive result may indicate colonization with MRSA and must be correlated clinically.    Respiratory Panel PCR w/COVID-19(SARS-CoV-2) BRYAN/LENY/NINFA/PAD/COR/HUANG In-House, NP Swab in UTM/VTM, 2 HR  TAT - Swab, Nasopharynx [[id number]]  (Normal) Collected: 04/26/25 1813    Lab Status: Final result Specimen: Swab from Nasopharynx Updated: 04/26/25 1916     ADENOVIRUS, PCR Not Detected     Coronavirus 229E Not Detected     Coronavirus HKU1 Not Detected     Coronavirus NL63 Not Detected     Coronavirus OC43 Not Detected     COVID19 Not Detected     Human Metapneumovirus Not Detected     Human Rhinovirus/Enterovirus Not Detected     Influenza A PCR Not Detected     Influenza B PCR Not Detected     Parainfluenza Virus 1 Not Detected     Parainfluenza Virus 2 Not Detected     Parainfluenza Virus 3 Not Detected     Parainfluenza Virus 4 Not Detected     RSV, PCR Not Detected     Bordetella pertussis pcr Not Detected     Bordetella parapertussis PCR Not Detected     Chlamydophila pneumoniae PCR Not Detected     Mycoplasma pneumo by PCR Not Detected    Narrative:      In the setting of a positive respiratory panel with a viral infection PLUS a negative procalcitonin without other underlying concern for bacterial infection, consider observing off antibiotics or discontinuation of antibiotics and continue supportive care. If the respiratory panel is positive for atypical bacterial infection (Bordetella pertussis, Chlamydophila pneumoniae, or Mycoplasma pneumoniae), consider antibiotic de-escalation to target atypical bacterial infection.    Legionella Antigen, Urine - Urine, Urine, Clean Catch [[id number]]  (Normal) Collected: 04/26/25 1639    Lab Status: Final result Specimen: Urine, Clean Catch Updated: 04/26/25 1709     LEGIONELLA ANTIGEN, URINE Negative    S. Pneumo Ag Urine or CSF - Urine, Urine, Clean Catch [[id number]]  (Normal) Collected: 04/26/25 1639    Lab Status: Final result Specimen: Urine, Clean Catch Updated: 04/26/25 1709     Strep Pneumo Ag Negative    COVID-19, FLU A/B, RSV PCR 1 HR TAT - Swab, Nasopharynx [[id number]]  (Normal) Collected: 04/26/25 1334    Lab Status: Final result Specimen: Swab from  Nasopharynx Updated: 04/26/25 1425     COVID19 Not Detected     Influenza A PCR Not Detected     Influenza B PCR Not Detected     RSV, PCR Not Detected    Narrative:      Fact sheet for providers: https://www.fda.gov/media/491729/download    Fact sheet for patients: https://www.fda.gov/media/882263/download    Test performed by PCR.          [x]  Radiology  CT Chest Without Contrast Diagnostic  Result Date: 4/27/2025  1.Cardiomegaly with mild pulmonary edema. No pleural effusion. 2.Scattered areas of atelectasis in both lungs. No definite consolidating pneumonia. 3.Mild mediastinal adenopathy, worsened from prior study. This may be reactive. Follow-up chest CT in 3 months recommended. 4.Cholelithiasis. 5.Probable hepatosplenomegaly. 6.Atherosclerotic disease and coronary artery disease. Aortic valvular disease. Electronically Signed: Jean Tamez MD  4/27/2025 4:34 AM EDT  Workstation ID: [id number]    XR Chest 1 View  Result Date: 4/26/2025  Impression: Enlarged cardiac silhouette with findings suggestive of improving pulmonary edema compared to 4/4/2025 chest x-ray. Electronically Signed: Michelle Reyez MD  4/26/2025 2:06 PM EDT  Workstation ID: [id number]    []  EKG/Telemetry   []  Cardiology/Vascular   []  Pathology  []  Old records  []  Other:    Assessment & Plan   Assessment / Plan     Assessment:  Sepsis  Possible UTI  Possible pneumonia  A-fib with RVR  COPD on 2 L of oxygen at at night  Pulmonary hypertension  HFrEF  KATHRIN on CKD 3  History of bacteremia  Morbid obesity with BMI 48    Plan:  Admitted to the ICU  Pressors as needed to maintain adequate blood pressure, has been off since early morning  Continue broad-spectrum antibiotics, patient has been in the hospital recently so we will need to cover for nosocomial infections including MRSA and gram-negative infections  Await urine culture and blood culture  Sputum culture, Legionella, strep  PCT  Spot dose Bumex if needed and if blood pressure  allows  Monitor renal function  Critical care consulted       Discussed with RN.    VTE Prophylaxis:  Pharmacologic & mechanical VTE prophylaxis orders are present.        CODE STATUS:   Code Status (Patient has no pulse and is not breathing): CPR (Attempt to Resuscitate)  Medical Interventions (Patient has pulse or is breathing): Full Support  Level Of Support Discussed With: Patient      Electronically signed by Joqauin Rutledge MD, 4/27/2025, 09:31 EDT.

## 2025-04-27 NOTE — PROGRESS NOTES
Respiratory Therapist Broncho-Pulmonary Hygiene Progress Note      Patient Name:  Mary Albarran  YOB: 1950    Mary Albarran meets the qualification for Level 2 of the Bronco-Pulmonary Hygiene Protocol. This was based on my daily patient assessment and includes review of chest x-ray results, cough ability and quality, oxygenation, secretions or risk for secretion development and patient mobility.     Broncho-Pulmonary Hygiene Assessment:    Level of Movement: Low level of mobility  Lethargic uncoorperative    Breath Sounds: Clear to slightly diminished    Cough: Strong, effective and/or frequent    Chest X-Ray: Possible signs of consolidation and/or atelectasis or clear.     Sputum Productions: None or small amount of thin or watery secretions with effective cough    History and Physical: Home use of oxygen     SpO2 to Oxygen Need: greater than 92% on room air or  less than 3L nasal canula    Current SpO2 is: 96% on 3L nasal cannula    Based on this information, I have completed the following interventions: Aerobika with bronchodialtor medication or TID      Electronically signed by Shelia Colon RRT, 04/27/25, 4:33 AM EDT.

## 2025-04-28 LAB
ALBUMIN SERPL-MCNC: 3.2 G/DL (ref 3.5–5.2)
ALBUMIN/GLOB SERPL: 0.9 G/DL
ALP SERPL-CCNC: 63 U/L (ref 39–117)
ALT SERPL W P-5'-P-CCNC: 15 U/L (ref 1–33)
ANION GAP SERPL CALCULATED.3IONS-SCNC: 14.3 MMOL/L (ref 5–15)
ANISOCYTOSIS BLD QL: NORMAL
AST SERPL-CCNC: 21 U/L (ref 1–32)
BACTERIA SPEC AEROBE CULT: ABNORMAL
BASOPHILS # BLD AUTO: 0.02 10*3/MM3 (ref 0–0.2)
BASOPHILS NFR BLD AUTO: 0.2 % (ref 0–1.5)
BILIRUB SERPL-MCNC: 0.6 MG/DL (ref 0–1.2)
BUN SERPL-MCNC: 25 MG/DL (ref 8–23)
BUN/CREAT SERPL: 32.5 (ref 7–25)
BURR CELLS BLD QL SMEAR: NORMAL
CALCIUM SPEC-SCNC: 8.9 MG/DL (ref 8.6–10.5)
CHLORIDE SERPL-SCNC: 102 MMOL/L (ref 98–107)
CO2 SERPL-SCNC: 20.7 MMOL/L (ref 22–29)
CREAT SERPL-MCNC: 0.77 MG/DL (ref 0.57–1)
DEPRECATED RDW RBC AUTO: 49.6 FL (ref 37–54)
EGFRCR SERPLBLD CKD-EPI 2021: 82.1 ML/MIN/1.73
EOSINOPHIL # BLD AUTO: 0.03 10*3/MM3 (ref 0–0.4)
EOSINOPHIL NFR BLD AUTO: 0.4 % (ref 0.3–6.2)
ERYTHROCYTE [DISTWIDTH] IN BLOOD BY AUTOMATED COUNT: 14.6 % (ref 12.3–15.4)
GLOBULIN UR ELPH-MCNC: 3.7 GM/DL
GLUCOSE BLDC GLUCOMTR-MCNC: 123 MG/DL (ref 70–99)
GLUCOSE BLDC GLUCOMTR-MCNC: 151 MG/DL (ref 70–99)
GLUCOSE BLDC GLUCOMTR-MCNC: 153 MG/DL (ref 70–99)
GLUCOSE BLDC GLUCOMTR-MCNC: 165 MG/DL (ref 70–99)
GLUCOSE SERPL-MCNC: 173 MG/DL (ref 65–99)
HCT VFR BLD AUTO: 36 % (ref 34–46.6)
HGB BLD-MCNC: 11 G/DL (ref 12–15.9)
IMM GRANULOCYTES # BLD AUTO: 0.02 10*3/MM3 (ref 0–0.05)
IMM GRANULOCYTES NFR BLD AUTO: 0.2 % (ref 0–0.5)
LARGE PLATELETS: NORMAL
LYMPHOCYTES # BLD AUTO: 1.09 10*3/MM3 (ref 0.7–3.1)
LYMPHOCYTES NFR BLD AUTO: 12.9 % (ref 19.6–45.3)
MAGNESIUM SERPL-MCNC: 2 MG/DL (ref 1.6–2.4)
MCH RBC QN AUTO: 28.4 PG (ref 26.6–33)
MCHC RBC AUTO-ENTMCNC: 30.6 G/DL (ref 31.5–35.7)
MCV RBC AUTO: 92.8 FL (ref 79–97)
MONOCYTES # BLD AUTO: 0.36 10*3/MM3 (ref 0.1–0.9)
MONOCYTES NFR BLD AUTO: 4.3 % (ref 5–12)
NEUTROPHILS NFR BLD AUTO: 6.92 10*3/MM3 (ref 1.7–7)
NEUTROPHILS NFR BLD AUTO: 82 % (ref 42.7–76)
NRBC BLD AUTO-RTO: 0 /100 WBC (ref 0–0.2)
OVALOCYTES BLD QL SMEAR: NORMAL
PHOSPHATE SERPL-MCNC: 3.8 MG/DL (ref 2.5–4.5)
PLATELET # BLD AUTO: 93 10*3/MM3 (ref 140–450)
PMV BLD AUTO: 14.2 FL (ref 6–12)
POTASSIUM SERPL-SCNC: 4.2 MMOL/L (ref 3.5–5.2)
PROT SERPL-MCNC: 6.9 G/DL (ref 6–8.5)
RBC # BLD AUTO: 3.88 10*6/MM3 (ref 3.77–5.28)
SMALL PLATELETS BLD QL SMEAR: NORMAL
SODIUM SERPL-SCNC: 137 MMOL/L (ref 136–145)
UFH PPP CHRO-ACNC: 1.06 IU/ML
WBC MORPH BLD: NORMAL
WBC NRBC COR # BLD AUTO: 8.44 10*3/MM3 (ref 3.4–10.8)

## 2025-04-28 PROCEDURE — 25010000002 VANCOMYCIN 5 G RECONSTITUTED SOLUTION: Performed by: HOSPITALIST

## 2025-04-28 PROCEDURE — 83735 ASSAY OF MAGNESIUM: CPT | Performed by: PHYSICIAN ASSISTANT

## 2025-04-28 PROCEDURE — 25010000002 CEFEPIME PER 500 MG: Performed by: HOSPITALIST

## 2025-04-28 PROCEDURE — 25010000002 ACETAMINOPHEN 10 MG/ML SOLUTION: Performed by: STUDENT IN AN ORGANIZED HEALTH CARE EDUCATION/TRAINING PROGRAM

## 2025-04-28 PROCEDURE — 25010000002 CEFTRIAXONE PER 250 MG: Performed by: INTERNAL MEDICINE

## 2025-04-28 PROCEDURE — 94761 N-INVAS EAR/PLS OXIMETRY MLT: CPT

## 2025-04-28 PROCEDURE — 94799 UNLISTED PULMONARY SVC/PX: CPT

## 2025-04-28 PROCEDURE — 85520 HEPARIN ASSAY: CPT | Performed by: HOSPITALIST

## 2025-04-28 PROCEDURE — 99291 CRITICAL CARE FIRST HOUR: CPT | Performed by: INTERNAL MEDICINE

## 2025-04-28 PROCEDURE — 25810000003 SODIUM CHLORIDE 0.9 % SOLUTION: Performed by: HOSPITALIST

## 2025-04-28 PROCEDURE — 25010000002 ENOXAPARIN PER 10 MG: Performed by: HOSPITALIST

## 2025-04-28 PROCEDURE — 85025 COMPLETE CBC W/AUTO DIFF WBC: CPT | Performed by: HOSPITALIST

## 2025-04-28 PROCEDURE — 63710000001 ONDANSETRON ODT 4 MG TABLET DISPERSIBLE: Performed by: HOSPITALIST

## 2025-04-28 PROCEDURE — 97161 PT EVAL LOW COMPLEX 20 MIN: CPT

## 2025-04-28 PROCEDURE — 82948 REAGENT STRIP/BLOOD GLUCOSE: CPT | Performed by: PHYSICIAN ASSISTANT

## 2025-04-28 PROCEDURE — 25010000002 ONDANSETRON PER 1 MG: Performed by: HOSPITALIST

## 2025-04-28 PROCEDURE — 63710000001 INSULIN LISPRO (HUMAN) PER 5 UNITS: Performed by: PHYSICIAN ASSISTANT

## 2025-04-28 PROCEDURE — 25010000002 AMIODARONE IN DEXTROSE 5% 360-4.14 MG/200ML-% SOLUTION: Performed by: STUDENT IN AN ORGANIZED HEALTH CARE EDUCATION/TRAINING PROGRAM

## 2025-04-28 PROCEDURE — 94664 DEMO&/EVAL PT USE INHALER: CPT

## 2025-04-28 PROCEDURE — 80053 COMPREHEN METABOLIC PANEL: CPT | Performed by: PHYSICIAN ASSISTANT

## 2025-04-28 PROCEDURE — 99233 SBSQ HOSP IP/OBS HIGH 50: CPT | Performed by: INTERNAL MEDICINE

## 2025-04-28 PROCEDURE — 82948 REAGENT STRIP/BLOOD GLUCOSE: CPT

## 2025-04-28 PROCEDURE — 97165 OT EVAL LOW COMPLEX 30 MIN: CPT

## 2025-04-28 PROCEDURE — 85007 BL SMEAR W/DIFF WBC COUNT: CPT | Performed by: HOSPITALIST

## 2025-04-28 PROCEDURE — 84100 ASSAY OF PHOSPHORUS: CPT | Performed by: PHYSICIAN ASSISTANT

## 2025-04-28 RX ORDER — DILTIAZEM HYDROCHLORIDE 180 MG/1
180 CAPSULE, COATED, EXTENDED RELEASE ORAL DAILY
Status: DISCONTINUED | OUTPATIENT
Start: 2025-04-28 | End: 2025-05-02 | Stop reason: HOSPADM

## 2025-04-28 RX ORDER — METOPROLOL TARTRATE 50 MG
50 TABLET ORAL 3 TIMES DAILY
Status: DISPENSED | OUTPATIENT
Start: 2025-04-28 | End: 2025-05-01

## 2025-04-28 RX ORDER — ACETAMINOPHEN 10 MG/ML
1000 INJECTION, SOLUTION INTRAVENOUS ONCE
Status: COMPLETED | OUTPATIENT
Start: 2025-04-28 | End: 2025-04-28

## 2025-04-28 RX ORDER — IPRATROPIUM BROMIDE AND ALBUTEROL SULFATE 2.5; .5 MG/3ML; MG/3ML
3 SOLUTION RESPIRATORY (INHALATION) ONCE
Status: DISCONTINUED | OUTPATIENT
Start: 2025-04-29 | End: 2025-05-02 | Stop reason: HOSPADM

## 2025-04-28 RX ORDER — AMMONIUM LACTATE 12 G/100G
LOTION TOPICAL
Status: DISCONTINUED | OUTPATIENT
Start: 2025-04-29 | End: 2025-05-02 | Stop reason: HOSPADM

## 2025-04-28 RX ORDER — MORPHINE SULFATE 2 MG/ML
1 INJECTION, SOLUTION INTRAMUSCULAR; INTRAVENOUS ONCE
Status: DISCONTINUED | OUTPATIENT
Start: 2025-04-28 | End: 2025-04-28

## 2025-04-28 RX ORDER — ACETAMINOPHEN 10 MG/ML
1000 INJECTION, SOLUTION INTRAVENOUS ONCE
Status: DISCONTINUED | OUTPATIENT
Start: 2025-04-28 | End: 2025-04-28

## 2025-04-28 RX ADMIN — MUPIROCIN 1 APPLICATION: 20 OINTMENT TOPICAL at 08:17

## 2025-04-28 RX ADMIN — METOPROLOL TARTRATE 50 MG: 50 TABLET, FILM COATED ORAL at 22:18

## 2025-04-28 RX ADMIN — INSULIN LISPRO 2 UNITS: 100 INJECTION, SOLUTION INTRAVENOUS; SUBCUTANEOUS at 08:16

## 2025-04-28 RX ADMIN — ENOXAPARIN SODIUM 120 MG: 100 INJECTION SUBCUTANEOUS at 08:16

## 2025-04-28 RX ADMIN — IPRATROPIUM BROMIDE AND ALBUTEROL SULFATE 3 ML: .5; 3 SOLUTION RESPIRATORY (INHALATION) at 02:01

## 2025-04-28 RX ADMIN — ONDANSETRON 4 MG: 4 TABLET, ORALLY DISINTEGRATING ORAL at 18:21

## 2025-04-28 RX ADMIN — ONDANSETRON 4 MG: 2 INJECTION INTRAMUSCULAR; INTRAVENOUS at 23:46

## 2025-04-28 RX ADMIN — METOPROLOL TARTRATE 50 MG: 50 TABLET, FILM COATED ORAL at 10:35

## 2025-04-28 RX ADMIN — CEFTRIAXONE SODIUM 1000 MG: 1 INJECTION, POWDER, FOR SOLUTION INTRAMUSCULAR; INTRAVENOUS at 22:18

## 2025-04-28 RX ADMIN — INSULIN LISPRO 2 UNITS: 100 INJECTION, SOLUTION INTRAVENOUS; SUBCUTANEOUS at 11:32

## 2025-04-28 RX ADMIN — IPRATROPIUM BROMIDE AND ALBUTEROL SULFATE 3 ML: .5; 3 SOLUTION RESPIRATORY (INHALATION) at 13:50

## 2025-04-28 RX ADMIN — CEFEPIME 2000 MG: 2 INJECTION, POWDER, FOR SOLUTION INTRAVENOUS at 06:05

## 2025-04-28 RX ADMIN — DILTIAZEM HYDROCHLORIDE 180 MG: 180 CAPSULE, COATED, EXTENDED RELEASE ORAL at 10:35

## 2025-04-28 RX ADMIN — ENOXAPARIN SODIUM 120 MG: 100 INJECTION SUBCUTANEOUS at 19:37

## 2025-04-28 RX ADMIN — SODIUM CHLORIDE 1500 MG: 9 INJECTION, SOLUTION INTRAVENOUS at 18:12

## 2025-04-28 RX ADMIN — PANTOPRAZOLE SODIUM 40 MG: 40 INJECTION, POWDER, FOR SOLUTION INTRAVENOUS at 08:16

## 2025-04-28 RX ADMIN — METOPROLOL TARTRATE 50 MG: 50 TABLET, FILM COATED ORAL at 16:28

## 2025-04-28 RX ADMIN — ACETAMINOPHEN 650 MG: 325 TABLET ORAL at 13:53

## 2025-04-28 RX ADMIN — INSULIN LISPRO 2 UNITS: 100 INJECTION, SOLUTION INTRAVENOUS; SUBCUTANEOUS at 18:12

## 2025-04-28 RX ADMIN — IPRATROPIUM BROMIDE AND ALBUTEROL SULFATE 3 ML: .5; 3 SOLUTION RESPIRATORY (INHALATION) at 06:41

## 2025-04-28 RX ADMIN — ONDANSETRON 4 MG: 4 TABLET, ORALLY DISINTEGRATING ORAL at 02:33

## 2025-04-28 RX ADMIN — ACETAMINOPHEN 1000 MG: 10 INJECTION, SOLUTION INTRAVENOUS at 03:37

## 2025-04-28 RX ADMIN — HYDROCODONE BITARTRATE AND ACETAMINOPHEN 1 TABLET: 5; 325 TABLET ORAL at 11:32

## 2025-04-28 RX ADMIN — Medication 10 ML: at 08:17

## 2025-04-28 RX ADMIN — Medication 10 ML: at 22:18

## 2025-04-28 RX ADMIN — BUDESONIDE 0.5 MG: 0.5 SUSPENSION RESPIRATORY (INHALATION) at 06:41

## 2025-04-28 RX ADMIN — AMIODARONE HYDROCHLORIDE 0.5 MG/MIN: 1.8 INJECTION, SOLUTION INTRAVENOUS at 04:17

## 2025-04-28 RX ADMIN — ONDANSETRON 4 MG: 4 TABLET, ORALLY DISINTEGRATING ORAL at 11:32

## 2025-04-28 RX ADMIN — HYDROCODONE BITARTRATE AND ACETAMINOPHEN 1 TABLET: 5; 325 TABLET ORAL at 06:05

## 2025-04-28 RX ADMIN — AMIODARONE HYDROCHLORIDE 0.5 MG/MIN: 1.8 INJECTION, SOLUTION INTRAVENOUS at 16:28

## 2025-04-28 RX ADMIN — HYDROCODONE BITARTRATE AND ACETAMINOPHEN 1 TABLET: 5; 325 TABLET ORAL at 19:37

## 2025-04-28 NOTE — PROGRESS NOTES
[facility]   Hospitalist Progress Note  Date: 2025  Patient Name: Mary Albarran  : 1950  MRN: [id number]  Date of admission: 2025  [place] [facility] [place]      Subjective   Subjective     Chief Complaint: Lethargic    Summary:Mary Albarran is a 74 y.o. female with a host of medical problems presented to the ED with lethargy, she had recently been admitted to the hospital removal for a CHF exacerbation.  Upon presentation in the ED she was too somnolent to give much of a history.  She was found to have a fever, tachypnea, significant leukocytosis with lactic acidosis.  She was admitted to the medicine service for sepsis.    Interval Followup:  Patient remains in atrial fibrillation with rapid rate  Patient states she is feeling somewhat better today  Patient is off Levophed  Vital signs remain stable  Continues to have fever to 100.9  No nausea or vomiting       Objective   Objective     Vitals:   Temp:  [99.4 °F (37.4 °C)-100.3 °F (37.9 °C)] 99.6 °F (37.6 °C)  Heart Rate:  [113-170] 162  Resp:  [14-25] 14  BP: ()/() 136/120  Flow (L/min) (Oxygen Therapy):  [3] 3    Physical Exam   General: Awake, alert, NAD resting on side of bed  HENT: NCAT, MMM;   Eyes: pupils equal, no scleral icterus  Cardiovascular: IR/IR with rapid rate   Pulmonary: decreased. No wheezing noted   Gastrointestinal: S/ND/NT, +BS  Musculoskeletal: No gross deformities  Skin: No jaundice, no rash on exposed skin appreciated      Result Review    Result Review:  I have personally reviewed these results:  [x]  Laboratory      Lab 25  0245 25  0253 25  2336 25  1949 25  1637 25  1441 25  1334 25  1333   WBC 8.44 23.33*  --   --   --   --   --  22.84*   HEMOGLOBIN 11.0* 12.9  --   --   --   --   --  13.8   HEMATOCRIT 36.0 42.8  --   --   --   --   --  46.1   PLATELETS 93* 115*  --   --   --   --   --  114*   NEUTROS ABS 6.92 21.55*  --   --   --   --   --  21.34*   IMMATURE GRANS  (ABS) 0.02 0.12*  --   --   --   --   --  0.13*   LYMPHS ABS 1.09 1.00  --   --   --   --   --  0.66*   MONOS ABS 0.36 0.61  --   --   --   --   --  0.58   EOS ABS 0.03 0.00  --   --   --   --   --  0.04   MCV 92.8 94.7  --   --   --   --   --  94.3   PROCALCITONIN  --   --   --   --   --  0.96*  --   --    LACTATE  --  3.8* 1.5 3.0*   < >  --    < > 3.3*    < > = values in this interval not displayed.         Lab 04/28/25 0245 04/27/25 0253 04/26/25  1441 04/26/25  1333   SODIUM 137 141  --  135*   POTASSIUM 4.2 4.6  --  5.4*   CHLORIDE 102 105  --  97*   CO2 20.7* 21.6*  --  25.6   ANION GAP 14.3 14.4  --  12.4   BUN 25* 26*  --  36*   CREATININE 0.77 0.79  --  1.08*   EGFR 82.1 79.6  --  54.7*   GLUCOSE 173* 121*  --  292*   CALCIUM 8.9 8.8  --  9.6   MAGNESIUM 2.0 2.4  --  1.5*   PHOSPHORUS 3.8 3.5 3.6  --          Lab 04/28/25 0245 04/27/25 0253 04/26/25  1333   TOTAL PROTEIN 6.9 7.2 8.0   ALBUMIN 3.2* 3.2* 4.1   GLOBULIN 3.7 4.0 3.9   ALT (SGPT) 15 16 20   AST (SGOT) 21 20 25   BILIRUBIN 0.6 0.6 0.7   ALK PHOS 63 73 115         Lab 04/26/25  1441 04/26/25  1333   PROBNP  --  619.7   HSTROP T 15* 13                 Lab 04/26/25  1334   PH, ARTERIAL 7.391   PCO2, ARTERIAL 42.6   PO2 ART 58.0*   O2 SATURATION ART 89.3*   FIO2 40   HCO3 ART 25.9   BASE EXCESS ART 0.6     Brief Urine Lab Results  (Last result in the past 365 days)        Color   Clarity   Blood   Leuk Est   Nitrite   Protein   CREAT   Urine HCG        04/26/25 1325 Yellow   Clear   Small (1+)   Small (1+)   Positive   Negative                 [x]  Microbiology   Microbiology Results (last 10 days)       Procedure Component Value - Date/Time    MRSA Screen, PCR (Inpatient) - Swab, Nares [[id number]]  (Abnormal) Collected: 04/26/25 1813    Lab Status: Final result Specimen: Swab from Nares Updated: 04/26/25 2001     MRSA PCR MRSA Detected    Narrative:      The negative predictive value of this diagnostic test is high and should only be used to  consider de-escalating anti-MRSA therapy. A positive result may indicate colonization with MRSA and must be correlated clinically.    Respiratory Panel PCR w/COVID-19(SARS-CoV-2) BRYAN/LENY/NINFA/PAD/COR/HUANG In-House, NP Swab in UTM/VTM, 2 HR TAT - Swab, Nasopharynx [[id number]]  (Normal) Collected: 04/26/25 1813    Lab Status: Final result Specimen: Swab from Nasopharynx Updated: 04/26/25 1916     ADENOVIRUS, PCR Not Detected     Coronavirus 229E Not Detected     Coronavirus HKU1 Not Detected     Coronavirus NL63 Not Detected     Coronavirus OC43 Not Detected     COVID19 Not Detected     Human Metapneumovirus Not Detected     Human Rhinovirus/Enterovirus Not Detected     Influenza A PCR Not Detected     Influenza B PCR Not Detected     Parainfluenza Virus 1 Not Detected     Parainfluenza Virus 2 Not Detected     Parainfluenza Virus 3 Not Detected     Parainfluenza Virus 4 Not Detected     RSV, PCR Not Detected     Bordetella pertussis pcr Not Detected     Bordetella parapertussis PCR Not Detected     Chlamydophila pneumoniae PCR Not Detected     Mycoplasma pneumo by PCR Not Detected    Narrative:      In the setting of a positive respiratory panel with a viral infection PLUS a negative procalcitonin without other underlying concern for bacterial infection, consider observing off antibiotics or discontinuation of antibiotics and continue supportive care. If the respiratory panel is positive for atypical bacterial infection (Bordetella pertussis, Chlamydophila pneumoniae, or Mycoplasma pneumoniae), consider antibiotic de-escalation to target atypical bacterial infection.    Legionella Antigen, Urine - Urine, Urine, Clean Catch [[id number]]  (Normal) Collected: 04/26/25 1639    Lab Status: Final result Specimen: Urine, Clean Catch Updated: 04/26/25 1709     LEGIONELLA ANTIGEN, URINE Negative    S. Pneumo Ag Urine or CSF - Urine, Urine, Clean Catch [[id number]]  (Normal) Collected: 04/26/25 1639    Lab Status: Final result  Specimen: Urine, Clean Catch Updated: 04/26/25 1709     Strep Pneumo Ag Negative    Blood Culture - Blood, Arm, Left [[id number]]  (Normal) Collected: 04/26/25 1441    Lab Status: Preliminary result Specimen: Blood from Arm, Left Updated: 04/27/25 1500     Blood Culture No growth at 24 hours    Narrative:      Less than seven (7) mL's of blood was collected.  Insufficient quantity may yield false negative results.    Blood Culture - Blood, Hand, Right [[id number]]  (Normal) Collected: 04/26/25 1441    Lab Status: Preliminary result Specimen: Blood from Hand, Right Updated: 04/27/25 1500     Blood Culture No growth at 24 hours    Narrative:      Less than seven (7) mL's of blood was collected.  Insufficient quantity may yield false negative results.    COVID-19, FLU A/B, RSV PCR 1 HR TAT - Swab, Nasopharynx [[id number]]  (Normal) Collected: 04/26/25 1334    Lab Status: Final result Specimen: Swab from Nasopharynx Updated: 04/26/25 1425     COVID19 Not Detected     Influenza A PCR Not Detected     Influenza B PCR Not Detected     RSV, PCR Not Detected    Narrative:      Fact sheet for providers: https://www.fda.gov/media/596217/download    Fact sheet for patients: https://www.fda.gov/media/947810/download    Test performed by PCR.    Urine Culture - Urine, Indwelling Urethral Catheter [[id number]]  (Abnormal) Collected: 04/26/25 1325    Lab Status: Preliminary result Specimen: Urine from Indwelling Urethral Catheter Updated: 04/27/25 1139     Urine Culture >100,000 CFU/mL Escherichia coli    Narrative:      Colonization of the urinary tract without infection is common. Treatment is discouraged unless the patient is symptomatic, pregnant, or undergoing an invasive urologic procedure.          [x]  Radiology  CT Head Without Contrast  Result Date: 4/27/2025  Impression: No evidence of acute intracranial disease. Electronically Signed: Kingston Serrano MD  4/27/2025 12:43 PM EDT  Workstation ID: [id number]    CT Chest Without  Contrast Diagnostic  Result Date: 4/27/2025  1.Cardiomegaly with mild pulmonary edema. No pleural effusion. 2.Scattered areas of atelectasis in both lungs. No definite consolidating pneumonia. 3.Mild mediastinal adenopathy, worsened from prior study. This may be reactive. Follow-up chest CT in 3 months recommended. 4.Cholelithiasis. 5.Probable hepatosplenomegaly. 6.Atherosclerotic disease and coronary artery disease. Aortic valvular disease. Electronically Signed: Jean Tamez MD  4/27/2025 4:34 AM EDT  Workstation ID: [id number]    XR Chest 1 View  Result Date: 4/26/2025  Impression: Enlarged cardiac silhouette with findings suggestive of improving pulmonary edema compared to 4/4/2025 chest x-ray. Electronically Signed: Michelle Reyez MD  4/26/2025 2:06 PM EDT  Workstation ID: [id number]    []  EKG/Telemetry   []  Cardiology/Vascular   []  Pathology  []  Old records  []  Other:    Assessment & Plan   Assessment / Plan     Assessment:  Septic shock  Acute UTI  Diabetes mellitus type 2, uncontrolled  Atrial fibrillation with rapid rate on Xarelto  Acute on chronic kidney disease stage III  Morbid obesity with a BMI of 45  Systolic congestive heart failure  COPD without exacerbation on 2L of oxygen     PLAN  -- Patient remains admitted to the intensive care  --Levophed has been discontinued   --remains on amiodarone for a. Fib   --Continue to follow cultures  --Continue Xarelto  --Replace electrolytes as needed  --Discussed plan with Dr. Munoz   --Continue patient on metoprolol and Cardizem  --CT of the head for ear pain remains fine       Discussed with RN.    VTE Prophylaxis:  Pharmacologic & mechanical VTE prophylaxis orders are present.        CODE STATUS:   Code Status (Patient has no pulse and is not breathing): CPR (Attempt to Resuscitate)  Medical Interventions (Patient has pulse or is breathing): Full Support  Level Of Support Discussed With: Patient      Electronically signed by Kelvin Thomson DO, 4/28/2025,  08:59 EDT.

## 2025-04-28 NOTE — THERAPY EVALUATION
Acute Care - Physical Therapy Initial Evaluation   [facility]     Patient Name: Mary Albarran  : 1950  MRN: [id number]  Today's Date: 2025      Visit Dx:     ICD-10-CM ICD-9-CM   1. Sepsis, due to unspecified organism, unspecified whether acute organ dysfunction present  A41.9 038.9     995.91   2. Acute UTI  N39.0 599.0   3. Atrial fibrillation, unspecified type  I48.91 427.31   4. Difficulty walking  R26.2 719.7     Patient Active Problem List   Diagnosis    Aortic stenosis, moderate    Atrial fibrillation    Chronic kidney disease, stage III (moderate)    Hyperlipidemia    Hypertension    Chronic diastolic congestive heart failure    Atrial fibrillation with RVR    Onychomycosis    Onychocryptosis    Foot pain, bilateral    Nephrolithiasis    Recurrent urinary tract infection    Acute on chronic HFrEF (heart failure with reduced ejection fraction)    Cellulitis of right leg without foot    Diabetes mellitus type 2 with complications    Depression    COPD (chronic obstructive pulmonary disease)    Chronic pain    Anxiety    Heart failure with reduced ejection fraction    CHF exacerbation    Sepsis     Past Medical History:   Diagnosis Date    Allergic rhinitis     Anxiety     Aortic valve disease 2021    Fibrocalcific changes of the aortic valve with mild aortic valve stenosis   on echo 3/2/2021  Moderate aortic valve regurgitation is present. Aortic valve maximum pressure gradient is 26 mmHg. Moderate aortic valve stenosis is present.  On echo 2/10/2022       Arthritis     Atrial fibrillation     CHF (congestive heart failure)     Chronic kidney disease (CKD), stage III (moderate)     Chronic pain     COPD (chronic obstructive pulmonary disease)     Coronary artery disease     Diabetes mellitus type 2 with complications     Elevated cholesterol     Ex-smoker     QUIT SMOKING     GERD (gastroesophageal reflux disease)     History of home oxygen therapy     2L/NC AAT REPORTED    History of  transfusion     Hyperlipidemia     Hypertension     Thrombocytopenia      Past Surgical History:   Procedure Laterality Date    HYSTERECTOMY      30 YEARS AGO     PT Assessment (Last 12 Hours)       PT Evaluation and Treatment       Row Name 04/28/25 1400          Physical Therapy Time and Intention    Document Type evaluation  -AV     Mode of Treatment individual therapy;physical therapy  -AV       Row Name 04/28/25 1400          General Information    Patient Profile Reviewed yes  -AV     Patient Observations alert;cooperative;agree to therapy  -AV     Prior Level of Function independent:;all household mobility;gait;transfer;ADL's  Ambulated with standard walker. 2 L continuous O2, will increase to 2.5 if needed with activity/when feeling unwell.  -AV     Equipment Currently Used at Home walker, standard;oxygen  -AV     Existing Precautions/Restrictions fall;oxygen therapy device and L/min  -AV       Row Name 04/28/25 1400          Living Environment    Current Living Arrangements home  -AV     Home Accessibility stairs to enter home  -AV     People in Home spouse  -AV       Row Name 04/28/25 1400          Home Main Entrance    Number of Stairs, Main Entrance other (see comments)  Ramp  -AV       Row Name 04/28/25 1400          Cognition    Orientation Status (Cognition) oriented to;person;place  -AV       Row Name 04/28/25 1400          Range of Motion (ROM)    Range of Motion bilateral lower extremities;ROM is WFL  -AV       Row Name 04/28/25 1400          Strength (Manual Muscle Testing)    Strength (Manual Muscle Testing) bilateral lower extremities  4-/5  -AV       Row Name 04/28/25 1400          Bed Mobility    Bed Mobility supine-sit;sit-supine  -AV     Supine-Sit Casey (Bed Mobility) contact guard  -AV     Sit-Supine Casey (Bed Mobility) contact guard  -AV     Assistive Device (Bed Mobility) head of bed elevated;bed rails  -AV     Comment, (Bed Mobility) Patient maintained sitting EOB with  SBA x10 minutes. Mild dizziness sitting EOB, nurse in room  -AV       Row Name 04/28/25 1400          Transfers    Comment, (Transfers) Declined further transfers  -AV       Row Name 04/28/25 1400          Safety Issues/Impairments Affecting Functional Mobility    Impairments Affecting Function (Mobility) balance;endurance/activity tolerance;strength  -AV       Row Name 04/28/25 1400          Balance    Balance Assessment sitting dynamic balance  -AV     Dynamic Sitting Balance standby assist  -AV     Position, Sitting Balance unsupported;sitting edge of bed  -AV       Row Name             Wound 04/26/25 1923 Right lower leg    Wound - Properties Group Placement Date: 04/26/25  -AP Placement Time: 1923  -AP Present on Original Admission: Y  -AP Side: Right  -AP Orientation: lower  -AP Location: leg  -AP    Retired Wound - Properties Group Placement Date: 04/26/25  -AP Placement Time: 1923  -AP Present on Original Admission: Y  -AP Side: Right  -AP Orientation: lower  -AP Location: leg  -AP    Retired Wound - Properties Group Placement Date: 04/26/25  -AP Placement Time: 1923  -AP Present on Original Admission: Y  -AP Side: Right  -AP Orientation: lower  -AP Location: leg  -AP    Retired Wound - Properties Group Date first assessed: 04/26/25  -AP Time first assessed: 1923  -AP Present on Original Admission: Y  -AP Side: Right  -AP Location: leg  -AP      Row Name             Wound 04/26/25 1924 Left lower leg    Wound - Properties Group Placement Date: 04/26/25  -AP Placement Time: 1924  -AP Side: Left  -AP Orientation: lower  -AP Location: leg  -AP    Retired Wound - Properties Group Placement Date: 04/26/25  -AP Placement Time: 1924  -AP Side: Left  -AP Orientation: lower  -AP Location: leg  -AP    Retired Wound - Properties Group Placement Date: 04/26/25  -AP Placement Time: 1924  -AP Side: Left  -AP Orientation: lower  -AP Location: leg  -AP    Retired Wound - Properties Group Date first assessed: 04/26/25  -AP  Time first assessed: 1924  -AP Side: Left  -AP Location: leg  -AP      Row Name 04/28/25 1400          Plan of Care Review    Plan of Care Reviewed With patient  -AV     Progress no change  -AV     Outcome Evaluation Patient presents with deficits in balance, endurance, transfers, and ambulation. Patient will benefit from skilled PT services to address these mobility deficits and decrease risk of falls.  -AV       Row Name 04/28/25 1400          Positioning and Restraints    Pre-Treatment Position in bed  -AV     Post Treatment Position bed  -AV     In Bed fowlers;call light within reach;encouraged to call for assist;with family/caregiver  -AV       Row Name 04/28/25 1400          Therapy Assessment/Plan (PT)    Rehab Potential (PT) good  -AV     Criteria for Skilled Interventions Met (PT) yes;meets criteria  -AV     Therapy Frequency (PT) daily  -AV     Predicted Duration of Therapy Intervention (PT) 10 days  -AV     Problem List (PT) problems related to;balance;mobility;strength  -AV     Activity Limitations Related to Problem List (PT) unable to transfer safely;unable to ambulate safely  -AV       Row Name 04/28/25 1400          PT Evaluation Complexity    History, PT Evaluation Complexity 1-2 personal factors and/or comorbidities  -AV     Examination of Body Systems (PT Eval Complexity) total of 4 or more elements  -AV     Clinical Presentation (PT Evaluation Complexity) stable  -AV     Clinical Decision Making (PT Evaluation Complexity) low complexity  -AV     Overall Complexity (PT Evaluation Complexity) low complexity  -AV       Row Name 04/28/25 1400          Therapy Plan Review/Discharge Plan (PT)    Therapy Plan Review (PT) evaluation/treatment results reviewed;patient  -AV       Row Name 04/28/25 1400          Physical Therapy Goals    Bed Mobility Goal Selection (PT) bed mobility, PT goal 1  -AV     Transfer Goal Selection (PT) transfer, PT goal 1  -AV     Gait Training Goal Selection (PT) gait training,  PT goal 1  -AV       Row Name 04/28/25 1400          Bed Mobility Goal 1 (PT)    Activity/Assistive Device (Bed Mobility Goal 1, PT) sit to supine/supine to sit  -AV     [place] Level/Cues Needed (Bed Mobility Goal 1, PT) modified independence  -AV     Time Frame (Bed Mobility Goal 1, PT) 10 days  -AV       Row Name 04/28/25 1400          Transfer Goal 1 (PT)    Activity/Assistive Device (Transfer Goal 1, PT) sit-to-stand/stand-to-sit;bed-to-chair/chair-to-bed;walker, rolling  -AV     [place] Level/Cues Needed (Transfer Goal 1, PT) modified independence  -AV     Time Frame (Transfer Goal 1, PT) 10 days  -AV       Row Name 04/28/25 1400          Gait Training Goal 1 (PT)    Activity/Assistive Device (Gait Training Goal 1, PT) gait (walking locomotion);assistive device use;walker, rolling  -AV     [place] Level (Gait Training Goal 1, PT) modified independence  -AV     Distance (Gait Training Goal 1, PT) 120  -AV     Time Frame (Gait Training Goal 1, PT) 10 days  -AV               User Key  (r) = Recorded By, (t) = Taken By, (c) = Cosigned By      Initials Name Provider Type    AP Natasha Rios, RN Registered Nurse    Kelvin Benavides, PT Physical Therapist                    Physical Therapy Education       Title: PT OT SLP Therapies (In Progress)       Topic: Physical Therapy (In Progress)       Point: Mobility training (Done)       Learning Progress Summary            Patient Acceptance, E,TB, VU by AV at 4/28/2025 1449                      Point: Home exercise program (Not Started)       Learner Progress:  Not documented in this visit.              Point: Body mechanics (Done)       Learning Progress Summary            Patient Acceptance, E,TB, VU by AV at 4/28/2025 1449                      Point: Precautions (Done)       Learning Progress Summary            Patient Acceptance, E,TB, VU by AV at 4/28/2025 1449                                      User Key       Initials Effective Dates Name  Provider Type Discipline    AV 06/11/21 -  Kelvin Trejo, PT Physical Therapist PT                  PT Recommendation and Plan  Anticipated Discharge Disposition (PT): sub acute care setting  Planned Therapy Interventions (PT): balance training, bed mobility training, gait training, home exercise program, neuromuscular re-education, strengthening, transfer training  Therapy Frequency (PT): daily  Plan of Care Reviewed With: patient  Progress: no change  Outcome Evaluation: Patient presents with deficits in balance, endurance, transfers, and ambulation. Patient will benefit from skilled PT services to address these mobility deficits and decrease risk of falls.   Outcome Measures       Row Name 04/28/25 1400             How much help from another person do you currently need...    Turning from your back to your side while in flat bed without using bedrails? 3  -AV      Moving from lying on back to sitting on the side of a flat bed without bedrails? 3  -AV      Moving to and from a bed to a chair (including a wheelchair)? 2  -AV      Standing up from a chair using your arms (e.g., wheelchair, bedside chair)? 2  -AV      Climbing 3-5 steps with a railing? 1  -AV      To walk in hospital room? 2  -AV      AM-PAC 6 Clicks Score (PT) 13  -AV         Functional Assessment    Outcome Measure Options AM-PAC 6 Clicks Basic Mobility (PT)  -AV                User Key  (r) = Recorded By, (t) = Taken By, (c) = Cosigned By      Initials Name Provider Type    AV Kelvin Trejo, PT Physical Therapist                     Time Calculation:    PT Charges       Row Name 04/28/25 1449             Time Calculation    PT Received On 04/28/25  -AV      PT Goal Re-Cert Due Date 05/07/25  -AV         Untimed Charges    PT Eval/Re-eval Minutes 40  -AV         Total Minutes    Untimed Charges Total Minutes 40  -AV       Total Minutes 40  -AV                User Key  (r) = Recorded By, (t) = Taken By, (c) = Cosigned By      Initials Name  Provider Type    AV Kelvin Trejo, PT Physical Therapist                  Therapy Charges for Today       Code Description Service Date Service Provider Modifiers Qty    [id number] HC PT EVAL LOW COMPLEXITY 3 4/28/2025 Kelvin Trejo, PT GP 1            PT G-Codes  Outcome Measure Options: AM-PAC 6 Clicks Basic Mobility (PT)  AM-PAC 6 Clicks Score (PT): 13    Kelvin Trejo, PT  4/28/2025

## 2025-04-28 NOTE — PLAN OF CARE
Goal Outcome Evaluation:  Plan of Care Reviewed With: patient        Progress: improving  Outcome Evaluation: Patient refused to wear bipap tonight. Patient is on 3L nasal cannula at this time. Patient states she wears 2L at home baseline.

## 2025-04-28 NOTE — PROGRESS NOTES
[facility]     Progress Note    Patient Name: Mary Albarran  : 1950  MRN: [id number]  Primary Care Physician:  Brittni Quiroz APRN  Date of admission: 2025    Subjective   Subjective       Chief Complaint:   Patient is critically ill in ICU with sepsis    Critical drips: Amiodarone drip, insulin drip, D5 LR, Levophed  Lines and tubes: Peripheral IVs, indwelling Maldonado    Over last 24 hours,   Earlier this a.m. awake and following commands, during rounds lethargic  A-fib RVR with heart rate up into the 150s  Tmax 100.9  Bipap utilized during the day  UOP 2.8 L  Family at bedside  Lower extremities are with wound care and wraps in place  Complaining of right ear pain - CT of head negative.  Otoscopic exam negative    Overnight  Received additional Amio bolus for RVR    Today  Off pressors  On 3LNC   Remains in Afib RVR  Complaints of pain over night- received Norco and tylenol  3L of UOP x 24H      Objective   Objective     Vitals:   Temp:  [99.4 °F (37.4 °C)-100.7 °F (38.2 °C)] 100.7 °F (38.2 °C)  Heart Rate:  [] 92  Resp:  [14-25] 21  BP: (100-136)/() 130/64  Flow (L/min) (Oxygen Therapy):  [3] 3  Physical Exam   Vital Signs Reviewed  Obese female with BMI of 48, NAD  HEENT: ATNC, MMM  Chest:  poor aeration, diminished breath sounds appreciated, no work of breathing noted at rest, equal rise and fall of chest  CV: Irregularly irregular with RVR  Abd: Obese soft nontender  EXT: Chronic venous stasis, erythema in right lower extremity, bilateral dressings in place      Result Review    Result Review:  I have personally reviewed the results from the time of this admission to 2025 14:15 EDT and agree with these findings:  [x]  Laboratory  [x]  Microbiology  [x]  Radiology  [x]  EKG/Telemetry   [x]  Cardiology/Vascular   []  Pathology  []  Old records  []  Other:  Most notable findings include:         Lab 25  0245 25  0253 25  1441 25  1333   WBC 8.44  23.33*  --  22.84*   HEMOGLOBIN 11.0* 12.9  --  13.8   HEMATOCRIT 36.0 42.8  --  46.1   PLATELETS 93* 115*  --  114*   SODIUM 137 141  --  135*   POTASSIUM 4.2 4.6  --  5.4*   CHLORIDE 102 105  --  97*   CO2 20.7* 21.6*  --  25.6   BUN 25* 26*  --  36*   CREATININE 0.77 0.79  --  1.08*   GLUCOSE 173* 121*  --  292*   CALCIUM 8.9 8.8  --  9.6   PHOSPHORUS 3.8 3.5 3.6  --    TOTAL PROTEIN 6.9 7.2  --  8.0   ALBUMIN 3.2* 3.2*  --  4.1   GLOBULIN 3.7 4.0  --  3.9       XR Chest 1 View  Result Date: 4/26/2025  XR CHEST 1 VW Date of Exam: 4/26/2025 1:50 PM EDT Indication: Weak/Dizzy/AMS triage protocol Comparison: AP chest x-ray 4/4/2025, CT chest 2/11/2025 Findings: Cardiac silhouette remains enlarged. Findings of pulmonary edema appear decreased, but not resolved compared to previous chest x-ray. No pneumothorax or large pleural effusion is seen.     Impression: Impression: Enlarged cardiac silhouette with findings suggestive of improving pulmonary edema compared to 4/4/2025 chest x-ray. Electronically Signed: Michelle Reyez MD  4/26/2025 2:06 PM EDT  Workstation ID: [id number]    Blood cultures are NGTD  Respiratory panel negative  Urine culture in process  MRSA PCR positive      Assessment & Plan   Assessment / Plan     Active Hospital Problems:  Active Hospital Problems    Diagnosis     Sepsis    Septic shock likely secondary to UTI  A-fib with RVR on anticoagulation with Xarelto  Hyperglycemia with known diabetes mellitus  Hyperkalemia on admission at 5.4  KATHRIN on CKD stage III with creatinine of 1.08 on admission.  Most recent baseline 0.7-0.9  Lactic acidosis  Obesity with BMI of 45  Congestive heart failure with reduced ejection fraction without decompensation  COPD with chronic hypoxemia on 2L NC nightly  Right ear pain    Plan:   Continue supplemental O2.  Titrate to maintain SpO2 90% or greater  Continue BiPAP as needed, settings 12/6.  Titrate FiO2 to maintain SpO2 90% or greater  Chest CT personally reviewed  continue  Levophed ordered if needed.  Currently not requiring pressor therapy  Amiodarone drip.  Restart home oral doses of Cardizem and metoprolol current  De-escalate antibiotics to ceftriaxone to complete 5 days therapy  On Xarelto as outpatient. On therapeutic Lovenox  Trend renal function and electrolytes.  Replace as needed  Currently with indwelling Maldonado catheter  Lactic cleared    GI prophylaxis with Protonix  DVT prophylax on therapeutic Lovenox    VTE Prophylaxis:  Pharmacologic & mechanical VTE prophylaxis orders are present.    CODE STATUS:   Code Status (Patient has no pulse and is not breathing): CPR (Attempt to Resuscitate)  Medical Interventions (Patient has pulse or is breathing): Full Support  Level Of Support Discussed With: Patient      Patient is critically ill with septic shock, A-fib with RVR, acute congestive heart failure, possible cellulitis. We have personally reviewed all pertinent labs, imaging, microbiology and documentation. We have discussed care with the primary service as well as at multidisciplinary critical care rounds with the bedside nurse, respiratory therapist, pharmacist and all other ancillary services.35 minutes of critical care time was spent managing this patient, excluding procedures. Of this time, I spent 20 minutes in accordance with split shared billing.    I, MARIANA Sun spent 15 minutes in accordance with split shared billing.  Electronically signed by MARIANA Maravilla, 04/27/25, 11:43 AM EDT.    Electronically signed by Eliud Munoz MD, 4/28/2025, 14:15 EDT.

## 2025-04-28 NOTE — PLAN OF CARE
Goal Outcome Evaluation:  Plan of Care Reviewed With: patient        Progress: no change  Outcome Evaluation: Patient presents with limitations in self-care, functional transfers, balance, and endurance. She would benefit from continued skilled occupational therapy services to maximize independence with ADLs/functional transfers.    Anticipated Discharge Disposition (OT): sub acute care setting

## 2025-04-28 NOTE — THERAPY EVALUATION
Patient Name: Mary Albarran  : 1950    MRN: [id number]                              Today's Date: 2025       Admit Date: 2025    Visit Dx:     ICD-10-CM ICD-9-CM   1. Sepsis, due to unspecified organism, unspecified whether acute organ dysfunction present  A41.9 038.9     995.91   2. Acute UTI  N39.0 599.0   3. Atrial fibrillation, unspecified type  I48.91 427.31   4. Difficulty walking  R26.2 719.7   5. Decreased activities of daily living (ADL)  Z78.9 V49.89     Patient Active Problem List   Diagnosis    Aortic stenosis, moderate    Atrial fibrillation    Chronic kidney disease, stage III (moderate)    Hyperlipidemia    Hypertension    Chronic diastolic congestive heart failure    Atrial fibrillation with RVR    Onychomycosis    Onychocryptosis    Foot pain, bilateral    Nephrolithiasis    Recurrent urinary tract infection    Acute on chronic HFrEF (heart failure with reduced ejection fraction)    Cellulitis of right leg without foot    Diabetes mellitus type 2 with complications    Depression    COPD (chronic obstructive pulmonary disease)    Chronic pain    Anxiety    Heart failure with reduced ejection fraction    CHF exacerbation    Sepsis     Past Medical History:   Diagnosis Date    Allergic rhinitis     Anxiety     Aortic valve disease 2021    Fibrocalcific changes of the aortic valve with mild aortic valve stenosis   on echo 3/2/2021  Moderate aortic valve regurgitation is present. Aortic valve maximum pressure gradient is 26 mmHg. Moderate aortic valve stenosis is present.  On echo 2/10/2022       Arthritis     Atrial fibrillation     CHF (congestive heart failure)     Chronic kidney disease (CKD), stage III (moderate)     Chronic pain     COPD (chronic obstructive pulmonary disease)     Coronary artery disease     Diabetes mellitus type 2 with complications     Elevated cholesterol     Ex-smoker     QUIT SMOKING     GERD (gastroesophageal reflux disease)     History of home  oxygen therapy     2L/NC AAT REPORTED    History of transfusion     Hyperlipidemia     Hypertension     Thrombocytopenia      Past Surgical History:   Procedure Laterality Date    HYSTERECTOMY      30 YEARS AGO      General Information       Row Name 04/28/25 1451          OT Time and Intention    Document Type evaluation  -     Mode of Treatment individual therapy;occupational therapy  -       Row Name 04/28/25 145          General Information    Patient Profile Reviewed yes  -LF     Prior Level of Function --  (I) with ADLs, ambulated with a standard walker with tennis balls on feet, has a walk-in shower with shower chair/grab bars/handheld shower head, elevated commode, stands to groom, and wears 2L home O2.  -     Existing Precautions/Restrictions fall;oxygen therapy device and L/min  -     Barriers to Rehab none identified  -       Row Name 04/28/25 145          Occupational Profile    Reason for Services/Referral (Occupational Profile) Patient is a 74 year old female admitted to [facility] for fatigue on April 26th, 2025. Occupational therapy consulted due to recent decline in ADLs/functional transfers. No previous occupational therapy services for current condition.  -       Row Name 04/28/25 1451          Living Environment    Current Living Arrangements home  -     People in Home spouse  -       Row Name 04/28/25 1451          Home Main Entrance    Number of Stairs, Main Entrance none  ramp  -       Row Name 04/28/25 1451          Cognition    Orientation Status (Cognition) oriented x 3  -       Row Name 04/28/25 145          Safety Issues/Impairments Affecting Functional Mobility    Impairments Affecting Function (Mobility) balance;endurance/activity tolerance;pain;strength  -               User Key  (r) = Recorded By, (t) = Taken By, (c) = Cosigned By      Initials Name Provider Type    LF Nevaeh Schmidt OT Occupational Therapist                     Mobility/ADL's        [facility] Name 04/28/25 1453          Bed Mobility    Comment, (Bed Mobility) Pt politely declined all bed mobility and functional transfers d/t completing with PT just prior to OT's arrival. Per PT evaluation she required CGA for supine to sit and declined sit to stand.  -[facility] Name 04/28/25 1453          Activities of Daily Living    BADL Assessment/Intervention bathing;upper body dressing;lower body dressing;grooming;feeding;toileting  -[facility] Name 04/28/25 1453          Bathing Assessment/Intervention    [place] Level (Bathing) bathing skills;upper body;standby assist;lower body;maximum assist (25% patient effort);dependent (less than 25% patient effort)  -[facility] Name 04/28/25 1453          Upper Body Dressing Assessment/Training    [place] Level (Upper Body Dressing) upper body dressing skills;standby assist  -LF       Row Name 04/28/25 1453          Lower Body Dressing Assessment/Training    [place] Level (Lower Body Dressing) lower body dressing skills;maximum assist (25% patient effort);dependent (less than 25% patient effort)  -LF       Row Name 04/28/25 1453          Grooming Assessment/Training    [place] Level (Grooming) grooming skills;standby assist  -LF       Row Name 04/28/25 1453          Self-Feeding Assessment/Training    [place] Level (Feeding) feeding skills;set up  -LF       Row Name 04/28/25 1453          Toileting Assessment/Training    [place] Level (Toileting) toileting skills;maximum assist (25% patient effort);dependent (less than 25% patient effort)  -     Comment, (Toileting) Maldonado catheter currently in place.  -               User Key  (r) = Recorded By, (t) = Taken By, (c) = Cosigned By      Initials Name Provider Type     Nevaeh Schmidt OT Occupational Therapist                   Obj/Interventions       [facility] Name 04/28/25 1452          Sensory Assessment (Somatosensory)    Sensory Assessment (Somatosensory) UE sensation intact   -[facility] Name 04/28/25 1454          Vision Assessment/Intervention    Visual Impairment/Limitations WFL  -LF       Row Name 04/28/25 1454          Range of Motion Comprehensive    General Range of Motion bilateral upper extremity ROM WFL  -LF       Row Name 04/28/25 1454          Strength Comprehensive (MMT)    Comment, General Manual Muscle Testing (MMT) Assessment 4/5 BUEs  -LF       Row Name 04/28/25 1454          Motor Skills    Motor Skills coordination;functional endurance  -LF     Coordination bilateral;upper extremity;WFL  -LF     Functional Endurance Fair-  -LF       Row Name 04/28/25 1454          Balance    Comment, Balance Not tested, likely impaired  -LF               User Key  (r) = Recorded By, (t) = Taken By, (c) = Cosigned By      Initials Name Provider Type    Nevaeh Hyde OT Occupational Therapist                   Goals/Plan       Row Name 04/28/25 1455          Bed Mobility Goal 1 (OT)    Activity/Assistive Device (Bed Mobility Goal 1, OT) bed mobility activities, all  -LF     [place] Level/Cues Needed (Bed Mobility Goal 1, OT) modified independence  -LF     Time Frame (Bed Mobility Goal 1, OT) long term goal (LTG);10 days  -LF       Row Name 04/28/25 1455          Transfer Goal 1 (OT)    Activity/Assistive Device (Transfer Goal 1, OT) transfers, all  -LF     [place] Level/Cues Needed (Transfer Goal 1, OT) modified independence  -LF     Time Frame (Transfer Goal 1, OT) long term goal (LTG);10 days  -LF       Row Name 04/28/25 1455          Bathing Goal 1 (OT)    Activity/Device (Bathing Goal 1, OT) bathing skills, all  -LF     [place] Level/Cues Needed (Bathing Goal 1, OT) modified independence  -LF     Time Frame (Bathing Goal 1, OT) long term goal (LTG);10 days  -LF       Row Name 04/28/25 1455          Dressing Goal 1 (OT)    Activity/Device (Dressing Goal 1, OT) dressing skills, all  -LF     [place]/Cues Needed (Dressing Goal 1, OT) modified independence   -LF     Time Frame (Dressing Goal 1, OT) long term goal (LTG);10 days  -       Row Name 04/28/25 1455          Toileting Goal 1 (OT)    Activity/Device (Toileting Goal 1, OT) toileting skills, all  -LF     [place] Level/Cues Needed (Toileting Goal 1, OT) modified independence  -LF     Time Frame (Toileting Goal 1, OT) long term goal (LTG);10 days  -       Row Name 04/28/25 1455          Grooming Goal 1 (OT)    Activity/Device (Grooming Goal 1, OT) grooming skills, all  -LF     [place] (Grooming Goal 1, OT) set-up required  -LF     Time Frame (Grooming Goal 1, OT) long term goal (LTG);10 days  -       Row Name 04/28/25 1455          Problem Specific Goal 1 (OT)    Problem Specific Goal 1 (OT) Patient will demonstrate good- endurance to support ADLs/functional transfers.  -LF     Time Frame (Problem Specific Goal 1, OT) long term goal (LTG);10 days  -[facility] Name 04/28/25 1455          Therapy Assessment/Plan (OT)    Planned Therapy Interventions (OT) activity tolerance training;BADL retraining;functional balance retraining;occupation/activity based interventions;patient/caregiver education/training;strengthening exercise;transfer/mobility retraining  -               User Key  (r) = Recorded By, (t) = Taken By, (c) = Cosigned By      Initials Name Provider Type    LF Nevaeh Schmidt OT Occupational Therapist                   Clinical Impression       Row Name 04/28/25 1454          Pain Assessment    Additional Documentation Pain Scale: FACES Pre/Post-Treatment (Group)  -LF       Row Name 04/28/25 1458          Pain Scale: FACES Pre/Post-Treatment    Pain: FACES Scale, Pretreatment 2-->hurts little bit  -     Posttreatment Pain Rating 2-->hurts little bit  -       Row Name 04/28/25 145          Plan of Care Review    Plan of Care Reviewed With patient  -LF     Progress no change  -     Outcome Evaluation Patient presents with limitations in self-care, functional transfers, balance, and  endurance. She would benefit from continued skilled occupational therapy services to maximize independence with ADLs/functional transfers.  -       Row Name 04/28/25 1459          Therapy Assessment/Plan (OT)    Patient/Family Therapy Goal Statement (OT) To maximize independence.  -LF     Rehab Potential (OT) good  -LF     Criteria for Skilled Therapeutic Interventions Met (OT) yes;meets criteria;skilled treatment is necessary  -     Therapy Frequency (OT) 5 times/wk  -       Row Name 04/28/25 1455          Therapy Plan Review/Discharge Plan (OT)    Equipment Needs Upon Discharge (OT) walker, rolling  -     Anticipated Discharge Disposition (OT) sub acute care setting  -       Row Name 04/28/25 1454          Vital Signs    O2 Delivery Pre Treatment nasal cannula  -LF     O2 Delivery Intra Treatment nasal cannula  -LF     O2 Delivery Post Treatment nasal cannula  -       Row Name 04/28/25 1452          Positioning and Restraints    Pre-Treatment Position in bed  -LF     Post Treatment Position bed  -LF     In Bed fowlers;call light within reach;encouraged to call for assist  -LF               User Key  (r) = Recorded By, (t) = Taken By, (c) = Cosigned By      Initials Name Provider Type    LF Nevaeh Schmidt, OT Occupational Therapist                   Outcome Measures       Row Name 04/28/25 1455          How much help from another is currently needed...    Putting on and taking off regular lower body clothing? 1  -LF     Bathing (including washing, rinsing, and drying) 2  -LF     Toileting (which includes using toilet bed pan or urinal) 1  -LF     Putting on and taking off regular upper body clothing 3  -LF     Taking care of personal grooming (such as brushing teeth) 3  -LF     Eating meals 4  -LF     AM-PAC 6 Clicks Score (OT) 14  -LF       Row Name 04/28/25 1400 04/28/25 1200       How much help from another person do you currently need...    Turning from your back to your side while in flat bed  without using bedrails? 3  -AV 2  -SM    Moving from lying on back to sitting on the side of a flat bed without bedrails? 3  -AV 2  -SM    Moving to and from a bed to a chair (including a wheelchair)? 2  -AV 1  -SM    Standing up from a chair using your arms (e.g., wheelchair, bedside chair)? 2  -AV 1  -SM    Climbing 3-5 steps with a railing? 1  -AV 1  -SM    To walk in hospital room? 2  -AV 1  -SM    AM-PAC 6 Clicks Score (PT) 13  -AV 8  -SM    Highest Level of Mobility Goal 4 --> Transfer to chair/commode  -AV 3 --> Sit at edge of bed  -SM      Row Name 04/28/25 0800 04/28/25 0400       How much help from another person do you currently need...    Turning from your back to your side while in flat bed without using bedrails? 2  -SM 2  -CS    Moving from lying on back to sitting on the side of a flat bed without bedrails? 2  -SM 2  -CS    Moving to and from a bed to a chair (including a wheelchair)? 1  -SM 1  -CS    Standing up from a chair using your arms (e.g., wheelchair, bedside chair)? 1  -SM 1  -CS    Climbing 3-5 steps with a railing? 1  -SM 1  -CS    To walk in hospital room? 1  -SM 1  -CS    AM-PAC 6 Clicks Score (PT) 8  -SM 8  -CS    Highest Level of Mobility Goal 3 --> Sit at edge of bed  -SM 3 --> Sit at edge of bed  -CS      Row Name 04/28/25 1455 04/28/25 1400       Functional Assessment    Outcome Measure Options AM-PAC 6 Clicks Daily Activity (OT);Optimal Instrument  -LF AM-PAC 6 Clicks Basic Mobility (PT)  -AV      Row Name 04/28/25 1455          Optimal Instrument    Optimal Instrument Optimal - 3  -LF     Bending/Stooping 4  -LF     Standing 5  -LF     Reaching 1  -LF     From the list, choose the 3 activities you would most like to be able to do without any difficulty Bending/stooping;Standing;Reaching  -LF     Total Score Optimal - 3 10  -LF               User Key  (r) = Recorded By, (t) = Taken By, (c) = Cosigned By      Initials Name Provider Type    Lizzette Andrew, RN Registered Nurse      Nevaeh Schmidt, OT Occupational Therapist    Kelvin Benavides, PT Physical Therapist    Bhavna Diallo, RN Registered Nurse                    Occupational Therapy Education       Title: PT OT SLP Therapies (In Progress)       Topic: Occupational Therapy (In Progress)       Point: ADL training (Done)       Learning Progress Summary            Patient Acceptance, E,TB, VU by  at 4/28/2025 1455                      Point: Precautions (Done)       Learning Progress Summary            Patient Acceptance, E,TB, VU by  at 4/28/2025 1455                      Point: Body mechanics (Done)       Learning Progress Summary            Patient Acceptance, E,TB, VU by  at 4/28/2025 1455                                      User Key       Initials Effective Dates Name Provider Type Discipline     06/16/21 -  Nevaeh Schmidt OT Occupational Therapist OT                  OT Recommendation and Plan  Planned Therapy Interventions (OT): activity tolerance training, BADL retraining, functional balance retraining, occupation/activity based interventions, patient/caregiver education/training, strengthening exercise, transfer/mobility retraining  Therapy Frequency (OT): 5 times/wk  Plan of Care Review  Plan of Care Reviewed With: patient  Progress: no change  Outcome Evaluation: Patient presents with limitations in self-care, functional transfers, balance, and endurance. She would benefit from continued skilled occupational therapy services to maximize independence with ADLs/functional transfers.     Time Calculation:   Evaluation Complexity (OT)  Review Occupational Profile/Medical/Therapy History Complexity: brief/low complexity  Assessment, Occupational Performance/Identification of Deficit Complexity: 3-5 performance deficits  Clinical Decision Making Complexity (OT): problem focused assessment/low complexity  Overall Complexity of Evaluation (OT): low complexity     Time Calculation- OT       Row Name  04/28/25 1456             Time Calculation- OT    OT Received On 04/28/25  -LF      OT Goal Re-Cert Due Date 05/07/25  -LF         Untimed Charges    OT Eval/Re-eval Minutes 35  -LF         Total Minutes    Untimed Charges Total Minutes 35  -LF       Total Minutes 35  -LF                User Key  (r) = Recorded By, (t) = Taken By, (c) = Cosigned By      Initials Name Provider Type     Nevaeh Schmidt OT Occupational Therapist                  Therapy Charges for Today       Code Description Service Date Service Provider Modifiers Qty    1952872 HC OT EVAL LOW COMPLEXITY 3 4/28/2025 Nevaeh Schmidt OT GO 1                 Nevaeh Schmidt OT  4/28/2025

## 2025-04-28 NOTE — PLAN OF CARE
Goal Outcome Evaluation:           Progress: improving  Outcome Evaluation: Pt AOx4, 3L NC O2 saturation 88%-93%, refused bipap, Afib 150's-180's through the evening. Additional Amio bolus given, 6 hours at 1mg, and switched to 0.5mg at 0430 this AM. Tmax 100.2, Blood pressure stable. Pt had multiple complaints of pain including all 4 of her extremities, Norco x2, PO Tylenol x1, IV Tylenol x1. Pt also complained of nausea/ acid reflux like symptoms to which zofran x2 was given. Pt had adequate UOP of gaitan cath. No BM this shift but pt reported BM earlier 4/27. Pt encouraged to turn Q2.

## 2025-04-28 NOTE — PLAN OF CARE
Goal Outcome Evaluation:  Plan of Care Reviewed With: patient        Progress: no change  Outcome Evaluation: Patient presents with deficits in balance, endurance, transfers, and ambulation. Patient will benefit from skilled PT services to address these mobility deficits and decrease risk of falls.    Anticipated Discharge Disposition (PT): sub acute care setting

## 2025-04-28 NOTE — SIGNIFICANT NOTE
04/28/25 1125   Coping/Psychosocial   Observed Emotional State calm;cooperative   Verbalized Emotional State hopefulness   Trust Relationship/Rapport empathic listening provided   Involvement in Care interacting with patient   Additional Documentation Spiritual Care (Group)   Spiritual Care   Spiritual Care Visit Type initial   Spiritual Care Source  initiative   Receptivity to Spiritual Care visit welcomed   Spiritual Care Interventions supportive conversation provided;prayer support provided   Response to Spiritual Care receptive of support;engaged in conversation;thanks expressed   Use of Spiritual Resources prayer;spirituality for coping, indicated strong use of   Spiritual Care Follow-Up follow-up, none required as presently assessed     length of visit: 12  min

## 2025-04-29 ENCOUNTER — APPOINTMENT (OUTPATIENT)
Dept: GENERAL RADIOLOGY | Facility: HOSPITAL | Age: 75
End: 2025-04-29
Payer: MEDICARE

## 2025-04-29 PROBLEM — I48.11 LONGSTANDING PERSISTENT ATRIAL FIBRILLATION: Status: ACTIVE | Noted: 2021-11-28

## 2025-04-29 LAB
ALBUMIN SERPL-MCNC: 3.4 G/DL (ref 3.5–5.2)
ALBUMIN/GLOB SERPL: 0.9 G/DL
ALP SERPL-CCNC: 62 U/L (ref 39–117)
ALT SERPL W P-5'-P-CCNC: 16 U/L (ref 1–33)
ANION GAP SERPL CALCULATED.3IONS-SCNC: 13.5 MMOL/L (ref 5–15)
AST SERPL-CCNC: 23 U/L (ref 1–32)
BILIRUB SERPL-MCNC: 0.6 MG/DL (ref 0–1.2)
BUN SERPL-MCNC: 27 MG/DL (ref 8–23)
BUN/CREAT SERPL: 29 (ref 7–25)
CALCIUM SPEC-SCNC: 9.1 MG/DL (ref 8.6–10.5)
CHLORIDE SERPL-SCNC: 101 MMOL/L (ref 98–107)
CO2 SERPL-SCNC: 19.5 MMOL/L (ref 22–29)
CREAT SERPL-MCNC: 0.93 MG/DL (ref 0.57–1)
DEPRECATED RDW RBC AUTO: 50.4 FL (ref 37–54)
EGFRCR SERPLBLD CKD-EPI 2021: 65.4 ML/MIN/1.73
ERYTHROCYTE [DISTWIDTH] IN BLOOD BY AUTOMATED COUNT: 14.6 % (ref 12.3–15.4)
GLOBULIN UR ELPH-MCNC: 3.7 GM/DL
GLUCOSE BLDC GLUCOMTR-MCNC: 118 MG/DL (ref 70–99)
GLUCOSE BLDC GLUCOMTR-MCNC: 124 MG/DL (ref 70–99)
GLUCOSE BLDC GLUCOMTR-MCNC: 138 MG/DL (ref 70–99)
GLUCOSE BLDC GLUCOMTR-MCNC: 163 MG/DL (ref 70–99)
GLUCOSE SERPL-MCNC: 125 MG/DL (ref 65–99)
HCT VFR BLD AUTO: 38.5 % (ref 34–46.6)
HGB BLD-MCNC: 11.5 G/DL (ref 12–15.9)
MAGNESIUM SERPL-MCNC: 2 MG/DL (ref 1.6–2.4)
MCH RBC QN AUTO: 27.8 PG (ref 26.6–33)
MCHC RBC AUTO-ENTMCNC: 29.9 G/DL (ref 31.5–35.7)
MCV RBC AUTO: 93 FL (ref 79–97)
NT-PROBNP SERPL-MCNC: 6349 PG/ML (ref 0–900)
PHOSPHATE SERPL-MCNC: 3.7 MG/DL (ref 2.5–4.5)
PLATELET # BLD AUTO: 89 10*3/MM3 (ref 140–450)
PMV BLD AUTO: 13.4 FL (ref 6–12)
POTASSIUM SERPL-SCNC: 4.3 MMOL/L (ref 3.5–5.2)
PROT SERPL-MCNC: 7.1 G/DL (ref 6–8.5)
RBC # BLD AUTO: 4.14 10*6/MM3 (ref 3.77–5.28)
SODIUM SERPL-SCNC: 134 MMOL/L (ref 136–145)
WBC NRBC COR # BLD AUTO: 7.24 10*3/MM3 (ref 3.4–10.8)

## 2025-04-29 PROCEDURE — 83880 ASSAY OF NATRIURETIC PEPTIDE: CPT

## 2025-04-29 PROCEDURE — 82948 REAGENT STRIP/BLOOD GLUCOSE: CPT | Performed by: PHYSICIAN ASSISTANT

## 2025-04-29 PROCEDURE — 80053 COMPREHEN METABOLIC PANEL: CPT | Performed by: PHYSICIAN ASSISTANT

## 2025-04-29 PROCEDURE — 82948 REAGENT STRIP/BLOOD GLUCOSE: CPT

## 2025-04-29 PROCEDURE — 94799 UNLISTED PULMONARY SVC/PX: CPT

## 2025-04-29 PROCEDURE — 99233 SBSQ HOSP IP/OBS HIGH 50: CPT | Performed by: INTERNAL MEDICINE

## 2025-04-29 PROCEDURE — 25010000002 ONDANSETRON PER 1 MG: Performed by: HOSPITALIST

## 2025-04-29 PROCEDURE — 25010000002 CEFTRIAXONE PER 250 MG: Performed by: INTERNAL MEDICINE

## 2025-04-29 PROCEDURE — 97110 THERAPEUTIC EXERCISES: CPT

## 2025-04-29 PROCEDURE — 84100 ASSAY OF PHOSPHORUS: CPT | Performed by: PHYSICIAN ASSISTANT

## 2025-04-29 PROCEDURE — 63710000001 INSULIN LISPRO (HUMAN) PER 5 UNITS: Performed by: PHYSICIAN ASSISTANT

## 2025-04-29 PROCEDURE — 85027 COMPLETE CBC AUTOMATED: CPT | Performed by: PHYSICIAN ASSISTANT

## 2025-04-29 PROCEDURE — 83735 ASSAY OF MAGNESIUM: CPT | Performed by: PHYSICIAN ASSISTANT

## 2025-04-29 PROCEDURE — 25010000002 FUROSEMIDE PER 20 MG

## 2025-04-29 PROCEDURE — 94664 DEMO&/EVAL PT USE INHALER: CPT

## 2025-04-29 PROCEDURE — 25010000002 ENOXAPARIN PER 10 MG: Performed by: HOSPITALIST

## 2025-04-29 PROCEDURE — 94761 N-INVAS EAR/PLS OXIMETRY MLT: CPT

## 2025-04-29 PROCEDURE — 99233 SBSQ HOSP IP/OBS HIGH 50: CPT | Performed by: STUDENT IN AN ORGANIZED HEALTH CARE EDUCATION/TRAINING PROGRAM

## 2025-04-29 PROCEDURE — 71045 X-RAY EXAM CHEST 1 VIEW: CPT

## 2025-04-29 RX ORDER — BUMETANIDE 1 MG/1
2 TABLET ORAL DAILY
Status: DISCONTINUED | OUTPATIENT
Start: 2025-04-30 | End: 2025-05-02 | Stop reason: HOSPADM

## 2025-04-29 RX ORDER — FUROSEMIDE 10 MG/ML
40 INJECTION INTRAMUSCULAR; INTRAVENOUS ONCE
Status: COMPLETED | OUTPATIENT
Start: 2025-04-29 | End: 2025-04-29

## 2025-04-29 RX ADMIN — RIVAROXABAN 20 MG: 20 TABLET, FILM COATED ORAL at 17:24

## 2025-04-29 RX ADMIN — Medication 10 ML: at 08:23

## 2025-04-29 RX ADMIN — IPRATROPIUM BROMIDE AND ALBUTEROL SULFATE 3 ML: .5; 3 SOLUTION RESPIRATORY (INHALATION) at 23:21

## 2025-04-29 RX ADMIN — MICONAZOLE NITRATE 1 APPLICATION: 2 POWDER TOPICAL at 11:03

## 2025-04-29 RX ADMIN — BUDESONIDE 0.5 MG: 0.5 SUSPENSION RESPIRATORY (INHALATION) at 18:35

## 2025-04-29 RX ADMIN — FUROSEMIDE 40 MG: 10 INJECTION, SOLUTION INTRAMUSCULAR; INTRAVENOUS at 08:19

## 2025-04-29 RX ADMIN — MUPIROCIN 1 APPLICATION: 20 OINTMENT TOPICAL at 08:19

## 2025-04-29 RX ADMIN — HYDROCODONE BITARTRATE AND ACETAMINOPHEN 1 TABLET: 5; 325 TABLET ORAL at 11:02

## 2025-04-29 RX ADMIN — CEFTRIAXONE SODIUM 1000 MG: 1 INJECTION, POWDER, FOR SOLUTION INTRAMUSCULAR; INTRAVENOUS at 20:58

## 2025-04-29 RX ADMIN — INSULIN LISPRO 2 UNITS: 100 INJECTION, SOLUTION INTRAVENOUS; SUBCUTANEOUS at 20:57

## 2025-04-29 RX ADMIN — HYDROCODONE BITARTRATE AND ACETAMINOPHEN 1 TABLET: 5; 325 TABLET ORAL at 04:59

## 2025-04-29 RX ADMIN — Medication 10 ML: at 20:59

## 2025-04-29 RX ADMIN — METOPROLOL TARTRATE 50 MG: 50 TABLET, FILM COATED ORAL at 16:06

## 2025-04-29 RX ADMIN — Medication 10 ML: at 20:58

## 2025-04-29 RX ADMIN — PANTOPRAZOLE SODIUM 40 MG: 40 INJECTION, POWDER, FOR SOLUTION INTRAVENOUS at 08:19

## 2025-04-29 RX ADMIN — IPRATROPIUM BROMIDE AND ALBUTEROL SULFATE 3 ML: .5; 3 SOLUTION RESPIRATORY (INHALATION) at 18:35

## 2025-04-29 RX ADMIN — Medication: at 11:03

## 2025-04-29 RX ADMIN — MICONAZOLE NITRATE 1 APPLICATION: 2 POWDER TOPICAL at 05:00

## 2025-04-29 RX ADMIN — HYDROCODONE BITARTRATE AND ACETAMINOPHEN 1 TABLET: 5; 325 TABLET ORAL at 23:45

## 2025-04-29 RX ADMIN — METOPROLOL TARTRATE 50 MG: 50 TABLET, FILM COATED ORAL at 20:58

## 2025-04-29 RX ADMIN — METOPROLOL TARTRATE 50 MG: 50 TABLET, FILM COATED ORAL at 08:19

## 2025-04-29 RX ADMIN — IPRATROPIUM BROMIDE AND ALBUTEROL SULFATE 3 ML: .5; 3 SOLUTION RESPIRATORY (INHALATION) at 00:08

## 2025-04-29 RX ADMIN — BUDESONIDE 0.5 MG: 0.5 SUSPENSION RESPIRATORY (INHALATION) at 06:50

## 2025-04-29 RX ADMIN — IPRATROPIUM BROMIDE AND ALBUTEROL SULFATE 3 ML: .5; 3 SOLUTION RESPIRATORY (INHALATION) at 14:27

## 2025-04-29 RX ADMIN — IPRATROPIUM BROMIDE AND ALBUTEROL SULFATE 3 ML: .5; 3 SOLUTION RESPIRATORY (INHALATION) at 06:50

## 2025-04-29 RX ADMIN — DILTIAZEM HYDROCHLORIDE 180 MG: 180 CAPSULE, COATED, EXTENDED RELEASE ORAL at 08:20

## 2025-04-29 RX ADMIN — ONDANSETRON 4 MG: 2 INJECTION INTRAMUSCULAR; INTRAVENOUS at 17:24

## 2025-04-29 RX ADMIN — HYDROCODONE BITARTRATE AND ACETAMINOPHEN 1 TABLET: 5; 325 TABLET ORAL at 17:24

## 2025-04-29 RX ADMIN — ENOXAPARIN SODIUM 120 MG: 100 INJECTION SUBCUTANEOUS at 08:23

## 2025-04-29 NOTE — SIGNIFICANT NOTE
Wound Eval / Progress Noted    GUERO Bautista     Patient Name: Mary Albarran  : 1950  MRN: [id number]  Today's Date: 2025                 Admit Date: 2025    Visit Dx:    ICD-10-CM ICD-9-CM   1. Sepsis, due to unspecified organism, unspecified whether acute organ dysfunction present  A41.9 038.9     995.91   2. Acute UTI  N39.0 599.0   3. Atrial fibrillation, unspecified type  I48.91 427.31   4. Difficulty walking  R26.2 719.7   5. Decreased activities of daily living (ADL)  Z78.9 V49.89         Sepsis        Past Medical History:   Diagnosis Date    Allergic rhinitis     Anxiety     Aortic valve disease 2021    Fibrocalcific changes of the aortic valve with mild aortic valve stenosis   on echo 3/2/2021  Moderate aortic valve regurgitation is present. Aortic valve maximum pressure gradient is 26 mmHg. Moderate aortic valve stenosis is present.  On echo 2/10/2022       Arthritis     Atrial fibrillation     CHF (congestive heart failure)     Chronic kidney disease (CKD), stage III (moderate)     Chronic pain     COPD (chronic obstructive pulmonary disease)     Coronary artery disease     Diabetes mellitus type 2 with complications     Elevated cholesterol     Ex-smoker     QUIT SMOKING     GERD (gastroesophageal reflux disease)     History of home oxygen therapy     2L/NC AAT REPORTED    History of transfusion     Hyperlipidemia     Hypertension     Thrombocytopenia         Past Surgical History:   Procedure Laterality Date    HYSTERECTOMY      30 YEARS AGO         Physical Assessment:  Wound 25 1338 Right posterior foot unspecified (Active)   Wound Image   25 1544   Dressing Appearance dry;intact 25 1544   Closure None 25 1544   Base moist;red;pink 25 1544   Periwound dry;pink;redness 25 1544   Periwound Temperature warm 25 1544   Periwound Skin Turgor soft 25 1544   Edges open 25 1544   Wound Length (cm) 0.8 cm 25 1544   Wound Width  (cm) 1.3 cm 04/28/25 1544   Wound Depth (cm) 0.2 cm 04/28/25 1544   Wound Surface Area (cm^2) 0.82 cm^2 04/28/25 1544   Wound Volume (cm^3) 0.109 cm^3 04/28/25 1544   Drainage Characteristics/Odor serosanguineous 04/28/25 1544   Drainage Amount scant 04/28/25 1544   Care, Wound cleansed with;sterile normal saline 04/28/25 1544   Dressing Care dressing removed;dressing applied;silver impregnated;hydrofiber;silicone border foam 04/28/25 1544   Periwound Care absorptive dressing applied 04/28/25 1544       Wound 03/18/25 1812 Bilateral anterior groin (Active)   Wound Image    04/28/25 1544   Dressing Appearance open to air 04/28/25 1544   Closure None 04/28/25 1544   Base moist;red 04/28/25 1544   Periwound dry;redness 04/28/25 1544   Periwound Temperature warm 04/28/25 1544   Periwound Skin Turgor soft 04/28/25 1544   Edges open 04/28/25 1544   Drainage Characteristics/Odor serosanguineous 04/28/25 1544   Drainage Amount scant 04/28/25 1544   Care, Wound cleansed with;sterile normal saline 04/28/25 1544   Dressing Care open to air 04/28/25 1544   Periwound Care dry periwound area maintained 04/28/25 1544       Wound 04/04/25 2112 Left lower breast (Active)   Wound Image   04/28/25 1544   Dressing Appearance open to air 04/28/25 1544   Closure None 04/28/25 1544   Base moist;red 04/28/25 1544   Periwound dry;redness 04/28/25 1544   Periwound Temperature warm 04/28/25 1544   Periwound Skin Turgor soft 04/28/25 1544   Edges open 04/28/25 1544   Drainage Characteristics/Odor serosanguineous 04/28/25 1544   Drainage Amount scant 04/28/25 1544   Care, Wound cleansed with;sterile normal saline 04/28/25 1544   Dressing Care open to air 04/28/25 1544   Periwound Care dry periwound area maintained 04/28/25 1544       Wound 04/04/25 2114 Left anterior foot (Active)   Wound Image   04/28/25 1544   Dressing Appearance dry;intact 04/28/25 1544   Closure None 04/28/25 1544   Base moist;red;yellow 04/28/25 1544   Periwound dry;pink  04/28/25 1544   Periwound Temperature warm 04/28/25 1544   Periwound Skin Turgor soft 04/28/25 1544   Edges open 04/28/25 1544   Wound Length (cm) 1.1 cm 04/28/25 1544   Wound Width (cm) 0.9 cm 04/28/25 1544   Wound Depth (cm) 0.2 cm 04/28/25 1544   Wound Surface Area (cm^2) 0.78 cm^2 04/28/25 1544   Wound Volume (cm^3) 0.104 cm^3 04/28/25 1544   Drainage Characteristics/Odor serosanguineous 04/28/25 1544   Drainage Amount scant 04/28/25 1544   Care, Wound cleansed with;sterile normal saline 04/28/25 1544   Dressing Care dressing removed;dressing applied;silver impregnated;hydrofiber;silicone border foam 04/28/25 1544   Periwound Care absorptive dressing applied 04/28/25 1544    Right Anterior Lower Leg     Right Posterior Leg    Abbreviated Note     Wound Check / Follow-up:  Patient seen today for wound consult. Patient is awake, alert, and oriented. Patient reports she is unable to receive home health due to insurance denial. Patient states she did have a wound care nurse who was visiting her once a week; however, this was also discontinued following her most recent admission to [facility]. Patient states her  performs her wound care and wraps her legs with gauze rolls and elastic bandages every other day. Patient states she has been wrapping her legs for approximately ten years. Patient reports intense pain when her legs are open to air. Nutritional supplements have been ordered for this patient.     Please see wound assessments above:    Bilateral lower extremity edema.  Erythema noted to bilateral lower legs, more prominent to right. Maroon pigmentation scattered to right leg with petechia appearance to right calf. Dry scaly skin noted to bilateral legs. Cleansed with CHG wipes and allowed to dry. Recommending quality skin care and hygiene to bilateral legs with a thin layer of Lac-Hydrin lotion being applied.  Recommending edema management to bilateral lower extremities with  application of ABD pads to bilateral lower legs for protection, covered with gauze rolls and elastic bandages from base of toes to bend of knees. Discussed findings with attending MD and orders obtained.     Right plantar foot chronic ulceration.  Wound base presents with moist pink and red tissue.    Left anterior foot ulceration, patient reports previous blood blister. Wound base presents with moist red and yellow tissue.  Cleansed with normal saline and gauze, blotted dry.  Recommending every other day dressing changes with silver impregnated Hydrofiber, moistened with 1-2 drops of NS, and silicone border dressing securement.     Moisture associated skin damage with a fungal presentation and areas of erosion noted to abdominal crease, breast creases, and bilateral groin creases. Areas of erosion present with moist red tissue.  Periwound tissue is dry with pink/redness and a fungal presentation.  Recommending quality skin care and hygiene with application of miconazole powder twice a day, along with application of dry sheets to assist with moisture management. Discussed findings with Attending MD and orders obtained.     Blanchable redness to gluteal aspects. Recommending quality skin care and hygiene with application of blue top moisture barrier four times a day, and as needed for incontinence.  Implement every 2 hour turns, and offload at all times.  Keep patient clean, dry, and free from all moisture.     Impression: Bilateral lower extremity edema.  Right lower extremity maroon pigmentation.  Right plantar foot ulceration. Left anterior foot ulceration. Moisture associated skin damage with a fungal presentation and areas of erosion to abdominal crease, groin creases, and breast creases.     Short term goals:  Regain skin integrity, skin protection, moisture prevention, pressure reduction, quality skin care and hygiene, topical treatment, every other day dressing changes, edema management.    Christie Gee RN     4/28/2025    23:36 EDT

## 2025-04-29 NOTE — PROGRESS NOTES
[facility]   Hospitalist Progress Note  Date: 2025  Patient Name: Mary Albarran  : 1950  MRN: [id number]  Date of admission: 2025  Room/Bed: [facility]/      Subjective   Subjective     Chief Complaint: Lethargic    Summary:Mary Albarran is a 74 y.o. female with a host of medical problems presented to the ED with lethargy, she had recently been admitted to the hospital removal for a CHF exacerbation.  Upon presentation in the ED she was too somnolent to give much of a history.  She was found to have a fever, tachypnea, significant leukocytosis with lactic acidosis.  She was admitted to the medicine service for sepsis.    Interval Followup:  Patient remains in atrial fibrillation with rapid rate at times  Patient has had 3 loose bowel movements this morning  Patient has been febrile to 100.2  Patient remains on antibiotics  No nausea or vomiting  Patient is very weak      Objective   Objective     Vitals:   Temp:  [97.9 °F (36.6 °C)-100.8 °F (38.2 °C)] 97.9 °F (36.6 °C)  Heart Rate:  [] 102  Resp:  [20-22] 22  BP: (106-138)/() 134/62  Flow (L/min) (Oxygen Therapy):  [3-4] 4    Physical Exam   General: Awake, alert, NAD resting in bed   HENT: NCAT, MMM;   Eyes: pupils equal, no scleral icterus  Cardiovascular: IR/IR with rapid rate   Pulmonary: decreased. No wheezing noted   Gastrointestinal: S/ND/NT, +BS  Musculoskeletal: No gross deformities  Skin: lower legs have venous changes and right lower leg has more erythema       Result Review    Result Review:  I have personally reviewed these results:  [x]  Laboratory      Lab 25  0518 25  1215 25  0245 25  0253 25  2336 25  1949 25  1637 25  1441 25  1334 25  1333   WBC 7.24  --  8.44 23.33*  --   --   --   --   --  22.84*   HEMOGLOBIN 11.5*  --  11.0* 12.9  --   --   --   --   --  13.8   HEMATOCRIT 38.5  --  36.0 42.8  --   --   --   --   --  46.1   PLATELETS 89*  --  93* 115*  --    --   --   --   --  114*   NEUTROS ABS  --   --  6.92 21.55*  --   --   --   --   --  21.34*   IMMATURE GRANS (ABS)  --   --  0.02 0.12*  --   --   --   --   --  0.13*   LYMPHS ABS  --   --  1.09 1.00  --   --   --   --   --  0.66*   MONOS ABS  --   --  0.36 0.61  --   --   --   --   --  0.58   EOS ABS  --   --  0.03 0.00  --   --   --   --   --  0.04   MCV 93.0  --  92.8 94.7  --   --   --   --   --  94.3   PROCALCITONIN  --   --   --   --   --   --   --  0.96*  --   --    LACTATE  --   --   --  3.8* 1.5 3.0*   < >  --    < > 3.3*   HEPARIN ANTI-XA  --  1.06  --   --   --   --   --   --   --   --     < > = values in this interval not displayed.         Lab 04/29/25  0518 04/28/25  0245 04/27/25  0253   SODIUM 134* 137 141   POTASSIUM 4.3 4.2 4.6   CHLORIDE 101 102 105   CO2 19.5* 20.7* 21.6*   ANION GAP 13.5 14.3 14.4   BUN 27* 25* 26*   CREATININE 0.93 0.77 0.79   EGFR 65.4 82.1 79.6   GLUCOSE 125* 173* 121*   CALCIUM 9.1 8.9 8.8   MAGNESIUM 2.0 2.0 2.4   PHOSPHORUS 3.7 3.8 3.5         Lab 04/29/25  0518 04/28/25  0245 04/27/25  0253   TOTAL PROTEIN 7.1 6.9 7.2   ALBUMIN 3.4* 3.2* 3.2*   GLOBULIN 3.7 3.7 4.0   ALT (SGPT) 16 15 16   AST (SGOT) 23 21 20   BILIRUBIN 0.6 0.6 0.6   ALK PHOS 62 63 73         Lab 04/29/25  0518 04/26/25  1441 04/26/25  1333   PROBNP 6,349.0*  --  619.7   HSTROP T  --  15* 13                 Lab 04/26/25  1334   PH, ARTERIAL 7.391   PCO2, ARTERIAL 42.6   PO2 ART 58.0*   O2 SATURATION ART 89.3*   FIO2 40   HCO3 ART 25.9   BASE EXCESS ART 0.6     Brief Urine Lab Results  (Last result in the past 365 days)        Color   Clarity   Blood   Leuk Est   Nitrite   Protein   CREAT   Urine HCG        04/26/25 1325 Yellow   Clear   Small (1+)   Small (1+)   Positive   Negative                 [x]  Microbiology   Microbiology Results (last 10 days)       Procedure Component Value - Date/Time    MRSA Screen, PCR (Inpatient) - Swab, Nares [[id number]]  (Abnormal) Collected: 04/26/25 1813    Lab Status:  Final result Specimen: Swab from Nares Updated: 04/26/25 2001     MRSA PCR MRSA Detected    Narrative:      The negative predictive value of this diagnostic test is high and should only be used to consider de-escalating anti-MRSA therapy. A positive result may indicate colonization with MRSA and must be correlated clinically.    Respiratory Panel PCR w/COVID-19(SARS-CoV-2) BRYAN/LENY/NINFA/PAD/COR/HUANG In-House, NP Swab in UTM/VTM, 2 HR TAT - Swab, Nasopharynx [[id number]]  (Normal) Collected: 04/26/25 1813    Lab Status: Final result Specimen: Swab from Nasopharynx Updated: 04/26/25 1916     ADENOVIRUS, PCR Not Detected     Coronavirus 229E Not Detected     Coronavirus HKU1 Not Detected     Coronavirus NL63 Not Detected     Coronavirus OC43 Not Detected     COVID19 Not Detected     Human Metapneumovirus Not Detected     Human Rhinovirus/Enterovirus Not Detected     Influenza A PCR Not Detected     Influenza B PCR Not Detected     Parainfluenza Virus 1 Not Detected     Parainfluenza Virus 2 Not Detected     Parainfluenza Virus 3 Not Detected     Parainfluenza Virus 4 Not Detected     RSV, PCR Not Detected     Bordetella pertussis pcr Not Detected     Bordetella parapertussis PCR Not Detected     Chlamydophila pneumoniae PCR Not Detected     Mycoplasma pneumo by PCR Not Detected    Narrative:      In the setting of a positive respiratory panel with a viral infection PLUS a negative procalcitonin without other underlying concern for bacterial infection, consider observing off antibiotics or discontinuation of antibiotics and continue supportive care. If the respiratory panel is positive for atypical bacterial infection (Bordetella pertussis, Chlamydophila pneumoniae, or Mycoplasma pneumoniae), consider antibiotic de-escalation to target atypical bacterial infection.    Legionella Antigen, Urine - Urine, Urine, Clean Catch [[id number]]  (Normal) Collected: 04/26/25 1639    Lab Status: Final result Specimen: Urine, Clean Catch  Updated: 04/26/25 1709     LEGIONELLA ANTIGEN, URINE Negative    S. Pneumo Ag Urine or CSF - Urine, Urine, Clean Catch [[id number]]  (Normal) Collected: 04/26/25 1639    Lab Status: Final result Specimen: Urine, Clean Catch Updated: 04/26/25 1709     Strep Pneumo Ag Negative    Blood Culture - Blood, Arm, Left [[id number]]  (Normal) Collected: 04/26/25 1441    Lab Status: Preliminary result Specimen: Blood from Arm, Left Updated: 04/28/25 1500     Blood Culture No growth at 2 days    Narrative:      Less than seven (7) mL's of blood was collected.  Insufficient quantity may yield false negative results.    Blood Culture - Blood, Hand, Right [[id number]]  (Normal) Collected: 04/26/25 1441    Lab Status: Preliminary result Specimen: Blood from Hand, Right Updated: 04/28/25 1500     Blood Culture No growth at 2 days    Narrative:      Less than seven (7) mL's of blood was collected.  Insufficient quantity may yield false negative results.    COVID-19, FLU A/B, RSV PCR 1 HR TAT - Swab, Nasopharynx [[id number]]  (Normal) Collected: 04/26/25 1334    Lab Status: Final result Specimen: Swab from Nasopharynx Updated: 04/26/25 1425     COVID19 Not Detected     Influenza A PCR Not Detected     Influenza B PCR Not Detected     RSV, PCR Not Detected    Narrative:      Fact sheet for providers: https://www.fda.gov/media/997654/download    Fact sheet for patients: https://www.fda.gov/media/437863/download    Test performed by PCR.    Urine Culture - Urine, Indwelling Urethral Catheter [[id number]]  (Abnormal)  (Susceptibility) Collected: 04/26/25 1325    Lab Status: Final result Specimen: Urine from Indwelling Urethral Catheter Updated: 04/28/25 1026     Urine Culture >100,000 CFU/mL Escherichia coli    Narrative:      Colonization of the urinary tract without infection is common. Treatment is discouraged unless the patient is symptomatic, pregnant, or undergoing an invasive urologic procedure.    Susceptibility        Escherichia coli       KAMERON      Amoxicillin + Clavulanate Susceptible      Ampicillin Susceptible      Ampicillin + Sulbactam Susceptible      Cefazolin (Urine) Susceptible      Cefepime Susceptible      Ceftazidime Susceptible      Ceftriaxone Susceptible      Gentamicin Susceptible      Levofloxacin Susceptible      Nitrofurantoin Susceptible      Piperacillin + Tazobactam Susceptible      Trimethoprim + Sulfamethoxazole Susceptible                                 [x]  Radiology  XR Chest 1 View  Result Date: 4/29/2025  Cardiomegaly with mild vascular congestion and possible mild residual edema. Electronically Signed: Jean Tamez MD  4/29/2025 12:20 AM EDT  Workstation ID: [id number]    CT Head Without Contrast  Result Date: 4/27/2025  Impression: No evidence of acute intracranial disease. Electronically Signed: Kingston Serrano MD  4/27/2025 12:43 PM EDT  Workstation ID: [id number]    CT Chest Without Contrast Diagnostic  Result Date: 4/27/2025  1.Cardiomegaly with mild pulmonary edema. No pleural effusion. 2.Scattered areas of atelectasis in both lungs. No definite consolidating pneumonia. 3.Mild mediastinal adenopathy, worsened from prior study. This may be reactive. Follow-up chest CT in 3 months recommended. 4.Cholelithiasis. 5.Probable hepatosplenomegaly. 6.Atherosclerotic disease and coronary artery disease. Aortic valvular disease. Electronically Signed: Jean Tamez MD  4/27/2025 4:34 AM EDT  Workstation ID: [id number]    XR Chest 1 View  Result Date: 4/26/2025  Impression: Enlarged cardiac silhouette with findings suggestive of improving pulmonary edema compared to 4/4/2025 chest x-ray. Electronically Signed: Michelle Reyez MD  4/26/2025 2:06 PM EDT  Workstation ID: [id number]    []  EKG/Telemetry   []  Cardiology/Vascular   []  Pathology  []  Old records  []  Other:    Assessment & Plan   Assessment / Plan     Assessment:  Acute urinary tract infection due to E. Coli   Septic shock  Atrial fibrillation with rapid  rate  Biventricular congestive heart failure  Chronic venous changes  Chronic hypoxemic respiratory failure on oxygen at home  Chronic kidney disease stage III  Hypertension  Diabetes mellitus type 2  Recent history of norovirus  Morbid obesity with a BMI of 45      PLAN  -- Patient remains admitted to the medicine service.  Patient has moved out of the intensive care  -- Patient has been off Levophed for more than 24 hours  -- Patient is having episodes of diarrhea this morning  -- Patient remains on ceftriaxone for urinary tract infection  -- Continue patient on Cardizem and metoprolol oral for atrial fibrillation.  Amiodarone drip has been discontinued  --Will consult cardiology for further help with atrial fibrillation his rate is not controlled  -- Continue patient on IV Lasix  --Continue patient on Xarelto  --Will discontinue IV vancomycin   -- Continue sliding scale insulin for diabetes  -- Discussed plan with pulmonary Dr. Farmer      Discussed with RN.    VTE Prophylaxis:  Pharmacologic & mechanical VTE prophylaxis orders are present.        CODE STATUS:   Code Status (Patient has no pulse and is not breathing): CPR (Attempt to Resuscitate)  Medical Interventions (Patient has pulse or is breathing): Full Support  Level Of Support Discussed With: Patient      Electronically signed by Kelvin Thomson DO, 4/29/2025, 09:48 EDT.

## 2025-04-29 NOTE — CONSULTS
Cardiology Consult Note  [address] SERVICES          Patient Identification:  Mary Albarran      [id number]  74 y.o.        female  1950           Reason for Consultation: Atrial fibrillation    PCP: Brittni Quiroz APRN    History of Present Illness:     Patient is a 74-year-old with a prior history of aortic stenosis moderate, HFrEF mildly reduced, persistent atrial fibrillation, chronic renal failure stage IIIa, diabetes type 2, COPD on 2 L nasal cannula at home, and hyperlipidemia who several admissions over the last 2 to 3 months due to respiratory failure from COPD/ CHF, sepsis/cellulitis.  Presented with symptoms of lethargy and was found to be febrile with temperature to 101 her initial troponin was flat and not uptrending.  She was noted to be hypotensive the patient was admitted for sepsis and A-fib with a rapid ventricular rate.  Her chest x-ray appeared to be improved from prior regarding edema.  The patient states that she is noticing some shortness of breath symptoms now at night especially when she gets hot she denies any anginal chest pain     ast History:  Past Medical History:   Diagnosis Date    Allergic rhinitis     Anxiety     Aortic valve disease 11/28/2021    Fibrocalcific changes of the aortic valve with mild aortic valve stenosis   on echo 3/2/2021  Moderate aortic valve regurgitation is present. Aortic valve maximum pressure gradient is 26 mmHg. Moderate aortic valve stenosis is present.  On echo 2/10/2022       Arthritis     Atrial fibrillation     CHF (congestive heart failure)     Chronic kidney disease (CKD), stage III (moderate)     Chronic pain     COPD (chronic obstructive pulmonary disease)     Coronary artery disease     Diabetes mellitus type 2 with complications     Elevated cholesterol     Ex-smoker     QUIT SMOKING 2008    GERD (gastroesophageal reflux disease)     History of home oxygen therapy     2L/NC AAT REPORTED    History of  transfusion     Hyperlipidemia     Hypertension     Thrombocytopenia      Past Surgical History:   Procedure Laterality Date    HYSTERECTOMY      30 YEARS AGO     Allergies   Allergen Reactions    Morphine Confusion    Codeine Mental Status Change    Sglt2 Inhibitors Other (See Comments)     Frequent UTI      Social History     Socioeconomic History    Marital status:    Tobacco Use    Smoking status: Former     Current packs/day: 0.00     Average packs/day: 1 pack/day for 37.0 years (37.0 ttl pk-yrs)     Types: Cigarettes     Start date: 4/3/1971     Quit date: 2008     Years since quittin.0     Passive exposure: Never    Smokeless tobacco: Never    Tobacco comments:     FOR 30 YEARS   Vaping Use    Vaping status: Never Used   Substance and Sexual Activity    Alcohol use: Never    Drug use: Never    Sexual activity: Defer     Family History   Problem Relation Age of Onset    Heart disease Father     Heart disease Other     Heart disease Other     Diabetes Other        Medications:  Prior to Admission medications    Medication Sig Start Date End Date Taking? Authorizing Provider   albuterol (ACCUNEB) 1.25 MG/3ML nebulizer solution Take 3 mL by nebulization Every 6 (Six) Hours As Needed for Wheezing or Shortness of Air. 3/30/23  Yes Brittni Quiroz APRN   albuterol sulfate  (90 Base) MCG/ACT inhaler Inhale 2 puffs Every 4 (Four) Hours As Needed for Wheezing. 24  Yes Brittni Quiroz APRN   ammonium lactate (LAC-HYDRIN) 12 % lotion Apply  topically to the appropriate area as directed 2 (Two) Times a Day. 25  Yes Joaquin Rutledge MD   atorvastatin (LIPITOR) 20 MG tablet TAKE 1 TABLET DAILY 24  Yes Brittni Quiroz APRN   budesonide (PULMICORT) 1 MG/2ML nebulizer solution Take 2 mL by nebulization Daily. 24  Yes Brittni Quiroz APRN   bumetanide (BUMEX) 2 MG tablet Take 1 tablet by mouth Daily. 25  Yes Provider, MD Whitley   cholecalciferol  (VITAMIN D3) 25 MCG (1000 UT) tablet Take 1 tablet by mouth Daily.   Yes ProviderWhitley MD   dilTIAZem CD (CARDIZEM CD) 180 MG 24 hr capsule Take 1 capsule by mouth Daily.   Yes ProviderWhitley MD   HYDROcodone-acetaminophen (NORCO)  MG per tablet Take 1 tablet by mouth Every 6 (Six) Hours As Needed for Moderate Pain. 4/9/25  Yes Brittni Quiroz APRN   Insulin Glargine (Lantus SoloStar) 100 UNIT/ML injection pen Inject 15 Units under the skin into the appropriate area as directed Daily. 2/28/25  Yes Joaquin Rutledge MD   Januvia 50 MG tablet Take 1 tablet by mouth Daily. 4/19/25  Yes Whitley Emmanuel MD   Magnesium Oxide -Mg Supplement 400 (240 Mg) MG tablet Take 1 tablet by mouth Daily. 4/19/25  Yes ProviderWhitley MD   metoprolol tartrate (LOPRESSOR) 50 MG tablet Take 1 tablet by mouth 3 (Three) Times a Day. 3/30/25 7/29/25 Yes Whitley Emmanuel MD   potassium chloride ER (K-TAB) 20 MEQ tablet controlled-release ER tablet Take 2 tablets by mouth 2 (Two) Times a Day. 4/18/25  Yes Whitley Emmanuel MD   pregabalin (LYRICA) 150 MG capsule Take 1 capsule by mouth 2 (Two) Times a Day. 2/5/25  Yes Brittni Quiroz APRN   rivaroxaban (XARELTO) 20 MG tablet Take 1 tablet by mouth Daily for 30 days. Indications: DVT/PE (active thrombosis) 3/25/25 4/26/25 Yes Melody Luna MD      Current medications:  ammonium lactate, , Topical, Daily  budesonide, 0.5 mg, Nebulization, BID - RT  cefTRIAXone, 1,000 mg, Intravenous, Q24H  dilTIAZem CD, 180 mg, Oral, Daily  insulin lispro, 2-7 Units, Subcutaneous, 4x Daily AC & at Bedtime  ipratropium-albuterol, 3 mL, Nebulization, 4x Daily - RT  ipratropium-albuterol, 3 mL, Nebulization, Once  metoprolol tartrate, 50 mg, Oral, TID  miconazole, 1 Application, Topical, 2 times per day  mupirocin, 1 Application, Each Nare, BID  pantoprazole, 40 mg, Intravenous, Daily  rivaroxaban, 20 mg, Oral, Daily With Dinner  senna-docusate sodium, 2  "tablet, Oral, BID  sodium chloride, 10 mL, Intravenous, Q12H  sodium chloride, 10 mL, Intravenous, Q12H      Current IV drips:       Review of Systems   Constitutional: Negative for chills, fever and weight loss.   HENT:  Negative for congestion and nosebleeds.    Cardiovascular:  Negative for orthopnea and paroxysmal nocturnal dyspnea.   Respiratory:  Negative for cough and shortness of breath.    Endocrine: Negative for cold intolerance and heat intolerance.   Skin:  Negative for rash.   Musculoskeletal:  Negative for back pain and muscle weakness.   Gastrointestinal:  Negative for abdominal pain, nausea and vomiting.   Genitourinary:  Negative for dysuria and nocturia.   Neurological:  Positive for loss of balance. Negative for dizziness and light-headedness.   Psychiatric/Behavioral:  Positive for altered mental status. Negative for hallucinations.          Physical Exam    /80 (BP Location: Left arm, Patient Position: Lying)   Pulse 88   Temp 98.1 °F (36.7 °C) (Oral)   Resp 20   Ht 160 cm (62.99\")   Wt 119 kg (261 lb 7.5 oz)   SpO2 94%   BMI 46.33 kg/m²  Body mass index is 46.33 kg/m².   Oxygen saturation   @FLOWAN(10::1)@ SpO2  Min: 85 %  Max: 96 %    General Appearance:   no acute distress  Intubated and sedated  HENT:   lips not cyanotic  Atraumatic  Neck:  thyroid not enlarged  supple  Respiratory:  no respiratory distress  normal breath sounds  no rales  Cardiovascular:  no jugular venous distention  regular rhythm  apical impulse normal  S1 normal, S2 normal  no S3, no S4   no murmur  no rub, no thrill  no carotid bruit  pedal pulses normal  lower extremity edema: +1 gastrointestinal:   bowel sounds normal  non-tender  no hepatomegaly, no splenomegaly  Musculoskeletal:  no clubbing of fingers.   normocephalic, head atraumatic  Skin:   warm, dry  No rashes  Neuro/Psychiatric:  normal mood and affect  judgement and insight appropriate      Cardiographics:     ECG  (personally reviewed) "    Telemetry:  (personally reviewed) normal sinus rhythm brief   Results for orders placed during the hospital encounter of 02/11/25    Adult Transthoracic Echo Complete W/ Cont if Necessary Per Protocol    Interpretation Summary    Left ventricular systolic function is mildly decreased. Left ventricular ejection fraction appears to be 46 - 50%. with some anterior septal hypokensis    The right ventricular cavity is mildly dilated.    The left atrial cavity is mildly dilated.    The right atrial cavity is moderately  dilated.    Mild to moderate aortic valve stenosis is present.    Estimated right ventricular systolic pressure from tricuspid regurgitation is moderately elevated (45-55 mmHg).         No results found for this or any previous visit.     Cardiolite (Tc-99m Sestamibi) stress test                  Lab Review:       CBC          4/27/2025    02:53 4/28/2025    02:45 4/29/2025    05:18   CBC   WBC 23.33  8.44  7.24    RBC 4.52  3.88  4.14    Hemoglobin 12.9  11.0  11.5    Hematocrit 42.8  36.0  38.5    MCV 94.7  92.8  93.0    MCH 28.5  28.4  27.8    MCHC 30.1  30.6  29.9    RDW 14.6  14.6  14.6    Platelets 115  93  89        CMP          4/27/2025    02:53 4/28/2025    02:45 4/29/2025    05:18   CMP   Glucose 121  173  125    BUN 26  25  27    Creatinine 0.79  0.77  0.93    EGFR 79.6  82.1  65.4    Sodium 141  137  134    Potassium 4.6  4.2  4.3    Chloride 105  102  101    Calcium 8.8  8.9  9.1    Total Protein 7.2  6.9  7.1    Albumin 3.2  3.2  3.4    Globulin 4.0  3.7  3.7    Total Bilirubin 0.6  0.6  0.6    Alkaline Phosphatase 73  63  62    AST (SGOT) 20  21  23    ALT (SGPT) 16  15  16    Albumin/Globulin Ratio 0.8  0.9  0.9    BUN/Creatinine Ratio 32.9  32.5  29.0    Anion Gap 14.4  14.3  13.5         CARDIAC LABS:      Lab 04/29/25  0518 04/26/25  1441 04/26/25  1333   PROBNP 6,349.0*  --  619.7   HSTROP T  --  15* 13      Lab Results   Component Value Date    DIGOXIN 0.50 (L) 03/07/2025      Lab  "Results   Component Value Date    TSH 1.520 01/27/2023           Invalid input(s): \"LDLCALC\"  No results found for: \"POCTROP\"  No results found for: \"DDIMERQUAN\"  Lab Results   Component Value Date    MG 2.0 04/29/2025             CARDIAC LABS:      Lab 04/29/25  0518 04/26/25  1441 04/26/25  1333   PROBNP 6,349.0*  --  619.7   HSTROP T  --  15* 13        Imaging:  CXR       XR Chest 1 View  Performed: 04/29/25 0015  Final          Impression: Cardiomegaly with mild vascular congestion and possible mild residual edema.        Assessment:    Longstanding persistent atrial fibrillation    Acute on chronic HFrEF (heart failure with reduced ejection fraction)    Sepsis      Atrial fibrillation persistent in nature now the patient has been back on her Lopressor and Cardizem her rate control appears to be doing fairly well in the last 24 hours we will continue to monitor on telemetry and continue with Xarelto 20 daily with meals for anticoagulation    HFpEF acute on chronic patient now appears to have some increased evidence of volume overload on chest x-ray as mobile proBNP now that her infectious issues and her blood pressure has resumed have resolved we will go ahead and resume her Bumex today and see how she responds overnight    Plan:  1.  Continue with Cardizem  daily and Lopressor 50 twice daily dosing for rate control  2.  Lasix 40 mg IV x 1 today and resume Bumex 2 mg p.o. daily in a.m.  3.  Xarelto 20 mg      Thank you for allowing us to share in [facility].            Joaquin Gonzalez MD   4/29/2025    16:11 EDT    "

## 2025-04-29 NOTE — TELEPHONE ENCOUNTER
"Called [facility]  Supply Felix answered phone and was very rude and nasty and would not help me and said he could not help me because I have been unable to reach pt and he laughed and said 'LADY I CAN'T HELP YOU , YOU ARE GOING TO MAKE ME MADE\" and sent me to someone else.     Spoke to ladsonia and she said they need a 6 min walk  and an RX but if it has been 36 billing cycles then they are unable to give her what is needed .  "

## 2025-04-29 NOTE — PLAN OF CARE
Goal Outcome Evaluation:  Plan of Care Reviewed With: patient        Progress: no change  Outcome Evaluation: transfered from unit this shift. felt nausous, gave prn nausea medications. felt soa, got breathing treatment ordered. slept rest of shift until wanting pain medication. on 3L o2.

## 2025-04-29 NOTE — PLAN OF CARE
Goal Outcome Evaluation:  Plan of Care Reviewed With: patient        Progress: no change  Outcome Evaluation: Pt A&ox4. Heart rate tachy this shift. Multiple liquid BMs this morning Dr. Thomson notified. Medicated for pain x1 per order. Wound care complete. Ongoing care.

## 2025-04-29 NOTE — THERAPY TREATMENT NOTE
Patient Name: Mary Albarran  : 1950    MRN: [id number]                              Today's Date: 2025       Admit Date: 2025    Visit Dx:     ICD-10-CM ICD-9-CM   1. Sepsis, due to unspecified organism, unspecified whether acute organ dysfunction present  A41.9 038.9     995.91   2. Acute UTI  N39.0 599.0   3. Atrial fibrillation, unspecified type  I48.91 427.31   4. Difficulty walking  R26.2 719.7   5. Decreased activities of daily living (ADL)  Z78.9 V49.89     Patient Active Problem List   Diagnosis    Aortic stenosis, moderate    Atrial fibrillation    Chronic kidney disease, stage III (moderate)    Hyperlipidemia    Hypertension    Chronic diastolic congestive heart failure    Atrial fibrillation with RVR    Onychomycosis    Onychocryptosis    Foot pain, bilateral    Nephrolithiasis    Recurrent urinary tract infection    Acute on chronic HFrEF (heart failure with reduced ejection fraction)    Cellulitis of right leg without foot    Diabetes mellitus type 2 with complications    Depression    COPD (chronic obstructive pulmonary disease)    Chronic pain    Anxiety    Heart failure with reduced ejection fraction    CHF exacerbation    Sepsis     Past Medical History:   Diagnosis Date    Allergic rhinitis     Anxiety     Aortic valve disease 2021    Fibrocalcific changes of the aortic valve with mild aortic valve stenosis   on echo 3/2/2021  Moderate aortic valve regurgitation is present. Aortic valve maximum pressure gradient is 26 mmHg. Moderate aortic valve stenosis is present.  On echo 2/10/2022       Arthritis     Atrial fibrillation     CHF (congestive heart failure)     Chronic kidney disease (CKD), stage III (moderate)     Chronic pain     COPD (chronic obstructive pulmonary disease)     Coronary artery disease     Diabetes mellitus type 2 with complications     Elevated cholesterol     Ex-smoker     QUIT SMOKING     GERD (gastroesophageal reflux disease)     History of home  oxygen therapy     2L/NC AAT REPORTED    History of transfusion     Hyperlipidemia     Hypertension     Thrombocytopenia      Past Surgical History:   Procedure Laterality Date    HYSTERECTOMY      30 YEARS AGO      General Information       Row Name 04/29/25 1118          OT Time and Intention    Document Type therapy note (daily note)  -AV     Mode of Treatment individual therapy;occupational therapy  -AV     Patient Effort good  -AV       Row Name 04/29/25 1118          General Information    Existing Precautions/Restrictions fall;oxygen therapy device and L/min  -AV       [facility] Name 04/29/25 1118          Cognition    Orientation Status (Cognition) --  alert, pleasant and cooperative. able to perform exercises with only minimal cues/ demonstration.  -AV       Row Name 04/29/25 1118          Safety Issues/Impairments Affecting Functional Mobility    Impairments Affecting Function (Mobility) balance;endurance/activity tolerance;strength  -AV               User Key  (r) = Recorded By, (t) = Taken By, (c) = Cosigned By      Initials Name Provider Type    AV Juan Ireland OT Occupational Therapist                     Mobility/ADL's    No documentation.                  Obj/Interventions       Row Name 04/29/25 1119          Shoulder (Therapeutic Exercise)    Shoulder (Therapeutic Exercise) strengthening exercise  -AV     Shoulder Strengthening (Therapeutic Exercise) bilateral;flexion;horizontal aBduction/aDduction;resistance band;yellow  x12  -AV       [facility] Name 04/29/25 1119          Elbow/Forearm (Therapeutic Exercise)    Elbow/Forearm (Therapeutic Exercise) strengthening exercise  -AV     Elbow/Forearm Strengthening (Therapeutic Exercise) bilateral;flexion;extension;resistance band;yellow  x12  -AV       [facility] Name 04/29/25 1119          Motor Skills    Therapeutic Exercise shoulder;elbow/forearm  performed in high-Cruz's/ on 4L O2. 3 short rest breaks required.  -AV               User Key  (r) = Recorded By, (t) =  Taken By, (c) = Cosigned By      Initials Name Provider Type    Juan Magana OT Occupational Therapist                   Goals/Plan    No documentation.                  Clinical Impression       Row Name 04/29/25 1121          Pain Scale: FACES Pre/Post-Treatment    Pain: FACES Scale, Pretreatment 0-->no hurt  -AV     Posttreatment Pain Rating 0-->no hurt  -AV       Row Name 04/29/25 1121          Plan of Care Review    Progress no change  -AV     Outcome Evaluation Patient performed upper extremity therapeutic exercises with light resistance theraband to improve endurance/activity tolerance needed to support ADLs. O2 sats low 90's/ HR ranged  during session. Continued OT is indicated to remediate/ compensate for deficits to maximize independence and safety with functional ADL tasks.  -AV       Row Name 04/29/25 1121          Therapy Plan Review/Discharge Plan (OT)    Anticipated Discharge Disposition (OT) sub acute care setting  -AV       Row Name 04/29/25 1121          Vital Signs    O2 Delivery Pre Treatment nasal cannula  4L  -AV     O2 Delivery Intra Treatment nasal cannula  4L  -AV     O2 Delivery Post Treatment nasal cannula  4L  -AV       Row Name 04/29/25 1121          Positioning and Restraints    Pre-Treatment Position in bed  -AV     Post Treatment Position bed  -AV     In Bed call light within reach;encouraged to call for assist  -AV               User Key  (r) = Recorded By, (t) = Taken By, (c) = Cosigned By      Initials Name Provider Type    Juan Magana OT Occupational Therapist                   Outcome Measures       Row Name 04/29/25 1123          How much help from another is currently needed...    Putting on and taking off regular lower body clothing? 1  -AV     Bathing (including washing, rinsing, and drying) 2  -AV     Toileting (which includes using toilet bed pan or urinal) 1  -AV     Putting on and taking off regular upper body clothing 3  -AV     Taking care of personal  grooming (such as brushing teeth) 3  -AV     Eating meals 4  -AV     AM-PAC 6 Clicks Score (OT) 14  -AV       Row Name 04/29/25 0718          How much help from another person do you currently need...    Turning from your back to your side while in flat bed without using bedrails? 3  -HM     Moving from lying on back to sitting on the side of a flat bed without bedrails? 3  -HM     Moving to and from a bed to a chair (including a wheelchair)? 2  -HM     Standing up from a chair using your arms (e.g., wheelchair, bedside chair)? 2  -HM     Climbing 3-5 steps with a railing? 1  -HM     To walk in hospital room? 2  -HM     AM-PAC 6 Clicks Score (PT) 13  -HM     Highest Level of Mobility Goal 4 --> Transfer to chair/commode  -HM       Row Name 04/29/25 1123          Optimal Instrument    Bending/Stooping 4  -AV     Standing 5  -AV     Reaching 1  -AV               User Key  (r) = Recorded By, (t) = Taken By, (c) = Cosigned By      Initials Name Provider Type    AV Juan Ireland OT Occupational Therapist     Becki Reyes, RN Registered Nurse                    Occupational Therapy Education       Title: PT OT SLP Therapies (In Progress)       Topic: Occupational Therapy (In Progress)       Point: ADL training (Done)       Learning Progress Summary            Patient Acceptance, E,TB, VU by  at 4/28/2025 1455                      Point: Precautions (Done)       Learning Progress Summary            Patient Acceptance, E,TB, VU by  at 4/28/2025 1455                      Point: Body mechanics (Done)       Learning Progress Summary            Patient Acceptance, E,TB, VU by  at 4/28/2025 1455                                      User Key       Initials Effective Dates Name Provider Type Discipline     06/16/21 -  Nevaeh Schmidt OT Occupational Therapist OT                  OT Recommendation and Plan     Plan of Care Review  Progress: no change  Outcome Evaluation: Patient performed upper extremity therapeutic  exercises with light resistance theraband to improve endurance/activity tolerance needed to support ADLs. O2 sats low 90's/ HR ranged  during session. Continued OT is indicated to remediate/ compensate for deficits to maximize independence and safety with functional ADL tasks.     Time Calculation:         Time Calculation- OT       Row Name 04/29/25 1124             Time Calculation- OT    OT Received On 04/29/25  -AV      OT Goal Re-Cert Due Date 05/07/25  -AV         Timed Charges    42165 - OT Therapeutic Exercise Minutes 10  -AV         Total Minutes    Timed Charges Total Minutes 10  -AV       Total Minutes 10  -AV                User Key  (r) = Recorded By, (t) = Taken By, (c) = Cosigned By      Initials Name Provider Type    AV Juan Ireland OT Occupational Therapist                  Therapy Charges for Today       Code Description Service Date Service Provider Modifiers Qty    [id number] HC OT THER PROC EA 15 MIN 4/29/2025 Juan Ireland OT GO 1                 Juan Ireland OT  4/29/2025

## 2025-04-29 NOTE — PROGRESS NOTES
[facility]     Progress Note    Patient Name: Mary Albarran  : 1950  MRN: [id number]  Primary Care Physician:  Brittni Quiroz APRN  Date of admission: 2025    Subjective   Subjective   Follow-up for sepsis    Over last 24 hours, transferred out of ICU    This morning,  Currently on 4 L nasal cannula  Resting in bed  Having dressing changed by RN  No fever or chills  Denies chest pain or chest tightness  Denies cough      Objective   Objective     Vitals:   Temp:  [97.9 °F (36.6 °C)-100.8 °F (38.2 °C)] 97.9 °F (36.6 °C)  Heart Rate:  [] 102  Resp:  [20-22] 22  BP: (106-138)/() 134/62  Flow (L/min) (Oxygen Therapy):  [3-4] 4  Physical Exam   Vital Signs Reviewed   General: WDWN, Alert, NAD.  Obese female, lying in bed  HEENT:  PERRL, EOMI.  OP, nares clear, no sinus tenderness  Neck:  Supple, no JVD, no thyromegaly  Chest:  good aeration, diminished bilaterally with scattered crackles, currently on 4 L  CV: RRR, no MGR, pulses 2+, equal.  Abd:  Soft, NT, ND, + BS, no HSM  EXT:  no clubbing, no cyanosis, BLE edema  Neuro:  A&Ox3, CN grossly intact, no focal deficits.  Skin: No rashes or lesions noted    Result Review    Result Review:  I have personally reviewed the results from the time of this admission to 2025 09:19 EDT and agree with these findings:  [x]  Laboratory  [x]  Microbiology  [x]  Radiology  [x]  EKG/Telemetry   [x]  Cardiology/Vascular   []  Pathology  []  Old records  []  Other:  Most notable findings include:         Lab 25  0518 25  0245 25  0253 25  1441 25  1333   WBC 7.24 8.44 23.33*  --  22.84*   HEMOGLOBIN 11.5* 11.0* 12.9  --  13.8   HEMATOCRIT 38.5 36.0 42.8  --  46.1   PLATELETS 89* 93* 115*  --  114*   SODIUM 134* 137 141  --  135*   POTASSIUM 4.3 4.2 4.6  --  5.4*   CHLORIDE 101 102 105  --  97*   CO2 19.5* 20.7* 21.6*  --  25.6   BUN 27* 25* 26*  --  36*   CREATININE 0.93 0.77 0.79  --  1.08*   GLUCOSE 125* 173* 121*   --  292*   CALCIUM 9.1 8.9 8.8  --  9.6   PHOSPHORUS 3.7 3.8 3.5 3.6  --    TOTAL PROTEIN 7.1 6.9 7.2  --  8.0   ALBUMIN 3.4* 3.2* 3.2*  --  4.1   GLOBULIN 3.7 3.7 4.0  --  3.9       XR Chest 1 View  Result Date: 4/29/2025  XR CHEST 1 VW Date of Exam: 4/29/2025 12:05 AM EDT Indication: difficulty breathing Comparison: 4/26/2025 Findings: Cardiomegaly is stable. There is a mild degree of vascular congestion. Mild interstitial changes in the lower lung zones may reflect some mild residual edema. No dense alveolar infiltrates. No pneumothorax.     Impression: Cardiomegaly with mild vascular congestion and possible mild residual edema. Electronically Signed: Jean Tamez MD  4/29/2025 12:20 AM EDT  Workstation ID: [id number]    XR Chest 1 View  Result Date: 4/26/2025  XR CHEST 1 VW Date of Exam: 4/26/2025 1:50 PM EDT Indication: Weak/Dizzy/AMS triage protocol Comparison: AP chest x-ray 4/4/2025, CT chest 2/11/2025 Findings: Cardiac silhouette remains enlarged. Findings of pulmonary edema appear decreased, but not resolved compared to previous chest x-ray. No pneumothorax or large pleural effusion is seen.     Impression: Impression: Enlarged cardiac silhouette with findings suggestive of improving pulmonary edema compared to 4/4/2025 chest x-ray. Electronically Signed: Michelle Reyez MD  4/26/2025 2:06 PM EDT  Workstation ID: [id number]    Blood cultures are NGTD  Respiratory panel negative  Urine culture in process  MRSA PCR positive      Assessment & Plan   Assessment / Plan     Active Hospital Problems:  Active Hospital Problems    Diagnosis     Sepsis    Septic shock likely secondary to UTI  A-fib with RVR on anticoagulation with Xarelto  Hyperglycemia with known diabetes mellitus  Hyperkalemia on admission at 5.4  KATHRIN on CKD stage III with creatinine of 1.08 on admission.  Most recent baseline 0.7-0.9  Lactic acidosis  Obesity with BMI of 45  Congestive heart failure with reduced ejection fraction without  decompensation  COPD with chronic hypoxemia, 2 L at baseline  Right ear pain    Plan:     Continue supplemental oxygen to keep SpO2 greater than 90%  Continue BiPAP at night and with naps  Chest x-ray showed pulmonary edema  BNP elevated at 6350  Continue Lasix IV daily.  Monitor renal function electrolytes  Continue ceftriaxone and vancomycin given MRSA nares positive  Continue nebs and broncho bone hygiene  Encourage I-S and flutter valve use  PT/OT on board  Patient may benefit from rehab      VTE Prophylaxis:  Pharmacologic & mechanical VTE prophylaxis orders are present.    CODE STATUS:   Code Status (Patient has no pulse and is not breathing): CPR (Attempt to Resuscitate)  Medical Interventions (Patient has pulse or is breathing): Full Support  Level Of Support Discussed With: Patient    Electronically signed by LUCERO Olmedo, 04/29/25, 12:32 PM EDT.    This patient was seen by both a physician and a NP. I, Mau Farmer MD, spent >50% of time in accordance with split shared billing. This included personally reviewing all pertinent labs, imaging, microbiology and documentation. Also discussing the case with the patient and any available family, the admitting physician and any available ancillary staff.   Electronically signed by Mau Farmer MD, 04/29/25, 3:12 PM EDT.

## 2025-04-30 LAB
027 TOXIN: ABNORMAL
ALBUMIN SERPL-MCNC: 3.1 G/DL (ref 3.5–5.2)
ALBUMIN/GLOB SERPL: 0.9 G/DL
ALP SERPL-CCNC: 60 U/L (ref 39–117)
ALT SERPL W P-5'-P-CCNC: 15 U/L (ref 1–33)
ANION GAP SERPL CALCULATED.3IONS-SCNC: 13.2 MMOL/L (ref 5–15)
AST SERPL-CCNC: 18 U/L (ref 1–32)
BILIRUB SERPL-MCNC: 0.6 MG/DL (ref 0–1.2)
BUN SERPL-MCNC: 26 MG/DL (ref 8–23)
BUN/CREAT SERPL: 32.1 (ref 7–25)
C DIFF GDH + TOXINS A+B STL QL IA.RAPID: NEGATIVE
C DIFF TOX GENS STL QL NAA+PROBE: POSITIVE
CALCIUM SPEC-SCNC: 9 MG/DL (ref 8.6–10.5)
CHLORIDE SERPL-SCNC: 102 MMOL/L (ref 98–107)
CO2 SERPL-SCNC: 20.8 MMOL/L (ref 22–29)
CREAT SERPL-MCNC: 0.81 MG/DL (ref 0.57–1)
DEPRECATED RDW RBC AUTO: 49.8 FL (ref 37–54)
EGFRCR SERPLBLD CKD-EPI 2021: 77.2 ML/MIN/1.73
ERYTHROCYTE [DISTWIDTH] IN BLOOD BY AUTOMATED COUNT: 14.6 % (ref 12.3–15.4)
GLOBULIN UR ELPH-MCNC: 3.5 GM/DL
GLUCOSE BLDC GLUCOMTR-MCNC: 100 MG/DL (ref 70–99)
GLUCOSE BLDC GLUCOMTR-MCNC: 143 MG/DL (ref 70–99)
GLUCOSE BLDC GLUCOMTR-MCNC: 148 MG/DL (ref 70–99)
GLUCOSE BLDC GLUCOMTR-MCNC: 167 MG/DL (ref 70–99)
GLUCOSE SERPL-MCNC: 110 MG/DL (ref 65–99)
HCT VFR BLD AUTO: 35.3 % (ref 34–46.6)
HGB BLD-MCNC: 10.7 G/DL (ref 12–15.9)
MAGNESIUM SERPL-MCNC: 1.9 MG/DL (ref 1.6–2.4)
MCH RBC QN AUTO: 28.1 PG (ref 26.6–33)
MCHC RBC AUTO-ENTMCNC: 30.3 G/DL (ref 31.5–35.7)
MCV RBC AUTO: 92.7 FL (ref 79–97)
PLATELET # BLD AUTO: 89 10*3/MM3 (ref 140–450)
PMV BLD AUTO: 13.6 FL (ref 6–12)
POTASSIUM SERPL-SCNC: 3.7 MMOL/L (ref 3.5–5.2)
PROT SERPL-MCNC: 6.6 G/DL (ref 6–8.5)
RBC # BLD AUTO: 3.81 10*6/MM3 (ref 3.77–5.28)
SODIUM SERPL-SCNC: 136 MMOL/L (ref 136–145)
WBC NRBC COR # BLD AUTO: 5.06 10*3/MM3 (ref 3.4–10.8)

## 2025-04-30 PROCEDURE — 94664 DEMO&/EVAL PT USE INHALER: CPT

## 2025-04-30 PROCEDURE — 94760 N-INVAS EAR/PLS OXIMETRY 1: CPT

## 2025-04-30 PROCEDURE — 80053 COMPREHEN METABOLIC PANEL: CPT | Performed by: PHYSICIAN ASSISTANT

## 2025-04-30 PROCEDURE — 99233 SBSQ HOSP IP/OBS HIGH 50: CPT | Performed by: INTERNAL MEDICINE

## 2025-04-30 PROCEDURE — 82948 REAGENT STRIP/BLOOD GLUCOSE: CPT

## 2025-04-30 PROCEDURE — 94799 UNLISTED PULMONARY SVC/PX: CPT

## 2025-04-30 PROCEDURE — 82948 REAGENT STRIP/BLOOD GLUCOSE: CPT | Performed by: PHYSICIAN ASSISTANT

## 2025-04-30 PROCEDURE — 99233 SBSQ HOSP IP/OBS HIGH 50: CPT | Performed by: STUDENT IN AN ORGANIZED HEALTH CARE EDUCATION/TRAINING PROGRAM

## 2025-04-30 PROCEDURE — 25010000002 ONDANSETRON PER 1 MG: Performed by: HOSPITALIST

## 2025-04-30 PROCEDURE — 83735 ASSAY OF MAGNESIUM: CPT | Performed by: INTERNAL MEDICINE

## 2025-04-30 PROCEDURE — 85027 COMPLETE CBC AUTOMATED: CPT | Performed by: PHYSICIAN ASSISTANT

## 2025-04-30 PROCEDURE — 63710000001 INSULIN LISPRO (HUMAN) PER 5 UNITS: Performed by: PHYSICIAN ASSISTANT

## 2025-04-30 PROCEDURE — 87493 C DIFF AMPLIFIED PROBE: CPT | Performed by: INTERNAL MEDICINE

## 2025-04-30 PROCEDURE — 87449 NOS EACH ORGANISM AG IA: CPT | Performed by: INTERNAL MEDICINE

## 2025-04-30 RX ADMIN — HYDROCODONE BITARTRATE AND ACETAMINOPHEN 1 TABLET: 5; 325 TABLET ORAL at 12:41

## 2025-04-30 RX ADMIN — HYDROCODONE BITARTRATE AND ACETAMINOPHEN 1 TABLET: 5; 325 TABLET ORAL at 18:54

## 2025-04-30 RX ADMIN — DILTIAZEM HYDROCHLORIDE 180 MG: 180 CAPSULE, COATED, EXTENDED RELEASE ORAL at 09:30

## 2025-04-30 RX ADMIN — BUDESONIDE 0.5 MG: 0.5 SUSPENSION RESPIRATORY (INHALATION) at 20:02

## 2025-04-30 RX ADMIN — Medication 10 ML: at 20:57

## 2025-04-30 RX ADMIN — MICONAZOLE NITRATE 1 APPLICATION: 2 POWDER TOPICAL at 17:18

## 2025-04-30 RX ADMIN — INSULIN LISPRO 2 UNITS: 100 INJECTION, SOLUTION INTRAVENOUS; SUBCUTANEOUS at 21:16

## 2025-04-30 RX ADMIN — Medication 10 ML: at 20:58

## 2025-04-30 RX ADMIN — IPRATROPIUM BROMIDE AND ALBUTEROL SULFATE 3 ML: .5; 3 SOLUTION RESPIRATORY (INHALATION) at 13:26

## 2025-04-30 RX ADMIN — ONDANSETRON 4 MG: 2 INJECTION INTRAMUSCULAR; INTRAVENOUS at 13:22

## 2025-04-30 RX ADMIN — Medication 10 ML: at 09:31

## 2025-04-30 RX ADMIN — Medication 10 ML: at 09:29

## 2025-04-30 RX ADMIN — METOPROLOL TARTRATE 50 MG: 50 TABLET, FILM COATED ORAL at 09:30

## 2025-04-30 RX ADMIN — IPRATROPIUM BROMIDE AND ALBUTEROL SULFATE 3 ML: .5; 3 SOLUTION RESPIRATORY (INHALATION) at 20:02

## 2025-04-30 RX ADMIN — RIVAROXABAN 20 MG: 20 TABLET, FILM COATED ORAL at 17:50

## 2025-04-30 RX ADMIN — ONDANSETRON 4 MG: 2 INJECTION INTRAMUSCULAR; INTRAVENOUS at 07:23

## 2025-04-30 RX ADMIN — BUMETANIDE 2 MG: 1 TABLET ORAL at 09:30

## 2025-04-30 RX ADMIN — METOPROLOL TARTRATE 50 MG: 50 TABLET, FILM COATED ORAL at 20:57

## 2025-04-30 RX ADMIN — IPRATROPIUM BROMIDE AND ALBUTEROL SULFATE 3 ML: .5; 3 SOLUTION RESPIRATORY (INHALATION) at 06:27

## 2025-04-30 RX ADMIN — HYDROCODONE BITARTRATE AND ACETAMINOPHEN 1 TABLET: 5; 325 TABLET ORAL at 06:40

## 2025-04-30 RX ADMIN — Medication 10 ML: at 07:24

## 2025-04-30 RX ADMIN — BUDESONIDE 0.5 MG: 0.5 SUSPENSION RESPIRATORY (INHALATION) at 06:28

## 2025-04-30 RX ADMIN — MICONAZOLE NITRATE 1 APPLICATION: 2 POWDER TOPICAL at 21:00

## 2025-04-30 RX ADMIN — Medication: at 17:18

## 2025-04-30 RX ADMIN — MUPIROCIN 1 APPLICATION: 20 OINTMENT TOPICAL at 09:28

## 2025-04-30 RX ADMIN — PANTOPRAZOLE SODIUM 40 MG: 40 INJECTION, POWDER, FOR SOLUTION INTRAVENOUS at 09:29

## 2025-04-30 NOTE — SIGNIFICANT NOTE
04/30/25 0849   OTHER   Discipline occupational therapist   Rehab Time/Intention   Session Not Performed patient/family declined treatment  (pleasantly declined 0713 due to nausea/ nurse to medicate. checked back at pt request 0819, still not feeling well.)

## 2025-04-30 NOTE — PLAN OF CARE
Goal Outcome Evaluation:  Plan of Care Reviewed With: patient        Progress: no change  Outcome Evaluation: Patienet medicated for pain and nausea per MAR. Cdiff suspected, stool sample collected, awaiting results, and placed in isolation per order. No other needs at this time. Ongoing care.

## 2025-04-30 NOTE — CONSULTS
Consult placed per Insulin Administration. Met with patient at bedside. Discussed current A1c of 9% with an estimated average glucose of 212 mg/dl from March 2025. Patinet states she takes Lantus and Januvia daily at home but has an additional rapid acting insulin if her BG is > 300 prescribed by her PCP. Patient states her PCP recently increased her Lantus to 30 units daily which is what she plans on taking when she leaves the hospital. She manually checks her BG at home and does not wear a sensor. She has no further needs or questions at this time.

## 2025-04-30 NOTE — SIGNIFICANT NOTE
04/30/25 1331   Physical Therapy Time and Intention   Session Not Performed patient/family declined treatment  (Declined due to nausea, nursing aware.)

## 2025-04-30 NOTE — PLAN OF CARE
Goal Outcome Evaluation:  Plan of Care Reviewed With: patient        Progress: no change  Outcome Evaluation: had pain medication once this shift. able to make needs known.

## 2025-04-30 NOTE — PROGRESS NOTES
[facility]   Hospitalist Progress Note  Date: 2025  Patient Name: Mary Albarran  : 1950  MRN: [id number]  Date of admission: 2025  Room/Bed: [facility]/      Subjective   Subjective     Chief Complaint: Lethargic    Summary:Mary Albarran is a 74 y.o. female with a host of medical problems presented to the ED with lethargy, she had recently been admitted to the hospital removal for a CHF exacerbation.  Upon presentation in the ED she was too somnolent to give much of a history.  She was found to have a fever, tachypnea, significant leukocytosis with lactic acidosis.  She was admitted to the medicine service for sepsis.    Interval Followup:    Patient continues to have multiple liquid bowel movements and has pain in her buttock area due to having bowel movements  Patient remains in atrial fibrillation however heart rate is better controlled  Patient has been afebrile  Patient remains very weak but continues to refuse rehab saying she wants to discharge home                Objective   Objective     Vitals:   Temp:  [97.5 °F (36.4 °C)-98.2 °F (36.8 °C)] 97.7 °F (36.5 °C)  Heart Rate:  [74-91] 84  Resp:  [16-20] 18  BP: (122-151)/(53-87) 144/74  Flow (L/min) (Oxygen Therapy):  [4] 4    Physical Exam   General: Awake, alert, NAD resting in bed.  at the bedside   HENT: NCAT, MMM;   Eyes: pupils equal, no scleral icterus  Cardiovascular: IR/IR with rapid rate   Pulmonary: decreased. No wheezing noted   Gastrointestinal: S/ND/NT, +BS  Musculoskeletal: No gross deformities  Skin: lower legs have venous changes and right lower leg has more erythema       Result Review    Result Review:  I have personally reviewed these results:  [x]  Laboratory      Lab 25  0436 25  0518 25  1215 25  0245 25  0253 25  2336 25  1949 25  1637 25  1441 25  1334 25  1333   WBC 5.06 7.24  --  8.44 23.33*  --   --   --   --   --  22.84*   HEMOGLOBIN 10.7* 11.5*   --  11.0* 12.9  --   --   --   --   --  13.8   HEMATOCRIT 35.3 38.5  --  36.0 42.8  --   --   --   --   --  46.1   PLATELETS 89* 89*  --  93* 115*  --   --   --   --   --  114*   NEUTROS ABS  --   --   --  6.92 21.55*  --   --   --   --   --  21.34*   IMMATURE GRANS (ABS)  --   --   --  0.02 0.12*  --   --   --   --   --  0.13*   LYMPHS ABS  --   --   --  1.09 1.00  --   --   --   --   --  0.66*   MONOS ABS  --   --   --  0.36 0.61  --   --   --   --   --  0.58   EOS ABS  --   --   --  0.03 0.00  --   --   --   --   --  0.04   MCV 92.7 93.0  --  92.8 94.7  --   --   --   --   --  94.3   PROCALCITONIN  --   --   --   --   --   --   --   --  0.96*  --   --    LACTATE  --   --   --   --  3.8* 1.5 3.0*   < >  --    < > 3.3*   HEPARIN ANTI-XA  --   --  1.06  --   --   --   --   --   --   --   --     < > = values in this interval not displayed.         Lab 04/30/25  0436 04/29/25  0518 04/28/25  0245 04/27/25  0253   SODIUM 136 134* 137 141   POTASSIUM 3.7 4.3 4.2 4.6   CHLORIDE 102 101 102 105   CO2 20.8* 19.5* 20.7* 21.6*   ANION GAP 13.2 13.5 14.3 14.4   BUN 26* 27* 25* 26*   CREATININE 0.81 0.93 0.77 0.79   EGFR 77.2 65.4 82.1 79.6   GLUCOSE 110* 125* 173* 121*   CALCIUM 9.0 9.1 8.9 8.8   MAGNESIUM 1.9 2.0 2.0 2.4   PHOSPHORUS  --  3.7 3.8 3.5         Lab 04/30/25  0436 04/29/25  0518 04/28/25  0245   TOTAL PROTEIN 6.6 7.1 6.9   ALBUMIN 3.1* 3.4* 3.2*   GLOBULIN 3.5 3.7 3.7   ALT (SGPT) 15 16 15   AST (SGOT) 18 23 21   BILIRUBIN 0.6 0.6 0.6   ALK PHOS 60 62 63         Lab 04/29/25  0518 04/26/25  1441 04/26/25  1333   PROBNP 6,349.0*  --  619.7   HSTROP T  --  15* 13                 Lab 04/26/25  1334   PH, ARTERIAL 7.391   PCO2, ARTERIAL 42.6   PO2 ART 58.0*   O2 SATURATION ART 89.3*   FIO2 40   HCO3 ART 25.9   BASE EXCESS ART 0.6     Brief Urine Lab Results  (Last result in the past 365 days)        Color   Clarity   Blood   Leuk Est   Nitrite   Protein   CREAT   Urine HCG        04/26/25 1325 Yellow   Clear   Small  (1+)   Small (1+)   Positive   Negative                 [x]  Microbiology   Microbiology Results (last 10 days)       Procedure Component Value - Date/Time    MRSA Screen, PCR (Inpatient) - Swab, Nares [[id number]]  (Abnormal) Collected: 04/26/25 1813    Lab Status: Final result Specimen: Swab from Nares Updated: 04/26/25 2001     MRSA PCR MRSA Detected    Narrative:      The negative predictive value of this diagnostic test is high and should only be used to consider de-escalating anti-MRSA therapy. A positive result may indicate colonization with MRSA and must be correlated clinically.    Respiratory Panel PCR w/COVID-19(SARS-CoV-2) BRYAN/LENY/NINFA/PAD/COR/HUANG In-House, NP Swab in UTM/VTM, 2 HR TAT - Swab, Nasopharynx [[id number]]  (Normal) Collected: 04/26/25 1813    Lab Status: Final result Specimen: Swab from Nasopharynx Updated: 04/26/25 1916     ADENOVIRUS, PCR Not Detected     Coronavirus 229E Not Detected     Coronavirus HKU1 Not Detected     Coronavirus NL63 Not Detected     Coronavirus OC43 Not Detected     COVID19 Not Detected     Human Metapneumovirus Not Detected     Human Rhinovirus/Enterovirus Not Detected     Influenza A PCR Not Detected     Influenza B PCR Not Detected     Parainfluenza Virus 1 Not Detected     Parainfluenza Virus 2 Not Detected     Parainfluenza Virus 3 Not Detected     Parainfluenza Virus 4 Not Detected     RSV, PCR Not Detected     Bordetella pertussis pcr Not Detected     Bordetella parapertussis PCR Not Detected     Chlamydophila pneumoniae PCR Not Detected     Mycoplasma pneumo by PCR Not Detected    Narrative:      In the setting of a positive respiratory panel with a viral infection PLUS a negative procalcitonin without other underlying concern for bacterial infection, consider observing off antibiotics or discontinuation of antibiotics and continue supportive care. If the respiratory panel is positive for atypical bacterial infection (Bordetella pertussis, Chlamydophila  pneumoniae, or Mycoplasma pneumoniae), consider antibiotic de-escalation to target atypical bacterial infection.    Legionella Antigen, Urine - Urine, Urine, Clean Catch [[id number]]  (Normal) Collected: 04/26/25 1639    Lab Status: Final result Specimen: Urine, Clean Catch Updated: 04/26/25 1709     LEGIONELLA ANTIGEN, URINE Negative    S. Pneumo Ag Urine or CSF - Urine, Urine, Clean Catch [[id number]]  (Normal) Collected: 04/26/25 1639    Lab Status: Final result Specimen: Urine, Clean Catch Updated: 04/26/25 1709     Strep Pneumo Ag Negative    Blood Culture - Blood, Arm, Left [[id number]]  (Normal) Collected: 04/26/25 1441    Lab Status: Preliminary result Specimen: Blood from Arm, Left Updated: 04/29/25 1500     Blood Culture No growth at 3 days    Narrative:      Less than seven (7) mL's of blood was collected.  Insufficient quantity may yield false negative results.    Blood Culture - Blood, Hand, Right [[id number]]  (Normal) Collected: 04/26/25 1441    Lab Status: Preliminary result Specimen: Blood from Hand, Right Updated: 04/29/25 1500     Blood Culture No growth at 3 days    Narrative:      Less than seven (7) mL's of blood was collected.  Insufficient quantity may yield false negative results.    COVID-19, FLU A/B, RSV PCR 1 HR TAT - Swab, Nasopharynx [[id number]]  (Normal) Collected: 04/26/25 1334    Lab Status: Final result Specimen: Swab from Nasopharynx Updated: 04/26/25 1425     COVID19 Not Detected     Influenza A PCR Not Detected     Influenza B PCR Not Detected     RSV, PCR Not Detected    Narrative:      Fact sheet for providers: https://www.fda.gov/media/868195/download    Fact sheet for patients: https://www.fda.gov/media/114866/download    Test performed by PCR.    Urine Culture - Urine, Indwelling Urethral Catheter [[id number]]  (Abnormal)  (Susceptibility) Collected: 04/26/25 1325    Lab Status: Final result Specimen: Urine from Indwelling Urethral Catheter Updated: 04/28/25 1026     Urine  Culture >100,000 CFU/mL Escherichia coli    Narrative:      Colonization of the urinary tract without infection is common. Treatment is discouraged unless the patient is symptomatic, pregnant, or undergoing an invasive urologic procedure.    Susceptibility        Escherichia coli      KAMERON      Amoxicillin + Clavulanate Susceptible      Ampicillin Susceptible      Ampicillin + Sulbactam Susceptible      Cefazolin (Urine) Susceptible      Cefepime Susceptible      Ceftazidime Susceptible      Ceftriaxone Susceptible      Gentamicin Susceptible      Levofloxacin Susceptible      Nitrofurantoin Susceptible      Piperacillin + Tazobactam Susceptible      Trimethoprim + Sulfamethoxazole Susceptible                                 [x]  Radiology  XR Chest 1 View  Result Date: 4/29/2025  Cardiomegaly with mild vascular congestion and possible mild residual edema. Electronically Signed: Jean Tmaez MD  4/29/2025 12:20 AM EDT  Workstation ID: [id number]    CT Head Without Contrast  Result Date: 4/27/2025  Impression: No evidence of acute intracranial disease. Electronically Signed: Kingston Serrano MD  4/27/2025 12:43 PM EDT  Workstation ID: [id number]    CT Chest Without Contrast Diagnostic  Result Date: 4/27/2025  1.Cardiomegaly with mild pulmonary edema. No pleural effusion. 2.Scattered areas of atelectasis in both lungs. No definite consolidating pneumonia. 3.Mild mediastinal adenopathy, worsened from prior study. This may be reactive. Follow-up chest CT in 3 months recommended. 4.Cholelithiasis. 5.Probable hepatosplenomegaly. 6.Atherosclerotic disease and coronary artery disease. Aortic valvular disease. Electronically Signed: Jean Tamez MD  4/27/2025 4:34 AM EDT  Workstation ID: [id number]    XR Chest 1 View  Result Date: 4/26/2025  Impression: Enlarged cardiac silhouette with findings suggestive of improving pulmonary edema compared to 4/4/2025 chest x-ray. Electronically Signed: Michelle Reyez MD  4/26/2025 2:06 PM EDT   Workstation ID: [id number]    []  EKG/Telemetry   []  Cardiology/Vascular   []  Pathology  []  Old records  []  Other:    Assessment & Plan   Assessment / Plan     Assessment:  Acute Uti due to E. Coli  Septic Shock, resolved  Atrial Fibrillation with rapid rate   Biventricular heart failure   Chronic Venous Changes  Chronic hypoxic Respiratory failure on oxygen   Chronic Kidney disease stage !!!  HTN  Diabetes Type 2  Recent history of Norovirus  Morbid Obesity with BMI of 45        PLAN  -- Patient remains admitted to the medicine service  -- Patient continues to have diarrhea.  At this time we will discontinue ceftriaxone as she has received 5 days of antibiotics  -- Will check for C. Difficile  -- Continue patient on oral Cardizem and metoprolol  -- Cardiology following.  Discussed plan with Dr. Gonzalez  -- Continue IV Lasix  -- Continue Xarelto  -- Continue sliding scale insulin for diabetes  -- Pulmonary also following.  Discussed plan with Dr. Bernardo      Discussed with RN.    VTE Prophylaxis:  Pharmacologic & mechanical VTE prophylaxis orders are present.        CODE STATUS:   Code Status (Patient has no pulse and is not breathing): CPR (Attempt to Resuscitate)  Medical Interventions (Patient has pulse or is breathing): Full Support  Level Of Support Discussed With: Patient      Electronically signed by Kelvin Thomson DO, 4/30/2025, 10:56 EDT.

## 2025-04-30 NOTE — PROGRESS NOTES
CARDIOLOGY  INPATIENT PROGRESS NOTE                [address] AMBULATORY SERVICES    4/30/2025      PATIENT IDENTIFICATION:   Name:  Mary Albarran      MRN:  [id number]     74 y.o.  female             Chief Complaint   Patient presents with    Fatigue        SUBJECTIVE:   Patient clinically stable overnight did rest better yesterday  OBJECTIVE:  Vitals:    04/30/25 0622 04/30/25 0627 04/30/25 0740 04/30/25 0930   BP:   132/57 144/74   BP Location:   Right arm Left arm   Patient Position:   Lying Lying   Pulse: 85 86 84    Resp: 20 18 18    Temp:   97.7 °F (36.5 °C)    TempSrc:   Oral    SpO2: 90% 90% 92%    Weight:       Height:               Body mass index is 46.29 kg/m².    Intake/Output Summary (Last 24 hours) at 4/30/2025 1200  Last data filed at 4/30/2025 0330  Gross per 24 hour   Intake 480 ml   Output 725 ml   Net -245 ml       Telemetry: Atrial fibrillation rate primarily controlled    Physical Exam  General Appearance:   no acute distress  Alert and oriented x3  HENT:   lips not cyanotic  Atraumatic  Neck:  No jvd   supple  Respiratory:  no respiratory distress  normal breath sounds  no rales  Cardiovascular:    Irregular irregular  no S3, no S4   no murmur  no rub  lower extremity edema: none    Skin:   warm, dry  No rashes      Allergies   Allergen Reactions    Morphine Confusion    Codeine Mental Status Change    Sglt2 Inhibitors Other (See Comments)     Frequent UTI      Scheduled meds:  ammonium lactate, , Topical, Daily  budesonide, 0.5 mg, Nebulization, BID - RT  bumetanide, 2 mg, Oral, Daily  cefTRIAXone, 1,000 mg, Intravenous, Q24H  dilTIAZem CD, 180 mg, Oral, Daily  insulin lispro, 2-7 Units, Subcutaneous, 4x Daily AC & at Bedtime  ipratropium-albuterol, 3 mL, Nebulization, 4x Daily - RT  ipratropium-albuterol, 3 mL, Nebulization, Once  metoprolol tartrate, 50 mg, Oral, TID  miconazole, 1 Application, Topical, 2 times per day  mupirocin, 1 Application, Each Nare,  "BID  pantoprazole, 40 mg, Intravenous, Daily  rivaroxaban, 20 mg, Oral, Daily With Dinner  senna-docusate sodium, 2 tablet, Oral, BID  sodium chloride, 10 mL, Intravenous, Q12H  sodium chloride, 10 mL, Intravenous, Q12H      IV meds:                         Data Review:  CBC          4/28/2025    02:45 4/29/2025    05:18 4/30/2025    04:36   CBC   WBC 8.44  7.24  5.06    RBC 3.88  4.14  3.81    Hemoglobin 11.0  11.5  10.7    Hematocrit 36.0  38.5  35.3    MCV 92.8  93.0  92.7    MCH 28.4  27.8  28.1    MCHC 30.6  29.9  30.3    RDW 14.6  14.6  14.6    Platelets 93  89  89      CMP          4/28/2025    02:45 4/29/2025    05:18 4/30/2025    04:36   CMP   Glucose 173  125  110    BUN 25  27  26    Creatinine 0.77  0.93  0.81    EGFR 82.1  65.4  77.2    Sodium 137  134  136    Potassium 4.2  4.3  3.7    Chloride 102  101  102    Calcium 8.9  9.1  9.0    Total Protein 6.9  7.1  6.6    Albumin 3.2  3.4  3.1    Globulin 3.7  3.7  3.5    Total Bilirubin 0.6  0.6  0.6    Alkaline Phosphatase 63  62  60    AST (SGOT) 21  23  18    ALT (SGPT) 15  16  15    Albumin/Globulin Ratio 0.9  0.9  0.9    BUN/Creatinine Ratio 32.5  29.0  32.1    Anion Gap 14.3  13.5  13.2       CARDIAC LABS:      Lab 04/29/25  0518 04/26/25  1441 04/26/25  1333   PROBNP 6,349.0*  --  619.7   HSTROP T  --  15* 13        Lab Results   Component Value Date    DIGOXIN 0.50 (L) 03/07/2025      Lab Results   Component Value Date    TSH 1.520 01/27/2023           Invalid input(s): \"LDLCALC\"  No results found for: \"POCTROP\"  Lab Results   Component Value Date    TROPONINT 15 (H) 04/26/2025   (  Lab Results   Component Value Date    MG 1.9 04/30/2025     Results for orders placed during the hospital encounter of 02/11/25    Adult Transthoracic Echo Complete W/ Cont if Necessary Per Protocol    Interpretation Summary    Left ventricular systolic function is mildly decreased. Left ventricular ejection fraction appears to be 46 - 50%. with some anterior septal " hypokensis    The right ventricular cavity is mildly dilated.    The left atrial cavity is mildly dilated.    The right atrial cavity is moderately  dilated.    Mild to moderate aortic valve stenosis is present.    Estimated right ventricular systolic pressure from tricuspid regurgitation is moderately elevated (45-55 mmHg).           ASSESSMENT:    Longstanding persistent atrial fibrillation    Acute on chronic HFrEF (heart failure with reduced ejection fraction)    Sepsis        PLAN:  1.  Continue with metoprolol 50 twice daily and diltiazem 180 for rate control  2.  Bumex 2 mg p.o. resumed          Joaquin Gonzalez MD  4/30/2025    12:00 EDT

## 2025-04-30 NOTE — PROGRESS NOTES
[facility]     Progress Note    Patient Name: Mary Albarran  : 1950  MRN: [id number]  Primary Care Physician:  Brittni Quiroz APRN  Date of admission: 2025    Subjective   Subjective   Follow-up for sepsis    No acute events overnight.    This morning,  Resting in bed  Currently on 4 L nasal cannula with SpO2 of 94%  Decreased to 3 L nasal cannula  Overall feeling better  Denies dyspnea or cough  Loose stools improving  Remains weak and fatigued  No fever or chills  No chest pain or hemoptysis    Objective   Objective     Vitals:   Temp:  [97.4 °F (36.3 °C)-98.2 °F (36.8 °C)] 97.4 °F (36.3 °C)  Heart Rate:  [74-97] 88  Resp:  [16-20] 18  BP: (120-151)/(53-80) 120/53  Flow (L/min) (Oxygen Therapy):  [3-4] 3    Physical Exam   Vital Signs Reviewed   General: WDWN, Alert, NAD.  Obese female, lying in bed  HEENT:  PERRL, EOMI.  OP, nares clear, no sinus tenderness  Neck:  Supple, no JVD, no thyromegaly  Chest:  good aeration, diminished bilaterally with scattered crackles, currently on 3 L  CV: RRR, no MGR, pulses 2+, equal.  Abd:  Soft, NT, ND, + BS, no HSM  EXT:  no clubbing, no cyanosis, BLE edema  Neuro:  A&Ox3, CN grossly intact, no focal deficits.  Skin: No rashes or lesions noted    Result Review    Result Review:  I have personally reviewed the results from the time of this admission to 2025 14:47 EDT and agree with these findings:  [x]  Laboratory  [x]  Microbiology  [x]  Radiology  [x]  EKG/Telemetry   [x]  Cardiology/Vascular   []  Pathology  []  Old records  []  Other:  Most notable findings include:   proBNP 6349      Lab 25  0436 25  0518 25  0245 25  0253 25  1441 25  1333   WBC 5.06 7.24 8.44 23.33*  --  22.84*   HEMOGLOBIN 10.7* 11.5* 11.0* 12.9  --  13.8   HEMATOCRIT 35.3 38.5 36.0 42.8  --  46.1   PLATELETS 89* 89* 93* 115*  --  114*   SODIUM 136 134* 137 141  --  135*   POTASSIUM 3.7 4.3 4.2 4.6  --  5.4*   CHLORIDE 102 101 102 105  --   97*   CO2 20.8* 19.5* 20.7* 21.6*  --  25.6   BUN 26* 27* 25* 26*  --  36*   CREATININE 0.81 0.93 0.77 0.79  --  1.08*   GLUCOSE 110* 125* 173* 121*  --  292*   CALCIUM 9.0 9.1 8.9 8.8  --  9.6   PHOSPHORUS  --  3.7 3.8 3.5 3.6  --    TOTAL PROTEIN 6.6 7.1 6.9 7.2  --  8.0   ALBUMIN 3.1* 3.4* 3.2* 3.2*  --  4.1   GLOBULIN 3.5 3.7 3.7 4.0  --  3.9       XR Chest 1 View  Result Date: 4/29/2025  XR CHEST 1 VW Date of Exam: 4/29/2025 12:05 AM EDT Indication: difficulty breathing Comparison: 4/26/2025 Findings: Cardiomegaly is stable. There is a mild degree of vascular congestion. Mild interstitial changes in the lower lung zones may reflect some mild residual edema. No dense alveolar infiltrates. No pneumothorax.     Impression: Cardiomegaly with mild vascular congestion and possible mild residual edema. Electronically Signed: Jean Tamez MD  4/29/2025 12:20 AM EDT  Workstation ID: [id number]    Blood cultures are NGTD  Respiratory panel negative  Urine culture in process  MRSA PCR positive      Assessment & Plan   Assessment / Plan     Active Hospital Problems:  Active Hospital Problems    Diagnosis     Sepsis     Acute on chronic HFrEF (heart failure with reduced ejection fraction)     Longstanding persistent atrial fibrillation    Septic shock likely secondary to UTI  A-fib with RVR on anticoagulation with Xarelto  Hyperglycemia with known diabetes mellitus  Hyperkalemia on admission at 5.4  KATHRIN on CKD stage III with creatinine of 1.08 on admission.  Most recent baseline 0.7-0.9  Lactic acidosis  Obesity with BMI of 45  Congestive heart failure with reduced ejection fraction without decompensation  COPD with chronic hypoxemia, 2 L at baseline  Right ear pain    Plan:   Remains on 4 L nasal cannula.  Continue supplemental oxygen to keep SpO2 greater than 90%  Continue BiPAP at night and with naps  Chest x-ray showed pulmonary edema  BNP elevated at 6350  Agree with transition to Bumex 2 mg p.o. daily  Monitor renal  function electrolytes  Completed course of antibiotics for 5 days  Completed 3 days of vancomycin.  Her PCR MRSA was positive  C. difficile negative  Continue Xarelto for A-fib  Cardiology on board, appreciate assistance  Continue nebs and bronchopulmonary hygiene  Encourage I-S and flutter valve use  PT/OT on board  Patient may benefit from rehab      VTE Prophylaxis:  Pharmacologic & mechanical VTE prophylaxis orders are present.    CODE STATUS:   Code Status (Patient has no pulse and is not breathing): CPR (Attempt to Resuscitate)  Medical Interventions (Patient has pulse or is breathing): Full Support  Level Of Support Discussed With: Patient      Electronically signed by LUCERO Bains, 04/30/25, 2:54 PM EDT.  This patient was seen by both a physician and a NP. I, Mau Farmer MD, spent >50% of time in accordance with split shared billing. This included personally reviewing all pertinent labs, imaging, microbiology and documentation. Also discussing the case with the patient and any available family, the admitting physician and any available ancillary staff.   Electronically signed by Mau Farmer MD, 04/30/25, 3:09 PM EDT.

## 2025-05-01 LAB
ALBUMIN SERPL-MCNC: 3.6 G/DL (ref 3.5–5.2)
ALBUMIN/GLOB SERPL: 1.1 G/DL
ALP SERPL-CCNC: 62 U/L (ref 39–117)
ALT SERPL W P-5'-P-CCNC: 14 U/L (ref 1–33)
ANION GAP SERPL CALCULATED.3IONS-SCNC: 14.6 MMOL/L (ref 5–15)
AST SERPL-CCNC: 16 U/L (ref 1–32)
BACTERIA SPEC AEROBE CULT: NORMAL
BACTERIA SPEC AEROBE CULT: NORMAL
BILIRUB SERPL-MCNC: 0.6 MG/DL (ref 0–1.2)
BUN SERPL-MCNC: 27 MG/DL (ref 8–23)
BUN/CREAT SERPL: 30.3 (ref 7–25)
CALCIUM SPEC-SCNC: 9.2 MG/DL (ref 8.6–10.5)
CHLORIDE SERPL-SCNC: 100 MMOL/L (ref 98–107)
CO2 SERPL-SCNC: 23.4 MMOL/L (ref 22–29)
CREAT SERPL-MCNC: 0.89 MG/DL (ref 0.57–1)
DEPRECATED RDW RBC AUTO: 48.7 FL (ref 37–54)
EGFRCR SERPLBLD CKD-EPI 2021: 69 ML/MIN/1.73
ERYTHROCYTE [DISTWIDTH] IN BLOOD BY AUTOMATED COUNT: 14.4 % (ref 12.3–15.4)
GLOBULIN UR ELPH-MCNC: 3.4 GM/DL
GLUCOSE BLDC GLUCOMTR-MCNC: 129 MG/DL (ref 70–99)
GLUCOSE BLDC GLUCOMTR-MCNC: 134 MG/DL (ref 70–99)
GLUCOSE BLDC GLUCOMTR-MCNC: 135 MG/DL (ref 70–99)
GLUCOSE BLDC GLUCOMTR-MCNC: 174 MG/DL (ref 70–99)
GLUCOSE SERPL-MCNC: 127 MG/DL (ref 65–99)
HCT VFR BLD AUTO: 35.2 % (ref 34–46.6)
HGB BLD-MCNC: 10.7 G/DL (ref 12–15.9)
MAGNESIUM SERPL-MCNC: 1.8 MG/DL (ref 1.6–2.4)
MCH RBC QN AUTO: 27.9 PG (ref 26.6–33)
MCHC RBC AUTO-ENTMCNC: 30.4 G/DL (ref 31.5–35.7)
MCV RBC AUTO: 91.9 FL (ref 79–97)
PLATELET # BLD AUTO: 96 10*3/MM3 (ref 140–450)
PMV BLD AUTO: 12.8 FL (ref 6–12)
POTASSIUM SERPL-SCNC: 3.6 MMOL/L (ref 3.5–5.2)
POTASSIUM SERPL-SCNC: 4.4 MMOL/L (ref 3.5–5.2)
PROT SERPL-MCNC: 7 G/DL (ref 6–8.5)
QT INTERVAL: 314 MS
QTC INTERVAL: 420 MS
RBC # BLD AUTO: 3.83 10*6/MM3 (ref 3.77–5.28)
SODIUM SERPL-SCNC: 138 MMOL/L (ref 136–145)
WBC NRBC COR # BLD AUTO: 5.08 10*3/MM3 (ref 3.4–10.8)

## 2025-05-01 PROCEDURE — 82948 REAGENT STRIP/BLOOD GLUCOSE: CPT

## 2025-05-01 PROCEDURE — 80053 COMPREHEN METABOLIC PANEL: CPT | Performed by: PHYSICIAN ASSISTANT

## 2025-05-01 PROCEDURE — 94799 UNLISTED PULMONARY SVC/PX: CPT

## 2025-05-01 PROCEDURE — 97110 THERAPEUTIC EXERCISES: CPT

## 2025-05-01 PROCEDURE — 85027 COMPLETE CBC AUTOMATED: CPT | Performed by: PHYSICIAN ASSISTANT

## 2025-05-01 PROCEDURE — 63710000001 INSULIN LISPRO (HUMAN) PER 5 UNITS: Performed by: PHYSICIAN ASSISTANT

## 2025-05-01 PROCEDURE — 83735 ASSAY OF MAGNESIUM: CPT | Performed by: INTERNAL MEDICINE

## 2025-05-01 PROCEDURE — 84132 ASSAY OF SERUM POTASSIUM: CPT | Performed by: INTERNAL MEDICINE

## 2025-05-01 PROCEDURE — 99233 SBSQ HOSP IP/OBS HIGH 50: CPT | Performed by: INTERNAL MEDICINE

## 2025-05-01 PROCEDURE — 63710000001 ONDANSETRON ODT 4 MG TABLET DISPERSIBLE: Performed by: HOSPITALIST

## 2025-05-01 PROCEDURE — 25010000002 BUMETANIDE PER 0.5 MG: Performed by: INTERNAL MEDICINE

## 2025-05-01 PROCEDURE — 25010000002 ONDANSETRON PER 1 MG: Performed by: HOSPITALIST

## 2025-05-01 PROCEDURE — 99233 SBSQ HOSP IP/OBS HIGH 50: CPT | Performed by: STUDENT IN AN ORGANIZED HEALTH CARE EDUCATION/TRAINING PROGRAM

## 2025-05-01 PROCEDURE — 82948 REAGENT STRIP/BLOOD GLUCOSE: CPT | Performed by: PHYSICIAN ASSISTANT

## 2025-05-01 RX ORDER — VANCOMYCIN HYDROCHLORIDE 125 MG/1
125 CAPSULE ORAL EVERY 6 HOURS SCHEDULED
Status: DISCONTINUED | OUTPATIENT
Start: 2025-05-01 | End: 2025-05-02 | Stop reason: HOSPADM

## 2025-05-01 RX ORDER — BUMETANIDE 0.25 MG/ML
2 INJECTION, SOLUTION INTRAMUSCULAR; INTRAVENOUS ONCE
Status: COMPLETED | OUTPATIENT
Start: 2025-05-01 | End: 2025-05-01

## 2025-05-01 RX ORDER — POTASSIUM CHLORIDE 1500 MG/1
40 TABLET, EXTENDED RELEASE ORAL EVERY 4 HOURS
Status: COMPLETED | OUTPATIENT
Start: 2025-05-01 | End: 2025-05-01

## 2025-05-01 RX ORDER — VALSARTAN 80 MG/1
40 TABLET ORAL
Status: DISCONTINUED | OUTPATIENT
Start: 2025-05-01 | End: 2025-05-02 | Stop reason: HOSPADM

## 2025-05-01 RX ORDER — HYDROCODONE BITARTRATE AND ACETAMINOPHEN 10; 325 MG/1; MG/1
1 TABLET ORAL EVERY 6 HOURS PRN
Refills: 0 | Status: DISCONTINUED | OUTPATIENT
Start: 2025-05-01 | End: 2025-05-02 | Stop reason: HOSPADM

## 2025-05-01 RX ORDER — PREGABALIN 75 MG/1
150 CAPSULE ORAL 2 TIMES DAILY
Status: DISCONTINUED | OUTPATIENT
Start: 2025-05-01 | End: 2025-05-02 | Stop reason: HOSPADM

## 2025-05-01 RX ORDER — METOPROLOL SUCCINATE 50 MG/1
100 TABLET, EXTENDED RELEASE ORAL
Status: DISCONTINUED | OUTPATIENT
Start: 2025-05-02 | End: 2025-05-02 | Stop reason: HOSPADM

## 2025-05-01 RX ORDER — FAMOTIDINE 20 MG/1
20 TABLET, FILM COATED ORAL
Status: DISCONTINUED | OUTPATIENT
Start: 2025-05-01 | End: 2025-05-02 | Stop reason: HOSPADM

## 2025-05-01 RX ADMIN — BUMETANIDE 2 MG: 1 TABLET ORAL at 08:11

## 2025-05-01 RX ADMIN — Medication 10 ML: at 21:51

## 2025-05-01 RX ADMIN — VANCOMYCIN HYDROCHLORIDE 125 MG: 125 CAPSULE ORAL at 17:09

## 2025-05-01 RX ADMIN — MICONAZOLE NITRATE 1 APPLICATION: 2 POWDER TOPICAL at 12:06

## 2025-05-01 RX ADMIN — BUMETANIDE 2 MG: 0.25 INJECTION INTRAMUSCULAR; INTRAVENOUS at 13:42

## 2025-05-01 RX ADMIN — HYDROCODONE BITARTRATE AND ACETAMINOPHEN 1 TABLET: 5; 325 TABLET ORAL at 14:40

## 2025-05-01 RX ADMIN — POTASSIUM CHLORIDE 40 MEQ: 1500 TABLET, EXTENDED RELEASE ORAL at 08:12

## 2025-05-01 RX ADMIN — VANCOMYCIN HYDROCHLORIDE 125 MG: 125 CAPSULE ORAL at 12:20

## 2025-05-01 RX ADMIN — DILTIAZEM HYDROCHLORIDE 180 MG: 180 CAPSULE, COATED, EXTENDED RELEASE ORAL at 08:12

## 2025-05-01 RX ADMIN — VANCOMYCIN HYDROCHLORIDE 125 MG: 125 CAPSULE ORAL at 08:12

## 2025-05-01 RX ADMIN — Medication: at 12:05

## 2025-05-01 RX ADMIN — METOPROLOL TARTRATE 50 MG: 50 TABLET, FILM COATED ORAL at 17:09

## 2025-05-01 RX ADMIN — ONDANSETRON 4 MG: 4 TABLET, ORALLY DISINTEGRATING ORAL at 06:11

## 2025-05-01 RX ADMIN — IPRATROPIUM BROMIDE AND ALBUTEROL SULFATE 3 ML: .5; 3 SOLUTION RESPIRATORY (INHALATION) at 19:46

## 2025-05-01 RX ADMIN — FAMOTIDINE 20 MG: 20 TABLET, FILM COATED ORAL at 08:11

## 2025-05-01 RX ADMIN — HYDROCODONE BITARTRATE AND ACETAMINOPHEN 1 TABLET: 5; 325 TABLET ORAL at 01:04

## 2025-05-01 RX ADMIN — POTASSIUM CHLORIDE 40 MEQ: 1500 TABLET, EXTENDED RELEASE ORAL at 11:29

## 2025-05-01 RX ADMIN — HYDROCODONE BITARTRATE AND ACETAMINOPHEN 1 TABLET: 5; 325 TABLET ORAL at 08:12

## 2025-05-01 RX ADMIN — BUDESONIDE 0.5 MG: 0.5 SUSPENSION RESPIRATORY (INHALATION) at 19:46

## 2025-05-01 RX ADMIN — HYDROCODONE BITARTRATE AND ACETAMINOPHEN 1 TABLET: 5; 325 TABLET ORAL at 21:48

## 2025-05-01 RX ADMIN — RIVAROXABAN 20 MG: 20 TABLET, FILM COATED ORAL at 17:09

## 2025-05-01 RX ADMIN — MICONAZOLE NITRATE 1 APPLICATION: 2 POWDER TOPICAL at 21:50

## 2025-05-01 RX ADMIN — METOPROLOL TARTRATE 50 MG: 50 TABLET, FILM COATED ORAL at 21:47

## 2025-05-01 RX ADMIN — Medication 10 ML: at 08:12

## 2025-05-01 RX ADMIN — ONDANSETRON 4 MG: 2 INJECTION INTRAMUSCULAR; INTRAVENOUS at 14:40

## 2025-05-01 RX ADMIN — IPRATROPIUM BROMIDE AND ALBUTEROL SULFATE 3 ML: .5; 3 SOLUTION RESPIRATORY (INHALATION) at 01:34

## 2025-05-01 RX ADMIN — METOPROLOL TARTRATE 50 MG: 50 TABLET, FILM COATED ORAL at 08:12

## 2025-05-01 RX ADMIN — INSULIN LISPRO 2 UNITS: 100 INJECTION, SOLUTION INTRAVENOUS; SUBCUTANEOUS at 12:20

## 2025-05-01 RX ADMIN — VALSARTAN 40 MG: 80 TABLET, FILM COATED ORAL at 11:29

## 2025-05-01 RX ADMIN — MUPIROCIN 1 APPLICATION: 20 OINTMENT TOPICAL at 08:11

## 2025-05-01 RX ADMIN — FAMOTIDINE 20 MG: 20 TABLET, FILM COATED ORAL at 17:09

## 2025-05-01 NOTE — THERAPY TREATMENT NOTE
Patient Name: Mary Albarran  : 1950    MRN: [id number]                              Today's Date: 2025       Admit Date: 2025    Visit Dx:     ICD-10-CM ICD-9-CM   1. Sepsis, due to unspecified organism, unspecified whether acute organ dysfunction present  A41.9 038.9     995.91   2. Acute UTI  N39.0 599.0   3. Atrial fibrillation, unspecified type  I48.91 427.31   4. Difficulty walking  R26.2 719.7   5. Decreased activities of daily living (ADL)  Z78.9 V49.89     Patient Active Problem List   Diagnosis    Aortic stenosis, moderate    Longstanding persistent atrial fibrillation    Chronic kidney disease, stage III (moderate)    Hyperlipidemia    Hypertension    Chronic diastolic congestive heart failure    Atrial fibrillation with RVR    Onychomycosis    Onychocryptosis    Foot pain, bilateral    Nephrolithiasis    Recurrent urinary tract infection    Acute on chronic HFrEF (heart failure with reduced ejection fraction)    Cellulitis of right leg without foot    Diabetes mellitus type 2 with complications    Depression    COPD (chronic obstructive pulmonary disease)    Chronic pain    Anxiety    Heart failure with reduced ejection fraction    CHF exacerbation    Sepsis     Past Medical History:   Diagnosis Date    Allergic rhinitis     Anxiety     Aortic valve disease 2021    Fibrocalcific changes of the aortic valve with mild aortic valve stenosis   on echo 3/2/2021  Moderate aortic valve regurgitation is present. Aortic valve maximum pressure gradient is 26 mmHg. Moderate aortic valve stenosis is present.  On echo 2/10/2022       Arthritis     Atrial fibrillation     CHF (congestive heart failure)     Chronic kidney disease (CKD), stage III (moderate)     Chronic pain     COPD (chronic obstructive pulmonary disease)     Coronary artery disease     Diabetes mellitus type 2 with complications     Elevated cholesterol     Ex-smoker     QUIT SMOKING     GERD (gastroesophageal reflux disease)      History of home oxygen therapy     2L/NC AAT REPORTED    History of transfusion     Hyperlipidemia     Hypertension     Thrombocytopenia      Past Surgical History:   Procedure Laterality Date    HYSTERECTOMY      30 YEARS AGO      General Information       Row Name 05/01/25 1019          OT Time and Intention    Document Type therapy note (daily note)  -AV     Mode of Treatment individual therapy;occupational therapy  -AV       Row Name 05/01/25 1019          General Information    Existing Precautions/Restrictions fall;oxygen therapy device and L/min  -AV       Row Name 05/01/25 1019          Cognition    Orientation Status (Cognition) --  alert. required moderate cues/ demonstration for exercise completion.  -AV       Row Name 05/01/25 1019          Safety Issues/Impairments Affecting Functional Mobility    Impairments Affecting Function (Mobility) balance;endurance/activity tolerance;strength  -AV               User Key  (r) = Recorded By, (t) = Taken By, (c) = Cosigned By      Initials Name Provider Type    Juan Magana OT Occupational Therapist                     Mobility/ADL's       Row Name 05/01/25 1020          Transfers    Comment, (Transfers) declined out of bed treatment- reports being sick all night  -AV               User Key  (r) = Recorded By, (t) = Taken By, (c) = Cosigned By      Initials Name Provider Type    Juan Magana OT Occupational Therapist                   Obj/Interventions       Row Name 05/01/25 1020          Elbow/Forearm (Therapeutic Exercise)    Elbow/Forearm (Therapeutic Exercise) AROM (active range of motion)  -AV     Elbow/Forearm AROM (Therapeutic Exercise) bilateral;flexion;extension;supination;pronation;10 repetitions  -AV       Row Name 05/01/25 1020          Wrist (Therapeutic Exercise)    Wrist (Therapeutic Exercise) AROM (active range of motion)  -AV     Wrist AROM (Therapeutic Exercise) bilateral;flexion;extension;10 repetitions  -AV       Row Name  05/01/25 1020          Hand (Therapeutic Exercise)    Hand (Therapeutic Exercise) AROM (active range of motion)  -AV     Hand AROM/AAROM (Therapeutic Exercise) AROM (active range of motion);bilateral;finger flexion;finger extension;10 repetitions  -AV       Row Name 05/01/25 1020          Motor Skills    Therapeutic Exercise elbow/forearm;wrist;hand  agreeable to upper extremity exercises in bed. performed in semi-Cruz's/ on 2L O2 with sats 91%.  -AV               User Key  (r) = Recorded By, (t) = Taken By, (c) = Cosigned By      Initials Name Provider Type    Juan Magana OT Occupational Therapist                   Goals/Plan    No documentation.                  Clinical Impression       Row Name 05/01/25 1022          Pain Assessment    Pre/Posttreatment Pain Comment 9/10 nurse notified/ in room  -AV       Row Name 05/01/25 1022          Plan of Care Review    Progress no change  -AV     Outcome Evaluation Patient performed upper extremity therapeutic exercises to improve endurance/activity tolerance needed to support ADLs. Continued OT is indicated to remediate/ compensate for deficits to maximize independence and safety with functional ADL tasks.  -AV       Row Name 05/01/25 1022          Vital Signs    O2 Delivery Pre Treatment nasal cannula  -AV     O2 Delivery Intra Treatment nasal cannula  -AV     O2 Delivery Post Treatment nasal cannula  -AV       Row Name 05/01/25 1022          Positioning and Restraints    Pre-Treatment Position in bed  -AV     Post Treatment Position bed  -AV     In Bed call light within reach;encouraged to call for assist;with nsg;with other staff  nurse and respiratory therapist  -AV               User Key  (r) = Recorded By, (t) = Taken By, (c) = Cosigned By      Initials Name Provider Type    Juan Magana OT Occupational Therapist                   Outcome Measures       Row Name 05/01/25 1025          How much help from another is currently needed...    Putting on  and taking off regular lower body clothing? 1  -AV     Bathing (including washing, rinsing, and drying) 2  -AV     Toileting (which includes using toilet bed pan or urinal) 1  -AV     Putting on and taking off regular upper body clothing 2  -AV     Taking care of personal grooming (such as brushing teeth) 2  -AV     Eating meals 4  -AV     AM-PAC 6 Clicks Score (OT) 12  -AV       Row Name 05/01/25 1025          Optimal Instrument    Bending/Stooping 4  -AV     Standing 5  -AV     Reaching 1  -AV               User Key  (r) = Recorded By, (t) = Taken By, (c) = Cosigned By      Initials Name Provider Type    AV Juan Ireland OT Occupational Therapist                    Occupational Therapy Education       Title: PT OT SLP Therapies (In Progress)       Topic: Occupational Therapy (In Progress)       Point: ADL training (Done)       Learning Progress Summary            Patient Acceptance, E,TB, VU by  at 4/28/2025 1455                      Point: Precautions (Done)       Learning Progress Summary            Patient Acceptance, E,TB, VU by  at 4/28/2025 1455                      Point: Body mechanics (Done)       Learning Progress Summary            Patient Acceptance, E,TB, VU by  at 4/28/2025 1455                                      User Key       Initials Effective Dates Name Provider Type Discipline     06/16/21 -  Nevaeh Schmidt OT Occupational Therapist OT                  OT Recommendation and Plan     Plan of Care Review  Progress: no change  Outcome Evaluation: Patient performed upper extremity therapeutic exercises to improve endurance/activity tolerance needed to support ADLs. Continued OT is indicated to remediate/ compensate for deficits to maximize independence and safety with functional ADL tasks.     Time Calculation:         Time Calculation- OT       Row Name 05/01/25 1025             Time Calculation- OT    OT Received On 05/01/25  -AV      OT Goal Re-Cert Due Date 05/07/25  -AV          Timed Charges    90475 - OT Therapeutic Exercise Minutes 10  -AV         Total Minutes    Timed Charges Total Minutes 10  -AV       Total Minutes 10  -AV                User Key  (r) = Recorded By, (t) = Taken By, (c) = Cosigned By      Initials Name Provider Type    Juan Magana OT Occupational Therapist                  Therapy Charges for Today       Code Description Service Date Service Provider Modifiers Qty    81765293373 HC OT THER PROC EA 15 MIN 5/1/2025 Juan Ireland OT GO 1                 Juan Ireland OT  5/1/2025

## 2025-05-01 NOTE — PLAN OF CARE
Goal Outcome Evaluation:  Plan of Care Reviewed With: patient        Progress: no change  Outcome Evaluation: given prn pain medication. no complaints of nausea. isolation precautions maintained.

## 2025-05-01 NOTE — THERAPY EVALUATION
Respiratory Therapist Broncho-Pulmonary Hygiene Progress Note      Patient Name:  Mary Albarran  YOB: 1950    Mary Albarran meets the qualification for Level 2 of the Bronco-Pulmonary Hygiene Protocol. This was based on my daily patient assessment and includes review of chest x-ray results, cough ability and quality, oxygenation, secretions or risk for secretion development and patient mobility.     Broncho-Pulmonary Hygiene Assessment:    Level of Movement: Actively changing positions-requires assistance  Disoriented/Follows Commands    Breath Sounds: Diminished and/or coarse rhonchi    Cough: Strong, effective    Chest X-Ray: Possible signs of consolidation and/or atelectasis or clear.     Sputum Productions: None or small amount of thin or watery secretions with effective cough    History and Physical: Home use of oxygen  and Chronic condition    SpO2 to Oxygen Need: greater than 92% on room air or  less than 3L nasal canula    Current SpO2 is: 92% on 3L nasal cannula    Based on this information, I have completed the following interventions: Aerobika with bronchodialtor medication or TID      Electronically signed by Brittani Staton RRT, 04/30/25, 8:07 PM EDT.

## 2025-05-01 NOTE — SIGNIFICANT NOTE
05/01/25 1305   OTHER   Discipline physical therapy assistant   Rehab Time/Intention   Session Not Performed patient/family declined, not feeling well   Therapy Assessment/Plan (PT)   Criteria for Skilled Interventions Met (PT) yes;skilled treatment is necessary

## 2025-05-01 NOTE — PROGRESS NOTES
[facility]     Progress Note    Patient Name: Mary Albarran  : 1950  MRN: [id number]  Primary Care Physician:  Brittni Quiroz APRN  Date of admission: 2025    Subjective   Subjective   Follow-up for sepsis    No acute events overnight.    This morning,  Currently on 4 L nasal cannula  Resting in bed  Reports feeling better today  Denies any chest pain chest tightness  Cough improving  No fever or chills  Remains weak and fatigued  Remains bedbound    Objective   Objective     Vitals:   Temp:  [97.4 °F (36.3 °C)-98.4 °F (36.9 °C)] 98 °F (36.7 °C)  Heart Rate:  [] 77  Resp:  [16-20] 20  BP: (118-149)/(45-88) 136/88  Flow (L/min) (Oxygen Therapy):  [2-4] 4    Physical Exam   Vital Signs Reviewed   General: WDWN, Alert, NAD.  Obese female, lying in bed  HEENT:  PERRL, EOMI.  OP, nares clear, no sinus tenderness  Neck:  Supple, no JVD, no thyromegaly  Chest:  good aeration, diminished bilaterally with scattered crackles, currently on 3 L  CV: RRR, no MGR, pulses 2+, equal.  Abd:  Soft, NT, ND, + BS, no HSM  EXT:  no clubbing, no cyanosis, BLE edema  Neuro:  A&Ox3, CN grossly intact, no focal deficits.  Skin: No rashes or lesions noted    Result Review    Result Review:  I have personally reviewed the results from the time of this admission to 2025 08:50 EDT and agree with these findings:  [x]  Laboratory  [x]  Microbiology  [x]  Radiology  [x]  EKG/Telemetry   [x]  Cardiology/Vascular   []  Pathology  []  Old records  []  Other:  Most notable findings include:   proBNP 6349      Lab 25  0532 25  0436 25  0518 25  0245 25  0253 25  1441 25  1333   WBC 5.08 5.06 7.24 8.44 23.33*  --  22.84*   HEMOGLOBIN 10.7* 10.7* 11.5* 11.0* 12.9  --  13.8   HEMATOCRIT 35.2 35.3 38.5 36.0 42.8  --  46.1   PLATELETS 96* 89* 89* 93* 115*  --  114*   SODIUM 138 136 134* 137 141  --  135*   POTASSIUM 3.6 3.7 4.3 4.2 4.6  --  5.4*   CHLORIDE 100 102 101 102 105  --   97*   CO2 23.4 20.8* 19.5* 20.7* 21.6*  --  25.6   BUN 27* 26* 27* 25* 26*  --  36*   CREATININE 0.89 0.81 0.93 0.77 0.79  --  1.08*   GLUCOSE 127* 110* 125* 173* 121*  --  292*   CALCIUM 9.2 9.0 9.1 8.9 8.8  --  9.6   PHOSPHORUS  --   --  3.7 3.8 3.5 3.6  --    TOTAL PROTEIN 7.0 6.6 7.1 6.9 7.2  --  8.0   ALBUMIN 3.6 3.1* 3.4* 3.2* 3.2*  --  4.1   GLOBULIN 3.4 3.5 3.7 3.7 4.0  --  3.9       XR Chest 1 View  Result Date: 4/29/2025  XR CHEST 1 VW Date of Exam: 4/29/2025 12:05 AM EDT Indication: difficulty breathing Comparison: 4/26/2025 Findings: Cardiomegaly is stable. There is a mild degree of vascular congestion. Mild interstitial changes in the lower lung zones may reflect some mild residual edema. No dense alveolar infiltrates. No pneumothorax.     Impression: Cardiomegaly with mild vascular congestion and possible mild residual edema. Electronically Signed: Jean Tamez MD  4/29/2025 12:20 AM EDT  Workstation ID: [id number]    Blood cultures are NGTD  Respiratory panel negative  Urine culture in process  MRSA PCR positive      Assessment & Plan   Assessment / Plan     Active Hospital Problems:  Active Hospital Problems    Diagnosis     Sepsis     Acute on chronic HFrEF (heart failure with reduced ejection fraction)     Longstanding persistent atrial fibrillation    Septic shock likely secondary to UTI  A-fib with RVR on anticoagulation with Xarelto  Hyperglycemia with known diabetes mellitus  Hyperkalemia on admission at 5.4  KATHRIN on CKD stage III with creatinine of 1.08 on admission.  Most recent baseline 0.7-0.9  Lactic acidosis  Obesity with BMI of 45  Congestive heart failure with reduced ejection fraction without decompensation  COPD with chronic hypoxemia, 2 L at baseline  Right ear pain    Plan:   Remains on 4 L nasal cannula.  Continue to wean supplemental oxygen to keep SpO2 greater than 90%  Continue BiPAP at night and with naps  Chest x-ray showed pulmonary edema. BNP elevated at 6350  Continue Bumex  2 mg p.o. daily.  Monitor renal function electrolytes  Completed course of antibiotics for 5 days  Completed 3 days of vancomycin.  Her PCR MRSA was positive  Patient C. difficile toxin antigen was negative but PCR is positive.  Given that she is symptomatic with diarrhea, reasonable to treat  Continue Xarelto for A-fib  Cardiology on board, appreciate assistance  Continue nebs and bronchopulmonary hygiene  Encourage I-S and flutter valve use  PT/OT on board  Patient may benefit from rehab  6-minute walk test prior to discharge    VTE Prophylaxis:  Pharmacologic & mechanical VTE prophylaxis orders are present.    CODE STATUS:   Code Status (Patient has no pulse and is not breathing): CPR (Attempt to Resuscitate)  Medical Interventions (Patient has pulse or is breathing): Full Support  Level Of Support Discussed With: Patient    This patient was seen by both a physician and a NP. IMau MD, spent >50% of time in accordance with split shared billing. This included personally reviewing all pertinent labs, imaging, microbiology and documentation. Also discussing the case with the patient and any available family, the admitting physician and any available ancillary staff.   Electronically signed by Mau Farmer MD, 05/01/25, 3:34 PM EDT.

## 2025-05-01 NOTE — PROGRESS NOTES
CARDIOLOGY  INPATIENT PROGRESS NOTE                [address] AMBULATORY SERVICES    5/1/2025      PATIENT IDENTIFICATION:   Name:  Mary Albarran      MRN:  [id number]     74 y.o.  female             Chief Complaint   Patient presents with    Fatigue        SUBJECTIVE:   Patient with some nausea overnight otherwise no acute problems or complaints  OBJECTIVE:  Vitals:    05/01/25 0503 05/01/25 0802 05/01/25 0815 05/01/25 0817   BP:  136/88     BP Location:  Right arm     Patient Position:  Lying     Pulse:  77     Resp:  20     Temp:  98 °F (36.7 °C)     TempSrc:  Oral     SpO2:  90% (!) 86% 90%   Weight: 113 kg (250 lb)      Height:               Body mass index is 44.3 kg/m².    Intake/Output Summary (Last 24 hours) at 5/1/2025 1025  Last data filed at 5/1/2025 0900  Gross per 24 hour   Intake 50 ml   Output 2650 ml   Net -2600 ml       Telemetry: Atrial fibrillation rate controlled    Physical Exam  General Appearance:   no acute distress  Alert and oriented x3  HENT:   lips not cyanotic  Atraumatic  Neck:  No jvd   supple  Respiratory:  no respiratory distress  normal breath sounds  no rales  Cardiovascular:    regular rhythm  no S3, no S4   no murmur  no rub  lower extremity edema: Mild edema skin:   warm, dry  No rashes      Allergies   Allergen Reactions    Morphine Confusion    Codeine Mental Status Change    Sglt2 Inhibitors Other (See Comments)     Frequent UTI      Scheduled meds:  ammonium lactate, , Topical, Daily  budesonide, 0.5 mg, Nebulization, BID - RT  bumetanide, 2 mg, Oral, Daily  dilTIAZem CD, 180 mg, Oral, Daily  famotidine, 20 mg, Oral, BID AC  insulin lispro, 2-7 Units, Subcutaneous, 4x Daily AC & at Bedtime  ipratropium-albuterol, 3 mL, Nebulization, 4x Daily - RT  ipratropium-albuterol, 3 mL, Nebulization, Once  metoprolol tartrate, 50 mg, Oral, TID  miconazole, 1 Application, Topical, 2 times per day  mupirocin, 1 Application, Each Nare, BID  potassium chloride ER, 40  "mEq, Oral, Q4H  rivaroxaban, 20 mg, Oral, Daily With Dinner  sodium chloride, 10 mL, Intravenous, Q12H  sodium chloride, 10 mL, Intravenous, Q12H  vancomycin, 125 mg, Oral, Q6H      IV meds:                         Data Review:  CBC          4/29/2025    05:18 4/30/2025    04:36 5/1/2025    05:32   CBC   WBC 7.24  5.06  5.08    RBC 4.14  3.81  3.83    Hemoglobin 11.5  10.7  10.7    Hematocrit 38.5  35.3  35.2    MCV 93.0  92.7  91.9    MCH 27.8  28.1  27.9    MCHC 29.9  30.3  30.4    RDW 14.6  14.6  14.4    Platelets 89  89  96      CMP          4/29/2025    05:18 4/30/2025    04:36 5/1/2025    05:32   CMP   Glucose 125  110  127    BUN 27  26  27    Creatinine 0.93  0.81  0.89    EGFR 65.4  77.2  69.0    Sodium 134  136  138    Potassium 4.3  3.7  3.6    Chloride 101  102  100    Calcium 9.1  9.0  9.2    Total Protein 7.1  6.6  7.0    Albumin 3.4  3.1  3.6    Globulin 3.7  3.5  3.4    Total Bilirubin 0.6  0.6  0.6    Alkaline Phosphatase 62  60  62    AST (SGOT) 23  18  16    ALT (SGPT) 16  15  14    Albumin/Globulin Ratio 0.9  0.9  1.1    BUN/Creatinine Ratio 29.0  32.1  30.3    Anion Gap 13.5  13.2  14.6       CARDIAC LABS:      Lab 04/29/25  0518 04/26/25  1441 04/26/25  1333   PROBNP 6,349.0*  --  619.7   HSTROP T  --  15* 13        Lab Results   Component Value Date    DIGOXIN 0.50 (L) 03/07/2025      Lab Results   Component Value Date    TSH 1.520 01/27/2023           Invalid input(s): \"LDLCALC\"  No results found for: \"POCTROP\"  Lab Results   Component Value Date    TROPONINT 15 (H) 04/26/2025   (  Lab Results   Component Value Date    MG 1.8 05/01/2025     Results for orders placed during the hospital encounter of 02/11/25    Adult Transthoracic Echo Complete W/ Cont if Necessary Per Protocol    Interpretation Summary    Left ventricular systolic function is mildly decreased. Left ventricular ejection fraction appears to be 46 - 50%. with some anterior septal hypokensis    The right ventricular cavity is " mildly dilated.    The left atrial cavity is mildly dilated.    The right atrial cavity is moderately  dilated.    Mild to moderate aortic valve stenosis is present.    Estimated right ventricular systolic pressure from tricuspid regurgitation is moderately elevated (45-55 mmHg).           ASSESSMENT:    Longstanding persistent atrial fibrillation    Acute on chronic HFrEF (heart failure with reduced ejection fraction)    Sepsis        PLAN:  1.  Will add valsartan 40 mg mg daily for treatment of her CHF patient has adverse reactions to SGLT inhibitors listed  2.  Extra 2 mg IV Bumex today and continue with her p.o. Bumex repeat BMP in morning  3.  Xarelto 20 mg daily  4.  Change metoprolol to Toprol 100 daily  5.  Repeat BMP and BMP in a.m. patient stable from cardiac standpoint from CHF issue will sign off recommend follow-up in CHF clinic for close monitoring afterwards to try to help monitor volume status and prevent readmission        Joaquin Gonzalez MD  5/1/2025    10:25 EDT

## 2025-05-01 NOTE — PROGRESS NOTES
[facility]   Hospitalist Progress Note  Date: 2025  Patient Name: Mary Albarran  : 1950  MRN: [id number]  Date of admission: 2025  Room/Bed: [facility]      Subjective   Subjective     Chief Complaint: Lethargic    Summary:Mary Albarran is a 74 y.o. female with a host of medical problems presented to the ED with lethargy, she had recently been admitted to the hospital removal for a CHF exacerbation.  Upon presentation in the ED she was too somnolent to give much of a history.  She was found to have a fever, tachypnea, significant leukocytosis with lactic acidosis.  She was admitted to the medicine service for sepsis.    Interval Followup:  Patient is positive for C. Difficile  Patient had some nausea this morning.  Patient continues to have diarrhea  Patient's blood pressure medication continues to be adjusted  Patient denies any vomiting.  No abdominal pain  Patient still desires to go home upon discharge although rehab is recommended    Objective   Objective     Vitals:   Temp:  [97.4 °F (36.3 °C)-98.4 °F (36.9 °C)] 98 °F (36.7 °C)  Heart Rate:  [] 77  Resp:  [16-20] 20  BP: (118-149)/(45-88) 136/88  Flow (L/min) (Oxygen Therapy):  [2-4] 4    Physical Exam   General: Awake, alert, NAD resting in bed.   HENT: NCAT, MMM;   Eyes: pupils equal, no scleral icterus  Cardiovascular: IR/IR rate currently controlled   Pulmonary: decreased. No wheezing noted   Gastrointestinal: S/ND/NT, +BS  Musculoskeletal: No gross deformities  Skin: lower legs have venous changes and right lower leg has more erythema       Result Review    Result Review:  I have personally reviewed these results:  [x]  Laboratory      Lab 25  0532 25  0436 25  0518 25  1215 25  0245 25  0253 25  2336 25  1949 25  1637 25  1441 25  1334 25  1333   WBC 5.08 5.06 7.24  --  8.44 23.33*  --   --   --   --   --  22.84*   HEMOGLOBIN 10.7* 10.7* 11.5*  --  11.0* 12.9  --    --   --   --   --  13.8   HEMATOCRIT 35.2 35.3 38.5  --  36.0 42.8  --   --   --   --   --  46.1   PLATELETS 96* 89* 89*  --  93* 115*  --   --   --   --   --  114*   NEUTROS ABS  --   --   --   --  6.92 21.55*  --   --   --   --   --  21.34*   IMMATURE GRANS (ABS)  --   --   --   --  0.02 0.12*  --   --   --   --   --  0.13*   LYMPHS ABS  --   --   --   --  1.09 1.00  --   --   --   --   --  0.66*   MONOS ABS  --   --   --   --  0.36 0.61  --   --   --   --   --  0.58   EOS ABS  --   --   --   --  0.03 0.00  --   --   --   --   --  0.04   MCV 91.9 92.7 93.0  --  92.8 94.7  --   --   --   --   --  94.3   PROCALCITONIN  --   --   --   --   --   --   --   --   --  0.96*  --   --    LACTATE  --   --   --   --   --  3.8* 1.5 3.0*   < >  --    < > 3.3*   HEPARIN ANTI-XA  --   --   --  1.06  --   --   --   --   --   --   --   --     < > = values in this interval not displayed.         Lab 05/01/25  0532 04/30/25  0436 04/29/25  0518 04/28/25  0245 04/27/25  0253   SODIUM 138 136 134* 137 141   POTASSIUM 3.6 3.7 4.3 4.2 4.6   CHLORIDE 100 102 101 102 105   CO2 23.4 20.8* 19.5* 20.7* 21.6*   ANION GAP 14.6 13.2 13.5 14.3 14.4   BUN 27* 26* 27* 25* 26*   CREATININE 0.89 0.81 0.93 0.77 0.79   EGFR 69.0 77.2 65.4 82.1 79.6   GLUCOSE 127* 110* 125* 173* 121*   CALCIUM 9.2 9.0 9.1 8.9 8.8   MAGNESIUM 1.8 1.9 2.0 2.0 2.4   PHOSPHORUS  --   --  3.7 3.8 3.5         Lab 05/01/25  0532 04/30/25  0436 04/29/25  0518   TOTAL PROTEIN 7.0 6.6 7.1   ALBUMIN 3.6 3.1* 3.4*   GLOBULIN 3.4 3.5 3.7   ALT (SGPT) 14 15 16   AST (SGOT) 16 18 23   BILIRUBIN 0.6 0.6 0.6   ALK PHOS 62 60 62         Lab 04/29/25  0518 04/26/25  1441 04/26/25  1333   PROBNP 6,349.0*  --  619.7   HSTROP T  --  15* 13                 Lab 04/26/25  1334   PH, ARTERIAL 7.391   PCO2, ARTERIAL 42.6   PO2 ART 58.0*   O2 SATURATION ART 89.3*   FIO2 40   HCO3 ART 25.9   BASE EXCESS ART 0.6     Brief Urine Lab Results  (Last result in the past 365 days)        Color   Clarity    Blood   Leuk Est   Nitrite   Protein   CREAT   Urine HCG        04/26/25 1325 Yellow   Clear   Small (1+)   Small (1+)   Positive   Negative                 [x]  Microbiology   Microbiology Results (last 10 days)       Procedure Component Value - Date/Time    Clostridioides difficile Toxin - Stool, Per Rectum [[id number]]  (Abnormal) Collected: 04/30/25 1628    Lab Status: Final result Specimen: Stool from Per Rectum Updated: 04/30/25 1803    Narrative:      The following orders were created for panel order Clostridioides difficile Toxin - Stool, Per Rectum.  Procedure                               Abnormality         Status                     ---------                               -----------         ------                     Clostridioides difficile...[[id number]]  Abnormal            Final result                 Please view results for these tests on the individual orders.    Clostridioides difficile Toxin, PCR - Stool, Per Rectum [[id number]]  (Abnormal) Collected: 04/30/25 1628    Lab Status: Final result Specimen: Stool from Per Rectum Updated: 04/30/25 1803     Toxigenic C. difficile by PCR Positive     027 Toxin Presumptive Negative    Narrative:      DNA from a toxigenic strain of C.difficile has been detected. Antigen testing for the presence of free C.difficile toxin is currently in progress, to help determine the clinical significance of this PCR result.     Clostridioides difficile toxin Ag, Reflex - Stool, Per Rectum [[id number]]  (Normal) Collected: 04/30/25 1628    Lab Status: Final result Specimen: Stool from Per Rectum Updated: 04/30/25 1800     C.diff Toxin Ag Negative    Narrative:      DNA from a toxigenic strain of C.difficile was detected, although the free toxin itself was not detected. These findings are consistent with C.difficile colonization and may not reflect actual C.difficile infection. Clinical correlation needed.    MRSA Screen, PCR (Inpatient) - Swab, Nares [[id number]]  (Abnormal)  Collected: 04/26/25 1813    Lab Status: Final result Specimen: Swab from Nares Updated: 04/26/25 2001     MRSA PCR MRSA Detected    Narrative:      The negative predictive value of this diagnostic test is high and should only be used to consider de-escalating anti-MRSA therapy. A positive result may indicate colonization with MRSA and must be correlated clinically.    Respiratory Panel PCR w/COVID-19(SARS-CoV-2) BRYAN/LENY/NINFA/PAD/COR/HUANG In-House, NP Swab in UTM/VTM, 2 HR TAT - Swab, Nasopharynx [[id number]]  (Normal) Collected: 04/26/25 1813    Lab Status: Final result Specimen: Swab from Nasopharynx Updated: 04/26/25 1916     ADENOVIRUS, PCR Not Detected     Coronavirus 229E Not Detected     Coronavirus HKU1 Not Detected     Coronavirus NL63 Not Detected     Coronavirus OC43 Not Detected     COVID19 Not Detected     Human Metapneumovirus Not Detected     Human Rhinovirus/Enterovirus Not Detected     Influenza A PCR Not Detected     Influenza B PCR Not Detected     Parainfluenza Virus 1 Not Detected     Parainfluenza Virus 2 Not Detected     Parainfluenza Virus 3 Not Detected     Parainfluenza Virus 4 Not Detected     RSV, PCR Not Detected     Bordetella pertussis pcr Not Detected     Bordetella parapertussis PCR Not Detected     Chlamydophila pneumoniae PCR Not Detected     Mycoplasma pneumo by PCR Not Detected    Narrative:      In the setting of a positive respiratory panel with a viral infection PLUS a negative procalcitonin without other underlying concern for bacterial infection, consider observing off antibiotics or discontinuation of antibiotics and continue supportive care. If the respiratory panel is positive for atypical bacterial infection (Bordetella pertussis, Chlamydophila pneumoniae, or Mycoplasma pneumoniae), consider antibiotic de-escalation to target atypical bacterial infection.    Legionella Antigen, Urine - Urine, Urine, Clean Catch [[id number]]  (Normal) Collected: 04/26/25 1639    Lab Status:  Final result Specimen: Urine, Clean Catch Updated: 04/26/25 1709     LEGIONELLA ANTIGEN, URINE Negative    S. Pneumo Ag Urine or CSF - Urine, Urine, Clean Catch [[id number]]  (Normal) Collected: 04/26/25 1639    Lab Status: Final result Specimen: Urine, Clean Catch Updated: 04/26/25 1709     Strep Pneumo Ag Negative    Blood Culture - Blood, Arm, Left [[id number]]  (Normal) Collected: 04/26/25 1441    Lab Status: Preliminary result Specimen: Blood from Arm, Left Updated: 04/30/25 1500     Blood Culture No growth at 4 days    Narrative:      Less than seven (7) mL's of blood was collected.  Insufficient quantity may yield false negative results.    Blood Culture - Blood, Hand, Right [[id number]]  (Normal) Collected: 04/26/25 1441    Lab Status: Preliminary result Specimen: Blood from Hand, Right Updated: 04/30/25 1500     Blood Culture No growth at 4 days    Narrative:      Less than seven (7) mL's of blood was collected.  Insufficient quantity may yield false negative results.    COVID-19, FLU A/B, RSV PCR 1 HR TAT - Swab, Nasopharynx [[id number]]  (Normal) Collected: 04/26/25 1334    Lab Status: Final result Specimen: Swab from Nasopharynx Updated: 04/26/25 1425     COVID19 Not Detected     Influenza A PCR Not Detected     Influenza B PCR Not Detected     RSV, PCR Not Detected    Narrative:      Fact sheet for providers: https://www.fda.gov/media/941426/download    Fact sheet for patients: https://www.fda.gov/media/937399/download    Test performed by PCR.    Urine Culture - Urine, Indwelling Urethral Catheter [[id number]]  (Abnormal)  (Susceptibility) Collected: 04/26/25 1325    Lab Status: Final result Specimen: Urine from Indwelling Urethral Catheter Updated: 04/28/25 1026     Urine Culture >100,000 CFU/mL Escherichia coli    Narrative:      Colonization of the urinary tract without infection is common. Treatment is discouraged unless the patient is symptomatic, pregnant, or undergoing an invasive urologic procedure.     Susceptibility        Escherichia coli      KAMERON      Amoxicillin + Clavulanate Susceptible      Ampicillin Susceptible      Ampicillin + Sulbactam Susceptible      Cefazolin (Urine) Susceptible      Cefepime Susceptible      Ceftazidime Susceptible      Ceftriaxone Susceptible      Gentamicin Susceptible      Levofloxacin Susceptible      Nitrofurantoin Susceptible      Piperacillin + Tazobactam Susceptible      Trimethoprim + Sulfamethoxazole Susceptible                                 [x]  Radiology  XR Chest 1 View  Result Date: 4/29/2025  Cardiomegaly with mild vascular congestion and possible mild residual edema. Electronically Signed: Jean Tamez MD  4/29/2025 12:20 AM EDT  Workstation ID: [id number]    CT Head Without Contrast  Result Date: 4/27/2025  Impression: No evidence of acute intracranial disease. Electronically Signed: Kingston Serrano MD  4/27/2025 12:43 PM EDT  Workstation ID: [id number]    CT Chest Without Contrast Diagnostic  Result Date: 4/27/2025  1.Cardiomegaly with mild pulmonary edema. No pleural effusion. 2.Scattered areas of atelectasis in both lungs. No definite consolidating pneumonia. 3.Mild mediastinal adenopathy, worsened from prior study. This may be reactive. Follow-up chest CT in 3 months recommended. 4.Cholelithiasis. 5.Probable hepatosplenomegaly. 6.Atherosclerotic disease and coronary artery disease. Aortic valvular disease. Electronically Signed: Jean Tamez MD  4/27/2025 4:34 AM EDT  Workstation ID: [id number]    XR Chest 1 View  Result Date: 4/26/2025  Impression: Enlarged cardiac silhouette with findings suggestive of improving pulmonary edema compared to 4/4/2025 chest x-ray. Electronically Signed: Michelle Reyez MD  4/26/2025 2:06 PM EDT  Workstation ID: [id number]    []  EKG/Telemetry   []  Cardiology/Vascular   []  Pathology  []  Old records  []  Other:    Assessment & Plan   Assessment / Plan     Assessment:  Urinary tract infection due to E. Coli  Septic shock,  resolved  Atrial fibrillation with rapid rate  Biventricular heart failure  Chronic venous changes  Chronic hypoxemic respiratory failure on oxygen  Chronic kidney disease stage III  Hypertension  Diabetes mellitus type 2  Morbid obesity with a BMI of 45  C. difficile infection      PLAN  -- Patient remains admitted to the medicine service  -- Patient has completed a course of antibiotics for her E. coli urinary tract infection  -- Will start patient on oral vancomycin today for C. Difficile  -- Cardiology has started patient on valsartan today  -- Will give patient IV Bumex today  -- Continue patient on Xarelto for her atrial fibrillation  -- Patient's metoprolol has been increased to 100 daily  -- Discussed plan with cardiology Dr. Gonzalez  -- Continue sliding scale insulin for diabetes  -- Repeat CBC and BMP in morning to monitor electrolytes and hemoglobin  -- Discussed plan with nursing with patient    Discussed with RN.    VTE Prophylaxis:  Pharmacologic & mechanical VTE prophylaxis orders are present.        CODE STATUS:   Code Status (Patient has no pulse and is not breathing): CPR (Attempt to Resuscitate)  Medical Interventions (Patient has pulse or is breathing): Full Support  Level Of Support Discussed With: Patient      Electronically signed by Kelvin Thomson DO, 5/1/2025, 10:44 EDT.

## 2025-05-01 NOTE — PLAN OF CARE
Goal Outcome Evaluation:  Plan of Care Reviewed With: patient         Uneventful shift, frequent calls for pain and nausea meds. VSS. Wound care done. Ax1 to bsc.

## 2025-05-02 ENCOUNTER — READMISSION MANAGEMENT (OUTPATIENT)
Dept: CALL CENTER | Facility: HOSPITAL | Age: 75
End: 2025-05-02
Payer: MEDICARE

## 2025-05-02 VITALS
HEIGHT: 63 IN | RESPIRATION RATE: 20 BRPM | DIASTOLIC BLOOD PRESSURE: 60 MMHG | BODY MASS INDEX: 43.48 KG/M2 | TEMPERATURE: 97.9 F | SYSTOLIC BLOOD PRESSURE: 140 MMHG | OXYGEN SATURATION: 97 % | HEART RATE: 89 BPM | WEIGHT: 245.37 LBS

## 2025-05-02 PROBLEM — A04.72 C. DIFFICILE DIARRHEA: Status: ACTIVE | Noted: 2025-05-02

## 2025-05-02 LAB
ANION GAP SERPL CALCULATED.3IONS-SCNC: 11.3 MMOL/L (ref 5–15)
BUN SERPL-MCNC: 22 MG/DL (ref 8–23)
BUN/CREAT SERPL: 27.2 (ref 7–25)
CALCIUM SPEC-SCNC: 9.2 MG/DL (ref 8.6–10.5)
CHLORIDE SERPL-SCNC: 100 MMOL/L (ref 98–107)
CO2 SERPL-SCNC: 24.7 MMOL/L (ref 22–29)
CREAT SERPL-MCNC: 0.81 MG/DL (ref 0.57–1)
DEPRECATED RDW RBC AUTO: 49.4 FL (ref 37–54)
EGFRCR SERPLBLD CKD-EPI 2021: 77.2 ML/MIN/1.73
ERYTHROCYTE [DISTWIDTH] IN BLOOD BY AUTOMATED COUNT: 14.5 % (ref 12.3–15.4)
GLUCOSE BLDC GLUCOMTR-MCNC: 134 MG/DL (ref 70–99)
GLUCOSE BLDC GLUCOMTR-MCNC: 159 MG/DL (ref 70–99)
GLUCOSE SERPL-MCNC: 152 MG/DL (ref 65–99)
HCT VFR BLD AUTO: 36.7 % (ref 34–46.6)
HGB BLD-MCNC: 10.8 G/DL (ref 12–15.9)
MAGNESIUM SERPL-MCNC: 1.6 MG/DL (ref 1.6–2.4)
MCH RBC QN AUTO: 27.6 PG (ref 26.6–33)
MCHC RBC AUTO-ENTMCNC: 29.4 G/DL (ref 31.5–35.7)
MCV RBC AUTO: 93.9 FL (ref 79–97)
PLATELET # BLD AUTO: 98 10*3/MM3 (ref 140–450)
PMV BLD AUTO: 13.5 FL (ref 6–12)
POTASSIUM SERPL-SCNC: 3.5 MMOL/L (ref 3.5–5.2)
RBC # BLD AUTO: 3.91 10*6/MM3 (ref 3.77–5.28)
SODIUM SERPL-SCNC: 136 MMOL/L (ref 136–145)
WBC NRBC COR # BLD AUTO: 5.03 10*3/MM3 (ref 3.4–10.8)

## 2025-05-02 PROCEDURE — 83735 ASSAY OF MAGNESIUM: CPT | Performed by: INTERNAL MEDICINE

## 2025-05-02 PROCEDURE — 85027 COMPLETE CBC AUTOMATED: CPT | Performed by: INTERNAL MEDICINE

## 2025-05-02 PROCEDURE — 94664 DEMO&/EVAL PT USE INHALER: CPT

## 2025-05-02 PROCEDURE — 82948 REAGENT STRIP/BLOOD GLUCOSE: CPT

## 2025-05-02 PROCEDURE — 63710000001 INSULIN LISPRO (HUMAN) PER 5 UNITS: Performed by: PHYSICIAN ASSISTANT

## 2025-05-02 PROCEDURE — 97530 THERAPEUTIC ACTIVITIES: CPT

## 2025-05-02 PROCEDURE — 82948 REAGENT STRIP/BLOOD GLUCOSE: CPT | Performed by: PHYSICIAN ASSISTANT

## 2025-05-02 PROCEDURE — 99239 HOSP IP/OBS DSCHRG MGMT >30: CPT | Performed by: INTERNAL MEDICINE

## 2025-05-02 PROCEDURE — 25010000002 MAGNESIUM SULFATE 2 GM/50ML SOLUTION: Performed by: INTERNAL MEDICINE

## 2025-05-02 PROCEDURE — 80048 BASIC METABOLIC PNL TOTAL CA: CPT | Performed by: INTERNAL MEDICINE

## 2025-05-02 PROCEDURE — 94761 N-INVAS EAR/PLS OXIMETRY MLT: CPT

## 2025-05-02 PROCEDURE — 94799 UNLISTED PULMONARY SVC/PX: CPT

## 2025-05-02 PROCEDURE — 97110 THERAPEUTIC EXERCISES: CPT

## 2025-05-02 RX ORDER — VANCOMYCIN HYDROCHLORIDE 125 MG/1
125 CAPSULE ORAL EVERY 6 HOURS SCHEDULED
Qty: 35 CAPSULE | Refills: 0 | Status: SHIPPED | OUTPATIENT
Start: 2025-05-02 | End: 2025-05-11

## 2025-05-02 RX ORDER — POTASSIUM CHLORIDE 1500 MG/1
40 TABLET, EXTENDED RELEASE ORAL EVERY 4 HOURS
Status: COMPLETED | OUTPATIENT
Start: 2025-05-02 | End: 2025-05-02

## 2025-05-02 RX ORDER — VALSARTAN 40 MG/1
40 TABLET ORAL
Qty: 30 TABLET | Refills: 0 | Status: SHIPPED | OUTPATIENT
Start: 2025-05-03 | End: 2025-06-02

## 2025-05-02 RX ORDER — METOPROLOL SUCCINATE 100 MG/1
100 TABLET, EXTENDED RELEASE ORAL
Qty: 30 TABLET | Refills: 0 | Status: SHIPPED | OUTPATIENT
Start: 2025-05-03 | End: 2025-06-02

## 2025-05-02 RX ORDER — MAGNESIUM SULFATE HEPTAHYDRATE 40 MG/ML
2 INJECTION, SOLUTION INTRAVENOUS ONCE
Status: COMPLETED | OUTPATIENT
Start: 2025-05-02 | End: 2025-05-02

## 2025-05-02 RX ADMIN — FAMOTIDINE 20 MG: 20 TABLET, FILM COATED ORAL at 08:55

## 2025-05-02 RX ADMIN — BUMETANIDE 2 MG: 1 TABLET ORAL at 08:59

## 2025-05-02 RX ADMIN — PREGABALIN 150 MG: 75 CAPSULE ORAL at 08:57

## 2025-05-02 RX ADMIN — BUDESONIDE 0.5 MG: 0.5 SUSPENSION RESPIRATORY (INHALATION) at 07:20

## 2025-05-02 RX ADMIN — INSULIN LISPRO 2 UNITS: 100 INJECTION, SOLUTION INTRAVENOUS; SUBCUTANEOUS at 11:56

## 2025-05-02 RX ADMIN — VANCOMYCIN HYDROCHLORIDE 125 MG: 125 CAPSULE ORAL at 00:12

## 2025-05-02 RX ADMIN — VALSARTAN 40 MG: 80 TABLET, FILM COATED ORAL at 08:58

## 2025-05-02 RX ADMIN — PREGABALIN 150 MG: 75 CAPSULE ORAL at 00:12

## 2025-05-02 RX ADMIN — METOPROLOL SUCCINATE 100 MG: 50 TABLET, EXTENDED RELEASE ORAL at 08:58

## 2025-05-02 RX ADMIN — HYDROCODONE BITARTRATE AND ACETAMINOPHEN 1 TABLET: 10; 325 TABLET ORAL at 12:36

## 2025-05-02 RX ADMIN — VANCOMYCIN HYDROCHLORIDE 125 MG: 125 CAPSULE ORAL at 06:43

## 2025-05-02 RX ADMIN — DILTIAZEM HYDROCHLORIDE 180 MG: 180 CAPSULE, COATED, EXTENDED RELEASE ORAL at 08:58

## 2025-05-02 RX ADMIN — HYDROCODONE BITARTRATE AND ACETAMINOPHEN 1 TABLET: 10; 325 TABLET ORAL at 04:18

## 2025-05-02 RX ADMIN — IPRATROPIUM BROMIDE AND ALBUTEROL SULFATE 3 ML: .5; 3 SOLUTION RESPIRATORY (INHALATION) at 00:51

## 2025-05-02 RX ADMIN — POTASSIUM CHLORIDE 40 MEQ: 1500 TABLET, EXTENDED RELEASE ORAL at 08:57

## 2025-05-02 RX ADMIN — MAGNESIUM SULFATE HEPTAHYDRATE 2 G: 40 INJECTION, SOLUTION INTRAVENOUS at 08:57

## 2025-05-02 RX ADMIN — IPRATROPIUM BROMIDE AND ALBUTEROL SULFATE 3 ML: .5; 3 SOLUTION RESPIRATORY (INHALATION) at 07:20

## 2025-05-02 RX ADMIN — VANCOMYCIN HYDROCHLORIDE 125 MG: 125 CAPSULE ORAL at 12:36

## 2025-05-02 RX ADMIN — POTASSIUM CHLORIDE 40 MEQ: 1500 TABLET, EXTENDED RELEASE ORAL at 12:36

## 2025-05-02 NOTE — DISCHARGE SUMMARY
We               [facility]         HOSPITALIST  DISCHARGE SUMMARY    Patient Name: Mary Albarran  : 1950  MRN: [id number]    Date of Admission: 2025  Date of Discharge:  2025    Primary Care Physician: Brittni Quiroz APRN    Consults       Date and Time Order Name Status Description    2025  2:01 PM Inpatient Cardiology Consult      2025  4:18 PM Inpatient Pulmonology Consult      2025  2:27 PM Hospitalist (on-call MD unless specified)              Active and Resolved Hospital Problems:  Active Hospital Problems    Diagnosis POA    C. difficile diarrhea [A04.72] Unknown    Sepsis [A41.9] Yes    Acute on chronic HFrEF (heart failure with reduced ejection fraction) [I50.23] Yes    Longstanding persistent atrial fibrillation [I48.11] Yes    Aortic stenosis, moderate [I35.0] Yes    Chronic kidney disease, stage III (moderate) [N18.30] Yes    Hypertension [I10] Yes      Resolved Hospital Problems   No resolved problems to display.       Hospital Course     Hospital Course:  Mary Albarran is a 74 y.o. female with history of morbid obesity, heart failure, atrial fibrillation with rapid rate, chronic hypoxemic respiratory failure, COPD, chronic kidney disease, hypertension, diabetes who presented to our hospital for weakness and sleepiness.  On arrival to the ER patient's temperature was 101, pulse of 141.  Patient did have some hypoxia initially she is on 2 L but required 4 L of oxygen.  Patient's BNP was 619 potassium was 5.4, sodium was 135.  Patient was noted to have an abnormal urinalysis with a urine infection.  Chest x-ray showed pulmonary edema.  Patient was admitted to the hospital.  Patient was initially in the intensive care and required Levophed.  Patient was treated with IV antibiotics for urinary tract infection.  Patient's urine culture was growing E. coli.  Patient unfortunately had diarrhea around .  This was tested for C. difficile which was positive.   Patient was started on oral vancomycin with improvement in her diarrhea.  Patient was also seen by cardiology given her atrial fibrillation with rapid rate.  Patient's metoprolol was increased.  Valsartan was added.  Patient was given IV Lasix during her hospitalization but eventually resumed her Bumex on discharge.  Patient was continued on Xarelto.  Overall it was recommended that patient go to rehab however patient adamantly refuses rehab services and refuses home health services.  Patient wishes to be discharged home.  Therefore patient will discharge home today in stable condition.  Patient also has an appointment with [facility] cardiology next week and has an appointment with her primary care.  Patient will be discharged home today in stable condition.        DISCHARGE Follow Up Recommendations for labs and diagnostics:   Patient to follow-up with primary care in 1 week  Patient to follow-up with cardiology as scheduled        Day of Discharge     Vital Signs:  Temp:  [97.7 °F (36.5 °C)-98.5 °F (36.9 °C)] 97.9 °F (36.6 °C)  Heart Rate:  [] 89  Resp:  [18-20] 20  BP: (117-153)/(50-85) 140/60  Flow (L/min) (Oxygen Therapy):  [3-4] 3  Physical Exam:   General: Awake, alert, NAD resting in bed.   HENT: NCAT, MMM;   Eyes: pupils equal, no scleral icterus  Cardiovascular: IR/IR rate currently controlled   Pulmonary: decreased. No wheezing noted   Gastrointestinal: S/ND/NT, +BS  Musculoskeletal: No gross deformities  Skin: bilateral lower legs have venous changes          Discharge Details        Discharge Medications        New Medications        Instructions Start Date   metoprolol succinate  MG 24 hr tablet  Commonly known as: TOPROL-XL   100 mg, Oral, Every 24 Hours Scheduled   Start Date: May 3, 2025     valsartan 40 MG tablet  Commonly known as: DIOVAN   40 mg, Oral, Every 24 Hours Scheduled   Start Date: May 3, 2025     vancomycin 125 MG capsule  Commonly known as: VANCOCIN   125 mg,  Oral, Every 6 Hours Scheduled             Continue These Medications        Instructions Start Date   albuterol 1.25 MG/3ML nebulizer solution  Commonly known as: ACCUNEB   1.25 mg, Nebulization, Every 6 Hours PRN      albuterol sulfate  (90 Base) MCG/ACT inhaler  Commonly known as: PROVENTIL HFA;VENTOLIN HFA;PROAIR HFA   2 puffs, Inhalation, Every 4 Hours PRN      ammonium lactate 12 % lotion  Commonly known as: LAC-HYDRIN   Topical, 2 Times Daily      atorvastatin 20 MG tablet  Commonly known as: LIPITOR   TAKE 1 TABLET DAILY      budesonide 1 MG/2ML nebulizer solution  Commonly known as: PULMICORT   1 mg, Nebulization, Daily      bumetanide 2 MG tablet  Commonly known as: BUMEX   1 tablet, Daily      cholecalciferol 25 MCG (1000 UT) tablet  Commonly known as: VITAMIN D3   1,000 Units, Daily      dilTIAZem  MG 24 hr capsule  Commonly known as: CARDIZEM CD   180 mg, Daily      HYDROcodone-acetaminophen  MG per tablet  Commonly known as: NORCO   1 tablet, Oral, Every 6 Hours PRN      Januvia 50 MG tablet  Generic drug: SITagliptin   50 mg, Daily      Lantus SoloStar 100 UNIT/ML injection pen  Generic drug: Insulin Glargine   15 Units, Subcutaneous, Daily      Magnesium Oxide -Mg Supplement 400 (240 Mg) MG tablet   1 tablet, Daily      potassium chloride ER 20 MEQ tablet controlled-release ER tablet  Commonly known as: K-TAB   40 mEq, 2 Times Daily      pregabalin 150 MG capsule  Commonly known as: LYRICA   150 mg, Oral, 2 Times Daily      rivaroxaban 20 MG tablet  Commonly known as: XARELTO   20 mg, Oral, Daily             Stop These Medications      metoprolol tartrate 50 MG tablet  Commonly known as: LOPRESSOR              Allergies   Allergen Reactions    Morphine Confusion    Codeine Mental Status Change    Sglt2 Inhibitors Other (See Comments)     Frequent UTI        Discharge Disposition:  Home or Self Care    Diet:  Hospital:  Diet Order   Procedures    Diet: Diabetic, Cardiac, Fluid  Restriction (240 mL/tray); Healthy Heart (2-3 Na+); Consistent Carbohydrate; 1500 mL/day; Texture: Regular (IDDSI 7); Fluid Consistency: Thin (IDDSI 0)       Discharge Activity: as tolerated       CODE STATUS:  Code Status and Medical Interventions: CPR (Attempt to Resuscitate); Full Support   Ordered at: 04/26/25 1614     Code Status (Patient has no pulse and is not breathing):    CPR (Attempt to Resuscitate)     Medical Interventions (Patient has pulse or is breathing):    Full Support     Level Of Support Discussed With:    Patient         Future Appointments   Date Time Provider Department Center   5/9/2025 12:30 PM [facility]       Additional Instructions for the Follow-ups that You Need to Schedule       Discharge Follow-up with PCP   As directed       Currently Documented PCP:    Brittni Quiroz APRN    PCP Phone Number:    [phone]     Follow Up Details: in 1 week                Pertinent  and/or Most Recent Results     PROCEDURES:   None     LAB RESULTS:      Lab 05/02/25  0539 05/01/25  0532 04/30/25  0436 04/29/25  0518 04/28/25  1215 04/28/25  0245 04/27/25  0253 04/26/25  2336 04/26/25  1949 04/26/25  1637 04/26/25  1441 04/26/25  1334 04/26/25  1333   WBC 5.03 5.08 5.06 7.24  --  8.44 23.33*  --   --   --   --   --  22.84*   HEMOGLOBIN 10.8* 10.7* 10.7* 11.5*  --  11.0* 12.9  --   --   --   --   --  13.8   HEMATOCRIT 36.7 35.2 35.3 38.5  --  36.0 42.8  --   --   --   --   --  46.1   PLATELETS 98* 96* 89* 89*  --  93* 115*  --   --   --   --   --  114*   NEUTROS ABS  --   --   --   --   --  6.92 21.55*  --   --   --   --   --  21.34*   IMMATURE GRANS (ABS)  --   --   --   --   --  0.02 0.12*  --   --   --   --   --  0.13*   LYMPHS ABS  --   --   --   --   --  1.09 1.00  --   --   --   --   --  0.66*   MONOS ABS  --   --   --   --   --  0.36 0.61  --   --   --   --   --  0.58   EOS ABS  --   --   --   --   --  0.03 0.00  --   --   --   --   --  0.04   MCV 93.9  91.9 92.7 93.0  --  92.8 94.7  --   --   --   --   --  94.3   PROCALCITONIN  --   --   --   --   --   --   --   --   --   --  0.96*  --   --    LACTATE  --   --   --   --   --   --  3.8* 1.5 3.0* 3.0*  --  2.3* 3.3*   HEPARIN ANTI-XA  --   --   --   --  1.06  --   --   --   --   --   --   --   --          Lab 05/02/25  0539 05/01/25  1549 05/01/25 0532 04/30/25 0436 04/29/25 0518 04/28/25 0245 04/27/25  0253 04/26/25  1441   SODIUM 136  --  138 136 134* 137 141  --    POTASSIUM 3.5 4.4 3.6 3.7 4.3 4.2 4.6  --    CHLORIDE 100  --  100 102 101 102 105  --    CO2 24.7  --  23.4 20.8* 19.5* 20.7* 21.6*  --    ANION GAP 11.3  --  14.6 13.2 13.5 14.3 14.4  --    BUN 22  --  27* 26* 27* 25* 26*  --    CREATININE 0.81  --  0.89 0.81 0.93 0.77 0.79  --    EGFR 77.2  --  69.0 77.2 65.4 82.1 79.6  --    GLUCOSE 152*  --  127* 110* 125* 173* 121*  --    CALCIUM 9.2  --  9.2 9.0 9.1 8.9 8.8  --    MAGNESIUM 1.6  --  1.8 1.9 2.0 2.0 2.4  --    PHOSPHORUS  --   --   --   --  3.7 3.8 3.5 3.6         Lab 05/01/25  0532 04/30/25 0436 04/29/25 0518 04/28/25 0245 04/27/25  0253   TOTAL PROTEIN 7.0 6.6 7.1 6.9 7.2   ALBUMIN 3.6 3.1* 3.4* 3.2* 3.2*   GLOBULIN 3.4 3.5 3.7 3.7 4.0   ALT (SGPT) 14 15 16 15 16   AST (SGOT) 16 18 23 21 20   BILIRUBIN 0.6 0.6 0.6 0.6 0.6   ALK PHOS 62 60 62 63 73         Lab 04/29/25  0518 04/26/25  1441 04/26/25  1333   PROBNP 6,349.0*  --  619.7   HSTROP T  --  15* 13                 Lab 04/26/25  1334   PH, ARTERIAL 7.391   PCO2, ARTERIAL 42.6   PO2 ART 58.0*   O2 SATURATION ART 89.3*   FIO2 40   HCO3 ART 25.9   BASE EXCESS ART 0.6     Brief Urine Lab Results  (Last result in the past 365 days)        Color   Clarity   Blood   Leuk Est   Nitrite   Protein   CREAT   Urine HCG        04/26/25 1325 Yellow   Clear   Small (1+)   Small (1+)   Positive   Negative                 Microbiology Results (last 10 days)       Procedure Component Value - Date/Time    Clostridioides difficile Toxin - Stool, Per  Rectum [[id number]]  (Abnormal) Collected: 04/30/25 1628    Lab Status: Final result Specimen: Stool from Per Rectum Updated: 04/30/25 1803    Narrative:      The following orders were created for panel order Clostridioides difficile Toxin - Stool, Per Rectum.  Procedure                               Abnormality         Status                     ---------                               -----------         ------                     Clostridioides difficile...[[id number]]  Abnormal            Final result                 Please view results for these tests on the individual orders.    Clostridioides difficile Toxin, PCR - Stool, Per Rectum [[id number]]  (Abnormal) Collected: 04/30/25 1628    Lab Status: Final result Specimen: Stool from Per Rectum Updated: 04/30/25 1803     Toxigenic C. difficile by PCR Positive     027 Toxin Presumptive Negative    Narrative:      DNA from a toxigenic strain of C.difficile has been detected. Antigen testing for the presence of free C.difficile toxin is currently in progress, to help determine the clinical significance of this PCR result.     Clostridioides difficile toxin Ag, Reflex - Stool, Per Rectum [[id number]]  (Normal) Collected: 04/30/25 1628    Lab Status: Final result Specimen: Stool from Per Rectum Updated: 04/30/25 1800     C.diff Toxin Ag Negative    Narrative:      DNA from a toxigenic strain of C.difficile was detected, although the free toxin itself was not detected. These findings are consistent with C.difficile colonization and may not reflect actual C.difficile infection. Clinical correlation needed.    MRSA Screen, PCR (Inpatient) - Swab, Nares [[id number]]  (Abnormal) Collected: 04/26/25 1813    Lab Status: Final result Specimen: Swab from Nares Updated: 04/26/25 2001     MRSA PCR MRSA Detected    Narrative:      The negative predictive value of this diagnostic test is high and should only be used to consider de-escalating anti-MRSA therapy. A positive result may  indicate colonization with MRSA and must be correlated clinically.    Respiratory Panel PCR w/COVID-19(SARS-CoV-2) BRYAN/LENY/NINFA/PAD/COR/HUANG In-House, NP Swab in UTM/VTM, 2 HR TAT - Swab, Nasopharynx [[id number]]  (Normal) Collected: 04/26/25 1813    Lab Status: Final result Specimen: Swab from Nasopharynx Updated: 04/26/25 1916     ADENOVIRUS, PCR Not Detected     Coronavirus 229E Not Detected     Coronavirus HKU1 Not Detected     Coronavirus NL63 Not Detected     Coronavirus OC43 Not Detected     COVID19 Not Detected     Human Metapneumovirus Not Detected     Human Rhinovirus/Enterovirus Not Detected     Influenza A PCR Not Detected     Influenza B PCR Not Detected     Parainfluenza Virus 1 Not Detected     Parainfluenza Virus 2 Not Detected     Parainfluenza Virus 3 Not Detected     Parainfluenza Virus 4 Not Detected     RSV, PCR Not Detected     Bordetella pertussis pcr Not Detected     Bordetella parapertussis PCR Not Detected     Chlamydophila pneumoniae PCR Not Detected     Mycoplasma pneumo by PCR Not Detected    Narrative:      In the setting of a positive respiratory panel with a viral infection PLUS a negative procalcitonin without other underlying concern for bacterial infection, consider observing off antibiotics or discontinuation of antibiotics and continue supportive care. If the respiratory panel is positive for atypical bacterial infection (Bordetella pertussis, Chlamydophila pneumoniae, or Mycoplasma pneumoniae), consider antibiotic de-escalation to target atypical bacterial infection.    Legionella Antigen, Urine - Urine, Urine, Clean Catch [[id number]]  (Normal) Collected: 04/26/25 1639    Lab Status: Final result Specimen: Urine, Clean Catch Updated: 04/26/25 1709     LEGIONELLA ANTIGEN, URINE Negative    S. Pneumo Ag Urine or CSF - Urine, Urine, Clean Catch [[id number]]  (Normal) Collected: 04/26/25 1639    Lab Status: Final result Specimen: Urine, Clean Catch Updated: 04/26/25 1709     Strep  Pneumo Ag Negative    Blood Culture - Blood, Arm, Left [[id number]]  (Normal) Collected: 04/26/25 1441    Lab Status: Final result Specimen: Blood from Arm, Left Updated: 05/01/25 1500     Blood Culture No growth at 5 days    Narrative:      Less than seven (7) mL's of blood was collected.  Insufficient quantity may yield false negative results.    Blood Culture - Blood, Hand, Right [[id number]]  (Normal) Collected: 04/26/25 1441    Lab Status: Final result Specimen: Blood from Hand, Right Updated: 05/01/25 1500     Blood Culture No growth at 5 days    Narrative:      Less than seven (7) mL's of blood was collected.  Insufficient quantity may yield false negative results.    COVID-19, FLU A/B, RSV PCR 1 HR TAT - Swab, Nasopharynx [[id number]]  (Normal) Collected: 04/26/25 1334    Lab Status: Final result Specimen: Swab from Nasopharynx Updated: 04/26/25 1425     COVID19 Not Detected     Influenza A PCR Not Detected     Influenza B PCR Not Detected     RSV, PCR Not Detected    Narrative:      Fact sheet for providers: https://www.fda.gov/media/669291/download    Fact sheet for patients: https://www.fda.gov/media/144665/download    Test performed by PCR.    Urine Culture - Urine, Indwelling Urethral Catheter [[id number]]  (Abnormal)  (Susceptibility) Collected: 04/26/25 1325    Lab Status: Final result Specimen: Urine from Indwelling Urethral Catheter Updated: 04/28/25 1026     Urine Culture >100,000 CFU/mL Escherichia coli    Narrative:      Colonization of the urinary tract without infection is common. Treatment is discouraged unless the patient is symptomatic, pregnant, or undergoing an invasive urologic procedure.    Susceptibility        Escherichia coli      KAMERON      Amoxicillin + Clavulanate Susceptible      Ampicillin Susceptible      Ampicillin + Sulbactam Susceptible      Cefazolin (Urine) Susceptible      Cefepime Susceptible      Ceftazidime Susceptible      Ceftriaxone Susceptible      Gentamicin Susceptible       Levofloxacin Susceptible      Nitrofurantoin Susceptible      Piperacillin + Tazobactam Susceptible      Trimethoprim + Sulfamethoxazole Susceptible                                   XR Chest 1 View  Result Date: 4/29/2025  Cardiomegaly with mild vascular congestion and possible mild residual edema. Electronically Signed: Jean Tamez MD  4/29/2025 12:20 AM EDT  Workstation ID: [id number]    CT Head Without Contrast  Result Date: 4/27/2025  Impression: No evidence of acute intracranial disease. Electronically Signed: Kingston Serrano MD  4/27/2025 12:43 PM EDT  Workstation ID: [id number]    CT Chest Without Contrast Diagnostic  Result Date: 4/27/2025  1.Cardiomegaly with mild pulmonary edema. No pleural effusion. 2.Scattered areas of atelectasis in both lungs. No definite consolidating pneumonia. 3.Mild mediastinal adenopathy, worsened from prior study. This may be reactive. Follow-up chest CT in 3 months recommended. 4.Cholelithiasis. 5.Probable hepatosplenomegaly. 6.Atherosclerotic disease and coronary artery disease. Aortic valvular disease. Electronically Signed: Jean Tamez MD  4/27/2025 4:34 AM EDT  Workstation ID: [id number]    XR Chest 1 View  Result Date: 4/26/2025  Impression: Enlarged cardiac silhouette with findings suggestive of improving pulmonary edema compared to 4/4/2025 chest x-ray. Electronically Signed: Michelle Reyez MD  4/26/2025 2:06 PM EDT  Workstation ID: [id number]       Results for orders placed during the hospital encounter of 04/04/25    Duplex Venous Lower Extremity - Bilateral CV-READ    Interpretation Summary    Normal bilateral lower extremity venous duplex scan.      Results for orders placed during the hospital encounter of 04/04/25    Duplex Venous Lower Extremity - Bilateral CV-READ    Interpretation Summary    Normal bilateral lower extremity venous duplex scan.      Results for orders placed during the hospital encounter of 02/11/25    Adult Transthoracic Echo Complete W/ Cont if  Necessary Per Protocol    Interpretation Summary    Left ventricular systolic function is mildly decreased. Left ventricular ejection fraction appears to be 46 - 50%. with some anterior septal hypokensis    The right ventricular cavity is mildly dilated.    The left atrial cavity is mildly dilated.    The right atrial cavity is moderately  dilated.    Mild to moderate aortic valve stenosis is present.    Estimated right ventricular systolic pressure from tricuspid regurgitation is moderately elevated (45-55 mmHg).      Labs Pending at Discharge:        Time spent on Discharge including face to face service:  more than 35 minutes    Electronically signed by Kelvin Thomson DO, 05/02/25, 11:44 AM EDT.

## 2025-05-02 NOTE — PLAN OF CARE
Goal Outcome Evaluation:PT A&O times 4 Pt cont with c/o pain to feet and hands notified Ximena PÑEA if she could increase pt's norco to her same home dosage of 10mg and add her Lyrica new orders added pt stated her pain was a lot better after these meds were administered Pt cont in iso for c-diff cont POC

## 2025-05-02 NOTE — OUTREACH NOTE
Prep Survey      Flowsheet Row Responses   [facility] patient discharged from? [facility]   Is LACE score < 7 ? No   Eligibility [facility]   Date of Admission 04/26/25   Date of Discharge 05/02/25   Discharge Disposition Home or Self Care   Discharge diagnosis C. difficile diarrhea, sepsis   Does the patient have one of the following disease processes/diagnoses(primary or secondary)? Sepsis   Does the patient have Home health ordered? No   Is there a DME ordered? No   Prep survey completed? Yes            Shania TREVIÑO - Registered Nurse

## 2025-05-02 NOTE — THERAPY TREATMENT NOTE
Acute Care - Physical Therapy Treatment Note  [facility]     Patient Name: Mary Albarran  : 1950  MRN: [id number]  Today's Date: 2025      Visit Dx:     ICD-10-CM ICD-9-CM   1. Sepsis, due to unspecified organism, unspecified whether acute organ dysfunction present  A41.9 038.9     995.91   2. Acute UTI  N39.0 599.0   3. Atrial fibrillation, unspecified type  I48.91 427.31   4. Difficulty walking  R26.2 719.7   5. Decreased activities of daily living (ADL)  Z78.9 V49.89     Patient Active Problem List   Diagnosis    Aortic stenosis, moderate    Longstanding persistent atrial fibrillation    Chronic kidney disease, stage III (moderate)    Hyperlipidemia    Hypertension    Chronic diastolic congestive heart failure    Atrial fibrillation with RVR    Onychomycosis    Onychocryptosis    Foot pain, bilateral    Nephrolithiasis    Recurrent urinary tract infection    Acute on chronic HFrEF (heart failure with reduced ejection fraction)    Cellulitis of right leg without foot    Diabetes mellitus type 2 with complications    Depression    COPD (chronic obstructive pulmonary disease)    Chronic pain    Anxiety    Heart failure with reduced ejection fraction    CHF exacerbation    Sepsis    C. difficile diarrhea     Past Medical History:   Diagnosis Date    Allergic rhinitis     Anxiety     Aortic valve disease 2021    Fibrocalcific changes of the aortic valve with mild aortic valve stenosis   on echo 3/2/2021  Moderate aortic valve regurgitation is present. Aortic valve maximum pressure gradient is 26 mmHg. Moderate aortic valve stenosis is present.  On echo 2/10/2022       Arthritis     Atrial fibrillation     CHF (congestive heart failure)     Chronic kidney disease (CKD), stage III (moderate)     Chronic pain     COPD (chronic obstructive pulmonary disease)     Coronary artery disease     Diabetes mellitus type 2 with complications     Elevated cholesterol     Ex-smoker     QUIT SMOKING     GERD  (gastroesophageal reflux disease)     History of home oxygen therapy     2L/NC AAT REPORTED    History of transfusion     Hyperlipidemia     Hypertension     Thrombocytopenia      Past Surgical History:   Procedure Laterality Date    HYSTERECTOMY      30 YEARS AGO     PT Assessment (Last 12 Hours)       PT Evaluation and Treatment       Row Name 05/02/25 1131          Physical Therapy Time and Intention    Document Type therapy note (daily note)  -     Mode of Treatment individual therapy;physical therapy  -WM     Patient Effort good  -     Symptoms Noted During/After Treatment fatigue  -       Row Name 05/02/25 1131          Bed Mobility    Supine-Sit [place] (Bed Mobility) contact guard  -     Sit-Supine [place] (Bed Mobility) contact guard  -     Assistive Device (Bed Mobility) bed rails;head of bed elevated  -       Row Name 05/02/25 1131          Transfers    Transfers stand pivot/stand step transfer;toilet transfer  -       Row Name 05/02/25 1131          Stand Pivot/Stand Step Transfer    Stand Pivot/Stand Step [place] (Transfers) contact guard;verbal cues  -     Assistive Device (Stand Pivot Stand Step Transfer) walker, front-wheeled  -       Row Name 05/02/25 1131          Toilet Transfer    [place] Level (Toilet Transfer) contact guard  -     Assistive Device (Toilet Transfer) walker, front-wheeled;commode, bedside without drop arms  -       Row Name 05/02/25 1131          Gait/Stairs (Locomotion)    Patient was able to Ambulate no, other medical factors prevent ambulation  -     Reason Patient was unable to Ambulate --  Pt declined  -       Row Name 05/02/25 1131          Safety Issues/Impairments Affecting Functional Mobility    Impairments Affecting Function (Mobility) balance;endurance/activity tolerance;strength  -       Row Name 05/02/25 1131          Motor Skills    Therapeutic Exercise hip;knee;ankle  -       Row Name 05/02/25 1131          Hip  (Therapeutic Exercise)    Hip (Therapeutic Exercise) AAROM (active assistive range of motion);strengthening exercise  -     Hip AAROM (Therapeutic Exercise) bilateral;aBduction;aDduction;supine;10 repetitions;2 sets  -     Hip Strengthening (Therapeutic Exercise) bilateral;heel slides;supine;20 repititions  Manual resistance  -       Row Name 05/02/25 1131          Knee (Therapeutic Exercise)    Knee (Therapeutic Exercise) AROM (active range of motion)  -     Knee AROM (Therapeutic Exercise) bilateral;SAQ (short arc quad);supine;20 repititions  -       Row Name 05/02/25 1131          Ankle (Therapeutic Exercise)    Ankle (Therapeutic Exercise) AROM (active range of motion)  -     Ankle AROM (Therapeutic Exercise) bilateral;dorsiflexion;plantarflexion;supine;20 repititions  -       Row Name             Wound 02/11/25 1338 Right posterior foot unspecified    Wound - Properties Group Placement Date: 02/11/25  -KE Placement Time: 1338  -KE Side: Right  -KE Orientation: posterior  -KE Location: foot  -KE Type: unspecified  -KE    Retired Wound - Properties Group Placement Date: 02/11/25  -KE Placement Time: 1338  -KE Side: Right  -KE Orientation: posterior  -KE Location: foot  -KE Type: unspecified  -KE    Retired Wound - Properties Group Placement Date: 02/11/25  -KE Placement Time: 1338  -KE Side: Right  -KE Orientation: posterior  -KE Location: foot  -KE Type: unspecified  -KE    Retired Wound - Properties Group Date first assessed: 02/11/25  -KE Time first assessed: 1338  -KE Side: Right  -KE Location: foot  -KE Type: unspecified  -KE      Row Name             Wound 04/04/25 2114 Left anterior foot    Wound - Properties Group Placement Date: 04/04/25  -RW Placement Time: 2114  -RW Side: Left  -RW Orientation: anterior  -RW Location: foot  -RW    Retired Wound - Properties Group Placement Date: 04/04/25  -RW Placement Time: 2114 -RW Side: Left  -RW Orientation: anterior  -RW Location: foot  -RW     Retired Wound - Properties Group Placement Date: 04/04/25  -RW Placement Time: 2114 -RW Side: Left  -RW Orientation: anterior  -RW Location: foot  -RW    Retired Wound - Properties Group Date first assessed: 04/04/25  -RW Time first assessed: 2114 -RW Side: Left  -RW Location: foot  -RW      Row Name             Wound 04/04/25 2112 Left lower breast    Wound - Properties Group Placement Date: 04/04/25  -RW Placement Time: 2112 -RW Present on Original Admission: Y  -RW Side: Left  -RW Orientation: lower  -RW Location: breast  -RW    Retired Wound - Properties Group Placement Date: 04/04/25  -RW Placement Time: 2112 -RW Present on Original Admission: Y  -RW Side: Left  -RW Orientation: lower  -RW Location: breast  -RW    Retired Wound - Properties Group Placement Date: 04/04/25  -RW Placement Time: 2112 -RW Present on Original Admission: Y  -RW Side: Left  -RW Orientation: lower  -RW Location: breast  -RW    Retired Wound - Properties Group Date first assessed: 04/04/25  -RW Time first assessed: 2112 -RW Present on Original Admission: Y  -RW Side: Left  -RW Location: breast  -RW      Row Name             Wound 03/18/25 1812 Bilateral anterior groin    Wound - Properties Group Placement Date: 03/18/25  -AH Placement Time: 1812  -AH Present on Original Admission: Y  -AH Side: Bilateral  -AH Orientation: anterior  -AH Location: groin  -AH    Retired Wound - Properties Group Placement Date: 03/18/25  -AH Placement Time: 1812 -AH Present on Original Admission: Y  -AH Side: Bilateral  -AH Orientation: anterior  -AH Location: groin  -AH    Retired Wound - Properties Group Placement Date: 03/18/25  -AH Placement Time: 1812  -AH Present on Original Admission: Y  -AH Side: Bilateral  -AH Orientation: anterior  -AH Location: groin  -AH    Retired Wound - Properties Group Date first assessed: 03/18/25  -AH Time first assessed: 1812  -AH Present on Original Admission: Y  -AH Side: Bilateral  -AH Location: groin  -AH       Row Name 05/02/25 1131          Positioning and Restraints    Pre-Treatment Position in bed  -WM     Post Treatment Position bed  -WM     In Bed fowlers;call light within reach;encouraged to call for assist;exit alarm on  -WM       Row Name 05/02/25 1131          Progress Summary (PT)    Progress Toward Functional Goals (PT) progress toward functional goals is gradual  -WM               User Key  (r) = Recorded By, (t) = Taken By, (c) = Cosigned By      Initials Name Provider Type     Corinna Luna, RN Registered Nurse    Christie Isaac RN Registered Nurse    Jerson Summers PTA Physical Therapist Assistant    Rocio Umanzor RN Registered Nurse                    Physical Therapy Education       Title: PT OT SLP Therapies (In Progress)       Topic: Physical Therapy (In Progress)       Point: Mobility training (Done)       Learning Progress Summary            Patient Acceptance, E,TB, VU by AV at 4/28/2025 1449                      Point: Home exercise program (Not Started)       Learner Progress:  Not documented in this visit.              Point: Body mechanics (Done)       Learning Progress Summary            Patient Acceptance, E,TB, VU by AV at 4/28/2025 1449                      Point: Precautions (Done)       Learning Progress Summary            Patient Acceptance, E,TB, VU by AV at 4/28/2025 1449                                      User Key       Initials Effective Dates Name Provider Type Discipline     06/11/21 -  Kelvin Trejo, PT Physical Therapist PT                  PT Recommendation and Plan     Progress Summary (PT)  Progress Toward Functional Goals (PT): progress toward functional goals is gradual   Outcome Measures       Row Name 05/02/25 1136             How much help from another person do you currently need...    Turning from your back to your side while in flat bed without using bedrails? 4  -WM      Moving from lying on back to sitting on the side of a flat bed without  bedrails? 4  -WM      Moving to and from a bed to a chair (including a wheelchair)? 3  -WM      Standing up from a chair using your arms (e.g., wheelchair, bedside chair)? 3  -WM      Climbing 3-5 steps with a railing? 2  -WM      To walk in hospital room? 3  -WM      AM-PAC 6 Clicks Score (PT) 19  -WM                User Key  (r) = Recorded By, (t) = Taken By, (c) = Cosigned By      Initials Name Provider Type    Jerson Summers PTA Physical Therapist Assistant                     Time Calculation:    PT Charges       Row Name 05/02/25 1130             Time Calculation    PT Received On 05/02/25  -WM         Timed Charges    86450 - PT Therapeutic Exercise Minutes 14  -WM      00291 - PT Therapeutic Activity Minutes 10  -WM         Total Minutes    Timed Charges Total Minutes 24  -WM       Total Minutes 24  -WM                User Key  (r) = Recorded By, (t) = Taken By, (c) = Cosigned By      Initials Name Provider Type    Jerson Summers PTA Physical Therapist Assistant                      PT G-Codes  Outcome Measure Options: AM-PAC 6 Clicks Daily Activity (OT), Optimal Instrument  AM-PAC 6 Clicks Score (PT): 19  AM-PAC 6 Clicks Score (OT): 12    Jerson Franco PTA  5/2/2025

## 2025-05-04 ENCOUNTER — TRANSITIONAL CARE MANAGEMENT TELEPHONE ENCOUNTER (OUTPATIENT)
Dept: CALL CENTER | Facility: HOSPITAL | Age: 75
End: 2025-05-04
Payer: MEDICARE

## 2025-05-04 NOTE — OUTREACH NOTE
Call Center TCM Note      Flowsheet Row Responses   [facility] facility patient discharged from? [facility]   Does the patient have one of the following disease processes/diagnoses(primary or secondary)? Sepsis   TCM attempt successful? No   Unsuccessful attempts Attempt 1            DIPIKA HUGHES - Registered Nurse    5/4/2025, 18:58 EDT

## 2025-05-05 ENCOUNTER — TRANSITIONAL CARE MANAGEMENT TELEPHONE ENCOUNTER (OUTPATIENT)
Dept: CALL CENTER | Facility: HOSPITAL | Age: 75
End: 2025-05-05
Payer: MEDICARE

## 2025-05-05 NOTE — OUTREACH NOTE
Call Center TCM Note      Flowsheet Row Responses   [facility] facility patient discharged from? [facility]   Does the patient have one of the following disease processes/diagnoses(primary or secondary)? Sepsis   TCM attempt successful? No   Unsuccessful attempts Attempt 2            AMAYA GUERRERO - Registered Nurse    5/5/2025, 10:32 EDT

## 2025-05-06 ENCOUNTER — TRANSITIONAL CARE MANAGEMENT TELEPHONE ENCOUNTER (OUTPATIENT)
Dept: CALL CENTER | Facility: HOSPITAL | Age: 75
End: 2025-05-06
Payer: MEDICARE

## 2025-05-09 ENCOUNTER — OFFICE VISIT (OUTPATIENT)
Dept: FAMILY MEDICINE CLINIC | Facility: CLINIC | Age: 75
End: 2025-05-09
Payer: MEDICARE

## 2025-05-09 VITALS
WEIGHT: 245 LBS | DIASTOLIC BLOOD PRESSURE: 64 MMHG | BODY MASS INDEX: 43.41 KG/M2 | HEART RATE: 81 BPM | TEMPERATURE: 96.5 F | SYSTOLIC BLOOD PRESSURE: 120 MMHG | HEIGHT: 63 IN | OXYGEN SATURATION: 96 %

## 2025-05-09 DIAGNOSIS — N18.31 STAGE 3A CHRONIC KIDNEY DISEASE: ICD-10-CM

## 2025-05-09 DIAGNOSIS — I48.11 LONGSTANDING PERSISTENT ATRIAL FIBRILLATION: ICD-10-CM

## 2025-05-09 DIAGNOSIS — Z99.81 REQUIRES CONTINUOUS AT HOME SUPPLEMENTAL OXYGEN: ICD-10-CM

## 2025-05-09 DIAGNOSIS — Z09 HOSPITAL DISCHARGE FOLLOW-UP: Primary | ICD-10-CM

## 2025-05-09 DIAGNOSIS — I50.32 CHRONIC DIASTOLIC CONGESTIVE HEART FAILURE: ICD-10-CM

## 2025-05-09 DIAGNOSIS — I35.0 AORTIC STENOSIS, MODERATE: ICD-10-CM

## 2025-05-09 DIAGNOSIS — A41.9 SEPSIS, DUE TO UNSPECIFIED ORGANISM, UNSPECIFIED WHETHER ACUTE ORGAN DYSFUNCTION PRESENT: ICD-10-CM

## 2025-05-09 DIAGNOSIS — I50.20 HEART FAILURE WITH REDUCED EJECTION FRACTION: ICD-10-CM

## 2025-05-09 DIAGNOSIS — Z99.81 OXYGEN DEPENDENT: ICD-10-CM

## 2025-05-09 RX ORDER — NITROFURANTOIN 25; 75 MG/1; MG/1
100 CAPSULE ORAL DAILY
Qty: 90 CAPSULE | Refills: 1 | Status: SHIPPED | OUTPATIENT
Start: 2025-05-09 | End: 2025-05-12

## 2025-05-09 RX ORDER — VALSARTAN 40 MG/1
40 TABLET ORAL
Qty: 90 TABLET | Refills: 1 | Status: SHIPPED | OUTPATIENT
Start: 2025-05-09

## 2025-05-09 RX ORDER — METOPROLOL SUCCINATE 100 MG/1
100 TABLET, EXTENDED RELEASE ORAL
Qty: 90 TABLET | Refills: 1 | Status: SHIPPED | OUTPATIENT
Start: 2025-05-09 | End: 2025-06-08

## 2025-05-09 NOTE — PROGRESS NOTES
Mary Albarran is a 74 y.o. female admitted to [facility] and  is seen for follow up.  Chief Complaint   Patient presents with    Hospital Follow Up Visit   Hospital Course:  Mary Albarran is a 74 y.o. female with history of morbid obesity, heart failure, atrial fibrillation with rapid rate, chronic hypoxemic respiratory failure, COPD, chronic kidney disease, hypertension, diabetes who presented to our hospital for weakness and sleepiness.  On arrival to the ER patient's temperature was 101, pulse of 141.  Patient did have some hypoxia initially she is on 2 L but required 4 L of oxygen.  Patient's BNP was 619 potassium was 5.4, sodium was 135.  Patient was noted to have an abnormal urinalysis with a urine infection.  Chest x-ray showed pulmonary edema.  Patient was admitted to the hospital.  Patient was initially in the intensive care and required Levophed.  Patient was treated with IV antibiotics for urinary tract infection.  Patient's urine culture was growing E. coli.  Patient unfortunately had diarrhea around April 29.  This was tested for C. difficile which was positive.  Patient was started on oral vancomycin with improvement in her diarrhea.  Patient was also seen by cardiology given her atrial fibrillation with rapid rate.  Patient's metoprolol was increased.  Valsartan was added.  Patient was given IV Lasix during her hospitalization but eventually resumed her Bumex on discharge.  Patient was continued on Xarelto.  Overall it was recommended that patient go to rehab however patient adamantly refuses rehab services and refuses home health services.  Patient wishes to be discharged home.  Therefore patient will discharge home today in stable condition.  Patient also has an appointment with [facility] cardiology next week and has an appointment with her primary care.  Patient will be discharged home today in stable condition.        9/26/2023    10:07 AM 10/12/2023     6:27 PM 1/5/2024     2:36  PM 2/28/2025     5:27 PM 3/24/2025     5:28 PM 4/8/2025     5:11 PM 5/2/2025     5:19 PM   Date of TCM Phone Call   [facility]   Date of Admission 9/21/2023 10/6/2023  2/11/2025 3/18/2025 4/4/2025 4/26/2025   Date of Discharge 9/25/2023 10/12/2023 1/5/2024 2/28/2025 3/24/2025 4/8/2025 5/2/2025   Discharge Disposition  Home or Self Care Home or Self Care Home-Health Care Svc Home-Health Care Svc Home-Health Care Svc Home or Self Care     Discharge summary is available.  Current outpatient and discharge medications have been reconciled for the patient.  Reviewed by: LUCERO Collins    She was admitted for the following reason(s): Hypoxia, Sepsis  Primary admitting diagnosis at discharge was .  Active Hospital Problems     Diagnosis POA    C. difficile diarrhea [A04.72] Unknown    Sepsis [A41.9] Yes    Acute on chronic HFrEF (heart failure with reduced ejection fraction) [I50.23] Yes    Longstanding persistent atrial fibrillation [I48.11] Yes    Aortic stenosis, moderate [I35.0] Yes    Chronic kidney disease, stage III (moderate) [N18.30] Yes    Hypertension [I10]          Procedures performed while hospitalized:Procedures Performed in Hospital: IV fluids, IV Antibiotics with broad-spectrum antibiotic, and please see EPIC record for further details of results and finding    XR Chest 1 View  Result Date: 4/29/2025  Cardiomegaly with mild vascular congestion and possible mild residual edema. Electronically Signed: Jean Tamez MD  4/29/2025 12:20 AM EDT  Workstation ID: [id number]    CT Head Without Contrast  Result Date: 4/27/2025  Impression: No evidence of acute intracranial disease. Electronically Signed: Kingston Serrano MD  4/27/2025 12:43 PM EDT  Workstation ID: [id number]    CT Chest Without Contrast Diagnostic  Result Date: 4/27/2025  1.Cardiomegaly with mild  pulmonary edema. No pleural effusion. 2.Scattered areas of atelectasis in both lungs. No definite consolidating pneumonia. 3.Mild mediastinal adenopathy, worsened from prior study. This may be reactive. Follow-up chest CT in 3 months recommended. 4.Cholelithiasis. 5.Probable hepatosplenomegaly. 6.Atherosclerotic disease and coronary artery disease. Aortic valvular disease. Electronically Signed: Jean Tamez MD  4/27/2025 4:34 AM EDT  Workstation ID: [id number]    XR Chest 1 View  Result Date: 4/26/2025  Impression: Enlarged cardiac silhouette with findings suggestive of improving pulmonary edema compared to 4/4/2025 chest x-ray. Electronically Signed: Michelle Reyez MD  4/26/2025 2:06 PM EDT  Workstation ID: [id number]       Pending results:  Pending tests: none  Pain Control:  well controlled  Diet: Diabetic, Cardiac, Fluid Restriction (240 mL/tray); Healthy Heart (2-3 Na+); Consistent Carbohydrate; 1500 mL/day     Activity after Discharge: activity as tolerated  Items to be addressed at first follow up visit include:  1. Medication changes:   Discharge Medications          New Medications         Instructions Start Date   metoprolol succinate  MG 24 hr tablet  Commonly known as: TOPROL-XL    100 mg, Oral, Every 24 Hours Scheduled    Start Date: May 3, 2025      valsartan 40 MG tablet  Commonly known as: DIOVAN    40 mg, Oral, Every 24 Hours Scheduled    Start Date: May 3, 2025      vancomycin 125 MG capsule  Commonly known as: VANCOCIN    125 mg, Oral, Every 6 Hours Scheduled                  Continue These Medications         Instructions Start Date   albuterol 1.25 MG/3ML nebulizer solution  Commonly known as: ACCUNEB    1.25 mg, Nebulization, Every 6 Hours PRN        albuterol sulfate  (90 Base) MCG/ACT inhaler  Commonly known as: PROVENTIL HFA;VENTOLIN HFA;PROAIR HFA    2 puffs, Inhalation, Every 4 Hours PRN        ammonium lactate 12 % lotion  Commonly known as: LAC-HYDRIN    Topical, 2 Times  "Daily        atorvastatin 20 MG tablet  Commonly known as: LIPITOR    TAKE 1 TABLET DAILY        budesonide 1 MG/2ML nebulizer solution  Commonly known as: PULMICORT    1 mg, Nebulization, Daily        bumetanide 2 MG tablet  Commonly known as: BUMEX    1 tablet, Daily        cholecalciferol 25 MCG (1000 UT) tablet  Commonly known as: VITAMIN D3    1,000 Units, Daily        dilTIAZem  MG 24 hr capsule  Commonly known as: CARDIZEM CD    180 mg, Daily        HYDROcodone-acetaminophen  MG per tablet  Commonly known as: NORCO    1 tablet, Oral, Every 6 Hours PRN        Januvia 50 MG tablet  Generic drug: SITagliptin    50 mg, Daily        Lantus SoloStar 100 UNIT/ML injection pen  Generic drug: Insulin Glargine    15 Units, Subcutaneous, Daily        Magnesium Oxide -Mg Supplement 400 (240 Mg) MG tablet    1 tablet, Daily        potassium chloride ER 20 MEQ tablet controlled-release ER tablet  Commonly known as: K-TAB    40 mEq, 2 Times Daily        pregabalin 150 MG capsule  Commonly known as: LYRICA    150 mg, Oral, 2 Times Daily        rivaroxaban 20 MG tablet  Commonly known as: XARELTO    20 mg, Oral, Daily                  Stop These Medications       metoprolol tartrate 50 MG tablet  Commonly known as: LOPRESSOR                She reports her overall condition is are improving since discharge.  No specific complaints.  Social support is said to be adequate to meet her needs. .     Objective   Vitals:    05/09/25 1214   BP: 120/64   Pulse: 81   Temp: 96.5 °F (35.8 °C)   TempSrc: Temporal   SpO2: 96%   Weight: 111 kg (245 lb)   Height: 160 cm (62.99\")     Body mass index is 43.41 kg/m².      History of Present Illness  The patient is a 74-year-old female who presents today for a hospital follow-up. She has had readmission to the hospital on three separate occasions since 03/18/2025. The initial admission occurred on 03/18/2025 with atrial flutter with RVR, hyperkalemia, chronic kidney disease, and acute " respiratory failure. At that time, she was admitted to the hospital and discharged on 03/24/2025.    On 03/27/2025, she was admitted back to the hospital at [facility] for an additional 3 days. She then went back to the hospital on 04/04/2025 where she was admitted once again at [facility] for heart failure, reduced ejection fraction, CHF exacerbation, and chronic hypoxia. She was discharged on 04/08/2025 and readmitted back to the hospital at [facility] on 04/26/2025. She was admitted once again for sepsis at this time. She also tested positive for C. difficile, although later showed negative, and had chronic kidney disease and hypertension. She was discharged on 05/02/2025.    She comes back today for follow-up on 05/09/2025 and seems to be feeling better overall. Swelling has improved. She is wheelchair-bound due to generalized weakness and is in need of a portable oxygen concentrator. An oxygen form on file states that she is dependent on oxygen. She was placed in the room and was taken off of her oxygen. At that time, pulse oximetry was placed on her finger with oxygen before discontinuation. She was satting 97%. Oxygen was discontinued, and pulse oximetry was placed on her finger on room air. Within 5 minutes, her oxygen level dropped to 88%. Oxygen was then replaced at 2 L via nasal cannula, and her oxygen level recovered to 97%.    She states she is feeling much better and is monitoring her fluid output. She reports that the day before she did go over 4 ounces because she forgot to take medication but has been strictly monitoring fluid to prevent further exacerbations of congestive heart failure. She is taking valsartan, metoprolol, atorvastatin, digitalis at 180 mg, Januvia, and Xarelto. She is also watching her diet, monitoring her salt intake, and currently not having any salt supplements. She is being followed by cardiology from the  [facility] and has a follow-up appointment with them on 05/12/2025.        Physical Exam  Vitals reviewed.   Constitutional:       General: She is not in acute distress.     Appearance: Normal appearance. She is well-developed. She is obese. She is not ill-appearing.   HENT:      Head: Normocephalic and atraumatic.   Eyes:      Conjunctiva/sclera: Conjunctivae normal.      Pupils: Pupils are equal, round, and reactive to light.   Cardiovascular:      Rate and Rhythm: Normal rate and regular rhythm.      Heart sounds: No murmur heard.  Pulmonary:      Effort: Pulmonary effort is normal.      Breath sounds: Normal breath sounds. No wheezing or rhonchi.      Comments: Oxygen dependent on 2 L via nasal cannula  Musculoskeletal:      Right lower leg: Edema present.      Left lower leg: Edema present.   Skin:     General: Skin is warm and dry.   Neurological:      Mental Status: She is alert and oriented to person, place, and time.      Motor: Weakness present.      Gait: Gait abnormal (wheel chair bound).   Psychiatric:         Mood and Affect: Mood and affect normal.         Behavior: Behavior normal.         Thought Content: Thought content normal.         Judgment: Judgment normal.             Assessment & Plan     Assessment & Plan  1. Congestive heart failure.  - Multiple hospital admissions since 03/18/2025 due to CHF exacerbations.  - Physical exam findings include improved swelling and oxygen saturation of 97% with 2 L via nasal cannula.  - Patient reports feeling better overall, monitoring fluid output, and adhering to a low-salt diet.  - Medications prescribed include valsartan, metoprolol, atorvastatin, digitalis (180 mg), Januvia, and Xarelto. Refills for valsartan and metoprolol provided.    2. Chronic kidney disease.  - Hospital admissions noted for hyperkalemia and chronic kidney disease.  - Patient's kidney function is being monitored through hospital follow-ups.  - Patient is being assessed  for fluid management and medication adherence.  - Continued monitoring and management of kidney function recommended.    3. Acute respiratory failure.  - Patient has a history of acute respiratory failure and chronic hypoxia.  - Oxygen saturation dropped to 88% on room air, improved to 97% with 2 L via nasal cannula.  - Patient requires a portable oxygen concentrator and is dependent on oxygen.  - Oxygen therapy continued with 2 L via nasal cannula.    4. Sepsis.  - Recent hospitalization for sepsis with positive C. difficile, later negative.  - Patient's overall condition has improved since discharge on 05/02/2025.  - Monitoring for signs of infection and sepsis recurrence.  - Continued follow-up and management of sepsis recovery.     Problem List Items Addressed This Visit       Aortic stenosis, moderate    Overview   Fibrocalcific changes of the aortic valve with mild aortic valve stenosis   on echo 3/2/2021    Moderate aortic valve regurgitation is present.  Aortic valve maximum pressure gradient is 26 mmHg.  Moderate aortic valve stenosis is present.  On echo 2/10/2022               Relevant Medications    metoprolol succinate XL (TOPROL-XL) 100 MG 24 hr tablet    Longstanding persistent atrial fibrillation    Relevant Medications    metoprolol succinate XL (TOPROL-XL) 100 MG 24 hr tablet    Chronic kidney disease, stage III (moderate)    Chronic diastolic congestive heart failure    Relevant Medications    metoprolol succinate XL (TOPROL-XL) 100 MG 24 hr tablet    Heart failure with reduced ejection fraction    Relevant Medications    metoprolol succinate XL (TOPROL-XL) 100 MG 24 hr tablet    Sepsis     Other Visit Diagnoses         Hospital discharge follow-up    -  Primary      Oxygen dependent          Requires continuous at home supplemental oxygen              Oxygen Dependent  An oxygen form on file states that she is dependent on oxygen. She was placed in the room and was taken off of her oxygen. At  that time, pulse oximetry was placed on her finger with oxygen before discontinuation. She was satting 97%. Oxygen was discontinued, and pulse oximetry was placed on her finger on room air. Within 5 minutes, her oxygen level dropped to 88%. Oxygen was then replaced at 2 L via nasal cannula, and her oxygen level recovered to 97%.        Patient or patient representative verbalized consent for the use of Ambient Listening during the visit with  LUCERO Collins for chart documentation. 5/12/2025  06:54 EDT

## 2025-05-23 ENCOUNTER — TELEPHONE (OUTPATIENT)
Dept: FAMILY MEDICINE CLINIC | Facility: CLINIC | Age: 75
End: 2025-05-23

## 2025-05-23 ENCOUNTER — TELEPHONE (OUTPATIENT)
Dept: FAMILY MEDICINE CLINIC | Facility: CLINIC | Age: 75
End: 2025-05-23
Payer: MEDICARE

## 2025-05-23 NOTE — TELEPHONE ENCOUNTER
Pt is calling to see if there was any updated information on her oxygen. Allison was speaking with her about a new oxygen company. She brought the paperwork in a few weeks ago but hasn't heard anything. She is asking for Allison to give her a call back.

## 2025-05-23 NOTE — TELEPHONE ENCOUNTER
Caller: Mary Albarran    Relationship to patient: Self    Best call back number: [phone]     Patient is needing: PATIENT STATES AN ORDER FOR PORTABLE OXYGEN WAS SUPPOSED TO BE PUT IN AND SHE HAS NOT GOTTEN THE ORDER. PATIENT WOULD LIKE A CALL BACK TO BE UPDATED STATING IT HAS BEEN SEVERAL WEEKS NOW.

## 2025-05-25 ENCOUNTER — APPOINTMENT (OUTPATIENT)
Dept: CT IMAGING | Facility: HOSPITAL | Age: 75
End: 2025-05-25
Payer: MEDICARE

## 2025-05-25 ENCOUNTER — APPOINTMENT (OUTPATIENT)
Dept: GENERAL RADIOLOGY | Facility: HOSPITAL | Age: 75
End: 2025-05-25
Payer: MEDICARE

## 2025-05-25 ENCOUNTER — HOSPITAL ENCOUNTER (INPATIENT)
Facility: HOSPITAL | Age: 75
LOS: 4 days | Discharge: HOME HEALTH | End: 2025-05-29
Attending: EMERGENCY MEDICINE | Admitting: HOSPITALIST
Payer: MEDICARE

## 2025-05-25 DIAGNOSIS — R26.2 DIFFICULTY WALKING: ICD-10-CM

## 2025-05-25 DIAGNOSIS — J15.211 PNEUMONIA OF BOTH LOWER LOBES DUE TO METHICILLIN SUSCEPTIBLE STAPHYLOCOCCUS AUREUS (MSSA): ICD-10-CM

## 2025-05-25 DIAGNOSIS — A41.9 SEPSIS, DUE TO UNSPECIFIED ORGANISM, UNSPECIFIED WHETHER ACUTE ORGAN DYSFUNCTION PRESENT: ICD-10-CM

## 2025-05-25 DIAGNOSIS — R13.10 DYSPHAGIA, UNSPECIFIED TYPE: ICD-10-CM

## 2025-05-25 DIAGNOSIS — Z78.9 DECREASED ACTIVITIES OF DAILY LIVING (ADL): ICD-10-CM

## 2025-05-25 DIAGNOSIS — J18.9 PNEUMONIA DUE TO INFECTIOUS ORGANISM, UNSPECIFIED LATERALITY, UNSPECIFIED PART OF LUNG: Primary | ICD-10-CM

## 2025-05-25 LAB
ALBUMIN SERPL-MCNC: 4.2 G/DL (ref 3.5–5.2)
ALBUMIN/GLOB SERPL: 1.1 G/DL
ALP SERPL-CCNC: 100 U/L (ref 39–117)
ALT SERPL W P-5'-P-CCNC: 13 U/L (ref 1–33)
ANION GAP SERPL CALCULATED.3IONS-SCNC: 12.5 MMOL/L (ref 5–15)
ARTERIAL PATENCY WRIST A: POSITIVE
AST SERPL-CCNC: 18 U/L (ref 1–32)
ATMOSPHERIC PRESS: 746.8 MMHG
BASE EXCESS BLDA CALC-SCNC: 1.1 MMOL/L (ref -2–2)
BASOPHILS # BLD AUTO: 0.05 10*3/MM3 (ref 0–0.2)
BASOPHILS NFR BLD AUTO: 0.3 % (ref 0–1.5)
BDY SITE: ABNORMAL
BILIRUB SERPL-MCNC: 1.1 MG/DL (ref 0–1.2)
BILIRUB UR QL STRIP: NEGATIVE
BUN SERPL-MCNC: 23 MG/DL (ref 8–23)
BUN/CREAT SERPL: 22.3 (ref 7–25)
CA-I BLDA-SCNC: 1.14 MMOL/L (ref 1.13–1.32)
CALCIUM SPEC-SCNC: 9.5 MG/DL (ref 8.6–10.5)
CHLORIDE BLDA-SCNC: 106 MMOL/L (ref 98–107)
CHLORIDE SERPL-SCNC: 99 MMOL/L (ref 98–107)
CLARITY UR: CLEAR
CO2 SERPL-SCNC: 27.5 MMOL/L (ref 22–29)
COLOR UR: YELLOW
CREAT SERPL-MCNC: 1.03 MG/DL (ref 0.57–1)
D-LACTATE SERPL-SCNC: 1.7 MMOL/L
D-LACTATE SERPL-SCNC: 2.1 MMOL/L (ref 0.5–2)
D-LACTATE SERPL-SCNC: 2.6 MMOL/L (ref 0.5–2)
D-LACTATE SERPL-SCNC: 3 MMOL/L (ref 0.5–2)
DEPRECATED RDW RBC AUTO: 49.4 FL (ref 37–54)
EGFRCR SERPLBLD CKD-EPI 2021: 57.9 ML/MIN/1.73
EOSINOPHIL # BLD AUTO: 0.09 10*3/MM3 (ref 0–0.4)
EOSINOPHIL NFR BLD AUTO: 0.5 % (ref 0.3–6.2)
ERYTHROCYTE [DISTWIDTH] IN BLOOD BY AUTOMATED COUNT: 14.6 % (ref 12.3–15.4)
FLUAV RNA RESP QL NAA+PROBE: NOT DETECTED
FLUBV RNA RESP QL NAA+PROBE: NOT DETECTED
GAS FLOW AIRWAY: 8 LPM
GEN 5 1HR TROPONIN T REFLEX: 14 NG/L
GLOBULIN UR ELPH-MCNC: 4 GM/DL
GLUCOSE BLDC GLUCOMTR-MCNC: 215 MG/DL (ref 65–99)
GLUCOSE SERPL-MCNC: 213 MG/DL (ref 65–99)
GLUCOSE UR STRIP-MCNC: NEGATIVE MG/DL
HCO3 BLDA-SCNC: 27.6 MMOL/L (ref 22–26)
HCT VFR BLD AUTO: 43.6 % (ref 34–46.6)
HCT VFR BLD CALC: 39 % (ref 38–51)
HEMODILUTION: NO
HGB BLD-MCNC: 13.1 G/DL (ref 12–15.9)
HGB BLDA-MCNC: 13.3 G/DL (ref 12–18)
HGB UR QL STRIP.AUTO: NEGATIVE
HOLD SPECIMEN: NORMAL
HOLD SPECIMEN: NORMAL
IMM GRANULOCYTES # BLD AUTO: 0.08 10*3/MM3 (ref 0–0.05)
IMM GRANULOCYTES NFR BLD AUTO: 0.5 % (ref 0–0.5)
KETONES UR QL STRIP: NEGATIVE
LEUKOCYTE ESTERASE UR QL STRIP.AUTO: NEGATIVE
LYMPHOCYTES # BLD AUTO: 1.13 10*3/MM3 (ref 0.7–3.1)
LYMPHOCYTES NFR BLD AUTO: 6.8 % (ref 19.6–45.3)
MAGNESIUM SERPL-MCNC: 2 MG/DL (ref 1.6–2.4)
MCH RBC QN AUTO: 27.8 PG (ref 26.6–33)
MCHC RBC AUTO-ENTMCNC: 30 G/DL (ref 31.5–35.7)
MCV RBC AUTO: 92.4 FL (ref 79–97)
MODALITY: ABNORMAL
MONOCYTES # BLD AUTO: 0.62 10*3/MM3 (ref 0.1–0.9)
MONOCYTES NFR BLD AUTO: 3.7 % (ref 5–12)
NEUTROPHILS NFR BLD AUTO: 14.75 10*3/MM3 (ref 1.7–7)
NEUTROPHILS NFR BLD AUTO: 88.2 % (ref 42.7–76)
NITRITE UR QL STRIP: NEGATIVE
NRBC BLD AUTO-RTO: 0 /100 WBC (ref 0–0.2)
NT-PROBNP SERPL-MCNC: 1151 PG/ML (ref 0–900)
PCO2 BLDA: 50.2 MM HG (ref 35–45)
PH BLDA: 7.35 PH UNITS (ref 7.35–7.45)
PH UR STRIP.AUTO: 5.5 [PH] (ref 5–8)
PLATELET # BLD AUTO: 102 10*3/MM3 (ref 140–450)
PMV BLD AUTO: 13.6 FL (ref 6–12)
PO2 BLDA: 57.9 MM HG (ref 80–100)
POTASSIUM BLDA-SCNC: 4.4 MMOL/L (ref 3.5–5)
POTASSIUM SERPL-SCNC: 4.7 MMOL/L (ref 3.5–5.2)
PROCALCITONIN SERPL-MCNC: 2.79 NG/ML (ref 0–0.25)
PROT SERPL-MCNC: 8.2 G/DL (ref 6–8.5)
PROT UR QL STRIP: NEGATIVE
QT INTERVAL: 382 MS
QTC INTERVAL: 459 MS
RBC # BLD AUTO: 4.72 10*6/MM3 (ref 3.77–5.28)
RSV RNA RESP QL NAA+PROBE: NOT DETECTED
SAO2 % BLDCOA: 87.7 % (ref 95–99)
SARS-COV-2 RNA RESP QL NAA+PROBE: NOT DETECTED
SODIUM BLD-SCNC: 142 MMOL/L (ref 131–143)
SODIUM SERPL-SCNC: 139 MMOL/L (ref 136–145)
SP GR UR STRIP: 1.01 (ref 1–1.03)
TROPONIN T % DELTA: -13
TROPONIN T NUMERIC DELTA: -2 NG/L
TROPONIN T SERPL HS-MCNC: 16 NG/L
UROBILINOGEN UR QL STRIP: NORMAL
WBC NRBC COR # BLD AUTO: 16.72 10*3/MM3 (ref 3.4–10.8)
WHOLE BLOOD HOLD COAG: NORMAL
WHOLE BLOOD HOLD SPECIMEN: NORMAL

## 2025-05-25 PROCEDURE — 70450 CT HEAD/BRAIN W/O DYE: CPT

## 2025-05-25 PROCEDURE — 99222 1ST HOSP IP/OBS MODERATE 55: CPT | Performed by: HOSPITALIST

## 2025-05-25 PROCEDURE — 93005 ELECTROCARDIOGRAM TRACING: CPT | Performed by: EMERGENCY MEDICINE

## 2025-05-25 PROCEDURE — 85025 COMPLETE CBC W/AUTO DIFF WBC: CPT | Performed by: EMERGENCY MEDICINE

## 2025-05-25 PROCEDURE — 83605 ASSAY OF LACTIC ACID: CPT | Performed by: EMERGENCY MEDICINE

## 2025-05-25 PROCEDURE — 94799 UNLISTED PULMONARY SVC/PX: CPT

## 2025-05-25 PROCEDURE — 83880 ASSAY OF NATRIURETIC PEPTIDE: CPT | Performed by: EMERGENCY MEDICINE

## 2025-05-25 PROCEDURE — 94660 CPAP INITIATION&MGMT: CPT

## 2025-05-25 PROCEDURE — 80051 ELECTROLYTE PANEL: CPT

## 2025-05-25 PROCEDURE — 25010000002 VANCOMYCIN 5 G RECONSTITUTED SOLUTION: Performed by: EMERGENCY MEDICINE

## 2025-05-25 PROCEDURE — 25810000003 SODIUM CHLORIDE 0.9 % SOLUTION: Performed by: EMERGENCY MEDICINE

## 2025-05-25 PROCEDURE — 99291 CRITICAL CARE FIRST HOUR: CPT

## 2025-05-25 PROCEDURE — 25010000002 CEFEPIME PER 500 MG: Performed by: EMERGENCY MEDICINE

## 2025-05-25 PROCEDURE — 25510000001 IOPAMIDOL PER 1 ML: Performed by: EMERGENCY MEDICINE

## 2025-05-25 PROCEDURE — 87040 BLOOD CULTURE FOR BACTERIA: CPT | Performed by: EMERGENCY MEDICINE

## 2025-05-25 PROCEDURE — 71045 X-RAY EXAM CHEST 1 VIEW: CPT

## 2025-05-25 PROCEDURE — 80053 COMPREHEN METABOLIC PANEL: CPT | Performed by: EMERGENCY MEDICINE

## 2025-05-25 PROCEDURE — 83735 ASSAY OF MAGNESIUM: CPT | Performed by: EMERGENCY MEDICINE

## 2025-05-25 PROCEDURE — 25810000003 SEPSIS FLUID NS 0.9 % SOLUTION: Performed by: EMERGENCY MEDICINE

## 2025-05-25 PROCEDURE — 87086 URINE CULTURE/COLONY COUNT: CPT | Performed by: EMERGENCY MEDICINE

## 2025-05-25 PROCEDURE — 36415 COLL VENOUS BLD VENIPUNCTURE: CPT | Performed by: HOSPITALIST

## 2025-05-25 PROCEDURE — 93005 ELECTROCARDIOGRAM TRACING: CPT

## 2025-05-25 PROCEDURE — 36600 WITHDRAWAL OF ARTERIAL BLOOD: CPT

## 2025-05-25 PROCEDURE — 87637 SARSCOV2&INF A&B&RSV AMP PRB: CPT | Performed by: EMERGENCY MEDICINE

## 2025-05-25 PROCEDURE — 82948 REAGENT STRIP/BLOOD GLUCOSE: CPT | Performed by: EMERGENCY MEDICINE

## 2025-05-25 PROCEDURE — 25010000002 METHYLPREDNISOLONE PER 125 MG: Performed by: HOSPITALIST

## 2025-05-25 PROCEDURE — 83605 ASSAY OF LACTIC ACID: CPT

## 2025-05-25 PROCEDURE — 82330 ASSAY OF CALCIUM: CPT

## 2025-05-25 PROCEDURE — 81003 URINALYSIS AUTO W/O SCOPE: CPT | Performed by: EMERGENCY MEDICINE

## 2025-05-25 PROCEDURE — 71260 CT THORAX DX C+: CPT

## 2025-05-25 PROCEDURE — 84145 PROCALCITONIN (PCT): CPT | Performed by: HOSPITALIST

## 2025-05-25 PROCEDURE — 84484 ASSAY OF TROPONIN QUANT: CPT | Performed by: EMERGENCY MEDICINE

## 2025-05-25 PROCEDURE — 82803 BLOOD GASES ANY COMBINATION: CPT

## 2025-05-25 RX ORDER — SODIUM CHLORIDE 0.9 % (FLUSH) 0.9 %
10 SYRINGE (ML) INJECTION AS NEEDED
Status: DISCONTINUED | OUTPATIENT
Start: 2025-05-25 | End: 2025-05-29 | Stop reason: HOSPADM

## 2025-05-25 RX ORDER — ONDANSETRON 2 MG/ML
4 INJECTION INTRAMUSCULAR; INTRAVENOUS EVERY 6 HOURS PRN
Status: DISCONTINUED | OUTPATIENT
Start: 2025-05-25 | End: 2025-05-29 | Stop reason: HOSPADM

## 2025-05-25 RX ORDER — ARFORMOTEROL TARTRATE 15 UG/2ML
15 SOLUTION RESPIRATORY (INHALATION)
Status: DISCONTINUED | OUTPATIENT
Start: 2025-05-26 | End: 2025-05-29 | Stop reason: HOSPADM

## 2025-05-25 RX ORDER — ATORVASTATIN CALCIUM 20 MG/1
20 TABLET, FILM COATED ORAL NIGHTLY
Status: DISCONTINUED | OUTPATIENT
Start: 2025-05-25 | End: 2025-05-29 | Stop reason: HOSPADM

## 2025-05-25 RX ORDER — ONDANSETRON 4 MG/1
4 TABLET, ORALLY DISINTEGRATING ORAL EVERY 6 HOURS PRN
Status: DISCONTINUED | OUTPATIENT
Start: 2025-05-25 | End: 2025-05-29 | Stop reason: HOSPADM

## 2025-05-25 RX ORDER — POLYETHYLENE GLYCOL 3350 17 G/17G
17 POWDER, FOR SOLUTION ORAL DAILY PRN
Status: DISCONTINUED | OUTPATIENT
Start: 2025-05-25 | End: 2025-05-29 | Stop reason: HOSPADM

## 2025-05-25 RX ORDER — ACETAMINOPHEN 325 MG/1
650 TABLET ORAL EVERY 6 HOURS PRN
Status: DISCONTINUED | OUTPATIENT
Start: 2025-05-25 | End: 2025-05-29 | Stop reason: HOSPADM

## 2025-05-25 RX ORDER — SODIUM CHLORIDE 9 MG/ML
40 INJECTION, SOLUTION INTRAVENOUS AS NEEDED
Status: DISCONTINUED | OUTPATIENT
Start: 2025-05-25 | End: 2025-05-29 | Stop reason: HOSPADM

## 2025-05-25 RX ORDER — AMOXICILLIN 250 MG
2 CAPSULE ORAL 2 TIMES DAILY PRN
Status: DISCONTINUED | OUTPATIENT
Start: 2025-05-25 | End: 2025-05-29 | Stop reason: HOSPADM

## 2025-05-25 RX ORDER — IBUPROFEN 600 MG/1
1 TABLET ORAL
Status: DISCONTINUED | OUTPATIENT
Start: 2025-05-25 | End: 2025-05-29 | Stop reason: HOSPADM

## 2025-05-25 RX ORDER — DILTIAZEM HCL IN NACL,ISO-OSM 125 MG/125
5-15 PLASTIC BAG, INJECTION (ML) INTRAVENOUS
Status: DISCONTINUED | OUTPATIENT
Start: 2025-05-26 | End: 2025-05-29 | Stop reason: HOSPADM

## 2025-05-25 RX ORDER — NICOTINE POLACRILEX 4 MG
15 LOZENGE BUCCAL
Status: DISCONTINUED | OUTPATIENT
Start: 2025-05-25 | End: 2025-05-29 | Stop reason: HOSPADM

## 2025-05-25 RX ORDER — ACETAMINOPHEN 650 MG/1
325 SUPPOSITORY RECTAL EVERY 4 HOURS PRN
Status: DISCONTINUED | OUTPATIENT
Start: 2025-05-25 | End: 2025-05-29 | Stop reason: HOSPADM

## 2025-05-25 RX ORDER — IPRATROPIUM BROMIDE AND ALBUTEROL SULFATE 2.5; .5 MG/3ML; MG/3ML
3 SOLUTION RESPIRATORY (INHALATION)
Status: DISCONTINUED | OUTPATIENT
Start: 2025-05-26 | End: 2025-05-29 | Stop reason: HOSPADM

## 2025-05-25 RX ORDER — BUDESONIDE 0.5 MG/2ML
0.5 INHALANT ORAL
Status: DISCONTINUED | OUTPATIENT
Start: 2025-05-26 | End: 2025-05-29 | Stop reason: HOSPADM

## 2025-05-25 RX ORDER — IOPAMIDOL 755 MG/ML
100 INJECTION, SOLUTION INTRAVASCULAR
Status: COMPLETED | OUTPATIENT
Start: 2025-05-25 | End: 2025-05-25

## 2025-05-25 RX ORDER — SODIUM CHLORIDE 0.9 % (FLUSH) 0.9 %
10 SYRINGE (ML) INJECTION EVERY 12 HOURS SCHEDULED
Status: DISCONTINUED | OUTPATIENT
Start: 2025-05-26 | End: 2025-05-29 | Stop reason: HOSPADM

## 2025-05-25 RX ORDER — BISACODYL 5 MG/1
5 TABLET, DELAYED RELEASE ORAL DAILY PRN
Status: DISCONTINUED | OUTPATIENT
Start: 2025-05-25 | End: 2025-05-29 | Stop reason: HOSPADM

## 2025-05-25 RX ORDER — DEXTROSE MONOHYDRATE 25 G/50ML
25 INJECTION, SOLUTION INTRAVENOUS
Status: DISCONTINUED | OUTPATIENT
Start: 2025-05-25 | End: 2025-05-29 | Stop reason: HOSPADM

## 2025-05-25 RX ORDER — BISACODYL 10 MG
10 SUPPOSITORY, RECTAL RECTAL DAILY PRN
Status: DISCONTINUED | OUTPATIENT
Start: 2025-05-25 | End: 2025-05-29 | Stop reason: HOSPADM

## 2025-05-25 RX ADMIN — METHYLPREDNISOLONE SODIUM SUCCINATE 40 MG: 125 INJECTION, POWDER, LYOPHILIZED, FOR SOLUTION INTRAMUSCULAR; INTRAVENOUS at 22:02

## 2025-05-25 RX ADMIN — SODIUM CHLORIDE 2322 ML: 9 INJECTION, SOLUTION INTRAVENOUS at 16:11

## 2025-05-25 RX ADMIN — IOPAMIDOL 64 ML: 755 INJECTION, SOLUTION INTRAVENOUS at 19:25

## 2025-05-25 RX ADMIN — SODIUM CHLORIDE 2250 MG: 9 INJECTION, SOLUTION INTRAVENOUS at 20:00

## 2025-05-25 RX ADMIN — CEFEPIME 2000 MG: 2 INJECTION, POWDER, FOR SOLUTION INTRAVENOUS at 19:20

## 2025-05-25 RX ADMIN — DILTIAZEM HYDROCHLORIDE 5 MG/HR: 5 INJECTION INTRAVENOUS at 23:38

## 2025-05-25 NOTE — ED PROVIDER NOTES
Time: 3:54 PM EDT  Date of encounter:  5/25/2025  Independent Historian/Clinical History and Information was obtained by:   Patient, Family, and EMS    History is limited by: Altered Mental Status    Chief Complaint: Fever, weakness, altered mental status      History of Present Illness:  Patient is a 74 y.o. year old female who presents to the emergency department for evaluation of fever, weakness, altered mental status.  This patient presents to the emergency room with the above symptoms which were gone for the last several days.  The patient has a history of atrial fibrillation, COPD, congestive heart failure, diabetes, thrombocytopenia, chronic kidney disease and aortic valve disease.  Family indicates that she has developed a fever and weakness and altered mental status for the last 24 to 48 hours    .  Patient Care Team  Primary Care Provider: Brittni Quiroz APRN    Past Medical History:     Allergies   Allergen Reactions    Morphine Confusion    Codeine Mental Status Change    Sglt2 Inhibitors Other (See Comments)     Frequent UTI      Past Medical History:   Diagnosis Date    Allergic rhinitis     Anxiety     Aortic valve disease 11/28/2021    Fibrocalcific changes of the aortic valve with mild aortic valve stenosis   on echo 3/2/2021  Moderate aortic valve regurgitation is present. Aortic valve maximum pressure gradient is 26 mmHg. Moderate aortic valve stenosis is present.  On echo 2/10/2022       Arthritis     Atrial fibrillation     CHF (congestive heart failure)     Chronic kidney disease (CKD), stage III (moderate)     Chronic pain     COPD (chronic obstructive pulmonary disease)     Coronary artery disease     Diabetes mellitus type 2 with complications     Elevated cholesterol     Ex-smoker     QUIT SMOKING 2008    GERD (gastroesophageal reflux disease)     History of home oxygen therapy     2L/NC AAT REPORTED    History of transfusion     Hyperlipidemia     Hypertension     Thrombocytopenia       Past Surgical History:   Procedure Laterality Date    HYSTERECTOMY      30 YEARS AGO     Family History   Problem Relation Age of Onset    Heart disease Father     Heart disease Other     Heart disease Other     Diabetes Other        Home Medications:  Prior to Admission medications    Medication Sig Start Date End Date Taking? Authorizing Provider   albuterol (ACCUNEB) 1.25 MG/3ML nebulizer solution Take 3 mL by nebulization Every 6 (Six) Hours As Needed for Wheezing or Shortness of Air. 3/30/23   Brittni Quiroz APRN   albuterol sulfate  (90 Base) MCG/ACT inhaler Inhale 2 puffs Every 4 (Four) Hours As Needed for Wheezing. 5/20/24   Brittni Quiroz APRN   ammonium lactate (LAC-HYDRIN) 12 % lotion Apply  topically to the appropriate area as directed 2 (Two) Times a Day. 2/28/25   Joaquin Rutledge MD   atorvastatin (LIPITOR) 20 MG tablet TAKE 1 TABLET DAILY 9/12/24   Brittni Quiroz APRN   budesonide (PULMICORT) 1 MG/2ML nebulizer solution Take 2 mL by nebulization Daily. 9/9/24   Brittni Quiroz APRN   bumetanide (BUMEX) 2 MG tablet Take 1 tablet by mouth Daily. 4/18/25   Whitley Emmanuel MD   cholecalciferol (VITAMIN D3) 25 MCG (1000 UT) tablet Take 1 tablet by mouth Daily.    Whitley Emmanuel MD   dilTIAZem CD (CARDIZEM CD) 180 MG 24 hr capsule Take 1 capsule by mouth Daily.    Whitley Emmanuel MD   HYDROcodone-acetaminophen (NORCO)  MG per tablet Take 1 tablet by mouth Every 6 (Six) Hours As Needed for Moderate Pain. 4/9/25   Brittni Quiroz APRN   Insulin Glargine (Lantus SoloStar) 100 UNIT/ML injection pen Inject 15 Units under the skin into the appropriate area as directed Daily. 2/28/25   Joaquin Rutledge MD   Januvia 50 MG tablet Take 1 tablet by mouth Daily. 4/19/25   Whitley Emmanuel MD   Magnesium Oxide -Mg Supplement 400 (240 Mg) MG tablet Take 1 tablet by mouth Daily. 4/19/25   Whitley Emmanuel MD   metoprolol succinate XL  "(TOPROL-XL) 100 MG 24 hr tablet Take 1 tablet by mouth Daily for 30 days. 25  Brittni Quiroz APRN   potassium chloride ER (K-TAB) 20 MEQ tablet controlled-release ER tablet Take 2 tablets by mouth 2 (Two) Times a Day. 25   ProviderWhitley MD   pregabalin (LYRICA) 150 MG capsule Take 1 capsule by mouth 2 (Two) Times a Day. 25   Brittni Quirzo APRN   rivaroxaban (XARELTO) 20 MG tablet Take 1 tablet by mouth Daily for 30 days. Indications: DVT/PE (active thrombosis) 3/25/25 5/9/25  Melody Luna MD   valsartan (DIOVAN) 40 MG tablet Take 1 tablet by mouth Daily. 25   Brittni Quiroz APRN        Social History:   Social History     Tobacco Use    Smoking status: Former     Current packs/day: 0.00     Average packs/day: 1 pack/day for 37.0 years (37.0 ttl pk-yrs)     Types: Cigarettes     Start date: 4/3/1971     Quit date: 2008     Years since quittin.1     Passive exposure: Never    Smokeless tobacco: Never    Tobacco comments:     FOR 30 YEARS   Vaping Use    Vaping status: Never Used   Substance Use Topics    Alcohol use: Never    Drug use: Never         Review of Systems:  Review of Systems   Constitutional:  Positive for activity change, appetite change, chills, fatigue and fever.   Neurological:  Positive for weakness and light-headedness. Negative for numbness.   Psychiatric/Behavioral:  Positive for confusion.         Physical Exam:  /67 (BP Location: Left arm, Patient Position: Lying)   Pulse (!) 132   Temp 99.8 °F (37.7 °C) (Oral)   Resp 19   Ht 160 cm (63\")   Wt 115 kg (254 lb 3.1 oz)   SpO2 91%   BMI 45.03 kg/m²     Physical Exam  Vitals and nursing note reviewed.   Constitutional:       General: She is in acute distress.      Appearance: Normal appearance. She is obese. She is ill-appearing. She is not toxic-appearing.   HENT:      Head: Normocephalic and atraumatic.      Mouth/Throat:      Mouth: Mucous membranes are moist.     "  Pharynx: Oropharynx is clear.   Eyes:      General: No scleral icterus.     Extraocular Movements: Extraocular movements intact.      Pupils: Pupils are equal, round, and reactive to light.   Cardiovascular:      Rate and Rhythm: Tachycardia present. Rhythm irregular.      Pulses: Normal pulses.      Heart sounds: Normal heart sounds.   Pulmonary:      Effort: Pulmonary effort is normal. No respiratory distress.      Breath sounds: Examination of the right-middle field reveals decreased breath sounds. Examination of the left-middle field reveals decreased breath sounds. Decreased breath sounds present.   Abdominal:      General: Abdomen is flat.      Palpations: Abdomen is soft.      Tenderness: There is no abdominal tenderness.   Musculoskeletal:         General: Normal range of motion.      Cervical back: Normal range of motion and neck supple.   Skin:     General: Skin is warm and dry.   Neurological:      General: No focal deficit present.      Mental Status: She is alert. Mental status is at baseline. She is disoriented.      Comments: The patient is lethargic and has generalized weakness.                    Medical Decision Making:      Comorbidities that affect care:    Coronary Artery Disease    External Notes reviewed:    Previous Clinic Note: Office visit on May 12 for hypertension and atrial fibrillation      The following orders were placed and all results were independently analyzed by me:  Orders Placed This Encounter   Procedures    Blood Culture - Blood,    Blood Culture - Blood,    COVID-19, FLU A/B, RSV PCR 1 HR TAT - Swab, Nasopharynx    Urine Culture - Urine,    Respiratory Culture - Sputum, Throat    Legionella Antigen, Urine - Urine, Urine, Clean Catch    S. Pneumo Ag Urine or CSF - Urine, Urine, Clean Catch    XR Chest 1 View    CT Head Without Contrast    CT Chest With Contrast Diagnostic    [place] Draw    Comprehensive Metabolic Panel    High Sensitivity Troponin T    Magnesium    CBC Auto  Differential    Lactic Acid, Plasma    Arterial Blood Gas,H&H,Lytes,Lactate    Urinalysis With Culture If Indicated - Urine, Clean Catch    High Sensitivity Troponin T 1Hr    Blood Gas, Arterial -    STAT Lactic Acid, Reflex    BNP    STAT Lactic Acid, Reflex    Procalcitonin    NPO Diet NPO Type: Strict NPO    Undress & Gown    Vital Signs    Orthostatic Blood Pressure    Undress & Gown    Continuous Pulse Oximetry    Vital Signs    Code Status and Medical Interventions: CPR (Attempt to Resuscitate); Full Support    Hospitalist (on-call MD unless specified)    Inpatient Pulmonology Consult    Oxygen Therapy- Nasal Cannula; Titrate 1-6 LPM Per SpO2; 90 - 95%    Oxygen Therapy- Nasal Cannula; Titrate 1-6 LPM Per SpO2; 90 - 95%    NIPPV-IPPV / BIPAP / CPAP    POC Glucose Once    POC Lactate    POC Electrolyte Panel    ECG 12 Lead Altered Mental Status    Wound Ostomy Eval & Treat    Insert Peripheral IV    Insert Peripheral IV    Inpatient Admission    Fall Precautions    CBC & Differential    Green Top (Gel)    Lavender Top    Gold Top - SST    Light Blue Top       Medications Given in the Emergency Department:  Medications   sodium chloride 0.9 % flush 10 mL (has no administration in time range)   sodium chloride 0.9 % flush 10 mL (has no administration in time range)   vancomycin 2250 mg/500 mL 0.9% NS IVPB ([facility]) (2,250 mg Intravenous New Bag 5/25/25 2000)   Pharmacy to dose vancomycin (has no administration in time range)   cefepime 2000 mg IVPB in 100 mL NS (VTB) (has no administration in time range)   methylPREDNISolone sodium succinate (SOLU-Medrol) 40 mg in sterile water (preservative free) 0.64 mL (40 mg Intravenous Given 5/25/25 2202)   sepsis fluid NS 0.9 % bolus 2,322 mL (2,322 mL Intravenous New Bag 5/25/25 1611)   cefepime 2000 mg IVPB in 100 mL NS (VTB) (0 mg Intravenous Stopped 5/25/25 1950)   iopamidol (ISOVUE-370) 76 % injection 100 mL (64 mL Intravenous Given 5/25/25 1925)        ED Course:          EKG: Atrial fibrillation with rate of 86 beats per  No acute ischemic changes were noted.      Labs:    Lab Results (last 24 hours)       Procedure Component Value Units Date/Time    CBC & Differential [[id number]]  (Abnormal) Collected: 05/25/25 1550    Specimen: Blood Updated: 05/25/25 1634    Narrative:      The following orders were created for panel order CBC & Differential.  Procedure                               Abnormality         Status                     ---------                               -----------         ------                     CBC Auto Differential[[id number]]        Abnormal            Final result               Scan Slide[[id number]]                                                                    Please view results for these tests on the individual orders.    Comprehensive Metabolic Panel [[id number]]  (Abnormal) Collected: 05/25/25 1550    Specimen: Blood Updated: 05/25/25 1621     Glucose 213 mg/dL      BUN 23 mg/dL      Creatinine 1.03 mg/dL      Sodium 139 mmol/L      Potassium 4.7 mmol/L      Chloride 99 mmol/L      CO2 27.5 mmol/L      Calcium 9.5 mg/dL      Total Protein 8.2 g/dL      Albumin 4.2 g/dL      ALT (SGPT) 13 U/L      AST (SGOT) 18 U/L      Alkaline Phosphatase 100 U/L      Total Bilirubin 1.1 mg/dL      Globulin 4.0 gm/dL      A/G Ratio 1.1 g/dL      BUN/Creatinine Ratio 22.3     Anion Gap 12.5 mmol/L      eGFR 57.9 mL/min/1.73     Narrative:      GFR Categories in Chronic Kidney Disease (CKD)              GFR Category          GFR (mL/min/1.73)    Interpretation  G1                    90 or greater        Normal or high (1)  G2                    60-89                Mild decrease (1)  G3a                   45-59                Mild to moderate decrease  G3b                   30-44                Moderate to severe decrease  G4                    15-29                Severe decrease  G5                    14 or less           Kidney failure    (1)In the absence of  evidence of kidney disease, neither GFR category G1 or G2 fulfill the criteria for CKD.    eGFR calculation 2021 CKD-EPI creatinine equation, which does not include race as a factor    High Sensitivity Troponin T [[id number]]  (Abnormal) Collected: 05/25/25 1550    Specimen: Blood Updated: 05/25/25 1619     HS Troponin T 16 ng/L     Narrative:      High Sensitive Troponin T Reference Range:  <14.0 ng/L- Negative Female for AMI  <22.0 ng/L- Negative Male for AMI  >=14 - Abnormal Female indicating possible myocardial injury.  >=22 - Abnormal Male indicating possible myocardial injury.   Clinicians would have to utilize clinical acumen, EKG, Troponin, and serial changes to determine if it is an Acute Myocardial Infarction or myocardial injury due to an underlying chronic condition.         Magnesium [[id number]]  (Normal) Collected: 05/25/25 1550    Specimen: Blood Updated: 05/25/25 1621     Magnesium 2.0 mg/dL     CBC Auto Differential [[id number]]  (Abnormal) Collected: 05/25/25 1550    Specimen: Blood Updated: 05/25/25 1634     WBC 16.72 10*3/mm3      RBC 4.72 10*6/mm3      Hemoglobin 13.1 g/dL      Hematocrit 43.6 %      MCV 92.4 fL      MCH 27.8 pg      MCHC 30.0 g/dL      RDW 14.6 %      RDW-SD 49.4 fl      MPV 13.6 fL      Platelets 102 10*3/mm3      Neutrophil % 88.2 %      Lymphocyte % 6.8 %      Monocyte % 3.7 %      Eosinophil % 0.5 %      Basophil % 0.3 %      Immature Grans % 0.5 %      Neutrophils, Absolute 14.75 10*3/mm3      Lymphocytes, Absolute 1.13 10*3/mm3      Monocytes, Absolute 0.62 10*3/mm3      Eosinophils, Absolute 0.09 10*3/mm3      Basophils, Absolute 0.05 10*3/mm3      Immature Grans, Absolute 0.08 10*3/mm3      nRBC 0.0 /100 WBC     Lactic Acid, Plasma [[id number]]  (Abnormal) Collected: 05/25/25 1550    Specimen: Blood Updated: 05/25/25 1630     Lactate 2.6 mmol/L     BNP [[id number]]  (Abnormal) Collected: 05/25/25 1550    Specimen: Blood Updated: 05/25/25 1733     proBNP 1,151.0 pg/mL      Narrative:      This assay is used as an aid in the diagnosis of individuals suspected of having heart failure. It can be used as an aid in the diagnosis of acute decompensated heart failure (ADHF) in patients presenting with signs and symptoms of ADHF to the emergency department (ED). In addition, NT-proBNP of <300 pg/mL indicates ADHF is not likely.    Age Range Result Interpretation  NT-proBNP Concentration (pg/mL:      <50             Positive            >450                   Gray                 300-450                    Negative             <300    50-75           Positive            >900                  Gray                300-900                  Negative            <300      >75             Positive            >1800                  Gray                300-1800                  Negative            <300    Urinalysis With Culture If Indicated - Urine, Clean Catch [[id number]]  (Normal) Collected: 05/25/25 1608    Specimen: Urine, Clean Catch Updated: 05/25/25 1619     Color, UA Yellow     Appearance, UA Clear     pH, UA 5.5     Specific Gravity, UA 1.009     Glucose, UA Negative     Ketones, UA Negative     Bilirubin, UA Negative     Blood, UA Negative     Protein, UA Negative     Leuk Esterase, UA Negative     Nitrite, UA Negative     Urobilinogen, UA 0.2 E.U./dL    Narrative:      In absence of clinical symptoms, the presence of pyuria, bacteria, and/or nitrites on the urinalysis result does not correlate with infection.  Urine microscopic not indicated.    Urine Culture - Urine, Urine, Clean Catch [[id number]] Collected: 05/25/25 1608    Specimen: Urine, Clean Catch Updated: 05/25/25 1619    COVID-19, FLU A/B, RSV PCR 1 HR TAT - Swab, Nasopharynx [[id number]]  (Normal) Collected: 05/25/25 1617    Specimen: Swab from Nasopharynx Updated: 05/25/25 1705     COVID19 Not Detected     Influenza A PCR Not Detected     Influenza B PCR Not Detected     RSV, PCR Not Detected    Narrative:      Fact sheet for  providers: https://www.fda.gov/media/043460/download    Fact sheet for patients: https://www.fda.gov/media/800118/download    Test performed by PCR.    POC Glucose Once [[id number]]  (Abnormal) Collected: 05/25/25 1625    Specimen: Blood Updated: 05/25/25 1627     Glucose 215 mg/dL      Comment: Serial Number: [id number]:  [id number]       Blood Gas, Arterial - [[id number]]  (Abnormal) Collected: 05/25/25 1625    Specimen: Arterial Blood Updated: 05/25/25 1627     Site Right Radial     Sid's Test Positive     pH, Arterial 7.348 pH units      pCO2, Arterial 50.2 mm Hg      pO2, Arterial 57.9 mm Hg      HCO3, Arterial 27.6 mmol/L      Base Excess, Arterial 1.1 mmol/L      Comment: Serial Number: [id number]:  [id number]        O2 Saturation, Arterial 87.7 %      Hemoglobin, Blood Gas 13.3 g/dL      Hematocrit, Blood Gas 39.0 %      Barometric Pressure for Blood Gas 746.8000 mmHg      Modality HFNC     Flow Rate 8.0000 lpm      Hemodilution No    POC Lactate [[id number]]  (Normal) Collected: 05/25/25 1625    Specimen: Arterial Blood Updated: 05/25/25 1627     Lactate 1.7 mmol/L      Comment: Serial Number: [id number]:  [id number]       POC Electrolyte Panel [[id number]]  (Normal) Collected: 05/25/25 1625    Specimen: Arterial Blood Updated: 05/25/25 1627     Sodium 142 mmol/L      POC Potassium 4.4 mmol/L      Chloride 106 mmol/L      Ionized Calcium 1.14 mmol/L      Comment: Serial Number: [id number]:  [id number]       Blood Culture - Blood, Arm, Left [[id number]] Collected: 05/25/25 1627    Specimen: Blood from Arm, Left Updated: 05/25/25 1631    Blood Culture - Blood, Hand, Right [[id number]] Collected: 05/25/25 1627    Specimen: Blood from Hand, Right Updated: 05/25/25 1631    High Sensitivity Troponin T 1Hr [[id number]]  (Abnormal) Collected: 05/25/25 1757    Specimen: Blood from Arm, Right Updated: 05/25/25 1822     HS Troponin T 14 ng/L      Troponin T Numeric Delta -2 ng/L      Troponin T % Delta -13    Narrative:       High Sensitive Troponin T Reference Range:  <14.0 ng/L- Negative Female for AMI  <22.0 ng/L- Negative Male for AMI  >=14 - Abnormal Female indicating possible myocardial injury.  >=22 - Abnormal Male indicating possible myocardial injury.   Clinicians would have to utilize clinical acumen, EKG, Troponin, and serial changes to determine if it is an Acute Myocardial Infarction or myocardial injury due to an underlying chronic condition.         STAT Lactic Acid, Reflex [[id number]]  (Abnormal) Collected: 05/25/25 1757    Specimen: Blood from Arm, Right Updated: 05/25/25 1835     Lactate 2.1 mmol/L     STAT Lactic Acid, Reflex [[id number]] Collected: 05/25/25 2155    Specimen: Blood from Hand, Left Updated: 05/25/25 2157    Procalcitonin [[id number]] Collected: 05/25/25 2155    Specimen: Blood from Hand, Left Updated: 05/25/25 2157             Imaging:    CT Chest With Contrast Diagnostic  Result Date: 5/25/2025  CT CHEST W CONTRAST DIAGNOSTIC Date of Exam: 5/25/2025 7:06 PM EDT Indication: Shortness of breath. Comparison: 4/27/2025 Technique: Axial CT images were obtained of the chest after the uneventful intravenous administration of iodinated contrast.  Reconstructed coronal and sagittal images were also obtained. Automated exposure control and iterative construction methods were  used. Findings: There is motion degradation, and there is streak artifact from the patient's arms. Nodular enlargement of the right thyroid lobe is unchanged. Heart remains enlarged. No pleural or pericardial effusion is seen. There is atherosclerotic disease and aortic  valvular disease. Prevascular adenopathy measures 1.6 cm, previously 1.4 cm. Precarinal adenopathy seen previously is not well characterized due to artifact currently. No hilar or axillary adenopathy. Upper abdominal images show probable splenomegaly. Lung windows show increased consolidation in the lower lobes with some persistent mild opacity in the right middle lobe and  lingula. Findings probably reflect worsening atelectasis although pneumonia is not excluded, particularly in the lower lobes. Groundglass opacities and septal thickening are again identified suggesting mild pulmonary edema. No pneumothorax.     1.Increasing consolidation in the lower lobes with some persistent mild opacity in the right middle lobe and lingula. Findings probably reflect worsening atelectasis although pneumonia is not excluded, particularly in the lower lobes. 2.Groundglass opacities and septal thickening are again identified suggesting mild pulmonary edema. 3.Cardiomegaly. 4.Mediastinal lymphadenopathy, as described above. This may be reactive. Attention on 3-month follow-up CT recommended. 5.Additional findings as given above. Electronically Signed: Jean Tamez MD  5/25/2025 7:32 PM EDT  Workstation ID: [id number]    CT Head Without Contrast  Result Date: 5/25/2025  CT HEAD WO CONTRAST Date of Exam: 5/25/2025 7:06 PM EDT Indication: Altered mental status. Comparison: None available. Technique: Axial CT images were obtained of the head without contrast administration.  Reconstructed coronal and sagittal images were also obtained. Automated exposure control and iterative construction methods were used. Findings: There is no evidence of hemorrhage. There is no mass effect or midline shift. Diffuse brain atrophy There is no extracerebral collection. Ventricles are normal in size and configuration for patient's stated age. Posterior fossa is within normal limits. Calvarium and skull base appear intact.  Visualized sinuses show no air fluid levels. Visualized orbits are unremarkable.     Impression: 1.No acute intracranial abnormality identified. 2.Diffuse brain atrophy. Electronically Signed: Harvey Sneed MD  5/25/2025 7:25 PM EDT  Workstation ID: [id number]    XR Chest 1 View  Result Date: 5/25/2025  XR CHEST 1 VW Date of Exam: 5/25/2025 4:26 PM EDT Indication: Weak/Dizzy/AMS triage protocol  Comparison: 4/29/2025 Findings: Cardiomegaly. Prominence of the central pulmonary vasculature. No definite interstitial or airspace disease. Costophrenic angle sharp     Impression: Cardiomegaly with pulmonary vascular congestion. No gross pulmonary edema or suspicious consolidation Electronically Signed: Lino Lieberman  5/25/2025 5:01 PM EDT  Workstation ID: [id number]        Differential Diagnosis and Discussion:    Altered Mental Status: Based on the patient's signs and symptoms, differential diagnosis includes but is not limited to meningitis, stroke, sepsis, subarachnoid hemorrhage, intracranial bleeding, encephalitis, and metabolic encephalopathy.    PROCEDURES:    Labs were collected in the emergency department and all labs were reviewed and interpreted by me.  X-ray were performed in the emergency department and all X-ray impressions were independently interpreted by me.  An EKG was performed and the EKG was interpreted by me.    ECG 12 Lead Altered Mental Status   Preliminary Result   HEART RATE=86  bpm   RR Khbyregy=239  ms   IA Interval=  ms   P Horizontal Axis=  deg   P Front Axis=  deg   QRSD Interval=74  ms   QT Ldftynee=854  ms   WKzO=779  ms   QRS Axis=109  deg   T Wave Axis=17  deg   - ABNORMAL ECG -   Atrial fibrillation   Right axis deviation   Low voltage, precordial leads   Date and Time of Study:2025-05-25 15:35:33          Procedures    MDM     Amount and/or Complexity of Data Reviewed  Clinical lab tests: reviewed  Tests in the radiology section of CPT®: reviewed  Tests in the medicine section of CPT®: reviewed  Decide to obtain previous medical records or to obtain history from someone other than the patient: yes             Total Critical Care time of 50 minutes. Total critical care time documented does not include time spent on separately billed procedures for services of nurses or physician assistants. I personally saw and examined the patient. I have reviewed all diagnostic interpretations  and treatment plans as written. I was present for the key portions of any procedures performed and the inclusive time noted in any critical care statement. Critical care time includes patient management by me, time spent at the patients bedside,  time to review lab and imaging results, discussing patient care, documentation in the medical record, and time spent with family or caregiver.      Sepsis criteria was met in the emergency department and the Sepsis protocol (including antibiotic administration) was initiated.      SIRS criteria considered:   1.  Temperature > 100.4 or <96.8    2.  Heart Rate > 90    3.  Respiratory Rate > 22    4.  WBC > 12K or <4K.             Severe Sepsis:     Respiratory: Mechanical Ventilation or Bipap  Hypotension: SBP > 90 or MAP < 65  Renal: Creatinine > 2  Metabolic: Lactic Acid > 2  Hematologic: Platelets < 100K or INR > 1.5  Hepatic: BILI  >  2  CNS: Sudden AMS     Septic Shock:     Severe Sepsis + Persistent hypotension or Lactic Acid > 4     Normal saline bolus, Antibiotics, and final disposition was based on these definitions.        Sepsis was recognized at 1830    Antibiotics were ordered.     30 mL/kg bolus was  indicated.         The patient was ordered 2322 mL of fluids.  Patient was reassessed after fluid bolus.    The patient presents with 2 out of the 4 SIRS criteria and a suspected source for sepsis.  Patient was evaluated and placed on a cardiac monitor for fear of worsening tachycardia and life-threatening hypotension.  Patient was monitored for shock and signs of end-organ damage.  Mental status was repeatedly checked throughout the ED stay.  Medications were ordered by me which includes cefepime and vancomycin.  The case was discussed at length with admitting physician.     Total Critical Care time of 50 minutes. Total critical care time documented does not include time spent on separately billed procedures for services of nurses or physician assistants. I  personally saw and examined the patient. I have reviewed all diagnostic interpretations and treatment plans as written. I was present for the key portions of any procedures performed and the inclusive time noted in any critical care statement. Critical care time includes patient management by me, time spent at the patients bedside,  time to review lab and imaging results, discussing patient care, documentation in the medical record, and time spent with family or caregiver.    Patient Care Considerations:    MRI: I considered ordering an MRI however CT is adequate for ED evaluation      Consultants/Shared Management Plan:    Hospitalist: I have discussed the case with hospitalist who agrees to accept the patient for admission.    Social Determinants of Health:    Patient is unable to carry out activities of daily life. Escalation of care is necessary.       Disposition and Care Coordination:    Admit:   Through independent evaluation of the patient's history, physical, and imperical data, the patient meets criteria for inpatient admission to the hospital.        Final diagnoses:   Pneumonia due to infectious organism, unspecified laterality, unspecified part of lung   Sepsis, due to unspecified organism, unspecified whether acute organ dysfunction present        ED Disposition       ED Disposition   Decision to Admit    Condition   --    Comment   Level of Care: Telemetry [5]   Diagnosis: PNA (pneumonia) [008320]   Admitting Physician: DAVID HOOKS [[id number]]   Attending Physician: DAVID HOOKS [[id number]]   Certification: I Certify That Inpatient Hospital Services Are Medically Necessary For Greater Than 2 Midnights                 This medical record created using voice recognition software.             Terrence Jameson,   05/25/25 1390

## 2025-05-26 LAB
ANION GAP SERPL CALCULATED.3IONS-SCNC: 11.8 MMOL/L (ref 5–15)
B PARAPERT DNA SPEC QL NAA+PROBE: NOT DETECTED
B PERT DNA SPEC QL NAA+PROBE: NOT DETECTED
BACTERIA SPEC AEROBE CULT: ABNORMAL
BASOPHILS # BLD AUTO: 0.02 10*3/MM3 (ref 0–0.2)
BASOPHILS NFR BLD AUTO: 0.1 % (ref 0–1.5)
BUN SERPL-MCNC: 23 MG/DL (ref 8–23)
BUN/CREAT SERPL: 23 (ref 7–25)
C PNEUM DNA NPH QL NAA+NON-PROBE: NOT DETECTED
CALCIUM SPEC-SCNC: 8.6 MG/DL (ref 8.6–10.5)
CHLORIDE SERPL-SCNC: 102 MMOL/L (ref 98–107)
CO2 SERPL-SCNC: 23.2 MMOL/L (ref 22–29)
CREAT SERPL-MCNC: 1 MG/DL (ref 0.57–1)
D-LACTATE SERPL-SCNC: 1.7 MMOL/L (ref 0.5–2)
DEPRECATED RDW RBC AUTO: 49.3 FL (ref 37–54)
EGFRCR SERPLBLD CKD-EPI 2021: 60 ML/MIN/1.73
EOSINOPHIL # BLD AUTO: 0 10*3/MM3 (ref 0–0.4)
EOSINOPHIL NFR BLD AUTO: 0 % (ref 0.3–6.2)
ERYTHROCYTE [DISTWIDTH] IN BLOOD BY AUTOMATED COUNT: 14.8 % (ref 12.3–15.4)
FLUAV SUBTYP SPEC NAA+PROBE: NOT DETECTED
FLUBV RNA ISLT QL NAA+PROBE: NOT DETECTED
GLUCOSE BLDC GLUCOMTR-MCNC: 262 MG/DL (ref 70–99)
GLUCOSE BLDC GLUCOMTR-MCNC: 266 MG/DL (ref 70–99)
GLUCOSE BLDC GLUCOMTR-MCNC: 278 MG/DL (ref 70–99)
GLUCOSE BLDC GLUCOMTR-MCNC: 280 MG/DL (ref 70–99)
GLUCOSE BLDC GLUCOMTR-MCNC: 281 MG/DL (ref 70–99)
GLUCOSE BLDC GLUCOMTR-MCNC: 350 MG/DL (ref 70–99)
GLUCOSE SERPL-MCNC: 294 MG/DL (ref 65–99)
HADV DNA SPEC NAA+PROBE: NOT DETECTED
HCOV 229E RNA SPEC QL NAA+PROBE: NOT DETECTED
HCOV HKU1 RNA SPEC QL NAA+PROBE: NOT DETECTED
HCOV NL63 RNA SPEC QL NAA+PROBE: NOT DETECTED
HCOV OC43 RNA SPEC QL NAA+PROBE: NOT DETECTED
HCT VFR BLD AUTO: 37.1 % (ref 34–46.6)
HGB BLD-MCNC: 11.2 G/DL (ref 12–15.9)
HMPV RNA NPH QL NAA+NON-PROBE: NOT DETECTED
HPIV1 RNA ISLT QL NAA+PROBE: NOT DETECTED
HPIV2 RNA SPEC QL NAA+PROBE: NOT DETECTED
HPIV3 RNA NPH QL NAA+PROBE: NOT DETECTED
HPIV4 P GENE NPH QL NAA+PROBE: NOT DETECTED
IMM GRANULOCYTES # BLD AUTO: 0.05 10*3/MM3 (ref 0–0.05)
IMM GRANULOCYTES NFR BLD AUTO: 0.4 % (ref 0–0.5)
LYMPHOCYTES # BLD AUTO: 0.75 10*3/MM3 (ref 0.7–3.1)
LYMPHOCYTES NFR BLD AUTO: 5.3 % (ref 19.6–45.3)
M PNEUMO IGG SER IA-ACNC: NOT DETECTED
MAGNESIUM SERPL-MCNC: 1.9 MG/DL (ref 1.6–2.4)
MCH RBC QN AUTO: 27.7 PG (ref 26.6–33)
MCHC RBC AUTO-ENTMCNC: 30.2 G/DL (ref 31.5–35.7)
MCV RBC AUTO: 91.6 FL (ref 79–97)
MONOCYTES # BLD AUTO: 0.16 10*3/MM3 (ref 0.1–0.9)
MONOCYTES NFR BLD AUTO: 1.1 % (ref 5–12)
NEUTROPHILS NFR BLD AUTO: 13.23 10*3/MM3 (ref 1.7–7)
NEUTROPHILS NFR BLD AUTO: 93.1 % (ref 42.7–76)
NRBC BLD AUTO-RTO: 0 /100 WBC (ref 0–0.2)
PHOSPHATE SERPL-MCNC: 3.5 MG/DL (ref 2.5–4.5)
PLATELET # BLD AUTO: 85 10*3/MM3 (ref 140–450)
PMV BLD AUTO: 13.5 FL (ref 6–12)
POTASSIUM SERPL-SCNC: 4.6 MMOL/L (ref 3.5–5.2)
RBC # BLD AUTO: 4.05 10*6/MM3 (ref 3.77–5.28)
RBC MORPH BLD: NORMAL
RHINOVIRUS RNA SPEC NAA+PROBE: NOT DETECTED
RSV RNA NPH QL NAA+NON-PROBE: NOT DETECTED
SARS-COV-2 RNA RESP QL NAA+PROBE: NOT DETECTED
SMALL PLATELETS BLD QL SMEAR: NORMAL
SODIUM SERPL-SCNC: 137 MMOL/L (ref 136–145)
T4 FREE SERPL-MCNC: 1.24 NG/DL (ref 0.92–1.68)
TSH SERPL DL<=0.05 MIU/L-ACNC: 0.58 UIU/ML (ref 0.27–4.2)
WBC MORPH BLD: NORMAL
WBC NRBC COR # BLD AUTO: 14.21 10*3/MM3 (ref 3.4–10.8)

## 2025-05-26 PROCEDURE — 85007 BL SMEAR W/DIFF WBC COUNT: CPT | Performed by: HOSPITALIST

## 2025-05-26 PROCEDURE — 63710000001 INSULIN REGULAR HUMAN PER 5 UNITS: Performed by: HOSPITALIST

## 2025-05-26 PROCEDURE — 94799 UNLISTED PULMONARY SVC/PX: CPT

## 2025-05-26 PROCEDURE — 0202U NFCT DS 22 TRGT SARS-COV-2: CPT

## 2025-05-26 PROCEDURE — 63710000001 INSULIN GLARGINE PER 5 UNITS: Performed by: PHYSICIAN ASSISTANT

## 2025-05-26 PROCEDURE — 84100 ASSAY OF PHOSPHORUS: CPT | Performed by: HOSPITALIST

## 2025-05-26 PROCEDURE — 94640 AIRWAY INHALATION TREATMENT: CPT

## 2025-05-26 PROCEDURE — 25010000002 FUROSEMIDE PER 20 MG

## 2025-05-26 PROCEDURE — 25810000003 SODIUM CHLORIDE 0.9 % SOLUTION 250 ML FLEX CONT: Performed by: HOSPITALIST

## 2025-05-26 PROCEDURE — 80048 BASIC METABOLIC PNL TOTAL CA: CPT | Performed by: HOSPITALIST

## 2025-05-26 PROCEDURE — 82948 REAGENT STRIP/BLOOD GLUCOSE: CPT

## 2025-05-26 PROCEDURE — 94761 N-INVAS EAR/PLS OXIMETRY MLT: CPT

## 2025-05-26 PROCEDURE — 25010000002 MAGNESIUM SULFATE IN D5W 1G/100ML (PREMIX) 1-5 GM/100ML-% SOLUTION: Performed by: PHYSICIAN ASSISTANT

## 2025-05-26 PROCEDURE — 25010000002 CEFEPIME PER 500 MG: Performed by: HOSPITALIST

## 2025-05-26 PROCEDURE — 25010000002 VANCOMYCIN HCL 1.25 G RECONSTITUTED SOLUTION 1 EACH VIAL: Performed by: HOSPITALIST

## 2025-05-26 PROCEDURE — 25010000002 METHYLPREDNISOLONE PER 125 MG: Performed by: HOSPITALIST

## 2025-05-26 PROCEDURE — 85025 COMPLETE CBC W/AUTO DIFF WBC: CPT | Performed by: HOSPITALIST

## 2025-05-26 PROCEDURE — 92610 EVALUATE SWALLOWING FUNCTION: CPT

## 2025-05-26 PROCEDURE — 83735 ASSAY OF MAGNESIUM: CPT | Performed by: HOSPITALIST

## 2025-05-26 PROCEDURE — 82948 REAGENT STRIP/BLOOD GLUCOSE: CPT | Performed by: HOSPITALIST

## 2025-05-26 PROCEDURE — 99223 1ST HOSP IP/OBS HIGH 75: CPT | Performed by: INTERNAL MEDICINE

## 2025-05-26 PROCEDURE — 84443 ASSAY THYROID STIM HORMONE: CPT | Performed by: PHYSICIAN ASSISTANT

## 2025-05-26 PROCEDURE — 63710000001 INSULIN LISPRO (HUMAN) PER 5 UNITS: Performed by: FAMILY MEDICINE

## 2025-05-26 PROCEDURE — 83605 ASSAY OF LACTIC ACID: CPT | Performed by: EMERGENCY MEDICINE

## 2025-05-26 PROCEDURE — 94660 CPAP INITIATION&MGMT: CPT

## 2025-05-26 PROCEDURE — 84439 ASSAY OF FREE THYROXINE: CPT | Performed by: PHYSICIAN ASSISTANT

## 2025-05-26 PROCEDURE — 94664 DEMO&/EVAL PT USE INHALER: CPT

## 2025-05-26 PROCEDURE — 99233 SBSQ HOSP IP/OBS HIGH 50: CPT | Performed by: INTERNAL MEDICINE

## 2025-05-26 RX ORDER — METOPROLOL SUCCINATE 25 MG/1
25 TABLET, EXTENDED RELEASE ORAL
Status: DISCONTINUED | OUTPATIENT
Start: 2025-05-26 | End: 2025-05-26

## 2025-05-26 RX ORDER — MICONAZOLE NITRATE 20 MG/G
1 CREAM TOPICAL
Status: DISCONTINUED | OUTPATIENT
Start: 2025-05-26 | End: 2025-05-29 | Stop reason: HOSPADM

## 2025-05-26 RX ORDER — FAMOTIDINE 20 MG/1
20 TABLET, FILM COATED ORAL
Status: DISCONTINUED | OUTPATIENT
Start: 2025-05-26 | End: 2025-05-28

## 2025-05-26 RX ORDER — HYDROCODONE BITARTRATE AND ACETAMINOPHEN 10; 325 MG/1; MG/1
1 TABLET ORAL EVERY 8 HOURS PRN
Refills: 0 | Status: DISCONTINUED | OUTPATIENT
Start: 2025-05-26 | End: 2025-05-29 | Stop reason: HOSPADM

## 2025-05-26 RX ORDER — NITROFURANTOIN 25; 75 MG/1; MG/1
1 CAPSULE ORAL DAILY
COMMUNITY
Start: 2025-05-12

## 2025-05-26 RX ORDER — FLUCONAZOLE 100 MG/1
100 TABLET ORAL EVERY 24 HOURS
Status: DISCONTINUED | OUTPATIENT
Start: 2025-05-26 | End: 2025-05-27

## 2025-05-26 RX ORDER — DILTIAZEM HYDROCHLORIDE 180 MG/1
180 CAPSULE, COATED, EXTENDED RELEASE ORAL DAILY
Status: DISCONTINUED | OUTPATIENT
Start: 2025-05-26 | End: 2025-05-29 | Stop reason: HOSPADM

## 2025-05-26 RX ORDER — MIDODRINE HYDROCHLORIDE 10 MG/1
10 TABLET ORAL
Status: DISCONTINUED | OUTPATIENT
Start: 2025-05-26 | End: 2025-05-29 | Stop reason: HOSPADM

## 2025-05-26 RX ORDER — SACUBITRIL AND VALSARTAN 97; 103 MG/1; MG/1
0.5 TABLET, FILM COATED ORAL EVERY MORNING
COMMUNITY
End: 2025-05-29 | Stop reason: HOSPADM

## 2025-05-26 RX ORDER — SACUBITRIL AND VALSARTAN 97; 103 MG/1; MG/1
1 TABLET, FILM COATED ORAL NIGHTLY
COMMUNITY
Start: 2025-05-13

## 2025-05-26 RX ORDER — METOPROLOL SUCCINATE 25 MG/1
25 TABLET, EXTENDED RELEASE ORAL EVERY 12 HOURS SCHEDULED
Status: DISCONTINUED | OUTPATIENT
Start: 2025-05-26 | End: 2025-05-29 | Stop reason: HOSPADM

## 2025-05-26 RX ORDER — FUROSEMIDE 10 MG/ML
40 INJECTION INTRAMUSCULAR; INTRAVENOUS EVERY 12 HOURS
Status: DISCONTINUED | OUTPATIENT
Start: 2025-05-26 | End: 2025-05-28

## 2025-05-26 RX ORDER — MAGNESIUM SULFATE 1 G/100ML
1 INJECTION INTRAVENOUS ONCE
Status: COMPLETED | OUTPATIENT
Start: 2025-05-26 | End: 2025-05-26

## 2025-05-26 RX ORDER — INSULIN LISPRO 100 [IU]/ML
3-14 INJECTION, SOLUTION INTRAVENOUS; SUBCUTANEOUS
Status: DISCONTINUED | OUTPATIENT
Start: 2025-05-26 | End: 2025-05-29 | Stop reason: HOSPADM

## 2025-05-26 RX ADMIN — ARFORMOTEROL TARTRATE 15 MCG: 15 SOLUTION RESPIRATORY (INHALATION) at 01:05

## 2025-05-26 RX ADMIN — IPRATROPIUM BROMIDE AND ALBUTEROL SULFATE 3 ML: .5; 3 SOLUTION RESPIRATORY (INHALATION) at 19:17

## 2025-05-26 RX ADMIN — FLUCONAZOLE 100 MG: 100 TABLET ORAL at 17:25

## 2025-05-26 RX ADMIN — INSULIN GLARGINE 15 UNITS: 100 INJECTION, SOLUTION SUBCUTANEOUS at 11:00

## 2025-05-26 RX ADMIN — METOPROLOL SUCCINATE 25 MG: 25 TABLET, EXTENDED RELEASE ORAL at 21:17

## 2025-05-26 RX ADMIN — Medication 10 ML: at 21:18

## 2025-05-26 RX ADMIN — MICONAZOLE NITRATE 1 APPLICATION: 2 CREAM TOPICAL at 21:18

## 2025-05-26 RX ADMIN — IPRATROPIUM BROMIDE AND ALBUTEROL SULFATE 3 ML: .5; 3 SOLUTION RESPIRATORY (INHALATION) at 01:05

## 2025-05-26 RX ADMIN — IPRATROPIUM BROMIDE AND ALBUTEROL SULFATE 3 ML: .5; 3 SOLUTION RESPIRATORY (INHALATION) at 07:19

## 2025-05-26 RX ADMIN — DILTIAZEM HYDROCHLORIDE 180 MG: 180 CAPSULE, EXTENDED RELEASE ORAL at 11:41

## 2025-05-26 RX ADMIN — VANCOMYCIN HYDROCHLORIDE 1250 MG: 1.25 INJECTION, POWDER, LYOPHILIZED, FOR SOLUTION INTRAVENOUS at 19:59

## 2025-05-26 RX ADMIN — MIDODRINE HYDROCHLORIDE 10 MG: 10 TABLET ORAL at 10:24

## 2025-05-26 RX ADMIN — ATORVASTATIN CALCIUM 20 MG: 20 TABLET, FILM COATED ORAL at 21:17

## 2025-05-26 RX ADMIN — HYDROCODONE BITARTRATE AND ACETAMINOPHEN 1 TABLET: 10; 325 TABLET ORAL at 23:34

## 2025-05-26 RX ADMIN — METHYLPREDNISOLONE SODIUM SUCCINATE 40 MG: 125 INJECTION, POWDER, LYOPHILIZED, FOR SOLUTION INTRAMUSCULAR; INTRAVENOUS at 21:17

## 2025-05-26 RX ADMIN — CEFEPIME 2000 MG: 2 INJECTION, POWDER, FOR SOLUTION INTRAVENOUS at 03:07

## 2025-05-26 RX ADMIN — IPRATROPIUM BROMIDE AND ALBUTEROL SULFATE 3 ML: .5; 3 SOLUTION RESPIRATORY (INHALATION) at 11:10

## 2025-05-26 RX ADMIN — FUROSEMIDE 40 MG: 10 INJECTION, SOLUTION INTRAMUSCULAR; INTRAVENOUS at 10:23

## 2025-05-26 RX ADMIN — ARFORMOTEROL TARTRATE 15 MCG: 15 SOLUTION RESPIRATORY (INHALATION) at 07:19

## 2025-05-26 RX ADMIN — IPRATROPIUM BROMIDE AND ALBUTEROL SULFATE 3 ML: .5; 3 SOLUTION RESPIRATORY (INHALATION) at 15:54

## 2025-05-26 RX ADMIN — MAGNESIUM SULFATE 1 G: 1 INJECTION INTRAVENOUS at 11:42

## 2025-05-26 RX ADMIN — ARFORMOTEROL TARTRATE 15 MCG: 15 SOLUTION RESPIRATORY (INHALATION) at 19:17

## 2025-05-26 RX ADMIN — BUDESONIDE 0.5 MG: 0.5 SUSPENSION RESPIRATORY (INHALATION) at 07:19

## 2025-05-26 RX ADMIN — METHYLPREDNISOLONE SODIUM SUCCINATE 40 MG: 125 INJECTION, POWDER, LYOPHILIZED, FOR SOLUTION INTRAMUSCULAR; INTRAVENOUS at 10:23

## 2025-05-26 RX ADMIN — FUROSEMIDE 40 MG: 10 INJECTION, SOLUTION INTRAMUSCULAR; INTRAVENOUS at 21:35

## 2025-05-26 RX ADMIN — ACETAMINOPHEN 325 MG: 650 SUPPOSITORY RECTAL at 03:08

## 2025-05-26 RX ADMIN — Medication 10 ML: at 09:35

## 2025-05-26 RX ADMIN — CEFEPIME 2000 MG: 2 INJECTION, POWDER, FOR SOLUTION INTRAVENOUS at 17:30

## 2025-05-26 RX ADMIN — BUDESONIDE 0.5 MG: 0.5 SUSPENSION RESPIRATORY (INHALATION) at 19:17

## 2025-05-26 RX ADMIN — CEFEPIME 2000 MG: 2 INJECTION, POWDER, FOR SOLUTION INTRAVENOUS at 10:24

## 2025-05-26 RX ADMIN — INSULIN LISPRO 12 UNITS: 100 INJECTION, SOLUTION INTRAVENOUS; SUBCUTANEOUS at 21:18

## 2025-05-26 RX ADMIN — INSULIN HUMAN 6 UNITS: 100 INJECTION, SOLUTION PARENTERAL at 17:24

## 2025-05-26 RX ADMIN — BUDESONIDE 0.5 MG: 0.5 SUSPENSION RESPIRATORY (INHALATION) at 01:05

## 2025-05-26 RX ADMIN — FAMOTIDINE 20 MG: 20 TABLET, FILM COATED ORAL at 17:25

## 2025-05-26 RX ADMIN — INSULIN HUMAN 6 UNITS: 100 INJECTION, SOLUTION PARENTERAL at 01:10

## 2025-05-26 RX ADMIN — INSULIN HUMAN 6 UNITS: 100 INJECTION, SOLUTION PARENTERAL at 05:41

## 2025-05-26 RX ADMIN — MIDODRINE HYDROCHLORIDE 10 MG: 10 TABLET ORAL at 17:24

## 2025-05-26 RX ADMIN — INSULIN HUMAN 6 UNITS: 100 INJECTION, SOLUTION PARENTERAL at 11:41

## 2025-05-26 NOTE — PLAN OF CARE
Goal Outcome Evaluation:  Plan of Care Reviewed With: patient        Progress: no change  Outcome Evaluation: Patient wore bipap throughout the night. Unit is on standby in the room. Currently on 3L NC. Patient she stated she wears 2L at home, will try to titrate to 2L today. Tolerated breathing treatment this morning.

## 2025-05-26 NOTE — SIGNIFICANT NOTE
Wound Eval / Progress Noted    GUERO Bautista     Patient Name: Mary Albarran  : 1950  MRN: [id number]  Today's Date: 2025                 Admit Date: 2025    Visit Dx:    ICD-10-CM ICD-9-CM   1. Pneumonia due to infectious organism, unspecified laterality, unspecified part of lung  J18.9 486   2. Sepsis, due to unspecified organism, unspecified whether acute organ dysfunction present  A41.9 038.9     995.91   3. Dysphagia, unspecified type  R13.10 787.20         PNA (pneumonia)        Past Medical History:   Diagnosis Date    Allergic rhinitis     Anxiety     Aortic valve disease 2021    Fibrocalcific changes of the aortic valve with mild aortic valve stenosis   on echo 3/2/2021  Moderate aortic valve regurgitation is present. Aortic valve maximum pressure gradient is 26 mmHg. Moderate aortic valve stenosis is present.  On echo 2/10/2022       Arthritis     Atrial fibrillation     CHF (congestive heart failure)     Chronic kidney disease (CKD), stage III (moderate)     Chronic pain     COPD (chronic obstructive pulmonary disease)     Coronary artery disease     Diabetes mellitus type 2 with complications     Elevated cholesterol     Ex-smoker     QUIT SMOKING     GERD (gastroesophageal reflux disease)     History of home oxygen therapy     2L/NC AAT REPORTED    History of transfusion     Hyperlipidemia     Hypertension     Thrombocytopenia         Past Surgical History:   Procedure Laterality Date    HYSTERECTOMY      30 YEARS AGO         Physical Assessment:     25 1140   Wound 25 1338 Right posterior foot unspecified   Placement Date/Time: 25 1338   Side: Right  Orientation: posterior  Location: foot  Type: unspecified   Wound Image    Dressing Appearance intact;moist drainage   Dressing Removed Type silicone border foam   Confirmed Empty Wound Bed Yes, visual inspection of wound bed   Closure None   Base moist;pink   Periwound intact;dry;pink   Periwound Temperature warm    Periwound Skin Turgor soft   Edges open   Wound Length (cm) 0.8 cm   Wound Width (cm) 0.5 cm   Wound Depth (cm) 0.1 cm   Wound Surface Area (cm^2) 0.31 cm^2   Wound Volume (cm^3) 0.021 cm^3   Drainage Characteristics/Odor yellow   Drainage Amount small   Care, Wound cleansed with;sterile normal saline   Dressing Care dressing applied;dressing moistened;hydrofiber;silver impregnated;silicone border foam   Periwound Care absorptive dressing applied   Wound 04/04/25 2114 Left anterior foot   Placement Date/Time: 04/04/25 2114   Side: Left  Orientation: anterior  Location: foot   Wound Image    Dressing Appearance intact;no drainage   Dressing Removed Type silicone border foam   Confirmed Empty Wound Bed Yes, visual inspection of wound bed   Closure None   Base pink;red;moist   Periwound intact;dry;pink   Periwound Temperature warm   Periwound Skin Turgor soft   Edges open   Wound Length (cm) 1.3 cm   Wound Width (cm) 1.4 cm   Wound Depth (cm) 0.1 cm   Wound Surface Area (cm^2) 1.43 cm^2   Wound Volume (cm^3) 0.095 cm^3   Drainage Amount none   Care, Wound cleansed with;sterile normal saline   Dressing Care dressing applied;hydrofiber;silver impregnated;silicone border foam;dressing moistened   Periwound Care absorptive dressing applied      RLE     LLE                        Wound Check / Follow-up: Patient seen today for wound consult.  Patient is currently lying in bed resting quietly with eyes closed.  She does arouse to name.  Explained purpose for visit and patient is agreeable to assessment.    Patient with significant redness and rash consistent with fungal presentation to bilateral breast folds, abdominal fold extending into bilateral groin.  Abdomen and groin are most affected.  Patient states that she does routinely use a medicated powder at home along with absorbent pads to assist with management of this condition.  Recommending twice daily skin care/hygiene along with application of antifungal cream.   Moisture prevention measures further encouraged.    Patient additionally noted to have chronic discoloration of bilateral lower extremities.  The right lower extremity does have redness and discoloration that extends all the way up the patient's thigh.  Patient states that she has some kind of underlying condition but is unable to recall the name of it.  Discoloration to the left lower extremity is more consistent with hemosiderin staining.  Dry scaly skin is also noted to bilateral lower extremities and feet.  Recommending diligent skin care/hygiene.    Open wound along the left anterior foot as well as to the right plantar foot.  Patient states that she is unsure how these developed but states that they just suddenly appeared.  Both wounds have open shallow wound bases with pink moist tissue.  The right plantar foot wound did have a small amount of yellow drainage on the dressing removed.  Both wounds were cleansed with normal saline and gauze.  Recommending daily dressing changes with normal saline moistened silver impregnated Hydrofiber and a silicone border dressing.    Impression: Open wounds along the left dorsal foot and right plantar foot.  Moisture redness and rash consistent with fungal presentation to skin folds under the breast, abdomen and bilateral groin.  Skin discoloration of bilateral lower extremities.    Short term goals: Regain skin integrity.  Daily dressing changes.  Topical treatments.  Skin protection and moisture prevention.  Pressure reduction.    Yanira Jacques RN    5/26/2025    13:58 EDT

## 2025-05-26 NOTE — ED NOTES
This nurse attempted to collect the lactate two times and another RN and was unsuccessful called ED phlebotomy, they will put her on their list to get done

## 2025-05-26 NOTE — PROGRESS NOTES
"[facility] Clinical Pharmacy Services: Vancomycin Pharmacokinetic Initial Consult Note    Mary Albarran is a 74 y.o. female who is on day 1 of pharmacy to dose vancomycin for Pneumonia.    Consulting Provider: Dr. Dowd  Planned Duration of Therapy: 5 days  Was Patient Receiving Prior to Admission/Consult?: No  Loading Dose Ordered or Given: 2250 mg on  at 2000  PK/PD Target: -600 mg/L.hr   Imaging Reviewed?: Yes  Other Antimicrobials: Cefepime    Microbiology Data  MRSA PCR performed: 25; Result: Pending  Culture/Source:    blood cultures x 2: in process   urine culture: in process   respiratory culture: ordered   MRSA nasal PCR: positive    Vitals/Labs  Ht: 160 cm (63\"); Wt: 115 kg (254 lb 3.1 oz)  Temp (24hrs), Av.5 °F (38.1 °C), Min:99.8 °F (37.7 °C), Max:103.3 °F (39.6 °C)   Estimated Creatinine Clearance: 60.3 mL/min (A) (by C-G formula based on SCr of 1.03 mg/dL (H)).     Results from last 7 days   Lab Units 25  1550   CREATININE mg/dL 1.03*   WBC 10*3/mm3 16.72*     Assessment/Plan:    Vancomycin Dose:  1250 mg IV every 24 hours; which provides the following predicted parameters:  AUC24,ss: 548 mg/L.hr  Probability of AUC24 > 400: 73 %  Ctrough,ss: 14.8 mg/L  Probability of Ctrough,ss > 20: 35 %  Probability of nephrotoxicity (Lodise ERIC ): 10 %  Vanc Trough planned prior to the 3rd or 4th dose or as clinically indicated  Patient has order for Basic Metabolic Panel    Pharmacy will follow patient's kidney function and will adjust doses and obtain levels as necessary. Thank you for involving pharmacy in this patient's care. Please contact pharmacy with any questions or concerns.                           Rosemarie Pablo  Clinical Pharmacist    "

## 2025-05-26 NOTE — THERAPY EVALUATION
Acute Care - Speech Language Pathology   Swallow Initial Evaluation GUERO Bautsita     Patient Name: Mary Albarran  : 1950  MRN: [id number]  Today's Date: 2025               Admit Date: 2025    Visit Dx:     ICD-10-CM ICD-9-CM   1. Pneumonia due to infectious organism, unspecified laterality, unspecified part of lung  J18.9 486   2. Sepsis, due to unspecified organism, unspecified whether acute organ dysfunction present  A41.9 038.9     995.91   3. Dysphagia, unspecified type  R13.10 787.20     Patient Active Problem List   Diagnosis    Aortic stenosis, moderate    Longstanding persistent atrial fibrillation    Chronic kidney disease, stage III (moderate)    Hyperlipidemia    Hypertension    Chronic diastolic congestive heart failure    Atrial fibrillation with RVR    Onychomycosis    Onychocryptosis    Foot pain, bilateral    Nephrolithiasis    Recurrent urinary tract infection    Acute on chronic HFrEF (heart failure with reduced ejection fraction)    Cellulitis of right leg without foot    Diabetes mellitus type 2 with complications    Depression    COPD (chronic obstructive pulmonary disease)    Chronic pain    Anxiety    Heart failure with reduced ejection fraction    CHF exacerbation    Sepsis    C. difficile diarrhea    History of home oxygen therapy    PNA (pneumonia)     Past Medical History:   Diagnosis Date    Allergic rhinitis     Anxiety     Aortic valve disease 2021    Fibrocalcific changes of the aortic valve with mild aortic valve stenosis   on echo 3/2/2021  Moderate aortic valve regurgitation is present. Aortic valve maximum pressure gradient is 26 mmHg. Moderate aortic valve stenosis is present.  On echo 2/10/2022       Arthritis     Atrial fibrillation     CHF (congestive heart failure)     Chronic kidney disease (CKD), stage III (moderate)     Chronic pain     COPD (chronic obstructive pulmonary disease)     Coronary artery disease     Diabetes mellitus type 2 with  complications     Elevated cholesterol     Ex-smoker     QUIT SMOKING 2008    GERD (gastroesophageal reflux disease)     History of home oxygen therapy     2L/NC AAT REPORTED    History of transfusion     Hyperlipidemia     Hypertension     Thrombocytopenia      Past Surgical History:   Procedure Laterality Date    HYSTERECTOMY      30 YEARS AGO       SLP Recommendation and Plan             Inpatient Speech Pathology Dysphagia Evaluation        PAIN SCALE: None indicated    PRECAUTIONS/CONTRAINDICATIONS: Standard, fall    SUSPECTED ABUSE/NEGLECT/EXPLOITATION: None noted    SOCIAL/PSYCHOLOGICAL NEEDS/BARRIERS: None noted    PAST SOCIAL HISTORY: 74-year-old female, lives at home with her     PRIOR FUNCTION: Patient reports no prior swallowing difficulty    PATIENT GOALS/EXPECTATIONS: Patient wants to eat and drink    HISTORY: 74-year-old female admitted to [facility] on 5/25/2025 secondary to pneumonia.  Patient with right sided pneumonia with concern for sepsis as evidenced by fever, tachycardia, leukocytosis.  Patient initially lethargic upon admission, requiring BiPAP however on 3 L nasal cannula at time of this evaluation.  Patient with improvement in mental status at time of dysphagia valuation.    CURRENT DIET LEVEL: N.p.o.    OBJECTIVE:    TEST ADMINISTERED: Clinical dysphagia evaluation    COGNITION/SAFETY AWARENESS: Not thoroughly evaluated    BEHAVIORAL OBSERVATIONS: Patient awakens easily, answer simple questions, alert and oriented to self and environment.    ORAL MOTOR EXAM: Dry oral mucosa, oral care provided    VOICE QUALITY: Clear    REFLEX EXAM: Deferred    POSTURE: Sitting fully upright    FEEDING/SWALLOWING FUNCTION: Assessed with ice chips, thin liquids, nectar thick liquids, purée solids, regular solids    CLINICAL OBSERVATIONS: Single ice chips.  Adequate manipulation.  Swallows completed.  Vocal quality and laryngeal sounds clear.  Nectar thick and thin liquids by cup and by  straw.  Patient assisting for self administration.  Swallows completed within a timely manner.  Vocal quality clear and laryngeal sounds clear with cervical auscultation.  Purée solids with adequate manipulation.  Swallow completed.  Regular solids.  Extended chewing however patient does not have lower dentition.  Swallows were completed.  Mild oral residue.  Clears with liquid wash.  Laryngeal elevation noted palpation.  No overt clinical signs or symptoms of aspiration observed at bedside however cannot rule out silent aspiration.    DYSPHAGIA CRITERIA: N/A    FUNCTIONAL ASSESSMENT INSTRUMENT: Patient currently scored a level 7 of 7 on Functional Communication Measures for swallowing indicating a 0% limitation in function.    ASSESSMENT/ PLAN OF CARE:  At bedside patient demonstrates swallow function appearing adequate for tolerance of regular solids and thin liquids.  Currently patient does not require any further skilled speech pathology services regarding dysphagia.  If patient demonstrates any decline in status please reconsult speech pathology.    PROBLEMS:  1.  na   LTG 1: na   STG 1a: na     RECOMMENDATIONS:   1.   DIET: Regular solids, thin liquids    2.  POSITION: Fully upright for all p.o. intake, 30 minutes following    3.  COMPENSATORY STRATEGIES: Assist for self-feeding/meal set up.    Pt/responsible party agrees with plan of care and has been informed of all alternatives, risks and benefits.                 Anticipated Discharge Disposition (SLP): No further SLP services warranted (05/26/25 7029)                                                               EDUCATION  The patient has been educated in the following areas:   Dysphagia (Swallowing Impairment).                Time Calculation:    Time Calculation- SLP       Row Name 05/26/25 0953             Time Calculation- SLP    SLP Start Time 0850  -SN      SLP Stop Time 0950  -SN      SLP Time Calculation (min) 60 min  -SN      SLP Received On  05/26/25  -SN         Untimed Charges    26176-GE Eval Oral Pharyng Swallow Minutes 60  -SN         Total Minutes    Untimed Charges Total Minutes 60  -SN       Total Minutes 60  -SN                User Key  (r) = Recorded By, (t) = Taken By, (c) = Cosigned By      Initials Name Provider Type    SN Laney Denise MS-CCC/SLP, DEMETRIA Speech and Language Pathologist                    Therapy Charges for Today       Code Description Service Date Service Provider Modifiers Qty    [id number]  ST EVAL ORAL PHARYNG SWALLOW 4 5/26/2025 Laney Denise MS-CCC/SLP, DEMETRIA GN 1                 NAZIA Kruger/DEMETRIA RODRIGUEZ  5/26/2025

## 2025-05-26 NOTE — PROGRESS NOTES
[facility]   Hospitalist Progress Note  Date: 2025  Patient Name: Mary Albarran  : 1950  MRN: [id number]  Date of admission: 2025      Subjective   Subjective     Chief Complaint: Altered mental status    Summary: Patient is 74-year-old female past medical history significant for obesity, systolic heart failure, atrial fibrillation on Xarelto, COPD, CKD, chronic pain, diabetes, mild to moderate aortic valve stenosis that presents to the emergency department with worsening mentation evaluated emergency department was noted to be febrile elevated leukocyte count evidence of pneumonia and fluid overload has been admitted to the hospitalist service pulmonary consulted started on broad-spectrum antibiotics cefepime and vancomycin started on IV Lasix and A-fib with RVR started on diltiazem drip holding sedating medications    Interval Followup: Patient seen and examined remains very sleepy but awakens and answers questions speech therapy evaluating is cleared her for diet.  Resuming home oral Cardizem should be able to wean off of diltiazem drip once oral medications administered.  Continuing broad-spectrum antibiotics nebulizer treatments and steroids continue with diuresis midodrine added for blood pressure support      Objective   Objective     Vitals:   Temp:  [98.6 °F (37 °C)-103.3 °F (39.6 °C)] 98.6 °F (37 °C)  Heart Rate:  [] 100  Resp:  [19-28] 20  BP: ()/(35-97) 105/54  Flow (L/min) (Oxygen Therapy):  [3-8] 3    Physical Exam   Constitutional: Awake alert no acute distress sleepy but answering questions  Cardiovascular: Irregularly irregular tachycardic  Respiratory diminished  GI: Abdomen soft nontender   Skin; extensive chronic stasis changes bilateral extremity with erythema in the right lower extremity extending medially proximal thigh area.  Bilateral abdominal fold/groin area redness    Result Review    Result Review:  I have personally reviewed the results from the time of  this admission to 5/26/2025 10:56 EDT and agree with these findings:  [x]  Laboratory  []  Microbiology  []  Radiology  []  EKG/Telemetry   []  Cardiology/Vascular   []  Pathology  []  Old records  []  Other:    Assessment & Plan   Assessment / Plan   Assessment:    Bilateral multifocal pneumonia.  Unspecified bacterial pathogen  Acute on chronic systolic congestive heart failure  Fluid overload  A-fib RVR on anticoagulation with Xarelto  Diabetes   COPD  Hypertension  Hyperlipidemia  Thrombocytopenia  Chronic hypoxic respiratory failure on home oxygen.  Morbid obesity BMI 45  Chronic stasis changes bilateral extremity with a yeast infection    Plan:    Continuing broad-spectrum antibiotics with cefepime and vancomycin  Started on diuresis Lasix 40 IV twice daily  Start midodrine 10 mg 3 times daily  Continue nebulizer treatment  Continue steroids can likely wean off  Pepcid for GI prophylaxis  Xarelto on hold until we ensure no procedures planned from pulmonary standpoint  Continue diltiazem drip resume home oral diltiazem and wean off of drip  Toprol XL resumed at lower dose due to hypotension increase as tolerated  Sedating medications on hold  SSI per protocol resume basal insulin  Advance diet per speech therapy  PT OT speech therapy consultations  Pulmonary consultation  Further treatment contingent upon her hospital course  Discussed plan with RN.    VTE Prophylaxis:  Mechanical VTE prophylaxis orders are present.        CODE STATUS:   Code Status (Patient has no pulse and is not breathing): CPR (Attempt to Resuscitate)  Medical Interventions (Patient has pulse or is breathing): Full Support  Level Of Support Discussed With: Patient        Electronically signed by MARIANA Trinidad, 05/26/25, 10:56 AM EDT.    Patient seen and examined during rounds with physician assistant.  Patient remains on Cardizem drip at 5 mg/h with controlled A-fib and soft blood pressure.  Patient denies any dizziness.  Patient  overall feels better since admission.  Does have cough without any mucus.  Denies any chest pain or palpitations.  Extensive lower extremity chronic stasis changes with redness noted.  Wound nurse consulted will benefit with leg compressions.  Started on oral Diflucan for extensive yeast infection.  De-escalate antibiotics based on culture data.  Continue diuretics.  Resume Xarelto soon for her A-fib.  Patient follows with [facility] cardiology.  Does not see any pulmonologist as an outpatient.  Agree with the above management plan as discussed with physician assistant.    Electronically signed by Jan Negrete MD, 05/26/25, 3:21 PM EDT.

## 2025-05-26 NOTE — PROGRESS NOTES
"[facility] Clinical Pharmacy Services: Vancomycin Monitoring Note    Mary Albarran is a 74 y.o. female who is on day  of pharmacy to dose vancomycin for Pneumonia.    Previous Vancomycin Dose: 1250 mg IV every 24 hours  Imaging Reviewed?: Yes  Updated Cultures and Sensitivities:    blood cx: in process   urine cx: in process   respiratory culture: ordered    Vitals/Labs  Ht: 160 cm (63\"); Wt: 115 kg (254 lb 3.1 oz)   Temp (24hrs), Av.8 °F (38.2 °C), Min:99.8 °F (37.7 °C), Max:103.3 °F (39.6 °C)   Estimated Creatinine Clearance: 62.1 mL/min (by C-G formula based on SCr of 1 mg/dL).     Results from last 7 days   Lab Units 25  0420 25  1550   CREATININE mg/dL 1.00 1.03*   WBC 10*3/mm3 14.21* 16.72*     Assessment/Plan    Current Vancomycin Dose:  1250 mg IV every 24 hours; which provides the following predicted parameters:  AUC24,ss: 532 mg/L.hr  Probability of AUC24 > 400: 71 %  Ctrough,ss: 14.2 mg/L  Probability of Ctrough,ss > 20: 34 %  Probability of nephrotoxicity (Lodise ERIC ): 9 %  Next Vanc Random ordered for  with AM labs  We will continue to monitor patient changes and renal function     Thank you for involving pharmacy in this patient's care. Please contact pharmacy with any questions or concerns.    Teodora De La Paz [facility]  Clinical Pharmacist  "

## 2025-05-26 NOTE — PAYOR COMM NOTE
"Belia Albarran (74 y.o. Female)       Date of Birth   1950    Social Security Number       Address   [address] [facility] [address] [facility] [place]    Home Phone   [phone]    MRN   [id number]       Anabaptist   Confucianist    Marital Status                               Admission Date   5/25/2025    Admission Type   Emergency    Admitting Provider   Juan Dowd DO    Attending Provider   Jan Negrete MD    Department, Room/Bed   [facility] 4TH FLOOR MEDICAL TELEMETRY UNIT, 423/1       Discharge Date       Discharge Disposition       Discharge Destination                                 Attending Provider: Jan Negrete MD    Allergies: Morphine, Codeine, Sglt2 Inhibitors    Isolation: None   Infection: None   Code Status: CPR    Ht: 160 cm (63\")   Wt: 115 kg (254 lb 3.1 oz)    Admission Cmt: None   Principal Problem: PNA (pneumonia) [J18.9]                   Active Insurance as of 5/25/2025       Primary Coverage       Payor Plan Insurance Group Employer/Plan Group    [employer] MEDICARE REPLACEMENT [employer] PPO [id number]       Payor Plan Address Payor Plan Phone Number Payor Plan Fax Number Effective Dates    [address] [address] [phone]  1/1/2022 - None Entered    [facility] [id number]         Subscriber Name Subscriber Birth Date Member ID       BELIA ALBARRAN 1950 [id number]                     Emergency Contacts        (Rel.) Home Phone Work Phone Mobile Phone    KILEY ALBARRAN (Spouse) [phone] -- [phone]    CANDE ALBARRAN (Daughter) [phone] -- [phone]    JESUS ALBARRAN (Son) [phone] -- [phone]    EVERETT ALBARRAN (Grandchild) -- -- --           Pneumonia [facility] Inpatient Care       Indications Met   Last updated by Kaycee Fox RN on 5/26/2025 0715     Review Status Created By   Primary Completed Kaycee Fox RN      Criteria Review   Pneumonia [facility] Inpatient [facility]     Overall Determination: Indications Met     Criteria:  [×] " Admission is indicated for  1 or more  of the following  [A] [B]:      [×] Hypoxemia      [×] Hemodynamic instability          5/26/2025  7:15 AM              -- 5/26/2025  7:15 AM by Kaycee Fox RN --                  b/p- 85/62, 98/35                  HR- 103-135      [×] Altered mental status that is severe or persistent      [×] Ventilatory assistance [D] needed (eg, mechanical ventilation, noninvasive ventilation)          5/26/2025  7:15 AM              -- 5/26/2025  7:15 AM by Kaycee Fox RN --                  NPPV/NIV      [×] Moderate-risk-category or high-risk-category patient [C] (Pneumonia Severity Index class IV or V, or CURB-65 score of 3 or greater)           5/26/2025  7:15 AM              -- 5/26/2025  7:15 AM by Kaycee Fox RN --                  PSI- V     Notes:  -- 5/26/2025  7:15 AM by Kaycee Fox RN --      Subject: Admission      74-year-old female with a history of morbid obesity, heart failure, atrial fibrillation patient, chronic hypoxic respiratory failure, COPD, chronic kidney disease, hypertension, diabetes who presents to the hospital for fever, weakness, and AMS.  On arrival to the ED, patient had temperature of 103.3, pulse of 103, respiratory rate of 23, blood pressure of 113/47, and she is on 6 L of oxygen saturating 90%. Patient was put on high flow. Patient received sepsis level fluid bolus.Chest CT with contrast shows: Increasing consolidation in the lower lobes with some persistent mild opacity in the right middle lobe and lingula. Worsening atelectasis. Groundglass opacities and septal thickening are again seen suggesting mild pulmonary edema.        WBC- 16.7,                    Orders noted:  Pulmonology consult, telemetry, ST eval, NPO, fall precautions, NPPV/NIV,  cefepime iv q 8 hr, cardizem gtt, vancomycin iv q 24 hr, solumedrol iv q 12  hr,  Nebs,                   History & Physical        Dowd, Juan, DO at 05/25/25 7652           [facility]    HOSPITALIST HISTORY AND PHYSICAL  Date: 2025   Patient Name: Mary Albarran  : 1950  MRN: [id number]  Primary Care Physician:  Brittni Quiroz APRN  Date of admission: 2025    Subjectivesomnolence  Subjective     Chief Complaint: Somnolence    HPI: Patient is a 74-year-old female with a history of morbid obesity, heart failure, atrial fibrillation patient, chronic hypoxic respiratory failure, COPD, chronic kidney disease, hypertension, diabetes who presents to the hospital because she has been lethargic.  She has also been coughing.    On arrival to the ED, patient had temperature of 103.3, pulse of 103, respiratory rate of 23, blood pressure of 113/47, and she is on 6 L of oxygen saturating 90%.  Patient was put on high flow.  Patient received sepsis level fluid bolus.    Chest x-ray showed cardiomegaly with pulmonary vascular congestion.  CT head showed diffuse brain atrophy.    Chest CT with contrast shows: Increasing consolidation in the lower lobes with some persistent mild opacity in the right middle lobe and lingula.  Worsening atelectasis.  Groundglass opacities and septal thickening are again seen suggesting mild pulmonary edema.  Cardiomegaly.  Mediastinal lymphadenopathy.      AB.3/50.2/57.9/27.6.  proBNP is 1151.  Sodium is 142, potassium is 4.4, chloride is 106, CO2 was 12.5, creatinine is 1.03, calcium is 9.5, magnesium is 2, lactic is 2.1.  White blood cell count is 16.72.  Platelets are 102.    At baseline, patient walks with a walker.    Patient has chronic wounds on her lower extremities.  Her spouse cleans and wraps them.      Personal History     Past Medical History:  Past Medical History:   Diagnosis Date    Allergic rhinitis     Anxiety     Aortic valve disease 2021    Fibrocalcific changes of the aortic valve with mild aortic valve stenosis   on echo 3/2/2021  Moderate aortic valve regurgitation is present. Aortic valve maximum pressure gradient is 26 mmHg.  Moderate aortic valve stenosis is present.  On echo 2/10/2022       Arthritis     Atrial fibrillation     CHF (congestive heart failure)     Chronic kidney disease (CKD), stage III (moderate)     Chronic pain     COPD (chronic obstructive pulmonary disease)     Coronary artery disease     Diabetes mellitus type 2 with complications     Elevated cholesterol     Ex-smoker     QUIT SMOKING     GERD (gastroesophageal reflux disease)     History of home oxygen therapy     2L/NC AAT REPORTED    History of transfusion     Hyperlipidemia     Hypertension     Thrombocytopenia        Past Surgical History:  Past Surgical History:   Procedure Laterality Date    HYSTERECTOMY      30 YEARS AGO       Family History:   Family History   Problem Relation Age of Onset    Heart disease Father     Heart disease Other     Heart disease Other     Diabetes Other        Social History:   Social History     Socioeconomic History    Marital status:    Tobacco Use    Smoking status: Former     Current packs/day: 0.00     Average packs/day: 1 pack/day for 37.0 years (37.0 ttl pk-yrs)     Types: Cigarettes     Start date: 4/3/1971     Quit date: 2008     Years since quittin.1     Passive exposure: Never    Smokeless tobacco: Never    Tobacco comments:     FOR 30 YEARS   Vaping Use    Vaping status: Never Used   Substance and Sexual Activity    Alcohol use: Never    Drug use: Never    Sexual activity: Defer       Home Medications:  HYDROcodone-acetaminophen, Insulin Glargine, Magnesium Oxide -Mg Supplement, SITagliptin, albuterol, albuterol sulfate HFA, ammonium lactate, atorvastatin, budesonide, bumetanide, cholecalciferol, dilTIAZem CD, metoprolol succinate XL, potassium chloride ER, pregabalin, rivaroxaban, and valsartan    Allergies:  Allergies   Allergen Reactions    Morphine Confusion    Codeine Mental Status Change    Sglt2 Inhibitors Other (See Comments)     Frequent UTI        Review of Systems   All systems were  reviewed and negative except for: Unable to get patient is extremely somnolent    Objective  Objective     Vitals:   Temp:  [99.9 °F (37.7 °C)-103.3 °F (39.6 °C)] 99.9 °F (37.7 °C)  Heart Rate:  [] 110  Resp:  [20-23] 20  BP: (104-130)/(47-97) 125/69  Flow (L/min) (Oxygen Therapy):  [6-8] 8    Physical Exam    Constitutional: Extremely somnolent   Eyes: Pupils equal, sclerae anicteric, no conjunctival injection   HENT: NCAT, mucous membranes moist   Neck: Supple, no thyromegaly, no lymphadenopathy, trachea midline   Respiratory: Coarse breath sounds   Cardiovascular: Irregularly irregular tachycardic   Gastrointestinal: Positive bowel sounds, soft, nontender, nondistended   Musculoskeletal: Plus edema   Psychiatric: Appropriate affect, cooperative   Neurologic: Oriented x 3, strength symmetric in all extremities, Cranial Nerves grossly intact to confrontation, speech clear   Skin: Lower extremities wrapped up.    Result Review   Result Review:  I have personally reviewed the results from the time of this admission to 5/25/2025 21:24 EDT and agree with these findings:  [x]  Laboratory  [x]  Microbiology  [x]  Radiology  [x]  EKG/Telemetry   [x]  Cardiology/Vascular   [x]  Pathology  [x]  Old records  []  Other:      Assessment & Plan  Assessment / Plan   #1 right-sided pneumonia with concern for sepsis as evidenced by fever, tachycardia, leukocytosis.  -Cefepime and vancomycin.  Complete respiratory panel ordered.  Patient has been in the hospital recently.  -Patient received sepsis level fluid bolus.  -Swallow assessment ordered    #2 decompensated CHF  -Will need to give IV Lasix when able  --As needed BiPAP ordered    #3 possible COPD exacerbation  -Steroids, Brovana, Pulmicort, DuoNeb, RT for bronchopulmonary hygiene  -Pulmonology consulted.    #4 A-fib with RVR  -Cardizem infusion.    #5 insulin-dependent diabetes  - Insulin sliding scale ordered.  Patient n.p.o. because she is too somnolent.  Hold oral  diabetic medications.    #6 hypertension well-controlled with the Cardizem drip.  Hold oral hypertensive medication.    VTE Prophylaxis:  Mechanical VTE prophylaxis orders are signed & held.          CODE STATUS:    Code Status (Patient has no pulse and is not breathing): CPR (Attempt to Resuscitate)  Medical Interventions (Patient has pulse or is breathing): Full Support  Level Of Support Discussed With: Patient      Admission Status:  I believe this patient meets inpatient status.    Electronically signed by Juan Dowd DO, 05/25/25, 9:24 PM EDT.             Electronically signed by Juan Dowd DO at 05/25/25 2137          Emergency Department Notes        Zuleima Benitez, RN at 05/25/25 2148          This nurse attempted to collect the lactate two times and another RN and was unsuccessful called ED phlebotomy, they will put her on their list to get done    Electronically signed by Zuleima Benitez, RN at 05/25/25 2150       West, Mckenzie, PCT at 05/25/25 2026          HOSP @2025    Electronically signed by Mckenzie Daniel PCT at 05/25/25 2027       Terrence Jameson DO at 05/25/25 1551          Time: 3:54 PM EDT  Date of encounter:  5/25/2025  Independent Historian/Clinical History and Information was obtained by:   Patient, Family, and EMS    History is limited by: Altered Mental Status    Chief Complaint: Fever, weakness, altered mental status      History of Present Illness:  Patient is a 74 y.o. year old female who presents to the emergency department for evaluation of fever, weakness, altered mental status.  This patient presents to the emergency room with the above symptoms which were gone for the last several days.  The patient has a history of atrial fibrillation, COPD, congestive heart failure, diabetes, thrombocytopenia, chronic kidney disease and aortic valve disease.  Family indicates that she has developed a fever and weakness and altered mental status for the last 24 to 48 hours    .  Patient  Care Team  Primary Care Provider: Brittni Quiroz APRN    Past Medical History:     Allergies   Allergen Reactions    Morphine Confusion    Codeine Mental Status Change    Sglt2 Inhibitors Other (See Comments)     Frequent UTI      Past Medical History:   Diagnosis Date    Allergic rhinitis     Anxiety     Aortic valve disease 11/28/2021    Fibrocalcific changes of the aortic valve with mild aortic valve stenosis   on echo 3/2/2021  Moderate aortic valve regurgitation is present. Aortic valve maximum pressure gradient is 26 mmHg. Moderate aortic valve stenosis is present.  On echo 2/10/2022       Arthritis     Atrial fibrillation     CHF (congestive heart failure)     Chronic kidney disease (CKD), stage III (moderate)     Chronic pain     COPD (chronic obstructive pulmonary disease)     Coronary artery disease     Diabetes mellitus type 2 with complications     Elevated cholesterol     Ex-smoker     QUIT SMOKING 2008    GERD (gastroesophageal reflux disease)     History of home oxygen therapy     2L/NC AAT REPORTED    History of transfusion     Hyperlipidemia     Hypertension     Thrombocytopenia      Past Surgical History:   Procedure Laterality Date    HYSTERECTOMY      30 YEARS AGO     Family History   Problem Relation Age of Onset    Heart disease Father     Heart disease Other     Heart disease Other     Diabetes Other        Home Medications:  Prior to Admission medications    Medication Sig Start Date End Date Taking? Authorizing Provider   albuterol (ACCUNEB) 1.25 MG/3ML nebulizer solution Take 3 mL by nebulization Every 6 (Six) Hours As Needed for Wheezing or Shortness of Air. 3/30/23   Brittni Quiroz APRN   albuterol sulfate  (90 Base) MCG/ACT inhaler Inhale 2 puffs Every 4 (Four) Hours As Needed for Wheezing. 5/20/24   Brittni Quiroz APRN   ammonium lactate (LAC-HYDRIN) 12 % lotion Apply  topically to the appropriate area as directed 2 (Two) Times a Day. 2/28/25   Joaquin Rutledge  MD GAURAV   atorvastatin (LIPITOR) 20 MG tablet TAKE 1 TABLET DAILY 9/12/24   Brittni Quiroz APRN   budesonide (PULMICORT) 1 MG/2ML nebulizer solution Take 2 mL by nebulization Daily. 9/9/24   Brittni Quiroz APRN   bumetanide (BUMEX) 2 MG tablet Take 1 tablet by mouth Daily. 4/18/25   Whitley Emmanuel MD   cholecalciferol (VITAMIN D3) 25 MCG (1000 UT) tablet Take 1 tablet by mouth Daily.    Whitley Emmanuel MD   dilTIAZem CD (CARDIZEM CD) 180 MG 24 hr capsule Take 1 capsule by mouth Daily.    Whitley Emmanuel MD   HYDROcodone-acetaminophen (NORCO)  MG per tablet Take 1 tablet by mouth Every 6 (Six) Hours As Needed for Moderate Pain. 4/9/25   Brittni Quiroz APRN   Insulin Glargine (Lantus SoloStar) 100 UNIT/ML injection pen Inject 15 Units under the skin into the appropriate area as directed Daily. 2/28/25   Joaquin Rutledge MD   Januvia 50 MG tablet Take 1 tablet by mouth Daily. 4/19/25   Whitley Emmanuel MD   Magnesium Oxide -Mg Supplement 400 (240 Mg) MG tablet Take 1 tablet by mouth Daily. 4/19/25   Whitley Emmanuel MD   metoprolol succinate XL (TOPROL-XL) 100 MG 24 hr tablet Take 1 tablet by mouth Daily for 30 days. 5/9/25 6/8/25  Brittni Quiroz APRN   potassium chloride ER (K-TAB) 20 MEQ tablet controlled-release ER tablet Take 2 tablets by mouth 2 (Two) Times a Day. 4/18/25   Whitley Emmanuel MD   pregabalin (LYRICA) 150 MG capsule Take 1 capsule by mouth 2 (Two) Times a Day. 2/5/25   Brittni Quiroz APRN   rivaroxaban (XARELTO) 20 MG tablet Take 1 tablet by mouth Daily for 30 days. Indications: DVT/PE (active thrombosis) 3/25/25 5/9/25  Melody Luna MD   valsartan (DIOVAN) 40 MG tablet Take 1 tablet by mouth Daily. 5/9/25   Brittni Quiroz APRN        Social History:   Social History     Tobacco Use    Smoking status: Former     Current packs/day: 0.00     Average packs/day: 1 pack/day for 37.0 years (37.0 ttl pk-yrs)      "Types: Cigarettes     Start date: 4/3/1971     Quit date: 2008     Years since quittin.1     Passive exposure: Never    Smokeless tobacco: Never    Tobacco comments:     FOR 30 YEARS   Vaping Use    Vaping status: Never Used   Substance Use Topics    Alcohol use: Never    Drug use: Never         Review of Systems:  Review of Systems   Constitutional:  Positive for activity change, appetite change, chills, fatigue and fever.   Neurological:  Positive for weakness and light-headedness. Negative for numbness.   Psychiatric/Behavioral:  Positive for confusion.         Physical Exam:  /67 (BP Location: Left arm, Patient Position: Lying)   Pulse (!) 132   Temp 99.8 °F (37.7 °C) (Oral)   Resp 19   Ht 160 cm (63\")   Wt 115 kg (254 lb 3.1 oz)   SpO2 91%   BMI 45.03 kg/m²     Physical Exam  Vitals and nursing note reviewed.   Constitutional:       General: She is in acute distress.      Appearance: Normal appearance. She is obese. She is ill-appearing. She is not toxic-appearing.   HENT:      Head: Normocephalic and atraumatic.      Mouth/Throat:      Mouth: Mucous membranes are moist.      Pharynx: Oropharynx is clear.   Eyes:      General: No scleral icterus.     Extraocular Movements: Extraocular movements intact.      Pupils: Pupils are equal, round, and reactive to light.   Cardiovascular:      Rate and Rhythm: Tachycardia present. Rhythm irregular.      Pulses: Normal pulses.      Heart sounds: Normal heart sounds.   Pulmonary:      Effort: Pulmonary effort is normal. No respiratory distress.      Breath sounds: Examination of the right-middle field reveals decreased breath sounds. Examination of the left-middle field reveals decreased breath sounds. Decreased breath sounds present.   Abdominal:      General: Abdomen is flat.      Palpations: Abdomen is soft.      Tenderness: There is no abdominal tenderness.   Musculoskeletal:         General: Normal range of motion.      Cervical back: Normal " range of motion and neck supple.   Skin:     General: Skin is warm and dry.   Neurological:      General: No focal deficit present.      Mental Status: She is alert. Mental status is at baseline. She is disoriented.      Comments: The patient is lethargic and has generalized weakness.                    Medical Decision Making:      Comorbidities that affect care:    Coronary Artery Disease    External Notes reviewed:    Previous Clinic Note: Office visit on May 12 for hypertension and atrial fibrillation      The following orders were placed and all results were independently analyzed by me:  Orders Placed This Encounter   Procedures    Blood Culture - Blood,    Blood Culture - Blood,    COVID-19, FLU A/B, RSV PCR 1 HR TAT - Swab, Nasopharynx    Urine Culture - Urine,    Respiratory Culture - Sputum, Throat    Legionella Antigen, Urine - Urine, Urine, Clean Catch    S. Pneumo Ag Urine or CSF - Urine, Urine, Clean Catch    XR Chest 1 View    CT Head Without Contrast    CT Chest With Contrast Diagnostic    [place] Draw    Comprehensive Metabolic Panel    High Sensitivity Troponin T    Magnesium    CBC Auto Differential    Lactic Acid, Plasma    Arterial Blood Gas,H&H,Lytes,Lactate    Urinalysis With Culture If Indicated - Urine, Clean Catch    High Sensitivity Troponin T 1Hr    Blood Gas, Arterial -    STAT Lactic Acid, Reflex    BNP    STAT Lactic Acid, Reflex    Procalcitonin    NPO Diet NPO Type: Strict NPO    Undress & Gown    Vital Signs    Orthostatic Blood Pressure    Undress & Gown    Continuous Pulse Oximetry    Vital Signs    Code Status and Medical Interventions: CPR (Attempt to Resuscitate); Full Support    Hospitalist (on-call MD unless specified)    Inpatient Pulmonology Consult    Oxygen Therapy- Nasal Cannula; Titrate 1-6 LPM Per SpO2; 90 - 95%    Oxygen Therapy- Nasal Cannula; Titrate 1-6 LPM Per SpO2; 90 - 95%    NIPPV-IPPV / BIPAP / CPAP    POC Glucose Once    POC Lactate    POC Electrolyte Panel     ECG 12 Lead Altered Mental Status    Wound Ostomy Eval & Treat    Insert Peripheral IV    Insert Peripheral IV    Inpatient Admission    Fall Precautions    CBC & Differential    Green Top (Gel)    Lavender Top    Gold Top - SST    Light Blue Top       Medications Given in the Emergency Department:  Medications   sodium chloride 0.9 % flush 10 mL (has no administration in time range)   sodium chloride 0.9 % flush 10 mL (has no administration in time range)   vancomycin 2250 mg/500 mL 0.9% NS IVPB ([facility]) (2,250 mg Intravenous New Bag 5/25/25 2000)   Pharmacy to dose vancomycin (has no administration in time range)   cefepime 2000 mg IVPB in 100 mL NS (VTB) (has no administration in time range)   methylPREDNISolone sodium succinate (SOLU-Medrol) 40 mg in sterile water (preservative free) 0.64 mL (40 mg Intravenous Given 5/25/25 2202)   sepsis fluid NS 0.9 % bolus 2,322 mL (2,322 mL Intravenous New Bag 5/25/25 1611)   cefepime 2000 mg IVPB in 100 mL NS (VTB) (0 mg Intravenous Stopped 5/25/25 1950)   iopamidol (ISOVUE-370) 76 % injection 100 mL (64 mL Intravenous Given 5/25/25 1925)        ED Course:         EKG: Atrial fibrillation with rate of 86 beats per  No acute ischemic changes were noted.      Labs:    Lab Results (last 24 hours)       Procedure Component Value Units Date/Time    CBC & Differential [[id number]]  (Abnormal) Collected: 05/25/25 1550    Specimen: Blood Updated: 05/25/25 1634    Narrative:      The following orders were created for panel order CBC & Differential.  Procedure                               Abnormality         Status                     ---------                               -----------         ------                     CBC Auto Differential[[id number]]        Abnormal            Final result               Scan Slide[[id number]]                                                                    Please view results for these tests on the individual orders.    Comprehensive Metabolic  Panel [[id number]]  (Abnormal) Collected: 05/25/25 1550    Specimen: Blood Updated: 05/25/25 1621     Glucose 213 mg/dL      BUN 23 mg/dL      Creatinine 1.03 mg/dL      Sodium 139 mmol/L      Potassium 4.7 mmol/L      Chloride 99 mmol/L      CO2 27.5 mmol/L      Calcium 9.5 mg/dL      Total Protein 8.2 g/dL      Albumin 4.2 g/dL      ALT (SGPT) 13 U/L      AST (SGOT) 18 U/L      Alkaline Phosphatase 100 U/L      Total Bilirubin 1.1 mg/dL      Globulin 4.0 gm/dL      A/G Ratio 1.1 g/dL      BUN/Creatinine Ratio 22.3     Anion Gap 12.5 mmol/L      eGFR 57.9 mL/min/1.73     Narrative:      GFR Categories in Chronic Kidney Disease (CKD)              GFR Category          GFR (mL/min/1.73)    Interpretation  G1                    90 or greater        Normal or high (1)  G2                    60-89                Mild decrease (1)  G3a                   45-59                Mild to moderate decrease  G3b                   30-44                Moderate to severe decrease  G4                    15-29                Severe decrease  G5                    14 or less           Kidney failure    (1)In the absence of evidence of kidney disease, neither GFR category G1 or G2 fulfill the criteria for CKD.    eGFR calculation 2021 CKD-EPI creatinine equation, which does not include race as a factor    High Sensitivity Troponin T [[id number]]  (Abnormal) Collected: 05/25/25 1550    Specimen: Blood Updated: 05/25/25 1619     HS Troponin T 16 ng/L     Narrative:      High Sensitive Troponin T Reference Range:  <14.0 ng/L- Negative Female for AMI  <22.0 ng/L- Negative Male for AMI  >=14 - Abnormal Female indicating possible myocardial injury.  >=22 - Abnormal Male indicating possible myocardial injury.   Clinicians would have to utilize clinical acumen, EKG, Troponin, and serial changes to determine if it is an Acute Myocardial Infarction or myocardial injury due to an underlying chronic condition.         Magnesium [[id number]]   (Normal) Collected: 05/25/25 1550    Specimen: Blood Updated: 05/25/25 1621     Magnesium 2.0 mg/dL     CBC Auto Differential [[id number]]  (Abnormal) Collected: 05/25/25 1550    Specimen: Blood Updated: 05/25/25 1634     WBC 16.72 10*3/mm3      RBC 4.72 10*6/mm3      Hemoglobin 13.1 g/dL      Hematocrit 43.6 %      MCV 92.4 fL      MCH 27.8 pg      MCHC 30.0 g/dL      RDW 14.6 %      RDW-SD 49.4 fl      MPV 13.6 fL      Platelets 102 10*3/mm3      Neutrophil % 88.2 %      Lymphocyte % 6.8 %      Monocyte % 3.7 %      Eosinophil % 0.5 %      Basophil % 0.3 %      Immature Grans % 0.5 %      Neutrophils, Absolute 14.75 10*3/mm3      Lymphocytes, Absolute 1.13 10*3/mm3      Monocytes, Absolute 0.62 10*3/mm3      Eosinophils, Absolute 0.09 10*3/mm3      Basophils, Absolute 0.05 10*3/mm3      Immature Grans, Absolute 0.08 10*3/mm3      nRBC 0.0 /100 WBC     Lactic Acid, Plasma [[id number]]  (Abnormal) Collected: 05/25/25 1550    Specimen: Blood Updated: 05/25/25 1630     Lactate 2.6 mmol/L     BNP [[id number]]  (Abnormal) Collected: 05/25/25 1550    Specimen: Blood Updated: 05/25/25 1733     proBNP 1,151.0 pg/mL     Narrative:      This assay is used as an aid in the diagnosis of individuals suspected of having heart failure. It can be used as an aid in the diagnosis of acute decompensated heart failure (ADHF) in patients presenting with signs and symptoms of ADHF to the emergency department (ED). In addition, NT-proBNP of <300 pg/mL indicates ADHF is not likely.    Age Range Result Interpretation  NT-proBNP Concentration (pg/mL:      <50             Positive            >450                   Gray                 300-450                    Negative             <300    50-75           Positive            >900                  Gray                300-900                  Negative            <300      >75             Positive            >1800                  Gray                300-1800                  Negative             <300    Urinalysis With Culture If Indicated - Urine, Clean Catch [[id number]]  (Normal) Collected: 05/25/25 1608    Specimen: Urine, Clean Catch Updated: 05/25/25 1619     Color, UA Yellow     Appearance, UA Clear     pH, UA 5.5     Specific Gravity, UA 1.009     Glucose, UA Negative     Ketones, UA Negative     Bilirubin, UA Negative     Blood, UA Negative     Protein, UA Negative     Leuk Esterase, UA Negative     Nitrite, UA Negative     Urobilinogen, UA 0.2 E.U./dL    Narrative:      In absence of clinical symptoms, the presence of pyuria, bacteria, and/or nitrites on the urinalysis result does not correlate with infection.  Urine microscopic not indicated.    Urine Culture - Urine, Urine, Clean Catch [[id number]] Collected: 05/25/25 1608    Specimen: Urine, Clean Catch Updated: 05/25/25 1619    COVID-19, FLU A/B, RSV PCR 1 HR TAT - Swab, Nasopharynx [[id number]]  (Normal) Collected: 05/25/25 1617    Specimen: Swab from Nasopharynx Updated: 05/25/25 1705     COVID19 Not Detected     Influenza A PCR Not Detected     Influenza B PCR Not Detected     RSV, PCR Not Detected    Narrative:      Fact sheet for providers: https://www.fda.gov/media/981573/download    Fact sheet for patients: https://www.fda.gov/media/108603/download    Test performed by PCR.    POC Glucose Once [[id number]]  (Abnormal) Collected: 05/25/25 1625    Specimen: Blood Updated: 05/25/25 1627     Glucose 215 mg/dL      Comment: Serial Number: [id number]:  [id number]       Blood Gas, Arterial - [[id number]]  (Abnormal) Collected: 05/25/25 1625    Specimen: Arterial Blood Updated: 05/25/25 1627     Site Right Radial     Sid's Test Positive     pH, Arterial 7.348 pH units      pCO2, Arterial 50.2 mm Hg      pO2, Arterial 57.9 mm Hg      HCO3, Arterial 27.6 mmol/L      Base Excess, Arterial 1.1 mmol/L      Comment: Serial Number: [id number]:  [id number]        O2 Saturation, Arterial 87.7 %      Hemoglobin, Blood Gas 13.3 g/dL      Hematocrit, Blood Gas  39.0 %      Barometric Pressure for Blood Gas 746.8000 mmHg      Modality HFNC     Flow Rate 8.0000 lpm      Hemodilution No    POC Lactate [[id number]]  (Normal) Collected: 05/25/25 1625    Specimen: Arterial Blood Updated: 05/25/25 1627     Lactate 1.7 mmol/L      Comment: Serial Number: [id number]:  [id number]       POC Electrolyte Panel [[id number]]  (Normal) Collected: 05/25/25 1625    Specimen: Arterial Blood Updated: 05/25/25 1627     Sodium 142 mmol/L      POC Potassium 4.4 mmol/L      Chloride 106 mmol/L      Ionized Calcium 1.14 mmol/L      Comment: Serial Number: [id number]:  [id number]       Blood Culture - Blood, Arm, Left [[id number]] Collected: 05/25/25 1627    Specimen: Blood from Arm, Left Updated: 05/25/25 1631    Blood Culture - Blood, Hand, Right [[id number]] Collected: 05/25/25 1627    Specimen: Blood from Hand, Right Updated: 05/25/25 1631    High Sensitivity Troponin T 1Hr [[id number]]  (Abnormal) Collected: 05/25/25 1757    Specimen: Blood from Arm, Right Updated: 05/25/25 1822     HS Troponin T 14 ng/L      Troponin T Numeric Delta -2 ng/L      Troponin T % Delta -13    Narrative:      High Sensitive Troponin T Reference Range:  <14.0 ng/L- Negative Female for AMI  <22.0 ng/L- Negative Male for AMI  >=14 - Abnormal Female indicating possible myocardial injury.  >=22 - Abnormal Male indicating possible myocardial injury.   Clinicians would have to utilize clinical acumen, EKG, Troponin, and serial changes to determine if it is an Acute Myocardial Infarction or myocardial injury due to an underlying chronic condition.         STAT Lactic Acid, Reflex [[id number]]  (Abnormal) Collected: 05/25/25 1757    Specimen: Blood from Arm, Right Updated: 05/25/25 1835     Lactate 2.1 mmol/L     STAT Lactic Acid, Reflex [[id number]] Collected: 05/25/25 2155    Specimen: Blood from Hand, Left Updated: 05/25/25 2157    Procalcitonin [[id number]] Collected: 05/25/25 2155    Specimen: Blood from Hand, Left Updated:  05/25/25 2157             Imaging:    CT Chest With Contrast Diagnostic  Result Date: 5/25/2025  CT CHEST W CONTRAST DIAGNOSTIC Date of Exam: 5/25/2025 7:06 PM EDT Indication: Shortness of breath. Comparison: 4/27/2025 Technique: Axial CT images were obtained of the chest after the uneventful intravenous administration of iodinated contrast.  Reconstructed coronal and sagittal images were also obtained. Automated exposure control and iterative construction methods were  used. Findings: There is motion degradation, and there is streak artifact from the patient's arms. Nodular enlargement of the right thyroid lobe is unchanged. Heart remains enlarged. No pleural or pericardial effusion is seen. There is atherosclerotic disease and aortic  valvular disease. Prevascular adenopathy measures 1.6 cm, previously 1.4 cm. Precarinal adenopathy seen previously is not well characterized due to artifact currently. No hilar or axillary adenopathy. Upper abdominal images show probable splenomegaly. Lung windows show increased consolidation in the lower lobes with some persistent mild opacity in the right middle lobe and lingula. Findings probably reflect worsening atelectasis although pneumonia is not excluded, particularly in the lower lobes. Groundglass opacities and septal thickening are again identified suggesting mild pulmonary edema. No pneumothorax.     1.Increasing consolidation in the lower lobes with some persistent mild opacity in the right middle lobe and lingula. Findings probably reflect worsening atelectasis although pneumonia is not excluded, particularly in the lower lobes. 2.Groundglass opacities and septal thickening are again identified suggesting mild pulmonary edema. 3.Cardiomegaly. 4.Mediastinal lymphadenopathy, as described above. This may be reactive. Attention on 3-month follow-up CT recommended. 5.Additional findings as given above. Electronically Signed: Jean Tamez MD  5/25/2025 7:32 PM EDT   Workstation ID: [id number]    CT Head Without Contrast  Result Date: 5/25/2025  CT HEAD WO CONTRAST Date of Exam: 5/25/2025 7:06 PM EDT Indication: Altered mental status. Comparison: None available. Technique: Axial CT images were obtained of the head without contrast administration.  Reconstructed coronal and sagittal images were also obtained. Automated exposure control and iterative construction methods were used. Findings: There is no evidence of hemorrhage. There is no mass effect or midline shift. Diffuse brain atrophy There is no extracerebral collection. Ventricles are normal in size and configuration for patient's stated age. Posterior fossa is within normal limits. Calvarium and skull base appear intact.  Visualized sinuses show no air fluid levels. Visualized orbits are unremarkable.     Impression: 1.No acute intracranial abnormality identified. 2.Diffuse brain atrophy. Electronically Signed: Harvey Sneed MD  5/25/2025 7:25 PM EDT  Workstation ID: [id number]    XR Chest 1 View  Result Date: 5/25/2025  XR CHEST 1 VW Date of Exam: 5/25/2025 4:26 PM EDT Indication: Weak/Dizzy/AMS triage protocol Comparison: 4/29/2025 Findings: Cardiomegaly. Prominence of the central pulmonary vasculature. No definite interstitial or airspace disease. Costophrenic angle sharp     Impression: Cardiomegaly with pulmonary vascular congestion. No gross pulmonary edema or suspicious consolidation Electronically Signed: Lino Lieberman  5/25/2025 5:01 PM EDT  Workstation ID: [id number]        Differential Diagnosis and Discussion:    Altered Mental Status: Based on the patient's signs and symptoms, differential diagnosis includes but is not limited to meningitis, stroke, sepsis, subarachnoid hemorrhage, intracranial bleeding, encephalitis, and metabolic encephalopathy.    PROCEDURES:    Labs were collected in the emergency department and all labs were reviewed and interpreted by me.  X-ray were performed in the emergency department and  all X-ray impressions were independently interpreted by me.  An EKG was performed and the EKG was interpreted by me.    ECG 12 Lead Altered Mental Status   Preliminary Result   HEART RATE=86  bpm   RR Mfayslaq=466  ms   MD Interval=  ms   P Horizontal Axis=  deg   P Front Axis=  deg   QRSD Interval=74  ms   QT Kjfclszg=951  ms   IBdC=986  ms   QRS Axis=109  deg   T Wave Axis=17  deg   - ABNORMAL ECG -   Atrial fibrillation   Right axis deviation   Low voltage, precordial leads   Date and Time of Study:2025-05-25 15:35:33          Procedures    MDM     Amount and/or Complexity of Data Reviewed  Clinical lab tests: reviewed  Tests in the radiology section of CPT®: reviewed  Tests in the medicine section of CPT®: reviewed  Decide to obtain previous medical records or to obtain history from someone other than the patient: yes             Total Critical Care time of 50 minutes. Total critical care time documented does not include time spent on separately billed procedures for services of nurses or physician assistants. I personally saw and examined the patient. I have reviewed all diagnostic interpretations and treatment plans as written. I was present for the key portions of any procedures performed and the inclusive time noted in any critical care statement. Critical care time includes patient management by me, time spent at the patients bedside,  time to review lab and imaging results, discussing patient care, documentation in the medical record, and time spent with family or caregiver.      Sepsis criteria was met in the emergency department and the Sepsis protocol (including antibiotic administration) was initiated.      SIRS criteria considered:   1.  Temperature > 100.4 or <96.8    2.  Heart Rate > 90    3.  Respiratory Rate > 22    4.  WBC > 12K or <4K.             Severe Sepsis:     Respiratory: Mechanical Ventilation or Bipap  Hypotension: SBP > 90 or MAP < 65  Renal: Creatinine > 2  Metabolic: Lactic Acid >  2  Hematologic: Platelets < 100K or INR > 1.5  Hepatic: BILI  >  2  CNS: Sudden AMS     Septic Shock:     Severe Sepsis + Persistent hypotension or Lactic Acid > 4     Normal saline bolus, Antibiotics, and final disposition was based on these definitions.        Sepsis was recognized at 1830    Antibiotics were ordered.     30 mL/kg bolus was  indicated.         The patient was ordered 2322 mL of fluids.  Patient was reassessed after fluid bolus.    The patient presents with 2 out of the 4 SIRS criteria and a suspected source for sepsis.  Patient was evaluated and placed on a cardiac monitor for fear of worsening tachycardia and life-threatening hypotension.  Patient was monitored for shock and signs of end-organ damage.  Mental status was repeatedly checked throughout the ED stay.  Medications were ordered by me which includes cefepime and vancomycin.  The case was discussed at length with admitting physician.     Total Critical Care time of 50 minutes. Total critical care time documented does not include time spent on separately billed procedures for services of nurses or physician assistants. I personally saw and examined the patient. I have reviewed all diagnostic interpretations and treatment plans as written. I was present for the key portions of any procedures performed and the inclusive time noted in any critical care statement. Critical care time includes patient management by me, time spent at the patients bedside,  time to review lab and imaging results, discussing patient care, documentation in the medical record, and time spent with family or caregiver.    Patient Care Considerations:    MRI: I considered ordering an MRI however CT is adequate for ED evaluation      Consultants/Shared Management Plan:    Hospitalist: I have discussed the case with hospitalist who agrees to accept the patient for admission.    Social Determinants of Health:    Patient is unable to carry out activities of daily life.  Escalation of care is necessary.       Disposition and Care Coordination:    Admit:   Through independent evaluation of the patient's history, physical, and imperical data, the patient meets criteria for inpatient admission to the hospital.        Final diagnoses:   Pneumonia due to infectious organism, unspecified laterality, unspecified part of lung   Sepsis, due to unspecified organism, unspecified whether acute organ dysfunction present        ED Disposition       ED Disposition   Decision to Admit    Condition   --    Comment   Level of Care: Telemetry [5]   Diagnosis: PNA (pneumonia) [702725]   Admitting Physician: DAVID HOOKS [[id number]]   Attending Physician: DAVID HOOKS [[id number]]   Certification: I Certify That Inpatient Hospital Services Are Medically Necessary For Greater Than 2 Midnights                 This medical record created using voice recognition software.             Terrence Jameson DO  05/25/25 2205      Electronically signed by Terrence Jameson DO at 05/25/25 2205       Lab Results (last 24 hours)       Procedure Component Value Units Date/Time    POC Glucose Once [[id number]]  (Abnormal) Collected: 05/26/25 0810    Specimen: Blood Updated: 05/26/25 0812     Glucose 262 mg/dL      Comment: Serial Number: [id number]:  [id number]       CBC & Differential [[id number]]  (Abnormal) Collected: 05/26/25 0420    Specimen: Blood Updated: 05/26/25 0559    Narrative:      The following orders were created for panel order CBC & Differential.  Procedure                               Abnormality         Status                     ---------                               -----------         ------                     CBC Auto Differential[[id number]]        Abnormal            Final result               Scan Slide[[id number]]                                       Final result                 Please view results for these tests on the individual orders.    CBC Auto Differential [[id number]]  (Abnormal)  Collected: 05/26/25 0420    Specimen: Blood Updated: 05/26/25 0559     WBC 14.21 10*3/mm3      RBC 4.05 10*6/mm3      Hemoglobin 11.2 g/dL      Hematocrit 37.1 %      MCV 91.6 fL      MCH 27.7 pg      MCHC 30.2 g/dL      RDW 14.8 %      RDW-SD 49.3 fl      MPV 13.5 fL      Platelets 85 10*3/mm3      Neutrophil % 93.1 %      Lymphocyte % 5.3 %      Monocyte % 1.1 %      Eosinophil % 0.0 %      Basophil % 0.1 %      Immature Grans % 0.4 %      Neutrophils, Absolute 13.23 10*3/mm3      Lymphocytes, Absolute 0.75 10*3/mm3      Monocytes, Absolute 0.16 10*3/mm3      Eosinophils, Absolute 0.00 10*3/mm3      Basophils, Absolute 0.02 10*3/mm3      Immature Grans, Absolute 0.05 10*3/mm3      nRBC 0.0 /100 WBC     Scan Slide [[id number]] Collected: 05/26/25 0420    Specimen: Blood Updated: 05/26/25 0559     RBC Morphology Normal     WBC Morphology Normal     Platelet Estimate Decreased    POC Glucose Once [[id number]]  (Abnormal) Collected: 05/26/25 0520    Specimen: Blood Updated: 05/26/25 0522     Glucose 281 mg/dL      Comment: Serial Number: [id number]:  [id number]       Basic Metabolic Panel [[id number]]  (Abnormal) Collected: 05/26/25 0420    Specimen: Blood Updated: 05/26/25 0509     Glucose 294 mg/dL      BUN 23 mg/dL      Creatinine 1.00 mg/dL      Sodium 137 mmol/L      Potassium 4.6 mmol/L      Comment: Slight hemolysis detected by analyzer. Result may be falsely elevated.        Chloride 102 mmol/L      CO2 23.2 mmol/L      Calcium 8.6 mg/dL      BUN/Creatinine Ratio 23.0     Anion Gap 11.8 mmol/L      eGFR 60.0 mL/min/1.73     Narrative:      GFR Categories in Chronic Kidney Disease (CKD)              GFR Category          GFR (mL/min/1.73)    Interpretation  G1                    90 or greater        Normal or high (1)  G2                    60-89                Mild decrease (1)  G3a                   45-59                Mild to moderate decrease  G3b                   30-44                Moderate to  "severe decrease  G4                    15-29                Severe decrease  G5                    14 or less           Kidney failure    (1)In the absence of evidence of kidney disease, neither GFR category G1 or G2 fulfill the criteria for CKD.    eGFR calculation 2021 CKD-EPI creatinine equation, which does not include race as a factor    Magnesium [[id number]]  (Normal) Collected: 05/26/25 0420    Specimen: Blood Updated: 05/26/25 0509     Magnesium 1.9 mg/dL     Phosphorus [[id number]]  (Normal) Collected: 05/26/25 0420    Specimen: Blood Updated: 05/26/25 0509     Phosphorus 3.5 mg/dL     STAT Lactic Acid, Reflex [[id number]]  (Normal) Collected: 05/26/25 0132    Specimen: Blood from Arm, Left Updated: 05/26/25 0201     Lactate 1.7 mmol/L     POC Glucose Once [[id number]]  (Abnormal) Collected: 05/26/25 0103    Specimen: Blood Updated: 05/26/25 0105     Glucose 266 mg/dL      Comment: Serial Number: [id number]:  [id number]       STAT Lactic Acid, Reflex [[id number]]  (Abnormal) Collected: 05/25/25 2155    Specimen: Blood from Hand, Left Updated: 05/25/25 2229     Lactate 3.0 mmol/L     Procalcitonin [[id number]]  (Abnormal) Collected: 05/25/25 2155    Specimen: Blood from Hand, Left Updated: 05/25/25 2228     Procalcitonin 2.79 ng/mL     Narrative:      As a Marker for Sepsis (Non-Neonates):    1. <0.5 ng/mL represents a low risk of severe sepsis and/or septic shock.  2. >2 ng/mL represents a high risk of severe sepsis and/or septic shock.    As a Marker for Lower Respiratory Tract Infections that require antibiotic therapy:    PCT on Admission    Antibiotic Therapy       6-12 Hrs later    >0.5                Strongly Recommended  >0.25 - <0.5        Recommended  0.1 - 0.25          Discouraged              Remeasure/reassess PCT  <0.1                Strongly Discouraged     Remeasure/reassess PCT    As 28 day mortality risk marker: \"Change in Procalcitonin Result\" (>80% or <=80%) if Day 0 (or Day 1) and Day 4 " values are available. Refer to http://www.[facility]-pct-calculator.com    Change in PCT <=80%  A decrease of PCT levels below or equal to 80% defines a positive change in PCT test result representing a higher risk for 28-day all-cause mortality of patients diagnosed with severe sepsis for septic shock.    Change in PCT >80%  A decrease of PCT levels of more than 80% defines a negative change in PCT result representing a lower risk for 28-day all-cause mortality of patients diagnosed with severe sepsis or septic shock.    This test is Prognostic not Diagnostic, if elevated correlate with clinical findings before administering antibiotic treatment.        STAT Lactic Acid, Reflex [[id number]]  (Abnormal) Collected: 05/25/25 1757    Specimen: Blood from Arm, Right Updated: 05/25/25 1835     Lactate 2.1 mmol/L     High Sensitivity Troponin T 1Hr [[id number]]  (Abnormal) Collected: 05/25/25 1757    Specimen: Blood from Arm, Right Updated: 05/25/25 1822     HS Troponin T 14 ng/L      Troponin T Numeric Delta -2 ng/L      Troponin T % Delta -13    Narrative:      High Sensitive Troponin T Reference Range:  <14.0 ng/L- Negative Female for AMI  <22.0 ng/L- Negative Male for AMI  >=14 - Abnormal Female indicating possible myocardial injury.  >=22 - Abnormal Male indicating possible myocardial injury.   Clinicians would have to utilize clinical acumen, EKG, Troponin, and serial changes to determine if it is an Acute Myocardial Infarction or myocardial injury due to an underlying chronic condition.         BNP [[id number]]  (Abnormal) Collected: 05/25/25 1550    Specimen: Blood Updated: 05/25/25 1733     proBNP 1,151.0 pg/mL     Narrative:      This assay is used as an aid in the diagnosis of individuals suspected of having heart failure. It can be used as an aid in the diagnosis of acute decompensated heart failure (ADHF) in patients presenting with signs and symptoms of ADHF to the emergency department (ED). In addition, NT-proBNP  of <300 pg/mL indicates ADHF is not likely.    Age Range Result Interpretation  NT-proBNP Concentration (pg/mL:      <50             Positive            >450                   Gray                 300-450                    Negative             <300    50-75           Positive            >900                  Gray                300-900                  Negative            <300      >75             Positive            >1800                  Gray                300-1800                  Negative            <300    COVID-19, FLU A/B, RSV PCR 1 HR TAT - Swab, Nasopharynx [[id number]]  (Normal) Collected: 05/25/25 1617    Specimen: Swab from Nasopharynx Updated: 05/25/25 1705     COVID19 Not Detected     Influenza A PCR Not Detected     Influenza B PCR Not Detected     RSV, PCR Not Detected    Narrative:      Fact sheet for providers: https://www.fda.gov/media/744401/download    Fact sheet for patients: https://www.fda.gov/media/317738/download    Test performed by PCR.    CBC & Differential [[id number]]  (Abnormal) Collected: 05/25/25 1550    Specimen: Blood Updated: 05/25/25 1634    Narrative:      The following orders were created for panel order CBC & Differential.  Procedure                               Abnormality         Status                     ---------                               -----------         ------                     CBC Auto Differential[[id number]]        Abnormal            Final result               Scan Slide[[id number]]                                                                    Please view results for these tests on the individual orders.    CBC Auto Differential [[id number]]  (Abnormal) Collected: 05/25/25 1550    Specimen: Blood Updated: 05/25/25 1634     WBC 16.72 10*3/mm3      RBC 4.72 10*6/mm3      Hemoglobin 13.1 g/dL      Hematocrit 43.6 %      MCV 92.4 fL      MCH 27.8 pg      MCHC 30.0 g/dL      RDW 14.6 %      RDW-SD 49.4 fl      MPV 13.6 fL      Platelets 102 10*3/mm3       Neutrophil % 88.2 %      Lymphocyte % 6.8 %      Monocyte % 3.7 %      Eosinophil % 0.5 %      Basophil % 0.3 %      Immature Grans % 0.5 %      Neutrophils, Absolute 14.75 10*3/mm3      Lymphocytes, Absolute 1.13 10*3/mm3      Monocytes, Absolute 0.62 10*3/mm3      Eosinophils, Absolute 0.09 10*3/mm3      Basophils, Absolute 0.05 10*3/mm3      Immature Grans, Absolute 0.08 10*3/mm3      nRBC 0.0 /100 WBC     Blood Culture - Blood, Arm, Left [[id number]] Collected: 05/25/25 1627    Specimen: Blood from Arm, Left Updated: 05/25/25 1631    Blood Culture - Blood, Hand, Right [[id number]] Collected: 05/25/25 1627    Specimen: Blood from Hand, Right Updated: 05/25/25 1631    Lactic Acid, Plasma [[id number]]  (Abnormal) Collected: 05/25/25 1550    Specimen: Blood Updated: 05/25/25 1630     Lactate 2.6 mmol/L     POC Glucose Once [[id number]]  (Abnormal) Collected: 05/25/25 1625    Specimen: Blood Updated: 05/25/25 1627     Glucose 215 mg/dL      Comment: Serial Number: [id number]:  [id number]       Blood Gas, Arterial - [[id number]]  (Abnormal) Collected: 05/25/25 1625    Specimen: Arterial Blood Updated: 05/25/25 1627     Site Right Radial     Sid's Test Positive     pH, Arterial 7.348 pH units      pCO2, Arterial 50.2 mm Hg      pO2, Arterial 57.9 mm Hg      HCO3, Arterial 27.6 mmol/L      Base Excess, Arterial 1.1 mmol/L      Comment: Serial Number: [id number]  [id number]        O2 Saturation, Arterial 87.7 %      Hemoglobin, Blood Gas 13.3 g/dL      Hematocrit, Blood Gas 39.0 %      Barometric Pressure for Blood Gas 746.8000 mmHg      Modality HFNC     Flow Rate 8.0000 lpm      Hemodilution No    POC Lactate [[id number]]  (Normal) Collected: 05/25/25 1625    Specimen: Arterial Blood Updated: 05/25/25 1627     Lactate 1.7 mmol/L      Comment: Serial Number: [id number]  [id number]       POC Electrolyte Panel [[id number]]  (Normal) Collected: 05/25/25 1625    Specimen: Arterial Blood Updated: 05/25/25 1627     Sodium 142  mmol/L      POC Potassium 4.4 mmol/L      Chloride 106 mmol/L      Ionized Calcium 1.14 mmol/L      Comment: Serial Number: [id number]  [id number]       Comprehensive Metabolic Panel [[id number]]  (Abnormal) Collected: 05/25/25 1550    Specimen: Blood Updated: 05/25/25 1621     Glucose 213 mg/dL      BUN 23 mg/dL      Creatinine 1.03 mg/dL      Sodium 139 mmol/L      Potassium 4.7 mmol/L      Chloride 99 mmol/L      CO2 27.5 mmol/L      Calcium 9.5 mg/dL      Total Protein 8.2 g/dL      Albumin 4.2 g/dL      ALT (SGPT) 13 U/L      AST (SGOT) 18 U/L      Alkaline Phosphatase 100 U/L      Total Bilirubin 1.1 mg/dL      Globulin 4.0 gm/dL      A/G Ratio 1.1 g/dL      BUN/Creatinine Ratio 22.3     Anion Gap 12.5 mmol/L      eGFR 57.9 mL/min/1.73     Narrative:      GFR Categories in Chronic Kidney Disease (CKD)              GFR Category          GFR (mL/min/1.73)    Interpretation  G1                    90 or greater        Normal or high (1)  G2                    60-89                Mild decrease (1)  G3a                   45-59                Mild to moderate decrease  G3b                   30-44                Moderate to severe decrease  G4                    15-29                Severe decrease  G5                    14 or less           Kidney failure    (1)In the absence of evidence of kidney disease, neither GFR category G1 or G2 fulfill the criteria for CKD.    eGFR calculation 2021 CKD-EPI creatinine equation, which does not include race as a factor    Magnesium [[id number]]  (Normal) Collected: 05/25/25 1550    Specimen: Blood Updated: 05/25/25 1621     Magnesium 2.0 mg/dL     Urinalysis With Culture If Indicated - Urine, Clean Catch [[id number]]  (Normal) Collected: 05/25/25 1608    Specimen: Urine, Clean Catch Updated: 05/25/25 1619     Color, UA Yellow     Appearance, UA Clear     pH, UA 5.5     Specific Gravity, UA 1.009     Glucose, UA Negative     Ketones, UA Negative     Bilirubin, UA Negative      Blood, UA Negative     Protein, UA Negative     Leuk Esterase, UA Negative     Nitrite, UA Negative     Urobilinogen, UA 0.2 E.U./dL    Narrative:      In absence of clinical symptoms, the presence of pyuria, bacteria, and/or nitrites on the urinalysis result does not correlate with infection.  Urine microscopic not indicated.    Urine Culture - Urine, Urine, Clean Catch [[id number]] Collected: 05/25/25 1608    Specimen: Urine, Clean Catch Updated: 05/25/25 1619    High Sensitivity Troponin T [[id number]]  (Abnormal) Collected: 05/25/25 1550    Specimen: Blood Updated: 05/25/25 1619     HS Troponin T 16 ng/L     Narrative:      High Sensitive Troponin T Reference Range:  <14.0 ng/L- Negative Female for AMI  <22.0 ng/L- Negative Male for AMI  >=14 - Abnormal Female indicating possible myocardial injury.  >=22 - Abnormal Male indicating possible myocardial injury.   Clinicians would have to utilize clinical acumen, EKG, Troponin, and serial changes to determine if it is an Acute Myocardial Infarction or myocardial injury due to an underlying chronic condition.         Morris Draw [[id number]] Collected: 05/25/25 1550    Specimen: Blood Updated: 05/25/25 1602    Narrative:      The following orders were created for panel order Morris Draw.  Procedure                               Abnormality         Status                     ---------                               -----------         ------                     Green Top (Gel)[[id number]]                                  Final result               Lavender Top[[id number]]                                     Final result               Gold Top - SST[[id number]]                                   Final result               Light Blue Top[[id number]]                                   Final result                 Please view results for these tests on the individual orders.    Green Top (Gel) [[id number]] Collected: 05/25/25 1550    Specimen: Blood Updated: 05/25/25 1602      Extra Tube Hold for add-ons.     Comment: Auto resulted.       Lavender Top [[id number]] Collected: 05/25/25 1550    Specimen: Blood Updated: 05/25/25 1602     Extra Tube hold for add-on     Comment: Auto resulted       Gold Top - SST [[id number]] Collected: 05/25/25 1550    Specimen: Blood Updated: 05/25/25 1602     Extra Tube Hold for add-ons.     Comment: Auto resulted.       Light Blue Top [[id number]] Collected: 05/25/25 1550    Specimen: Blood Updated: 05/25/25 1602     Extra Tube Hold for add-ons.     Comment: Auto resulted             Physician Progress Notes (last 24 hours)  Notes from 05/25/25 0959 through 05/26/25 0959   No notes of this type exist for this encounter.       Consult Notes (last 24 hours)  Notes from 05/25/25 0959 through 05/26/25 0959   No notes of this type exist for this encounter.     [facility] ,  Ph [phone]-  F [phone]

## 2025-05-26 NOTE — PROGRESS NOTES
RT EQUIPMENT DEVICE RELATED - SKIN ASSESSMENT    RT Medical Equipment/Device:     NIV Mask:  Full-face    size: Medium    Skin Assessment:      Cheek:  Intact  Chin:  Intact  Ears:  Intact  Neck:  Intact  Nose:  Intact  Mouth:  Intact    Device Skin Pressure Protection:  Positioning supports utilized, Pressure points protected, and Skin-to-device areas padded:  None Required    Nurse Notification:  Elayne Torres, RRT

## 2025-05-26 NOTE — PLAN OF CARE
Goal Outcome Evaluation:  Plan of Care Reviewed With: patient        Progress: no change  Outcome Evaluation: New admit from ED. Arrived on Bipap. Only responsive to pain. Large wounds on legs, abdominal folds, and under breasts. Cardizem running at 5 mg/hr. Q6 Blood glucose check. No additional needs at this time.

## 2025-05-26 NOTE — PLAN OF CARE
Goal Outcome Evaluation:      Cardizem drip discontinued, heart rate running 80-90's.  Lungs with crackles at the bases.  IV lasix given earlier, tolerating well, has pure wick in place.  Patient using BiPap when napping. Respiratory panel is negative. Blood glucose running in the high 200's, sliding scale administered, patient is receiving steroids. Seen by Wound Nurse today.  Continue with current plan of care.

## 2025-05-26 NOTE — PROGRESS NOTES
RT EQUIPMENT DEVICE RELATED - SKIN ASSESSMENT    RT Medical Equipment/Device:     NIV Mask:  Full-face    size: medium    Skin Assessment:      Cheek:  Intact  Chin:  Intact  Forehead:  Intact  Nose:  Intact  Lips:  Intact    Device Skin Pressure Protection:  Pressure points protected    Nurse Notification:  Elayne Martinez, RRT

## 2025-05-26 NOTE — CONSULTS
Pulmonary / Critical Care Consult Note      Patient Name: Mary Albarran  : 1950  MRN: [id number]  Primary Care Physician:  Brittni Quiroz APRN  Referring Physician: Jan Negrete MD  Date of admission: 2025    Subjective   Subjective     Reason for Consult/ Chief Complaint:   Acute on chronic hypoxic respiratory failure, concern for pneumonia from unknown organism    HPI:  Mary Albarran is a 74 y.o. female with past medical history of COPD per patient, obesity, CHF, A-fib on Eliquis, and Kd stage IIIa presented to the ED for evaluation of worsening shortness of breath and cough.  In the ED, ABG was 7.3 , proBNP was 1151, lactate 3.0 procalcitonin 2.79.  Patient's Tmax on admission was 103.3.  Chest CT demonstrating increasing consolidation in the left lower lobe with some persistent mild opacity in the right middle lobe and lingula.  Groundglass opacities and septal thickening concerning for pulmonary edema.  The service was consulted to assist in the management treatment of patient's acute issues.  Upon assessment, patient lying in bed on 3 L nasal cannula no permastore distress.  Findings and plan were discussed with the patient.  Currently denies any fever, chills, nausea, vomiting, patient denies any chest pain or chest tightness.        Personal History     Past Medical History:   Diagnosis Date    Allergic rhinitis     Anxiety     Aortic valve disease 2021    Fibrocalcific changes of the aortic valve with mild aortic valve stenosis   on echo 3/2/2021  Moderate aortic valve regurgitation is present. Aortic valve maximum pressure gradient is 26 mmHg. Moderate aortic valve stenosis is present.  On echo 2/10/2022       Arthritis     Atrial fibrillation     CHF (congestive heart failure)     Chronic kidney disease (CKD), stage III (moderate)     Chronic pain     COPD (chronic obstructive pulmonary disease)     Coronary artery disease     Diabetes mellitus type 2 with complications      Elevated cholesterol     Ex-smoker     QUIT SMOKING 2008    GERD (gastroesophageal reflux disease)     History of home oxygen therapy     2L/NC AAT REPORTED    History of transfusion     Hyperlipidemia     Hypertension     Thrombocytopenia        Past Surgical History:   Procedure Laterality Date    HYSTERECTOMY      30 YEARS AGO       Family History: family history includes Diabetes in an other family member; Heart disease in her father and other family members. Otherwise pertinent FHx was reviewed and not pertinent to current issue.    Social History:  reports that she quit smoking about 17 years ago. Her smoking use included cigarettes. She started smoking about 54 years ago. She has a 37 pack-year smoking history. She has never been exposed to tobacco smoke. She has never used smokeless tobacco. She reports that she does not drink alcohol and does not use drugs.    Home Medications:  HYDROcodone-acetaminophen, Insulin Glargine, Magnesium Oxide -Mg Supplement, SITagliptin, albuterol, albuterol sulfate HFA, ammonium lactate, atorvastatin, budesonide, bumetanide, cholecalciferol, dilTIAZem CD, metoprolol succinate XL, nitrofurantoin (macrocrystal-monohydrate), potassium chloride ER, pregabalin, rivaroxaban, sacubitril-valsartan, and valsartan    Allergies:  Allergies   Allergen Reactions    Morphine Confusion    Codeine Mental Status Change    Sglt2 Inhibitors Other (See Comments)     Frequent UTI        Objective    Objective     Vitals:   Temp:  [98.6 °F (37 °C)-103.3 °F (39.6 °C)] 98.6 °F (37 °C)  Heart Rate:  [] 100  Resp:  [16-28] 16  BP: ()/(35-97) 105/54  Flow (L/min) (Oxygen Therapy):  [3-8] 3    Physical Exam:  Vital Signs Reviewed   General:  WDWN, Alert, NAD.  Chronically ill-appearing female, lying in bed  HEENT:  PERRL, EOMI.  OP, nares clear, no sinus tenderness  Neck:  Supple, no JVD, no thyromegaly  Lymph: no axillary, cervical, supraclavicular lymphadenopathy noted  bilaterally  Chest: Decreased aeration with diminished breath sounds bilaterally as well as Velcro-like crackles and scattered rhonchi  CV: RRR, no MGR, pulses 2+, equal.  Abd:  Soft, NT, ND, + BS, no HSM, obese  EXT:  no clubbing, no cyanosis, no edema, no joint tenderness  Neuro:  A&Ox3, CN grossly intact, no focal deficits.  Skin: No rashes or lesions noted      Result Review    Result Review:  I have personally reviewed the results from the time of this admission to 5/26/2025 13:17 EDT and agree with these findings:  [x]  Laboratory  [x]  Microbiology  [x]  Radiology  [x]  EKG/Telemetry   [x]  Cardiology/Vascular   []  Pathology  []  Old records  []  Other:  Most notable findings include:       Lab 05/26/25  0420 05/25/25  1550   WBC 14.21* 16.72*   HEMOGLOBIN 11.2* 13.1   HEMATOCRIT 37.1 43.6   PLATELETS 85* 102*   SODIUM 137 139   POTASSIUM 4.6 4.7   CHLORIDE 102 99   CO2 23.2 27.5   BUN 23 23   CREATININE 1.00 1.03*   GLUCOSE 294* 213*   CALCIUM 8.6 9.5   PHOSPHORUS 3.5  --    TOTAL PROTEIN  --  8.2   ALBUMIN  --  4.2   GLOBULIN  --  4.0     Results for orders placed during the hospital encounter of 02/11/25    Adult Transthoracic Echo Complete W/ Cont if Necessary Per Protocol    Interpretation Summary    Left ventricular systolic function is mildly decreased. Left ventricular ejection fraction appears to be 46 - 50%. with some anterior septal hypokensis    The right ventricular cavity is mildly dilated.    The left atrial cavity is mildly dilated.    The right atrial cavity is moderately  dilated.    Mild to moderate aortic valve stenosis is present.    Estimated right ventricular systolic pressure from tricuspid regurgitation is moderately elevated (45-55 mmHg).    CT Chest With Contrast Diagnostic  Result Date: 5/25/2025  CT CHEST W CONTRAST DIAGNOSTIC Date of Exam: 5/25/2025 7:06 PM EDT Indication: Shortness of breath. Comparison: 4/27/2025 Technique: Axial CT images were obtained of the chest after the  uneventful intravenous administration of iodinated contrast.  Reconstructed coronal and sagittal images were also obtained. Automated exposure control and iterative construction methods were  used. Findings: There is motion degradation, and there is streak artifact from the patient's arms. Nodular enlargement of the right thyroid lobe is unchanged. Heart remains enlarged. No pleural or pericardial effusion is seen. There is atherosclerotic disease and aortic  valvular disease. Prevascular adenopathy measures 1.6 cm, previously 1.4 cm. Precarinal adenopathy seen previously is not well characterized due to artifact currently. No hilar or axillary adenopathy. Upper abdominal images show probable splenomegaly. Lung windows show increased consolidation in the lower lobes with some persistent mild opacity in the right middle lobe and lingula. Findings probably reflect worsening atelectasis although pneumonia is not excluded, particularly in the lower lobes. Groundglass opacities and septal thickening are again identified suggesting mild pulmonary edema. No pneumothorax.     Impression: 1.Increasing consolidation in the lower lobes with some persistent mild opacity in the right middle lobe and lingula. Findings probably reflect worsening atelectasis although pneumonia is not excluded, particularly in the lower lobes. 2.Groundglass opacities and septal thickening are again identified suggesting mild pulmonary edema. 3.Cardiomegaly. 4.Mediastinal lymphadenopathy, as described above. This may be reactive. Attention on 3-month follow-up CT recommended. 5.Additional findings as given above. Electronically Signed: Jean Tamez MD  5/25/2025 7:32 PM EDT  Workstation ID: [id number]    BNP elevated  Assessment & Plan   Assessment / Plan     Active Hospital Problems:  Active Hospital Problems    Diagnosis     **PNA (pneumonia)        Impression:  Acute hypoxemic respiratory failure requiring BiPAP  Acute exacerbation of  COPD  Community-acquired pneumonia for unspecified organism  Acute decompensated diastolic congestive heart failure  Acute cardiogenic pulmonary edema  Lactic acidosis, clinically significant  Leukocytosis  Class III obesity with BMI 45  Acute hypoxic restaurant failure requiring BiPAP    Plan:  Wean O2 to keep SPO2 greater than 90%   Continue BiPAP nightly and with naps on current settings  Chest CT personally reviewed showing findings consistent with pneumonia as well as some volume overload  Continue Brovana, Pulmicort DuoNebs  Continue vancomycin and cefepime and de-escalate accordingly.  Check MRSA PCR and discontinue vancomycin if negative.  Follow-up cultures  Continue Solu-Medrol 40 mg twice a day.  Changed to prednisone tomorrow to complete 5 days of therapy  Start midodrine 5 mg 3 times daily  Start Lasix 40 mg IV twice daily  Cardizem drip per primary  Trend renal panel electrolytes  Continue bronchopulmonary hygiene  Continue nebulizers  Encourage activity and incentive spirometer use    VTE Prophylaxis:  Mechanical VTE prophylaxis orders are present.    Code Status and Medical Interventions: CPR (Attempt to Resuscitate); Full Support   Ordered at: 05/25/25 2121     Code Status (Patient has no pulse and is not breathing):    CPR (Attempt to Resuscitate)     Medical Interventions (Patient has pulse or is breathing):    Full Support     Level Of Support Discussed With:    Patient        Labs, imaging, microbiology, notes and medications personally reviewed  Discussed with primary    I, Dr. Eliud Munoz, have spent more than 50% of the total time managing the patient in this encounter today.  This included personally reviewing all pertinent labs, imaging, microbiology and documentation. Also discussing the case with the patient and any available family, the admitting physician and any available ancillary staff.    Thank you for involving me in the care of this patient.    Electronically signed by  Sunil Resendez, LUCERO, 05/26/25, 12:21 PM EDT.  Electronically signed by Eliud Munoz MD, 05/26/25, 1:17 PM EDT.

## 2025-05-26 NOTE — H&P
[facility] HISTORY AND PHYSICAL  Date: 2025   Patient Name: Mayr Albarran  : 1950  MRN: [id number]  Primary Care Physician:  Brittni Quiroz APRN  Date of admission: 2025    Subjective somnolence  Subjective     Chief Complaint: Somnolence    HPI: Patient is a 74-year-old female with a history of morbid obesity, heart failure, atrial fibrillation patient, chronic hypoxic respiratory failure, COPD, chronic kidney disease, hypertension, diabetes who presents to the hospital because she has been lethargic.  She has also been coughing.    On arrival to the ED, patient had temperature of 103.3, pulse of 103, respiratory rate of 23, blood pressure of 113/47, and she is on 6 L of oxygen saturating 90%.  Patient was put on high flow.  Patient received sepsis level fluid bolus.    Chest x-ray showed cardiomegaly with pulmonary vascular congestion.  CT head showed diffuse brain atrophy.    Chest CT with contrast shows: Increasing consolidation in the lower lobes with some persistent mild opacity in the right middle lobe and lingula.  Worsening atelectasis.  Groundglass opacities and septal thickening are again seen suggesting mild pulmonary edema.  Cardiomegaly.  Mediastinal lymphadenopathy.      AB.3/50.2/57.9/27.6.  proBNP is 1151.  Sodium is 142, potassium is 4.4, chloride is 106, CO2 was 12.5, creatinine is 1.03, calcium is 9.5, magnesium is 2, lactic is 2.1.  White blood cell count is 16.72.  Platelets are 102.    At baseline, patient walks with a walker.    Patient has chronic wounds on her lower extremities.  Her spouse cleans and wraps them.      Personal History     Past Medical History:  Past Medical History:   Diagnosis Date    Allergic rhinitis     Anxiety     Aortic valve disease 2021    Fibrocalcific changes of the aortic valve with mild aortic valve stenosis   on echo 3/2/2021  Moderate aortic valve regurgitation is present. Aortic valve maximum pressure  gradient is 26 mmHg. Moderate aortic valve stenosis is present.  On echo 2/10/2022       Arthritis     Atrial fibrillation     CHF (congestive heart failure)     Chronic kidney disease (CKD), stage III (moderate)     Chronic pain     COPD (chronic obstructive pulmonary disease)     Coronary artery disease     Diabetes mellitus type 2 with complications     Elevated cholesterol     Ex-smoker     QUIT SMOKING     GERD (gastroesophageal reflux disease)     History of home oxygen therapy     2L/NC AAT REPORTED    History of transfusion     Hyperlipidemia     Hypertension     Thrombocytopenia        Past Surgical History:  Past Surgical History:   Procedure Laterality Date    HYSTERECTOMY      30 YEARS AGO       Family History:   Family History   Problem Relation Age of Onset    Heart disease Father     Heart disease Other     Heart disease Other     Diabetes Other        Social History:   Social History     Socioeconomic History    Marital status:    Tobacco Use    Smoking status: Former     Current packs/day: 0.00     Average packs/day: 1 pack/day for 37.0 years (37.0 ttl pk-yrs)     Types: Cigarettes     Start date: 4/3/1971     Quit date: 2008     Years since quittin.1     Passive exposure: Never    Smokeless tobacco: Never    Tobacco comments:     FOR 30 YEARS   Vaping Use    Vaping status: Never Used   Substance and Sexual Activity    Alcohol use: Never    Drug use: Never    Sexual activity: Defer       Home Medications:  HYDROcodone-acetaminophen, Insulin Glargine, Magnesium Oxide -Mg Supplement, SITagliptin, albuterol, albuterol sulfate HFA, ammonium lactate, atorvastatin, budesonide, bumetanide, cholecalciferol, dilTIAZem CD, metoprolol succinate XL, potassium chloride ER, pregabalin, rivaroxaban, and valsartan    Allergies:  Allergies   Allergen Reactions    Morphine Confusion    Codeine Mental Status Change    Sglt2 Inhibitors Other (See Comments)     Frequent UTI        Review of  Systems   All systems were reviewed and negative except for: Unable to get patient is extremely somnolent    Objective   Objective     Vitals:   Temp:  [99.9 °F (37.7 °C)-103.3 °F (39.6 °C)] 99.9 °F (37.7 °C)  Heart Rate:  [] 110  Resp:  [20-23] 20  BP: (104-130)/(47-97) 125/69  Flow (L/min) (Oxygen Therapy):  [6-8] 8    Physical Exam    Constitutional: Extremely somnolent   Eyes: Pupils equal, sclerae anicteric, no conjunctival injection   HENT: NCAT, mucous membranes moist   Neck: Supple, no thyromegaly, no lymphadenopathy, trachea midline   Respiratory: Coarse breath sounds   Cardiovascular: Irregularly irregular tachycardic   Gastrointestinal: Positive bowel sounds, soft, nontender, nondistended   Musculoskeletal: Plus edema   Psychiatric: Appropriate affect, cooperative   Neurologic: Oriented x 3, strength symmetric in all extremities, Cranial Nerves grossly intact to confrontation, speech clear   Skin: Lower extremities wrapped up.    Result Review    Result Review:  I have personally reviewed the results from the time of this admission to 5/25/2025 21:24 EDT and agree with these findings:  [x]  Laboratory  [x]  Microbiology  [x]  Radiology  [x]  EKG/Telemetry   [x]  Cardiology/Vascular   [x]  Pathology  [x]  Old records  []  Other:      Assessment & Plan   Assessment / Plan   #1 right-sided pneumonia with concern for sepsis as evidenced by fever, tachycardia, leukocytosis.  -Cefepime and vancomycin.  Complete respiratory panel ordered.  Patient has been in the hospital recently.  -Patient received sepsis level fluid bolus.  -Swallow assessment ordered    #2 decompensated CHF  -Will need to give IV Lasix when able  --As needed BiPAP ordered    #3 possible COPD exacerbation  -Steroids, Brovana, Pulmicort, DuoNeb, RT for bronchopulmonary hygiene  -Pulmonology consulted.    #4 A-fib with RVR  -Cardizem infusion.    #5 insulin-dependent diabetes  - Insulin sliding scale ordered.  Patient n.p.o. because she  is too somnolent.  Hold oral diabetic medications.    #6 hypertension well-controlled with the Cardizem drip.  Hold oral hypertensive medication.    VTE Prophylaxis:  Mechanical VTE prophylaxis orders are signed & held.          CODE STATUS:    Code Status (Patient has no pulse and is not breathing): CPR (Attempt to Resuscitate)  Medical Interventions (Patient has pulse or is breathing): Full Support  Level Of Support Discussed With: Patient      Admission Status:  I believe this patient meets inpatient status.    Electronically signed by Juan Dowd DO, 05/25/25, 9:24 PM EDT.

## 2025-05-27 LAB
ALBUMIN SERPL-MCNC: 3.2 G/DL (ref 3.5–5.2)
ANION GAP SERPL CALCULATED.3IONS-SCNC: 14.6 MMOL/L (ref 5–15)
BASOPHILS # BLD AUTO: 0.02 10*3/MM3 (ref 0–0.2)
BASOPHILS NFR BLD AUTO: 0.2 % (ref 0–1.5)
BUN SERPL-MCNC: 41 MG/DL (ref 8–23)
BUN/CREAT SERPL: 35 (ref 7–25)
CALCIUM SPEC-SCNC: 8.9 MG/DL (ref 8.6–10.5)
CHLORIDE SERPL-SCNC: 101 MMOL/L (ref 98–107)
CO2 SERPL-SCNC: 18.4 MMOL/L (ref 22–29)
CREAT SERPL-MCNC: 1.17 MG/DL (ref 0.57–1)
DEPRECATED RDW RBC AUTO: 51.1 FL (ref 37–54)
EGFRCR SERPLBLD CKD-EPI 2021: 49.7 ML/MIN/1.73
EOSINOPHIL # BLD AUTO: 0.02 10*3/MM3 (ref 0–0.4)
EOSINOPHIL NFR BLD AUTO: 0.2 % (ref 0.3–6.2)
ERYTHROCYTE [DISTWIDTH] IN BLOOD BY AUTOMATED COUNT: 15 % (ref 12.3–15.4)
GLUCOSE BLDC GLUCOMTR-MCNC: 232 MG/DL (ref 70–99)
GLUCOSE BLDC GLUCOMTR-MCNC: 257 MG/DL (ref 70–99)
GLUCOSE BLDC GLUCOMTR-MCNC: 275 MG/DL (ref 70–99)
GLUCOSE BLDC GLUCOMTR-MCNC: 300 MG/DL (ref 70–99)
GLUCOSE SERPL-MCNC: 260 MG/DL (ref 65–99)
HCT VFR BLD AUTO: 36.6 % (ref 34–46.6)
HGB BLD-MCNC: 10.9 G/DL (ref 12–15.9)
IMM GRANULOCYTES # BLD AUTO: 0.06 10*3/MM3 (ref 0–0.05)
IMM GRANULOCYTES NFR BLD AUTO: 0.5 % (ref 0–0.5)
LYMPHOCYTES # BLD AUTO: 0.9 10*3/MM3 (ref 0.7–3.1)
LYMPHOCYTES NFR BLD AUTO: 8 % (ref 19.6–45.3)
MAGNESIUM SERPL-MCNC: 2.1 MG/DL (ref 1.6–2.4)
MCH RBC QN AUTO: 27.6 PG (ref 26.6–33)
MCHC RBC AUTO-ENTMCNC: 29.8 G/DL (ref 31.5–35.7)
MCV RBC AUTO: 92.7 FL (ref 79–97)
MONOCYTES # BLD AUTO: 0.35 10*3/MM3 (ref 0.1–0.9)
MONOCYTES NFR BLD AUTO: 3.1 % (ref 5–12)
MRSA DNA SPEC QL NAA+PROBE: ABNORMAL
NEUTROPHILS NFR BLD AUTO: 88 % (ref 42.7–76)
NEUTROPHILS NFR BLD AUTO: 9.97 10*3/MM3 (ref 1.7–7)
NRBC BLD AUTO-RTO: 0 /100 WBC (ref 0–0.2)
PHOSPHATE SERPL-MCNC: 4.1 MG/DL (ref 2.5–4.5)
PLATELET # BLD AUTO: 82 10*3/MM3 (ref 140–450)
PMV BLD AUTO: 13.6 FL (ref 6–12)
POTASSIUM SERPL-SCNC: 5.2 MMOL/L (ref 3.5–5.2)
PROCALCITONIN SERPL-MCNC: 4.9 NG/ML (ref 0–0.25)
RBC # BLD AUTO: 3.95 10*6/MM3 (ref 3.77–5.28)
SODIUM SERPL-SCNC: 134 MMOL/L (ref 136–145)
VANCOMYCIN SERPL-MCNC: 17.07 MCG/ML (ref 5–40)
WBC NRBC COR # BLD AUTO: 11.32 10*3/MM3 (ref 3.4–10.8)

## 2025-05-27 PROCEDURE — 97165 OT EVAL LOW COMPLEX 30 MIN: CPT

## 2025-05-27 PROCEDURE — 63710000001 INSULIN GLARGINE PER 5 UNITS: Performed by: FAMILY MEDICINE

## 2025-05-27 PROCEDURE — 94664 DEMO&/EVAL PT USE INHALER: CPT

## 2025-05-27 PROCEDURE — 84145 PROCALCITONIN (PCT): CPT | Performed by: PHYSICIAN ASSISTANT

## 2025-05-27 PROCEDURE — 99233 SBSQ HOSP IP/OBS HIGH 50: CPT | Performed by: INTERNAL MEDICINE

## 2025-05-27 PROCEDURE — 87641 MR-STAPH DNA AMP PROBE: CPT | Performed by: INTERNAL MEDICINE

## 2025-05-27 PROCEDURE — 94799 UNLISTED PULMONARY SVC/PX: CPT

## 2025-05-27 PROCEDURE — 25010000002 CEFEPIME PER 500 MG: Performed by: HOSPITALIST

## 2025-05-27 PROCEDURE — 80202 ASSAY OF VANCOMYCIN: CPT | Performed by: HOSPITALIST

## 2025-05-27 PROCEDURE — 63710000001 PREDNISONE PER 1 MG

## 2025-05-27 PROCEDURE — 25810000003 SODIUM CHLORIDE 0.9 % SOLUTION 250 ML FLEX CONT: Performed by: HOSPITALIST

## 2025-05-27 PROCEDURE — 94761 N-INVAS EAR/PLS OXIMETRY MLT: CPT

## 2025-05-27 PROCEDURE — 83735 ASSAY OF MAGNESIUM: CPT | Performed by: HOSPITALIST

## 2025-05-27 PROCEDURE — 63710000001 ONDANSETRON ODT 4 MG TABLET DISPERSIBLE: Performed by: HOSPITALIST

## 2025-05-27 PROCEDURE — 82948 REAGENT STRIP/BLOOD GLUCOSE: CPT

## 2025-05-27 PROCEDURE — 25010000002 VANCOMYCIN HCL 1.25 G RECONSTITUTED SOLUTION 1 EACH VIAL: Performed by: HOSPITALIST

## 2025-05-27 PROCEDURE — 87899 AGENT NOS ASSAY W/OPTIC: CPT | Performed by: INTERNAL MEDICINE

## 2025-05-27 PROCEDURE — 80069 RENAL FUNCTION PANEL: CPT | Performed by: PHYSICIAN ASSISTANT

## 2025-05-27 PROCEDURE — 82948 REAGENT STRIP/BLOOD GLUCOSE: CPT | Performed by: HOSPITALIST

## 2025-05-27 PROCEDURE — 25010000002 AMPICILLIN-SULBACTAM PER 1.5 G: Performed by: INTERNAL MEDICINE

## 2025-05-27 PROCEDURE — 25010000002 FUROSEMIDE PER 20 MG

## 2025-05-27 PROCEDURE — 87449 NOS EACH ORGANISM AG IA: CPT | Performed by: INTERNAL MEDICINE

## 2025-05-27 PROCEDURE — 85025 COMPLETE CBC W/AUTO DIFF WBC: CPT | Performed by: HOSPITALIST

## 2025-05-27 PROCEDURE — 63710000001 INSULIN LISPRO (HUMAN) PER 5 UNITS: Performed by: FAMILY MEDICINE

## 2025-05-27 RX ORDER — PREDNISONE 20 MG/1
40 TABLET ORAL
Status: DISCONTINUED | OUTPATIENT
Start: 2025-05-28 | End: 2025-05-27

## 2025-05-27 RX ORDER — PREDNISONE 20 MG/1
40 TABLET ORAL
Status: DISCONTINUED | OUTPATIENT
Start: 2025-05-27 | End: 2025-05-29 | Stop reason: HOSPADM

## 2025-05-27 RX ORDER — METOLAZONE 5 MG/1
5 TABLET ORAL ONCE
Status: COMPLETED | OUTPATIENT
Start: 2025-05-27 | End: 2025-05-27

## 2025-05-27 RX ORDER — FLUCONAZOLE 200 MG/1
400 TABLET ORAL EVERY 24 HOURS
Status: DISCONTINUED | OUTPATIENT
Start: 2025-05-27 | End: 2025-05-29 | Stop reason: HOSPADM

## 2025-05-27 RX ORDER — AMMONIUM LACTATE 12 G/100G
LOTION TOPICAL 2 TIMES DAILY PRN
Status: DISCONTINUED | OUTPATIENT
Start: 2025-05-27 | End: 2025-05-29 | Stop reason: HOSPADM

## 2025-05-27 RX ADMIN — MIDODRINE HYDROCHLORIDE 10 MG: 10 TABLET ORAL at 08:06

## 2025-05-27 RX ADMIN — INSULIN LISPRO 5 UNITS: 100 INJECTION, SOLUTION INTRAVENOUS; SUBCUTANEOUS at 08:06

## 2025-05-27 RX ADMIN — MIDODRINE HYDROCHLORIDE 10 MG: 10 TABLET ORAL at 17:11

## 2025-05-27 RX ADMIN — FLUCONAZOLE 400 MG: 200 TABLET ORAL at 16:01

## 2025-05-27 RX ADMIN — INSULIN GLARGINE 20 UNITS: 100 INJECTION, SOLUTION SUBCUTANEOUS at 08:05

## 2025-05-27 RX ADMIN — AMPICILLIN AND SULBACTAM 3 G: 2; 1 INJECTION, POWDER, FOR SOLUTION INTRAVENOUS at 17:33

## 2025-05-27 RX ADMIN — DILTIAZEM HYDROCHLORIDE 180 MG: 180 CAPSULE, EXTENDED RELEASE ORAL at 08:06

## 2025-05-27 RX ADMIN — HYDROCODONE BITARTRATE AND ACETAMINOPHEN 1 TABLET: 10; 325 TABLET ORAL at 07:42

## 2025-05-27 RX ADMIN — METOLAZONE 5 MG: 5 TABLET ORAL at 08:06

## 2025-05-27 RX ADMIN — Medication 10 ML: at 08:06

## 2025-05-27 RX ADMIN — CEFEPIME 2000 MG: 2 INJECTION, POWDER, FOR SOLUTION INTRAVENOUS at 10:35

## 2025-05-27 RX ADMIN — MICONAZOLE NITRATE 1 APPLICATION: 2 CREAM TOPICAL at 22:29

## 2025-05-27 RX ADMIN — IPRATROPIUM BROMIDE AND ALBUTEROL SULFATE 3 ML: .5; 3 SOLUTION RESPIRATORY (INHALATION) at 13:48

## 2025-05-27 RX ADMIN — ARFORMOTEROL TARTRATE 15 MCG: 15 SOLUTION RESPIRATORY (INHALATION) at 19:18

## 2025-05-27 RX ADMIN — INSULIN LISPRO 6 UNITS: 100 INJECTION, SOLUTION INTRAVENOUS; SUBCUTANEOUS at 11:59

## 2025-05-27 RX ADMIN — Medication 10 ML: at 17:33

## 2025-05-27 RX ADMIN — MUPIROCIN 1 APPLICATION: 20 OINTMENT TOPICAL at 20:03

## 2025-05-27 RX ADMIN — HYDROCODONE BITARTRATE AND ACETAMINOPHEN 1 TABLET: 10; 325 TABLET ORAL at 16:01

## 2025-05-27 RX ADMIN — FAMOTIDINE 20 MG: 20 TABLET, FILM COATED ORAL at 08:06

## 2025-05-27 RX ADMIN — METOPROLOL SUCCINATE 25 MG: 25 TABLET, EXTENDED RELEASE ORAL at 20:03

## 2025-05-27 RX ADMIN — RIVAROXABAN 20 MG: 20 TABLET, FILM COATED ORAL at 17:33

## 2025-05-27 RX ADMIN — MIDODRINE HYDROCHLORIDE 10 MG: 10 TABLET ORAL at 11:59

## 2025-05-27 RX ADMIN — METOPROLOL SUCCINATE 25 MG: 25 TABLET, EXTENDED RELEASE ORAL at 08:06

## 2025-05-27 RX ADMIN — ATORVASTATIN CALCIUM 20 MG: 20 TABLET, FILM COATED ORAL at 20:03

## 2025-05-27 RX ADMIN — ONDANSETRON 4 MG: 4 TABLET, ORALLY DISINTEGRATING ORAL at 05:21

## 2025-05-27 RX ADMIN — IPRATROPIUM BROMIDE AND ALBUTEROL SULFATE 3 ML: .5; 3 SOLUTION RESPIRATORY (INHALATION) at 07:16

## 2025-05-27 RX ADMIN — FUROSEMIDE 40 MG: 10 INJECTION, SOLUTION INTRAMUSCULAR; INTRAVENOUS at 22:29

## 2025-05-27 RX ADMIN — INSULIN LISPRO 10 UNITS: 100 INJECTION, SOLUTION INTRAVENOUS; SUBCUTANEOUS at 17:11

## 2025-05-27 RX ADMIN — Medication 10 ML: at 20:03

## 2025-05-27 RX ADMIN — FAMOTIDINE 20 MG: 20 TABLET, FILM COATED ORAL at 17:11

## 2025-05-27 RX ADMIN — MICONAZOLE NITRATE 1 APPLICATION: 2 CREAM TOPICAL at 10:38

## 2025-05-27 RX ADMIN — BUDESONIDE 0.5 MG: 0.5 SUSPENSION RESPIRATORY (INHALATION) at 19:18

## 2025-05-27 RX ADMIN — ARFORMOTEROL TARTRATE 15 MCG: 15 SOLUTION RESPIRATORY (INHALATION) at 07:16

## 2025-05-27 RX ADMIN — VANCOMYCIN HYDROCHLORIDE 1250 MG: 1.25 INJECTION, POWDER, LYOPHILIZED, FOR SOLUTION INTRAVENOUS at 19:57

## 2025-05-27 RX ADMIN — FUROSEMIDE 40 MG: 10 INJECTION, SOLUTION INTRAMUSCULAR; INTRAVENOUS at 10:37

## 2025-05-27 RX ADMIN — IPRATROPIUM BROMIDE AND ALBUTEROL SULFATE 3 ML: .5; 3 SOLUTION RESPIRATORY (INHALATION) at 19:18

## 2025-05-27 RX ADMIN — PREDNISONE 40 MG: 20 TABLET ORAL at 08:06

## 2025-05-27 RX ADMIN — CEFEPIME 2000 MG: 2 INJECTION, POWDER, FOR SOLUTION INTRAVENOUS at 02:29

## 2025-05-27 RX ADMIN — INSULIN LISPRO 8 UNITS: 100 INJECTION, SOLUTION INTRAVENOUS; SUBCUTANEOUS at 20:03

## 2025-05-27 RX ADMIN — AMPICILLIN AND SULBACTAM 3 G: 2; 1 INJECTION, POWDER, FOR SOLUTION INTRAVENOUS at 23:59

## 2025-05-27 NOTE — PROGRESS NOTES
Patient refused Vital Capacity test even after explaining benefits of it. Patient stated she was in too much pain at this time.

## 2025-05-27 NOTE — PROGRESS NOTES
Pulmonary / Critical Care Progress Note      Patient Name: Mary Albarran  : 1950  MRN: [id number]  Attending:  Jan Negrete MD  Date of admission: 2025    Subjective   Subjective   Follow-up for acute on chronic hypoxic respiratory failure, concern for pneumonia from unknown organism, question COPD exacerbation    No acute events overnight    This morning,  Currently on 2 L  Resting in bed  Reports feeling better today  Denies any chest pain chest tightness  No fever or chills  Denies cough      Objective   Objective     Vitals:   Temp:  [97.3 °F (36.3 °C)-99.3 °F (37.4 °C)] 98.1 °F (36.7 °C)  Heart Rate:  [] 82  Resp:  [16-22] 20  BP: ()/(48-65) 113/65  Flow (L/min) (Oxygen Therapy):  [2.5-3] 2.5    Physical Exam   Vital Signs Reviewed   General:  WDWN, Alert, NAD.  Chronically ill-appearing female, lying in bed  HEENT:  PERRL, EOMI.  OP, nares clear  Chest: Improved aeration with diminished breath sounds bilaterally with Velcro-like crackles and scattered rhonchi  CV: RRR, no MGR, pulses 2+, equal.  Abd:  Soft, NT, ND, + BS, no HSM  EXT:  no clubbing, no cyanosis, no edema  Neuro:  A&Ox3, CN grossly intact, no focal deficits.  Skin: No rashes or lesions noted      Result Review    Result Review:  I have personally reviewed the results from the time of this admission to 2025 13:04 EDT and agree with these findings:  [x]  Laboratory  [x]  Microbiology  [x]  Radiology  [x]  EKG/Telemetry   [x]  Cardiology/Vascular   []  Pathology  []  Old records  []  Other:  Most notable findings include:       Lab 25  0457 25  0420 25  1550   WBC 11.32* 14.21* 16.72*   HEMOGLOBIN 10.9* 11.2* 13.1   HEMATOCRIT 36.6 37.1 43.6   PLATELETS 82* 85* 102*   SODIUM 134* 137 139   POTASSIUM 5.2 4.6 4.7   CHLORIDE 101 102 99   CO2 18.4* 23.2 27.5   BUN 41* 23 23   CREATININE 1.17* 1.00 1.03*   GLUCOSE 260* 294* 213*   CALCIUM 8.9 8.6 9.5   PHOSPHORUS 4.1 3.5  --    TOTAL PROTEIN  --   --   8.2   ALBUMIN 3.2*  --  4.2   GLOBULIN  --   --  4.0     CT Chest With Contrast Diagnostic  Result Date: 5/25/2025  CT CHEST W CONTRAST DIAGNOSTIC Date of Exam: 5/25/2025 7:06 PM EDT Indication: Shortness of breath. Comparison: 4/27/2025 Technique: Axial CT images were obtained of the chest after the uneventful intravenous administration of iodinated contrast.  Reconstructed coronal and sagittal images were also obtained. Automated exposure control and iterative construction methods were  used. Findings: There is motion degradation, and there is streak artifact from the patient's arms. Nodular enlargement of the right thyroid lobe is unchanged. Heart remains enlarged. No pleural or pericardial effusion is seen. There is atherosclerotic disease and aortic  valvular disease. Prevascular adenopathy measures 1.6 cm, previously 1.4 cm. Precarinal adenopathy seen previously is not well characterized due to artifact currently. No hilar or axillary adenopathy. Upper abdominal images show probable splenomegaly. Lung windows show increased consolidation in the lower lobes with some persistent mild opacity in the right middle lobe and lingula. Findings probably reflect worsening atelectasis although pneumonia is not excluded, particularly in the lower lobes. Groundglass opacities and septal thickening are again identified suggesting mild pulmonary edema. No pneumothorax.     Impression: 1.Increasing consolidation in the lower lobes with some persistent mild opacity in the right middle lobe and lingula. Findings probably reflect worsening atelectasis although pneumonia is not excluded, particularly in the lower lobes. 2.Groundglass opacities and septal thickening are again identified suggesting mild pulmonary edema. 3.Cardiomegaly. 4.Mediastinal lymphadenopathy, as described above. This may be reactive. Attention on 3-month follow-up CT recommended. 5.Additional findings as given above. Electronically Signed: Jean Tamez MD   5/25/2025 7:32 PM EDT  Workstation ID: [id number]    Results for orders placed during the hospital encounter of 02/11/25    Adult Transthoracic Echo Complete W/ Cont if Necessary Per Protocol    Interpretation Summary    Left ventricular systolic function is mildly decreased. Left ventricular ejection fraction appears to be 46 - 50%. with some anterior septal hypokensis    The right ventricular cavity is mildly dilated.    The left atrial cavity is mildly dilated.    The right atrial cavity is moderately  dilated.    Mild to moderate aortic valve stenosis is present.    Estimated right ventricular systolic pressure from tricuspid regurgitation is moderately elevated (45-55 mmHg).    BNP elevated  Procalcitonin elevated      Assessment & Plan   Assessment / Plan     Active Hospital Problems:  Active Hospital Problems    Diagnosis     **PNA (pneumonia)      Impression:  Acute hypoxemic and hypercapnic respiratory failure requiring BiPAP  Acute exacerbation of COPD  Community-acquired pneumonia for unspecified organism  Therapeutic drug monitoring of antibiotics  Acute decompensated diastolic congestive heart failure  Acute cardiogenic pulmonary edema  Lactic acidosis, clinically significant  Leukocytosis  Class III obesity with BMI 45  Hyponatremia, clinically insignificant     Plan:  Wean O2 to keep SPO2 greater than 90%   Continue BiPAP nightly and with naps on current settings  Chest CT personally reviewed showing findings consistent with pneumonia as well as some volume overload  Continue Brovana, Pulmicort DuoNebs  MRSA PCR positive.  Continue vancomycin and cefepime for now.  Day 2/5  Cultures currently negative/pending  Monitor vancomycin levels and renal panels for therapeutic drug monitoring  All other micro negative this time  Change steroids to prednisone 40 mg daily to complete 5 days  Continue midodrine 5 mg 3 times daily  Continue Lasix 40 mg IV twice daily.  Spot dose metolazone 5 mg x 1  Cardizem drip  per primary  Trend renal panel electrolytes  Continue bronchopulmonary hygiene  Continue nebulizers  Encourage activity and incentive spirometer use  Will consult RT  to see if patient qualifies for NIPPV    VTE Prophylaxis:  Pharmacologic & mechanical VTE prophylaxis orders are present.    CODE STATUS:   Code Status (Patient has no pulse and is not breathing): CPR (Attempt to Resuscitate)  Medical Interventions (Patient has pulse or is breathing): Full Support  Level Of Support Discussed With: Patient      Labs, imaging, microbiology, notes and medications personally reviewed  Discussed with primary    I, Dr. Eliud Munoz, have spent more than 50% of the total time managing the patient in this encounter today.  This included personally reviewing all pertinent labs, imaging, microbiology and documentation. Also discussing the case with the patient and any available family, the admitting physician and any available ancillary staff.    Electronically signed by LUCERO Olmedo, 05/27/25, 10:57 AM EDT.  Electronically signed by Eliud Munoz MD, 05/27/25, 1:04 PM EDT.

## 2025-05-27 NOTE — PLAN OF CARE
Goal Outcome Evaluation:   Patient awake majority of the night watching tv and on cell phone. No distress noted. Declined BiPAP use.     Problem: Noninvasive Ventilation Acute  Goal: Effective Unassisted Ventilation and Oxygenation  Outcome: Progressing  Intervention: Monitor and Manage Noninvasive Ventilation  Flowsheets  Taken 5/27/2025 0352 by Meseret Torres RRT  NPPV/CPAP Maintenance: other (see comments)  Taken 5/26/2025 1003 by Trinidad Martinez RRT  Airway/Ventilation Management: airway patency maintained

## 2025-05-27 NOTE — PROGRESS NOTES
RT EQUIPMENT DEVICE RELATED - SKIN ASSESSMENT    RT Medical Equipment/Device:     NIV Mask:  Full-face    size: medium    Skin Assessment:      Cheek:  Intact  Chin:  Intact  Neck:  Intact  Nose:  Intact  Lips:  Intact    Device Skin Pressure Protection:  Pressure points protected    Nurse Notification:  Elayne Martinez, RRT

## 2025-05-27 NOTE — PROGRESS NOTES
[facility]   Hospitalist Progress Note  Date: 2025  Patient Name: Mary Albarran  : 1950  MRN: [id number]  Date of admission: 2025      Subjective   Subjective     Chief Complaint: Altered mental status    Summary: Patient is 74-year-old female past medical history significant for obesity, systolic heart failure, atrial fibrillation on Xarelto, COPD, CKD, chronic pain, diabetes, mild to moderate aortic valve stenosis that presents to the emergency department with worsening mentation evaluated emergency department was noted to be febrile elevated leukocyte count evidence of pneumonia and fluid overload has been admitted to the hospitalist service pulmonary consulted started on broad-spectrum antibiotics cefepime and vancomycin started on IV Lasix and A-fib with RVR started on diltiazem drip holding sedating medications.  With A-fib rate controlled Cardizem drip was DC'd.  Patient refused to use BiPAP due to pressure on the face.  Declined FVC by RT case management and stated she will not be using BiPAP at home.    Interval Followup:   Remains on 2 and half liters with 94% saturation.   blood pressure stable on midodrine.  Has been refusing BiPAP.  Does not want to use it  Awake alert oriented.  Have cough not able to bring up mucus.  Shortness of air improving.  Has questions about recurrent MRSA.    Objective   Objective     Vitals:   Temp:  [97.3 °F (36.3 °C)-98.1 °F (36.7 °C)] 98.1 °F (36.7 °C)  Heart Rate:  [] 77  Resp:  [20-22] 20  BP: ()/(53-65) 113/65  Flow (L/min) (Oxygen Therapy):  [2.5] 2.5    Physical Exam   Constitutional: Awake alert no acute distress   Cardiovascular: Irregularly irregular   Respiratory diminished  GI: Abdomen soft nontender   Skin; extensive chronic stasis changes bilateral extremity with erythema in the right lower extremity extending medially proximal thigh area.  Bilateral breast, abdominal fold/groin area redness    Result Review    Result  Review:  I have personally reviewed the results from the time of this admission to 5/27/2025 15:51 EDT and agree with these findings:  [x]  Laboratory  [x]  Microbiology  [x]  Radiology  []  EKG/Telemetry   []  Cardiology/Vascular   []  Pathology  [x]  Old records  [x]  Other: Medications    Assessment & Plan   Assessment / Plan   Assessment:    Bilateral lower lobe multifocal pneumonia.  Unspecified bacterial pathogen  MRSA PCR positive.  Recurrent  Acute on chronic systolic congestive heart failure  Fluid overload  A-fib RVR on anticoagulation with Xarelto  Diabetes type II with recent hemoglobin A1c of 9%.  With hyperglycemia  COPD with acute exacerbation  Hypertension  Hyperlipidemia  Thrombocytopenia  Chronic hypoxic respiratory failure on home oxygen.  Morbid obesity BMI 45  Chronic stasis changes bilateral extremity with  yeast infection.  CKD 3 AA.  Stable  Chronic anemia and thrombocytopenia.  Stable.   sepsis present on admission due to pneumonia.  Patient had tachycardia, hypoxemia, leukocytosis and elevated procalcitonin level.    Plan:    Continuing broad-spectrum antibiotics with cefepime and vancomycin  continue diuresis Lasix 40 IV twice daily  S on midodrine 10 mg 3 times daily  Continue nebulizer treatment   steroids switch to p.o.  Pepcid for GI prophylaxis  Resumed home Xarelto   Off diltiazem drip, resumed home oral diltiazem   Toprol XL resumed at lower dose due to hypotension increase as tolerated.  Hold home Entresto  Sedating medications on hold including Lyrica  SSI per protocol resumed basal insulin  Appreciate wound nurse input  Fluconazole  Advance diet per speech therapy  PT OT speech therapy consultations  Pulmonary consulted.  Appreciate input.  Appreciate RT case management input   continue telemetry  Bactroban and hebacillin washes for MRSA colonization.  Discussed plan with RN.  Discharge home when stable.  Does not want to go to rehab    VTE Prophylaxis:  Pharmacologic &  mechanical VTE prophylaxis orders are present.        CODE STATUS:   Code Status (Patient has no pulse and is not breathing): CPR (Attempt to Resuscitate)  Medical Interventions (Patient has pulse or is breathing): Full Support  Level Of Support Discussed With: Patient        Electronically signed by Jan Negrete MD, 05/27/25, 3:58 PM EDT.  .

## 2025-05-27 NOTE — THERAPY EVALUATION
Patient Name: Mary Albarran  : 1950    MRN: [id number]                              Today's Date: 2025       Admit Date: 2025    Visit Dx:     ICD-10-CM ICD-9-CM   1. Pneumonia due to infectious organism, unspecified laterality, unspecified part of lung  J18.9 486   2. Sepsis, due to unspecified organism, unspecified whether acute organ dysfunction present  A41.9 038.9     995.91   3. Dysphagia, unspecified type  R13.10 787.20   4. Decreased activities of daily living (ADL)  Z78.9 V49.89     Patient Active Problem List   Diagnosis    Aortic stenosis, moderate    Longstanding persistent atrial fibrillation    Chronic kidney disease, stage III (moderate)    Hyperlipidemia    Hypertension    Chronic diastolic congestive heart failure    Atrial fibrillation with RVR    Onychomycosis    Onychocryptosis    Foot pain, bilateral    Nephrolithiasis    Recurrent urinary tract infection    Acute on chronic HFrEF (heart failure with reduced ejection fraction)    Cellulitis of right leg without foot    Diabetes mellitus type 2 with complications    Depression    COPD (chronic obstructive pulmonary disease)    Chronic pain    Anxiety    Heart failure with reduced ejection fraction    CHF exacerbation    Sepsis    C. difficile diarrhea    History of home oxygen therapy    PNA (pneumonia)     Past Medical History:   Diagnosis Date    Allergic rhinitis     Anxiety     Aortic valve disease 2021    Fibrocalcific changes of the aortic valve with mild aortic valve stenosis   on echo 3/2/2021  Moderate aortic valve regurgitation is present. Aortic valve maximum pressure gradient is 26 mmHg. Moderate aortic valve stenosis is present.  On echo 2/10/2022       Arthritis     Atrial fibrillation     CHF (congestive heart failure)     Chronic kidney disease (CKD), stage III (moderate)     Chronic pain     COPD (chronic obstructive pulmonary disease)     Coronary artery disease     Diabetes mellitus type 2 with  complications     Elevated cholesterol     Ex-smoker     QUIT SMOKING 2008    GERD (gastroesophageal reflux disease)     History of home oxygen therapy     2L/NC AAT REPORTED    History of transfusion     Hyperlipidemia     Hypertension     Thrombocytopenia      Past Surgical History:   Procedure Laterality Date    HYSTERECTOMY      30 YEARS AGO      General Information       [facility] Name 05/27/25 1543          OT Time and Intention    Document Type evaluation  -     Mode of Treatment individual therapy;occupational therapy  -[facility] Name 05/27/25 1543          General Information    Patient Profile Reviewed yes  -LF     Prior Level of Function --  (I) with ADLs, ambulated with a standard walker with tennis balls on the feet, has a walk-in shower with shower chair/grab bars/handheld shower head, standard commode, stands to groom, and wears 2L home O2.  -     Existing Precautions/Restrictions fall  -     Barriers to Rehab none identified  -[facility] Name 05/27/25 1543          Occupational Profile    Reason for Services/Referral (Occupational Profile) Patient is a 74 year old female admitted to [facility] for SOB and irregular heart beat on May 25th, 2025. Occupational therapy consulted due to recent decline in ADLs/functional transfers. No previous occupational therapy services for current condition.  -[facility] Name 05/27/25 1543          Living Environment    Current Living Arrangements home  -     People in Home spouse  -LF       Row Name 05/27/25 1543          Home Main Entrance    Number of Stairs, Main Entrance none  Ramp  -[facility] Name 05/27/25 1543          Cognition    Orientation Status (Cognition) oriented x 3  -LF       Row Name 05/27/25 1543          Safety Issues/Impairments Affecting Functional Mobility    Impairments Affecting Function (Mobility) balance;endurance/activity tolerance;pain;strength  -               User Key  (r) = Recorded By, (t) = Taken By, (c) =  Cosigned By      Initials Name Provider Type     Nevaeh Schmidt OT Occupational Therapist                     Mobility/ADL's       Row Name 05/27/25 1545          Bed Mobility    Comment, (Bed Mobility) Pt declined all bed mobility a functional transfers, despite encouragement, stating that she had not slept and felt too weak.  -[facility] Name 05/27/25 1545          Activities of Daily Living    BADL Assessment/Intervention bathing;upper body dressing;lower body dressing;grooming;feeding;toileting  -[facility] Name 05/27/25 1545          Bathing Assessment/Intervention    [place] Level (Bathing) bathing skills;upper body;minimum assist (75% patient effort);lower body;maximum assist (25% patient effort);dependent (less than 25% patient effort)  -[facility] Name 05/27/25 1545          Upper Body Dressing Assessment/Training    [place] Level (Upper Body Dressing) upper body dressing skills;minimum assist (75% patient effort)  -LF       Row Name 05/27/25 1545          Lower Body Dressing Assessment/Training    [place] Level (Lower Body Dressing) lower body dressing skills;maximum assist (25% patient effort);dependent (less than 25% patient effort)  -LF       Row Name 05/27/25 1545          Grooming Assessment/Training    [place] Level (Grooming) grooming skills;standby assist  -LF       Row Name 05/27/25 1545          Self-Feeding Assessment/Training    [place] Level (Feeding) feeding skills;set up  -LF       Row Name 05/27/25 1545          Toileting Assessment/Training    [place] Level (Toileting) toileting skills;maximum assist (25% patient effort);dependent (less than 25% patient effort)  -               User Key  (r) = Recorded By, (t) = Taken By, (c) = Cosigned By      Initials Name Provider Type     Nevaeh Schmidt OT Occupational Therapist                   Obj/Interventions       Row Name 05/27/25 1546          Sensory Assessment (Somatosensory)    Sensory  Assessment (Somatosensory) UE sensation intact  -[facility] Name 05/27/25 1546          Vision Assessment/Intervention    Visual Impairment/Limitations WFL  -[facility] Name 05/27/25 1546          Range of Motion Comprehensive    General Range of Motion bilateral upper extremity ROM WFL  -LF       Row Name 05/27/25 1546          Strength Comprehensive (MMT)    Comment, General Manual Muscle Testing (MMT) Assessment 4-/5 BUEs  -LF       Row Name 05/27/25 1546          Motor Skills    Motor Skills coordination;functional endurance  -LF     Coordination bilateral;upper extremity;WFL  -LF     Functional Endurance Poor+  -LF       Row Name 05/27/25 1546          Balance    Comment, Balance Not tested, likely impaired.  -LF               User Key  (r) = Recorded By, (t) = Taken By, (c) = Cosigned By      Initials Name Provider Type     Nevaeh Schmidt OT Occupational Therapist                   Goals/Plan       Row Name 05/27/25 1547          Bed Mobility Goal 1 (OT)    Activity/Assistive Device (Bed Mobility Goal 1, OT) bed mobility activities, all  -LF     [place] Level/Cues Needed (Bed Mobility Goal 1, OT) modified independence  -LF     Time Frame (Bed Mobility Goal 1, OT) long term goal (LTG);10 days  -LF       Row Name 05/27/25 1547          Transfer Goal 1 (OT)    Activity/Assistive Device (Transfer Goal 1, OT) transfers, all  -LF     [place] Level/Cues Needed (Transfer Goal 1, OT) modified independence  -LF     Time Frame (Transfer Goal 1, OT) long term goal (LTG);10 days  -LF       Row Name 05/27/25 1547          Bathing Goal 1 (OT)    Activity/Device (Bathing Goal 1, OT) bathing skills, all  -LF     [place] Level/Cues Needed (Bathing Goal 1, OT) modified independence  -LF     Time Frame (Bathing Goal 1, OT) long term goal (LTG);10 days  -LF       Row Name 05/27/25 1547          Dressing Goal 1 (OT)    Activity/Device (Dressing Goal 1, OT) dressing skills, all  -LF     [place]/Cues  Needed (Dressing Goal 1, OT) modified independence  -LF     Time Frame (Dressing Goal 1, OT) long term goal (LTG);10 days  -       Row Name 05/27/25 1547          Toileting Goal 1 (OT)    Activity/Device (Toileting Goal 1, OT) toileting skills, all  -LF     [place] Level/Cues Needed (Toileting Goal 1, OT) modified independence  -LF     Time Frame (Toileting Goal 1, OT) long term goal (LTG);10 days  -       Row Name 05/27/25 1547          Grooming Goal 1 (OT)    Activity/Device (Grooming Goal 1, OT) grooming skills, all  -LF     [place] (Grooming Goal 1, OT) modified independence  -LF     Time Frame (Grooming Goal 1, OT) long term goal (LTG);10 days  -       Row Name 05/27/25 1547          Strength Goal 1 (OT)    Strength Goal 1 (OT) Pt will demonstrate 4/5 BUE strength to support ADLs/functional transfers.  -LF     Time Frame (Strength Goal 1, OT) long term goal (LTG);10 days  -       Row Name 05/27/25 1547          Problem Specific Goal 1 (OT)    Problem Specific Goal 1 (OT) Patient will demonstrate fair+ endurance to support ADLs/functional transfers.  -LF     Time Frame (Problem Specific Goal 1, OT) long term goal (LTG);10 days  -       Row Name 05/27/25 1547          Therapy Assessment/Plan (OT)    Planned Therapy Interventions (OT) activity tolerance training;BADL retraining;functional balance retraining;occupation/activity based interventions;patient/caregiver education/training;strengthening exercise;transfer/mobility retraining  -               User Key  (r) = Recorded By, (t) = Taken By, (c) = Cosigned By      Initials Name Provider Type     Nevaeh Schmidt OT Occupational Therapist                   Clinical Impression       Row Name 05/27/25 1547          Pain Assessment    Additional Documentation Pain Scale: FACES Pre/Post-Treatment (Group)  -       Row Name 05/27/25 1547          Pain Scale: FACES Pre/Post-Treatment    Pain: FACES Scale, Pretreatment 2-->hurts little bit   -     Posttreatment Pain Rating 2-->hurts little bit  -       Row Name 05/27/25 1547          Plan of Care Review    Plan of Care Reviewed With patient  -LF     Progress no change  -     Outcome Evaluation Patient presents with limitations in self-care, functional transfers, balance, and endurance. She would benefit from continued skilled occupational therapy services to maximize independence with ADLs/functional transfers.  -       Row Name 05/27/25 1547          Therapy Assessment/Plan (OT)    Patient/Family Therapy Goal Statement (OT) To maximize independence.  -     Rehab Potential (OT) good  -LF     Criteria for Skilled Therapeutic Interventions Met (OT) yes;meets criteria;skilled treatment is necessary  -     Therapy Frequency (OT) 5 times/wk  -       Row Name 05/27/25 1547          Therapy Plan Review/Discharge Plan (OT)    Anticipated Discharge Disposition (OT) sub acute care setting  -       Row Name 05/27/25 1547          Vital Signs    O2 Delivery Pre Treatment nasal cannula  -     O2 Delivery Intra Treatment nasal cannula  -     O2 Delivery Post Treatment nasal cannula  -       Row Name 05/27/25 1546          Positioning and Restraints    Pre-Treatment Position in bed  -LF     Post Treatment Position bed  -LF     In Bed fowlers;call light within reach;encouraged to call for assist;exit alarm on  -               User Key  (r) = Recorded By, (t) = Taken By, (c) = Cosigned By      Initials Name Provider Type     Nevaeh Schmidt, OT Occupational Therapist                   Outcome Measures       Row Name 05/27/25 3737          How much help from another is currently needed...    Putting on and taking off regular lower body clothing? 2  -LF     Bathing (including washing, rinsing, and drying) 2  -LF     Toileting (which includes using toilet bed pan or urinal) 1  -LF     Putting on and taking off regular upper body clothing 3  -LF     Taking care of personal grooming (such as  brushing teeth) 3  -LF     Eating meals 4  -LF     AM-PAC 6 Clicks Score (OT) 15  -LF       Row Name 05/27/25 0800          How much help from another person do you currently need...    Turning from your back to your side while in flat bed without using bedrails? 4  -NR     Moving from lying on back to sitting on the side of a flat bed without bedrails? 4  -NR     Moving to and from a bed to a chair (including a wheelchair)? 3  -NR     Standing up from a chair using your arms (e.g., wheelchair, bedside chair)? 3  -NR     Climbing 3-5 steps with a railing? 2  -NR     To walk in hospital room? 3  -NR     AM-PAC 6 Clicks Score (PT) 19  -NR       Row Name 05/27/25 1549          Functional Assessment    Outcome Measure Options AM-PAC 6 Clicks Daily Activity (OT);Optimal Instrument  -LF       Row Name 05/27/25 1549          Optimal Instrument    Optimal Instrument Optimal - 3  -LF     Bending/Stooping 4  -LF     Standing 5  -LF     Reaching 1  -LF     From the list, choose the 3 activities you would most like to be able to do without any difficulty Bending/stooping;Standing;Reaching  -LF     Total Score Optimal - 3 10  -LF               User Key  (r) = Recorded By, (t) = Taken By, (c) = Cosigned By      Initials Name Provider Type    Nevaeh Hyde OT Occupational Therapist    NR Loni Joy, RN Registered Nurse                    Occupational Therapy Education       Title: PT OT SLP Therapies (In Progress)       Topic: Occupational Therapy (In Progress)       Point: ADL training (Done)       Learning Progress Summary            Patient Acceptance, E,TB, VU by  at 5/27/2025 1549                      Point: Precautions (Done)       Learning Progress Summary            Patient Acceptance, E,TB, VU by  at 5/27/2025 1549                      Point: Body mechanics (Done)       Learning Progress Summary            Patient Acceptance, E,TB, VU by  at 5/27/2025 1549                                      User Key        Initials Effective Dates Name Provider Type Discipline    LF 06/16/21 -  Nevaeh Schmidt OT Occupational Therapist OT                  OT Recommendation and Plan  Planned Therapy Interventions (OT): activity tolerance training, BADL retraining, functional balance retraining, occupation/activity based interventions, patient/caregiver education/training, strengthening exercise, transfer/mobility retraining  Therapy Frequency (OT): 5 times/wk  Plan of Care Review  Plan of Care Reviewed With: patient  Progress: no change  Outcome Evaluation: Patient presents with limitations in self-care, functional transfers, balance, and endurance. She would benefit from continued skilled occupational therapy services to maximize independence with ADLs/functional transfers.     Time Calculation:   Evaluation Complexity (OT)  Review Occupational Profile/Medical/Therapy History Complexity: brief/low complexity  Assessment, Occupational Performance/Identification of Deficit Complexity: 3-5 performance deficits  Clinical Decision Making Complexity (OT): problem focused assessment/low complexity  Overall Complexity of Evaluation (OT): low complexity     Time Calculation- OT       Row Name 05/27/25 1550             Time Calculation- OT    OT Received On 05/27/25  -LF      OT Goal Re-Cert Due Date 06/05/25  -LF         Untimed Charges    OT Eval/Re-eval Minutes 35  -LF         Total Minutes    Untimed Charges Total Minutes 35  -LF       Total Minutes 35  -LF                User Key  (r) = Recorded By, (t) = Taken By, (c) = Cosigned By      Initials Name Provider Type    LF Nevaeh Schmidt OT Occupational Therapist                  Therapy Charges for Today       Code Description Service Date Service Provider Modifiers Qty    [id number] HC OT EVAL LOW COMPLEXITY 3 5/27/2025 Nevaeh Schmidt OT GO 1                 Nevaeh Schmidt OT  5/27/2025

## 2025-05-27 NOTE — TELEPHONE ENCOUNTER
I had called and poke to Sharron who said she would take care of it , called today and Sharron stated she had not seen fax ., I will refax office note  [phone]

## 2025-05-27 NOTE — PLAN OF CARE
Goal Outcome Evaluation:  Plan of Care Reviewed With: patient        Progress: no change  Outcome Evaluation: Patient refused bipap last night. Unit is on standby in the room. Currently on 2.5L. Used volera for half of breathing treatment then requested to stop due to feeling sick to stomach this morning.

## 2025-05-27 NOTE — PROGRESS NOTES
"[facility] Clinical Pharmacy Services: Vancomycin Monitoring Note    Mary Albarran is a 74 y.o. female who is on day 3/5 of pharmacy to dose vancomycin for Pneumonia.    Previous Vancomycin Dose: 1250 mg IV every 24 hours  Imaging Reviewed?: Yes  Updated Cultures and Sensitivities:    blood cx: NGTD   urine cx: gram negative bacilli   respiratory culture: all values Not Detected   MRSA PCR: MRSA detected    Vitals/Labs  Ht: 160 cm (63\"); Wt: 115 kg (254 lb 3.1 oz)   Temp (24hrs), Av.1 °F (36.7 °C), Min:97.3 °F (36.3 °C), Max:99.3 °F (37.4 °C)   Estimated Creatinine Clearance: 53.1 mL/min (A) (by C-G formula based on SCr of 1.17 mg/dL (H)).     Results from last 7 days   Lab Units 25  0457 25  0420 25  1550   VANCOMYCIN RM mcg/mL 17.07  --   --    CREATININE mg/dL 1.17* 1.00 1.03*   WBC 10*3/mm3 11.32* 14.21* 16.72*     Assessment/Plan      Will continue current Vancomycin Dose:  1250 mg IV every 24 hours; which provides the following predicted parameters:  AUC24,ss: 546 mg/L.hr  Probability of AUC24 > 400: 86 %  Ctrough,ss: 18.5 mg/L  Probability of Ctrough,ss > 20: 42 %  Probability of nephrotoxicity (Lodise ERIC ): 15 %  No new level ordered at this time.  Renal function decreased somewhat. Will follow for need to adjust dose. Probability of nephrotoxicity still at limit.  We will continue to monitor patient changes and renal function. Renal panel ordered for  AM    Thank you for involving pharmacy in this patient's care. Please contact pharmacy with any questions or concerns.    Karma Brumfield  Clinical Pharmacist    "

## 2025-05-27 NOTE — PLAN OF CARE
Goal Outcome Evaluation:              Outcome Evaluation: Patient alert and oriented x4. Pain treated per MAR. Accucheck changed to ACHS. Vital signs stable throughout shift. Continue care per Plan of Care.

## 2025-05-27 NOTE — PLAN OF CARE
Goal Outcome Evaluation:   VSS. A-Fib/ Flutter controlled. Alert and oriented. Positive for MRSA -Nares. Lungs with scattered wheezes, nonproductive cough  noted. BLE remain swollen, reddened. Able to make all needs known. Medicated for pain to bilateral hands / feet, see MAR.

## 2025-05-27 NOTE — CONSULTS
Ms. Albarran refused to perform FVC; stating she is in too much pain.  I attempted to discuss use of bipap in the home, but patient states she will not use a bipap in the home.  I will notify pulmonary.

## 2025-05-28 LAB
ALBUMIN SERPL-MCNC: 3.5 G/DL (ref 3.5–5.2)
ANION GAP SERPL CALCULATED.3IONS-SCNC: 11.8 MMOL/L (ref 5–15)
BASOPHILS # BLD AUTO: 0.01 10*3/MM3 (ref 0–0.2)
BASOPHILS NFR BLD AUTO: 0.1 % (ref 0–1.5)
BUN SERPL-MCNC: 53.8 MG/DL (ref 8–23)
BUN/CREAT SERPL: 43.4 (ref 7–25)
CALCIUM SPEC-SCNC: 9.5 MG/DL (ref 8.6–10.5)
CHLORIDE SERPL-SCNC: 99 MMOL/L (ref 98–107)
CO2 SERPL-SCNC: 22.2 MMOL/L (ref 22–29)
CREAT SERPL-MCNC: 1.24 MG/DL (ref 0.57–1)
DEPRECATED RDW RBC AUTO: 49.4 FL (ref 37–54)
EGFRCR SERPLBLD CKD-EPI 2021: 46.3 ML/MIN/1.73
EOSINOPHIL # BLD AUTO: 0.01 10*3/MM3 (ref 0–0.4)
EOSINOPHIL NFR BLD AUTO: 0.1 % (ref 0.3–6.2)
ERYTHROCYTE [DISTWIDTH] IN BLOOD BY AUTOMATED COUNT: 14.8 % (ref 12.3–15.4)
GLUCOSE BLDC GLUCOMTR-MCNC: 140 MG/DL (ref 70–99)
GLUCOSE BLDC GLUCOMTR-MCNC: 229 MG/DL (ref 70–99)
GLUCOSE BLDC GLUCOMTR-MCNC: 259 MG/DL (ref 70–99)
GLUCOSE BLDC GLUCOMTR-MCNC: 265 MG/DL (ref 70–99)
GLUCOSE SERPL-MCNC: 153 MG/DL (ref 65–99)
HCT VFR BLD AUTO: 37.3 % (ref 34–46.6)
HGB BLD-MCNC: 11.3 G/DL (ref 12–15.9)
IMM GRANULOCYTES # BLD AUTO: 0.05 10*3/MM3 (ref 0–0.05)
IMM GRANULOCYTES NFR BLD AUTO: 0.5 % (ref 0–0.5)
L PNEUMO1 AG UR QL IA: NEGATIVE
LYMPHOCYTES # BLD AUTO: 1.04 10*3/MM3 (ref 0.7–3.1)
LYMPHOCYTES NFR BLD AUTO: 10.3 % (ref 19.6–45.3)
MAGNESIUM SERPL-MCNC: 2.3 MG/DL (ref 1.6–2.4)
MCH RBC QN AUTO: 27.5 PG (ref 26.6–33)
MCHC RBC AUTO-ENTMCNC: 30.3 G/DL (ref 31.5–35.7)
MCV RBC AUTO: 90.8 FL (ref 79–97)
MONOCYTES # BLD AUTO: 0.73 10*3/MM3 (ref 0.1–0.9)
MONOCYTES NFR BLD AUTO: 7.2 % (ref 5–12)
NEUTROPHILS NFR BLD AUTO: 8.27 10*3/MM3 (ref 1.7–7)
NEUTROPHILS NFR BLD AUTO: 81.8 % (ref 42.7–76)
NRBC BLD AUTO-RTO: 0 /100 WBC (ref 0–0.2)
NT-PROBNP SERPL-MCNC: 5412 PG/ML (ref 0–900)
PHOSPHATE SERPL-MCNC: 5 MG/DL (ref 2.5–4.5)
PLATELET # BLD AUTO: 101 10*3/MM3 (ref 140–450)
PMV BLD AUTO: 13.2 FL (ref 6–12)
POTASSIUM SERPL-SCNC: 4.5 MMOL/L (ref 3.5–5.2)
RBC # BLD AUTO: 4.11 10*6/MM3 (ref 3.77–5.28)
S PNEUM AG SPEC QL LA: NEGATIVE
SODIUM SERPL-SCNC: 133 MMOL/L (ref 136–145)
WBC NRBC COR # BLD AUTO: 10.11 10*3/MM3 (ref 3.4–10.8)

## 2025-05-28 PROCEDURE — 25810000003 SODIUM CHLORIDE 0.9 % SOLUTION 250 ML FLEX CONT: Performed by: HOSPITALIST

## 2025-05-28 PROCEDURE — 25010000002 ONDANSETRON PER 1 MG: Performed by: HOSPITALIST

## 2025-05-28 PROCEDURE — 63710000001 INSULIN GLARGINE PER 5 UNITS: Performed by: FAMILY MEDICINE

## 2025-05-28 PROCEDURE — 80069 RENAL FUNCTION PANEL: CPT | Performed by: INTERNAL MEDICINE

## 2025-05-28 PROCEDURE — 83880 ASSAY OF NATRIURETIC PEPTIDE: CPT | Performed by: INTERNAL MEDICINE

## 2025-05-28 PROCEDURE — 94664 DEMO&/EVAL PT USE INHALER: CPT

## 2025-05-28 PROCEDURE — 85025 COMPLETE CBC W/AUTO DIFF WBC: CPT | Performed by: HOSPITALIST

## 2025-05-28 PROCEDURE — 99232 SBSQ HOSP IP/OBS MODERATE 35: CPT | Performed by: INTERNAL MEDICINE

## 2025-05-28 PROCEDURE — 94799 UNLISTED PULMONARY SVC/PX: CPT

## 2025-05-28 PROCEDURE — 83735 ASSAY OF MAGNESIUM: CPT | Performed by: INTERNAL MEDICINE

## 2025-05-28 PROCEDURE — 94761 N-INVAS EAR/PLS OXIMETRY MLT: CPT

## 2025-05-28 PROCEDURE — 63710000001 INSULIN LISPRO (HUMAN) PER 5 UNITS: Performed by: FAMILY MEDICINE

## 2025-05-28 PROCEDURE — 82948 REAGENT STRIP/BLOOD GLUCOSE: CPT

## 2025-05-28 PROCEDURE — 82948 REAGENT STRIP/BLOOD GLUCOSE: CPT | Performed by: HOSPITALIST

## 2025-05-28 PROCEDURE — 99233 SBSQ HOSP IP/OBS HIGH 50: CPT | Performed by: INTERNAL MEDICINE

## 2025-05-28 PROCEDURE — 25010000002 VANCOMYCIN HCL 1.25 G RECONSTITUTED SOLUTION 1 EACH VIAL: Performed by: HOSPITALIST

## 2025-05-28 PROCEDURE — 25010000002 AMPICILLIN-SULBACTAM PER 1.5 G: Performed by: INTERNAL MEDICINE

## 2025-05-28 PROCEDURE — 63710000001 PREDNISONE PER 1 MG

## 2025-05-28 PROCEDURE — 97161 PT EVAL LOW COMPLEX 20 MIN: CPT

## 2025-05-28 RX ORDER — BUMETANIDE 1 MG/1
2 TABLET ORAL DAILY
Status: DISCONTINUED | OUTPATIENT
Start: 2025-05-29 | End: 2025-05-29 | Stop reason: HOSPADM

## 2025-05-28 RX ORDER — FAMOTIDINE 20 MG/1
20 TABLET, FILM COATED ORAL NIGHTLY
Status: DISCONTINUED | OUTPATIENT
Start: 2025-05-28 | End: 2025-05-29 | Stop reason: HOSPADM

## 2025-05-28 RX ADMIN — AMPICILLIN AND SULBACTAM 3 G: 2; 1 INJECTION, POWDER, FOR SOLUTION INTRAVENOUS at 18:01

## 2025-05-28 RX ADMIN — ONDANSETRON 4 MG: 2 INJECTION INTRAMUSCULAR; INTRAVENOUS at 02:53

## 2025-05-28 RX ADMIN — HYDROCODONE BITARTRATE AND ACETAMINOPHEN 1 TABLET: 10; 325 TABLET ORAL at 08:49

## 2025-05-28 RX ADMIN — VANCOMYCIN HYDROCHLORIDE 1250 MG: 1.25 INJECTION, POWDER, LYOPHILIZED, FOR SOLUTION INTRAVENOUS at 20:45

## 2025-05-28 RX ADMIN — METOPROLOL SUCCINATE 25 MG: 25 TABLET, EXTENDED RELEASE ORAL at 21:30

## 2025-05-28 RX ADMIN — IPRATROPIUM BROMIDE AND ALBUTEROL SULFATE 3 ML: .5; 3 SOLUTION RESPIRATORY (INHALATION) at 00:27

## 2025-05-28 RX ADMIN — INSULIN LISPRO 8 UNITS: 100 INJECTION, SOLUTION INTRAVENOUS; SUBCUTANEOUS at 21:30

## 2025-05-28 RX ADMIN — INSULIN LISPRO 5 UNITS: 100 INJECTION, SOLUTION INTRAVENOUS; SUBCUTANEOUS at 12:35

## 2025-05-28 RX ADMIN — PREDNISONE 40 MG: 20 TABLET ORAL at 08:49

## 2025-05-28 RX ADMIN — ARFORMOTEROL TARTRATE 15 MCG: 15 SOLUTION RESPIRATORY (INHALATION) at 18:56

## 2025-05-28 RX ADMIN — MIDODRINE HYDROCHLORIDE 10 MG: 10 TABLET ORAL at 08:49

## 2025-05-28 RX ADMIN — MIDODRINE HYDROCHLORIDE 10 MG: 10 TABLET ORAL at 17:20

## 2025-05-28 RX ADMIN — MIDODRINE HYDROCHLORIDE 10 MG: 10 TABLET ORAL at 12:35

## 2025-05-28 RX ADMIN — AMPICILLIN AND SULBACTAM 3 G: 2; 1 INJECTION, POWDER, FOR SOLUTION INTRAVENOUS at 05:19

## 2025-05-28 RX ADMIN — INSULIN GLARGINE 20 UNITS: 100 INJECTION, SOLUTION SUBCUTANEOUS at 08:48

## 2025-05-28 RX ADMIN — INSULIN LISPRO 8 UNITS: 100 INJECTION, SOLUTION INTRAVENOUS; SUBCUTANEOUS at 17:21

## 2025-05-28 RX ADMIN — IPRATROPIUM BROMIDE AND ALBUTEROL SULFATE 3 ML: .5; 3 SOLUTION RESPIRATORY (INHALATION) at 18:56

## 2025-05-28 RX ADMIN — Medication 10 ML: at 21:30

## 2025-05-28 RX ADMIN — BUDESONIDE 0.5 MG: 0.5 SUSPENSION RESPIRATORY (INHALATION) at 18:56

## 2025-05-28 RX ADMIN — FAMOTIDINE 20 MG: 20 TABLET, FILM COATED ORAL at 21:30

## 2025-05-28 RX ADMIN — ARFORMOTEROL TARTRATE 15 MCG: 15 SOLUTION RESPIRATORY (INHALATION) at 08:00

## 2025-05-28 RX ADMIN — HYDROCODONE BITARTRATE AND ACETAMINOPHEN 1 TABLET: 10; 325 TABLET ORAL at 16:56

## 2025-05-28 RX ADMIN — ONDANSETRON 4 MG: 2 INJECTION INTRAMUSCULAR; INTRAVENOUS at 10:25

## 2025-05-28 RX ADMIN — Medication 10 ML: at 10:25

## 2025-05-28 RX ADMIN — METOPROLOL SUCCINATE 25 MG: 25 TABLET, EXTENDED RELEASE ORAL at 12:35

## 2025-05-28 RX ADMIN — FAMOTIDINE 20 MG: 20 TABLET, FILM COATED ORAL at 08:49

## 2025-05-28 RX ADMIN — MUPIROCIN 1 APPLICATION: 20 OINTMENT TOPICAL at 21:30

## 2025-05-28 RX ADMIN — MICONAZOLE NITRATE 1 APPLICATION: 2 CREAM TOPICAL at 09:00

## 2025-05-28 RX ADMIN — MICONAZOLE NITRATE 1 APPLICATION: 2 CREAM TOPICAL at 21:31

## 2025-05-28 RX ADMIN — DILTIAZEM HYDROCHLORIDE 180 MG: 180 CAPSULE, EXTENDED RELEASE ORAL at 12:35

## 2025-05-28 RX ADMIN — IPRATROPIUM BROMIDE AND ALBUTEROL SULFATE 3 ML: .5; 3 SOLUTION RESPIRATORY (INHALATION) at 12:46

## 2025-05-28 RX ADMIN — BUDESONIDE 0.5 MG: 0.5 SUSPENSION RESPIRATORY (INHALATION) at 08:00

## 2025-05-28 RX ADMIN — IPRATROPIUM BROMIDE AND ALBUTEROL SULFATE 3 ML: .5; 3 SOLUTION RESPIRATORY (INHALATION) at 08:00

## 2025-05-28 RX ADMIN — RIVAROXABAN 20 MG: 20 TABLET, FILM COATED ORAL at 17:20

## 2025-05-28 RX ADMIN — AMPICILLIN AND SULBACTAM 3 G: 2; 1 INJECTION, POWDER, FOR SOLUTION INTRAVENOUS at 12:52

## 2025-05-28 RX ADMIN — HYDROCODONE BITARTRATE AND ACETAMINOPHEN 1 TABLET: 10; 325 TABLET ORAL at 00:00

## 2025-05-28 RX ADMIN — FLUCONAZOLE 400 MG: 200 TABLET ORAL at 16:56

## 2025-05-28 RX ADMIN — ATORVASTATIN CALCIUM 20 MG: 20 TABLET, FILM COATED ORAL at 21:29

## 2025-05-28 NOTE — PROGRESS NOTES
Walking oximetry attempted today per MD orders, pt refused due to inability to walk and complaints of ulcers on feet. Stated she does not get around well

## 2025-05-28 NOTE — PROGRESS NOTES
Pulmonary / Critical Care Progress Note      Patient Name: Mary Albarran  : 1950  MRN: [id number]  Attending:  Jan Negrete MD  Date of admission: 2025    Subjective   Subjective   Follow-up for acute on chronic hypoxic respiratory failure, concern for pneumonia from unknown organism, question COPD exacerbation    No acute events overnight    This morning,  Currently on 2 L  Resting in bed  Overall feeling better  Dyspnea improving  Occasional cough  Denies any chest pain chest tightness  No fever or chills  Off Cardizem drip  Eager to discharge home      Objective   Objective     Vitals:   Temp:  [97.7 °F (36.5 °C)-98.1 °F (36.7 °C)] 97.7 °F (36.5 °C)  Heart Rate:  [] 93  Resp:  [18-20] 18  BP: (116-126)/(50-68) 126/60  Flow (L/min) (Oxygen Therapy):  [2-2.5] 2    Physical Exam   Vital Signs Reviewed   General:  WDWN, Alert, NAD.  Chronically ill-appearing female, lying in bed  HEENT:  PERRL, EOMI.  OP, nares clear  Chest: Improved aeration with diminished breath sounds bilaterally with Velcro-like crackles and scattered rhonchi  CV: RRR, no MGR, pulses 2+, equal.  Abd:  Soft, NT, ND, + BS, no HSM  EXT:  no clubbing, no cyanosis, no edema  Neuro:  A&Ox3, CN grossly intact, no focal deficits.  Skin: No rashes or lesions noted      Result Review    Result Review:  I have personally reviewed the results from the time of this admission to 2025 12:48 EDT and agree with these findings:  [x]  Laboratory  [x]  Microbiology  [x]  Radiology  [x]  EKG/Telemetry   [x]  Cardiology/Vascular   []  Pathology  []  Old records  []  Other:  Most notable findings include:       Lab 25  0503 25  0457 25  0420 25  1550   WBC 10.11 11.32* 14.21* 16.72*   HEMOGLOBIN 11.3* 10.9* 11.2* 13.1   HEMATOCRIT 37.3 36.6 37.1 43.6   PLATELETS 101* 82* 85* 102*   SODIUM 133* 134* 137 139   POTASSIUM 4.5 5.2 4.6 4.7   CHLORIDE 99 101 102 99   CO2 22.2 18.4* 23.2 27.5   BUN 53.8* 41* 23 23    CREATININE 1.24* 1.17* 1.00 1.03*   GLUCOSE 153* 260* 294* 213*   CALCIUM 9.5 8.9 8.6 9.5   PHOSPHORUS 5.0* 4.1 3.5  --    TOTAL PROTEIN  --   --   --  8.2   ALBUMIN 3.5 3.2*  --  4.2   GLOBULIN  --   --   --  4.0     CT Chest With Contrast Diagnostic  Result Date: 5/25/2025  CT CHEST W CONTRAST DIAGNOSTIC Date of Exam: 5/25/2025 7:06 PM EDT Indication: Shortness of breath. Comparison: 4/27/2025 Technique: Axial CT images were obtained of the chest after the uneventful intravenous administration of iodinated contrast.  Reconstructed coronal and sagittal images were also obtained. Automated exposure control and iterative construction methods were  used. Findings: There is motion degradation, and there is streak artifact from the patient's arms. Nodular enlargement of the right thyroid lobe is unchanged. Heart remains enlarged. No pleural or pericardial effusion is seen. There is atherosclerotic disease and aortic  valvular disease. Prevascular adenopathy measures 1.6 cm, previously 1.4 cm. Precarinal adenopathy seen previously is not well characterized due to artifact currently. No hilar or axillary adenopathy. Upper abdominal images show probable splenomegaly. Lung windows show increased consolidation in the lower lobes with some persistent mild opacity in the right middle lobe and lingula. Findings probably reflect worsening atelectasis although pneumonia is not excluded, particularly in the lower lobes. Groundglass opacities and septal thickening are again identified suggesting mild pulmonary edema. No pneumothorax.     Impression: 1.Increasing consolidation in the lower lobes with some persistent mild opacity in the right middle lobe and lingula. Findings probably reflect worsening atelectasis although pneumonia is not excluded, particularly in the lower lobes. 2.Groundglass opacities and septal thickening are again identified suggesting mild pulmonary edema. 3.Cardiomegaly. 4.Mediastinal lymphadenopathy, as  described above. This may be reactive. Attention on 3-month follow-up CT recommended. 5.Additional findings as given above. Electronically Signed: Jean Tamez MD  5/25/2025 7:32 PM EDT  Workstation ID: [id number]    Results for orders placed during the hospital encounter of 02/11/25    Adult Transthoracic Echo Complete W/ Cont if Necessary Per Protocol    Interpretation Summary    Left ventricular systolic function is mildly decreased. Left ventricular ejection fraction appears to be 46 - 50%. with some anterior septal hypokensis    The right ventricular cavity is mildly dilated.    The left atrial cavity is mildly dilated.    The right atrial cavity is moderately  dilated.    Mild to moderate aortic valve stenosis is present.    Estimated right ventricular systolic pressure from tricuspid regurgitation is moderately elevated (45-55 mmHg).    BNP elevated  Procalcitonin elevated    Assessment & Plan   Assessment / Plan     Active Hospital Problems:  Active Hospital Problems    Diagnosis     **PNA (pneumonia)      Impression:  Acute hypoxemic and hypercapnic respiratory failure requiring BiPAP  Acute exacerbation of COPD  Community-acquired pneumonia for unspecified organism  Therapeutic drug monitoring of antibiotics  Acute decompensated diastolic congestive heart failure  Acute cardiogenic pulmonary edema  Lactic acidosis, clinically significant  Leukocytosis  Class III obesity with BMI 45  Hyponatremia, clinically insignificant     Plan:  Wean O2 to keep SPO2 greater than 90%   Continue BiPAP nightly and with naps on current settings  Chest CT personally reviewed showing findings consistent with pneumonia as well as some volume overload  Continue Brovana, Pulmicort DuoNebs  MRSA PCR positive.  Continue vancomycin.  Day 3/5  Continue Unasyn to complete 5 days of antibiotics  Cultures currently negative/pending  Monitor vancomycin levels and renal panels for therapeutic drug monitoring  Continue prednisone 40 mg  daily to complete 5 days  Continue midodrine 5 mg 3 times daily  Continue Bumex 2 mg p.o. daily  Trend renal panel and electrolytes  Continue bronchopulmonary hygiene  Continue nebulizers  Encourage activity and incentive spirometer use  RT  consulted.  However, patient refuses home NIPPV  Anticipate discharge in the next 1 to 2 days  Follow-up with us 1 to 2 weeks after discharge    VTE Prophylaxis:  Pharmacologic & mechanical VTE prophylaxis orders are present.    CODE STATUS:   Code Status (Patient has no pulse and is not breathing): CPR (Attempt to Resuscitate)  Medical Interventions (Patient has pulse or is breathing): Full Support  Level Of Support Discussed With: Patient      Labs, imaging, microbiology, notes and medications personally reviewed  Discussed with primary    I, Dr. Eliud Munoz, have spent more than 50% of the total time managing the patient in this encounter today.  This included personally reviewing all pertinent labs, imaging, microbiology and documentation. Also discussing the case with the patient and any available family, the admitting physician and any available ancillary staff.    Electronically signed by LUCERO Bains, 05/28/25, 12:18 PM EDT.  Electronically signed by Eliud Munoz MD, 05/28/25, 12:48 PM EDT.

## 2025-05-28 NOTE — THERAPY EVALUATION
Acute Care - Physical Therapy Initial Evaluation   [facility]     Patient Name: Mary Albarran  : 1950  MRN: [id number]  Today's Date: 2025      Visit Dx:     ICD-10-CM ICD-9-CM   1. Pneumonia due to infectious organism, unspecified laterality, unspecified part of lung  J18.9 486   2. Sepsis, due to unspecified organism, unspecified whether acute organ dysfunction present  A41.9 038.9     995.91   3. Dysphagia, unspecified type  R13.10 787.20   4. Decreased activities of daily living (ADL)  Z78.9 V49.89   5. Difficulty walking  R26.2 719.7     Patient Active Problem List   Diagnosis    Aortic stenosis, moderate    Longstanding persistent atrial fibrillation    Chronic kidney disease, stage III (moderate)    Hyperlipidemia    Hypertension    Chronic diastolic congestive heart failure    Atrial fibrillation with RVR    Onychomycosis    Onychocryptosis    Foot pain, bilateral    Nephrolithiasis    Recurrent urinary tract infection    Acute on chronic HFrEF (heart failure with reduced ejection fraction)    Cellulitis of right leg without foot    Diabetes mellitus type 2 with complications    Depression    COPD (chronic obstructive pulmonary disease)    Chronic pain    Anxiety    Heart failure with reduced ejection fraction    CHF exacerbation    Sepsis    C. difficile diarrhea    History of home oxygen therapy    PNA (pneumonia)     Past Medical History:   Diagnosis Date    Allergic rhinitis     Anxiety     Aortic valve disease 2021    Fibrocalcific changes of the aortic valve with mild aortic valve stenosis   on echo 3/2/2021  Moderate aortic valve regurgitation is present. Aortic valve maximum pressure gradient is 26 mmHg. Moderate aortic valve stenosis is present.  On echo 2/10/2022       Arthritis     Atrial fibrillation     CHF (congestive heart failure)     Chronic kidney disease (CKD), stage III (moderate)     Chronic pain     COPD (chronic obstructive pulmonary disease)     Coronary artery disease      Diabetes mellitus type 2 with complications     Elevated cholesterol     Ex-smoker     QUIT SMOKING 2008    GERD (gastroesophageal reflux disease)     History of home oxygen therapy     2L/NC AAT REPORTED    History of transfusion     Hyperlipidemia     Hypertension     Thrombocytopenia      Past Surgical History:   Procedure Laterality Date    HYSTERECTOMY      30 YEARS AGO     PT Assessment (Last 12 Hours)       PT Evaluation and Treatment       Row Name 05/28/25 1127          Physical Therapy Time and Intention    Subjective Information no complaints (P)   -RA     Document Type evaluation (P)   -RA     Mode of Treatment individual therapy;physical therapy (P)   -RA     Patient Effort good (P)   -RA       Row Name 05/28/25 1127          General Information    Patient Profile Reviewed yes (P)   -RA     Patient Observations alert;cooperative (P)   -RA     Prior Level of Function independent:;gait;transfer;bed mobility;ADL's (P)   -RA     Equipment Currently Used at Home walker, standard;oxygen;ramp (P)   -RA     Existing Precautions/Restrictions fall (P)   -RA     Barriers to Rehab none identified (P)   -RA       Row Name 05/28/25 1127          Living Environment    Current Living Arrangements home (P)   -RA     People in Home spouse (P)   -RA     Primary Care Provided by self (P)   -RA       Row Name 05/28/25 1127          Home Use of Assistive/Adaptive Equipment    Equipment Currently Used at Home walker, standard;oxygen;ramp (P)   -RA       Row Name 05/28/25 1127          Cognition    Orientation Status (Cognition) oriented x 3 (P)   -RA       Row Name 05/28/25 1127          Range of Motion (ROM)    Range of Motion bilateral lower extremities;ROM is WFL (P)   -RA       Row Name 05/28/25 1127          Strength (Manual Muscle Testing)    Strength (Manual Muscle Testing) other (see comments) (P)   Strength testing deferred past the hips; pt did not want her legs touched; hip strength [facility]  -       Row Name  05/28/25 1127          Strength Comprehensive (MMT)    Comment, General Manual Muscle Testing (MMT) Assessment BLE: 4-/5 (P)   -RA       Row Name 05/28/25 1127          Bed Mobility    Bed Mobility supine-sit-supine (P)   -RA     Supine-Sit-Supine [place] (Bed Mobility) contact guard (P)   -RA     Assistive Device (Bed Mobility) bed rails;head of bed elevated (P)   -RA       Row Name 05/28/25 1127          Transfers    Comment, (Transfers) Pt declined any further transfers until her feet/legs are wrapped. (P)   -RA       Row Name 05/28/25 1127          Gait/Stairs (Locomotion)    Patient was able to Ambulate no, other medical factors prevent ambulation (P)   -RA     Reason Patient was unable to Ambulate Other (Comment) (P)   Refused ambulation due to legs not being wrapped  -RA       Row Name 05/28/25 1127          Safety Issues/Impairments Affecting Functional Mobility    Impairments Affecting Function (Mobility) balance;endurance/activity tolerance;strength (P)   -RA       Row Name 05/28/25 1127          Balance    Balance Assessment sitting dynamic balance (P)   -RA     Dynamic Sitting Balance contact guard (P)   -RA     Position, Sitting Balance sitting edge of bed (P)   -RA       Row Name             Wound 02/11/25 1338 Right posterior foot unspecified    Wound - Properties Group Placement Date: 02/11/25  -KE Placement Time: 1338  -KE Side: Right  -KE Orientation: posterior  -KE Location: foot  -KE Type: unspecified  -KE    Retired Wound - Properties Group Placement Date: 02/11/25  -KE Placement Time: 1338  -KE Side: Right  -KE Orientation: posterior  -KE Location: foot  -KE Type: unspecified  -KE    Retired Wound - Properties Group Placement Date: 02/11/25  -KE Placement Time: 1338  -KE Side: Right  -KE Orientation: posterior  -KE Location: foot  -KE Type: unspecified  -KE    Retired Wound - Properties Group Date first assessed: 02/11/25  -KE Time first assessed: 1338  -KE Side: Right  -KE Location: foot   -KE Type: unspecified  -KE      Row Name             Wound 03/18/25 1812 Bilateral anterior groin    Wound - Properties Group Placement Date: 03/18/25  -AH Placement Time: 1812 -AH Present on Original Admission: Y  -AH Side: Bilateral  -AH Orientation: anterior  -AH Location: groin  -AH    Retired Wound - Properties Group Placement Date: 03/18/25  -AH Placement Time: 1812 -AH Present on Original Admission: Y  -AH Side: Bilateral  -AH Orientation: anterior  -AH Location: groin  -AH    Retired Wound - Properties Group Placement Date: 03/18/25  -AH Placement Time: 1812 -AH Present on Original Admission: Y  -AH Side: Bilateral  -AH Orientation: anterior  -AH Location: groin  -AH    Retired Wound - Properties Group Date first assessed: 03/18/25  -AH Time first assessed: 1812 -AH Present on Original Admission: Y  -AH Side: Bilateral  -AH Location: groin  -AH      Row Name             Wound 04/04/25 2112 Left lower breast    Wound - Properties Group Placement Date: 04/04/25  -RW Placement Time: 2112 -RW Present on Original Admission: Y  -RW Side: Left  -RW Orientation: lower  -RW Location: breast  -RW    Retired Wound - Properties Group Placement Date: 04/04/25  -RW Placement Time: 2112 -RW Present on Original Admission: Y  -RW Side: Left  -RW Orientation: lower  -RW Location: breast  -RW    Retired Wound - Properties Group Placement Date: 04/04/25  -RW Placement Time: 2112 -RW Present on Original Admission: Y  -RW Side: Left  -RW Orientation: lower  -RW Location: breast  -RW    Retired Wound - Properties Group Date first assessed: 04/04/25  -RW Time first assessed: 2112 -RW Present on Original Admission: Y  -RW Side: Left  -RW Location: breast  -RW      Row Name             Wound 04/04/25 2114 Left anterior foot    Wound - Properties Group Placement Date: 04/04/25  -RW Placement Time: 2114 -RW Side: Left  -RW Orientation: anterior  -RW Location: foot  -RW    Retired Wound - Properties Group Placement Date:  04/04/25  -RW Placement Time: 2114 -RW Side: Left  -RW Orientation: anterior  -RW Location: foot  -RW    Retired Wound - Properties Group Placement Date: 04/04/25  -RW Placement Time: 2114 -RW Side: Left  -RW Orientation: anterior  -RW Location: foot  -RW    Retired Wound - Properties Group Date first assessed: 04/04/25  -RW Time first assessed: 2114 -RW Side: Left  -RW Location: foot  -RW      Row Name 05/28/25 1127          Plan of Care Review    Plan of Care Reviewed With patient (P)   -RA     Outcome Evaluation Pt presents with decreased strength, transfers and functional mobility. Will benefit from inpatient PT services and continued PT services upon discharge. (P)   -RA       Row Name 05/28/25 1127          Therapy Assessment/Plan (PT)    Criteria for Skilled Interventions Met (PT) yes;meets criteria (P)   -RA     Therapy Frequency (PT) daily (P)   -RA     Predicted Duration of Therapy Intervention (PT) 10 days (P)   -RA     Problem List (PT) problems related to;mobility (P)   -RA     Activity Limitations Related to Problem List (PT) unable to transfer safely;unable to ambulate safely (P)   -RA       Row Name 05/28/25 1127          PT Evaluation Complexity    History, PT Evaluation Complexity no personal factors and/or comorbidities (P)   -RA     Examination of Body Systems (PT Eval Complexity) total of 4 or more elements (P)   -RA     Clinical Presentation (PT Evaluation Complexity) stable (P)   -RA     Clinical Decision Making (PT Evaluation Complexity) low complexity (P)   -RA     Overall Complexity (PT Evaluation Complexity) low complexity (P)   -RA       Row Name 05/28/25 1127          Physical Therapy Goals    Transfer Goal Selection (PT) transfer, PT goal 1 (P)   -RA     Gait Training Goal Selection (PT) gait training, PT goal 1 (P)   -RA       Row Name 05/28/25 1127          Transfer Goal 1 (PT)    Activity/Assistive Device (Transfer Goal 1, PT) sit-to-stand/stand-to-sit (P)   -RA     [place]  Level/Cues Needed (Transfer Goal 1, PT) supervision required (P)   -RA     Time Frame (Transfer Goal 1, PT) 10 days (P)   -RA       Row Name 05/28/25 1127          Gait Training Goal 1 (PT)    Activity/Assistive Device (Gait Training Goal 1, PT) assistive device use;walker, rolling (P)   -RA     [place] Level (Gait Training Goal 1, PT) supervision required (P)   -RA     Distance (Gait Training Goal 1, PT) 200 (P)   -RA     Time Frame (Gait Training Goal 1, PT) 10 days (P)   -RA               User Key  (r) = Recorded By, (t) = Taken By, (c) = Cosigned By      Initials Name Provider Type    Corinna Lazaro, RN Registered Nurse    Christie Isaac RN Registered Nurse    Rocio Umanzor RN Registered Nurse    Yue Chavez, PT Student PT Student                    Physical Therapy Education       Title: PT OT SLP Therapies (In Progress)       Topic: Physical Therapy (Not Started)       Point: Mobility training (Not Started)       Learner Progress:  Not documented in this visit.              Point: Home exercise program (Not Started)       Learner Progress:  Not documented in this visit.              Point: Body mechanics (Not Started)       Learner Progress:  Not documented in this visit.              Point: Precautions (Not Started)       Learner Progress:  Not documented in this visit.                                  PT Recommendation and Plan  Anticipated Discharge Disposition (PT): (P) sub acute care setting  Planned Therapy Interventions (PT): (P) balance training, bed mobility training, gait training, strengthening, transfer training  Therapy Frequency (PT): (P) daily  Plan of Care Reviewed With: (P) patient  Outcome Evaluation: (P) Pt presents with decreased strength, transfers and functional mobility. Will benefit from inpatient PT services and continued PT services upon discharge.   Outcome Measures       Row Name 05/28/25 1100             How much help from another person do you currently  need...    Turning from your back to your side while in flat bed without using bedrails? 4 (P)   -RA      Moving from lying on back to sitting on the side of a flat bed without bedrails? 3 (P)   -RA      Moving to and from a bed to a chair (including a wheelchair)? 3 (P)   -RA      Standing up from a chair using your arms (e.g., wheelchair, bedside chair)? 3 (P)   -RA      Climbing 3-5 steps with a railing? 2 (P)   -RA      To walk in hospital room? 3 (P)   -RA      AM-PAC 6 Clicks Score (PT) 18 (P)   -RA                User Key  (r) = Recorded By, (t) = Taken By, (c) = Cosigned By      Initials Name Provider Type    Yue Chavez, PT Student PT Student                     Time Calculation:    PT Charges       Row Name 05/28/25 1133             Time Calculation    PT Received On 05/28/25 (P)   -RA      PT Goal Re-Cert Due Date 06/06/25 (P)   -RA         Untimed Charges    PT Eval/Re-eval Minutes 20 (P)   -RA         Total Minutes    Untimed Charges Total Minutes 20 (P)   -RA       Total Minutes 20 (P)   -RA                User Key  (r) = Recorded By, (t) = Taken By, (c) = Cosigned By      Initials Name Provider Type    Yue Chavez, PT Student PT Student                  Therapy Charges for Today       Code Description Service Date Service Provider Modifiers Qty    62394079761 HC PT EVAL LOW COMPLEXITY 3 5/28/2025 Yue Castillo, PT Student GP 1            PT G-Codes  Outcome Measure Options: AM-PAC 6 Clicks Daily Activity (OT), Optimal Instrument  AM-PAC 6 Clicks Score (PT): (P) 18  AM-PAC 6 Clicks Score (OT): 15    Yue Castillo PT Student  5/28/2025

## 2025-05-28 NOTE — PLAN OF CARE
Goal Outcome Evaluation:   Pt did not wear bipap overnight, has not worn since 5/26. When off bipap, pt rests comfortably on 2 liters. No changes at this time

## 2025-05-28 NOTE — PLAN OF CARE
Goal Outcome Evaluation:  Plan of Care Reviewed With: patient        Progress: improving  Outcome Evaluation: Patient is on a 2L nasal cannula and refuses to wear that mask (Bipap).

## 2025-05-28 NOTE — PLAN OF CARE
Goal Outcome Evaluation:              Outcome Evaluation: Pt a/ox4, on 2LNC pt didn't wear bipap last night, pain med given per MAR, pt afib on monitor, wound care done per MAR, pt makes needs known, will continue plan of care.

## 2025-05-28 NOTE — PROGRESS NOTES
[facility]   Hospitalist Progress Note  Date: 2025  Patient Name: Mary Albarran  : 1950  MRN: [id number]  Date of admission: 2025      Subjective   Subjective     Chief Complaint: Altered mental status    Summary: Patient is 74-year-old female past medical history significant for obesity, systolic heart failure, atrial fibrillation on Xarelto, COPD, CKD, chronic pain, diabetes, mild to moderate aortic valve stenosis that presents to the emergency department with worsening mentation evaluated emergency department was noted to be febrile elevated leukocyte count evidence of pneumonia and fluid overload has been admitted to the hospitalist service pulmonary consulted started on broad-spectrum antibiotics cefepime and vancomycin started on IV Lasix and A-fib with RVR started on diltiazem drip holding sedating medications.  With A-fib rate controlled Cardizem drip was DC'd.  Patient refused to use BiPAP due to pressure on the face.  Declined FVC by RT case management and stated she will not be using BiPAP at home.    Interval Followup:   Remains on 2 liters with 94% saturation.   blood pressure stable on midodrine.  Has been refusing BiPAP.  Does not want to use it.  Creatinine trending up.  Awake alert oriented.  Complaining of persistent nausea.  Positive BM yesterday.  Not constipated.  No abdominal pain  Have cough not able to bring up mucus.  Shortness of air improving.  Has questions about recurrent MRSA.  Started on Bactroban and Hibiclens washes.    Objective   Objective     Vitals:   Temp:  [97.7 °F (36.5 °C)-98.1 °F (36.7 °C)] 97.7 °F (36.5 °C)  Heart Rate:  [] 76  Resp:  [18-20] 18  BP: (116-126)/(50-68) 126/60  Flow (L/min) (Oxygen Therapy):  [2-2.5] 2    Physical Exam   Constitutional: Awake alert no acute distress   Cardiovascular: Irregularly irregular   Respiratory diminished  GI: Abdomen soft nontender   Skin; extensive chronic stasis changes bilateral extremity with erythema  in the right lower extremity extending medially proximal thigh area.  Bilateral breast, abdominal fold/groin area redness    Result Review    Result Review:  I have personally reviewed the results from the time of this admission to 5/28/2025 12:30 EDT and agree with these findings:  [x]  Laboratory  [x]  Microbiology  []  Radiology  []  EKG/Telemetry   []  Cardiology/Vascular   []  Pathology  [x]  Old records  [x]  Other: Medications    Assessment & Plan   Assessment / Plan   Assessment:    Bilateral lower lobe multifocal pneumonia.  Unspecified bacterial pathogen  MRSA PCR positive.  Recurrent  Acute on chronic systolic congestive heart failure.  Improving  Fluid overload  A-fib RVR on anticoagulation with Xarelto  Diabetes type II with recent hemoglobin A1c of 9%.  With hyperglycemia  COPD with acute exacerbation.  Improving  Hypertension  Hyperlipidemia  Thrombocytopenia  Chronic hypoxic respiratory failure on home oxygen.  Stable  Morbid obesity BMI 45  Chronic stasis changes bilateral extremity with  yeast infection.  CKD 3a.    Chronic anemia and thrombocytopenia.  Stable.   sepsis present on admission due to pneumonia.  Patient had tachycardia, hypoxemia, leukocytosis and elevated procalcitonin level.    Plan:    Continuing broad-spectrum antibiotics with vancomycin.  Cefepime switched to Unasyn as per pulmonary  DC Lasix 40 IV twice daily, switch to home p.o. Bumex  Continue on midodrine 10 mg 3 times daily  Continue nebulizer treatment  Oral steroids.  Pepcid for GI prophylaxis  Resumed home Xarelto   Off diltiazem drip, resumed home oral diltiazem   Toprol XL resumed at lower dose due to hypotension increase as tolerated.  Hold home Entresto  Sedating medications on hold including Lyrica  SSI per protocol resumed basal insulin  Appreciate wound nurse input  Fluconazole  Advance diet per speech therapy  PT OT speech therapy consultations  Pulmonary consulted.  Appreciate input.  Appreciate RT case  management input   continue telemetry  Antiemetics  Legionella and strep pneumonia antigen negative  Bactroban and hebacillin washes for MRSA colonization.  Discussed plan with RN.  Discharge home when stable likely in a.m.  Does not want to go to rehab    VTE Prophylaxis:  Pharmacologic & mechanical VTE prophylaxis orders are present.        CODE STATUS:   Code Status (Patient has no pulse and is not breathing): CPR (Attempt to Resuscitate)  Medical Interventions (Patient has pulse or is breathing): Full Support  Level Of Support Discussed With: Patient    Electronically signed by Jan Negrete MD, 05/28/25, 12:34 PM EDT.

## 2025-05-28 NOTE — PLAN OF CARE
Goal Outcome Evaluation:              Outcome Evaluation: Patient is on 2L NC. Complained of pain and treated per MAR. Wound/skin care completed and legs wrapped with kerlix and ace wrap. IV ABX administered per MAR.

## 2025-05-28 NOTE — PLAN OF CARE
Goal Outcome Evaluation:     Outcome Evaluation: (P) Pt presents with decreased strength, transfers and functional mobility. Will benefit from inpatient PT services and continued PT services upon discharge.    Anticipated Discharge Disposition (PT): (P) sub acute care setting

## 2025-05-29 ENCOUNTER — READMISSION MANAGEMENT (OUTPATIENT)
Dept: CALL CENTER | Facility: HOSPITAL | Age: 75
End: 2025-05-29
Payer: MEDICARE

## 2025-05-29 VITALS
BODY MASS INDEX: 45.04 KG/M2 | HEART RATE: 86 BPM | DIASTOLIC BLOOD PRESSURE: 79 MMHG | HEIGHT: 63 IN | WEIGHT: 254.19 LBS | TEMPERATURE: 98.2 F | SYSTOLIC BLOOD PRESSURE: 123 MMHG | OXYGEN SATURATION: 90 % | RESPIRATION RATE: 24 BRPM

## 2025-05-29 PROBLEM — J18.9 PNA (PNEUMONIA): Status: RESOLVED | Noted: 2025-05-25 | Resolved: 2025-05-29

## 2025-05-29 LAB
ALBUMIN SERPL-MCNC: 3.6 G/DL (ref 3.5–5.2)
ANION GAP SERPL CALCULATED.3IONS-SCNC: 12.9 MMOL/L (ref 5–15)
BUN SERPL-MCNC: 47 MG/DL (ref 8–23)
BUN/CREAT SERPL: 39.5 (ref 7–25)
CALCIUM SPEC-SCNC: 9.6 MG/DL (ref 8.6–10.5)
CHLORIDE SERPL-SCNC: 100 MMOL/L (ref 98–107)
CO2 SERPL-SCNC: 23.1 MMOL/L (ref 22–29)
CREAT SERPL-MCNC: 1.19 MG/DL (ref 0.57–1)
EGFRCR SERPLBLD CKD-EPI 2021: 48.7 ML/MIN/1.73
GLUCOSE BLDC GLUCOMTR-MCNC: 140 MG/DL (ref 70–99)
GLUCOSE BLDC GLUCOMTR-MCNC: 200 MG/DL (ref 70–99)
GLUCOSE BLDC GLUCOMTR-MCNC: 237 MG/DL (ref 70–99)
GLUCOSE SERPL-MCNC: 154 MG/DL (ref 65–99)
PHOSPHATE SERPL-MCNC: 4.6 MG/DL (ref 2.5–4.5)
POTASSIUM SERPL-SCNC: 4.4 MMOL/L (ref 3.5–5.2)
PROCALCITONIN SERPL-MCNC: 1.76 NG/ML (ref 0–0.25)
SODIUM SERPL-SCNC: 136 MMOL/L (ref 136–145)

## 2025-05-29 PROCEDURE — 94799 UNLISTED PULMONARY SVC/PX: CPT

## 2025-05-29 PROCEDURE — 63710000001 PREDNISONE PER 1 MG

## 2025-05-29 PROCEDURE — 63710000001 INSULIN GLARGINE PER 5 UNITS: Performed by: FAMILY MEDICINE

## 2025-05-29 PROCEDURE — 99233 SBSQ HOSP IP/OBS HIGH 50: CPT | Performed by: INTERNAL MEDICINE

## 2025-05-29 PROCEDURE — 63710000001 INSULIN LISPRO (HUMAN) PER 5 UNITS: Performed by: FAMILY MEDICINE

## 2025-05-29 PROCEDURE — 84145 PROCALCITONIN (PCT): CPT | Performed by: INTERNAL MEDICINE

## 2025-05-29 PROCEDURE — 25010000002 AMPICILLIN-SULBACTAM PER 1.5 G: Performed by: INTERNAL MEDICINE

## 2025-05-29 PROCEDURE — 94664 DEMO&/EVAL PT USE INHALER: CPT

## 2025-05-29 PROCEDURE — 80069 RENAL FUNCTION PANEL: CPT | Performed by: INTERNAL MEDICINE

## 2025-05-29 PROCEDURE — 82948 REAGENT STRIP/BLOOD GLUCOSE: CPT

## 2025-05-29 PROCEDURE — 99239 HOSP IP/OBS DSCHRG MGMT >30: CPT | Performed by: INTERNAL MEDICINE

## 2025-05-29 PROCEDURE — 94761 N-INVAS EAR/PLS OXIMETRY MLT: CPT

## 2025-05-29 RX ORDER — MICONAZOLE NITRATE 20 MG/G
1 CREAM TOPICAL 2 TIMES DAILY
Qty: 30 G | Refills: 0 | Status: SHIPPED | OUTPATIENT
Start: 2025-05-29

## 2025-05-29 RX ORDER — PREDNISONE 20 MG/1
40 TABLET ORAL
Qty: 2 TABLET | Refills: 0 | Status: SHIPPED | OUTPATIENT
Start: 2025-05-30

## 2025-05-29 RX ORDER — MIDODRINE HYDROCHLORIDE 10 MG/1
10 TABLET ORAL
Qty: 90 TABLET | Refills: 0 | Status: SHIPPED | OUTPATIENT
Start: 2025-05-29

## 2025-05-29 RX ORDER — METOPROLOL SUCCINATE 25 MG/1
25 TABLET, EXTENDED RELEASE ORAL EVERY 12 HOURS SCHEDULED
Qty: 60 TABLET | Refills: 0 | Status: SHIPPED | OUTPATIENT
Start: 2025-05-29

## 2025-05-29 RX ORDER — DOXYCYCLINE HYCLATE 100 MG
100 TABLET ORAL 2 TIMES DAILY
Qty: 6 TABLET | Refills: 0 | Status: SHIPPED | OUTPATIENT
Start: 2025-05-29 | End: 2025-06-02

## 2025-05-29 RX ADMIN — ARFORMOTEROL TARTRATE 15 MCG: 15 SOLUTION RESPIRATORY (INHALATION) at 07:36

## 2025-05-29 RX ADMIN — IPRATROPIUM BROMIDE AND ALBUTEROL SULFATE 3 ML: .5; 3 SOLUTION RESPIRATORY (INHALATION) at 00:31

## 2025-05-29 RX ADMIN — BUMETANIDE 2 MG: 1 TABLET ORAL at 08:19

## 2025-05-29 RX ADMIN — PREDNISONE 40 MG: 20 TABLET ORAL at 08:19

## 2025-05-29 RX ADMIN — MIDODRINE HYDROCHLORIDE 10 MG: 10 TABLET ORAL at 12:14

## 2025-05-29 RX ADMIN — MUPIROCIN 1 APPLICATION: 20 OINTMENT TOPICAL at 08:19

## 2025-05-29 RX ADMIN — AMPICILLIN AND SULBACTAM 3 G: 2; 1 INJECTION, POWDER, FOR SOLUTION INTRAVENOUS at 00:05

## 2025-05-29 RX ADMIN — ACETAMINOPHEN 650 MG: 325 TABLET ORAL at 00:10

## 2025-05-29 RX ADMIN — FLUCONAZOLE 400 MG: 200 TABLET ORAL at 17:45

## 2025-05-29 RX ADMIN — MIDODRINE HYDROCHLORIDE 10 MG: 10 TABLET ORAL at 17:45

## 2025-05-29 RX ADMIN — INSULIN LISPRO 5 UNITS: 100 INJECTION, SOLUTION INTRAVENOUS; SUBCUTANEOUS at 17:46

## 2025-05-29 RX ADMIN — MIDODRINE HYDROCHLORIDE 10 MG: 10 TABLET ORAL at 08:21

## 2025-05-29 RX ADMIN — Medication 10 ML: at 08:20

## 2025-05-29 RX ADMIN — AMPICILLIN AND SULBACTAM 3 G: 2; 1 INJECTION, POWDER, FOR SOLUTION INTRAVENOUS at 12:14

## 2025-05-29 RX ADMIN — DILTIAZEM HYDROCHLORIDE 180 MG: 180 CAPSULE, EXTENDED RELEASE ORAL at 08:18

## 2025-05-29 RX ADMIN — METOPROLOL SUCCINATE 25 MG: 25 TABLET, EXTENDED RELEASE ORAL at 08:19

## 2025-05-29 RX ADMIN — MICONAZOLE NITRATE 1 APPLICATION: 2 CREAM TOPICAL at 11:46

## 2025-05-29 RX ADMIN — HYDROCODONE BITARTRATE AND ACETAMINOPHEN 1 TABLET: 10; 325 TABLET ORAL at 10:03

## 2025-05-29 RX ADMIN — INSULIN GLARGINE 20 UNITS: 100 INJECTION, SOLUTION SUBCUTANEOUS at 08:20

## 2025-05-29 RX ADMIN — IPRATROPIUM BROMIDE AND ALBUTEROL SULFATE 3 ML: .5; 3 SOLUTION RESPIRATORY (INHALATION) at 12:02

## 2025-05-29 RX ADMIN — AMPICILLIN AND SULBACTAM 3 G: 2; 1 INJECTION, POWDER, FOR SOLUTION INTRAVENOUS at 06:20

## 2025-05-29 RX ADMIN — IPRATROPIUM BROMIDE AND ALBUTEROL SULFATE 3 ML: .5; 3 SOLUTION RESPIRATORY (INHALATION) at 07:36

## 2025-05-29 RX ADMIN — BUDESONIDE 0.5 MG: 0.5 SUSPENSION RESPIRATORY (INHALATION) at 07:36

## 2025-05-29 RX ADMIN — INSULIN LISPRO 5 UNITS: 100 INJECTION, SOLUTION INTRAVENOUS; SUBCUTANEOUS at 12:14

## 2025-05-29 NOTE — DISCHARGE SUMMARY
[facility]        HOSPITALIST  DISCHARGE SUMMARY    Patient Name: Mary Albarran  : 1950  MRN: [id number]    Date of Admission: 2025  Date of Discharge:  2025  Primary Care Physician: Brittni Quiroz APRN    Consults       Date and Time Order Name Status Description    2025  9:23 PM Inpatient Pulmonology Consult Completed     2025  8:23 PM Hospitalist (on-call MD unless specified)              Active and Resolved Hospital Problems:  Active Hospital Problems   No active problems to display.      Resolved Hospital Problems    Diagnosis POA    **PNA (pneumonia) [J18.9] Yes   Bilateral lower lobe multifocal pneumonia.  Unspecified bacterial pathogen  MRSA PCR positive.  Recurrent  Acute on chronic systolic congestive heart failure.  Improving  Fluid overload  A-fib RVR on admission, on anticoagulation with Xarelto  Diabetes type II with recent hemoglobin A1c of 9%.  With hyperglycemia  COPD with acute exacerbation.  Improving  Hypertension  Hyperlipidemia  Thrombocytopenia  Chronic hypoxic respiratory failure on home oxygen.  Stable  Morbid obesity BMI 45  Chronic stasis changes bilateral extremity with  yeast infection.  CKD 3a.    Chronic anemia and thrombocytopenia.  Stable.   sepsis present on admission due to pneumonia.  Patient had tachycardia, hypoxemia, leukocytosis and elevated procalcitonin level.    Hospital Course     Hospital Course:  Mary Albarran is a 74 y.o. female  past medical history significant for obesity, systolic heart failure, atrial fibrillation on Xarelto, COPD, CKD, chronic pain, diabetes, mild to moderate aortic valve stenosis that presents to the emergency department with worsening mentation evaluated emergency department was noted to be febrile elevated leukocyte count evidence of pneumonia and fluid overload has been admitted to the hospitalist service pulmonary consulted started on broad-spectrum antibiotics cefepime and vancomycin  started on IV Lasix and A-fib with RVR started on diltiazem drip holding sedating medications.  With A-fib rate controlled Cardizem drip was DC'd.  Patient refused to use BiPAP due to pressure on the face.  Declined FVC by RT case management and stated she will not be using BiPAP at home.     Interval Followup:   Remains on 2 liters with 94% saturation.   blood pressure stable on midodrine.  Has been refusing BiPAP.  Does not want to use it.  Creatinine trending trending down  Awake alert oriented.  No nausea.  Positive small BM .  Not constipated.  No abdominal pain  Have cough not able to bring up mucus.  Shortness of air improving.  Has questions about recurrent MRSA.  Started on Bactroban and Hibiclens washes.  Patient wants to go home today.  Patient wanted portable oxygen tank but refused to ambulate to qualify for it.  Patient does not want to go to rehab.  Urine culture with gram-negative fernanda.  Patient totally asymptomatic.  Finished fluconazole for extensive yeast infection of skin.    DISCHARGE Follow Up Recommendations for labs and diagnostics: PCP, pulmonary and cardiology.  Continue home oxygen.  PCP to follow-up on results of pending blood and urine cultures.  Home metoprolol dose decreased due to soft blood pressure      Day of Discharge     Vital Signs:  Temp:  [97.5 °F (36.4 °C)-98.2 °F (36.8 °C)] 98.2 °F (36.8 °C)  Heart Rate:  [68-88] 86  Resp:  [18-24] 24  BP: (104-123)/(51-79) 123/79  Flow (L/min) (Oxygen Therapy):  [2] 2    Physical Exam:     Constitutional: Awake alert no acute distress   Cardiovascular: Irregularly irregular   Respiratory diminished  GI: Abdomen soft nontender   Skin; extensive chronic stasis changes bilateral extremity with erythema in the right lower extremity extending medially proximal thigh area.  Bilateral breast, abdominal fold/groin area redness.  Bilateral lower extremities wrapped now with Ace wrap for compression     Discharge Details        Discharge Medications         New Medications        Instructions Start Date   doxycycline 100 MG enteric coated tablet  Commonly known as: DORYX   100 mg, Oral, 2 Times Daily      miconazole 2 % cream  Commonly known as: MICOTIN   1 Application, Topical, 2 Times Daily      midodrine 10 MG tablet  Commonly known as: PROAMATINE   10 mg, Oral, 3 Times Daily Before Meals      mupirocin 2 % nasal ointment  Commonly known as: BACTROBAN   1 Application, Each Nare, 2 Times Daily      predniSONE 20 MG tablet  Commonly known as: DELTASONE   40 mg, Oral, Daily With Breakfast   Start Date: May 30, 2025            Changes to Medications        Instructions Start Date   Entresto  MG tablet  Generic drug: sacubitril-valsartan  What changed: Another medication with the same name was removed. Continue taking this medication, and follow the directions you see here.   1 tablet, Oral, Nightly, Take 1/2 tablet BY MOUTH EVERY MORNING AND ONE TABLET AT BEDTIME      metoprolol succinate XL 25 MG 24 hr tablet  Commonly known as: TOPROL-XL  What changed:   medication strength  how much to take  when to take this   25 mg, Oral, Every 12 Hours Scheduled             Continue These Medications        Instructions Start Date   albuterol 1.25 MG/3ML nebulizer solution  Commonly known as: ACCUNEB   1.25 mg, Nebulization, Every 6 Hours PRN      albuterol sulfate  (90 Base) MCG/ACT inhaler  Commonly known as: PROVENTIL HFA;VENTOLIN HFA;PROAIR HFA   2 puffs, Inhalation, Every 4 Hours PRN      ammonium lactate 12 % lotion  Commonly known as: LAC-HYDRIN   Topical, 2 Times Daily      atorvastatin 20 MG tablet  Commonly known as: LIPITOR   TAKE 1 TABLET DAILY      budesonide 1 MG/2ML nebulizer solution  Commonly known as: PULMICORT   1 mg, Nebulization, Daily      bumetanide 2 MG tablet  Commonly known as: BUMEX   1 tablet, Daily      cholecalciferol 25 MCG (1000 UT) tablet  Commonly known as: VITAMIN D3   1,000 Units, Daily      dilTIAZem  MG 24 hr  capsule  Commonly known as: CARDIZEM CD   180 mg, Daily      HYDROcodone-acetaminophen  MG per tablet  Commonly known as: NORCO   1 tablet, Oral, Every 6 Hours PRN      Januvia 50 MG tablet  Generic drug: SITagliptin   50 mg, Daily      Lantus SoloStar 100 UNIT/ML injection pen  Generic drug: Insulin Glargine   15 Units, Subcutaneous, Daily      Magnesium Oxide -Mg Supplement 400 (240 Mg) MG tablet   1 tablet, Daily      nitrofurantoin (macrocrystal-monohydrate) 100 MG capsule  Commonly known as: MACROBID   1 capsule, Daily      potassium chloride ER 20 MEQ tablet controlled-release ER tablet  Commonly known as: K-TAB   40 mEq, 2 Times Daily      pregabalin 150 MG capsule  Commonly known as: LYRICA   150 mg, Oral, 2 Times Daily      rivaroxaban 20 MG tablet  Commonly known as: XARELTO   20 mg, Oral, Daily             Stop These Medications      valsartan 40 MG tablet  Commonly known as: DIOVAN              Allergies   Allergen Reactions    Morphine Confusion    Codeine Mental Status Change    Sglt2 Inhibitors Other (See Comments)     Frequent UTI        Discharge Disposition:  Home-Health Care c    Diet:  Diet Instructions       Diet: Diabetic Diets, Cardiac Diets; Healthy Heart (2-3 Na+); Thin (IDDSI 0); Consistent Carbohydrate      Discharge Diet:  Diabetic Diets  Cardiac Diets       Cardiac Diet: Healthy Heart (2-3 Na+)    Fluid Consistency: Thin (IDDSI 0)    Diabetic Diet: Consistent Carbohydrate            Discharge Activity:   Activity Instructions       Activity as Tolerated              CODE STATUS:  Code Status and Medical Interventions: CPR (Attempt to Resuscitate); Full Support   Ordered at: 05/25/25 7259     Code Status (Patient has no pulse and is not breathing):    CPR (Attempt to Resuscitate)     Medical Interventions (Patient has pulse or is breathing):    Full Support     Level Of Support Discussed With:    Patient         Future Appointments   Date Time Provider Department Center    7/11/2025  1:15 PM Brittni Quiroz APRN [facility]       Additional Instructions for the Follow-ups that You Need to Schedule       Discharge Follow-up with PCP   As directed       Currently Documented PCP:    Brittni Quiroz APRN    PCP Phone Number:    [phone]     Follow Up Details: 1 week                Pertinent  and/or Most Recent Results     PROCEDURES:   * Cannot find OR case *     LAB RESULTS:      Lab 05/29/25 0527 05/28/25  0503 05/27/25  0457 05/26/25  0420 05/26/25  0132 05/25/25  2155 05/25/25  1757 05/25/25  1625 05/25/25  1550   WBC  --  10.11 11.32* 14.21*  --   --   --   --  16.72*   HEMOGLOBIN  --  11.3* 10.9* 11.2*  --   --   --   --  13.1   HEMATOCRIT  --  37.3 36.6 37.1  --   --   --   --  43.6   PLATELETS  --  101* 82* 85*  --   --   --   --  102*   NEUTROS ABS  --  8.27* 9.97* 13.23*  --   --   --   --  14.75*   IMMATURE GRANS (ABS)  --  0.05 0.06* 0.05  --   --   --   --  0.08*   LYMPHS ABS  --  1.04 0.90 0.75  --   --   --   --  1.13   MONOS ABS  --  0.73 0.35 0.16  --   --   --   --  0.62   EOS ABS  --  0.01 0.02 0.00  --   --   --   --  0.09   MCV  --  90.8 92.7 91.6  --   --   --   --  92.4   PROCALCITONIN 1.76*  --  4.90*  --   --  2.79*  --   --   --    LACTATE  --   --   --   --  1.7 3.0* 2.1* 1.7 2.6*         Lab 05/29/25 0527 05/28/25  0503 05/27/25  0457 05/26/25  0420 05/25/25  1550   SODIUM 136 133* 134* 137 139   POTASSIUM 4.4 4.5 5.2 4.6 4.7   CHLORIDE 100 99 101 102 99   CO2 23.1 22.2 18.4* 23.2 27.5   ANION GAP 12.9 11.8 14.6 11.8 12.5   BUN 47.0* 53.8* 41* 23 23   CREATININE 1.19* 1.24* 1.17* 1.00 1.03*   EGFR 48.7* 46.3* 49.7* 60.0* 57.9*   GLUCOSE 154* 153* 260* 294* 213*   CALCIUM 9.6 9.5 8.9 8.6 9.5   MAGNESIUM  --  2.3 2.1 1.9 2.0   PHOSPHORUS 4.6* 5.0* 4.1 3.5  --    TSH  --   --   --  0.581  --          Lab 05/29/25  0527 05/28/25  0503 05/27/25  0457 05/25/25  1550   TOTAL PROTEIN  --   --   --  8.2   ALBUMIN 3.6 3.5 3.2* 4.2   GLOBULIN   --   --   --  4.0   ALT (SGPT)  --   --   --  13   AST (SGOT)  --   --   --  18   BILIRUBIN  --   --   --  1.1   ALK PHOS  --   --   --  100         Lab 05/28/25  0503 05/25/25  1757 05/25/25  1550   PROBNP 5,412.0*  --  1,151.0*   HSTROP T  --  14* 16*                 Lab 05/25/25  1625   PH, ARTERIAL 7.348*   PCO2, ARTERIAL 50.2*   PO2 ART 57.9*   O2 SATURATION ART 87.7*   HCO3 ART 27.6*   BASE EXCESS ART 1.1     Brief Urine Lab Results  (Last result in the past 365 days)        Color   Clarity   Blood   Leuk Est   Nitrite   Protein   CREAT   Urine HCG        05/25/25 1608 Yellow   Clear   Negative   Negative   Negative   Negative                 Microbiology Results (last 10 days)       Procedure Component Value - Date/Time    Legionella Antigen, Urine - Urine, Urine, Clean Catch [[id number]]  (Normal) Collected: 05/27/25 2358    Lab Status: Final result Specimen: Urine, Clean Catch Updated: 05/28/25 0033     LEGIONELLA ANTIGEN, URINE Negative    S. Pneumo Ag Urine or CSF - Urine, Urine, Clean Catch [[id number]]  (Normal) Collected: 05/27/25 2358    Lab Status: Final result Specimen: Urine, Clean Catch Updated: 05/28/25 0033     Strep Pneumo Ag Negative    MRSA Screen, PCR (Inpatient) - Swab, Nares [[id number]]  (Abnormal) Collected: 05/27/25 0728    Lab Status: Final result Specimen: Swab from Nares Updated: 05/27/25 0855     MRSA PCR MRSA Detected    Narrative:      The negative predictive value of this diagnostic test is high and should only be used to consider de-escalating anti-MRSA therapy. A positive result may indicate colonization with MRSA and must be correlated clinically.    Respiratory Panel PCR w/COVID-19(SARS-CoV-2) BRYAN/LENY/NINAF/PAD/COR/HUANG In-House, NP Swab in UTM/VTM, 2 HR TAT - Swab, Nasopharynx [[id number]]  (Normal) Collected: 05/26/25 1529    Lab Status: Final result Specimen: Swab from Nasopharynx Updated: 05/26/25 1619     ADENOVIRUS, PCR Not Detected     Coronavirus 229E Not Detected      Coronavirus HKU1 Not Detected     Coronavirus NL63 Not Detected     Coronavirus OC43 Not Detected     COVID19 Not Detected     Human Metapneumovirus Not Detected     Human Rhinovirus/Enterovirus Not Detected     Influenza A PCR Not Detected     Influenza B PCR Not Detected     Parainfluenza Virus 1 Not Detected     Parainfluenza Virus 2 Not Detected     Parainfluenza Virus 3 Not Detected     Parainfluenza Virus 4 Not Detected     RSV, PCR Not Detected     Bordetella pertussis pcr Not Detected     Bordetella parapertussis PCR Not Detected     Chlamydophila pneumoniae PCR Not Detected     Mycoplasma pneumo by PCR Not Detected    Narrative:      In the setting of a positive respiratory panel with a viral infection PLUS a negative procalcitonin without other underlying concern for bacterial infection, consider observing off antibiotics or discontinuation of antibiotics and continue supportive care. If the respiratory panel is positive for atypical bacterial infection (Bordetella pertussis, Chlamydophila pneumoniae, or Mycoplasma pneumoniae), consider antibiotic de-escalation to target atypical bacterial infection.    Blood Culture - Blood, Arm, Left [[id number]]  (Normal) Collected: 05/25/25 1627    Lab Status: Preliminary result Specimen: Blood from Arm, Left Updated: 05/29/25 1631     Blood Culture No growth at 4 days    Narrative:      Less than seven (7) mL's of blood was collected.  Insufficient quantity may yield false negative results.    Blood Culture - Blood, Hand, Right [[id number]]  (Normal) Collected: 05/25/25 1627    Lab Status: Preliminary result Specimen: Blood from Hand, Right Updated: 05/29/25 1631     Blood Culture No growth at 4 days    Narrative:      Less than seven (7) mL's of blood was collected.  Insufficient quantity may yield false negative results.    COVID-19, FLU A/B, RSV PCR 1 HR TAT - Swab, Nasopharynx [[id number]]  (Normal) Collected: 05/25/25 1617    Lab Status: Final result Specimen: Swab  from Nasopharynx Updated: 05/25/25 1705     COVID19 Not Detected     Influenza A PCR Not Detected     Influenza B PCR Not Detected     RSV, PCR Not Detected    Narrative:      Fact sheet for providers: https://www.fda.gov/media/861273/download    Fact sheet for patients: https://www.fda.gov/media/510235/download    Test performed by PCR.    Urine Culture - Urine, Urine, Clean Catch [[id number]]  (Abnormal) Collected: 05/25/25 1608    Lab Status: Final result Specimen: Urine, Clean Catch Updated: 05/26/25 2206     Urine Culture <10,000 CFU/mL Gram Negative Bacilli    Narrative:      Call if further workup needed.   Colonization of the urinary tract without infection is common. Treatment is discouraged unless the patient is symptomatic, pregnant, or undergoing an invasive urologic procedure.            CT Chest With Contrast Diagnostic  Result Date: 5/25/2025  Impression: 1.Increasing consolidation in the lower lobes with some persistent mild opacity in the right middle lobe and lingula. Findings probably reflect worsening atelectasis although pneumonia is not excluded, particularly in the lower lobes. 2.Groundglass opacities and septal thickening are again identified suggesting mild pulmonary edema. 3.Cardiomegaly. 4.Mediastinal lymphadenopathy, as described above. This may be reactive. Attention on 3-month follow-up CT recommended. 5.Additional findings as given above. Electronically Signed: Jean Tamez MD  5/25/2025 7:32 PM EDT  Workstation ID: [id number]    CT Head Without Contrast  Result Date: 5/25/2025  Impression: Impression: 1.No acute intracranial abnormality identified. 2.Diffuse brain atrophy. Electronically Signed: Harvey Sneed MD  5/25/2025 7:25 PM EDT  Workstation ID: [id number]    XR Chest 1 View  Result Date: 5/25/2025  Impression: Impression: Cardiomegaly with pulmonary vascular congestion. No gross pulmonary edema or suspicious consolidation Electronically Signed: Lino Lieberman  5/25/2025 5:01 PM  EDT  Workstation ID: [id number]      Results for orders placed during the hospital encounter of 04/04/25    Duplex Venous Lower Extremity - Bilateral CV-READ    Interpretation Summary    Normal bilateral lower extremity venous duplex scan.      Results for orders placed during the hospital encounter of 04/04/25    Duplex Venous Lower Extremity - Bilateral CV-READ    Interpretation Summary    Normal bilateral lower extremity venous duplex scan.      Results for orders placed during the hospital encounter of 02/11/25    Adult Transthoracic Echo Complete W/ Cont if Necessary Per Protocol    Interpretation Summary    Left ventricular systolic function is mildly decreased. Left ventricular ejection fraction appears to be 46 - 50%. with some anterior septal hypokensis    The right ventricular cavity is mildly dilated.    The left atrial cavity is mildly dilated.    The right atrial cavity is moderately  dilated.    Mild to moderate aortic valve stenosis is present.    Estimated right ventricular systolic pressure from tricuspid regurgitation is moderately elevated (45-55 mmHg).      Imaging Results (All)       Procedure Component Value Units Date/Time    CT Chest With Contrast Diagnostic [[id number]] Collected: 05/25/25 1926     Updated: 05/25/25 1934    Narrative:      CT CHEST W CONTRAST DIAGNOSTIC    Date of Exam: 5/25/2025 7:06 PM EDT    Indication: Shortness of breath.    Comparison: 4/27/2025    Technique: Axial CT images were obtained of the chest after the uneventful intravenous administration of iodinated contrast.  Reconstructed coronal and sagittal images were also obtained. Automated exposure control and iterative construction methods were   used.    Findings:  There is motion degradation, and there is streak artifact from the patient's arms. Nodular enlargement of the right thyroid lobe is unchanged. Heart remains enlarged. No pleural or pericardial effusion is seen. There is atherosclerotic disease and aortic    valvular disease. Prevascular adenopathy measures 1.6 cm, previously 1.4 cm. Precarinal adenopathy seen previously is not well characterized due to artifact currently. No hilar or axillary adenopathy. Upper abdominal images show probable splenomegaly.    Lung windows show increased consolidation in the lower lobes with some persistent mild opacity in the right middle lobe and lingula. Findings probably reflect worsening atelectasis although pneumonia is not excluded, particularly in the lower lobes.   Groundglass opacities and septal thickening are again identified suggesting mild pulmonary edema. No pneumothorax.      Impression:      1.Increasing consolidation in the lower lobes with some persistent mild opacity in the right middle lobe and lingula. Findings probably reflect worsening atelectasis although pneumonia is not excluded, particularly in the lower lobes.  2.Groundglass opacities and septal thickening are again identified suggesting mild pulmonary edema.  3.Cardiomegaly.  4.Mediastinal lymphadenopathy, as described above. This may be reactive. Attention on 3-month follow-up CT recommended.  5.Additional findings as given above.        Electronically Signed: Jean Tamez MD    5/25/2025 7:32 PM EDT    Workstation ID: [id number]    CT Head Without Contrast [[id number]] Collected: 05/25/25 1924     Updated: 05/25/25 1927    Narrative:      CT HEAD WO CONTRAST    Date of Exam: 5/25/2025 7:06 PM EDT    Indication: Altered mental status.    Comparison: None available.    Technique: Axial CT images were obtained of the head without contrast administration.  Reconstructed coronal and sagittal images were also obtained. Automated exposure control and iterative construction methods were used.      Findings:  There is no evidence of hemorrhage. There is no mass effect or midline shift. Diffuse brain atrophy    There is no extracerebral collection.    Ventricles are normal in size and configuration for patient's  stated age.     Posterior fossa is within normal limits.    Calvarium and skull base appear intact.  Visualized sinuses show no air fluid levels. Visualized orbits are unremarkable.        Impression:      Impression:  1.No acute intracranial abnormality identified.  2.Diffuse brain atrophy.        Electronically Signed: Harvey Sneed MD    5/25/2025 7:25 PM EDT    Workstation ID: [id number]    XR Chest 1 View [[id number]] Collected: 05/25/25 1700     Updated: 05/25/25 1703    Narrative:      XR CHEST 1 VW    Date of Exam: 5/25/2025 4:26 PM EDT    Indication: Weak/Dizzy/AMS triage protocol    Comparison: 4/29/2025    Findings:  Cardiomegaly. Prominence of the central pulmonary vasculature. No definite interstitial or airspace disease. Costophrenic angle sharp      Impression:      Impression:  Cardiomegaly with pulmonary vascular congestion. No gross pulmonary edema or suspicious consolidation      Electronically Signed: Lino Lieberman    5/25/2025 5:01 PM EDT    Workstation ID: [id number]             Labs Pending at Discharge:  Pending Labs       Order Current Status    Blood Culture - Blood, Arm, Left Preliminary result    Blood Culture - Blood, Hand, Right Preliminary result                Time spent on Discharge including face to face service: 31 minutes  Part of this note may be an electronic transcription/translation of spoken language to printed text using the Dragon Dictation System.     TElectronically signed by Jan Negrete MD, 05/29/25, 5:11 PM EDT.

## 2025-05-29 NOTE — PLAN OF CARE
Goal Outcome Evaluation:      Pt did not wear bipap last night. Pt is on 2lnc

## 2025-05-29 NOTE — PROGRESS NOTES
Pulmonary / Critical Care Progress Note      Patient Name: Mary Albarran  : 1950  MRN: [id number]  Attending:  Jan Negrete MD  Date of admission: 2025    Subjective   Subjective   Follow-up for acute on chronic hypoxic respiratory failure, concern for pneumonia from unknown organism, question COPD exacerbation    Slowly improving  On 2 L of oxygen  Resting in bed  Occasional dry cough  Orthopnea better  Leg swelling better  No fever or chills        Objective   Objective     Vitals:   Temp:  [97.5 °F (36.4 °C)-98.2 °F (36.8 °C)] 98.2 °F (36.8 °C)  Heart Rate:  [68-88] 86  Resp:  [18-24] 24  BP: (104-130)/(51-79) 123/79  Flow (L/min) (Oxygen Therapy):  [2] 2    Physical Exam   Vital Signs Reviewed   General:  WDWN, Alert, NAD.  Chronically ill-appearing female, lying in bed  HEENT:  PERRL, EOMI.  OP, nares clear  Chest: Improved aeration with diminished breath sounds bilaterally with Velcro-like crackles and scattered rhonchi  CV: RRR, no MGR, pulses 2+, equal.  Abd:  Soft, NT, ND, + BS, no HSM  EXT:  no clubbing, no cyanosis, no edema  Neuro:  A&Ox3, CN grossly intact, no focal deficits.  Skin: No rashes or lesions noted      Result Review    Result Review:  I have personally reviewed the results from the time of this admission to 2025 15:38 EDT and agree with these findings:  [x]  Laboratory  [x]  Microbiology  [x]  Radiology  [x]  EKG/Telemetry   [x]  Cardiology/Vascular   []  Pathology  []  Old records  []  Other:  Most notable findings include:       Lab 25  0527 25  0503 25  0457 25  0420 25  1550   WBC  --  10.11 11.32* 14.21* 16.72*   HEMOGLOBIN  --  11.3* 10.9* 11.2* 13.1   HEMATOCRIT  --  37.3 36.6 37.1 43.6   PLATELETS  --  101* 82* 85* 102*   SODIUM 136 133* 134* 137 139   POTASSIUM 4.4 4.5 5.2 4.6 4.7   CHLORIDE 100 99 101 102 99   CO2 23.1 22.2 18.4* 23.2 27.5   BUN 47.0* 53.8* 41* 23 23   CREATININE 1.19* 1.24* 1.17* 1.00 1.03*   GLUCOSE 154* 153*  260* 294* 213*   CALCIUM 9.6 9.5 8.9 8.6 9.5   PHOSPHORUS 4.6* 5.0* 4.1 3.5  --    TOTAL PROTEIN  --   --   --   --  8.2   ALBUMIN 3.6 3.5 3.2*  --  4.2   GLOBULIN  --   --   --   --  4.0     CT Chest With Contrast Diagnostic  Result Date: 5/25/2025  CT CHEST W CONTRAST DIAGNOSTIC Date of Exam: 5/25/2025 7:06 PM EDT Indication: Shortness of breath. Comparison: 4/27/2025 Technique: Axial CT images were obtained of the chest after the uneventful intravenous administration of iodinated contrast.  Reconstructed coronal and sagittal images were also obtained. Automated exposure control and iterative construction methods were  used. Findings: There is motion degradation, and there is streak artifact from the patient's arms. Nodular enlargement of the right thyroid lobe is unchanged. Heart remains enlarged. No pleural or pericardial effusion is seen. There is atherosclerotic disease and aortic  valvular disease. Prevascular adenopathy measures 1.6 cm, previously 1.4 cm. Precarinal adenopathy seen previously is not well characterized due to artifact currently. No hilar or axillary adenopathy. Upper abdominal images show probable splenomegaly. Lung windows show increased consolidation in the lower lobes with some persistent mild opacity in the right middle lobe and lingula. Findings probably reflect worsening atelectasis although pneumonia is not excluded, particularly in the lower lobes. Groundglass opacities and septal thickening are again identified suggesting mild pulmonary edema. No pneumothorax.     Impression: 1.Increasing consolidation in the lower lobes with some persistent mild opacity in the right middle lobe and lingula. Findings probably reflect worsening atelectasis although pneumonia is not excluded, particularly in the lower lobes. 2.Groundglass opacities and septal thickening are again identified suggesting mild pulmonary edema. 3.Cardiomegaly. 4.Mediastinal lymphadenopathy, as described above. This may be  reactive. Attention on 3-month follow-up CT recommended. 5.Additional findings as given above. Electronically Signed: Jean Tamez MD  5/25/2025 7:32 PM EDT  Workstation ID: [id number]    Results for orders placed during the hospital encounter of 02/11/25    Adult Transthoracic Echo Complete W/ Cont if Necessary Per Protocol    Interpretation Summary    Left ventricular systolic function is mildly decreased. Left ventricular ejection fraction appears to be 46 - 50%. with some anterior septal hypokensis    The right ventricular cavity is mildly dilated.    The left atrial cavity is mildly dilated.    The right atrial cavity is moderately  dilated.    Mild to moderate aortic valve stenosis is present.    Estimated right ventricular systolic pressure from tricuspid regurgitation is moderately elevated (45-55 mmHg).    BNP elevated  Procalcitonin elevated    Assessment & Plan   Assessment / Plan     Active Hospital Problems:  Active Hospital Problems    Diagnosis     **PNA (pneumonia)      Impression:  Acute hypoxemic and hypercapnic respiratory failure requiring BiPAP  Acute exacerbation of COPD  Community-acquired pneumonia for unspecified organism  Therapeutic drug monitoring of antibiotics  Acute decompensated diastolic congestive heart failure  Acute cardiogenic pulmonary edema  Lactic acidosis, clinically significant  Leukocytosis  Class III obesity with BMI 45  Hyponatremia, clinically insignificant     Plan:  Wean O2 to keep SPO2 greater than 90%   Continue BiPAP nightly and with naps on current settings  Chest CT personally reviewed showing findings consistent with pneumonia as well as some volume overload  Continue Brovana, Pulmicort DuoNebs  MRSA PCR positive.  Continue vancomycin.  Day 4/5  Continue Unasyn to complete 5 days of antibiotics  Cultures currently negative/pending  Monitor vancomycin levels and renal panels for therapeutic drug monitoring  Procalcitonin decreasing  Continue prednisone 40 mg  daily to complete 5 days  Continue midodrine 5 mg 3 times daily  Continue Bumex 2 mg p.o. daily  Trend renal panel and electrolytes  Continue bronchopulmonary hygiene  Continue nebulizers  Encourage activity and incentive spirometer use  RT  consulted.  However, patient refuses home NIPPV  Anticipate discharge in the next 1 to 2 days  Follow-up with us 1 to 2 weeks after discharge    VTE Prophylaxis:  Pharmacologic & mechanical VTE prophylaxis orders are present.    CODE STATUS:   Code Status (Patient has no pulse and is not breathing): CPR (Attempt to Resuscitate)  Medical Interventions (Patient has pulse or is breathing): Full Support  Level Of Support Discussed With: Patient      Labs, imaging, microbiology, notes and medications personally reviewed  Discussed with primary    Electronically signed by Eliud Munoz MD, 05/29/25, 3:38 PM EDT.

## 2025-05-29 NOTE — PROGRESS NOTES
RT EQUIPMENT DEVICE RELATED - SKIN ASSESSMENT    RT Medical Equipment/Device:     NIV Mask:  Full-face    size: m    Skin Assessment:      Nose:  Intact    Device Skin Pressure Protection:  Skin-to skin areas padded    Nurse Notification:  Elayne Desir, RRT

## 2025-05-29 NOTE — PLAN OF CARE
Goal Outcome Evaluation:      Patient did not want to wear Bipap at this time. She is currently on 2L nasal cannula. Bipap is at bedside on standby if needed.

## 2025-05-29 NOTE — OUTREACH NOTE
Prep Survey      Flowsheet Row Responses   [facility] patient discharged from? [facility]   Is LACE score < 7 ? No   Eligibility [facility]   Date of Admission 05/25/25   Date of Discharge 05/29/25   Discharge Disposition Home or Self Care   Discharge diagnosis PNA (pneumonia)   Does the patient have one of the following disease processes/diagnoses(primary or secondary)? Pneumonia   Does the patient have Home health ordered? No   Is there a DME ordered? No   Prep survey completed? Yes            Shania TREVIÑO - Registered Nurse

## 2025-05-29 NOTE — PROGRESS NOTES
"[facility] Clinical Pharmacy Services: Vancomycin Monitoring Note    Mary Albarran is a 74 y.o. female who is on day  of pharmacy to dose vancomycin for Pneumonia.  Also on concurrent amp/sulbactam  Previous Vancomycin Dose:   1250 mg IV every  24  hours  Imaging Reviewed?: Yes  Updated Cultures and Sensitivities:    BC - neg x     MRSA PCR pos  Vitals/Labs  Ht: 160 cm (63\"); Wt: 115 kg (254 lb 3.1 oz)   Temp (24hrs), Av.7 °F (36.5 °C), Min:97.5 °F (36.4 °C), Max:97.9 °F (36.6 °C)   Estimated Creatinine Clearance: 52.2 mL/min (A) (by C-G formula based on SCr of 1.19 mg/dL (H)).       Results from last 7 days   Lab Units 25  0527 25  0503 25  0457 25  0420   VANCOMYCIN RM mcg/mL  --   --  17.07  --    CREATININE mg/dL 1.19* 1.24* 1.17* 1.00   WBC 10*3/mm3  --  10.11 11.32* 14.21*     Assessment/Plan    Current Vancomycin Dose:  1250 mg IV every 24 hours; which provides the following predicted parameters:  AUC24,ss: 563 mg/L.hr  Probability of AUC24 > 400: 85 %  Ctrough,ss: 19.1 mg/L  Probability of Ctrough,ss > 20: 46 %  Probability of nephrotoxicity (Lodise ERIC ): 16 %  No additional levels needed (last ) unless stop date extended. Check BMP in am; slight pump from baseline.   We will continue to monitor patient changes and renal function     Thank you for involving pharmacy in this patient's care. Please contact pharmacy with any questions or concerns.    Steven Guillen [facility]  Clinical Pharmacist      "

## 2025-05-30 ENCOUNTER — TRANSITIONAL CARE MANAGEMENT TELEPHONE ENCOUNTER (OUTPATIENT)
Dept: CALL CENTER | Facility: HOSPITAL | Age: 75
End: 2025-05-30
Payer: MEDICARE

## 2025-05-30 ENCOUNTER — TELEPHONE (OUTPATIENT)
Dept: FAMILY MEDICINE CLINIC | Facility: CLINIC | Age: 75
End: 2025-05-30
Payer: MEDICARE

## 2025-05-30 LAB
BACTERIA SPEC AEROBE CULT: NORMAL
BACTERIA SPEC AEROBE CULT: NORMAL

## 2025-05-30 NOTE — OUTREACH NOTE
Call Center TCM Note      Flowsheet Row Responses   [facility] facility patient discharged from? [facility]   Does the patient have one of the following disease processes/diagnoses(primary or secondary)? Pneumonia   TCM attempt successful? No   Unsuccessful attempts Attempt 2  [ son and daughter on release]            Luis KUO - Registered Nurse    5/30/2025, 15:32 EDT

## 2025-05-30 NOTE — OUTREACH NOTE
Call Center TCM Note      Flowsheet Row Responses   [facility] facility patient discharged from? [facility]   Does the patient have one of the following disease processes/diagnoses(primary or secondary)? Pneumonia   TCM attempt successful? No   Unsuccessful attempts Attempt 1   Call Status Left message            Luis Dela Cruz Registered Nurse    5/30/2025, 15:12 EDT

## 2025-05-31 ENCOUNTER — TRANSITIONAL CARE MANAGEMENT TELEPHONE ENCOUNTER (OUTPATIENT)
Dept: CALL CENTER | Facility: HOSPITAL | Age: 75
End: 2025-05-31
Payer: MEDICARE

## 2025-05-31 NOTE — OUTREACH NOTE
Call Center TCM Note      Flowsheet Row Responses   [facility] facility patient discharged from? [facility]   Does the patient have one of the following disease processes/diagnoses(primary or secondary)? Pneumonia   TCM attempt successful? No   Unsuccessful attempts Attempt 3            Sonia Dela Cruz Registered Nurse    5/31/2025, 11:18 EDT

## 2025-06-08 LAB
QT INTERVAL: 382 MS
QTC INTERVAL: 459 MS

## 2025-06-16 ENCOUNTER — OFFICE VISIT (OUTPATIENT)
Dept: FAMILY MEDICINE CLINIC | Facility: CLINIC | Age: 75
End: 2025-06-16
Payer: MEDICARE

## 2025-06-16 VITALS
WEIGHT: 242 LBS | TEMPERATURE: 98.1 F | HEART RATE: 63 BPM | HEIGHT: 63 IN | DIASTOLIC BLOOD PRESSURE: 60 MMHG | OXYGEN SATURATION: 94 % | SYSTOLIC BLOOD PRESSURE: 118 MMHG | BODY MASS INDEX: 42.88 KG/M2

## 2025-06-16 DIAGNOSIS — Z09 FOLLOW-UP EXAM: Primary | ICD-10-CM

## 2025-06-16 DIAGNOSIS — Z78.9 INPATIENT HOSPITALIZATION WITHIN LAST 30 DAYS: ICD-10-CM

## 2025-06-16 DIAGNOSIS — J18.9 COMMUNITY ACQUIRED BILATERAL LOWER LOBE PNEUMONIA: ICD-10-CM

## 2025-06-16 DIAGNOSIS — G89.29 CHRONIC JOINT PAIN: ICD-10-CM

## 2025-06-16 DIAGNOSIS — G62.9 NEUROPATHY: ICD-10-CM

## 2025-06-16 DIAGNOSIS — M25.50 CHRONIC JOINT PAIN: ICD-10-CM

## 2025-06-16 PROCEDURE — 3074F SYST BP LT 130 MM HG: CPT | Performed by: NURSE PRACTITIONER

## 2025-06-16 PROCEDURE — 1125F AMNT PAIN NOTED PAIN PRSNT: CPT | Performed by: NURSE PRACTITIONER

## 2025-06-16 PROCEDURE — 1159F MED LIST DOCD IN RCRD: CPT | Performed by: NURSE PRACTITIONER

## 2025-06-16 PROCEDURE — 99214 OFFICE O/P EST MOD 30 MIN: CPT | Performed by: NURSE PRACTITIONER

## 2025-06-16 PROCEDURE — G2211 COMPLEX E/M VISIT ADD ON: HCPCS | Performed by: NURSE PRACTITIONER

## 2025-06-16 PROCEDURE — 3052F HG A1C>EQUAL 8.0%<EQUAL 9.0%: CPT | Performed by: NURSE PRACTITIONER

## 2025-06-16 PROCEDURE — 1160F RVW MEDS BY RX/DR IN RCRD: CPT | Performed by: NURSE PRACTITIONER

## 2025-06-16 PROCEDURE — 3078F DIAST BP <80 MM HG: CPT | Performed by: NURSE PRACTITIONER

## 2025-06-16 PROCEDURE — 1111F DSCHRG MED/CURRENT MED MERGE: CPT | Performed by: NURSE PRACTITIONER

## 2025-06-16 RX ORDER — HYDROCODONE BITARTRATE AND ACETAMINOPHEN 10; 325 MG/1; MG/1
1 TABLET ORAL EVERY 6 HOURS PRN
Qty: 120 TABLET | Refills: 0 | Status: SHIPPED | OUTPATIENT
Start: 2025-06-16

## 2025-06-16 RX ORDER — METOPROLOL SUCCINATE 25 MG/1
25 TABLET, EXTENDED RELEASE ORAL EVERY 12 HOURS SCHEDULED
Qty: 180 TABLET | Refills: 1 | Status: SHIPPED | OUTPATIENT
Start: 2025-06-16

## 2025-06-16 NOTE — PROGRESS NOTES
Mary Albarran is a 74 y.o. female admitted to [facility] and  is seen for follow up.  Chief Complaint   Patient presents with    Hospital Follow Up Visit     Pneumonia , no issues   Active and Resolved Hospital Problems:       Active Hospital Problems   No active problems to display.       Resolved Hospital Problems     Diagnosis POA    **PNA (pneumonia) [J18.9] Yes   Bilateral lower lobe multifocal pneumonia.  Unspecified bacterial pathogen  MRSA PCR positive.  Recurrent  Acute on chronic systolic congestive heart failure.  Improving  Fluid overload  A-fib RVR on admission, on anticoagulation with Xarelto  Diabetes type II with recent hemoglobin A1c of 9%.  With hyperglycemia  COPD with acute exacerbation.  Improving  Hypertension  Hyperlipidemia  Thrombocytopenia  Chronic hypoxic respiratory failure on home oxygen.  Stable  Morbid obesity BMI 45  Chronic stasis changes bilateral extremity with  yeast infection.  CKD 3a.    Chronic anemia and thrombocytopenia.  Stable.   sepsis present on admission due to pneumonia.  Patient had tachycardia, hypoxemia, leukocytosis and elevated procalcitonin level.     Hospital Course      Hospital Course:  Mary Albarran is a 74 y.o. female  past medical history significant for obesity, systolic heart failure, atrial fibrillation on Xarelto, COPD, CKD, chronic pain, diabetes, mild to moderate aortic valve stenosis that presents to the emergency department with worsening mentation evaluated emergency department was noted to be febrile elevated leukocyte count evidence of pneumonia and fluid overload has been admitted to the hospitalist service pulmonary consulted started on broad-spectrum antibiotics cefepime and vancomycin started on IV Lasix and A-fib with RVR started on diltiazem drip holding sedating medications.  With A-fib rate controlled Cardizem drip was DC'd.  Patient refused to use BiPAP due to pressure on the face.  Declined FVC by RT case management and stated she will  not be using BiPAP at home.     Interval Followup:   Remains on 2 liters with 94% saturation.   blood pressure stable on midodrine.  Has been refusing BiPAP.  Does not want to use it.  Creatinine trending trending down  Awake alert oriented.  No nausea.  Positive small BM .  Not constipated.  No abdominal pain  Have cough not able to bring up mucus.  Shortness of air improving.  Has questions about recurrent MRSA.  Started on Bactroban and Hibiclens washes.  Patient wants to go home today.  Patient wanted portable oxygen tank but refused to ambulate to qualify for it.  Patient does not want to go to rehab.  Urine culture with gram-negative fernanda.  Patient totally asymptomatic.  Finished fluconazole for extensive yeast infection of skin.     DISCHARGE Follow Up Recommendations for labs and diagnostics: PCP, pulmonary and cardiology.  Continue home oxygen.  PCP to follow-up on results of pending blood and urine cultures.  Home metoprolol dose decreased due to soft blood pressure     Urine Culture <10,000 CFU/mL Gram Negative Bacilli Abnormal       Call if further workup needed.  Colonization of the urinary tract without infection is common. Treatment is discouraged unless the patient is symptomatic, pregnant, or undergoing an invasive urologic procedure.            10/12/2023     6:27 PM 1/5/2024     2:36 PM 2/28/2025     5:27 PM 3/24/2025     5:28 PM 4/8/2025     5:11 PM 5/2/2025     5:19 PM 5/29/2025     6:40 PM   Date of TCM Phone Call   [facility]   Date of Admission 10/6/2023  2/11/2025 3/18/2025 4/4/2025 4/26/2025 5/25/2025   Date of Discharge 10/12/2023 1/5/2024 2/28/2025 3/24/2025 4/8/2025 5/2/2025 5/29/2025   Discharge Disposition Home or Self Care Home or Self Care Home-Health Care Svc Home-Health Care Svc Home-Health Care Svc Home or Self Care Home or Self Care      Discharge summary is available.  Current outpatient and discharge medications have been reconciled for the patient.  Reviewed by: LUCERO Collins    She was admitted for the following reason(s):  Procedures performed while hospitalized:  CT Chest With Contrast Diagnostic  Result Date: 5/25/2025  1.Increasing consolidation in the lower lobes with some persistent mild opacity in the right middle lobe and lingula. Findings probably reflect worsening atelectasis although pneumonia is not excluded, particularly in the lower lobes. 2.Groundglass opacities and septal thickening are again identified suggesting mild pulmonary edema. 3.Cardiomegaly. 4.Mediastinal lymphadenopathy, as described above. This may be reactive. Attention on 3-month follow-up CT recommended. 5.Additional findings as given above. Electronically Signed: Jean Tamez MD  5/25/2025 7:32 PM EDT  Workstation ID: [id number]    CT Head Without Contrast  Result Date: 5/25/2025  Impression: 1.No acute intracranial abnormality identified. 2.Diffuse brain atrophy. Electronically Signed: Harvey Sneed MD  5/25/2025 7:25 PM EDT  Workstation ID: [id number]    XR Chest 1 View  Result Date: 5/25/2025  Impression: Cardiomegaly with pulmonary vascular congestion. No gross pulmonary edema or suspicious consolidation Electronically Signed: Lino Lieberman  5/25/2025 5:01 PM EDT  Workstation ID: [id number]    Respiratory Panel PCR w/COVID-19(SARS-CoV-2) BRYAN/LENY/NINFA/PAD/COR/HUANG In-House, NP Swab in UTM/VTM, 2 HR TAT - Swab, Nasopharynx [[id number]]  (Normal) Collected: 05/26/25 1529      Lab Status: Final result Specimen: Swab from Nasopharynx Updated: 05/26/25 1619       ADENOVIRUS, PCR Not Detected       Coronavirus 229E Not Detected       Coronavirus HKU1 Not Detected       Coronavirus NL63 Not Detected       Coronavirus OC43 Not Detected       COVID19 Not Detected       Human Metapneumovirus Not Detected       Human Rhinovirus/Enterovirus Not Detected        Influenza A PCR Not Detected       Influenza B PCR Not Detected       Parainfluenza Virus 1 Not Detected       Parainfluenza Virus 2 Not Detected       Parainfluenza Virus 3 Not Detected       Parainfluenza Virus 4 Not Detected       RSV, PCR Not Detected       Bordetella pertussis pcr Not Detected       Bordetella parapertussis PCR Not Detected       Chlamydophila pneumoniae PCR Not Detected       Mycoplasma pneumo by PCR Not Detected     Narrative:       In the setting of a positive respiratory panel with a viral infection PLUS a negative procalcitonin without other underlying concern for bacterial infection, consider observing off antibiotics or discontinuation of antibiotics and continue supportive care. If the respiratory panel is positive for atypical bacterial infection (Bordetella pertussis, Chlamydophila pneumoniae, or Mycoplasma pneumoniae), consider antibiotic de-escalation to target atypical bacterial infection.     Blood Culture - Blood, Arm, Left [[id number]]  (Normal) Collected: 05/25/25 1627     Lab Status: Preliminary result Specimen: Blood from Arm, Left Updated: 05/29/25 1631       Blood Culture No growth at 4 days     Narrative:       Less than seven (7) mL's of blood was collected.  Insufficient quantity may yield false negative results.     Blood Culture - Blood, Hand, Right [[id number]]  (Normal) Collected: 05/25/25 1627     Lab Status: Preliminary result Specimen: Blood from Hand, Right Updated: 05/29/25 1631       Blood Culture No growth at 4 days     Narrative:       Less than seven (7) mL's of blood was collected.  Insufficient quantity may yield false negative results.     COVID-19, FLU A/B, RSV PCR 1 HR TAT - Swab, Nasopharynx [[id number]]  (Normal) Collected: 05/25/25 1617     Lab Status: Final result Specimen: Swab from Nasopharynx Updated: 05/25/25 1705       COVID19 Not Detected       Influenza A PCR Not Detected       Influenza B PCR Not Detected       RSV, PCR Not Detected      Narrative:       Fact sheet for providers: https://www.fda.gov/media/106693/download    Fact sheet for patients: https://www.fda.gov/media/583071/download     Test performed by PCR.     Urine Culture - Urine, Urine, Clean Catch [[id number]]  (Abnormal) Collected: 05/25/25 1608     Lab Status: Final result Specimen: Urine, Clean Catch Updated: 05/26/25 2206       Urine Culture <10,000 CFU/mL Gram Negative Bacilli            Procedures Performed in Hospital: please see EPIC record for further details of results and finding  Pending results:  Pending tests: none  Pain Control:  well controlled  Diet: low sodium diet, fluid restriction  Activity after Discharge: activity as tolerated  Items to be addressed at first follow up visit include:  1. Medication changes:   New Medications         Instructions Start Date   doxycycline 100 MG enteric coated tablet  Commonly known as: DORYX    100 mg, Oral, 2 Times Daily        miconazole 2 % cream  Commonly known as: MICOTIN    1 Application, Topical, 2 Times Daily        midodrine 10 MG tablet  Commonly known as: PROAMATINE    10 mg, Oral, 3 Times Daily Before Meals        mupirocin 2 % nasal ointment  Commonly known as: BACTROBAN    1 Application, Each Nare, 2 Times Daily        predniSONE 20 MG tablet  Commonly known as: DELTASONE    40 mg, Oral, Daily With Breakfast    Start Date: May 30, 2025                Changes to Medications         Instructions Start Date   Entresto  MG tablet  Generic drug: sacubitril-valsartan  What changed: Another medication with the same name was removed. Continue taking this medication, and follow the directions you see here.    1 tablet, Oral, Nightly, Take 1/2 tablet BY MOUTH EVERY MORNING AND ONE TABLET AT BEDTIME        metoprolol succinate XL 25 MG 24 hr tablet  Commonly known as: TOPROL-XL  What changed:   medication strength  how much to take  when to take this    25 mg, Oral, Every 12 Hours Scheduled                  Continue These  Medications         Instructions Start Date   albuterol 1.25 MG/3ML nebulizer solution  Commonly known as: ACCUNEB    1.25 mg, Nebulization, Every 6 Hours PRN        albuterol sulfate  (90 Base) MCG/ACT inhaler  Commonly known as: PROVENTIL HFA;VENTOLIN HFA;PROAIR HFA    2 puffs, Inhalation, Every 4 Hours PRN        ammonium lactate 12 % lotion  Commonly known as: LAC-HYDRIN    Topical, 2 Times Daily        atorvastatin 20 MG tablet  Commonly known as: LIPITOR    TAKE 1 TABLET DAILY        budesonide 1 MG/2ML nebulizer solution  Commonly known as: PULMICORT    1 mg, Nebulization, Daily        bumetanide 2 MG tablet  Commonly known as: BUMEX    1 tablet, Daily        cholecalciferol 25 MCG (1000 UT) tablet  Commonly known as: VITAMIN D3    1,000 Units, Daily        dilTIAZem  MG 24 hr capsule  Commonly known as: CARDIZEM CD    180 mg, Daily        HYDROcodone-acetaminophen  MG per tablet  Commonly known as: NORCO    1 tablet, Oral, Every 6 Hours PRN        Januvia 50 MG tablet  Generic drug: SITagliptin    50 mg, Daily        Lantus SoloStar 100 UNIT/ML injection pen  Generic drug: Insulin Glargine    15 Units, Subcutaneous, Daily        Magnesium Oxide -Mg Supplement 400 (240 Mg) MG tablet    1 tablet, Daily        nitrofurantoin (macrocrystal-monohydrate) 100 MG capsule  Commonly known as: MACROBID    1 capsule, Daily        potassium chloride ER 20 MEQ tablet controlled-release ER tablet  Commonly known as: K-TAB    40 mEq, 2 Times Daily        pregabalin 150 MG capsule  Commonly known as: LYRICA    150 mg, Oral, 2 Times Daily        rivaroxaban 20 MG tablet  Commonly known as: XARELTO    20 mg, Oral, Daily                  Stop These Medications       valsartan 40 MG tablet  Commonly known as: DIOVAN     She reports her overall condition is are improving since discharge.  No specific complaints.  Social support is said to be adequate to meet her needs.       History of Present Illness  The  "patient presents for evaluation of pneumonia, wheezing, and leg swelling. She is accompanied by her granddaughter.    She reports an improvement in her condition since her discharge on 05/29/2025. She has been adhering to her oxygen therapy regimen and has completed her course of antibiotics and steroids. She is currently on 2 liters of oxygen and has a scheduled appointment with her pulmonologist on 07/18/2025. She has been experiencing mild wheezing, which she manages with daily breathing treatments and up to four doses of albuterol as needed. She also uses a lung capacity exerciser. She was not provided with a nebulizer upon discharge from the hospital.    She has been managing her leg swelling effectively, although she notes that inadequate sleep and early rising can exacerbate the condition. She has been using a cream, which she finds beneficial for moisturizing her skin. She has a small sore on her leg, which has remained stable in size. She keeps her legs wrapped to prevent the formation of blisters. She has a history of cellulitis but does not believe this is the current issue.    She was prescribed medication for low blood pressure, which she has discontinued due to normalization of her blood pressure. She has adjusted her medication schedule to take a full tablet at bedtime instead of half a tablet in the morning. She is currently taking metoprolol 25 mg every 12 hours and requires a refill. She was previously on a 100 mg dose.    She has been taking ginseng, which she believes aids in circulation and pain management. She has reduced her hydrocodone intake, taking one dose yesterday and another this morning. She was previously taking it continuously from morning to the next morning. She has been prescribed magnesium, which she supplements over-the-counter.  Objective   Vitals:    06/16/25 0943   BP: 118/60   Pulse: 63   Temp: 98.1 °F (36.7 °C)   SpO2: 94%   Weight: 110 kg (242 lb)   Height: 160 cm (63\") "     Body mass index is 42.87 kg/m².    Physical Exam  Vitals reviewed.   Constitutional:       General: She is not in acute distress.     Appearance: Normal appearance. She is well-developed. She is morbidly obese. She is not ill-appearing.   HENT:      Head: Normocephalic and atraumatic.      Right Ear: External ear normal.      Left Ear: External ear normal.   Eyes:      Conjunctiva/sclera: Conjunctivae normal.      Pupils: Pupils are equal, round, and reactive to light.   Cardiovascular:      Rate and Rhythm: Normal rate and regular rhythm.      Heart sounds: No murmur heard.  Pulmonary:      Effort: Pulmonary effort is normal.      Breath sounds: Normal breath sounds. No wheezing.      Comments: 02 dependent   Skin:     General: Skin is warm and dry.   Neurological:      Mental Status: She is alert and oriented to person, place, and time.      Gait: Gait abnormal (wheel chair dependent).   Psychiatric:         Mood and Affect: Mood and affect normal.         Behavior: Behavior normal.         Thought Content: Thought content normal.         Judgment: Judgment normal.       Assessment & Plan     Assessment & Plan  1. Pneumonia.  Her urine cultures have been reviewed and do not necessitate any alterations to her current treatment plan. The metoprolol dosage has been reduced due to its impact on her blood pressure. Both Legionella and pneumonia tests returned negative results. However, she tested positive for MRSA, indicating carrier status. The respiratory panel results were within normal limits. Blood cultures showed no growth after a 5-day period. A prescription for metoprolol 25 mg every 12 hours will be provided. A repeat chest x-ray will be ordered to monitor the progression of her pneumonia.    2. Wheezing.  She reports wheezing, which typically resolves with breathing treatments. She is currently using Pulmicort once daily and albuterol up to four times daily. She is advised to take albuterol closer to  bedtime but not at bedtime to avoid sleep disturbances. A referral to pulmonology will be made for further evaluation.    3. Leg swelling.  She experiences leg swelling, which is managed with compression wrapping. She uses ammonium lactate cream to moisturize the skin and prevent blisters. There is no indication of cellulitis or infection.    4. Medication Management.  A prescription for hydrocodone will be sent to pharmacy. She is advised to continue using ginseng tea daily as it appears to help with circulation and pain.    Follow-up  The patient will follow up on 07/11/2025.     Problem List Items Addressed This Visit    None  Visit Diagnoses         Follow-up exam    -  Primary      Inpatient hospitalization within last 30 days          Chronic joint pain        Relevant Medications    HYDROcodone-acetaminophen (NORCO)  MG per tablet      Neuropathy        Relevant Medications    HYDROcodone-acetaminophen (NORCO)  MG per tablet      Community acquired bilateral lower lobe pneumonia        Relevant Orders    XR Chest PA & Lateral                    Patient or patient representative verbalized consent for the use of Ambient Listening during the visit with  LUCERO Collins for chart documentation. 6/16/2025  10:28 EDT

## 2025-06-29 ENCOUNTER — APPOINTMENT (OUTPATIENT)
Dept: GENERAL RADIOLOGY | Facility: HOSPITAL | Age: 75
End: 2025-06-29
Payer: MEDICARE

## 2025-06-29 ENCOUNTER — HOSPITAL ENCOUNTER (INPATIENT)
Facility: HOSPITAL | Age: 75
LOS: 6 days | Discharge: HOME | End: 2025-07-05
Attending: EMERGENCY MEDICINE | Admitting: INTERNAL MEDICINE
Payer: MEDICARE

## 2025-06-29 DIAGNOSIS — I48.91 ATRIAL FIBRILLATION WITH RAPID VENTRICULAR RESPONSE: ICD-10-CM

## 2025-06-29 DIAGNOSIS — L08.9 DIABETIC INFECTION OF RIGHT FOOT: ICD-10-CM

## 2025-06-29 DIAGNOSIS — R26.2 DIFFICULTY WALKING: ICD-10-CM

## 2025-06-29 DIAGNOSIS — R50.9 ACUTE FEBRILE ILLNESS: ICD-10-CM

## 2025-06-29 DIAGNOSIS — E11.628 DIABETIC INFECTION OF RIGHT FOOT: ICD-10-CM

## 2025-06-29 DIAGNOSIS — J18.9 PNEUMONIA OF BOTH LUNGS DUE TO INFECTIOUS ORGANISM, UNSPECIFIED PART OF LUNG: ICD-10-CM

## 2025-06-29 DIAGNOSIS — Z78.9 DECREASED ACTIVITIES OF DAILY LIVING (ADL): ICD-10-CM

## 2025-06-29 DIAGNOSIS — A41.9 SEPSIS, DUE TO UNSPECIFIED ORGANISM, UNSPECIFIED WHETHER ACUTE ORGAN DYSFUNCTION PRESENT: Primary | ICD-10-CM

## 2025-06-29 DIAGNOSIS — L03.115 CELLULITIS OF RIGHT LEG: ICD-10-CM

## 2025-06-29 LAB
ALBUMIN SERPL-MCNC: 4.5 G/DL (ref 3.5–5.2)
ALBUMIN/GLOB SERPL: 1.3 G/DL
ALP SERPL-CCNC: 101 U/L (ref 39–117)
ALT SERPL W P-5'-P-CCNC: 10 U/L (ref 1–33)
ANION GAP SERPL CALCULATED.3IONS-SCNC: 14.1 MMOL/L (ref 5–15)
AST SERPL-CCNC: 14 U/L (ref 1–32)
BACTERIA UR QL AUTO: ABNORMAL /HPF
BASOPHILS # BLD AUTO: 0.05 10*3/MM3 (ref 0–0.2)
BASOPHILS NFR BLD AUTO: 0.3 % (ref 0–1.5)
BILIRUB SERPL-MCNC: 0.9 MG/DL (ref 0–1.2)
BILIRUB UR QL STRIP: NEGATIVE
BUN SERPL-MCNC: 21.6 MG/DL (ref 8–23)
BUN/CREAT SERPL: 22.3 (ref 7–25)
CALCIUM SPEC-SCNC: 9.9 MG/DL (ref 8.6–10.5)
CHLORIDE SERPL-SCNC: 95 MMOL/L (ref 98–107)
CLARITY UR: CLEAR
CO2 SERPL-SCNC: 26.9 MMOL/L (ref 22–29)
COLOR UR: ABNORMAL
CREAT SERPL-MCNC: 0.97 MG/DL (ref 0.57–1)
CRP SERPL-MCNC: 2.16 MG/DL (ref 0–0.5)
D-LACTATE SERPL-SCNC: 2 MMOL/L (ref 0.5–2)
D-LACTATE SERPL-SCNC: 2.5 MMOL/L (ref 0.5–2)
DEPRECATED RDW RBC AUTO: 50.4 FL (ref 37–54)
EGFRCR SERPLBLD CKD-EPI 2021: 62.2 ML/MIN/1.73
EOSINOPHIL # BLD AUTO: 0.04 10*3/MM3 (ref 0–0.4)
EOSINOPHIL NFR BLD AUTO: 0.2 % (ref 0.3–6.2)
ERYTHROCYTE [DISTWIDTH] IN BLOOD BY AUTOMATED COUNT: 15.3 % (ref 12.3–15.4)
ERYTHROCYTE [SEDIMENTATION RATE] IN BLOOD: 8 MM/HR (ref 0–30)
FLUAV RNA RESP QL NAA+PROBE: NOT DETECTED
FLUBV RNA NPH QL NAA+NON-PROBE: NOT DETECTED
GEN 5 1HR TROPONIN T REFLEX: 20 NG/L
GLOBULIN UR ELPH-MCNC: 3.5 GM/DL
GLUCOSE BLDC GLUCOMTR-MCNC: 212 MG/DL (ref 70–99)
GLUCOSE SERPL-MCNC: 254 MG/DL (ref 65–99)
GLUCOSE UR STRIP-MCNC: NEGATIVE MG/DL
HCT VFR BLD AUTO: 42.6 % (ref 34–46.6)
HGB BLD-MCNC: 13.1 G/DL (ref 12–15.9)
HGB UR QL STRIP.AUTO: ABNORMAL
HOLD SPECIMEN: NORMAL
HOLD SPECIMEN: NORMAL
HYALINE CASTS UR QL AUTO: ABNORMAL /LPF
IMM GRANULOCYTES # BLD AUTO: 0.09 10*3/MM3 (ref 0–0.05)
IMM GRANULOCYTES NFR BLD AUTO: 0.5 % (ref 0–0.5)
KETONES UR QL STRIP: ABNORMAL
LEUKOCYTE ESTERASE UR QL STRIP.AUTO: ABNORMAL
LYMPHOCYTES # BLD AUTO: 0.86 10*3/MM3 (ref 0.7–3.1)
LYMPHOCYTES NFR BLD AUTO: 4.9 % (ref 19.6–45.3)
MAGNESIUM SERPL-MCNC: 1.9 MG/DL (ref 1.6–2.4)
MCH RBC QN AUTO: 27.6 PG (ref 26.6–33)
MCHC RBC AUTO-ENTMCNC: 30.8 G/DL (ref 31.5–35.7)
MCV RBC AUTO: 89.9 FL (ref 79–97)
MONOCYTES # BLD AUTO: 0.81 10*3/MM3 (ref 0.1–0.9)
MONOCYTES NFR BLD AUTO: 4.6 % (ref 5–12)
NEUTROPHILS NFR BLD AUTO: 15.73 10*3/MM3 (ref 1.7–7)
NEUTROPHILS NFR BLD AUTO: 89.5 % (ref 42.7–76)
NITRITE UR QL STRIP: NEGATIVE
NRBC BLD AUTO-RTO: 0 /100 WBC (ref 0–0.2)
NT-PROBNP SERPL-MCNC: 945.1 PG/ML (ref 0–900)
PH UR STRIP.AUTO: 7.5 [PH] (ref 5–8)
PLATELET # BLD AUTO: 123 10*3/MM3 (ref 140–450)
PMV BLD AUTO: 13 FL (ref 6–12)
POTASSIUM SERPL-SCNC: 4.8 MMOL/L (ref 3.5–5.2)
PROT SERPL-MCNC: 8 G/DL (ref 6–8.5)
PROT UR QL STRIP: ABNORMAL
RBC # BLD AUTO: 4.74 10*6/MM3 (ref 3.77–5.28)
RBC # UR STRIP: ABNORMAL /HPF
REF LAB TEST METHOD: ABNORMAL
RSV RNA RESP QL NAA+PROBE: NOT DETECTED
SARS-COV-2 RNA RESP QL NAA+PROBE: NOT DETECTED
SODIUM SERPL-SCNC: 136 MMOL/L (ref 136–145)
SP GR UR STRIP: 1.02 (ref 1–1.03)
SQUAMOUS #/AREA URNS HPF: ABNORMAL /HPF
TROPONIN T % DELTA: 0
TROPONIN T NUMERIC DELTA: 0 NG/L
TROPONIN T SERPL HS-MCNC: 20 NG/L
UROBILINOGEN UR QL STRIP: ABNORMAL
WBC # UR STRIP: ABNORMAL /HPF
WBC NRBC COR # BLD AUTO: 17.58 10*3/MM3 (ref 3.4–10.8)
WHOLE BLOOD HOLD COAG: NORMAL
WHOLE BLOOD HOLD SPECIMEN: NORMAL

## 2025-06-29 PROCEDURE — 87205 SMEAR GRAM STAIN: CPT | Performed by: EMERGENCY MEDICINE

## 2025-06-29 PROCEDURE — 84484 ASSAY OF TROPONIN QUANT: CPT | Performed by: EMERGENCY MEDICINE

## 2025-06-29 PROCEDURE — 87147 CULTURE TYPE IMMUNOLOGIC: CPT | Performed by: EMERGENCY MEDICINE

## 2025-06-29 PROCEDURE — 99223 1ST HOSP IP/OBS HIGH 75: CPT | Performed by: INTERNAL MEDICINE

## 2025-06-29 PROCEDURE — 87077 CULTURE AEROBIC IDENTIFY: CPT | Performed by: EMERGENCY MEDICINE

## 2025-06-29 PROCEDURE — 93010 ELECTROCARDIOGRAM REPORT: CPT | Performed by: INTERNAL MEDICINE

## 2025-06-29 PROCEDURE — 63710000001 INSULIN LISPRO (HUMAN) PER 5 UNITS: Performed by: INTERNAL MEDICINE

## 2025-06-29 PROCEDURE — 87040 BLOOD CULTURE FOR BACTERIA: CPT | Performed by: EMERGENCY MEDICINE

## 2025-06-29 PROCEDURE — 87070 CULTURE OTHR SPECIMN AEROBIC: CPT | Performed by: EMERGENCY MEDICINE

## 2025-06-29 PROCEDURE — 73630 X-RAY EXAM OF FOOT: CPT

## 2025-06-29 PROCEDURE — 25010000002 VANCOMYCIN 5 G RECONSTITUTED SOLUTION: Performed by: EMERGENCY MEDICINE

## 2025-06-29 PROCEDURE — 80053 COMPREHEN METABOLIC PANEL: CPT | Performed by: EMERGENCY MEDICINE

## 2025-06-29 PROCEDURE — 36415 COLL VENOUS BLD VENIPUNCTURE: CPT | Performed by: EMERGENCY MEDICINE

## 2025-06-29 PROCEDURE — 25010000002 PIPERACILLIN SOD-TAZOBACTAM PER 1 G: Performed by: EMERGENCY MEDICINE

## 2025-06-29 PROCEDURE — 25810000003 SODIUM CHLORIDE 0.9 % SOLUTION: Performed by: EMERGENCY MEDICINE

## 2025-06-29 PROCEDURE — 86140 C-REACTIVE PROTEIN: CPT | Performed by: INTERNAL MEDICINE

## 2025-06-29 PROCEDURE — 25010000002 ACETAMINOPHEN 10 MG/ML SOLUTION: Performed by: EMERGENCY MEDICINE

## 2025-06-29 PROCEDURE — 99291 CRITICAL CARE FIRST HOUR: CPT

## 2025-06-29 PROCEDURE — 83605 ASSAY OF LACTIC ACID: CPT | Performed by: EMERGENCY MEDICINE

## 2025-06-29 PROCEDURE — 87186 SC STD MICRODIL/AGAR DIL: CPT | Performed by: EMERGENCY MEDICINE

## 2025-06-29 PROCEDURE — 81001 URINALYSIS AUTO W/SCOPE: CPT | Performed by: EMERGENCY MEDICINE

## 2025-06-29 PROCEDURE — 82948 REAGENT STRIP/BLOOD GLUCOSE: CPT

## 2025-06-29 PROCEDURE — 93005 ELECTROCARDIOGRAM TRACING: CPT | Performed by: EMERGENCY MEDICINE

## 2025-06-29 PROCEDURE — 85652 RBC SED RATE AUTOMATED: CPT | Performed by: INTERNAL MEDICINE

## 2025-06-29 PROCEDURE — 94799 UNLISTED PULMONARY SVC/PX: CPT

## 2025-06-29 PROCEDURE — 83735 ASSAY OF MAGNESIUM: CPT | Performed by: EMERGENCY MEDICINE

## 2025-06-29 PROCEDURE — 71045 X-RAY EXAM CHEST 1 VIEW: CPT

## 2025-06-29 PROCEDURE — 85025 COMPLETE CBC W/AUTO DIFF WBC: CPT | Performed by: EMERGENCY MEDICINE

## 2025-06-29 PROCEDURE — 83880 ASSAY OF NATRIURETIC PEPTIDE: CPT | Performed by: EMERGENCY MEDICINE

## 2025-06-29 PROCEDURE — 87637 SARSCOV2&INF A&B&RSV AMP PRB: CPT | Performed by: EMERGENCY MEDICINE

## 2025-06-29 RX ORDER — METOPROLOL SUCCINATE 25 MG/1
25 TABLET, EXTENDED RELEASE ORAL EVERY 12 HOURS SCHEDULED
Status: DISCONTINUED | OUTPATIENT
Start: 2025-06-29 | End: 2025-07-02

## 2025-06-29 RX ORDER — SODIUM CHLORIDE 0.9 % (FLUSH) 0.9 %
10 SYRINGE (ML) INJECTION EVERY 12 HOURS SCHEDULED
Status: DISCONTINUED | OUTPATIENT
Start: 2025-06-29 | End: 2025-07-05 | Stop reason: HOSPADM

## 2025-06-29 RX ORDER — SODIUM CHLORIDE 0.9 % (FLUSH) 0.9 %
10 SYRINGE (ML) INJECTION AS NEEDED
Status: DISCONTINUED | OUTPATIENT
Start: 2025-06-29 | End: 2025-07-05 | Stop reason: HOSPADM

## 2025-06-29 RX ORDER — ATORVASTATIN CALCIUM 20 MG/1
20 TABLET, FILM COATED ORAL DAILY
Status: DISCONTINUED | OUTPATIENT
Start: 2025-06-30 | End: 2025-07-05 | Stop reason: HOSPADM

## 2025-06-29 RX ORDER — ACETAMINOPHEN 10 MG/ML
1000 INJECTION, SOLUTION INTRAVENOUS ONCE
Status: COMPLETED | OUTPATIENT
Start: 2025-06-29 | End: 2025-06-29

## 2025-06-29 RX ORDER — ONDANSETRON 4 MG/1
4 TABLET, ORALLY DISINTEGRATING ORAL EVERY 6 HOURS PRN
Status: DISCONTINUED | OUTPATIENT
Start: 2025-06-29 | End: 2025-07-05 | Stop reason: HOSPADM

## 2025-06-29 RX ORDER — SODIUM CHLORIDE 9 MG/ML
40 INJECTION, SOLUTION INTRAVENOUS AS NEEDED
Status: DISCONTINUED | OUTPATIENT
Start: 2025-06-29 | End: 2025-07-05 | Stop reason: HOSPADM

## 2025-06-29 RX ORDER — BUDESONIDE 0.25 MG/2ML
1 INHALANT ORAL DAILY
Status: DISCONTINUED | OUTPATIENT
Start: 2025-06-30 | End: 2025-07-02

## 2025-06-29 RX ORDER — ARFORMOTEROL TARTRATE 15 UG/2ML
15 SOLUTION RESPIRATORY (INHALATION)
Status: DISCONTINUED | OUTPATIENT
Start: 2025-06-29 | End: 2025-07-05 | Stop reason: HOSPADM

## 2025-06-29 RX ORDER — BISACODYL 10 MG
10 SUPPOSITORY, RECTAL RECTAL DAILY PRN
Status: DISCONTINUED | OUTPATIENT
Start: 2025-06-29 | End: 2025-07-05 | Stop reason: HOSPADM

## 2025-06-29 RX ORDER — DILTIAZEM HYDROCHLORIDE 5 MG/ML
15 INJECTION INTRAVENOUS ONCE
Status: COMPLETED | OUTPATIENT
Start: 2025-06-29 | End: 2025-06-29

## 2025-06-29 RX ORDER — PREGABALIN 75 MG/1
150 CAPSULE ORAL 2 TIMES DAILY
Status: DISCONTINUED | OUTPATIENT
Start: 2025-06-29 | End: 2025-07-05 | Stop reason: HOSPADM

## 2025-06-29 RX ORDER — NICOTINE POLACRILEX 4 MG
15 LOZENGE BUCCAL
Status: DISCONTINUED | OUTPATIENT
Start: 2025-06-29 | End: 2025-07-05 | Stop reason: HOSPADM

## 2025-06-29 RX ORDER — POLYETHYLENE GLYCOL 3350 17 G/17G
17 POWDER, FOR SOLUTION ORAL DAILY PRN
Status: DISCONTINUED | OUTPATIENT
Start: 2025-06-29 | End: 2025-07-05 | Stop reason: HOSPADM

## 2025-06-29 RX ORDER — BISACODYL 5 MG/1
5 TABLET, DELAYED RELEASE ORAL DAILY PRN
Status: DISCONTINUED | OUTPATIENT
Start: 2025-06-29 | End: 2025-07-05 | Stop reason: HOSPADM

## 2025-06-29 RX ORDER — NITROGLYCERIN 0.4 MG/1
0.4 TABLET SUBLINGUAL
Status: DISCONTINUED | OUTPATIENT
Start: 2025-06-29 | End: 2025-07-05 | Stop reason: HOSPADM

## 2025-06-29 RX ORDER — AMOXICILLIN 250 MG
2 CAPSULE ORAL 2 TIMES DAILY PRN
Status: DISCONTINUED | OUTPATIENT
Start: 2025-06-29 | End: 2025-07-05 | Stop reason: HOSPADM

## 2025-06-29 RX ORDER — IBUPROFEN 600 MG/1
1 TABLET ORAL
Status: DISCONTINUED | OUTPATIENT
Start: 2025-06-29 | End: 2025-07-05 | Stop reason: HOSPADM

## 2025-06-29 RX ORDER — DEXTROSE MONOHYDRATE 25 G/50ML
25 INJECTION, SOLUTION INTRAVENOUS
Status: DISCONTINUED | OUTPATIENT
Start: 2025-06-29 | End: 2025-07-05 | Stop reason: HOSPADM

## 2025-06-29 RX ORDER — DILTIAZEM HYDROCHLORIDE 180 MG/1
180 CAPSULE, COATED, EXTENDED RELEASE ORAL DAILY
Status: DISCONTINUED | OUTPATIENT
Start: 2025-06-30 | End: 2025-07-05 | Stop reason: HOSPADM

## 2025-06-29 RX ORDER — CALCIUM CARBONATE 500 MG/1
1 TABLET, CHEWABLE ORAL 2 TIMES DAILY PRN
Status: DISCONTINUED | OUTPATIENT
Start: 2025-06-29 | End: 2025-07-02

## 2025-06-29 RX ORDER — IPRATROPIUM BROMIDE AND ALBUTEROL SULFATE 2.5; .5 MG/3ML; MG/3ML
3 SOLUTION RESPIRATORY (INHALATION) EVERY 4 HOURS PRN
Status: DISCONTINUED | OUTPATIENT
Start: 2025-06-29 | End: 2025-07-05 | Stop reason: HOSPADM

## 2025-06-29 RX ORDER — INSULIN LISPRO 100 [IU]/ML
2-9 INJECTION, SOLUTION INTRAVENOUS; SUBCUTANEOUS
Status: DISCONTINUED | OUTPATIENT
Start: 2025-06-29 | End: 2025-07-05 | Stop reason: HOSPADM

## 2025-06-29 RX ADMIN — SODIUM CHLORIDE 1000 ML: 9 INJECTION, SOLUTION INTRAVENOUS at 16:53

## 2025-06-29 RX ADMIN — SODIUM CHLORIDE 1000 ML: 9 INJECTION, SOLUTION INTRAVENOUS at 15:55

## 2025-06-29 RX ADMIN — SODIUM CHLORIDE 2250 MG: 9 INJECTION, SOLUTION INTRAVENOUS at 17:55

## 2025-06-29 RX ADMIN — ARFORMOTEROL TARTRATE 15 MCG: 15 SOLUTION RESPIRATORY (INHALATION) at 23:05

## 2025-06-29 RX ADMIN — DILTIAZEM HYDROCHLORIDE 15 MG: 5 INJECTION, SOLUTION INTRAVENOUS at 17:02

## 2025-06-29 RX ADMIN — IPRATROPIUM BROMIDE AND ALBUTEROL SULFATE 3 ML: .5; 3 SOLUTION RESPIRATORY (INHALATION) at 23:05

## 2025-06-29 RX ADMIN — ACETAMINOPHEN 1000 MG: 10 INJECTION INTRAVENOUS at 15:56

## 2025-06-29 RX ADMIN — PREGABALIN 150 MG: 75 CAPSULE ORAL at 22:00

## 2025-06-29 RX ADMIN — METOPROLOL TARTRATE 5 MG: 1 INJECTION, SOLUTION INTRAVENOUS at 22:41

## 2025-06-29 RX ADMIN — INSULIN LISPRO 4 UNITS: 100 INJECTION, SOLUTION INTRAVENOUS; SUBCUTANEOUS at 22:00

## 2025-06-29 RX ADMIN — Medication 10 ML: at 21:54

## 2025-06-29 RX ADMIN — METOPROLOL SUCCINATE ER TABLETS 25 MG: 25 TABLET, FILM COATED, EXTENDED RELEASE ORAL at 23:28

## 2025-06-29 RX ADMIN — PIPERACILLIN AND TAZOBACTAM 4.5 G: 4; .5 INJECTION, POWDER, FOR SOLUTION INTRAVENOUS; PARENTERAL at 16:53

## 2025-06-29 NOTE — CASE MANAGEMENT/SOCIAL WORK
Discharge Planning Assessment   Lily     Patient Name: Mary Albarran  MRN: [id number]  Today's Date: 6/29/2025    Admit Date: 6/29/2025        Discharge Needs Assessment       Row Name 06/29/25 1753       Living Environment    People in Home spouse    Current Living Arrangements home    Potentially Unsafe Housing Conditions none    In the past 12 months has the electric, gas, oil, or water company threatened to shut off services in your home? No    Primary Care Provided by self    Provides Primary Care For no one, unable/limited ability to care for self    Family Caregiver if Needed child(siena), adult;spouse    Quality of Family Relationships helpful;involved;supportive    Able to Return to Prior Arrangements yes       Resource/Environmental Concerns    Resource/Environmental Concerns none    Transportation Concerns none       Transportation Needs    In the past 12 months, has lack of transportation kept you from medical appointments or from getting medications? no    In the past 12 months, has lack of transportation kept you from meetings, work, or from getting things needed for daily living? No       Food Insecurity    Within the past 12 months, you worried that your food would run out before you got the money to buy more. Never true    Within the past 12 months, the food you bought just didn't last and you didn't have money to get more. Never true       Transition Planning    Patient/Family Anticipates Transition to home;home with family    Patient/Family Anticipated Services at Transition none    Transportation Anticipated family or friend will provide       Discharge Needs Assessment    Readmission Within the Last 30 Days previous discharge plan unsuccessful    Equipment Currently Used at Home walker, standard;oxygen    Concerns to be Addressed denies needs/concerns at this time    Do you want help finding or keeping work or a job? I do not need or want help    Do you want help with school or training? For  example, starting or completing job training or getting a high school diploma, GED or equivalent No    Anticipated Changes Related to Illness none    Equipment Needed After Discharge none                   Discharge Plan    No documentation.                 Continued Care and Services - Admitted Since 6/29/2025    No active coordination exists.          Demographic Summary       Row Name 06/29/25 1758       General Information    Admission Type inpatient    Arrived From home    Referral Source emergency department    Reason for Consult discharge planning    Preferred Language English       Contact Information    Permission Granted to Share Info With family/designee                   Functional Status       Row Name 06/29/25 1753       Functional Status    Usual Activity Tolerance moderate    Current Activity Tolerance fair       Physical Activity    On average, how many days per week do you engage in moderate to strenuous exercise (like a brisk walk)? 0 days    On average, how many minutes do you engage in exercise at this level? 0 min    Number of minutes of exercise per week 0       Assessment of Health Literacy    How often do you have someone help you read hospital materials? Occasionally    How often do you have problems learning about your medical condition because of difficulty understanding written information? Occasionally    How often do you have a problem understanding what is told to you about your medical condition? Occasionally    How confident are you filling out medical forms by yourself? Quite a bit    Health Literacy Moderate       Functional Status, IADL    Medications independent;assistive equipment    Meal Preparation assistive equipment;independent    Housekeeping independent;assistive equipment    Laundry assistive equipment;independent    Shopping independent;assistive equipment    If for any reason you need help with day-to-day activities such as bathing, preparing meals, shopping, managing  finances, etc., do you get the help you need? I get all the help I need       Mental Status    General Appearance WDL WDL                   Psychosocial    No documentation.                  Abuse/Neglect       Row Name 06/29/25 5773       Personal Safety    Feels Unsafe at Home or Work/School no    Feels Threatened by Someone no    Does Anyone Try to Keep You From Having Contact with Others or Doing Things Outside Your Home? no    Physical Signs of Abuse Present no                   Legal       Row Name 06/29/25 4088       Financial Resource Strain    How hard is it for you to pay for the very basics like food, housing, medical care, and heating? Not hard       Financial/Legal    Source of Income disability                   Substance Abuse    No documentation.                  Patient Forms    No documentation.                 SW attempted to meet with pt at bedside this date to complete discharge needs assessment. Pt was observed to be resting in her hospital bed at the time of assessment. Pts daughter was also present and was the historian. Pts daughter reports that pt is able to meet her own ADLs and return home at discharge. Pt resides with her . Pts daughter denied any concerns at this time regarding discharge planning. Pts PCP is LUCERO Pollard and her pharmacy of choice is [facility].     FAVIOLA Montejo

## 2025-06-29 NOTE — H&P
[facility]  Date: 2025   Patient Name: Mary Albarran  : 1950  MRN: [id number]  Primary Care Physician:  Brittni Quiroz APRN  Date of admission: 2025    Subjective   Subjective     Chief Complaint:   Fevers chills    HPI:    Mary Albarran is a 74 y.o. female with medical history significant for atrial fibrillation, CKD stage IIIa, hypoxic respiratory failure on 2 L nasal cannula, hyperlipidemia, hypertension, COPD, type 2 diabetes, heart failure with reduced ejection fraction    Patient presents to the emergency department on 2025.  Patient was recently admitted to the hospital 2025 through 2025, treated for pneumonia.  Patient states she discharged from the hospital was feeling well, followed up with her primary care physician.  However acutely on day of presentation, 2025 patient started with fevers and chills.  Patient states she ate lunch with her family, pielberta.  Other people ate the same pizza with no other complaints.  Patient denied any nausea vomiting or diarrhea.  Patient started with fevers chills, rigors.  Patient states in the past when she exhibits the symptoms she quickly deteriorates therefore  brought her into the emergency department.  Patient stated up until lunch she had been within her usual state of health.  Patient has been taking her medications as prescribed.  In the emergency department, patient presented tachycardic 126 A-fib with RVR, febrile 38.3, respiratory rate of 20, blood pressure 135/76 saturating above 90% on her home 2 L nasal cannula.  Patient has a white count of 17, lactate of 2.5, glucose elevated 254.  Creatinine 0.97.  .  Troponin 20, repeat 20.  Patient denies any acute symptoms of dyspnea.  Patient's UA with trace leukocytes.  No bacteria, no WBCs.  Patient's chest x-ray shows similar left greater than right perihilar and bibasilar opacities compared to chest x-ray from  2025.  Patient was noted to have right lower extremity swelling and erythema.  Patient states this is her usual.  Patient has an open wound on the bottom of her right foot closest to fifth digit.  Patient states it looks better than usual.  In the emergency department patient started with broad-spectrum antibiotics, IV fluids.  Hospitalist service contacted for admission      Personal History     Past Medical History:  Past Medical History:   Diagnosis Date    Allergic rhinitis     Anxiety     Aortic valve disease 2021    Fibrocalcific changes of the aortic valve with mild aortic valve stenosis   on echo 3/2/2021  Moderate aortic valve regurgitation is present. Aortic valve maximum pressure gradient is 26 mmHg. Moderate aortic valve stenosis is present.  On echo 2/10/2022       Arthritis     Atrial fibrillation     CHF (congestive heart failure)     Chronic kidney disease (CKD), stage III (moderate)     Chronic pain     COPD (chronic obstructive pulmonary disease)     Coronary artery disease     Diabetes mellitus type 2 with complications     Elevated cholesterol     Ex-smoker     QUIT SMOKING     GERD (gastroesophageal reflux disease)     History of home oxygen therapy     2L/NC AAT REPORTED    History of transfusion     Hyperlipidemia     Hypertension     Thrombocytopenia        Past Surgical History:  Past Surgical History:   Procedure Laterality Date    HYSTERECTOMY      30 YEARS AGO       Family History:   Family History   Problem Relation Age of Onset    Heart disease Father     Heart disease Other     Heart disease Other     Diabetes Other        Social History:   Social History     Socioeconomic History    Marital status:    Tobacco Use    Smoking status: Former     Current packs/day: 0.00     Average packs/day: 1 pack/day for 37.0 years (37.0 ttl pk-yrs)     Types: Cigarettes     Start date: 4/3/1971     Quit date: 2008     Years since quittin.2     Passive exposure: Never     Smokeless tobacco: Never    Tobacco comments:     FOR 30 YEARS   Vaping Use    Vaping status: Never Used   Substance and Sexual Activity    Alcohol use: Never    Drug use: Never    Sexual activity: Defer       Home Medications:  HYDROcodone-acetaminophen, Insulin Glargine, Magnesium Oxide -Mg Supplement, SITagliptin, albuterol, albuterol sulfate HFA, ammonium lactate, atorvastatin, budesonide, bumetanide, cholecalciferol, dilTIAZem CD, metoprolol succinate XL, miconazole, mupirocin, potassium chloride ER, pregabalin, rivaroxaban, and sacubitril-valsartan    Allergies:  Allergies   Allergen Reactions    Morphine Confusion    Codeine Mental Status Change    Sglt2 Inhibitors Other (See Comments)     Frequent UTI        Objective   Objective     Vitals:   Temp:  [99.1 °F (37.3 °C)-101 °F (38.3 °C)] 99.6 °F (37.6 °C)  Heart Rate:  [108-143] 108  Resp:  [16-21] 21  BP: ()/(39-76) 113/49  Flow (L/min) (Oxygen Therapy):  [2-3] 3    Physical Exam    Constitutional: Awake, alert, uncomfortable appearing   Eyes: Pupils equal, sclerae anicteric, no conjunctival injection   HENT: NCAT, mucous membranes moist   Neck: Supple, no thyromegaly, no lymphadenopathy, trachea midline   Respiratory: Clear to auscultation bilaterally, nonlabored respirations    Cardiovascular: Tachycardic, irregularly irregular, no murmurs, rubs, or gallops, palpable pedal pulses bilaterally   Gastrointestinal: Positive bowel sounds, soft, nontender, nondistended   Neurologic: Oriented x 3, strength symmetric in all extremities, Cranial Nerves grossly intact to confrontation, speech clear   Skin: Right lower extremity with erythema extending up to the knee.  Open lesion to bottom of right foot close to the fifth digit.  Purulent discharge noted.    Result Review    Result Review:  I have personally reviewed the results from the time of this admission to 6/29/2025 19:54 EDT and agree with these findings:  [x]  Laboratory  [x]  Microbiology  [x]   Radiology  []  EKG/Telemetry   [x]  Cardiology/Vascular   []  Pathology  [x]  Old records  []  Other:      Assessment & Plan   Assessment / Plan     Assessment/Plan:   Severe sepsis present on admission, unknown source  Lactic acidosis  Concern for diabetic foot ulcer to right foot  Atrial fibrillation with RVR  CKD stage IIIa  Chronic hypoxic respiratory failure on 2 L nasal cannula  Hyperlipidemia  Hypertension  COPD not in acute exacerbation  Type 2 diabetes  Heart failure with reduced ejection fraction not in exacerbation    Plan:  Patient to be admitted to the hospital for further care management  Continue broad-spectrum antibiotics at this time  Blood cultures obtained, wound cultures obtained from right foot, will follow-up  ED ordered COVID flu and RSV testing, will follow-up  Patient remained stable on her home level of 2 L nasal cannula with no complaints of cough at this time  Patient has received sepsis bolus in the emergency department  Will continue to monitor heart rate, will resume home heart rate control medications  For continued issues with tachycardia overnight as needed doses of metoprolol will be available.  If needed patient may require diltiazem drip  Sign scale insulin with hypoglycemia protocol  Home medications reviewed and resumed as indicated        VTE Prophylaxis:  Pharmacologic VTE prophylaxis orders are signed & held.          CODE STATUS:    Code Status (Patient has no pulse and is not breathing): CPR (Attempt to Resuscitate)  Medical Interventions (Patient has pulse or is breathing): Full Support      Admission Status:  I believe this patient meets inpatient status.    Electronically signed by Jose Maria Alcala MD, 06/29/25, 5:17 PM EDT.

## 2025-06-29 NOTE — ED PROVIDER NOTES
Time: 3:41 PM EDT  Date of encounter:  6/29/2025  Independent Historian/Clinical History and Information was obtained by:   Patient and Family    History is limited by: Altered Mental Status    Chief Complaint: Altered mental status, confusion, cough, vomiting      History of Present Illness:  Patient is a 74 y.o. year old diabetic female with COPD and CHF, A-fib, previous history of pneumonia and sepsis who presents to the emergency department for evaluation of altered mental status and not acting herself and seeming somewhat lethargic in the setting of showing up with a fever of 101 Fahrenheit, new wet sounding cough and 1 episode of vomiting today.      Family member states she usually has to be admitted for sepsis.    Patient seems lethargic in the setting of fever but able to answer her name and the year and that she is in the [facility] correctly.      Also noted to have some redness of the right lower extremity that may be worse than usual.  She keeps her legs wrapped due to swelling.      Patient Care Team  Primary Care Provider: Brittni Quiroz APRN    Past Medical History:     Allergies   Allergen Reactions    Morphine Confusion    Codeine Mental Status Change    Sglt2 Inhibitors Other (See Comments)     Frequent UTI      Past Medical History:   Diagnosis Date    Allergic rhinitis     Anxiety     Aortic valve disease 11/28/2021    Fibrocalcific changes of the aortic valve with mild aortic valve stenosis   on echo 3/2/2021  Moderate aortic valve regurgitation is present. Aortic valve maximum pressure gradient is 26 mmHg. Moderate aortic valve stenosis is present.  On echo 2/10/2022       Arthritis     Atrial fibrillation     CHF (congestive heart failure)     Chronic kidney disease (CKD), stage III (moderate)     Chronic pain     COPD (chronic obstructive pulmonary disease)     Coronary artery disease     Diabetes mellitus type 2 with complications     Elevated cholesterol     Ex-smoker     QUIT  SMOKING 2008    GERD (gastroesophageal reflux disease)     History of home oxygen therapy     2L/NC AAT REPORTED    History of transfusion     Hyperlipidemia     Hypertension     Thrombocytopenia      Past Surgical History:   Procedure Laterality Date    HYSTERECTOMY      30 YEARS AGO     Family History   Problem Relation Age of Onset    Heart disease Father     Heart disease Other     Heart disease Other     Diabetes Other        Home Medications:  Prior to Admission medications    Medication Sig Start Date End Date Taking? Authorizing Provider   albuterol (ACCUNEB) 1.25 MG/3ML nebulizer solution Take 3 mL by nebulization Every 6 (Six) Hours As Needed for Wheezing or Shortness of Air. 3/30/23   Brittni Quiroz APRN   albuterol sulfate  (90 Base) MCG/ACT inhaler Inhale 2 puffs Every 4 (Four) Hours As Needed for Wheezing. 5/20/24   Brittni Quiroz APRN   ammonium lactate (LAC-HYDRIN) 12 % lotion Apply  topically to the appropriate area as directed 2 (Two) Times a Day. 2/28/25   Joaquin Rutledge MD   atorvastatin (LIPITOR) 20 MG tablet TAKE 1 TABLET DAILY  Patient taking differently: Take 1 tablet by mouth Daily. 9/12/24   Brittni Quiroz APRN   budesonide (PULMICORT) 1 MG/2ML nebulizer solution Take 2 mL by nebulization Daily. 9/9/24   Brittni Quiroz APRN   bumetanide (BUMEX) 2 MG tablet Take 1 tablet by mouth Daily. 4/18/25   Whitley Emmanuel MD   cholecalciferol (VITAMIN D3) 25 MCG (1000 UT) tablet Take 1 tablet by mouth Daily.    Whitley Emmanuel MD   dilTIAZem CD (CARDIZEM CD) 180 MG 24 hr capsule Take 1 capsule by mouth Daily.    Whitley Emmanuel MD   Entresto  MG tablet Take 1 tablet by mouth Every Night. Take 1/2 tablet BY MOUTH EVERY MORNING AND ONE TABLET AT BEDTIME 5/13/25   Whitley Emmanuel MD   HYDROcodone-acetaminophen (NORCO)  MG per tablet Take 1 tablet by mouth Every 6 (Six) Hours As Needed for Moderate Pain. 6/16/25   Brittni Quiroz  LUCERO Treadwell   Insulin Glargine (Lantus SoloStar) 100 UNIT/ML injection pen Inject 15 Units under the skin into the appropriate area as directed Daily. 25   Joaquin Rutledge MD   Januvia 50 MG tablet Take 1 tablet by mouth Daily. 25   Whitley Emmanuel MD   Magnesium Oxide -Mg Supplement 400 (240 Mg) MG tablet Take 1 tablet by mouth Daily. 25   Whitley Emmanuel MD   metoprolol succinate XL (TOPROL-XL) 25 MG 24 hr tablet Take 1 tablet by mouth Every 12 (Twelve) Hours. 25   Brittni Quiroz APRN   miconazole (MICOTIN) 2 % cream Apply 1 Application topically to the appropriate area as directed 2 (Two) Times a Day. 25   Jan Negrete MD   mupirocin (BACTROBAN) 2 % nasal ointment apply small amount  to Each nostril route 2 (Two) Times a Day. 25   Jan Negrete MD   potassium chloride ER (K-TAB) 20 MEQ tablet controlled-release ER tablet Take 2 tablets by mouth 2 (Two) Times a Day. 25   Whitley Emmanuel MD   pregabalin (LYRICA) 150 MG capsule Take 1 capsule by mouth 2 (Two) Times a Day. 25   Brittni Quiroz APRN   rivaroxaban (XARELTO) 20 MG tablet Take 1 tablet by mouth Daily for 30 days. Indications: DVT/PE (active thrombosis) 3/25/25 6/16/25  Melody Luna MD        Social History:   Social History     Tobacco Use    Smoking status: Former     Current packs/day: 0.00     Average packs/day: 1 pack/day for 37.0 years (37.0 ttl pk-yrs)     Types: Cigarettes     Start date: 4/3/1971     Quit date: 2008     Years since quittin.2     Passive exposure: Never    Smokeless tobacco: Never    Tobacco comments:     FOR 30 YEARS   Vaping Use    Vaping status: Never Used   Substance Use Topics    Alcohol use: Never    Drug use: Never         Review of Systems:  Review of Systems   I performed a 10 point review of systems which was all negative, except for the positives found in the HPI above.    Physical Exam:  /76 (BP Location: Right arm,  Patient Position: Lying)   Pulse (!) 131   Temp (!) 101 °F (38.3 °C) (Oral)   Resp 20   SpO2 92%       Physical Exam   General: Awake alert and in mild to moderate distress, feels warm to the touch, appears septic, occasional coughing noted    HEENT: Head normocephalic atraumatic, eyes PERRLA EOMI, nose normal, oropharynx normal.    Neck: Supple full range of motion, no meningismus, no lymphadenopathy    Heart: Tachycardic with irregularly irregular rhythm, no murmurs or rubs, 2+ radial pulses bilaterally    Lungs: Clear to auscultation bilaterally without wheezes or crackles, no respiratory distress    Abdomen: Soft, nontender, nondistended, no rebound or guarding    Skin: Warm, dry, no rash    Musculoskeletal: Normal range of motion, bilateral lower extremity edema with right lower leg erythema that blanches with palpation and warmth and tenderness noted to the right lower extremity concerning for possible cellulitis and also a diabetic foot ulcer over sole of right foot    Neurologic: Oriented x3, no motor deficits no sensory deficits    Psychiatric: Mood appears stable, no psychosis            Medical Decision Making:      Comorbidities that affect care:    Atrial Fibrillation, Congestive Heart Failure, COPD, Coronary Artery Disease, Diabetes    External Notes reviewed:    Previous Labs: I reviewed her most recent urine cultures from last month showing gram-negative rods      The following orders were placed and all results were independently analyzed by me:  Orders Placed This Encounter   Procedures    Blood Culture - Blood,    Blood Culture - Blood,    COVID-19, FLU A/B, RSV PCR 1 HR TAT - Swab, Nasopharynx    Wound Culture - Wound, Foot, Right    XR Chest 1 View    XR Foot 3+ View Right    [place] Draw    Comprehensive Metabolic Panel    High Sensitivity Troponin T    Magnesium    Urinalysis With Microscopic If Indicated (No Culture) - Urine, Clean Catch    CBC Auto Differential    Lactic Acid, Plasma     High Sensitivity Troponin T 1Hr    BNP    STAT Lactic Acid, Reflex    NPO Diet NPO Type: Strict NPO    Undress & Gown    Vital Signs    Orthostatic Blood Pressure    Undress & Gown    Continuous Pulse Oximetry    Vital Signs    Straight cath    Hospitalist (on-call MD unless specified)    Oxygen Therapy- Nasal Cannula; Titrate 1-6 LPM Per SpO2; 90 - 95%    Oxygen Therapy- Nasal Cannula; Titrate 1-6 LPM Per SpO2; 90 - 95%    POC Glucose Once    ECG 12 Lead Tachycardia    Insert Peripheral IV    Insert Peripheral IV    Fall Precautions    CBC & Differential    Green Top (Gel)    Lavender Top    Gold Top - SST    Light Blue Top       Medications Given in the Emergency Department:  Medications   sodium chloride 0.9 % flush 10 mL (has no administration in time range)   sodium chloride 0.9 % flush 10 mL (has no administration in time range)   vancomycin 2250 mg/500 mL 0.9% NS IVPB ([facility]) (has no administration in time range)   piperacillin-tazobactam (ZOSYN) IVPB 4.5 g IVPB in 100 mL NS (VTB) (4.5 g Intravenous New Bag 6/29/25 1653)   dilTIAZem (CARDIZEM) injection 15 mg (has no administration in time range)   sodium chloride 0.9 % bolus 1,000 mL (1,000 mL Intravenous New Bag 6/29/25 1653)   acetaminophen (OFIRMEV) injection 1,000 mg (1,000 mg Intravenous Given 6/29/25 1556)   sodium chloride 0.9 % bolus 1,000 mL (0 mL Intravenous Stopped 6/29/25 1701)        ED Course:    ED Course as of 06/29/25 1705   Sun Jun 29, 2025   1547 EG: Interpreted her twelve-lead EKG is atrial fibrillation with rapid ventricular rate of 136 bpm, absent P waves, normal QRS, normal ST segments and T waves without acute ischemia. [VS]      ED Course User Index  [VS] Avinash Huitron MD       Labs:    Lab Results (last 24 hours)       Procedure Component Value Units Date/Time    CBC & Differential [[id number]]  (Abnormal) Collected: 06/29/25 1526    Specimen: Blood Updated: 06/29/25 1530    Narrative:      The following orders were created for panel  order CBC & Differential.  Procedure                               Abnormality         Status                     ---------                               -----------         ------                     CBC Auto Differential[[id number]]        Abnormal            Final result                 Please view results for these tests on the individual orders.    Comprehensive Metabolic Panel [[id number]]  (Abnormal) Collected: 06/29/25 1526    Specimen: Blood Updated: 06/29/25 1549     Glucose 254 mg/dL      BUN 21.6 mg/dL      Creatinine 0.97 mg/dL      Sodium 136 mmol/L      Potassium 4.8 mmol/L      Chloride 95 mmol/L      CO2 26.9 mmol/L      Calcium 9.9 mg/dL      Total Protein 8.0 g/dL      Albumin 4.5 g/dL      ALT (SGPT) 10 U/L      AST (SGOT) 14 U/L      Alkaline Phosphatase 101 U/L      Total Bilirubin 0.9 mg/dL      Globulin 3.5 gm/dL      A/G Ratio 1.3 g/dL      BUN/Creatinine Ratio 22.3     Anion Gap 14.1 mmol/L      eGFR 62.2 mL/min/1.73     Narrative:      GFR Categories in Chronic Kidney Disease (CKD)              GFR Category          GFR (mL/min/1.73)    Interpretation  G1                    90 or greater        Normal or high (1)  G2                    60-89                Mild decrease (1)  G3a                   45-59                Mild to moderate decrease  G3b                   30-44                Moderate to severe decrease  G4                    15-29                Severe decrease  G5                    14 or less           Kidney failure    (1)In the absence of evidence of kidney disease, neither GFR category G1 or G2 fulfill the criteria for CKD.    eGFR calculation 2021 CKD-EPI creatinine equation, which does not include race as a factor    High Sensitivity Troponin T [[id number]]  (Abnormal) Collected: 06/29/25 1526    Specimen: Blood Updated: 06/29/25 1549     HS Troponin T 20 ng/L     Narrative:      High Sensitive Troponin T Reference Range:  <14.0 ng/L- Negative Female for AMI  <22.0 ng/L-  Negative Male for AMI  >=14 - Abnormal Female indicating possible myocardial injury.  >=22 - Abnormal Male indicating possible myocardial injury.   Clinicians would have to utilize clinical acumen, EKG, Troponin, and serial changes to determine if it is an Acute Myocardial Infarction or myocardial injury due to an underlying chronic condition.         Magnesium [[id number]]  (Normal) Collected: 06/29/25 1526    Specimen: Blood Updated: 06/29/25 1549     Magnesium 1.9 mg/dL     CBC Auto Differential [[id number]]  (Abnormal) Collected: 06/29/25 1526    Specimen: Blood Updated: 06/29/25 1530     WBC 17.58 10*3/mm3      RBC 4.74 10*6/mm3      Hemoglobin 13.1 g/dL      Hematocrit 42.6 %      MCV 89.9 fL      MCH 27.6 pg      MCHC 30.8 g/dL      RDW 15.3 %      RDW-SD 50.4 fl      MPV 13.0 fL      Platelets 123 10*3/mm3      Neutrophil % 89.5 %      Lymphocyte % 4.9 %      Monocyte % 4.6 %      Eosinophil % 0.2 %      Basophil % 0.3 %      Immature Grans % 0.5 %      Neutrophils, Absolute 15.73 10*3/mm3      Lymphocytes, Absolute 0.86 10*3/mm3      Monocytes, Absolute 0.81 10*3/mm3      Eosinophils, Absolute 0.04 10*3/mm3      Basophils, Absolute 0.05 10*3/mm3      Immature Grans, Absolute 0.09 10*3/mm3      nRBC 0.0 /100 WBC     Lactic Acid, Plasma [[id number]]  (Abnormal) Collected: 06/29/25 1526    Specimen: Blood Updated: 06/29/25 1629     Lactate 2.5 mmol/L     BNP [[id number]]  (Abnormal) Collected: 06/29/25 1526    Specimen: Blood Updated: 06/29/25 1627     proBNP 945.1 pg/mL     Narrative:      This assay is used as an aid in the diagnosis of individuals suspected of having heart failure. It can be used as an aid in the diagnosis of acute decompensated heart failure (ADHF) in patients presenting with signs and symptoms of ADHF to the emergency department (ED). In addition, NT-proBNP of <300 pg/mL indicates ADHF is not likely.    Age Range Result Interpretation  NT-proBNP Concentration (pg/mL:      <50              Positive            >450                   Gray                 300-450                    Negative             <300    50-75           Positive            >900                  Gray                300-900                  Negative            <300      >75             Positive            >1800                  Gray                300-1800                  Negative            <300    Blood Culture - Blood, Arm, Right [[id number]] Collected: 06/29/25 1531    Specimen: Blood from Arm, Right Updated: 06/29/25 1535    Blood Culture - Blood, Arm, Right [[id number]] Collected: 06/29/25 1630    Specimen: Blood from Arm, Right Updated: 06/29/25 1634    High Sensitivity Troponin T 1Hr [[id number]]  (Abnormal) Collected: 06/29/25 1630    Specimen: Blood from Arm, Right Updated: 06/29/25 1659     HS Troponin T 20 ng/L      Troponin T Numeric Delta 0 ng/L      Troponin T % Delta 0    Narrative:      High Sensitive Troponin T Reference Range:  <14.0 ng/L- Negative Female for AMI  <22.0 ng/L- Negative Male for AMI  >=14 - Abnormal Female indicating possible myocardial injury.  >=22 - Abnormal Male indicating possible myocardial injury.   Clinicians would have to utilize clinical acumen, EKG, Troponin, and serial changes to determine if it is an Acute Myocardial Infarction or myocardial injury due to an underlying chronic condition.         Urinalysis With Microscopic If Indicated (No Culture) - Urine, Clean Catch [[id number]] Collected: 06/29/25 1700    Specimen: Urine, Clean Catch Updated: 06/29/25 1704    COVID-19, FLU A/B, RSV PCR 1 HR TAT - Swab, Nasopharynx [[id number]] Collected: 06/29/25 1700    Specimen: Swab from Nasopharynx Updated: 06/29/25 1703    Wound Culture - Wound, Foot, Right [[id number]] Collected: 06/29/25 1700    Specimen: Wound from Foot, Right Updated: 06/29/25 1703             Imaging:    XR Chest 1 View  Result Date: 6/29/2025  XR CHEST 1 VW Date of Exam: 6/29/2025 3:14 PM EDT Indication: Weak/Dizzy/AMS triage  protocol Comparison: CT chest 5/25/2025, chest radiograph 5/25/2025 Findings: Grossly stable cardiomegaly. Similar left greater than right perihilar and bibasilar airspace opacities. No overt edema, significant effusion or pneumothorax. Degenerative related osseous change.     Impression: Radiographically similar left greater than right perihilar and bibasilar opacities as compared to 5/25/2025. Differential includes atelectasis/scar and pneumonia in the right clinical setting. Stable cardiomegaly without signs of overt edema. Electronically Signed: Dc Miguel MD  6/29/2025 3:56 PM EDT  Workstation ID: [id number]        Differential Diagnosis and Discussion:    Altered Mental Status: Based on the patient's signs and symptoms, differential diagnosis includes but is not limited to meningitis, stroke, sepsis, subarachnoid hemorrhage, intracranial bleeding, encephalitis, and metabolic encephalopathy.  Fever: Based on the complaint of fever, differential diagnosis includes but is not limited to meningitis, pneumonia, pyelonephritis, acute uti,  systemic immune response syndrome, sepsis, viral syndrome, fungal infection, tick born illness and other bacterial infections.    PROCEDURES:    Labs were collected in the emergency department and all labs were reviewed and interpreted by me.  X-ray were performed in the emergency department and all X-ray impressions were independently interpreted by me.  An EKG was performed and the EKG was interpreted by me.    ECG 12 Lead Tachycardia   Preliminary Result   HEART OUSW=592  bpm   RR Wicsoujj=768  ms   AZ Interval=  ms   P Horizontal Axis=  deg   P Front Axis=  deg   QRSD Interval=70  ms   QT Vxcshuxo=769  ms   UXdZ=300  ms   QRS Axis=111  deg   T Wave Axis=18  deg   - ABNORMAL ECG -   Atrial fibrillation   Right axis deviation   Abnormal R-wave progression, late transition   Date and Time of Study:2025-06-29 15:12:45          Procedures    MDM     Amount and/or Complexity of  Data Reviewed  Clinical lab tests: reviewed  Tests in the radiology section of CPT®: reviewed  Tests in the medicine section of CPT®: reviewed  Decide to obtain previous medical records or to obtain history from someone other than the patient: yes           This patient is a 74-year-old diabetic female with previous history of sepsis now presenting with fever and some confusion and tachycardia and elevated white blood cell count of 17 with a left shift concerning for sepsis.    She has a new cough and congestion and we are getting a chest x-ray and swabbing her for flu and COVID and also checking a catheterized urinalysis.    She also has some apparent cellulitis of the right lower extremity possibly related to a diabetic right foot infection.  Will send off a wound culture.    I reviewed her chest x-ray image and interpreted as possible bilateral infiltrates that could represent pneumonia in the setting of her fever and cough.    I recognized sepsis at 1540 and I am giving IV fluids and some Tylenol and sending off blood cultures and lactic acid and starting her on IV broad-spectrum antibiotics including Zosyn and vancomycin and will plan to admit her to the hospital.        Critical care:    Total Critical Care time of 35 minutes. Total critical care time documented does not include time spent on separately billed procedures for services of nurses or physician assistants. I personally saw and examined the patient. I have reviewed all diagnostic interpretations and treatment plans as written. I was present for the key portions of any procedures performed and the inclusive time noted in any critical care statement. Critical care time includes patient management by me, time spent at the patients bedside,  time to review lab and imaging results, discussing patient care, documentation in the medical record, and time spent with family or caregiver.          Patient Care Considerations:          Consultants/Shared  Management Plan:    Hospitalist: I have discussed the case with the admitting hospitalist who agrees to accept the patient for admission.    Social Determinants of Health:    Patient has presented with family members who are responsible, reliable and will ensure follow up care.      Disposition and Care Coordination:    Admit:   Through independent evaluation of the patient's history, physical, and imperical data, the patient meets criteria for inpatient admission to the hospital.        Final diagnoses:   Sepsis, due to unspecified organism, unspecified whether acute organ dysfunction present   Acute febrile illness   Atrial fibrillation with rapid ventricular response   Diabetic infection of right foot   Cellulitis of right leg   Pneumonia of both lungs due to infectious organism, unspecified part of lung        ED Disposition       ED Disposition   Decision to Admit    Condition   --    Comment   --               This medical record created using voice recognition software.             Avinash Huitron MD  06/29/25 [id number]       Avinash Huitron MD  06/29/25 [id number]

## 2025-06-30 LAB
ANION GAP SERPL CALCULATED.3IONS-SCNC: 12.9 MMOL/L (ref 5–15)
BASOPHILS # BLD AUTO: 0.03 10*3/MM3 (ref 0–0.2)
BASOPHILS NFR BLD AUTO: 0.2 % (ref 0–1.5)
BUN SERPL-MCNC: 16.4 MG/DL (ref 8–23)
BUN/CREAT SERPL: 19.8 (ref 7–25)
CALCIUM SPEC-SCNC: 8.6 MG/DL (ref 8.6–10.5)
CHLORIDE SERPL-SCNC: 102 MMOL/L (ref 98–107)
CO2 SERPL-SCNC: 21.1 MMOL/L (ref 22–29)
CREAT SERPL-MCNC: 0.83 MG/DL (ref 0.57–1)
DEPRECATED RDW RBC AUTO: 50.3 FL (ref 37–54)
EGFRCR SERPLBLD CKD-EPI 2021: 75 ML/MIN/1.73
EOSINOPHIL # BLD AUTO: 0 10*3/MM3 (ref 0–0.4)
EOSINOPHIL NFR BLD AUTO: 0 % (ref 0.3–6.2)
ERYTHROCYTE [DISTWIDTH] IN BLOOD BY AUTOMATED COUNT: 15.7 % (ref 12.3–15.4)
GLUCOSE BLDC GLUCOMTR-MCNC: 177 MG/DL (ref 70–99)
GLUCOSE BLDC GLUCOMTR-MCNC: 198 MG/DL (ref 70–99)
GLUCOSE BLDC GLUCOMTR-MCNC: 214 MG/DL (ref 70–99)
GLUCOSE BLDC GLUCOMTR-MCNC: 219 MG/DL (ref 70–99)
GLUCOSE SERPL-MCNC: 239 MG/DL (ref 65–99)
HCT VFR BLD AUTO: 36.1 % (ref 34–46.6)
HGB BLD-MCNC: 11.3 G/DL (ref 12–15.9)
IMM GRANULOCYTES # BLD AUTO: 0.07 10*3/MM3 (ref 0–0.05)
IMM GRANULOCYTES NFR BLD AUTO: 0.5 % (ref 0–0.5)
LYMPHOCYTES # BLD AUTO: 0.8 10*3/MM3 (ref 0.7–3.1)
LYMPHOCYTES NFR BLD AUTO: 5.2 % (ref 19.6–45.3)
MAGNESIUM SERPL-MCNC: 1.8 MG/DL (ref 1.6–2.4)
MCH RBC QN AUTO: 27.4 PG (ref 26.6–33)
MCHC RBC AUTO-ENTMCNC: 31.3 G/DL (ref 31.5–35.7)
MCV RBC AUTO: 87.6 FL (ref 79–97)
MONOCYTES # BLD AUTO: 0.33 10*3/MM3 (ref 0.1–0.9)
MONOCYTES NFR BLD AUTO: 2.1 % (ref 5–12)
MRSA DNA SPEC QL NAA+PROBE: ABNORMAL
NEUTROPHILS NFR BLD AUTO: 14.12 10*3/MM3 (ref 1.7–7)
NEUTROPHILS NFR BLD AUTO: 92 % (ref 42.7–76)
NRBC BLD AUTO-RTO: 0 /100 WBC (ref 0–0.2)
PLATELET # BLD AUTO: 109 10*3/MM3 (ref 140–450)
PMV BLD AUTO: 12.7 FL (ref 6–12)
POTASSIUM SERPL-SCNC: 4.6 MMOL/L (ref 3.5–5.2)
PROCALCITONIN SERPL-MCNC: 1.62 NG/ML (ref 0–0.25)
RBC # BLD AUTO: 4.12 10*6/MM3 (ref 3.77–5.28)
SODIUM SERPL-SCNC: 136 MMOL/L (ref 136–145)
WBC NRBC COR # BLD AUTO: 15.35 10*3/MM3 (ref 3.4–10.8)

## 2025-06-30 PROCEDURE — 85025 COMPLETE CBC W/AUTO DIFF WBC: CPT | Performed by: INTERNAL MEDICINE

## 2025-06-30 PROCEDURE — 63710000001 INSULIN LISPRO (HUMAN) PER 5 UNITS: Performed by: INTERNAL MEDICINE

## 2025-06-30 PROCEDURE — 80048 BASIC METABOLIC PNL TOTAL CA: CPT | Performed by: INTERNAL MEDICINE

## 2025-06-30 PROCEDURE — 94799 UNLISTED PULMONARY SVC/PX: CPT

## 2025-06-30 PROCEDURE — 82948 REAGENT STRIP/BLOOD GLUCOSE: CPT

## 2025-06-30 PROCEDURE — 25010000002 VANCOMYCIN HCL 1.25 G RECONSTITUTED SOLUTION 1 EACH VIAL: Performed by: INTERNAL MEDICINE

## 2025-06-30 PROCEDURE — 84145 PROCALCITONIN (PCT): CPT | Performed by: INTERNAL MEDICINE

## 2025-06-30 PROCEDURE — 97165 OT EVAL LOW COMPLEX 30 MIN: CPT

## 2025-06-30 PROCEDURE — 99232 SBSQ HOSP IP/OBS MODERATE 35: CPT | Performed by: INTERNAL MEDICINE

## 2025-06-30 PROCEDURE — 25010000002 PIPERACILLIN SOD-TAZOBACTAM PER 1 G: Performed by: INTERNAL MEDICINE

## 2025-06-30 PROCEDURE — 63710000001 INSULIN GLARGINE PER 5 UNITS: Performed by: INTERNAL MEDICINE

## 2025-06-30 PROCEDURE — 94664 DEMO&/EVAL PT USE INHALER: CPT

## 2025-06-30 PROCEDURE — 25810000003 SODIUM CHLORIDE 0.9 % SOLUTION 250 ML FLEX CONT: Performed by: INTERNAL MEDICINE

## 2025-06-30 PROCEDURE — 82948 REAGENT STRIP/BLOOD GLUCOSE: CPT | Performed by: INTERNAL MEDICINE

## 2025-06-30 PROCEDURE — 83735 ASSAY OF MAGNESIUM: CPT | Performed by: INTERNAL MEDICINE

## 2025-06-30 PROCEDURE — 94761 N-INVAS EAR/PLS OXIMETRY MLT: CPT

## 2025-06-30 PROCEDURE — 87641 MR-STAPH DNA AMP PROBE: CPT | Performed by: INTERNAL MEDICINE

## 2025-06-30 RX ORDER — MINERAL OIL/HYDROPHIL PETROLAT
1 OINTMENT (GRAM) TOPICAL DAILY
Status: DISCONTINUED | OUTPATIENT
Start: 2025-06-30 | End: 2025-07-05 | Stop reason: HOSPADM

## 2025-06-30 RX ORDER — HYDROCODONE BITARTRATE AND ACETAMINOPHEN 10; 325 MG/1; MG/1
1 TABLET ORAL ONCE
Refills: 0 | Status: COMPLETED | OUTPATIENT
Start: 2025-06-30 | End: 2025-06-30

## 2025-06-30 RX ADMIN — RIVAROXABAN 20 MG: 20 TABLET, FILM COATED ORAL at 08:37

## 2025-06-30 RX ADMIN — METOPROLOL SUCCINATE ER TABLETS 25 MG: 25 TABLET, FILM COATED, EXTENDED RELEASE ORAL at 21:37

## 2025-06-30 RX ADMIN — METOPROLOL SUCCINATE ER TABLETS 25 MG: 25 TABLET, FILM COATED, EXTENDED RELEASE ORAL at 08:38

## 2025-06-30 RX ADMIN — BUDESONIDE 1 MG: 0.25 SUSPENSION RESPIRATORY (INHALATION) at 06:56

## 2025-06-30 RX ADMIN — ATORVASTATIN CALCIUM 20 MG: 20 TABLET, FILM COATED ORAL at 08:38

## 2025-06-30 RX ADMIN — VANCOMYCIN HYDROCHLORIDE 1250 MG: 1.25 INJECTION, POWDER, LYOPHILIZED, FOR SOLUTION INTRAVENOUS at 14:29

## 2025-06-30 RX ADMIN — PIPERACILLIN AND TAZOBACTAM 4.5 G: 4; .5 INJECTION, POWDER, FOR SOLUTION INTRAVENOUS; PARENTERAL at 21:46

## 2025-06-30 RX ADMIN — METOPROLOL TARTRATE 5 MG: 1 INJECTION, SOLUTION INTRAVENOUS at 04:04

## 2025-06-30 RX ADMIN — INSULIN LISPRO 4 UNITS: 100 INJECTION, SOLUTION INTRAVENOUS; SUBCUTANEOUS at 08:07

## 2025-06-30 RX ADMIN — PREGABALIN 150 MG: 75 CAPSULE ORAL at 08:37

## 2025-06-30 RX ADMIN — DILTIAZEM HYDROCHLORIDE 180 MG: 180 CAPSULE, COATED, EXTENDED RELEASE ORAL at 08:37

## 2025-06-30 RX ADMIN — INSULIN LISPRO 2 UNITS: 100 INJECTION, SOLUTION INTRAVENOUS; SUBCUTANEOUS at 17:35

## 2025-06-30 RX ADMIN — INSULIN GLARGINE 10 UNITS: 100 INJECTION, SOLUTION SUBCUTANEOUS at 17:35

## 2025-06-30 RX ADMIN — HYDROCODONE BITARTRATE AND ACETAMINOPHEN 1 TABLET: 10; 325 TABLET ORAL at 23:06

## 2025-06-30 RX ADMIN — ARFORMOTEROL TARTRATE 15 MCG: 15 SOLUTION RESPIRATORY (INHALATION) at 22:44

## 2025-06-30 RX ADMIN — Medication 10 ML: at 08:39

## 2025-06-30 RX ADMIN — PREGABALIN 150 MG: 75 CAPSULE ORAL at 21:37

## 2025-06-30 RX ADMIN — WHITE PETROLATUM 1 APPLICATION: 1.75 OINTMENT TOPICAL at 17:35

## 2025-06-30 RX ADMIN — IPRATROPIUM BROMIDE AND ALBUTEROL SULFATE 3 ML: .5; 3 SOLUTION RESPIRATORY (INHALATION) at 04:09

## 2025-06-30 RX ADMIN — ARFORMOTEROL TARTRATE 15 MCG: 15 SOLUTION RESPIRATORY (INHALATION) at 06:55

## 2025-06-30 RX ADMIN — INSULIN LISPRO 4 UNITS: 100 INJECTION, SOLUTION INTRAVENOUS; SUBCUTANEOUS at 12:09

## 2025-06-30 RX ADMIN — Medication 10 ML: at 21:41

## 2025-06-30 RX ADMIN — INSULIN LISPRO 2 UNITS: 100 INJECTION, SOLUTION INTRAVENOUS; SUBCUTANEOUS at 22:17

## 2025-06-30 NOTE — PLAN OF CARE
Goal Outcome Evaluation:  Plan of Care Reviewed With: patient        Progress: no change  Outcome Evaluation: Nadia CARDOZA&Cony4. Hr elevated at beginning of shift, improved thorughout shift. No c/o pain. Ongoing care.

## 2025-06-30 NOTE — PLAN OF CARE
Goal Outcome Evaluation:  Plan of Care Reviewed With: patient        Progress: no change (First session for evaluation)  Outcome Evaluation: Patient presents with limitations of cognition/level of arousal, balance, strength and endurance/activity tolerance which impede her ability to perform ADL and transfers as prior.  The skills of a therapist will be required to safely and effectively implement treatment plan to restore maximal level of function.    Anticipated Discharge Disposition (OT): sub acute care setting

## 2025-06-30 NOTE — PROGRESS NOTES
"[facility] Clinical Pharmacy Services: Vancomycin Monitoring Note    Mary Albarran is a 74 y.o. female who is on day  of pharmacy to dose vancomycin for Skin and Soft Tissue.    Previous Vancomycin Dose:   1250 mg IV every  24  hours  Imaging Reviewed?: Yes   Foot Xray: Within exam limitations, no convincing osteomyelitis or acute osseous abnormality. Diffuse soft tissue swelling which could reflect volume volume overload/third spacing or cellulitis in the right clinical setting.   Updated Cultures and Sensitivities:    MRSA PCR: MRSA detected    Vitals/Labs  Ht: 157.5 cm (62\"); Wt: 123 kg (271 lb 6.2 oz)   Temp (24hrs), Av.4 °F (37.4 °C), Min:98.3 °F (36.8 °C), Max:101 °F (38.3 °C)   Estimated Creatinine Clearance: 76.7 mL/min (by C-G formula based on SCr of 0.83 mg/dL).       Results from last 7 days   Lab Units 25  0630 25  0549 25  1526   CREATININE mg/dL 0.83  --  0.97   WBC 10*3/mm3  --  15.35* 17.58*     Assessment/Plan    Current Vancomycin Dose: 1250 mg IV every 24 hours; which provides the following predicted parameters:  Exposure target: AUC24 (range) 400-600 mg/L.hr   AUC24,ss: 436 mg/L.hr  Probability of AUC24 > 400: 56 %  Ctrough,ss: 10.5 mg/L  Probability of Ctrough,ss > 20: 24 %  Probability of nephrotoxicity (Lodise ERIC ): 6 %  Next Vanc Random ordered for  w/ AM labs.      Scr down to 0.83mg/dL; note pt received 2000mL of bolus fluids on   We will continue to monitor patient changes and renal function     Thank you for involving pharmacy in this patient's care. Please contact pharmacy with any questions or concerns.    Trinidad Rios, PharmD  Clinical Pharmacist   "

## 2025-06-30 NOTE — SIGNIFICANT NOTE
Wound Eval / Progress Noted    GUERO Bautista     Patient Name: Mary Albarran  : 1950  MRN: [id number]  Today's Date: 2025                 Admit Date: 2025    Visit Dx:    ICD-10-CM ICD-9-CM   1. Sepsis, due to unspecified organism, unspecified whether acute organ dysfunction present  A41.9 038.9     995.91   2. Acute febrile illness  R50.9 780.60   3. Atrial fibrillation with rapid ventricular response  I48.91 427.31   4. Diabetic infection of right foot  E11.628 250.80    L08.9 686.9   5. Cellulitis of right leg  L03.115 682.6   6. Pneumonia of both lungs due to infectious organism, unspecified part of lung  J18.9 483.8   7. Decreased activities of daily living (ADL)  Z78.9 V49.89         Sepsis        Past Medical History:   Diagnosis Date    Allergic rhinitis     Anxiety     Aortic valve disease 2021    Fibrocalcific changes of the aortic valve with mild aortic valve stenosis   on echo 3/2/2021  Moderate aortic valve regurgitation is present. Aortic valve maximum pressure gradient is 26 mmHg. Moderate aortic valve stenosis is present.  On echo 2/10/2022       Arthritis     Atrial fibrillation     CHF (congestive heart failure)     Chronic kidney disease (CKD), stage III (moderate)     Chronic pain     COPD (chronic obstructive pulmonary disease)     Coronary artery disease     Diabetes mellitus type 2 with complications     Elevated cholesterol     Ex-smoker     QUIT SMOKING     GERD (gastroesophageal reflux disease)     History of home oxygen therapy     2L/NC AAT REPORTED    History of transfusion     Hyperlipidemia     Hypertension     Thrombocytopenia         Past Surgical History:   Procedure Laterality Date    HYSTERECTOMY      30 YEARS AGO     Physical Assessment:  Wound 25 1338 Right posterior foot unspecified (Active)   Wound Image   25 0840   Dressing Appearance open to air 25 0840   Confirmed Empty Wound Bed Yes, visual inspection of wound bed 25 0840    Closure None 06/30/25 0840   Base moist;red;slough;yellow 06/30/25 0840   Periwound dry;intact 06/30/25 0840   Periwound Temperature warm 06/30/25 0840   Periwound Skin Turgor soft 06/30/25 0840   Edges open 06/30/25 0840   Wound Length (cm) 1.3 cm 06/30/25 0840   Wound Width (cm) 1.8 cm 06/30/25 0840   Wound Depth (cm) 0.4 cm 06/30/25 0840   Wound Surface Area (cm^2) 1.84 cm^2 06/30/25 0840   Wound Volume (cm^3) 0.49 cm^3 06/30/25 0840   Drainage Characteristics/Odor serosanguineous 06/30/25 0840   Drainage Amount small 06/30/25 0840   Care, Wound cleansed with;irrigated with;sterile normal saline 06/30/25 0840   Dressing Care dressing applied;hydrofiber;silver impregnated;silicone border foam 06/30/25 0840   Periwound Care absorptive dressing applied 06/30/25 0840    RLE      RLE    Wound Check / Follow-up: Patient seen today for wound consult.  Patient is awake and alert at time of visit.  She is agreeable to assessment.  Primary PCA at bedside assisted with turning and repositioning.    Patient with open wound to right plantar foot.  Patient reports that she has had this wound for approximately 6 months.  Moist, red tissue along with thin yellow sloughing tissue is noted to wound base.  Cleansed wound with normal saline and gauze.  Applied silver impregnated Hydrofiber and secured with silicone border dressing.  Recommending daily dressing changes.    Dry, scaly skin is noted to bilateral lower extremities.  Erythema is noted to right  foot extending up right lower leg and extending to right medial thigh.  Skin is hot to the touch.  Patient reports tenderness.  Attending physician rounded at bedside during visit.  Recommending skin care with cleansing with CHG wash daily and then applying thin layer of Aquaphor ointment.    Redness and moisture is noted within lower abdominal fold.  No signs of yeast or fungal presentation noted.  Recommending skin care with application of cornstarch powder then padding with  moisture wicking fabric / dry sheets.    Buttocks intact with blanchable redness noted. Recommending skin care and skin protection with application of blue top barrier cream.     Recommending to implement pressure reduction measures including every two hour turns and offloading heels at all times.      Impression: Chronic wound to right plantar foot. Dry, scaly skin to BLE. Erythema to RLE. Redness and moisture within lower abdominal fold. Intact blanchable redness to buttocks.      Short term goals: Regain skin integrity, skin protection, pressure reduction, daily dressing changes, skin care, topical treatment.      Marianela Pompa RN    6/30/2025    16:05 EDT

## 2025-06-30 NOTE — THERAPY EVALUATION
Patient Name: Mary Albarran  : 1950    MRN: [id number]                              Today's Date: 2025       Admit Date: 2025    Visit Dx:     ICD-10-CM ICD-9-CM   1. Sepsis, due to unspecified organism, unspecified whether acute organ dysfunction present  A41.9 038.9     995.91   2. Acute febrile illness  R50.9 780.60   3. Atrial fibrillation with rapid ventricular response  I48.91 427.31   4. Diabetic infection of right foot  E11.628 250.80    L08.9 686.9   5. Cellulitis of right leg  L03.115 682.6   6. Pneumonia of both lungs due to infectious organism, unspecified part of lung  J18.9 483.8   7. Decreased activities of daily living (ADL)  Z78.9 V49.89     Patient Active Problem List   Diagnosis    Aortic stenosis, moderate    Longstanding persistent atrial fibrillation    Chronic kidney disease, stage III (moderate)    Hyperlipidemia    Hypertension    Chronic diastolic congestive heart failure    Atrial fibrillation with RVR    Onychomycosis    Onychocryptosis    Foot pain, bilateral    Nephrolithiasis    Recurrent urinary tract infection    Acute on chronic HFrEF (heart failure with reduced ejection fraction)    Cellulitis of right leg without foot    Diabetes mellitus type 2 with complications    Depression    COPD (chronic obstructive pulmonary disease)    Chronic pain    Anxiety    Heart failure with reduced ejection fraction    CHF exacerbation    Sepsis    C. difficile diarrhea    History of home oxygen therapy     Past Medical History:   Diagnosis Date    Allergic rhinitis     Anxiety     Aortic valve disease 2021    Fibrocalcific changes of the aortic valve with mild aortic valve stenosis   on echo 3/2/2021  Moderate aortic valve regurgitation is present. Aortic valve maximum pressure gradient is 26 mmHg. Moderate aortic valve stenosis is present.  On echo 2/10/2022       Arthritis     Atrial fibrillation     CHF (congestive heart failure)     Chronic kidney disease (CKD), stage  III (moderate)     Chronic pain     COPD (chronic obstructive pulmonary disease)     Coronary artery disease     Diabetes mellitus type 2 with complications     Elevated cholesterol     Ex-smoker     QUIT SMOKING 2008    GERD (gastroesophageal reflux disease)     History of home oxygen therapy     2L/NC AAT REPORTED    History of transfusion     Hyperlipidemia     Hypertension     Thrombocytopenia      Past Surgical History:   Procedure Laterality Date    HYSTERECTOMY      30 YEARS AGO      General Information       Row Name 06/30/25 1123          OT Time and Intention    Document Type evaluation  -AV     Mode of Treatment individual therapy;occupational therapy  -AV       Row Name 06/30/25 1123          General Information    Patient Profile Reviewed yes  -AV     Prior Level of Function independent:;ADL's;transfer  Sits to bathe (tub with seat).  Stands at sink to groom.  Ambulates with rollator.  2 L continuous home oxygen.  -AV     Existing Precautions/Restrictions fall;oxygen therapy device and L/min  Droplet isolation.  Latex allergy  -AV     Barriers to Rehab none identified  -AV       Row Name 06/30/25 1123          Occupational Profile    Reason for Services/Referral (Occupational Profile) Patient is a 74 year old female admitted [facility] on 6/29/2025 with fever and chills.  She is currently on 3W/4 L O2 with sats 93%.   OT consulted due to recent decline in ADL/transfer independence.  No previous OT services for current condition.  -AV       Row Name 06/30/25 1123          Living Environment    People in Home spouse  -AV       Row Name 06/30/25 1123          Home Main Entrance    Number of Stairs, Main Entrance none  Ramped entry  -AV       Row Name 06/30/25 1123          Cognition    Orientation Status (Cognition) --  Decreased level of arousal/max verbal and tactile cues for arousal throughout evaluation.  Able to follow commands with repeated direction.  Questionable ability to retain  information.  Nurse aware.  -AV       Row Name 06/30/25 1123          Safety Issues/Impairments Affecting Functional Mobility    Impairments Affecting Function (Mobility) balance;cognition;endurance/activity tolerance;strength  -AV               User Key  (r) = Recorded By, (t) = Taken By, (c) = Cosigned By      Initials Name Provider Type    Juan Magana OT Occupational Therapist                     Mobility/ADL's       Row Name 06/30/25 1125          Transfers    Comment, (Transfers) Unable to transfer to bedside max assist of 1  -AV       Row Name 06/30/25 1125          Activities of Daily Living    BADL Assessment/Intervention --  Dependent ADLs.  PureWick catheter in place  -AV               User Key  (r) = Recorded By, (t) = Taken By, (c) = Cosigned By      Initials Name Provider Type    Juan Magana OT Occupational Therapist                   Obj/Interventions       Row Name 06/30/25 1125          Sensory Assessment (Somatosensory)    Sensory Assessment (Somatosensory) UE sensation intact  -AV     Sensory Assessment Sensory awareness to light touch  -AV       Row Name 06/30/25 1125          Vision Assessment/Intervention    Visual Impairment/Limitations WFL  Max cues to open eyes throughout session  -AV       Row Name 06/30/25 1125          Range of Motion Comprehensive    General Range of Motion bilateral upper extremity ROM WFL  -AV     Comment, General Range of Motion AAROM bilateral shoulders.  Further BUE AROM WFL.  -AV       Row Name 06/30/25 1125          Strength Comprehensive (MMT)    Comment, General Manual Muscle Testing (MMT) Assessment 2(-)/5 bilateral anterior deltoids.  3+/5 bilateral biceps, triceps and   -AV       Row Name 06/30/25 1125          Motor Skills    Motor Skills coordination;functional endurance  -AV     Coordination --  Right dominant.  Not tested  -AV     Functional Endurance poor  -AV       Row Name 06/30/25 1125          Balance    Comment, Balance Unable to  assess.  Likely impaired  -AV               User Key  (r) = Recorded By, (t) = Taken By, (c) = Cosigned By      Initials Name Provider Type    Juan Magana, ASHLEE Occupational Therapist                   Goals/Plan       Row Name 06/30/25 1127          Transfer Goal 1 (OT)    Activity/Assistive Device (Transfer Goal 1, OT) transfers, all;walker, rolling  -AV     [place] Level/Cues Needed (Transfer Goal 1, OT) supervision required;verbal cues required  -AV     Time Frame (Transfer Goal 1, OT) long term goal (LTG);10 days  -AV       Row Name 06/30/25 1127          Bathing Goal 1 (OT)    Activity/Device (Bathing Goal 1, OT) bathing skills, all  -AV     [place] Level/Cues Needed (Bathing Goal 1, OT) supervision required;set-up required;verbal cues required  -AV     Time Frame (Bathing Goal 1, OT) long term goal (LTG);10 days  -AV       Row Name 06/30/25 1127          Dressing Goal 1 (OT)    Activity/Device (Dressing Goal 1, OT) dressing skills, all  -AV     [place]/Cues Needed (Dressing Goal 1, OT) supervision required;set-up required;verbal cues required  -AV     Time Frame (Dressing Goal 1, OT) long term goal (LTG);10 days  -AV       Row Name 06/30/25 1127          Toileting Goal 1 (OT)    Activity/Device (Toileting Goal 1, OT) toileting skills, all  -AV     [place] Level/Cues Needed (Toileting Goal 1, OT) supervision required;set-up required;verbal cues required  -AV     Time Frame (Toileting Goal 1, OT) long term goal (LTG);10 days  -AV       Row Name 06/30/25 1127          Grooming Goal 1 (OT)    Activity/Device (Grooming Goal 1, OT) grooming skills, all  Standing at sink  -AV     [place] (Grooming Goal 1, OT) supervision required;set-up required;verbal cues required  -AV     Time Frame (Grooming Goal 1, OT) long term goal (LTG);10 days  -AV       Row Name 06/30/25 1127          Strength Goal 1 (OT)    Strength Goal 1 (OT) Patient will demonstrate 4/5 bilateral biceps, triceps and   to increase ADL and transfer independence.  -AV     Time Frame (Strength Goal 1, OT) long term goal (LTG);10 days  -AV       Row Name 06/30/25 1127          Problem Specific Goal 1 (OT)    Problem Specific Goal 1 (OT) Patient will demonstrate fair endurance/activity tolerance needed to support ADLs.  -AV     Time Frame (Problem Specific Goal 1, OT) long term goal (LTG);10 days  -AV       Row Name 06/30/25 1127          Therapy Assessment/Plan (OT)    Planned Therapy Interventions (OT) activity tolerance training;BADL retraining;functional balance retraining;occupation/activity based interventions;patient/caregiver education/training;ROM/therapeutic exercise;strengthening exercise;transfer/mobility retraining  -AV               User Key  (r) = Recorded By, (t) = Taken By, (c) = Cosigned By      Initials Name Provider Type    AV Juan Ireland, OT Occupational Therapist                   Clinical Impression       Row Name 06/30/25 1127          Pain Assessment    Additional Documentation Pain Scale: FACES Pre/Post-Treatment (Group)  -AV       Row Name 06/30/25 1127          Pain Scale: FACES Pre/Post-Treatment    Pain: FACES Scale, Pretreatment 0-->no hurt  -AV     Posttreatment Pain Rating 0-->no hurt  -AV       Row Name 06/30/25 1127          Plan of Care Review    Plan of Care Reviewed With patient  -AV     Progress no change  First session for evaluation  -AV     Outcome Evaluation Patient presents with limitations of cognition/level of arousal, balance, strength and endurance/activity tolerance which impede her ability to perform ADL and transfers as prior.  The skills of a therapist will be required to safely and effectively implement treatment plan to restore maximal level of function.  -AV       Row Name 06/30/25 1127          Therapy Assessment/Plan (OT)    Patient/Family Therapy Goal Statement (OT) None stated  -AV     Rehab Potential (OT) fair  -AV     Criteria for Skilled Therapeutic Interventions Met (OT)  yes;meets criteria;skilled treatment is necessary  -AV     Therapy Frequency (OT) 5 times/wk  -AV       Row Name 06/30/25 1127          Therapy Plan Review/Discharge Plan (OT)    Anticipated Discharge Disposition (OT) sub acute care setting  -AV       Row Name 06/30/25 1127          Vital Signs    O2 Delivery Pre Treatment nasal cannula  -AV     O2 Delivery Intra Treatment nasal cannula  -AV     O2 Delivery Post Treatment nasal cannula  -AV       Row Name 06/30/25 1127          Positioning and Restraints    Pre-Treatment Position in bed  -AV     Post Treatment Position bed  -AV     In Bed call light within reach;encouraged to call for assist;with nsg  -AV               User Key  (r) = Recorded By, (t) = Taken By, (c) = Cosigned By      Initials Name Provider Type    Juan Magana, ASHLEE Occupational Therapist                   Outcome Measures       Row Name 06/30/25 1128          How much help from another is currently needed...    Putting on and taking off regular lower body clothing? 1  -AV     Bathing (including washing, rinsing, and drying) 1  -AV     Toileting (which includes using toilet bed pan or urinal) 1  -AV     Putting on and taking off regular upper body clothing 1  -AV     Taking care of personal grooming (such as brushing teeth) 1  -AV     Eating meals 1  -AV     AM-PAC 6 Clicks Score (OT) 6  -AV       Row Name 06/30/25 0730          How much help from another person do you currently need...    Turning from your back to your side while in flat bed without using bedrails? 3  -HM     Moving from lying on back to sitting on the side of a flat bed without bedrails? 3  -HM     Moving to and from a bed to a chair (including a wheelchair)? 3  -HM     Standing up from a chair using your arms (e.g., wheelchair, bedside chair)? 3  -HM     Climbing 3-5 steps with a railing? 2  -HM     To walk in hospital room? 2  -HM     AM-PAC 6 Clicks Score (PT) 16  -HM       Row Name 06/30/25 1128          Functional  Assessment    Outcome Measure Options AM-PAC 6 Clicks Daily Activity (OT);Optimal Instrument  -AV       Row Name 06/30/25 1128          Optimal Instrument    Optimal Instrument Optimal - 3  -AV     Bending/Stooping 5  -AV     Standing 5  -AV     Reaching 4  -AV     From the list, choose the 3 activities you would most like to be able to do without any difficulty Bending/stooping;Standing;Reaching  -AV     Total Score Optimal - 3 14  -AV               User Key  (r) = Recorded By, (t) = Taken By, (c) = Cosigned By      Initials Name Provider Type    AV Juan Ireland OT Occupational Therapist     Becki Reyes RN Registered Nurse                    Occupational Therapy Education       Title: PT OT SLP Therapies (In Progress)       Topic: Occupational Therapy (Done)       Point: ADL training (Done)       Learning Progress Summary            Patient Acceptance, E, VU by AV at 6/30/2025 1129                      Point: Home exercise program (Done)       Learning Progress Summary            Patient Acceptance, E, VU by AV at 6/30/2025 1129                      Point: Precautions (Done)       Learning Progress Summary            Patient Acceptance, E, VU by AV at 6/30/2025 1129                      Point: Body mechanics (Done)       Learning Progress Summary            Patient Acceptance, E, VU by AV at 6/30/2025 1129                                      User Key       Initials Effective Dates Name Provider Type Discipline    AV 06/16/21 -  Juan Ireland OT Occupational Therapist OT                  OT Recommendation and Plan  Planned Therapy Interventions (OT): activity tolerance training, BADL retraining, functional balance retraining, occupation/activity based interventions, patient/caregiver education/training, ROM/therapeutic exercise, strengthening exercise, transfer/mobility retraining  Therapy Frequency (OT): 5 times/wk  Plan of Care Review  Plan of Care Reviewed With: patient  Progress: no change (First  session for evaluation)  Outcome Evaluation: Patient presents with limitations of cognition/level of arousal, balance, strength and endurance/activity tolerance which impede her ability to perform ADL and transfers as prior.  The skills of a therapist will be required to safely and effectively implement treatment plan to restore maximal level of function.     Time Calculation:   Evaluation Complexity (OT)  Review Occupational Profile/Medical/Therapy History Complexity: expanded/moderate complexity  Assessment, Occupational Performance/Identification of Deficit Complexity: 3-5 performance deficits  Clinical Decision Making Complexity (OT): problem focused assessment/low complexity  Overall Complexity of Evaluation (OT): low complexity     Time Calculation- OT       Row Name 06/30/25 1130             Time Calculation- OT    OT Received On 06/30/25  -AV      OT Goal Re-Cert Due Date 07/09/25  -AV         Untimed Charges    OT Eval/Re-eval Minutes 35  -AV         Total Minutes    Untimed Charges Total Minutes 35  -AV       Total Minutes 35  -AV                User Key  (r) = Recorded By, (t) = Taken By, (c) = Cosigned By      Initials Name Provider Type    AV Juan Ireland OT Occupational Therapist                  Therapy Charges for Today       Code Description Service Date Service Provider Modifiers Qty    [id number]  OT EVAL LOW COMPLEXITY 3 6/30/2025 Juan Ireland OT GO 1                 Juan Ireland OT  6/30/2025

## 2025-06-30 NOTE — PLAN OF CARE
Goal Outcome Evaluation:      Pt falls to sleep when trying to answer questions. On O2 @ 4L per n/c. Sats remain in low 90's has a weak cough. Suction at bedside. Purwick in place

## 2025-06-30 NOTE — PROGRESS NOTES
[facility]   Hospitalist Progress Note  Date: 2025  Patient Name: Mary Albarran  : 1950  MRN: [id number]  Date of admission: 2025  Room/Bed: [facility]/1      Subjective   Subjective     Chief Complaint:   Fevers, chills    Summary:  Mary Albarran is a 74 y.o. female with medical history significant for atrial fibrillation, CKD stage IIIa, hypoxic respiratory failure on 2 L nasal cannula, hyperlipidemia, hypertension, COPD, type 2 diabetes, heart failure with reduced ejection fraction     Patient presents to the emergency department on 2025.  Patient was recently admitted to the hospital 2025 through 2025, treated for pneumonia.  Patient states she discharged from the hospital was feeling well, followed up with her primary care physician.  However acutely on day of presentation, 2025 patient started with fevers and chills.  Patient states she ate lunch with her family, pizza.  Other people ate the same pizza with no other complaints.  Patient denied any nausea vomiting or diarrhea.  Patient started with fevers chills, rigors.  Patient states in the past when she exhibits the symptoms she quickly deteriorates therefore  brought her into the emergency department.  Patient stated up until lunch she had been within her usual state of health.  Patient has been taking her medications as prescribed.  In the emergency department, patient presented tachycardic 126 A-fib with RVR, febrile 38.3, respiratory rate of 20, blood pressure 135/76 saturating above 90% on her home 2 L nasal cannula.  Patient has a white count of 17, lactate of 2.5, glucose elevated 254.  Creatinine 0.97.  .  Troponin 20, repeat 20.  Patient denies any acute symptoms of dyspnea.  Patient's UA with trace leukocytes.  No bacteria, no WBCs.  Patient's chest x-ray shows similar left greater than right perihilar and bibasilar opacities compared to chest x-ray from 2025.  Patient was noted to have right  lower extremity swelling and erythema.  Patient states this is her usual.  Patient has an open wound on the bottom of her right foot closest to fifth digit.  Patient states it looks better than usual.  In the emergency department patient started with broad-spectrum antibiotics, IV fluids.  Hospitalist service contacted for admission    Interval Followup:   Patient tachycardic overnight, discussed with nurse providing patient's a.m. medications as soon as possible.  Patient's heart rate improved once getting her home oral medications.  Patient still lethargic on rounds.  Able to open eyes and interact appropriately.  Ordering MRI of patient's foot to ensure no deep-seated infection.  Patient remains hyperglycemic with sliding scale insulin and therefore resuming majority of her long-acting insulin.  At home patient takes 15 units, ordering 10 units for now.      Objective   Objective     Vitals:   Temp:  [98.3 °F (36.8 °C)-100.2 °F (37.9 °C)] 99.7 °F (37.6 °C)  Heart Rate:  [108-154] 134  Resp:  [16-22] 20  BP: ()/() 119/60  Flow (L/min) (Oxygen Therapy):  [2-4] 4    Physical Exam               Constitutional: Awake, alert, lethargic, more comfortable appearing on rounds.              Eyes: Pupils equal, sclerae anicteric, no conjunctival injection              HENT: NCAT, mucous membranes moist              Neck: Supple, no thyromegaly, no lymphadenopathy, trachea midline              Respiratory: Clear to auscultation bilaterally, nonlabored respirations               Cardiovascular: Tachycardic, irregularly irregular, no murmurs, rubs, or gallops, palpable pedal pulses bilaterally              Gastrointestinal: Positive bowel sounds, soft, nontender, nondistended              Neurologic: Oriented x 3, strength symmetric in all extremities, Cranial Nerves grossly intact to confrontation, speech clear              Skin: Right lower extremity with erythema extending up to the knee.  Open lesion to bottom  of right foot close to the fifth digit.  Purulent discharge noted.       Result Review    Result Review:  I have personally reviewed these results:  [x]  Laboratory      Lab 06/30/25  0630 06/30/25  0549 06/29/25  2101 06/29/25  1913 06/29/25  1630 06/29/25  1526   WBC  --  15.35*  --   --   --  17.58*   HEMOGLOBIN  --  11.3*  --   --   --  13.1   HEMATOCRIT  --  36.1  --   --   --  42.6   PLATELETS  --  109*  --   --   --  123*   NEUTROS ABS  --  14.12*  --   --   --  15.73*   IMMATURE GRANS (ABS)  --  0.07*  --   --   --  0.09*   LYMPHS ABS  --  0.80  --   --   --  0.86   MONOS ABS  --  0.33  --   --   --  0.81   EOS ABS  --  0.00  --   --   --  0.04   MCV  --  87.6  --   --   --  89.9   SED RATE  --   --  8  --   --   --    CRP  --   --   --   --  2.16*  --    PROCALCITONIN 1.62*  --   --   --   --   --    LACTATE  --   --   --  2.0  --  2.5*         Lab 06/30/25  0630 06/29/25  1526   SODIUM 136 136   POTASSIUM 4.6 4.8   CHLORIDE 102 95*   CO2 21.1* 26.9   ANION GAP 12.9 14.1   BUN 16.4 21.6   CREATININE 0.83 0.97   EGFR 75.0 62.2   GLUCOSE 239* 254*   CALCIUM 8.6 9.9   MAGNESIUM 1.8 1.9         Lab 06/29/25  1526   TOTAL PROTEIN 8.0   ALBUMIN 4.5   GLOBULIN 3.5   ALT (SGPT) 10   AST (SGOT) 14   BILIRUBIN 0.9   ALK PHOS 101         Lab 06/29/25  1630 06/29/25  1526   PROBNP  --  945.1*   HSTROP T 20* 20*                 Brief Urine Lab Results  (Last result in the past 365 days)        Color   Clarity   Blood   Leuk Est   Nitrite   Protein   CREAT   Urine HCG        06/29/25 1700 Dark Yellow   Clear   Small (1+)   Trace   Negative   30 mg/dL (1+)                 [x]  Microbiology   Microbiology Results (last 10 days)       Procedure Component Value - Date/Time    MRSA Screen, PCR (Inpatient) - Swab, Nares [[id number]]  (Abnormal) Collected: 06/30/25 0456    Lab Status: Final result Specimen: Swab from Nares Updated: 06/30/25 0628     MRSA PCR MRSA Detected    Narrative:      The negative predictive value of this  diagnostic test is high and should only be used to consider de-escalating anti-MRSA therapy. A positive result may indicate colonization with MRSA and must be correlated clinically.    COVID-19, FLU A/B, RSV PCR 1 HR TAT - Swab, Nasopharynx [[id number]]  (Normal) Collected: 06/29/25 1700    Lab Status: Final result Specimen: Swab from Nasopharynx Updated: 06/29/25 1809     COVID19 Not Detected     Influenza A PCR Not Detected     Influenza B PCR Not Detected     RSV, PCR Not Detected    Narrative:      Fact sheet for providers: https://www.fda.gov/media/502443/download    Fact sheet for patients: https://www.fda.gov/media/619527/download    Test performed by PCR.    Wound Culture - Wound, Foot, Right [[id number]] Collected: 06/29/25 1700    Lab Status: Preliminary result Specimen: Wound from Foot, Right Updated: 06/29/25 1805     Gram Stain Few (2+) WBCs seen      Few (2+) Gram positive cocci in pairs, chains and clusters    Blood Culture - Blood, Arm, Right [[id number]]  (Normal) Collected: 06/29/25 1531    Lab Status: Preliminary result Specimen: Blood from Arm, Right Updated: 06/30/25 1545     Blood Culture No growth at 24 hours          [x]  Radiology  XR Foot 3+ View Right  Result Date: 6/29/2025  Impression: Within exam limitations, no convincing osteomyelitis or acute osseous abnormality. Diffuse soft tissue swelling which could reflect volume volume overload/third spacing or cellulitis in the right clinical setting. Based on clinical suspicion for osteomyelitis, consider dedicated MRI. Electronically Signed: Dc Miguel MD  6/29/2025 6:08 PM EDT  Workstation ID: [id number]    XR Chest 1 View  Result Date: 6/29/2025  Impression: Radiographically similar left greater than right perihilar and bibasilar opacities as compared to 5/25/2025. Differential includes atelectasis/scar and pneumonia in the right clinical setting. Stable cardiomegaly without signs of overt edema. Electronically Signed: Dc Miguel MD   6/29/2025 3:56 PM EDT  Workstation ID: [id number]    []  EKG/Telemetry   []  Cardiology/Vascular   []  Pathology  []  Old records  []  Other:    Assessment & Plan   Assessment / Plan     Assessment:  Severe sepsis present on admission, unknown source  Lactic acidosis  Concern for diabetic foot ulcer to right foot  Atrial fibrillation with RVR  CKD stage IIIa  Chronic hypoxic respiratory failure on 2 L nasal cannula  Hyperlipidemia  Hypertension  COPD not in acute exacerbation  Type 2 diabetes  Heart failure with reduced ejection fraction not in exacerbation     Plan:  Patient remains admitted to the hospital for further care and management  Continue broad-spectrum antibiotics at this time  Blood cultures obtained, wound cultures obtained from right foot, cultures no growth to date.  Still following wound culture  ED ordered COVID flu and RSV testing, all have returned negative  Patient up on oxygen to 4 L, baseline level 2 L at home  Improvement in heart rate following the administration of her home oral medications  Resuming long-acting insulin, at home uses 15 units, currently will order 10 and uptitrate as needed  Sliding scale insulin with hypoglycemia protocol  Home medications reviewed and resumed as indicated  Continue nebulized breathing treatments       Discussed with RN.    VTE Prophylaxis:  Pharmacologic VTE prophylaxis orders are present.        CODE STATUS:   Code Status (Patient has no pulse and is not breathing): CPR (Attempt to Resuscitate)  Medical Interventions (Patient has pulse or is breathing): Full Support

## 2025-06-30 NOTE — PROGRESS NOTES
"[facility] Clinical Pharmacy Services: Vancomycin Pharmacokinetic Initial Consult Note    Mary Albarran is a 74 y.o. female who is on day 1 of pharmacy to dose vancomycin for Skin and Soft Tissue.    Consult Information  Consulting Provider: Dr. Alcala  Planned Duration of Therapy: 14 days  Was Patient Receiving Prior to Admission/Consult?: No  Loading Dose Ordered or Given: 2250 mg on  at 1800  PK/PD Target: -600 mg/L.hr   Relevant ID History: +MRSA nasal PCR 25  Other Antimicrobials: Piperacillin/Tazobactam    Imaging Reviewed?: Yes    Microbiology Data  MRSA PCR performed: 25; Result: Pending  Culture/Source:    Wound, right foot in process   Blood in process    Vitals/Labs  Ht: 157.5 cm (62\"); Wt: 123 kg (271 lb 6.2 oz)  Temp (24hrs), Av.9 °F (37.7 °C), Min:99.1 °F (37.3 °C), Max:101 °F (38.3 °C)   Estimated Creatinine Clearance: 65.6 mL/min (by C-G formula based on SCr of 0.97 mg/dL).       Results from last 7 days   Lab Units 25  1526   CREATININE mg/dL 0.97   WBC 10*3/mm3 17.58*     Assessment/Plan:    Vancomycin Dose: 1250 mg IV every 24 hours; which provides the following predicted parameters:  Regimen: 1250 mg IV every 24 hours.  Start time: 1400 on 2025  Exposure target: AUC24 (range) 400-600 mg/L.hr   AUC24,ss: 507 mg/L.hr  Probability of AUC24 > 400: 68 %  Ctrough,ss: 13.2 mg/L  Probability of Ctrough,ss > 20: 31 %  Probability of nephrotoxicity (Lodise ERIC ): 8 %  Vanc Random ordered for  at with AM labs  Patient has order for Basic Metabolic Panel     Pharmacy will follow patient's kidney function and will adjust doses and obtain levels as necessary. Thank you for involving pharmacy in this patient's care. Please contact pharmacy with any questions or concerns.                           Johanna Grimaldo [facility]  Clinical Pharmacist    "

## 2025-07-01 ENCOUNTER — APPOINTMENT (OUTPATIENT)
Dept: MRI IMAGING | Facility: HOSPITAL | Age: 75
End: 2025-07-01
Payer: MEDICARE

## 2025-07-01 LAB
ANION GAP SERPL CALCULATED.3IONS-SCNC: 11.2 MMOL/L (ref 5–15)
BUN SERPL-MCNC: 17.3 MG/DL (ref 8–23)
BUN/CREAT SERPL: 21.6 (ref 7–25)
CALCIUM SPEC-SCNC: 9.2 MG/DL (ref 8.6–10.5)
CHLORIDE SERPL-SCNC: 104 MMOL/L (ref 98–107)
CO2 SERPL-SCNC: 21.8 MMOL/L (ref 22–29)
CREAT SERPL-MCNC: 0.8 MG/DL (ref 0.57–1)
DEPRECATED RDW RBC AUTO: 51.8 FL (ref 37–54)
DEVICE COMMENT: ABNORMAL
EGFRCR SERPLBLD CKD-EPI 2021: 78.4 ML/MIN/1.73
ERYTHROCYTE [DISTWIDTH] IN BLOOD BY AUTOMATED COUNT: 15.6 % (ref 12.3–15.4)
GLUCOSE BLDC GLUCOMTR-MCNC: 137 MG/DL (ref 70–99)
GLUCOSE BLDC GLUCOMTR-MCNC: 151 MG/DL (ref 70–99)
GLUCOSE BLDC GLUCOMTR-MCNC: 175 MG/DL (ref 70–99)
GLUCOSE BLDC GLUCOMTR-MCNC: 176 MG/DL (ref 70–99)
GLUCOSE SERPL-MCNC: 150 MG/DL (ref 65–99)
HCT VFR BLD AUTO: 36.7 % (ref 34–46.6)
HGB BLD-MCNC: 11.1 G/DL (ref 12–15.9)
MAGNESIUM SERPL-MCNC: 2.1 MG/DL (ref 1.6–2.4)
MCH RBC QN AUTO: 27.3 PG (ref 26.6–33)
MCHC RBC AUTO-ENTMCNC: 30.2 G/DL (ref 31.5–35.7)
MCV RBC AUTO: 90.2 FL (ref 79–97)
PLATELET # BLD AUTO: 89 10*3/MM3 (ref 140–450)
PMV BLD AUTO: 13.3 FL (ref 6–12)
POTASSIUM SERPL-SCNC: 4 MMOL/L (ref 3.5–5.2)
RBC # BLD AUTO: 4.07 10*6/MM3 (ref 3.77–5.28)
SODIUM SERPL-SCNC: 137 MMOL/L (ref 136–145)
VANCOMYCIN SERPL-MCNC: 9.47 MCG/ML (ref 5–40)
WBC NRBC COR # BLD AUTO: 8.51 10*3/MM3 (ref 3.4–10.8)

## 2025-07-01 PROCEDURE — 82948 REAGENT STRIP/BLOOD GLUCOSE: CPT

## 2025-07-01 PROCEDURE — 63710000001 INSULIN GLARGINE PER 5 UNITS: Performed by: INTERNAL MEDICINE

## 2025-07-01 PROCEDURE — 94799 UNLISTED PULMONARY SVC/PX: CPT

## 2025-07-01 PROCEDURE — 25010000002 PIPERACILLIN SOD-TAZOBACTAM PER 1 G: Performed by: INTERNAL MEDICINE

## 2025-07-01 PROCEDURE — 25010000002 VANCOMYCIN 1 G RECONSTITUTED SOLUTION 1 EACH VIAL: Performed by: INTERNAL MEDICINE

## 2025-07-01 PROCEDURE — 73720 MRI LWR EXTREMITY W/O&W/DYE: CPT

## 2025-07-01 PROCEDURE — 94664 DEMO&/EVAL PT USE INHALER: CPT

## 2025-07-01 PROCEDURE — 80202 ASSAY OF VANCOMYCIN: CPT | Performed by: INTERNAL MEDICINE

## 2025-07-01 PROCEDURE — 25810000003 SODIUM CHLORIDE 0.9 % SOLUTION 250 ML FLEX CONT: Performed by: INTERNAL MEDICINE

## 2025-07-01 PROCEDURE — 97161 PT EVAL LOW COMPLEX 20 MIN: CPT

## 2025-07-01 PROCEDURE — 85027 COMPLETE CBC AUTOMATED: CPT | Performed by: INTERNAL MEDICINE

## 2025-07-01 PROCEDURE — 99233 SBSQ HOSP IP/OBS HIGH 50: CPT | Performed by: INTERNAL MEDICINE

## 2025-07-01 PROCEDURE — A9573 GADOPICLENOL 0.5 MMOL/ML SOLUTION: HCPCS | Performed by: INTERNAL MEDICINE

## 2025-07-01 PROCEDURE — 25010000002 BUMETANIDE PER 0.5 MG: Performed by: INTERNAL MEDICINE

## 2025-07-01 PROCEDURE — 94761 N-INVAS EAR/PLS OXIMETRY MLT: CPT

## 2025-07-01 PROCEDURE — 63710000001 INSULIN LISPRO (HUMAN) PER 5 UNITS: Performed by: INTERNAL MEDICINE

## 2025-07-01 PROCEDURE — 25510000001 GADOPICLENOL 0.5 MMOL/ML SOLUTION: Performed by: INTERNAL MEDICINE

## 2025-07-01 PROCEDURE — 83735 ASSAY OF MAGNESIUM: CPT | Performed by: INTERNAL MEDICINE

## 2025-07-01 PROCEDURE — 82948 REAGENT STRIP/BLOOD GLUCOSE: CPT | Performed by: INTERNAL MEDICINE

## 2025-07-01 PROCEDURE — 80048 BASIC METABOLIC PNL TOTAL CA: CPT | Performed by: INTERNAL MEDICINE

## 2025-07-01 RX ORDER — BUMETANIDE 1 MG/1
2 TABLET ORAL DAILY
Status: DISCONTINUED | OUTPATIENT
Start: 2025-07-02 | End: 2025-07-03

## 2025-07-01 RX ORDER — HYDROXYZINE HYDROCHLORIDE 25 MG/1
25 TABLET, FILM COATED ORAL 3 TIMES DAILY PRN
Status: DISCONTINUED | OUTPATIENT
Start: 2025-07-01 | End: 2025-07-05 | Stop reason: HOSPADM

## 2025-07-01 RX ORDER — ALUMINA, MAGNESIA, AND SIMETHICONE 2400; 2400; 240 MG/30ML; MG/30ML; MG/30ML
15 SUSPENSION ORAL EVERY 6 HOURS PRN
Status: DISCONTINUED | OUTPATIENT
Start: 2025-07-01 | End: 2025-07-05 | Stop reason: HOSPADM

## 2025-07-01 RX ORDER — HYDROCODONE BITARTRATE AND ACETAMINOPHEN 10; 325 MG/1; MG/1
1 TABLET ORAL EVERY 6 HOURS PRN
Refills: 0 | Status: DISCONTINUED | OUTPATIENT
Start: 2025-07-01 | End: 2025-07-05 | Stop reason: HOSPADM

## 2025-07-01 RX ORDER — BUMETANIDE 0.25 MG/ML
3 INJECTION, SOLUTION INTRAMUSCULAR; INTRAVENOUS ONCE
Status: COMPLETED | OUTPATIENT
Start: 2025-07-01 | End: 2025-07-01

## 2025-07-01 RX ORDER — ACETAMINOPHEN 325 MG/1
650 TABLET ORAL EVERY 6 HOURS PRN
Status: DISCONTINUED | OUTPATIENT
Start: 2025-07-01 | End: 2025-07-05 | Stop reason: HOSPADM

## 2025-07-01 RX ORDER — POTASSIUM CHLORIDE 1500 MG/1
40 TABLET, EXTENDED RELEASE ORAL ONCE
Status: COMPLETED | OUTPATIENT
Start: 2025-07-01 | End: 2025-07-01

## 2025-07-01 RX ORDER — LIDOCAINE 5 G/100G
1 CREAM RECTAL; TOPICAL 3 TIMES DAILY PRN
Status: DISCONTINUED | OUTPATIENT
Start: 2025-07-01 | End: 2025-07-05 | Stop reason: HOSPADM

## 2025-07-01 RX ORDER — NICOTINE 21 MG/24HR
1 PATCH, TRANSDERMAL 24 HOURS TRANSDERMAL DAILY PRN
Status: DISCONTINUED | OUTPATIENT
Start: 2025-07-01 | End: 2025-07-05 | Stop reason: HOSPADM

## 2025-07-01 RX ORDER — ONDANSETRON 2 MG/ML
4 INJECTION INTRAMUSCULAR; INTRAVENOUS EVERY 4 HOURS PRN
Status: DISCONTINUED | OUTPATIENT
Start: 2025-07-01 | End: 2025-07-05 | Stop reason: HOSPADM

## 2025-07-01 RX ORDER — LIDOCAINE 4 G/G
1 PATCH TOPICAL DAILY PRN
Status: DISCONTINUED | OUTPATIENT
Start: 2025-07-01 | End: 2025-07-05 | Stop reason: HOSPADM

## 2025-07-01 RX ADMIN — GADOPICLENOL 10 ML: 485.1 INJECTION INTRAVENOUS at 22:06

## 2025-07-01 RX ADMIN — PREGABALIN 150 MG: 75 CAPSULE ORAL at 08:12

## 2025-07-01 RX ADMIN — METOPROLOL SUCCINATE ER TABLETS 25 MG: 25 TABLET, FILM COATED, EXTENDED RELEASE ORAL at 08:12

## 2025-07-01 RX ADMIN — ATORVASTATIN CALCIUM 20 MG: 20 TABLET, FILM COATED ORAL at 08:12

## 2025-07-01 RX ADMIN — DILTIAZEM HYDROCHLORIDE 180 MG: 180 CAPSULE, COATED, EXTENDED RELEASE ORAL at 08:12

## 2025-07-01 RX ADMIN — ARFORMOTEROL TARTRATE 15 MCG: 15 SOLUTION RESPIRATORY (INHALATION) at 10:35

## 2025-07-01 RX ADMIN — WHITE PETROLATUM 1 APPLICATION: 1.75 OINTMENT TOPICAL at 08:12

## 2025-07-01 RX ADMIN — HYDROCODONE BITARTRATE AND ACETAMINOPHEN 1 TABLET: 10; 325 TABLET ORAL at 20:53

## 2025-07-01 RX ADMIN — PIPERACILLIN AND TAZOBACTAM 4.5 G: 4; .5 INJECTION, POWDER, FOR SOLUTION INTRAVENOUS; PARENTERAL at 22:30

## 2025-07-01 RX ADMIN — VANCOMYCIN HYDROCHLORIDE 1000 MG: 1 INJECTION, POWDER, LYOPHILIZED, FOR SOLUTION INTRAVENOUS at 20:01

## 2025-07-01 RX ADMIN — BUDESONIDE 0.25 MG: 0.25 SUSPENSION RESPIRATORY (INHALATION) at 10:43

## 2025-07-01 RX ADMIN — POTASSIUM CHLORIDE 40 MEQ: 1500 TABLET, EXTENDED RELEASE ORAL at 09:19

## 2025-07-01 RX ADMIN — INSULIN LISPRO 2 UNITS: 100 INJECTION, SOLUTION INTRAVENOUS; SUBCUTANEOUS at 17:40

## 2025-07-01 RX ADMIN — PREGABALIN 150 MG: 75 CAPSULE ORAL at 20:53

## 2025-07-01 RX ADMIN — PIPERACILLIN AND TAZOBACTAM 4.5 G: 4; .5 INJECTION, POWDER, FOR SOLUTION INTRAVENOUS; PARENTERAL at 06:43

## 2025-07-01 RX ADMIN — HYDROCODONE BITARTRATE AND ACETAMINOPHEN 1 TABLET: 10; 325 TABLET ORAL at 09:19

## 2025-07-01 RX ADMIN — ARFORMOTEROL TARTRATE 15 MCG: 15 SOLUTION RESPIRATORY (INHALATION) at 23:33

## 2025-07-01 RX ADMIN — VANCOMYCIN HYDROCHLORIDE 1000 MG: 1 INJECTION, POWDER, LYOPHILIZED, FOR SOLUTION INTRAVENOUS at 09:18

## 2025-07-01 RX ADMIN — Medication 10 ML: at 08:11

## 2025-07-01 RX ADMIN — METOPROLOL SUCCINATE ER TABLETS 25 MG: 25 TABLET, FILM COATED, EXTENDED RELEASE ORAL at 20:53

## 2025-07-01 RX ADMIN — RIVAROXABAN 20 MG: 20 TABLET, FILM COATED ORAL at 08:12

## 2025-07-01 RX ADMIN — INSULIN LISPRO 2 UNITS: 100 INJECTION, SOLUTION INTRAVENOUS; SUBCUTANEOUS at 08:11

## 2025-07-01 RX ADMIN — INSULIN GLARGINE 10 UNITS: 100 INJECTION, SOLUTION SUBCUTANEOUS at 08:11

## 2025-07-01 RX ADMIN — BUMETANIDE 3 MG: 0.25 INJECTION INTRAMUSCULAR; INTRAVENOUS at 09:19

## 2025-07-01 RX ADMIN — Medication 400 MG: at 09:20

## 2025-07-01 RX ADMIN — INSULIN LISPRO 2 UNITS: 100 INJECTION, SOLUTION INTRAVENOUS; SUBCUTANEOUS at 11:47

## 2025-07-01 RX ADMIN — Medication 10 ML: at 20:53

## 2025-07-01 RX ADMIN — PIPERACILLIN AND TAZOBACTAM 4.5 G: 4; .5 INJECTION, POWDER, FOR SOLUTION INTRAVENOUS; PARENTERAL at 14:22

## 2025-07-01 NOTE — THERAPY EVALUATION
Acute Care - Physical Therapy Initial Evaluation   [facility]     Patient Name: Mary Albarran  : 1950  MRN: [id number]  Today's Date: 2025      Visit Dx:     ICD-10-CM ICD-9-CM   1. Sepsis, due to unspecified organism, unspecified whether acute organ dysfunction present  A41.9 038.9     995.91   2. Acute febrile illness  R50.9 780.60   3. Atrial fibrillation with rapid ventricular response  I48.91 427.31   4. Diabetic infection of right foot  E11.628 250.80    L08.9 686.9   5. Cellulitis of right leg  L03.115 682.6   6. Pneumonia of both lungs due to infectious organism, unspecified part of lung  J18.9 483.8   7. Decreased activities of daily living (ADL)  Z78.9 V49.89   8. Difficulty walking  R26.2 719.7     Patient Active Problem List   Diagnosis    Aortic stenosis, moderate    Longstanding persistent atrial fibrillation    Chronic kidney disease, stage III (moderate)    Hyperlipidemia    Hypertension    Chronic diastolic congestive heart failure    Atrial fibrillation with RVR    Onychomycosis    Onychocryptosis    Foot pain, bilateral    Nephrolithiasis    Recurrent urinary tract infection    Acute on chronic HFrEF (heart failure with reduced ejection fraction)    Cellulitis of right leg without foot    Diabetes mellitus type 2 with complications    Depression    COPD (chronic obstructive pulmonary disease)    Chronic pain    Anxiety    Heart failure with reduced ejection fraction    CHF exacerbation    Sepsis    C. difficile diarrhea    History of home oxygen therapy     Past Medical History:   Diagnosis Date    Allergic rhinitis     Anxiety     Aortic valve disease 2021    Fibrocalcific changes of the aortic valve with mild aortic valve stenosis   on echo 3/2/2021  Moderate aortic valve regurgitation is present. Aortic valve maximum pressure gradient is 26 mmHg. Moderate aortic valve stenosis is present.  On echo 2/10/2022       Arthritis     Atrial fibrillation     CHF (congestive heart  failure)     Chronic kidney disease (CKD), stage III (moderate)     Chronic pain     COPD (chronic obstructive pulmonary disease)     Coronary artery disease     Diabetes mellitus type 2 with complications     Elevated cholesterol     Ex-smoker     QUIT SMOKING 2008    GERD (gastroesophageal reflux disease)     History of home oxygen therapy     2L/NC AAT REPORTED    History of transfusion     Hyperlipidemia     Hypertension     Thrombocytopenia      Past Surgical History:   Procedure Laterality Date    HYSTERECTOMY      30 YEARS AGO     PT Assessment (Last 12 Hours)       PT Evaluation and Treatment       Row Name 07/01/25 1700          Physical Therapy Time and Intention    Subjective Information complains of;fatigue;weakness  -CS     Document Type evaluation  -CS     Mode of Treatment individual therapy;physical therapy  -CS     Patient Effort good  -CS     Symptoms Noted During/After Treatment fatigue;shortness of breath  -CS       Row Name 07/01/25 1700          General Information    Patient Profile Reviewed yes  -CS     Patient Observations cooperative;alert;agree to therapy  -CS     Prior Level of Function independent:;all household mobility;gait;transfer;bed mobility;ADL's  -CS     Equipment Currently Used at Home walker, standard;oxygen;ramp  pt reports using standard walker for ambulation. Uses 2L O2 at home. She has a walk-in shower with seat She uses a wheelchair in community  -CS     Existing Precautions/Restrictions fall;oxygen therapy device and L/min  3-4L currently  -CS     Limitations/Impairments safety/cognitive  -CS     Barriers to Rehab none identified  -CS       Row Name 07/01/25 1700          Living Environment    Current Living Arrangements home  -CS     Home Accessibility wheelchair accessible  ramp to enter  -CS     People in Home spouse  -CS     Primary Care Provided by self  -CS       Row Name 07/01/25 1700          Pain    Pretreatment Pain Rating 3/10  -CS     Posttreatment Pain Rating  3/10  -CS     Pain Side/Orientation generalized  -CS     Pain Management Interventions nursing notified  -       Row Name 07/01/25 1700          Cognition    Orientation Status (Cognition) oriented x 3  -CS       Row Name 07/01/25 1700          Range of Motion Comprehensive    General Range of Motion lower extremity range of motion deficits identified  -CS     Comment, General Range of Motion limited due to swelling and increased tissue approximation  -       Row Name 07/01/25 1700          Strength Comprehensive (MMT)    General Manual Muscle Testing (MMT) Assessment lower extremity strength deficits identified  -CS     Comment, General Manual Muscle Testing (MMT) Assessment BLEs assessed at 3/5  -       Row Name 07/01/25 1700          Bed Mobility    Bed Mobility bed mobility (all) activities  -     All Activities, [place] (Bed Mobility) standby assist  -CS     Bed Mobility, Safety Issues decreased use of legs for bridging/pushing  -CS     Assistive Device (Bed Mobility) bed rails;head of bed elevated  -       Row Name 07/01/25 1700          Transfers    Transfers sit-stand transfer  -       Row Name 07/01/25 1700          Sit-Stand Transfer    Sit-Stand [place] (Transfers) verbal cues;contact guard  -     Assistive Device (Sit-Stand Transfers) walker, standard  -       Row Name 07/01/25 1700          Gait/Stairs (Locomotion)    Gait/Stairs Locomotion gait/ambulation assistive device  -     [place] Level (Gait) verbal cues;contact guard  -CS     Assistive Device (Gait) walker, standard  -     Patient was able to Ambulate yes  -CS     Distance in Feet (Gait) 10  -CS     Pattern (Gait) step-to  -CS     Deviations/Abnormal Patterns (Gait) gait speed decreased;stride length decreased;antalgic  -CS     Right Sided Gait Deviations weight shift ability decreased  -       Row Name 07/01/25 1700          Safety Issues/Impairments Affecting Functional Mobility    Impairments Affecting  Function (Mobility) balance;cognition;endurance/activity tolerance;pain;strength;shortness of breath  -CS       Row Name 07/01/25 1700          Balance    Balance Assessment standing dynamic balance  -CS     Dynamic Standing Balance contact guard  -CS     Position/Device Used, Standing Balance walker, standard  -CS       Row Name             Wound 02/11/25 1338 Right posterior foot unspecified    Wound - Properties Group Placement Date: 02/11/25  -KE Placement Time: 1338  -KE Side: Right  -KE Orientation: posterior  -KE Location: foot  -KE Type: unspecified  -KE    Retired Wound - Properties Group Placement Date: 02/11/25  -KE Placement Time: 1338  -KE Side: Right  -KE Orientation: posterior  -KE Location: foot  -KE Type: unspecified  -KE    Retired Wound - Properties Group Placement Date: 02/11/25  -KE Placement Time: 1338  -KE Side: Right  -KE Orientation: posterior  -KE Location: foot  -KE Type: unspecified  -KE    Retired Wound - Properties Group Date first assessed: 02/11/25  -KE Time first assessed: 1338  -KE Side: Right  -KE Location: foot  -KE Type: unspecified  -KE      Row Name 07/01/25 1700          Plan of Care Review    Plan of Care Reviewed With patient  -CS     Progress no change  -CS     Outcome Evaluation Pt presents with limitations that impede their ability to safely and independently transfer and ambulate. The skills of a therapist will be required to safely and effectively implement the following treatment plan to restore maximal level of function.  -       Row Name 07/01/25 1700          Positioning and Restraints    Pre-Treatment Position in bed  -CS     Post Treatment Position bed  -CS     In Bed fowlers;call light within reach;encouraged to call for assist;exit alarm on  -CS       Row Name 07/01/25 1700          Therapy Assessment/Plan (PT)    Rehab Potential (PT) good  -CS     Criteria for Skilled Interventions Met (PT) yes;skilled treatment is necessary  -CS     Therapy Frequency (PT)  daily  -CS     Predicted Duration of Therapy Intervention (PT) 10 days  -CS     Problem List (PT) problems related to;balance;cognition;mobility;strength;pain  -CS     Activity Limitations Related to Problem List (PT) unable to ambulate safely;unable to transfer safely  -CS       Row Name 07/01/25 1700          PT Evaluation Complexity    History, PT Evaluation Complexity 1-2 personal factors and/or comorbidities  -CS     Examination of Body Systems (PT Eval Complexity) total of 4 or more elements  -CS     Clinical Presentation (PT Evaluation Complexity) stable  -CS     Clinical Decision Making (PT Evaluation Complexity) low complexity  -CS     Overall Complexity (PT Evaluation Complexity) low complexity  -CS       Row Name 07/01/25 1700          Therapy Plan Review/Discharge Plan (PT)    Therapy Plan Review (PT) evaluation/treatment results reviewed;patient  -CS       [facility] Name 07/01/25 1700          Physical Therapy Goals    Bed Mobility Goal Selection (PT) bed mobility, PT goal 1  -CS     Transfer Goal Selection (PT) transfer, PT goal 1  -CS     Gait Training Goal Selection (PT) gait training, PT goal 1  -[facility] Name 07/01/25 1700          Bed Mobility Goal 1 (PT)    Activity/Assistive Device (Bed Mobility Goal 1, PT) bed mobility activities, all  -CS     [place] Level/Cues Needed (Bed Mobility Goal 1, PT) independent  -CS     Time Frame (Bed Mobility Goal 1, PT) long term goal (LTG);10 days  -       Row Name 07/01/25 1700          Transfer Goal 1 (PT)    Activity/Assistive Device (Transfer Goal 1, PT) sit-to-stand/stand-to-sit;bed-to-chair/chair-to-bed;walker, rolling  -CS     [place] Level/Cues Needed (Transfer Goal 1, PT) modified independence  -CS     Time Frame (Transfer Goal 1, PT) long term goal (LTG);10 days  -       Row Name 07/01/25 1700          Gait Training Goal 1 (PT)    Activity/Assistive Device (Gait Training Goal 1, PT) gait (walking locomotion);assistive device use;walker,  rolling  -CS     [place] Level (Gait Training Goal 1, PT) supervision required  -CS     Distance (Gait Training Goal 1, PT) 50  -CS     Time Frame (Gait Training Goal 1, PT) long term goal (LTG);10 days  -CS               User Key  (r) = Recorded By, (t) = Taken By, (c) = Cosigned By      Initials Name Provider Type    Christie Isaac, RN Registered Nurse    Kezia Garcia, PT Physical Therapist                    Physical Therapy Education       Title: PT OT SLP Therapies (In Progress)       Topic: Physical Therapy (Not Started)       Point: Mobility training (Not Started)       Learner Progress:  Not documented in this visit.              Point: Home exercise program (Not Started)       Learner Progress:  Not documented in this visit.              Point: Body mechanics (Not Started)       Learner Progress:  Not documented in this visit.              Point: Precautions (Not Started)       Learner Progress:  Not documented in this visit.                                  PT Recommendation and Plan  Anticipated Discharge Disposition (PT): home with assist (pt refusing to participate in home health or rehab services, but education was provided on the benefits and importance of it to continue once leaving the hospital)  Planned Therapy Interventions (PT): balance training, bed mobility training, gait training, strengthening, transfer training  Therapy Frequency (PT): daily  Plan of Care Reviewed With: patient  Progress: no change  Outcome Evaluation: Pt presents with limitations that impede their ability to safely and independently transfer and ambulate. The skills of a therapist will be required to safely and effectively implement the following treatment plan to restore maximal level of function.   Outcome Measures       Row Name 07/01/25 1700             How much help from another person do you currently need...    Turning from your back to your side while in flat bed without using bedrails? 4  -CS       Moving from lying on back to sitting on the side of a flat bed without bedrails? 4  -CS      Moving to and from a bed to a chair (including a wheelchair)? 3  -CS      Standing up from a chair using your arms (e.g., wheelchair, bedside chair)? 3  -CS      Climbing 3-5 steps with a railing? 3  -CS      To walk in hospital room? 3  -CS      AM-PAC 6 Clicks Score (PT) 20  -CS         Functional Assessment    Outcome Measure Options AM-PAC 6 Clicks Basic Mobility (PT)  -CS                User Key  (r) = Recorded By, (t) = Taken By, (c) = Cosigned By      Initials Name Provider Type    Kezia Garcia, PT Physical Therapist                     Time Calculation:    PT Charges       Row Name 07/01/25 1703             Time Calculation    PT Received On 07/01/25  -CS      PT Goal Re-Cert Due Date 07/10/25  -CS         Untimed Charges    PT Eval/Re-eval Minutes 46  -CS         Total Minutes    Untimed Charges Total Minutes 46  -CS       Total Minutes 46  -CS                User Key  (r) = Recorded By, (t) = Taken By, (c) = Cosigned By      Initials Name Provider Type    Kezia Garcia, PT Physical Therapist                  Therapy Charges for Today       Code Description Service Date Service Provider Modifiers Qty    92120703406 HC PT EVAL LOW COMPLEXITY 4 7/1/2025 Kezia Jacobson, PT GP 1            PT G-Codes  Outcome Measure Options: AM-PAC 6 Clicks Basic Mobility (PT)  AM-PAC 6 Clicks Score (PT): 20  AM-PAC 6 Clicks Score (OT): 6    Kezia Jacobson PT  7/1/2025

## 2025-07-01 NOTE — PLAN OF CARE
Goal Outcome Evaluation:  Plan of Care Reviewed With: patient        Progress: no change  Outcome Evaluation: Pt presents with limitations that impede their ability to safely and independently transfer and ambulate. The skills of a therapist will be required to safely and effectively implement the following treatment plan to restore maximal level of function.    Anticipated Discharge Disposition (PT): home with assist (pt refusing to participate in home health or rehab services, but education was provided on the benefits and importance of it to continue once leaving the hospital)

## 2025-07-01 NOTE — PROGRESS NOTES
"[facility] Clinical Pharmacy Services: Vancomycin Monitoring Note    Mary Albarran is a 74 y.o. female who is on day 3/14 of pharmacy to dose vancomycin for Skin and Soft Tissue.    Previous Vancomycin Dose:   1250 mg IV every  24  hours  Imaging Reviewed?: Yes   Foot Xray: Within exam limitations, no convincing osteomyelitis or acute osseous abnormality. Diffuse soft tissue swelling which could reflect volume volume overload/third spacing or cellulitis in the right clinical setting.   Updated Cultures and Sensitivities:    MRSA PCR: MRSA detected    Vitals/Labs  Ht: 157.5 cm (62\"); Wt: 123 kg (271 lb 6.2 oz)   Temp (24hrs), Av.8 °F (37.1 °C), Min:97.9 °F (36.6 °C), Max:100.2 °F (37.9 °C)   Estimated Creatinine Clearance: 79.6 mL/min (by C-G formula based on SCr of 0.8 mg/dL).       Results from last 7 days   Lab Units 25  0520 25  0630 25  0549 25  1526   VANCOMYCIN RM mcg/mL 9.47  --   --   --    CREATININE mg/dL 0.80 0.83  --  0.97   WBC 10*3/mm3 8.51  --  15.35* 17.58*     Assessment/Plan    Change Vancomycin Dose: 1000 mg IV every 12 hours; which provides the following predicted parameters:  AUC24,ss: 475 mg/L.hr  Probability of AUC24 > 400: 79 %  Ctrough,ss: 10.9 mg/L  Probability of Ctrough,ss > 20: 0 %  Probability of nephrotoxicity (Lodise ERIC ): 6 %  Next Vanc Random ordered for  w/ AM labs.   We will continue to monitor patient changes and renal function     Thank you for involving pharmacy in this patient's care. Please contact pharmacy with any questions or concerns.    Bill Mcallister [facility]  Clinical Pharmacist     "

## 2025-07-01 NOTE — PLAN OF CARE
Goal Outcome Evaluation:  Plan of Care Reviewed With: patient        Progress: improving  Outcome Evaluation: Patient A&Ox4, Hr still slightly elevated, vss otherwise on 3.5L NC. Pt medicated for pain x1. MRI of foot in progress. Ongoing care.

## 2025-07-01 NOTE — SIGNIFICANT NOTE
07/01/25 0758   OTHER   Discipline occupational therapist   Rehab Time/Intention   Session Not Performed patient unavailable for treatment

## 2025-07-01 NOTE — PLAN OF CARE
Goal Outcome Evaluation:              Outcome Evaluation: Pt alert x 4. Complains of pain in hands and feet, pt states she takes Hydrocodone at home from pain q 6hr. Notified MD and one time pain pill order was placed and effective per pt. VSS with no concerns at this time, will continue plan of care.

## 2025-07-02 LAB
ANION GAP SERPL CALCULATED.3IONS-SCNC: 13.9 MMOL/L (ref 5–15)
BASOPHILS # BLD AUTO: 0.02 10*3/MM3 (ref 0–0.2)
BASOPHILS NFR BLD AUTO: 0.3 % (ref 0–1.5)
BUN SERPL-MCNC: 17.7 MG/DL (ref 8–23)
BUN/CREAT SERPL: 21.1 (ref 7–25)
CALCIUM SPEC-SCNC: 9.1 MG/DL (ref 8.6–10.5)
CHLORIDE SERPL-SCNC: 101 MMOL/L (ref 98–107)
CO2 SERPL-SCNC: 23.1 MMOL/L (ref 22–29)
CREAT SERPL-MCNC: 0.84 MG/DL (ref 0.57–1)
DEPRECATED RDW RBC AUTO: 50.2 FL (ref 37–54)
EGFRCR SERPLBLD CKD-EPI 2021: 73.9 ML/MIN/1.73
EOSINOPHIL # BLD AUTO: 0.09 10*3/MM3 (ref 0–0.4)
EOSINOPHIL NFR BLD AUTO: 1.3 % (ref 0.3–6.2)
ERYTHROCYTE [DISTWIDTH] IN BLOOD BY AUTOMATED COUNT: 15.3 % (ref 12.3–15.4)
GLUCOSE BLDC GLUCOMTR-MCNC: 143 MG/DL (ref 70–99)
GLUCOSE BLDC GLUCOMTR-MCNC: 155 MG/DL (ref 70–99)
GLUCOSE BLDC GLUCOMTR-MCNC: 171 MG/DL (ref 70–99)
GLUCOSE BLDC GLUCOMTR-MCNC: 185 MG/DL (ref 70–99)
GLUCOSE SERPL-MCNC: 196 MG/DL (ref 65–99)
HCT VFR BLD AUTO: 35.3 % (ref 34–46.6)
HGB BLD-MCNC: 10.6 G/DL (ref 12–15.9)
IMM GRANULOCYTES # BLD AUTO: 0.01 10*3/MM3 (ref 0–0.05)
IMM GRANULOCYTES NFR BLD AUTO: 0.1 % (ref 0–0.5)
LYMPHOCYTES # BLD AUTO: 0.88 10*3/MM3 (ref 0.7–3.1)
LYMPHOCYTES NFR BLD AUTO: 13 % (ref 19.6–45.3)
MAGNESIUM SERPL-MCNC: 1.7 MG/DL (ref 1.6–2.4)
MCH RBC QN AUTO: 27 PG (ref 26.6–33)
MCHC RBC AUTO-ENTMCNC: 30 G/DL (ref 31.5–35.7)
MCV RBC AUTO: 89.8 FL (ref 79–97)
MONOCYTES # BLD AUTO: 0.56 10*3/MM3 (ref 0.1–0.9)
MONOCYTES NFR BLD AUTO: 8.3 % (ref 5–12)
NEUTROPHILS NFR BLD AUTO: 5.22 10*3/MM3 (ref 1.7–7)
NEUTROPHILS NFR BLD AUTO: 77 % (ref 42.7–76)
NRBC BLD AUTO-RTO: 0 /100 WBC (ref 0–0.2)
PHOSPHATE SERPL-MCNC: 4.8 MG/DL (ref 2.5–4.5)
PLATELET # BLD AUTO: 84 10*3/MM3 (ref 140–450)
PMV BLD AUTO: 13 FL (ref 6–12)
POTASSIUM SERPL-SCNC: 3.6 MMOL/L (ref 3.5–5.2)
RBC # BLD AUTO: 3.93 10*6/MM3 (ref 3.77–5.28)
SODIUM SERPL-SCNC: 138 MMOL/L (ref 136–145)
VANCOMYCIN SERPL-MCNC: 14.39 MCG/ML (ref 5–40)
WBC NRBC COR # BLD AUTO: 6.78 10*3/MM3 (ref 3.4–10.8)

## 2025-07-02 PROCEDURE — 25010000002 PIPERACILLIN SOD-TAZOBACTAM PER 1 G: Performed by: INTERNAL MEDICINE

## 2025-07-02 PROCEDURE — 80202 ASSAY OF VANCOMYCIN: CPT | Performed by: INTERNAL MEDICINE

## 2025-07-02 PROCEDURE — 94799 UNLISTED PULMONARY SVC/PX: CPT

## 2025-07-02 PROCEDURE — 63710000001 INSULIN GLARGINE PER 5 UNITS: Performed by: INTERNAL MEDICINE

## 2025-07-02 PROCEDURE — 83735 ASSAY OF MAGNESIUM: CPT | Performed by: INTERNAL MEDICINE

## 2025-07-02 PROCEDURE — 25010000002 VANCOMYCIN 1 G RECONSTITUTED SOLUTION 1 EACH VIAL: Performed by: INTERNAL MEDICINE

## 2025-07-02 PROCEDURE — 84100 ASSAY OF PHOSPHORUS: CPT | Performed by: INTERNAL MEDICINE

## 2025-07-02 PROCEDURE — 82948 REAGENT STRIP/BLOOD GLUCOSE: CPT | Performed by: INTERNAL MEDICINE

## 2025-07-02 PROCEDURE — 99233 SBSQ HOSP IP/OBS HIGH 50: CPT | Performed by: INTERNAL MEDICINE

## 2025-07-02 PROCEDURE — 94761 N-INVAS EAR/PLS OXIMETRY MLT: CPT

## 2025-07-02 PROCEDURE — 85025 COMPLETE CBC W/AUTO DIFF WBC: CPT | Performed by: INTERNAL MEDICINE

## 2025-07-02 PROCEDURE — 94664 DEMO&/EVAL PT USE INHALER: CPT

## 2025-07-02 PROCEDURE — 82948 REAGENT STRIP/BLOOD GLUCOSE: CPT

## 2025-07-02 PROCEDURE — 80048 BASIC METABOLIC PNL TOTAL CA: CPT | Performed by: INTERNAL MEDICINE

## 2025-07-02 PROCEDURE — 63710000001 INSULIN LISPRO (HUMAN) PER 5 UNITS: Performed by: INTERNAL MEDICINE

## 2025-07-02 PROCEDURE — 25810000003 SODIUM CHLORIDE 0.9 % SOLUTION 250 ML FLEX CONT: Performed by: INTERNAL MEDICINE

## 2025-07-02 PROCEDURE — 94760 N-INVAS EAR/PLS OXIMETRY 1: CPT

## 2025-07-02 RX ORDER — BUDESONIDE 0.25 MG/2ML
0.5 INHALANT ORAL
Status: DISCONTINUED | OUTPATIENT
Start: 2025-07-02 | End: 2025-07-05 | Stop reason: HOSPADM

## 2025-07-02 RX ORDER — DOXYCYCLINE 100 MG/1
100 CAPSULE ORAL EVERY 12 HOURS SCHEDULED
Status: DISCONTINUED | OUTPATIENT
Start: 2025-07-02 | End: 2025-07-05 | Stop reason: HOSPADM

## 2025-07-02 RX ORDER — BUDESONIDE 0.25 MG/2ML
0.5 INHALANT ORAL DAILY
Status: DISCONTINUED | OUTPATIENT
Start: 2025-07-03 | End: 2025-07-02

## 2025-07-02 RX ORDER — METOPROLOL SUCCINATE 25 MG/1
37.5 TABLET, EXTENDED RELEASE ORAL EVERY 12 HOURS SCHEDULED
Status: DISCONTINUED | OUTPATIENT
Start: 2025-07-02 | End: 2025-07-03

## 2025-07-02 RX ADMIN — Medication 400 MG: at 10:12

## 2025-07-02 RX ADMIN — DOXYCYCLINE 100 MG: 100 CAPSULE ORAL at 21:23

## 2025-07-02 RX ADMIN — INSULIN GLARGINE 10 UNITS: 100 INJECTION, SOLUTION SUBCUTANEOUS at 10:15

## 2025-07-02 RX ADMIN — WHITE PETROLATUM 1 APPLICATION: 1.75 OINTMENT TOPICAL at 10:15

## 2025-07-02 RX ADMIN — BUMETANIDE 2 MG: 1 TABLET ORAL at 10:08

## 2025-07-02 RX ADMIN — Medication 10 ML: at 10:14

## 2025-07-02 RX ADMIN — METOPROLOL SUCCINATE ER TABLETS 25 MG: 25 TABLET, FILM COATED, EXTENDED RELEASE ORAL at 10:13

## 2025-07-02 RX ADMIN — HYDROCODONE BITARTRATE AND ACETAMINOPHEN 1 TABLET: 10; 325 TABLET ORAL at 23:53

## 2025-07-02 RX ADMIN — METOPROLOL SUCCINATE 37.5 MG: 25 TABLET, EXTENDED RELEASE ORAL at 21:23

## 2025-07-02 RX ADMIN — Medication 5 MG: at 21:23

## 2025-07-02 RX ADMIN — DILTIAZEM HYDROCHLORIDE 180 MG: 180 CAPSULE, COATED, EXTENDED RELEASE ORAL at 10:12

## 2025-07-02 RX ADMIN — ARFORMOTEROL TARTRATE 15 MCG: 15 SOLUTION RESPIRATORY (INHALATION) at 21:40

## 2025-07-02 RX ADMIN — ATORVASTATIN CALCIUM 20 MG: 20 TABLET, FILM COATED ORAL at 10:12

## 2025-07-02 RX ADMIN — PIPERACILLIN AND TAZOBACTAM 4.5 G: 4; .5 INJECTION, POWDER, FOR SOLUTION INTRAVENOUS; PARENTERAL at 06:19

## 2025-07-02 RX ADMIN — HYDROCODONE BITARTRATE AND ACETAMINOPHEN 1 TABLET: 10; 325 TABLET ORAL at 10:11

## 2025-07-02 RX ADMIN — VANCOMYCIN HYDROCHLORIDE 1000 MG: 1 INJECTION, POWDER, LYOPHILIZED, FOR SOLUTION INTRAVENOUS at 10:05

## 2025-07-02 RX ADMIN — PREGABALIN 150 MG: 75 CAPSULE ORAL at 10:08

## 2025-07-02 RX ADMIN — PIPERACILLIN AND TAZOBACTAM 4.5 G: 4; .5 INJECTION, POWDER, FOR SOLUTION INTRAVENOUS; PARENTERAL at 21:48

## 2025-07-02 RX ADMIN — HYDROCODONE BITARTRATE AND ACETAMINOPHEN 1 TABLET: 10; 325 TABLET ORAL at 03:52

## 2025-07-02 RX ADMIN — Medication 10 ML: at 21:49

## 2025-07-02 RX ADMIN — LIDOCAINE 1 PATCH: 4 PATCH TOPICAL at 21:22

## 2025-07-02 RX ADMIN — INSULIN LISPRO 2 UNITS: 100 INJECTION, SOLUTION INTRAVENOUS; SUBCUTANEOUS at 12:58

## 2025-07-02 RX ADMIN — HYDROCODONE BITARTRATE AND ACETAMINOPHEN 1 TABLET: 10; 325 TABLET ORAL at 17:30

## 2025-07-02 RX ADMIN — PIPERACILLIN AND TAZOBACTAM 4.5 G: 4; .5 INJECTION, POWDER, FOR SOLUTION INTRAVENOUS; PARENTERAL at 14:31

## 2025-07-02 RX ADMIN — RIVAROXABAN 20 MG: 20 TABLET, FILM COATED ORAL at 10:12

## 2025-07-02 RX ADMIN — BUDESONIDE 0.5 MG: 0.25 SUSPENSION RESPIRATORY (INHALATION) at 10:06

## 2025-07-02 RX ADMIN — PREGABALIN 150 MG: 75 CAPSULE ORAL at 21:23

## 2025-07-02 RX ADMIN — INSULIN LISPRO 2 UNITS: 100 INJECTION, SOLUTION INTRAVENOUS; SUBCUTANEOUS at 21:46

## 2025-07-02 RX ADMIN — ARFORMOTEROL TARTRATE 15 MCG: 15 SOLUTION RESPIRATORY (INHALATION) at 09:23

## 2025-07-02 RX ADMIN — BUDESONIDE 0.5 MG: 0.25 SUSPENSION RESPIRATORY (INHALATION) at 21:41

## 2025-07-02 RX ADMIN — INSULIN LISPRO 2 UNITS: 100 INJECTION, SOLUTION INTRAVENOUS; SUBCUTANEOUS at 17:29

## 2025-07-02 NOTE — PROGRESS NOTES
[facility]   Hospitalist Progress Note    Date of admission: 6/29/2025  Patient Name: Mary Albarran  1950  Date: 7/2/2025      Subjective     Chief Complaint   Patient presents with    Altered Mental Status       Interval Followup: No shortness of air complaints today, denies overt fevers.      Objective     Vitals:   Temp:  [97.8 °F (36.6 °C)-98.7 °F (37.1 °C)] 98.6 °F (37 °C)  Heart Rate:  [] 116  Resp:  [18-22] 22  BP: (118-143)/(58-84) 143/84  Flow (L/min) (Oxygen Therapy):  [3-4] 3    Physical Exam  Awake, conversant  Conversational dyspnea  B/l le trace edema, chron stasis changes, dry scaly skin, wound dressed     Abdomen nondistended    Result Review:  Vital signs, labs and recent relevant imaging reviewed.      CBC          6/30/2025    05:49 7/1/2025    05:20 7/2/2025    05:08   CBC   WBC 15.35  8.51  6.78    RBC 4.12  4.07  3.93    Hemoglobin 11.3  11.1  10.6    Hematocrit 36.1  36.7  35.3    MCV 87.6  90.2  89.8    MCH 27.4  27.3  27.0    MCHC 31.3  30.2  30.0    RDW 15.7  15.6  15.3    Platelets 109  89  84      CMP          6/30/2025    06:30 7/1/2025    05:20 7/2/2025    05:08   CMP   Glucose 239  150  196    BUN 16.4  17.3  17.7    Creatinine 0.83  0.80  0.84    EGFR 75.0  78.4  73.9    Sodium 136  137  138    Potassium 4.6  4.0  3.6    Chloride 102  104  101    Calcium 8.6  9.2  9.1    BUN/Creatinine Ratio 19.8  21.6  21.1    Anion Gap 12.9  11.2  13.9          acetaminophen    aluminum-magnesium hydroxide-simethicone    benzocaine-menthol    senna-docusate sodium **AND** polyethylene glycol **AND** bisacodyl **AND** bisacodyl    calcium carbonate    dextrose    dextrose    Diclofenac Sodium    glucagon (human recombinant)    HYDROcodone-acetaminophen    hydrOXYzine    ipratropium-albuterol    Lidocaine (Anorectal)    Lidocaine    melatonin    metoprolol tartrate    nicotine    nitroglycerin    ondansetron    ondansetron ODT    Pharmacy to dose vancomycin    Pharmacy To Dose:     sodium chloride    sodium chloride    sodium chloride    sodium chloride    arformoterol, 15 mcg, Nebulization, BID - RT  atorvastatin, 20 mg, Oral, Daily  budesonide, 0.5 mg, Nebulization, BID - RT  bumetanide, 2 mg, Oral, Daily  dilTIAZem CD, 180 mg, Oral, Daily  insulin glargine, 10 Units, Subcutaneous, Daily  insulin lispro, 2-9 Units, Subcutaneous, 4x Daily AC & at Bedtime  magnesium oxide, 400 mg, Oral, Daily  metoprolol succinate XL, 25 mg, Oral, Q12H  mineral oil-hydrophilic petrolatum, 1 Application, Topical, Daily  piperacillin-tazobactam, 4.5 g, Intravenous, Q8H  pregabalin, 150 mg, Oral, BID  rivaroxaban, 20 mg, Oral, Daily  [Held by provider] sacubitril-valsartan, 1 tablet, Oral, Nightly  sodium chloride, 10 mL, Intravenous, Q12H  vancomycin, 1,000 mg, Intravenous, Q12H        MRI Foot Right With & Without Contrast  Result Date: 7/1/2025  Impression: No evidence of osteomyelitis. Mild superficial cellulitis of the dorsum and lateral foot Enhancement of the plantar soft tissues adjacent to the fourth metatarsal and between fourth and fifth metatarsals suggests cellulitis and myositis Electronically Signed: Frank Mir MD  7/1/2025 10:36 PM EDT  Workstation ID: [id number]    XR Foot 3+ View Right  Result Date: 6/29/2025  Impression: Within exam limitations, no convincing osteomyelitis or acute osseous abnormality. Diffuse soft tissue swelling which could reflect volume volume overload/third spacing or cellulitis in the right clinical setting. Based on clinical suspicion for osteomyelitis, consider dedicated MRI. Electronically Signed: Dc Miguel MD  6/29/2025 6:08 PM EDT  Workstation ID: [id number]    XR Chest 1 View  Result Date: 6/29/2025  Impression: Radiographically similar left greater than right perihilar and bibasilar opacities as compared to 5/25/2025. Differential includes atelectasis/scar and pneumonia in the right clinical setting. Stable cardiomegaly without signs of overt edema.  Electronically Signed: Dc Miguel MD  6/29/2025 3:56 PM EDT  Workstation ID: [id number]      Assessment / Plan     Summary: 74 y.o.   Mary Albarran is a 74 y.o. female with medical history significant for atrial fibrillation, CKD stage IIIa, hypoxic respiratory failure on 2 L nasal cannula, hyperlipidemia, hypertension, COPD, type 2 diabetes, heart failure with reduced ejection fraction     Patient presents to the emergency department on 6/29/2025.  Patient was recently admitted to the hospital 5/25/2025 through 5/29/2025, treated for pneumonia.  Patient states she discharged from the hospital was feeling well, followed up with her primary care physician.  However acutely on day of presentation, 6/29/2025 patient started with fevers and chills.  Patient states she ate lunch with her family, pizza.  Other people ate the same pizza with no other complaints.  Patient denied any nausea vomiting or diarrhea.  Patient started with fevers chills, rigors.  Patient states in the past when she exhibits the symptoms she quickly deteriorates therefore  brought her into the emergency department.  Patient stated up until lunch she had been within her usual state of health.  Patient has been taking her medications as prescribed.  In the emergency department, patient presented tachycardic 126 A-fib with RVR, febrile 38.3, respiratory rate of 20, blood pressure 135/76 saturating above 90% on her home 2 L nasal cannula.  Patient has a white count of 17, lactate of 2.5, glucose elevated 254.  Creatinine 0.97.  .  Troponin 20, repeat 20.  Patient denies any acute symptoms of dyspnea.  Patient's UA with trace leukocytes.  No bacteria, no WBCs.  Patient's chest x-ray shows similar left greater than right perihilar and bibasilar opacities compared to chest x-ray from 5/25/2025.  Patient was noted to have right lower extremity swelling and erythema.  Patient states this is her usual.  Patient has an open wound on the bottom  of her right foot closest to fifth digit.  Patient states it looks better than usual.  In the emergency department patient started with broad-spectrum antibiotics, IV fluids.  Hospitalist service contacted for admission     Assessment/Plan (clinically significant if listed here)  Severe sepsis, poa, diabetic food infection with pseudomonas, staph aureus  Lactic acidosis  Concern for diabetic foot ulcer to right foot  Atrial fibrillation with RVR  Acute on Chronic HRF, 2L baseline requirement  Acute on chronic HFrEF exacerbation  Chronic thrombocytopenia  CKD stage IIIa  Hyperlipidemia  Hypertension  COPD not in acute exacerbation  ID-DM2    MRI foot negative for osteo-, cellulitis and possible myositis suggested  Leukocytosis responding to antibiotics,   Vancomycin and Zosyn for now will narrow vancomycin to doxycycline and ultimately, follow-up final wound culture, prelim with Pseudomonas and staph, MRSA nares was positive, covering for cellulitis and possible pna on cxr (vs residual /chronic findings), pneumonia should be adequately covered by current antibiotics regardless we will defer CT chest  BMI 49.6, proBNP slightly elevated at 945 even in setting of her severe obesity.  Has a history of HFrEF, oxygen higher than baseline  Continue diuresis with Bumex, monitor volume status, respiratory status, I's and O's renal function, doing better with diuresis.  Heart rate somewhat variable on telemetry but still up to 120s at times   Continue diltiazem, increase metoprolol succinate further to 37.5 twice daily, continue hold Entresto to allow for further titration and monitor rate control, should hopefully improve some with treatment of infection as well  Continue rivaroxaban  Cont statin  Cont brov/pulm, nebs,   Cont lyrica  Chronic thrombocytopenia - near baseline  PT/OT   Check a.m. CBC, BMP, magnesium, phosphorus, vanc levels.        Dispo: patient declines to work with physical therapy just wants to go home at  time of discharge...    VTE Prophylaxis:  Pharmacologic VTE prophylaxis orders are present.      Code Status (Patient has no pulse and is not breathing): CPR (Attempt to Resuscitate)  Medical Interventions (Patient has pulse or is breathing): Full Support

## 2025-07-02 NOTE — PROGRESS NOTES
"[facility] Clinical Pharmacy Services: Vancomycin Monitoring Note    Mary Albarran is a 74 y.o. female who is on day  of pharmacy to dose vancomycin for Skin and Soft Tissue.    Previous Vancomycin Dose:   1250 mg IV every  24  hours  Imaging Reviewed?: Yes   Foot Xray: Within exam limitations, no convincing osteomyelitis or acute osseous abnormality. Diffuse soft tissue swelling which could reflect volume volume overload/third spacing or cellulitis in the right clinical setting.   MRI: No evidence of osteomyelitis   Updated Cultures and Sensitivities:    MRSA PCR: MRSA detected    Vitals/Labs  Ht: 157.5 cm (62\"); Wt: 123 kg (271 lb 6.2 oz)   Temp (24hrs), Av.4 °F (36.9 °C), Min:97.6 °F (36.4 °C), Max:99.1 °F (37.3 °C)   Estimated Creatinine Clearance: 75.8 mL/min (by C-G formula based on SCr of 0.84 mg/dL).       Results from last 7 days   Lab Units 25  0508 25  0520 25  0630 25  0549   VANCOMYCIN RM mcg/mL 14.39 9.47  --   --    CREATININE mg/dL 0.84 0.80 0.83  --    WBC 10*3/mm3 6.78 8.51  --  15.35*     Assessment/Plan    Current Vancomycin Dose: 1000 mg IV every 12 hours; which provides the following predicted parameters:  AUC24,ss: 478 mg/L.hr  Probability of AUC24 > 400: 97 %  Ctrough,ss: 11.6 mg/L  Probability of Ctrough,ss > 20: 0 %  Probability of nephrotoxicity (Lodise ERIC ): 7 %  No levels planned at this time.   We will continue to monitor patient changes and renal function     Thank you for involving pharmacy in this patient's care. Please contact pharmacy with any questions or concerns.    Bill Mcallister, PharmD  Clinical Pharmacist       "

## 2025-07-02 NOTE — PLAN OF CARE
Goal Outcome Evaluation:              Outcome Evaluation: No significant changes this shift, PRN pain medications given as ordered, glucose montiored, Continue with POC

## 2025-07-02 NOTE — PROGRESS NOTES
[facility]   Hospitalist Progress Note    Date of admission: 6/29/2025  Patient Name: Mary Albarran  1950  Date: 7/1/2025      Subjective     Chief Complaint   Patient presents with    Altered Mental Status       Interval Followup: soa slightly better.  Denies fevers.  Some pain in foot.        Objective     Vitals:   Temp:  [97.6 °F (36.4 °C)-99.1 °F (37.3 °C)] 99.1 °F (37.3 °C)  Heart Rate:  [] 94  Resp:  [18-20] 18  BP: (111-130)/(50-74) 123/74  Flow (L/min) (Oxygen Therapy):  [4] 4    Physical Exam  Awake, conversant  Conversational dyspnea  B/l le trace edema, chron stasis changes, dry scaly skin, wound dressed     Abdomen nondistended    Result Review:  Vital signs, labs and recent relevant imaging reviewed.      CBC          6/29/2025    15:26 6/30/2025    05:49 7/1/2025    05:20   CBC   WBC 17.58  15.35  8.51    RBC 4.74  4.12  4.07    Hemoglobin 13.1  11.3  11.1    Hematocrit 42.6  36.1  36.7    MCV 89.9  87.6  90.2    MCH 27.6  27.4  27.3    MCHC 30.8  31.3  30.2    RDW 15.3  15.7  15.6    Platelets 123  109  89      CMP          6/29/2025    15:26 6/30/2025    06:30 7/1/2025    05:20   CMP   Glucose 254  239  150    BUN 21.6  16.4  17.3    Creatinine 0.97  0.83  0.80    EGFR 62.2  75.0  78.4    Sodium 136  136  137    Potassium 4.8  4.6  4.0    Chloride 95  102  104    Calcium 9.9  8.6  9.2    Total Protein 8.0      Albumin 4.5      Globulin 3.5      Total Bilirubin 0.9      Alkaline Phosphatase 101      AST (SGOT) 14      ALT (SGPT) 10      Albumin/Globulin Ratio 1.3      BUN/Creatinine Ratio 22.3  19.8  21.6    Anion Gap 14.1  12.9  11.2          acetaminophen    aluminum-magnesium hydroxide-simethicone    benzocaine-menthol    senna-docusate sodium **AND** polyethylene glycol **AND** bisacodyl **AND** bisacodyl    calcium carbonate    dextrose    dextrose    Diclofenac Sodium    glucagon (human recombinant)    HYDROcodone-acetaminophen    hydrOXYzine    ipratropium-albuterol    Lidocaine  (Anorectal)    Lidocaine    melatonin    metoprolol tartrate    nicotine    nitroglycerin    ondansetron    ondansetron ODT    Pharmacy to dose vancomycin    Pharmacy To Dose:    sodium chloride    sodium chloride    sodium chloride    sodium chloride    arformoterol, 15 mcg, Nebulization, BID - RT  atorvastatin, 20 mg, Oral, Daily  budesonide, 1 mg, Nebulization, Daily  [START ON 7/2/2025] bumetanide, 2 mg, Oral, Daily  dilTIAZem CD, 180 mg, Oral, Daily  insulin glargine, 10 Units, Subcutaneous, Daily  insulin lispro, 2-9 Units, Subcutaneous, 4x Daily AC & at Bedtime  magnesium oxide, 400 mg, Oral, Daily  metoprolol succinate XL, 25 mg, Oral, Q12H  mineral oil-hydrophilic petrolatum, 1 Application, Topical, Daily  piperacillin-tazobactam, 4.5 g, Intravenous, Q8H  pregabalin, 150 mg, Oral, BID  rivaroxaban, 20 mg, Oral, Daily  [Held by provider] sacubitril-valsartan, 1 tablet, Oral, Nightly  sodium chloride, 10 mL, Intravenous, Q12H  vancomycin, 1,000 mg, Intravenous, Q12H        XR Foot 3+ View Right  Result Date: 6/29/2025  Impression: Within exam limitations, no convincing osteomyelitis or acute osseous abnormality. Diffuse soft tissue swelling which could reflect volume volume overload/third spacing or cellulitis in the right clinical setting. Based on clinical suspicion for osteomyelitis, consider dedicated MRI. Electronically Signed: Dc Miguel MD  6/29/2025 6:08 PM EDT  Workstation ID: [id number]    XR Chest 1 View  Result Date: 6/29/2025  Impression: Radiographically similar left greater than right perihilar and bibasilar opacities as compared to 5/25/2025. Differential includes atelectasis/scar and pneumonia in the right clinical setting. Stable cardiomegaly without signs of overt edema. Electronically Signed: Dc Miguel MD  6/29/2025 3:56 PM EDT  Workstation ID: [id number]      Assessment / Plan     Summary: 74 y.o.   Mary Albarran is a 74 y.o. female with medical history significant for atrial  fibrillation, CKD stage IIIa, hypoxic respiratory failure on 2 L nasal cannula, hyperlipidemia, hypertension, COPD, type 2 diabetes, heart failure with reduced ejection fraction     Patient presents to the emergency department on 6/29/2025.  Patient was recently admitted to the hospital 5/25/2025 through 5/29/2025, treated for pneumonia.  Patient states she discharged from the hospital was feeling well, followed up with her primary care physician.  However acutely on day of presentation, 6/29/2025 patient started with fevers and chills.  Patient states she ate lunch with her family, akbar.  Other people ate the same pizza with no other complaints.  Patient denied any nausea vomiting or diarrhea.  Patient started with fevers chills, rigors.  Patient states in the past when she exhibits the symptoms she quickly deteriorates therefore  brought her into the emergency department.  Patient stated up until lunch she had been within her usual state of health.  Patient has been taking her medications as prescribed.  In the emergency department, patient presented tachycardic 126 A-fib with RVR, febrile 38.3, respiratory rate of 20, blood pressure 135/76 saturating above 90% on her home 2 L nasal cannula.  Patient has a white count of 17, lactate of 2.5, glucose elevated 254.  Creatinine 0.97.  .  Troponin 20, repeat 20.  Patient denies any acute symptoms of dyspnea.  Patient's UA with trace leukocytes.  No bacteria, no WBCs.  Patient's chest x-ray shows similar left greater than right perihilar and bibasilar opacities compared to chest x-ray from 5/25/2025.  Patient was noted to have right lower extremity swelling and erythema.  Patient states this is her usual.  Patient has an open wound on the bottom of her right foot closest to fifth digit.  Patient states it looks better than usual.  In the emergency department patient started with broad-spectrum antibiotics, IV fluids.  Hospitalist service contacted for  admission       Assessment/Plan (clinically significant if listed here)  Severe sepsis present on admission, unknown source  Lactic acidosis  Concern for diabetic foot ulcer to right foot  Atrial fibrillation with RVR  Acute on Chronic HRF, 2L baseline requirement  Acute on chronic HFrEF exacerbation  Chronic thrombocytopenia  CKD stage IIIa  Hyperlipidemia  Hypertension  COPD not in acute exacerbation  ID-DM2    Right foot x-ray no obvious osteomyelitis noted, some cellulitis possible/soft tissue swelling noted  Mri of foot pending to better evaluate   Chest x-ray left greater than right bibasilar opacities similar to prior x-ray from 5/25, pneumonia also possible, Pro-Chavez is 1.62, biased by ckd   cont vanc zosyn for now for diabetic foot infection and possible pneumonia on imaging. f/u final wound culture and mri of foot to evaluate for osteo. MRSA nares positive.    BMI 49.6, proBNP slightly elevated at 945 even in setting of her severe obesity.  Has a history of HFrEF, oxygen higher than baseline  resume bumex, will give iv at higher dose forn ow as suspect has some volume component.  order additional wean o2 as able back to baseline, monitor resp status   hold entresto given softer bp, cont diltiazem, monitor rate control, cont xarelto AC  Cont statin  Cont brov/pulm, nebs,   Cont lyrica  Chronic thrombocytopenia based on review of outpatient lab.Platelets downtrending slightly but close to baseline still.  Will monitor  PT/OT  Check a.m. CBC, BMP, magnesium, phosphorus, vanc levels     Dispo: poss rehab  once medically stable.     VTE Prophylaxis:  Pharmacologic VTE prophylaxis orders are present.      Code Status (Patient has no pulse and is not breathing): CPR (Attempt to Resuscitate)  Medical Interventions (Patient has pulse or is breathing): Full Support

## 2025-07-02 NOTE — PLAN OF CARE
Goal Outcome Evaluation:           Progress: no change  Outcome Evaluation: No changes overnight, gave PRN pain medication as needed. Continue plan of care.

## 2025-07-03 LAB
ANION GAP SERPL CALCULATED.3IONS-SCNC: 11.2 MMOL/L (ref 5–15)
BACTERIA SPEC AEROBE CULT: ABNORMAL
BASOPHILS # BLD AUTO: 0.03 10*3/MM3 (ref 0–0.2)
BASOPHILS NFR BLD AUTO: 0.7 % (ref 0–1.5)
BUN SERPL-MCNC: 15.4 MG/DL (ref 8–23)
BUN/CREAT SERPL: 16.4 (ref 7–25)
CALCIUM SPEC-SCNC: 8.8 MG/DL (ref 8.6–10.5)
CHLORIDE SERPL-SCNC: 104 MMOL/L (ref 98–107)
CO2 SERPL-SCNC: 24.8 MMOL/L (ref 22–29)
CREAT SERPL-MCNC: 0.94 MG/DL (ref 0.57–1)
DEPRECATED RDW RBC AUTO: 48.6 FL (ref 37–54)
EGFRCR SERPLBLD CKD-EPI 2021: 64.6 ML/MIN/1.73
EOSINOPHIL # BLD AUTO: 0.09 10*3/MM3 (ref 0–0.4)
EOSINOPHIL NFR BLD AUTO: 2 % (ref 0.3–6.2)
ERYTHROCYTE [DISTWIDTH] IN BLOOD BY AUTOMATED COUNT: 14.9 % (ref 12.3–15.4)
GLUCOSE BLDC GLUCOMTR-MCNC: 143 MG/DL (ref 70–99)
GLUCOSE BLDC GLUCOMTR-MCNC: 144 MG/DL (ref 70–99)
GLUCOSE BLDC GLUCOMTR-MCNC: 148 MG/DL (ref 70–99)
GLUCOSE BLDC GLUCOMTR-MCNC: 176 MG/DL (ref 70–99)
GLUCOSE SERPL-MCNC: 126 MG/DL (ref 65–99)
GRAM STN SPEC: ABNORMAL
GRAM STN SPEC: ABNORMAL
HCT VFR BLD AUTO: 34.6 % (ref 34–46.6)
HGB BLD-MCNC: 10.4 G/DL (ref 12–15.9)
IMM GRANULOCYTES # BLD AUTO: 0.02 10*3/MM3 (ref 0–0.05)
IMM GRANULOCYTES NFR BLD AUTO: 0.4 % (ref 0–0.5)
LYMPHOCYTES # BLD AUTO: 0.94 10*3/MM3 (ref 0.7–3.1)
LYMPHOCYTES NFR BLD AUTO: 20.9 % (ref 19.6–45.3)
MAGNESIUM SERPL-MCNC: 1.6 MG/DL (ref 1.6–2.4)
MCH RBC QN AUTO: 27 PG (ref 26.6–33)
MCHC RBC AUTO-ENTMCNC: 30.1 G/DL (ref 31.5–35.7)
MCV RBC AUTO: 89.9 FL (ref 79–97)
MONOCYTES # BLD AUTO: 0.38 10*3/MM3 (ref 0.1–0.9)
MONOCYTES NFR BLD AUTO: 8.4 % (ref 5–12)
NEUTROPHILS NFR BLD AUTO: 3.04 10*3/MM3 (ref 1.7–7)
NEUTROPHILS NFR BLD AUTO: 67.6 % (ref 42.7–76)
NRBC BLD AUTO-RTO: 0 /100 WBC (ref 0–0.2)
PHOSPHATE SERPL-MCNC: 5.7 MG/DL (ref 2.5–4.5)
PLATELET # BLD AUTO: 96 10*3/MM3 (ref 140–450)
PMV BLD AUTO: 13 FL (ref 6–12)
POTASSIUM SERPL-SCNC: 3.5 MMOL/L (ref 3.5–5.2)
RBC # BLD AUTO: 3.85 10*6/MM3 (ref 3.77–5.28)
SODIUM SERPL-SCNC: 140 MMOL/L (ref 136–145)
WBC NRBC COR # BLD AUTO: 4.5 10*3/MM3 (ref 3.4–10.8)

## 2025-07-03 PROCEDURE — 63710000001 INSULIN GLARGINE PER 5 UNITS: Performed by: INTERNAL MEDICINE

## 2025-07-03 PROCEDURE — 25010000002 MAGNESIUM SULFATE 4 GM/100ML SOLUTION: Performed by: INTERNAL MEDICINE

## 2025-07-03 PROCEDURE — 25010000002 PIPERACILLIN SOD-TAZOBACTAM PER 1 G: Performed by: INTERNAL MEDICINE

## 2025-07-03 PROCEDURE — 84100 ASSAY OF PHOSPHORUS: CPT | Performed by: INTERNAL MEDICINE

## 2025-07-03 PROCEDURE — 94761 N-INVAS EAR/PLS OXIMETRY MLT: CPT

## 2025-07-03 PROCEDURE — 94664 DEMO&/EVAL PT USE INHALER: CPT

## 2025-07-03 PROCEDURE — 82948 REAGENT STRIP/BLOOD GLUCOSE: CPT | Performed by: INTERNAL MEDICINE

## 2025-07-03 PROCEDURE — 97110 THERAPEUTIC EXERCISES: CPT

## 2025-07-03 PROCEDURE — 83735 ASSAY OF MAGNESIUM: CPT | Performed by: INTERNAL MEDICINE

## 2025-07-03 PROCEDURE — 82948 REAGENT STRIP/BLOOD GLUCOSE: CPT

## 2025-07-03 PROCEDURE — 94799 UNLISTED PULMONARY SVC/PX: CPT

## 2025-07-03 PROCEDURE — 85025 COMPLETE CBC W/AUTO DIFF WBC: CPT | Performed by: INTERNAL MEDICINE

## 2025-07-03 PROCEDURE — 80048 BASIC METABOLIC PNL TOTAL CA: CPT | Performed by: INTERNAL MEDICINE

## 2025-07-03 PROCEDURE — 99233 SBSQ HOSP IP/OBS HIGH 50: CPT | Performed by: INTERNAL MEDICINE

## 2025-07-03 PROCEDURE — 63710000001 INSULIN LISPRO (HUMAN) PER 5 UNITS: Performed by: INTERNAL MEDICINE

## 2025-07-03 RX ORDER — POTASSIUM CHLORIDE 750 MG/1
40 CAPSULE, EXTENDED RELEASE ORAL ONCE
Status: COMPLETED | OUTPATIENT
Start: 2025-07-03 | End: 2025-07-03

## 2025-07-03 RX ORDER — BUMETANIDE 1 MG/1
2 TABLET ORAL
Status: DISCONTINUED | OUTPATIENT
Start: 2025-07-04 | End: 2025-07-05 | Stop reason: HOSPADM

## 2025-07-03 RX ORDER — METOPROLOL SUCCINATE 25 MG/1
25 TABLET, EXTENDED RELEASE ORAL EVERY 12 HOURS SCHEDULED
Status: DISCONTINUED | OUTPATIENT
Start: 2025-07-03 | End: 2025-07-05 | Stop reason: HOSPADM

## 2025-07-03 RX ORDER — MAGNESIUM SULFATE HEPTAHYDRATE 40 MG/ML
4 INJECTION, SOLUTION INTRAVENOUS ONCE
Status: COMPLETED | OUTPATIENT
Start: 2025-07-03 | End: 2025-07-03

## 2025-07-03 RX ADMIN — ARFORMOTEROL TARTRATE 15 MCG: 15 SOLUTION RESPIRATORY (INHALATION) at 09:08

## 2025-07-03 RX ADMIN — ARFORMOTEROL TARTRATE 15 MCG: 15 SOLUTION RESPIRATORY (INHALATION) at 20:52

## 2025-07-03 RX ADMIN — HYDROCODONE BITARTRATE AND ACETAMINOPHEN 1 TABLET: 10; 325 TABLET ORAL at 21:16

## 2025-07-03 RX ADMIN — BUDESONIDE 0.25 MG: 0.25 SUSPENSION RESPIRATORY (INHALATION) at 20:52

## 2025-07-03 RX ADMIN — DOXYCYCLINE 100 MG: 100 CAPSULE ORAL at 21:15

## 2025-07-03 RX ADMIN — MAGNESIUM SULFATE HEPTAHYDRATE 4 G: 40 INJECTION, SOLUTION INTRAVENOUS at 08:58

## 2025-07-03 RX ADMIN — METOPROLOL SUCCINATE 25 MG: 25 TABLET, EXTENDED RELEASE ORAL at 21:15

## 2025-07-03 RX ADMIN — DILTIAZEM HYDROCHLORIDE 180 MG: 180 CAPSULE, COATED, EXTENDED RELEASE ORAL at 08:23

## 2025-07-03 RX ADMIN — PREGABALIN 150 MG: 75 CAPSULE ORAL at 21:15

## 2025-07-03 RX ADMIN — PIPERACILLIN AND TAZOBACTAM 4.5 G: 4; .5 INJECTION, POWDER, FOR SOLUTION INTRAVENOUS; PARENTERAL at 06:05

## 2025-07-03 RX ADMIN — ATORVASTATIN CALCIUM 20 MG: 20 TABLET, FILM COATED ORAL at 08:22

## 2025-07-03 RX ADMIN — DOXYCYCLINE 100 MG: 100 CAPSULE ORAL at 08:23

## 2025-07-03 RX ADMIN — RIVAROXABAN 20 MG: 20 TABLET, FILM COATED ORAL at 08:23

## 2025-07-03 RX ADMIN — Medication 10 ML: at 08:23

## 2025-07-03 RX ADMIN — INSULIN GLARGINE 10 UNITS: 100 INJECTION, SOLUTION SUBCUTANEOUS at 08:23

## 2025-07-03 RX ADMIN — PIPERACILLIN AND TAZOBACTAM 4.5 G: 4; .5 INJECTION, POWDER, FOR SOLUTION INTRAVENOUS; PARENTERAL at 13:37

## 2025-07-03 RX ADMIN — POTASSIUM CHLORIDE 40 MEQ: 750 CAPSULE, EXTENDED RELEASE ORAL at 08:58

## 2025-07-03 RX ADMIN — BUDESONIDE 0.5 MG: 0.25 SUSPENSION RESPIRATORY (INHALATION) at 09:08

## 2025-07-03 RX ADMIN — PIPERACILLIN AND TAZOBACTAM 4.5 G: 4; .5 INJECTION, POWDER, FOR SOLUTION INTRAVENOUS; PARENTERAL at 21:17

## 2025-07-03 RX ADMIN — Medication 400 MG: at 11:27

## 2025-07-03 RX ADMIN — METOPROLOL SUCCINATE 25 MG: 25 TABLET, EXTENDED RELEASE ORAL at 08:58

## 2025-07-03 RX ADMIN — WHITE PETROLATUM 1 APPLICATION: 1.75 OINTMENT TOPICAL at 10:17

## 2025-07-03 RX ADMIN — PREGABALIN 150 MG: 75 CAPSULE ORAL at 08:23

## 2025-07-03 RX ADMIN — Medication 10 ML: at 21:17

## 2025-07-03 RX ADMIN — INSULIN LISPRO 2 UNITS: 100 INJECTION, SOLUTION INTRAVENOUS; SUBCUTANEOUS at 11:33

## 2025-07-03 RX ADMIN — HYDROCODONE BITARTRATE AND ACETAMINOPHEN 1 TABLET: 10; 325 TABLET ORAL at 11:27

## 2025-07-03 NOTE — THERAPY TREATMENT NOTE
Patient Name: Mary Albarran  : 1950    MRN: [id number]                              Today's Date: 7/3/2025       Admit Date: 2025    Visit Dx:     ICD-10-CM ICD-9-CM   1. Sepsis, due to unspecified organism, unspecified whether acute organ dysfunction present  A41.9 038.9     995.91   2. Acute febrile illness  R50.9 780.60   3. Atrial fibrillation with rapid ventricular response  I48.91 427.31   4. Diabetic infection of right foot  E11.628 250.80    L08.9 686.9   5. Cellulitis of right leg  L03.115 682.6   6. Pneumonia of both lungs due to infectious organism, unspecified part of lung  J18.9 483.8   7. Decreased activities of daily living (ADL)  Z78.9 V49.89   8. Difficulty walking  R26.2 719.7     Patient Active Problem List   Diagnosis    Aortic stenosis, moderate    Longstanding persistent atrial fibrillation    Chronic kidney disease, stage III (moderate)    Hyperlipidemia    Hypertension    Chronic diastolic congestive heart failure    Atrial fibrillation with RVR    Onychomycosis    Onychocryptosis    Foot pain, bilateral    Nephrolithiasis    Recurrent urinary tract infection    Acute on chronic HFrEF (heart failure with reduced ejection fraction)    Cellulitis of right leg without foot    Diabetes mellitus type 2 with complications    Depression    COPD (chronic obstructive pulmonary disease)    Chronic pain    Anxiety    Heart failure with reduced ejection fraction    CHF exacerbation    Sepsis    C. difficile diarrhea    History of home oxygen therapy     Past Medical History:   Diagnosis Date    Allergic rhinitis     Anxiety     Aortic valve disease 2021    Fibrocalcific changes of the aortic valve with mild aortic valve stenosis   on echo 3/2/2021  Moderate aortic valve regurgitation is present. Aortic valve maximum pressure gradient is 26 mmHg. Moderate aortic valve stenosis is present.  On echo 2/10/2022       Arthritis     Atrial fibrillation     CHF (congestive heart failure)      Chronic kidney disease (CKD), stage III (moderate)     Chronic pain     COPD (chronic obstructive pulmonary disease)     Coronary artery disease     Diabetes mellitus type 2 with complications     Elevated cholesterol     Ex-smoker     QUIT SMOKING 2008    GERD (gastroesophageal reflux disease)     History of home oxygen therapy     2L/NC AAT REPORTED    History of transfusion     Hyperlipidemia     Hypertension     Thrombocytopenia      Past Surgical History:   Procedure Laterality Date    HYSTERECTOMY      30 YEARS AGO      General Information       Row Name 07/03/25 1038          OT Time and Intention    Document Type therapy note (daily note)  -AV     Mode of Treatment individual therapy;occupational therapy  -AV     Patient Effort good  -AV       Row Name 07/03/25 1038          General Information    Existing Precautions/Restrictions fall;oxygen therapy device and L/min  -AV       Row Name 07/03/25 1038          Cognition    Orientation Status (Cognition) --  alert, pleasant and cooperative. able to perform therapeutic exercises with only minimal cues/ demonstration.  -AV       Row Name 07/03/25 1038          Safety Issues/Impairments Affecting Functional Mobility    Impairments Affecting Function (Mobility) balance;endurance/activity tolerance;strength;shortness of breath  -AV               User Key  (r) = Recorded By, (t) = Taken By, (c) = Cosigned By      Initials Name Provider Type    AV Juan Ireland OT Occupational Therapist                     Mobility/ADL's    No documentation.                  Obj/Interventions       Row Name 07/03/25 1038          Vision Assessment/Intervention    Visual Impairment/Limitations WFL  -AV       Row Name 07/03/25 1038          Range of Motion Comprehensive    General Range of Motion bilateral upper extremity ROM WFL  -AV     Comment, General Range of Motion AROM. bilateral fists ~75%  -AV       Row Name 07/03/25 1038          Strength Comprehensive (MMT)    Comment,  General Manual Muscle Testing (MMT) Assessment 4(-)/5 bilateral biceps, triceps and   -AV       Row Name 07/03/25 1038          Shoulder (Therapeutic Exercise)    Shoulder (Therapeutic Exercise) AROM (active range of motion)  -AV     Shoulder AROM (Therapeutic Exercise) bilateral;flexion;aBduction;aDduction;horizontal aBduction/aDduction;15 repititions  -AV       Row Name 07/03/25 1038          Elbow/Forearm (Therapeutic Exercise)    Elbow/Forearm (Therapeutic Exercise) AROM (active range of motion)  -AV     Elbow/Forearm AROM (Therapeutic Exercise) bilateral;extension;supination;pronation;15 repititions  -AV       Row Name 07/03/25 1038          Motor Skills    Therapeutic Exercise shoulder;elbow/forearm  performed in high-[place]'s/ on 3L O2 with sats 95%  -AV               User Key  (r) = Recorded By, (t) = Taken By, (c) = Cosigned By      Initials Name Provider Type    Juan Magana, ASHLEE Occupational Therapist                   Goals/Plan    No documentation.                  Clinical Impression       [facility] Name 07/03/25 1040          Pain Scale: FACES Pre/Post-Treatment    Pain: FACES Scale, Pretreatment 0-->no hurt  -AV     Posttreatment Pain Rating 0-->no hurt  -AV       [facility] Name 07/03/25 1040          Therapy Assessment/Plan (OT)    Rehab Potential (OT) good  -AV       [facility] Name 07/03/25 1040          Therapy Plan Review/Discharge Plan (OT)    Anticipated Discharge Disposition (OT) sub acute care setting  -AV       Row Name 07/03/25 1040          Vital Signs    O2 Delivery Pre Treatment nasal cannula  -AV     O2 Delivery Intra Treatment nasal cannula  -AV     O2 Delivery Post Treatment nasal cannula  -AV       Row Name 07/03/25 1040          Positioning and Restraints    Pre-Treatment Position in bed  -AV     Post Treatment Position bed  -AV     In Bed call light within reach;encouraged to call for assist  -AV               User Key  (r) = Recorded By, (t) = Taken By, (c) = Cosigned By      Initials Name  Provider Type    Juan Magana OT Occupational Therapist                   Outcome Measures       Row Name 07/03/25 1040          How much help from another is currently needed...    Putting on and taking off regular lower body clothing? 2  -AV     Bathing (including washing, rinsing, and drying) 2  -AV     Toileting (which includes using toilet bed pan or urinal) 2  -AV     Putting on and taking off regular upper body clothing 3  -AV     Taking care of personal grooming (such as brushing teeth) 3  -AV     Eating meals 4  -AV     AM-PAC 6 Clicks Score (OT) 16  -AV       Row Name 07/03/25 0723          How much help from another person do you currently need...    Turning from your back to your side while in flat bed without using bedrails? 4  -BT     Moving from lying on back to sitting on the side of a flat bed without bedrails? 4  -BT     Moving to and from a bed to a chair (including a wheelchair)? 3  -BT     Standing up from a chair using your arms (e.g., wheelchair, bedside chair)? 3  -BT     Climbing 3-5 steps with a railing? 2  -BT     To walk in hospital room? 3  -BT     AM-PAC 6 Clicks Score (PT) 19  -BT     Highest Level of Mobility Goal Walk 10 Steps or More-6  -BT       Row Name 07/03/25 1040          Optimal Instrument    Bending/Stooping 3  -AV     Standing 2  -AV     Reaching 1  -AV               User Key  (r) = Recorded By, (t) = Taken By, (c) = Cosigned By      Initials Name Provider Type    Juan Magana OT Occupational Therapist    BT Teodora France RN Registered Nurse                    Occupational Therapy Education       Title: PT OT SLP Therapies (In Progress)       Topic: Occupational Therapy (Done)       Point: ADL training (Done)       Learning Progress Summary            Patient Acceptance, E, VU by AV at 6/30/2025 1129                      Point: Home exercise program (Done)       Learning Progress Summary            Patient Acceptance, E, VU by PLACIDO at 6/30/2025 1126                       Point: Precautions (Done)       Learning Progress Summary            Patient Acceptance, E, VU by AV at 6/30/2025 1129                      Point: Body mechanics (Done)       Learning Progress Summary            Patient Acceptance, E, VU by AV at 6/30/2025 1129                                      User Key       Initials Effective Dates Name Provider Type Discipline    AV 06/16/21 -  Juan Ireland OT Occupational Therapist OT                  OT Recommendation and Plan  Planned Therapy Interventions (OT): activity tolerance training, BADL retraining, functional balance retraining, occupation/activity based interventions, patient/caregiver education/training, ROM/therapeutic exercise, strengthening exercise, transfer/mobility retraining  Therapy Frequency (OT): 5 times/wk  Plan of Care Review  Plan of Care Reviewed With: patient  Progress: no change (First session for evaluation)  Outcome Evaluation: Patient presents with limitations of cognition/level of arousal, balance, strength and endurance/activity tolerance which impede her ability to perform ADL and transfers as prior.  The skills of a therapist will be required to safely and effectively implement treatment plan to restore maximal level of function.     Time Calculation:   Evaluation Complexity (OT)  Review Occupational Profile/Medical/Therapy History Complexity: expanded/moderate complexity  Assessment, Occupational Performance/Identification of Deficit Complexity: 3-5 performance deficits  Clinical Decision Making Complexity (OT): problem focused assessment/low complexity  Overall Complexity of Evaluation (OT): low complexity     Time Calculation- OT       Row Name 07/03/25 1041             Time Calculation- OT    OT Received On 07/03/25  -AV      OT Goal Re-Cert Due Date 07/09/25  -AV         Timed Charges    53341 - OT Therapeutic Exercise Minutes 10  -AV         Total Minutes    Timed Charges Total Minutes 10  -AV       Total Minutes 10   -AV                User Key  (r) = Recorded By, (t) = Taken By, (c) = Cosigned By      Initials Name Provider Type    Juan Magana OT Occupational Therapist                  Therapy Charges for Today       Code Description Service Date Service Provider Modifiers Qty    [id number]  OT THER PROC EA 15 MIN 7/3/2025 Juan Ireland OT GO 1                 Juan Ireland OT  7/3/2025

## 2025-07-03 NOTE — SIGNIFICANT NOTE
Wound Eval / Progress Noted    GUERO Bautista     Patient Name: Mary Albarran  : 1950  MRN: [id number]  Today's Date: 7/3/2025                 Admit Date: 2025    Visit Dx:    ICD-10-CM ICD-9-CM   1. Sepsis, due to unspecified organism, unspecified whether acute organ dysfunction present  A41.9 038.9     995.91   2. Acute febrile illness  R50.9 780.60   3. Atrial fibrillation with rapid ventricular response  I48.91 427.31   4. Diabetic infection of right foot  E11.628 250.80    L08.9 686.9   5. Cellulitis of right leg  L03.115 682.6   6. Pneumonia of both lungs due to infectious organism, unspecified part of lung  J18.9 483.8   7. Decreased activities of daily living (ADL)  Z78.9 V49.89   8. Difficulty walking  R26.2 719.7         Sepsis        Past Medical History:   Diagnosis Date    Allergic rhinitis     Anxiety     Aortic valve disease 2021    Fibrocalcific changes of the aortic valve with mild aortic valve stenosis   on echo 3/2/2021  Moderate aortic valve regurgitation is present. Aortic valve maximum pressure gradient is 26 mmHg. Moderate aortic valve stenosis is present.  On echo 2/10/2022       Arthritis     Atrial fibrillation     CHF (congestive heart failure)     Chronic kidney disease (CKD), stage III (moderate)     Chronic pain     COPD (chronic obstructive pulmonary disease)     Coronary artery disease     Diabetes mellitus type 2 with complications     Elevated cholesterol     Ex-smoker     QUIT SMOKING     GERD (gastroesophageal reflux disease)     History of home oxygen therapy     2L/NC AAT REPORTED    History of transfusion     Hyperlipidemia     Hypertension     Thrombocytopenia         Past Surgical History:   Procedure Laterality Date    HYSTERECTOMY      30 YEARS AGO     Physical Assessment:     25 0642   Wound 25 1338 Right posterior foot unspecified   Placement Date/Time: 25   Side: Right  Orientation: posterior  Location: foot  Type: unspecified    Wound Image    Dressing Appearance dry;intact   Dressing Removed Type hydrofiber;silver impregnated;silicone border foam   Confirmed Empty Wound Bed Yes, visual inspection of wound bed   Closure None   Base moist;pink;red;slough;yellow   Periwound dry;intact   Periwound Temperature warm   Periwound Skin Turgor soft   Edges open   Drainage Characteristics/Odor serosanguineous   Drainage Amount scant   Care, Wound cleansed with;sterile normal saline   Dressing Care dressing applied;hydrofiber;silver impregnated;silicone border foam   Periwound Care absorptive dressing applied    RLE     RLE    Wound Check / Follow-up: Patient seen today for wound follow-up. Patient is awake and alert at time of visit. She is agreeable to assessment. Primary PCA at bedside assisted with turning and repositioning.     Open to right plantar foot. Moist, pink to red tissue and thin, yellow sloughing tissue is noted to wound base. Cleansed wound with normal saline and gauze. Applied silver impregnated hydrofiber then secured with silicone border dressing. Recommending to continue daily dressing changes.    Dry skin remains to BLE but is improving. Erythema remains but much improved compared to last assessment. Tissue remains warm to touch. Recommending to continue cleansing with CHG wash daily and then applying thin layer of Aquaphor ointment.    Redness and moisture improving to skin folds to lower abdomen. Recommending to continue skin care with application of cornstarch powder then padding with moisture wicking fabric / dry sheets.     Buttocks intact and pink. Recommending skin care and skin protection with application of blue top barrier cream.    Recommending to continue pressure reduction measures including every two hour turns and offloading heels at all times.      Impression: Chronic wound to right plantar foot. Dry, scaly skin to BLE. Erythema to RLE. Redness and moisture within lower abdominal fold.      Short term goals:  Regain skin integrity, skin protection, pressure reduction, daily dressing changes, skin care, topical treatment.      Marianela Pompa RN    7/3/2025    09:40 EDT

## 2025-07-03 NOTE — PLAN OF CARE
Goal Outcome Evaluation:           Progress: no change  Outcome Evaluation: No significant changes this shift, PRN pain meds given as needed. Glucose monitored. Continue with POC.

## 2025-07-03 NOTE — PLAN OF CARE
Goal Outcome Evaluation:              Outcome Evaluation: No new concerns noted or voiced. Glucose monitored, no coverage needed. Continue POC

## 2025-07-04 LAB
ANION GAP SERPL CALCULATED.3IONS-SCNC: 12.5 MMOL/L (ref 5–15)
BACTERIA SPEC AEROBE CULT: NORMAL
BACTERIA SPEC AEROBE CULT: NORMAL
BASOPHILS # BLD AUTO: 0.04 10*3/MM3 (ref 0–0.2)
BASOPHILS NFR BLD AUTO: 0.7 % (ref 0–1.5)
BUN SERPL-MCNC: 14.6 MG/DL (ref 8–23)
BUN/CREAT SERPL: 16 (ref 7–25)
CALCIUM SPEC-SCNC: 9.3 MG/DL (ref 8.6–10.5)
CHLORIDE SERPL-SCNC: 101 MMOL/L (ref 98–107)
CO2 SERPL-SCNC: 24.5 MMOL/L (ref 22–29)
CREAT SERPL-MCNC: 0.91 MG/DL (ref 0.57–1)
DEPRECATED RDW RBC AUTO: 47.5 FL (ref 37–54)
EGFRCR SERPLBLD CKD-EPI 2021: 67.2 ML/MIN/1.73
EOSINOPHIL # BLD AUTO: 0.07 10*3/MM3 (ref 0–0.4)
EOSINOPHIL NFR BLD AUTO: 1.2 % (ref 0.3–6.2)
ERYTHROCYTE [DISTWIDTH] IN BLOOD BY AUTOMATED COUNT: 14.9 % (ref 12.3–15.4)
GLUCOSE BLDC GLUCOMTR-MCNC: 161 MG/DL (ref 70–99)
GLUCOSE BLDC GLUCOMTR-MCNC: 161 MG/DL (ref 70–99)
GLUCOSE BLDC GLUCOMTR-MCNC: 163 MG/DL (ref 70–99)
GLUCOSE SERPL-MCNC: 146 MG/DL (ref 65–99)
HCT VFR BLD AUTO: 34.4 % (ref 34–46.6)
HGB BLD-MCNC: 10.7 G/DL (ref 12–15.9)
IMM GRANULOCYTES # BLD AUTO: 0.05 10*3/MM3 (ref 0–0.05)
IMM GRANULOCYTES NFR BLD AUTO: 0.9 % (ref 0–0.5)
LYMPHOCYTES # BLD AUTO: 0.97 10*3/MM3 (ref 0.7–3.1)
LYMPHOCYTES NFR BLD AUTO: 17.1 % (ref 19.6–45.3)
MAGNESIUM SERPL-MCNC: 2 MG/DL (ref 1.6–2.4)
MCH RBC QN AUTO: 27 PG (ref 26.6–33)
MCHC RBC AUTO-ENTMCNC: 31.1 G/DL (ref 31.5–35.7)
MCV RBC AUTO: 86.6 FL (ref 79–97)
MONOCYTES # BLD AUTO: 0.47 10*3/MM3 (ref 0.1–0.9)
MONOCYTES NFR BLD AUTO: 8.3 % (ref 5–12)
NEUTROPHILS NFR BLD AUTO: 4.07 10*3/MM3 (ref 1.7–7)
NEUTROPHILS NFR BLD AUTO: 71.8 % (ref 42.7–76)
NRBC BLD AUTO-RTO: 0 /100 WBC (ref 0–0.2)
PHOSPHATE SERPL-MCNC: 4.8 MG/DL (ref 2.5–4.5)
PLATELET # BLD AUTO: 101 10*3/MM3 (ref 140–450)
PMV BLD AUTO: 12.3 FL (ref 6–12)
POTASSIUM SERPL-SCNC: 3.9 MMOL/L (ref 3.5–5.2)
QT INTERVAL: 299 MS
QTC INTERVAL: 451 MS
RBC # BLD AUTO: 3.97 10*6/MM3 (ref 3.77–5.28)
SODIUM SERPL-SCNC: 138 MMOL/L (ref 136–145)
WBC NRBC COR # BLD AUTO: 5.67 10*3/MM3 (ref 3.4–10.8)

## 2025-07-04 PROCEDURE — 84100 ASSAY OF PHOSPHORUS: CPT | Performed by: INTERNAL MEDICINE

## 2025-07-04 PROCEDURE — 94640 AIRWAY INHALATION TREATMENT: CPT

## 2025-07-04 PROCEDURE — 94799 UNLISTED PULMONARY SVC/PX: CPT

## 2025-07-04 PROCEDURE — 25010000002 PIPERACILLIN SOD-TAZOBACTAM PER 1 G: Performed by: INTERNAL MEDICINE

## 2025-07-04 PROCEDURE — 63710000001 INSULIN GLARGINE PER 5 UNITS: Performed by: INTERNAL MEDICINE

## 2025-07-04 PROCEDURE — 82948 REAGENT STRIP/BLOOD GLUCOSE: CPT

## 2025-07-04 PROCEDURE — 94664 DEMO&/EVAL PT USE INHALER: CPT

## 2025-07-04 PROCEDURE — 82948 REAGENT STRIP/BLOOD GLUCOSE: CPT | Performed by: INTERNAL MEDICINE

## 2025-07-04 PROCEDURE — 83735 ASSAY OF MAGNESIUM: CPT | Performed by: INTERNAL MEDICINE

## 2025-07-04 PROCEDURE — 80048 BASIC METABOLIC PNL TOTAL CA: CPT | Performed by: INTERNAL MEDICINE

## 2025-07-04 PROCEDURE — 63710000001 INSULIN LISPRO (HUMAN) PER 5 UNITS: Performed by: INTERNAL MEDICINE

## 2025-07-04 PROCEDURE — 94760 N-INVAS EAR/PLS OXIMETRY 1: CPT

## 2025-07-04 PROCEDURE — 85025 COMPLETE CBC W/AUTO DIFF WBC: CPT | Performed by: INTERNAL MEDICINE

## 2025-07-04 PROCEDURE — 99232 SBSQ HOSP IP/OBS MODERATE 35: CPT | Performed by: INTERNAL MEDICINE

## 2025-07-04 RX ADMIN — ATORVASTATIN CALCIUM 20 MG: 20 TABLET, FILM COATED ORAL at 08:41

## 2025-07-04 RX ADMIN — INSULIN LISPRO 2 UNITS: 100 INJECTION, SOLUTION INTRAVENOUS; SUBCUTANEOUS at 21:39

## 2025-07-04 RX ADMIN — Medication 5 MG: at 22:05

## 2025-07-04 RX ADMIN — Medication 400 MG: at 12:34

## 2025-07-04 RX ADMIN — INSULIN GLARGINE 10 UNITS: 100 INJECTION, SOLUTION SUBCUTANEOUS at 08:41

## 2025-07-04 RX ADMIN — ARFORMOTEROL TARTRATE 15 MCG: 15 SOLUTION RESPIRATORY (INHALATION) at 21:14

## 2025-07-04 RX ADMIN — PREGABALIN 150 MG: 75 CAPSULE ORAL at 20:35

## 2025-07-04 RX ADMIN — WHITE PETROLATUM 1 APPLICATION: 1.75 OINTMENT TOPICAL at 08:41

## 2025-07-04 RX ADMIN — INSULIN LISPRO 2 UNITS: 100 INJECTION, SOLUTION INTRAVENOUS; SUBCUTANEOUS at 12:34

## 2025-07-04 RX ADMIN — PREGABALIN 150 MG: 75 CAPSULE ORAL at 08:41

## 2025-07-04 RX ADMIN — PIPERACILLIN AND TAZOBACTAM 4.5 G: 4; .5 INJECTION, POWDER, FOR SOLUTION INTRAVENOUS; PARENTERAL at 06:05

## 2025-07-04 RX ADMIN — BUDESONIDE 0.5 MG: 0.25 SUSPENSION RESPIRATORY (INHALATION) at 21:14

## 2025-07-04 RX ADMIN — HYDROCODONE BITARTRATE AND ACETAMINOPHEN 1 TABLET: 10; 325 TABLET ORAL at 17:47

## 2025-07-04 RX ADMIN — Medication 5 MG: at 00:57

## 2025-07-04 RX ADMIN — DOXYCYCLINE 100 MG: 100 CAPSULE ORAL at 08:41

## 2025-07-04 RX ADMIN — BUMETANIDE 2 MG: 1 TABLET ORAL at 12:34

## 2025-07-04 RX ADMIN — METOPROLOL SUCCINATE 25 MG: 25 TABLET, EXTENDED RELEASE ORAL at 20:35

## 2025-07-04 RX ADMIN — INSULIN LISPRO 2 UNITS: 100 INJECTION, SOLUTION INTRAVENOUS; SUBCUTANEOUS at 17:03

## 2025-07-04 RX ADMIN — METOPROLOL SUCCINATE 25 MG: 25 TABLET, EXTENDED RELEASE ORAL at 08:41

## 2025-07-04 RX ADMIN — Medication 10 ML: at 20:35

## 2025-07-04 RX ADMIN — PIPERACILLIN AND TAZOBACTAM 4.5 G: 4; .5 INJECTION, POWDER, FOR SOLUTION INTRAVENOUS; PARENTERAL at 21:39

## 2025-07-04 RX ADMIN — RIVAROXABAN 20 MG: 20 TABLET, FILM COATED ORAL at 08:41

## 2025-07-04 RX ADMIN — DILTIAZEM HYDROCHLORIDE 180 MG: 180 CAPSULE, COATED, EXTENDED RELEASE ORAL at 08:41

## 2025-07-04 RX ADMIN — BUDESONIDE 0.5 MG: 0.25 SUSPENSION RESPIRATORY (INHALATION) at 09:41

## 2025-07-04 RX ADMIN — Medication 10 ML: at 08:41

## 2025-07-04 RX ADMIN — ARFORMOTEROL TARTRATE 15 MCG: 15 SOLUTION RESPIRATORY (INHALATION) at 09:41

## 2025-07-04 RX ADMIN — PIPERACILLIN AND TAZOBACTAM 4.5 G: 4; .5 INJECTION, POWDER, FOR SOLUTION INTRAVENOUS; PARENTERAL at 13:33

## 2025-07-04 RX ADMIN — DOXYCYCLINE 100 MG: 100 CAPSULE ORAL at 20:35

## 2025-07-04 RX ADMIN — HYDROCODONE BITARTRATE AND ACETAMINOPHEN 1 TABLET: 10; 325 TABLET ORAL at 08:47

## 2025-07-04 NOTE — PLAN OF CARE
Goal Outcome Evaluation:                    Took over care of patient at 1700.  Alert and oriented x4.  Complained of pain and was medicated with PRN per MAR.  Continuing plan of care.                         Oral Minoxidil Counseling- I discussed with the patient the risks of oral minoxidil including but not limited to shortness of breath, swelling of the feet or ankles, dizziness, lightheadedness, unwanted hair growth and allergic reaction.  The patient verbalized understanding of the proper use and possible adverse effects of oral minoxidil.  All of the patient's questions and concerns were addressed.

## 2025-07-04 NOTE — PROGRESS NOTES
[facility]   Hospitalist Progress Note    Date of admission: 6/29/2025  Patient Name: Mary Albarran  1950  Date: 7/4/2025      Subjective     Chief Complaint   Patient presents with    Altered Mental Status       Interval Followup: Feels like she is getting little better every day, breathing feels much better swelling decreasing.    Objective     Vitals:   Temp:  [97.8 °F (36.6 °C)-98.4 °F (36.9 °C)] 97.8 °F (36.6 °C)  Heart Rate:  [84-97] 85  Resp:  [16-20] 16  BP: (108-136)/(55-85) 108/55  Flow (L/min) (Oxygen Therapy):  [2-3] 2    Physical Exam  Awake, conversant  Nonlabored, on oxygen, no wheezing  Extremities notably improving swelling, has chronic stasis changes and scaly skin, erythema and much improving to  Breathing much more comfortably today aeration proving limited by body habitus, no acute wheezing, on nasal cannula  Decreasing lower extremity edema bilaterally has chronic stasis changes dry scaly skin, decreasing erythema    Result Review:  Vital signs, labs and recent relevant imaging reviewed.      CBC          7/2/2025    05:08 7/3/2025    05:29 7/4/2025    05:10   CBC   WBC 6.78  4.50  5.67    RBC 3.93  3.85  3.97    Hemoglobin 10.6  10.4  10.7    Hematocrit 35.3  34.6  34.4    MCV 89.8  89.9  86.6    MCH 27.0  27.0  27.0    MCHC 30.0  30.1  31.1    RDW 15.3  14.9  14.9    Platelets 84  96  101      CMP          7/2/2025    05:08 7/3/2025    05:29 7/4/2025    05:10   CMP   Glucose 196  126  146    BUN 17.7  15.4  14.6    Creatinine 0.84  0.94  0.91    EGFR 73.9  64.6  67.2    Sodium 138  140  138    Potassium 3.6  3.5  3.9    Chloride 101  104  101    Calcium 9.1  8.8  9.3    BUN/Creatinine Ratio 21.1  16.4  16.0    Anion Gap 13.9  11.2  12.5          acetaminophen    aluminum-magnesium hydroxide-simethicone    benzocaine-menthol    senna-docusate sodium **AND** polyethylene glycol **AND** bisacodyl **AND** bisacodyl    dextrose    dextrose    Diclofenac Sodium    glucagon (human  recombinant)    HYDROcodone-acetaminophen    hydrOXYzine    ipratropium-albuterol    Lidocaine (Anorectal)    Lidocaine    melatonin    metoprolol tartrate    nicotine    nitroglycerin    ondansetron    ondansetron ODT    sodium chloride    sodium chloride    sodium chloride    sodium chloride    arformoterol, 15 mcg, Nebulization, BID - RT  atorvastatin, 20 mg, Oral, Daily  budesonide, 0.5 mg, Nebulization, BID - RT  bumetanide, 2 mg, Oral, Daily With Lunch  dilTIAZem CD, 180 mg, Oral, Daily  doxycycline, 100 mg, Oral, Q12H  insulin glargine, 10 Units, Subcutaneous, Daily  insulin lispro, 2-9 Units, Subcutaneous, 4x Daily AC & at Bedtime  magnesium oxide, 400 mg, Oral, Daily With Lunch  metoprolol succinate XL, 25 mg, Oral, Q12H  mineral oil-hydrophilic petrolatum, 1 Application, Topical, Daily  piperacillin-tazobactam, 4.5 g, Intravenous, Q8H  pregabalin, 150 mg, Oral, BID  rivaroxaban, 20 mg, Oral, Daily  [Held by provider] sacubitril-valsartan, 1 tablet, Oral, Nightly  sodium chloride, 10 mL, Intravenous, Q12H        MRI Foot Right With & Without Contrast  Result Date: 7/1/2025  Impression: No evidence of osteomyelitis. Mild superficial cellulitis of the dorsum and lateral foot Enhancement of the plantar soft tissues adjacent to the fourth metatarsal and between fourth and fifth metatarsals suggests cellulitis and myositis Electronically Signed: Frank Mir MD  7/1/2025 10:36 PM EDT  Workstation ID: [id number]    XR Foot 3+ View Right  Result Date: 6/29/2025  Impression: Within exam limitations, no convincing osteomyelitis or acute osseous abnormality. Diffuse soft tissue swelling which could reflect volume volume overload/third spacing or cellulitis in the right clinical setting. Based on clinical suspicion for osteomyelitis, consider dedicated MRI. Electronically Signed: Dc Miguel MD  6/29/2025 6:08 PM EDT  Workstation ID: [id number]    XR Chest 1 View  Result Date: 6/29/2025  Impression:  Radiographically similar left greater than right perihilar and bibasilar opacities as compared to 5/25/2025. Differential includes atelectasis/scar and pneumonia in the right clinical setting. Stable cardiomegaly without signs of overt edema. Electronically Signed: Dc Miguel MD  6/29/2025 3:56 PM EDT  Workstation ID: [id number]      Assessment / Plan     Summary: 74 y.o.   Mary Albarran is a 74 y.o. female with medical history significant for atrial fibrillation, CKD stage IIIa, hypoxic respiratory failure on 2 L nasal cannula, hyperlipidemia, hypertension, COPD, type 2 diabetes, heart failure with reduced ejection fraction     Patient presents to the emergency department on 6/29/2025.  Patient was recently admitted to the hospital 5/25/2025 through 5/29/2025, treated for pneumonia.  Patient states she discharged from the hospital was feeling well, followed up with her primary care physician.  However acutely on day of presentation, 6/29/2025 patient started with fevers and chills.  Patient states she ate lunch with her family, [place].  Other people ate the same pizza with no other complaints.  Patient denied any nausea vomiting or diarrhea.  Patient started with fevers chills, rigors.  Patient states in the past when she exhibits the symptoms she quickly deteriorates therefore  brought her into the emergency department.  Patient stated up until lunch she had been within her usual state of health.  Patient has been taking her medications as prescribed.  In the emergency department, patient presented tachycardic 126 A-fib with RVR, febrile 38.3, respiratory rate of 20, blood pressure 135/76 saturating above 90% on her home 2 L nasal cannula.  Patient has a white count of 17, lactate of 2.5, glucose elevated 254.  Creatinine 0.97.  .  Troponin 20, repeat 20.  Patient denies any acute symptoms of dyspnea.  Patient's UA with trace leukocytes.  No bacteria, no WBCs.  Patient's chest x-ray shows similar  left greater than right perihilar and bibasilar opacities compared to chest x-ray from 5/25/2025.  Patient was noted to have right lower extremity swelling and erythema.  Patient states this is her usual.  Patient has an open wound on the bottom of her right foot closest to fifth digit.  Patient states it looks better than usual.  In the emergency department patient started with broad-spectrum antibiotics, IV fluids.  Hospitalist service contacted for admission     Assessment/Plan (clinically significant if listed here)  Severe sepsis, poa, diabetic food infection with pseudomonas, staph aureus  Lactic acidosis  Concern for diabetic foot ulcer to right foot  Atrial fibrillation with RVR  Acute on Chronic HRF, 2L baseline requirement  Acute on CHF, borderline reduced EF 46-50%, g1dd  Chronic thrombocytopenia  CKD stage IIIa  Hyperlipidemia  Hypertension  COPD not in acute exacerbation  ID-DM2  Hypomagnesemia    Responding to antibiotics, continue course 4.5, continue Zosyn for Pseudomonas/MSSA from wound culture, MRSA nares positive, continue doxycycline course for possible additional pneumonia component  Will keep/monitor hospital for course for Zosyn for diabetic foot infection as no oral option available, resistance pattern showed intermediate to fluoroquinolones  Cont diuresis, bumex, I/o, renal fxn,   Chf clinic f/u outpt  Cont diltiazem, metoprolol succinate.  On telemetry rate still variable, monitor  Bp limiting entresto/home med resumption still, additional gdtm as able  Cont rivaroxaban  Continue rivaroxaban  Cont statin  Cont brov/pulm, nebs,   Cont lyrica  Chronic thrombocytopenia - near baseline  PT/OT   Check a.m. CBC, BMP, magnesium, phosphorus    Dispo: patient declines to work with physical therapy just wants to go home at time of discharge... Will be at high risk for readmission given her severe morbid obesity multiple comorbidities with poor ability to adequately care for herself at home,  unfortunately.  Medically able hopefully able to go in the next 1 to 2 days otherwise    VTE Prophylaxis:  Pharmacologic VTE prophylaxis orders are present.      Code Status (Patient has no pulse and is not breathing): CPR (Attempt to Resuscitate)  Medical Interventions (Patient has pulse or is breathing): Full Support

## 2025-07-04 NOTE — PLAN OF CARE
Goal Outcome Evaluation:  Plan of Care Reviewed With: patient        Progress: improving  Outcome Evaluation: Patient resting, pain med given for hands, patient alert and oriented, call light within reach, bed alert on.

## 2025-07-05 VITALS
HEIGHT: 62 IN | HEART RATE: 81 BPM | WEIGHT: 271.39 LBS | DIASTOLIC BLOOD PRESSURE: 66 MMHG | BODY MASS INDEX: 49.94 KG/M2 | SYSTOLIC BLOOD PRESSURE: 119 MMHG | TEMPERATURE: 97.9 F | RESPIRATION RATE: 20 BRPM | OXYGEN SATURATION: 90 %

## 2025-07-05 PROBLEM — R65.20 SEPSIS DUE TO PSEUDOMONAS SPECIES WITH ACUTE HYPOXIC RESPIRATORY FAILURE WITHOUT SEPTIC SHOCK: Status: ACTIVE | Noted: 2025-07-05

## 2025-07-05 PROBLEM — A41.52 SEPSIS DUE TO PSEUDOMONAS SPECIES WITH ACUTE HYPOXIC RESPIRATORY FAILURE WITHOUT SEPTIC SHOCK: Status: ACTIVE | Noted: 2025-07-05

## 2025-07-05 PROBLEM — J96.01 SEPSIS DUE TO PSEUDOMONAS SPECIES WITH ACUTE HYPOXIC RESPIRATORY FAILURE WITHOUT SEPTIC SHOCK: Status: ACTIVE | Noted: 2025-07-05

## 2025-07-05 LAB
ANION GAP SERPL CALCULATED.3IONS-SCNC: 12.2 MMOL/L (ref 5–15)
BASOPHILS # BLD AUTO: 0.05 10*3/MM3 (ref 0–0.2)
BASOPHILS NFR BLD AUTO: 1 % (ref 0–1.5)
BUN SERPL-MCNC: 15.4 MG/DL (ref 8–23)
BUN/CREAT SERPL: 14.1 (ref 7–25)
CALCIUM SPEC-SCNC: 9.1 MG/DL (ref 8.6–10.5)
CHLORIDE SERPL-SCNC: 100 MMOL/L (ref 98–107)
CO2 SERPL-SCNC: 25.8 MMOL/L (ref 22–29)
CREAT SERPL-MCNC: 1.09 MG/DL (ref 0.57–1)
DEPRECATED RDW RBC AUTO: 49.7 FL (ref 37–54)
EGFRCR SERPLBLD CKD-EPI 2021: 54.1 ML/MIN/1.73
EOSINOPHIL # BLD AUTO: 0.06 10*3/MM3 (ref 0–0.4)
EOSINOPHIL NFR BLD AUTO: 1.1 % (ref 0.3–6.2)
ERYTHROCYTE [DISTWIDTH] IN BLOOD BY AUTOMATED COUNT: 15.3 % (ref 12.3–15.4)
GLUCOSE BLDC GLUCOMTR-MCNC: 150 MG/DL (ref 70–99)
GLUCOSE BLDC GLUCOMTR-MCNC: 150 MG/DL (ref 70–99)
GLUCOSE SERPL-MCNC: 128 MG/DL (ref 65–99)
HCT VFR BLD AUTO: 36.6 % (ref 34–46.6)
HGB BLD-MCNC: 11.2 G/DL (ref 12–15.9)
IMM GRANULOCYTES # BLD AUTO: 0.07 10*3/MM3 (ref 0–0.05)
IMM GRANULOCYTES NFR BLD AUTO: 1.3 % (ref 0–0.5)
LYMPHOCYTES # BLD AUTO: 1.17 10*3/MM3 (ref 0.7–3.1)
LYMPHOCYTES NFR BLD AUTO: 22.2 % (ref 19.6–45.3)
MAGNESIUM SERPL-MCNC: 1.8 MG/DL (ref 1.6–2.4)
MCH RBC QN AUTO: 27.6 PG (ref 26.6–33)
MCHC RBC AUTO-ENTMCNC: 30.6 G/DL (ref 31.5–35.7)
MCV RBC AUTO: 90.1 FL (ref 79–97)
MONOCYTES # BLD AUTO: 0.43 10*3/MM3 (ref 0.1–0.9)
MONOCYTES NFR BLD AUTO: 8.2 % (ref 5–12)
NEUTROPHILS NFR BLD AUTO: 3.48 10*3/MM3 (ref 1.7–7)
NEUTROPHILS NFR BLD AUTO: 66.2 % (ref 42.7–76)
NRBC BLD AUTO-RTO: 0.4 /100 WBC (ref 0–0.2)
PHOSPHATE SERPL-MCNC: 4.9 MG/DL (ref 2.5–4.5)
PLATELET # BLD AUTO: 116 10*3/MM3 (ref 140–450)
PMV BLD AUTO: 13.8 FL (ref 6–12)
POTASSIUM SERPL-SCNC: 3.7 MMOL/L (ref 3.5–5.2)
RBC # BLD AUTO: 4.06 10*6/MM3 (ref 3.77–5.28)
SODIUM SERPL-SCNC: 138 MMOL/L (ref 136–145)
WBC NRBC COR # BLD AUTO: 5.26 10*3/MM3 (ref 3.4–10.8)

## 2025-07-05 PROCEDURE — 63710000001 INSULIN LISPRO (HUMAN) PER 5 UNITS: Performed by: INTERNAL MEDICINE

## 2025-07-05 PROCEDURE — 99239 HOSP IP/OBS DSCHRG MGMT >30: CPT | Performed by: INTERNAL MEDICINE

## 2025-07-05 PROCEDURE — 85025 COMPLETE CBC W/AUTO DIFF WBC: CPT | Performed by: INTERNAL MEDICINE

## 2025-07-05 PROCEDURE — 82948 REAGENT STRIP/BLOOD GLUCOSE: CPT | Performed by: INTERNAL MEDICINE

## 2025-07-05 PROCEDURE — 80048 BASIC METABOLIC PNL TOTAL CA: CPT | Performed by: INTERNAL MEDICINE

## 2025-07-05 PROCEDURE — 84100 ASSAY OF PHOSPHORUS: CPT | Performed by: INTERNAL MEDICINE

## 2025-07-05 PROCEDURE — 94799 UNLISTED PULMONARY SVC/PX: CPT

## 2025-07-05 PROCEDURE — 83735 ASSAY OF MAGNESIUM: CPT | Performed by: INTERNAL MEDICINE

## 2025-07-05 PROCEDURE — 63710000001 INSULIN GLARGINE PER 5 UNITS: Performed by: INTERNAL MEDICINE

## 2025-07-05 PROCEDURE — 94664 DEMO&/EVAL PT USE INHALER: CPT

## 2025-07-05 PROCEDURE — 25010000002 PIPERACILLIN SOD-TAZOBACTAM PER 1 G: Performed by: INTERNAL MEDICINE

## 2025-07-05 PROCEDURE — 94761 N-INVAS EAR/PLS OXIMETRY MLT: CPT

## 2025-07-05 RX ORDER — POTASSIUM CHLORIDE 1500 MG/1
40 TABLET, EXTENDED RELEASE ORAL 2 TIMES DAILY
Qty: 120 TABLET | Refills: 0 | Status: SHIPPED | OUTPATIENT
Start: 2025-07-05 | End: 2025-08-04

## 2025-07-05 RX ORDER — BUMETANIDE 2 MG/1
2 TABLET ORAL DAILY
Qty: 30 TABLET | Refills: 0 | Status: SHIPPED | OUTPATIENT
Start: 2025-07-05 | End: 2025-08-04

## 2025-07-05 RX ADMIN — METOPROLOL SUCCINATE 25 MG: 25 TABLET, EXTENDED RELEASE ORAL at 08:35

## 2025-07-05 RX ADMIN — INSULIN LISPRO 2 UNITS: 100 INJECTION, SOLUTION INTRAVENOUS; SUBCUTANEOUS at 08:34

## 2025-07-05 RX ADMIN — Medication 400 MG: at 11:15

## 2025-07-05 RX ADMIN — BUDESONIDE 0.5 MG: 0.25 SUSPENSION RESPIRATORY (INHALATION) at 09:45

## 2025-07-05 RX ADMIN — PIPERACILLIN AND TAZOBACTAM 4.5 G: 4; .5 INJECTION, POWDER, FOR SOLUTION INTRAVENOUS; PARENTERAL at 06:05

## 2025-07-05 RX ADMIN — INSULIN GLARGINE 10 UNITS: 100 INJECTION, SOLUTION SUBCUTANEOUS at 08:34

## 2025-07-05 RX ADMIN — WHITE PETROLATUM 1 APPLICATION: 1.75 OINTMENT TOPICAL at 08:34

## 2025-07-05 RX ADMIN — DOXYCYCLINE 100 MG: 100 CAPSULE ORAL at 08:35

## 2025-07-05 RX ADMIN — INSULIN LISPRO 2 UNITS: 100 INJECTION, SOLUTION INTRAVENOUS; SUBCUTANEOUS at 11:15

## 2025-07-05 RX ADMIN — ATORVASTATIN CALCIUM 20 MG: 20 TABLET, FILM COATED ORAL at 08:35

## 2025-07-05 RX ADMIN — PIPERACILLIN AND TAZOBACTAM 4.5 G: 4; .5 INJECTION, POWDER, FOR SOLUTION INTRAVENOUS; PARENTERAL at 12:36

## 2025-07-05 RX ADMIN — PREGABALIN 150 MG: 75 CAPSULE ORAL at 08:34

## 2025-07-05 RX ADMIN — DILTIAZEM HYDROCHLORIDE 180 MG: 180 CAPSULE, COATED, EXTENDED RELEASE ORAL at 08:35

## 2025-07-05 RX ADMIN — RIVAROXABAN 20 MG: 20 TABLET, FILM COATED ORAL at 08:35

## 2025-07-05 RX ADMIN — HYDROCODONE BITARTRATE AND ACETAMINOPHEN 1 TABLET: 10; 325 TABLET ORAL at 06:11

## 2025-07-05 RX ADMIN — HYDROCODONE BITARTRATE AND ACETAMINOPHEN 1 TABLET: 10; 325 TABLET ORAL at 00:07

## 2025-07-05 RX ADMIN — Medication 10 ML: at 08:35

## 2025-07-05 RX ADMIN — ARFORMOTEROL TARTRATE 15 MCG: 15 SOLUTION RESPIRATORY (INHALATION) at 09:37

## 2025-07-05 NOTE — DISCHARGE SUMMARY
[facility]         HOSPITALIST  DISCHARGE SUMMARY    Patient Name: Mary Albarran    : 1950    MRN: [id number]    Date of Admission: 2025  Date of Discharge:  25  Primary Care Physician: Brittni Quiroz APRN    Consults       Date and Time Order Name Status Description    2025  4:13 PM Hospitalist (on-call MD unless specified)              Final Diagnosis:  Severe sepsis, poa, right diabetic foot infection with pseudomonas, MSSA  Lactic acidosis  Atrial fibrillation with RVR  Acute on Chronic HRF, 2L baseline requirement  Acute on CHF, borderline reduced EF 46-50%, g1dd  Chronic thrombocytopenia  CKD stage IIIa  Hyperlipidemia  Hypertension  COPD not in acute exacerbation  ID-DM2  Severe Morbid Obesity BMI 50  Hypomagnesemia    Hospital Course     Mary Albarran is a 74 y.o. female with medical history significant for atrial fibrillation, CKD stage IIIa, hypoxic respiratory failure on 2 L nasal cannula, hyperlipidemia, hypertension, COPD, type 2 diabetes, heart failure with reduced ejection fraction     Patient presents to the emergency department on 2025.  Patient was recently admitted to the hospital 2025 through 2025, treated for pneumonia.  Patient states she discharged from the hospital was feeling well, followed up with her primary care physician.  However acutely on day of presentation, 2025 patient started with fevers and chills.  Patient states she ate lunch with her family, pizza.  Other people ate the same pizza with no other complaints.  Patient denied any nausea vomiting or diarrhea.  Patient started with fevers chills, rigors.  Patient states in the past when she exhibits the symptoms she quickly deteriorates therefore  brought her into the emergency department.  Patient stated up until lunch she had been within her usual state of health.  Patient has been taking her medications as prescribed.  In the emergency department, patient  presented tachycardic 126 A-fib with RVR, febrile 38.3, respiratory rate of 20, blood pressure 135/76 saturating above 90% on her home 2 L nasal cannula.  Patient has a white count of 17, lactate of 2.5, glucose elevated 254.  Creatinine 0.97.  .  Troponin 20, repeat 20.  Patient denies any acute symptoms of dyspnea.  Patient's UA with trace leukocytes.  No bacteria, no WBCs.  Patient's chest x-ray shows similar left greater than right perihilar and bibasilar opacities compared to chest x-ray from 5/25/2025.  Patient was noted to have right lower extremity swelling and erythema.  Patient states this is her usual.  Patient has an open wound on the bottom of her right foot closest to fifth digit.  Patient states it looks better than usual.  In the emergency department patient started with broad-spectrum antibiotics, IV fluids.  Hospitalist service contacted for admission    Hospital Course:  Treated for diabetic foot infection that resulted with Pseudomonas and MSSA, white count normalized, completed antibiotics and patient has Pseudomonas was resistant to oral options.  Completed course of doxycycline as well for MRSA nares positive and possibly underlying pneumonia component although likely favor pulmonary edema from CHF.  Responded well to diuresis had notable volume overload initially.    Patient notes she should still have another week left of Bumex, unclear if is having it filled elsewhere as last noted in epic is from 4/2025.  Will send a refill in case she is actually out at home.  Has existing cardiology and PCP follow-up that she will keep for further close monitoring and additional titration of meds as needed.  Discussed fluid restriction/total fluid intake, CHF monitoring.     Instructed her to hold her Entresto for now given her low normal blood pressure and hopefully could go back on it in the future may need to reduce dose depending on how her blood pressure stabilizes after recovering from  current issues.      Will make referral for wound care to follow-up for diabetic foot wound right plantar wound.    Declines inpatient rehab, had a bad experience with home health before and does not want this, states does not have support from her /family support a.m. will do fine at home.  Given her age comorbidities and severe morbid obesity with mobility issues suspect will be high risk for readmission.  Hopefully can control her CHF better which will minimize her risk for further infections.    Patient discharged in stable condition with close outpatient follow up. Return precautions and follow up discussed and patient voiced agreement and understanding of treatment plan.     DISCHARGE Follow Up Recommendations for labs and diagnostics:   As above    CODE STATUS:  Code Status and Medical Interventions: CPR (Attempt to Resuscitate); Full Support   Ordered at: 06/29/25 1796     Code Status (Patient has no pulse and is not breathing):    CPR (Attempt to Resuscitate)     Medical Interventions (Patient has pulse or is breathing):    Full Support           Day of Discharge     Vital Signs:  Temp:  [97.3 °F (36.3 °C)-98.7 °F (37.1 °C)] 97.9 °F (36.6 °C)  Heart Rate:  [81-92] 81  Resp:  [16-20] 20  BP: ()/(61-70) 119/66  Flow (L/min) (Oxygen Therapy):  [2] 2    Physical Exam    Gen: awake, resting in bed, conversant  Resp: breathing comfortably on baseline 2 L, auscultation slightly limited by body habitus  CV: Chronic stasis changes, trace edema notably improving compared to several days ago,   Resolving le erythema,     Discharge Details        Discharge Medications        PAUSE taking these medications        Instructions Start Date   Entresto  MG tablet  Wait to take this until your doctor or other care provider tells you to start again.  Generic drug: sacubitril-valsartan   1 tablet, Nightly             Continue These Medications        Instructions Start Date   albuterol 1.25 MG/3ML nebulizer  solution  Commonly known as: ACCUNEB   1.25 mg, Nebulization, Every 6 Hours PRN      albuterol sulfate  (90 Base) MCG/ACT inhaler  Commonly known as: PROVENTIL HFA;VENTOLIN HFA;PROAIR HFA   2 puffs, Inhalation, Every 4 Hours PRN      atorvastatin 20 MG tablet  Commonly known as: LIPITOR   TAKE 1 TABLET DAILY      budesonide 1 MG/2ML nebulizer solution  Commonly known as: PULMICORT   1 mg, Nebulization, Daily      bumetanide 2 MG tablet  Commonly known as: BUMEX   2 mg, Oral, Daily      cholecalciferol 25 MCG (1000 UT) tablet  Commonly known as: VITAMIN D3   1,000 Units, Daily      dilTIAZem  MG 24 hr capsule  Commonly known as: CARDIZEM CD   180 mg, Daily      HYDROcodone-acetaminophen  MG per tablet  Commonly known as: NORCO   1 tablet, Oral, Every 6 Hours PRN      Januvia 50 MG tablet  Generic drug: SITagliptin   50 mg, Daily      Lantus SoloStar 100 UNIT/ML injection pen  Generic drug: Insulin Glargine   15 Units, Subcutaneous, Daily      Magnesium Oxide -Mg Supplement 400 (240 Mg) MG tablet   1 tablet, Daily      metoprolol succinate XL 25 MG 24 hr tablet  Commonly known as: TOPROL-XL   25 mg, Oral, Every 12 Hours Scheduled      miconazole 2 % cream  Commonly known as: MICOTIN   1 Application, Topical, 2 Times Daily      potassium chloride ER 20 MEQ tablet controlled-release ER tablet  Commonly known as: K-TAB   40 mEq, Oral, 2 Times Daily      pregabalin 150 MG capsule  Commonly known as: LYRICA   150 mg, Oral, 2 Times Daily      rivaroxaban 20 MG tablet  Commonly known as: XARELTO   20 mg, Oral, Daily                 Discharge Disposition:  Home or Self Care    Diet: continue on diet / dietary restrictions from hospitalization   Dietary Orders (From admission, onward)       Start     Ordered    06/29/25 2046  Diet: Cardiac, Diabetic; Healthy Heart (2-3 Na+); Consistent Carbohydrate; Fluid Consistency: Thin (IDDSI 0)  Diet Effective Now        References:    Diet Order Definitions   Question  Answer Comment   Diets: Cardiac    Diets: Diabetic    Cardiac Diet: Healthy Heart (2-3 Na+)    Diabetic Diet: Consistent Carbohydrate    Fluid Consistency: Thin (IDDSI 0)        06/29/25 2045                    Discharge Activity: advance as tolerated          Pertinent  and/or Most Recent Results       LAB RESULTS:      Lab 07/05/25  0604 07/04/25  0510 07/03/25  0529 07/02/25  0508 07/01/25  0520 06/30/25  0630 06/30/25  0549 06/29/25  2101 06/29/25  1913 06/29/25  1630 06/29/25  1526   WBC 5.26 5.67 4.50 6.78 8.51  --  15.35*  --   --   --  17.58*   HEMOGLOBIN 11.2* 10.7* 10.4* 10.6* 11.1*  --  11.3*  --   --   --  13.1   HEMATOCRIT 36.6 34.4 34.6 35.3 36.7  --  36.1  --   --   --  42.6   PLATELETS 116* 101* 96* 84* 89*  --  109*  --   --   --  123*   NEUTROS ABS 3.48 4.07 3.04 5.22  --   --  14.12*  --   --   --  15.73*   IMMATURE GRANS (ABS) 0.07* 0.05 0.02 0.01  --   --  0.07*  --   --   --  0.09*   LYMPHS ABS 1.17 0.97 0.94 0.88  --   --  0.80  --   --   --  0.86   MONOS ABS 0.43 0.47 0.38 0.56  --   --  0.33  --   --   --  0.81   EOS ABS 0.06 0.07 0.09 0.09  --   --  0.00  --   --   --  0.04   MCV 90.1 86.6 89.9 89.8 90.2  --  87.6  --   --   --  89.9   SED RATE  --   --   --   --   --   --   --  8  --   --   --    CRP  --   --   --   --   --   --   --   --   --  2.16*  --    PROCALCITONIN  --   --   --   --   --  1.62*  --   --   --   --   --    LACTATE  --   --   --   --   --   --   --   --  2.0  --  2.5*         Lab 07/05/25  0641 07/04/25  0510 07/03/25  0529 07/02/25  0508 07/01/25  0520   SODIUM 138 138 140 138 137   POTASSIUM 3.7 3.9 3.5 3.6 4.0   CHLORIDE 100 101 104 101 104   CO2 25.8 24.5 24.8 23.1 21.8*   ANION GAP 12.2 12.5 11.2 13.9 11.2   BUN 15.4 14.6 15.4 17.7 17.3   CREATININE 1.09* 0.91 0.94 0.84 0.80   EGFR 54.1* 67.2 64.6 73.9 78.4   GLUCOSE 128* 146* 126* 196* 150*   CALCIUM 9.1 9.3 8.8 9.1 9.2   MAGNESIUM 1.8 2.0 1.6 1.7 2.1   PHOSPHORUS 4.9* 4.8* 5.7* 4.8*  --          Lab  06/29/25  1526   TOTAL PROTEIN 8.0   ALBUMIN 4.5   GLOBULIN 3.5   ALT (SGPT) 10   AST (SGOT) 14   BILIRUBIN 0.9   ALK PHOS 101         Lab 06/29/25  1630 06/29/25  1526   PROBNP  --  945.1*   HSTROP T 20* 20*                 Brief Urine Lab Results  (Last result in the past 365 days)        Color   Clarity   Blood   Leuk Est   Nitrite   Protein   CREAT   Urine HCG        06/29/25 1700 Dark Yellow   Clear   Small (1+)   Trace   Negative   30 mg/dL (1+)                 Microbiology Results (last 10 days)       Procedure Component Value - Date/Time    MRSA Screen, PCR (Inpatient) - Swab, Nares [[id number]]  (Abnormal) Collected: 06/30/25 0456    Lab Status: Final result Specimen: Swab from Nares Updated: 06/30/25 0647     MRSA PCR MRSA Detected    Narrative:      The negative predictive value of this diagnostic test is high and should only be used to consider de-escalating anti-MRSA therapy. A positive result may indicate colonization with MRSA and must be correlated clinically.    COVID-19, FLU A/B, RSV PCR 1 HR TAT - Swab, Nasopharynx [[id number]]  (Normal) Collected: 06/29/25 1700    Lab Status: Final result Specimen: Swab from Nasopharynx Updated: 06/29/25 1809     COVID19 Not Detected     Influenza A PCR Not Detected     Influenza B PCR Not Detected     RSV, PCR Not Detected    Narrative:      Fact sheet for providers: https://www.fda.gov/media/870926/download    Fact sheet for patients: https://www.fda.gov/media/837820/download    Test performed by PCR.    Wound Culture - Wound, Foot, Right [[id number]]  (Abnormal)  (Susceptibility) Collected: 06/29/25 1700    Lab Status: Final result Specimen: Wound from Foot, Right Updated: 07/03/25 0652     Wound Culture Rare growth Pseudomonas aeruginosa     Comment:            Heavy growth (4+) Streptococcus, Beta Hemolytic, Group G     Comment:   This organism is considered to be universally susceptible to penicillin.  No further antibiotic testing will be performed. If  Clindamycin or Erythromycin is the drug of choice, notify the laboratory within 7 days to request susceptibility testing.         Heavy growth (4+) Staphylococcus aureus      Heavy growth (4+) Normal Skin Mildred     Gram Stain Few (2+) WBCs seen      Few (2+) Gram positive cocci in pairs, chains and clusters    Susceptibility        Pseudomonas aeruginosa      KAMERON      Cefepime Susceptible      Ceftazidime Susceptible  [1]       Ciprofloxacin Intermediate      Levofloxacin Intermediate      Piperacillin + Tazobactam Susceptible  [1]       Tobramycin Susceptible                   [1]  Appended report. These results have been appended to a previously preliminary verified report.                Susceptibility        Staphylococcus aureus      KAMERON      Clindamycin Susceptible      Erythromycin Susceptible      Oxacillin Susceptible      Rifampin Susceptible      Tetracycline Susceptible      Trimethoprim + Sulfamethoxazole Susceptible      Vancomycin Susceptible                       Susceptibility Comments       Pseudomonas aeruginosa    With the exception of urinary-sourced infections, aminoglycosides should not be used as monotherapy.               Blood Culture - Blood, Arm, Right [[id number]]  (Normal) Collected: 06/29/25 1630    Lab Status: Final result Specimen: Blood from Arm, Right Updated: 07/04/25 1645     Blood Culture No growth at 5 days    Blood Culture - Blood, Arm, Right [[id number]]  (Normal) Collected: 06/29/25 1531    Lab Status: Final result Specimen: Blood from Arm, Right Updated: 07/04/25 1545     Blood Culture No growth at 5 days            RADIOLOGY:    MRI Foot Right With & Without Contrast  Result Date: 7/1/2025  Impression: Impression: No evidence of osteomyelitis. Mild superficial cellulitis of the dorsum and lateral foot Enhancement of the plantar soft tissues adjacent to the fourth metatarsal and between fourth and fifth metatarsals suggests cellulitis and myositis Electronically Signed:  Frank Mir MD  7/1/2025 10:36 PM EDT  Workstation ID: [id number]    XR Foot 3+ View Right  Result Date: 6/29/2025  Impression: Impression: Within exam limitations, no convincing osteomyelitis or acute osseous abnormality. Diffuse soft tissue swelling which could reflect volume volume overload/third spacing or cellulitis in the right clinical setting. Based on clinical suspicion for osteomyelitis, consider dedicated MRI. Electronically Signed: Dc Miguel MD  6/29/2025 6:08 PM EDT  Workstation ID: [id number]    XR Chest 1 View  Result Date: 6/29/2025  Impression: Impression: Radiographically similar left greater than right perihilar and bibasilar opacities as compared to 5/25/2025. Differential includes atelectasis/scar and pneumonia in the right clinical setting. Stable cardiomegaly without signs of overt edema. Electronically Signed: Dc Miguel MD  6/29/2025 3:56 PM EDT  Workstation ID: [id number]      Results for orders placed during the hospital encounter of 04/04/25    Duplex Venous Lower Extremity - Bilateral CV-READ 04/07/2025 11:56 AM    Interpretation Summary    Normal bilateral lower extremity venous duplex scan.      Results for orders placed during the hospital encounter of 04/04/25    Duplex Venous Lower Extremity - Bilateral CV-READ 04/07/2025 11:56 AM    Interpretation Summary    Normal bilateral lower extremity venous duplex scan.      Results for orders placed during the hospital encounter of 02/11/25    Adult Transthoracic Echo Complete W/ Cont if Necessary Per Protocol 02/11/2025 10:41 AM    Interpretation Summary    Left ventricular systolic function is mildly decreased. Left ventricular ejection fraction appears to be 46 - 50%. with some anterior septal hypokensis    The right ventricular cavity is mildly dilated.    The left atrial cavity is mildly dilated.    The right atrial cavity is moderately  dilated.    Mild to moderate aortic valve stenosis is present.    Estimated right  ventricular systolic pressure from tricuspid regurgitation is moderately elevated (45-55 mmHg).      Labs Pending at Discharge:        Time spent on Discharge including face to face service:  36 minutes

## 2025-07-05 NOTE — PLAN OF CARE
Goal Outcome Evaluation:  Plan of Care Reviewed With: patient        Progress: improving  Outcome Evaluation: A&Ox4. Vital signs stable. Medicated for pain x1 this shift. Melatonin administered at bedtime. 2 Units of insulin administered at bedtime. Antibiotics administered per mar. No questions or concerns noted at this time. Plan of care ongoing.

## 2025-07-05 NOTE — NURSING NOTE
Pt discharged home this afternoon. IV removed with no signs of excessive bleeding. Tele monitored removed and sent back to central monitoring. Discharge instructions and teachings completed with verbal understanding from pt. Pt wheeled down to private vehicle with family.

## 2025-07-07 ENCOUNTER — READMISSION MANAGEMENT (OUTPATIENT)
Dept: CALL CENTER | Facility: HOSPITAL | Age: 75
End: 2025-07-07
Payer: MEDICARE

## 2025-07-07 ENCOUNTER — TRANSITIONAL CARE MANAGEMENT TELEPHONE ENCOUNTER (OUTPATIENT)
Dept: CALL CENTER | Facility: HOSPITAL | Age: 75
End: 2025-07-07
Payer: MEDICARE

## 2025-07-07 NOTE — OUTREACH NOTE
Call Center TCM Note      Flowsheet Row Responses   [facility] patient discharged from? [facility]   Does the patient have one of the following disease processes/diagnoses(primary or secondary)? Sepsis   TCM attempt successful? No   Unsuccessful attempts Attempt 2  [attempted to reach patient, spouse, daughter and son, listed on PCP verbal release. No answer.]            KERLINE KYLE - Registered Nurse    7/7/2025, 16:14 EDT

## 2025-07-07 NOTE — OUTREACH NOTE
Call Center TCM Note      Flowsheet Row Responses   [facility] facility patient discharged from? [facility]   Does the patient have one of the following disease processes/diagnoses(primary or secondary)? Sepsis   TCM attempt successful? No   Unsuccessful attempts Attempt 1            KERLINE Dela Cruz Registered Nurse    7/7/2025, 15:20 EDT

## 2025-07-07 NOTE — OUTREACH NOTE
Prep Survey      Flowsheet Row Responses   [facility] patient discharged from? [facility]   Is LACE score < 7 ? No   Eligibility [facility]   Date of Admission 06/29/25   Date of Discharge 07/05/25   Discharge Disposition Home or Self Care   Discharge diagnosis Severe sepsis, poa, right diabetic foot infection with pseudomonas, MSSA  Lactic acidosis   Does the patient have one of the following disease processes/diagnoses(primary or secondary)? Sepsis   Does the patient have Home health ordered? No   Is there a DME ordered? No   Prep survey completed? Yes            DIPIKA HUGHES - Registered Nurse

## 2025-07-08 ENCOUNTER — TRANSITIONAL CARE MANAGEMENT TELEPHONE ENCOUNTER (OUTPATIENT)
Dept: CALL CENTER | Facility: HOSPITAL | Age: 75
End: 2025-07-08
Payer: MEDICARE

## 2025-07-08 NOTE — OUTREACH NOTE
Call Center TCM Note      Flowsheet Row Responses   [facility] facility patient discharged from? [facility]   Does the patient have one of the following disease processes/diagnoses(primary or secondary)? Sepsis   TCM attempt successful? No   Unsuccessful attempts Attempt 3            Luis KUO - Registered Nurse    7/8/2025, 10:06 EDT

## 2025-07-22 RX ORDER — ALBUTEROL SULFATE 1.25 MG/3ML
1 SOLUTION RESPIRATORY (INHALATION) EVERY 6 HOURS PRN
Qty: 300 ML | Refills: 12 | Status: SHIPPED | OUTPATIENT
Start: 2025-07-22 | End: 2025-07-25 | Stop reason: SDUPTHER

## 2025-07-22 RX ORDER — BUMETANIDE 2 MG/1
2 TABLET ORAL DAILY
Qty: 30 TABLET | Refills: 0 | Status: SHIPPED | OUTPATIENT
Start: 2025-07-22 | End: 2025-07-25 | Stop reason: SDUPTHER

## 2025-07-22 RX ORDER — BUDESONIDE 1 MG/2ML
1 INHALANT ORAL DAILY
Qty: 180 ML | Refills: 1 | Status: SHIPPED | OUTPATIENT
Start: 2025-07-22

## 2025-07-22 NOTE — TELEPHONE ENCOUNTER
Caller: Mary Albarran    Relationship: Self    Best call back number: [phone]     Requested Prescriptions:   Requested Prescriptions     Pending Prescriptions Disp Refills    bumetanide (BUMEX) 2 MG tablet 30 tablet 0     Sig: Take 1 tablet by mouth Daily for 30 days.    albuterol (ACCUNEB) 1.25 MG/3ML nebulizer solution 300 mL 12     Sig: Take 3 mL by nebulization Every 6 (Six) Hours As Needed for Wheezing or Shortness of Air.    budesonide (PULMICORT) 1 MG/2ML nebulizer solution 180 mL 1     Sig: Take 2 mL by nebulization Daily.        Pharmacy where request should be sent: [facility] [address] [phone] [facility] [phone]      Last office visit with prescribing clinician: 6/16/2025   Last telemedicine visit with prescribing clinician: Visit date not found   Next office visit with prescribing clinician: Visit date not found     Does the patient have less than a 3 day supply:  [x] Yes  [] No    Would you like a call back once the refill request has been completed: [x] Yes [] No    If the office needs to give you a call back, can they leave a voicemail: [x] Yes [] No    Steven Felix Rep   07/22/25 10:01 EDT

## 2025-07-25 ENCOUNTER — OFFICE VISIT (OUTPATIENT)
Dept: FAMILY MEDICINE CLINIC | Facility: CLINIC | Age: 75
End: 2025-07-25
Payer: MEDICARE

## 2025-07-25 VITALS
TEMPERATURE: 98.4 F | OXYGEN SATURATION: 90 % | SYSTOLIC BLOOD PRESSURE: 132 MMHG | HEIGHT: 62 IN | HEART RATE: 93 BPM | WEIGHT: 240 LBS | BODY MASS INDEX: 44.16 KG/M2 | DIASTOLIC BLOOD PRESSURE: 68 MMHG

## 2025-07-25 DIAGNOSIS — G47.30 SLEEP APNEA IN ADULT: ICD-10-CM

## 2025-07-25 DIAGNOSIS — L03.115 CELLULITIS OF RIGHT FOOT: ICD-10-CM

## 2025-07-25 DIAGNOSIS — I50.32 CHRONIC DIASTOLIC CONGESTIVE HEART FAILURE: ICD-10-CM

## 2025-07-25 DIAGNOSIS — N39.0 SEPSIS DUE TO GRAM-NEGATIVE UTI: ICD-10-CM

## 2025-07-25 DIAGNOSIS — G47.30 SLEEP APNEA, UNSPECIFIED TYPE: Primary | ICD-10-CM

## 2025-07-25 DIAGNOSIS — A41.50 SEPSIS DUE TO GRAM-NEGATIVE UTI: ICD-10-CM

## 2025-07-25 RX ORDER — BUMETANIDE 2 MG/1
2 TABLET ORAL DAILY
Qty: 30 TABLET | Refills: 0 | Status: SHIPPED | OUTPATIENT
Start: 2025-07-25 | End: 2025-08-04 | Stop reason: SDUPTHER

## 2025-07-25 RX ORDER — ALBUTEROL SULFATE 1.25 MG/3ML
1 SOLUTION RESPIRATORY (INHALATION) EVERY 6 HOURS PRN
Qty: 300 ML | Refills: 3 | Status: SHIPPED | OUTPATIENT
Start: 2025-07-25 | End: 2025-08-04 | Stop reason: SDUPTHER

## 2025-07-25 RX ORDER — ALBUTEROL SULFATE 90 UG/1
2 INHALANT RESPIRATORY (INHALATION) EVERY 4 HOURS PRN
Qty: 34 G | Refills: 3 | Status: SHIPPED | OUTPATIENT
Start: 2025-07-25 | End: 2025-08-04 | Stop reason: SDUPTHER

## 2025-07-28 RX ORDER — BUMETANIDE 2 MG/1
2 TABLET ORAL DAILY
Qty: 30 TABLET | Refills: 0 | OUTPATIENT
Start: 2025-07-28 | End: 2025-08-27

## 2025-07-28 RX ORDER — ALBUTEROL SULFATE 1.25 MG/3ML
1 SOLUTION RESPIRATORY (INHALATION) EVERY 6 HOURS PRN
Qty: 300 ML | Refills: 3 | OUTPATIENT
Start: 2025-07-28

## 2025-07-28 RX ORDER — ALBUTEROL SULFATE 90 UG/1
2 INHALANT RESPIRATORY (INHALATION) EVERY 4 HOURS PRN
Qty: 34 G | Refills: 3 | OUTPATIENT
Start: 2025-07-28

## 2025-07-28 NOTE — TELEPHONE ENCOUNTER
Caller: Mary Albarran    Relationship: Self    Best call back number: [phone]     Requested Prescriptions:   Requested Prescriptions     Pending Prescriptions Disp Refills    albuterol (ACCUNEB) 1.25 MG/3ML nebulizer solution 300 mL 3     Sig: Take 3 mL by nebulization Every 6 (Six) Hours As Needed for Wheezing or Shortness of Air.    bumetanide (BUMEX) 2 MG tablet 30 tablet 0     Sig: Take 1 tablet by mouth Daily for 30 days.    albuterol sulfate  (90 Base) MCG/ACT inhaler 34 g 3     Sig: Inhale 2 puffs Every 4 (Four) Hours As Needed for Wheezing.        Pharmacy where request should be sent: [facility] [address] [phone] [facility] [phone]      Last office visit with prescribing clinician: 6/16/2025   Last telemedicine visit with prescribing clinician: Visit date not found   Next office visit with prescribing clinician: 10/31/2025     Additional details provided by patient: PATIENT CALLED IN STATING SHE WAS SEEN BY MD OLVERA ON 7.25.25 FOR A HOSPITAL FOLLOW UP.     PATIENT STATES THESE MEDICATIONS WERE SENT TO THE WRONG PHARMACY AND IS REQUESTING THEM TO BE RESENT TO [facility], PATIENT ALSO STATES FOR THE bumetanide (BUMEX) 2 MG tablet  SHE IS NEEDING A 90 DAY SUPPLY FOR INSURANCE, PLEASE ADVISE     Does the patient have less than a 3 day supply:  [x] Yes  [] No    Steven Dixon Rep   07/28/25 11:08 EDT

## 2025-07-29 DIAGNOSIS — G89.29 CHRONIC JOINT PAIN: ICD-10-CM

## 2025-07-29 DIAGNOSIS — G62.9 NEUROPATHY: ICD-10-CM

## 2025-07-29 DIAGNOSIS — M25.50 CHRONIC JOINT PAIN: ICD-10-CM

## 2025-07-29 NOTE — TELEPHONE ENCOUNTER
Caller: Mary Albarran LAKESHA    Relationship: Self    Best call back number: [phone]    Requested Prescriptions:   Requested Prescriptions     Pending Prescriptions Disp Refills    HYDROcodone-acetaminophen (NORCO)  MG per tablet 120 tablet 0     Sig: Take 1 tablet by mouth Every 6 (Six) Hours As Needed for Moderate Pain.    ONDANSETRON 4 MG    Pharmacy where request should be sent: [email or web address] [address] [phone] [facility] [phone]      Last office visit with prescribing clinician: 6/16/2025   Last telemedicine visit with prescribing clinician: Visit date not found   Next office visit with prescribing clinician: 10/31/2025     Additional details provided by patient: PATIENT HAS LESS THAN A THREE DAY SUPPLY OF MEDICATION. PLEASE SEND NEW PRESCRIPTIONS TO PHARMACY ASAP.    Does the patient have less than a 3 day supply:  [x] Yes  [] No    Would you like a call back once the refill request has been completed: [] Yes [] No    If the office needs to give you a call back, can they leave a voicemail: [] Yes [] No    Steven Jiang Rep   07/29/25 12:51 EDT

## 2025-07-30 RX ORDER — HYDROCODONE BITARTRATE AND ACETAMINOPHEN 10; 325 MG/1; MG/1
1 TABLET ORAL EVERY 6 HOURS PRN
Qty: 120 TABLET | Refills: 0 | Status: SHIPPED | OUTPATIENT
Start: 2025-07-30

## 2025-08-03 NOTE — PROGRESS NOTES
Patient or patient representative verbalized consent for the use of Ambient Listening during the visit with  Bekah Heard MD for chart documentation. 7/25/2025  10:19 EDT    Chief Complaint  Blood Infection and Hospital Follow Up Visit    Subjective      Mary Albarran is a 74 y.o. female who presents to [facility]     History of Present Illness  The patient is a 74-year-old female presenting for a hospital follow-up after hospitalization from 06/29/2025 to 07/05/2025 for sepsis and congestive heart failure. She reports doing well since discharge with no ongoing issues and followed up with cardiology last Friday. She needs 90-day prescriptions for albuterol and Bumex from [facility]. Normally seen by Chris Quiroz.    On Bumex for 3-4 months. Post-discharge, she experienced a drop in oxygen levels, was readmitted, and diagnosed with congestive heart failure. Treated for pneumonia and cellulitis during hospitalization. Gained 10 pounds in a week due to fluid retention; cardiologist advised increasing diuretic intake for 4-5 days. Not enrolled in congestive heart failure clinic. Monitoring fluid intake closely.    Uses albuterol 3-4 times daily and a nebulizer once daily for wheezing and shortness of breath. No follow-up x-ray post-discharge.    Advised to undergo sleep apnea test, prefers home test. Started on melatonin in hospital, improving sleep quality. Sleeps 11-12 hours at night without waking unless necessary. Previously visited sleep specialist but left after waiting 2.5 hours.    Has valve issues and a heart murmur.    Sleep: Sleeps 11-12 hours at night without waking unless necessary.        Patient Care Team:  Brittni Quiroz APRN as PCP - General (Nurse Practitioner)  Vignesh Santillan MD as Consulting Physician (Urology)    Objective   Vital Signs:   Vitals:    07/25/25 1000   BP: 132/68   Pulse: 93   Temp: 98.4 °F (36.9 °C)   SpO2: 90%   Weight: 109 kg  "(240 lb)   Height: 157.5 cm (62\")     Body mass index is 43.9 kg/m².    Wt Readings from Last 3 Encounters:   07/25/25 109 kg (240 lb)   06/29/25 123 kg (271 lb 6.2 oz)   06/16/25 110 kg (242 lb)     BP Readings from Last 3 Encounters:   07/25/25 132/68   07/05/25 119/66   06/16/25 118/60       Health Maintenance   Topic Date Due    DXA SCAN  Never done    DIABETIC EYE EXAM  Never done    Pneumococcal Vaccine 50+ (1 of 2 - PCV) Never done    TDAP/TD VACCINES (1 - Tdap) Never done    MAMMOGRAM  Never done    ZOSTER VACCINE (1 of 2) Never done    URINE MICROALBUMIN-CREATININE RATIO (uACR)  01/20/2022    DIABETIC FOOT EXAM  05/19/2024    COVID-19 Vaccine (1 - 2024-25 season) Never done    LIPID PANEL  09/13/2024    ANNUAL WELLNESS VISIT  02/16/2025    HEMOGLOBIN A1C  09/07/2025    INFLUENZA VACCINE  10/01/2025    COLORECTAL CANCER SCREENING  03/10/2026    HEPATITIS C SCREENING  Completed    LUNG CANCER SCREENING  Discontinued       Lab Results (last 24 hours)       ** No results found for the last 24 hours. **               Physical Exam     Physical Exam  General Appearance: Normal.  Vital signs: Within normal limits.  HEENT: Within normal limits.  Respiratory: Clear to auscultation, no wheezing, rales, or rhonchi.  Cardiovascular: Regular rate and rhythm, no murmurs, rubs, or gallops.  Skin: Warm and dry, no rash.  Neurological: Normal.      Result Review   The following data was reviewed by: Bekah Heard MD on 07/25/2025:  [x]  Tests & Results  []  Hospitalization/Emergency Department/Urgent Care  []  Internal/External Consultant Notes    Results  - Imaging:    - X-ray of the right lower lobe: Right lower lobe shadowed      Procedures            ASSESSMENT/PLAN  Assessment & Plan  Sleep apnea, unspecified type    Orders:    Ambulatory Referral to Sleep Medicine    Chronic diastolic congestive heart failure               Sepsis due to gram-negative UTI         Cellulitis of right foot         Sleep apnea in " adult              Assessment & Plan  1. Congestive Heart Failure: Stable. Hospitalized from 06/29/2025 to 07/05/2025 for congestive heart failure and sepsis. Recent discharge on 07/15/2025. No shortness of breath reported since discharge.  - Cardiologist recommended increasing diuretic dosage for 4-5 days to manage fluid retention.  - Bumex prescription renewed for 90-day supply.    2. Sepsis: Stable. Hospitalized from 06/29/2025 to 07/05/2025 for sepsis. No further issues post-discharge.  - Monitoring for signs of infection.    3. Pneumonia: Stable. Treated for pneumonia during hospitalization at [facility].- Monitoring for signs of respiratory infection.    4. Cellulitis: Stable. Treated for cellulitis during hospitalization at [facility]. No further issues post-discharge.  - Monitoring for signs of skin infection.    5. Suspected Sleep Apnea.  - Referral to sleep specialist for evaluation and potential home sleep study.  - Importance of treating sleep apnea to prevent right heart failure discussed.    6. Medication Management.  - Prescriptions for albuterol renewed for 90-day supply.  - Uses albuterol up to 4 times a day and a nebulizer once a day as needed.  - Monitoring effectiveness of albuterol and nebulizer use.  - Ensuring medication adherence and availability.    Follow-up  - Referral to sleep specialist for evaluation and potential home sleep study.               Mary Albarran  reports that she quit smoking about 17 years ago. Her smoking use included cigarettes. She started smoking about 54 years ago. She has a 37 pack-year smoking history. She has never been exposed to tobacco smoke. She has never used smokeless tobacco.              FOLLOW UP  No follow-ups on file.  Patient was given instructions and counseling regarding her condition or for health maintenance advice. Please see specific information pulled into the AVS if appropriate.       Bekah Heard MD  08/03/25  08:18 EDT    Part of this note  may be an electronic transcription/translation of spoken language to printed text using the Dragon Dictation System.

## 2025-08-04 RX ORDER — ONDANSETRON 4 MG/1
4 TABLET, ORALLY DISINTEGRATING ORAL EVERY 8 HOURS PRN
Qty: 90 TABLET | Refills: 3 | Status: SHIPPED | OUTPATIENT
Start: 2025-08-04 | End: 2025-08-04 | Stop reason: SDUPTHER

## 2025-08-04 RX ORDER — BUMETANIDE 2 MG/1
2 TABLET ORAL DAILY
Qty: 30 TABLET | Refills: 0 | Status: SHIPPED | OUTPATIENT
Start: 2025-08-04 | End: 2025-09-03

## 2025-08-04 RX ORDER — ALBUTEROL SULFATE 90 UG/1
2 INHALANT RESPIRATORY (INHALATION) EVERY 4 HOURS PRN
Qty: 34 G | Refills: 3 | Status: SHIPPED | OUTPATIENT
Start: 2025-08-04

## 2025-08-04 RX ORDER — ONDANSETRON 4 MG/1
4 TABLET, ORALLY DISINTEGRATING ORAL EVERY 8 HOURS PRN
Qty: 90 TABLET | Refills: 3 | Status: SHIPPED | OUTPATIENT
Start: 2025-08-04

## 2025-08-04 RX ORDER — ALBUTEROL SULFATE 1.25 MG/3ML
1 SOLUTION RESPIRATORY (INHALATION) EVERY 6 HOURS PRN
Qty: 300 ML | Refills: 3 | Status: SHIPPED | OUTPATIENT
Start: 2025-08-04